# Patient Record
Sex: MALE | Race: WHITE | NOT HISPANIC OR LATINO | Employment: OTHER | URBAN - METROPOLITAN AREA
[De-identification: names, ages, dates, MRNs, and addresses within clinical notes are randomized per-mention and may not be internally consistent; named-entity substitution may affect disease eponyms.]

---

## 2017-12-27 ENCOUNTER — HOSPITAL ENCOUNTER (EMERGENCY)
Facility: HOSPITAL | Age: 52
Discharge: HOME | End: 2017-12-28
Attending: EMERGENCY MEDICINE | Admitting: EMERGENCY MEDICINE
Payer: COMMERCIAL

## 2017-12-27 DIAGNOSIS — T69.9XXA COLD EXPOSURE, INITIAL ENCOUNTER: Primary | ICD-10-CM

## 2017-12-28 VITALS
WEIGHT: 205 LBS | BODY MASS INDEX: 32.11 KG/M2 | SYSTOLIC BLOOD PRESSURE: 122 MMHG | HEART RATE: 100 BPM | OXYGEN SATURATION: 99 % | TEMPERATURE: 97.3 F | RESPIRATION RATE: 18 BRPM | DIASTOLIC BLOOD PRESSURE: 72 MMHG

## 2017-12-28 PROCEDURE — 99284 EMERGENCY DEPT VISIT MOD MDM: CPT

## 2017-12-28 RX ORDER — METHYLPHENIDATE HYDROCHLORIDE 10 MG/1
5 TABLET ORAL
COMMUNITY
End: 2018-10-09 | Stop reason: ALTCHOICE

## 2017-12-28 RX ORDER — PANTOPRAZOLE SODIUM 40 MG/1
40 TABLET, DELAYED RELEASE ORAL DAILY
COMMUNITY
End: 2018-08-03 | Stop reason: SDUPTHER

## 2017-12-28 RX ORDER — CLOZAPINE 50 MG/1
50 TABLET ORAL DAILY
Status: ON HOLD | COMMUNITY
End: 2019-12-23 | Stop reason: DRUGHIGH

## 2017-12-28 NOTE — ED ATTENDING ATTESTATION
Angelic Fish MD, saw and evaluated the patient  I have discussed the patient with the resident/non-physician practitioner and agree with the resident's/non-physician practitioner's findings, Plan of Care, and MDM as documented in the resident's/non-physician practitioner's note, except where noted  All available labs and Radiology studies were reviewed  At this point I agree with the current assessment done in the Emergency Department  I have conducted an independent evaluation of this patient a history and physical is as follows:      Critical Care Time  CritCare Time    Procedures     51 yo male from group home, was walking outside for 4 to 5 hours  Pt was seen by  and was brought in to due to frozen pants and concern for hypothermia  Pt with no current complaints  No cp, no sob, no abdominal pain, no headache  Vss, afebrile, lungs cta, rrr, abdomen soft nontender, no neuro deficits  Reassurance, discuss with [facility]

## 2017-12-28 NOTE — ED PROVIDER NOTES
History  Chief Complaint   Patient presents with    Cold Exposure     pt was walking around outside for approx 4-5hrs  pt was found by police with urine soaked/frozen pants  pt states "i was just walking, i live in a group home "     HPI  51-year-old male with known psychiatric history presents to the emergency department after being found by police walking through the streets  Patient states that he lives in a group home and that he went for a walk today  He reports having walked for about 4 hours in the cold today and that he was picked up by a  who drove by and saw him a with freezing pants  Patient denies any complaints or injuries states that he feels well and would like to go back to his group home  Review of history is negative other than stated above  Prior to Admission Medications   Prescriptions Last Dose Informant Patient Reported? Taking? Multiple Vitamin (DAILY DEVORA PO) 12/27/2017 at morning  Yes Yes   Sig: Take 1 tablet by mouth daily  aspirin 81 MG tablet 12/27/2017 at morning  Yes Yes   Sig: Take 81 mg by mouth daily  benztropine (COGENTIN) 1 mg tablet 12/27/2017 at morning  Yes Yes   Sig: Take 0 5 mg by mouth 2 (two) times a day     clozapine (CLOZARIL) 50 MG tablet 12/27/2017 at morning  Yes Yes   Sig: Take 50 mg by mouth daily   divalproex sodium (DEPAKOTE) 500 mg EC tablet 12/26/2017 at bedtime  Yes Yes   Sig: Take 1,000 mg by mouth daily at bedtime  divalproex sodium (DEPAKOTE) 500 mg EC tablet 12/27/2017 at morning time  Yes Yes   Sig: Take 500 mg by mouth every morning  levothyroxine 75 mcg tablet 12/27/2017 at morning  Yes Yes   Sig: Take 75 mcg by mouth daily  lisinopril (ZESTRIL) 5 mg tablet 12/27/2017 at morning  Yes Yes   Sig: Take 5 mg by mouth daily     metFORMIN (GLUCOPHAGE) 500 mg tablet 12/27/2017 at Unknown time  Yes Yes   Sig: Take 500 mg by mouth 2 (two) times a day with meals     methylphenidate (RITALIN) 10 mg tablet 12/27/2017 at morning time Yes Yes   Sig: Take 5 mg by mouth daily before breakfast   pantoprazole (PROTONIX) 40 mg tablet 12/27/2017 at morning  Yes Yes   Sig: Take 40 mg by mouth daily      Facility-Administered Medications: None       Past Medical History:   Diagnosis Date    Bipolar 1 disorder ([place] [address] )     Constipation     Diabetes ([place] [address] )     Disease of thyroid gland     GERD (gastroesophageal reflux disease)     Hypertension     Hyponatremia     Hypothyroidism     Psychiatric disorder     Schizo affective schizophrenia ([place] [address] )        History reviewed  No pertinent surgical history  Family History   Problem Relation Age of Onset    Family history unknown: Yes     I have reviewed and agree with the history as documented  Social History   Substance Use Topics    Smoking status: Current Every Day Smoker     Packs/day: 2 00     Types: Cigarettes    Smokeless tobacco: Never Used    Alcohol use No        Review of Systems   Constitutional: Negative for fatigue and fever  Eyes: Negative for visual disturbance  Respiratory: Negative for cough and shortness of breath  Cardiovascular: Negative for chest pain and leg swelling  Gastrointestinal: Negative for abdominal pain, nausea and vomiting  Genitourinary: Negative for dysuria  Musculoskeletal: Negative for gait problem  Skin: Negative for rash and wound  Neurological: Negative for dizziness and headaches  Psychiatric/Behavioral: The patient is not nervous/anxious  All other systems reviewed and are negative        Physical Exam  ED Triage Vitals [12/27/17 2327]   Temperature Pulse Respirations Blood Pressure SpO2   (!) 97 3 °F (36 3 °C) 97 20 101/60 98 %      Temp Source Heart Rate Source Patient Position - Orthostatic VS BP Location FiO2 (%)   Oral Monitor Lying Right arm --      Pain Score       No Pain           Orthostatic Vital Signs  Vitals:    12/27/17 2327 12/28/17 0000 12/28/17 0030   BP: 101/60 91/56 122/72   Pulse: 97 92 100   Patient Position - Orthostatic VS: Lying Lying Lying       Physical Exam   Constitutional: He appears well-nourished  No distress  HENT:   Head: Normocephalic and atraumatic  Eyes: EOM are normal    Neck: Normal range of motion  Neck supple  Cardiovascular: Normal rate and regular rhythm  Pulmonary/Chest: Effort normal and breath sounds normal  No respiratory distress  Abdominal: Soft  He exhibits no distension  There is no tenderness  Musculoskeletal: Normal range of motion  Neurological: He is alert  Skin: Skin is warm and dry  He is not diaphoretic  Psychiatric: He has a normal mood and affect  His behavior is normal  Cognition and memory are normal    Nursing note and vitals reviewed  ED Medications  Medications - No data to display    Diagnostic Studies  Results Reviewed     None                 No orders to display         Procedures  Procedures      Phone Consults  ED Phone Contact    ED Course  ED Course                                MDM  Number of Diagnoses or Management Options  Cold exposure, initial encounter:   Diagnosis management comments: 53-year-old male picked up by police after found walking in the cold  Patient denies any complaints at this time  Okay for discharge back to group home  CritCare Time    Disposition  Final diagnoses:   Cold exposure, initial encounter     Time reflects when diagnosis was documented in both MDM as applicable and the Disposition within this note     Time User Action Codes Description Comment    12/28/2017  1:07 AM Danyelle Crain  9XXA] Cold exposure, initial encounter       ED Disposition     ED Disposition Condition Comment    Discharge  Garry Granados discharge to home/self care  Condition at discharge: Good        Follow-up Information     Follow up With Specialties Details Why Contact Info Additional Information    Bessie Jessica MD   As needed [address] [facility] Emergency Department Emergency Medicine  If symptoms worsen [address]  [phone]  ED, [address] [place] [address], [phone]        Patient's Medications   Discharge Prescriptions    No medications on file     No discharge procedures on file  ED Provider  Attending physically available and evaluated Teodoro Chang I managed the patient along with the ED Attending      Electronically Signed by         Hilda Abarca MD  Resident  12/28/17 9477

## 2017-12-28 NOTE — ED NOTES
[facility] ACT worker at the bedside, Brody Gerard  Requested she be called and updated on pt status  [phone]   Pt lives at a group home Rayna Zhu pa [address] [facility] [place], RN  12/28/17 8331

## 2018-03-29 LAB
ABSOL LYMPHOCYTES (HISTORICAL): 3 K/UL (ref 0.5–4)
BASOPHILS # BLD AUTO: 0 % (ref 0–1)
BASOPHILS # BLD AUTO: 0 K/UL (ref 0–0.1)
DEPRECATED RDW RBC AUTO: 15.2 %
EOSINOPHIL # BLD AUTO: 0 K/UL (ref 0–0.4)
EOSINOPHIL NFR BLD AUTO: 0 % (ref 0–6)
HCT VFR BLD AUTO: 41.2 % (ref 41–53)
HGB BLD-MCNC: 13.8 G/DL (ref 13.5–17.5)
LYMPHOCYTES NFR BLD AUTO: 26 % (ref 25–45)
MCH RBC QN AUTO: 29.4 PG (ref 26–34)
MCHC RBC AUTO-ENTMCNC: 33.5 % (ref 31–36)
MCV RBC AUTO: 88 FL (ref 80–100)
MONOCYTES # BLD AUTO: 1.1 K/UL (ref 0.2–0.9)
MONOCYTES NFR BLD AUTO: 10 % (ref 1–10)
NEUTROPHILS ABS COUNT (HISTORICAL): 7.2 K/UL (ref 1.8–7.8)
NEUTS SEG NFR BLD AUTO: 64 % (ref 45–65)
PLATELET # BLD AUTO: 130 K/MCL (ref 150–450)
RBC # BLD AUTO: 4.68 M/MCL (ref 4.5–5.9)
WBC # BLD AUTO: 11.3 K/MCL (ref 4.5–11)

## 2018-04-03 LAB
ABSOL LYMPHOCYTES (HISTORICAL): 3.6 K/UL (ref 0.5–4)
ALBUMIN SERPL BCP-MCNC: 3.7 G/DL (ref 3–5.2)
ALP SERPL-CCNC: 54 U/L (ref 43–122)
ALT SERPL W P-5'-P-CCNC: 26 U/L (ref 9–52)
ANION GAP SERPL CALCULATED.3IONS-SCNC: 7 MMOL/L (ref 5–14)
AST SERPL W P-5'-P-CCNC: 26 U/L (ref 17–59)
BASOPHILS # BLD AUTO: 0.1 K/UL (ref 0–0.1)
BASOPHILS # BLD AUTO: 1 % (ref 0–1)
BILIRUB SERPL-MCNC: 0.2 MG/DL
BUN SERPL-MCNC: 9 MG/DL (ref 5–25)
CALCIUM SERPL-MCNC: 8.9 MG/DL (ref 8.4–10.2)
CHLORIDE SERPL-SCNC: 99 MEQ/L (ref 97–108)
CHOLEST SERPL-MCNC: 134 MG/DL
CHOLEST/HDLC SERPL: 3.8 {RATIO}
CO2 SERPL-SCNC: 28 MMOL/L (ref 22–30)
CREATINE, SERUM (HISTORICAL): 0.53 MG/DL (ref 0.7–1.5)
DEPRECATED RDW RBC AUTO: 15.5 %
EGFR (HISTORICAL): >60 ML/MIN/1.73 M2
EOSINOPHIL # BLD AUTO: 0 K/UL (ref 0–0.4)
EOSINOPHIL NFR BLD AUTO: 0 % (ref 0–6)
EST. AVERAGE GLUCOSE BLD GHB EST-MCNC: 117 MG/DL
GLUCOSE SERPL-MCNC: 86 MG/DL (ref 70–99)
HBA1C MFR BLD HPLC: 5.7 %
HCT VFR BLD AUTO: 41.9 % (ref 41–53)
HDLC SERPL-MCNC: 35 MG/DL
HGB BLD-MCNC: 14.1 G/DL (ref 13.5–17.5)
LDL/HDL RATIO (HISTORICAL): 2.2
LDLC SERPL CALC-MCNC: 78 MG/DL
LYMPHOCYTES NFR BLD AUTO: 33 % (ref 25–45)
MCH RBC QN AUTO: 29.4 PG (ref 26–34)
MCHC RBC AUTO-ENTMCNC: 33.6 % (ref 31–36)
MCV RBC AUTO: 88 FL (ref 80–100)
MONOCYTES # BLD AUTO: 1.1 K/UL (ref 0.2–0.9)
MONOCYTES NFR BLD AUTO: 10 % (ref 1–10)
NEUTROPHILS ABS COUNT (HISTORICAL): 6.1 K/UL (ref 1.8–7.8)
NEUTS SEG NFR BLD AUTO: 56 % (ref 45–65)
PLATELET # BLD AUTO: 141 K/MCL (ref 150–450)
POTASSIUM SERPL-SCNC: 4 MEQ/L (ref 3.6–5)
PSA (HISTORICAL): 0.58 NG/ML
RBC # BLD AUTO: 4.79 M/MCL (ref 4.5–5.9)
SODIUM SERPL-SCNC: 134 MEQ/L (ref 137–147)
TOTAL PROTEIN (HISTORICAL): 6.2 G/DL (ref 5.9–8.4)
TRIGL SERPL-MCNC: 105 MG/DL
TSH SERPL DL<=0.05 MIU/L-ACNC: 1.25 UIU/ML (ref 0.47–4.68)
VLDLC SERPL CALC-MCNC: 21 MG/DL (ref 0–40)
WBC # BLD AUTO: 10.9 K/MCL (ref 4.5–11)

## 2018-04-13 LAB
ABSOL LYMPHOCYTES (HISTORICAL): 2.2 K/UL (ref 0.5–4)
BASOPHILS # BLD AUTO: 0 % (ref 0–1)
BASOPHILS # BLD AUTO: 0 K/UL (ref 0–0.1)
DEPRECATED RDW RBC AUTO: 15.2 %
EOSINOPHIL # BLD AUTO: 0 K/UL (ref 0–0.4)
EOSINOPHIL NFR BLD AUTO: 0 % (ref 0–6)
HCT VFR BLD AUTO: 43.9 % (ref 41–53)
HGB BLD-MCNC: 14.5 G/DL (ref 13.5–17.5)
LYMPHOCYTES NFR BLD AUTO: 21 % (ref 25–45)
MCH RBC QN AUTO: 28.9 PG (ref 26–34)
MCHC RBC AUTO-ENTMCNC: 33 % (ref 31–36)
MCV RBC AUTO: 88 FL (ref 80–100)
MONOCYTES # BLD AUTO: 1.2 K/UL (ref 0.2–0.9)
MONOCYTES NFR BLD AUTO: 11 % (ref 1–10)
NEUTROPHILS ABS COUNT (HISTORICAL): 7 K/UL (ref 1.8–7.8)
NEUTS SEG NFR BLD AUTO: 68 % (ref 45–65)
PLATELET # BLD AUTO: 140 K/MCL (ref 150–450)
RBC # BLD AUTO: 5.01 M/MCL (ref 4.5–5.9)
WBC # BLD AUTO: 10.4 K/MCL (ref 4.5–11)

## 2018-04-27 LAB
ABSOL LYMPHOCYTES (HISTORICAL): 2 K/UL (ref 0.5–4)
BASOPHILS # BLD AUTO: 0 % (ref 0–1)
BASOPHILS # BLD AUTO: 0 K/UL (ref 0–0.1)
DEPRECATED RDW RBC AUTO: 14.9 %
EOSINOPHIL # BLD AUTO: 0 K/UL (ref 0–0.4)
EOSINOPHIL NFR BLD AUTO: 0 % (ref 0–6)
HCT VFR BLD AUTO: 44 % (ref 41–53)
HGB BLD-MCNC: 14.4 G/DL (ref 13.5–17.5)
LYMPHOCYTES NFR BLD AUTO: 26 % (ref 25–45)
MCH RBC QN AUTO: 29 PG (ref 26–34)
MCHC RBC AUTO-ENTMCNC: 32.8 % (ref 31–36)
MCV RBC AUTO: 89 FL (ref 80–100)
MONOCYTES # BLD AUTO: 1 K/UL (ref 0.2–0.9)
MONOCYTES NFR BLD AUTO: 12 % (ref 1–10)
NEUTROPHILS ABS COUNT (HISTORICAL): 4.8 K/UL (ref 1.8–7.8)
NEUTS SEG NFR BLD AUTO: 62 % (ref 45–65)
PLATELET # BLD AUTO: 144 K/MCL (ref 150–450)
RBC # BLD AUTO: 4.97 M/MCL (ref 4.5–5.9)
WBC # BLD AUTO: 7.8 K/MCL (ref 4.5–11)

## 2018-05-11 LAB
ABSOL LYMPHOCYTES (HISTORICAL): 2.4 K/UL (ref 0.5–4)
ACETAMINOPHEN LEVEL (HISTORICAL): <10 UG/ML (ref 10–30)
ALBUMIN SERPL BCP-MCNC: 4.4 G/DL (ref 3–5.2)
ALP SERPL-CCNC: 60 U/L (ref 43–122)
ALT SERPL W P-5'-P-CCNC: 31 U/L (ref 9–52)
AMPHETAMINE URINE (HISTORICAL): NEGATIVE
ANION GAP SERPL CALCULATED.3IONS-SCNC: 10 MMOL/L (ref 5–14)
AST SERPL W P-5'-P-CCNC: 28 U/L (ref 17–59)
BARBITURATE URINE (HISTORICAL): NEGATIVE
BASOPHILS # BLD AUTO: 0 % (ref 0–1)
BASOPHILS # BLD AUTO: 0 K/UL (ref 0–0.1)
BENZODIAZEPINE URINE (HISTORICAL): NEGATIVE
BILIRUB SERPL-MCNC: 0.3 MG/DL
BILIRUB UR QL STRIP: NEGATIVE MG/DL
BUN SERPL-MCNC: 12 MG/DL (ref 5–25)
CALCIUM SERPL-MCNC: 9.5 MG/DL (ref 8.4–10.2)
CHLORIDE SERPL-SCNC: 105 MEQ/L (ref 97–108)
CLARITY UR: CLEAR
CO2 SERPL-SCNC: 26 MMOL/L (ref 22–30)
COCAINE (METAB.), URINE (HISTORICAL): NEGATIVE
COLOR UR: YELLOW
COMMENT (HISTORICAL): ABNORMAL
COMMENT (HISTORICAL): NORMAL
CREATINE, SERUM (HISTORICAL): 0.61 MG/DL (ref 0.7–1.5)
DEPRECATED RDW RBC AUTO: 15.2 %
DRUG COMMENT (HISTORICAL): NORMAL
EGFR (HISTORICAL): >60 ML/MIN/1.73 M2
EOSINOPHIL # BLD AUTO: 0 K/UL (ref 0–0.4)
EOSINOPHIL NFR BLD AUTO: 0 % (ref 0–6)
GLUCOSE SERPL-MCNC: 91 MG/DL (ref 70–99)
GLUCOSE SERPL-MCNC: 92 MG/DL (ref 70–99)
GLUCOSE UR STRIP-MCNC: NEGATIVE MG/DL
HCT VFR BLD AUTO: 49.3 % (ref 41–53)
HGB BLD-MCNC: 16 G/DL (ref 13.5–17.5)
HGB UR QL STRIP.AUTO: NEGATIVE
KETONES UR STRIP-MCNC: NEGATIVE MG/DL
LEUKOCYTE ESTERASE UR QL STRIP: NEGATIVE
LYMPHOCYTES NFR BLD AUTO: 14 % (ref 25–45)
MCH RBC QN AUTO: 28.6 PG (ref 26–34)
MCHC RBC AUTO-ENTMCNC: 32.4 % (ref 31–36)
MCV RBC AUTO: 88 FL (ref 80–100)
MDMA (GC/MS) (HISTORICAL): NEGATIVE
METHADONE URINE (HISTORICAL): NEGATIVE
METHAMPHETAMINE URINE (HISTORICAL): NEGATIVE
METHYL ALCOHOL (HISTORICAL): NEGATIVE MG/DL
MONOCYTES # BLD AUTO: 1 K/UL (ref 0.2–0.9)
MONOCYTES NFR BLD AUTO: 6 % (ref 1–10)
NEUTROPHILS ABS COUNT (HISTORICAL): 13.9 K/UL (ref 1.8–7.8)
NEUTS SEG NFR BLD AUTO: 80 % (ref 45–65)
NITRITE UR QL STRIP: NEGATIVE
OPIATES (HISTORICAL): NEGATIVE
OXYCODONE (HISTORICAL): NEGATIVE
PH UR STRIP.AUTO: 6 [PH] (ref 4.5–8)
PHENCYCLIDINE URINE (HISTORICAL): NEGATIVE
PLATELET # BLD AUTO: 152 K/MCL (ref 150–450)
POTASSIUM SERPL-SCNC: 4.9 MEQ/L (ref 3.6–5)
PROT UR STRIP-MCNC: NEGATIVE MG/DL
RBC # BLD AUTO: 5.59 M/MCL (ref 4.5–5.9)
RBC MORPHOLOGY (HISTORICAL): ABNORMAL
SALICYLATE LEVEL (HISTORICAL): <1 MG/DL (ref 10–30)
SODIUM SERPL-SCNC: 141 MEQ/L (ref 137–147)
SP GR UR STRIP.AUTO: 1 (ref 1–1.04)
THC URINE (HISTORICAL): NEGATIVE
TOTAL PROTEIN (HISTORICAL): 7.2 G/DL (ref 5.9–8.4)
TRICYCLICS URINE (HISTORICAL): NEGATIVE
TSH SERPL DL<=0.05 MIU/L-ACNC: 0.51 UIU/ML (ref 0.47–4.68)
UROBILINOGEN UR QL STRIP.AUTO: NEGATIVE MG/DL (ref 0–1)
VALPROIC ACID TOTAL (HISTORICAL): 63.4 UG/ML (ref 50–120)
WBC # BLD AUTO: 17.3 K/MCL (ref 4.5–11)

## 2018-05-31 LAB
ABSOL LYMPHOCYTES (HISTORICAL): 2.4 K/UL (ref 0.5–4)
BANDS (HISTORICAL): 1 % (ref 3–11)
COMMENT (HISTORICAL): ABNORMAL
DEPRECATED RDW RBC AUTO: 14.9 %
HCT VFR BLD AUTO: 44.9 % (ref 41–53)
HGB BLD-MCNC: 15.2 G/DL (ref 13.5–17.5)
LYMPHOCYTES NFR BLD AUTO: 29 % (ref 25–45)
MCH RBC QN AUTO: 30.5 PG (ref 26–34)
MCHC RBC AUTO-ENTMCNC: 33.8 % (ref 31–36)
MCV RBC AUTO: 90 FL (ref 80–100)
MONOCYTES # BLD AUTO: 1.1 K/UL (ref 0.2–0.9)
MONOCYTES NFR BLD AUTO: 13 % (ref 1–10)
NEUTROPHILS ABS COUNT (HISTORICAL): 4.7 K/UL (ref 1.8–7.8)
NEUTS SEG NFR BLD AUTO: 57 % (ref 45–65)
PLATELET # BLD AUTO: 152 K/MCL (ref 150–450)
RBC # BLD AUTO: 4.98 M/MCL (ref 4.5–5.9)
RBC MORPHOLOGY (HISTORICAL): ABNORMAL
WBC # BLD AUTO: 8.2 K/MCL (ref 4.5–11)

## 2018-06-22 LAB
ABSOL LYMPHOCYTES (HISTORICAL): 2.2 K/UL (ref 0.5–4)
BASOPHILS # BLD AUTO: 0 % (ref 0–1)
BASOPHILS # BLD AUTO: 0 K/UL (ref 0–0.1)
DEPRECATED RDW RBC AUTO: 15 %
EOSINOPHIL # BLD AUTO: 0 K/UL (ref 0–0.4)
EOSINOPHIL NFR BLD AUTO: 0 % (ref 0–6)
HCT VFR BLD AUTO: 47.4 % (ref 41–53)
HGB BLD-MCNC: 15.7 G/DL (ref 13.5–17.5)
LYMPHOCYTES NFR BLD AUTO: 21 % (ref 25–45)
MCH RBC QN AUTO: 30 PG (ref 26–34)
MCHC RBC AUTO-ENTMCNC: 33.2 % (ref 31–36)
MCV RBC AUTO: 91 FL (ref 80–100)
MONOCYTES # BLD AUTO: 1 K/UL (ref 0.2–0.9)
MONOCYTES NFR BLD AUTO: 10 % (ref 1–10)
NEUTROPHILS ABS COUNT (HISTORICAL): 7.4 K/UL (ref 1.8–7.8)
NEUTS SEG NFR BLD AUTO: 69 % (ref 45–65)
PLATELET # BLD AUTO: 138 K/MCL (ref 150–450)
RBC # BLD AUTO: 5.24 M/MCL (ref 4.5–5.9)
WBC # BLD AUTO: 10.7 K/MCL (ref 4.5–11)

## 2018-06-27 ENCOUNTER — HOSPITAL ENCOUNTER (EMERGENCY)
Facility: HOSPITAL | Age: 53
Discharge: HOME | End: 2018-06-28
Attending: EMERGENCY MEDICINE | Admitting: EMERGENCY MEDICINE
Payer: COMMERCIAL

## 2018-06-27 VITALS
WEIGHT: 185 LBS | HEART RATE: 84 BPM | BODY MASS INDEX: 28.98 KG/M2 | SYSTOLIC BLOOD PRESSURE: 144 MMHG | DIASTOLIC BLOOD PRESSURE: 84 MMHG | OXYGEN SATURATION: 96 % | TEMPERATURE: 96.6 F | RESPIRATION RATE: 18 BRPM

## 2018-06-27 DIAGNOSIS — R10.9 ABDOMINAL PAIN: Primary | ICD-10-CM

## 2018-06-28 PROCEDURE — 99284 EMERGENCY DEPT VISIT MOD MDM: CPT

## 2018-06-28 NOTE — ED PROVIDER NOTES
Pt Name: Kirstie Carias  MRN: [id number]  [place] 1965  Age/Sex: 50 y o  male  Date of evaluation: 6/27/2018  PCP: Telly Guerrero MD    [address] [place] [address]    Chief Complaint   Patient presents with    Abdominal Pain     pt states that he started with ABD pain this morning  pt states that his Abd pain stopped this afternoon  pt denies any pain at this time  pt denies N/V/D  pt states that he wants to go tot he 3rd floor to ICU  pt states he wants to be admitted to the ICU floor  HPI    50 y o  male presenting with complaint of abdominal pain that has since resolved  Patient states that his stomach hurt some this morning, but resolved this afternoon  He states he still able eat and drink  Patient also complains that he does not want to take his medication anymore and does not want to be doped up  He also states these not want to go back to the home where he is living  Patient also states that he is going to save up his money and get a  at the [phone]  He is currently denying any symptoms to include abdominal pain, nausea, vomiting, diarrhea  HPI      Past Medical and Surgical History    Past Medical History:   Diagnosis Date    Bipolar 1 disorder ([facility] )     Constipation     Diabetes ([facility] )     Disease of thyroid gland     GERD (gastroesophageal reflux disease)     Hypertension     Hyponatremia     Hypothyroidism     Psychiatric disorder     Schizo affective schizophrenia ([place] [address] )        History reviewed  No pertinent surgical history      Family History   Problem Relation Age of Onset    Family history unknown: Yes       Social History   Substance Use Topics    Smoking status: Current Every Day Smoker     Packs/day: 2 00     Types: Cigarettes    Smokeless tobacco: Never Used    Alcohol use No           Allergies    Allergies   Allergen Reactions    Penicillins Anaphylaxis    Carbapenems     Cephalosporins        Home Medications    Prior to Admission medications    Medication Sig Start Date End Date Taking? Authorizing Provider   aspirin 81 MG tablet Take 81 mg by mouth daily  Historical Provider, MD   benztropine (COGENTIN) 1 mg tablet Take 0 5 mg by mouth 2 (two) times a day      Historical Provider, MD   clozapine (CLOZARIL) 50 MG tablet Take 50 mg by mouth daily    Historical Provider, MD   divalproex sodium (DEPAKOTE) 500 mg EC tablet Take 1,000 mg by mouth daily at bedtime  Historical Provider, MD   divalproex sodium (DEPAKOTE) 500 mg EC tablet Take 500 mg by mouth every morning  Historical Provider, MD   levothyroxine 75 mcg tablet Take 75 mcg by mouth daily  Historical Provider, MD   lisinopril (ZESTRIL) 5 mg tablet Take 5 mg by mouth daily  Historical Provider, MD   metFORMIN (GLUCOPHAGE) 500 mg tablet Take 500 mg by mouth 2 (two) times a day with meals      Historical Provider, MD   methylphenidate (RITALIN) 10 mg tablet Take 5 mg by mouth daily before breakfast    Historical Provider, MD   Multiple Vitamin (DAILY DEVORA PO) Take 1 tablet by mouth daily  Historical Provider, MD   pantoprazole (PROTONIX) 40 mg tablet Take 40 mg by mouth daily    Historical Provider, MD           Review of Systems    Review of Systems   Constitutional: Negative for appetite change, chills and diaphoresis  HENT: Negative for drooling, facial swelling, trouble swallowing and voice change  Respiratory: Negative for apnea, shortness of breath and wheezing  Cardiovascular: Negative for chest pain and leg swelling  Gastrointestinal: Positive for abdominal pain  Negative for abdominal distention, diarrhea, nausea and vomiting  Genitourinary: Negative for dysuria and urgency  Musculoskeletal: Negative for arthralgias, back pain, gait problem and neck pain  Skin: Negative for color change, rash and wound  Neurological: Negative for seizures, speech difficulty, weakness and headaches  Psychiatric/Behavioral: Negative for agitation, behavioral problems and dysphoric mood   The patient is not nervous/anxious  All other systems reviewed and negative  Physical Exam      ED Triage Vitals [06/27/18 2300]   Temperature Pulse Respirations Blood Pressure SpO2   (!) 96 6 °F (35 9 °C) 84 18 144/84 96 %      Temp Source Heart Rate Source Patient Position - Orthostatic VS BP Location FiO2 (%)   Temporal Monitor Sitting Left arm --      Pain Score       No Pain               Physical Exam   Constitutional: He is oriented to person, place, and time  He appears well-developed and well-nourished  HENT:   Head: Normocephalic and atraumatic  Eyes: Conjunctivae and EOM are normal  Pupils are equal, round, and reactive to light  Neck: Normal range of motion  Neck supple  No tracheal deviation present  Cardiovascular: Normal rate, regular rhythm, normal heart sounds and intact distal pulses  No murmur heard  Pulmonary/Chest: Effort normal and breath sounds normal  No stridor  No respiratory distress  He has no wheezes  He has no rales  Abdominal: Soft  He exhibits no distension and no mass  There is no tenderness  There is no rebound and no guarding  Negative Hyatt sign, no pain at McBurney's point, negative Rovsing's  No tenderness anywhere on abdominal exam    Musculoskeletal: Normal range of motion  He exhibits no edema or deformity  Neurological: He is alert and oriented to person, place, and time  Skin: Skin is warm and dry  No rash noted  Psychiatric: He has a normal mood and affect   His behavior is normal  Judgment and thought content normal             Diagnostic Results      Labs:    Results for orders placed or performed in visit on 06/22/18   CBC AND DIFFERENTIAL (HISTORICAL)   Result Value Ref Range    WBC 10 7 4 5 - 11 0 K/MCL    RBC 5 24 4 50 - 5 90 M/MCL    Hemoglobin 15 7 13 5 - 17 5 G/DL    Hematocrit 47 4 41 0 - 53 0 %    MCV 91 80 - 100 FL    MCH 30 0 26 0 - 34 0 PG    MCHC 33 2 31 0 - 36 0 %    RDW 15 0 <15 3 %    Platelets 307 (L) 214 - 450 K/MCL Neutrophils Relative 69 (H) 45 - 65 %    NEUTROPHILS ABS COUNT 7 4 1 8 - 7 8 K/uL    ABSOL LYMPHOCYTES 2 2 0 5 - 4 0 K/uL    Monocytes Absolute 1 0 (H) 0 2 - 0 9 K/uL    Eosinophils Absolute 0 0 0 0 - 0 4 K/uL    Basophils Absolute 0 0 0 0 - 0 1 K/uL    Lymphocytes Relative 21 (L) 25 - 45 %    Monocytes Relative 10 1 - 10 %    Eosinophils Relative 0 0 - 6 %    Basophils Relative 0 0 - 1 %       All labs reviewed and utilized in the medical decision making process    Radiology:    No orders to display       All radiology studies independently viewed by me and interpreted by the radiologist     Procedure    Procedures    CritCare Time      ED Course of Care and Re-Assessments      Patient asymptomatic throughout stay, when his  arrived to the ER, patient requested discharge  Medications - No data to display        FINAL IMPRESSION    Final diagnoses:   Abdominal pain         DISPOSITION/PLAN    82-year-old male with history and symptoms above  Vital signs reassuring, examination unremarkable  All symptoms resolved prior to evaluation and did not recur in ER  Patient displayed slightly disordered thinking throughout interview, but no evidence for acute threat to himself or others  Discussed with patient's  on his arrival, who felt comfortable with patient returning to his group home  Discharged with strict return precautions, follow up with primary care doctor  Time reflects when diagnosis was documented in both MDM as applicable and the Disposition within this note     Time User Action Codes Description Comment    6/27/2018 11:56 PM Cloria Cogan T Add [R10 9] Abdominal pain       ED Disposition     ED Disposition Condition Comment    Discharge  Darrell Lesser Alpha discharge to home/self care      Condition at discharge: Good        Follow-up Information     Follow up With Specialties Details Why Contact Info    Stalin Kelley MD Family Medicine Call in 1 day As needed [address] [place] [address] [address]  [phone]              PATIENT REFERRED TO:    Ulysses Chatters, MD  [address]  [address]    Call in 1 day  As needed      DISCHARGE MEDICATIONS:    Discharge Medication List as of 6/27/2018 11:56 PM      CONTINUE these medications which have NOT CHANGED    Details   benztropine (COGENTIN) 1 mg tablet Take 0 5 mg by mouth 2 (two) times a day  , Historical Med      clozapine (CLOZARIL) 50 MG tablet Take 50 mg by mouth daily, Historical Med      !! divalproex sodium (DEPAKOTE) 500 mg EC tablet Take 1,000 mg by mouth daily at bedtime  , Until Discontinued, Historical Med      !! divalproex sodium (DEPAKOTE) 500 mg EC tablet Take 500 mg by mouth every morning , Until Discontinued, Historical Med      lisinopril (ZESTRIL) 5 mg tablet Take 5 mg by mouth daily  , Until Discontinued, Historical Med      metFORMIN (GLUCOPHAGE) 500 mg tablet Take 500 mg by mouth 2 (two) times a day with meals  , Historical Med      Multiple Vitamin (DAILY DEVORA PO) Take 1 tablet by mouth daily  , Until Discontinued, Historical Med      levothyroxine 75 mcg tablet Take 75 mcg by mouth daily  , Until Discontinued, Historical Med      methylphenidate (RITALIN) 10 mg tablet Take 5 mg by mouth daily before breakfast, Historical Med      pantoprazole (PROTONIX) 40 mg tablet Take 40 mg by mouth daily, Historical Med       !! - Potential duplicate medications found  Please discuss with provider  No discharge procedures on file           MD Chadwick Corrales MD  06/28/18 [id number]

## 2018-06-28 NOTE — DISCHARGE INSTRUCTIONS

## 2018-07-03 ENCOUNTER — TELEPHONE (OUTPATIENT)
Dept: FAMILY MEDICINE CLINIC | Facility: CLINIC | Age: 53
End: 2018-07-03

## 2018-07-13 PROBLEM — L84 FOOT CALLUS: Status: ACTIVE | Noted: 2017-07-19

## 2018-07-17 DIAGNOSIS — E11.9 TYPE 2 DIABETES MELLITUS WITHOUT COMPLICATION, WITHOUT LONG-TERM CURRENT USE OF INSULIN (HCC): Primary | ICD-10-CM

## 2018-07-17 DIAGNOSIS — E11.69 TYPE 2 DIABETES MELLITUS WITH OTHER SPECIFIED COMPLICATION, WITHOUT LONG-TERM CURRENT USE OF INSULIN (HCC): ICD-10-CM

## 2018-07-19 ENCOUNTER — APPOINTMENT (OUTPATIENT)
Dept: LAB | Facility: HOSPITAL | Age: 53
End: 2018-07-19
Payer: COMMERCIAL

## 2018-07-19 ENCOUNTER — TRANSCRIBE ORDERS (OUTPATIENT)
Dept: ADMINISTRATIVE | Facility: HOSPITAL | Age: 53
End: 2018-07-19

## 2018-07-19 DIAGNOSIS — E13.8 DIABETES MELLITUS OF OTHER TYPE WITH COMPLICATION, UNSPECIFIED WHETHER LONG TERM INSULIN USE: ICD-10-CM

## 2018-07-19 DIAGNOSIS — I10 ESSENTIAL HYPERTENSION, MALIGNANT: ICD-10-CM

## 2018-07-19 DIAGNOSIS — E13.8 DIABETES MELLITUS OF OTHER TYPE WITH COMPLICATION, UNSPECIFIED WHETHER LONG TERM INSULIN USE: Primary | ICD-10-CM

## 2018-07-19 DIAGNOSIS — E11.9 TYPE 2 DIABETES MELLITUS WITHOUT COMPLICATION, WITHOUT LONG-TERM CURRENT USE OF INSULIN (HCC): ICD-10-CM

## 2018-07-19 LAB
ALBUMIN SERPL BCP-MCNC: 3.8 G/DL (ref 3–5.2)
ALP SERPL-CCNC: 70 U/L (ref 43–122)
ALT SERPL W P-5'-P-CCNC: 23 U/L (ref 9–52)
ANION GAP SERPL CALCULATED.3IONS-SCNC: 7 MMOL/L (ref 5–14)
AST SERPL W P-5'-P-CCNC: 21 U/L (ref 17–59)
BASOPHILS # BLD AUTO: 0 THOUSANDS/ΜL (ref 0–0.1)
BASOPHILS NFR BLD AUTO: 0 % (ref 0–1)
BILIRUB SERPL-MCNC: 0.2 MG/DL
BUN SERPL-MCNC: 11 MG/DL (ref 5–25)
CALCIUM SERPL-MCNC: 9 MG/DL (ref 8.4–10.2)
CHLORIDE SERPL-SCNC: 103 MMOL/L (ref 97–108)
CHOLEST SERPL-MCNC: 101 MG/DL
CO2 SERPL-SCNC: 29 MMOL/L (ref 22–30)
CREAT SERPL-MCNC: 0.6 MG/DL (ref 0.7–1.5)
CREAT UR-MCNC: 64.8 MG/DL
EOSINOPHIL # BLD AUTO: 0 THOUSAND/ΜL (ref 0–0.4)
EOSINOPHIL NFR BLD AUTO: 0 % (ref 0–6)
ERYTHROCYTE [DISTWIDTH] IN BLOOD BY AUTOMATED COUNT: 14.7 %
EST. AVERAGE GLUCOSE BLD GHB EST-MCNC: 126 MG/DL
GFR SERPL CREATININE-BSD FRML MDRD: 116 ML/MIN/1.73SQ M
GLUCOSE P FAST SERPL-MCNC: 94 MG/DL (ref 70–99)
HBA1C MFR BLD: 6 % (ref 4.2–6.3)
HCT VFR BLD AUTO: 45.5 % (ref 41–53)
HDLC SERPL-MCNC: 38 MG/DL (ref 40–59)
HGB BLD-MCNC: 15.3 G/DL (ref 13.5–17.5)
LDLC SERPL CALC-MCNC: 44 MG/DL
LYMPHOCYTES # BLD AUTO: 2.6 THOUSANDS/ΜL (ref 0.5–4)
LYMPHOCYTES NFR BLD AUTO: 27 % (ref 20–50)
MCH RBC QN AUTO: 30.4 PG (ref 26–34)
MCHC RBC AUTO-ENTMCNC: 33.7 G/DL (ref 31–36)
MCV RBC AUTO: 90 FL (ref 80–100)
MICROALBUMIN UR-MCNC: 14.5 MG/L (ref 0–20)
MICROALBUMIN/CREAT 24H UR: 22 MG/G CREATININE (ref 0–30)
MONOCYTES # BLD AUTO: 1 THOUSAND/ΜL (ref 0.2–0.9)
MONOCYTES NFR BLD AUTO: 11 % (ref 1–10)
NEUTROPHILS # BLD AUTO: 6 THOUSANDS/ΜL (ref 1.8–7.8)
NEUTS SEG NFR BLD AUTO: 62 % (ref 45–65)
NONHDLC SERPL-MCNC: 63 MG/DL
PLATELET # BLD AUTO: 137 THOUSANDS/UL (ref 150–450)
PMV BLD AUTO: 8.9 FL (ref 8.9–12.7)
POTASSIUM SERPL-SCNC: 4.1 MMOL/L (ref 3.6–5)
PROT SERPL-MCNC: 6.8 G/DL (ref 5.9–8.4)
RBC # BLD AUTO: 5.04 MILLION/UL (ref 4.5–5.9)
SODIUM SERPL-SCNC: 139 MMOL/L (ref 137–147)
TRIGL SERPL-MCNC: 94 MG/DL
TSH SERPL DL<=0.05 MIU/L-ACNC: 1.43 UIU/ML (ref 0.47–4.68)
WBC # BLD AUTO: 9.6 THOUSAND/UL (ref 4.5–11)

## 2018-07-19 PROCEDURE — 80061 LIPID PANEL: CPT

## 2018-07-19 PROCEDURE — 82570 ASSAY OF URINE CREATININE: CPT | Performed by: FAMILY MEDICINE

## 2018-07-19 PROCEDURE — 85025 COMPLETE CBC W/AUTO DIFF WBC: CPT

## 2018-07-19 PROCEDURE — 36415 COLL VENOUS BLD VENIPUNCTURE: CPT

## 2018-07-19 PROCEDURE — 82043 UR ALBUMIN QUANTITATIVE: CPT | Performed by: FAMILY MEDICINE

## 2018-07-19 PROCEDURE — 84443 ASSAY THYROID STIM HORMONE: CPT

## 2018-07-19 PROCEDURE — 83036 HEMOGLOBIN GLYCOSYLATED A1C: CPT

## 2018-07-19 PROCEDURE — 80053 COMPREHEN METABOLIC PANEL: CPT

## 2018-07-19 PROCEDURE — 3061F NEG MICROALBUMINURIA REV: CPT | Performed by: FAMILY MEDICINE

## 2018-07-19 RX ORDER — BLOOD PRESSURE TEST KIT
KIT MISCELLANEOUS
Qty: 200 EACH | Refills: 0 | Status: SHIPPED | OUTPATIENT
Start: 2018-07-19 | End: 2019-08-07 | Stop reason: SDUPTHER

## 2018-07-21 ENCOUNTER — APPOINTMENT (OUTPATIENT)
Dept: LAB | Facility: HOSPITAL | Age: 53
End: 2018-07-21
Payer: COMMERCIAL

## 2018-07-21 ENCOUNTER — TRANSCRIBE ORDERS (OUTPATIENT)
Dept: ADMINISTRATIVE | Facility: HOSPITAL | Age: 53
End: 2018-07-21

## 2018-07-21 DIAGNOSIS — E13.8 DIABETES MELLITUS OF OTHER TYPE WITH COMPLICATION, UNSPECIFIED WHETHER LONG TERM INSULIN USE: ICD-10-CM

## 2018-07-21 DIAGNOSIS — E66.9 OBESITY, UNSPECIFIED CLASSIFICATION, UNSPECIFIED OBESITY TYPE, UNSPECIFIED WHETHER SERIOUS COMORBIDITY PRESENT: ICD-10-CM

## 2018-07-21 DIAGNOSIS — I10 ESSENTIAL HYPERTENSION, MALIGNANT: ICD-10-CM

## 2018-07-21 DIAGNOSIS — F25.9 SCHIZOAFFECTIVE DISORDER, UNSPECIFIED TYPE (HCC): Primary | ICD-10-CM

## 2018-07-21 DIAGNOSIS — Z79.899 NEED FOR PROPHYLACTIC CHEMOTHERAPY: ICD-10-CM

## 2018-07-21 DIAGNOSIS — F25.9 SCHIZOAFFECTIVE DISORDER, UNSPECIFIED TYPE (HCC): ICD-10-CM

## 2018-07-21 LAB
BASOPHILS # BLD AUTO: 0 THOUSANDS/ΜL (ref 0–0.1)
BASOPHILS NFR BLD AUTO: 0 % (ref 0–1)
EOSINOPHIL # BLD AUTO: 0 THOUSAND/ΜL (ref 0–0.4)
EOSINOPHIL NFR BLD AUTO: 0 % (ref 0–6)
ERYTHROCYTE [DISTWIDTH] IN BLOOD BY AUTOMATED COUNT: 14.8 %
HCT VFR BLD AUTO: 44 % (ref 41–53)
HGB BLD-MCNC: 14.8 G/DL (ref 13.5–17.5)
LYMPHOCYTES # BLD AUTO: 2.3 THOUSANDS/ΜL (ref 0.5–4)
LYMPHOCYTES NFR BLD AUTO: 18 % (ref 20–50)
MCH RBC QN AUTO: 30.2 PG (ref 26–34)
MCHC RBC AUTO-ENTMCNC: 33.5 G/DL (ref 31–36)
MCV RBC AUTO: 90 FL (ref 80–100)
MONOCYTES # BLD AUTO: 1.2 THOUSAND/ΜL (ref 0.2–0.9)
MONOCYTES NFR BLD AUTO: 10 % (ref 1–10)
NEUTROPHILS # BLD AUTO: 9.3 THOUSANDS/ΜL (ref 1.8–7.8)
NEUTS SEG NFR BLD AUTO: 72 % (ref 45–65)
PLATELET # BLD AUTO: 144 THOUSANDS/UL (ref 150–450)
PMV BLD AUTO: 8.6 FL (ref 8.9–12.7)
RBC # BLD AUTO: 4.88 MILLION/UL (ref 4.5–5.9)
WBC # BLD AUTO: 12.9 THOUSAND/UL (ref 4.5–11)

## 2018-07-21 PROCEDURE — 85025 COMPLETE CBC W/AUTO DIFF WBC: CPT

## 2018-07-21 PROCEDURE — 36415 COLL VENOUS BLD VENIPUNCTURE: CPT

## 2018-07-23 ENCOUNTER — OFFICE VISIT (OUTPATIENT)
Dept: FAMILY MEDICINE CLINIC | Facility: CLINIC | Age: 53
End: 2018-07-23
Payer: COMMERCIAL

## 2018-07-23 VITALS
SYSTOLIC BLOOD PRESSURE: 112 MMHG | DIASTOLIC BLOOD PRESSURE: 76 MMHG | HEIGHT: 66 IN | RESPIRATION RATE: 16 BRPM | HEART RATE: 76 BPM | WEIGHT: 201 LBS | TEMPERATURE: 97.2 F | OXYGEN SATURATION: 92 % | BODY MASS INDEX: 32.3 KG/M2

## 2018-07-23 DIAGNOSIS — F17.200 TOBACCO DEPENDENCE SYNDROME: ICD-10-CM

## 2018-07-23 DIAGNOSIS — Z12.11 ENCOUNTER FOR SCREENING COLONOSCOPY: ICD-10-CM

## 2018-07-23 DIAGNOSIS — Z12.5 ENCOUNTER FOR PROSTATE CANCER SCREENING: ICD-10-CM

## 2018-07-23 DIAGNOSIS — E66.9 OBESITY (BMI 30.0-34.9): ICD-10-CM

## 2018-07-23 DIAGNOSIS — Z00.00 MEDICARE ANNUAL WELLNESS VISIT, SUBSEQUENT: Primary | ICD-10-CM

## 2018-07-23 DIAGNOSIS — Z11.59 NEED FOR HEPATITIS C SCREENING TEST: ICD-10-CM

## 2018-07-23 PROCEDURE — 3725F SCREEN DEPRESSION PERFORMED: CPT | Performed by: FAMILY MEDICINE

## 2018-07-23 PROCEDURE — 99396 PREV VISIT EST AGE 40-64: CPT | Performed by: FAMILY MEDICINE

## 2018-07-23 PROCEDURE — 3008F BODY MASS INDEX DOCD: CPT | Performed by: FAMILY MEDICINE

## 2018-07-23 RX ORDER — LANCETS 28 GAUGE
EACH MISCELLANEOUS
COMMUNITY
Start: 2018-03-26 | End: 2019-08-13 | Stop reason: SDUPTHER

## 2018-07-23 RX ORDER — CLOZAPINE 100 MG/1
100 TABLET ORAL DAILY
Status: ON HOLD | COMMUNITY
End: 2020-08-07

## 2018-07-23 RX ORDER — ATORVASTATIN CALCIUM 10 MG/1
TABLET, FILM COATED ORAL
COMMUNITY
Start: 2018-07-10 | End: 2018-08-23 | Stop reason: SDUPTHER

## 2018-07-23 RX ORDER — BLOOD-GLUCOSE METER
KIT MISCELLANEOUS
COMMUNITY
Start: 2018-05-31 | End: 2018-09-27 | Stop reason: SDUPTHER

## 2018-07-23 NOTE — PROGRESS NOTES
Assessment and Plan:    Problem List Items Addressed This Visit     Tobacco dependence syndrome     Uncontrolled patient has been smoking half to 1 pack a day for more than 2 years he declined to quit smoking           Other Visit Diagnoses     Medicare annual wellness visit, subsequent    -  Primary    Patient up-to-date with the tetanus shot he declined the shingles vaccine patient does followed by podiatric an eye doctor for diabetic eye and foot care    Obesity (BMI 30 0-34 9)        Uncontrolled lifestyle modification discussed with the patient proper diet and exercise discussed with the patient    Encounter for screening colonoscopy        Patient declined colonoscopy    Need for hepatitis C screening test        Relevant Orders    Hepatitis C antibody    Encounter for prostate cancer screening        Relevant Orders    PSA, Total Screen        Health Maintenance Due   Topic Date Due    HIV SCREENING  1965    Diabetic Foot Exam  07/17/1977    DM Eye Exam  07/17/1977       HPI:  Abdifatah Ziegler is a 48 y o  male here for his Subsequent Wellness Visit     Patient live in a group home in office with the caregiver per her he already had HIV screening and patient does followed by Ophthalmology and podiatric for diabetic eye and foot care    Patient Active Problem List   Diagnosis    Encephalopathy acute    Bipolar 1 disorder ([address] )    Schizo affective schizophrenia ([address] )    Constipation, chronic    Hyperammonemia ([address]    Type 2 diabetes mellitus ([address] )    Tracheobronchitis    Streptococcus pneumoniae    Aneurysm of ascending aorta ([address] )    Chronic airway obstruction, not elsewhere classified    Benign essential hypertension    Disorder of lipoprotein and lipid metabolism    Plantar fasciitis    Foot callus    Gastroesophageal reflux disease without esophagitis    GI bleed    Hyponatremia    Hypothyroidism    Ischemic colitis ([address] )    Moderate cigarette smoker (10-19 per day)    Morbid obesity ([place] [address] )    Tobacco dependence syndrome    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     Past Medical History:   Diagnosis Date    Bipolar 1 disorder ([place] [address] ) 2011    Colitis 03/27/2014    Constipation     Diabetes ([place] [address] ) 2011    Disease of thyroid gland     GERD (gastroesophageal reflux disease) 2011    Hypertension     Hyponatremia     Hypothyroidism 2011    Lipoprotein deficiency     Obesity     Plantar fasciitis     Psychiatric disorder     Schizo affective schizophrenia ([place] [address] ) 2011    Sebaceous gland disease     Tobacco abuse      History reviewed  No pertinent surgical history  Family History   Problem Relation Age of Onset    Family history unknown: Yes     History   Smoking Status    Current Every Day Smoker    Packs/day: 1 00    Types: Cigarettes   Smokeless Tobacco    Never Used     History   Alcohol Use No      History   Drug Use No       Current Outpatient Prescriptions   Medication Sig Dispense Refill    Alcohol Swabs PADS USE AS NEEDED TO CHECK BLOOD GLUCOSE (DIABETES) 200 each 0    atorvastatin (LIPITOR) 10 mg tablet       cloZAPine (CLOZARIL) 100 mg tablet Take 400 mg by mouth daily      clozapine (CLOZARIL) 50 MG tablet Take 50 mg by mouth daily      divalproex sodium (DEPAKOTE) 500 mg EC tablet Take 1,000 mg by mouth daily at bedtime   divalproex sodium (DEPAKOTE) 500 mg EC tablet Take 500 mg by mouth every morning   FREESTYLE LITE test strip       Lancets (FREESTYLE) lancets TEST BLOOD SUGAR ONCE DAILY (DIABETES)      levothyroxine 75 mcg tablet Take 75 mcg by mouth daily   metFORMIN (GLUCOPHAGE) 500 mg tablet Take 500 mg by mouth 2 (two) times a day with meals        Multiple Vitamin (DAILY DEVORA PO) Take 1 tablet by mouth daily        pantoprazole (PROTONIX) 40 mg tablet Take 40 mg by mouth daily      benztropine (COGENTIN) 1 mg tablet Take 0 5 mg by mouth 2 (two) times a day        lisinopril (ZESTRIL) 5 mg tablet Take 5 mg by mouth daily       methylphenidate (RITALIN) 10 mg tablet Take 5 mg by mouth daily before breakfast       No current facility-administered medications for this visit        Allergies   Allergen Reactions    Penicillins Anaphylaxis    Carbapenems     Cephalosporins      Immunization History   Administered Date(s) Administered    DTaP 06/07/2012    Influenza 09/22/2015    Influenza TIV (IM) 09/22/2011, 10/22/2012, 09/22/2015    Pneumococcal Polysaccharide PPV23 11/14/2008, 09/22/2011    Tdap 11/14/2008, 02/26/2014       Patient Care Team:  Driss Mccloud MD as PCP - General      Medicare Screening Tests and Risk Assessments:  AWV Clinical     ISAR:   Previous hospitalizations?:  No       Once in a Lifetime Medicare Screening:       Medicare Screening Tests and Risk Assessment:   AAA Risk Assessment    Osteoporosis Risk Assessment    HIV Risk Assessment        Drug and Alcohol Use:   Tobacco use    Cigarettes:  current smoker    Tobacco use duration    Packs per day:  1    Tobacco Cessation Readiness    Readiness to quit:  not ready    Alcohol use    Alcohol use:  never    Alcohol Treatment Readiness   Illicit Drug Use    Drug use:  never        Diet & Exercise:   Diet   What is your diet?:  Regular   How many servings a day of the following:   Fruits and Vegetables:  1-2 Meat:  1-2   Whole Grains:  2 Simple Carbs:  3   Dairy:  1 Soda:  2   Coffee:  2    Exercise    Do you currently exercise?:  currently not exercising       Cognitive Impairment Screening:   Cognitive Impairment Screening    Do you have difficulty learning or retaining new information?:  Yes Do you have difficulty handling new tasks?:  No   Do you have difficulty with reasoning?:  No Do you have difficulty with spatial ability and orientation?:  No   Do you have difficulty with language?:  No Do you have difficulty with behavior?:  Yes       Functional Ability/Level of Safety:   Hearing    Hearing difficulties:  No Bilateral:  normal   Left:  normal Right:  normal   Hearing aid:  No    Hearing Impairment Assessment    Current Activities    Help needed with the folllowing:    Using the phone:  No Transportation:  Yes   Shopping:  No Preparing Meals:  Yes   Doing Housework:  No Doing Laundry:  No   Managing Medications:  Yes Managing Money:  No   ADL    Fall Risk   Have you fallen in the last 12 months?:  No    Injury History       Home Safety:   Are there hazards in your environment?:  No   If you fell, would you need help to get back up from the ground?:  Yes Do you have problems or concerns getting in/out of a bed, chair, tub, or toilet?:  No   Do you feel unsteady when walking?:  No Is your activity limited by pain?:  No   Do you have handrails and grab-bars in the home?:  No Are emergency numbers kept by the phone and regularly updated?:  Yes   Are you and/or family members aware of the dangers of smoking in bed?:  No Are firearms stored securely?:  Yes   Do you have working smoke alarms and fire extinguisher?:  Yes Do all household members know how to use them?:  Yes   Have you left the stove on unsupervised?:  No    Home Safety Risk Factors   Unfamilar with surroundings:  No Uneven floors:  No   Stairs or handrail saftey risk:  No Loose rugs:  No   Household clutter:  No Poor household lighting:  No   No grab bars in bathroom:  No Further evaluation needed:  No       Advanced Directives:   Advanced Directives    Living Will:  No Durable POA for healthcare:  No   Advanced directive:  No    Patient's End of Life Decisions        Urinary Incontinence:   Do you have urinary incontinence?:  No        Glaucoma:            Provider Screening     Preventative Screening/Counseling:   Cardiovascular Screening/Counseling:   (Labs Q5 years, EKG optional one-time)   General:  Risks and Benefits Discussed, Screening Current Counseling:  Healthy Diet, Healthy Weight          Diabetes Screening/Counseling:   (2 tests/year if Pre-Diabetes or 1 test/year if no Diabetes) General:  Risks and Benefits Discussed, Screening Current Counseling:  Healthy Diet, Healthy Weight          Colorectal Cancer Screening/Counseling:   (FOBT Q1 yr; Flex Sig Q4 yrs or Q10 yrs after Screening Colonoscopy; Screening Colonoscpy Q2 yrs High Risk or Q10 yrs Low Risk; Barium Enema Q2 yrs High Risk or Q4 yrs Low Risk)   General:  Patient Declines, Risks and Benefits Discussed           Prostate Cancer Screening/Counseling:   (Annual)    General:  Risks and Benefits Discussed Due for: Labs:  PSA         Breast Cancer Screening/Counseling:   (Baseline Age 28 - 43; Annual Age 36+)         Cervical Cancer Screening/Counseling:   (Annual for High Risk or Childbearing Age with Abnormal Pap in Last 3 yrs; Every 2 all others)         Osteoporosis Screening/Counseling:   (Every 2 Yrs if at risk or more if medically necessary)   General:  Risks and Benefits Discussed, Screening Not Indicated           AAA Screening/Counseling:   (Once per Lifetime with risk factors)          Glaucoma Screening/Counseling:   (Annual)   General:  Risks and Benefits Discussed, Screening Current          HIV Screening/Counseling:   (Voluntary; Once annually for high risk OR 3 times for Pregnancy at diagnosis of IUP; 3rd trimester; and at Labor   General:  Risks and Benefits Discussed, Screening Current           Hepatitis C Screening:    Hepatitis C Screening Accepted: Yes              Immunizations:   Influenza (annual): Influenza UTD This Year   Pneumococcal (Once in a Lifetime):  Pneumococcal Due Today   Hepatitis B Series (low risk patients):  Prevention Counseling   Hepatitis B Series (medium to high risk patients scheduled series):  Patient Declines   Tdap (Non-Medicare Wellness Visit required):   Tdap Vaccine UTD       Other Preventative Couseling (Non-Medicare Wellness Visit Required):   nutrition counseling performed, fall prevention education provided, sunscreen education provided       Referrals (Non-Medicare Wellness Visit Required):       Medical Equipment/Suppliers:

## 2018-07-23 NOTE — PATIENT INSTRUCTIONS

## 2018-07-23 NOTE — ASSESSMENT & PLAN NOTE
Uncontrolled patient has been smoking half to 1 pack a day for more than 2 years he declined to quit smoking

## 2018-08-01 ENCOUNTER — HOSPITAL ENCOUNTER (EMERGENCY)
Facility: HOSPITAL | Age: 53
Discharge: HOME | End: 2018-08-01
Attending: EMERGENCY MEDICINE
Payer: COMMERCIAL

## 2018-08-01 VITALS
TEMPERATURE: 98.2 F | WEIGHT: 198.41 LBS | HEART RATE: 71 BPM | BODY MASS INDEX: 32.52 KG/M2 | RESPIRATION RATE: 20 BRPM | SYSTOLIC BLOOD PRESSURE: 122 MMHG | DIASTOLIC BLOOD PRESSURE: 86 MMHG | OXYGEN SATURATION: 96 %

## 2018-08-01 DIAGNOSIS — F22 PARANOIA (HCC): Primary | ICD-10-CM

## 2018-08-01 LAB
AMPHETAMINES SERPL QL SCN: NEGATIVE
BARBITURATES UR QL: NEGATIVE
BENZODIAZ UR QL: NEGATIVE
COCAINE UR QL: NEGATIVE
ETHANOL EXG-MCNC: 0 MG/DL
METHADONE UR QL: NEGATIVE
OPIATES UR QL SCN: NEGATIVE
PCP UR QL: NEGATIVE
THC UR QL: NEGATIVE

## 2018-08-01 PROCEDURE — 82075 ASSAY OF BREATH ETHANOL: CPT | Performed by: EMERGENCY MEDICINE

## 2018-08-01 PROCEDURE — 99284 EMERGENCY DEPT VISIT MOD MDM: CPT

## 2018-08-01 PROCEDURE — 80307 DRUG TEST PRSMV CHEM ANLYZR: CPT | Performed by: EMERGENCY MEDICINE

## 2018-08-01 RX ORDER — NICOTINE 21 MG/24HR
21 PATCH, TRANSDERMAL 24 HOURS TRANSDERMAL ONCE
Status: DISCONTINUED | OUTPATIENT
Start: 2018-08-01 | End: 2018-08-01

## 2018-08-01 NOTE — ED PROVIDER NOTES
Final Diagnosis:  1  Paranoia [facility])      Chief Complaint   Patient presents with    Psychiatric Evaluation     Per ems pt has been increasingly aggressive with other guests at [facility]  Pt states "I called 911 because I don't want to live at [facility] anymore, the place has voice boxes, they want to drive me crazy " Denies SI, HI, VH  This is a 48year old male presenting for evaluation of paranoia  The patient is in a group home  He states that he doesn't want to live there and wants to get an apartment separate from everyone else because he thinks that the facility is trying to make him crazy by installing voice boxes to make him crazy somehow  He also thinks they are talking about him  Denies anything else bothering him  Specifically, denies f/ch/n/v/cp/sob  Denies falls, trauma  Denies SI/HI/VH  Denies AH but also stated about the voice boxes  Per report, the patient was agitated and they called 911 due to his behavior  PMH:  - hypothyroidism  - psychiatric disorder  - diabetes  - cosntipation  - gerd  - bipolar  - schizo-affective schizophrenia  - HTN  - obesity  PSH:  - ?  Smokes daily and was counseled  No alcohol  No drugs  Vitals:    08/01/18 0039   BP: 122/86   BP Location: Right arm   Pulse: 71   Resp: 20   Temp: 98 2 °F (36 8 °C)   TempSrc: Oral   SpO2: 96%   Weight: 90 kg (198 lb 6 6 oz)   General: VSS, NAD, awake, alert  Well-nourished, well-developed  Appears stated age  Speaking normally in full sentences  Head: Normocephalic, atraumatic, nontender  Eyes: PERRL, EOM-I  No diplopia  No hyphema  No subconjunctival hemorrhages  Symmetrical lids  ENT: Atraumatic external nose and ears  MMM  No malocclusion  No stridor  Normal phonation  No drooling  Normal swallowing  Neck: Symmetric, trachea midline  No JVD  CV: RRR  +S1/S2  No murmurs or gallops  Peripheral pulses +2 throughout  No chest wall tenderness     Lungs:   Unlabored No retractions  CTAB, lungs sounds equal bilateral    No tachypnea  Abd: +BS, soft, NT/ND    MSK:   FROM   Back:   No rashes  Skin: Dry, intact  Neuro: AAOx3, GCS 15, CN II-XII grossly intact  Motor grossly intact  Psychiatric/Behavioral: Appropriate mood and affect   Exam: deferred  A:  - Paranoia  - agitation  P:  - CRISIS  - 13 point ROS was performed and all are normal unless stated in the history above  - Nursing note reviewed  Vitals reviewed  - Orders placed by myself and/or advanced practitioner / resident     - Previous chart was reviewed  - No language barrier    - History obtained from patient  - There are no limitations to the history obtained  - Critical care time: Not applicable for this patient  ED Course as of Aug 01 0120   Wed Aug 01, 2018   0118 Discussed with CRISIS  Patient going back to safe place of group home and can work on finding his own place as their own protocols  Medications   nicotine (NICODERM CQ) 21 mg/24 hr TD 24 hr patch 21 mg (not administered)     No orders to display     Orders Placed This Encounter   Procedures    Rapid drug screen, urine    Consult to ED crisis worker    POCT alcohol breath test     Labs Reviewed   POCT ALCOHOL BREATH TEST - Normal       Result Value Ref Range Status    EXTBreath Alcohol 0 00   Final   RAPID DRUG SCREEN, URINE     Time reflects when diagnosis was documented in both MDM as applicable and the Disposition within this note     Time User Action Codes Description Comment    8/1/2018 12:55 AM Jarvis Griffin Add [F22] [facility])       ED Disposition     ED Disposition Condition Comment    Discharge  Susie Gaitan discharge to home/self care      Condition at discharge: Good        Follow-up Information     Follow up With Specialties Details Why Contact Info Additional [address] [facility] Emergency Department Emergency Medicine Go to [facility] symptoms worsen [facility] [address] [phone] [address] [place] [address] ED, 2404 [address] [facility] , Willian Rivas MD Family Medicine Call in 1 day To make appointment for re-evaluation in 1 week [address] [facility]  [address] [facility]  [phone]           Patient's Medications   Discharge Prescriptions    No medications on file     No discharge procedures on file  Prior to Admission Medications   Prescriptions Last Dose Informant Patient Reported? Taking? Alcohol Swabs PADS   No No   Sig: USE AS NEEDED TO CHECK BLOOD GLUCOSE (DIABETES)   FREESTYLE LITE test strip   Yes No   Lancets (FREESTYLE) lancets   Yes No   Sig: TEST BLOOD SUGAR ONCE DAILY (DIABETES)   Multiple Vitamin (DAILY DEVORA PO)   Yes No   Sig: Take 1 tablet by mouth daily  atorvastatin (LIPITOR) 10 mg tablet   Yes No   benztropine (COGENTIN) 1 mg tablet   Yes No   Sig: Take 0 5 mg by mouth 2 (two) times a day     cloZAPine (CLOZARIL) 100 mg tablet   Yes No   Sig: Take 400 mg by mouth daily   clozapine (CLOZARIL) 50 MG tablet   Yes No   Sig: Take 50 mg by mouth daily   divalproex sodium (DEPAKOTE) 500 mg EC tablet   Yes No   Sig: Take 1,000 mg by mouth daily at bedtime  divalproex sodium (DEPAKOTE) 500 mg EC tablet   Yes No   Sig: Take 500 mg by mouth every morning  levothyroxine 75 mcg tablet   Yes No   Sig: Take 75 mcg by mouth daily  lisinopril (ZESTRIL) 5 mg tablet   Yes No   Sig: Take 5 mg by mouth daily  metFORMIN (GLUCOPHAGE) 500 mg tablet   Yes No   Sig: Take 500 mg by mouth 2 (two) times a day with meals     methylphenidate (RITALIN) 10 mg tablet   Yes No   Sig: Take 5 mg by mouth daily before breakfast   pantoprazole (PROTONIX) 40 mg tablet   Yes No   Sig: Take 40 mg by mouth daily      Facility-Administered Medications: None       Portions of the record may have been created with voice recognition software  Occasional wrong word or "sound a like" substitutions may have occurred due to the inherent limitations of voice recognition software   Read the chart carefully and recognize, using context, where substitutions have occurred      Electronically signed by:  Sukhwinder Goodman MD  08/01/18 0126

## 2018-08-01 NOTE — DISCHARGE INSTRUCTIONS
Psychotic Disorder   WHAT YOU NEED TO KNOW:   A psychotic disorder is a medical condition that causes hallucinations and delusions  Hallucinations are seeing, hearing, tasting, or feeling things that are not real  Delusions are beliefs that something is real, true, or right when it is not  These false beliefs do not go away even if there is proof that they are not true  You may believe someone is spying on you, after you, or controlling your mind  You may also believe there is something wrong with how your body works  Schizophrenia and schizoaffective disorder are examples of psychotic disorders  DISCHARGE INSTRUCTIONS:   Call 911 if:   · You feel like you could harm yourself or someone else  Contact your healthcare provider if:   · Your symptoms do not improve  · You cannot make it to your next appointment  · You have new symptoms  · You have questions or concerns about your condition or care  Medicines  may be given to decrease your symptoms  You may need 1 or more medicines  You may need to take your medicine for several weeks before you begin to feel better  Tell your healthcare provider about any side effects or problems you have with your medicines  The type or amount of medicine may need to be changed  Get support: It may be difficult to cope with your illness  You may feel lonely, anxious, or depressed  It may help to join a support group  A support group lets you talk with others who have a mental illness  For information and more support visit:  · [facility]  [address] [facility]  , [address] [place] [address]  Phone: [phone]  Phone: [phone]  Web Address: http://Appier/  [place]  Self-care:   · Do not drink alcohol or use illegal drugs  Alcohol and illegal drugs can make your symptoms worse  Ask your healthcare provider for information if you currently drink alcohol or use illegal drugs and need help to quit  · Do not smoke    Nicotine and other chemicals in cigarettes and cigars can cause lung damage  They can also decrease how well your medicine works  Ask your healthcare provider for information if you currently smoke and need help to quit  E-cigarettes or smokeless tobacco still contain nicotine  Talk to your healthcare provider before you use these products  · Exercise regularly  Exercise can help improve your mood and decrease symptoms  Ask about the best exercise plan for you  · Manage your stress  Stress can make your condition worse  Ask your healthcare provider for more information about practicing mindfulness and deep breathing exercises to help decrease your stress  You may learn other ways to manage stress during therapy  Follow up with your healthcare provider as directed:  Write down your questions so you remember to ask them during your visits  © 2017 [facility] Information is for End User's use only and may not be sold, redistributed or otherwise used for commercial purposes  All illustrations and images included in CareNotes® are the copyrighted property of A D A M , Inc  or Adriel Arenas  The above information is an  only  It is not intended as medical advice for individual conditions or treatments  Talk to your doctor, nurse or pharmacist before following any medical regimen to see if it is safe and effective for you

## 2018-08-01 NOTE — ED NOTES
Spoke with patient per physician request  Pt clearly unable to process most information given to him  He did say he didn't want to hurt himself, but he stated repeatedly that he wanted us to find him a new place to live, that he did not want to live at [facility] any longer  Explained several times that we were unable to find him new housing  He said since he was already in the ED he would just live there  Explained several times that was not possible either  He then said that he wasn't able to walk home, so how would he get home  Explained we would arrange transportation for him  He agreed  Discussed case with Dr Malcom Curry

## 2018-08-03 DIAGNOSIS — E03.9 ACQUIRED HYPOTHYROIDISM: ICD-10-CM

## 2018-08-03 DIAGNOSIS — Z71.3 DIETARY COUNSELING: ICD-10-CM

## 2018-08-03 DIAGNOSIS — K21.9 GASTROESOPHAGEAL REFLUX DISEASE WITHOUT ESOPHAGITIS: Primary | ICD-10-CM

## 2018-08-03 RX ORDER — PANTOPRAZOLE SODIUM 40 MG/1
TABLET, DELAYED RELEASE ORAL
Qty: 28 TABLET | Refills: 0 | Status: SHIPPED | OUTPATIENT
Start: 2018-08-03 | End: 2018-08-21 | Stop reason: SDUPTHER

## 2018-08-03 RX ORDER — LEVOTHYROXINE SODIUM 0.07 MG/1
TABLET ORAL
Qty: 28 TABLET | Refills: 0 | Status: SHIPPED | OUTPATIENT
Start: 2018-08-03 | End: 2018-08-21 | Stop reason: SDUPTHER

## 2018-08-03 RX ORDER — DIPHENOXYLATE HYDROCHLORIDE AND ATROPINE SULFATE 2.5; .025 MG/1; MG/1
TABLET ORAL
Qty: 28 TABLET | Refills: 0 | Status: SHIPPED | OUTPATIENT
Start: 2018-08-03 | End: 2019-08-27 | Stop reason: SDUPTHER

## 2018-08-21 DIAGNOSIS — E03.9 ACQUIRED HYPOTHYROIDISM: ICD-10-CM

## 2018-08-21 DIAGNOSIS — K21.9 GASTROESOPHAGEAL REFLUX DISEASE WITHOUT ESOPHAGITIS: ICD-10-CM

## 2018-08-21 RX ORDER — LEVOTHYROXINE SODIUM 0.07 MG/1
TABLET ORAL
Qty: 28 TABLET | Refills: 0 | Status: SHIPPED | OUTPATIENT
Start: 2018-08-21 | End: 2019-08-27 | Stop reason: SDUPTHER

## 2018-08-21 RX ORDER — PANTOPRAZOLE SODIUM 40 MG/1
TABLET, DELAYED RELEASE ORAL
Qty: 28 TABLET | Refills: 0 | Status: SHIPPED | OUTPATIENT
Start: 2018-08-21 | End: 2019-08-27 | Stop reason: SDUPTHER

## 2018-08-23 DIAGNOSIS — E78.2 MIXED HYPERLIPIDEMIA: ICD-10-CM

## 2018-08-23 DIAGNOSIS — E11.9 TYPE 2 DIABETES MELLITUS WITHOUT COMPLICATION, WITHOUT LONG-TERM CURRENT USE OF INSULIN (HCC): Primary | ICD-10-CM

## 2018-08-23 RX ORDER — ATORVASTATIN CALCIUM 10 MG/1
TABLET, FILM COATED ORAL
Qty: 28 TABLET | Refills: 0 | Status: SHIPPED | OUTPATIENT
Start: 2018-08-23 | End: 2018-10-28 | Stop reason: ALTCHOICE

## 2018-08-24 ENCOUNTER — TRANSCRIBE ORDERS (OUTPATIENT)
Dept: ADMINISTRATIVE | Facility: HOSPITAL | Age: 53
End: 2018-08-24

## 2018-08-24 ENCOUNTER — APPOINTMENT (OUTPATIENT)
Dept: LAB | Facility: HOSPITAL | Age: 53
End: 2018-08-24
Payer: COMMERCIAL

## 2018-08-24 DIAGNOSIS — I10 ESSENTIAL HYPERTENSION, MALIGNANT: ICD-10-CM

## 2018-08-24 DIAGNOSIS — E66.9 OBESITY, UNSPECIFIED CLASSIFICATION, UNSPECIFIED OBESITY TYPE, UNSPECIFIED WHETHER SERIOUS COMORBIDITY PRESENT: ICD-10-CM

## 2018-08-24 DIAGNOSIS — E13.8 DIABETES MELLITUS OF OTHER TYPE WITH COMPLICATION, UNSPECIFIED WHETHER LONG TERM INSULIN USE: ICD-10-CM

## 2018-08-24 DIAGNOSIS — F25.9 SCHIZOAFFECTIVE DISORDER, UNSPECIFIED TYPE (HCC): ICD-10-CM

## 2018-08-24 DIAGNOSIS — F25.9 SCHIZOAFFECTIVE DISORDER, UNSPECIFIED TYPE (HCC): Primary | ICD-10-CM

## 2018-08-24 DIAGNOSIS — Z79.899 NEED FOR PROPHYLACTIC CHEMOTHERAPY: ICD-10-CM

## 2018-08-24 LAB
BASOPHILS # BLD AUTO: 0 THOUSANDS/ΜL (ref 0–0.1)
BASOPHILS NFR BLD AUTO: 0 % (ref 0–1)
EOSINOPHIL # BLD AUTO: 0 THOUSAND/ΜL (ref 0–0.4)
EOSINOPHIL NFR BLD AUTO: 0 % (ref 0–6)
ERYTHROCYTE [DISTWIDTH] IN BLOOD BY AUTOMATED COUNT: 14.9 %
HCT VFR BLD AUTO: 44.7 % (ref 41–53)
HGB BLD-MCNC: 14.9 G/DL (ref 13.5–17.5)
LYMPHOCYTES # BLD AUTO: 2.8 THOUSANDS/ΜL (ref 0.5–4)
LYMPHOCYTES NFR BLD AUTO: 27 % (ref 20–50)
MCH RBC QN AUTO: 30.1 PG (ref 26–34)
MCHC RBC AUTO-ENTMCNC: 33.4 G/DL (ref 31–36)
MCV RBC AUTO: 90 FL (ref 80–100)
MONOCYTES # BLD AUTO: 1 THOUSAND/ΜL (ref 0.2–0.9)
MONOCYTES NFR BLD AUTO: 10 % (ref 1–10)
NEUTROPHILS # BLD AUTO: 6.4 THOUSANDS/ΜL (ref 1.8–7.8)
NEUTS SEG NFR BLD AUTO: 63 % (ref 45–65)
PLATELET # BLD AUTO: 145 THOUSANDS/UL (ref 150–450)
PMV BLD AUTO: 8.8 FL (ref 8.9–12.7)
RBC # BLD AUTO: 4.94 MILLION/UL (ref 4.5–5.9)
WBC # BLD AUTO: 10.2 THOUSAND/UL (ref 4.5–11)

## 2018-08-24 PROCEDURE — 85025 COMPLETE CBC W/AUTO DIFF WBC: CPT

## 2018-08-24 PROCEDURE — 36415 COLL VENOUS BLD VENIPUNCTURE: CPT

## 2018-08-31 ENCOUNTER — TRANSCRIBE ORDERS (OUTPATIENT)
Dept: ADMINISTRATIVE | Facility: HOSPITAL | Age: 53
End: 2018-08-31

## 2018-08-31 ENCOUNTER — APPOINTMENT (OUTPATIENT)
Dept: LAB | Facility: HOSPITAL | Age: 53
End: 2018-08-31
Payer: COMMERCIAL

## 2018-08-31 DIAGNOSIS — E13.8 DIABETES MELLITUS OF OTHER TYPE WITH COMPLICATION, UNSPECIFIED WHETHER LONG TERM INSULIN USE: ICD-10-CM

## 2018-08-31 DIAGNOSIS — E66.9 OBESITY, UNSPECIFIED CLASSIFICATION, UNSPECIFIED OBESITY TYPE, UNSPECIFIED WHETHER SERIOUS COMORBIDITY PRESENT: ICD-10-CM

## 2018-08-31 DIAGNOSIS — Z12.5 ENCOUNTER FOR PROSTATE CANCER SCREENING: ICD-10-CM

## 2018-08-31 DIAGNOSIS — Z11.59 NEED FOR HEPATITIS C SCREENING TEST: ICD-10-CM

## 2018-08-31 DIAGNOSIS — F25.9 SCHIZOAFFECTIVE DISORDER, UNSPECIFIED TYPE (HCC): Primary | ICD-10-CM

## 2018-08-31 DIAGNOSIS — Z79.899 NEED FOR PROPHYLACTIC CHEMOTHERAPY: ICD-10-CM

## 2018-08-31 DIAGNOSIS — F25.9 SCHIZOAFFECTIVE DISORDER, UNSPECIFIED TYPE (HCC): ICD-10-CM

## 2018-08-31 DIAGNOSIS — I10 ESSENTIAL HYPERTENSION, MALIGNANT: ICD-10-CM

## 2018-08-31 LAB
ALBUMIN SERPL BCP-MCNC: 3.9 G/DL (ref 3–5.2)
ALP SERPL-CCNC: 68 U/L (ref 43–122)
ALT SERPL W P-5'-P-CCNC: 27 U/L (ref 9–52)
AMMONIA PLAS-SCNC: <9 UMOL/L (ref 9–33)
ANION GAP SERPL CALCULATED.3IONS-SCNC: 7 MMOL/L (ref 5–14)
AST SERPL W P-5'-P-CCNC: 25 U/L (ref 17–59)
BILIRUB SERPL-MCNC: 0.2 MG/DL
BUN SERPL-MCNC: 9 MG/DL (ref 5–25)
CALCIUM SERPL-MCNC: 9.3 MG/DL (ref 8.4–10.2)
CHLORIDE SERPL-SCNC: 104 MMOL/L (ref 97–108)
CHOLEST SERPL-MCNC: 129 MG/DL
CO2 SERPL-SCNC: 32 MMOL/L (ref 22–30)
CREAT SERPL-MCNC: 0.7 MG/DL (ref 0.7–1.5)
EST. AVERAGE GLUCOSE BLD GHB EST-MCNC: 126 MG/DL
GFR SERPL CREATININE-BSD FRML MDRD: 109 ML/MIN/1.73SQ M
GLUCOSE P FAST SERPL-MCNC: 119 MG/DL (ref 70–99)
HBA1C MFR BLD: 6 % (ref 4.2–6.3)
HDLC SERPL-MCNC: 37 MG/DL (ref 40–59)
LDLC SERPL CALC-MCNC: 58 MG/DL
NONHDLC SERPL-MCNC: 92 MG/DL
POTASSIUM SERPL-SCNC: 4.6 MMOL/L (ref 3.6–5)
PROT SERPL-MCNC: 7.2 G/DL (ref 5.9–8.4)
PSA SERPL-MCNC: 0.3 NG/ML (ref 0–4)
SODIUM SERPL-SCNC: 143 MMOL/L (ref 137–147)
T4 FREE SERPL-MCNC: 0.93 NG/DL (ref 0.76–1.46)
TRIGL SERPL-MCNC: 168 MG/DL
TSH SERPL DL<=0.05 MIU/L-ACNC: 2.58 UIU/ML (ref 0.47–4.68)

## 2018-08-31 PROCEDURE — 80061 LIPID PANEL: CPT

## 2018-08-31 PROCEDURE — 83036 HEMOGLOBIN GLYCOSYLATED A1C: CPT

## 2018-08-31 PROCEDURE — 80053 COMPREHEN METABOLIC PANEL: CPT

## 2018-08-31 PROCEDURE — 80159 DRUG ASSAY CLOZAPINE: CPT

## 2018-08-31 PROCEDURE — 80165 DIPROPYLACETIC ACID FREE: CPT

## 2018-08-31 PROCEDURE — 84439 ASSAY OF FREE THYROXINE: CPT

## 2018-08-31 PROCEDURE — G0103 PSA SCREENING: HCPCS

## 2018-08-31 PROCEDURE — 84443 ASSAY THYROID STIM HORMONE: CPT

## 2018-08-31 PROCEDURE — 86803 HEPATITIS C AB TEST: CPT

## 2018-08-31 PROCEDURE — 36415 COLL VENOUS BLD VENIPUNCTURE: CPT

## 2018-08-31 PROCEDURE — 82140 ASSAY OF AMMONIA: CPT

## 2018-09-01 LAB — HCV AB SER QL: NORMAL

## 2018-09-04 LAB — VALPROATE FREE SERPL-MCNC: 9.8 UG/ML (ref 6–22)

## 2018-09-07 LAB
CLOZAPINE SERPL-MCNC: 384 NG/ML (ref 350–650)
CLOZAPINE+NOR SERPL-MCNC: 508 NG/ML
NORCLOZAPINE SERPL-MCNC: 124 NG/ML

## 2018-09-21 ENCOUNTER — APPOINTMENT (OUTPATIENT)
Dept: LAB | Facility: HOSPITAL | Age: 53
End: 2018-09-21
Payer: COMMERCIAL

## 2018-09-21 DIAGNOSIS — F25.9 SCHIZOAFFECTIVE DISORDER, UNSPECIFIED TYPE (HCC): ICD-10-CM

## 2018-09-21 DIAGNOSIS — Z79.899 NEED FOR PROPHYLACTIC CHEMOTHERAPY: ICD-10-CM

## 2018-09-21 DIAGNOSIS — E13.8 DIABETES MELLITUS OF OTHER TYPE WITH COMPLICATION, UNSPECIFIED WHETHER LONG TERM INSULIN USE: ICD-10-CM

## 2018-09-21 DIAGNOSIS — I10 ESSENTIAL HYPERTENSION, MALIGNANT: ICD-10-CM

## 2018-09-21 DIAGNOSIS — E66.9 OBESITY, UNSPECIFIED CLASSIFICATION, UNSPECIFIED OBESITY TYPE, UNSPECIFIED WHETHER SERIOUS COMORBIDITY PRESENT: ICD-10-CM

## 2018-09-21 LAB
BASOPHILS # BLD AUTO: 0.1 THOUSANDS/ΜL (ref 0–0.1)
BASOPHILS NFR BLD AUTO: 1 % (ref 0–1)
EOSINOPHIL # BLD AUTO: 0 THOUSAND/ΜL (ref 0–0.4)
EOSINOPHIL NFR BLD AUTO: 0 % (ref 0–6)
ERYTHROCYTE [DISTWIDTH] IN BLOOD BY AUTOMATED COUNT: 14.8 %
HCT VFR BLD AUTO: 44.7 % (ref 41–53)
HGB BLD-MCNC: 14.9 G/DL (ref 13.5–17.5)
LYMPHOCYTES # BLD AUTO: 2.8 THOUSANDS/ΜL (ref 0.5–4)
LYMPHOCYTES NFR BLD AUTO: 27 % (ref 20–50)
MCH RBC QN AUTO: 30.3 PG (ref 26–34)
MCHC RBC AUTO-ENTMCNC: 33.5 G/DL (ref 31–36)
MCV RBC AUTO: 91 FL (ref 80–100)
MONOCYTES # BLD AUTO: 0.9 THOUSAND/ΜL (ref 0.2–0.9)
MONOCYTES NFR BLD AUTO: 9 % (ref 1–10)
NEUTROPHILS # BLD AUTO: 6.4 THOUSANDS/ΜL (ref 1.8–7.8)
NEUTS SEG NFR BLD AUTO: 63 % (ref 45–65)
PLATELET # BLD AUTO: 138 THOUSANDS/UL (ref 150–450)
PMV BLD AUTO: 8.6 FL (ref 8.9–12.7)
RBC # BLD AUTO: 4.93 MILLION/UL (ref 4.5–5.9)
WBC # BLD AUTO: 10.1 THOUSAND/UL (ref 4.5–11)

## 2018-09-21 PROCEDURE — 85025 COMPLETE CBC W/AUTO DIFF WBC: CPT

## 2018-09-21 PROCEDURE — 36415 COLL VENOUS BLD VENIPUNCTURE: CPT

## 2018-09-25 NOTE — ED NOTES
Taxi arrived pt escorted to taxi at this time        Terri Mcdaniel, TABBY  08/01/18 0852 patient is HD M W F   Nephrology consult Dr Escoto   last session on Friday   Nithin HD in am  normal Renal functions

## 2018-09-27 DIAGNOSIS — E11.9 TYPE 2 DIABETES MELLITUS WITHOUT COMPLICATION, WITHOUT LONG-TERM CURRENT USE OF INSULIN (HCC): Primary | ICD-10-CM

## 2018-09-27 RX ORDER — BLOOD-GLUCOSE METER
KIT MISCELLANEOUS
Qty: 50 EACH | Refills: 0 | Status: SHIPPED | OUTPATIENT
Start: 2018-09-27 | End: 2019-08-13 | Stop reason: SDUPTHER

## 2018-10-02 ENCOUNTER — HOSPITAL ENCOUNTER (EMERGENCY)
Facility: HOSPITAL | Age: 53
Discharge: HOME | End: 2018-10-02
Attending: EMERGENCY MEDICINE | Admitting: EMERGENCY MEDICINE
Payer: COMMERCIAL

## 2018-10-02 VITALS
HEART RATE: 79 BPM | BODY MASS INDEX: 34.41 KG/M2 | TEMPERATURE: 98.1 F | DIASTOLIC BLOOD PRESSURE: 85 MMHG | WEIGHT: 210 LBS | RESPIRATION RATE: 18 BRPM | OXYGEN SATURATION: 96 % | SYSTOLIC BLOOD PRESSURE: 129 MMHG

## 2018-10-02 DIAGNOSIS — R10.9 ABDOMINAL PAIN: Primary | ICD-10-CM

## 2018-10-02 PROCEDURE — 99283 EMERGENCY DEPT VISIT LOW MDM: CPT

## 2018-10-02 RX ORDER — MAGNESIUM HYDROXIDE/ALUMINUM HYDROXICE/SIMETHICONE 120; 1200; 1200 MG/30ML; MG/30ML; MG/30ML
SUSPENSION ORAL
Status: COMPLETED
Start: 2018-10-02 | End: 2018-10-02

## 2018-10-02 RX ORDER — MAGNESIUM HYDROXIDE/ALUMINUM HYDROXICE/SIMETHICONE 120; 1200; 1200 MG/30ML; MG/30ML; MG/30ML
30 SUSPENSION ORAL ONCE
Status: COMPLETED | OUTPATIENT
Start: 2018-10-02 | End: 2018-10-02

## 2018-10-02 RX ADMIN — MAGNESIUM HYDROXIDE/ALUMINUM HYDROXICE/SIMETHICONE 30 ML: 120; 1200; 1200 SUSPENSION ORAL at 14:39

## 2018-10-02 RX ADMIN — LIDOCAINE HYDROCHLORIDE 15 ML: 20 SOLUTION ORAL; TOPICAL at 14:39

## 2018-10-02 RX ADMIN — ALUMINUM HYDROXIDE, MAGNESIUM HYDROXIDE, AND SIMETHICONE 30 ML: 200; 200; 20 SUSPENSION ORAL at 14:39

## 2018-10-02 NOTE — ED PROVIDER NOTES
Pt Name: Teresa Rose  MRN: [id number]  Birthdate 1965  Age/Sex: 48 y o  male  Date of evaluation: 10/2/2018  PCP: Titi Maki MD    CHIEF COMPLAINT    Chief Complaint   Patient presents with    Heartburn     States has had stomach problems over the past 3-4 months, especially after eating crab cakes; ate them about 1 1/2 hour ago and about 1/2 hour now stomach has been upset; denies nausea or vomiting or pain; also states "I don't like living where I'm at and I want to go upstairs here; Denies SI HI, AH, VH          HPI    48 y o  male presenting with abdominal discomfort  Patient notes feeling of nausea and mild discomfort in the epigastric region, this began shortly after eating some crab cakes  Patient notes multiple prior episodes over the past several months and is currently taking a proton pump inhibitor for the symptoms  Denies fever, shortness of breath, cough, trauma, vomiting, changes in urine or bowel movements, other symptoms  HPI      Past Medical and Surgical History    Past Medical History:   Diagnosis Date    Bipolar 1 disorder ([place] [address] ) 2011    Colitis 03/27/2014    Constipation     Diabetes ([place] [address] ) 2011    Disease of thyroid gland     GERD (gastroesophageal reflux disease) 2011    Hypertension     Hyponatremia     Hypothyroidism 2011    Lipoprotein deficiency     Obesity     Plantar fasciitis     Psychiatric disorder     Schizo affective schizophrenia ([place] [address] ) 2011    Sebaceous gland disease     Tobacco abuse        History reviewed  No pertinent surgical history      Family History   Problem Relation Age of Onset    Family history unknown: Yes       Social History   Substance Use Topics    Smoking status: Current Some Day Smoker     Types: Cigarettes    Smokeless tobacco: Never Used    Alcohol use No           Allergies    Allergies   Allergen Reactions    Penicillins Anaphylaxis    Carbapenems     Cephalosporins        Home Medications    Prior to Admission medications    Medication Sig Start Date End Date Taking? Authorizing Provider   Alcohol Swabs PADS USE AS NEEDED TO CHECK BLOOD GLUCOSE (DIABETES) 7/19/18   Deedee Paniagua MD   atorvastatin (LIPITOR) 10 mg tablet TAKE 1 TABLET BY MOUTH ONCE DAILY AT BEDTIME - HYPERLIPIDEMIA 8/23/18   Deedee Paniagua MD   benztropine (COGENTIN) 1 mg tablet Take 0 5 mg by mouth 2 (two) times a day      Historical Provider, MD   cloZAPine (CLOZARIL) 100 mg tablet Take 400 mg by mouth daily    Historical Provider, MD   clozapine (CLOZARIL) 50 MG tablet Take 50 mg by mouth daily    Historical Provider, MD   divalproex sodium (DEPAKOTE) 500 mg EC tablet Take 1,000 mg by mouth daily at bedtime  Historical Provider, MD   divalproex sodium (DEPAKOTE) 500 mg EC tablet Take 500 mg by mouth every morning  Historical Provider, MD   FREESTYLE LITE test strip TEST BLOOD SUGARS ONCE DAILY (DM) 9/27/18   Deedee Paniagua MD   Lancets (FREESTYLE) lancets TEST BLOOD SUGAR ONCE DAILY (DIABETES) 3/26/18   Historical Provider, MD   levothyroxine 75 mcg tablet TAKE 1 TABLET BY MOUTH ONCE DAILY - HYPOTHYROIDISM 8/21/18   Deedee Paniagua MD   lisinopril (ZESTRIL) 5 mg tablet Take 5 mg by mouth daily  Historical Provider, MD   metFORMIN (GLUCOPHAGE) 500 mg tablet TAKE ONE TABLET BY MOUTH 2 TIMES A DAY (DIABETES) 8/23/18   Deedee Paniagua MD   methylphenidate (RITALIN) 10 mg tablet Take 5 mg by mouth daily before breakfast    Historical Provider, MD   Multiple Vitamin (DAILY DEVORA PO) Take 1 tablet by mouth daily  Historical Provider, MD   multivitamin (THERAGRAN) TABS TAKE ONE TABLET BY MOUTH EVERY DAY (SUPPLEMENT) 8/3/18   Deedee Paniagua MD   pantoprazole (PROTONIX) 40 mg tablet TAKE 1 TABLET BY MOUTH ONCE DAILY AFTER FEED 8/21/18   Deedee Paniagua MD           Review of Systems    Review of Systems   Constitutional: Negative for appetite change, chills and diaphoresis  HENT: Negative for drooling, facial swelling, trouble swallowing and voice change  Respiratory: Negative for apnea, shortness of breath and wheezing  Cardiovascular: Negative for chest pain and leg swelling  Gastrointestinal: Positive for abdominal pain  Negative for abdominal distention, diarrhea, nausea and vomiting  Genitourinary: Negative for dysuria and urgency  Musculoskeletal: Negative for arthralgias, back pain, gait problem and neck pain  Skin: Negative for color change, rash and wound  Neurological: Negative for seizures, speech difficulty, weakness and headaches  Psychiatric/Behavioral: Negative for agitation, behavioral problems and dysphoric mood  The patient is not nervous/anxious  All other systems reviewed and negative  Physical Exam      ED Triage Vitals [10/02/18 1406]   Temperature Pulse Respirations Blood Pressure SpO2   98 1 °F (36 7 °C) 79 18 129/85 96 %      Temp Source Heart Rate Source Patient Position - Orthostatic VS BP Location FiO2 (%)   Tymp Core Monitor Sitting Left arm --      Pain Score       No Pain               Physical Exam   Constitutional: He is oriented to person, place, and time  He appears well-developed and well-nourished  HENT:   Head: Normocephalic and atraumatic  Eyes: Pupils are equal, round, and reactive to light  Conjunctivae and EOM are normal    Neck: Normal range of motion  Neck supple  No tracheal deviation present  Cardiovascular: Normal rate, regular rhythm, normal heart sounds and intact distal pulses  No murmur heard  Pulmonary/Chest: Effort normal and breath sounds normal  No stridor  No respiratory distress  He has no wheezes  He has no rales  Abdominal: Soft  He exhibits no distension  There is no tenderness  There is no rebound and no guarding  No tenderness palpation anywhere in abdomen, abdomen soft with no rebound or guarding   Musculoskeletal: Normal range of motion  He exhibits no edema or deformity  Neurological: He is alert and oriented to person, place, and time     Skin: Skin is warm and dry  No rash noted  Psychiatric: His behavior is normal  Judgment and thought content normal    Flat affect            Diagnostic Results      Labs:    Results for orders placed or performed in visit on 09/21/18   CBC and differential   Result Value Ref Range    WBC 10 10 4 50 - 11 00 Thousand/uL    RBC 4 93 4 50 - 5 90 Million/uL    Hemoglobin 14 9 13 5 - 17 5 g/dL    Hematocrit 44 7 41 0 - 53 0 %    MCV 91 80 - 100 fL    MCH 30 3 26 0 - 34 0 pg    MCHC 33 5 31 0 - 36 0 g/dL    RDW 14 8 <15 3 %    MPV 8 6 (L) 8 9 - 12 7 fL    Platelets 495 (L) 770 - 450 Thousands/uL    Neutrophils Relative 63 45 - 65 %    Lymphocytes Relative 27 20 - 50 %    Monocytes Relative 9 1 - 10 %    Eosinophils Relative 0 0 - 6 %    Basophils Relative 1 0 - 1 %    Neutrophils Absolute 6 40 1 80 - 7 80 Thousands/µL    Lymphocytes Absolute 2 80 0 50 - 4 00 Thousands/µL    Monocytes Absolute 0 90 0 20 - 0 90 Thousand/µL    Eosinophils Absolute 0 00 0 00 - 0 40 Thousand/µL    Basophils Absolute 0 10 0 00 - 0 10 Thousands/µL       All labs reviewed and utilized in the medical decision making process    Radiology:    No orders to display       All radiology studies independently viewed by me and interpreted by the radiologist     Procedure    Procedures    CritCare Time      ED Course of Care and Re-Assessments      Symptoms improved with GI cocktail  Patient requested the admission to the hospital as he states that he would rather not go back to his group home  Informed that this was not an indication for admission to the hospital   Patient conferred this  on the phone and requested discharge that he could walk back to his group home      Medications   aluminum-magnesium hydroxide-simethicone (MYLANTA) 200-200-20 mg/5 mL oral suspension 30 mL (30 mL Oral Given 10/2/18 0620)   lidocaine viscous (XYLOCAINE) 2 % mucosal solution 15 mL (15 mL Swish & Spit Given 10/2/18 1439)           FINAL IMPRESSION    Final diagnoses: Abdominal pain         DISPOSITION/PLAN    46-year-old male with history and symptoms above  Vital signs reassuring, examination likewise reassuring with benign abdomen  Symptoms resolved with GI cocktail  Given timing, vital signs, examination, very low suspicion for perforated ulcer, small-bowel obstruction, cholecystitis, appendicitis, sepsis, other life threat at this time  Discharged strict return precautions, follow up primary care doctor  Time reflects when diagnosis was documented in both MDM as applicable and the Disposition within this note     Time User Action Codes Description Comment    10/2/2018  2:31 PM Martell Acevedo Add [R10 9] Abdominal pain       ED Disposition     ED Disposition Condition Comment    Discharge  Harvis Phalen Alpha discharge to home/self care  Condition at discharge: Good        Follow-up Information     Follow up With Specialties Details Why [address] [facility] Medicine Call today To discuss your symptoms and schedule a recheck  [address]  [address] [facility] Emergency Department Emergency Medicine Go to If symptoms worsen [address] [address] [phone]            PATIENT REFERRED TO:    Taisha Muhammad MD  [address]  [address]    Call today  To discuss your symptoms and schedule a recheck      [facility] Emergency Department  [address] [address] [phone]  Go to  If symptoms worsen      DISCHARGE MEDICATIONS:    Discharge Medication List as of 10/2/2018  2:32 PM      CONTINUE these medications which have NOT CHANGED    Details   Alcohol Swabs PADS USE AS NEEDED TO CHECK BLOOD GLUCOSE (DIABETES), Normal      atorvastatin (LIPITOR) 10 mg tablet TAKE 1 TABLET BY MOUTH ONCE DAILY AT BEDTIME - HYPERLIPIDEMIA, Normal      benztropine (COGENTIN) 1 mg tablet Take 0 5 mg by mouth 2 (two) times a day  , Historical Med      !! cloZAPine (CLOZARIL) 100 mg tablet Take 400 mg by mouth daily, Historical Med      !! clozapine (CLOZARIL) 50 MG tablet Take 50 mg by mouth daily, Historical Med      !! divalproex sodium (DEPAKOTE) 500 mg EC tablet Take 1,000 mg by mouth daily at bedtime  , Until Discontinued, Historical Med      !! divalproex sodium (DEPAKOTE) 500 mg EC tablet Take 500 mg by mouth every morning , Until Discontinued, Historical Med      FREESTYLE LITE test strip TEST BLOOD SUGARS ONCE DAILY (DM), Normal      Lancets (FREESTYLE) lancets TEST BLOOD SUGAR ONCE DAILY (DIABETES), Historical Med      levothyroxine 75 mcg tablet TAKE 1 TABLET BY MOUTH ONCE DAILY - HYPOTHYROIDISM, Normal      lisinopril (ZESTRIL) 5 mg tablet Take 5 mg by mouth daily  , Until Discontinued, Historical Med      metFORMIN (GLUCOPHAGE) 500 mg tablet TAKE ONE TABLET BY MOUTH 2 TIMES A DAY (DIABETES), Normal      methylphenidate (RITALIN) 10 mg tablet Take 5 mg by mouth daily before breakfast, Historical Med      !! Multiple Vitamin (DAILY DEVORA PO) Take 1 tablet by mouth daily  , Until Discontinued, Historical Med      !! multivitamin (THERAGRAN) TABS TAKE ONE TABLET BY MOUTH EVERY DAY (SUPPLEMENT), Normal      pantoprazole (PROTONIX) 40 mg tablet TAKE 1 TABLET BY MOUTH ONCE DAILY AFTER FEED, Normal       !! - Potential duplicate medications found  Please discuss with provider  No discharge procedures on file           MD Mustapha Dorado MD  10/02/18 7685

## 2018-10-02 NOTE — DISCHARGE INSTRUCTIONS

## 2018-10-09 ENCOUNTER — OFFICE VISIT (OUTPATIENT)
Dept: FAMILY MEDICINE CLINIC | Facility: CLINIC | Age: 53
End: 2018-10-09
Payer: COMMERCIAL

## 2018-10-09 VITALS
WEIGHT: 205 LBS | HEIGHT: 66 IN | HEART RATE: 91 BPM | TEMPERATURE: 96.1 F | DIASTOLIC BLOOD PRESSURE: 70 MMHG | BODY MASS INDEX: 32.95 KG/M2 | OXYGEN SATURATION: 97 % | SYSTOLIC BLOOD PRESSURE: 110 MMHG

## 2018-10-09 DIAGNOSIS — E03.9 ACQUIRED HYPOTHYROIDISM: ICD-10-CM

## 2018-10-09 DIAGNOSIS — K21.9 GASTROESOPHAGEAL REFLUX DISEASE WITHOUT ESOPHAGITIS: ICD-10-CM

## 2018-10-09 DIAGNOSIS — I10 BENIGN ESSENTIAL HYPERTENSION: ICD-10-CM

## 2018-10-09 DIAGNOSIS — Z23 NEED FOR INFLUENZA VACCINATION: ICD-10-CM

## 2018-10-09 DIAGNOSIS — E11.9 TYPE 2 DIABETES MELLITUS WITHOUT COMPLICATION, WITHOUT LONG-TERM CURRENT USE OF INSULIN (HCC): Primary | ICD-10-CM

## 2018-10-09 PROCEDURE — 99214 OFFICE O/P EST MOD 30 MIN: CPT | Performed by: FAMILY MEDICINE

## 2018-10-09 PROCEDURE — 3078F DIAST BP <80 MM HG: CPT | Performed by: FAMILY MEDICINE

## 2018-10-09 PROCEDURE — 4010F ACE/ARB THERAPY RXD/TAKEN: CPT | Performed by: FAMILY MEDICINE

## 2018-10-09 PROCEDURE — 3074F SYST BP LT 130 MM HG: CPT | Performed by: FAMILY MEDICINE

## 2018-10-09 RX ORDER — DIVALPROEX SODIUM 500 MG/1
250 TABLET, DELAYED RELEASE ORAL 3 TIMES DAILY
COMMUNITY
End: 2018-10-28 | Stop reason: ALTCHOICE

## 2018-10-09 RX ORDER — LISINOPRIL 5 MG/1
5 TABLET ORAL DAILY
Qty: 30 TABLET | Refills: 2 | Status: SHIPPED | OUTPATIENT
Start: 2018-10-09 | End: 2018-12-05 | Stop reason: SDUPTHER

## 2018-10-09 NOTE — PROGRESS NOTES
Subjective:   Chief Complaint   Patient presents with    Follow-up     chronic conditions        Patient ID: Teresa Rose is a 48 y o  male  Patient here follow-up with a chronic condition The patient has long history of hypertension the blood pressure today is in control patient tolerates medication well and no chest pain or short of breath no palpitation no headache patient was history of hypothyroidism on levothyroxine 75 mcg tolerated well asymptomatic also patient has history of non-insulin-dependent diabetic on metformin 500 mg tolerated well asymptomatic patient already on statin and ACE-inhibitor  The patient history of GERD on pantoprazole tolerated well no side effect patient asymptomatic no abdomen pain no heartburn no nausea vomiting or diarrhea no weight change  Recent blood work discussed with the patient        The following portions of the patient's history were reviewed and updated as appropriate: allergies, current medications, past family history, past medical history, past social history, past surgical history and problem list     Review of Systems   Constitutional: Negative for fatigue and fever  HENT: Negative for ear pain, sinus pain, sinus pressure and sore throat  Eyes: Negative for pain and redness  Respiratory: Negative for cough, chest tightness and shortness of breath  Cardiovascular: Negative for chest pain, palpitations and leg swelling  Gastrointestinal: Negative for abdominal pain, blood in stool, constipation, diarrhea and nausea  Genitourinary: Negative for flank pain, frequency and hematuria  Musculoskeletal: Negative for back pain and joint swelling  Skin: Negative for rash  Neurological: Negative for dizziness, numbness and headaches  Hematological: Does not bruise/bleed easily           Objective:  Vitals:    10/09/18 1112   BP: 110/70   Pulse: 91   Temp: (!) 96 1 °F (35 6 °C)   TempSrc: Oral   SpO2: 97%   Weight: 93 kg (205 lb)   Height: 5' 6" (1 676 m)      Physical Exam   Constitutional: He is oriented to person, place, and time  He appears well-developed and well-nourished  HENT:   Head: Normocephalic  Right Ear: External ear normal    Left Ear: External ear normal    Eyes: Conjunctivae and EOM are normal  Right eye exhibits no discharge  Left eye exhibits no discharge  Neck: No JVD present  Cardiovascular: Normal rate, regular rhythm and normal heart sounds  Exam reveals no gallop  No murmur heard  Pulmonary/Chest: Effort normal  No respiratory distress  He has no wheezes  He has no rales  He exhibits no tenderness  Abdominal: He exhibits no mass  There is no tenderness  There is no rebound  Musculoskeletal: He exhibits no edema or tenderness  Neurological: He is alert and oriented to person, place, and time  Skin: No rash noted  No erythema  Assessment/Plan:    Type 2 diabetes mellitus (HCC)  Lab Results   Component Value Date    HGBA1C 6 0 08/31/2018    Chronic fair controlled and on metformin 500 twice a day patient already on statin    low carb diet discussed with the patient also discussed important lose weight    Gastroesophageal reflux disease without esophagitis  Chronic ,well controlled by pantoprazole  Discuss with pt important lose weight   Multiple small meal ,avoid eat and lying down ,avoid spicy food and avoid provoked food        Hypothyroidism  Chronic asymptomatic for control in the levothyroxine 75 mcg continue current med    Benign essential hypertension  Chronic well controlled continue current medication low-salt diet less than 2 g a day ,   low caffeine intake   regular aerobic exercise 20 to totally minute a day diet and important lose weight discussed with the patient         Diagnoses and all orders for this visit:    Type 2 diabetes mellitus without complication, without long-term current use of insulin ([facility])  -     CBC and differential; Future  -     Comprehensive metabolic panel;  Future  - Hemoglobin A1C; Future  -     Lipid panel; Future  -     TSH, 3rd generation; Future    Benign essential hypertension  -     CBC and differential; Future  -     Comprehensive metabolic panel; Future  -     Hemoglobin A1C; Future  -     Lipid panel; Future  -     TSH, 3rd generation; Future  -     lisinopril (ZESTRIL) 5 mg tablet; Take 1 tablet (5 mg total) by mouth daily    Need for influenza vaccination  -     influenza vaccine, 7493-4704, quadrivalent, recombinant, PF, 0 5 mL, for patients 18 yr+ (FLUBLOK)    Gastroesophageal reflux disease without esophagitis    Acquired hypothyroidism  -     CBC and differential; Future  -     Comprehensive metabolic panel; Future  -     Hemoglobin A1C; Future  -     Lipid panel; Future  -     TSH, 3rd generation; Future    Other orders  -     divalproex sodium (DEPAKOTE) 500 mg EC tablet;  Take 250 mg by mouth 3 (three) times a day

## 2018-10-09 NOTE — PATIENT INSTRUCTIONS
10% - bad control"> 10% - bad control,Hemoglobin A1c (HbA1c) greater than 10% indicating poor diabetic control,Haemoglobin A1c greater than 10% indicating poor diabetic control">   Diabetes Mellitus Type 2 in Adults, Ambulatory Care   GENERAL INFORMATION:   Diabetes mellitus type 2  is a disease that affects how your body uses glucose (sugar)  Insulin helps move sugar out of the blood so it can be used for energy  Normally, when the blood sugar level increases, the pancreas makes more insulin  Type 2 diabetes develops because either the body cannot make enough insulin, or it cannot use the insulin correctly  After many years, your pancreas may stop making insulin  Common symptoms include the following:   · More hunger or thirst than usual     · Frequent urination     · Weight loss without trying     · Blurred vision  Seek immediate care for the following symptoms:   · Severe abdominal pain, or pain that spreads to your back  You may also be vomiting  · Trouble staying awake or focusing    · Shaking or sweating    · Blurred or double vision    · Breath has a fruity, sweet smell    · Breathing is deep and labored, or rapid and shallow    · Heartbeat is fast and weak  Treatment for diabetes mellitus type 2  includes keeping your blood sugar at a normal level  You must eat the right foods, and exercise regularly  You may also need medicine if you cannot control your blood sugar level with nutrition and exercise  Manage diabetes mellitus type 2:   · Check your blood sugar level  You will be taught how to check a small drop of blood in a glucose monitor  Ask your healthcare provider when and how often to check during the day  Ask your healthcare provider what your blood sugar levels should be when you check them  · Keep track of carbohydrates (sugar and starchy foods)  Your blood sugar level can get too high if you eat too many carbohydrates   Your dietitian will help you plan meals and snacks that have the right amount of carbohydrates  · Eat low-fat foods  Some examples are skinless chicken and low-fat milk  · Eat less sodium (salt)  Some examples of high-sodium foods to limit are soy sauce, potato chips, and soup  Do not add salt to food you cook  Limit your use of table salt  · Eat high-fiber foods  Foods that are a good source of fiber include vegetables, whole grain bread, and beans  · Limit alcohol  Alcohol affects your blood sugar level and can make it harder to manage your diabetes  Women should limit alcohol to 1 drink a day  Men should limit alcohol to 2 drinks a day  A drink of alcohol is 12 ounces of beer, 5 ounces of wine, or 1½ ounces of liquor  · Get regular exercise  Exercise can help keep your blood sugar level steady, decrease your risk of heart disease, and help you lose weight  Exercise for at least 30 minutes, 5 days a week  Include muscle strengthening activities 2 days each week  Work with your healthcare provider to create an exercise plan  · Check your feet each day  for injuries or open sores  Ask your healthcare provider for activities you can do if you have an open sore  · Quit smoking  If you smoke, it is never too late to quit  Smoking can worsen the problems that may occur with diabetes  Ask your healthcare provider for information about how to stop smoking if you are having trouble quitting  · Ask about your weight:  Ask healthcare providers if you need to lose weight, and how much to lose  Ask them to help you with a weight loss program  Even a 10 to 15 pound weight loss can help you manage your blood sugar level  · Carry medical alert identification  Wear medical alert jewelry or carry a card that says you have diabetes  Ask your healthcare provider where to get these items  · Ask about vaccines  Diabetes puts you at risk of serious illness if you get the flu, pneumonia, or hepatitis   Ask your healthcare provider if you should get a flu, pneumonia, or hepatitis B vaccine, and when to get the vaccine  Follow up with your healthcare provider as directed:  Write down your questions so you remember to ask them during your visits  CARE AGREEMENT:   You have the right to help plan your care  Learn about your health condition and how it may be treated  Discuss treatment options with your caregivers to decide what care you want to receive  You always have the right to refuse treatment  The above information is an  only  It is not intended as medical advice for individual conditions or treatments  Talk to your doctor, nurse or pharmacist before following any medical regimen to see if it is safe and effective for you  © 2014 [address] is for End User's use only and may not be sold, redistributed or otherwise used for commercial purposes  All illustrations and images included in CareNotes® are the copyrighted property of A D [employer] M [employer]  or [employer] Aren[employer]

## 2018-10-09 NOTE — ASSESSMENT & PLAN NOTE
Lab Results   Component Value Date    HGBA1C 6 0 08/31/2018    Chronic fair controlled and on metformin 500 twice a day patient already on statin    low carb diet discussed with the patient also discussed important lose weight

## 2018-10-19 ENCOUNTER — APPOINTMENT (OUTPATIENT)
Dept: LAB | Facility: HOSPITAL | Age: 53
End: 2018-10-19
Attending: PSYCHIATRY & NEUROLOGY
Payer: COMMERCIAL

## 2018-10-19 ENCOUNTER — TRANSCRIBE ORDERS (OUTPATIENT)
Dept: ADMINISTRATIVE | Facility: HOSPITAL | Age: 53
End: 2018-10-19

## 2018-10-19 DIAGNOSIS — E66.9 OBESITY, UNSPECIFIED CLASSIFICATION, UNSPECIFIED OBESITY TYPE, UNSPECIFIED WHETHER SERIOUS COMORBIDITY PRESENT: ICD-10-CM

## 2018-10-19 DIAGNOSIS — I51.9 MYXEDEMA HEART DISEASE: ICD-10-CM

## 2018-10-19 DIAGNOSIS — I51.9 MYXEDEMA HEART DISEASE: Primary | ICD-10-CM

## 2018-10-19 DIAGNOSIS — E03.9 MYXEDEMA HEART DISEASE: ICD-10-CM

## 2018-10-19 DIAGNOSIS — E03.9 MYXEDEMA HEART DISEASE: Primary | ICD-10-CM

## 2018-10-19 LAB
BASOPHILS # BLD AUTO: 0.1 THOUSANDS/ΜL (ref 0–0.1)
BASOPHILS NFR BLD AUTO: 1 % (ref 0–1)
EOSINOPHIL # BLD AUTO: 0 THOUSAND/ΜL (ref 0–0.4)
EOSINOPHIL NFR BLD AUTO: 0 % (ref 0–6)
ERYTHROCYTE [DISTWIDTH] IN BLOOD BY AUTOMATED COUNT: 14.5 %
HCT VFR BLD AUTO: 46.3 % (ref 41–53)
HGB BLD-MCNC: 15.3 G/DL (ref 13.5–17.5)
LYMPHOCYTES # BLD AUTO: 2.2 THOUSANDS/ΜL (ref 0.5–4)
LYMPHOCYTES NFR BLD AUTO: 20 % (ref 20–50)
MCH RBC QN AUTO: 30.2 PG (ref 26–34)
MCHC RBC AUTO-ENTMCNC: 33 G/DL (ref 31–36)
MCV RBC AUTO: 92 FL (ref 80–100)
MONOCYTES # BLD AUTO: 1.2 THOUSAND/ΜL (ref 0.2–0.9)
MONOCYTES NFR BLD AUTO: 10 % (ref 1–10)
NEUTROPHILS # BLD AUTO: 7.7 THOUSANDS/ΜL (ref 1.8–7.8)
NEUTS SEG NFR BLD AUTO: 69 % (ref 45–65)
PLATELET # BLD AUTO: 165 THOUSANDS/UL (ref 150–450)
PMV BLD AUTO: 8.8 FL (ref 8.9–12.7)
RBC # BLD AUTO: 5.05 MILLION/UL (ref 4.5–5.9)
WBC # BLD AUTO: 11.2 THOUSAND/UL (ref 4.5–11)

## 2018-10-19 PROCEDURE — 85025 COMPLETE CBC W/AUTO DIFF WBC: CPT

## 2018-10-19 PROCEDURE — 36415 COLL VENOUS BLD VENIPUNCTURE: CPT

## 2018-10-28 ENCOUNTER — HOSPITAL ENCOUNTER (EMERGENCY)
Facility: HOSPITAL | Age: 53
Discharge: HOME | End: 2018-10-28
Attending: EMERGENCY MEDICINE | Admitting: EMERGENCY MEDICINE
Payer: COMMERCIAL

## 2018-10-28 VITALS
SYSTOLIC BLOOD PRESSURE: 125 MMHG | WEIGHT: 207 LBS | HEART RATE: 84 BPM | BODY MASS INDEX: 29.63 KG/M2 | DIASTOLIC BLOOD PRESSURE: 70 MMHG | RESPIRATION RATE: 20 BRPM | HEIGHT: 70 IN | OXYGEN SATURATION: 96 % | TEMPERATURE: 98.9 F

## 2018-10-28 DIAGNOSIS — F25.9 SCHIZO AFFECTIVE SCHIZOPHRENIA (HCC): Primary | ICD-10-CM

## 2018-10-28 PROCEDURE — 99284 EMERGENCY DEPT VISIT MOD MDM: CPT

## 2018-10-29 NOTE — DISCHARGE INSTRUCTIONS
Psychotic Disorder   WHAT YOU NEED TO KNOW:   A psychotic disorder is a medical condition that causes hallucinations and delusions  Hallucinations are seeing, hearing, tasting, or feeling things that are not real  Delusions are beliefs that something is real, true, or right when it is not  These false beliefs do not go away even if there is proof that they are not true  You may believe someone is spying on you, after you, or controlling your mind  You may also believe there is something wrong with how your body works  Schizophrenia and schizoaffective disorder are examples of psychotic disorders  DISCHARGE INSTRUCTIONS:   Call 911 if:   · You feel like you could harm yourself or someone else  Contact your healthcare provider if:   · Your symptoms do not improve  · You cannot make it to your next appointment  · You have new symptoms  · You have questions or concerns about your condition or care  Medicines  may be given to decrease your symptoms  You may need 1 or more medicines  You may need to take your medicine for several weeks before you begin to feel better  Tell your healthcare provider about any side effects or problems you have with your medicines  The type or amount of medicine may need to be changed  Get support: It may be difficult to cope with your illness  You may feel lonely, anxious, or depressed  It may help to join a support group  A support group lets you talk with others who have a mental illness  For information and more support visit:  · [facility]  [address]  [facility]  , [address]  Phone: [phone]  Phone: [phone]  Web Address: http://WibiData/  [employer]  Self-care:   · Do not drink alcohol or use illegal drugs  Alcohol and illegal drugs can make your symptoms worse  Ask your healthcare provider for information if you currently drink alcohol or use illegal drugs and need help to quit  · Do not smoke    Nicotine and other chemicals in cigarettes and cigars can cause lung damage  They can also decrease how well your medicine works  Ask your healthcare provider for information if you currently smoke and need help to quit  E-cigarettes or smokeless tobacco still contain nicotine  Talk to your healthcare provider before you use these products  · Exercise regularly  Exercise can help improve your mood and decrease symptoms  Ask about the best exercise plan for you  · Manage your stress  Stress can make your condition worse  Ask your healthcare provider for more information about practicing mindfulness and deep breathing exercises to help decrease your stress  You may learn other ways to manage stress during therapy  Follow up with your healthcare provider as directed:  Write down your questions so you remember to ask them during your visits  © 2017 [facility] Information is for End User's use only and may not be sold, redistributed or otherwise used for commercial purposes  All illustrations and images included in CareNotes® are the copyrighted property of [employer]  or Adriel Arenas  The above information is an  only  It is not intended as medical advice for individual conditions or treatments  Talk to your doctor, nurse or pharmacist before following any medical regimen to see if it is safe and effective for you

## 2018-10-29 NOTE — ED PROVIDER NOTES
History  Chief Complaint   Patient presents with    Psychiatric Evaluation     Not happy at [facility]  The people there tell me to jump off the 2nd floor smoke porch  Denies SI/HI/VH/AH      72-year-old gentleman presents stating that he no longer wishes to live that sounds very house  He has been there for prolonged period of time and states that he is fed up of the people especially as there continually telling him to jump off the second story smoking about a  He denies depression, anxiety, suicidal ideation, homicidal ideation, or hallucinations  He reports he is taking his medications although he is unsure exactly what they are  He denies any acute issues, problems, complaints  Psychiatric Evaluation   Presenting symptoms: no aggressive behavior, no agitation, no bizarre behavior, no delusions, no depression, no hallucinations, no homicidal ideas, no paranoid behavior, no self-mutilation, no suicidal thoughts, no suicidal threats and no suicide attempt    Degree of incapacity (severity): Unable to specify  Onset quality:  Unable to specify  Timing:  Unable to specify  Progression:  Unable to specify  Context: not noncompliant    Treatment compliance:  Most of the time  Associated symptoms: no abdominal pain, no anxiety, no appetite change, no chest pain, no fatigue, no feelings of worthlessness, no headaches, no hypersomnia, no hyperventilation, no insomnia, no irritability and no poor judgment        Prior to Admission Medications   Prescriptions Last Dose Informant Patient Reported? Taking? Alcohol Swabs PADS  Care Giver No No   Sig: USE AS NEEDED TO CHECK BLOOD GLUCOSE (DIABETES)   FREESTYLE LITE test strip  Care Giver No No   Sig: TEST BLOOD SUGARS ONCE DAILY (DM)   Lancets (FREESTYLE) lancets  Care Giver Yes No   Sig: TEST BLOOD SUGAR ONCE DAILY (DIABETES)   Multiple Vitamin (DAILY DEVORA PO)  Care Giver Yes No   Sig: Take 1 tablet by mouth daily     cloZAPine (CLOZARIL) 100 mg tablet Care Giver Yes No   Sig: Take 400 mg by mouth daily   clozapine (CLOZARIL) 50 MG tablet  Care Giver Yes No   Sig: Take 50 mg by mouth daily   levothyroxine 75 mcg tablet  Care Giver No No   Sig: TAKE 1 TABLET BY MOUTH ONCE DAILY - HYPOTHYROIDISM   lisinopril (ZESTRIL) 5 mg tablet   No No   Sig: Take 1 tablet (5 mg total) by mouth daily   metFORMIN (GLUCOPHAGE) 500 mg tablet  Care Giver No No   Sig: TAKE ONE TABLET BY MOUTH 2 TIMES A DAY (DIABETES)   multivitamin (THERAGRAN) TABS  Care Giver No No   Sig: TAKE ONE TABLET BY MOUTH EVERY DAY (SUPPLEMENT)   pantoprazole (PROTONIX) 40 mg tablet  Care Giver No No   Sig: TAKE 1 TABLET BY MOUTH ONCE DAILY AFTER FEED      Facility-Administered Medications: None       Past Medical History:   Diagnosis Date    Bipolar 1 disorder ([place] [address] ) 2011    Colitis 03/27/2014    Constipation     [facility]     Diabetes ([place] [address] ) 2011    Disease of thyroid gland     GERD (gastroesophageal reflux disease) 2011    Hypertension     Hyponatremia     Hypothyroidism 2011    Lipoprotein deficiency     Obesity     Plantar fasciitis     Psychiatric disorder     Schizo affective schizophrenia ([place] [address] ) 2011    Sebaceous gland disease     Tobacco abuse        History reviewed  No pertinent surgical history  Family History   Problem Relation Age of Onset    Family history unknown: Yes     I have reviewed and agree with the history as documented  Social History   Substance Use Topics    Smoking status: Current Every Day Smoker     Packs/day: 1 00     Types: Cigarettes    Smokeless tobacco: Never Used    Alcohol use No        Review of Systems   Constitutional: Negative for activity change, appetite change, chills, fatigue, fever and irritability  HENT: Negative  Negative for congestion, postnasal drip, rhinorrhea, sinus pain, sore throat and trouble swallowing  Eyes: Negative  Respiratory: Negative      Cardiovascular: Negative for chest pain    Gastrointestinal: Negative for abdominal pain, constipation, diarrhea, nausea and vomiting  Endocrine: Negative  Genitourinary: Negative  Musculoskeletal: Negative  Negative for arthralgias, back pain and myalgias  Skin: Negative  Allergic/Immunologic: Negative  Neurological: Negative  Negative for headaches  Hematological: Negative  Psychiatric/Behavioral: Negative  Negative for agitation, hallucinations, homicidal ideas, paranoia, self-injury and suicidal ideas  The patient is not nervous/anxious and does not have insomnia  Physical Exam  Physical Exam   Constitutional: He is oriented to person, place, and time  He appears well-developed and well-nourished  No distress  HENT:   Head: Normocephalic and atraumatic  Eyes: Pupils are equal, round, and reactive to light  Neck: Neck supple  Cardiovascular: Normal rate, regular rhythm and normal heart sounds  Pulmonary/Chest: Effort normal and breath sounds normal  No respiratory distress  Abdominal: Soft  Bowel sounds are normal  He exhibits no distension  There is no tenderness  There is no guarding  Musculoskeletal: Normal range of motion  He exhibits no edema  Neurological: He is alert and oriented to person, place, and time  No sensory deficit  He exhibits normal muscle tone  Skin: Skin is warm and dry  Capillary refill takes less than 2 seconds  He is not diaphoretic  Psychiatric: He has a normal mood and affect  His behavior is normal    Nursing note and vitals reviewed        Vital Signs  ED Triage Vitals [10/28/18 2043]   Temperature Pulse Respirations Blood Pressure SpO2   98 9 °F (37 2 °C) 89 20 (!) 190/99 97 %      Temp Source Heart Rate Source Patient Position - Orthostatic VS BP Location FiO2 (%)   Tympanic Monitor Sitting Left arm --      Pain Score       No Pain           Vitals:    10/28/18 2043   BP: (!) 190/99   Pulse: 89   Patient Position - Orthostatic VS: Sitting       Visual Acuity      ED Medications  Medications - No data to display    Diagnostic Studies  Results Reviewed     None                 No orders to display              Procedures  Procedures       Phone Contacts  ED Phone Contact    ED Course                               MDM  Number of Diagnoses or Management Options  Diagnosis management comments: A 82-year-old gentleman presents stating he no longer wishes to reside at the facility where he is staying  He reports that he does not like the other people that live there he does wishes not to live any longer  He denies any acute psychiatric conditions specifically he denies depression, anxiety, or suicidal ideation  Crisis spoke with the patient and he is now agreeable to return to his place of residence  There are no acute issues to necessitate psychiatric evaluation or admission  CritCare Time    Disposition  Final diagnoses:   None     ED Disposition     None      Follow-up Information    None         Patient's Medications   Discharge Prescriptions    No medications on file     No discharge procedures on file      ED Provider  Electronically Signed by           Damaris Watkins DO  10/28/18 1247

## 2018-10-29 NOTE — ED NOTES
Crisis worker met with Pt  Pt denies SI/HI/AH/VH and is requesting to return home  When asked why he called 911, he reported it was due to not wanting to live at [facility] and that [facility] is not helping him  Crisis worker informed Debbie Salazar that housing cannot be changed from the emergency department and he was in agreement to return home  There was no available transport through [facility] and ride was arranged through the [employer] program  Crisis worker informed staff at [facility] patient would be returning home

## 2018-11-07 ENCOUNTER — APPOINTMENT (EMERGENCY)
Dept: CT IMAGING | Facility: HOSPITAL | Age: 53
End: 2018-11-07
Payer: COMMERCIAL

## 2018-11-07 ENCOUNTER — HOSPITAL ENCOUNTER (EMERGENCY)
Facility: HOSPITAL | Age: 53
Discharge: HOME | End: 2018-11-07
Attending: EMERGENCY MEDICINE | Admitting: EMERGENCY MEDICINE
Payer: COMMERCIAL

## 2018-11-07 VITALS
HEIGHT: 69 IN | RESPIRATION RATE: 14 BRPM | SYSTOLIC BLOOD PRESSURE: 144 MMHG | BODY MASS INDEX: 30.66 KG/M2 | WEIGHT: 207 LBS | TEMPERATURE: 99.8 F | OXYGEN SATURATION: 94 % | DIASTOLIC BLOOD PRESSURE: 85 MMHG | HEART RATE: 101 BPM

## 2018-11-07 DIAGNOSIS — S09.93XA FACIAL INJURY, INITIAL ENCOUNTER: Primary | ICD-10-CM
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
## 2018-11-07 LAB
AMPHETAMINES SERPL QL SCN: NEGATIVE
ANION GAP SERPL CALCULATED.3IONS-SCNC: 6 MMOL/L (ref 5–14)
BARBITURATES UR QL: NEGATIVE
BASOPHILS # BLD AUTO: 0.1 THOUSANDS/ΜL (ref 0–0.1)
BASOPHILS NFR BLD AUTO: 1 % (ref 0–1)
BENZODIAZ UR QL: NEGATIVE
BUN SERPL-MCNC: 11 MG/DL (ref 5–25)
CALCIUM SERPL-MCNC: 9.4 MG/DL (ref 8.4–10.2)
CHLORIDE SERPL-SCNC: 104 MMOL/L (ref 97–108)
CO2 SERPL-SCNC: 30 MMOL/L (ref 22–30)
COCAINE UR QL: NEGATIVE
CREAT SERPL-MCNC: 0.61 MG/DL (ref 0.7–1.5)
EOSINOPHIL # BLD AUTO: 0 THOUSAND/ΜL (ref 0–0.4)
EOSINOPHIL NFR BLD AUTO: 0 % (ref 0–6)
ERYTHROCYTE [DISTWIDTH] IN BLOOD BY AUTOMATED COUNT: 14.3 %
ETHANOL SERPL-MCNC: <10 MG/DL (ref 0–10)
GFR SERPL CREATININE-BSD FRML MDRD: 116 ML/MIN/1.73SQ M
GLUCOSE SERPL-MCNC: 95 MG/DL (ref 70–99)
HCT VFR BLD AUTO: 44.1 % (ref 41–53)
HGB BLD-MCNC: 14.9 G/DL (ref 13.5–17.5)
LYMPHOCYTES # BLD AUTO: 3.5 THOUSANDS/ΜL (ref 0.5–4)
LYMPHOCYTES NFR BLD AUTO: 34 % (ref 20–50)
MCH RBC QN AUTO: 30.6 PG (ref 26–34)
MCHC RBC AUTO-ENTMCNC: 33.8 G/DL (ref 31–36)
MCV RBC AUTO: 91 FL (ref 80–100)
METHADONE UR QL: NEGATIVE
MONOCYTES # BLD AUTO: 1 THOUSAND/ΜL (ref 0.2–0.9)
MONOCYTES NFR BLD AUTO: 10 % (ref 1–10)
NEUTROPHILS # BLD AUTO: 5.6 THOUSANDS/ΜL (ref 1.8–7.8)
NEUTS SEG NFR BLD AUTO: 55 % (ref 45–65)
OPIATES UR QL SCN: NEGATIVE
PCP UR QL: NEGATIVE
PLATELET # BLD AUTO: 164 THOUSANDS/UL (ref 150–450)
PMV BLD AUTO: 8.2 FL (ref 8.9–12.7)
POTASSIUM SERPL-SCNC: 4.2 MMOL/L (ref 3.6–5)
RBC # BLD AUTO: 4.87 MILLION/UL (ref 4.5–5.9)
SODIUM SERPL-SCNC: 140 MMOL/L (ref 137–147)
THC UR QL: NEGATIVE
WBC # BLD AUTO: 10.1 THOUSAND/UL (ref 4.5–11)

## 2018-11-07 PROCEDURE — 80307 DRUG TEST PRSMV CHEM ANLYZR: CPT | Performed by: PHYSICIAN ASSISTANT

## 2018-11-07 PROCEDURE — 36415 COLL VENOUS BLD VENIPUNCTURE: CPT | Performed by: PHYSICIAN ASSISTANT

## 2018-11-07 PROCEDURE — 80320 DRUG SCREEN QUANTALCOHOLS: CPT | Performed by: PHYSICIAN ASSISTANT

## 2018-11-07 PROCEDURE — 99284 EMERGENCY DEPT VISIT MOD MDM: CPT

## 2018-11-07 PROCEDURE — 70450 CT HEAD/BRAIN W/O DYE: CPT

## 2018-11-07 PROCEDURE — 85025 COMPLETE CBC W/AUTO DIFF WBC: CPT | Performed by: PHYSICIAN ASSISTANT

## 2018-11-07 PROCEDURE — 80048 BASIC METABOLIC PNL TOTAL CA: CPT | Performed by: PHYSICIAN ASSISTANT
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
## 2018-11-08 NOTE — DISCHARGE INSTRUCTIONS
Facial Contusion   WHAT YOU NEED TO KNOW:   A facial contusion is a bruise that appears on your face after an injury  A bruise happens when small blood vessels tear but skin does not  When blood vessels tear, blood leaks into nearby tissue, such as soft tissue or muscle  You may develop swelling and bruising around your eyes if your bruise is on your brow, forehead, or the bridge of your nose  DISCHARGE INSTRUCTIONS:   Return to the emergency department if:   · You have a fever  · You have watery, clear fluid draining from your nose  · You have changes in your vision or eye appearance  · You have changes or pain with eye movement  · You have tingling or numbness in or near the injured area  Contact your healthcare provider if:   · You find a new lump in the injured area  · Your symptoms do not improve with treatment  · You have questions or concerns about your condition or care  Medicines:   · Acetaminophen  decreases pain  It is available without a doctor's order  Ask how much to take and how often to take it  Follow directions  Acetaminophen can cause liver damage if not taken correctly  · Take your medicine as directed  Contact your healthcare provider if you think your medicine is not helping or if you have side effects  Tell him of her if you are allergic to any medicine  Keep a list of the medicines, vitamins, and herbs you take  Include the amounts, and when and why you take them  Bring the list or the pill bottles to follow-up visits  Carry your medicine list with you in case of an emergency  Ice:  Apply ice on your bruise for 15 to 20 minutes every hour or as directed  Use an ice pack, or put crushed ice in a plastic bag  Cover it with a towel  Ice helps prevent tissue damage and decreases swelling and pain  Elevation:  Sleep with your head elevated to help decrease swelling     Help your contusion heal:  Do not  massage the area or put heating pads or other warming devices on the bruise right after your injury  Heat and massage may slow the healing of the area  Follow up with your healthcare provider as directed: You may need to return within a week to have your injury checked again  Write down any questions you have so you remember to ask them in your follow-up visits  Prevent a facial contusion:   · Use safety belts and child restraints  · Use safety helmets when you ride a bicycle or motorcycle  · Use a mouth and face guard during sports  © 2017 [facility] Information is for End User's use only and may not be sold, redistributed or otherwise used for commercial purposes  All illustrations and images included in CareNotes® are the copyrighted property of [employer]  or [employer]riel [employer]enas  The above information is an  only  It is not intended as medical advice for individual conditions or treatments  Talk to your doctor, nurse or pharmacist before following any medical regimen to see if it is safe and effective for you

## 2018-11-12 ENCOUNTER — TELEPHONE (OUTPATIENT)
Dept: FAMILY MEDICINE CLINIC | Facility: CLINIC | Age: 53
End: 2018-11-12

## 2018-11-12 NOTE — TELEPHONE ENCOUNTER
Nilam Pandya from adult protective services called regarding any concerns about Anita Joya please advise

## 2018-11-16 ENCOUNTER — APPOINTMENT (OUTPATIENT)
Dept: LAB | Facility: HOSPITAL | Age: 53
End: 2018-11-16
Payer: COMMERCIAL

## 2018-11-16 ENCOUNTER — TRANSCRIBE ORDERS (OUTPATIENT)
Dept: ADMINISTRATIVE | Facility: HOSPITAL | Age: 53
End: 2018-11-16

## 2018-11-16 DIAGNOSIS — Z79.899 NEED FOR PROPHYLACTIC CHEMOTHERAPY: ICD-10-CM

## 2018-11-16 DIAGNOSIS — E66.9 OBESITY, UNSPECIFIED CLASSIFICATION, UNSPECIFIED OBESITY TYPE, UNSPECIFIED WHETHER SERIOUS COMORBIDITY PRESENT: ICD-10-CM

## 2018-11-16 DIAGNOSIS — I10 ESSENTIAL HYPERTENSION, MALIGNANT: ICD-10-CM

## 2018-11-16 DIAGNOSIS — F25.9 SCHIZOAFFECTIVE DISORDER, UNSPECIFIED TYPE (HCC): Primary | ICD-10-CM

## 2018-11-16 DIAGNOSIS — F25.9 SCHIZOAFFECTIVE DISORDER, UNSPECIFIED TYPE (HCC): ICD-10-CM

## 2018-11-16 DIAGNOSIS — E13.8 DIABETES MELLITUS OF OTHER TYPE WITH COMPLICATION, UNSPECIFIED WHETHER LONG TERM INSULIN USE: ICD-10-CM

## 2018-11-16 LAB
BASOPHILS # BLD AUTO: 0.1 THOUSANDS/ΜL (ref 0–0.1)
BASOPHILS NFR BLD AUTO: 0 % (ref 0–1)
EOSINOPHIL # BLD AUTO: 0 THOUSAND/ΜL (ref 0–0.4)
EOSINOPHIL NFR BLD AUTO: 0 % (ref 0–6)
ERYTHROCYTE [DISTWIDTH] IN BLOOD BY AUTOMATED COUNT: 14.4 %
HCT VFR BLD AUTO: 44.9 % (ref 41–53)
HGB BLD-MCNC: 14.9 G/DL (ref 13.5–17.5)
LYMPHOCYTES # BLD AUTO: 2.6 THOUSANDS/ΜL (ref 0.5–4)
LYMPHOCYTES NFR BLD AUTO: 18 % (ref 20–50)
MCH RBC QN AUTO: 30 PG (ref 26–34)
MCHC RBC AUTO-ENTMCNC: 33.2 G/DL (ref 31–36)
MCV RBC AUTO: 90 FL (ref 80–100)
MONOCYTES # BLD AUTO: 1.2 THOUSAND/ΜL (ref 0.2–0.9)
MONOCYTES NFR BLD AUTO: 9 % (ref 1–10)
NEUTROPHILS # BLD AUTO: 10.6 THOUSANDS/ΜL (ref 1.8–7.8)
NEUTS SEG NFR BLD AUTO: 73 % (ref 45–65)
PLATELET # BLD AUTO: 164 THOUSANDS/UL (ref 150–450)
PMV BLD AUTO: 8.4 FL (ref 8.9–12.7)
RBC # BLD AUTO: 4.97 MILLION/UL (ref 4.5–5.9)
WBC # BLD AUTO: 14.5 THOUSAND/UL (ref 4.5–11)

## 2018-11-16 PROCEDURE — 36415 COLL VENOUS BLD VENIPUNCTURE: CPT

## 2018-11-16 PROCEDURE — 85025 COMPLETE CBC W/AUTO DIFF WBC: CPT

## 2018-12-05 DIAGNOSIS — I10 BENIGN ESSENTIAL HYPERTENSION: ICD-10-CM

## 2018-12-05 PROCEDURE — 4010F ACE/ARB THERAPY RXD/TAKEN: CPT | Performed by: FAMILY MEDICINE

## 2018-12-05 RX ORDER — LISINOPRIL 5 MG/1
5 TABLET ORAL DAILY
Qty: 30 TABLET | Refills: 2 | Status: SHIPPED | OUTPATIENT
Start: 2018-12-05 | End: 2019-08-27 | Stop reason: SDUPTHER

## 2018-12-14 ENCOUNTER — APPOINTMENT (OUTPATIENT)
Dept: LAB | Facility: HOSPITAL | Age: 53
End: 2018-12-14
Payer: COMMERCIAL

## 2018-12-14 DIAGNOSIS — E66.9 OBESITY, UNSPECIFIED CLASSIFICATION, UNSPECIFIED OBESITY TYPE, UNSPECIFIED WHETHER SERIOUS COMORBIDITY PRESENT: ICD-10-CM

## 2018-12-14 DIAGNOSIS — Z11.59 NEED FOR HEPATITIS C SCREENING TEST: ICD-10-CM

## 2018-12-14 DIAGNOSIS — F25.9 SCHIZOAFFECTIVE DISORDER, UNSPECIFIED TYPE (HCC): ICD-10-CM

## 2018-12-14 DIAGNOSIS — E03.9 ACQUIRED HYPOTHYROIDISM: ICD-10-CM

## 2018-12-14 DIAGNOSIS — I10 BENIGN ESSENTIAL HYPERTENSION: ICD-10-CM

## 2018-12-14 DIAGNOSIS — E11.9 TYPE 2 DIABETES MELLITUS WITHOUT COMPLICATION, WITHOUT LONG-TERM CURRENT USE OF INSULIN (HCC): ICD-10-CM

## 2018-12-14 DIAGNOSIS — I10 ESSENTIAL HYPERTENSION, MALIGNANT: ICD-10-CM

## 2018-12-14 DIAGNOSIS — Z79.899 NEED FOR PROPHYLACTIC CHEMOTHERAPY: ICD-10-CM

## 2018-12-14 DIAGNOSIS — E13.8 DIABETES MELLITUS OF OTHER TYPE WITH COMPLICATION, UNSPECIFIED WHETHER LONG TERM INSULIN USE: ICD-10-CM

## 2018-12-14 DIAGNOSIS — Z12.5 ENCOUNTER FOR PROSTATE CANCER SCREENING: ICD-10-CM

## 2018-12-14 LAB
ALBUMIN SERPL BCP-MCNC: 4.3 G/DL (ref 3–5.2)
ALP SERPL-CCNC: 59 U/L (ref 43–122)
ALT SERPL W P-5'-P-CCNC: 25 U/L (ref 9–52)
AMMONIA PLAS-SCNC: 22 UMOL/L (ref 9–33)
ANION GAP SERPL CALCULATED.3IONS-SCNC: 8 MMOL/L (ref 5–14)
AST SERPL W P-5'-P-CCNC: 20 U/L (ref 17–59)
BASOPHILS # BLD AUTO: 0 THOUSANDS/ΜL (ref 0–0.1)
BASOPHILS NFR BLD AUTO: 0 % (ref 0–1)
BILIRUB SERPL-MCNC: 0.2 MG/DL
BUN SERPL-MCNC: 11 MG/DL (ref 5–25)
CALCIUM SERPL-MCNC: 9.6 MG/DL (ref 8.4–10.2)
CHLORIDE SERPL-SCNC: 102 MMOL/L (ref 97–108)
CHOLEST SERPL-MCNC: 122 MG/DL
CO2 SERPL-SCNC: 27 MMOL/L (ref 22–30)
CREAT SERPL-MCNC: 0.69 MG/DL (ref 0.7–1.5)
EOSINOPHIL # BLD AUTO: 0 THOUSAND/ΜL (ref 0–0.4)
EOSINOPHIL NFR BLD AUTO: 0 % (ref 0–6)
ERYTHROCYTE [DISTWIDTH] IN BLOOD BY AUTOMATED COUNT: 14.1 %
EST. AVERAGE GLUCOSE BLD GHB EST-MCNC: 146 MG/DL
GFR SERPL CREATININE-BSD FRML MDRD: 110 ML/MIN/1.73SQ M
GLUCOSE SERPL-MCNC: 99 MG/DL (ref 70–99)
HBA1C MFR BLD: 6.7 % (ref 4.2–6.3)
HCT VFR BLD AUTO: 47.3 % (ref 41–53)
HDLC SERPL-MCNC: 31 MG/DL (ref 40–59)
HGB BLD-MCNC: 15.3 G/DL (ref 13.5–17.5)
LDLC SERPL CALC-MCNC: 61 MG/DL
LYMPHOCYTES # BLD AUTO: 2.3 THOUSANDS/ΜL (ref 0.5–4)
LYMPHOCYTES NFR BLD AUTO: 18 % (ref 25–45)
MCH RBC QN AUTO: 29.5 PG (ref 26–34)
MCHC RBC AUTO-ENTMCNC: 32.3 G/DL (ref 31–36)
MCV RBC AUTO: 91 FL (ref 80–100)
MONOCYTES # BLD AUTO: 1 THOUSAND/ΜL (ref 0.2–0.9)
MONOCYTES NFR BLD AUTO: 8 % (ref 1–10)
NEUTROPHILS # BLD AUTO: 9.3 THOUSANDS/ΜL (ref 1.8–7.8)
NEUTS SEG NFR BLD AUTO: 74 % (ref 45–65)
NONHDLC SERPL-MCNC: 91 MG/DL
PLATELET # BLD AUTO: 166 THOUSANDS/UL (ref 150–450)
PMV BLD AUTO: 8.8 FL (ref 8.9–12.7)
POTASSIUM SERPL-SCNC: 4.4 MMOL/L (ref 3.6–5)
PROT SERPL-MCNC: 7.3 G/DL (ref 5.9–8.4)
PSA SERPL-MCNC: 0.3 NG/ML (ref 0–4)
RBC # BLD AUTO: 5.19 MILLION/UL (ref 4.5–5.9)
SODIUM SERPL-SCNC: 137 MMOL/L (ref 137–147)
T4 FREE SERPL-MCNC: 0.94 NG/DL (ref 0.76–1.46)
TRIGL SERPL-MCNC: 149 MG/DL
TSH SERPL DL<=0.05 MIU/L-ACNC: 1.45 UIU/ML (ref 0.47–4.68)
WBC # BLD AUTO: 12.6 THOUSAND/UL (ref 4.5–11)

## 2018-12-14 PROCEDURE — 86803 HEPATITIS C AB TEST: CPT

## 2018-12-14 PROCEDURE — G0103 PSA SCREENING: HCPCS

## 2018-12-14 PROCEDURE — 85025 COMPLETE CBC W/AUTO DIFF WBC: CPT

## 2018-12-14 PROCEDURE — 84439 ASSAY OF FREE THYROXINE: CPT

## 2018-12-14 PROCEDURE — 80159 DRUG ASSAY CLOZAPINE: CPT

## 2018-12-14 PROCEDURE — 83036 HEMOGLOBIN GLYCOSYLATED A1C: CPT

## 2018-12-14 PROCEDURE — 80061 LIPID PANEL: CPT

## 2018-12-14 PROCEDURE — 84443 ASSAY THYROID STIM HORMONE: CPT

## 2018-12-14 PROCEDURE — 80165 DIPROPYLACETIC ACID FREE: CPT

## 2018-12-14 PROCEDURE — 82140 ASSAY OF AMMONIA: CPT

## 2018-12-14 PROCEDURE — 36415 COLL VENOUS BLD VENIPUNCTURE: CPT

## 2018-12-14 PROCEDURE — 80053 COMPREHEN METABOLIC PANEL: CPT

## 2018-12-15 LAB — HCV AB SER QL: NORMAL

## 2018-12-17 LAB — VALPROATE FREE SERPL-MCNC: 5.7 UG/ML (ref 6–22)

## 2018-12-18 LAB
CLOZAPINE SERPL-MCNC: 191 NG/ML (ref 350–650)
CLOZAPINE+NOR SERPL-MCNC: 260 NG/ML
NORCLOZAPINE SERPL-MCNC: 69 NG/ML

## 2019-01-11 ENCOUNTER — TRANSCRIBE ORDERS (OUTPATIENT)
Dept: ADMINISTRATIVE | Facility: HOSPITAL | Age: 54
End: 2019-01-11

## 2019-01-11 ENCOUNTER — APPOINTMENT (OUTPATIENT)
Dept: LAB | Facility: HOSPITAL | Age: 54
End: 2019-01-11
Payer: COMMERCIAL

## 2019-01-11 DIAGNOSIS — F25.9 SCHIZOAFFECTIVE DISORDER, UNSPECIFIED TYPE (HCC): Primary | ICD-10-CM

## 2019-01-11 DIAGNOSIS — E13.8 DIABETES MELLITUS OF OTHER TYPE WITH COMPLICATION, UNSPECIFIED WHETHER LONG TERM INSULIN USE: ICD-10-CM

## 2019-01-11 DIAGNOSIS — F25.9 SCHIZOAFFECTIVE DISORDER, UNSPECIFIED TYPE (HCC): ICD-10-CM

## 2019-01-11 DIAGNOSIS — I10 ESSENTIAL HYPERTENSION, MALIGNANT: ICD-10-CM

## 2019-01-11 DIAGNOSIS — E66.9 OBESITY, UNSPECIFIED CLASSIFICATION, UNSPECIFIED OBESITY TYPE, UNSPECIFIED WHETHER SERIOUS COMORBIDITY PRESENT: ICD-10-CM

## 2019-01-11 DIAGNOSIS — Z79.899 ENCOUNTER FOR LONG-TERM (CURRENT) USE OF MEDICATIONS: ICD-10-CM

## 2019-01-11 LAB
BASOPHILS # BLD AUTO: 0 THOUSANDS/ΜL (ref 0–0.1)
BASOPHILS NFR BLD AUTO: 0 % (ref 0–1)
EOSINOPHIL # BLD AUTO: 0 THOUSAND/ΜL (ref 0–0.4)
EOSINOPHIL NFR BLD AUTO: 0 % (ref 0–6)
ERYTHROCYTE [DISTWIDTH] IN BLOOD BY AUTOMATED COUNT: 14.1 %
HCT VFR BLD AUTO: 46.8 % (ref 41–53)
HGB BLD-MCNC: 15.3 G/DL (ref 13.5–17.5)
LYMPHOCYTES # BLD AUTO: 2.6 THOUSANDS/ΜL (ref 0.5–4)
LYMPHOCYTES NFR BLD AUTO: 26 % (ref 25–45)
MCH RBC QN AUTO: 29.7 PG (ref 26–34)
MCHC RBC AUTO-ENTMCNC: 32.7 G/DL (ref 31–36)
MCV RBC AUTO: 91 FL (ref 80–100)
MONOCYTES # BLD AUTO: 1 THOUSAND/ΜL (ref 0.2–0.9)
MONOCYTES NFR BLD AUTO: 10 % (ref 1–10)
NEUTROPHILS # BLD AUTO: 6.5 THOUSANDS/ΜL (ref 1.8–7.8)
NEUTS SEG NFR BLD AUTO: 64 % (ref 45–65)
PLATELET # BLD AUTO: 160 THOUSANDS/UL (ref 150–450)
PMV BLD AUTO: 8.9 FL (ref 8.9–12.7)
RBC # BLD AUTO: 5.16 MILLION/UL (ref 4.5–5.9)
WBC # BLD AUTO: 10.1 THOUSAND/UL (ref 4.5–11)

## 2019-01-11 PROCEDURE — 85025 COMPLETE CBC W/AUTO DIFF WBC: CPT

## 2019-01-11 PROCEDURE — 36415 COLL VENOUS BLD VENIPUNCTURE: CPT

## 2019-01-22 ENCOUNTER — HOSPITAL ENCOUNTER (EMERGENCY)
Facility: HOSPITAL | Age: 54
Discharge: HOME | End: 2019-01-22
Attending: EMERGENCY MEDICINE | Admitting: EMERGENCY MEDICINE
Payer: COMMERCIAL

## 2019-01-22 VITALS
SYSTOLIC BLOOD PRESSURE: 167 MMHG | RESPIRATION RATE: 18 BRPM | TEMPERATURE: 98.2 F | DIASTOLIC BLOOD PRESSURE: 98 MMHG | HEART RATE: 92 BPM | OXYGEN SATURATION: 97 %

## 2019-01-22 DIAGNOSIS — Z00.8 ENCOUNTER FOR PSYCHOLOGICAL EVALUATION: Primary | ICD-10-CM

## 2019-01-22 PROCEDURE — 99283 EMERGENCY DEPT VISIT LOW MDM: CPT

## 2019-01-22 RX ORDER — DIVALPROEX SODIUM 500 MG/1
1500 TABLET, EXTENDED RELEASE ORAL
Status: ON HOLD | COMMUNITY
End: 2019-12-23 | Stop reason: DRUGHIGH

## 2019-01-22 RX ORDER — ATORVASTATIN CALCIUM 10 MG/1
10 TABLET, FILM COATED ORAL DAILY
COMMUNITY
End: 2019-08-27 | Stop reason: SDUPTHER

## 2019-01-22 NOTE — ED NOTES
Patient was brought to the Ed by [facility]  Believes that his [employer] worker is meeting him here  Contacted [employer], [phone]  Spoke to Ayden who states that she was not supposed to meet the client at the hospital but at his home  She states that they are working on plans for alternative housing, and that he dosen't need to be in the hospital   She said that he can walk home

## 2019-01-22 NOTE — DISCHARGE INSTRUCTIONS
Medical Clearance for Psychiatric Care   WHAT YOU NEED TO KNOW:   Medical clearance for psychiatric care is a medical exam to make sure that a patient's psychiatric symptoms are not caused by a medical condition  Psychiatric symptoms such as delusions or hallucinations may be caused or made worse by medicine or a physical illness  Healthcare providers will provide any necessary treatment so that the patient may be safely transferred to a psychiatric facility  Talk to the patient's healthcare provider if you have questions or concerns about his condition or care  DISCHARGE INSTRUCTIONS:   Tests that may be needed to medically clear a patient for psychiatric care:   · A mental exam  checks the patient's awareness and memory  The healthcare provider will ask the patient if he knows his name, his location, and the date  · A neurological exam  checks the patient's vision, sensation, reflexes, and muscle function  · An EKG  records the patient's heart rhythm to make sure it is safe to transfer him  · Blood and urine tests  check for infection, test kidney function, or get information about the patient's overall health  · An x-ray  takes pictures of the patient's chest to check for infection or fluid in the lungs  · A CT scan , or CAT scan, is a type of x-ray that uses a computer to take pictures of the patient's head  The scan checks for fluid or a tumor  The patient may be given a dye before the pictures are taken to help healthcare providers see the pictures better  Tell the healthcare provider if the patient has ever had a reaction to contrast dye  Follow-up care: The patient will see a medical doctor while at the psychiatric hospital if he takes medicine or has a medical condition  © 2017 [address] [facility] [employer] Information is for End User's use only and may not be sold, redistributed or otherwise used for commercial purposes   All illustrations and images included in CareNotes® are the copyrighted property of [employer]  or [employer]riel Aren[employer]  The above information is an  only  It is not intended as medical advice for individual conditions or treatments  Talk to your doctor, nurse or pharmacist before following any medical regimen to see if it is safe and effective for you

## 2019-01-23 NOTE — ED PROVIDER NOTES
History  Chief Complaint   Patient presents with    Psychiatric Evaluation     pt called [facility] to bring him to ER because he does not want to go back to group home  states taht he is suicidal at times but denies, HI/AH/VH  Psychiatric Evaluation   Presenting symptoms: disorganized thought process    Presenting symptoms: no agitation and no suicidal thoughts    Patient accompanied by:  Law enforcement  Degree of incapacity (severity): Moderate  Onset quality:  Gradual  Timing:  Constant  Progression:  Worsening  Chronicity:  Recurrent  Treatment compliance: All of the time  Associated symptoms: no abdominal pain, no chest pain and no headaches    Risk factors: hx of mental illness    Risk factors comment:  Patient of a group home       Prior to Admission Medications   Prescriptions Last Dose Informant Patient Reported? Taking? Alcohol Swabs PADS  Care Giver No No   Sig: USE AS NEEDED TO CHECK BLOOD GLUCOSE (DIABETES)   FREESTYLE LITE test strip  Care Giver No No   Sig: TEST BLOOD SUGARS ONCE DAILY (DM)   Lancets (FREESTYLE) lancets  Care Giver Yes No   Sig: TEST BLOOD SUGAR ONCE DAILY (DIABETES)   Multiple Vitamin (DAILY DEVORA PO)  Care Giver Yes No   Sig: Take 1 tablet by mouth daily     atorvastatin (LIPITOR) 10 mg tablet   Yes Yes   Sig: Take 10 mg by mouth daily   cloZAPine (CLOZARIL) 100 mg tablet  Care Giver Yes No   Sig: Take 400 mg by mouth daily   clozapine (CLOZARIL) 50 MG tablet  Care Giver Yes No   Sig: Take 50 mg by mouth daily   divalproex sodium (DEPAKOTE ER) 500 mg 24 hr tablet   Yes Yes   Sig: Take 1,500 mg by mouth daily at bedtime   levothyroxine 75 mcg tablet  Care Giver No No   Sig: TAKE 1 TABLET BY MOUTH ONCE DAILY - HYPOTHYROIDISM   lisinopril (ZESTRIL) 5 mg tablet   No No   Sig: Take 1 tablet (5 mg total) by mouth daily   metFORMIN (GLUCOPHAGE) 500 mg tablet  Care Giver No No   Sig: TAKE ONE TABLET BY MOUTH 2 TIMES A DAY (DIABETES)   multivitamin (THERAGRAN) TABS  Care Giver No No Sig: TAKE ONE TABLET BY MOUTH EVERY DAY (SUPPLEMENT)   pantoprazole (PROTONIX) 40 mg tablet  Care Giver No No   Sig: TAKE 1 TABLET BY MOUTH ONCE DAILY AFTER FEED      Facility-Administered Medications: None       Past Medical History:   Diagnosis Date    Asthma     Bipolar 1 disorder ([place] [address] ) 2011    Colitis 03/27/2014    Constipation     [facility]     Diabetes ([place] [address] ) 2011    Disease of thyroid gland     GERD (gastroesophageal reflux disease) 2011    Hyperlipidemia     Hypertension     Hyponatremia     Hypothyroidism 2011    Lipoprotein deficiency     Obesity     Plantar fasciitis     Psychiatric disorder     Schizo affective schizophrenia ([place] [address] ) 2011    Sebaceous gland disease     Tobacco abuse        History reviewed  No pertinent surgical history  Family History   Problem Relation Age of Onset    Family history unknown: Yes     I have reviewed and agree with the history as documented  Social History   Substance Use Topics    Smoking status: Current Every Day Smoker     Packs/day: 1 00     Types: Cigarettes    Smokeless tobacco: Never Used    Alcohol use No        Review of Systems   Constitutional: Negative for chills and fever  HENT: Negative for rhinorrhea, sore throat and trouble swallowing  Eyes: Negative for pain  Respiratory: Negative for cough, shortness of breath, wheezing and stridor  Cardiovascular: Negative for chest pain and leg swelling  Gastrointestinal: Negative for abdominal pain, diarrhea and nausea  Endocrine: Negative for polyuria  Genitourinary: Negative for dysuria, flank pain and urgency  Musculoskeletal: Negative for joint swelling, myalgias and neck stiffness  Skin: Negative for rash  Allergic/Immunologic: Negative for immunocompromised state  Neurological: Negative for dizziness, syncope, weakness, numbness and headaches     Psychiatric/Behavioral: Negative for agitation, confusion and suicidal ideas  All other systems reviewed and are negative  Physical Exam  Physical Exam   Constitutional: He is oriented to person, place, and time  He appears well-developed and well-nourished  HENT:   Head: Normocephalic and atraumatic  Eyes: Pupils are equal, round, and reactive to light  EOM are normal    Neck: Normal range of motion  Neck supple  Cardiovascular: Normal rate and regular rhythm  Exam reveals no friction rub  No murmur heard  Pulmonary/Chest: Breath sounds normal  No respiratory distress  He has no wheezes  He has no rales  Abdominal: Soft  Bowel sounds are normal  He exhibits no distension  There is no tenderness  Musculoskeletal: Normal range of motion  He exhibits no edema or tenderness  Neurological: He is alert and oriented to person, place, and time  Skin: Skin is warm  No rash noted  Psychiatric: He expresses no homicidal and no suicidal ideation  Nursing note and vitals reviewed        Vital Signs  ED Triage Vitals [01/22/19 0951]   Temperature Pulse Respirations Blood Pressure SpO2   98 2 °F (36 8 °C) 92 18 167/98 97 %      Temp Source Heart Rate Source Patient Position - Orthostatic VS BP Location FiO2 (%)   Tympanic Monitor Sitting Left arm --      Pain Score       --           Vitals:    01/22/19 0951   BP: 167/98   Pulse: 92   Patient Position - Orthostatic VS: Sitting       Visual Acuity      ED Medications  Medications - No data to display    Diagnostic Studies  Results Reviewed     None                 No orders to display              Procedures  Procedures       Phone Contacts  ED Phone Contact    ED Course                               MDM  Number of Diagnoses or Management Options  Encounter for psychological evaluation: new and requires workup  Diagnosis management comments: 53M with hx of mental health Patient not suicidal not homicidal   Stable for outpatient management is crisis worker did discuss with act team member who states that the patient can be discharged appropriately back to the group home  Pt re-examined and evaluated after testing and treatment  Spoke with the patient and feeling improved and sxs have resolved  Will discharge home with close f/u with pcp and instructed to return to the ED if sxs worsen or continue  Pt agrees with the plan for discharge and feels comfortable to go home with proper f/u  Advised to return for worsening or additional problems  Diagnostic tests were reviewed and questions answered  Diagnosis, care plan and treatment options were discussed  The patient understand instructions and will follow up as directed  CritCare Time    Disposition  Final diagnoses:   Encounter for psychological evaluation     Time reflects when diagnosis was documented in both MDM as applicable and the Disposition within this note     Time User Action Codes Description Comment    1/22/2019 10:01 AM Jesus Mason Add [Z00 8] Encounter for psychological evaluation       ED Disposition     ED Disposition Condition Comment    Discharge  Asiya Gaitan discharge to home/self care  Condition at discharge: Stable        Follow-up Information     Follow up With Specialties Details Why [address] [facility] Call in 2 days If symptoms worsen [address]    [address]            Discharge Medication List as of 1/22/2019 10:01 AM      CONTINUE these medications which have NOT CHANGED    Details   Alcohol Swabs PADS USE AS NEEDED TO CHECK BLOOD GLUCOSE (DIABETES), Normal      !! cloZAPine (CLOZARIL) 100 mg tablet Take 400 mg by mouth daily, Historical Med      !! clozapine (CLOZARIL) 50 MG tablet Take 50 mg by mouth daily, Historical Med      FREESTYLE LITE test strip TEST BLOOD SUGARS ONCE DAILY (DM), Normal      Lancets (FREESTYLE) lancets TEST BLOOD SUGAR ONCE DAILY (DIABETES), Historical Med      levothyroxine 75 mcg tablet TAKE 1 TABLET BY MOUTH ONCE DAILY - HYPOTHYROIDISM, Normal lisinopril (ZESTRIL) 5 mg tablet Take 1 tablet (5 mg total) by mouth daily, Starting Wed 12/5/2018, Normal      metFORMIN (GLUCOPHAGE) 500 mg tablet TAKE ONE TABLET BY MOUTH 2 TIMES A DAY (DIABETES), Normal      !! Multiple Vitamin (DAILY DEVORA PO) Take 1 tablet by mouth daily  , Historical Med      !! multivitamin (THERAGRAN) TABS TAKE ONE TABLET BY MOUTH EVERY DAY (SUPPLEMENT), Normal      pantoprazole (PROTONIX) 40 mg tablet TAKE 1 TABLET BY MOUTH ONCE DAILY AFTER FEED, Normal       !! - Potential duplicate medications found  Please discuss with provider  No discharge procedures on file      ED Provider  Electronically Signed by           Layla Tomas DO  01/23/19 8394

## 2019-01-29 ENCOUNTER — HOSPITAL ENCOUNTER (EMERGENCY)
Facility: HOSPITAL | Age: 54
Discharge: HOME | End: 2019-01-29
Attending: EMERGENCY MEDICINE | Admitting: EMERGENCY MEDICINE
Payer: COMMERCIAL

## 2019-01-29 VITALS
WEIGHT: 220.6 LBS | HEART RATE: 70 BPM | BODY MASS INDEX: 32.58 KG/M2 | RESPIRATION RATE: 18 BRPM | OXYGEN SATURATION: 99 % | SYSTOLIC BLOOD PRESSURE: 110 MMHG | DIASTOLIC BLOOD PRESSURE: 71 MMHG | TEMPERATURE: 97.5 F

## 2019-01-29 DIAGNOSIS — K59.09 CONSTIPATION, CHRONIC: Primary | ICD-10-CM

## 2019-01-29 DIAGNOSIS — F25.9 SCHIZOAFFECTIVE DISORDER (HCC): ICD-10-CM

## 2019-01-29 LAB
AMPHETAMINES SERPL QL SCN: NEGATIVE
ANION GAP SERPL CALCULATED.3IONS-SCNC: 5 MMOL/L (ref 5–14)
BARBITURATES UR QL: NEGATIVE
BASOPHILS # BLD AUTO: 0.1 THOUSANDS/ΜL (ref 0–0.1)
BASOPHILS NFR BLD AUTO: 1 % (ref 0–1)
BENZODIAZ UR QL: NEGATIVE
BUN SERPL-MCNC: 11 MG/DL (ref 5–25)
CALCIUM SERPL-MCNC: 9.5 MG/DL (ref 8.4–10.2)
CHLORIDE SERPL-SCNC: 103 MMOL/L (ref 97–108)
CK SERPL-CCNC: 74 U/L (ref 55–170)
CO2 SERPL-SCNC: 28 MMOL/L (ref 22–30)
COCAINE UR QL: NEGATIVE
CREAT SERPL-MCNC: 0.68 MG/DL (ref 0.7–1.5)
EOSINOPHIL # BLD AUTO: 0 THOUSAND/ΜL (ref 0–0.4)
EOSINOPHIL NFR BLD AUTO: 0 % (ref 0–6)
ERYTHROCYTE [DISTWIDTH] IN BLOOD BY AUTOMATED COUNT: 14.2 %
ETHANOL SERPL-MCNC: <10 MG/DL (ref 0–10)
GFR SERPL CREATININE-BSD FRML MDRD: 111 ML/MIN/1.73SQ M
GLUCOSE SERPL-MCNC: 88 MG/DL (ref 70–99)
HCT VFR BLD AUTO: 46.4 % (ref 41–53)
HGB BLD-MCNC: 15.5 G/DL (ref 13.5–17.5)
LYMPHOCYTES # BLD AUTO: 3 THOUSANDS/ΜL (ref 0.5–4)
LYMPHOCYTES NFR BLD AUTO: 24 % (ref 25–45)
MCH RBC QN AUTO: 29.7 PG (ref 26–34)
MCHC RBC AUTO-ENTMCNC: 33.4 G/DL (ref 31–36)
MCV RBC AUTO: 89 FL (ref 80–100)
METHADONE UR QL: NEGATIVE
MONOCYTES # BLD AUTO: 1 THOUSAND/ΜL (ref 0.2–0.9)
MONOCYTES NFR BLD AUTO: 8 % (ref 1–10)
NEUTROPHILS # BLD AUTO: 8.6 THOUSANDS/ΜL (ref 1.8–7.8)
NEUTS SEG NFR BLD AUTO: 68 % (ref 45–65)
OPIATES UR QL SCN: NEGATIVE
PCP UR QL: NEGATIVE
PLATELET # BLD AUTO: 155 THOUSANDS/UL (ref 150–450)
PMV BLD AUTO: 8.3 FL (ref 8.9–12.7)
POTASSIUM SERPL-SCNC: 4.4 MMOL/L (ref 3.6–5)
RBC # BLD AUTO: 5.2 MILLION/UL (ref 4.5–5.9)
SODIUM SERPL-SCNC: 136 MMOL/L (ref 137–147)
THC UR QL: NEGATIVE
VALPROATE SERPL-MCNC: 53.6 UG/ML (ref 50–120)
WBC # BLD AUTO: 12.8 THOUSAND/UL (ref 4.5–11)

## 2019-01-29 PROCEDURE — 85025 COMPLETE CBC W/AUTO DIFF WBC: CPT | Performed by: EMERGENCY MEDICINE

## 2019-01-29 PROCEDURE — 80164 ASSAY DIPROPYLACETIC ACD TOT: CPT | Performed by: EMERGENCY MEDICINE

## 2019-01-29 PROCEDURE — 80048 BASIC METABOLIC PNL TOTAL CA: CPT | Performed by: EMERGENCY MEDICINE

## 2019-01-29 PROCEDURE — 99284 EMERGENCY DEPT VISIT MOD MDM: CPT

## 2019-01-29 PROCEDURE — 80320 DRUG SCREEN QUANTALCOHOLS: CPT | Performed by: EMERGENCY MEDICINE

## 2019-01-29 PROCEDURE — 36415 COLL VENOUS BLD VENIPUNCTURE: CPT | Performed by: EMERGENCY MEDICINE

## 2019-01-29 PROCEDURE — 80307 DRUG TEST PRSMV CHEM ANLYZR: CPT | Performed by: EMERGENCY MEDICINE

## 2019-01-29 PROCEDURE — 82550 ASSAY OF CK (CPK): CPT | Performed by: EMERGENCY MEDICINE

## 2019-01-29 RX ORDER — ACETAMINOPHEN 325 MG/1
325 TABLET ORAL EVERY 6 HOURS PRN
Status: CANCELLED | OUTPATIENT
Start: 2019-01-29

## 2019-01-29 RX ORDER — LISINOPRIL 10 MG/1
5 TABLET ORAL DAILY
Status: DISCONTINUED | OUTPATIENT
Start: 2019-01-30 | End: 2019-01-29 | Stop reason: HOSPADM

## 2019-01-29 RX ORDER — HALOPERIDOL 1 MG/1
2 TABLET ORAL EVERY 6 HOURS PRN
Status: CANCELLED | OUTPATIENT
Start: 2019-01-29

## 2019-01-29 RX ORDER — HALOPERIDOL 5 MG/ML
2 INJECTION INTRAMUSCULAR EVERY 6 HOURS PRN
Status: CANCELLED | OUTPATIENT
Start: 2019-01-29

## 2019-01-29 RX ORDER — BENZTROPINE MESYLATE 1 MG/ML
1 INJECTION INTRAMUSCULAR; INTRAVENOUS EVERY 8 HOURS PRN
Status: CANCELLED | OUTPATIENT
Start: 2019-01-29

## 2019-01-29 RX ORDER — RISPERIDONE 0.5 MG/1
0.5 TABLET, ORALLY DISINTEGRATING ORAL EVERY 8 HOURS PRN
Status: CANCELLED | OUTPATIENT
Start: 2019-01-29

## 2019-01-29 RX ORDER — MAGNESIUM HYDROXIDE/ALUMINUM HYDROXICE/SIMETHICONE 120; 1200; 1200 MG/30ML; MG/30ML; MG/30ML
15 SUSPENSION ORAL EVERY 4 HOURS PRN
Status: CANCELLED | OUTPATIENT
Start: 2019-01-29

## 2019-01-29 RX ORDER — HYDROXYZINE HYDROCHLORIDE 25 MG/1
25 TABLET, FILM COATED ORAL EVERY 6 HOURS PRN
Status: CANCELLED | OUTPATIENT
Start: 2019-01-29

## 2019-01-29 RX ORDER — BENZTROPINE MESYLATE 0.5 MG/1
1 TABLET ORAL EVERY 8 HOURS PRN
Status: CANCELLED | OUTPATIENT
Start: 2019-01-29

## 2019-01-29 RX ORDER — PANTOPRAZOLE SODIUM 40 MG/1
40 TABLET, DELAYED RELEASE ORAL
Status: DISCONTINUED | OUTPATIENT
Start: 2019-01-30 | End: 2019-01-29 | Stop reason: HOSPADM

## 2019-01-29 RX ORDER — DIVALPROEX SODIUM 500 MG/1
1500 TABLET, EXTENDED RELEASE ORAL
Status: DISCONTINUED | OUTPATIENT
Start: 2019-01-29 | End: 2019-01-29 | Stop reason: HOSPADM

## 2019-01-29 RX ORDER — LEVOTHYROXINE SODIUM 0.07 MG/1
75 TABLET ORAL
Status: DISCONTINUED | OUTPATIENT
Start: 2019-01-30 | End: 2019-01-29 | Stop reason: HOSPADM

## 2019-01-29 NOTE — ED PROVIDER NOTES
History  Chief Complaint   Patient presents with    Psychiatric Evaluation     Pt brought to Psychiatric via EMS from [facility] after throwing a chair at a window  Per pt, he c/o abd pain, staff stated he needed to go to hospital, he did not want to come so he threw the chair  Pt denies abd pain or symptoms upon arrival  Pt reports AH, states voices are telling him to jump off the bridge or "2nd floor porch"  Pt denies SI/HI/VH  Pt calm and cooperative  Patient is a 53-year-old male with a history of schizoaffective disorder and bipolar who presents with acute onset of command auditory hallucinations  Patient states he has been hearing voices for Alisa Mings but recently they have gotten worse  Today upset because of certain living situations where he is at  Got so angry eventually picked up a chair and threw it at a wall  Patient denies any suicidal homicidal ideations but again has having command hallucinations to kill himself  States he was compliant with his meds up until today did not taking his medication day  Denies any illicit drug or alcohol use  States his last hospital admission has been several years  Patient denies any chest pain abdominal pain shortness of breath headache or other complaints  Prior to Admission Medications   Prescriptions Last Dose Informant Patient Reported? Taking? Alcohol Swabs PADS 1/28/2019 at Unknown time Care Giver No Yes   Sig: USE AS NEEDED TO CHECK BLOOD GLUCOSE (DIABETES)   FREESTYLE LITE test strip 1/28/2019 at Unknown time Care Giver No Yes   Sig: TEST BLOOD SUGARS ONCE DAILY (DM)   Lancets (FREESTYLE) lancets 1/28/2019 at Unknown time Care Giver Yes Yes   Sig: TEST BLOOD SUGAR ONCE DAILY (DIABETES)   Multiple Vitamin (DAILY DEVORA PO) 1/28/2019 at Unknown time Care Giver Yes Yes   Sig: Take 1 tablet by mouth daily     atorvastatin (LIPITOR) 10 mg tablet 1/28/2019 at Unknown time  Yes Yes   Sig: Take 10 mg by mouth daily   cloZAPine (CLOZARIL) 100 mg tablet 1/28/2019 at Unknown time Care Giver Yes Yes   Sig: Take 400 mg by mouth daily   clozapine (CLOZARIL) 50 MG tablet 1/28/2019 at Unknown time Care Giver Yes Yes   Sig: Take 50 mg by mouth daily   divalproex sodium (DEPAKOTE ER) 500 mg 24 hr tablet 1/28/2019 at Unknown time  Yes Yes   Sig: Take 1,500 mg by mouth daily at bedtime   levothyroxine 75 mcg tablet 1/28/2019 at Unknown time Care Giver No Yes   Sig: TAKE 1 TABLET BY MOUTH ONCE DAILY - HYPOTHYROIDISM   lisinopril (ZESTRIL) 5 mg tablet 1/28/2019 at Unknown time  No Yes   Sig: Take 1 tablet (5 mg total) by mouth daily   metFORMIN (GLUCOPHAGE) 500 mg tablet 1/28/2019 at Unknown time Care Giver No Yes   Sig: TAKE ONE TABLET BY MOUTH 2 TIMES A DAY (DIABETES)   multivitamin (THERAGRAN) TABS 1/28/2019 at Unknown time Care Giver No Yes   Sig: TAKE ONE TABLET BY MOUTH EVERY DAY (SUPPLEMENT)   pantoprazole (PROTONIX) 40 mg tablet 1/28/2019 at Unknown time Care Giver No Yes   Sig: TAKE 1 TABLET BY MOUTH ONCE DAILY AFTER FEED      Facility-Administered Medications: None       Past Medical History:   Diagnosis Date    Asthma     Bipolar 1 disorder ([place] [address] ) 2011    Colitis 03/27/2014    Constipation     COPD (chronic obstructive pulmonary disease) ([place] [address] )     Diabetes ([place] [address] ) 2011    Disease of thyroid gland     GERD (gastroesophageal reflux disease) 2011    Hyperlipidemia     Hypertension     Hyponatremia     Hypothyroidism 2011    Lipoprotein deficiency     Obesity     Plantar fasciitis     Psychiatric disorder     Schizo affective schizophrenia ([place] [address] ) 2011    Sebaceous gland disease     Tobacco abuse        History reviewed  No pertinent surgical history  Family History   Problem Relation Age of Onset    Family history unknown: Yes     I have reviewed and agree with the history as documented      Social History   Substance Use Topics    Smoking status: Current Every Day Smoker     Packs/day: 1 00     Types: Cigarettes    Smokeless tobacco: Never Used    Alcohol use No        Review of Systems   Constitutional: Negative  Negative for activity change and appetite change  HENT: Negative  Eyes: Negative  Respiratory: Negative  Negative for cough and shortness of breath  Cardiovascular: Negative  Negative for chest pain  Gastrointestinal: Negative  Negative for abdominal pain  Endocrine: Negative  Genitourinary: Negative  Musculoskeletal: Negative  Skin: Negative  Allergic/Immunologic: Negative  Neurological: Negative  Hematological: Negative  Psychiatric/Behavioral: Positive for agitation, dysphoric mood and hallucinations  All other systems reviewed and are negative  Physical Exam  Physical Exam   Constitutional: He is oriented to person, place, and time  He appears well-developed and well-nourished  HENT:   Head: Normocephalic and atraumatic  Right Ear: External ear normal    Left Ear: External ear normal    Nose: Nose normal    Mouth/Throat: Oropharynx is clear and moist    Eyes: Pupils are equal, round, and reactive to light  Conjunctivae are normal    Neck: Normal range of motion  Neck supple  Cardiovascular: Normal rate, regular rhythm, normal heart sounds and intact distal pulses  Pulmonary/Chest: Effort normal and breath sounds normal    Abdominal: Soft  Bowel sounds are normal    Musculoskeletal: Normal range of motion  Neurological: He is alert and oriented to person, place, and time  Skin: Skin is warm and dry  Psychiatric: His speech is delayed  He is slowed, withdrawn and actively hallucinating  He expresses impulsivity  Nursing note and vitals reviewed        Vital Signs  ED Triage Vitals [01/29/19 1540]   Temperature Pulse Respirations Blood Pressure SpO2   (!) 97 °F (36 1 °C) 78 14 (!) 172/102 95 %      Temp Source Heart Rate Source Patient Position - Orthostatic VS BP Location FiO2 (%)   Tympanic Monitor Sitting Left arm --      Pain Score       No Pain           Vitals: 01/29/19 1540 01/29/19 1813 01/29/19 2001   BP: (!) 172/102 101/60 110/71   Pulse: 78 66 70   Patient Position - Orthostatic VS: Sitting Lying Sitting       Visual Acuity      ED Medications  Medications - No data to display    Diagnostic Studies  Results Reviewed     Procedure Component Value Units Date/Time    Valproic acid level, total [[id number]]  (Normal) Collected:  01/29/19 1610    Lab Status:  Final result Specimen:  Blood from Arm, Left Updated:  01/29/19 1646     Valproic Acid, Total 53 6 ug/mL     Rapid drug screen, urine [[id number]]  (Normal) Collected:  01/29/19 1611    Lab Status:  Final result Specimen:  Urine from Urine, Clean Catch Updated:  01/29/19 1641     Amph/Meth UR Negative     Barbiturate Ur Negative     Benzodiazepine Urine Negative     Cocaine Urine Negative     Methadone Urine Negative     Opiate Urine Negative     PCP Ur Negative     THC Urine Negative    Narrative:         FOR MEDICAL PURPOSES ONLY  IF CONFIRMATION NEEDED PLEASE CONTACT THE LAB WITHIN 5 DAYS      Drug Screen Cutoff Levels:  AMPHETAMINE/METHAMPHETAMINES  1000 ng/mL  BARBITURATES     200 ng/mL  BENZODIAZEPINES     200 ng/mL  COCAINE      300 ng/mL  METHADONE      300 ng/mL  OPIATES      300 ng/mL  PHENCYCLIDINE     25 ng/mL  THC       50 ng/mL    Ethanol [[id number]]  (Normal) Collected:  01/29/19 1610    Lab Status:  Final result Specimen:  Blood from Arm, Left Updated:  01/29/19 1631     Ethanol Lvl <10 mg/dL     CK [[id number]]  (Normal) Collected:  01/29/19 1610    Lab Status:  Final result Specimen:  Blood from Arm, Left Updated:  01/29/19 1631     Total CK 74 U/L     Basic metabolic panel [[id number]]  (Abnormal) Collected:  01/29/19 1610    Lab Status:  Final result Specimen:  Blood from Arm, Left Updated:  01/29/19 1631     Sodium 136 (L) mmol/L      Potassium 4 4 mmol/L      Chloride 103 mmol/L      CO2 28 mmol/L      ANION GAP 5 mmol/L      BUN 11 mg/dL      Creatinine 0 68 (L) mg/dL      Glucose 88 mg/dL Calcium 9 5 mg/dL      eGFR 111 ml/min/1 73sq m     Narrative:         National Kidney Disease Education Program recommendations are as follows:  GFR calculation is accurate only with a steady state creatinine  Chronic Kidney disease less than 60 ml/min/1 73 sq  meters  Kidney failure less than 15 ml/min/1 73 sq  meters  CBC and differential [[id number]]  (Abnormal) Collected:  01/29/19 1610    Lab Status:  Final result Specimen:  Blood from Arm, Left Updated:  01/29/19 1624     WBC 12 80 (H) Thousand/uL      RBC 5 20 Million/uL      Hemoglobin 15 5 g/dL      Hematocrit 46 4 %      MCV 89 fL      MCH 29 7 pg      MCHC 33 4 g/dL      RDW 14 2 %      MPV 8 3 (L) fL      Platelets 957 Thousands/uL      Neutrophils Relative 68 (H) %      Lymphocytes Relative 24 (L) %      Monocytes Relative 8 %      Eosinophils Relative 0 %      Basophils Relative 1 %      Neutrophils Absolute 8 60 (H) Thousands/µL      Lymphocytes Absolute 3 00 Thousands/µL      Monocytes Absolute 1 00 (H) Thousand/µL      Eosinophils Absolute 0 00 Thousand/µL      Basophils Absolute 0 10 Thousands/µL                  No orders to display              Procedures  Procedures       Phone Contacts  ED Phone Contact    ED Course  ED Course as of Jan 30 0009 Tue Jan 29, 2019   [facility] here to see pt  Pt has similar ER visit last week  Pt upset about living situation, wants to live in [place] but not bed availability yet  Stated to ICM that he lied about hearing voices  Will dc                                   MDM    Disposition  Final diagnoses:   Schizoaffective disorder ([facility] [address] )     Time reflects when diagnosis was documented in both MDM as applicable and the Disposition within this note     Time User Action Codes Description Comment    1/29/2019  7:37 PM Crystal Horne Add [K59 09] Constipation, chronic     1/29/2019  7:37 PM Crystal Horne Modify [K59 09] Constipation, chronic     1/29/2019  7:37 PM Crystal Horne Modify [K59 09] Constipation, chronic     1/29/2019  7:51 PM Phyllistine Chain Add [F25 9] Schizoaffective disorder [facility])       ED Disposition     ED Disposition Condition Date/Time Comment    Discharge  Tue Jan 29, 2019  7:51 PM Franca Traore discharge to home/self care  Condition at discharge: Stable        MD Documentation      Most Recent Value   Sending MD Dr Cheung Call up With Specialties Details Why Eneida Yang MD Family Medicine   [address] [facility]  [address]            Discharge Medication List as of 1/29/2019  7:52 PM      CONTINUE these medications which have NOT CHANGED    Details   Alcohol Swabs PADS USE AS NEEDED TO CHECK BLOOD GLUCOSE (DIABETES), Normal      atorvastatin (LIPITOR) 10 mg tablet Take 10 mg by mouth daily, Historical Med      !! cloZAPine (CLOZARIL) 100 mg tablet Take 400 mg by mouth daily, Historical Med      !! clozapine (CLOZARIL) 50 MG tablet Take 50 mg by mouth daily, Historical Med      divalproex sodium (DEPAKOTE ER) 500 mg 24 hr tablet Take 1,500 mg by mouth daily at bedtime, Historical Med      FREESTYLE LITE test strip TEST BLOOD SUGARS ONCE DAILY (DM), Normal      Lancets (FREESTYLE) lancets TEST BLOOD SUGAR ONCE DAILY (DIABETES), Historical Med      levothyroxine 75 mcg tablet TAKE 1 TABLET BY MOUTH ONCE DAILY - HYPOTHYROIDISM, Normal      lisinopril (ZESTRIL) 5 mg tablet Take 1 tablet (5 mg total) by mouth daily, Starting Wed 12/5/2018, Normal      metFORMIN (GLUCOPHAGE) 500 mg tablet TAKE ONE TABLET BY MOUTH 2 TIMES A DAY (DIABETES), Normal      !! Multiple Vitamin (DAILY DEVORA PO) Take 1 tablet by mouth daily  , Historical Med      !! multivitamin (THERAGRAN) TABS TAKE ONE TABLET BY MOUTH EVERY DAY (SUPPLEMENT), Normal      pantoprazole (PROTONIX) 40 mg tablet TAKE 1 TABLET BY MOUTH ONCE DAILY AFTER FEED, Normal       !! - Potential duplicate medications found  Please discuss with provider          No discharge procedures on file      ED Provider  Electronically Signed by           Jw Palma MD  01/29/19 6893       Jw Palma MD  01/30/19 7722

## 2019-01-29 NOTE — ED NOTES
Pt consuming dinner at this time, tolerating well  Pt in no distress, denies complaints        Lisy Klein RN  01/29/19 2829

## 2019-01-29 NOTE — ED NOTES
Pt sleeping at this time, in no distress, in full view of nursing station        Brenden Vences, TABBY  01/29/19 1321

## 2019-01-29 NOTE — ED NOTES
Pt states he did not take his medications today, states he took his meds yesterday        Rusty Villarreal RN  01/29/19 8338

## 2019-01-29 NOTE — ED NOTES
Pt presented to the ED via EMS following him becoming agitated at the group home  Pt reported that other residents were making him mad and he threw a chair at the wall  He denies that he was trying to harm anyone and was expressing his anger  Pt reported increased command AH to kill himself beginning this morning  Pt did not take his medication this morning, but has been compliant until then  At times Pt reports passive SI, but denies a plan  He has been feeling impulsive and does not think he is safe to return to the group home at this time  Pt sleep and appetite has been varying over the past week  Pt is a poor historian, but is able to remember that it has been "years" since he was last admitted for psychiatric reasons  Pt is willing to sign a 201 for inpatient treatment and MD is in agreement with this plan

## 2019-01-29 NOTE — ED NOTES
Per EVS Pt is active with [facility] [place]  Currently in queue to complete pre cert  201 faxed to 6B for review

## 2019-01-29 NOTE — ED NOTES
Update given to Verdene Spurling at [facility]        Sabrina Nguyen RN  01/29/19 4445

## 2019-01-30 ENCOUNTER — TELEPHONE (OUTPATIENT)
Dept: FAMILY MEDICINE CLINIC | Facility: CLINIC | Age: 54
End: 2019-01-30

## 2019-01-30 NOTE — ED NOTES
Glory Song from [address] [place] [address] at bedside at this time speaking with patient       Markel Scanlon RN  01/29/19 1942

## 2019-01-30 NOTE — ED NOTES
Pt cont to sleep, in no distress, respirations even and unlabored        Ruslan Piedra RN  01/29/19 0907

## 2019-01-30 NOTE — ED NOTES
ACT worker came in to evaluate Pt; Pt admitted that his behaviors were due to not being placed in a new group home following his county meeting today  Pt will be discharged back to [facility]  Ride is arranged with Corie

## 2019-01-30 NOTE — ED NOTES
[facility] from [facility] AT [place] no longer at bedside  Pt provided diet cola  Pt denies complaints at this time, in no distress  Pt stable, calm, and cooperative          La Nena Smith RN  01/29/19 1949

## 2019-01-30 NOTE — CONSULTS
VPA ordered for tonight despite warning about "Urea Cycle metabolism" contraindication, as last dose was verified in record by RN as being Jan 28

## 2019-01-30 NOTE — ED NOTES
Writer contacted [facility] to make them aware that patient is in the ED  On call staff will be contacted

## 2019-01-30 NOTE — DISCHARGE INSTRUCTIONS
Schizoaffective Disorder   WHAT YOU NEED TO KNOW:   Schizoaffective disorder is a long-term mental illness that may change how you think, feel, and act around others  You may not know what is real and what is not real    DISCHARGE INSTRUCTIONS:   Medicines:   · Antipsychotics: These medicines help decrease psychotic symptoms or severe agitation  You may need antiparkinson medicine to control muscle stiffness, twitches, and restlessness caused by antipsychotic medicines  · Antianxiety medicine: This medicine may be given to decrease anxiety and help you feel calm and relaxed  · Antidepressants: These medicines are given to decrease or stop the symptoms of depression, anxiety, and behavior problems  · Mood stabilizers: These medicines help control mood swings  · Anticonvulsants: This medicine is given to control seizures  It may also be used to decrease violent behavior and control your mood swings  · Blood pressure medicines: These may be used to help decrease motor tics (uncontrolled movements)  They may also help you feel calmer, more focused, and less irritable  · Anticholinergics: This medicine decreases the side effects of other medicines  · Take your medicine as directed  Contact your healthcare provider if you think your medicine is not helping or if you have side effects  Tell him or her if you are allergic to any medicine  Keep a list of the medicines, vitamins, and herbs you take  Include the amounts, and when and why you take them  Bring the list or the pill bottles to follow-up visits  Carry your medicine list with you in case of an emergency  Follow up with your healthcare provider or psychiatrist as directed: You may need to return to have your blood pressure and other symptoms checked  You may need blood tests to check the level of medicine in your blood  Write down your questions so you remember to ask them during your visits  Manage your symptoms:   The following may help you feel better or prevent symptoms of schizoaffective disorder from coming back:  · Find support for yourself and your family:  Talk with others to help you cope with your illness better  This may also help to improve how you relate to others  · Keep all medical appointments: This will help manage your disease and the side-effects from medicines you may be taking  · Use your medicines as directed:  Put your medicines in a pillbox placed in an area you can easily see  Use a watch with an alarm to help you remember when it is time to take your medicine  Tell your healthcare provider if you know or think you might be pregnant  Do not stop taking your medicines without your healthcare provider's okay  A sudden stop can cause serious medical problems  · Watch for early signs of a relapse and seek help immediately:      ¨ How you think, feel, and see things has changed  ¨ You behave differently than usual     ¨ You become more nervous and upset, but do not know why  ¨ You eat less and have trouble sleeping  ¨ You have little or no interest in friends or activities  For support and more information:   · American [employer] Association  [facility], [facility] Jesus[address] , Cathie Gaviria [address]  Phone: [phone]  Phone: [phone]  Web Address: [employer] com br  Buck Mason  · [address] [place] [address], Public Information & Communication Branch  [address] [place] [address], [address] [facility], [address]  [place] [address]   Phone: [phone]  Phone: [phone]  Web Address: [facility] tn  Contact your healthcare provider or psychiatrist if:   · You think you are having a relapse  · You are having side effects from your medicine, or they are not helping  · You are not sleeping well or are sleeping more than usual     · You cannot eat or are eating more than usual     · You have muscle spasms, stiffness, or trouble walking      · Your sad feelings or thoughts change the way you function during the day  · You have questions or concerns about your condition or care  Seek care immediately or call 911 if:   · You feel like hurting or killing yourself or others  · You feel that your condition is getting worse  · You feel very upset, threaten someone, or you feel violent  · You suddenly have changes in your vision  · You suddenly have chest pain, trouble breathing, or a fever  © 2017 [address]  Information is for End User's use only and may not be sold, redistributed or otherwise used for commercial purposes  All illustrations and images included in CareNotes® are the copyrighted property of A D A M , Inc  or Adriel Arenas  The above information is an  only  It is not intended as medical advice for individual conditions or treatments  Talk to your doctor, nurse or pharmacist before following any medical regimen to see if it is safe and effective for you

## 2019-02-08 ENCOUNTER — HOSPITAL ENCOUNTER (OUTPATIENT)
Dept: NON INVASIVE DIAGNOSTICS | Facility: HOSPITAL | Age: 54
Discharge: HOME | End: 2019-02-08
Payer: COMMERCIAL

## 2019-02-08 ENCOUNTER — APPOINTMENT (OUTPATIENT)
Dept: LAB | Facility: HOSPITAL | Age: 54
End: 2019-02-08
Payer: COMMERCIAL

## 2019-02-08 DIAGNOSIS — I10 ESSENTIAL HYPERTENSION, MALIGNANT: ICD-10-CM

## 2019-02-08 DIAGNOSIS — E13.8 DIABETES MELLITUS OF OTHER TYPE WITH COMPLICATION, UNSPECIFIED WHETHER LONG TERM INSULIN USE: ICD-10-CM

## 2019-02-08 DIAGNOSIS — E66.9 OBESITY, UNSPECIFIED CLASSIFICATION, UNSPECIFIED OBESITY TYPE, UNSPECIFIED WHETHER SERIOUS COMORBIDITY PRESENT: ICD-10-CM

## 2019-02-08 DIAGNOSIS — Z79.899 ENCOUNTER FOR LONG-TERM (CURRENT) USE OF MEDICATIONS: ICD-10-CM

## 2019-02-08 DIAGNOSIS — F25.9 SCHIZOAFFECTIVE DISORDER, UNSPECIFIED TYPE (HCC): ICD-10-CM

## 2019-02-08 LAB
ALBUMIN SERPL BCP-MCNC: 4.6 G/DL (ref 3–5.2)
ALP SERPL-CCNC: 63 U/L (ref 43–122)
ALT SERPL W P-5'-P-CCNC: 17 U/L (ref 9–52)
AMMONIA PLAS-SCNC: 41 UMOL/L (ref 9–33)
ANION GAP SERPL CALCULATED.3IONS-SCNC: 10 MMOL/L (ref 5–14)
AST SERPL W P-5'-P-CCNC: 20 U/L (ref 17–59)
ATRIAL RATE: 94 BPM
BASOPHILS # BLD AUTO: 0 THOUSANDS/ΜL (ref 0–0.1)
BASOPHILS NFR BLD AUTO: 0 % (ref 0–1)
BILIRUB SERPL-MCNC: 0.4 MG/DL
BUN SERPL-MCNC: 10 MG/DL (ref 5–25)
CALCIUM SERPL-MCNC: 10.1 MG/DL (ref 8.4–10.2)
CHLORIDE SERPL-SCNC: 102 MMOL/L (ref 97–108)
CHOLEST SERPL-MCNC: 137 MG/DL
CO2 SERPL-SCNC: 25 MMOL/L (ref 22–30)
CREAT SERPL-MCNC: 0.65 MG/DL (ref 0.7–1.5)
EOSINOPHIL # BLD AUTO: 0 THOUSAND/ΜL (ref 0–0.4)
EOSINOPHIL NFR BLD AUTO: 0 % (ref 0–6)
ERYTHROCYTE [DISTWIDTH] IN BLOOD BY AUTOMATED COUNT: 14 %
GFR SERPL CREATININE-BSD FRML MDRD: 113 ML/MIN/1.73SQ M
GLUCOSE P FAST SERPL-MCNC: 105 MG/DL (ref 70–99)
HCT VFR BLD AUTO: 48.1 % (ref 41–53)
HDLC SERPL-MCNC: 31 MG/DL (ref 40–59)
HGB BLD-MCNC: 15.8 G/DL (ref 13.5–17.5)
LDLC SERPL CALC-MCNC: 83 MG/DL
LYMPHOCYTES # BLD AUTO: 2.3 THOUSANDS/ΜL (ref 0.5–4)
LYMPHOCYTES NFR BLD AUTO: 21 % (ref 25–45)
MCH RBC QN AUTO: 29.5 PG (ref 26–34)
MCHC RBC AUTO-ENTMCNC: 32.8 G/DL (ref 31–36)
MCV RBC AUTO: 90 FL (ref 80–100)
MONOCYTES # BLD AUTO: 1.1 THOUSAND/ΜL (ref 0.2–0.9)
MONOCYTES NFR BLD AUTO: 10 % (ref 1–10)
NEUTROPHILS # BLD AUTO: 8 THOUSANDS/ΜL (ref 1.8–7.8)
NEUTS SEG NFR BLD AUTO: 70 % (ref 45–65)
NONHDLC SERPL-MCNC: 106 MG/DL
P AXIS: 55 DEGREES
PLATELET # BLD AUTO: 153 THOUSANDS/UL (ref 150–450)
PLATELET BLD QL SMEAR: ADEQUATE
PMV BLD AUTO: 8.7 FL (ref 8.9–12.7)
POTASSIUM SERPL-SCNC: 4.3 MMOL/L (ref 3.6–5)
PR INTERVAL: 158 MS
PROT SERPL-MCNC: 7.6 G/DL (ref 5.9–8.4)
QRS AXIS: -21 DEGREES
QRSD INTERVAL: 100 MS
QT INTERVAL: 338 MS
QTC INTERVAL: 422 MS
RBC # BLD AUTO: 5.36 MILLION/UL (ref 4.5–5.9)
RBC MORPH BLD: NORMAL
SODIUM SERPL-SCNC: 137 MMOL/L (ref 137–147)
T WAVE AXIS: 39 DEGREES
T4 FREE SERPL-MCNC: 1.01 NG/DL (ref 0.76–1.46)
TRIGL SERPL-MCNC: 116 MG/DL
TSH SERPL DL<=0.05 MIU/L-ACNC: 1.16 UIU/ML (ref 0.47–4.68)
VENTRICULAR RATE: 94 BPM
WBC # BLD AUTO: 11.4 THOUSAND/UL (ref 4.5–11)

## 2019-02-08 PROCEDURE — 93010 ELECTROCARDIOGRAM REPORT: CPT | Performed by: INTERNAL MEDICINE

## 2019-02-08 PROCEDURE — 84439 ASSAY OF FREE THYROXINE: CPT

## 2019-02-08 PROCEDURE — 85025 COMPLETE CBC W/AUTO DIFF WBC: CPT

## 2019-02-08 PROCEDURE — 80053 COMPREHEN METABOLIC PANEL: CPT

## 2019-02-08 PROCEDURE — 36415 COLL VENOUS BLD VENIPUNCTURE: CPT

## 2019-02-08 PROCEDURE — 82140 ASSAY OF AMMONIA: CPT

## 2019-02-08 PROCEDURE — 84443 ASSAY THYROID STIM HORMONE: CPT

## 2019-02-08 PROCEDURE — 80165 DIPROPYLACETIC ACID FREE: CPT

## 2019-02-08 PROCEDURE — 93005 ELECTROCARDIOGRAM TRACING: CPT

## 2019-02-08 PROCEDURE — 80061 LIPID PANEL: CPT

## 2019-02-11 LAB — VALPROATE FREE SERPL-MCNC: 5.3 UG/ML (ref 6–22)

## 2019-03-08 ENCOUNTER — TRANSCRIBE ORDERS (OUTPATIENT)
Dept: ADMINISTRATIVE | Facility: HOSPITAL | Age: 54
End: 2019-03-08

## 2019-03-08 ENCOUNTER — APPOINTMENT (OUTPATIENT)
Dept: LAB | Facility: HOSPITAL | Age: 54
End: 2019-03-08
Payer: COMMERCIAL

## 2019-03-08 DIAGNOSIS — E13.8 DIABETES MELLITUS OF OTHER TYPE WITH COMPLICATION, UNSPECIFIED WHETHER LONG TERM INSULIN USE: ICD-10-CM

## 2019-03-08 DIAGNOSIS — I10 ESSENTIAL HYPERTENSION, MALIGNANT: ICD-10-CM

## 2019-03-08 DIAGNOSIS — E66.9 OBESITY, UNSPECIFIED CLASSIFICATION, UNSPECIFIED OBESITY TYPE, UNSPECIFIED WHETHER SERIOUS COMORBIDITY PRESENT: ICD-10-CM

## 2019-03-08 DIAGNOSIS — F25.0 SCHIZOAFFECTIVE DISORDER, BIPOLAR TYPE (HCC): ICD-10-CM

## 2019-03-08 DIAGNOSIS — Z79.899 DRUG THERAPY: ICD-10-CM

## 2019-03-08 DIAGNOSIS — F25.0 SCHIZOAFFECTIVE DISORDER, BIPOLAR TYPE (HCC): Primary | ICD-10-CM

## 2019-03-08 LAB
BASOPHILS # BLD AUTO: 0 THOUSANDS/ΜL (ref 0–0.1)
BASOPHILS NFR BLD AUTO: 0 % (ref 0–1)
EOSINOPHIL # BLD AUTO: 0 THOUSAND/ΜL (ref 0–0.4)
EOSINOPHIL NFR BLD AUTO: 0 % (ref 0–6)
ERYTHROCYTE [DISTWIDTH] IN BLOOD BY AUTOMATED COUNT: 13.6 %
HCT VFR BLD AUTO: 46.9 % (ref 41–53)
HGB BLD-MCNC: 15.3 G/DL (ref 13.5–17.5)
LYMPHOCYTES # BLD AUTO: 3.2 THOUSANDS/ΜL (ref 0.5–4)
LYMPHOCYTES NFR BLD AUTO: 26 % (ref 25–45)
MCH RBC QN AUTO: 29.4 PG (ref 26–34)
MCHC RBC AUTO-ENTMCNC: 32.7 G/DL (ref 31–36)
MCV RBC AUTO: 90 FL (ref 80–100)
MONOCYTES # BLD AUTO: 1.1 THOUSAND/ΜL (ref 0.2–0.9)
MONOCYTES NFR BLD AUTO: 9 % (ref 1–10)
NEUTROPHILS # BLD AUTO: 8 THOUSANDS/ΜL (ref 1.8–7.8)
NEUTS SEG NFR BLD AUTO: 65 % (ref 45–65)
PLATELET # BLD AUTO: 157 THOUSANDS/UL (ref 150–450)
PMV BLD AUTO: 9 FL (ref 8.9–12.7)
RBC # BLD AUTO: 5.22 MILLION/UL (ref 4.5–5.9)
WBC # BLD AUTO: 12.3 THOUSAND/UL (ref 4.5–11)

## 2019-03-08 PROCEDURE — 36415 COLL VENOUS BLD VENIPUNCTURE: CPT

## 2019-03-08 PROCEDURE — 85025 COMPLETE CBC W/AUTO DIFF WBC: CPT

## 2019-04-05 ENCOUNTER — APPOINTMENT (OUTPATIENT)
Dept: LAB | Facility: HOSPITAL | Age: 54
End: 2019-04-05
Payer: COMMERCIAL

## 2019-04-05 DIAGNOSIS — I10 ESSENTIAL HYPERTENSION, MALIGNANT: ICD-10-CM

## 2019-04-05 DIAGNOSIS — E13.8 DIABETES MELLITUS OF OTHER TYPE WITH COMPLICATION, UNSPECIFIED WHETHER LONG TERM INSULIN USE: ICD-10-CM

## 2019-04-05 DIAGNOSIS — Z79.899 DRUG THERAPY: ICD-10-CM

## 2019-04-05 DIAGNOSIS — F25.0 SCHIZOAFFECTIVE DISORDER, BIPOLAR TYPE (HCC): ICD-10-CM

## 2019-04-05 DIAGNOSIS — E66.9 OBESITY, UNSPECIFIED CLASSIFICATION, UNSPECIFIED OBESITY TYPE, UNSPECIFIED WHETHER SERIOUS COMORBIDITY PRESENT: ICD-10-CM

## 2019-04-05 LAB
BASOPHILS # BLD AUTO: 0 THOUSANDS/ΜL (ref 0–0.1)
BASOPHILS NFR BLD AUTO: 0 % (ref 0–1)
EOSINOPHIL # BLD AUTO: 0 THOUSAND/ΜL (ref 0–0.4)
EOSINOPHIL NFR BLD AUTO: 0 % (ref 0–6)
ERYTHROCYTE [DISTWIDTH] IN BLOOD BY AUTOMATED COUNT: 13.8 %
HCT VFR BLD AUTO: 46.1 % (ref 41–53)
HGB BLD-MCNC: 15.1 G/DL (ref 13.5–17.5)
LYMPHOCYTES # BLD AUTO: 2.7 THOUSANDS/ΜL (ref 0.5–4)
LYMPHOCYTES NFR BLD AUTO: 22 % (ref 25–45)
MCH RBC QN AUTO: 29.7 PG (ref 26–34)
MCHC RBC AUTO-ENTMCNC: 32.9 G/DL (ref 31–36)
MCV RBC AUTO: 90 FL (ref 80–100)
MONOCYTES # BLD AUTO: 1.2 THOUSAND/ΜL (ref 0.2–0.9)
MONOCYTES NFR BLD AUTO: 10 % (ref 1–10)
NEUTROPHILS # BLD AUTO: 8.7 THOUSANDS/ΜL (ref 1.8–7.8)
NEUTS SEG NFR BLD AUTO: 69 % (ref 45–65)
PLATELET # BLD AUTO: 187 THOUSANDS/UL (ref 150–450)
PMV BLD AUTO: 8.8 FL (ref 8.9–12.7)
RBC # BLD AUTO: 5.11 MILLION/UL (ref 4.5–5.9)
WBC # BLD AUTO: 12.7 THOUSAND/UL (ref 4.5–11)

## 2019-04-05 PROCEDURE — 36415 COLL VENOUS BLD VENIPUNCTURE: CPT

## 2019-04-05 PROCEDURE — 85025 COMPLETE CBC W/AUTO DIFF WBC: CPT

## 2019-04-26 ENCOUNTER — HOSPITAL ENCOUNTER (OUTPATIENT)
Dept: NON INVASIVE DIAGNOSTICS | Facility: HOSPITAL | Age: 54
Discharge: HOME | End: 2019-04-26
Payer: COMMERCIAL

## 2019-04-26 ENCOUNTER — TRANSCRIBE ORDERS (OUTPATIENT)
Dept: ADMINISTRATIVE | Facility: HOSPITAL | Age: 54
End: 2019-04-26

## 2019-04-26 ENCOUNTER — APPOINTMENT (OUTPATIENT)
Dept: LAB | Facility: HOSPITAL | Age: 54
End: 2019-04-26
Payer: COMMERCIAL

## 2019-04-26 DIAGNOSIS — I10 ESSENTIAL HYPERTENSION, MALIGNANT: ICD-10-CM

## 2019-04-26 DIAGNOSIS — F25.9 SCHIZOAFFECTIVE DISORDER, UNSPECIFIED TYPE (HCC): ICD-10-CM

## 2019-04-26 DIAGNOSIS — Z79.899 ENCOUNTER FOR LONG-TERM (CURRENT) USE OF OTHER MEDICATIONS: ICD-10-CM

## 2019-04-26 DIAGNOSIS — E11.9 DIABETES MELLITUS WITHOUT COMPLICATION (HCC): ICD-10-CM

## 2019-04-26 DIAGNOSIS — E66.9 OBESITY, UNSPECIFIED CLASSIFICATION, UNSPECIFIED OBESITY TYPE, UNSPECIFIED WHETHER SERIOUS COMORBIDITY PRESENT: ICD-10-CM

## 2019-04-26 DIAGNOSIS — F25.9 SCHIZOAFFECTIVE DISORDER, UNSPECIFIED TYPE (HCC): Primary | ICD-10-CM

## 2019-04-26 LAB
BASOPHILS # BLD AUTO: 0.1 THOUSAND/UL (ref 0–0.1)
BASOPHILS NFR MAR MANUAL: 1 % (ref 0–1)
ERYTHROCYTE [DISTWIDTH] IN BLOOD BY AUTOMATED COUNT: 14.2 %
HCT VFR BLD AUTO: 43.6 % (ref 41–53)
HGB BLD-MCNC: 14.4 G/DL (ref 13.5–17.5)
LYMPHOCYTES # BLD AUTO: 1.86 THOUSAND/UL (ref 0.5–4)
LYMPHOCYTES # BLD AUTO: 19 % (ref 25–45)
MCH RBC QN AUTO: 29.8 PG (ref 26–34)
MCHC RBC AUTO-ENTMCNC: 33.2 G/DL (ref 31–36)
MCV RBC AUTO: 90 FL (ref 80–100)
MONOCYTES # BLD AUTO: 1.47 THOUSAND/UL (ref 0.2–0.9)
MONOCYTES NFR BLD AUTO: 15 % (ref 1–10)
NEUTS SEG # BLD: 6.37 THOUSAND/UL (ref 1.8–7.8)
NEUTS SEG NFR BLD AUTO: 65 %
PLATELET # BLD AUTO: 147 THOUSANDS/UL (ref 150–450)
PLATELET BLD QL SMEAR: ABNORMAL
PMV BLD AUTO: 8.9 FL (ref 8.9–12.7)
RBC # BLD AUTO: 4.84 MILLION/UL (ref 4.5–5.9)
RBC MORPH BLD: NORMAL
TOTAL CELLS COUNTED SPEC: 100
WBC # BLD AUTO: 9.8 THOUSAND/UL (ref 4.5–11)

## 2019-04-26 PROCEDURE — 93005 ELECTROCARDIOGRAM TRACING: CPT

## 2019-04-26 PROCEDURE — 36415 COLL VENOUS BLD VENIPUNCTURE: CPT

## 2019-04-26 PROCEDURE — 85007 BL SMEAR W/DIFF WBC COUNT: CPT

## 2019-04-26 PROCEDURE — 85027 COMPLETE CBC AUTOMATED: CPT

## 2019-04-27 LAB
ATRIAL RATE: 88 BPM
P AXIS: 34 DEGREES
PR INTERVAL: 152 MS
QRS AXIS: -22 DEGREES
QRSD INTERVAL: 100 MS
QT INTERVAL: 334 MS
QTC INTERVAL: 404 MS
T WAVE AXIS: 18 DEGREES
VENTRICULAR RATE: 88 BPM

## 2019-04-27 PROCEDURE — 93010 ELECTROCARDIOGRAM REPORT: CPT | Performed by: INTERNAL MEDICINE

## 2019-04-29 ENCOUNTER — HOSPITAL ENCOUNTER (EMERGENCY)
Facility: HOSPITAL | Age: 54
Discharge: HOME | End: 2019-04-30
Attending: EMERGENCY MEDICINE | Admitting: EMERGENCY MEDICINE
Payer: COMMERCIAL

## 2019-04-29 VITALS
WEIGHT: 222.66 LBS | HEART RATE: 92 BPM | SYSTOLIC BLOOD PRESSURE: 124 MMHG | TEMPERATURE: 98.7 F | BODY MASS INDEX: 32.88 KG/M2 | DIASTOLIC BLOOD PRESSURE: 79 MMHG | OXYGEN SATURATION: 95 % | RESPIRATION RATE: 16 BRPM

## 2019-04-29 DIAGNOSIS — F32.A DEPRESSION: Primary | ICD-10-CM

## 2019-04-29 DIAGNOSIS — R44.3 HALLUCINATIONS: ICD-10-CM

## 2019-04-29 PROCEDURE — 99284 EMERGENCY DEPT VISIT MOD MDM: CPT

## 2019-04-29 PROCEDURE — 82075 ASSAY OF BREATH ETHANOL: CPT | Performed by: EMERGENCY MEDICINE

## 2019-04-30 LAB
AMPHETAMINES SERPL QL SCN: NEGATIVE
BARBITURATES UR QL: NEGATIVE
BENZODIAZ UR QL: NEGATIVE
BILIRUB UR QL STRIP: NEGATIVE
CLARITY UR: CLEAR
COCAINE UR QL: NEGATIVE
COLOR UR: NORMAL
ETHANOL EXG-MCNC: NORMAL MG/DL
ETHANOL SERPL-MCNC: <10 MG/DL (ref 0–10)
GLUCOSE UR STRIP-MCNC: NEGATIVE MG/DL
HGB UR QL STRIP.AUTO: NEGATIVE
KETONES UR STRIP-MCNC: NEGATIVE MG/DL
LEUKOCYTE ESTERASE UR QL STRIP: NEGATIVE
METHADONE UR QL: NEGATIVE
NITRITE UR QL STRIP: NEGATIVE
OPIATES UR QL SCN: NEGATIVE
PCP UR QL: NEGATIVE
PH UR STRIP.AUTO: 7 [PH]
PROT UR STRIP-MCNC: NEGATIVE MG/DL
SP GR UR STRIP.AUTO: 1.01 (ref 1–1.04)
THC UR QL: NEGATIVE
UROBILINOGEN UA: NEGATIVE MG/DL

## 2019-04-30 PROCEDURE — 80320 DRUG SCREEN QUANTALCOHOLS: CPT | Performed by: EMERGENCY MEDICINE

## 2019-04-30 PROCEDURE — 99282 EMERGENCY DEPT VISIT SF MDM: CPT | Performed by: EMERGENCY MEDICINE

## 2019-04-30 PROCEDURE — 81003 URINALYSIS AUTO W/O SCOPE: CPT | Performed by: EMERGENCY MEDICINE

## 2019-04-30 PROCEDURE — 36415 COLL VENOUS BLD VENIPUNCTURE: CPT | Performed by: EMERGENCY MEDICINE

## 2019-04-30 PROCEDURE — 80307 DRUG TEST PRSMV CHEM ANLYZR: CPT | Performed by: EMERGENCY MEDICINE

## 2019-05-03 ENCOUNTER — APPOINTMENT (OUTPATIENT)
Dept: LAB | Facility: HOSPITAL | Age: 54
End: 2019-05-03
Payer: COMMERCIAL

## 2019-05-03 DIAGNOSIS — F25.9 SCHIZOAFFECTIVE DISORDER, UNSPECIFIED TYPE (HCC): ICD-10-CM

## 2019-05-03 DIAGNOSIS — Z79.899 ENCOUNTER FOR LONG-TERM (CURRENT) USE OF OTHER MEDICATIONS: ICD-10-CM

## 2019-05-03 DIAGNOSIS — E11.9 DIABETES MELLITUS WITHOUT COMPLICATION (HCC): ICD-10-CM

## 2019-05-03 DIAGNOSIS — I10 ESSENTIAL HYPERTENSION, MALIGNANT: ICD-10-CM

## 2019-05-03 DIAGNOSIS — E66.9 OBESITY, UNSPECIFIED CLASSIFICATION, UNSPECIFIED OBESITY TYPE, UNSPECIFIED WHETHER SERIOUS COMORBIDITY PRESENT: ICD-10-CM

## 2019-05-03 LAB
BASOPHILS # BLD AUTO: 0.1 THOUSANDS/ΜL (ref 0–0.1)
BASOPHILS NFR BLD AUTO: 1 % (ref 0–1)
EOSINOPHIL # BLD AUTO: 0 THOUSAND/ΜL (ref 0–0.4)
EOSINOPHIL NFR BLD AUTO: 0 % (ref 0–6)
ERYTHROCYTE [DISTWIDTH] IN BLOOD BY AUTOMATED COUNT: 14.1 %
HCT VFR BLD AUTO: 43.5 % (ref 41–53)
HGB BLD-MCNC: 14.4 G/DL (ref 13.5–17.5)
LYMPHOCYTES # BLD AUTO: 3.1 THOUSANDS/ΜL (ref 0.5–4)
LYMPHOCYTES NFR BLD AUTO: 29 % (ref 25–45)
MCH RBC QN AUTO: 29.9 PG (ref 26–34)
MCHC RBC AUTO-ENTMCNC: 33.2 G/DL (ref 31–36)
MCV RBC AUTO: 90 FL (ref 80–100)
MONOCYTES # BLD AUTO: 1.2 THOUSAND/ΜL (ref 0.2–0.9)
MONOCYTES NFR BLD AUTO: 11 % (ref 1–10)
NEUTROPHILS # BLD AUTO: 6.3 THOUSANDS/ΜL (ref 1.8–7.8)
NEUTS SEG NFR BLD AUTO: 60 % (ref 45–65)
PLATELET # BLD AUTO: 161 THOUSANDS/UL (ref 150–450)
PMV BLD AUTO: 8.5 FL (ref 8.9–12.7)
RBC # BLD AUTO: 4.83 MILLION/UL (ref 4.5–5.9)
WBC # BLD AUTO: 10.6 THOUSAND/UL (ref 4.5–11)

## 2019-05-03 PROCEDURE — 36415 COLL VENOUS BLD VENIPUNCTURE: CPT

## 2019-05-03 PROCEDURE — 85025 COMPLETE CBC W/AUTO DIFF WBC: CPT

## 2019-05-31 ENCOUNTER — APPOINTMENT (OUTPATIENT)
Dept: LAB | Facility: HOSPITAL | Age: 54
End: 2019-05-31
Payer: COMMERCIAL

## 2019-05-31 ENCOUNTER — TRANSCRIBE ORDERS (OUTPATIENT)
Dept: ADMINISTRATIVE | Facility: HOSPITAL | Age: 54
End: 2019-05-31

## 2019-05-31 DIAGNOSIS — F25.9 SCHIZOAFFECTIVE DISORDER, UNSPECIFIED TYPE (HCC): ICD-10-CM

## 2019-05-31 DIAGNOSIS — Z79.899 ENCOUNTER FOR LONG-TERM (CURRENT) USE OF OTHER MEDICATIONS: ICD-10-CM

## 2019-05-31 DIAGNOSIS — I10 ESSENTIAL HYPERTENSION, MALIGNANT: ICD-10-CM

## 2019-05-31 DIAGNOSIS — E13.8 DIABETES MELLITUS OF OTHER TYPE WITH COMPLICATION, UNSPECIFIED WHETHER LONG TERM INSULIN USE: ICD-10-CM

## 2019-05-31 DIAGNOSIS — F25.9 SCHIZOAFFECTIVE DISORDER, UNSPECIFIED TYPE (HCC): Primary | ICD-10-CM

## 2019-05-31 LAB
BASOPHILS # BLD AUTO: 0.1 THOUSANDS/ΜL (ref 0–0.1)
BASOPHILS NFR BLD AUTO: 1 % (ref 0–1)
EOSINOPHIL # BLD AUTO: 0 THOUSAND/ΜL (ref 0–0.4)
EOSINOPHIL NFR BLD AUTO: 0 % (ref 0–6)
ERYTHROCYTE [DISTWIDTH] IN BLOOD BY AUTOMATED COUNT: 14.4 %
HCT VFR BLD AUTO: 43.5 % (ref 41–53)
HGB BLD-MCNC: 14.6 G/DL (ref 13.5–17.5)
LYMPHOCYTES # BLD AUTO: 2.6 THOUSANDS/ΜL (ref 0.5–4)
LYMPHOCYTES NFR BLD AUTO: 26 % (ref 25–45)
MCH RBC QN AUTO: 30 PG (ref 26–34)
MCHC RBC AUTO-ENTMCNC: 33.5 G/DL (ref 31–36)
MCV RBC AUTO: 90 FL (ref 80–100)
MONOCYTES # BLD AUTO: 0.9 THOUSAND/ΜL (ref 0.2–0.9)
MONOCYTES NFR BLD AUTO: 10 % (ref 1–10)
NEUTROPHILS # BLD AUTO: 6.2 THOUSANDS/ΜL (ref 1.8–7.8)
NEUTS SEG NFR BLD AUTO: 63 % (ref 45–65)
PLATELET # BLD AUTO: 170 THOUSANDS/UL (ref 150–450)
PMV BLD AUTO: 9.1 FL (ref 8.9–12.7)
RBC # BLD AUTO: 4.86 MILLION/UL (ref 4.5–5.9)
WBC # BLD AUTO: 9.8 THOUSAND/UL (ref 4.5–11)

## 2019-05-31 PROCEDURE — 36415 COLL VENOUS BLD VENIPUNCTURE: CPT

## 2019-05-31 PROCEDURE — 85025 COMPLETE CBC W/AUTO DIFF WBC: CPT

## 2019-06-28 ENCOUNTER — APPOINTMENT (OUTPATIENT)
Dept: LAB | Facility: HOSPITAL | Age: 54
End: 2019-06-28
Payer: COMMERCIAL

## 2019-06-28 DIAGNOSIS — E13.8 DIABETES MELLITUS OF OTHER TYPE WITH COMPLICATION, UNSPECIFIED WHETHER LONG TERM INSULIN USE: ICD-10-CM

## 2019-06-28 DIAGNOSIS — Z79.899 ENCOUNTER FOR LONG-TERM (CURRENT) USE OF OTHER MEDICATIONS: ICD-10-CM

## 2019-06-28 DIAGNOSIS — F25.9 SCHIZOAFFECTIVE DISORDER, UNSPECIFIED TYPE (HCC): ICD-10-CM

## 2019-06-28 DIAGNOSIS — I10 ESSENTIAL HYPERTENSION, MALIGNANT: ICD-10-CM

## 2019-06-28 LAB
BASOPHILS # BLD AUTO: 0.1 THOUSANDS/ΜL (ref 0–0.1)
BASOPHILS NFR BLD AUTO: 1 % (ref 0–1)
EOSINOPHIL # BLD AUTO: 0 THOUSAND/ΜL (ref 0–0.4)
EOSINOPHIL NFR BLD AUTO: 0 % (ref 0–6)
ERYTHROCYTE [DISTWIDTH] IN BLOOD BY AUTOMATED COUNT: 13.8 %
HCT VFR BLD AUTO: 46.3 % (ref 41–53)
HGB BLD-MCNC: 15.5 G/DL (ref 13.5–17.5)
LYMPHOCYTES # BLD AUTO: 3.4 THOUSANDS/ΜL (ref 0.5–4)
LYMPHOCYTES NFR BLD AUTO: 27 % (ref 25–45)
MCH RBC QN AUTO: 29.8 PG (ref 26–34)
MCHC RBC AUTO-ENTMCNC: 33.6 G/DL (ref 31–36)
MCV RBC AUTO: 89 FL (ref 80–100)
MONOCYTES # BLD AUTO: 1.2 THOUSAND/ΜL (ref 0.2–0.9)
MONOCYTES NFR BLD AUTO: 10 % (ref 1–10)
NEUTROPHILS # BLD AUTO: 7.8 THOUSANDS/ΜL (ref 1.8–7.8)
NEUTS SEG NFR BLD AUTO: 63 % (ref 45–65)
PLATELET # BLD AUTO: 183 THOUSANDS/UL (ref 150–450)
PMV BLD AUTO: 9.4 FL (ref 8.9–12.7)
RBC # BLD AUTO: 5.23 MILLION/UL (ref 4.5–5.9)
WBC # BLD AUTO: 12.4 THOUSAND/UL (ref 4.5–11)

## 2019-06-28 PROCEDURE — 85025 COMPLETE CBC W/AUTO DIFF WBC: CPT

## 2019-06-28 PROCEDURE — 36415 COLL VENOUS BLD VENIPUNCTURE: CPT

## 2019-07-19 ENCOUNTER — APPOINTMENT (OUTPATIENT)
Dept: LAB | Facility: HOSPITAL | Age: 54
End: 2019-07-19
Payer: COMMERCIAL

## 2019-07-19 ENCOUNTER — TRANSCRIBE ORDERS (OUTPATIENT)
Dept: ADMINISTRATIVE | Facility: HOSPITAL | Age: 54
End: 2019-07-19

## 2019-07-19 DIAGNOSIS — E66.9 OBESITY, UNSPECIFIED CLASSIFICATION, UNSPECIFIED OBESITY TYPE, UNSPECIFIED WHETHER SERIOUS COMORBIDITY PRESENT: ICD-10-CM

## 2019-07-19 DIAGNOSIS — E13.8 DIABETES MELLITUS OF OTHER TYPE WITH COMPLICATION, UNSPECIFIED WHETHER LONG TERM INSULIN USE: ICD-10-CM

## 2019-07-19 DIAGNOSIS — E72.20 HYPERAMMONEMIA (HCC): ICD-10-CM

## 2019-07-19 DIAGNOSIS — I10 ESSENTIAL HYPERTENSION, MALIGNANT: ICD-10-CM

## 2019-07-19 DIAGNOSIS — F25.9 SCHIZOAFFECTIVE DISORDER, UNSPECIFIED TYPE (HCC): ICD-10-CM

## 2019-07-19 DIAGNOSIS — E66.01 MORBID OBESITY (HCC): ICD-10-CM

## 2019-07-19 DIAGNOSIS — F25.9 SCHIZOAFFECTIVE DISORDER, UNSPECIFIED TYPE (HCC): Primary | ICD-10-CM

## 2019-07-19 DIAGNOSIS — I10 BENIGN ESSENTIAL HYPERTENSION: ICD-10-CM

## 2019-07-19 DIAGNOSIS — E87.1 HYPONATREMIA: ICD-10-CM

## 2019-07-19 DIAGNOSIS — E11.9 TYPE 2 DIABETES MELLITUS WITHOUT COMPLICATION, WITHOUT LONG-TERM CURRENT USE OF INSULIN (HCC): Primary | ICD-10-CM

## 2019-07-19 LAB
AMMONIA PLAS-SCNC: 28 UMOL/L (ref 9–33)
VALPROATE SERPL-MCNC: 51.9 UG/ML (ref 50–120)

## 2019-07-19 PROCEDURE — 82140 ASSAY OF AMMONIA: CPT

## 2019-07-19 PROCEDURE — 80164 ASSAY DIPROPYLACETIC ACD TOT: CPT

## 2019-07-19 PROCEDURE — 36415 COLL VENOUS BLD VENIPUNCTURE: CPT

## 2019-07-23 DIAGNOSIS — Z12.11 ENCOUNTER FOR SCREENING COLONOSCOPY: Primary | ICD-10-CM

## 2019-07-24 ENCOUNTER — OFFICE VISIT (OUTPATIENT)
Dept: FAMILY MEDICINE CLINIC | Facility: CLINIC | Age: 54
End: 2019-07-24
Payer: COMMERCIAL

## 2019-07-24 VITALS
DIASTOLIC BLOOD PRESSURE: 70 MMHG | WEIGHT: 212 LBS | HEART RATE: 81 BPM | BODY MASS INDEX: 34.07 KG/M2 | OXYGEN SATURATION: 92 % | HEIGHT: 66 IN | TEMPERATURE: 98.2 F | SYSTOLIC BLOOD PRESSURE: 120 MMHG

## 2019-07-24 DIAGNOSIS — L60.9 NAIL ABNORMALITY: ICD-10-CM

## 2019-07-24 DIAGNOSIS — Z00.01 ENCOUNTER FOR WELL ADULT EXAM WITH ABNORMAL FINDINGS: Primary | ICD-10-CM

## 2019-07-24 DIAGNOSIS — R06.2 WHEEZING ON RIGHT SIDE OF CHEST ON EXHALATION: ICD-10-CM

## 2019-07-24 DIAGNOSIS — K21.9 GASTROESOPHAGEAL REFLUX DISEASE WITHOUT ESOPHAGITIS: ICD-10-CM

## 2019-07-24 DIAGNOSIS — Z12.11 COLON CANCER SCREENING: ICD-10-CM

## 2019-07-24 DIAGNOSIS — E11.9 DIABETIC EYE EXAM (HCC): ICD-10-CM

## 2019-07-24 DIAGNOSIS — F31.9 BIPOLAR 1 DISORDER (HCC): ICD-10-CM

## 2019-07-24 DIAGNOSIS — F17.210 MODERATE CIGARETTE SMOKER (10-19 PER DAY): ICD-10-CM

## 2019-07-24 DIAGNOSIS — Z01.00 DIABETIC EYE EXAM (HCC): ICD-10-CM

## 2019-07-24 DIAGNOSIS — E03.9 ACQUIRED HYPOTHYROIDISM: ICD-10-CM

## 2019-07-24 DIAGNOSIS — I10 BENIGN ESSENTIAL HYPERTENSION: ICD-10-CM

## 2019-07-24 DIAGNOSIS — E11.9 TYPE 2 DIABETES MELLITUS WITHOUT COMPLICATION, WITHOUT LONG-TERM CURRENT USE OF INSULIN (HCC): ICD-10-CM

## 2019-07-24 LAB — SL AMB POCT HEMOGLOBIN AIC: 6 (ref ?–6.5)

## 2019-07-24 PROCEDURE — 99396 PREV VISIT EST AGE 40-64: CPT | Performed by: FAMILY MEDICINE

## 2019-07-24 PROCEDURE — 99214 OFFICE O/P EST MOD 30 MIN: CPT | Performed by: FAMILY MEDICINE

## 2019-07-24 PROCEDURE — 83036 HEMOGLOBIN GLYCOSYLATED A1C: CPT | Performed by: FAMILY MEDICINE

## 2019-07-24 PROCEDURE — 82570 ASSAY OF URINE CREATININE: CPT | Performed by: FAMILY MEDICINE

## 2019-07-24 PROCEDURE — 3008F BODY MASS INDEX DOCD: CPT | Performed by: FAMILY MEDICINE

## 2019-07-24 PROCEDURE — 82043 UR ALBUMIN QUANTITATIVE: CPT | Performed by: FAMILY MEDICINE

## 2019-07-24 PROCEDURE — 4004F PT TOBACCO SCREEN RCVD TLK: CPT | Performed by: FAMILY MEDICINE

## 2019-07-24 PROCEDURE — 3725F SCREEN DEPRESSION PERFORMED: CPT | Performed by: FAMILY MEDICINE

## 2019-07-24 PROCEDURE — 3074F SYST BP LT 130 MM HG: CPT | Performed by: FAMILY MEDICINE

## 2019-07-24 NOTE — PROGRESS NOTES
Subjective:   Chief Complaint   Patient presents with    Physical Exam    Follow-up     chronic condition        Patient ID: Claudeen Dupes is a 48 y o  male  Patient who live in a group home here for annual physical exam follow-up with a chronic condition patient history of non-insulin-dependent diabetic on metformin tolerated well without any side effect the patient compliant with the medication he is not compliant with the low carb diet and patient deny any increased thirsty increased frequency urination no dizziness no headache and no abdomen pain patient already on statin he did not see eye doctor for while and his did not let the foot doctor the examine him at home  Patient was history of hypertension on lisinopril tolerated well without any side effect blood pressure control deny any chest pain short of breath no palpitation no headach no dyspnea on exertion and no lower extremity edema patient history of hypothyroidism asymptomatic no heat or cold intolerance and patient due for blood work to check on his TSH patient was history of GERD deny any abdomen pain nausea vomiting or diarrhea no heartburn  Patient history of bipolar and he has not been taking his medication recently psychiatric already aware of that  Recent blood work discussed with the patient      The following portions of the patient's history were reviewed and updated as appropriate: allergies, current medications, past family history, past medical history, past social history, past surgical history and problem list     Review of Systems   Constitutional: Negative for fatigue and fever  HENT: Negative for ear pain, sinus pressure, sinus pain and sore throat  Eyes: Negative for pain and redness  Respiratory: Negative for cough, chest tightness and shortness of breath  Cardiovascular: Negative for chest pain, palpitations and leg swelling     Gastrointestinal: Negative for abdominal pain, blood in stool, constipation, diarrhea and nausea  Genitourinary: Negative for flank pain, frequency and hematuria  Musculoskeletal: Negative for back pain and joint swelling  Skin: Negative for rash  Neurological: Negative for dizziness, numbness and headaches  Hematological: Does not bruise/bleed easily  Objective:  Vitals:    07/24/19 1014   BP: 120/70   Pulse: 81   Temp: 98 2 °F (36 8 °C)   TempSrc: Tympanic   SpO2: 92%   Weight: 96 2 kg (212 lb)   Height: 5' 6" (1 676 m)      Physical Exam   Constitutional: He is oriented to person, place, and time  Poor hygiene   HENT:   Head: Normocephalic  Right Ear: External ear normal    Left Ear: External ear normal    Eyes: Conjunctivae and EOM are normal  Right eye exhibits no discharge  Left eye exhibits no discharge  Neck: No JVD present  Cardiovascular: Normal rate, regular rhythm and normal heart sounds  Exam reveals no gallop  Pulses are no weak pulses  No murmur heard  Pulses:       Dorsalis pedis pulses are 2+ on the right side, and 2+ on the left side  Pulmonary/Chest: Effort normal  No respiratory distress  He has wheezes  He has no rales  He exhibits no tenderness  Wheezing in right lung   Abdominal: He exhibits no mass  There is no tenderness  There is no rebound  Musculoskeletal: He exhibits no edema or tenderness  Feet:    Feet:   Right Foot:   Skin Integrity: Negative for warmth  Left Foot:   Skin Integrity: Negative for warmth  Neurological: He is alert and oriented to person, place, and time  Skin: No rash noted  No erythema  Patient's shoes and socks removed  Right Foot/Ankle   Right Foot Inspection  Skin Exam: skin intact no warmth and no pre-ulcer                          Toe Exam: no swelling and erythema  Sensory       Monofilament testing: intact  Vascular  Capillary refills: < 3 seconds  The right DP pulse is 2+       Left Foot/Ankle  Left Foot Inspection  Skin Exam: skin intactno warmth and no pre-ulcer                         Toe Exam: no swelling and no erythema                   Sensory       Monofilament: intact  Vascular  Capillary refills: < 3 seconds  The left DP pulse is 2+  Assign Risk Category:  No deformity present; No loss of protective sensation; No weak pulses       Risk: 0      Assessment/Plan:    Encounter for well adult exam with abnormal findings  Advice and education were given regarding nutrition, aerobic exercises, weight bearing exercises, cardiovascular risk reduction, fall risk reduction, and age appropriate supplements  The patient was counseled regarding instructions for management, risk factor reductions, prognosis, risks and benefits of treatment options, patient and family education, and importance of compliance with treatment  Patient is due for colonoscopy screening for colon cancer declined the fit test is given to the patient      Moderate cigarette smoker (10-19 per day)   advised patient to quit, and offered support  Discussed current use pattern  Asked patient to inform me when they set a quit date     At discussed with the patient complication of for smoking increase the risk of multiple kind of cancer he is aware  We offer him script for nicotine patch patient decline it          Bipolar 1 disorder  Chronic patient does followed by psychiatric patient refused to take his medication up psychiatric is aware of it    Benign essential hypertension  A chronic asymptomatic fair control continue current management low-salt diet increase physical activity discussed with the patient    Type 2 diabetes mellitus ([facility] [address] )  Lab Results   Component Value Date    HGBA1C 6 0 07/24/2019     Chronic asymptomatic he fair controlled continue current management encouraged patient to lose weight low carb diet discussed with the patient patient already on statin and already on Ace inhibitor      Hypothyroidism  Chronic asymptomatic fair control continue with the levothyroxine proper use of medication discussed with the patient also we the recommend to check a TSH level    BMI 34 0-34 9,adult  The BMI is above average  BMI counseling and education was provided to the patient  Nutrition recommendations include reducing portion sizes, decreasing overall calorie intake, 3-5 servings of fruits/vegetables daily, reducing fast food intake, consuming healthier snacks, decreasing soda and/or juice intake, moderation in carbohydrate intake and reducing intake of saturated fat and trans fat  Exercise recommendations include moderate aerobic physical activity for 150 minutes/week, exercising 3-5 times per week and joining a gym  Nail abnormality  New diagnosis the thick neck and discoloration on the toenails with patient history of diabetic will refer the patient to see podiatric    Wheezing on right side of chest on exhalation   finding in the exam and patient with chronic smoking plan for chest x-ray       Diagnoses and all orders for this visit:    Encounter for well adult exam with abnormal findings    Colon cancer screening  -     Occult Blood, Fecal Immunochemical; Future    BMI 34 0-34 9,adult    Moderate cigarette smoker (10-19 per day)    Nail abnormality    Type 2 diabetes mellitus without complication, without long-term current use of insulin (HCC)  -     POCT hemoglobin A1c  -     Microalbumin / creatinine urine ratio  -     CBC and differential; Future  -     Comprehensive metabolic panel; Future  -     Lipid panel; Future    Diabetic eye exam [facility])  -     Ambulatory Referral to Ophthalmology; Future    Gastroesophageal reflux disease without esophagitis  -     CBC and differential; Future  -     Comprehensive metabolic panel; Future  -     Lipid panel; Future    Benign essential hypertension  -     CBC and differential; Future  -     Comprehensive metabolic panel; Future  -     Lipid panel; Future    Acquired hypothyroidism  -     TSH, 3rd generation with Free T4 reflex;  Future    Bipolar 1 disorder ([facility] [address] )    Wheezing on right side of chest on exhalation  -     XR chest pa & lateral; Future

## 2019-07-24 NOTE — PROGRESS NOTES
[facility] HEALTH MAINTENANCE OFFICE VISIT  [facility] [place] [facility]    NAME: Abiel Franco  AGE: 48 y o   SEX: male  : 1965     DATE: 2019    Assessment and Plan     Problem List Items Addressed This Visit        Digestive    Gastroesophageal reflux disease without esophagitis    Relevant Orders    CBC and differential    Comprehensive metabolic panel    Lipid panel       Endocrine    [facility]     Lab Results   Component Value Date    HGBA1C 6 0 2019     Chronic asymptomatic he fair controlled continue current management encouraged patient to lose weight low carb diet discussed with the patient patient already on statin and already on Ace inhibitor           Relevant Orders    POCT hemoglobin A1c (Completed)    Microalbumin / creatinine urine ratio    CBC and differential    Comprehensive metabolic panel    Lipid panel    Hypothyroidism     Chronic asymptomatic fair control continue with the levothyroxine proper use of medication discussed with the patient also we the recommend to check a TSH level         Relevant Orders    TSH, 3rd generation with Free T4 reflex       Cardiovascular and Mediastinum    Benign essential hypertension     A chronic asymptomatic fair control continue current management low-salt diet increase physical activity discussed with the patient         Relevant Orders    CBC and differential    Comprehensive metabolic panel    Lipid panel       Musculoskeletal and Integument    Nail abnormality     New diagnosis the thick neck and discoloration on the toenails with patient history of diabetic will refer the patient to see podiatric            Other    Bipolar 1 disorder ([facility] [address] )     Chronic patient does followed by psychiatric patient refused to take his medication up psychiatric is aware of it         Moderate cigarette smoker (10-19 per day)      advised patient to quit, and offered support  Discussed current use pattern  Asked patient to inform me when they set a quit date     At discussed with the patient complication of for smoking increase the risk of multiple kind of cancer he is aware  We offer him script for nicotine patch patient decline it               BMI 34 0-34 9,adult     The BMI is above average  BMI counseling and education was provided to the patient  Nutrition recommendations include reducing portion sizes, decreasing overall calorie intake, 3-5 servings of fruits/vegetables daily, reducing fast food intake, consuming healthier snacks, decreasing soda and/or juice intake, moderation in carbohydrate intake and reducing intake of saturated fat and trans fat  Exercise recommendations include moderate aerobic physical activity for 150 minutes/week, exercising 3-5 times per week and joining a gym  Encounter for well adult exam with abnormal findings - Primary     Advice and education were given regarding nutrition, aerobic exercises, weight bearing exercises, cardiovascular risk reduction, fall risk reduction, and age appropriate supplements  The patient was counseled regarding instructions for management, risk factor reductions, prognosis, risks and benefits of treatment options, patient and family education, and importance of compliance with treatment       Patient is due for colonoscopy screening for colon cancer declined the fit test is given to the patient           Wheezing on right side of chest on exhalation      finding in the exam and patient with chronic smoking plan for chest x-ray         Relevant Orders    XR chest pa & lateral      Other Visit Diagnoses     Colon cancer screening        Relevant Orders    Occult Blood, Fecal Immunochemical    Diabetic eye exam [facility])        Relevant Orders    Ambulatory Referral to Ophthalmology            · Patient Counseling:   · Nutrition: Stressed importance of a well balanced diet, moderation of sodium/saturated fat, caloric balance and sufficient intake of fiber  · Exercise: Stressed the importance of regular exercise with a goal of 150 minutes per week  · Dental Health: Discussed daily flossing and brushing and regular dental visits     · Immunizations reviewed de for Shingirix   · Discussed benefits of screening patient is due for screening for colon cancer declined colonoscopy will give him the fit test the patient is due for diabetic eye exam    BMI Counseling: Body mass index is 34 22 kg/m²  Discussed with patient's BMI with him            Chief Complaint     Chief Complaint   Patient presents with    Physical Exam    Follow-up     chronic condition       History of Present Illness     Patient here for annual physical exam follow-up with a chronic condition  The patient live in a group home he has with the caregiver he deny any chest pain short of breath no palpitation no headache no blurred vision no weakness or lateralized of the symptom no cough no short of breath and no dyspnea on exertion no lower extremity edema the patient continued to smoke and he been smoking since teenager and 1-1 and half pack a day he is not interested in quitting smoking patient known to have history of bipolar schizophrenia and recently he has been refusing to take his medication      Well Adult Physical   Patient here for a comprehensive physical exam       Diet and Physical Activity  Diet: Regular diet  Exercise: never      Depression Screen  PHQ-9 Depression Screening    PHQ-9:    Frequency of the following problems over the past two weeks:       Little interest or pleasure in doing things:  0 - not at all  Feeling down, depressed, or hopeless:  0 - not at all  PHQ-2 Score:  0          General Health  Hearing: Normal:  bilateral  Vision: no vision problems  Dental: regular dental visits      The following portions of the patient's history were reviewed and updated as appropriate: allergies, current medications, past family history, past medical history, past social history, past surgical history and problem list     Review of Systems     Review of Systems   Constitutional: Negative for fatigue and fever  HENT: Negative for ear pain, sinus pressure, sinus pain and sore throat  Eyes: Negative for pain and redness  Respiratory: Negative for cough, chest tightness and shortness of breath  Cardiovascular: Negative for chest pain, palpitations and leg swelling  Gastrointestinal: Negative for abdominal pain, blood in stool, constipation, diarrhea and nausea  Endocrine: Negative for heat intolerance and polydipsia  Genitourinary: Negative for flank pain, frequency and hematuria  Musculoskeletal: Negative for back pain and joint swelling  Skin: Negative for rash  Neurological: Negative for dizziness, numbness and headaches  Hematological: Does not bruise/bleed easily  Psychiatric/Behavioral: Negative for agitation  Past Medical History     Past Medical History:   Diagnosis Date    Asthma     Bipolar 1 disorder ([place] [address] ) 2011    Colitis 03/27/2014    Constipation     COPD (chronic obstructive pulmonary disease) ([place] [address] )     Diabetes ([place] [address] ) 2011    Disease of thyroid gland     GERD (gastroesophageal reflux disease) 2011    Hyperlipidemia     Hypertension     Hyponatremia     Hypothyroidism 2011    Lipoprotein deficiency     Obesity     Plantar fasciitis     Psychiatric disorder     Schizo affective schizophrenia ([place] [address] ) 2011    Sebaceous gland disease     Tobacco abuse        Past Surgical History     History reviewed  No pertinent surgical history      Social History     Social History     Socioeconomic History    Marital status: Single     Spouse name: None    Number of children: None    Years of education: None    Highest education level: None   Occupational History    None   Social Needs    Financial resource strain: None    Food insecurity:     Worry: None     Inability: None    Transportation needs: Medical: None     Non-medical: None   Tobacco Use    Smoking status: Current Every Day Smoker     Packs/day: 1 00     Types: Cigarettes    Smokeless tobacco: Current User   Substance and Sexual Activity    Alcohol use: No    Drug use: No    Sexual activity: None   Lifestyle    Physical activity:     Days per week: None     Minutes per session: None    Stress: None   Relationships    Social connections:     Talks on phone: None     Gets together: None     Attends Church service: None     Active member of club or organization: None     Attends meetings of clubs or organizations: None     Relationship status: None    Intimate partner violence:     Fear of current or ex partner: None     Emotionally abused: None     Physically abused: None     Forced sexual activity: None   Other Topics Concern    None   Social History Narrative    No risks for falls    Activity level: moderate    No sleep changes    No animals    No firearms    Always uses a seatbelt       Family History     Family History   Family history unknown: Yes       Current Medications       Current Outpatient Medications:     Alcohol Swabs PADS, USE AS NEEDED TO CHECK BLOOD GLUCOSE (DIABETES), Disp: 200 each, Rfl: 0    atorvastatin (LIPITOR) 10 mg tablet, Take 10 mg by mouth daily, Disp: , Rfl:     cloZAPine (CLOZARIL) 100 mg tablet, Take 400 mg by mouth daily, Disp: , Rfl:     clozapine (CLOZARIL) 50 MG tablet, Take 50 mg by mouth daily, Disp: , Rfl:     divalproex sodium (DEPAKOTE ER) 500 mg 24 hr tablet, Take 1,500 mg by mouth daily at bedtime, Disp: , Rfl:     FREESTYLE LITE test strip, TEST BLOOD SUGARS ONCE DAILY (DM), Disp: 50 each, Rfl: 0    Lancets (FREESTYLE) lancets, TEST BLOOD SUGAR ONCE DAILY (DIABETES), Disp: , Rfl:     levothyroxine 75 mcg tablet, TAKE 1 TABLET BY MOUTH ONCE DAILY - HYPOTHYROIDISM, Disp: 28 tablet, Rfl: 0    lisinopril (ZESTRIL) 5 mg tablet, Take 1 tablet (5 mg total) by mouth daily, Disp: 30 tablet, Rfl: 2    metFORMIN (GLUCOPHAGE) 500 mg tablet, TAKE ONE TABLET BY MOUTH 2 TIMES A DAY (DIABETES), Disp: 56 tablet, Rfl: 0    multivitamin (THERAGRAN) TABS, TAKE ONE TABLET BY MOUTH EVERY DAY (SUPPLEMENT), Disp: 28 tablet, Rfl: 0    pantoprazole (PROTONIX) 40 mg tablet, TAKE 1 TABLET BY MOUTH ONCE DAILY AFTER FEED, Disp: 28 tablet, Rfl: 0     Allergies     Allergies   Allergen Reactions    Penicillins Anaphylaxis    Carbapenems     Cephalosporins        Objective     /70   Pulse 81   Temp 98 2 °F (36 8 °C) (Tympanic)   Ht 5' 6" (1 676 m)   Wt 96 2 kg (212 lb)   SpO2 92%   BMI 34 22 kg/m²      Physical Exam   Constitutional: He is oriented to person, place, and time  He appears well-developed and well-nourished  HENT:   Head: Normocephalic  Right Ear: External ear normal    Left Ear: External ear normal    Eyes: Conjunctivae and EOM are normal  Right eye exhibits no discharge  Left eye exhibits no discharge  Neck: No JVD present  Cardiovascular: Normal rate, regular rhythm and normal heart sounds  Exam reveals no gallop  No murmur heard  Pulmonary/Chest: Effort normal  No respiratory distress  He has wheezes  He has no rales  He exhibits no tenderness  Wheezing in right lung   Abdominal: He exhibits no mass  There is no tenderness  There is no rebound  Musculoskeletal: He exhibits no edema or tenderness  Neurological: He is alert and oriented to person, place, and time  Skin: No rash noted  No erythema  Psychiatric: He has a normal mood and affect  Elaine Arvizu MD  [place] [facility]  BMI Counseling: Body mass index is 34 22 kg/m²  Discussed the patient's BMI with him  The BMI is above average  BMI counseling and education was provided to the patient  Nutrition recommendations include reducing portion sizes, decreasing overall calorie intake, 3-5 servings of fruits/vegetables daily, reducing fast food intake and consuming healthier snacks  Exercise recommendations include moderate aerobic physical activity for 150 minutes/week

## 2019-07-24 NOTE — PATIENT INSTRUCTIONS
Weight Management   AMBULATORY CARE:   Why it is important to manage your weight:  Being overweight increases your risk of health conditions such as heart disease, high blood pressure, type 2 diabetes, and certain types of cancer  It can also increase your risk for osteoarthritis, sleep apnea, and other respiratory problems  Aim for a slow, steady weight loss  Even a small amount of weight loss can lower your risk of health problems  How to lose weight safely:  A safe and healthy way to lose weight is to eat fewer calories and get regular exercise  You can lose up about 1 pound a week by decreasing the number of calories you eat by 500 calories each day  You can decrease calories by eating smaller portion sizes or by cutting out high-calorie foods  Read labels to find out how many calories are in the foods you eat  You can also burn calories with exercise such as walking, swimming, or biking  You will be more likely to keep weight off if you make these changes part of your lifestyle  Healthy meal plan for weight management:  A healthy meal plan includes a variety of foods, contains fewer calories, and helps you stay healthy  A healthy meal plan includes the following:  · Eat whole-grain foods more often  A healthy meal plan should contain fiber  Fiber is the part of grains, fruits, and vegetables that is not broken down by your body  Whole-grain foods are healthy and provide extra fiber in your diet  Some examples of whole-grain foods are whole-wheat breads and pastas, oatmeal, brown rice, and bulgur  · Eat a variety of vegetables every day  Include dark, leafy greens such as spinach, kale, rufino greens, and mustard greens  Eat yellow and orange vegetables such as carrots, sweet potatoes, and winter squash  · Eat a variety of fruits every day  Choose fresh or canned fruit (canned in its own juice or light syrup) instead of juice  Fruit juice has very little or no fiber  · Eat low-fat dairy foods  Drink fat-free (skim) milk or 1% milk  Eat fat-free yogurt and low-fat cottage cheese  Try low-fat cheeses such as mozzarella and other reduced-fat cheeses  · Choose meat and other protein foods that are low in fat  Choose beans or other legumes such as split peas or lentils  Choose fish, skinless poultry (chicken or turkey), or lean cuts of red meat (beef or pork)  Before you cook meat or poultry, cut off any visible fat  · Use less fat and oil  Try baking foods instead of frying them  Add less fat, such as margarine, sour cream, regular salad dressing and mayonnaise to foods  Eat fewer high-fat foods  Some examples of high-fat foods include french fries, doughnuts, ice cream, and cakes  · Eat fewer sweets  Limit foods and drinks that are high in sugar  This includes candy, cookies, regular soda, and sweetened drinks  Ways to decrease calories:   · Eat smaller portions  ¨ Use a small plate with smaller servings  ¨ Do not eat second helpings  ¨ When you eat at a restaurant, ask for a box and place half of your meal in the box before you eat  ¨ Share an entrée with someone else  · Replace high-calorie snacks with healthy, low-calorie snacks  ¨ Choose fresh fruit, vegetables, fat-free rice cakes, or air-popped popcorn instead of potato chips, nuts, or chocolate  ¨ Choose water or calorie-free drinks instead of soda or sweetened drinks  · Eat regular meals  Skipping meals can lead to overeating later in the day  Eat a healthy snack in place of a meal if you do not have time to eat a regular meal      · Do not shop for groceries when you are hungry  You may be more likely to make unhealthy food choices  Take a grocery list of healthy foods and shop after you have eaten  Exercise:  Exercise at least 30 minutes per day on most days of the week  Some examples of exercise include walking, biking, dancing, and swimming   You can also fit in more physical activity by taking the stairs instead of the elevator or parking farther away from stores  Ask your healthcare provider about the best exercise plan for you  Other things to consider as you try to lose weight:   · Be aware of situations that may give you the urge to overeat, such as eating while watching television  Find ways to avoid these situations  For example, read a book, go for a walk, or do crafts  · Meet with a weight loss support group or friends who are also trying to lose weight  This may help you stay motivated to continue working on your weight loss goals  © 2017 [facility] Information is for End User's use only and may not be sold, redistributed or otherwise used for commercial purposes  All illustrations and images included in CareNotes® are the copyrighted property of [employer]  or Adriel Arenas  The above information is an  only  It is not intended as medical advice for individual conditions or treatments  Talk to your doctor, nurse or pharmacist before following any medical regimen to see if it is safe and effective for you

## 2019-07-25 LAB
CREAT UR-MCNC: 71.5 MG/DL
MICROALBUMIN UR-MCNC: <5 MG/L (ref 0–20)
MICROALBUMIN/CREAT 24H UR: <7 MG/G CREATININE (ref 0–30)

## 2019-08-07 DIAGNOSIS — E11.9 TYPE 2 DIABETES MELLITUS WITHOUT COMPLICATION, WITHOUT LONG-TERM CURRENT USE OF INSULIN (HCC): ICD-10-CM

## 2019-08-07 RX ORDER — BLOOD PRESSURE TEST KIT
KIT MISCELLANEOUS DAILY
Qty: 200 EACH | Refills: 2 | Status: SHIPPED | OUTPATIENT
Start: 2019-08-07 | End: 2019-08-27 | Stop reason: SDUPTHER

## 2019-08-13 DIAGNOSIS — E11.9 TYPE 2 DIABETES MELLITUS WITHOUT COMPLICATION, WITHOUT LONG-TERM CURRENT USE OF INSULIN (HCC): ICD-10-CM

## 2019-08-15 RX ORDER — LANCETS 28 GAUGE
EACH MISCELLANEOUS DAILY
Qty: 100 EACH | Refills: 5 | Status: SHIPPED | OUTPATIENT
Start: 2019-08-15 | End: 2019-08-27 | Stop reason: SDUPTHER

## 2019-08-23 ENCOUNTER — TELEPHONE (OUTPATIENT)
Dept: FAMILY MEDICINE CLINIC | Facility: CLINIC | Age: 54
End: 2019-08-23

## 2019-08-23 ENCOUNTER — APPOINTMENT (OUTPATIENT)
Dept: LAB | Facility: HOSPITAL | Age: 54
End: 2019-08-23
Payer: COMMERCIAL

## 2019-08-23 ENCOUNTER — TRANSCRIBE ORDERS (OUTPATIENT)
Dept: ADMINISTRATIVE | Facility: HOSPITAL | Age: 54
End: 2019-08-23

## 2019-08-23 DIAGNOSIS — E11.9 DIABETES MELLITUS WITHOUT COMPLICATION (HCC): ICD-10-CM

## 2019-08-23 DIAGNOSIS — Z79.899 ENCOUNTER FOR LONG-TERM (CURRENT) USE OF OTHER MEDICATIONS: ICD-10-CM

## 2019-08-23 DIAGNOSIS — F25.9 SCHIZOAFFECTIVE DISORDER, UNSPECIFIED TYPE (HCC): ICD-10-CM

## 2019-08-23 DIAGNOSIS — F25.9 SCHIZOAFFECTIVE DISORDER, UNSPECIFIED TYPE (HCC): Primary | ICD-10-CM

## 2019-08-23 DIAGNOSIS — E03.9 ACQUIRED HYPOTHYROIDISM: ICD-10-CM

## 2019-08-23 DIAGNOSIS — K21.9 GASTROESOPHAGEAL REFLUX DISEASE WITHOUT ESOPHAGITIS: ICD-10-CM

## 2019-08-23 DIAGNOSIS — I10 ESSENTIAL HYPERTENSION, MALIGNANT: ICD-10-CM

## 2019-08-23 DIAGNOSIS — E11.9 TYPE 2 DIABETES MELLITUS WITHOUT COMPLICATION, WITHOUT LONG-TERM CURRENT USE OF INSULIN (HCC): ICD-10-CM

## 2019-08-23 DIAGNOSIS — E87.1 HYPONATREMIA: Primary | ICD-10-CM

## 2019-08-23 DIAGNOSIS — I10 BENIGN ESSENTIAL HYPERTENSION: ICD-10-CM

## 2019-08-23 LAB
ALBUMIN SERPL BCP-MCNC: 3.9 G/DL (ref 3–5.2)
ALP SERPL-CCNC: 59 U/L (ref 43–122)
ALT SERPL W P-5'-P-CCNC: 18 U/L (ref 9–52)
AMMONIA PLAS-SCNC: 39 UMOL/L (ref 9–33)
ANION GAP SERPL CALCULATED.3IONS-SCNC: 7 MMOL/L (ref 5–14)
AST SERPL W P-5'-P-CCNC: 19 U/L (ref 17–59)
BILIRUB SERPL-MCNC: 0.2 MG/DL
BUN SERPL-MCNC: 15 MG/DL (ref 5–25)
CALCIUM SERPL-MCNC: 9 MG/DL (ref 8.4–10.2)
CHLORIDE SERPL-SCNC: 95 MMOL/L (ref 97–108)
CHOLEST SERPL-MCNC: 135 MG/DL
CO2 SERPL-SCNC: 29 MMOL/L (ref 22–30)
CREAT SERPL-MCNC: 0.6 MG/DL (ref 0.7–1.5)
ERYTHROCYTE [DISTWIDTH] IN BLOOD BY AUTOMATED COUNT: 13.9 %
EST. AVERAGE GLUCOSE BLD GHB EST-MCNC: 128 MG/DL
GFR SERPL CREATININE-BSD FRML MDRD: 116 ML/MIN/1.73SQ M
GLUCOSE P FAST SERPL-MCNC: 99 MG/DL (ref 70–99)
HBA1C MFR BLD: 6.1 % (ref 4.2–6.3)
HCT VFR BLD AUTO: 41.8 % (ref 41–53)
HDLC SERPL-MCNC: 23 MG/DL (ref 40–59)
HGB BLD-MCNC: 13.9 G/DL (ref 13.5–17.5)
LDLC SERPL CALC-MCNC: 72 MG/DL
LYMPHOCYTES # BLD AUTO: 0.99 THOUSAND/UL (ref 0.5–4)
LYMPHOCYTES # BLD AUTO: 10 % (ref 25–45)
MCH RBC QN AUTO: 29.6 PG (ref 26–34)
MCHC RBC AUTO-ENTMCNC: 33.3 G/DL (ref 31–36)
MCV RBC AUTO: 89 FL (ref 80–100)
MONOCYTES # BLD AUTO: 0.69 THOUSAND/UL (ref 0.2–0.9)
MONOCYTES NFR BLD AUTO: 7 % (ref 1–10)
NEUTS SEG # BLD: 8.22 THOUSAND/UL (ref 1.8–7.8)
NEUTS SEG NFR BLD AUTO: 83 %
NONHDLC SERPL-MCNC: 112 MG/DL
PLATELET # BLD AUTO: 165 THOUSANDS/UL (ref 150–450)
PLATELET BLD QL SMEAR: ADEQUATE
PMV BLD AUTO: 8.8 FL (ref 8.9–12.7)
POTASSIUM SERPL-SCNC: 4.4 MMOL/L (ref 3.6–5)
PROT SERPL-MCNC: 6.8 G/DL (ref 5.9–8.4)
RBC # BLD AUTO: 4.71 MILLION/UL (ref 4.5–5.9)
RBC MORPH BLD: NORMAL
SODIUM SERPL-SCNC: 131 MMOL/L (ref 137–147)
T4 FREE SERPL-MCNC: 0.93 NG/DL (ref 0.76–1.46)
TOTAL CELLS COUNTED SPEC: 100
TRIGL SERPL-MCNC: 201 MG/DL
TSH SERPL DL<=0.05 MIU/L-ACNC: 1.84 UIU/ML (ref 0.47–4.68)
WBC # BLD AUTO: 9.9 THOUSAND/UL (ref 4.5–11)

## 2019-08-23 PROCEDURE — 85027 COMPLETE CBC AUTOMATED: CPT

## 2019-08-23 PROCEDURE — 80159 DRUG ASSAY CLOZAPINE: CPT

## 2019-08-23 PROCEDURE — 80053 COMPREHEN METABOLIC PANEL: CPT

## 2019-08-23 PROCEDURE — 82140 ASSAY OF AMMONIA: CPT

## 2019-08-23 PROCEDURE — 84443 ASSAY THYROID STIM HORMONE: CPT

## 2019-08-23 PROCEDURE — 36415 COLL VENOUS BLD VENIPUNCTURE: CPT | Performed by: PSYCHIATRY & NEUROLOGY

## 2019-08-23 PROCEDURE — 80165 DIPROPYLACETIC ACID FREE: CPT

## 2019-08-23 PROCEDURE — 83036 HEMOGLOBIN GLYCOSYLATED A1C: CPT | Performed by: PSYCHIATRY & NEUROLOGY

## 2019-08-23 PROCEDURE — 85007 BL SMEAR W/DIFF WBC COUNT: CPT

## 2019-08-23 PROCEDURE — 80061 LIPID PANEL: CPT

## 2019-08-23 PROCEDURE — 84439 ASSAY OF FREE THYROXINE: CPT

## 2019-08-23 NOTE — RESULT ENCOUNTER NOTE
Order put in computer for sodium level per dr Sesay Lung   Per humble- patient moved to  [facility] and is no longer our patient

## 2019-08-23 NOTE — TELEPHONE ENCOUNTER
----- Message from Roderick Phalen, MD sent at 8/23/2019  2:49 PM EDT -----  Low sodium recheck NA on MOnday

## 2019-08-26 LAB
CLOZAPINE SERPL-MCNC: 214 NG/ML (ref 350–650)
CLOZAPINE+NOR SERPL-MCNC: 293 NG/ML
NORCLOZAPINE SERPL-MCNC: 79 NG/ML
VALPROATE FREE SERPL-MCNC: 5.6 UG/ML (ref 6–22)

## 2019-08-27 DIAGNOSIS — E03.9 ACQUIRED HYPOTHYROIDISM: ICD-10-CM

## 2019-08-27 DIAGNOSIS — E11.9 TYPE 2 DIABETES MELLITUS WITHOUT COMPLICATION, WITHOUT LONG-TERM CURRENT USE OF INSULIN (HCC): ICD-10-CM

## 2019-08-27 DIAGNOSIS — I10 BENIGN ESSENTIAL HYPERTENSION: ICD-10-CM

## 2019-08-27 DIAGNOSIS — Z71.3 DIETARY COUNSELING: ICD-10-CM

## 2019-08-27 DIAGNOSIS — K21.9 GASTROESOPHAGEAL REFLUX DISEASE WITHOUT ESOPHAGITIS: ICD-10-CM

## 2019-08-27 DIAGNOSIS — E78.9 DISORDER OF LIPOPROTEIN AND LIPID METABOLISM: Primary | ICD-10-CM

## 2019-08-27 NOTE — TELEPHONE ENCOUNTER
Luis aJy from [facility] called and said he still is a patient here and needs refills on all meds   I put you back on as his pcp

## 2019-08-28 PROCEDURE — 4010F ACE/ARB THERAPY RXD/TAKEN: CPT | Performed by: FAMILY MEDICINE

## 2019-08-28 RX ORDER — BLOOD PRESSURE TEST KIT
KIT MISCELLANEOUS DAILY
Qty: 200 EACH | Refills: 2 | Status: ON HOLD | OUTPATIENT
Start: 2019-08-28 | End: 2020-08-07

## 2019-08-28 RX ORDER — PANTOPRAZOLE SODIUM 40 MG/1
40 TABLET, DELAYED RELEASE ORAL DAILY
Qty: 30 TABLET | Refills: 2 | Status: SHIPPED | OUTPATIENT
Start: 2019-08-28 | End: 2019-08-31 | Stop reason: SDUPTHER

## 2019-08-28 RX ORDER — LANCETS 28 GAUGE
EACH MISCELLANEOUS DAILY
Qty: 100 EACH | Refills: 5 | Status: ON HOLD | OUTPATIENT
Start: 2019-08-28 | End: 2020-08-07

## 2019-08-28 RX ORDER — ATORVASTATIN CALCIUM 10 MG/1
10 TABLET, FILM COATED ORAL DAILY
Qty: 30 TABLET | Refills: 2 | Status: ON HOLD | OUTPATIENT
Start: 2019-08-28 | End: 2020-08-07

## 2019-08-28 RX ORDER — LISINOPRIL 5 MG/1
5 TABLET ORAL DAILY
Qty: 30 TABLET | Refills: 2 | Status: SHIPPED | OUTPATIENT
Start: 2019-08-28 | End: 2019-08-31 | Stop reason: SDUPTHER

## 2019-08-28 RX ORDER — DIPHENOXYLATE HYDROCHLORIDE AND ATROPINE SULFATE 2.5; .025 MG/1; MG/1
1 TABLET ORAL DAILY
Qty: 30 TABLET | Refills: 2 | Status: ON HOLD | OUTPATIENT
Start: 2019-08-28 | End: 2020-08-07

## 2019-08-28 RX ORDER — LEVOTHYROXINE SODIUM 0.07 MG/1
75 TABLET ORAL
Qty: 30 TABLET | Refills: 2 | Status: SHIPPED | OUTPATIENT
Start: 2019-08-28 | End: 2019-08-31 | Stop reason: SDUPTHER

## 2019-08-29 LAB
ALBUMIN SERPL-MCNC: 3.9 G/DL (ref 3.6–5.1)
ALBUMIN/GLOB SERPL: 1.6 (CALC) (ref 1–2.5)
ALP SERPL-CCNC: 66 U/L (ref 40–115)
ALT SERPL-CCNC: 13 U/L (ref 9–46)
AST SERPL-CCNC: 14 U/L (ref 10–35)
BASOPHILS # BLD AUTO: 66 CELLS/UL (ref 0–200)
BASOPHILS NFR BLD AUTO: 0.5 %
BILIRUB SERPL-MCNC: 0.2 MG/DL (ref 0.2–1.2)
BUN SERPL-MCNC: 10 MG/DL (ref 7–25)
BUN/CREAT SERPL: ABNORMAL (CALC) (ref 6–22)
CALCIUM SERPL-MCNC: 9 MG/DL (ref 8.6–10.3)
CHLORIDE SERPL-SCNC: 104 MMOL/L (ref 98–110)
CHOLEST SERPL-MCNC: 138 MG/DL
CHOLEST/HDLC SERPL: 4.1 (CALC)
CO2 SERPL-SCNC: 29 MMOL/L (ref 20–32)
CREAT SERPL-MCNC: 0.77 MG/DL (ref 0.7–1.33)
EOSINOPHIL # BLD AUTO: 0 CELLS/UL (ref 15–500)
EOSINOPHIL NFR BLD AUTO: 0 %
ERYTHROCYTE [DISTWIDTH] IN BLOOD BY AUTOMATED COUNT: 13.8 % (ref 11–15)
GLOBULIN SER CALC-MCNC: 2.4 G/DL (CALC) (ref 1.9–3.7)
GLUCOSE SERPL-MCNC: 112 MG/DL (ref 65–99)
HCT VFR BLD AUTO: 44.5 % (ref 38.5–50)
HDLC SERPL-MCNC: 34 MG/DL
HGB BLD-MCNC: 14.5 G/DL (ref 13.2–17.1)
LDLC SERPL CALC-MCNC: 78 MG/DL (CALC)
LYMPHOCYTES # BLD AUTO: 2680 CELLS/UL (ref 850–3900)
LYMPHOCYTES NFR BLD AUTO: 20.3 %
MCH RBC QN AUTO: 29.6 PG (ref 27–33)
MCHC RBC AUTO-ENTMCNC: 32.6 G/DL (ref 32–36)
MCV RBC AUTO: 90.8 FL (ref 80–100)
MONOCYTES # BLD AUTO: 1267 CELLS/UL (ref 200–950)
MONOCYTES NFR BLD AUTO: 9.6 %
NEUTROPHILS # BLD AUTO: 9187 CELLS/UL (ref 1500–7800)
NEUTROPHILS NFR BLD AUTO: 69.6 %
NONHDLC SERPL-MCNC: 104 MG/DL (CALC)
PLATELET # BLD AUTO: 182 THOUSAND/UL (ref 140–400)
PMV BLD REES-ECKER: 10.8 FL (ref 7.5–12.5)
POTASSIUM SERPL-SCNC: 4.4 MMOL/L (ref 3.5–5.3)
PROT SERPL-MCNC: 6.3 G/DL (ref 6.1–8.1)
RBC # BLD AUTO: 4.9 MILLION/UL (ref 4.2–5.8)
SL AMB EGFR AFRICAN AMERICAN: 121 ML/MIN/1.73M2
SL AMB EGFR NON AFRICAN AMERICAN: 104 ML/MIN/1.73M2
SODIUM SERPL-SCNC: 138 MMOL/L (ref 135–146)
TRIGL SERPL-MCNC: 165 MG/DL
TSH SERPL-ACNC: 0.9 MIU/L (ref 0.4–4.5)
WBC # BLD AUTO: 13.2 THOUSAND/UL (ref 3.8–10.8)

## 2019-08-31 DIAGNOSIS — I10 BENIGN ESSENTIAL HYPERTENSION: ICD-10-CM

## 2019-08-31 DIAGNOSIS — K21.9 GASTROESOPHAGEAL REFLUX DISEASE WITHOUT ESOPHAGITIS: ICD-10-CM

## 2019-08-31 DIAGNOSIS — E11.9 TYPE 2 DIABETES MELLITUS WITHOUT COMPLICATION, WITHOUT LONG-TERM CURRENT USE OF INSULIN (HCC): ICD-10-CM

## 2019-08-31 DIAGNOSIS — E03.9 ACQUIRED HYPOTHYROIDISM: ICD-10-CM

## 2019-09-03 RX ORDER — LEVOTHYROXINE SODIUM 0.07 MG/1
TABLET ORAL
Qty: 30 TABLET | Refills: 2 | Status: SHIPPED | OUTPATIENT
Start: 2019-09-03 | End: 2020-08-07 | Stop reason: HOSPADM

## 2019-09-03 RX ORDER — PANTOPRAZOLE SODIUM 40 MG/1
TABLET, DELAYED RELEASE ORAL
Qty: 30 TABLET | Refills: 2 | Status: ON HOLD | OUTPATIENT
Start: 2019-09-03 | End: 2020-08-07

## 2019-09-03 RX ORDER — LISINOPRIL 5 MG/1
TABLET ORAL
Qty: 30 TABLET | Refills: 2 | Status: ON HOLD | OUTPATIENT
Start: 2019-09-03 | End: 2020-08-07

## 2019-10-04 ENCOUNTER — HOSPITAL ENCOUNTER (EMERGENCY)
Facility: HOSPITAL | Age: 54
End: 2019-10-04
Attending: EMERGENCY MEDICINE
Payer: COMMERCIAL

## 2019-10-04 VITALS
OXYGEN SATURATION: 100 % | RESPIRATION RATE: 18 BRPM | TEMPERATURE: 98.7 F | DIASTOLIC BLOOD PRESSURE: 76 MMHG | SYSTOLIC BLOOD PRESSURE: 134 MMHG | HEART RATE: 84 BPM

## 2019-10-04 DIAGNOSIS — Z00.8 ENCOUNTER FOR PSYCHOLOGICAL EVALUATION: ICD-10-CM

## 2019-10-04 DIAGNOSIS — F31.9 BIPOLAR 1 DISORDER (HCC): Primary | ICD-10-CM

## 2019-10-04 LAB
ALBUMIN SERPL BCP-MCNC: 3.3 G/DL (ref 3.5–5)
ALP SERPL-CCNC: 84 U/L (ref 46–116)
ALT SERPL W P-5'-P-CCNC: 20 U/L (ref 12–78)
AMPHETAMINES SERPL QL SCN: NEGATIVE
ANION GAP SERPL CALCULATED.3IONS-SCNC: 8 MMOL/L (ref 4–13)
AST SERPL W P-5'-P-CCNC: 17 U/L (ref 5–45)
BARBITURATES UR QL: NEGATIVE
BASOPHILS # BLD AUTO: 0.06 THOUSANDS/ΜL (ref 0–0.1)
BASOPHILS NFR BLD AUTO: 1 % (ref 0–1)
BENZODIAZ UR QL: NEGATIVE
BILIRUB SERPL-MCNC: 0.21 MG/DL (ref 0.2–1)
BUN SERPL-MCNC: 12 MG/DL (ref 5–25)
CALCIUM SERPL-MCNC: 9.1 MG/DL (ref 8.3–10.1)
CHLORIDE SERPL-SCNC: 106 MMOL/L (ref 100–108)
CO2 SERPL-SCNC: 29 MMOL/L (ref 21–32)
COCAINE UR QL: NEGATIVE
CREAT SERPL-MCNC: 0.69 MG/DL (ref 0.6–1.3)
EOSINOPHIL # BLD AUTO: 0 THOUSAND/ΜL (ref 0–0.61)
EOSINOPHIL NFR BLD AUTO: 0 % (ref 0–6)
ERYTHROCYTE [DISTWIDTH] IN BLOOD BY AUTOMATED COUNT: 13.5 % (ref 11.6–15.1)
ETHANOL EXG-MCNC: 0 MG/DL
GFR SERPL CREATININE-BSD FRML MDRD: 109 ML/MIN/1.73SQ M
GLUCOSE SERPL-MCNC: 149 MG/DL (ref 65–140)
GLUCOSE SERPL-MCNC: 161 MG/DL (ref 65–140)
HCT VFR BLD AUTO: 44.1 % (ref 36.5–49.3)
HGB BLD-MCNC: 14.5 G/DL (ref 12–17)
IMM GRANULOCYTES # BLD AUTO: 0.06 THOUSAND/UL (ref 0–0.2)
IMM GRANULOCYTES NFR BLD AUTO: 1 % (ref 0–2)
LYMPHOCYTES # BLD AUTO: 2.99 THOUSANDS/ΜL (ref 0.6–4.47)
LYMPHOCYTES NFR BLD AUTO: 29 % (ref 14–44)
MCH RBC QN AUTO: 30.1 PG (ref 26.8–34.3)
MCHC RBC AUTO-ENTMCNC: 32.9 G/DL (ref 31.4–37.4)
MCV RBC AUTO: 92 FL (ref 82–98)
METHADONE UR QL: NEGATIVE
MONOCYTES # BLD AUTO: 1.08 THOUSAND/ΜL (ref 0.17–1.22)
MONOCYTES NFR BLD AUTO: 10 % (ref 4–12)
NEUTROPHILS # BLD AUTO: 6.22 THOUSANDS/ΜL (ref 1.85–7.62)
NEUTS SEG NFR BLD AUTO: 59 % (ref 43–75)
NRBC BLD AUTO-RTO: 0 /100 WBCS
OPIATES UR QL SCN: NEGATIVE
PCP UR QL: NEGATIVE
PLATELET # BLD AUTO: 163 THOUSANDS/UL (ref 149–390)
PMV BLD AUTO: 9.9 FL (ref 8.9–12.7)
POTASSIUM SERPL-SCNC: 4 MMOL/L (ref 3.5–5.3)
PROT SERPL-MCNC: 6.6 G/DL (ref 6.4–8.2)
RBC # BLD AUTO: 4.82 MILLION/UL (ref 3.88–5.62)
SODIUM SERPL-SCNC: 143 MMOL/L (ref 136–145)
THC UR QL: NEGATIVE
WBC # BLD AUTO: 10.41 THOUSAND/UL (ref 4.31–10.16)

## 2019-10-04 PROCEDURE — 82075 ASSAY OF BREATH ETHANOL: CPT | Performed by: EMERGENCY MEDICINE

## 2019-10-04 PROCEDURE — 82948 REAGENT STRIP/BLOOD GLUCOSE: CPT

## 2019-10-04 PROCEDURE — 80307 DRUG TEST PRSMV CHEM ANLYZR: CPT | Performed by: EMERGENCY MEDICINE

## 2019-10-04 PROCEDURE — 80053 COMPREHEN METABOLIC PANEL: CPT | Performed by: EMERGENCY MEDICINE

## 2019-10-04 PROCEDURE — 99285 EMERGENCY DEPT VISIT HI MDM: CPT | Performed by: EMERGENCY MEDICINE

## 2019-10-04 PROCEDURE — 36415 COLL VENOUS BLD VENIPUNCTURE: CPT | Performed by: EMERGENCY MEDICINE

## 2019-10-04 PROCEDURE — 85025 COMPLETE CBC W/AUTO DIFF WBC: CPT | Performed by: EMERGENCY MEDICINE

## 2019-10-04 PROCEDURE — 99285 EMERGENCY DEPT VISIT HI MDM: CPT

## 2019-10-04 RX ORDER — TRAZODONE HYDROCHLORIDE 50 MG/1
50 TABLET ORAL
Status: CANCELLED | OUTPATIENT
Start: 2019-10-04

## 2019-10-04 RX ORDER — HALOPERIDOL 5 MG/ML
5 INJECTION INTRAMUSCULAR EVERY 6 HOURS PRN
Status: CANCELLED | OUTPATIENT
Start: 2019-10-04

## 2019-10-04 RX ORDER — BENZTROPINE MESYLATE 1 MG/ML
1 INJECTION INTRAMUSCULAR; INTRAVENOUS EVERY 6 HOURS PRN
Status: CANCELLED | OUTPATIENT
Start: 2019-10-04

## 2019-10-04 RX ORDER — LORAZEPAM 1 MG/1
1 TABLET ORAL EVERY 6 HOURS PRN
Status: CANCELLED | OUTPATIENT
Start: 2019-10-04

## 2019-10-04 RX ORDER — ACETAMINOPHEN 325 MG/1
650 TABLET ORAL EVERY 6 HOURS PRN
Status: CANCELLED | OUTPATIENT
Start: 2019-10-04

## 2019-10-04 RX ORDER — OLANZAPINE 10 MG/1
10 INJECTION, POWDER, LYOPHILIZED, FOR SOLUTION INTRAMUSCULAR ONCE
Status: DISCONTINUED | OUTPATIENT
Start: 2019-10-04 | End: 2019-10-05 | Stop reason: HOSPADM

## 2019-10-04 RX ORDER — ATORVASTATIN CALCIUM 10 MG/1
10 TABLET, FILM COATED ORAL
Status: CANCELLED | OUTPATIENT
Start: 2019-10-05

## 2019-10-04 RX ORDER — MAGNESIUM HYDROXIDE/ALUMINUM HYDROXICE/SIMETHICONE 120; 1200; 1200 MG/30ML; MG/30ML; MG/30ML
30 SUSPENSION ORAL EVERY 4 HOURS PRN
Status: CANCELLED | OUTPATIENT
Start: 2019-10-04

## 2019-10-04 RX ORDER — ATORVASTATIN CALCIUM 10 MG/1
10 TABLET, FILM COATED ORAL
Status: DISCONTINUED | OUTPATIENT
Start: 2019-10-04 | End: 2019-10-05 | Stop reason: HOSPADM

## 2019-10-04 RX ORDER — DIVALPROEX SODIUM 500 MG/1
1500 TABLET, EXTENDED RELEASE ORAL DAILY
Status: CANCELLED | OUTPATIENT
Start: 2019-10-05

## 2019-10-04 RX ORDER — BENZTROPINE MESYLATE 0.5 MG/1
1 TABLET ORAL EVERY 6 HOURS PRN
Status: CANCELLED | OUTPATIENT
Start: 2019-10-04

## 2019-10-04 RX ORDER — HALOPERIDOL 5 MG
5 TABLET ORAL EVERY 6 HOURS PRN
Status: CANCELLED | OUTPATIENT
Start: 2019-10-04

## 2019-10-04 RX ORDER — LORAZEPAM 2 MG/ML
1 INJECTION INTRAMUSCULAR EVERY 6 HOURS PRN
Status: CANCELLED | OUTPATIENT
Start: 2019-10-04

## 2019-10-04 RX ORDER — DIVALPROEX SODIUM 500 MG/1
1500 TABLET, EXTENDED RELEASE ORAL DAILY
Status: DISCONTINUED | OUTPATIENT
Start: 2019-10-04 | End: 2019-10-05 | Stop reason: HOSPADM

## 2019-10-04 RX ORDER — VALPROIC ACID 250 MG/1
1500 CAPSULE, LIQUID FILLED ORAL ONCE
Status: DISCONTINUED | OUTPATIENT
Start: 2019-10-04 | End: 2019-10-04

## 2019-10-04 RX ADMIN — DIVALPROEX SODIUM 1500 MG: 500 TABLET, FILM COATED, EXTENDED RELEASE ORAL at 20:34

## 2019-10-04 RX ADMIN — ATORVASTATIN CALCIUM 10 MG: 10 TABLET, FILM COATED ORAL at 20:35

## 2019-10-04 RX ADMIN — METFORMIN HYDROCHLORIDE 500 MG: 500 TABLET ORAL at 20:35
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
## 2019-10-04 NOTE — ED NOTES
CW started bed search, CW spoke with Brianna Phillips  who informed me that pt clinical can be presented to [address] ([address]), for possible admission, 302 has been faxed      [facility] Worker

## 2019-10-04 NOTE — ED NOTES
Pt refusing to change into hospital scrubs, pt refusing to do BAT  After multiple attempts and encouragement by staff and [employer] pt changed into hospital scrubs        Jayla Gelz RN  10/04/19 7228

## 2019-10-04 NOTE — LETTER
[facility] [address]  [address] [id number]  Dept: [phone]      EMTALA TRANSFER CONSENT    NAME Omar Dey                                         1965                              MRN [id number]    I have been informed of my rights regarding examination, treatment, and transfer   by Dr Mariel Velazco DO    Benefits: Specialized equipment and/or services available at the receiving facility (Include comment)________________________    Risks: Potential for delay in receiving treatment      Consent for Transfer:  I acknowledge that my medical condition has been evaluated and explained to me by the emergency department physician or other qualified medical person and/or my attending physician, who has recommended that I be transferred to the service of  Accepting Physician: DR Te Sun  at [address] [place] [address] Name, [address] [facility] ([address] [place] [address]   The above potential benefits of such transfer, the potential risks associated with such transfer, and the probable risks of not being transferred have been explained to me, and I fully understand them  The doctor has explained that, in my case, the benefits of transfer outweigh the risks  I agree to be transferred  I authorize the performance of emergency medical procedures and treatments upon me in both transit and upon arrival at the receiving facility  Additionally, I authorize the release of any and all medical records to the receiving facility and request they be transported with me, if possible  I understand that the safest mode of transportation during a medical emergency is an ambulance and that the Hospital advocates the use of this mode of transport  Risks of traveling to the receiving facility by car, including absence of medical control, life sustaining equipment, such as oxygen, and medical personnel has been explained to me and I fully understand them      (GRAZYNA CORRECT BOX BELOW)  [x ]  I consent to the stated transfer and to be transported by ambulance/helicopter  [  ]  I consent to the stated transfer, but refuse transportation by ambulance and accept full responsibility for my transportation by car  I understand the risks of non-ambulance transfers and I exonerate the Hospital and its staff from any deterioration in my condition that results from this refusal     X___________________________________________    DATE  10/04/19  TIME________  Signature of patient or legally responsible individual signing on patient behalf           RELATIONSHIP TO PATIENT_________________________          Provider Certification    NAME Tracy Finney                                         1965                              MRN [id number]    A medical screening exam was performed on the above named patient  Based on the examination:    Condition Necessitating Transfer The primary encounter diagnosis was Bipolar 1 disorder ([facility] [address] )  A diagnosis of Encounter for psychological evaluation was also pertinent to this visit  Patient Condition: The patient has been stabilized such that within reasonable medical probability, no material deterioration of the patient condition or the condition of the unborn child(gabino) is likely to result from the transfer    Reason for Transfer: Level of Care needed not available at this facility    Transfer Requirements: [facility] ([address])[place], [place]    · Space available and qualified personnel available for treatment as acknowledged by Tish Jovel ([address] [place] [address]) [phone]  · Agreed to accept transfer and to provide appropriate medical treatment as acknowledged by       DR Radames Goodson   · Appropriate medical records of the examination and treatment of the patient are provided at the time of transfer   [facility], [address] _______  · Transfer will be performed by qualified personnel from [facility] [phone]  and appropriate transfer equipment as required, including the use of necessary and appropriate life support measures  Provider Certification: I have examined the patient and explained the following risks and benefits of being transferred/refusing transfer to the patient/family:         Based on these reasonable risks and benefits to the patient and/or the unborn child(gabino), and based upon the information available at the time of the patients examination, I certify that the medical benefits reasonably to be expected from the provision of appropriate medical treatments at another medical facility outweigh the increasing risks, if any, to the individuals medical condition, and in the case of labor to the unborn child, from effecting the transfer      X____________________________________________ DATE 10/04/19        TIME_______      ORIGINAL - SEND TO MEDICAL RECORDS   COPY - SEND WITH PATIENT DURING TRANSFER

## 2019-10-04 NOTE — ED NOTES
RN asked provider to have medications on deck in case pt de-escalates further        Kenneth Draper RN  10/04/19 1661

## 2019-10-04 NOTE — ED NOTES
Pts sister is at bedside to 302 pt  Cinthia Cuevas from crisis is also at bedside at this time        Cristino Ortiz, RN  10/04/19 0153
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
## 2019-10-04 NOTE — LETTER
Section I - General Information    Name of Patient: Sona Burk                 : 1965    Medicare #:____________________  Transport Date: 10/04/19 (PCS is valid for round trips on this date and for all repetitive trips in the 60-day range as noted below )  Origin: [address] [place] [address]                                                         Destination:[facility] ________________________________________________  Is the pt's stay covered under Medicare Part A (PPS/DRG)     (_) YES  (X) NO  Closest appropriate facility? (X) YES  (_) NO  If no, why is transport to more distant facility required?________________________  If hosp-hosp transfer, describe services needed at 2nd facility not available at 1st facility? _________________________________  If hospice pt, is this transport related to pt's terminal illness? (_) YES (X) NO Describe____________________________________    Section II - Medical Necessity Questionnaire  Ambulance transportation is medically necessary only if other means of transport are contraindicated or would be potentially harmful to the patient  To meet this requirement, the patient must either be "bed confined" or suffer from a condition such that transport by means other than ambulance is contraindicated by the patient's condition   The following questions must be answered by the medical professional signing below for this form to be valid:    1)  Describe the MEDICAL CONDITION (physical and/or mental) of this patient AT [address] [place] [address] that requires the patient to be transported in an ambulance and why transport by other means is contraindicated by the patient's condition:__________________________________________________________________________________________________    2) Is the patient "bed confined" as defined below?     (_) YES  (X) NO  To be "be confined" the patient must satisfy all three of the following conditions: (1) unable to get up from bed without Assistance; AND (2) unable to ambulate; AND (3) unable to sit in a chair or wheelchair  3) Can this patient safely be transported by car or wheelchair [place] (i e , seated during transport without a medical attendant or monitoring)?   (_) YES  (X) NO    4) In addition to completing questions 1-3 above, please check any of the following conditions that apply*:  *Note: supporting documentation for any boxes checked must be maintained in the patient's medical records  (_)Contractures   (_)Non-Healed Fractures  (_)Patient is confused (_)Patient is comatose (_)Moderate/severe pain on movement (X)Danger to self/others  (_)IV meds/fluids required (_)Patient is combative(_)Need or possible need for restraints (_)DVT requires elevation of lower extremity  (_)Medical attendant required (_)Requires oxygen-unable to self administer (_)Special handling/isolation/infection control precautions required (_)Unable to tolerate seated position for time needed to transport (_)Hemodynamic monitoring required en route (_)Unable to sit in a chair or wheelchair due to decubitus ulcers or other wounds (_)Cardiac monitoring required en route (_)Morbid obesity requires additional personnel/equipment to safely handle patient (_)Orthopedic device (backboard, halo, pins, traction, brace, wedge, etc,) requiring special handling during transport (_)Other(specify)_______________________________________________    Section III - Signature of Physician or Healthcare Professional  I certify that the above information is true and correct based on my evaluation of this patient, and represent that the patient requires transport by ambulance and that other forms of transport are contraindicated   I understand that this information will be used by the Centers for Medicare and Medicaid Services (CMS) to support the determination of medical necessity for ambulance services, and I represent that I have personal knowledge of the patient's condition at time of transport  (_) If this box is checked, I also certify that the patient is physically or mentally incapable of signing the ambulance service's claim and that the institution with which I am affiliated has furnished care, services, or assistance to the patient  My signature below is made on behalf of the patient pursuant to 42 CFR §424 36(b)(4)  In accordance with 42 CFR §424 37, the specific reason(s) that the patient is physically or mentally incapable of signing the claim form is as follows: _________________________________________________________________________________________________________      Signature of Physician* or Healthcare Professional______________________________________________________________  Signature Date 10/04/19 (For scheduled repetitive transports, this form is not valid for transports performed more than 60 days after this date)    Printed Name & Credentials of Physician or Healthcare Professional (MD, DO, RN, etc )________________________________  *Form must be signed by patient's attending physician for scheduled, repetitive transports   For non-repetitive, unscheduled ambulance transports, if unable to obtain the signature of the attending physician, any of the following may sign (choose appropriate option below)  (_) Physician Assistant (_)  Clinical Nurse Specialist (_)  Registered Nurse  (_)  Nurse Practitioner  (X) Discharge Planner

## 2019-10-04 NOTE — ED NOTES
Pt told RN he called his  and she is going to come and get him        Mabel Brewer RN  10/04/19 1120
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
## 2019-10-04 NOTE — ED NOTES
Pt poor historian not very knowledgeable about his medications sates only takes aspirin, metformin, and clozapine        Louise Vidal RN  10/04/19 4973

## 2019-10-05 ENCOUNTER — HOSPITAL ENCOUNTER (INPATIENT)
Facility: HOSPITAL | Age: 54
LOS: 79 days | DRG: 750 | End: 2019-12-23
Attending: PSYCHIATRY & NEUROLOGY | Admitting: PSYCHIATRY & NEUROLOGY
Payer: COMMERCIAL

## 2019-10-05 DIAGNOSIS — F31.9 BIPOLAR 1 DISORDER (HCC): ICD-10-CM

## 2019-10-05 DIAGNOSIS — Z91.89 AT RISK FOR DIABETIC FOOT ULCER: Primary | ICD-10-CM

## 2019-10-05 LAB
ALBUMIN SERPL BCP-MCNC: 3 G/DL (ref 3.5–5)
ALP SERPL-CCNC: 76 U/L (ref 46–116)
ALT SERPL W P-5'-P-CCNC: 11 U/L (ref 12–78)
ANION GAP SERPL CALCULATED.3IONS-SCNC: 8 MMOL/L (ref 4–13)
AST SERPL W P-5'-P-CCNC: 14 U/L (ref 5–45)
BASOPHILS # BLD AUTO: 0.08 THOUSANDS/ΜL (ref 0–0.1)
BASOPHILS NFR BLD AUTO: 1 % (ref 0–1)
BILIRUB SERPL-MCNC: 0.2 MG/DL (ref 0.2–1)
BUN SERPL-MCNC: 12 MG/DL (ref 5–25)
CALCIUM SERPL-MCNC: 9 MG/DL (ref 8.3–10.1)
CHLORIDE SERPL-SCNC: 106 MMOL/L (ref 100–108)
CO2 SERPL-SCNC: 26 MMOL/L (ref 21–32)
CREAT SERPL-MCNC: 0.72 MG/DL (ref 0.6–1.3)
EOSINOPHIL # BLD AUTO: 0 THOUSAND/ΜL (ref 0–0.61)
EOSINOPHIL NFR BLD AUTO: 0 % (ref 0–6)
ERYTHROCYTE [DISTWIDTH] IN BLOOD BY AUTOMATED COUNT: 13.7 % (ref 11.6–15.1)
EST. AVERAGE GLUCOSE BLD GHB EST-MCNC: 134 MG/DL
GFR SERPL CREATININE-BSD FRML MDRD: 107 ML/MIN/1.73SQ M
GLUCOSE SERPL-MCNC: 143 MG/DL (ref 65–140)
HBA1C MFR BLD: 6.3 % (ref 4.2–6.3)
HCT VFR BLD AUTO: 41.8 % (ref 36.5–49.3)
HGB BLD-MCNC: 13.7 G/DL (ref 12–17)
IMM GRANULOCYTES # BLD AUTO: 0.03 THOUSAND/UL (ref 0–0.2)
IMM GRANULOCYTES NFR BLD AUTO: 0 % (ref 0–2)
LYMPHOCYTES # BLD AUTO: 3.22 THOUSANDS/ΜL (ref 0.6–4.47)
LYMPHOCYTES NFR BLD AUTO: 31 % (ref 14–44)
MCH RBC QN AUTO: 29.5 PG (ref 26.8–34.3)
MCHC RBC AUTO-ENTMCNC: 32.8 G/DL (ref 31.4–37.4)
MCV RBC AUTO: 90 FL (ref 82–98)
MONOCYTES # BLD AUTO: 1.14 THOUSAND/ΜL (ref 0.17–1.22)
MONOCYTES NFR BLD AUTO: 11 % (ref 4–12)
NEUTROPHILS # BLD AUTO: 5.88 THOUSANDS/ΜL (ref 1.85–7.62)
NEUTS SEG NFR BLD AUTO: 57 % (ref 43–75)
NRBC BLD AUTO-RTO: 0 /100 WBCS
PLATELET # BLD AUTO: 148 THOUSANDS/UL (ref 149–390)
PMV BLD AUTO: 10 FL (ref 8.9–12.7)
POTASSIUM SERPL-SCNC: 4 MMOL/L (ref 3.5–5.3)
PROT SERPL-MCNC: 6.3 G/DL (ref 6.4–8.2)
RBC # BLD AUTO: 4.65 MILLION/UL (ref 3.88–5.62)
SODIUM SERPL-SCNC: 140 MMOL/L (ref 136–145)
WBC # BLD AUTO: 10.35 THOUSAND/UL (ref 4.31–10.16)

## 2019-10-05 PROCEDURE — 80053 COMPREHEN METABOLIC PANEL: CPT | Performed by: PSYCHIATRY & NEUROLOGY

## 2019-10-05 PROCEDURE — 83036 HEMOGLOBIN GLYCOSYLATED A1C: CPT | Performed by: PHYSICIAN ASSISTANT

## 2019-10-05 PROCEDURE — 99221 1ST HOSP IP/OBS SF/LOW 40: CPT | Performed by: STUDENT IN AN ORGANIZED HEALTH CARE EDUCATION/TRAINING PROGRAM

## 2019-10-05 PROCEDURE — 93005 ELECTROCARDIOGRAM TRACING: CPT

## 2019-10-05 PROCEDURE — 85025 COMPLETE CBC W/AUTO DIFF WBC: CPT | Performed by: PSYCHIATRY & NEUROLOGY

## 2019-10-05 RX ORDER — LEVOTHYROXINE SODIUM 0.07 MG/1
75 TABLET ORAL
Status: DISCONTINUED | OUTPATIENT
Start: 2019-10-06 | End: 2019-12-23 | Stop reason: HOSPADM

## 2019-10-05 RX ORDER — HALOPERIDOL 5 MG
5 TABLET ORAL EVERY 6 HOURS PRN
Status: DISCONTINUED | OUTPATIENT
Start: 2019-10-05 | End: 2019-12-23 | Stop reason: HOSPADM

## 2019-10-05 RX ORDER — ATORVASTATIN CALCIUM 10 MG/1
10 TABLET, FILM COATED ORAL
Status: DISCONTINUED | OUTPATIENT
Start: 2019-10-05 | End: 2019-12-23 | Stop reason: HOSPADM

## 2019-10-05 RX ORDER — TRAZODONE HYDROCHLORIDE 50 MG/1
50 TABLET ORAL
Status: DISCONTINUED | OUTPATIENT
Start: 2019-10-05 | End: 2019-12-23 | Stop reason: HOSPADM

## 2019-10-05 RX ORDER — CLOZAPINE 25 MG/1
50 TABLET ORAL DAILY
Status: DISCONTINUED | OUTPATIENT
Start: 2019-10-05 | End: 2019-10-11

## 2019-10-05 RX ORDER — CLOZAPINE 100 MG/1
400 TABLET ORAL
Status: DISCONTINUED | OUTPATIENT
Start: 2019-10-05 | End: 2019-10-08

## 2019-10-05 RX ORDER — DIVALPROEX SODIUM 500 MG/1
1500 TABLET, EXTENDED RELEASE ORAL DAILY
Status: DISCONTINUED | OUTPATIENT
Start: 2019-10-05 | End: 2019-10-22

## 2019-10-05 RX ORDER — BENZTROPINE MESYLATE 1 MG/1
1 TABLET ORAL EVERY 6 HOURS PRN
Status: DISCONTINUED | OUTPATIENT
Start: 2019-10-05 | End: 2019-12-23 | Stop reason: HOSPADM

## 2019-10-05 RX ORDER — MAGNESIUM HYDROXIDE/ALUMINUM HYDROXICE/SIMETHICONE 120; 1200; 1200 MG/30ML; MG/30ML; MG/30ML
30 SUSPENSION ORAL EVERY 4 HOURS PRN
Status: DISCONTINUED | OUTPATIENT
Start: 2019-10-05 | End: 2019-12-23 | Stop reason: HOSPADM

## 2019-10-05 RX ORDER — HALOPERIDOL 5 MG/ML
5 INJECTION INTRAMUSCULAR EVERY 6 HOURS PRN
Status: DISCONTINUED | OUTPATIENT
Start: 2019-10-05 | End: 2019-12-23 | Stop reason: HOSPADM

## 2019-10-05 RX ORDER — LORAZEPAM 2 MG/ML
1 INJECTION INTRAMUSCULAR EVERY 6 HOURS PRN
Status: DISCONTINUED | OUTPATIENT
Start: 2019-10-05 | End: 2019-12-23 | Stop reason: HOSPADM

## 2019-10-05 RX ORDER — LORAZEPAM 1 MG/1
1 TABLET ORAL EVERY 6 HOURS PRN
Status: DISCONTINUED | OUTPATIENT
Start: 2019-10-05 | End: 2019-12-23 | Stop reason: HOSPADM

## 2019-10-05 RX ORDER — BENZTROPINE MESYLATE 1 MG/ML
1 INJECTION INTRAMUSCULAR; INTRAVENOUS EVERY 6 HOURS PRN
Status: DISCONTINUED | OUTPATIENT
Start: 2019-10-05 | End: 2019-12-23 | Stop reason: HOSPADM

## 2019-10-05 RX ORDER — LISINOPRIL 5 MG/1
5 TABLET ORAL DAILY
Status: DISCONTINUED | OUTPATIENT
Start: 2019-10-05 | End: 2019-10-22

## 2019-10-05 RX ORDER — PANTOPRAZOLE SODIUM 40 MG/1
40 TABLET, DELAYED RELEASE ORAL
Status: DISCONTINUED | OUTPATIENT
Start: 2019-10-05 | End: 2019-12-23 | Stop reason: HOSPADM

## 2019-10-05 RX ORDER — ACETAMINOPHEN 325 MG/1
650 TABLET ORAL EVERY 6 HOURS PRN
Status: DISCONTINUED | OUTPATIENT
Start: 2019-10-05 | End: 2019-10-24

## 2019-10-05 RX ADMIN — METFORMIN HYDROCHLORIDE 500 MG: 500 TABLET, FILM COATED ORAL at 16:54

## 2019-10-05 RX ADMIN — CLOZAPINE 50 MG: 25 TABLET ORAL at 14:55

## 2019-10-05 RX ADMIN — LISINOPRIL 5 MG: 5 TABLET ORAL at 11:07

## 2019-10-05 RX ADMIN — DIVALPROEX SODIUM 1500 MG: 500 TABLET, FILM COATED, EXTENDED RELEASE ORAL at 08:58

## 2019-10-05 RX ADMIN — CLOZAPINE 400 MG: 100 TABLET ORAL at 21:31

## 2019-10-05 RX ADMIN — ATORVASTATIN CALCIUM 10 MG: 10 TABLET, FILM COATED ORAL at 16:54

## 2019-10-05 RX ADMIN — PANTOPRAZOLE SODIUM 40 MG: 40 TABLET, DELAYED RELEASE ORAL at 11:07

## 2019-10-05 RX ADMIN — METFORMIN HYDROCHLORIDE 500 MG: 500 TABLET, FILM COATED ORAL at 11:06

## 2019-10-05 NOTE — PROGRESS NOTES
Patient belongings by bedside:   One pair of jeans   Two resendiz   Three t-shirts     Patient belongings sent to locker:  Leather jacket   Brown hoodie   3 bandanas   Change  $13 00  "bat" figure   Empty wallet   Shorts with strings   Pants with strings   Belt   Tank top   Sunglasses   4 rings   5 chains   Sneakers with laces

## 2019-10-05 NOTE — ED NOTES
Insurance Authorization for admission:   Phone call placed to [facility]   Phone number: [phone] to Adelita Denise (care manager)    3 days approved  Level of care: IP  Review on IP  Authorization # Call Upon Arrival     EVS (Eligibility Verification System) called - [phone]    Automated system indicates: [place] eligible - Tr[facility] Crisis Worker
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
## 2019-10-05 NOTE — TREATMENT PLAN
TREATMENT PLAN REVIEW - [address] [facility] 48 y o  1965 male MRN: [id number]    [address] [place] [address] Room / Bed: [facility] 253/[facility] 308-69 Encounter: [id number]          Admit Date/Time:  10/5/2019 12:29 AM    Treatment Team: Attending Provider: Alesha Conklin; Consulting Physician: Jitendra Renner MD; Patient Care Assistant: Miky Reyes; Patient Care Technician: Kylah Garner; Registered Nurse: Gerline Severs, RN    Diagnosis: Principal Problem:    Schizoaffective disorder ([facility] )  Active Problems:    Type 2 diabetes mellitus ([facility] )    Benign essential hypertension    BMI 34 0-34 9,adult    Patient Strengths/Assets: adapts well, cooperative, communication skills     Patient Barriers/Limitations: chronic mental illness, lack of social/family support, poor insight, poor interpersonal skills, poor past treatment response, poor self-care    Short Term Goals: decrease in paranoid thoughts, decrease in psychotic symptoms, improvement in reasoning ability, improvement in self care    Long Term Goals: resolution of psychotic symptoms, no agitation on the unit, no aggressive behavior on the unit, adequate self care, appropriate interaction with peers, stable living arrangements upon discharge    Progress Towards Goals: restarting psychiatric medications as prescribed    Recommended Treatment: medication management, patient medication education, group therapy, milieu therapy, continued Behavioral Health psychiatric evaluation/assessment process     Treatment Frequency: daily medication monitoring, group and milieu therapy daily, monitoring through interdisciplinary rounds, monitoring through weekly patient care conferences    Expected Discharge Date: 7 days - 10/12/2019    Discharge Plan: placement in group home, follow up with [facility] for close psychiatric monitoring    Treatment Plan Created/Updated By: Candido Cantu MD

## 2019-10-05 NOTE — PROGRESS NOTES
Pt with episode of vomiting x1  Pt unsure what caused him to vomit, denies any recent symptoms  Denies feeling nauseous at this time

## 2019-10-05 NOTE — ED NOTES
Patient sister, Amy Silveira, calling department to speak with patient  Patient giving RN permission to discuss care with sister  Sister wishing to be contacted regarding patient care   Can be reached at [address], RN  10/04/19 1178

## 2019-10-05 NOTE — CONSULTS
Consultation - Jhon Dan 48 y o  male MRN: [id number]    Unit/Bed#: [facility] 253-01 Encounter: [id number]      Assessment/Plan     Diabetes Mellitus - continue metformin  Check A1c    Hypothyroidism - continue levothyroxine  TSH normal as of 8/28/19    Hyperlipidemia- continue atorvastatin    Schizophrenia- admit to [facility], orders per psych    Hypertension - continue lisinopril    GERD - continue pantoprazole    History of Present Illness     HPI: Jhon Dan is a 48y o  year old male who presents with acute psychosis  Requires medical clearance for admission to [facility]  Inpatient consult for Medical Clearance for [facility] patient  Consult performed by: Matthew Araujo PA-C  Consult ordered by: Khoa Dorantes MD          Review of Systems   Constitutional: Negative for chills, diaphoresis and fever  Eyes: Negative for visual disturbance  Respiratory: Negative for cough and shortness of breath  Cardiovascular: Negative for chest pain and palpitations  Gastrointestinal: Negative for abdominal pain, diarrhea, nausea and vomiting  Genitourinary: Negative for dysuria, flank pain and frequency  Musculoskeletal: Negative for arthralgias and myalgias  Skin: Negative for color change, rash and wound  Allergic/Immunologic: Negative for immunocompromised state  Neurological: Negative for dizziness and light-headedness  Hematological: Does not bruise/bleed easily  Psychiatric/Behavioral: Positive for behavioral problems  Negative for confusion  The patient is not nervous/anxious          Historical Information   Past Medical History:   Diagnosis Date    Asthma     Bipolar 1 disorder ([place] [address] ) 2011    Colitis 03/27/2014    Constipation     COPD (chronic obstructive pulmonary disease) ([place] [address] )     Diabetes ([place] [address] ) 2011    Disease of thyroid gland     GERD (gastroesophageal reflux disease) 2011    Hyperlipidemia     Hypertension     Hyponatremia     Hypothyroidism 2011    Lipoprotein deficiency     Obesity     Plantar fasciitis     Psychiatric disorder     Schizo affective schizophrenia [facility]) 2011    Sebaceous gland disease     Tobacco abuse      No past surgical history on file    Social History   Social History     Substance and Sexual Activity   Alcohol Use No     Social History     Substance and Sexual Activity   Drug Use No     Social History     Tobacco Use   Smoking Status Current Every Day Smoker    Packs/day: 1 00    Types: Cigarettes   Smokeless Tobacco Current User     Family History: non-contributory    Meds/Allergies   current meds:   Current Facility-Administered Medications   Medication Dose Route Frequency    acetaminophen (TYLENOL) tablet 650 mg  650 mg Oral Q6H PRN    aluminum-magnesium hydroxide-simethicone (MYLANTA) 200-200-20 mg/5 mL oral suspension 30 mL  30 mL Oral Q4H PRN    atorvastatin (LIPITOR) tablet 10 mg  10 mg Oral Daily With Dinner    benztropine (COGENTIN) injection 1 mg  1 mg Intramuscular Q6H PRN    benztropine (COGENTIN) tablet 1 mg  1 mg Oral Q6H PRN    divalproex sodium (DEPAKOTE ER) 24 hr tablet 1,500 mg  1,500 mg Oral Daily    haloperidol (HALDOL) tablet 5 mg  5 mg Oral Q6H PRN    haloperidol lactate (HALDOL) injection 5 mg  5 mg Intramuscular Q6H PRN    [START ON 10/6/2019] levothyroxine tablet 75 mcg  75 mcg Oral Early Morning    lisinopril (ZESTRIL) tablet 5 mg  5 mg Oral Daily    LORazepam (ATIVAN) 2 mg/mL injection 1 mg  1 mg Intramuscular Q6H PRN    LORazepam (ATIVAN) tablet 1 mg  1 mg Oral Q6H PRN    magnesium hydroxide (MILK OF MAGNESIA) 400 mg/5 mL oral suspension 30 mL  30 mL Oral Daily PRN    metFORMIN (GLUCOPHAGE) tablet 500 mg  500 mg Oral BID With Meals    nicotine polacrilex (NICORETTE) gum 2 mg  2 mg Oral Q2H PRN    pantoprazole (PROTONIX) EC tablet 40 mg  40 mg Oral Early Morning    traZODone (DESYREL) tablet 50 mg  50 mg Oral HS PRN     Allergies   Allergen Reactions    Penicillins Anaphylaxis    Carbapenems     Cephalosporins Objective   Vitals: Blood pressure 137/99, pulse 70, temperature 97 8 °F (36 6 °C), temperature source Tympanic, resp  rate 18, height 5' 6" (1 676 m), weight 102 kg (225 lb 3 2 oz), SpO2 99 %  No intake or output data in the 24 hours ending 10/05/19 0923  Invasive Devices     None                 Physical Exam   Constitutional: He is oriented to person, place, and time  He appears well-developed and well-nourished  No distress  HENT:   Head: Normocephalic and atraumatic  Mouth/Throat: Oropharynx is clear and moist    Eyes: Pupils are equal, round, and reactive to light  No scleral icterus  Neck: No JVD present  Cardiovascular: Normal rate and regular rhythm  Exam reveals no gallop and no friction rub  No murmur heard  Pulmonary/Chest: No respiratory distress  He has wheezes (bibasilar, likely chronic )  He has no rales  Abdominal: Soft  Bowel sounds are normal  He exhibits no distension and no mass  There is no tenderness  There is no guarding  Neurological: He is alert and oriented to person, place, and time  Skin: Skin is warm and dry  Capillary refill takes less than 2 seconds  He is not diaphoretic  Psychiatric: His affect is blunt  His speech is rapid and/or pressured and tangential  He is withdrawn  Vitals reviewed  Lab Results: I have personally reviewed pertinent reports  Imaging Studies: I have personally reviewed pertinent reports  EKG, Pathology, and Other Studies: I have personally reviewed pertinent reports      VTE Prophylaxis: Reason for no pharmacologic prophylaxis Ambulatory    Code Status: Prior  Advance Directive and Living Will:      Power of :    POLST:

## 2019-10-05 NOTE — PLAN OF CARE
Problem: SELF HARM/SUICIDALITY  Goal: Will have no self-injury during hospital stay  Description  INTERVENTIONS:  - Q 15 MINUTES: Routine safety checks  - Q WAKING SHIFT & PRN: Assess risk to determine if routine checks are adequate to maintain patient safety  - Encourage patient to participate actively in care by formulating a plan to combat response to suicidal ideation, identify supports and resources  Outcome: Progressing     Problem: INVOLUNTARY ADMIT  Goal: Will cooperate with staff recommendations and doctor's orders and will demonstrate appropriate behavior  Description  INTERVENTIONS:  - Treat underlying conditions and offer medication as ordered  - Educate regarding involuntary admission procedures and rules  - Utilize positive consistent limit setting strategies to support patient and staff safety  Outcome: Progressing     Problem: SELF HARM/SUICIDALITY  Goal: Will have no self-injury during hospital stay  Description  INTERVENTIONS:  - Q 15 MINUTES: Routine safety checks  - Q WAKING SHIFT & PRN: Assess risk to determine if routine checks are adequate to maintain patient safety  - Encourage patient to participate actively in care by formulating a plan to combat response to suicidal ideation, identify supports and resources  10/5/2019 0144 by Kale Mancini RN  Outcome: Progressing  10/5/2019 0129 by Kale Mancini RN  Outcome: Progressing     Problem: INVOLUNTARY ADMIT  Goal: Will cooperate with staff recommendations and doctor's orders and will demonstrate appropriate behavior  Description  INTERVENTIONS:  - Treat underlying conditions and offer medication as ordered  - Educate regarding involuntary admission procedures and rules  - Utilize positive consistent limit setting strategies to support patient and staff safety  10/5/2019 0144 by Kale Mancini RN  Outcome: Progressing  10/5/2019 0129 by Kale Mancini RN  Outcome: Progressing

## 2019-10-05 NOTE — TREATMENT PLAN
RN will meet with the patient at least twice per day to assess any concerns and give education on diagnosis, prescribed medications, and healthy coping skills

## 2019-10-05 NOTE — EMTALA/ACUTE CARE TRANSFER
[facility]  [address] [facility] [address]  Dept: [phone]      EMTALA TRANSFER CONSENT    NAME Maria Antonia Soto                                         1965                              MRN [id number]    I have been informed of my rights regarding examination, treatment, and transfer   by Dr Franchesca Thakur DO    Benefits: Specialized equipment and/or services available at the receiving facility (Include comment)________________________    Risks: Potential for delay in receiving treatment      Consent for Transfer:  I acknowledge that my medical condition has been evaluated and explained to me by the emergency department physician or other qualified medical person and/or my attending physician, who has recommended that I be transferred to the service of  Accepting Physician: DR Keila Hewitt  at [address] Name, [address] : Mo Parra ([facility])[place], [place]   The above potential benefits of such transfer, the potential risks associated with such transfer, and the probable risks of not being transferred have been explained to me, and I fully understand them  The doctor has explained that, in my case, the benefits of transfer outweigh the risks  I agree to be transferred  I authorize the performance of emergency medical procedures and treatments upon me in both transit and upon arrival at the receiving facility  Additionally, I authorize the release of any and all medical records to the receiving facility and request they be transported with me, if possible  I understand that the safest mode of transportation during a medical emergency is an ambulance and that the Hospital advocates the use of this mode of transport  Risks of traveling to the receiving facility by car, including absence of medical control, life sustaining equipment, such as oxygen, and medical personnel has been explained to me and I fully understand them      (GRAZYNA CORRECT BOX BELOW)  [  ]  I consent to the stated transfer and to be transported by ambulance/helicopter  [  ]  I consent to the stated transfer, but refuse transportation by ambulance and accept full responsibility for my transportation by car  I understand the risks of non-ambulance transfers and I exonerate the Hospital and its staff from any deterioration in my condition that results from this refusal     X___________________________________________    DATE  10/04/19  TIME________  Signature of patient or legally responsible individual signing on patient behalf           RELATIONSHIP TO PATIENT_________________________          Provider Certification    NAME Shirley Caro                                         1965                              MRN [id number]    A medical screening exam was performed on the above named patient  Based on the examination:    Condition Necessitating Transfer The encounter diagnosis was Bipolar 1 disorder ([address] )  Patient Condition: The patient has been stabilized such that within reasonable medical probability, no material deterioration of the patient condition or the condition of the unborn child(gabino) is likely to result from the transfer    Reason for Transfer: Level of Care needed not available at this facility    Transfer Requirements: [address] [facility] ([address] [place]),  [place]    · Space available and qualified personnel available for treatment as acknowledged by Froy Wagner ([facility]) [phone]  · Agreed to accept transfer and to provide appropriate medical treatment as acknowledged by       DR Sheila Starkey   · Appropriate medical records of the examination and treatment of the patient are provided at the time of transfer   [address] [facility], [address] _______  · Transfer will be performed by qualified personnel from [facility] [phone]  and appropriate transfer equipment as required, including the use of necessary and appropriate life support measures  Provider Certification: I have examined the patient and explained the following risks and benefits of being transferred/refusing transfer to the patient/family:         Based on these reasonable risks and benefits to the patient and/or the unborn child(gabino), and based upon the information available at the time of the patients examination, I certify that the medical benefits reasonably to be expected from the provision of appropriate medical treatments at another medical facility outweigh the increasing risks, if any, to the individuals medical condition, and in the case of labor to the unborn child, from effecting the transfer      X____________________________________________ DATE 10/04/19        TIME_______      ORIGINAL - SEND TO MEDICAL RECORDS   COPY - SEND WITH PATIENT DURING TRANSFER

## 2019-10-05 NOTE — PROGRESS NOTES
Pt is calm and coopertive this morning  Appropriate during interaction  Pt OOB for breakfast, compliant with meds  Currently in shower

## 2019-10-05 NOTE — ED NOTES
Patient is accepted at [address] ([address])   Patient is accepted by Dr Penelope Burrell per Joycelyn Rinne (intake)     Transportation is arranged with [address] is scheduled for 10/05/2019 @0030  Patient may go to the floor at [address]  report is to be called to [phone] prior to patient transfer         [address] [place] [address]
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
## 2019-10-06 LAB
GLUCOSE SERPL-MCNC: 119 MG/DL (ref 65–140)
GLUCOSE SERPL-MCNC: 120 MG/DL (ref 65–140)

## 2019-10-06 PROCEDURE — 82948 REAGENT STRIP/BLOOD GLUCOSE: CPT

## 2019-10-06 RX ADMIN — LEVOTHYROXINE SODIUM 75 MCG: 75 TABLET ORAL at 06:45

## 2019-10-06 RX ADMIN — PANTOPRAZOLE SODIUM 40 MG: 40 TABLET, DELAYED RELEASE ORAL at 06:45

## 2019-10-06 RX ADMIN — METFORMIN HYDROCHLORIDE 500 MG: 500 TABLET, FILM COATED ORAL at 16:31

## 2019-10-06 RX ADMIN — DIVALPROEX SODIUM 1500 MG: 500 TABLET, FILM COATED, EXTENDED RELEASE ORAL at 10:18

## 2019-10-06 RX ADMIN — CLOZAPINE 400 MG: 100 TABLET ORAL at 22:01

## 2019-10-06 RX ADMIN — METFORMIN HYDROCHLORIDE 500 MG: 500 TABLET, FILM COATED ORAL at 10:17

## 2019-10-06 RX ADMIN — ATORVASTATIN CALCIUM 10 MG: 10 TABLET, FILM COATED ORAL at 16:31

## 2019-10-06 RX ADMIN — CLOZAPINE 50 MG: 25 TABLET ORAL at 10:24

## 2019-10-06 NOTE — PROGRESS NOTES
Pt is calm, cooperative during interaction  Walking away from staff, appears internally preoccupied  Denies SI, HI, AVH  Pt was appropriate in Wrap Up group with some participation  Disheveled appearance

## 2019-10-06 NOTE — PROGRESS NOTES
Pt seclusive to room, nappy majority of shift thus far  Slept though breakfast but did attend lunch  Pt currently sleeping  Responded to writer with eyes closed  Denies having any problems or concerns

## 2019-10-06 NOTE — PROGRESS NOTES
Pt heard with cough overnight  Pt lying on back to sleep, occasionally repositions self  Pt offered water, cough drops and wedge to prop up mattress  Pt politely declined, states, "I'm alright  It went away " referring to the cough  Pt not in distress  Remains in bed throughout the night, able to fall back to sleep

## 2019-10-06 NOTE — PROGRESS NOTES
Progress Note - Behavioral Health   Sylvia Whitfield 48 y o  male MRN: [id number]  Unit/Bed#: [facility] 260-01 Encounter: [id number]    Subjective:    Per nursing, patient is seclusive to room, napping most of the day, denies any concerns    Per patient, patient lying in bed during interview, refusing to get up from sleep  Patient denies any problems or concerns  He mumbles that his mood is "good "  He reports appetite is good, denying any sleep concerns  He denies any auditory or visual hallucinations  He reports tolerating medication well without reported side effects  Behavior over the last 24 hours:  unchanged  Medication side effects: No  ROS: no complaints    Objective:    Temp:  [96 5 °F (35 8 °C)-98 4 °F (36 9 °C)] 96 5 °F (35 8 °C)  HR:  [65-93] 93  Resp:  [16-18] 16  BP: (106-141)/(52-78) 106/61    Mental Status Evaluation:  Appearance:  Lying in bed, disheveled, trash on floor of room, responds to questions   Behavior:  No tics, tremors, or behaviors observed   Speech:  Mumbles answers, normal rhythm, low volume   Mood:  "good"   Affect:  Appears constricted in depressed range, stable, mood-congruent   Thought Process:  Linear and goal directed   Associations intact associations   Thought Content:  No passive or active suicidal or homicidal ideation, intent, or plan  Perceptual Disturbances: Denies any auditory or visual hallucinations   Sensorium:  Oriented to person, place, time, and situation   Memory:  recent and remote memory grossly intact   Consciousness:  Drowsy   Attention: Inattentive   Insight:  poor   Judgment: limited   Gait/Station: Did not assess   Motor Activity: no abnormal movements       Labs: I have personally reviewed all pertinent laboratory/tests results    Labs in last 72 hours:   Recent Labs     10/05/19  0616   WBC 10 35*   RBC 4 65   HGB 13 7   HCT 41 8   *   RDW 13 7   NEUTROABS 5 88   SODIUM 140   K 4 0      CO2 26   BUN 12   CREATININE 0 72   GLUC 143* CALCIUM 9 0   AST 14   ALT 11*   ALKPHOS 76   TP 6 3*   ALB 3 0*   TBILI 0 20       Progress Toward Goals: Minimal progress    Recommended Treatment: Continue with group therapy, milieu therapy and occupational therapy  Risks, benefits and possible side effects of Medications:   Risks, benefits, and possible side effects of medications explained to patient and patient verbalizes understanding  Medications: all current active meds have been reviewed      Current Facility-Administered Medications:  acetaminophen 650 mg Oral Q6H PRN Yana Islas MD   aluminum-magnesium hydroxide-simethicone 30 mL Oral Q4H PRN Yana Islas MD   atorvastatin 10 mg Oral Daily With Gemini Coppola MD   benztropine 1 mg Intramuscular Q6H PRN Yana Islas MD   benztropine 1 mg Oral Q6H PRN Yana Islas MD   cloZAPine 400 mg Oral HS Mayelin De La Torre MD   clozapine 50 mg Oral Daily Mayelin De La Torre MD   divalproex sodium 1,500 mg Oral Daily Yana Islas MD   haloperidol 5 mg Oral Q6H PRN Yana Islas MD   haloperidol lactate 5 mg Intramuscular Q6H PRN Yana Islas MD   insulin lispro 1-5 Units Subcutaneous HS Mayelin De La Torre MD   insulin lispro 1-6 Units Subcutaneous TID AC Mayelin De La Torre MD   levothyroxine 75 mcg Oral Early Morning Dionte Serrano PA-C   lisinopril 5 mg Oral Daily Sar[place]otaale[place]ander Serrano PA-C   LORazepam 1 mg Intramuscular Q6H PRN Yana Islas MD   LORazepam 1 mg Oral Q6H PRN Yana Islas MD   magnesium hydroxide 30 mL Oral Daily PRN Yana Islas MD   metFORMIN 500 mg Oral BID With Meals Dionte Serrano PA-C   nicotine polacrilex 2 mg Oral Q2H PRN Yana Islas MD   pantoprazole 40 mg Oral Early Morning Dionte Serrano PA-C   traZODone 50 mg Oral HS PRN Yana Islas MD           Assessment/Plan   Principal Problem:    Schizoaffective disorder [facility])  Active Problems:    Type 2 diabetes mellitus ([facility] [address] )    Benign essential hypertension    BMI 34 0-34 9,adult    49 y/o Male with schizoaffective disorder- remains disheveled, responds appropriately to questions, drowsy today, compliant with medications  Plan:  -Started blood sugar checks and sliding scale Insulin   -Continue rest of med regimen   -Will order serum VPA level, lipid profile, ammonia level for tomorrow AM   -CW to discuss with group home concerns leading up to admission

## 2019-10-07 LAB
ATRIAL RATE: 57 BPM
GLUCOSE SERPL-MCNC: 101 MG/DL (ref 65–140)
GLUCOSE SERPL-MCNC: 89 MG/DL (ref 65–140)
GLUCOSE SERPL-MCNC: 93 MG/DL (ref 65–140)
P AXIS: 48 DEGREES
PR INTERVAL: 162 MS
QRS AXIS: -19 DEGREES
QRSD INTERVAL: 96 MS
QT INTERVAL: 380 MS
QTC INTERVAL: 369 MS
T WAVE AXIS: 10 DEGREES
VENTRICULAR RATE: 57 BPM

## 2019-10-07 PROCEDURE — 82948 REAGENT STRIP/BLOOD GLUCOSE: CPT

## 2019-10-07 PROCEDURE — 99231 SBSQ HOSP IP/OBS SF/LOW 25: CPT | Performed by: PSYCHIATRY & NEUROLOGY

## 2019-10-07 PROCEDURE — 93010 ELECTROCARDIOGRAM REPORT: CPT | Performed by: INTERNAL MEDICINE

## 2019-10-07 RX ADMIN — DIVALPROEX SODIUM 1500 MG: 500 TABLET, FILM COATED, EXTENDED RELEASE ORAL at 13:03

## 2019-10-07 RX ADMIN — PANTOPRAZOLE SODIUM 40 MG: 40 TABLET, DELAYED RELEASE ORAL at 08:28

## 2019-10-07 RX ADMIN — CLOZAPINE 50 MG: 25 TABLET ORAL at 13:03

## 2019-10-07 RX ADMIN — LEVOTHYROXINE SODIUM 75 MCG: 75 TABLET ORAL at 08:27

## 2019-10-07 NOTE — PROGRESS NOTES
In the next few weeks you may receive a [employer] survey regarding your most recent clinic visit with us.  Please take a few moments to accurately evaluate your visit. We strive to provide you with the best medical care. Your feedback will assist us in achieving this. Again, thank you for your time and we look forward to your next visit.         If we need to contact you regarding any test results, we will make 2 attempts to reach you at the number you have listed during your office visit today. If we are unable to reach you, a letter with your results and any further instructions will be mailed to you home.      Medications as directed  Follow-up with primary M.D.       PT refused Lab work  He said" I am not do in it any way if the DR   Is asking for it "

## 2019-10-07 NOTE — PROGRESS NOTES
Seclusive to room  Irritable upon approach  Refused AM labs, glucose monitoring and VS   Disinterested in speaking with RN  Disheveled and malodorous
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
## 2019-10-07 NOTE — PROGRESS NOTES
Pt irritable, lying in bed  Would not open his eyes when speaking to writer  Pt asks when he is being discharged from this unit and then asks writer "to put in a good word with the doctors"  Pt was medication compliant with scheduled medications at 1300  Pt has not been attending groups, writer encouraged to do so  Writer also encouraged Pt to be cooperative with treatment and treatment team's recommendations, Pt verbalized understanding    Denies SI

## 2019-10-07 NOTE — PLAN OF CARE
Problem: SELF HARM/SUICIDALITY  Goal: Will have no self-injury during hospital stay  Description  INTERVENTIONS:  - Q 15 MINUTES: Routine safety checks  - Q WAKING SHIFT & PRN: Assess risk to determine if routine checks are adequate to maintain patient safety  - Encourage patient to participate actively in care by formulating a plan to combat response to suicidal ideation, identify supports and resources  Outcome: Progressing     Problem: PAIN - ADULT  Goal: Verbalizes/displays adequate comfort level or baseline comfort level  Description  Interventions:  - Encourage patient to monitor pain and request assistance  - Assess pain using appropriate pain scale  - Administer analgesics based on type and severity of pain and evaluate response  - Implement non-pharmacological measures as appropriate and evaluate response  - Consider cultural and social influences on pain and pain management  - Notify physician/advanced practitioner if interventions unsuccessful or patient reports new pain  Outcome: Progressing     Problem: DISCHARGE PLANNING  Goal: Discharge to home or other facility with appropriate resources  Description  INTERVENTIONS:  - Identify barriers to discharge w/patient and caregiver  - Arrange for needed discharge resources and transportation as appropriate  - Identify discharge learning needs (meds, wound care, etc )  - Arrange for interpretive services to assist at discharge as needed  - Refer to Case Management Department for coordinating discharge planning if the patient needs post-hospital services based on physician/advanced practitioner order or complex needs related to functional status, cognitive ability, or social support system  Outcome: Progressing

## 2019-10-07 NOTE — PROGRESS NOTES
Pt appears to have slept the majority of shift  Did begin coughing overnight  Pt loud and argumentative at 0500 when asked to obtain labs  Pt refused

## 2019-10-07 NOTE — PROGRESS NOTES
At the onset of the shift was calm, pleasant & took meds; Glucometer reading without difficulty  As shift progressed, became more irritable; labile  When writer introduced self as his nurse, states, "so what " When RN attempted to engage pt, states, "I don't care, I just want to get out of here!" Took much encouragement to get him to join group, though very little participation observed  Does deny SI/HI  Would not elaborate on anything else asked about  Will continue to monitor

## 2019-10-07 NOTE — PROGRESS NOTES
Progress Note - Behavioral Health   Qamar Brand 48 y o  male MRN: [id number]  Unit/Bed#: [facility] 260-01 Encounter: [id number]    Assessment/Plan   Principal Problem:    Schizoaffective disorder ([address] )  Active Problems:    Type 2 diabetes mellitus (HCC)    Benign essential hypertension    BMI 34 0-34 9,adult    Subjective:  Patient's reason for admission was discussed in detail but patient appears to have poor insight regarding his behaviors of attempting to [facility]  Patient appears malodorous and irritable and continues to refuse vitals and lab work  Patient reports his focus is to get discharge  He understand that [facility] may not accept him based on the behaviors and he is open to working with primary team for another planning if indicated  After long discussion he did agreed with plan of getting Depakote level done today but appears to have poor insight and judgment  We discussed the plan of having a 303 hearing tomorrow and planning disposition accordingly  Patient continues to verbalize that he was compliant with his medication prior to admission      Current Medications:    Current Facility-Administered Medications:  acetaminophen 650 mg Oral Q6H PRN Grzegorz Duarte MD   aluminum-magnesium hydroxide-simethicone 30 mL Oral Q4H PRN Grzegorz Duarte MD   atorvastatin 10 mg Oral Daily With Lillie Massey MD   benztropine 1 mg Intramuscular Q6H PRN Grzegorz Duarte MD   benztropine 1 mg Oral Q6H PRN Grzegorz Duarte MD   cloZAPine 400 mg Oral HS Piedad Rosen MD   clozapine 50 mg Oral Daily Piedad Rosen MD   divalproex sodium 1,500 mg Oral Daily Grzegorz Duarte MD   haloperidol 5 mg Oral Q6H PRN Grzegorz Duarte MD   haloperidol lactate 5 mg Intramuscular Q6H PRN Grzegorz Duarte MD   insulin lispro 1-5 Units Subcutaneous HS Piedad Rosen MD   insulin lispro 1-6 Units Subcutaneous TID AC Piedad Rosen MD   levothyroxine 75 mcg Oral Early Morning Dayna Street PA-C   lisinopril 5 mg Oral Daily Angie Conroy Lisbet Fernandes PA-C   LORazepam 1 mg Intramuscular Q6H PRN Betty Stein MD   LORazepam 1 mg Oral Q6H PRN Betty Stein MD   magnesium hydroxide 30 mL Oral Daily PRN Betty Stein MD   metFORMIN 500 mg Oral BID With Meals Anthony Buckner PA-C   nicotine polacrilex 2 mg Oral Q2H PRN Betty Stein MD   pantoprazole 40 mg Oral Early Morning Anthony Buckner PA-C   traZODone 50 mg Oral HS PRN Betty Stein MD       Behavioral Health Medications: all current active meds have been reviewed  Vital signs in last 24 hours:  Temp:  [97 8 °F (36 6 °C)] 97 8 °F (36 6 °C)  HR:  [87] 87  Resp:  [16] 16  BP: (97)/(53) 97/53    Laboratory results:    I have personally reviewed all pertinent laboratory/tests results    Labs in last 72 hours:   Recent Labs     10/05/19  0616   WBC 10 35*   RBC 4 65   HGB 13 7   HCT 41 8   *   RDW 13 7   NEUTROABS 5 88   SODIUM 140   K 4 0      CO2 26   BUN 12   CREATININE 0 72   GLUC 143*   CALCIUM 9 0   AST 14   ALT 11*   ALKPHOS 76   TP 6 3*   ALB 3 0*   TBILI 0 20     Admission Labs:   Admission on 10/05/2019   Component Date Value    WBC 10/05/2019 10 35*    RBC 10/05/2019 4 65     Hemoglobin 10/05/2019 13 7     Hematocrit 10/05/2019 41 8     MCV 10/05/2019 90     MCH 10/05/2019 29 5     MCHC 10/05/2019 32 8     RDW 10/05/2019 13 7     MPV 10/05/2019 10 0     Platelets 74/27/8833 148*    nRBC 10/05/2019 0     Neutrophils Relative 10/05/2019 57     Immat GRANS % 10/05/2019 0     Lymphocytes Relative 10/05/2019 31     Monocytes Relative 10/05/2019 11     Eosinophils Relative 10/05/2019 0     Basophils Relative 10/05/2019 1     Neutrophils Absolute 10/05/2019 5 88     Immature Grans Absolute 10/05/2019 0 03     Lymphocytes Absolute 10/05/2019 3 22     Monocytes Absolute 10/05/2019 1 14     Eosinophils Absolute 10/05/2019 0 00     Basophils Absolute 10/05/2019 0 08     Sodium 10/05/2019 140     Potassium 10/05/2019 4 0     Chloride 10/05/2019 106     CO2 10/05/2019 26     ANION GAP 10/05/2019 8     BUN 10/05/2019 12     Creatinine 10/05/2019 0 72     Glucose 10/05/2019 143*    Calcium 10/05/2019 9 0     AST 10/05/2019 14     ALT 10/05/2019 11*    Alkaline Phosphatase 10/05/2019 76     Total Protein 10/05/2019 6 3*    Albumin 10/05/2019 3 0*    Total Bilirubin 10/05/2019 0 20     eGFR 10/05/2019 107     Hemoglobin A1C 10/05/2019 6 3     EAG 10/05/2019 134     POC Glucose 10/06/2019 120     POC Glucose 10/06/2019 119     Ventricular Rate 10/05/2019 57     Atrial Rate 10/05/2019 57     FL Interval 10/05/2019 162     QRSD Interval 10/05/2019 96     QT Interval 10/05/2019 380     QTC Interval 10/05/2019 369     P Axis 10/05/2019 48     QRS Axis 10/05/2019 -19     T Wave Axis 10/05/2019 10     POC Glucose 10/07/2019 93        Psychiatric Review of Systems:  Behavior over the last 24 hours:  unchanged  Sleep: normal  Appetite: normal  Medication side effects: No  ROS: no complaints    Mental Status Evaluation:  Appearance:  disheveled   Behavior:  guarded   Speech:  loud   Mood:  anxious   Affect:  increased in range   Language rapid   Thought Process:  circumstantial and disorganized   Thought Content:  delusions  grandiose and persecutory   Perceptual Disturbances: Auditory hallucinations without commands   Risk Potential: Suicidal Ideations without plan, Homicidal Ideations none and Potential for Aggression No   Sensorium:  person and place   Cognition:  grossly intact   Consciousness:  awake    Attention: attention span appeared shorter than expected for age   Insight:  limited   Judgment: limited   Intellect fair   Gait/Station: normal gait/station   Motor Activity: no abnormal movements     Memory: Short and long term memory  fair     Progress Toward Goals: slow progress    Recommended Treatment:   Continue clozapine 50 mg daily and 400 mg at bedtime for psychosis management  Continue Depakote ER 1500 mg daily for mood stabilization    Check Depakote level today after dinner  This timing will be close to 12 hour after morning dose today  303 for tomorrow  Continue with group therapy, milieu therapy and occupational therapy  Continue following current medications:   Current Facility-Administered Medications:  acetaminophen 650 mg Oral Q6H PRN Rosi Bennett MD   aluminum-magnesium hydroxide-simethicone 30 mL Oral Q4H PRN Rosi Bennett MD   atorvastatin 10 mg Oral Daily With Nai Early MD   benztropine 1 mg Intramuscular Q6H PRN Rosi Bennett MD   benztropine 1 mg Oral Q6H PRN Rosi Bennett MD   cloZAPine 400 mg Oral HS Carson James MD   clozapine 50 mg Oral Daily Carson James MD   divalproex sodium 1,500 mg Oral Daily Rosi Bennett MD   haloperidol 5 mg Oral Q6H PRN Rosi Bennett MD   haloperidol lactate 5 mg Intramuscular Q6H PRN Rosi Bennett MD   insulin lispro 1-5 Units Subcutaneous HS Carson James MD   insulin lispro 1-6 Units Subcutaneous TID AC Carson James MD   levothyroxine 75 mcg Oral Early Morning Krystal Marques PA-C   lisinopril 5 mg Oral Daily Krystal Marques PA-C   LORazepam 1 mg Intramuscular Q6H PRN Rosi Bennett MD   LORazepam 1 mg Oral Q6H PRN Rosi Bennett MD   magnesium hydroxide 30 mL Oral Daily PRN Rosi Bennett MD   metFORMIN 500 mg Oral BID With Meals Krystal Marques PA-C   nicotine polacrilex 2 mg Oral Q2H PRN Rosi Bennett MD   pantoprazole 40 mg Oral Early Morning MARIA GUADALUPE PetersenC   traZODone 50 mg Oral HS PRN Rosi Bennett MD       Risks, benefits and possible side effects of Medications:   Risks, benefits, and possible side effects of medications explained to patient and patient verbalizes understanding  This note has been constructed using a voice recognition system  There may be translation, syntax,  or grammatical errors  If you have any questions, please contact the dictating provider

## 2019-10-07 NOTE — PLAN OF CARE
Problem: SELF HARM/SUICIDALITY  Goal: Will have no self-injury during hospital stay  Description  INTERVENTIONS:  - Q 15 MINUTES: Routine safety checks  - Q WAKING SHIFT & PRN: Assess risk to determine if routine checks are adequate to maintain patient safety  - Encourage patient to participate actively in care by formulating a plan to combat response to suicidal ideation, identify supports and resources  Outcome: Progressing     Problem: INVOLUNTARY ADMIT  Goal: Will cooperate with staff recommendations and doctor's orders and will demonstrate appropriate behavior  Description  INTERVENTIONS:  - Treat underlying conditions and offer medication as ordered  - Educate regarding involuntary admission procedures and rules  - Utilize positive consistent limit setting strategies to support patient and staff safety  Outcome: Progressing     Problem: SELF CARE DEFICIT  Goal: Return ADL status to a safe level of function  Description  INTERVENTIONS:  - Administer medication as ordered  - Assess ADL deficits and provide assistive devices as needed  - Obtain PT/OT consults as needed  - Assist and instruct patient to increase activity and self care as tolerated  Outcome: Progressing     Problem: PAIN - ADULT  Goal: Verbalizes/displays adequate comfort level or baseline comfort level  Description  Interventions:  - Encourage patient to monitor pain and request assistance  - Assess pain using appropriate pain scale  - Administer analgesics based on type and severity of pain and evaluate response  - Implement non-pharmacological measures as appropriate and evaluate response  - Consider cultural and social influences on pain and pain management  - Notify physician/advanced practitioner if interventions unsuccessful or patient reports new pain  Outcome: Progressing     Problem: Ineffective Coping  Goal: Participates in unit activities  Description  Interventions:  - Provide therapeutic environment   - Provide required programming - Redirect inappropriate behaviors   Outcome: Progressing
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
## 2019-10-07 NOTE — SOCIAL WORK
[facility] and if a [facility] meeting is required due to disposition issue
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
## 2019-10-07 NOTE — PROGRESS NOTES
Approached pt twice for metformin and lipitor  Refused both times  Educated pt to what they were for  Refused

## 2019-10-07 NOTE — PLAN OF CARE
Problem: Ineffective Coping  Goal: Participates in unit activities  Description  Interventions:  - Provide therapeutic environment   - Provide required programming   - Redirect inappropriate behaviors   10/6/2019 2024 by Yue Flores RN  Outcome: Not Progressing  Note:   Does not always attend groups    10/6/2019 2024 by Yue Flores RN  Outcome: Not Progressing  10/6/2019 2023 by Yue Flores RN  Outcome: Not Progressing

## 2019-10-07 NOTE — PROGRESS NOTES
Per report from previous shift: discussed reason for admit, denies SI/HI, AH/VH, malodorous, disheveled, can not return to [facility], refused labs, low BP  RN: refused AM VS

## 2019-10-07 NOTE — SOCIAL WORK
Worker left message for [facility] to schedule patient's 303 hearing for 10/8

## 2019-10-08 LAB
AMMONIA PLAS-SCNC: 32 UMOL/L (ref 11–35)
GLUCOSE SERPL-MCNC: 103 MG/DL (ref 65–140)
GLUCOSE SERPL-MCNC: 106 MG/DL (ref 65–140)
GLUCOSE SERPL-MCNC: 114 MG/DL (ref 65–140)
GLUCOSE SERPL-MCNC: 97 MG/DL (ref 65–140)
VALPROATE SERPL-MCNC: 56 UG/ML (ref 50–100)

## 2019-10-08 PROCEDURE — 82140 ASSAY OF AMMONIA: CPT | Performed by: PSYCHIATRY & NEUROLOGY

## 2019-10-08 PROCEDURE — 80164 ASSAY DIPROPYLACETIC ACD TOT: CPT | Performed by: PSYCHIATRY & NEUROLOGY

## 2019-10-08 PROCEDURE — 82948 REAGENT STRIP/BLOOD GLUCOSE: CPT

## 2019-10-08 RX ORDER — OLANZAPINE 10 MG/1
10 TABLET ORAL
Status: DISCONTINUED | OUTPATIENT
Start: 2019-10-08 | End: 2019-10-09

## 2019-10-08 RX ORDER — CLOZAPINE 25 MG/1
50 TABLET ORAL
Status: DISCONTINUED | OUTPATIENT
Start: 2019-10-08 | End: 2019-10-10

## 2019-10-08 RX ADMIN — METFORMIN HYDROCHLORIDE 500 MG: 500 TABLET, FILM COATED ORAL at 09:27

## 2019-10-08 RX ADMIN — OLANZAPINE 10 MG: 10 TABLET, FILM COATED ORAL at 16:25

## 2019-10-08 RX ADMIN — LEVOTHYROXINE SODIUM 75 MCG: 75 TABLET ORAL at 09:26

## 2019-10-08 RX ADMIN — ATORVASTATIN CALCIUM 10 MG: 10 TABLET, FILM COATED ORAL at 16:24

## 2019-10-08 RX ADMIN — LISINOPRIL 5 MG: 5 TABLET ORAL at 09:31

## 2019-10-08 RX ADMIN — METFORMIN HYDROCHLORIDE 500 MG: 500 TABLET, FILM COATED ORAL at 16:24

## 2019-10-08 RX ADMIN — DIVALPROEX SODIUM 1500 MG: 500 TABLET, FILM COATED, EXTENDED RELEASE ORAL at 09:26

## 2019-10-08 RX ADMIN — PANTOPRAZOLE SODIUM 40 MG: 40 TABLET, DELAYED RELEASE ORAL at 09:26

## 2019-10-08 RX ADMIN — CLOZAPINE 50 MG: 25 TABLET ORAL at 09:26

## 2019-10-08 NOTE — PROGRESS NOTES
10/08/19 1100   Team Meeting   Meeting Type Tx Team Meeting   Initial Conference Date 10/08/19   Team Members Present   Team Members Present Physician;Nurse;   Physician Team Member Dr Karen Young Team Member [facility] Management Team Member Duayne Bosworth   Patient/Family Present   Patient Present No   Patient's Family Present No   Patient refused to participate in treatment team meeting  Patient focused on getting discharged

## 2019-10-08 NOTE — PROGRESS NOTES
Pt refused to take clozaril  Several of the staff approached pt with it  Explained that missing doses could cause a restart  Pt adamantly refused  Wants to discharge

## 2019-10-08 NOTE — PROGRESS NOTES
Visibly upset following court commitment hearing  Patient stomped back to room and closed door  Disinterested in speaking and does not understand why he can not be discharged to a personal care home  Compliant with medications and blood glucose monitoring

## 2019-10-08 NOTE — PROGRESS NOTES
Pt coughing at times overnight, waking frequently  Declined cough drops however did accept ice water  Remains calm and cooperative

## 2019-10-08 NOTE — PROGRESS NOTES
Per report from previous shift: out in evening, selective with medications, refused clozaril, refused labs, groups, refusing to allow staff to wash clothes  CM: 303 hearing today

## 2019-10-08 NOTE — SOCIAL WORK
Worker received email from ISIDRA at [facility] that she had informed Buzz (Chip coordinator) of patient's admission and discharge from [facility]  Worker will await to hear regarding potential [facility] meeting
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
## 2019-10-09 LAB
GLUCOSE SERPL-MCNC: 129 MG/DL (ref 65–140)
GLUCOSE SERPL-MCNC: 134 MG/DL (ref 65–140)
GLUCOSE SERPL-MCNC: 186 MG/DL (ref 65–140)

## 2019-10-09 PROCEDURE — 82948 REAGENT STRIP/BLOOD GLUCOSE: CPT

## 2019-10-09 RX ADMIN — METFORMIN HYDROCHLORIDE 500 MG: 500 TABLET, FILM COATED ORAL at 15:46

## 2019-10-09 RX ADMIN — PANTOPRAZOLE SODIUM 40 MG: 40 TABLET, DELAYED RELEASE ORAL at 10:13

## 2019-10-09 RX ADMIN — OLANZAPINE 15 MG: 10 TABLET, FILM COATED ORAL at 15:45

## 2019-10-09 RX ADMIN — ATORVASTATIN CALCIUM 10 MG: 10 TABLET, FILM COATED ORAL at 15:45

## 2019-10-09 RX ADMIN — CLOZAPINE 50 MG: 25 TABLET ORAL at 11:46

## 2019-10-09 RX ADMIN — DIVALPROEX SODIUM 1500 MG: 500 TABLET, FILM COATED, EXTENDED RELEASE ORAL at 11:46

## 2019-10-09 RX ADMIN — CLOZAPINE 50 MG: 25 TABLET ORAL at 21:23

## 2019-10-09 RX ADMIN — LEVOTHYROXINE SODIUM 75 MCG: 75 TABLET ORAL at 10:13

## 2019-10-09 RX ADMIN — METFORMIN HYDROCHLORIDE 500 MG: 500 TABLET, FILM COATED ORAL at 11:46

## 2019-10-09 NOTE — PROGRESS NOTES
Per report from previous shift: disappointed with court commitment hearing, selectively cooperative with medication refused clozaril cely [facility]  CM: [facility] 10/18

## 2019-10-09 NOTE — PROGRESS NOTES
Patient refused to take early morning meds  This writer educated patient on levothyroxine and pantoprazole    Patient states he does "not have acid reflux and takes his synthroid at night "  This writer reminded of the times schedule and he agreed to take them before breakfast

## 2019-10-09 NOTE — PLAN OF CARE
Problem: DISCHARGE PLANNING  Goal: Discharge to home or other facility with appropriate resources  Description  INTERVENTIONS:  - Identify barriers to discharge w/patient and caregiver  - Arrange for needed discharge resources and transportation as appropriate  - Identify discharge learning needs (meds, wound care, etc )  - Arrange for interpretive services to assist at discharge as needed  - Refer to Case Management Department for coordinating discharge planning if the patient needs post-hospital services based on physician/advanced practitioner order or complex needs related to functional status, cognitive ability, or social support system  Outcome: Progressing     Problem: INVOLUNTARY ADMIT  Goal: Will cooperate with staff recommendations and doctor's orders and will demonstrate appropriate behavior  Description  INTERVENTIONS:  - Treat underlying conditions and offer medication as ordered  - Educate regarding involuntary admission procedures and rules  - Utilize positive consistent limit setting strategies to support patient and staff safety  Outcome: Progressing

## 2019-10-09 NOTE — PROGRESS NOTES
Pharmacy Clozapine Monitoring Progress Note     Izabella Andino is receiving a total daily dose of 100 mg of clozapine divided as 50mg in the morning and 50mg at bedtime  **If clozapine therapy is interrupted for more than 48 hours, therapy MUST be restarted at the initial titration dose (12 5mg - 25mg once daily) to avoid severe hypotension, bradycardia, seizure and syncope  **        Pharmacist Recommendations Based on Assessment and Plan:    1  Patient is Clozapine-REMS eligible, ANC was updated, with every four weeks monitoring frequency and the next [address] is due on 11/6/19      2    Recommend standing bowel regimen for patient on anticholinergic clozapine therapy  Assessment/Plan:    Phase of Treatment:     Current phase of treatment is titration  Patient Clozapine REMS Eligibility:     Patient is eligible to receive clozapine and the [address] monitoring frequency is every four weeks per clozapine REMS  The patient's latest [address] value has been updated into the Clozapine-REMS program          ANC and Clozapine Level (if available)     The most recent [address] was   Lab Results   Component Value Date    NEUTROABS 5 88 10/05/2019    and the next lab is due on 11/6/19  Last Clozapine level (if available):    0   Lab Value Date/Time    CLOZAPINE 214 (L) 08/23/2019 1304     0   Lab Value Date/Time    NCLOZIP 79 08/23/2019 1304           Monitoring Parameters for Clozapine:     Clozapine Adverse Effect Suggested Monitoring Patient's Current Status    Agranulocytosis ANC baseline and then repeat weekly for first 6 months and every 2 weeks for the second 6 months  Maintenance after one year of therapy every month  The most recent [address] was   Lab Results   Component Value Date    NEUTROABS 5 88 10/05/2019       This ANC is considered normal range (ANC >/= 1500/mcL)  Continue current treatment and monitoring course     Respiratory Depression Please ensure patient is not on concomitant respiratory lowering medications such as benzodiazepines, sedative hypnotics (eg  zolpidem) This patient is not on respiratory depression lowering medications   Myocarditis Baseline and weekly EKG, CRP, BNP, and troponin  Weekly symptomatic complaints of fatigue, dyspnea, tachycardia, chest pain, palpitations, and fever for the first 4 weeks  Last EKG Results on  10/5/19 showed: Sinus bradycardia  Otherwise normal ECG  When compared with ECG of 26-APR-2019 12:50,  Vent  rate has decreased BY  31 BPM  Confirmed by Jessie Saldaña ([id number]) on 10/7/2019 8:58:59 AM     Last QTC value was:  0   Lab Value Date/Time    QTCINT 369 10/05/2019 1430       Last BNP was: No results found for: NTBNP    Last Troponin was:  0   Lab Value Date/Time    TROPONINI 0 05 (H) 03/28/2014 1840        Orthostatic hypotension/  bradycardia Blood pressure and vital signs      Monitor blood pressure and orthostatic hypotension at least twice daily upon initiation and continue to follow at least once daily afterwards  Patient's last recorded vital signs:       /75 (BP Location: Left arm)   Pulse 72   Temp 98 °F (36 7 °C) (Tympanic)   Resp 18   Ht 5' 6" (1 676 m)   Wt 102 kg (225 lb 3 2 oz)   SpO2 99%   BMI 36 35 kg/m²             Constipation (bowel obstruction due to high anticholinergic clozapine burden) Assess at baseline and weekly during first four months of therapy  Docusate 100mg BID and Miralax 17 grams daily recommended initially  Prophylactic bowel regimen not ordered and it is recommended to order a standing bowel regimen to reduce risk of bowel obstruction associated with clozapine therapy  Sialorrhea Assess at baseline and weekly during first four months of therapy  May be managed using sublingual anticholinergic preparations (atropine 1% eye drops)  Patient is not experiencing sialorrhea  This will continue to be monitored           Please note that per current REMS protocol the provider can submit a treatment rationale that justifies clozapine treatment even if patient parameters are outside of REMS recommendations  The Clozapine REMS is an FDA-mandated program implemented by the manufacturers of clozapine  ([employer] cy  com/CpmgClozapineUI/home  u)  Pharmacy will continue to follow patient with team, thank you    Electronically signed by: lBaise Greene, PharmD, [address], Clinical Pharmacist - Psychiatry

## 2019-10-09 NOTE — PROGRESS NOTES
Pt refused bedtime clozaril 50mg  Pt states, "the Dr Marybel Prasad if I think good I can get out of here  I can think real good " Loudly stating to RN that pt is not going to take meds, "I will have to talk to him in the morning then " Pt irritable but states ready for bed

## 2019-10-09 NOTE — PROGRESS NOTES
Pt is walking the halls, currently no irritability  Pt stopped and stared at writer, making quick tongue movements, then stated to writer, "don't worry it wasn't offensive " Pt smiling and joking some with staff this evening  States, "I need to talk to the doctor tomorrow " Denies SI, HI, AVH by shrugging and nodding to writer

## 2019-10-09 NOTE — PROGRESS NOTES
Progress Note - Behavioral Health   Pablito Clifford 48 y o  male MRN: [id number]  Unit/Bed#: [facility] 260-01 Encounter: [id number]    Assessment/Plan   Principal Problem:    Schizoaffective disorder ([address] )  Active Problems:    Type 2 diabetes mellitus (HCC)    Benign essential hypertension    BMI 34 0-34 9,adult    Subjective:  Patient is not compliant with medication and is refusing clozapine despite multiple encouragement efforts  Patient has remained disheveled and with evident disorganized thought process  Today patient is having rapid speech and difficult to redirect  Patient only focused on discharge and will not understand the reasoning for medication compliance and disposition planning  Patient did agreed to get a 2nd psychiatrist evaluation today for need for medication over objection  Patient has remained with poor insight and judgment but is currently consenting for safety on the unit      Current Medications:    Current Facility-Administered Medications:  acetaminophen 650 mg Oral Q6H PRVICENTE Barnes MD   aluminum-magnesium hydroxide-simethicone 30 mL Oral Q4H PRN Richa Barnes MD   atorvastatin 10 mg Oral Daily With Campos Montoya MD   benztropine 1 mg Intramuscular Q6H PRVICENTE Barnes MD   benztropine 1 mg Oral Q6H PRVICENTE Barnes MD   clozapine 50 mg Oral Daily Deana Marroquin MD   cloZAPine 50 mg Oral HS Julio C Gore   divalproex sodium 1,500 mg Oral Daily Richa Barnes MD   haloperidol 5 mg Oral Q6H PRVICENTE Barnes MD   haloperidol lactate 5 mg Intramuscular Q6H PRVICENTE Barnes MD   insulin lispro 1-5 Units Subcutaneous HS Deana Marroquin MD   insulin lispro 1-6 Units Subcutaneous TID AC Deana Marroquin MD   levothyroxine 75 mcg Oral Early Morning Tom Khan PA-C   lisinopril 5 mg Oral Daily Tom Khan PA-C   LORazepam 1 mg Intramuscular Q6H PRVICENTE Barnes MD   LORazepam 1 mg Oral Q6H PRVICENTE Barnes MD   magnesium hydroxide 30 mL Oral Daily PRN Richa Barnes MD   metFORMIN 500 mg Oral BID With Meals Tonya Cintron PA-C   nicotine polacrilex 2 mg Oral Q2H PRN Tracy Nunez MD   OLANZapine 15 mg Oral After Lunch Julio C Gore   pantoprazole 40 mg Oral Early Morning Tonya Cintron PA-C   traZODone 50 mg Oral HS PRN Tracy Nunez MD       Behavioral Health Medications: all current active meds have been reviewed  Vital signs in last 24 hours:       Laboratory results:    I have personally reviewed all pertinent laboratory/tests results    Labs in last 72 hours:   Recent Labs     10/08/19  0900   VALPROICTOT 56   AMMONIA 32     Admission Labs:   Admission on 10/05/2019   Component Date Value    WBC 10/05/2019 10 35*    RBC 10/05/2019 4 65     Hemoglobin 10/05/2019 13 7     Hematocrit 10/05/2019 41 8     MCV 10/05/2019 90     MCH 10/05/2019 29 5     MCHC 10/05/2019 32 8     RDW 10/05/2019 13 7     MPV 10/05/2019 10 0     Platelets 99/85/8851 148*    nRBC 10/05/2019 0     Neutrophils Relative 10/05/2019 57     Immat GRANS % 10/05/2019 0     Lymphocytes Relative 10/05/2019 31     Monocytes Relative 10/05/2019 11     Eosinophils Relative 10/05/2019 0     Basophils Relative 10/05/2019 1     Neutrophils Absolute 10/05/2019 5 88     Immature Grans Absolute 10/05/2019 0 03     Lymphocytes Absolute 10/05/2019 3 22     Monocytes Absolute 10/05/2019 1 14     Eosinophils Absolute 10/05/2019 0 00     Basophils Absolute 10/05/2019 0 08     Sodium 10/05/2019 140     Potassium 10/05/2019 4 0     Chloride 10/05/2019 106     CO2 10/05/2019 26     ANION GAP 10/05/2019 8     BUN 10/05/2019 12     Creatinine 10/05/2019 0 72     Glucose 10/05/2019 143*    Calcium 10/05/2019 9 0     AST 10/05/2019 14     ALT 10/05/2019 11*    Alkaline Phosphatase 10/05/2019 76     Total Protein 10/05/2019 6 3*    Albumin 10/05/2019 3 0*    Total Bilirubin 10/05/2019 0 20     eGFR 10/05/2019 107     Hemoglobin A1C 10/05/2019 6 3     EAG 10/05/2019 134     POC Glucose 10/06/2019 120     POC Glucose 10/06/2019 119     Ventricular Rate 10/05/2019 57     Atrial Rate 10/05/2019 57     NH Interval 10/05/2019 162     QRSD Interval 10/05/2019 96     QT Interval 10/05/2019 380     QTC Interval 10/05/2019 369     P Axis 10/05/2019 48     QRS Axis 10/05/2019 -19     T Wave Axis 10/05/2019 10     POC Glucose 10/07/2019 93     POC Glucose 10/07/2019 89     POC Glucose 10/07/2019 101     Ammonia 10/08/2019 32     Valproic Acid, Total 10/08/2019 56     POC Glucose 10/08/2019 114     POC Glucose 10/08/2019 106     POC Glucose 10/08/2019 103     POC Glucose 10/08/2019 97        Psychiatric Review of Systems:  Behavior over the last 24 hours:  regressed  Sleep: normal  Appetite: normal  Medication side effects: No  ROS: no complaints    Mental Status Evaluation:  Appearance:  disheveled   Behavior:  psychomotor agitation   Speech:  loud   Mood:  angry, anxious and depressed   Affect:  labile   Language rapid   Thought Process:  circumstantial, disorganized, flight of ideas and tangential   Thought Content:  delusions  grandiose and persecutory   Perceptual Disturbances: Auditory hallucinations without commands   Risk Potential: Suicidal Ideations without plan, Homicidal Ideations none and Potential for Aggression No   Sensorium:  person and place   Cognition:  grossly intact   Consciousness:  awake    Attention: attention span appeared shorter than expected for age   Insight:  limited   Judgment: limited   Intellect limited   Gait/Station: normal gait/station   Motor Activity: no abnormal movements     Memory: Short and long term memory  fair     Progress Toward Goals: not progressing    Recommended Treatment:   Increase olanzapine to 15 mg daily at 2:00 p m  For psychosis management  Continue clozapine 50 mg b i d  For psychosis management  Continue Depakote ER 1500 mg daily for mood stabilization    Second psychiatrist evaluation requested for need for intramuscular medication for refusal of oral medication  Continue with group therapy, milieu therapy and occupational therapy  Continue following current medications:   Current Facility-Administered Medications:  acetaminophen 650 mg Oral Q6H PRN Chavo Irwin MD   aluminum-magnesium hydroxide-simethicone 30 mL Oral Q4H PRN Chavo Irwin MD   atorvastatin 10 mg Oral Daily With Aquilino Yee MD   benztropine 1 mg Intramuscular Q6H PRVICENTE Irwin MD   benztropine 1 mg Oral Q6H PRN Chavo Irwin MD   clozapine 50 mg Oral Daily Patsy Bhagat MD   cloZAPine 50 mg Oral HS [facility]   divalproex sodium 1,500 mg Oral Daily Chavo Irwin MD   haloperidol 5 mg Oral Q6H PRVICENTE Irwin MD   haloperidol lactate 5 mg Intramuscular Q6H PRVICENTE Irwin MD   insulin lispro 1-5 Units Subcutaneous HS Patsy Bhagat MD   insulin lispro 1-6 Units Subcutaneous TID AC Patsy Bhagat MD   levothyroxine 75 mcg Oral Early Morning Jayda Schroeder PA-C   lisinopril 5 mg Oral Daily Jayda Schroeder PA-C   LORazepam 1 mg Intramuscular Q6H PRVICENTE Irwin MD   LORazepam 1 mg Oral Q6H PRVICENTE Irwin MD   magnesium hydroxide 30 mL Oral Daily PRVICENTE Irwin MD   metFORMIN 500 mg Oral BID With Meals Jayda Schroeder PA-C   nicotine polacrilex 2 mg Oral Q2H PRN Chavo Irwin MD   OLANZapine 15 mg Oral After Lunch [facility]   pantoprazole 40 mg Oral Early Morning Jayda Schroeder PA-C   traZODone 50 mg Oral HS PRN Chavo Irwin MD       Risks, benefits and possible side effects of Medications:   Risks, benefits, and possible side effects of medications explained to patient and patient verbalizes understanding  This note has been constructed using a voice recognition system  There may be translation, syntax,  or grammatical errors  If you have any questions, please contact the dictating provider

## 2019-10-09 NOTE — PROGRESS NOTES
Pt irritable but cooperative, scant conversation  Visible in the milieu for short intervals, isolative to room majority of the shift  Meal and medication compliant  Reports feeling depressed that he has to stay here, but states that he will be cooperative so he can leave soon

## 2019-10-09 NOTE — SOCIAL WORK
Worker spoke with Amy from [facility] regarding [facility] meeting for 10/18  She reports that the team will make sure someone is present during CSP, she asked about a time  Worker informed her that a time has not been determined yet but it would take place in the afternoon  Worker asked if she was out to see the patient, she reported she was scheduled to see him today but had an emergency so she will be out on Thursday or Friday  Worker provided her with update on patient and not being compliant with meds

## 2019-10-09 NOTE — SOCIAL WORK
Worker received email from [employer] that she is not available till 10/18 for CSP meeting  Per Allan Interiano it appears Surendra Cristobal will be available as well in the afternoon  Worker will contact  [facility] regarding such as they will need to be available

## 2019-10-09 NOTE — PROGRESS NOTES
Seclsuive to room  Irritable upon approach  States "doctor told me I need to think good and I can leave, when can I leave"  Discussed expectation of unit  Refused breakfast, blood glucose monitoring, VS and AM medications  RN attempted to encourage compliance with medications multiple times throughout the morning but continued to refuse  Disinterested in speaking with staff    Refused shower, refused to wash clothes

## 2019-10-10 LAB
GLUCOSE SERPL-MCNC: 102 MG/DL (ref 65–140)
GLUCOSE SERPL-MCNC: 127 MG/DL (ref 65–140)
GLUCOSE SERPL-MCNC: 144 MG/DL (ref 65–140)
GLUCOSE SERPL-MCNC: 88 MG/DL (ref 65–140)

## 2019-10-10 PROCEDURE — 82948 REAGENT STRIP/BLOOD GLUCOSE: CPT

## 2019-10-10 RX ORDER — CLOZAPINE 100 MG/1
100 TABLET ORAL
Status: DISCONTINUED | OUTPATIENT
Start: 2019-10-10 | End: 2019-10-12

## 2019-10-10 RX ADMIN — CLOZAPINE 100 MG: 100 TABLET ORAL at 21:25

## 2019-10-10 RX ADMIN — DIVALPROEX SODIUM 1500 MG: 500 TABLET, FILM COATED, EXTENDED RELEASE ORAL at 09:42

## 2019-10-10 RX ADMIN — METFORMIN HYDROCHLORIDE 500 MG: 500 TABLET, FILM COATED ORAL at 09:42

## 2019-10-10 RX ADMIN — PANTOPRAZOLE SODIUM 40 MG: 40 TABLET, DELAYED RELEASE ORAL at 09:42

## 2019-10-10 RX ADMIN — LEVOTHYROXINE SODIUM 75 MCG: 75 TABLET ORAL at 09:42

## 2019-10-10 RX ADMIN — METFORMIN HYDROCHLORIDE 500 MG: 500 TABLET, FILM COATED ORAL at 16:45

## 2019-10-10 RX ADMIN — ATORVASTATIN CALCIUM 10 MG: 10 TABLET, FILM COATED ORAL at 16:45

## 2019-10-10 RX ADMIN — CLOZAPINE 50 MG: 25 TABLET ORAL at 09:42

## 2019-10-10 RX ADMIN — OLANZAPINE 15 MG: 10 TABLET, FILM COATED ORAL at 16:45
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
## 2019-10-10 NOTE — PLAN OF CARE
Problem: SELF HARM/SUICIDALITY  Goal: Will have no self-injury during hospital stay  Description  INTERVENTIONS:  - Q 15 MINUTES: Routine safety checks  - Q WAKING SHIFT & PRN: Assess risk to determine if routine checks are adequate to maintain patient safety  - Encourage patient to participate actively in care by formulating a plan to combat response to suicidal ideation, identify supports and resources  Outcome: Progressing     Problem: PAIN - ADULT  Goal: Verbalizes/displays adequate comfort level or baseline comfort level  Description  Interventions:  - Encourage patient to monitor pain and request assistance  - Assess pain using appropriate pain scale  - Administer analgesics based on type and severity of pain and evaluate response  - Implement non-pharmacological measures as appropriate and evaluate response  - Consider cultural and social influences on pain and pain management  - Notify physician/advanced practitioner if interventions unsuccessful or patient reports new pain  Outcome: Progressing

## 2019-10-10 NOTE — PROGRESS NOTES
Patient in bedroom sleeping  Initially refused morning medications, but did eventually comply after speaking with the doctor  Denies SI/HI/AVH, and slept well last night  Patient still malodorous and refusing to wash clothing despite encouragement by staff

## 2019-10-10 NOTE — PROGRESS NOTES
Per report from previous shift: compliant with medications after much encouragement in AM, refused insulin for coverage, showered, refused to wash clothes  CM: CSP next week

## 2019-10-10 NOTE — PLAN OF CARE
Problem: INVOLUNTARY ADMIT  Goal: Will cooperate with staff recommendations and doctor's orders and will demonstrate appropriate behavior  Description  INTERVENTIONS:  - Treat underlying conditions and offer medication as ordered  - Educate regarding involuntary admission procedures and rules  - Utilize positive consistent limit setting strategies to support patient and staff safety  Outcome: Progressing     Problem: SELF CARE DEFICIT  Goal: Return ADL status to a safe level of function  Description  INTERVENTIONS:  - Administer medication as ordered  - Assess ADL deficits and provide assistive devices as needed  - Obtain PT/OT consults as needed  - Assist and instruct patient to increase activity and self care as tolerated  Outcome: Progressing
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
## 2019-10-10 NOTE — PROGRESS NOTES
Progress Note - Behavioral Health   Era Darian 48 y o  male MRN: [id number]  Unit/Bed#: [facility] 260-01 Encounter: [id number]    Assessment/Plan   Principal Problem:    Schizoaffective disorder ([address] )  Active Problems:    Type 2 diabetes mellitus (HCC)    Benign essential hypertension    BMI 34 0-34 9,adult    Subjective:  Patient was compliant with medications with no acute side effects  Today he began refusing clozapine again but after discussion he is willing to take his medications  Patient is only focused on discharge but understand the timeline it takes for medications to work and also verbalized understanding that he cannot return back to [facility] and we need to work with team for next step planning  Discussed that the CSP meeting is scheduled for next week and will plan disposition accordingly  Patient did report understanding and agreed to take medication  Again emphasized on importance of self-care activity and participation in milieu therapy  Patient is consenting for safety on the unit      Current Medications:    Current Facility-Administered Medications:  acetaminophen 650 mg Oral Q6H PRN Cipriano Damico MD   aluminum-magnesium hydroxide-simethicone 30 mL Oral Q4H PRN Cipriano MD Mookie   atorvastatin 10 mg Oral Daily With Deana Gallegos MD   benztropine 1 mg Intramuscular Q6H PRN Cipriano Damico MD   benztropine 1 mg Oral Q6H PRN Cipriano Damico MD   cloZAPine 100 mg Oral HS Julio C Gore   clozapine 50 mg Oral Daily Sofia Nava MD   divalproex sodium 1,500 mg Oral Daily Cipriano Damico MD   haloperidol 5 mg Oral Q6H PRN Cipriano Damico MD   haloperidol lactate 5 mg Intramuscular Q6H PRN Cipriano Damico MD   insulin lispro 1-5 Units Subcutaneous HS Sofia Nava MD   insulin lispro 1-6 Units Subcutaneous TID AC Sofia Nava MD   levothyroxine 75 mcg Oral Early Morning Vida Rangel PA-C   lisinopril 5 mg Oral Daily Vida Rangel PA-C   LORazepam 1 mg Intramuscular Q6H PRN Cipriano Damico MD   LORazepam 1 mg Oral Q6H PRN Brett Ellsworth MD   magnesium hydroxide 30 mL Oral Daily PRN Brett Ellsworth MD   metFORMIN 500 mg Oral BID With Meals Charles Ordoñez PA-C   nicotine polacrilex 2 mg Oral Q2H PRN Brett Ellsworth MD   OLANZapine 15 mg Oral After Lunch Julio C Gore   pantoprazole 40 mg Oral Early Morning Charles Ordoñez PA-C   traZODone 50 mg Oral HS PRN Brett Ellsworth MD       Behavioral Health Medications: all current active meds have been reviewed  Vital signs in last 24 hours:  Temp:  [97 8 °F (36 6 °C)-98 2 °F (36 8 °C)] 97 8 °F (36 6 °C)  HR:  [57-89] 57  Resp:  [12-24] 24  BP: ()/(53-71) 89/53    Laboratory results:    I have personally reviewed all pertinent laboratory/tests results    Labs in last 72 hours:   Recent Labs     10/08/19  0900   VALPROICTOT 56   AMMONIA 32     Admission Labs:   Admission on 10/05/2019   Component Date Value    WBC 10/05/2019 10 35*    RBC 10/05/2019 4 65     Hemoglobin 10/05/2019 13 7     Hematocrit 10/05/2019 41 8     MCV 10/05/2019 90     MCH 10/05/2019 29 5     MCHC 10/05/2019 32 8     RDW 10/05/2019 13 7     MPV 10/05/2019 10 0     Platelets 69/70/0427 148*    nRBC 10/05/2019 0     Neutrophils Relative 10/05/2019 57     Immat GRANS % 10/05/2019 0     Lymphocytes Relative 10/05/2019 31     Monocytes Relative 10/05/2019 11     Eosinophils Relative 10/05/2019 0     Basophils Relative 10/05/2019 1     Neutrophils Absolute 10/05/2019 5 88     Immature Grans Absolute 10/05/2019 0 03     Lymphocytes Absolute 10/05/2019 3 22     Monocytes Absolute 10/05/2019 1 14     Eosinophils Absolute 10/05/2019 0 00     Basophils Absolute 10/05/2019 0 08     Sodium 10/05/2019 140     Potassium 10/05/2019 4 0     Chloride 10/05/2019 106     CO2 10/05/2019 26     ANION GAP 10/05/2019 8     BUN 10/05/2019 12     Creatinine 10/05/2019 0 72     Glucose 10/05/2019 143*    Calcium 10/05/2019 9 0     AST 10/05/2019 14     ALT 10/05/2019 11*    Alkaline Phosphatase 10/05/2019 76     Total Protein 10/05/2019 6 3*    Albumin 10/05/2019 3 0*    Total Bilirubin 10/05/2019 0 20     eGFR 10/05/2019 107     Hemoglobin A1C 10/05/2019 6 3     EAG 10/05/2019 134     POC Glucose 10/06/2019 120     POC Glucose 10/06/2019 119     Ventricular Rate 10/05/2019 57     Atrial Rate 10/05/2019 57     ME Interval 10/05/2019 162     QRSD Interval 10/05/2019 96     QT Interval 10/05/2019 380     QTC Interval 10/05/2019 369     P Axis 10/05/2019 48     QRS Axis 10/05/2019 -19     T Wave Axis 10/05/2019 10     POC Glucose 10/07/2019 93     POC Glucose 10/07/2019 89     POC Glucose 10/07/2019 101     Ammonia 10/08/2019 32     Valproic Acid, Total 10/08/2019 56     POC Glucose 10/08/2019 114     POC Glucose 10/08/2019 106     POC Glucose 10/08/2019 103     POC Glucose 10/08/2019 97     POC Glucose 10/09/2019 129     POC Glucose 10/09/2019 134     POC Glucose 10/09/2019 186*    POC Glucose 10/10/2019 127     POC Glucose 10/10/2019 102        Psychiatric Review of Systems:  Behavior over the last 24 hours:  unchanged  Sleep: normal  Appetite: normal  Medication side effects: No  ROS: no complaints    Mental Status Evaluation:  Appearance:  disheveled   Behavior:  psychomotor agitation   Speech:  loud   Mood:  angry and anxious   Affect:  labile   Language rapid   Thought Process:  circumstantial and disorganized   Thought Content:  delusions  grandiose and persecutory   Perceptual Disturbances:  Auditory hallucinations without commands   Risk Potential: Suicidal Ideations without plan, Homicidal Ideations none and Potential for Aggression No   Sensorium:  person and place   Cognition:  grossly intact   Consciousness:  awake    Attention: attention span appeared shorter than expected for age   Insight:  limited   Judgment: limited   Intellect fair   Gait/Station: normal gait/station   Motor Activity: no abnormal movements     Memory: Short and long term memory  fair     Progress Toward Goals: slow progress    Recommended Treatment:   Increase clozapine to 50 mg a m  And 100 mg at bedtime for psychosis management  Continue olanzapine 15 mg daily in the afternoon till clozapine is therapeutic  Depakote ER 15 mg daily for mood stabilization  Continue collaboration with [facility] team for disposition planning  Continue with group therapy, milieu therapy and occupational therapy  Continue following current medications:   Current Facility-Administered Medications:  acetaminophen 650 mg Oral Q6H PRN Brett Ellsworth MD   aluminum-magnesium hydroxide-simethicone 30 mL Oral Q4H PRN Brett Ellsworth MD   atorvastatin 10 mg Oral Daily With Mat Fam MD   benztropine 1 mg Intramuscular Q6H PRN Brett Ellsworth MD   benztropine 1 mg Oral Q6H PRN Brett Ellsworth MD   cloZAPine 100 mg Oral HS Julio C Gore   clozapine 50 mg Oral Daily Eugenio Taylor MD   divalproex sodium 1,500 mg Oral Daily Brett Ellsworth MD   haloperidol 5 mg Oral Q6H PRN Brett Ellsworth MD   haloperidol lactate 5 mg Intramuscular Q6H PRN Brett Ellsworth MD   insulin lispro 1-5 Units Subcutaneous HS Eugenio Taylor MD   insulin lispro 1-6 Units Subcutaneous TID AC Eugenio Taylor MD   levothyroxine 75 mcg Oral Early Morning Kit Smita, PA-C   lisinopril 5 mg Oral Daily Kit BRIT Ordoñez-C   LORazepam 1 mg Intramuscular Q6H PRN Brett Ellsworth MD   LORazepam 1 mg Oral Q6H PRN Brett Ellsworth MD   magnesium hydroxide 30 mL Oral Daily PRN Brett Ellsworth MD   metFORMIN 500 mg Oral BID With Meals BRIT Nazario-C   nicotine polacrilex 2 mg Oral Q2H PRN Brett Ellsworth MD   OLANZapine 15 mg Oral After Lunch Julio C Gore   pantoprazole 40 mg Oral Early Morning Kit Smita, PA-C   traZODone 50 mg Oral HS PRN Brett Ellsworth MD       Risks, benefits and possible side effects of Medications:   Risks, benefits, and possible side effects of medications explained to patient and patient verbalizes understanding          This note has been constructed using a voice recognition system  There may be translation, syntax,  or grammatical errors  If you have any questions, please contact the dictating provider

## 2019-10-11 LAB
GLUCOSE SERPL-MCNC: 103 MG/DL (ref 65–140)
GLUCOSE SERPL-MCNC: 152 MG/DL (ref 65–140)
GLUCOSE SERPL-MCNC: 93 MG/DL (ref 65–140)

## 2019-10-11 PROCEDURE — 82948 REAGENT STRIP/BLOOD GLUCOSE: CPT

## 2019-10-11 RX ORDER — CLOZAPINE 25 MG/1
75 TABLET ORAL DAILY
Status: DISCONTINUED | OUTPATIENT
Start: 2019-10-11 | End: 2019-10-12

## 2019-10-11 RX ADMIN — METFORMIN HYDROCHLORIDE 500 MG: 500 TABLET, FILM COATED ORAL at 09:16

## 2019-10-11 RX ADMIN — ATORVASTATIN CALCIUM 10 MG: 10 TABLET, FILM COATED ORAL at 17:56

## 2019-10-11 RX ADMIN — PANTOPRAZOLE SODIUM 40 MG: 40 TABLET, DELAYED RELEASE ORAL at 09:14

## 2019-10-11 RX ADMIN — CLOZAPINE 100 MG: 100 TABLET ORAL at 21:40

## 2019-10-11 RX ADMIN — OLANZAPINE 15 MG: 10 TABLET, FILM COATED ORAL at 13:16

## 2019-10-11 RX ADMIN — LEVOTHYROXINE SODIUM 75 MCG: 75 TABLET ORAL at 09:15

## 2019-10-11 RX ADMIN — DIVALPROEX SODIUM 1500 MG: 500 TABLET, FILM COATED, EXTENDED RELEASE ORAL at 09:16

## 2019-10-11 RX ADMIN — METFORMIN HYDROCHLORIDE 500 MG: 500 TABLET, FILM COATED ORAL at 17:56

## 2019-10-11 RX ADMIN — CLOZAPINE 75 MG: 25 TABLET ORAL at 09:17
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
## 2019-10-11 NOTE — PROGRESS NOTES
Status: Pt did shower yesterday; staff believe he is incontinent of urine, but he got irritable when asked  Pt refuses to wash his clothing  Pt was on phone frequently  He called the  & asked them to call 9-1-1  He also called the nurses station & asked them if he could leave  Pt was medication compliant     Medication: Clozaril was increased & will continue to be increased / no PRNs  D/C: TBD / considering EAC?     10/11/19 8527   Team Meeting   Meeting Type Daily Rounds   Team Members Present   Team Members Present Physician;Nurse;;Occupational Therapist   Physician Team Member Dr Bobby Obregon / Dante Schaumann Team Member [facility] Management Team Member Tal Santamaria / Zac Escobar   OT Team Member Maria Guadalupe   Patient/Family Present   Patient Present No   Patient's Family Present No

## 2019-10-11 NOTE — PROGRESS NOTES
Pt disheveled and irritable today  At the desk several times asking to see the MD   He saw the PA today  Pt takes radio as coping skill  Focused on discharge
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
## 2019-10-11 NOTE — PLAN OF CARE
Pt agrees for CM to have contact with his sisters & is medication compliant  He remains focused on when he will be allowed to d/c

## 2019-10-12 LAB
BASOPHILS # BLD AUTO: 0.06 THOUSANDS/ΜL (ref 0–0.1)
BASOPHILS NFR BLD AUTO: 1 % (ref 0–1)
EOSINOPHIL # BLD AUTO: 0 THOUSAND/ΜL (ref 0–0.61)
EOSINOPHIL NFR BLD AUTO: 0 % (ref 0–6)
ERYTHROCYTE [DISTWIDTH] IN BLOOD BY AUTOMATED COUNT: 13.2 % (ref 11.6–15.1)
GLUCOSE SERPL-MCNC: 110 MG/DL (ref 65–140)
GLUCOSE SERPL-MCNC: 112 MG/DL (ref 65–140)
GLUCOSE SERPL-MCNC: 115 MG/DL (ref 65–140)
HCT VFR BLD AUTO: 43.7 % (ref 36.5–49.3)
HGB BLD-MCNC: 14.5 G/DL (ref 12–17)
IMM GRANULOCYTES # BLD AUTO: 0.05 THOUSAND/UL (ref 0–0.2)
IMM GRANULOCYTES NFR BLD AUTO: 1 % (ref 0–2)
LYMPHOCYTES # BLD AUTO: 2.59 THOUSANDS/ΜL (ref 0.6–4.47)
LYMPHOCYTES NFR BLD AUTO: 31 % (ref 14–44)
MCH RBC QN AUTO: 29.8 PG (ref 26.8–34.3)
MCHC RBC AUTO-ENTMCNC: 33.2 G/DL (ref 31.4–37.4)
MCV RBC AUTO: 90 FL (ref 82–98)
MONOCYTES # BLD AUTO: 1.11 THOUSAND/ΜL (ref 0.17–1.22)
MONOCYTES NFR BLD AUTO: 13 % (ref 4–12)
NEUTROPHILS # BLD AUTO: 4.67 THOUSANDS/ΜL (ref 1.85–7.62)
NEUTS SEG NFR BLD AUTO: 54 % (ref 43–75)
NRBC BLD AUTO-RTO: 0 /100 WBCS
PLATELET # BLD AUTO: 148 THOUSANDS/UL (ref 149–390)
PMV BLD AUTO: 10.3 FL (ref 8.9–12.7)
RBC # BLD AUTO: 4.87 MILLION/UL (ref 3.88–5.62)
WBC # BLD AUTO: 8.48 THOUSAND/UL (ref 4.31–10.16)

## 2019-10-12 PROCEDURE — 82948 REAGENT STRIP/BLOOD GLUCOSE: CPT

## 2019-10-12 PROCEDURE — 99231 SBSQ HOSP IP/OBS SF/LOW 25: CPT | Performed by: PSYCHIATRY & NEUROLOGY

## 2019-10-12 PROCEDURE — 85025 COMPLETE CBC W/AUTO DIFF WBC: CPT | Performed by: PHYSICIAN ASSISTANT

## 2019-10-12 RX ORDER — CLOZAPINE 100 MG/1
100 TABLET ORAL DAILY
Status: DISCONTINUED | OUTPATIENT
Start: 2019-10-13 | End: 2019-12-23 | Stop reason: HOSPADM

## 2019-10-12 RX ADMIN — CLOZAPINE 75 MG: 25 TABLET ORAL at 10:25

## 2019-10-12 RX ADMIN — OLANZAPINE 15 MG: 10 TABLET, FILM COATED ORAL at 15:17

## 2019-10-12 RX ADMIN — METFORMIN HYDROCHLORIDE 500 MG: 500 TABLET, FILM COATED ORAL at 10:23

## 2019-10-12 RX ADMIN — PANTOPRAZOLE SODIUM 40 MG: 40 TABLET, DELAYED RELEASE ORAL at 07:34

## 2019-10-12 RX ADMIN — LEVOTHYROXINE SODIUM 75 MCG: 75 TABLET ORAL at 07:35

## 2019-10-12 RX ADMIN — ATORVASTATIN CALCIUM 10 MG: 10 TABLET, FILM COATED ORAL at 16:15

## 2019-10-12 RX ADMIN — METFORMIN HYDROCHLORIDE 500 MG: 500 TABLET, FILM COATED ORAL at 16:15

## 2019-10-12 RX ADMIN — DIVALPROEX SODIUM 1500 MG: 500 TABLET, FILM COATED, EXTENDED RELEASE ORAL at 10:24

## 2019-10-12 RX ADMIN — CLOZAPINE 125 MG: 100 TABLET ORAL at 21:04

## 2019-10-12 NOTE — PROGRESS NOTES
Pt alert and wandering unit at times  Refused lab work and glucose testing earlier then later complied  Irritable at times but overall improved mood

## 2019-10-12 NOTE — PROGRESS NOTES
Pt lying in room at time of interview  Repeating, "I'm going home soon  I want to go home " No irritability at this time  Declined group, but few minutes later observed walking with peer to group  Pt requesting sisterIvania's, phone number, not in chart  Pt aware  Denies SI, HI, AVH, focusing on D/C  Encouraged medications and blood sugar checks, pt in agreement at this time  Denies any questions

## 2019-10-12 NOTE — PLAN OF CARE
Problem: SELF HARM/SUICIDALITY  Goal: Will have no self-injury during hospital stay  Description  INTERVENTIONS:  - Q 15 MINUTES: Routine safety checks  - Q WAKING SHIFT & PRN: Assess risk to determine if routine checks are adequate to maintain patient safety  - Encourage patient to participate actively in care by formulating a plan to combat response to suicidal ideation, identify supports and resources  Outcome: Progressing     Problem: INVOLUNTARY ADMIT  Goal: Will cooperate with staff recommendations and doctor's orders and will demonstrate appropriate behavior  Description  INTERVENTIONS:  - Treat underlying conditions and offer medication as ordered  - Educate regarding involuntary admission procedures and rules  - Utilize positive consistent limit setting strategies to support patient and staff safety  Outcome: Progressing     Problem: PAIN - ADULT  Goal: Verbalizes/displays adequate comfort level or baseline comfort level  Description  Interventions:  - Encourage patient to monitor pain and request assistance  - Assess pain using appropriate pain scale  - Administer analgesics based on type and severity of pain and evaluate response  - Implement non-pharmacological measures as appropriate and evaluate response  - Consider cultural and social influences on pain and pain management  - Notify physician/advanced practitioner if interventions unsuccessful or patient reports new pain  Outcome: Progressing

## 2019-10-12 NOTE — PROGRESS NOTES
Progress Note - Behavioral Health   Bianka Senior 48 y o  male MRN: [id number]  Unit/Bed#: [facility] 260-01 Encounter: [id number]    The patient was seen for continuing care and reviewed with treatment team     Mental Status Evaluation:  Appearance:  disheveled   Behavior:  evasive and argumentative   Mood:  irritable   Affect: constricted   Speech: Loud   Thought Process:  Poverty of thoughts   Thought Content:  Obsessions   Perceptual Disturbances: Auditory hallucinations without commands   Risk Potential: No suicidal or homicidal ideation   Orientation:   Person, place     Progress Toward Goals:  Patient seen in room, denies all sxs, asks when he is leaving since he has been here for a month and is tired of it (admitted on 10/5/19)  Is agreeable to increase of clozaril, denies sedation, drooling and will cont up titration to 125/75 today and then 100/125 tmw  Will need follow-up labs today    Assessment/Plan    Schizoaffective disorder ([facility] [address] )     Recommended Treatment: Continue with pharmacotherapy, group therapy, milieu therapy and occupational therapy    The patient will be maintained on the following medications:    Current Facility-Administered Medications:  acetaminophen 650 mg Oral Q6H PRN Sandra Rodriguez MD   aluminum-magnesium hydroxide-simethicone 30 mL Oral Q4H PRN Sandra Rodriguez MD   atorvastatin 10 mg Oral Daily With Saida Anthony MD   benztropine 1 mg Intramuscular Q6H PRN Sandra Rodriguez MD   benztropine 1 mg Oral Q6H PRN Sandra Rodriguez MD   cloZAPine 100 mg Oral HS Julio C Gore   clozapine 75 mg Oral Daily Julio C Gore   divalproex sodium 1,500 mg Oral Daily Sandra Rodriguez MD   haloperidol 5 mg Oral Q6H PRN Sandra Rodriguez MD   haloperidol lactate 5 mg Intramuscular Q6H PRN Sandra Rodriguez MD   insulin lispro 1-5 Units Subcutaneous HS Alfonso Coronado MD   insulin lispro 1-6 Units Subcutaneous TID AC Alfonso Coronado MD   levothyroxine 75 mcg Oral Early Morning Radha Wolfe PA-C   lisinopril 5 mg Oral Daily Oralia Briggs PA-C   LORazepam 1 mg Intramuscular Q6H PRN Luis West MD   LORazepam 1 mg Oral Q6H PRN Luis West MD   magnesium hydroxide 30 mL Oral Daily PRN Luis West MD   metFORMIN 500 mg Oral BID With Meals Oralia Briggs PA-C   nicotine polacrilex 2 mg Oral Q2H PRN Luis West MD   OLANZapine 15 mg Oral After Lunch Julio C Gore   pantoprazole 40 mg Oral Early Morning Oralia Briggs PA-C   traZODone 50 mg Oral HS PRN Luis West MD

## 2019-10-12 NOTE — PROGRESS NOTES
Pt at desk asking writer for sister's phone number  Reminded pt that writer checked a few moments ago and there was no number in pt's chart  Pt stated, "I have to call my sister so she can tell me when I'm leaving " Encouraged pt to discuss D/C dates with the doctor  Pt quickly became irritable, loud and yelling at [address], at nurse's station  Stating that pt does not have any mental health issues, "I am here because someone has it out for me " stating that pt does not belong here  "Doctor said if I can think right I can go " Attempting to calm pt, pt pacing at front of desk  After a few moments pt was able to speak more calmly  Discussed that doctor and pt have a plan in place and we need to follow this current plan  Pt will speak with a provider in the morning  Pt went to Med window and took bedtime meds, went to room and is currently lying down

## 2019-10-12 NOTE — PROGRESS NOTES
Encouraged pt to get AM blood work done  Pt irritable, states he will not get blood work here in the hospital  Pt says that he gets blood work outside of hospital but is unwilling to do here  Discussed importance, pt continues to refuse  Told writer to leave him alone    Pt compliant with blood work after initial refusal

## 2019-10-12 NOTE — PROGRESS NOTES
Daily rounds  Reasons for admission reviewed  Per shift report, pt demands to see doctor, focused on discharge  Irritable/agitated when unable to get in touch with sister  Continues to refuse to wash clothing  Very disheveled  Reluctantly accepts medications

## 2019-10-13 LAB
GLUCOSE SERPL-MCNC: 107 MG/DL (ref 65–140)
GLUCOSE SERPL-MCNC: 112 MG/DL (ref 65–140)
GLUCOSE SERPL-MCNC: 114 MG/DL (ref 65–140)
GLUCOSE SERPL-MCNC: 99 MG/DL (ref 65–140)

## 2019-10-13 PROCEDURE — 82948 REAGENT STRIP/BLOOD GLUCOSE: CPT

## 2019-10-13 RX ADMIN — PANTOPRAZOLE SODIUM 40 MG: 40 TABLET, DELAYED RELEASE ORAL at 09:10

## 2019-10-13 RX ADMIN — ATORVASTATIN CALCIUM 10 MG: 10 TABLET, FILM COATED ORAL at 15:55

## 2019-10-13 RX ADMIN — METFORMIN HYDROCHLORIDE 500 MG: 500 TABLET, FILM COATED ORAL at 15:55

## 2019-10-13 RX ADMIN — DIVALPROEX SODIUM 1500 MG: 500 TABLET, FILM COATED, EXTENDED RELEASE ORAL at 09:06

## 2019-10-13 RX ADMIN — OLANZAPINE 15 MG: 10 TABLET, FILM COATED ORAL at 15:55

## 2019-10-13 RX ADMIN — CLOZAPINE 100 MG: 100 TABLET ORAL at 09:05

## 2019-10-13 RX ADMIN — LISINOPRIL 5 MG: 5 TABLET ORAL at 09:06

## 2019-10-13 RX ADMIN — METFORMIN HYDROCHLORIDE 500 MG: 500 TABLET, FILM COATED ORAL at 09:05

## 2019-10-13 NOTE — PROGRESS NOTES
Pt irritable on floor this evening  At first refused to take 2p zyprexa  Spoke with pt about Dr Joss Ching agreement, pt came up to take medication  Pt was pacing the [facility]  Supper trays were brought up fifteen minutes early  Pt kept telling staff trays were here and if we would bring in  More pleasant overrall, more redirectable  Compliant with blood sugars today

## 2019-10-13 NOTE — PROGRESS NOTES
Pt irritable and scant  Isolative to room through the early portion of the shift, but does wander the halls at times  Focused on discharge and missing clothing  States that he is angry and depressed that he has to be here  Writer encouraged Pt to remain cooperative with treatment team recommendations in order to discharge sooner  Pt verbalized understanding  Medication compliant thus far today

## 2019-10-13 NOTE — PLAN OF CARE
Problem: SELF HARM/SUICIDALITY  Goal: Will have no self-injury during hospital stay  Description  INTERVENTIONS:  - Q 15 MINUTES: Routine safety checks  - Q WAKING SHIFT & PRN: Assess risk to determine if routine checks are adequate to maintain patient safety  - Encourage patient to participate actively in care by formulating a plan to combat response to suicidal ideation, identify supports and resources  Outcome: Progressing     Problem: INVOLUNTARY ADMIT  Goal: Will cooperate with staff recommendations and doctor's orders and will demonstrate appropriate behavior  Description  INTERVENTIONS:  - Treat underlying conditions and offer medication as ordered  - Educate regarding involuntary admission procedures and rules  - Utilize positive consistent limit setting strategies to support patient and staff safety  Outcome: Progressing     Problem: SELF CARE DEFICIT  Goal: Return ADL status to a safe level of function  Description  INTERVENTIONS:  - Administer medication as ordered  - Assess ADL deficits and provide assistive devices as needed  - Obtain PT/OT consults as needed  - Assist and instruct patient to increase activity and self care as tolerated  Outcome: Progressing     Problem: PAIN - ADULT  Goal: Verbalizes/displays adequate comfort level or baseline comfort level  Description  Interventions:  - Encourage patient to monitor pain and request assistance  - Assess pain using appropriate pain scale  - Administer analgesics based on type and severity of pain and evaluate response  - Implement non-pharmacological measures as appropriate and evaluate response  - Consider cultural and social influences on pain and pain management  - Notify physician/advanced practitioner if interventions unsuccessful or patient reports new pain  Outcome: Progressing     Problem: DISCHARGE PLANNING  Goal: Discharge to home or other facility with appropriate resources  Description  INTERVENTIONS:  - Identify barriers to discharge w/patient and caregiver  - Arrange for needed discharge resources and transportation as appropriate  - Identify discharge learning needs (meds, wound care, etc )  - Arrange for interpretive services to assist at discharge as needed  - Refer to Case Management Department for coordinating discharge planning if the patient needs post-hospital services based on physician/advanced practitioner order or complex needs related to functional status, cognitive ability, or social support system  Outcome: Progressing     Problem: Ineffective Coping  Goal: Participates in unit activities  Description  Interventions:  - Provide therapeutic environment   - Provide required programming   - Redirect inappropriate behaviors   Outcome: Progressing

## 2019-10-13 NOTE — PROGRESS NOTES
Progress Note - Behavioral Health   Omar Dey 48 y o  male MRN: [id number]  Unit/Bed#: [facility] 260-01 Encounter: [id number]    The patient was seen for continuing care and reviewed with treatment team     Mental Status Evaluation:  Appearance:  disheveled   Behavior:  Uncooperative   Mood:  irritable   Affect: constricted   Speech: Sparse   Thought Process:  Poverty of thoughts   Thought Content:  Paranoid and mistrustful and Obsessions   Perceptual Disturbances: Appears to be responding to internal stimuli   Risk Potential: No suicidal or homicidal ideation   Orientation:   Person, place     Progress Toward Goals:   Lab Results   Component Value Date    WBC 8 48 10/12/2019    HGB 14 5 10/12/2019    HCT 43 7 10/12/2019    MCV 90 10/12/2019     (L) 10/12/2019     After education and encouragement he did allow CBC draw yesterday and the clozaril up-titration will continue  Remains irritable, impovrished, out in halls and some groups yesterday  Very disheveled, malodorous and wearing multiple layers of clothes  Given severity of his psychosis (impovrished TP, internal preoccupation), impaired insight and judgement he would necessitate medication over objection if he was to refuse clozaril  Assessment/Plan    Schizoaffective disorder ([facility] [address] )     Recommended Treatment: Continue with pharmacotherapy, group therapy, milieu therapy and occupational therapy    The patient will be maintained on the following medications:    Current Facility-Administered Medications:  acetaminophen 650 mg Oral Q6H PRN Brett Ellsworth MD   aluminum-magnesium hydroxide-simethicone 30 mL Oral Q4H PRN Brett Ellsworth MD   atorvastatin 10 mg Oral Daily With Mat Fam MD   benztropine 1 mg Intramuscular Q6H PRN Brett Ellsworth MD   benztropine 1 mg Oral Q6H PRN Brett Ellsworth MD   clozapine 100 mg Oral Daily Allyson Doran MD   cloZAPine 125 mg Oral HS Allyson Doran MD   divalproex sodium 1,500 mg Oral Daily Brett Ellsworth MD haloperidol 5 mg Oral Q6H PRN Joel Snider MD   haloperidol lactate 5 mg Intramuscular Q6H PRN Joel Snider MD   insulin lispro 1-5 Units Subcutaneous HS Casper Rainey MD   insulin lispro 1-6 Units Subcutaneous TID AC Casper Rainey MD   levothyroxine 75 mcg Oral Early Morning Lajune Felt, XUAN   lisinopril 5 mg Oral Daily Lajune Felt, XUAN   LORazepam 1 mg Intramuscular Q6H PRN Joel Snider MD   LORazepam 1 mg Oral Q6H PRN Joel Snider MD   magnesium hydroxide 30 mL Oral Daily PRN Joel Snider MD   metFORMIN 500 mg Oral BID With Meals Lajune Felt, XUAN   nicotine polacrilex 2 mg Oral Q2H PRN Joel Snider MD   OLANZapine 15 mg Oral After Lunch Julio C Gore   pantoprazole 40 mg Oral Early Morning Lajune Rio, XUAN   traZODone 50 mg Oral HS PRN Joel Snider MD

## 2019-10-13 NOTE — PROGRESS NOTES
Daily rounds  Per shift report, pt remains focused on discharge  Focused on missing pants in evening  Compliant with meds

## 2019-10-14 LAB
GLUCOSE SERPL-MCNC: 102 MG/DL (ref 65–140)
GLUCOSE SERPL-MCNC: 119 MG/DL (ref 65–140)
GLUCOSE SERPL-MCNC: 122 MG/DL (ref 65–140)
GLUCOSE SERPL-MCNC: 136 MG/DL (ref 65–140)

## 2019-10-14 PROCEDURE — 82948 REAGENT STRIP/BLOOD GLUCOSE: CPT

## 2019-10-14 PROCEDURE — 99231 SBSQ HOSP IP/OBS SF/LOW 25: CPT | Performed by: PHYSICIAN ASSISTANT

## 2019-10-14 RX ADMIN — ATORVASTATIN CALCIUM 10 MG: 10 TABLET, FILM COATED ORAL at 18:55

## 2019-10-14 RX ADMIN — OLANZAPINE 15 MG: 10 TABLET, FILM COATED ORAL at 13:44

## 2019-10-14 RX ADMIN — CLOZAPINE 100 MG: 100 TABLET ORAL at 09:45

## 2019-10-14 RX ADMIN — DIVALPROEX SODIUM 1500 MG: 500 TABLET, FILM COATED, EXTENDED RELEASE ORAL at 09:45

## 2019-10-14 RX ADMIN — LEVOTHYROXINE SODIUM 75 MCG: 75 TABLET ORAL at 09:46

## 2019-10-14 RX ADMIN — METFORMIN HYDROCHLORIDE 500 MG: 500 TABLET, FILM COATED ORAL at 18:55

## 2019-10-14 RX ADMIN — LISINOPRIL 5 MG: 5 TABLET ORAL at 09:45

## 2019-10-14 RX ADMIN — PANTOPRAZOLE SODIUM 40 MG: 40 TABLET, DELAYED RELEASE ORAL at 09:46

## 2019-10-14 RX ADMIN — CLOZAPINE 125 MG: 100 TABLET ORAL at 00:31

## 2019-10-14 RX ADMIN — CLOZAPINE 150 MG: 100 TABLET ORAL at 21:46

## 2019-10-14 RX ADMIN — METFORMIN HYDROCHLORIDE 500 MG: 500 TABLET, FILM COATED ORAL at 09:46

## 2019-10-14 NOTE — PROGRESS NOTES
Patient cooperative and pleasant upon interaction with writer, however has been labile throughout the morning  Denies SI/HI/AVH, reports sleeping without difficulty last night  At breakfast time, pt was compliant with BS, however refused to eat in dining room and had a verbal altercation with a peer  Issue was resolved through redirection and pt agreed to eat breakfast in day room immediately following  Initially refused morning medications and wanted a radio, staff encouraged pt to take medications then get radio, which he complied  Encouraged to shower, he claims he will do so this afternoon

## 2019-10-14 NOTE — PROGRESS NOTES
Pt became agitated later this evening when staff asked pt to fix pants as they were falling down and exposing himself  Pt became agitated and began cursing at staff  "I wont f*cking do it! I wear my clothes how I want!" pt cursing as he walked quickly to room  Shortly after refused medications  Encouraged pt to remain compliant however pt remains negative regarding treatment at this time  Currently laying in room
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
## 2019-10-14 NOTE — PROGRESS NOTES
Status: Irritable at time, needs encouragement to take medications  Pt was awake around midnight & wanted to talk to his roommate; his roommate was annoyed  Pt was able to return to sleep     Medication: Increase Clozaril  D/C: CPS mtg on Friday - consider EAC     10/14/19 0728   Team Meeting   Meeting Type Daily Rounds   Team Members Present   Team Members Present Physician;Nurse;;Occupational Therapist   Physician Team Member Dr Jeannette Myres / Dr Loretta Harvey / Heavenly Figueroa Team Member [facility] Management Team Member Niecy Hidalgo / Som Peck   OT Team Member Maria Guadalupe   Patient/Family Present   Patient Present No   Patient's Family Present No

## 2019-10-14 NOTE — PROGRESS NOTES
Progress Note - Behavioral Health   Wayland Sicard 48 y o  male MRN: [id number]  Unit/Bed#: [facility] 260-01 Encounter: [id number]    Assessment/Plan   Principal Problem:    Schizoaffective disorder ([address] )  Active Problems:    Type 2 diabetes mellitus (HCC)    Benign essential hypertension    BMI 34 0-34 9,adult      Subjective:   Patient today perseverates over discharge  Remains intrusive and constantly asking numerous staff about when he will be discharged  Is having meeting on Friday to determine where he will be going  Reports of irritability and did have altercation with peer early this morning  Did hear yelling and altercation which patient did require redirection  Was able to be de-escalated  Is labile at times  Speech is pressured  Remains with some disorganization and concrete thought process  Does denies psychotic symptoms when asked but does appear internally preoccupied  Scant in mood related symptoms  Denied suicidal and homicidal ideation  Does have possible potential for aggression due to impulsivity  Compliant with medications but requires much emphasis to take them  Appears to be tolerating medications well without serious side effects  Last CBC w/diff stable        Current Medications:  Current Facility-Administered Medications   Medication Dose Route Frequency    acetaminophen (TYLENOL) tablet 650 mg  650 mg Oral Q6H PRN    aluminum-magnesium hydroxide-simethicone (MYLANTA) 200-200-20 mg/5 mL oral suspension 30 mL  30 mL Oral Q4H PRN    atorvastatin (LIPITOR) tablet 10 mg  10 mg Oral Daily With Dinner    benztropine (COGENTIN) injection 1 mg  1 mg Intramuscular Q6H PRN    benztropine (COGENTIN) tablet 1 mg  1 mg Oral Q6H PRN    cloZAPine (CLOZARIL) tablet 100 mg  100 mg Oral Daily    cloZAPine (CLOZARIL) tablet 150 mg  150 mg Oral HS    divalproex sodium (DEPAKOTE ER) 24 hr tablet 1,500 mg  1,500 mg Oral Daily    haloperidol (HALDOL) tablet 5 mg  5 mg Oral Q6H PRN    haloperidol lactate (HALDOL) injection 5 mg  5 mg Intramuscular Q6H PRN    insulin lispro (HumaLOG) 100 units/mL subcutaneous injection 1-5 Units  1-5 Units Subcutaneous HS    insulin lispro (HumaLOG) 100 units/mL subcutaneous injection 1-6 Units  1-6 Units Subcutaneous TID AC    levothyroxine tablet 75 mcg  75 mcg Oral Early Morning    lisinopril (ZESTRIL) tablet 5 mg  5 mg Oral Daily    LORazepam (ATIVAN) 2 mg/mL injection 1 mg  1 mg Intramuscular Q6H PRN    LORazepam (ATIVAN) tablet 1 mg  1 mg Oral Q6H PRN    magnesium hydroxide (MILK OF MAGNESIA) 400 mg/5 mL oral suspension 30 mL  30 mL Oral Daily PRN    metFORMIN (GLUCOPHAGE) tablet 500 mg  500 mg Oral BID With Meals    nicotine polacrilex (NICORETTE) gum 2 mg  2 mg Oral Q2H PRN    OLANZapine (ZyPREXA) tablet 15 mg  15 mg Oral After Lunch    pantoprazole (PROTONIX) EC tablet 40 mg  40 mg Oral Early Morning    traZODone (DESYREL) tablet 50 mg  50 mg Oral HS PRN       Behavioral Health Medications: all current active meds have been reviewed and continue current psychiatric medications  Vitals:  Vitals:    10/14/19 0731   BP: 121/70   Pulse: 70   Resp: 18   Temp: (!) 97 2 °F (36 2 °C)   SpO2:        Laboratory results:    I have personally reviewed all pertinent laboratory/tests results    Most Recent Labs:   Lab Results   Component Value Date    WBC 8 48 10/12/2019    RBC 4 87 10/12/2019    HGB 14 5 10/12/2019    HCT 43 7 10/12/2019     (L) 10/12/2019    RDW 13 2 10/12/2019    NEUTROABS 4 67 10/12/2019    SODIUM 140 10/05/2019    K 4 0 10/05/2019     10/05/2019    CO2 26 10/05/2019    BUN 12 10/05/2019    CREATININE 0 72 10/05/2019    GLUCOSE 91 05/11/2018    GLUC 143 (H) 10/05/2019    GLUF 99 08/23/2019    CALCIUM 9 0 10/05/2019    AST 14 10/05/2019    ALT 11 (L) 10/05/2019    ALKPHOS 76 10/05/2019    TP 6 3 (L) 10/05/2019    ALB 3 0 (L) 10/05/2019    TBILI 0 20 10/05/2019    CHOLESTEROL 138 08/28/2019    HDL 34 (L) 08/28/2019    TRIG 165 (H) 08/28/2019    LDLCALC 72 08/23/2019    [place] 112 08/23/2019    VALPROICTOT 56 10/08/2019    LITHIUM 0 6 03/27/2014    AMMONIA 32 10/08/2019    [id number] 1 840 08/23/2019    FREET4 0 93 08/23/2019    HGBA1C 6 3 10/05/2019     10/05/2019       Psychiatric Review of Systems:  Behavior over the last 24 hours:  unchanged  Sleep: normal  Appetite: normal  Medication side effects: No  ROS: no complaints    Mental Status Evaluation:  Appearance:  disheveled and malodorous   Behavior:  Restless, fidgety, intrusive   Speech:  loud, pressured   Mood:  "okay"   Affect:  blunted and labile   Language naming objects and repeating phrases   Thought Process:  concrete, disorganized and perserverative   Thought Content:  obsessions   Perceptual Disturbances: probable internal preoccupation   Risk Potential: Denied SI/HI  Potential for aggression: low   Sensorium:  person and place   Cognition:  grossly intact   Consciousness:  alert and awake    Recent and Remote Memory limited   Attention: attention span appeared shorter than expected for age   Insight:  limited   Judgment: limited   Gait/Station: normal gait/station and normal balance   Motor Activity: no abnormal movements     Progress Toward Goals: progressing slowly    Recommended Treatment: Continue with group therapy, milieu therapy and occupational therapy  1   Increase Clozaril to 100mg QD AM + 150mg QD PM for mood stabilization/psychosis  2  Continue other medications  3  Extensive disposition planning     Risks, benefits and possible side effects of Medications:   Risks, benefits, and possible side effects of medications explained to patient and patient verbalizes understanding        Samuel Melendez PA-C

## 2019-10-14 NOTE — PLAN OF CARE
Problem: SELF HARM/SUICIDALITY  Goal: Will have no self-injury during hospital stay  Description  INTERVENTIONS:  - Q 15 MINUTES: Routine safety checks  - Q WAKING SHIFT & PRN: Assess risk to determine if routine checks are adequate to maintain patient safety  - Encourage patient to participate actively in care by formulating a plan to combat response to suicidal ideation, identify supports and resources  Outcome: Not Progressing     Problem: INVOLUNTARY ADMIT  Goal: Will cooperate with staff recommendations and doctor's orders and will demonstrate appropriate behavior  Description  INTERVENTIONS:  - Treat underlying conditions and offer medication as ordered  - Educate regarding involuntary admission procedures and rules  - Utilize positive consistent limit setting strategies to support patient and staff safety  Outcome: Not Progressing     Problem: SELF CARE DEFICIT  Goal: Return ADL status to a safe level of function  Description  INTERVENTIONS:  - Administer medication as ordered  - Assess ADL deficits and provide assistive devices as needed  - Obtain PT/OT consults as needed  - Assist and instruct patient to increase activity and self care as tolerated  Outcome: Not Progressing     Problem: PAIN - ADULT  Goal: Verbalizes/displays adequate comfort level or baseline comfort level  Description  Interventions:  - Encourage patient to monitor pain and request assistance  - Assess pain using appropriate pain scale  - Administer analgesics based on type and severity of pain and evaluate response  - Implement non-pharmacological measures as appropriate and evaluate response  - Consider cultural and social influences on pain and pain management  - Notify physician/advanced practitioner if interventions unsuccessful or patient reports new pain  Outcome: Not Progressing     Problem: DISCHARGE PLANNING  Goal: Discharge to home or other facility with appropriate resources  Description  INTERVENTIONS:  - Identify barriers to discharge w/patient and caregiver  - Arrange for needed discharge resources and transportation as appropriate  - Identify discharge learning needs (meds, wound care, etc )  - Arrange for interpretive services to assist at discharge as needed  - Refer to Case Management Department for coordinating discharge planning if the patient needs post-hospital services based on physician/advanced practitioner order or complex needs related to functional status, cognitive ability, or social support system  Outcome: Not Progressing     Problem: Ineffective Coping  Goal: Participates in unit activities  Description  Interventions:  - Provide therapeutic environment   - Provide required programming   - Redirect inappropriate behaviors   Outcome: Not Progressing

## 2019-10-14 NOTE — PROGRESS NOTES
Pt came out to staff around [address] requesting HS medications that he initially refused  Was calm and cooperative and returned to room  Pt having difficulty sleep and began talking with roommate which angered roommate  Argument began however pt remained calm and cooperative  Did not argue back to peer and states "I just thought you couldn't sleep either so I was gonna talk to you " Encouraged pt to try and rest which pt was agreeable with  Observed to be sleeping within the hour and has slept remainder of the night
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
## 2019-10-14 NOTE — PLAN OF CARE
Pt focused on leaving the hospital, states he does not need to be here  Pt taking medication with a lot of encouragement

## 2019-10-15 LAB
GLUCOSE SERPL-MCNC: 110 MG/DL (ref 65–140)
GLUCOSE SERPL-MCNC: 130 MG/DL (ref 65–140)
GLUCOSE SERPL-MCNC: 87 MG/DL (ref 65–140)
GLUCOSE SERPL-MCNC: 89 MG/DL (ref 65–140)

## 2019-10-15 PROCEDURE — 82948 REAGENT STRIP/BLOOD GLUCOSE: CPT

## 2019-10-15 RX ADMIN — PANTOPRAZOLE SODIUM 40 MG: 40 TABLET, DELAYED RELEASE ORAL at 07:00

## 2019-10-15 RX ADMIN — LEVOTHYROXINE SODIUM 75 MCG: 75 TABLET ORAL at 07:00

## 2019-10-15 RX ADMIN — BENZTROPINE MESYLATE 1 MG: 1 TABLET ORAL at 19:23

## 2019-10-15 RX ADMIN — METFORMIN HYDROCHLORIDE 500 MG: 500 TABLET, FILM COATED ORAL at 09:35

## 2019-10-15 RX ADMIN — ATORVASTATIN CALCIUM 10 MG: 10 TABLET, FILM COATED ORAL at 16:20

## 2019-10-15 RX ADMIN — CLOZAPINE 175 MG: 100 TABLET ORAL at 21:55

## 2019-10-15 RX ADMIN — DIVALPROEX SODIUM 1500 MG: 500 TABLET, FILM COATED, EXTENDED RELEASE ORAL at 09:35

## 2019-10-15 RX ADMIN — CLOZAPINE 100 MG: 100 TABLET ORAL at 09:35

## 2019-10-15 RX ADMIN — OLANZAPINE 15 MG: 10 TABLET, FILM COATED ORAL at 13:15

## 2019-10-15 RX ADMIN — METFORMIN HYDROCHLORIDE 500 MG: 500 TABLET, FILM COATED ORAL at 16:20

## 2019-10-15 RX ADMIN — ACETAMINOPHEN 650 MG: 325 TABLET, FILM COATED ORAL at 22:15

## 2019-10-15 RX ADMIN — HALOPERIDOL 5 MG: 5 TABLET ORAL at 19:23

## 2019-10-15 NOTE — PROGRESS NOTES
Approximately 1615 this afternoon, pt became loud with writer when trying to encourage pt to take prescribed medications  While pt became louder and yelling, a peer on the nearby phone became increasingly agitated and both pt and peer postured at one another, fists clenched, yelling, cursing, etc  Control Team called and then cancelled d/t adequate amount of staff on unit  Pt and peer calmed and kept distance following  Pt returned to room to rest shortly after dinner

## 2019-10-15 NOTE — PROGRESS NOTES
Progress Note - Behavioral Health     Yady Richards 48 y o  male MRN: [id number]   Unit/Bed#: [facility] 260-01 Encounter: [id number]    Behavior over the last 24 hours: neelam Hendrix was seen today for continuation of care and case was reviewed with team   Per staff, patient remains disheveled and can be aggressive at times  Remains bizarre in behavior and carries clothes around the unit  Today patient appears guarded upon approach  He is seen in his room sleeping, but was alert and awake for conversation  Remained scant and dismissive in conversation and would not elaborate on answers  Patient continues to have no insight into current situation and denies any depression, anxiety, anger, SI/HI, paranoia, or hallucinations  However, does appear to be slightly internally stimulated when asked about hearing or seeing anything  Did not express any overt paranoia  When explicitly asked if he felt anyone was watching him patient did deny at this time  Continues to be preoccupied with discharge and leaving       Sleep: normal  Appetite: normal  Medication side effects: No   ROS: denies any headache, abdominal pain, diarrhea, dizziness or sedation    Mental Status Evaluation:    Appearance:  disheveled, looks older than stated age, poor hygiene   Behavior:  guarded, no eye contact   Speech:  scant   Mood:  normal   Affect:  flat, mood-incongruent, slightly irritable   Thought Process:  circumstantial, concrete   Associations: concrete associations   Thought Content:  no overt delusions, preoccupied with discharge date   Perceptual Disturbances: denies auditory or visual hallucinations when asked, appears distracted   Risk Potential: Suicidal ideation - None at present  Homicidal ideation - None at present  Potential for aggression - Yes, due to agitation   Sensorium:  oriented to person and place   Memory:  recent and remote memory: unable to assess due to lack of cooperation   Consciousness:  alert and awake Attention: attention span and concentration appear shorter than expected for age   Insight:  impaired   Judgment: impaired   Gait/Station: in bed   Motor Activity: no abnormal movements     Vital signs in last 24 hours:    Temp:  [98 2 °F (36 8 °C)] 98 2 °F (36 8 °C)  HR:  [69] 69  Resp:  [18] 18  BP: (121)/(59) 121/59    Laboratory results:    I have personally reviewed all pertinent laboratory/tests results  Most Recent Labs:   Lab Results   Component Value Date    WBC 8 48 10/12/2019    RBC 4 87 10/12/2019    HGB 14 5 10/12/2019    HCT 43 7 10/12/2019     (L) 10/12/2019    RDW 13 2 10/12/2019    NEUTROABS 4 67 10/12/2019    SODIUM 140 10/05/2019    K 4 0 10/05/2019     10/05/2019    CO2 26 10/05/2019    BUN 12 10/05/2019    CREATININE 0 72 10/05/2019    GLUCOSE 91 05/11/2018    GLUC 143 (H) 10/05/2019    GLUF 99 08/23/2019    CALCIUM 9 0 10/05/2019    AST 14 10/05/2019    ALT 11 (L) 10/05/2019    ALKPHOS 76 10/05/2019    TP 6 3 (L) 10/05/2019    ALB 3 0 (L) 10/05/2019    TBILI 0 20 10/05/2019    CHOLESTEROL 138 08/28/2019    HDL 34 (L) 08/28/2019    TRIG 165 (H) 08/28/2019    LDLCALC 72 08/23/2019    [place] 112 08/23/2019    VALPROICTOT 56 10/08/2019    LITHIUM 0 6 03/27/2014    AMMONIA 32 10/08/2019    [id number] 1 840 08/23/2019    FREET4 0 93 08/23/2019    HGBA1C 6 3 10/05/2019     10/05/2019       Progress Toward Goals: no significant progress    Assessment/Plan   Principal Problem:    Schizoaffective disorder ([address] )  Active Problems:    Type 2 diabetes mellitus (HCC)    Benign essential hypertension    BMI 34 0-34 9,adult    Recommended Treatment:     Planned medication and treatment changes:     All current active medications have been reviewed  Encourage group therapy, milieu therapy and occupational therapy  Behavioral Health checks every 7 minutes  Increase Clozaril to 100 mg PO in AM and 175 mg PO QHS due to continued psychosis and mood instability with plan to titrate as pt tolerates  Continue all other medications as prescribed  Per CM, plan for ACT team mtg with pt on Friday  CBC w/ diff recheck for Clozaril management ordered for 10/18/19    Current Facility-Administered Medications:  acetaminophen 650 mg Oral Q6H PRN Chavo Irwin MD   aluminum-magnesium hydroxide-simethicone 30 mL Oral Q4H PRN Chavo Irwin MD   atorvastatin 10 mg Oral Daily With Aquilino Yee MD   benztropine 1 mg Intramuscular Q6H PRN Chavo Irwin MD   benztropine 1 mg Oral Q6H PRN Chavo Irwin, MD   clozapine 100 mg Oral Daily Luis Angel Mejia MD   cloZAPine 150 mg Oral HS Felecia Dean PA-C   divalproex sodium 1,500 mg Oral Daily Chavo Irwin MD   haloperidol 5 mg Oral Q6H PRN Chavo Irwin MD   haloperidol lactate 5 mg Intramuscular Q6H PRN Chavo Irwin MD   insulin lispro 1-5 Units Subcutaneous HS Patsy Bhagat MD   insulin lispro 1-6 Units Subcutaneous TID AC Patsy Bhagat MD   levothyroxine 75 mcg Oral Early Morning Jayda Schroeder PA-C   lisinopril 5 mg Oral Daily Jayda Schroeder PA-C   LORazepam 1 mg Intramuscular Q6H PRVICENTE Irwin MD   LORazepam 1 mg Oral Q6H PRN Chavo Irwin MD   magnesium hydroxide 30 mL Oral Daily PRN Chavo Irwin MD   metFORMIN 500 mg Oral BID With Meals Jayda Schroeder PA-C   nicotine polacrilex 2 mg Oral Q2H PRVICENTE Irwin MD   OLANZapine 15 mg Oral After Lunch Julio C Gore   pantoprazole 40 mg Oral Early Morning Jayda Schroeder PA-C   traZODone 50 mg Oral HS PRVICENTE Irwin MD       Risks / Benefits of Treatment:    Risks, benefits, and possible side effects of medications explained to patient  Patient has limited understanding of risks and benefits of treatment at this time, but agrees to take medications as prescribed  Counseling / Coordination of Care: Total floor / unit time spent today 20 minutes  Greater than 50% of total time was spent with the patient and / or family counseling and / or coordination of care   A description of counseling / coordination of care:  Patient's progress discussed with staff in treatment team meeting  Medications, treatment progress and treatment plan reviewed with patient  Medication changes discussed with patient  Supportive counseling provided to the patient

## 2019-10-15 NOTE — PROGRESS NOTES
Status: Pt had 2 verbal altercations with another patient & was posturing  He refused vitals this morning  Pt did shower, but refuses to wash clothing  Pt remains focused on leaving     Medication: Clozaril increased / no PRN  D/C: TBD - [address] meeting on Friday - will discuss [facility]     10/15/19 2058   Team Meeting   Meeting Type Daily Rounds   Team Members Present   Team Members Present Physician;Nurse;   Physician Team Member Dr Mazin Uribe / Dr Kathleen Garcia / Chandan Way Team Member [facility] Management Team Member [id number] / Héctor Mcgarry   Patient/Family Present   Patient Present No   Patient's Family Present No

## 2019-10-15 NOTE — PROGRESS NOTES
Seclusive to room in AM   Refused AM VS   OOB for meals  Pleasant during interaction asking to meet with MD and inquiring about discharge  Reminded patient of scheduled meeting Friday  Disheveled appearance, malodorous and appears to be incontinent of urine, MD aware  Wanders the randolph carrying multiple articles of clothing

## 2019-10-16 LAB
GLUCOSE SERPL-MCNC: 102 MG/DL (ref 65–140)
GLUCOSE SERPL-MCNC: 111 MG/DL (ref 65–140)
GLUCOSE SERPL-MCNC: 113 MG/DL (ref 65–140)
GLUCOSE SERPL-MCNC: 86 MG/DL (ref 65–140)

## 2019-10-16 PROCEDURE — 82948 REAGENT STRIP/BLOOD GLUCOSE: CPT

## 2019-10-16 RX ORDER — CLOZAPINE 100 MG/1
200 TABLET ORAL
Status: DISCONTINUED | OUTPATIENT
Start: 2019-10-16 | End: 2019-10-17

## 2019-10-16 RX ADMIN — PANTOPRAZOLE SODIUM 40 MG: 40 TABLET, DELAYED RELEASE ORAL at 09:43

## 2019-10-16 RX ADMIN — ACETAMINOPHEN 650 MG: 325 TABLET, FILM COATED ORAL at 22:16

## 2019-10-16 RX ADMIN — METFORMIN HYDROCHLORIDE 500 MG: 500 TABLET, FILM COATED ORAL at 16:12

## 2019-10-16 RX ADMIN — OLANZAPINE 15 MG: 10 TABLET, FILM COATED ORAL at 16:12

## 2019-10-16 RX ADMIN — ATORVASTATIN CALCIUM 10 MG: 10 TABLET, FILM COATED ORAL at 16:12

## 2019-10-16 RX ADMIN — CLOZAPINE 100 MG: 100 TABLET ORAL at 09:43

## 2019-10-16 RX ADMIN — METFORMIN HYDROCHLORIDE 500 MG: 500 TABLET, FILM COATED ORAL at 09:43

## 2019-10-16 RX ADMIN — CLOZAPINE 200 MG: 100 TABLET ORAL at 21:28

## 2019-10-16 RX ADMIN — DIVALPROEX SODIUM 1500 MG: 500 TABLET, FILM COATED, EXTENDED RELEASE ORAL at 09:43
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
## 2019-10-16 NOTE — PROGRESS NOTES
Pt focused on retrieving jewelry from locker this evening  Staff gave a few rings and pair of earrings per request   Pt did continue to ask for more items but was redirectable  Pt did shower this afternoon but refuses to put clothes in laundry  Pt wears multiple layers of clothes and carries the rest with him around the unit  Compliant with meds this evening

## 2019-10-16 NOTE — PLAN OF CARE
Problem: SELF HARM/SUICIDALITY  Goal: Will have no self-injury during hospital stay  Description  INTERVENTIONS:  - Q 15 MINUTES: Routine safety checks  - Q WAKING SHIFT & PRN: Assess risk to determine if routine checks are adequate to maintain patient safety  - Encourage patient to participate actively in care by formulating a plan to combat response to suicidal ideation, identify supports and resources  Outcome: Progressing     Problem: INVOLUNTARY ADMIT  Goal: Will cooperate with staff recommendations and doctor's orders and will demonstrate appropriate behavior  Description  INTERVENTIONS:  - Treat underlying conditions and offer medication as ordered  - Educate regarding involuntary admission procedures and rules  - Utilize positive consistent limit setting strategies to support patient and staff safety  Outcome: Progressing     Problem: PAIN - ADULT  Goal: Verbalizes/displays adequate comfort level or baseline comfort level  Description  Interventions:  - Encourage patient to monitor pain and request assistance  - Assess pain using appropriate pain scale  - Administer analgesics based on type and severity of pain and evaluate response  - Implement non-pharmacological measures as appropriate and evaluate response  - Consider cultural and social influences on pain and pain management  - Notify physician/advanced practitioner if interventions unsuccessful or patient reports new pain  Outcome: Progressing     Problem: SELF CARE DEFICIT  Goal: Return ADL status to a safe level of function  Description  INTERVENTIONS:  - Administer medication as ordered  - Assess ADL deficits and provide assistive devices as needed  - Obtain PT/OT consults as needed  - Assist and instruct patient to increase activity and self care as tolerated  Outcome: Not Progressing     Problem: Ineffective Coping  Goal: Participates in unit activities  Description  Interventions:  - Provide therapeutic environment   - Provide required programming   - Redirect inappropriate behaviors   Outcome: Not Progressing

## 2019-10-16 NOTE — PROGRESS NOTES
Refused AM VS   Disheveled, malodorous appearance  Carrying various articles of clothing as he walks the [place] waiting to meet with MD  Initially refuses medications but on second pass patient complied  Enjoys radio
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
## 2019-10-17 LAB
GLUCOSE SERPL-MCNC: 124 MG/DL (ref 65–140)
GLUCOSE SERPL-MCNC: 96 MG/DL (ref 65–140)

## 2019-10-17 PROCEDURE — 82948 REAGENT STRIP/BLOOD GLUCOSE: CPT

## 2019-10-17 PROCEDURE — 99232 SBSQ HOSP IP/OBS MODERATE 35: CPT | Performed by: PSYCHIATRY & NEUROLOGY

## 2019-10-17 RX ADMIN — OLANZAPINE 15 MG: 10 TABLET, FILM COATED ORAL at 19:27

## 2019-10-17 RX ADMIN — CLOZAPINE 100 MG: 100 TABLET ORAL at 08:09

## 2019-10-17 RX ADMIN — CLOZAPINE 250 MG: 100 TABLET ORAL at 21:50

## 2019-10-17 RX ADMIN — LEVOTHYROXINE SODIUM 75 MCG: 75 TABLET ORAL at 08:10

## 2019-10-17 RX ADMIN — METFORMIN HYDROCHLORIDE 500 MG: 500 TABLET, FILM COATED ORAL at 19:27

## 2019-10-17 RX ADMIN — PANTOPRAZOLE SODIUM 40 MG: 40 TABLET, DELAYED RELEASE ORAL at 08:10

## 2019-10-17 RX ADMIN — DIVALPROEX SODIUM 1500 MG: 500 TABLET, FILM COATED, EXTENDED RELEASE ORAL at 08:08

## 2019-10-17 RX ADMIN — METFORMIN HYDROCHLORIDE 500 MG: 500 TABLET, FILM COATED ORAL at 08:09

## 2019-10-17 RX ADMIN — ATORVASTATIN CALCIUM 10 MG: 10 TABLET, FILM COATED ORAL at 19:25
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
## 2019-10-17 NOTE — PLAN OF CARE
Problem: SELF HARM/SUICIDALITY  Goal: Will have no self-injury during hospital stay  Description  INTERVENTIONS:  - Q 15 MINUTES: Routine safety checks  - Q WAKING SHIFT & PRN: Assess risk to determine if routine checks are adequate to maintain patient safety  - Encourage patient to participate actively in care by formulating a plan to combat response to suicidal ideation, identify supports and resources  Outcome: Progressing     Problem: SELF CARE DEFICIT  Goal: Return ADL status to a safe level of function  Description  INTERVENTIONS:  - Administer medication as ordered  - Assess ADL deficits and provide assistive devices as needed  - Obtain PT/OT consults as needed  - Assist and instruct patient to increase activity and self care as tolerated  Outcome: Progressing     Problem: PAIN - ADULT  Goal: Verbalizes/displays adequate comfort level or baseline comfort level  Description  Interventions:  - Encourage patient to monitor pain and request assistance  - Assess pain using appropriate pain scale  - Administer analgesics based on type and severity of pain and evaluate response  - Implement non-pharmacological measures as appropriate and evaluate response  - Consider cultural and social influences on pain and pain management  - Notify physician/advanced practitioner if interventions unsuccessful or patient reports new pain  Outcome: Progressing     Problem: DISCHARGE PLANNING  Goal: Discharge to home or other facility with appropriate resources  Description  INTERVENTIONS:  - Identify barriers to discharge w/patient and caregiver  - Arrange for needed discharge resources and transportation as appropriate  - Identify discharge learning needs (meds, wound care, etc )  - Arrange for interpretive services to assist at discharge as needed  - Refer to Case Management Department for coordinating discharge planning if the patient needs post-hospital services based on physician/advanced practitioner order or complex needs related to functional status, cognitive ability, or social support system  Outcome: Progressing
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
## 2019-10-17 NOTE — PROGRESS NOTES
Pt anxious and rambling about discharge  Presented scheduled medications to him and he refused to take them  This RN and other RN brought medication to him  Refused both

## 2019-10-17 NOTE — PROGRESS NOTES
Awake and upset with staff for washing his clothing this AM   Refused VS, lisinopril held  Refused shower stating he showered last evening  Disheveled appearance  Easily agitated

## 2019-10-18 LAB
GLUCOSE SERPL-MCNC: 117 MG/DL (ref 65–140)
GLUCOSE SERPL-MCNC: 96 MG/DL (ref 65–140)

## 2019-10-18 PROCEDURE — 82948 REAGENT STRIP/BLOOD GLUCOSE: CPT

## 2019-10-18 PROCEDURE — 99232 SBSQ HOSP IP/OBS MODERATE 35: CPT | Performed by: PSYCHIATRY & NEUROLOGY

## 2019-10-18 RX ORDER — CLOZAPINE 100 MG/1
300 TABLET ORAL
Status: DISCONTINUED | OUTPATIENT
Start: 2019-10-18 | End: 2019-10-20

## 2019-10-18 RX ADMIN — METFORMIN HYDROCHLORIDE 500 MG: 500 TABLET, FILM COATED ORAL at 16:00

## 2019-10-18 RX ADMIN — OLANZAPINE 15 MG: 10 TABLET, FILM COATED ORAL at 16:00

## 2019-10-18 RX ADMIN — METFORMIN HYDROCHLORIDE 500 MG: 500 TABLET, FILM COATED ORAL at 11:25

## 2019-10-18 RX ADMIN — CLOZAPINE 100 MG: 100 TABLET ORAL at 11:25

## 2019-10-18 RX ADMIN — ATORVASTATIN CALCIUM 10 MG: 10 TABLET, FILM COATED ORAL at 16:24

## 2019-10-18 RX ADMIN — PANTOPRAZOLE SODIUM 40 MG: 40 TABLET, DELAYED RELEASE ORAL at 07:00

## 2019-10-18 RX ADMIN — DIVALPROEX SODIUM 1500 MG: 500 TABLET, FILM COATED, EXTENDED RELEASE ORAL at 11:24

## 2019-10-18 RX ADMIN — LEVOTHYROXINE SODIUM 75 MCG: 75 TABLET ORAL at 07:00

## 2019-10-18 NOTE — PROGRESS NOTES
Patient slept throughout the night waking to use the bathroom then returning to bed where he appears to have slept the remainder of the night

## 2019-10-18 NOTE — PROGRESS NOTES
Pt appears more reality oriented, less irritable  Remains disheveled and disorganized, but clean, showers frequently, refusing to shave or comb hair   Carries all his clothes under arm

## 2019-10-18 NOTE — PROGRESS NOTES
Pt continues to refuse lab work and also medications this morning  Upset he cannot be discharged today  Reports he is "tired of seeing other people leave" and feels he has a right to leave too  Staff explained importance of med compliance; pt still refuses at this time  Pt carrying clothes with him around unit

## 2019-10-18 NOTE — PROGRESS NOTES
Progress Note - Behavioral Health   Nunu Carrasco 48 y o  male MRN: [id number]  Unit/Bed#: [facility] 260-01 Encounter: [id number]    Assessment/Plan   Principal Problem:    Schizoaffective disorder ([facility] )  Active Problems:    Type 2 diabetes mellitus ([facility] )    Benign essential hypertension    BMI 34 0-34 9,adult      Subjective:  Patient today refused vital signs, refused lab work, and refused medications in the morning  Did agree to take medications later in the day  Requires much emphasis to take medications  Patient is solely focused on discharge with poor future planning  Patient could not tell me where he would go and what his plan would be if he were to be discharged  Due to mental illness and limited capacity patient is having CSP meeting today to determine disposition planning  If they are unable to locate placement for patient he will most likely be going to [facility]  Today patient was evidently paranoid  He believes that everyone is out to get him  He also believes that we are keeping him here for no reason  He stated that staff are writing notes to him saying he is not going anywhere  Denied hallucinations  Did appear internally preoccupied  Seen pacing the hallways  Did not want to go into mood related symptoms but denied suicidal and homicidal thoughts  Was not agitated today  Was not yelling today  Denied somatic complaints and did not appear to be experiencing adverse side effects      Current Medications:  Current Facility-Administered Medications   Medication Dose Route Frequency    acetaminophen (TYLENOL) tablet 650 mg  650 mg Oral Q6H PRN    aluminum-magnesium hydroxide-simethicone (MYLANTA) 200-200-20 mg/5 mL oral suspension 30 mL  30 mL Oral Q4H PRN    atorvastatin (LIPITOR) tablet 10 mg  10 mg Oral Daily With Dinner    benztropine (COGENTIN) injection 1 mg  1 mg Intramuscular Q6H PRN    benztropine (COGENTIN) tablet 1 mg  1 mg Oral Q6H PRN    cloZAPine (CLOZARIL) tablet 100 mg  100 mg Oral Daily    cloZAPine (CLOZARIL) tablet 250 mg  250 mg Oral HS    divalproex sodium (DEPAKOTE ER) 24 hr tablet 1,500 mg  1,500 mg Oral Daily    haloperidol (HALDOL) tablet 5 mg  5 mg Oral Q6H PRN    haloperidol lactate (HALDOL) injection 5 mg  5 mg Intramuscular Q6H PRN    levothyroxine tablet 75 mcg  75 mcg Oral Early Morning    lisinopril (ZESTRIL) tablet 5 mg  5 mg Oral Daily    LORazepam (ATIVAN) 2 mg/mL injection 1 mg  1 mg Intramuscular Q6H PRN    LORazepam (ATIVAN) tablet 1 mg  1 mg Oral Q6H PRN    magnesium hydroxide (MILK OF MAGNESIA) 400 mg/5 mL oral suspension 30 mL  30 mL Oral Daily PRN    metFORMIN (GLUCOPHAGE) tablet 500 mg  500 mg Oral BID With Meals    nicotine polacrilex (NICORETTE) gum 2 mg  2 mg Oral Q2H PRN    OLANZapine (ZyPREXA) tablet 15 mg  15 mg Oral After Lunch    pantoprazole (PROTONIX) EC tablet 40 mg  40 mg Oral Early Morning    traZODone (DESYREL) tablet 50 mg  50 mg Oral HS PRN       Behavioral Health Medications: all current active meds have been reviewed and continue current psychiatric medications  Vitals:  Vitals:    10/18/19 0733   BP: 105/50   Pulse: 81   Resp: 17   Temp: (!) 95 5 °F (35 3 °C)   SpO2: 99%       Laboratory results:    I have personally reviewed all pertinent laboratory/tests results    Most Recent Labs:   Lab Results   Component Value Date    WBC 8 48 10/12/2019    RBC 4 87 10/12/2019    HGB 14 5 10/12/2019    HCT 43 7 10/12/2019     (L) 10/12/2019    RDW 13 2 10/12/2019    NEUTROABS 4 67 10/12/2019    SODIUM 140 10/05/2019    K 4 0 10/05/2019     10/05/2019    CO2 26 10/05/2019    BUN 12 10/05/2019    CREATININE 0 72 10/05/2019    GLUCOSE 91 05/11/2018    GLUC 143 (H) 10/05/2019    GLUF 99 08/23/2019    CALCIUM 9 0 10/05/2019    AST 14 10/05/2019    ALT 11 (L) 10/05/2019    ALKPHOS 76 10/05/2019    TP 6 3 (L) 10/05/2019    ALB 3 0 (L) 10/05/2019    TBILI 0 20 10/05/2019    CHOLESTEROL 138 08/28/2019    HDL 34 (L) 08/28/2019    TRIG 165 (H) 08/28/2019    LDLCALC 72 08/23/2019    [place] 112 08/23/2019    VALPROICTOT 56 10/08/2019    LITHIUM 0 6 03/27/2014    AMMONIA 32 10/08/2019    CXZ7JVUNBPAC 1 840 08/23/2019    FREET4 0 93 08/23/2019    HGBA1C 6 3 10/05/2019     10/05/2019       Psychiatric Review of Systems:  Behavior over the last 24 hours:  unchanged  Sleep: normal  Appetite: normal  Medication side effects: No  ROS: no complaints    Mental Status Evaluation:  Appearance:  disheveled   Behavior:  restless and fidgety and uncooperative at times   Speech:  loud   Mood:  anxious   Affect:  blunted and increased in range   Language repeating phrases   Thought Process:  concrete, disorganized, illogical and perserverative   Thought Content:  obsessions and paranoid delusions   Perceptual Disturbances: Appears internally preoccupied  Denied hallucinations   Risk Potential: Denied SI/HI  Potential for aggression possible   Sensorium:  person and place   Cognition:  grossly intact   Consciousness:  awake    Recent and Remote Memory limited   Attention: attention span appeared shorter than expected for age   Insight:  limited   Judgment: limited   Gait/Station: normal gait/station and normal balance   Motor Activity: no abnormal movements     Progress Toward Goals: unchanged    Recommended Treatment: Continue with group therapy, milieu therapy and occupational therapy  1   Increase Clozaril to 300mg for psychosis and mood stabilization  2  CBC with diff tomorrow  3  Continue other medications  4  Consider increasing Depakote dosing for mood stabilization  5  Continue to watch blood pressure and possibly discontinue lisinopril due to hypertension  6  [address] meeting today for disposition planning      Risks, benefits and possible side effects of Medications:   Risks, benefits, and possible side effects of medications explained to patient and patient verbalizes understanding        Renetta Hand PA-C

## 2019-10-18 NOTE — PROGRESS NOTES
Staff overheard aggressive yelling coming from pt's room  Upon arrival, peer was standing in pt's doorway, both patient's yelling at [address]  Peer reported being angry because pt spilled cup of water on chair in dayroom and left a half eaten apple on the floor  Patients were

## 2019-10-18 NOTE — CASE MANAGEMENT
YOSELYN met with Judge Sorto from [facility] [address] from [facility], prior to the [facility] meeting scheduled for 2 PM   Harriet Quesada reviewed that Pt has lived at [facility] about 3 times the most recent being since 2017  She said that he was miserable there, & just existing  He would just walk around carrying his jacket(which he does on the unit) & sometimes where a jacket & carry another one in 100 degree heat  She said that an opening became available at the [facility] location & they offered similar support to a [facility], but that it would be in an apartment-style setting  She said that Pt would barricade his room door & become loud & verbally threatening when they tried to talk to him about the need to keep his room clean  Pt did attend the clubhouse some times while living there, & so when he would go out, staff would go in & clean his room  They would find pizza in drawers & feces, & deplorable conditions  Throughout the meeting, Pt would knock repeatedly on the door  He left the meeting but did return & was able to talk to Marilee2 Carlos Lowery, Diony Eden  [facility], & asking when he will have another court date  CM reviewed it would be one week from today  After the meeting, Harriet Quesada reviewed that CM could find the referral for housing on the internet & it could be submitted now & the providers would then make their own determinations
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
## 2019-10-18 NOTE — PROGRESS NOTES
Pt refused scheduled Zyprexa after multiple attempts  Writer encouraged pt to take prior to 2pm meeting  Pt refuses stating "No, I'm waiting for the doctor"  Will notify oncoming shift to offer med again

## 2019-10-18 NOTE — PROGRESS NOTES
Status: Pt seemed a little better, not linger at the exit door  Staff did get to do his laundry  He refused vitals all day yesterday  He refused CBC this morning    Medication: Clozaril increased  D/C: [facility] meeting today, will submit [facility] referral afterwards if the county & insurance agrees     10/18/19 2341   Team Meeting   Meeting Type Daily Rounds   Team Members Present   Team Members Present Physician;Nurse;;Occupational Therapist   Physician Team Member Dr Nickolas Khanna / Dr Levi Newton / Lisha Wing Team Member [facility] Management Team Member Anny Amin / Blanca Virgen   OT Team Member Maria Guadalupe   Patient/Family Present   Patient Present No   Patient's Family Present No

## 2019-10-19 LAB
GLUCOSE SERPL-MCNC: 115 MG/DL (ref 65–140)
GLUCOSE SERPL-MCNC: 116 MG/DL (ref 65–140)

## 2019-10-19 PROCEDURE — 82948 REAGENT STRIP/BLOOD GLUCOSE: CPT

## 2019-10-19 PROCEDURE — 99233 SBSQ HOSP IP/OBS HIGH 50: CPT | Performed by: PSYCHIATRY & NEUROLOGY

## 2019-10-19 RX ADMIN — ATORVASTATIN CALCIUM 10 MG: 10 TABLET, FILM COATED ORAL at 17:39

## 2019-10-19 RX ADMIN — METFORMIN HYDROCHLORIDE 500 MG: 500 TABLET, FILM COATED ORAL at 17:41

## 2019-10-19 RX ADMIN — CLOZAPINE 100 MG: 100 TABLET ORAL at 09:16

## 2019-10-19 RX ADMIN — LEVOTHYROXINE SODIUM 75 MCG: 75 TABLET ORAL at 09:15

## 2019-10-19 RX ADMIN — CLOZAPINE 300 MG: 100 TABLET ORAL at 21:36

## 2019-10-19 RX ADMIN — PANTOPRAZOLE SODIUM 40 MG: 40 TABLET, DELAYED RELEASE ORAL at 09:16

## 2019-10-19 RX ADMIN — OLANZAPINE 15 MG: 10 TABLET, FILM COATED ORAL at 13:50

## 2019-10-19 RX ADMIN — DIVALPROEX SODIUM 1500 MG: 500 TABLET, FILM COATED, EXTENDED RELEASE ORAL at 09:16

## 2019-10-19 RX ADMIN — METFORMIN HYDROCHLORIDE 500 MG: 500 TABLET, FILM COATED ORAL at 09:16

## 2019-10-19 NOTE — PROGRESS NOTES
Progress Note - Behavioral Health   Violeta Samayoa 48 y o  male MRN: [id number]  Unit/Bed#: [facility] 252-01 Encounter: [id number]    Assessment/Plan   Principal Problem:    Schizoaffective disorder ([address] )  Active Problems:    Type 2 diabetes mellitus (HCC)    Benign essential hypertension    BMI 34 0-34 9,adult    Subjective:  Patient refuses last night dose of clozapine as he was angry after finding out that he is planned for [facility]  Today after discussion he took the morning dose of clozapine  Patient has remained regressed and child-like with no insight regarding next step planning and him needing more support after discharge  I discussed with patient in detail that we are planning disposition to [facility]  Patient focused on not going to other hospitals but when I informed him that his sister also want him and now more stable setting he understood but will come back again with same questions  Patient was given the option of wearing his necklace if he work with primary team recommendation of medication compliance, self-care activity  He continues to denies suicidal or homicidal ideation and is consenting for safety on the unit  He has remained disheveled with multiple layers of clothing despite multiple encouragement by staff members      Current Medications:    Current Facility-Administered Medications:  acetaminophen 650 mg Oral Q6H PRN Joel Snider MD   aluminum-magnesium hydroxide-simethicone 30 mL Oral Q4H PRN Joel Snider MD   atorvastatin 10 mg Oral Daily With Aruna Silva MD   benztropine 1 mg Intramuscular Q6H PRN Joel Snider MD   benztropine 1 mg Oral Q6H PRN Joel Snider MD   clozapine 100 mg Oral Daily Kayleigh Ng MD   cloZAPine 300 mg Oral  Yazmin Roberts PA-C   divalproex sodium 1,500 mg Oral Daily Joel Snider MD   haloperidol 5 mg Oral Q6H PRN Joel Snider MD   haloperidol lactate 5 mg Intramuscular Q6H PRN Joel Snider MD   levothyroxine 75 mcg Oral Early Morning Carlos Alberto Roper PA-C lisinopril 5 mg Oral Daily Shay Barahona PA-C   LORazepam 1 mg Intramuscular Q6H PRN Ez Hills MD   LORazepam 1 mg Oral Q6H PRN Ez Hills MD   magnesium hydroxide 30 mL Oral Daily PRN Ez Hills MD   metFORMIN 500 mg Oral BID With Meals Shay Barahona PA-C   nicotine polacrilex 2 mg Oral Q2H PRN Ez Hills MD   OLANZapine 15 mg Oral After Lunch Julio C Gore   pantoprazole 40 mg Oral Early Morning Shay Barahona PA-C   traZODone 50 mg Oral HS PRN Ez Hills MD       Behavioral Health Medications: all current active meds have been reviewed  Vital signs in last 24 hours:  Resp:  [18] 18    Laboratory results:    I have personally reviewed all pertinent laboratory/tests results  Labs in last 72 hours: No results for input(s): WBC, RBC, HGB, HCT, PLT, RDW, NEUTROABS, SODIUM, K, CL, CO2, BUN, CREATININE, GLUCOSE, GLUC, GLUF, CALCIUM, AST, ALT, ALKPHOS, TP, ALB, TBILI, CHOLESTEROL, HDL, TRIG, LDLCALC, VALPROICTOT, CARBAMAZEPIN, LITHIUM, AMMONIA, [id number], FREET4, T3FREE, PREGTESTUR, PREGSERUM, HCG, HCGQUANT, RPR in the last 72 hours      Invalid input(s):  RBC  Admission Labs:   Admission on 10/05/2019   Component Date Value    WBC 10/05/2019 10 35*    RBC 10/05/2019 4 65     Hemoglobin 10/05/2019 13 7     Hematocrit 10/05/2019 41 8     MCV 10/05/2019 90     MCH 10/05/2019 29 5     MCHC 10/05/2019 32 8     RDW 10/05/2019 13 7     MPV 10/05/2019 10 0     Platelets 63/75/2285 148*    nRBC 10/05/2019 0     Neutrophils Relative 10/05/2019 57     Immat GRANS % 10/05/2019 0     Lymphocytes Relative 10/05/2019 31     Monocytes Relative 10/05/2019 11     Eosinophils Relative 10/05/2019 0     Basophils Relative 10/05/2019 1     Neutrophils Absolute 10/05/2019 5 88     Immature Grans Absolute 10/05/2019 0 03     Lymphocytes Absolute 10/05/2019 3 22     Monocytes Absolute 10/05/2019 1 14     Eosinophils Absolute 10/05/2019 0 00     Basophils Absolute 10/05/2019 0 08     Sodium 10/05/2019 140  Potassium 10/05/2019 4 0     Chloride 10/05/2019 106     CO2 10/05/2019 26     ANION GAP 10/05/2019 8     BUN 10/05/2019 12     Creatinine 10/05/2019 0 72     Glucose 10/05/2019 143*    Calcium 10/05/2019 9 0     AST 10/05/2019 14     ALT 10/05/2019 11*    Alkaline Phosphatase 10/05/2019 76     Total Protein 10/05/2019 6 3*    Albumin 10/05/2019 3 0*    Total Bilirubin 10/05/2019 0 20     eGFR 10/05/2019 107     Hemoglobin A1C 10/05/2019 6 3     EAG 10/05/2019 134     POC Glucose 10/06/2019 120     POC Glucose 10/06/2019 119     Ventricular Rate 10/05/2019 57     Atrial Rate 10/05/2019 57     MS Interval 10/05/2019 162     QRSD Interval 10/05/2019 96     QT Interval 10/05/2019 380     QTC Interval 10/05/2019 369     P Axis 10/05/2019 48     QRS Axis 10/05/2019 -19     T Wave Axis 10/05/2019 10     POC Glucose 10/07/2019 93     POC Glucose 10/07/2019 89     POC Glucose 10/07/2019 101     Ammonia 10/08/2019 32     Valproic Acid, Total 10/08/2019 56     POC Glucose 10/08/2019 114     POC Glucose 10/08/2019 106     POC Glucose 10/08/2019 103     POC Glucose 10/08/2019 97     POC Glucose 10/09/2019 129     POC Glucose 10/09/2019 134     POC Glucose 10/09/2019 186*    POC Glucose 10/10/2019 127     POC Glucose 10/10/2019 102     POC Glucose 10/10/2019 88     POC Glucose 10/10/2019 144*    POC Glucose 10/11/2019 103     POC Glucose 10/11/2019 93     POC Glucose 10/11/2019 152*    WBC 10/12/2019 8 48     RBC 10/12/2019 4 87     Hemoglobin 10/12/2019 14 5     Hematocrit 10/12/2019 43 7     MCV 10/12/2019 90     MCH 10/12/2019 29 8     MCHC 10/12/2019 33 2     RDW 10/12/2019 13 2     MPV 10/12/2019 10 3     Platelets 47/78/2501 148*    nRBC 10/12/2019 0     Neutrophils Relative 10/12/2019 54     Immat GRANS % 10/12/2019 1     Lymphocytes Relative 10/12/2019 31     Monocytes Relative 10/12/2019 13*    Eosinophils Relative 10/12/2019 0     Basophils Relative 10/12/2019 1     Neutrophils Absolute 10/12/2019 4 67     Immature Grans Absolute 10/12/2019 0 05     Lymphocytes Absolute 10/12/2019 2 59     Monocytes Absolute 10/12/2019 1 11     Eosinophils Absolute 10/12/2019 0 00     Basophils Absolute 10/12/2019 0 06     POC Glucose 10/12/2019 112     POC Glucose 10/12/2019 110     POC Glucose 10/12/2019 115     POC Glucose 10/13/2019 107     POC Glucose 10/13/2019 112     POC Glucose 10/13/2019 99     POC Glucose 10/13/2019 114     POC Glucose 10/14/2019 122     POC Glucose 10/14/2019 119     POC Glucose 10/14/2019 102     POC Glucose 10/14/2019 136     POC Glucose 10/15/2019 110     POC Glucose 10/15/2019 130     POC Glucose 10/15/2019 89     POC Glucose 10/15/2019 87     POC Glucose 10/16/2019 113     POC Glucose 10/16/2019 102     POC Glucose 10/16/2019 86     POC Glucose 10/16/2019 111     POC Glucose 10/17/2019 124     POC Glucose 10/17/2019 96     POC Glucose 10/18/2019 117     POC Glucose 10/18/2019 96     POC Glucose 10/19/2019 115        Psychiatric Review of Systems:  Behavior over the last 24 hours:  unchanged  Sleep: normal  Appetite: normal  Medication side effects: No  ROS: no complaints    Mental Status Evaluation:  Appearance:  disheveled   Behavior:  guarded   Speech:  loud   Mood:  angry   Affect:  increased in range   Language rapid   Thought Process:  disorganized   Thought Content:  delusions  persecutory   Perceptual Disturbances:  Auditory hallucinations without commands   Risk Potential: Suicidal Ideations without plan, Homicidal Ideations none and Potential for Aggression No   Sensorium:  person, place and time/date   Cognition:  grossly intact   Consciousness:  awake    Attention: attention span appeared shorter than expected for age   Insight:  limited   Judgment: limited   Intellect fair   Gait/Station: normal gait/station   Motor Activity: no abnormal movements     Memory: Short and long term memory  fair     Progress Toward Goals: slow progress    Recommended Treatment:   Disposition planning to [facility]  Patient will need single room due to his agitation and malodorous presentation  Continue with group therapy, milieu therapy and occupational therapy  Continue following current medications:   Current Facility-Administered Medications:  acetaminophen 650 mg Oral Q6H PRN Tracy Nunez MD   aluminum-magnesium hydroxide-simethicone 30 mL Oral Q4H PRN Tracy Nunez MD   atorvastatin 10 mg Oral Daily With Elena Robert MD   benztropine 1 mg Intramuscular Q6H PRN Tracy Nunez MD   benztropine 1 mg Oral Q6H PRN Tracy Nunez MD   clozapine 100 mg Oral Daily Tiny Whipple MD   cloZAPine 300 mg Oral HS Ez Molina PA-C   divalproex sodium 1,500 mg Oral Daily Tracy Nunez MD   haloperidol 5 mg Oral Q6H PRN Tracy Nunez MD   haloperidol lactate 5 mg Intramuscular Q6H PRN Tracy Nunez MD   levothyroxine 75 mcg Oral Early Morning Tonya Cintron PA-C   lisinopril 5 mg Oral Daily Tonya Cintron PA-C   LORazepam 1 mg Intramuscular Q6H PRN Tracy Nunez MD   LORazepam 1 mg Oral Q6H PRN Tracy Nunez MD   magnesium hydroxide 30 mL Oral Daily PRN Tracy Nunez MD   metFORMIN 500 mg Oral BID With Meals Tonya Cintron PA-C   nicotine polacrilex 2 mg Oral Q2H PRN Tracy Nunez MD   OLANZapine 15 mg Oral After Lunch Julio C Gore   pantoprazole 40 mg Oral Early Morning Tonya Cintron PA-C   traZODone 50 mg Oral HS PRN Tracy Nunez MD       Risks, benefits and possible side effects of Medications:   Risks, benefits, and possible side effects of medications explained to patient and patient verbalizes understanding  This note has been constructed using a voice recognition system  There may be translation, syntax,  or grammatical errors  If you have any questions, please contact the dictating provider

## 2019-10-19 NOTE — PROGRESS NOTES
Patient was restless during the early morning unable to fully sleep  He was able to fall asleep during the second portion of the night and currently remains asleep

## 2019-10-19 NOTE — PROGRESS NOTES
AM rounds:    Meeting yesterday determined to move forward with [facility] referral  Refused HX Clozaril  Refused labs  Pt is malodorous, disheveled, wearing multiple layers of clothing  Had verbal altercation with peer  Pt now has single room order do to numerous complaints about poor hygiene

## 2019-10-19 NOTE — PROGRESS NOTES
Pt is disheveled and malodorous  Restless, pacing the halls  Focused on belongings in his locker  Repeatedly asks if the doctor is still on the unit  Frequently filling his water cup and listening to music in his room

## 2019-10-19 NOTE — PROGRESS NOTES
Pt wandering unit, frequently asking for water, when phone will be on, radio, etc   Also reporting he is being discharged although has been told he will not be discharged this weekend  Compliant with meds this morning and less labile  Appears disheveled, carrying items around unit  Clothing is layered

## 2019-10-19 NOTE — PROGRESS NOTES
Pt showered  Slight improvement in appearance  Frequenting the nurses station less  Appears less restless

## 2019-10-19 NOTE — PROGRESS NOTES
Pt frequently at nurses desk asking for various things  He wanted his necklace with the cross  The necklace was very long so writer said he couldn't have it  Placed back in locker  Pt keeps coming to desk saying he wants out and when will  come? Is in dayroom with peers wearing a dirty white sleeveless tube shirt over shirt  Gets upset with redirection

## 2019-10-20 LAB
BASOPHILS # BLD AUTO: 0.06 THOUSANDS/ΜL (ref 0–0.1)
BASOPHILS NFR BLD AUTO: 1 % (ref 0–1)
EOSINOPHIL # BLD AUTO: 0 THOUSAND/ΜL (ref 0–0.61)
EOSINOPHIL NFR BLD AUTO: 0 % (ref 0–6)
ERYTHROCYTE [DISTWIDTH] IN BLOOD BY AUTOMATED COUNT: 12.9 % (ref 11.6–15.1)
GLUCOSE SERPL-MCNC: 123 MG/DL (ref 65–140)
GLUCOSE SERPL-MCNC: 188 MG/DL (ref 65–140)
HCT VFR BLD AUTO: 42.3 % (ref 36.5–49.3)
HGB BLD-MCNC: 14 G/DL (ref 12–17)
IMM GRANULOCYTES # BLD AUTO: 0.06 THOUSAND/UL (ref 0–0.2)
IMM GRANULOCYTES NFR BLD AUTO: 1 % (ref 0–2)
LYMPHOCYTES # BLD AUTO: 2.02 THOUSANDS/ΜL (ref 0.6–4.47)
LYMPHOCYTES NFR BLD AUTO: 24 % (ref 14–44)
MCH RBC QN AUTO: 29.7 PG (ref 26.8–34.3)
MCHC RBC AUTO-ENTMCNC: 33.1 G/DL (ref 31.4–37.4)
MCV RBC AUTO: 90 FL (ref 82–98)
MONOCYTES # BLD AUTO: 0.95 THOUSAND/ΜL (ref 0.17–1.22)
MONOCYTES NFR BLD AUTO: 11 % (ref 4–12)
NEUTROPHILS # BLD AUTO: 5.31 THOUSANDS/ΜL (ref 1.85–7.62)
NEUTS SEG NFR BLD AUTO: 63 % (ref 43–75)
NRBC BLD AUTO-RTO: 0 /100 WBCS
PLATELET # BLD AUTO: 157 THOUSANDS/UL (ref 149–390)
PMV BLD AUTO: 10.1 FL (ref 8.9–12.7)
RBC # BLD AUTO: 4.72 MILLION/UL (ref 3.88–5.62)
WBC # BLD AUTO: 8.4 THOUSAND/UL (ref 4.31–10.16)

## 2019-10-20 PROCEDURE — 99233 SBSQ HOSP IP/OBS HIGH 50: CPT | Performed by: PSYCHIATRY & NEUROLOGY

## 2019-10-20 PROCEDURE — 82948 REAGENT STRIP/BLOOD GLUCOSE: CPT

## 2019-10-20 PROCEDURE — 85025 COMPLETE CBC W/AUTO DIFF WBC: CPT | Performed by: PSYCHIATRY & NEUROLOGY

## 2019-10-20 RX ORDER — CLOZAPINE 100 MG/1
350 TABLET ORAL
Status: DISCONTINUED | OUTPATIENT
Start: 2019-10-20 | End: 2019-12-23 | Stop reason: HOSPADM

## 2019-10-20 RX ADMIN — ATORVASTATIN CALCIUM 10 MG: 10 TABLET, FILM COATED ORAL at 16:25

## 2019-10-20 RX ADMIN — OLANZAPINE 15 MG: 10 TABLET, FILM COATED ORAL at 14:31

## 2019-10-20 RX ADMIN — METFORMIN HYDROCHLORIDE 500 MG: 500 TABLET, FILM COATED ORAL at 16:25

## 2019-10-20 RX ADMIN — ACETAMINOPHEN 650 MG: 325 TABLET, FILM COATED ORAL at 00:20

## 2019-10-20 RX ADMIN — CLOZAPINE 100 MG: 100 TABLET ORAL at 10:27

## 2019-10-20 RX ADMIN — DIVALPROEX SODIUM 1500 MG: 500 TABLET, FILM COATED, EXTENDED RELEASE ORAL at 10:28

## 2019-10-20 RX ADMIN — METFORMIN HYDROCHLORIDE 500 MG: 500 TABLET, FILM COATED ORAL at 10:27

## 2019-10-20 NOTE — PROGRESS NOTES
AM Rounds:    Single room ordered due to poor ADLs  Repetitive behaviors, asking about discharge, if the MD is here and to go into his locker  Refuses vitals and labwork  Pt resistant to take meds, needing much encouragement

## 2019-10-20 NOTE — PROGRESS NOTES
Patient pleasant during interaction  Walking halls  Smiling at staff off and on this evening  Denies SI/HI  Compliant with some medications today, but not all  Still refusing vital signs  Shayy Llamas will continue to encourage pt to allow vitals to be assessed and to take all scheduled medicine

## 2019-10-20 NOTE — PROGRESS NOTES
Progress Note - Behavioral Health   Brook Estrella 48 y o  male MRN: [id number]  Unit/Bed#: [facility] 252-01 Encounter: [id number]    Assessment/Plan   Principal Problem:    Schizoaffective disorder ([address] )  Active Problems:    Type 2 diabetes mellitus (HCC)    Benign essential hypertension    BMI 34 0-34 9,adult    Subjective:  Patient is selectively compliant with medication but remained defiant and get angry easily when his needs are not met  Patient is disheveled and his room is malodorous  Today is refusing his morning medication  Patient also refusing again CBC with diff today  Needed frequent encouragement and redirection  Patient will not changes close despite multiple attempts by me and staff members  Patient remained with poor insight and judgment and remained an immediate risk to self due to inability to care for self      Current Medications:    Current Facility-Administered Medications:  acetaminophen 650 mg Oral Q6H PRN Luciana Bledsoe MD   aluminum-magnesium hydroxide-simethicone 30 mL Oral Q4H PRVICENTE Bledsoe MD   atorvastatin 10 mg Oral Daily With Lulú Interiano MD   benztropine 1 mg Intramuscular Q6H PRVICENTE Bledsoe MD   benztropine 1 mg Oral Q6H PRVICENTE Bledsoe MD   clozapine 100 mg Oral Daily Jeffry Leonard MD   cloZAPine 350 mg Oral HS Julio C Gore   divalproex sodium 1,500 mg Oral Daily Luciana Bledsoe MD   haloperidol 5 mg Oral Q6H PRVICENTE Bledsoe MD   haloperidol lactate 5 mg Intramuscular Q6H PRVICENTE Bledsoe MD   levothyroxine 75 mcg Oral Early Morning MARIA GUADALUPE WellerC   lisinopril 5 mg Oral Daily Dyllan Adam PA-C   LORazepam 1 mg Intramuscular Q6H PRVICENTE Bledsoe MD   LORazepam 1 mg Oral Q6H PRVICENTE Bledsoe MD   magnesium hydroxide 30 mL Oral Daily PRVICENTE Bledsoe MD   metFORMIN 500 mg Oral BID With Meals Dyllan Adam PA-C   nicotine polacrilex 2 mg Oral Q2H PRN Luciana Bledsoe MD   OLANZapine 15 mg Oral After Lunch Julio C Gore   pantoprazole 40 mg Oral Early Morning Rhina Arizmendi Michelle Joy PA-C   traZODone 50 mg Oral HS PRN Sandra Rodriguez MD       Behavioral Health Medications: all current active meds have been reviewed  Vital signs in last 24 hours:       Laboratory results:    I have personally reviewed all pertinent laboratory/tests results  Labs in last 72 hours: No results for input(s): WBC, RBC, HGB, HCT, PLT, RDW, NEUTROABS, SODIUM, K, CL, CO2, BUN, CREATININE, GLUCOSE, GLUC, GLUF, CALCIUM, AST, ALT, ALKPHOS, TP, ALB, TBILI, CHOLESTEROL, HDL, TRIG, LDLCALC, VALPROICTOT, CARBAMAZEPIN, LITHIUM, AMMONIA, [id number], FREET4, T3FREE, PREGTESTUR, PREGSERUM, HCG, HCGQUANT, RPR in the last 72 hours      Invalid input(s):  RBC  Admission Labs:   Admission on 10/05/2019   Component Date Value    WBC 10/05/2019 10 35*    RBC 10/05/2019 4 65     Hemoglobin 10/05/2019 13 7     Hematocrit 10/05/2019 41 8     MCV 10/05/2019 90     MCH 10/05/2019 29 5     MCHC 10/05/2019 32 8     RDW 10/05/2019 13 7     MPV 10/05/2019 10 0     Platelets 62/37/7007 148*    nRBC 10/05/2019 0     Neutrophils Relative 10/05/2019 57     Immat GRANS % 10/05/2019 0     Lymphocytes Relative 10/05/2019 31     Monocytes Relative 10/05/2019 11     Eosinophils Relative 10/05/2019 0     Basophils Relative 10/05/2019 1     Neutrophils Absolute 10/05/2019 5 88     Immature Grans Absolute 10/05/2019 0 03     Lymphocytes Absolute 10/05/2019 3 22     Monocytes Absolute 10/05/2019 1 14     Eosinophils Absolute 10/05/2019 0 00     Basophils Absolute 10/05/2019 0 08     Sodium 10/05/2019 140     Potassium 10/05/2019 4 0     Chloride 10/05/2019 106     CO2 10/05/2019 26     ANION GAP 10/05/2019 8     BUN 10/05/2019 12     Creatinine 10/05/2019 0 72     Glucose 10/05/2019 143*    Calcium 10/05/2019 9 0     AST 10/05/2019 14     ALT 10/05/2019 11*    Alkaline Phosphatase 10/05/2019 76     Total Protein 10/05/2019 6 3*    Albumin 10/05/2019 3 0*    Total Bilirubin 10/05/2019 0 20     eGFR 10/05/2019 107  Hemoglobin A1C 10/05/2019 6 3     EAG 10/05/2019 134     POC Glucose 10/06/2019 120     POC Glucose 10/06/2019 119     Ventricular Rate 10/05/2019 57     Atrial Rate 10/05/2019 57     WI Interval 10/05/2019 162     QRSD Interval 10/05/2019 96     QT Interval 10/05/2019 380     QTC Interval 10/05/2019 369     P Axis 10/05/2019 48     QRS Axis 10/05/2019 -19     T Wave Axis 10/05/2019 10     POC Glucose 10/07/2019 93     POC Glucose 10/07/2019 89     POC Glucose 10/07/2019 101     Ammonia 10/08/2019 32     Valproic Acid, Total 10/08/2019 56     POC Glucose 10/08/2019 114     POC Glucose 10/08/2019 106     POC Glucose 10/08/2019 103     POC Glucose 10/08/2019 97     POC Glucose 10/09/2019 129     POC Glucose 10/09/2019 134     POC Glucose 10/09/2019 186*    POC Glucose 10/10/2019 127     POC Glucose 10/10/2019 102     POC Glucose 10/10/2019 88     POC Glucose 10/10/2019 144*    POC Glucose 10/11/2019 103     POC Glucose 10/11/2019 93     POC Glucose 10/11/2019 152*    WBC 10/12/2019 8 48     RBC 10/12/2019 4 87     Hemoglobin 10/12/2019 14 5     Hematocrit 10/12/2019 43 7     MCV 10/12/2019 90     MCH 10/12/2019 29 8     MCHC 10/12/2019 33 2     RDW 10/12/2019 13 2     MPV 10/12/2019 10 3     Platelets 87/24/2477 148*    nRBC 10/12/2019 0     Neutrophils Relative 10/12/2019 54     Immat GRANS % 10/12/2019 1     Lymphocytes Relative 10/12/2019 31     Monocytes Relative 10/12/2019 13*    Eosinophils Relative 10/12/2019 0     Basophils Relative 10/12/2019 1     Neutrophils Absolute 10/12/2019 4 67     Immature Grans Absolute 10/12/2019 0 05     Lymphocytes Absolute 10/12/2019 2 59     Monocytes Absolute 10/12/2019 1 11     Eosinophils Absolute 10/12/2019 0 00     Basophils Absolute 10/12/2019 0 06     POC Glucose 10/12/2019 112     POC Glucose 10/12/2019 110     POC Glucose 10/12/2019 115     POC Glucose 10/13/2019 107     POC Glucose 10/13/2019 112     POC Glucose 10/13/2019 99     POC Glucose 10/13/2019 114     POC Glucose 10/14/2019 122     POC Glucose 10/14/2019 119     POC Glucose 10/14/2019 102     POC Glucose 10/14/2019 136     POC Glucose 10/15/2019 110     POC Glucose 10/15/2019 130     POC Glucose 10/15/2019 89     POC Glucose 10/15/2019 87     POC Glucose 10/16/2019 113     POC Glucose 10/16/2019 102     POC Glucose 10/16/2019 86     POC Glucose 10/16/2019 111     POC Glucose 10/17/2019 124     POC Glucose 10/17/2019 96     POC Glucose 10/18/2019 117     POC Glucose 10/18/2019 96     POC Glucose 10/19/2019 115     POC Glucose 10/19/2019 116     POC Glucose 10/20/2019 123        Psychiatric Review of Systems:  Behavior over the last 24 hours:  unchanged  Sleep: normal  Appetite: normal  Medication side effects: No  ROS: no complaints    Mental Status Evaluation:  Appearance:  disheveled   Behavior:  guarded and psychomotor agitation   Speech:  loud   Mood:  angry and anxious   Affect:  labile   Language rapid   Thought Process:  circumstantial and disorganized   Thought Content:  delusions  persecutory   Perceptual Disturbances: Auditory hallucinations without commands   Risk Potential: Suicidal Ideations without plan, Homicidal Ideations none and Potential for Aggression No   Sensorium:  person and place   Cognition:  grossly intact   Consciousness:  awake    Attention: attention span appeared shorter than expected for age   Insight:  limited   Judgment: limited   Intellect limited   Gait/Station: normal gait/station   Motor Activity: no abnormal movements     Memory: Short and long term memory  fair     Progress Toward Goals: slow progress    Recommended Treatment:   Increase clozapine to 100 mg a m  And 350 mg at HS for psychosis and mood stabilization  Continue Depakote ER 1500 mg daily for mood stabilization  Check CBC with diff today    Continue olanzapine 15 mg daily after lunch for psychosis management till clozapine is therapeutic  Disposition planning for EAC for further management and stabilization  Continue with group therapy, milieu therapy and occupational therapy  Continue following current medications:   Current Facility-Administered Medications:  acetaminophen 650 mg Oral Q6H PRN Luis West MD   aluminum-magnesium hydroxide-simethicone 30 mL Oral Q4H PRN Luis West MD   atorvastatin 10 mg Oral Daily With Asha Chan MD   benztropine 1 mg Intramuscular Q6H PRN Luis West MD   benztropine 1 mg Oral Q6H PRN Luis West MD   clozapine 100 mg Oral Daily Todd Roth MD   cloZAPine 350 mg Oral HS Julio C Gore   divalproex sodium 1,500 mg Oral Daily Luis West MD   haloperidol 5 mg Oral Q6H PRN Luis West MD   haloperidol lactate 5 mg Intramuscular Q6H PRN Luis West MD   levothyroxine 75 mcg Oral Early Morning [place]   lisinopril 5 mg Oral Daily [place] XUAN Briggs   LORazepam 1 mg Intramuscular Q6H PRN Luis West MD   LORazepam 1 mg Oral Q6H PRN Luis West MD   magnesium hydroxide 30 mL Oral Daily PRN Luis West MD   metFORMIN 500 mg Oral BID With Meals Oralia Briggs PA-C   nicotine polacrilex 2 mg Oral Q2H PRN Luis West MD   OLANZapine 15 mg Oral After Lunch Julio C Gore   pantoprazole 40 mg Oral Early Morning [place] XUAN Briggs   traZODone 50 mg Oral HS PRN Luis West MD       Risks, benefits and possible side effects of Medications:   Risks, benefits, and possible side effects of medications explained to patient and patient verbalizes understanding  This note has been constructed using a voice recognition system  There may be translation, syntax,  or grammatical errors  If you have any questions, please contact the dictating provider

## 2019-10-20 NOTE — PROGRESS NOTES
Pt seclusive to room  Napping off and on  Attended meals  Required much encouragement before taking meds and allowing blood draw

## 2019-10-20 NOTE — PROGRESS NOTES
Awake & in good spirits at the onset of the shift  To nurse's station multiple times, requesting water Or asking about when he'll leave here  Needed multiple redirections  Still up  C/o foot pain at 0020, for which he received Tylenol 650 mg po prn upon request at that time  Good effect obtained, as was asleep by 0130  Awake by 0500, again,  With multiple requests  Still smiling at staff, even when refusing meds  Currently in room, resting  Will continue to monitor

## 2019-10-21 LAB
GLUCOSE SERPL-MCNC: 121 MG/DL (ref 65–140)
GLUCOSE SERPL-MCNC: 128 MG/DL (ref 65–140)

## 2019-10-21 PROCEDURE — 99232 SBSQ HOSP IP/OBS MODERATE 35: CPT | Performed by: PSYCHIATRY & NEUROLOGY

## 2019-10-21 PROCEDURE — 82948 REAGENT STRIP/BLOOD GLUCOSE: CPT

## 2019-10-21 RX ADMIN — METFORMIN HYDROCHLORIDE 500 MG: 500 TABLET, FILM COATED ORAL at 15:53

## 2019-10-21 RX ADMIN — CLOZAPINE 100 MG: 100 TABLET ORAL at 09:55

## 2019-10-21 RX ADMIN — ATORVASTATIN CALCIUM 10 MG: 10 TABLET, FILM COATED ORAL at 15:53

## 2019-10-21 RX ADMIN — CLOZAPINE 350 MG: 100 TABLET ORAL at 22:03

## 2019-10-21 RX ADMIN — LEVOTHYROXINE SODIUM 75 MCG: 75 TABLET ORAL at 09:56

## 2019-10-21 RX ADMIN — ACETAMINOPHEN 650 MG: 325 TABLET, FILM COATED ORAL at 18:29

## 2019-10-21 RX ADMIN — DIVALPROEX SODIUM 1500 MG: 500 TABLET, FILM COATED, EXTENDED RELEASE ORAL at 09:54

## 2019-10-21 RX ADMIN — METFORMIN HYDROCHLORIDE 500 MG: 500 TABLET, FILM COATED ORAL at 09:54

## 2019-10-21 RX ADMIN — PANTOPRAZOLE SODIUM 40 MG: 40 TABLET, DELAYED RELEASE ORAL at 09:54

## 2019-10-21 RX ADMIN — OLANZAPINE 15 MG: 10 TABLET, FILM COATED ORAL at 13:28

## 2019-10-21 NOTE — PROGRESS NOTES
Progress Note - Behavioral Health   Wilfrid Mosley 48 y o  male MRN: [id number]  Unit/Bed#: [facility] 252-01 Encounter: [id number]    Assessment/Plan   Principal Problem:    Schizoaffective disorder ([address] )  Active Problems:    Type 2 diabetes mellitus (HCC)    Benign essential hypertension    BMI 34 0-34 9,adult      Subjective:  Patient ruminating and perseverating when he will be discharged  Last night refused Clozaril and today appeared more anxious and restless  Does not speak very long and will often walk away during interview  Must approach patient multiple times get answers  Patient appears to be paranoid towards staff  Had suspicious look when approaching patient  Remains with internal preoccupation  Will deny hallucinations when asked  Does report to other staff members recently had auditory hallucinations of voices  Denied SI/HI  Did not go into mood related symptoms  Denied somatic complaints  Appears to be tolerating medications well without serious side effects  If patient continues to refuse medications consider medications over objection      Current Medications:  Current Facility-Administered Medications   Medication Dose Route Frequency    acetaminophen (TYLENOL) tablet 650 mg  650 mg Oral Q6H PRN    aluminum-magnesium hydroxide-simethicone (MYLANTA) 200-200-20 mg/5 mL oral suspension 30 mL  30 mL Oral Q4H PRN    atorvastatin (LIPITOR) tablet 10 mg  10 mg Oral Daily With Dinner    benztropine (COGENTIN) injection 1 mg  1 mg Intramuscular Q6H PRN    benztropine (COGENTIN) tablet 1 mg  1 mg Oral Q6H PRN    cloZAPine (CLOZARIL) tablet 100 mg  100 mg Oral Daily    cloZAPine (CLOZARIL) tablet 350 mg  350 mg Oral HS    divalproex sodium (DEPAKOTE ER) 24 hr tablet 1,500 mg  1,500 mg Oral Daily    haloperidol (HALDOL) tablet 5 mg  5 mg Oral Q6H PRN    haloperidol lactate (HALDOL) injection 5 mg  5 mg Intramuscular Q6H PRN    levothyroxine tablet 75 mcg  75 mcg Oral Early Morning    lisinopril (ZESTRIL) tablet 5 mg  5 mg Oral Daily    LORazepam (ATIVAN) 2 mg/mL injection 1 mg  1 mg Intramuscular Q6H PRN    LORazepam (ATIVAN) tablet 1 mg  1 mg Oral Q6H PRN    magnesium hydroxide (MILK OF MAGNESIA) 400 mg/5 mL oral suspension 30 mL  30 mL Oral Daily PRN    metFORMIN (GLUCOPHAGE) tablet 500 mg  500 mg Oral BID With Meals    nicotine polacrilex (NICORETTE) gum 2 mg  2 mg Oral Q2H PRN    OLANZapine (ZyPREXA) tablet 15 mg  15 mg Oral After Lunch    pantoprazole (PROTONIX) EC tablet 40 mg  40 mg Oral Early Morning    traZODone (DESYREL) tablet 50 mg  50 mg Oral HS PRN       Behavioral Health Medications: all current active meds have been reviewed and continue current psychiatric medications  Vitals:  Vitals:    10/18/19 0733   SpO2: 99%       Laboratory results:    I have personally reviewed all pertinent laboratory/tests results    Most Recent Labs:   Lab Results   Component Value Date    WBC 8 40 10/20/2019    RBC 4 72 10/20/2019    HGB 14 0 10/20/2019    HCT 42 3 10/20/2019     10/20/2019    RDW 12 9 10/20/2019    NEUTROABS 5 31 10/20/2019    SODIUM 140 10/05/2019    K 4 0 10/05/2019     10/05/2019    CO2 26 10/05/2019    BUN 12 10/05/2019    CREATININE 0 72 10/05/2019    GLUCOSE 91 05/11/2018    GLUC 143 (H) 10/05/2019    GLUF 99 08/23/2019    CALCIUM 9 0 10/05/2019    AST 14 10/05/2019    ALT 11 (L) 10/05/2019    ALKPHOS 76 10/05/2019    TP 6 3 (L) 10/05/2019    ALB 3 0 (L) 10/05/2019    TBILI 0 20 10/05/2019    CHOLESTEROL 138 08/28/2019    HDL 34 (L) 08/28/2019    TRIG 165 (H) 08/28/2019    LDLCALC 72 08/23/2019    NONHDLC 112 08/23/2019    VALPROICTOT 56 10/08/2019    LITHIUM 0 6 03/27/2014    AMMONIA 32 10/08/2019    [id number] 1 840 08/23/2019    FREET4 0 93 08/23/2019    HGBA1C 6 3 10/05/2019     10/05/2019       Psychiatric Review of Systems:  Behavior over the last 24 hours:  unchanged  Sleep: normal  Appetite: normal  Medication side effects: No  ROS: no complaints    Mental Status Evaluation:  Appearance:  casually dressed   Behavior:  restless and fidgety, uncooperative at times   Speech:  pressured   Mood:  anxious   Affect:  blunted and increased in range   Language naming objects and repeating phrases   Thought Process:  concrete, disorganized, illogical and perserverative   Thought Content:  obsessions and paranoid delusions   Perceptual Disturbances: internal preoccupation   Risk Potential: Denied SI/HI  Potential for aggression: possible   Sensorium:  person and place   Cognition:  grossly intact   Consciousness:  alert and awake    Recent and Remote Memory limited   Attention: attention span appeared shorter than expected for age   Insight:  limited   Judgment: limited   Gait/Station: normal gait/station and normal balance   Motor Activity: no abnormal movements     Progress Toward Goals: unchanged    Recommended Treatment: Continue with group therapy, milieu therapy and occupational therapy  1   Continue slow titration of Clozaril  2  Consider increasing Depakote dosing for mood stabilization  3  Continue other medications  4  EAC is recommended at this time  Risks, benefits and possible side effects of Medications:   Risks, benefits, and possible side effects of medications explained to patient and patient verbalizes understanding        Mari Pérez PA-C

## 2019-10-21 NOTE — PROGRESS NOTES
Pt remains disheveled, malodorous, at times is incontinent of urine at times, needs supervision and redirection, always wants to make a deal to get more clothes

## 2019-10-21 NOTE — PROGRESS NOTES
Pt refused bedtime dose of Clozaril 350mg, offered twice  Pt appears to have slept much of the night  Sevier coughing and talking/moaning in sleep

## 2019-10-21 NOTE — PROGRESS NOTES
Status: Pt did agree to CBC lab, however, continues to refuse vitals  Pt refused PM Clozaril as well last night  At times Pt is singing/pleasant, & other times irritable     Medication: Clozaril increased, however, Pt refused it / PRN - Tylenol   D/C: TBD - move forward with EAC referral per [facility] on Fri     10/21/19 0741   Team Meeting   Meeting Type Daily Rounds   Team Members Present   Team Members Present Physician;Nurse;;Occupational Therapist   Physician Team Member Dr Bobby Obregon / Dr Stefany Fagan / Dante Schaumann Team Member [facility] Management Team Member Meli Brennan / Zac Escobar   OT Team Member Maria Guadalupe   Patient/Family Present   Patient Present No   Patient's Family Present No

## 2019-10-21 NOTE — PROGRESS NOTES
Pt wandering unit  Frequently asking when he is leaving or can he get something from his locker  Pt refused meds but later took them  Focused on leaving  Denied SI, HI and AVH

## 2019-10-22 LAB — GLUCOSE SERPL-MCNC: 113 MG/DL (ref 65–140)

## 2019-10-22 PROCEDURE — 82948 REAGENT STRIP/BLOOD GLUCOSE: CPT

## 2019-10-22 PROCEDURE — 99231 SBSQ HOSP IP/OBS SF/LOW 25: CPT | Performed by: PHYSICIAN ASSISTANT

## 2019-10-22 RX ORDER — DIVALPROEX SODIUM 500 MG/1
500 TABLET, DELAYED RELEASE ORAL
Status: COMPLETED | OUTPATIENT
Start: 2019-10-22 | End: 2019-10-22

## 2019-10-22 RX ORDER — DIVALPROEX SODIUM 500 MG/1
1000 TABLET, DELAYED RELEASE ORAL 2 TIMES DAILY
Status: DISCONTINUED | OUTPATIENT
Start: 2019-10-23 | End: 2019-12-23 | Stop reason: HOSPADM

## 2019-10-22 RX ADMIN — PANTOPRAZOLE SODIUM 40 MG: 40 TABLET, DELAYED RELEASE ORAL at 09:29

## 2019-10-22 RX ADMIN — DIVALPROEX SODIUM 1500 MG: 500 TABLET, FILM COATED, EXTENDED RELEASE ORAL at 09:28

## 2019-10-22 RX ADMIN — METFORMIN HYDROCHLORIDE 500 MG: 500 TABLET, FILM COATED ORAL at 09:29

## 2019-10-22 RX ADMIN — DIVALPROEX SODIUM 500 MG: 500 TABLET, DELAYED RELEASE ORAL at 18:38

## 2019-10-22 RX ADMIN — ATORVASTATIN CALCIUM 10 MG: 10 TABLET, FILM COATED ORAL at 18:38

## 2019-10-22 RX ADMIN — OLANZAPINE 15 MG: 10 TABLET, FILM COATED ORAL at 14:19

## 2019-10-22 RX ADMIN — ACETAMINOPHEN 650 MG: 325 TABLET, FILM COATED ORAL at 10:33

## 2019-10-22 RX ADMIN — CLOZAPINE 350 MG: 100 TABLET ORAL at 21:32

## 2019-10-22 RX ADMIN — LISINOPRIL 5 MG: 5 TABLET ORAL at 09:28

## 2019-10-22 RX ADMIN — METFORMIN HYDROCHLORIDE 500 MG: 500 TABLET, FILM COATED ORAL at 18:38

## 2019-10-22 RX ADMIN — LEVOTHYROXINE SODIUM 75 MCG: 75 TABLET ORAL at 09:30

## 2019-10-22 RX ADMIN — CLOZAPINE 100 MG: 100 TABLET ORAL at 09:28

## 2019-10-22 NOTE — CASE MANAGEMENT
CM worked on completing the [facility] referral   CM emailed Santi Doyle of [facility] Pt's sister, Nunu Paez, for information/documents needed for the referral     Pt approached CM multiple times throughout the day to ask where he was going  When told EAC, Pt would respond that he wasn't going there  Pt then would ask when he could go there, because at least there he could go out on day passes  CM reiterated that it would be an extended period of time, before he would be able to go, as they were only working on the referral process at this time  Pt would walk away, angrily, but return a short time later, asking to speak to CM again, asking the same questions

## 2019-10-22 NOTE — PROGRESS NOTES
Progress Note - Behavioral Health   Jhon Dan 48 y o  male MRN: [id number]  Unit/Bed#: [facility] 252-01 Encounter: [id number]    Assessment/Plan   Principal Problem:    Schizoaffective disorder ([address] )  Active Problems:    Type 2 diabetes mellitus (HCC)    Benign essential hypertension    BMI 34 0-34 9,adult      Subjective: Patient is still perseverating over discharge and knocking on staff members doors to speak with them about it  He is seen pacing the halls and heard yelling  When attempting to speak with him he said "what do you want" and turned around and walked away  He appears irritable and frustrated and has a potential for aggression  He is taking his medications but sometimes gives staff members a hard time about it  Less disheveled than on admission  Unable to assess drug side effects or depression/manic/pyschotic symptoms  Appears paranoid and often is seen carrying clothing as if he is ready to leave the unit  Does not appear to be experiencing side effects  Appears internally preoccupied  Refuses vital signs  Selective with lab work but ultimately does agree  Awaiting EAC placement  Blood pressure has been stable and has had Lisinopril held multiple times        Current Medications:  Current Facility-Administered Medications   Medication Dose Route Frequency    acetaminophen (TYLENOL) tablet 650 mg  650 mg Oral Q6H PRN    aluminum-magnesium hydroxide-simethicone (MYLANTA) 200-200-20 mg/5 mL oral suspension 30 mL  30 mL Oral Q4H PRN    atorvastatin (LIPITOR) tablet 10 mg  10 mg Oral Daily With Dinner    benztropine (COGENTIN) injection 1 mg  1 mg Intramuscular Q6H PRN    benztropine (COGENTIN) tablet 1 mg  1 mg Oral Q6H PRN    cloZAPine (CLOZARIL) tablet 100 mg  100 mg Oral Daily    cloZAPine (CLOZARIL) tablet 350 mg  350 mg Oral HS    [START ON 10/23/2019] divalproex sodium (DEPAKOTE) EC tablet 1,000 mg  1,000 mg Oral BID    divalproex sodium (DEPAKOTE) EC tablet 500 mg  500 mg Oral After Dinner    haloperidol (HALDOL) tablet 5 mg  5 mg Oral Q6H PRN    haloperidol lactate (HALDOL) injection 5 mg  5 mg Intramuscular Q6H PRN    levothyroxine tablet 75 mcg  75 mcg Oral Early Morning    lisinopril (ZESTRIL) tablet 5 mg  5 mg Oral Daily    LORazepam (ATIVAN) 2 mg/mL injection 1 mg  1 mg Intramuscular Q6H PRN    LORazepam (ATIVAN) tablet 1 mg  1 mg Oral Q6H PRN    magnesium hydroxide (MILK OF MAGNESIA) 400 mg/5 mL oral suspension 30 mL  30 mL Oral Daily PRN    metFORMIN (GLUCOPHAGE) tablet 500 mg  500 mg Oral BID With Meals    nicotine polacrilex (NICORETTE) gum 2 mg  2 mg Oral Q2H PRN    OLANZapine (ZyPREXA) tablet 15 mg  15 mg Oral After Lunch    pantoprazole (PROTONIX) EC tablet 40 mg  40 mg Oral Early Morning    traZODone (DESYREL) tablet 50 mg  50 mg Oral HS PRN       Behavioral Health Medications: all current active meds have been reviewed  Vitals:  Vitals:    10/22/19 0741   BP: 120/77   Pulse: 75   Resp: 18   Temp: 98 7 °F (37 1 °C)   SpO2:        Laboratory results:  I have personally reviewed all pertinent laboratory/tests results  Psychiatric Review of Systems:  Behavior over the last 24 hours:  Still irritability and guarded   Sleep: normal  Appetite: normal  Medication side effects: No  ROS: no complaints    Mental Status Evaluation:  Appearance:  disheveled and overweight   Behavior:  evasive, guarded and uncooperative   Speech:  [place] and pressured    Mood:  irritable   Affect:  labile and irritable   Language sparse and rapid   Thought Process:  concrete and goal directed   Thought Content:  normal   Perceptual Disturbances: None   Risk Potential: Denied SI/HI  Potential for aggression   Yes   Sensorium:  person and place   Cognition:  grossly intact   Consciousness:  alert and awake    Recent and Remote Memory limited   Attention: attention span and concentration were age appropriate   Insight:  limited   Judgment: limited   Gait/Station: normal gait/station Motor Activity: no abnormal movements     Progress Toward Goals: Not progressing toward goals adequately     Recommended Treatment: Continue with group therapy, milieu therapy and occupational therapy  1   Increase Depakote EC to 1000mg BID for mood stabilization  2  Depakote level, CMP, and CBC w/diff for Sunday  3  D/C Lisinopril due to stable BP and refusal of allowing vital signs   4  Continue other medications  5  Disposition planning with plan for EAC      Risks, benefits and possible side effects of Medications:   Risks, benefits, and possible side effects of medications explained to patient and patient verbalizes understanding        Anitha Hdez PA-C

## 2019-10-22 NOTE — PROGRESS NOTES
Status: Poor ADL's, c/o of foot pain  Pt reviewed vitals, so blood pressure medication held, but Pt compliant with other medications      Medication: Clozaril increased / PRN - Tylenol  D/C: EAC referral     10/22/19 0737   Team Meeting   Meeting Type Daily Rounds   Team Members Present   Team Members Present Physician;Nurse;;Occupational Therapist   Physician Team Member Dr Susie Alvarenga / Dr Nerissa De La Rosa / Fermin Peres Team Member [facility] Management Team Member Edmund Villareal / [place]   OT Team Member Maria Guadalupe   Patient/Family Present   Patient Present No   Patient's Family Present No

## 2019-10-22 NOTE — PROGRESS NOTES
Medication note:  Patient received tylenol 650 mg for pain in his feet patient rated 6/10  Medication effective as patient observed in his room walking around listening to the radio, stating his feet do not hurt  He would not respond to writer with a pain score after being asked twice  No signs of distress

## 2019-10-23 LAB
GLUCOSE SERPL-MCNC: 112 MG/DL (ref 65–140)
GLUCOSE SERPL-MCNC: 115 MG/DL (ref 65–140)
GLUCOSE SERPL-MCNC: 130 MG/DL (ref 65–140)

## 2019-10-23 PROCEDURE — 82948 REAGENT STRIP/BLOOD GLUCOSE: CPT

## 2019-10-23 PROCEDURE — 99232 SBSQ HOSP IP/OBS MODERATE 35: CPT | Performed by: PSYCHIATRY & NEUROLOGY

## 2019-10-23 RX ADMIN — LEVOTHYROXINE SODIUM 75 MCG: 75 TABLET ORAL at 10:35

## 2019-10-23 RX ADMIN — OLANZAPINE 15 MG: 10 TABLET, FILM COATED ORAL at 13:59

## 2019-10-23 RX ADMIN — PANTOPRAZOLE SODIUM 40 MG: 40 TABLET, DELAYED RELEASE ORAL at 10:35

## 2019-10-23 RX ADMIN — CLOZAPINE 100 MG: 100 TABLET ORAL at 10:36

## 2019-10-23 RX ADMIN — DIVALPROEX SODIUM 1000 MG: 500 TABLET, DELAYED RELEASE ORAL at 18:24

## 2019-10-23 RX ADMIN — METFORMIN HYDROCHLORIDE 500 MG: 500 TABLET, FILM COATED ORAL at 10:36

## 2019-10-23 RX ADMIN — METFORMIN HYDROCHLORIDE 500 MG: 500 TABLET, FILM COATED ORAL at 17:46

## 2019-10-23 RX ADMIN — ATORVASTATIN CALCIUM 10 MG: 10 TABLET, FILM COATED ORAL at 17:46

## 2019-10-23 RX ADMIN — DIVALPROEX SODIUM 1000 MG: 500 TABLET, DELAYED RELEASE ORAL at 10:36

## 2019-10-23 RX ADMIN — CLOZAPINE 350 MG: 100 TABLET ORAL at 21:28

## 2019-10-23 NOTE — PROGRESS NOTES
Pt remains disheveled malodorous and constantly requesting to go home, needs redirection and supervision

## 2019-10-23 NOTE — PROGRESS NOTES
Pt comfortable on unit and with staff  Needs redirection with ADL's and medications, but does comply  Frequently asks for water room and radio  Denies all symptoms  Showers

## 2019-10-23 NOTE — PROGRESS NOTES
Progress Note - Behavioral Health   Gabriel Hamilton 48 y o  male MRN: [id number]  Unit/Bed#: U 252-01 Encounter: [id number]    Assessment/Plan   Principal Problem:    Schizoaffective disorder ([address] )  Active Problems:    Type 2 diabetes mellitus (HCC)    Benign essential hypertension    BMI 34 0-34 9,adult      Subjective:  Patient is lying comfortably in bed with no complaints  Patient was informed that he has a 304 hearing on Friday  Patient is tolerating his medications well and denies any side effects like restlessness, tremor, EPS, or anticholinergic side effects  Patient states he has had improved mood and denies any issues sleeping or eating  He he denies any anxiety but does appear anxious over discharge  Patient denies any SI/HI, hallucinations, and delusions  Does remain with chronic paranoia and appears internally preoccupied  Has evident obsessive thought process  Hygiene improved and shaved today  Less malodorous  Will continue to monitor patient for any psychosis or mood changes  Recent increase in Depakote dosing and appeared more subdued today  Did not show signs of Depakote toxicity  Repeat lab work on Sunday  Plan is for EAC at this time      Current Medications:  Current Facility-Administered Medications   Medication Dose Route Frequency    acetaminophen (TYLENOL) tablet 650 mg  650 mg Oral Q6H PRN    aluminum-magnesium hydroxide-simethicone (MYLANTA) 200-200-20 mg/5 mL oral suspension 30 mL  30 mL Oral Q4H PRN    atorvastatin (LIPITOR) tablet 10 mg  10 mg Oral Daily With Dinner    benztropine (COGENTIN) injection 1 mg  1 mg Intramuscular Q6H PRN    benztropine (COGENTIN) tablet 1 mg  1 mg Oral Q6H PRN    cloZAPine (CLOZARIL) tablet 100 mg  100 mg Oral Daily    cloZAPine (CLOZARIL) tablet 350 mg  350 mg Oral HS    divalproex sodium (DEPAKOTE) EC tablet 1,000 mg  1,000 mg Oral BID    haloperidol (HALDOL) tablet 5 mg  5 mg Oral Q6H PRN    haloperidol lactate (HALDOL) injection 5 mg  5 mg Intramuscular Q6H PRN    levothyroxine tablet 75 mcg  75 mcg Oral Early Morning    LORazepam (ATIVAN) 2 mg/mL injection 1 mg  1 mg Intramuscular Q6H PRN    LORazepam (ATIVAN) tablet 1 mg  1 mg Oral Q6H PRN    magnesium hydroxide (MILK OF MAGNESIA) 400 mg/5 mL oral suspension 30 mL  30 mL Oral Daily PRN    metFORMIN (GLUCOPHAGE) tablet 500 mg  500 mg Oral BID With Meals    nicotine polacrilex (NICORETTE) gum 2 mg  2 mg Oral Q2H PRN    OLANZapine (ZyPREXA) tablet 15 mg  15 mg Oral After Lunch    pantoprazole (PROTONIX) EC tablet 40 mg  40 mg Oral Early Morning    traZODone (DESYREL) tablet 50 mg  50 mg Oral HS PRN    tuberculin injection 5 Units  5 Units Intradermal Once       Behavioral Health Medications: all current active meds have been reviewed and continue current psychiatric medications  Vitals:  Vitals:    10/22/19 0741   BP: 120/77   Pulse: 75   Resp: 18   SpO2:        Laboratory results:    I have personally reviewed all pertinent laboratory/tests results    Most Recent Labs:   Lab Results   Component Value Date    WBC 8 40 10/20/2019    RBC 4 72 10/20/2019    HGB 14 0 10/20/2019    HCT 42 3 10/20/2019     10/20/2019    RDW 12 9 10/20/2019    NEUTROABS 5 31 10/20/2019    SODIUM 140 10/05/2019    K 4 0 10/05/2019     10/05/2019    CO2 26 10/05/2019    BUN 12 10/05/2019    CREATININE 0 72 10/05/2019    GLUCOSE 91 05/11/2018    GLUC 143 (H) 10/05/2019    GLUF 99 08/23/2019    CALCIUM 9 0 10/05/2019    AST 14 10/05/2019    ALT 11 (L) 10/05/2019    ALKPHOS 76 10/05/2019    TP 6 3 (L) 10/05/2019    ALB 3 0 (L) 10/05/2019    TBILI 0 20 10/05/2019    CHOLESTEROL 138 08/28/2019    HDL 34 (L) 08/28/2019    TRIG 165 (H) 08/28/2019    LDLCALC 72 08/23/2019    NONHDLC 112 08/23/2019    VALPROICTOT 56 10/08/2019    LITHIUM 0 6 03/27/2014    AMMONIA 32 10/08/2019    [id number] 1 840 08/23/2019    FREET4 0 93 08/23/2019    HGBA1C 6 3 10/05/2019     10/05/2019       Psychiatric Review of Systems:  Behavior over the last 24 hours:  Slightly more subdued  Sleep: normal  Appetite: normal  Medication side effects: No  ROS: no complaints    Mental Status Evaluation:  Appearance:  disheveled, malodorous   Behavior:  less restless, guarded   Speech:  soft   Mood:  "okay"   Affect:  blunted and constricted   Language sparse   Thought Process:  concrete, illogical and perserverative   Thought Content:  obsessions and paranoid delusions   Perceptual Disturbances: internal preoccupation   Risk Potential: Denied SI/HI  Potential for aggression: low   Sensorium:  person and place   Cognition:  grossly intact   Consciousness:  sedated    Recent and Remote Memory limited   Attention: attention span appeared shorter than expected for age   Insight:  limited   Judgment: limited   Gait/Station: normal gait/station and normal balance   Motor Activity: no abnormal movements     Progress Toward Goals: some progression    Recommended Treatment: Continue with group therapy, milieu therapy and occupational therapy  1   Continue current medications  2  PPD ordered for EAC  3  CBC w/diff, CMP, and Depakote level ordered Sunday  4  Disposition planning with plan for EAC    Risks, benefits and possible side effects of Medications:   Risks, benefits, and possible side effects of medications explained to patient and patient verbalizes understanding        Vanessa Pelletier PA-C

## 2019-10-23 NOTE — PROGRESS NOTES
Pt having productive cough frequently throughout the night  Offered water and cough drop however pt declined

## 2019-10-23 NOTE — PROGRESS NOTES
Status: Pt shaved & showered  His room as cleaned, however, it still smells like urine    PRN for foot pain  Medication: no changes / PRN - Tylenol  D/C: EAC referral started - needing further information from ACT & family to complete some sections & for [facility] to provide full hospitalization list       10/23/19 0982   Team Meeting   Meeting Type Daily Rounds   Team Members Present   Team Members Present Physician;Nurse;   Physician Team Member Dr Francisco Vang / Dr Pool Armstrong / Maricarmen Youngblood Team Member [facility] Management Team Member Sara Sands / Birgit Wolfe   Patient/Family Present   Patient Present No   Patient's Family Present No

## 2019-10-23 NOTE — CASE MANAGEMENT
CM met with Pt to review that the hospital would be filing a 304 petition, for up to 90 additional days of commitment  CM reviewed with Pt his rights, & provided him a copy  Pt asking when he will leave & where he will go? CM reviewed EAC referral was submitted, however, they needed PPD completed & assessment from ACT  Pt mumbling under breath & walked away from   CM assisted with completing 304 petition  CM contacted [facility] Commitment @ [phone] & spoke with Luigi Monsivais, who provided Jose Luis Mullero #: [id number], for [facility] to hold the hearing on Friday

## 2019-10-23 NOTE — CASE MANAGEMENT
CM received necessary information from Megan Cota at [facility] Pt's sister, Daysi Host referral was completed, & supporting documentation was faxed to [employer] [place] [address] [address] (090 pages)

## 2019-10-24 LAB
GLUCOSE SERPL-MCNC: 116 MG/DL (ref 65–140)
GLUCOSE SERPL-MCNC: 139 MG/DL (ref 65–140)

## 2019-10-24 PROCEDURE — 99231 SBSQ HOSP IP/OBS SF/LOW 25: CPT | Performed by: PHYSICIAN ASSISTANT

## 2019-10-24 PROCEDURE — 82948 REAGENT STRIP/BLOOD GLUCOSE: CPT

## 2019-10-24 RX ORDER — ACETAMINOPHEN 325 MG/1
975 TABLET ORAL EVERY 8 HOURS PRN
Status: DISCONTINUED | OUTPATIENT
Start: 2019-10-24 | End: 2019-12-23 | Stop reason: HOSPADM

## 2019-10-24 RX ORDER — ACETAMINOPHEN 325 MG/1
325 TABLET ORAL EVERY 6 HOURS PRN
Status: DISCONTINUED | OUTPATIENT
Start: 2019-10-24 | End: 2019-12-23 | Stop reason: HOSPADM

## 2019-10-24 RX ORDER — ACETAMINOPHEN 325 MG/1
650 TABLET ORAL EVERY 6 HOURS PRN
Status: DISCONTINUED | OUTPATIENT
Start: 2019-10-24 | End: 2019-12-23 | Stop reason: HOSPADM

## 2019-10-24 RX ADMIN — DIVALPROEX SODIUM 1000 MG: 500 TABLET, DELAYED RELEASE ORAL at 18:08

## 2019-10-24 RX ADMIN — METFORMIN HYDROCHLORIDE 500 MG: 500 TABLET, FILM COATED ORAL at 08:46

## 2019-10-24 RX ADMIN — CLOZAPINE 100 MG: 100 TABLET ORAL at 08:46

## 2019-10-24 RX ADMIN — METFORMIN HYDROCHLORIDE 500 MG: 500 TABLET, FILM COATED ORAL at 18:07

## 2019-10-24 RX ADMIN — DIVALPROEX SODIUM 1000 MG: 500 TABLET, DELAYED RELEASE ORAL at 08:46

## 2019-10-24 RX ADMIN — PANTOPRAZOLE SODIUM 40 MG: 40 TABLET, DELAYED RELEASE ORAL at 08:46

## 2019-10-24 RX ADMIN — CLOZAPINE 350 MG: 100 TABLET ORAL at 21:57

## 2019-10-24 RX ADMIN — LEVOTHYROXINE SODIUM 75 MCG: 75 TABLET ORAL at 08:47

## 2019-10-24 RX ADMIN — ATORVASTATIN CALCIUM 10 MG: 10 TABLET, FILM COATED ORAL at 18:08

## 2019-10-24 RX ADMIN — OLANZAPINE 15 MG: 10 TABLET, FILM COATED ORAL at 13:08

## 2019-10-24 NOTE — SOCIAL WORK
Amy from  ACT came out to meet with patient  Patient had poor attention span and was asking when he would be discharged  Patient was informed that EAC has been discussed and he is waiting on an opening  Patient does not wish to go back to [facility] but also does not want to go back to [facility]   Patient lacks insight into his admission and behaviors at [facility]  Patient reports he has been compliant with his medications  Patient did admit to having a behavioral outburst last evening  Vinton Skiff encouraged patient to cooperate with staff    Vinton Skiff will check on EAC recommendation by ACT team

## 2019-10-24 NOTE — PROGRESS NOTES
Progress Note - Behavioral Health   Nunu Carrasco 48 y o  male MRN: [id number]  Unit/Bed#: [facility] 252-01 Encounter: [id number]    Assessment/Plan   Principal Problem:    Schizoaffective disorder ([address] )  Active Problems:    Type 2 diabetes mellitus (HCC)    Benign essential hypertension    BMI 34 0-34 9,adult      Subjective:  Patient hard to have full interview with due to limited attention span and often walking out during interviews  Was able to complete small portion of mini-mental status exam   Patient was unable to spell world backwards  He has poor immediate and distant recall  Showed inability to subtract serial sevens  [facility]  Did get his birth date right  Shows limited insight and judgment  Podiatry consult was performed due to for skin care and nail care on diabetic feet  Also yesterday was given PPD for EAC placement and did become agitated  Allegedly was screaming at staff and was trying to say that they are trying to pull his pants down  Evident paranoid delusions  Does appear internally preoccupied  Often does not answer if he is experiencing auditory hallucinations  Manic behavior slowly decreasing  Remains disorganized  Does not go in depth in mood related symptoms  Denied suicidal homicidal ideation  Requires much emphasis to get patient to take his medications  Refuses vital signs  Remains disheveled and smells of urine  Selective with lab work  Has lab work due on Sunday  Is awaiting EAC placement at this time      Current Medications:  Current Facility-Administered Medications   Medication Dose Route Frequency    acetaminophen (TYLENOL) tablet 650 mg  650 mg Oral Q6H PRN    aluminum-magnesium hydroxide-simethicone (MYLANTA) 200-200-20 mg/5 mL oral suspension 30 mL  30 mL Oral Q4H PRN    atorvastatin (LIPITOR) tablet 10 mg  10 mg Oral Daily With Dinner    benztropine (COGENTIN) injection 1 mg  1 mg Intramuscular Q6H PRN    benztropine (COGENTIN) tablet 1 mg  1 mg Oral Q6H PRN    cloZAPine (CLOZARIL) tablet 100 mg  100 mg Oral Daily    cloZAPine (CLOZARIL) tablet 350 mg  350 mg Oral HS    divalproex sodium (DEPAKOTE) EC tablet 1,000 mg  1,000 mg Oral BID    haloperidol (HALDOL) tablet 5 mg  5 mg Oral Q6H PRN    haloperidol lactate (HALDOL) injection 5 mg  5 mg Intramuscular Q6H PRN    levothyroxine tablet 75 mcg  75 mcg Oral Early Morning    LORazepam (ATIVAN) 2 mg/mL injection 1 mg  1 mg Intramuscular Q6H PRN    LORazepam (ATIVAN) tablet 1 mg  1 mg Oral Q6H PRN    magnesium hydroxide (MILK OF MAGNESIA) 400 mg/5 mL oral suspension 30 mL  30 mL Oral Daily PRN    metFORMIN (GLUCOPHAGE) tablet 500 mg  500 mg Oral BID With Meals    nicotine polacrilex (NICORETTE) gum 2 mg  2 mg Oral Q2H PRN    OLANZapine (ZyPREXA) tablet 15 mg  15 mg Oral After Lunch    pantoprazole (PROTONIX) EC tablet 40 mg  40 mg Oral Early Morning    traZODone (DESYREL) tablet 50 mg  50 mg Oral HS PRN    tuberculin injection 5 Units  5 Units Intradermal Once       Behavioral Health Medications: all current active meds have been reviewed and continue current psychiatric medications  Vitals:  Vitals:    10/23/19 1512   SpO2: 98%       Laboratory results:    I have personally reviewed all pertinent laboratory/tests results    Most Recent Labs:   Lab Results   Component Value Date    WBC 8 40 10/20/2019    RBC 4 72 10/20/2019    HGB 14 0 10/20/2019    HCT 42 3 10/20/2019     10/20/2019    RDW 12 9 10/20/2019    NEUTROABS 5 31 10/20/2019    SODIUM 140 10/05/2019    K 4 0 10/05/2019     10/05/2019    CO2 26 10/05/2019    BUN 12 10/05/2019    CREATININE 0 72 10/05/2019    GLUCOSE 91 05/11/2018    GLUC 143 (H) 10/05/2019    GLUF 99 08/23/2019    CALCIUM 9 0 10/05/2019    AST 14 10/05/2019    ALT 11 (L) 10/05/2019    ALKPHOS 76 10/05/2019    TP 6 3 (L) 10/05/2019    ALB 3 0 (L) 10/05/2019    TBILI 0 20 10/05/2019    CHOLESTEROL 138 08/28/2019    HDL 34 (L) 08/28/2019    TRIG 165 (H) 08/28/2019    LDLCALC 72 08/23/2019    [place] 112 08/23/2019    VALPROICTOT 56 10/08/2019    LITHIUM 0 6 03/27/2014    AMMONIA 32 10/08/2019    [id number] 1 840 08/23/2019    FREET4 0 93 08/23/2019    HGBA1C 6 3 10/05/2019     10/05/2019       Psychiatric Review of Systems:  Behavior over the last 24 hours:  unchanged  Sleep: normal  Appetite: normal  Medication side effects: No  ROS: no complaints    Mental Status Evaluation:  Appearance:  disheveled   Behavior:  restless and fidgety   Speech:  loud   Mood:  anxious   Affect:  blunted and increased in range   Language naming objects and repeating phrases   Thought Process:  disorganized, illogical and perserverative   Thought Content:  paranoid delusions, obsessions   Perceptual Disturbances: appears internally preoccupied   Risk Potential: Denied SI/HI  Potential for aggression: low   Sensorium:  person and place   Cognition:  grossly intact   Consciousness:  awake    Recent and Remote Memory limited   Attention: attention span appeared shorter than expected for age   Insight:  limited   Judgment: limited   Gait/Station: normal gait/station and normal balance   Motor Activity: no abnormal movements     Progress Toward Goals: unchanged    Recommended Treatment: Continue with group therapy, milieu therapy and occupational therapy  1   Continue current medications  2  Repeat CBC w/diff, CMP, and Depakote level on Sunday  3  Awaiting EAC placement    Risks, benefits and possible side effects of Medications:   Risks, benefits, and possible side effects of medications explained to patient and patient verbalizes understanding        Yoselin Tucker PA-C

## 2019-10-24 NOTE — PROGRESS NOTES
Patient woke up several times throughout the night coughing at times  He was able to return to sleep on his own without intervention

## 2019-10-24 NOTE — PROGRESS NOTES
10/24/19 0852   Team Meeting   Meeting Type Daily Rounds   Initial Conference Date 10/24/19   Team Members Present   Team Members Present Physician;Nurse;   Physician Team Member Dr Alexis Goodman, NO   Nursing Team Member [address] Management Team Member , [address]  [facility]   Patient/Family Present   Patient Present No   Patient's Family Present No   Chart and labs were reviewed  Medications to be monitored  304 hearing to be held on 10/25  Patient refused his PPD test on 10/23  EAC referral was completed

## 2019-10-24 NOTE — PROGRESS NOTES
Pt pleasant and cooperative during brief interaction  Says he is doing "good" and asked when he is being discharged  Had visit with ACT team member which he kept walking out of

## 2019-10-25 LAB
GLUCOSE SERPL-MCNC: 152 MG/DL (ref 65–140)
GLUCOSE SERPL-MCNC: 212 MG/DL (ref 65–140)

## 2019-10-25 PROCEDURE — 82948 REAGENT STRIP/BLOOD GLUCOSE: CPT

## 2019-10-25 PROCEDURE — 99232 SBSQ HOSP IP/OBS MODERATE 35: CPT | Performed by: PSYCHIATRY & NEUROLOGY

## 2019-10-25 PROCEDURE — NC001 PR NO CHARGE: Performed by: PSYCHIATRY & NEUROLOGY

## 2019-10-25 RX ADMIN — OLANZAPINE 15 MG: 10 TABLET, FILM COATED ORAL at 16:07

## 2019-10-25 RX ADMIN — METFORMIN HYDROCHLORIDE 500 MG: 500 TABLET, FILM COATED ORAL at 10:22

## 2019-10-25 RX ADMIN — ATORVASTATIN CALCIUM 10 MG: 10 TABLET, FILM COATED ORAL at 16:07

## 2019-10-25 RX ADMIN — LEVOTHYROXINE SODIUM 75 MCG: 75 TABLET ORAL at 10:28

## 2019-10-25 RX ADMIN — METFORMIN HYDROCHLORIDE 500 MG: 500 TABLET, FILM COATED ORAL at 16:08

## 2019-10-25 RX ADMIN — DIVALPROEX SODIUM 1000 MG: 500 TABLET, DELAYED RELEASE ORAL at 17:00

## 2019-10-25 RX ADMIN — PANTOPRAZOLE SODIUM 40 MG: 40 TABLET, DELAYED RELEASE ORAL at 10:29

## 2019-10-25 RX ADMIN — DIVALPROEX SODIUM 1000 MG: 500 TABLET, DELAYED RELEASE ORAL at 10:22

## 2019-10-25 RX ADMIN — ACETAMINOPHEN 975 MG: 325 TABLET, FILM COATED ORAL at 21:27

## 2019-10-25 RX ADMIN — CLOZAPINE 100 MG: 100 TABLET ORAL at 10:22

## 2019-10-25 RX ADMIN — CLOZAPINE 350 MG: 100 TABLET ORAL at 21:27

## 2019-10-25 NOTE — PROGRESS NOTES
Pt refused bedtime clozaril several times to writer, stating "I'll take it in the morning when the doctor tells me I can leave  "Telling writer, "I don't want to take it "  Another RN on floor went to talk with pt and take chain necklace to place in locker, as part of agreement made with doctor, and pt then in agreement to take medications

## 2019-10-25 NOTE — PROGRESS NOTES
Progress Note - Behavioral Health   Maria Antonia Resides 48 y o  male MRN: [id number]  Unit/Bed#: [facility] 252-01 Encounter: [id number]    Assessment/Plan   Principal Problem:    Schizoaffective disorder ([address] )  Active Problems:    Type 2 diabetes mellitus (HCC)    Benign essential hypertension    BMI 34 0-34 9,adult    Subjective:  Patient is selectively compliant with medication but only after efforts by nursing staff  Patient has remained disorganized, disheveled with poor insight and judgment  Patient will refuse the dose of clozapine but will take it after discussion with nursing staff  Patient is continuing to refuse it lab work and PPD  We had a 304 hearing today and patient is committed for period not to exceed 30 days inpatient  Patient got angry after the hearing remained child-like needing frequent redirection  He is currently consenting safety on the unit      Current Medications:    Current Facility-Administered Medications:  acetaminophen 325 mg Oral Q6H PRN Karolina Eye, PA-C   acetaminophen 650 mg Oral Q6H PRN Karolina Eye, PA-C   acetaminophen 975 mg Oral Q8H PRN Karolina Eye, PA-C   aluminum-magnesium hydroxide-simethicone 30 mL Oral Q4H PRN Betty Stein MD   atorvastatin 10 mg Oral Daily With Cecil Okeefe MD   benztropine 1 mg Intramuscular Q6H PRN Betty Stein MD   benztropine 1 mg Oral Q6H PRN Betty Stein MD   clozapine 100 mg Oral Daily Thai Lizama MD   cloZAPine 350 mg Oral HS Julio C Gore   divalproex sodium 1,000 mg Oral BID Karolina Eye, XUAN   haloperidol 5 mg Oral Q6H PRN Betty Stein MD   haloperidol lactate 5 mg Intramuscular Q6H PRN Betty Stein MD   levothyroxine 75 mcg Oral Early Morning BRIT Almendarez-PUSHPA   LORazepam 1 mg Intramuscular Q6H PRN Betty Stein MD   LORazepam 1 mg Oral Q6H PRN Betty Stein MD   magnesium hydroxide 30 mL Oral Daily PRN Betty Stein MD   metFORMIN 500 mg Oral BID With Meals Anthony Buckner PA-C   nicotine polacrilex 2 mg Oral Q2H PRN Richa Barnes MD   OLANZapine 15 mg Oral After Lunch Julio C Gore   pantoprazole 40 mg Oral Early Morning Tom Khan PA-C   traZODone 50 mg Oral HS PRN Richa Barnes MD   tuberculin 5 Units Intradermal Once Doc XUAN Gil       Behavioral Health Medications: all current active meds have been reviewed  Vital signs in last 24 hours:       Laboratory results:    I have personally reviewed all pertinent laboratory/tests results  Labs in last 72 hours: No results for input(s): WBC, RBC, HGB, HCT, PLT, RDW, NEUTROABS, SODIUM, K, CL, CO2, BUN, CREATININE, GLUCOSE, GLUC, GLUF, CALCIUM, AST, ALT, ALKPHOS, TP, ALB, TBILI, CHOLESTEROL, HDL, TRIG, LDLCALC, VALPROICTOT, CARBAMAZEPIN, LITHIUM, AMMONIA, [id number], FREET4, T3FREE, PREGTESTUR, PREGSERUM, HCG, HCGQUANT, RPR in the last 72 hours      Invalid input(s):  RBC  Admission Labs:   Admission on 10/05/2019   Component Date Value    WBC 10/05/2019 10 35*    RBC 10/05/2019 4 65     Hemoglobin 10/05/2019 13 7     Hematocrit 10/05/2019 41 8     MCV 10/05/2019 90     MCH 10/05/2019 29 5     MCHC 10/05/2019 32 8     RDW 10/05/2019 13 7     MPV 10/05/2019 10 0     Platelets 40/14/2069 148*    nRBC 10/05/2019 0     Neutrophils Relative 10/05/2019 57     Immat GRANS % 10/05/2019 0     Lymphocytes Relative 10/05/2019 31     Monocytes Relative 10/05/2019 11     Eosinophils Relative 10/05/2019 0     Basophils Relative 10/05/2019 1     Neutrophils Absolute 10/05/2019 5 88     Immature Grans Absolute 10/05/2019 0 03     Lymphocytes Absolute 10/05/2019 3 22     Monocytes Absolute 10/05/2019 1 14     Eosinophils Absolute 10/05/2019 0 00     Basophils Absolute 10/05/2019 0 08     Sodium 10/05/2019 140     Potassium 10/05/2019 4 0     Chloride 10/05/2019 106     CO2 10/05/2019 26     ANION GAP 10/05/2019 8     BUN 10/05/2019 12     Creatinine 10/05/2019 0 72     Glucose 10/05/2019 143*    Calcium 10/05/2019 9 0     AST 10/05/2019 14  ALT 10/05/2019 11*    Alkaline Phosphatase 10/05/2019 76     Total Protein 10/05/2019 6 3*    Albumin 10/05/2019 3 0*    Total Bilirubin 10/05/2019 0 20     eGFR 10/05/2019 107     Hemoglobin A1C 10/05/2019 6 3     EAG 10/05/2019 134     POC Glucose 10/06/2019 120     POC Glucose 10/06/2019 119     Ventricular Rate 10/05/2019 57     Atrial Rate 10/05/2019 57     NH Interval 10/05/2019 162     QRSD Interval 10/05/2019 96     QT Interval 10/05/2019 380     QTC Interval 10/05/2019 369     P Axis 10/05/2019 48     QRS Axis 10/05/2019 -19     T Wave Axis 10/05/2019 10     POC Glucose 10/07/2019 93     POC Glucose 10/07/2019 89     POC Glucose 10/07/2019 101     Ammonia 10/08/2019 32     Valproic Acid, Total 10/08/2019 56     POC Glucose 10/08/2019 114     POC Glucose 10/08/2019 106     POC Glucose 10/08/2019 103     POC Glucose 10/08/2019 97     POC Glucose 10/09/2019 129     POC Glucose 10/09/2019 134     POC Glucose 10/09/2019 186*    POC Glucose 10/10/2019 127     POC Glucose 10/10/2019 102     POC Glucose 10/10/2019 88     POC Glucose 10/10/2019 144*    POC Glucose 10/11/2019 103     POC Glucose 10/11/2019 93     POC Glucose 10/11/2019 152*    WBC 10/12/2019 8 48     RBC 10/12/2019 4 87     Hemoglobin 10/12/2019 14 5     Hematocrit 10/12/2019 43 7     MCV 10/12/2019 90     MCH 10/12/2019 29 8     MCHC 10/12/2019 33 2     RDW 10/12/2019 13 2     MPV 10/12/2019 10 3     Platelets 80/74/0877 148*    nRBC 10/12/2019 0     Neutrophils Relative 10/12/2019 54     Immat GRANS % 10/12/2019 1     Lymphocytes Relative 10/12/2019 31     Monocytes Relative 10/12/2019 13*    Eosinophils Relative 10/12/2019 0     Basophils Relative 10/12/2019 1     Neutrophils Absolute 10/12/2019 4 67     Immature Grans Absolute 10/12/2019 0 05     Lymphocytes Absolute 10/12/2019 2 59     Monocytes Absolute 10/12/2019 1 11     Eosinophils Absolute 10/12/2019 0 00     Basophils Absolute 10/12/2019 0 06     POC Glucose 10/12/2019 112     POC Glucose 10/12/2019 110     POC Glucose 10/12/2019 115     POC Glucose 10/13/2019 107     POC Glucose 10/13/2019 112     POC Glucose 10/13/2019 99     POC Glucose 10/13/2019 114     POC Glucose 10/14/2019 122     POC Glucose 10/14/2019 119     POC Glucose 10/14/2019 102     POC Glucose 10/14/2019 136     POC Glucose 10/15/2019 110     POC Glucose 10/15/2019 130     POC Glucose 10/15/2019 89     POC Glucose 10/15/2019 87     POC Glucose 10/16/2019 113     POC Glucose 10/16/2019 102     POC Glucose 10/16/2019 86     POC Glucose 10/16/2019 111     POC Glucose 10/17/2019 124     POC Glucose 10/17/2019 96     POC Glucose 10/18/2019 117     POC Glucose 10/18/2019 96     POC Glucose 10/19/2019 115     POC Glucose 10/19/2019 116     WBC 10/20/2019 8 40     RBC 10/20/2019 4 72     Hemoglobin 10/20/2019 14 0     Hematocrit 10/20/2019 42 3     MCV 10/20/2019 90     MCH 10/20/2019 29 7     MCHC 10/20/2019 33 1     RDW 10/20/2019 12 9     MPV 10/20/2019 10 1     Platelets 26/56/6611 157     nRBC 10/20/2019 0     Neutrophils Relative 10/20/2019 63     Immat GRANS % 10/20/2019 1     Lymphocytes Relative 10/20/2019 24     Monocytes Relative 10/20/2019 11     Eosinophils Relative 10/20/2019 0     Basophils Relative 10/20/2019 1     Neutrophils Absolute 10/20/2019 5 31     Immature Grans Absolute 10/20/2019 0 06     Lymphocytes Absolute 10/20/2019 2 02     Monocytes Absolute 10/20/2019 0 95     Eosinophils Absolute 10/20/2019 0 00     Basophils Absolute 10/20/2019 0 06     POC Glucose 10/20/2019 123     POC Glucose 10/20/2019 188*    POC Glucose 10/21/2019 121     POC Glucose 10/21/2019 128     POC Glucose 10/22/2019 113     POC Glucose 10/23/2019 115     POC Glucose 10/23/2019 130     POC Glucose 10/23/2019 112     POC Glucose 10/24/2019 139     POC Glucose 10/24/2019 116     POC Glucose 10/25/2019 212*       Psychiatric Review of Systems:  Behavior over the last 24 hours:  unchanged  Sleep: normal  Appetite: normal  Medication side effects: No  ROS: no complaints    Mental Status Evaluation:  Appearance:  disheveled   Behavior:  guarded and psychomotor agitation   Speech:  loud   Mood:  angry and anxious   Affect:  labile   Language rapid   Thought Process:  circumstantial and disorganized   Thought Content:  delusions  persecutory   Perceptual Disturbances: Auditory hallucinations without commands   Risk Potential: Suicidal Ideations without plan, Homicidal Ideations none and Potential for Aggression No   Sensorium:  person and place   Cognition:  grossly intact   Consciousness:  awake    Attention: attention span appeared shorter than expected for age   Insight:  limited   Judgment: limited   Intellect fair   Gait/Station: normal gait/station   Motor Activity: no abnormal movements     Memory: Short and long term memory  fair     Progress Toward Goals: slow progress    Recommended Treatment:   304:  Committed for period not to exceed 30 days  Initiate disposition planning to EAC  Continue with group therapy, milieu therapy and occupational therapy      Continue following current medications:   Current Facility-Administered Medications:  acetaminophen 325 mg Oral Q6H PRN Marleta Alec, PA-C   acetaminophen 650 mg Oral Q6H PRN Marleta Alec, PA-C   acetaminophen 975 mg Oral Q8H PRN Marleta Alec, PA-C   aluminum-magnesium hydroxide-simethicone 30 mL Oral Q4H PRN Ez Hills MD   atorvastatin 10 mg Oral Daily With Pedro oJhnson MD   benztropine 1 mg Intramuscular Q6H PRN Ez Hills MD   benztropine 1 mg Oral Q6H PRN Ez Hills MD   clozapine 100 mg Oral Daily Germain Giraldo MD   cloZAPine 350 mg Oral HS Julio C Gore   divalproex sodium 1,000 mg Oral BID Kevin Michael PA-C   haloperidol 5 mg Oral Q6H PRN Ez Hills MD   haloperidol lactate 5 mg Intramuscular Q6H PRN Ez Hills MD levothyroxine 75 mcg Oral Early Morning Oralia Briggs PA-C   LORazepam 1 mg Intramuscular Q6H PRN Luis West MD   LORazepam 1 mg Oral Q6H PRN Luis West MD   magnesium hydroxide 30 mL Oral Daily PRN Luis West MD   metFORMIN 500 mg Oral BID With Meals Oralia Briggs PA-C   nicotine polacrilex 2 mg Oral Q2H PRN Luis West MD   OLANZapine 15 mg Oral After Lunch Julio C Gore   pantoprazole 40 mg Oral Early Morning Oralia Briggs PA-C   traZODone 50 mg Oral HS PRN Luis West MD   tuberculin 5 Units Intradermal Once Dewameche Pelletier PA-C       Risks, benefits and possible side effects of Medications:   Risks, benefits, and possible side effects of medications explained to patient and patient verbalizes understanding  This note has been constructed using a voice recognition system  There may be translation, syntax,  or grammatical errors  If you have any questions, please contact the dictating provider

## 2019-10-25 NOTE — CASE MANAGEMENT
Pt approaching CM frequently throughout the day, asking if he can just pack his belongings & leave? CM reviewed that at this time, the courts had given up to 30 additional days inpatient  CM reviewed that the plan was for Pt to go to [facility], & a referral was submitted on 10/23  Pt mumbled under this breath & walked away

## 2019-10-25 NOTE — PROGRESS NOTES
TUBERCULIN Interdermal-  For EAC  First attempt was Wednesday at 4p- Pt absolutely refused to take injection  Today re-attempted  Pt absolutely refused again  MD hodges

## 2019-10-25 NOTE — PROGRESS NOTES
Status: 304 hearing this morning - request 90 days  He showered & his room was cleaned  He was upset last night because his dinner tray did not come up at dinner time  He said he would refuse his PM medications, but did eventually take it  He still needs PPD test; he refuses    Medication: no changes / no prns  D/C: TBD     10/25/19 0737   Team Meeting   Meeting Type Daily Rounds   Team Members Present   Team Members Present Physician;Nurse;;Occupational Therapist   Physician Team Member Dr Bobby Obregon / Dr Stefany Fagan / Dante Schaumann Team Member [facility] Management Team Member Tal Santamaria / Matthias Ramires   OT Team Member Maria Guadalupe   Patient/Family Present   Patient Present No   Patient's Family Present No

## 2019-10-25 NOTE — CASE MANAGEMENT
A 304b hearing was held at 8:45 AM, and Pt was represented by Raimundo Barron, the department was represented by Raimundo Viera, and mental health review officer Orquidea Bailey oversaw the proceedings  The court found that the Pt is severly mentally disabled & in need of inpatient treatment not to exceed 30 days  CM sent an email to [facility], [address] sister, Javier Tanner, to make them aware of the hearing outcome

## 2019-10-25 NOTE — PROGRESS NOTES
Patient slept throughout the night waking up at times with cough, would become irritable with safety checks performed with light shinning into his room  He was able to return to sleep each time

## 2019-10-25 NOTE — PROGRESS NOTES
Pt compliant with AM meds but refused scheduled PPD  Pt refused AM vitals  Needs encouragement to take medications, attend to ADLs  Pt showered this AM  Focused on meeting with physician and CM this AM  Became loud when court team here but was easily redirected  Remains focused on leaving the hospital  Pt pleasant and jokes with staff at times

## 2019-10-26 LAB
GLUCOSE SERPL-MCNC: 108 MG/DL (ref 65–140)
GLUCOSE SERPL-MCNC: 128 MG/DL (ref 65–140)

## 2019-10-26 PROCEDURE — 99232 SBSQ HOSP IP/OBS MODERATE 35: CPT | Performed by: PSYCHIATRY & NEUROLOGY

## 2019-10-26 PROCEDURE — 82948 REAGENT STRIP/BLOOD GLUCOSE: CPT

## 2019-10-26 RX ADMIN — PANTOPRAZOLE SODIUM 40 MG: 40 TABLET, DELAYED RELEASE ORAL at 09:33

## 2019-10-26 RX ADMIN — OLANZAPINE 15 MG: 10 TABLET, FILM COATED ORAL at 16:28

## 2019-10-26 RX ADMIN — LEVOTHYROXINE SODIUM 75 MCG: 75 TABLET ORAL at 09:32

## 2019-10-26 RX ADMIN — DIVALPROEX SODIUM 1000 MG: 500 TABLET, DELAYED RELEASE ORAL at 09:32

## 2019-10-26 RX ADMIN — DIVALPROEX SODIUM 1000 MG: 500 TABLET, DELAYED RELEASE ORAL at 17:16

## 2019-10-26 RX ADMIN — METFORMIN HYDROCHLORIDE 500 MG: 500 TABLET, FILM COATED ORAL at 07:56

## 2019-10-26 RX ADMIN — CLOZAPINE 100 MG: 100 TABLET ORAL at 09:32

## 2019-10-26 RX ADMIN — CLOZAPINE 350 MG: 100 TABLET ORAL at 22:11

## 2019-10-26 RX ADMIN — METFORMIN HYDROCHLORIDE 500 MG: 500 TABLET, FILM COATED ORAL at 16:27

## 2019-10-26 RX ADMIN — ATORVASTATIN CALCIUM 10 MG: 10 TABLET, FILM COATED ORAL at 16:26

## 2019-10-26 NOTE — PROGRESS NOTES
Pt refused AM vitals  Compliant with AM meds  Pt calm this morning  Ate breakfast  Pt put muffin in shirt pocket and left trail of crumbs throughout unit unintentionally  Pt apologized and offered to clean up  Very disheveled and malodorous  Pt denies symptoms when asked  No questions/concerns

## 2019-10-26 NOTE — PROGRESS NOTES
Progress Note - [facility] Alpha 48 y o  male MRN: @MRN   Unit/Bed#: [facility] 252-01 Encounter: [id number]    Per nursing report and sign out, patient is loud, but not aggressive, and redirectible  Slept, about same  Omar Dey reports today that "I just want to get out of here  Get me out tomorrow"  He was irritable, refusing to talk to me today  Denied he has any issues with his mood if he was not here  Poor insight  Denies SI or HI  Medication side effects: does not appear to have   ROS: denied issue, but limited interaction  Mental Status Evaluation:    Appearance:  disheveled   Behavior:  guarded   Speech:  loud   Mood:  irritable   Affect:  mood congruent       Thought Process:  circumstantial , disorganized   Associations: intact associations   Thought Content:  persecutory delusions   Perceptual Disturbances: auditory hallucinations   Risk Potential: Suicidal ideation - denies presently  Homicidal ideation - None  Potential for aggression - No   Sensorium:  unable to assess   Cognition:  grossly intact   Consciousness:  Alert & Awake   Memory:  recent and remote memory: unable to assess due to lack of cooperation   Attention: attention span and concentration appear shorter than expected for age           Insight:  limited   Judgment: limited   Muscle Strength  Muscle Tone normal  normal   Gait/Station: normal gait/station with good balance   Motor Activity: no abnormal movements       Vital signs in last 24 hours:         Laboratory results:  I have personally reviewed all pertinent laboratory/tests results  Assessment/Plan   Principal Problem:    Schizoaffective disorder (HCC)  Active Problems:    Type 2 diabetes mellitus (HCC)    Benign essential hypertension    BMI 34 0-34 9,adult      Omar Dey is uncooperative today, but no atypical behavior from current trends  Recommended Treatment:     Planned medication and treatment changes:     All current active medications have been reviewed  Encourage group therapy, milieu therapy and occupational therapy  [address] checks as unit standard unless ordered or noted otherwise      Current Facility-Administered Medications:  acetaminophen 325 mg Oral Q6H PRN Samule Cons, PA-C   acetaminophen 650 mg Oral Q6H PRN Samule Cons, PA-C   acetaminophen 975 mg Oral Q8H PRN Samule Cons, PA-C   aluminum-magnesium hydroxide-simethicone 30 mL Oral Q4H PRN Joel Snider MD   atorvastatin 10 mg Oral Daily With Aruna Silva MD   benztropine 1 mg Intramuscular Q6H PRN Joel Snider MD   benztropine 1 mg Oral Q6H PRN Joel Snider MD   clozapine 100 mg Oral Daily Kayleigh Ng MD   cloZAPine 350 mg Oral HS [facility]   divalproex sodium 1,000 mg Oral BID Samule Cons, PA-C   haloperidol 5 mg Oral Q6H PRN Joel Snider MD   haloperidol lactate 5 mg Intramuscular Q6H PRN Joel Snider MD   levothyroxine 75 mcg Oral Early Morning Lalindsaye XUAN Roper   LORazepam 1 mg Intramuscular Q6H PRN Joel Snider MD   LORazepam 1 mg Oral Q6H PRN Joel Snider MD   magnesium hydroxide 30 mL Oral Daily PRN Joel Snider MD   metFORMIN 500 mg Oral BID With Meals Carlos Alberto Roper PA-C   nicotine polacrilex 2 mg Oral Q2H PRN Joel Snider MD   OLANZapine 15 mg Oral After Lunch [facility]   pantoprazole 40 mg Oral Early Morning Lalindsaye Judson, XUAN   traZODone 50 mg Oral HS PRN Joel Snider MD   tuberculin 5 Units Intradermal Once Samule Cons, PA-C       1) continue current treatment  2) Continue to support patient and engage them in the programs available as feasible and appropriate  Continue case management support and therapy  Continue discharge planning  Risks / Benefits of Treatment:    Risks, benefits, and possible side effects of medications explained to patient and patient verbalizes understanding and agreement for treatment      Counseling / Coordination of Care:    Medication changes reviewed with nursing staff   Medications, treatment progress and treatment plan reviewed with patient        Ania Marquezraina EMANUEL DO  10/26/2019  7:26 AM

## 2019-10-26 NOTE — PROGRESS NOTES
Pt is in a pleasant mood with bright affect  Smiling and socializing with staff, asking to order pizza

## 2019-10-26 NOTE — PROGRESS NOTES
Patient slept throughout the night waking up with coughing and request for water  Patient was able to return to bed and sleep without difficulty

## 2019-10-26 NOTE — PROGRESS NOTES
Daily rounds  Reasons for admission reviewed  Per shift report, pt slept  Refused PPD yesterday  304 hearing yesterday  Showered yesterday

## 2019-10-27 LAB
GLUCOSE SERPL-MCNC: 131 MG/DL (ref 65–140)
GLUCOSE SERPL-MCNC: 138 MG/DL (ref 65–140)

## 2019-10-27 PROCEDURE — 82948 REAGENT STRIP/BLOOD GLUCOSE: CPT

## 2019-10-27 PROCEDURE — 99232 SBSQ HOSP IP/OBS MODERATE 35: CPT | Performed by: PSYCHIATRY & NEUROLOGY

## 2019-10-27 RX ADMIN — DIVALPROEX SODIUM 1000 MG: 500 TABLET, DELAYED RELEASE ORAL at 16:56

## 2019-10-27 RX ADMIN — METFORMIN HYDROCHLORIDE 500 MG: 500 TABLET, FILM COATED ORAL at 16:55

## 2019-10-27 RX ADMIN — DIVALPROEX SODIUM 1000 MG: 500 TABLET, DELAYED RELEASE ORAL at 09:36

## 2019-10-27 RX ADMIN — ATORVASTATIN CALCIUM 10 MG: 10 TABLET, FILM COATED ORAL at 16:55

## 2019-10-27 RX ADMIN — OLANZAPINE 15 MG: 10 TABLET, FILM COATED ORAL at 16:55

## 2019-10-27 RX ADMIN — CLOZAPINE 100 MG: 100 TABLET ORAL at 09:36

## 2019-10-27 RX ADMIN — CLOZAPINE 350 MG: 100 TABLET ORAL at 21:56

## 2019-10-27 RX ADMIN — LEVOTHYROXINE SODIUM 75 MCG: 75 TABLET ORAL at 09:35

## 2019-10-27 RX ADMIN — METFORMIN HYDROCHLORIDE 500 MG: 500 TABLET, FILM COATED ORAL at 09:36

## 2019-10-27 NOTE — PROGRESS NOTES
Pt calm  Sitting in dayroom with peers  Present in milieu  Wandering hallways  Pt said "he wants to go home" and "to get him a Taxi to hotel"  Still refusing vital signs, labs and PPD

## 2019-10-27 NOTE — PROGRESS NOTES
Pt calm and denying symptoms throughout shift  Pt remains focused on discharge and asks whether he could sign himself out  Often out in the milieu  Pleasant at times, cooperative with redirection, compliant with scheduled medications

## 2019-10-27 NOTE — PROGRESS NOTES
Progress Note - [facility] Alpha 48 y o  male MRN: @MRN   Unit/Bed#: [facility] 252-01 Encounter: [id number]    Per nursing report and sign out, patient sleeping well, evenings seem to be worse for him  No SI or HI    Sona Burk reports today that he has no depression, anxiety, SI, HI, AVH, or other issues other than "I committed no crime  Can you let me out?"  I explained the process, he reported he understood  Energy: ok  Sleep: normal  Appetite: normal  Medication side effects: No   ROS: no complaints    Mental Status Evaluation:    Appearance:  disheveled   Behavior:  Pleasant & cooperative, guarded   Speech:  Normal volume, regular rate and rhythm   Mood:  euthymic   Affect:  dysphoric, blunted       Thought Process:  perserverative   Associations: intact associations   Thought Content:  persecutory delusions   Perceptual Disturbances: no auditory or visual hallcunations   Risk Potential: Suicidal ideation - None  Homicidal ideation - None  Potential for aggression - No   Sensorium:  Person and place   Cognition:  grossly intact   Consciousness:  Alert & Awake   Memory:  recent and remote memory grossly intact   Attention: attention span and concentration appear shorter than expected for age           Insight:  limited   Judgment: limited   Muscle Strength  Muscle Tone normal  normal   Gait/Station: normal gait/station with good balance   Motor Activity: no abnormal movements       Vital signs in last 24 hours:         Laboratory results:  I have personally reviewed all pertinent laboratory/tests results  Assessment/Plan   Principal Problem:    Schizoaffective disorder (HCC)  Active Problems:    Type 2 diabetes mellitus (HCC)    Benign essential hypertension    BMI 34 0-34 9,adult      Sona Burk is more conversational, pleasant today  Still guarded and minimizing  Recommended Treatment:     Planned medication and treatment changes:     All current active medications have been reviewed  Encourage group therapy, milieu therapy and occupational therapy  [facility] unless ordered or noted otherwise      Current Facility-Administered Medications:  acetaminophen 325 mg Oral Q6H PRN Merary Camilo PA-C   acetaminophen 650 mg Oral Q6H PRN Merary Camilo PA-C   acetaminophen 975 mg Oral Q8H PRN Merary Camilo PA-C   aluminum-magnesium hydroxide-simethicone 30 mL Oral Q4H PRN Khoa Dorantes MD   atorvastatin 10 mg Oral Daily With Kaleigh Lee MD   benztropine 1 mg Intramuscular Q6H PRN Khoa Dorantes MD   benztropine 1 mg Oral Q6H PRN Khoa Dorantes MD   clozapine 100 mg Oral Daily Karla Marquez MD   cloZAPine 350 mg Oral HS Julio Csarah Gore   divalproex sodium 1,000 mg Oral BID Merary Camilo PA-C   haloperidol 5 mg Oral Q6H PRN Khoa Dorantes MD   haloperidol lactate 5 mg Intramuscular Q6H PRN Khoa Dorantes MD   levothyroxine 75 mcg Oral Early Morning Matthew Araujo PA-C   LORazepam 1 mg Intramuscular Q6H PRN Khoa Dorantes MD   LORazepam 1 mg Oral Q6H PRN Khoa Dorantes MD   magnesium hydroxide 30 mL Oral Daily PRN Khoa Dorantes MD   metFORMIN 500 mg Oral BID With Meals Matthew Araujo PA-C   nicotine polacrilex 2 mg Oral Q2H PRN Khoa Dorantes MD   OLANZapine 15 mg Oral After Lunch [facility]   pantoprazole 40 mg Oral Early Morning Matthew Araujo PA-C   traZODone 50 mg Oral HS PRN Khoa Dorantes MD   tuberculin 5 Units Intradermal Once Merary Camilo PA-C       1) continue current treatment  2) Continue to support patient and engage them in the programs available as feasible and appropriate  Continue case management support and therapy  Continue discharge planning  Risks / Benefits of Treatment:    Risks, benefits, and possible side effects of medications explained to patient and patient verbalizes understanding and agreement for treatment  Counseling / Coordination of Care:    Medication changes reviewed with nursing staff    Medications, treatment progress and treatment plan reviewed with patient        Uriel Nancy III, DO  10/27/2019  7:51 AM

## 2019-10-27 NOTE — PROGRESS NOTES
Has been sleeping in bed throughout the night, without restlessness or wakefulness  Upon arising, refused blood work & 0600 meds

## 2019-10-27 NOTE — PROGRESS NOTES
Tech reported pt had a large amount of urinary incontinence  Pt was asked to shower before attending breakfast because he was malodorous  Pt ate 100% of breakfast in TV room

## 2019-10-28 LAB
GLUCOSE SERPL-MCNC: 126 MG/DL (ref 65–140)
GLUCOSE SERPL-MCNC: 158 MG/DL (ref 65–140)

## 2019-10-28 PROCEDURE — 99231 SBSQ HOSP IP/OBS SF/LOW 25: CPT | Performed by: PHYSICIAN ASSISTANT

## 2019-10-28 PROCEDURE — 82948 REAGENT STRIP/BLOOD GLUCOSE: CPT

## 2019-10-28 RX ORDER — OXYBUTYNIN CHLORIDE 5 MG/1
5 TABLET, EXTENDED RELEASE ORAL DAILY
Status: DISCONTINUED | OUTPATIENT
Start: 2019-10-28 | End: 2019-12-23 | Stop reason: HOSPADM

## 2019-10-28 RX ADMIN — TUBERCULIN PURIFIED PROTEIN DERIVATIVE 5 UNITS: 5 INJECTION, SOLUTION INTRADERMAL at 12:29

## 2019-10-28 RX ADMIN — DIVALPROEX SODIUM 1000 MG: 500 TABLET, DELAYED RELEASE ORAL at 10:32

## 2019-10-28 RX ADMIN — CLOZAPINE 100 MG: 100 TABLET ORAL at 10:32

## 2019-10-28 RX ADMIN — OLANZAPINE 15 MG: 10 TABLET, FILM COATED ORAL at 13:07

## 2019-10-28 RX ADMIN — OXYBUTYNIN CHLORIDE 5 MG: 5 TABLET, EXTENDED RELEASE ORAL at 13:07

## 2019-10-28 RX ADMIN — CLOZAPINE 350 MG: 100 TABLET ORAL at 21:10

## 2019-10-28 RX ADMIN — ACETAMINOPHEN 650 MG: 325 TABLET, FILM COATED ORAL at 13:09

## 2019-10-28 RX ADMIN — METFORMIN HYDROCHLORIDE 500 MG: 500 TABLET, FILM COATED ORAL at 15:58

## 2019-10-28 RX ADMIN — DIVALPROEX SODIUM 1000 MG: 500 TABLET, DELAYED RELEASE ORAL at 17:48

## 2019-10-28 RX ADMIN — ATORVASTATIN CALCIUM 10 MG: 10 TABLET, FILM COATED ORAL at 15:58

## 2019-10-28 RX ADMIN — METFORMIN HYDROCHLORIDE 500 MG: 500 TABLET, FILM COATED ORAL at 10:32

## 2019-10-28 NOTE — PROGRESS NOTES
Progress Note - Behavioral Health   Jordin Omer 48 y o  male MRN: [id number]  Unit/Bed#: [facility] 252-01 Encounter: [id number]    Assessment/Plan   Principal Problem:    Schizoaffective disorder ([address] )  Active Problems:    Type 2 diabetes mellitus (HCC)    Benign essential hypertension    BMI 34 0-34 9,adult      Subjective:  Patient perseverating on discharge  Has limited insight and judgment into this matter  Has no future planning  Recently patient refused lab work, vital signs, and is not seen going to groups  Seen often walking in the hallways with majority of his clothing as if he is leaving the hospital   Currently is malodorous of urine and does appear disheveled  Staff has been helping patient complete his ADLs  Appears paranoid  Also appears internally preoccupied  Is scant in conversation and often terminates interview early  Is superficial most the time and hard to get good history from  Medication compliant  Appears to be tolerating medications well without serious side effects      Current Medications:  Current Facility-Administered Medications   Medication Dose Route Frequency    acetaminophen (TYLENOL) tablet 325 mg  325 mg Oral Q6H PRN    acetaminophen (TYLENOL) tablet 650 mg  650 mg Oral Q6H PRN    acetaminophen (TYLENOL) tablet 975 mg  975 mg Oral Q8H PRN    aluminum-magnesium hydroxide-simethicone (MYLANTA) 200-200-20 mg/5 mL oral suspension 30 mL  30 mL Oral Q4H PRN    atorvastatin (LIPITOR) tablet 10 mg  10 mg Oral Daily With Dinner    benztropine (COGENTIN) injection 1 mg  1 mg Intramuscular Q6H PRN    benztropine (COGENTIN) tablet 1 mg  1 mg Oral Q6H PRN    cloZAPine (CLOZARIL) tablet 100 mg  100 mg Oral Daily    cloZAPine (CLOZARIL) tablet 350 mg  350 mg Oral HS    divalproex sodium (DEPAKOTE) EC tablet 1,000 mg  1,000 mg Oral BID    haloperidol (HALDOL) tablet 5 mg  5 mg Oral Q6H PRN    haloperidol lactate (HALDOL) injection 5 mg  5 mg Intramuscular Q6H PRN    levothyroxine tablet 75 mcg  75 mcg Oral Early Morning    LORazepam (ATIVAN) 2 mg/mL injection 1 mg  1 mg Intramuscular Q6H PRN    LORazepam (ATIVAN) tablet 1 mg  1 mg Oral Q6H PRN    magnesium hydroxide (MILK OF MAGNESIA) 400 mg/5 mL oral suspension 30 mL  30 mL Oral Daily PRN    metFORMIN (GLUCOPHAGE) tablet 500 mg  500 mg Oral BID With Meals    nicotine polacrilex (NICORETTE) gum 2 mg  2 mg Oral Q2H PRN    OLANZapine (ZyPREXA) tablet 15 mg  15 mg Oral After Lunch    oxybutynin (DITROPAN-XL) 24 hr tablet 5 mg  5 mg Oral Daily    pantoprazole (PROTONIX) EC tablet 40 mg  40 mg Oral Early Morning    traZODone (DESYREL) tablet 50 mg  50 mg Oral HS PRN    tuberculin injection 5 Units  5 Units Intradermal Once       Behavioral Health Medications: all current active meds have been reviewed and continue current psychiatric medications  Vitals:  Vitals:    10/23/19 1512   SpO2: 98%       Laboratory results:    I have personally reviewed all pertinent laboratory/tests results    Most Recent Labs:   Lab Results   Component Value Date    WBC 8 40 10/20/2019    RBC 4 72 10/20/2019    HGB 14 0 10/20/2019    HCT 42 3 10/20/2019     10/20/2019    RDW 12 9 10/20/2019    NEUTROABS 5 31 10/20/2019    SODIUM 140 10/05/2019    K 4 0 10/05/2019     10/05/2019    CO2 26 10/05/2019    BUN 12 10/05/2019    CREATININE 0 72 10/05/2019    GLUCOSE 91 05/11/2018    GLUC 143 (H) 10/05/2019    GLUF 99 08/23/2019    CALCIUM 9 0 10/05/2019    AST 14 10/05/2019    ALT 11 (L) 10/05/2019    ALKPHOS 76 10/05/2019    TP 6 3 (L) 10/05/2019    ALB 3 0 (L) 10/05/2019    TBILI 0 20 10/05/2019    CHOLESTEROL 138 08/28/2019    HDL 34 (L) 08/28/2019    TRIG 165 (H) 08/28/2019    LDLCALC 72 08/23/2019    NONHDLC 112 08/23/2019    VALPROICTOT 56 10/08/2019    LITHIUM 0 6 03/27/2014    AMMONIA 32 10/08/2019    [id number] 1 840 08/23/2019    FREET4 0 93 08/23/2019    HGBA1C 6 3 10/05/2019     10/05/2019       Psychiatric Review of Systems:  Behavior over the last 24 hours:  unchanged  Sleep: normal  Appetite: normal  Medication side effects: No  ROS: no complaints    Mental Status Evaluation:  Appearance:  disheveled and malodorous   Behavior:  restless and fidgety   Speech:  pressured   Mood:  anxious   Affect:  increased in range   Language repeating phrases and sparse   Thought Process:  Embudo, goal directed, perseverative   Thought Content:  paranoid delusions, obsessions   Perceptual Disturbances: Appears internally preoccupied   Risk Potential: Denied SI/HI  Potential for aggression: Low   Sensorium:  person and place   Cognition:  grossly intact   Consciousness:  awake    Recent and Remote Memory limited   Attention: attention span appeared shorter than expected for age   Insight:  limited   Judgment: limited   Gait/Station: normal gait/station and normal balance   Motor Activity: no abnormal movements     Progress Toward Goals: unchanged    Recommended Treatment: Continue with group therapy, milieu therapy and occupational therapy  1   Labs reordered for tomorrow  2  Is refusing PPD required for EAC  3  Add Ditropan XL 5mg QD for overactive bladder  4  Continue other medications  5  Disposition planning    Risks, benefits and possible side effects of Medications:   Risks, benefits, and possible side effects of medications explained to patient and patient verbalizes understanding        Sabrina See PASelenaC

## 2019-10-28 NOTE — PROGRESS NOTES
Pt is overall calm and cooperative this evening  Accepting of redirection when necessary  Pt did refuse podiatry consult by Dr Carlyn Sanders  Staff attempted to educate pt on importantanc of foot care d/t diabetes  Pt not receptive
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
## 2019-10-28 NOTE — CONSULTS
Patient has refused care despite efforts by staff to convince him the importance having his diabetic feet looked

## 2019-10-28 NOTE — PLAN OF CARE
Pt continues to be malodorous & is refusing to cooperative with labs, vitals, & PPD testing needed for [facility]

## 2019-10-29 LAB
ALBUMIN SERPL BCP-MCNC: 3.1 G/DL (ref 3.5–5)
ALP SERPL-CCNC: 66 U/L (ref 46–116)
ALT SERPL W P-5'-P-CCNC: 22 U/L (ref 12–78)
ANION GAP SERPL CALCULATED.3IONS-SCNC: 7 MMOL/L (ref 4–13)
AST SERPL W P-5'-P-CCNC: 15 U/L (ref 5–45)
BASOPHILS # BLD AUTO: 0.09 THOUSANDS/ΜL (ref 0–0.1)
BASOPHILS NFR BLD AUTO: 1 % (ref 0–1)
BILIRUB SERPL-MCNC: 0.2 MG/DL (ref 0.2–1)
BUN SERPL-MCNC: 17 MG/DL (ref 5–25)
CALCIUM SERPL-MCNC: 8.6 MG/DL (ref 8.3–10.1)
CHLORIDE SERPL-SCNC: 106 MMOL/L (ref 100–108)
CO2 SERPL-SCNC: 28 MMOL/L (ref 21–32)
CREAT SERPL-MCNC: 0.76 MG/DL (ref 0.6–1.3)
EOSINOPHIL # BLD AUTO: 0 THOUSAND/ΜL (ref 0–0.61)
EOSINOPHIL NFR BLD AUTO: 0 % (ref 0–6)
ERYTHROCYTE [DISTWIDTH] IN BLOOD BY AUTOMATED COUNT: 12.9 % (ref 11.6–15.1)
GFR SERPL CREATININE-BSD FRML MDRD: 105 ML/MIN/1.73SQ M
GLUCOSE SERPL-MCNC: 106 MG/DL (ref 65–140)
GLUCOSE SERPL-MCNC: 93 MG/DL (ref 65–140)
HCT VFR BLD AUTO: 42.5 % (ref 36.5–49.3)
HGB BLD-MCNC: 14.1 G/DL (ref 12–17)
IMM GRANULOCYTES # BLD AUTO: 0.05 THOUSAND/UL (ref 0–0.2)
IMM GRANULOCYTES NFR BLD AUTO: 1 % (ref 0–2)
LYMPHOCYTES # BLD AUTO: 3.08 THOUSANDS/ΜL (ref 0.6–4.47)
LYMPHOCYTES NFR BLD AUTO: 36 % (ref 14–44)
MCH RBC QN AUTO: 30 PG (ref 26.8–34.3)
MCHC RBC AUTO-ENTMCNC: 33.2 G/DL (ref 31.4–37.4)
MCV RBC AUTO: 90 FL (ref 82–98)
MONOCYTES # BLD AUTO: 0.98 THOUSAND/ΜL (ref 0.17–1.22)
MONOCYTES NFR BLD AUTO: 11 % (ref 4–12)
NEUTROPHILS # BLD AUTO: 4.46 THOUSANDS/ΜL (ref 1.85–7.62)
NEUTS SEG NFR BLD AUTO: 51 % (ref 43–75)
NRBC BLD AUTO-RTO: 0 /100 WBCS
PLATELET # BLD AUTO: 161 THOUSANDS/UL (ref 149–390)
PMV BLD AUTO: 10.2 FL (ref 8.9–12.7)
POTASSIUM SERPL-SCNC: 4.3 MMOL/L (ref 3.5–5.3)
PROT SERPL-MCNC: 6.4 G/DL (ref 6.4–8.2)
RBC # BLD AUTO: 4.7 MILLION/UL (ref 3.88–5.62)
SODIUM SERPL-SCNC: 141 MMOL/L (ref 136–145)
VALPROATE SERPL-MCNC: 67 UG/ML (ref 50–100)
WBC # BLD AUTO: 8.66 THOUSAND/UL (ref 4.31–10.16)

## 2019-10-29 PROCEDURE — 85025 COMPLETE CBC W/AUTO DIFF WBC: CPT | Performed by: PHYSICIAN ASSISTANT

## 2019-10-29 PROCEDURE — 80164 ASSAY DIPROPYLACETIC ACD TOT: CPT | Performed by: PHYSICIAN ASSISTANT

## 2019-10-29 PROCEDURE — 80053 COMPREHEN METABOLIC PANEL: CPT | Performed by: PHYSICIAN ASSISTANT

## 2019-10-29 PROCEDURE — 82948 REAGENT STRIP/BLOOD GLUCOSE: CPT

## 2019-10-29 PROCEDURE — 99232 SBSQ HOSP IP/OBS MODERATE 35: CPT | Performed by: PSYCHIATRY & NEUROLOGY

## 2019-10-29 RX ADMIN — METFORMIN HYDROCHLORIDE 500 MG: 500 TABLET, FILM COATED ORAL at 11:02

## 2019-10-29 RX ADMIN — DIVALPROEX SODIUM 1000 MG: 500 TABLET, DELAYED RELEASE ORAL at 17:48

## 2019-10-29 RX ADMIN — ATORVASTATIN CALCIUM 10 MG: 10 TABLET, FILM COATED ORAL at 15:59

## 2019-10-29 RX ADMIN — OXYBUTYNIN CHLORIDE 5 MG: 5 TABLET, EXTENDED RELEASE ORAL at 11:02

## 2019-10-29 RX ADMIN — METFORMIN HYDROCHLORIDE 500 MG: 500 TABLET, FILM COATED ORAL at 15:59

## 2019-10-29 RX ADMIN — CLOZAPINE 100 MG: 100 TABLET ORAL at 11:03

## 2019-10-29 RX ADMIN — LEVOTHYROXINE SODIUM 75 MCG: 75 TABLET ORAL at 11:03

## 2019-10-29 RX ADMIN — ACETAMINOPHEN 975 MG: 325 TABLET, FILM COATED ORAL at 22:05

## 2019-10-29 RX ADMIN — CLOZAPINE 350 MG: 100 TABLET ORAL at 21:38

## 2019-10-29 RX ADMIN — PANTOPRAZOLE SODIUM 40 MG: 40 TABLET, DELAYED RELEASE ORAL at 11:03

## 2019-10-29 RX ADMIN — DIVALPROEX SODIUM 1000 MG: 500 TABLET, DELAYED RELEASE ORAL at 11:02

## 2019-10-29 RX ADMIN — OLANZAPINE 15 MG: 10 TABLET, FILM COATED ORAL at 15:59

## 2019-10-29 NOTE — PROGRESS NOTES
Progress Note - Behavioral Health   Paul Guerra 48 y o  male MRN: [id number]  [facility] 252-[facility] Encounter: [id number]    Assessment/Plan   Principal Problem:    Schizoaffective disorder ([address] )  Active Problems:    Type 2 diabetes mellitus (HCC)    Benign essential hypertension    BMI 34 0-34 9,adult      Subjective:  Patient remains no insight and limited judgment  Appears concrete and disorganized  Denies hallucinations but does appear internally preoccupied  Does remain with paranoid thought content  Does not disclose mood related symptoms and states he feels okay  Denied suicidal and homicidal thoughts  No agitation today  Requires prompting and emphasis to take medications, do vital signs, and complete lab work  After not allowing blood work for a few days he did allow it today  Depakote level today was 67 and deemed therapeutic  CBC and CMP are within normal limits  Patient refused to me with a podiatrist   Patient is awaiting [facility] placement at this time  Due to time constraints on involuntary 304 given by [facility] patient may require alternative care in place      Current Medications:  Current Facility-Administered Medications   Medication Dose Route Frequency    acetaminophen (TYLENOL) tablet 325 mg  325 mg Oral Q6H PRN    acetaminophen (TYLENOL) tablet 650 mg  650 mg Oral Q6H PRN    acetaminophen (TYLENOL) tablet 975 mg  975 mg Oral Q8H PRN    aluminum-magnesium hydroxide-simethicone (MYLANTA) 200-200-20 mg/5 mL oral suspension 30 mL  30 mL Oral Q4H PRN    atorvastatin (LIPITOR) tablet 10 mg  10 mg Oral Daily With Dinner    benztropine (COGENTIN) injection 1 mg  1 mg Intramuscular Q6H PRN    benztropine (COGENTIN) tablet 1 mg  1 mg Oral Q6H PRN    cloZAPine (CLOZARIL) tablet 100 mg  100 mg Oral Daily    cloZAPine (CLOZARIL) tablet 350 mg  350 mg Oral HS    divalproex sodium (DEPAKOTE) EC tablet 1,000 mg  1,000 mg Oral BID    haloperidol (HALDOL) tablet 5 mg  5 mg Oral Q6H PRN  haloperidol lactate (HALDOL) injection 5 mg  5 mg Intramuscular Q6H PRN    levothyroxine tablet 75 mcg  75 mcg Oral Early Morning    LORazepam (ATIVAN) 2 mg/mL injection 1 mg  1 mg Intramuscular Q6H PRN    LORazepam (ATIVAN) tablet 1 mg  1 mg Oral Q6H PRN    magnesium hydroxide (MILK OF MAGNESIA) 400 mg/5 mL oral suspension 30 mL  30 mL Oral Daily PRN    metFORMIN (GLUCOPHAGE) tablet 500 mg  500 mg Oral BID With Meals    nicotine polacrilex (NICORETTE) gum 2 mg  2 mg Oral Q2H PRN    OLANZapine (ZyPREXA) tablet 15 mg  15 mg Oral After Lunch    oxybutynin (DITROPAN-XL) 24 hr tablet 5 mg  5 mg Oral Daily    pantoprazole (PROTONIX) EC tablet 40 mg  40 mg Oral Early Morning    traZODone (DESYREL) tablet 50 mg  50 mg Oral HS PRN    tuberculin injection 5 Units  5 Units Intradermal Once       Behavioral Health Medications: all current active meds have been reviewed and continue current psychiatric medications  Vitals:  Vitals:    10/23/19 1512   SpO2: 98%       Laboratory results:    I have personally reviewed all pertinent laboratory/tests results    Most Recent Labs:   Lab Results   Component Value Date    WBC 8 66 10/29/2019    RBC 4 70 10/29/2019    HGB 14 1 10/29/2019    HCT 42 5 10/29/2019     10/29/2019    RDW 12 9 10/29/2019    NEUTROABS 4 46 10/29/2019    SODIUM 141 10/29/2019    K 4 3 10/29/2019     10/29/2019    CO2 28 10/29/2019    BUN 17 10/29/2019    CREATININE 0 76 10/29/2019    GLUCOSE 91 05/11/2018    GLUC 106 10/29/2019    GLUF 99 08/23/2019    CALCIUM 8 6 10/29/2019    AST 15 10/29/2019    ALT 22 10/29/2019    ALKPHOS 66 10/29/2019    TP 6 4 10/29/2019    ALB 3 1 (L) 10/29/2019    TBILI 0 20 10/29/2019    CHOLESTEROL 138 08/28/2019    HDL 34 (L) 08/28/2019    TRIG 165 (H) 08/28/2019    LDLCALC 72 08/23/2019    NONHDLC 112 08/23/2019    VALPROICTOT 67 10/29/2019    LITHIUM 0 6 03/27/2014    AMMONIA 32 10/08/2019    [id number] 1 840 08/23/2019    FREET4 0 93 08/23/2019 HGBA1C 6 3 10/05/2019     10/05/2019       Psychiatric Review of Systems:  Behavior over the last 24 hours:  unchanged  Sleep: normal  Appetite: normal  Medication side effects: No  ROS: no complaints    Mental Status Evaluation:  Appearance:  disheveled and malodorous   Behavior:  guarded and uncooperative   Speech:  loud   Mood:  dysthymic   Affect:  blunted and constricted   Language naming objects and repeating phrases   Thought Process:  concrete and disorganized   Thought Content:  paranoid delusions, obsessions   Perceptual Disturbances: appears internally preoccupied   Risk Potential: Denied SI/HI  Potential for aggression: low   Sensorium:  person and place   Cognition:  grossly intact   Consciousness:  alert and awake    Recent and Remote Memory limited   Attention: attention span appeared shorter than expected for age   Insight:  limited   Judgment: limited   Gait/Station: normal gait/station and normal balance   Motor Activity: no abnormal movements     Progress Toward Goals: unchanged    Recommended Treatment: Continue with group therapy, milieu therapy and occupational therapy  1   Continue current medications  2  Awaiting placement at [facility]    Risks, benefits and possible side effects of Medications:   Patient does not verbalize understanding at this time and will require further explanation        Stella Bain PA-C

## 2019-10-29 NOTE — PLAN OF CARE
Problem: SELF HARM/SUICIDALITY  Goal: Will have no self-injury during hospital stay  Description  INTERVENTIONS:  - Q 15 MINUTES: Routine safety checks  - Q WAKING SHIFT & PRN: Assess risk to determine if routine checks are adequate to maintain patient safety  - Encourage patient to participate actively in care by formulating a plan to combat response to suicidal ideation, identify supports and resources  Outcome: Progressing     Problem: INVOLUNTARY ADMIT  Goal: Will cooperate with staff recommendations and doctor's orders and will demonstrate appropriate behavior  Description  INTERVENTIONS:  - Treat underlying conditions and offer medication as ordered  - Educate regarding involuntary admission procedures and rules  - Utilize positive consistent limit setting strategies to support patient and staff safety  Outcome: Progressing     Problem: SELF CARE DEFICIT  Goal: Return ADL status to a safe level of function  Description  INTERVENTIONS:  - Administer medication as ordered  - Assess ADL deficits and provide assistive devices as needed  - Obtain PT/OT consults as needed  - Assist and instruct patient to increase activity and self care as tolerated  Outcome: Progressing     Problem: PAIN - ADULT  Goal: Verbalizes/displays adequate comfort level or baseline comfort level  Description  Interventions:  - Encourage patient to monitor pain and request assistance  - Assess pain using appropriate pain scale  - Administer analgesics based on type and severity of pain and evaluate response  - Implement non-pharmacological measures as appropriate and evaluate response  - Consider cultural and social influences on pain and pain management  - Notify physician/advanced practitioner if interventions unsuccessful or patient reports new pain  Outcome: Progressing     Problem: Ineffective Coping  Goal: Participates in unit activities  Description  Interventions:  - Provide therapeutic environment   - Provide required programming - Redirect inappropriate behaviors   Outcome: Not Progressing

## 2019-10-29 NOTE — PROGRESS NOTES
Pt laughing and joking with staff this evening  Pt showered and allowed staff to wash clothing  Pt requesting small toy from his locker  Pt was permitted to bring this to his room  Pt understands it is to stay in his room and he is expected to take his medications and attend to ADLs  Pt agreeable

## 2019-10-29 NOTE — PROGRESS NOTES
Status: Pt got his PPD test done  Pt remains focused on d/c  He refused to meet with Dr Rc Thompson  Depakote is 67  Medication: Ditropan started PRN - Tylenol  D/C: TBD  Discussed if lateral transfer could be made to [facility], so that 305 hearing would be done by [place]; attending will outreach to the doctor there        10/29/19 1849   Team Meeting   Meeting Type Daily Rounds   Team Members Present   Team Members Present Physician;Nurse;;Occupational Therapist   Physician Team Member Dr Viry Ritchie / Dr Bella Fernandez / Danielle Sharma Team Member [facility] Management Team Member Waqar Guerrero / Valeriy Huynh   OT Team Member Maria Guadalupe   Patient/Family Present   Patient Present No   Patient's Family Present No

## 2019-10-29 NOTE — CASE MANAGEMENT
CM returned a phone call to Pt's sister, Grover Chambers, @ [phone], as she had questions about how many days Pt was committed to  CM reviewed that Pt was only give 30 days, but the attending physician had requested 90  CM reviewed that they would petition again, however, there was the possibility they may not give any additional days  Crystal suggesting offering Pt soda and/or pizza if labs are needed  CM reviewed that PT did cooperative with labs & PPD test yesterday  She was happy to hear this  Crystal asked about becoming POA for Pt, & CM reviewed that unless Pt was deemed incompetent or incapacitated, she would not be able to make decisions for him, & she would not be able to sign him in on a 201  CM reviewed that they are continuing to work on options for Pt & she would provide on-going updates

## 2019-10-29 NOTE — PROGRESS NOTES
Pt seclusive to room throughout morning  Refused AM vitals  Refused to allow MHT to assist with cleaning his bedroom  Pt very malodorous and disheveled  Pt initially refused AM meds  Required prompting and encouragement  Pt was then compliant  Focused on talking with doctor

## 2019-10-30 LAB
GLUCOSE SERPL-MCNC: 114 MG/DL (ref 65–140)
GLUCOSE SERPL-MCNC: 126 MG/DL (ref 65–140)

## 2019-10-30 PROCEDURE — 82948 REAGENT STRIP/BLOOD GLUCOSE: CPT

## 2019-10-30 RX ADMIN — OLANZAPINE 15 MG: 10 TABLET, FILM COATED ORAL at 14:47

## 2019-10-30 RX ADMIN — METFORMIN HYDROCHLORIDE 500 MG: 500 TABLET, FILM COATED ORAL at 17:19

## 2019-10-30 RX ADMIN — PANTOPRAZOLE SODIUM 40 MG: 40 TABLET, DELAYED RELEASE ORAL at 07:55

## 2019-10-30 RX ADMIN — DIVALPROEX SODIUM 1000 MG: 500 TABLET, DELAYED RELEASE ORAL at 17:18

## 2019-10-30 RX ADMIN — OXYBUTYNIN CHLORIDE 5 MG: 5 TABLET, EXTENDED RELEASE ORAL at 09:14

## 2019-10-30 RX ADMIN — DIVALPROEX SODIUM 1000 MG: 500 TABLET, DELAYED RELEASE ORAL at 09:13

## 2019-10-30 RX ADMIN — ATORVASTATIN CALCIUM 10 MG: 10 TABLET, FILM COATED ORAL at 17:19

## 2019-10-30 RX ADMIN — CLOZAPINE 100 MG: 100 TABLET ORAL at 09:14

## 2019-10-30 RX ADMIN — ACETAMINOPHEN 325 MG: 325 TABLET, FILM COATED ORAL at 22:13

## 2019-10-30 RX ADMIN — LEVOTHYROXINE SODIUM 75 MCG: 75 TABLET ORAL at 07:55

## 2019-10-30 RX ADMIN — CLOZAPINE 350 MG: 100 TABLET ORAL at 21:19

## 2019-10-30 RX ADMIN — METFORMIN HYDROCHLORIDE 500 MG: 500 TABLET, FILM COATED ORAL at 09:13

## 2019-10-30 NOTE — PROGRESS NOTES
Progress Note - Behavioral Health   Violeta Samayoa 48 y o  male MRN: [id number]  Unit/Bed#: [facility] 252-01 Encounter: [id number]    Assessment/Plan   Principal Problem:    Schizoaffective disorder ([address] )  Active Problems:    Type 2 diabetes mellitus (HCC)    Benign essential hypertension    BMI 34 0-34 9,adult      Subjective:  Patient remains disheveled and malodorous  Yesterday had reports that he was joking, laughing, and dancing with staff  Had apparent brighter affect  PPD test negative today  No signs of agitation  Still perseverates on discharge  Limited insight and judgment  Denies psychosis when asked  Remains internally preoccupied  Remains with paranoid delusions  Is not currently manic  Still requires emphasis to take medications  Denied somatic complaints or side effects  Discharge to [facility]  Due to time constraints on 304 given by [facility] patient may require alternative discharge plan        Current Medications:  Current Facility-Administered Medications   Medication Dose Route Frequency    acetaminophen (TYLENOL) tablet 325 mg  325 mg Oral Q6H PRN    acetaminophen (TYLENOL) tablet 650 mg  650 mg Oral Q6H PRN    acetaminophen (TYLENOL) tablet 975 mg  975 mg Oral Q8H PRN    aluminum-magnesium hydroxide-simethicone (MYLANTA) 200-200-20 mg/5 mL oral suspension 30 mL  30 mL Oral Q4H PRN    atorvastatin (LIPITOR) tablet 10 mg  10 mg Oral Daily With Dinner    benztropine (COGENTIN) injection 1 mg  1 mg Intramuscular Q6H PRN    benztropine (COGENTIN) tablet 1 mg  1 mg Oral Q6H PRN    cloZAPine (CLOZARIL) tablet 100 mg  100 mg Oral Daily    cloZAPine (CLOZARIL) tablet 350 mg  350 mg Oral HS    divalproex sodium (DEPAKOTE) EC tablet 1,000 mg  1,000 mg Oral BID    haloperidol (HALDOL) tablet 5 mg  5 mg Oral Q6H PRN    haloperidol lactate (HALDOL) injection 5 mg  5 mg Intramuscular Q6H PRN    levothyroxine tablet 75 mcg  75 mcg Oral Early Morning    LORazepam (ATIVAN) 2 mg/mL injection 1 mg  1 mg Intramuscular Q6H PRN    LORazepam (ATIVAN) tablet 1 mg  1 mg Oral Q6H PRN    magnesium hydroxide (MILK OF MAGNESIA) 400 mg/5 mL oral suspension 30 mL  30 mL Oral Daily PRN    metFORMIN (GLUCOPHAGE) tablet 500 mg  500 mg Oral BID With Meals    nicotine polacrilex (NICORETTE) gum 2 mg  2 mg Oral Q2H PRN    OLANZapine (ZyPREXA) tablet 15 mg  15 mg Oral After Lunch    oxybutynin (DITROPAN-XL) 24 hr tablet 5 mg  5 mg Oral Daily    pantoprazole (PROTONIX) EC tablet 40 mg  40 mg Oral Early Morning    traZODone (DESYREL) tablet 50 mg  50 mg Oral HS PRN    tuberculin injection 5 Units  5 Units Intradermal Once       Behavioral Health Medications: all current active meds have been reviewed and continue current psychiatric medications  Vitals:  Vitals:    10/23/19 1512   SpO2: 98%       Laboratory results:    I have personally reviewed all pertinent laboratory/tests results    Most Recent Labs:   Lab Results   Component Value Date    WBC 8 66 10/29/2019    RBC 4 70 10/29/2019    HGB 14 1 10/29/2019    HCT 42 5 10/29/2019     10/29/2019    RDW 12 9 10/29/2019    NEUTROABS 4 46 10/29/2019    SODIUM 141 10/29/2019    K 4 3 10/29/2019     10/29/2019    CO2 28 10/29/2019    BUN 17 10/29/2019    CREATININE 0 76 10/29/2019    GLUCOSE 91 05/11/2018    GLUC 106 10/29/2019    GLUF 99 08/23/2019    CALCIUM 8 6 10/29/2019    AST 15 10/29/2019    ALT 22 10/29/2019    ALKPHOS 66 10/29/2019    TP 6 4 10/29/2019    ALB 3 1 (L) 10/29/2019    TBILI 0 20 10/29/2019    CHOLESTEROL 138 08/28/2019    HDL 34 (L) 08/28/2019    TRIG 165 (H) 08/28/2019    LDLCALC 72 08/23/2019    NONHDLC 112 08/23/2019    VALPROICTOT 67 10/29/2019    LITHIUM 0 6 03/27/2014    AMMONIA 32 10/08/2019    [id number] 1 840 08/23/2019    FREET4 0 93 08/23/2019    HGBA1C 6 3 10/05/2019     10/05/2019       Psychiatric Review of Systems:  Behavior over the last 24 hours:  unchanged  Sleep: normal  Appetite: normal  Medication side effects: No  ROS: no complaints    Mental Status Evaluation:  Appearance:  disheveled, malodorous   Behavior:  guarded and restless and fidgety   Speech:  loud   Mood:  "good"   Affect:  blunted and increased in range   Language naming objects and repeating phrases   Thought Process:  concrete and disorganized   Thought Content:  obsessions and chronic paranoid delusions   Perceptual Disturbances: internal preoccupation   Risk Potential: Denied SI/HI  Potential for aggression: low   Sensorium:  person and place   Cognition:  grossly intact   Consciousness:  alert and awake    Recent and Remote Memory limited   Attention: attention span appeared shorter than expected for age   Insight:  limited   Judgment: limited   Gait/Station: normal gait/station and normal balance   Motor Activity: no abnormal movements     Progress Toward Goals: some progression    Recommended Treatment: Continue with group therapy, milieu therapy and occupational therapy  1   Continue current medications  2  Disposition planning with possibility of EAC    Risks, benefits and possible side effects of Medications:   Risks, benefits, and possible side effects of medications explained to patient and patient verbalizes understanding        Franca Byers PA-C

## 2019-10-30 NOTE — PROGRESS NOTES
Pt calm and cooperative during interaction  Smiling during conversation  Compliant with meds today  Pt expresses frustration that he will have to go to another hospital after being here so long  Provided encouragement

## 2019-10-30 NOTE — PROGRESS NOTES
Pt irritable at med time this afternoon stating "I just want to get out of here"  Pt was compliant with meds after a few minutes  Pt is currently pleasant and smiling during interaction  Showered with encouragement

## 2019-10-30 NOTE — PLAN OF CARE
Problem: SELF HARM/SUICIDALITY  Goal: Will have no self-injury during hospital stay  Description  INTERVENTIONS:  - Q 15 MINUTES: Routine safety checks  - Q WAKING SHIFT & PRN: Assess risk to determine if routine checks are adequate to maintain patient safety  - Encourage patient to participate actively in care by formulating a plan to combat response to suicidal ideation, identify supports and resources  Outcome: Progressing     Problem: INVOLUNTARY ADMIT  Goal: Will cooperate with staff recommendations and doctor's orders and will demonstrate appropriate behavior  Description  INTERVENTIONS:  - Treat underlying conditions and offer medication as ordered  - Educate regarding involuntary admission procedures and rules  - Utilize positive consistent limit setting strategies to support patient and staff safety  Outcome: Progressing     Problem: SELF CARE DEFICIT  Goal: Return ADL status to a safe level of function  Description  INTERVENTIONS:  - Administer medication as ordered  - Assess ADL deficits and provide assistive devices as needed  - Obtain PT/OT consults as needed  - Assist and instruct patient to increase activity and self care as tolerated  Outcome: Progressing     Problem: PAIN - ADULT  Goal: Verbalizes/displays adequate comfort level or baseline comfort level  Description  Interventions:  - Encourage patient to monitor pain and request assistance  - Assess pain using appropriate pain scale  - Administer analgesics based on type and severity of pain and evaluate response  - Implement non-pharmacological measures as appropriate and evaluate response  - Consider cultural and social influences on pain and pain management  - Notify physician/advanced practitioner if interventions unsuccessful or patient reports new pain  Outcome: Progressing     Problem: Risk for Self Injury/Neglect  Goal: Complete daily ADLs, including personal hygiene independently, as able  Description  Interventions:  - Observe, teach, and assist patient with ADLS  - Monitor and promote a balance of rest/activity, with adequate nutrition and elimination  Outcome: Progressing     Problem: Ineffective Coping  Goal: Participates in unit activities  Description  Interventions:  - Provide therapeutic environment   - Provide required programming   - Redirect inappropriate behaviors   Outcome: Not Progressing

## 2019-10-30 NOTE — PROGRESS NOTES
Pt appears to have slept much of the night  Loud, deep cough overnight  Pt lying on side or flat on back  No distress

## 2019-10-30 NOTE — PROGRESS NOTES
Status: Pt was laughing, joking, & dancing yesterday     Medication: no changes / no PRNs  D/C: TBD     10/30/19 0731   Team Meeting   Meeting Type Daily Rounds   Team Members Present   Team Members Present Physician;Nurse;;Occupational Therapist   Physician Team Member Dr Daya Polanco / Dr Radha Ballard / Sharmin Enrique Team Member [facility] Management Team Member [id number] / Willy Miner   OT Team Member Maria Guadalupe   Patient/Family Present   Patient Present No   Patient's Family Present No

## 2019-10-31 LAB
GLUCOSE SERPL-MCNC: 107 MG/DL (ref 65–140)
GLUCOSE SERPL-MCNC: 117 MG/DL (ref 65–140)

## 2019-10-31 PROCEDURE — 82948 REAGENT STRIP/BLOOD GLUCOSE: CPT

## 2019-10-31 PROCEDURE — 99232 SBSQ HOSP IP/OBS MODERATE 35: CPT | Performed by: PSYCHIATRY & NEUROLOGY

## 2019-10-31 RX ADMIN — LEVOTHYROXINE SODIUM 75 MCG: 75 TABLET ORAL at 08:29

## 2019-10-31 RX ADMIN — OXYBUTYNIN CHLORIDE 5 MG: 5 TABLET, EXTENDED RELEASE ORAL at 09:46

## 2019-10-31 RX ADMIN — CLOZAPINE 350 MG: 100 TABLET ORAL at 21:18

## 2019-10-31 RX ADMIN — CLOZAPINE 100 MG: 100 TABLET ORAL at 09:46

## 2019-10-31 RX ADMIN — PANTOPRAZOLE SODIUM 40 MG: 40 TABLET, DELAYED RELEASE ORAL at 08:28

## 2019-10-31 RX ADMIN — ATORVASTATIN CALCIUM 10 MG: 10 TABLET, FILM COATED ORAL at 16:41

## 2019-10-31 RX ADMIN — OLANZAPINE 15 MG: 10 TABLET, FILM COATED ORAL at 14:17

## 2019-10-31 RX ADMIN — METFORMIN HYDROCHLORIDE 500 MG: 500 TABLET, FILM COATED ORAL at 08:29

## 2019-10-31 RX ADMIN — METFORMIN HYDROCHLORIDE 500 MG: 500 TABLET, FILM COATED ORAL at 16:41

## 2019-10-31 RX ADMIN — DIVALPROEX SODIUM 1000 MG: 500 TABLET, DELAYED RELEASE ORAL at 17:49

## 2019-10-31 RX ADMIN — DIVALPROEX SODIUM 1000 MG: 500 TABLET, DELAYED RELEASE ORAL at 09:46

## 2019-10-31 NOTE — PROGRESS NOTES
Progress Note - Behavioral Health   Era Darian 48 y o  male MRN: [id number]  Unit/Bed#: [facility] 252-01 Encounter: [id number]    Assessment/Plan   Principal Problem:    Schizoaffective disorder ([address] )  Active Problems:    Type 2 diabetes mellitus (HCC)    Benign essential hypertension    BMI 34 0-34 9,adult      Subjective:  Patient today required much emphasis and encouragement to take medications  Patient reluctantly did agree to take them  Is defiant and uncooperative at times  Was agitated today with nurses  Seen mostly seclusive to his room or pacing around the unit aimlessly with his clothes in his hands  Patient is solely focused on discharge but has no plan after discharge  Has limited insight and judgment  Appears concrete in thought process  Answers in yes or no  Denied hallucinations when asked  Appears internally preoccupied and paranoid  Denies most mood related symptoms  When asked he states he is okay  Denied somatic complaints or side effects  PLan is for discharge to [facility]  Due to time constraints on 304 given by [facility] patient may require alternative discharge plan        Current Medications:  Current Facility-Administered Medications   Medication Dose Route Frequency    acetaminophen (TYLENOL) tablet 325 mg  325 mg Oral Q6H PRN    acetaminophen (TYLENOL) tablet 650 mg  650 mg Oral Q6H PRN    acetaminophen (TYLENOL) tablet 975 mg  975 mg Oral Q8H PRN    aluminum-magnesium hydroxide-simethicone (MYLANTA) 200-200-20 mg/5 mL oral suspension 30 mL  30 mL Oral Q4H PRN    atorvastatin (LIPITOR) tablet 10 mg  10 mg Oral Daily With Dinner    benztropine (COGENTIN) injection 1 mg  1 mg Intramuscular Q6H PRN    benztropine (COGENTIN) tablet 1 mg  1 mg Oral Q6H PRN    cloZAPine (CLOZARIL) tablet 100 mg  100 mg Oral Daily    cloZAPine (CLOZARIL) tablet 350 mg  350 mg Oral HS    divalproex sodium (DEPAKOTE) EC tablet 1,000 mg  1,000 mg Oral BID    haloperidol (HALDOL) tablet 5 mg  5 mg Oral Q6H PRN    haloperidol lactate (HALDOL) injection 5 mg  5 mg Intramuscular Q6H PRN    levothyroxine tablet 75 mcg  75 mcg Oral Early Morning    LORazepam (ATIVAN) 2 mg/mL injection 1 mg  1 mg Intramuscular Q6H PRN    LORazepam (ATIVAN) tablet 1 mg  1 mg Oral Q6H PRN    magnesium hydroxide (MILK OF MAGNESIA) 400 mg/5 mL oral suspension 30 mL  30 mL Oral Daily PRN    metFORMIN (GLUCOPHAGE) tablet 500 mg  500 mg Oral BID With Meals    nicotine polacrilex (NICORETTE) gum 2 mg  2 mg Oral Q2H PRN    OLANZapine (ZyPREXA) tablet 15 mg  15 mg Oral After Lunch    oxybutynin (DITROPAN-XL) 24 hr tablet 5 mg  5 mg Oral Daily    pantoprazole (PROTONIX) EC tablet 40 mg  40 mg Oral Early Morning    traZODone (DESYREL) tablet 50 mg  50 mg Oral HS PRN    tuberculin injection 5 Units  5 Units Intradermal Once       Behavioral Health Medications: all current active meds have been reviewed and continue current psychiatric medications  Vitals:  Vitals:    10/31/19 0749   BP: 108/64   Pulse: 66   Resp: 18   Temp: 97 8 °F (36 6 °C)   SpO2:        Laboratory results:    I have personally reviewed all pertinent laboratory/tests results    Most Recent Labs:   Lab Results   Component Value Date    WBC 8 66 10/29/2019    RBC 4 70 10/29/2019    HGB 14 1 10/29/2019    HCT 42 5 10/29/2019     10/29/2019    RDW 12 9 10/29/2019    NEUTROABS 4 46 10/29/2019    SODIUM 141 10/29/2019    K 4 3 10/29/2019     10/29/2019    CO2 28 10/29/2019    BUN 17 10/29/2019    CREATININE 0 76 10/29/2019    GLUCOSE 91 05/11/2018    GLUC 106 10/29/2019    GLUF 99 08/23/2019    CALCIUM 8 6 10/29/2019    AST 15 10/29/2019    ALT 22 10/29/2019    ALKPHOS 66 10/29/2019    TP 6 4 10/29/2019    ALB 3 1 (L) 10/29/2019    TBILI 0 20 10/29/2019    CHOLESTEROL 138 08/28/2019    HDL 34 (L) 08/28/2019    TRIG 165 (H) 08/28/2019    LDLCALC 72 08/23/2019    NONHDLC 112 08/23/2019    VALPROICTOT 67 10/29/2019    LITHIUM 0 6 03/27/2014    AMMONIA 32 10/08/2019    [id number] 1 840 08/23/2019    FREET4 0 93 08/23/2019    HGBA1C 6 3 10/05/2019     10/05/2019       Psychiatric Review of Systems:  Behavior over the last 24 hours:  unchanged  Sleep: varies  Appetite: normal  Medication side effects: No  ROS: no complaints    Mental Status Evaluation:  Appearance:  disheveled and malodorous   Behavior:  restless and fidgety and uncooperative   Speech:  loud   Mood:  irritable   Affect:  blunted, increased in range and labile   Language naming objects, repeating phrases and rapid   Thought Process:  concrete, disorganized and perserverative   Thought Content:  obsessions and paranoid delusions   Perceptual Disturbances: appears internally preoccupied   Risk Potential: Denied SI/HI  Potential for aggression: possible   Sensorium:  person and place   Cognition:  grossly intact   Consciousness:  awake    Recent and Remote Memory limited   Attention: attention span appeared shorter than expected for age   Insight:  limited   Judgment: limited   Gait/Station: normal gait/station and normal balance   Motor Activity: no abnormal movements     Progress Toward Goals: unchanged    Recommended Treatment: Continue with group therapy, milieu therapy and occupational therapy  1   Continue current medications  2  Consider increase in Clozaril or Depakote for mood stabilization if patient remains labile and agitated  3  Disposition planning with possibility of EAC    Risks, benefits and possible side effects of Medications:   Patient does not verbalize understanding at this time and will require further explanation        Sabrina Barker PA-C

## 2019-10-31 NOTE — PROGRESS NOTES
Pt woke once overnight to use restroom however otherwise appeared to have slept throughout the night without difficulty

## 2019-10-31 NOTE — PROGRESS NOTES
Pt cont's to be labile with periods of agitation  Initially refused am med's, stalking away down [address]  Several attempts made and pt became more angry stating he wanted to leave and didn't deserve to be in this "hell hole"  When reminded he needed to take his medication in order to keep his jewelry, he did take them but remained angry  Disheveled and malodorous  Room was cleaned, bedding changed, clutter removed  After taking medications, refused to speak further to nurse

## 2019-10-31 NOTE — PROGRESS NOTES
Status: Pt has brief moments of agitation  C/o left foot pain & took Tylenol     Medication: no changes  D/C: EAC - as of Mon Pt was about 10th on the waiting list       10/31/19 0906   Team Meeting   Meeting Type Daily Rounds   Team Members Present   Team Members Present Physician;;Occupational Therapist;Nurse   Physician Team Member Dr John Paul Jones Team Member Laura Melendez / Patricia Olivas Management Team Member [id number]   OT Team Member Maria Guadalupe   Patient/Family Present   Patient Present No   Patient's Family Present No

## 2019-11-01 LAB
GLUCOSE SERPL-MCNC: 115 MG/DL (ref 65–140)
GLUCOSE SERPL-MCNC: 136 MG/DL (ref 65–140)

## 2019-11-01 PROCEDURE — 82948 REAGENT STRIP/BLOOD GLUCOSE: CPT

## 2019-11-01 PROCEDURE — 99232 SBSQ HOSP IP/OBS MODERATE 35: CPT | Performed by: PHYSICIAN ASSISTANT

## 2019-11-01 RX ADMIN — LEVOTHYROXINE SODIUM 75 MCG: 75 TABLET ORAL at 07:27

## 2019-11-01 RX ADMIN — DIVALPROEX SODIUM 1000 MG: 500 TABLET, DELAYED RELEASE ORAL at 08:41

## 2019-11-01 RX ADMIN — ATORVASTATIN CALCIUM 10 MG: 10 TABLET, FILM COATED ORAL at 17:12

## 2019-11-01 RX ADMIN — DIVALPROEX SODIUM 1000 MG: 500 TABLET, DELAYED RELEASE ORAL at 17:12

## 2019-11-01 RX ADMIN — ACETAMINOPHEN 975 MG: 325 TABLET, FILM COATED ORAL at 21:57

## 2019-11-01 RX ADMIN — CLOZAPINE 100 MG: 100 TABLET ORAL at 08:41

## 2019-11-01 RX ADMIN — OLANZAPINE 15 MG: 10 TABLET, FILM COATED ORAL at 13:26

## 2019-11-01 RX ADMIN — CLOZAPINE 350 MG: 100 TABLET ORAL at 21:31

## 2019-11-01 RX ADMIN — PANTOPRAZOLE SODIUM 40 MG: 40 TABLET, DELAYED RELEASE ORAL at 07:27

## 2019-11-01 RX ADMIN — OXYBUTYNIN CHLORIDE 5 MG: 5 TABLET, EXTENDED RELEASE ORAL at 08:41

## 2019-11-01 RX ADMIN — METFORMIN HYDROCHLORIDE 500 MG: 500 TABLET, FILM COATED ORAL at 17:12

## 2019-11-01 RX ADMIN — METFORMIN HYDROCHLORIDE 500 MG: 500 TABLET, FILM COATED ORAL at 08:41

## 2019-11-01 NOTE — PROGRESS NOTES
Pt remains labile, no irritability noted  Malodorous  Wandering halls  Attended groups  Focused on discharge  Frequently asking for items from his locker  Denies SI/HI/AVH  Medication compliant
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
## 2019-11-01 NOTE — PLAN OF CARE
Problem: SELF HARM/SUICIDALITY  Goal: Will have no self-injury during hospital stay  Description  INTERVENTIONS:  - Q 15 MINUTES: Routine safety checks  - Q WAKING SHIFT & PRN: Assess risk to determine if routine checks are adequate to maintain patient safety  - Encourage patient to participate actively in care by formulating a plan to combat response to suicidal ideation, identify supports and resources  Outcome: Progressing     Problem: INVOLUNTARY ADMIT  Goal: Will cooperate with staff recommendations and doctor's orders and will demonstrate appropriate behavior  Description  INTERVENTIONS:  - Treat underlying conditions and offer medication as ordered  - Educate regarding involuntary admission procedures and rules  - Utilize positive consistent limit setting strategies to support patient and staff safety  Outcome: Progressing     Problem: SELF CARE DEFICIT  Goal: Return ADL status to a safe level of function  Description  INTERVENTIONS:  - Administer medication as ordered  - Assess ADL deficits and provide assistive devices as needed  - Obtain PT/OT consults as needed  - Assist and instruct patient to increase activity and self care as tolerated  Outcome: Progressing     Problem: PAIN - ADULT  Goal: Verbalizes/displays adequate comfort level or baseline comfort level  Description  Interventions:  - Encourage patient to monitor pain and request assistance  - Assess pain using appropriate pain scale  - Administer analgesics based on type and severity of pain and evaluate response  - Implement non-pharmacological measures as appropriate and evaluate response  - Consider cultural and social influences on pain and pain management  - Notify physician/advanced practitioner if interventions unsuccessful or patient reports new pain  Outcome: Progressing     Problem: Risk for Self Injury/Neglect  Goal: Complete daily ADLs, including personal hygiene independently, as able  Description  Interventions:  - Observe, teach, and assist patient with ADLS  - Monitor and promote a balance of rest/activity, with adequate nutrition and elimination  Outcome: Progressing

## 2019-11-01 NOTE — PROGRESS NOTES
Status: Pt taking medication with much encouragement; he tends to argue with staff about it, but does eventually take it  Pt slept overnight  Pt last reported a BM on 10/23; staff to ask him about this today  Medication: no changes / no PRNs  D/C: EAC - Pt is currently 10th on the waiting list   CM Clinical Supervisor was able to work with Trudi Zaidi TIGIST, so that they will hold the 305 hearing        11/01/19 0738   Team Meeting   Meeting Type Daily Rounds   Team Members Present   Team Members Present Physician;Nurse;;Occupational Therapist   Physician Team Member Dr Fco Connors / Dr Sis Marks Team Member [facility] Management Team Member [id number] / Alla Sneed   OT Team Member Maria Guadalupe   Patient/Family Present   Patient Present No   Patient's Family Present No

## 2019-11-02 LAB
GLUCOSE SERPL-MCNC: 119 MG/DL (ref 65–140)
GLUCOSE SERPL-MCNC: 273 MG/DL (ref 65–140)

## 2019-11-02 PROCEDURE — 99231 SBSQ HOSP IP/OBS SF/LOW 25: CPT | Performed by: STUDENT IN AN ORGANIZED HEALTH CARE EDUCATION/TRAINING PROGRAM

## 2019-11-02 PROCEDURE — 82948 REAGENT STRIP/BLOOD GLUCOSE: CPT

## 2019-11-02 RX ADMIN — OLANZAPINE 15 MG: 10 TABLET, FILM COATED ORAL at 13:28

## 2019-11-02 RX ADMIN — ACETAMINOPHEN 650 MG: 325 TABLET, FILM COATED ORAL at 18:05

## 2019-11-02 RX ADMIN — ATORVASTATIN CALCIUM 10 MG: 10 TABLET, FILM COATED ORAL at 16:45

## 2019-11-02 RX ADMIN — OXYBUTYNIN CHLORIDE 5 MG: 5 TABLET, EXTENDED RELEASE ORAL at 10:19

## 2019-11-02 RX ADMIN — DIVALPROEX SODIUM 1000 MG: 500 TABLET, DELAYED RELEASE ORAL at 17:52

## 2019-11-02 RX ADMIN — CLOZAPINE 350 MG: 100 TABLET ORAL at 21:20

## 2019-11-02 RX ADMIN — METFORMIN HYDROCHLORIDE 500 MG: 500 TABLET, FILM COATED ORAL at 10:19

## 2019-11-02 RX ADMIN — CLOZAPINE 100 MG: 100 TABLET ORAL at 10:19

## 2019-11-02 RX ADMIN — LEVOTHYROXINE SODIUM 75 MCG: 75 TABLET ORAL at 10:18

## 2019-11-02 RX ADMIN — METFORMIN HYDROCHLORIDE 500 MG: 500 TABLET, FILM COATED ORAL at 16:45

## 2019-11-02 RX ADMIN — DIVALPROEX SODIUM 1000 MG: 500 TABLET, DELAYED RELEASE ORAL at 10:19

## 2019-11-02 RX ADMIN — PANTOPRAZOLE SODIUM 40 MG: 40 TABLET, DELAYED RELEASE ORAL at 10:19

## 2019-11-02 NOTE — PROGRESS NOTES
[facility] ACT member Ashley Main RN here to see pt  Pt appears cooperative and appropriate during interaction

## 2019-11-02 NOTE — PROGRESS NOTES
Pt woke once overtime to use restroom and requested water  Able to return to sleep soon after and slept remainder of the night

## 2019-11-02 NOTE — PLAN OF CARE
Problem: SELF HARM/SUICIDALITY  Goal: Will have no self-injury during hospital stay  Description  INTERVENTIONS:  - Q 15 MINUTES: Routine safety checks  - Q WAKING SHIFT & PRN: Assess risk to determine if routine checks are adequate to maintain patient safety  - Encourage patient to participate actively in care by formulating a plan to combat response to suicidal ideation, identify supports and resources  Outcome: Progressing     Problem: INVOLUNTARY ADMIT  Goal: Will cooperate with staff recommendations and doctor's orders and will demonstrate appropriate behavior  Description  INTERVENTIONS:  - Treat underlying conditions and offer medication as ordered  - Educate regarding involuntary admission procedures and rules  - Utilize positive consistent limit setting strategies to support patient and staff safety  Outcome: Progressing     Problem: SELF CARE DEFICIT  Goal: Return ADL status to a safe level of function  Description  INTERVENTIONS:  - Administer medication as ordered  - Assess ADL deficits and provide assistive devices as needed  - Obtain PT/OT consults as needed  - Assist and instruct patient to increase activity and self care as tolerated  Outcome: Progressing     Problem: PAIN - ADULT  Goal: Verbalizes/displays adequate comfort level or baseline comfort level  Description  Interventions:  - Encourage patient to monitor pain and request assistance  - Assess pain using appropriate pain scale  - Administer analgesics based on type and severity of pain and evaluate response  - Implement non-pharmacological measures as appropriate and evaluate response  - Consider cultural and social influences on pain and pain management  - Notify physician/advanced practitioner if interventions unsuccessful or patient reports new pain  Outcome: Progressing     Problem: Ineffective Coping  Goal: Participates in unit activities  Description  Interventions:  - Provide therapeutic environment   - Provide required programming - Redirect inappropriate behaviors   Outcome: Progressing     Problem: Risk for Self Injury/Neglect  Goal: Complete daily ADLs, including personal hygiene independently, as able  Description  Interventions:  - Observe, teach, and assist patient with ADLS  - Monitor and promote a balance of rest/activity, with adequate nutrition and elimination  Outcome: Progressing

## 2019-11-02 NOTE — PROGRESS NOTES
Pt displays repetitive behavior, asking for water and when he is leaving  Brief episodes of being loud and irritable  Other times he is social and joking with staff  Enjoys listening to music

## 2019-11-02 NOTE — PROGRESS NOTES
Progress Note - Behavioral Health   Sourav Reyes 48 y o  male MRN: [id number]  Unit/Bed#: [facility] 252-01 Encounter: [id number]    Subjective:    Per nursing, patient has been labile, malodorous, wonders halls, attending groups, focused on discharge  Per patient, patient reports feeling good  He denies any problems or concerns  He reports sleeping okay  He denies any concerns about appetite  He is perseverative about discharge, feeling trapped on the unit  During interview, patient got up and left room  Behavior over the last 24 hours:  unchanged  Medication side effects: No  ROS: no complaints    Objective:    Temp:  [96 °F (35 6 °C)] 96 °F (35 6 °C)    Mental Status Evaluation:  Appearance:  Sitting comfortably in chair, disheveled, cooperative, abruptly leaves interview, needing frequent re-direction   Behavior:  No tics, tremors, or behaviors observed   Speech:  Normal rate, rhythm, and volume   Mood:  "good"   Affect:  Appears blunted   Thought Process:  Perseverative, disorganized   Associations intact associations   Thought Content:  No passive or active suicidal or homicidal ideation, intent, or plan  Perceptual Disturbances: Unable to assess   Sensorium:  Oriented to person, place, time, and situation   Memory:  recent and remote memory grossly intact   Consciousness:  alert and awake   Attention: Distractible, needing a lot of re-direction   Insight:  poor   Judgment: impaired   Gait/Station: normal gait/station   Motor Activity: no abnormal movements     Progress Toward Goals: No change    Recommended Treatment: Continue with group therapy, milieu therapy and occupational therapy  Risks, benefits and possible side effects of Medications:   Risks, benefits, and possible side effects of medications explained to patient and patient verbalizes understanding  Medications: all current active meds have been reviewed      Current Facility-Administered Medications:  acetaminophen 325 mg Oral Q6H PRN Jaswinder Patterson PA-C   acetaminophen 650 mg Oral Q6H PRN Jaswinder Patterson PA-C   acetaminophen 975 mg Oral Q8H PRN Jaswinder Patterson PA-C   aluminum-magnesium hydroxide-simethicone 30 mL Oral Q4H PRN Maribel Voss MD   atorvastatin 10 mg Oral Daily With Lorelei Morris MD   benztropine 1 mg Intramuscular Q6H PRN Maribelabena Voss MD   benztropine 1 mg Oral Q6H PRN Maribel Voss MD   clozapine 100 mg Oral Daily Oscar Martinez MD   cloZAPine 350 mg Oral HS Julio Csarah Gore   divalproex sodium 1,000 mg Oral BID Jaswinder Patterson PA-C   haloperidol 5 mg Oral Q6H PRN Maribel Voss MD   haloperidol lactate 5 mg Intramuscular Q6H PRN Maribel Points, MD   levothyroxine 75 mcg Oral Early Morning Paramjit Miller PA-C   LORazepam 1 mg Intramuscular Q6H PRN Maribel Voss MD   LORazepam 1 mg Oral Q6H PRN Maribel Voss, MD   magnesium hydroxide 30 mL Oral Daily PRN Mariebl Voss MD   metFORMIN 500 mg Oral BID With Meals Paramjit Miller PA-C   nicotine polacrilex 2 mg Oral Q2H PRN Maribel Voss MD   OLANZapine 15 mg Oral After Lunch Julio C Gore   oxybutynin 5 mg Oral Daily Jaswinder Patterson PA-C   pantoprazole 40 mg Oral Early Morning Paramjit Miller PA-C   traZODone 50 mg Oral HS PRN Maribel Voss MD           Assessment/Plan   Principal Problem:    Schizoaffective disorder [facility])  Active Problems:    Type 2 diabetes mellitus (HCC)    Benign essential hypertension    BMI 34 0-34 9,adult    47 y/o Male with schizoaffective disorder- remains unkempt, disorganized, perseverative    Plan:  -Continue current med regimen   -Awaiting EAC placement

## 2019-11-03 LAB
GLUCOSE SERPL-MCNC: 123 MG/DL (ref 65–140)
GLUCOSE SERPL-MCNC: 89 MG/DL (ref 65–140)

## 2019-11-03 PROCEDURE — 82948 REAGENT STRIP/BLOOD GLUCOSE: CPT

## 2019-11-03 PROCEDURE — 99231 SBSQ HOSP IP/OBS SF/LOW 25: CPT | Performed by: STUDENT IN AN ORGANIZED HEALTH CARE EDUCATION/TRAINING PROGRAM

## 2019-11-03 RX ADMIN — LEVOTHYROXINE SODIUM 75 MCG: 75 TABLET ORAL at 07:11

## 2019-11-03 RX ADMIN — CLOZAPINE 100 MG: 100 TABLET ORAL at 09:04

## 2019-11-03 RX ADMIN — PANTOPRAZOLE SODIUM 40 MG: 40 TABLET, DELAYED RELEASE ORAL at 07:11

## 2019-11-03 RX ADMIN — METFORMIN HYDROCHLORIDE 500 MG: 500 TABLET, FILM COATED ORAL at 09:03

## 2019-11-03 RX ADMIN — CLOZAPINE 350 MG: 100 TABLET ORAL at 21:00

## 2019-11-03 RX ADMIN — METFORMIN HYDROCHLORIDE 500 MG: 500 TABLET, FILM COATED ORAL at 16:39

## 2019-11-03 RX ADMIN — OXYBUTYNIN CHLORIDE 5 MG: 5 TABLET, EXTENDED RELEASE ORAL at 09:03

## 2019-11-03 RX ADMIN — ATORVASTATIN CALCIUM 10 MG: 10 TABLET, FILM COATED ORAL at 16:39

## 2019-11-03 RX ADMIN — DIVALPROEX SODIUM 1000 MG: 500 TABLET, DELAYED RELEASE ORAL at 09:03

## 2019-11-03 RX ADMIN — OLANZAPINE 15 MG: 10 TABLET, FILM COATED ORAL at 13:27

## 2019-11-03 RX ADMIN — DIVALPROEX SODIUM 1000 MG: 500 TABLET, DELAYED RELEASE ORAL at 17:50

## 2019-11-03 NOTE — PROGRESS NOTES
Progress Note - Zane Gaitan 48 y o  male MRN: [id number]  Unit/Bed#: U 252-01 Encounter: [id number]    Subjective:    Per nursing, patient displays repetitive behaviors, asking for water, loud and irritable at times, enjoys listening to music, overall pleasant and cooperative today, re-directable  Per patient, patient reports not liking being in the hospital   He reports that his appetite is good, denying sleep problems  He reports his mood is "good "  He denies any SI/HI  He denies any auditory or visual hallucinations  He reports enjoying listening to music  He reports some frustrations with staff regarding his ability to get in his locker  Behavior over the last 24 hours:  unchanged  Medication side effects: No  ROS: no complaints    Objective:    Temp:  [96 °F (35 6 °C)] 96 °F (35 6 °C)    Mental Status Evaluation:  Appearance:  Pacing the unit, knocks intrusively on door at times, disheveled, malodorous   Behavior:  No tics, tremors, or behaviors observed   Speech:  Normal rate, rhythm, and volume   Mood:  "good"   Affect:  A bit labile, irritable at times   Thought Process: Tangential   Associations tangential associations   Thought Content:  No passive or active suicidal or homicidal ideation, intent, or plan  Perceptual Disturbances: Denies any auditory or visual hallucinations   Sensorium:  Oriented to person, place, time, and situation   Memory:  recent and remote memory grossly intact   Consciousness:  alert and awake   Attention: attention span and concentration were age appropriate   Insight:  poor   Judgment: impaired   Gait/Station: normal gait/station   Motor Activity: no abnormal movements       Progress Toward Goals: Progressing    Recommended Treatment: Continue with group therapy, milieu therapy and occupational therapy        Risks, benefits and possible side effects of Medications:   Risks, benefits, and possible side effects of medications explained to patient and patient verbalizes understanding  Medications: all current active meds have been reviewed  Current Facility-Administered Medications:  acetaminophen 325 mg Oral Q6H PRN Vj Pelayo PA-C   acetaminophen 650 mg Oral Q6H PRN Vj Pelayo PA-C   acetaminophen 975 mg Oral Q8H PRN Vj Pelayo PA-C   aluminum-magnesium hydroxide-simethicone 30 mL Oral Q4H PRN Modesto Ortiz MD   atorvastatin 10 mg Oral Daily With Sarahi Aly MD   benztropine 1 mg Intramuscular Q6H PRN Modesto Ortiz MD   benztropine 1 mg Oral Q6H PRN Modesto Ortiz MD   clozapine 100 mg Oral Daily Tabitha Stevens MD   cloZAPine 350 mg Oral HS Julio C Gore   divalproex sodium 1,000 mg Oral BID Vj Pelayo PA-C   haloperidol 5 mg Oral Q6H PRN Modesto Ortiz MD   haloperidol lactate 5 mg Intramuscular Q6H PRN Modesto Ortiz MD   levothyroxine 75 mcg Oral Early Morning Carleen Crow PA-C   LORazepam 1 mg Intramuscular Q6H PRN Modesto Ortiz MD   LORazepam 1 mg Oral Q6H PRN Modesto Ortiz MD   magnesium hydroxide 30 mL Oral Daily PRN Modesto Ortiz MD   metFORMIN 500 mg Oral BID With Meals Carleen Crow PA-C   nicotine polacrilex 2 mg Oral Q2H PRN Modesto Ortiz MD   OLANZapine 15 mg Oral After Lunch Julio C Gore   oxybutynin 5 mg Oral Daily Vj Pelayo PA-C   pantoprazole 40 mg Oral Early Morning Carleen Crow PA-C   traZODone 50 mg Oral HS PRN Modesto Ortiz MD           Assessment/Plan   Principal Problem:    Schizoaffective disorder [facility])  Active Problems:    Type 2 diabetes mellitus (HCC)    Benign essential hypertension    BMI 34 0-34 9,adult    47 y/o Male with schizoaffective disorder- continues to be disorganized in behavior, tangential thoughts, some lability of mood, disheveled and malodorous    Plan:  -Continue current med regimen   -Awaiting EAC placement

## 2019-11-03 NOTE — PROGRESS NOTES
Pt overall pleasant and cooperative  Repetitive questions to obtain water, locker items, etc   Pt is redirectable

## 2019-11-03 NOTE — PROGRESS NOTES
Pt labile this evening  Yelling "I want to get out of here" in hallway prior to dinner  Pt agitated r/t seeing someone exit the unit  Pt was able calm down quickly  Pt frequently at nurses station  Irritable at times but is redirectable

## 2019-11-03 NOTE — PROGRESS NOTES
Daily Rounds:    [facility] ACT saw pt yesterday  Pt frequently asking doctor about discharge yesterday  Continues with water chart  Mild agitation at times but is redirectable

## 2019-11-04 LAB
GLUCOSE SERPL-MCNC: 104 MG/DL (ref 65–140)
GLUCOSE SERPL-MCNC: 113 MG/DL (ref 65–140)

## 2019-11-04 PROCEDURE — 99231 SBSQ HOSP IP/OBS SF/LOW 25: CPT | Performed by: PHYSICIAN ASSISTANT

## 2019-11-04 PROCEDURE — 82948 REAGENT STRIP/BLOOD GLUCOSE: CPT

## 2019-11-04 RX ADMIN — DIVALPROEX SODIUM 1000 MG: 500 TABLET, DELAYED RELEASE ORAL at 17:18

## 2019-11-04 RX ADMIN — CLOZAPINE 100 MG: 100 TABLET ORAL at 09:06

## 2019-11-04 RX ADMIN — DIVALPROEX SODIUM 1000 MG: 500 TABLET, DELAYED RELEASE ORAL at 09:05

## 2019-11-04 RX ADMIN — LEVOTHYROXINE SODIUM 75 MCG: 75 TABLET ORAL at 06:45

## 2019-11-04 RX ADMIN — METFORMIN HYDROCHLORIDE 500 MG: 500 TABLET, FILM COATED ORAL at 17:18

## 2019-11-04 RX ADMIN — PANTOPRAZOLE SODIUM 40 MG: 40 TABLET, DELAYED RELEASE ORAL at 06:45

## 2019-11-04 RX ADMIN — ATORVASTATIN CALCIUM 10 MG: 10 TABLET, FILM COATED ORAL at 17:18

## 2019-11-04 RX ADMIN — OLANZAPINE 15 MG: 10 TABLET, FILM COATED ORAL at 13:59

## 2019-11-04 RX ADMIN — CLOZAPINE 350 MG: 100 TABLET ORAL at 21:06

## 2019-11-04 RX ADMIN — METFORMIN HYDROCHLORIDE 500 MG: 500 TABLET, FILM COATED ORAL at 09:06

## 2019-11-04 RX ADMIN — ACETAMINOPHEN 650 MG: 325 TABLET, FILM COATED ORAL at 09:48

## 2019-11-04 RX ADMIN — OXYBUTYNIN CHLORIDE 5 MG: 5 TABLET, EXTENDED RELEASE ORAL at 09:05

## 2019-11-04 NOTE — PROGRESS NOTES
Status: Pt has periods of agitation, but otherwise doing the same  CM inquired about Pt's clothing, as Pt's sister left a message regarding this  It was clarified that Pt has been offered clothing from the donations closet, but he needs to also allow staff to wash what he has; he has on some occassions     Medication: no changes / PRN - Tylenol(11/2)  D/C: EAC     11/04/19 0735   Team Meeting   Meeting Type Daily Rounds   Team Members Present   Team Members Present Physician;Nurse;;Occupational Therapist   Physician Team Member XUAN Olivera / Dr Quinn Henley Team Member GRANT [facility] Management Team Member Anny Amin / Blanca Virgen   OT Team Member Maria Guadalupe   Patient/Family Present   Patient Present No   Patient's Family Present No

## 2019-11-04 NOTE — PROGRESS NOTES
Progress Note - Behavioral Health   Yady Richards 48 y o  male MRN: [id number]  Unit/Bed#: U 252-01 Encounter: [id number]    Assessment/Plan   Principal Problem:    Schizoaffective disorder ([address] )  Active Problems:    Type 2 diabetes mellitus (HCC)    Benign essential hypertension    BMI 34 0-34 9,adult      Subjective:  Patient appears to have limited attention span and often terminates interview early  Today after approaching him multiple times did speak more in depth  States he is only focused on discharge but cannot give me any future planning ahead of this  Patient has poor judgment and insight into current situation and psychiatric disorder  Remains disheveled, carries most was clothing in the hallway, frequently knocks on physicians door, and requires emphasis in completing ADLs  At times patient will refuse vital signs and blood glucose checks, but does overall agree to them  Did take medications today but usually requires much emphasis  Patient remains disheveled and malodorous  Reports from nursing that patient often is incontinent of urine in his bed  Patient does not usually engage with others and is mostly isolative to himself  Does not attend groups  Appears internally preoccupied at times  Can be labile and agitated and usually involves it being over discharge  Did say he was agitated last night because he wanted to leave the hospital   When asking patient he did deny having all forms hallucinations, suicidal thoughts, and homicidal thoughts  He does harbor chronic paranoia  He Did deny somatic complaints and potential serious side effects  Patient is awaiting discharge to [facility]  Due to time constraints with 304 may require alternative discharge plan      Current Medications:  Current Facility-Administered Medications   Medication Dose Route Frequency    acetaminophen (TYLENOL) tablet 325 mg  325 mg Oral Q6H PRN    acetaminophen (TYLENOL) tablet 650 mg  650 mg Oral Q6H PRN    acetaminophen (TYLENOL) tablet 975 mg  975 mg Oral Q8H PRN    aluminum-magnesium hydroxide-simethicone (MYLANTA) 200-200-20 mg/5 mL oral suspension 30 mL  30 mL Oral Q4H PRN    atorvastatin (LIPITOR) tablet 10 mg  10 mg Oral Daily With Dinner    benztropine (COGENTIN) injection 1 mg  1 mg Intramuscular Q6H PRN    benztropine (COGENTIN) tablet 1 mg  1 mg Oral Q6H PRN    cloZAPine (CLOZARIL) tablet 100 mg  100 mg Oral Daily    cloZAPine (CLOZARIL) tablet 350 mg  350 mg Oral HS    divalproex sodium (DEPAKOTE) EC tablet 1,000 mg  1,000 mg Oral BID    haloperidol (HALDOL) tablet 5 mg  5 mg Oral Q6H PRN    haloperidol lactate (HALDOL) injection 5 mg  5 mg Intramuscular Q6H PRN    levothyroxine tablet 75 mcg  75 mcg Oral Early Morning    LORazepam (ATIVAN) 2 mg/mL injection 1 mg  1 mg Intramuscular Q6H PRN    LORazepam (ATIVAN) tablet 1 mg  1 mg Oral Q6H PRN    magnesium hydroxide (MILK OF MAGNESIA) 400 mg/5 mL oral suspension 30 mL  30 mL Oral Daily PRN    metFORMIN (GLUCOPHAGE) tablet 500 mg  500 mg Oral BID With Meals    nicotine polacrilex (NICORETTE) gum 2 mg  2 mg Oral Q2H PRN    OLANZapine (ZyPREXA) tablet 15 mg  15 mg Oral After Lunch    oxybutynin (DITROPAN-XL) 24 hr tablet 5 mg  5 mg Oral Daily    pantoprazole (PROTONIX) EC tablet 40 mg  40 mg Oral Early Morning    traZODone (DESYREL) tablet 50 mg  50 mg Oral HS PRN       Behavioral Health Medications: all current active meds have been reviewed and continue current psychiatric medications  Vitals:  Vitals:    11/03/19 1530   BP: 134/79   Pulse: 96   Resp: 17   SpO2:        Laboratory results:    I have personally reviewed all pertinent laboratory/tests results    Most Recent Labs:   Lab Results   Component Value Date    WBC 8 66 10/29/2019    RBC 4 70 10/29/2019    HGB 14 1 10/29/2019    HCT 42 5 10/29/2019     10/29/2019    RDW 12 9 10/29/2019    NEUTROABS 4 46 10/29/2019    SODIUM 141 10/29/2019    K 4 3 10/29/2019     10/29/2019    CO2 28 10/29/2019    BUN 17 10/29/2019    CREATININE 0 76 10/29/2019    GLUCOSE 91 05/11/2018    GLUC 106 10/29/2019    GLUF 99 08/23/2019    CALCIUM 8 6 10/29/2019    AST 15 10/29/2019    ALT 22 10/29/2019    ALKPHOS 66 10/29/2019    TP 6 4 10/29/2019    ALB 3 1 (L) 10/29/2019    TBILI 0 20 10/29/2019    CHOLESTEROL 138 08/28/2019    HDL 34 (L) 08/28/2019    TRIG 165 (H) 08/28/2019    LDLCALC 72 08/23/2019    NONHDLC 112 08/23/2019    VALPROICTOT 67 10/29/2019    LITHIUM 0 6 03/27/2014    AMMONIA 32 10/08/2019    [id number] 1 840 08/23/2019    FREET4 0 93 08/23/2019    HGBA1C 6 3 10/05/2019     10/05/2019       Psychiatric Review of Systems:  Behavior over the last 24 hours:  unchanged  Sleep: normal  Appetite: normal  Medication side effects: No  ROS: no complaints    Mental Status Evaluation:  Appearance:  disheveled and malodorous   Behavior:  restless and fidgety   Speech:  Less pressured, loud   Mood:  anxious   Affect:  increased in range   Language naming objects and repeating phrases   Thought Process:  concrete, goal directed and perserverative   Thought Content:  obsessions and paranoid delusions   Perceptual Disturbances: internal preoccupation   Risk Potential: Denied SI/HI  Potential for aggression: low   Sensorium:  person, place and time/date   Cognition:  grossly intact   Consciousness:  alert and awake    Recent and Remote Memory limited   Attention: attention span appeared shorter than expected for age   Insight:  limited   Judgment: limited   Gait/Station: normal gait/station and normal balance   Motor Activity: no abnormal movements     Progress Toward Goals: unchanged    Recommended Treatment: Continue with group therapy, milieu therapy and occupational therapy  1   Continue current medications  2  CBC w/diff for 11/5 for Clozaril monitoring  3    Disposition planning with plan for EAC    Risks, benefits and possible side effects of Medications:   Risks, benefits, and possible side effects of medications explained to patient and patient verbalizes understanding        Sandy Gorman PA-C

## 2019-11-04 NOTE — PROGRESS NOTES
Pt childlike, walking halls carrying his clothing, frequently asks for water, knocking on physician's door  Needs encouragement to comply with treatment  Refused AM vitals  Initially refused blood glucose check but was then agreeable  Pt received AM meds  Remains malodorous and disheveled in appearance, requires encouragement to attend to ADLs

## 2019-11-04 NOTE — PROGRESS NOTES
Pt pleasant this evening  Showered in afternoon  Refused evening vitals  Compliant with blood glucose check  Compliant with scheduled medicines  No irritability observed

## 2019-11-05 LAB
BASOPHILS # BLD AUTO: 0.09 THOUSANDS/ΜL (ref 0–0.1)
BASOPHILS NFR BLD AUTO: 1 % (ref 0–1)
CHOLEST SERPL-MCNC: 103 MG/DL (ref 50–200)
EOSINOPHIL # BLD AUTO: 0 THOUSAND/ΜL (ref 0–0.61)
EOSINOPHIL NFR BLD AUTO: 0 % (ref 0–6)
ERYTHROCYTE [DISTWIDTH] IN BLOOD BY AUTOMATED COUNT: 13 % (ref 11.6–15.1)
EST. AVERAGE GLUCOSE BLD GHB EST-MCNC: 131 MG/DL
GLUCOSE SERPL-MCNC: 100 MG/DL (ref 65–140)
GLUCOSE SERPL-MCNC: 118 MG/DL (ref 65–140)
HBA1C MFR BLD: 6.2 % (ref 4.2–6.3)
HCT VFR BLD AUTO: 42.1 % (ref 36.5–49.3)
HDLC SERPL-MCNC: 27 MG/DL
HGB BLD-MCNC: 13.7 G/DL (ref 12–17)
IMM GRANULOCYTES # BLD AUTO: 0.03 THOUSAND/UL (ref 0–0.2)
IMM GRANULOCYTES NFR BLD AUTO: 0 % (ref 0–2)
LDLC SERPL CALC-MCNC: 29 MG/DL (ref 0–100)
LYMPHOCYTES # BLD AUTO: 3.11 THOUSANDS/ΜL (ref 0.6–4.47)
LYMPHOCYTES NFR BLD AUTO: 31 % (ref 14–44)
MCH RBC QN AUTO: 29.3 PG (ref 26.8–34.3)
MCHC RBC AUTO-ENTMCNC: 32.5 G/DL (ref 31.4–37.4)
MCV RBC AUTO: 90 FL (ref 82–98)
MONOCYTES # BLD AUTO: 1.21 THOUSAND/ΜL (ref 0.17–1.22)
MONOCYTES NFR BLD AUTO: 12 % (ref 4–12)
NEUTROPHILS # BLD AUTO: 5.51 THOUSANDS/ΜL (ref 1.85–7.62)
NEUTS SEG NFR BLD AUTO: 56 % (ref 43–75)
NONHDLC SERPL-MCNC: 76 MG/DL
NRBC BLD AUTO-RTO: 0 /100 WBCS
PLATELET # BLD AUTO: 152 THOUSANDS/UL (ref 149–390)
PMV BLD AUTO: 10.2 FL (ref 8.9–12.7)
RBC # BLD AUTO: 4.68 MILLION/UL (ref 3.88–5.62)
TRIGL SERPL-MCNC: 236 MG/DL
WBC # BLD AUTO: 9.95 THOUSAND/UL (ref 4.31–10.16)

## 2019-11-05 PROCEDURE — 85025 COMPLETE CBC W/AUTO DIFF WBC: CPT | Performed by: PHYSICIAN ASSISTANT

## 2019-11-05 PROCEDURE — 82948 REAGENT STRIP/BLOOD GLUCOSE: CPT

## 2019-11-05 PROCEDURE — 80061 LIPID PANEL: CPT | Performed by: PHYSICIAN ASSISTANT

## 2019-11-05 PROCEDURE — 83036 HEMOGLOBIN GLYCOSYLATED A1C: CPT | Performed by: PHYSICIAN ASSISTANT

## 2019-11-05 PROCEDURE — 99231 SBSQ HOSP IP/OBS SF/LOW 25: CPT | Performed by: PHYSICIAN ASSISTANT

## 2019-11-05 RX ADMIN — PANTOPRAZOLE SODIUM 40 MG: 40 TABLET, DELAYED RELEASE ORAL at 05:55

## 2019-11-05 RX ADMIN — ACETAMINOPHEN 650 MG: 325 TABLET, FILM COATED ORAL at 21:31

## 2019-11-05 RX ADMIN — METFORMIN HYDROCHLORIDE 500 MG: 500 TABLET, FILM COATED ORAL at 08:41

## 2019-11-05 RX ADMIN — METFORMIN HYDROCHLORIDE 500 MG: 500 TABLET, FILM COATED ORAL at 16:39

## 2019-11-05 RX ADMIN — ATORVASTATIN CALCIUM 10 MG: 10 TABLET, FILM COATED ORAL at 16:39

## 2019-11-05 RX ADMIN — CLOZAPINE 350 MG: 100 TABLET ORAL at 21:28

## 2019-11-05 RX ADMIN — CLOZAPINE 100 MG: 100 TABLET ORAL at 08:42

## 2019-11-05 RX ADMIN — OLANZAPINE 15 MG: 10 TABLET, FILM COATED ORAL at 13:16

## 2019-11-05 RX ADMIN — DIVALPROEX SODIUM 1000 MG: 500 TABLET, DELAYED RELEASE ORAL at 08:42

## 2019-11-05 RX ADMIN — OXYBUTYNIN CHLORIDE 5 MG: 5 TABLET, EXTENDED RELEASE ORAL at 08:42

## 2019-11-05 RX ADMIN — DIVALPROEX SODIUM 1000 MG: 500 TABLET, DELAYED RELEASE ORAL at 17:57

## 2019-11-05 RX ADMIN — LEVOTHYROXINE SODIUM 75 MCG: 75 TABLET ORAL at 05:57

## 2019-11-05 NOTE — PROGRESS NOTES
Status: Pt takes meds with encouragement, took labs, refused vitals  Medication: no changes / PRN - Tylenol for food pain  D/C: [facility]     11/05/19 0774   Team Meeting   Meeting Type Daily Rounds   Team Members Present   Team Members Present Physician;Nurse;;Occupational Therapist   Physician Team Member XUAN Jacobson / Dr La Nena Baig Team Member [facility] Management Team Member [id number] / Valeriy Huynh   OT Team Member Maria Guadalupe   Patient/Family Present   Patient Present No   Patient's Family Present No

## 2019-11-05 NOTE — CASE MANAGEMENT
CM received a call from Pt's sister, [facility], & provided her an update  She inquired about Pt's support, Tamela Hashimoto, taking Pt into the community & CM reviewed that Pt would not be given community pass while on the acute unit, however, that is something that can be done at [facility]  Kristy said that Tamela Hashimoto would also like to attend the court hearing & share what he knows of Daniel Dey from the past 40 years  CM reviewed he would not be permitted to attend, & reasons why  Kristy said that she was doing to call [facility] to see if Pt had any belongings there & she will let CM know

## 2019-11-05 NOTE — CASE MANAGEMENT
YOSELYN met with Pt who continues to ask about when he will leave, where he will go, & if CM can just open the door for him to leave? YOSELYN reviewed with Pt that he is on the waiting list for EAC at this time  YOSELYN had received an email from Pt's sister, Cathi Nazario, over the weekend, seeking an update  YOSELYN contacted Kristy @ [phone] & she asked if she could call CM back around 11 AM, when she goes on break; CM agreed

## 2019-11-05 NOTE — PROGRESS NOTES
Progress Note - Behavioral Health   Paul uGerra 48 y o  male MRN: [id number]  Unit/Bed#: [facility] 252-01 Encounter: [id number]    Assessment/Plan   Principal Problem:    Schizoaffective disorder ([facility] [address] )  Active Problems:    [facility] )    Benign essential hypertension    BMI 34 0-34 9,adult      Subjective:  Patient compliant with lab work today  Does not show increase in A1c or lipid panel  Does show 21lb weight gain since admission  Will consult nutrition  Patient answers minimally in conversation and is focused on discharge  Often intrusive with staff asking questions perseverating on discharge  Does deny all psychiatric symptoms when I asked him  Does appear internally preoccupied at times  Does have labile moments but today was seen with brighter affect and joking with staff  Often seclusive to himself  Remains malodorous and disheveled  Seen carrying his clothes as if he is going on a trip  Does appear to be chronically paranoid  Was seen holding hands with another patient calling her his girlfriend  Patient has limited insight and judgment and is awaiting discharge to [facility]  Due to time constraints granted by [facility] on 304 may require alternative disposition plan        Current Medications:  Current Facility-Administered Medications   Medication Dose Route Frequency    acetaminophen (TYLENOL) tablet 325 mg  325 mg Oral Q6H PRN    acetaminophen (TYLENOL) tablet 650 mg  650 mg Oral Q6H PRN    acetaminophen (TYLENOL) tablet 975 mg  975 mg Oral Q8H PRN    aluminum-magnesium hydroxide-simethicone (MYLANTA) 200-200-20 mg/5 mL oral suspension 30 mL  30 mL Oral Q4H PRN    atorvastatin (LIPITOR) tablet 10 mg  10 mg Oral Daily With Dinner    benztropine (COGENTIN) injection 1 mg  1 mg Intramuscular Q6H PRN    benztropine (COGENTIN) tablet 1 mg  1 mg Oral Q6H PRN    cloZAPine (CLOZARIL) tablet 100 mg  100 mg Oral Daily    cloZAPine (CLOZARIL) tablet 350 mg  350 mg Oral HS    divalproex sodium (DEPAKOTE) EC tablet 1,000 mg  1,000 mg Oral BID    haloperidol (HALDOL) tablet 5 mg  5 mg Oral Q6H PRN    haloperidol lactate (HALDOL) injection 5 mg  5 mg Intramuscular Q6H PRN    levothyroxine tablet 75 mcg  75 mcg Oral Early Morning    LORazepam (ATIVAN) 2 mg/mL injection 1 mg  1 mg Intramuscular Q6H PRN    LORazepam (ATIVAN) tablet 1 mg  1 mg Oral Q6H PRN    magnesium hydroxide (MILK OF MAGNESIA) 400 mg/5 mL oral suspension 30 mL  30 mL Oral Daily PRN    metFORMIN (GLUCOPHAGE) tablet 500 mg  500 mg Oral BID With Meals    nicotine polacrilex (NICORETTE) gum 2 mg  2 mg Oral Q2H PRN    OLANZapine (ZyPREXA) tablet 15 mg  15 mg Oral After Lunch    oxybutynin (DITROPAN-XL) 24 hr tablet 5 mg  5 mg Oral Daily    pantoprazole (PROTONIX) EC tablet 40 mg  40 mg Oral Early Morning    traZODone (DESYREL) tablet 50 mg  50 mg Oral HS PRN       Behavioral Health Medications: all current active meds have been reviewed and continue current psychiatric medications  Vitals:  Vitals:    11/05/19 0904   BP: 124/74   Pulse: 96   Resp:    Temp: 97 8 °F (36 6 °C)   SpO2:        Laboratory results:    I have personally reviewed all pertinent laboratory/tests results    Most Recent Labs:   Lab Results   Component Value Date    WBC 9 95 11/05/2019    RBC 4 68 11/05/2019    HGB 13 7 11/05/2019    HCT 42 1 11/05/2019     11/05/2019    RDW 13 0 11/05/2019    NEUTROABS 5 51 11/05/2019    SODIUM 141 10/29/2019    K 4 3 10/29/2019     10/29/2019    CO2 28 10/29/2019    BUN 17 10/29/2019    CREATININE 0 76 10/29/2019    GLUCOSE 91 05/11/2018    GLUC 106 10/29/2019    GLUF 99 08/23/2019    CALCIUM 8 6 10/29/2019    AST 15 10/29/2019    ALT 22 10/29/2019    ALKPHOS 66 10/29/2019    TP 6 4 10/29/2019    ALB 3 1 (L) 10/29/2019    TBILI 0 20 10/29/2019    CHOLESTEROL 103 11/05/2019    HDL 27 (L) 11/05/2019    TRIG 236 (H) 11/05/2019    LDLCALC 29 11/05/2019    NONHDLC 76 11/05/2019    VALPROICTOT 67 10/29/2019    LITHIUM 0 6 2014    AMMONIA 32 10/08/2019    [id number] 1 840 2019    FREET4 0 93 2019    HGBA1C 6 2 2019          Metabolic Monitoring:       Baseline:  4 week  8 week  12 week  Quarterly Annually   Weight (BMI) 237  258          Waist Circumference X X X         /99  124/74           HbA1C/Fasting plasma glucose 6 3/134 6 131           Lipid Profile Chol: 138  T  HDL: 34  LDL: 78 Chol:  103  T  HDL: 27  LDL: 29  Chol:   TG:   HFL:   LDL:             Psychiatric Review of Systems:  Behavior over the last 24 hours:  unchanged  Sleep: normal  Appetite: normal  Medication side effects: No  ROS: no complaints    Mental Status Evaluation:  Appearance:  disheveled and Malodorous   Behavior:  guarded and restless and fidgety   Speech:  loud   Mood:  anxious   Affect:  blunted and increased in range   Language naming objects, repeating phrases and sparse   Thought Process:  concrete, goal directed and perserverative   Thought Content:  obsessions and Chronic paranoid delusions   Perceptual Disturbances: None  Appears internally preoccupied   Risk Potential: Denied SI/HI  Potential for aggression low   Sensorium:  person and place   Cognition:  grossly intact   Consciousness:  awake    Recent and Remote Memory limited   Attention: attention span appeared shorter than expected for age   Insight:  limited   Judgment: limited   Gait/Station: normal gait/station and normal balance   Motor Activity: no abnormal movements     Progress Toward Goals:  Some progression    Recommended Treatment: Continue with group therapy, milieu therapy and occupational therapy  1   Nutrition consult due to patient has gained 21 pounds since admission  2  Continue current medications  3  Awaiting discharge to [facility]  May require alternative plan due to time constraints on involuntary 304 granted by [facility]      Risks, benefits and possible side effects of Medications:   Risks, benefits, and possible side effects of medications explained to patient and patient verbalizes understanding        Bryant Rosado PA-C

## 2019-11-05 NOTE — PLAN OF CARE
Problem: SELF HARM/SUICIDALITY  Goal: Will have no self-injury during hospital stay  Description  INTERVENTIONS:  - Q 15 MINUTES: Routine safety checks  - Q WAKING SHIFT & PRN: Assess risk to determine if routine checks are adequate to maintain patient safety  - Encourage patient to participate actively in care by formulating a plan to combat response to suicidal ideation, identify supports and resources  Outcome: Progressing     Problem: INVOLUNTARY ADMIT  Goal: Will cooperate with staff recommendations and doctor's orders and will demonstrate appropriate behavior  Description  INTERVENTIONS:  - Treat underlying conditions and offer medication as ordered  - Educate regarding involuntary admission procedures and rules  - Utilize positive consistent limit setting strategies to support patient and staff safety  Outcome: Progressing     Problem: SELF CARE DEFICIT  Goal: Return ADL status to a safe level of function  Description  INTERVENTIONS:  - Administer medication as ordered  - Assess ADL deficits and provide assistive devices as needed  - Obtain PT/OT consults as needed  - Assist and instruct patient to increase activity and self care as tolerated  Outcome: Progressing     Problem: PAIN - ADULT  Goal: Verbalizes/displays adequate comfort level or baseline comfort level  Description  Interventions:  - Encourage patient to monitor pain and request assistance  - Assess pain using appropriate pain scale  - Administer analgesics based on type and severity of pain and evaluate response  - Implement non-pharmacological measures as appropriate and evaluate response  - Consider cultural and social influences on pain and pain management  - Notify physician/advanced practitioner if interventions unsuccessful or patient reports new pain  Outcome: Progressing     Problem: DISCHARGE PLANNING  Goal: Discharge to home or other facility with appropriate resources  Description  INTERVENTIONS:  - Identify barriers to discharge w/patient and caregiver  - Arrange for needed discharge resources and transportation as appropriate  - Identify discharge learning needs (meds, wound care, etc )  - Arrange for interpretive services to assist at discharge as needed  - Refer to Case Management Department for coordinating discharge planning if the patient needs post-hospital services based on physician/advanced practitioner order or complex needs related to functional status, cognitive ability, or social support system  Outcome: Progressing     Problem: Ineffective Coping  Goal: Participates in unit activities  Description  Interventions:  - Provide therapeutic environment   - Provide required programming   - Redirect inappropriate behaviors   Outcome: Progressing     Problem: Risk for Self Injury/Neglect  Goal: Complete daily ADLs, including personal hygiene independently, as able  Description  Interventions:  - Observe, teach, and assist patient with ADLS  - Monitor and promote a balance of rest/activity, with adequate nutrition and elimination  Outcome: Progressing

## 2019-11-06 LAB
GLUCOSE SERPL-MCNC: 110 MG/DL (ref 65–140)
GLUCOSE SERPL-MCNC: 122 MG/DL (ref 65–140)

## 2019-11-06 PROCEDURE — 99231 SBSQ HOSP IP/OBS SF/LOW 25: CPT | Performed by: PHYSICIAN ASSISTANT

## 2019-11-06 PROCEDURE — 93005 ELECTROCARDIOGRAM TRACING: CPT

## 2019-11-06 PROCEDURE — 82948 REAGENT STRIP/BLOOD GLUCOSE: CPT

## 2019-11-06 RX ORDER — DOCUSATE SODIUM 100 MG/1
100 CAPSULE, LIQUID FILLED ORAL 2 TIMES DAILY
Status: DISCONTINUED | OUTPATIENT
Start: 2019-11-06 | End: 2019-12-23 | Stop reason: HOSPADM

## 2019-11-06 RX ADMIN — ATORVASTATIN CALCIUM 10 MG: 10 TABLET, FILM COATED ORAL at 17:08

## 2019-11-06 RX ADMIN — METFORMIN HYDROCHLORIDE 500 MG: 500 TABLET, FILM COATED ORAL at 09:11

## 2019-11-06 RX ADMIN — METFORMIN HYDROCHLORIDE 500 MG: 500 TABLET, FILM COATED ORAL at 17:08

## 2019-11-06 RX ADMIN — OLANZAPINE 15 MG: 10 TABLET, FILM COATED ORAL at 14:01

## 2019-11-06 RX ADMIN — CLOZAPINE 350 MG: 100 TABLET ORAL at 21:46

## 2019-11-06 RX ADMIN — DIVALPROEX SODIUM 1000 MG: 500 TABLET, DELAYED RELEASE ORAL at 18:15

## 2019-11-06 RX ADMIN — LEVOTHYROXINE SODIUM 75 MCG: 75 TABLET ORAL at 06:35

## 2019-11-06 RX ADMIN — DIVALPROEX SODIUM 1000 MG: 500 TABLET, DELAYED RELEASE ORAL at 09:11

## 2019-11-06 RX ADMIN — OXYBUTYNIN CHLORIDE 5 MG: 5 TABLET, EXTENDED RELEASE ORAL at 09:11

## 2019-11-06 RX ADMIN — ACETAMINOPHEN 650 MG: 325 TABLET, FILM COATED ORAL at 21:48

## 2019-11-06 RX ADMIN — PANTOPRAZOLE SODIUM 40 MG: 40 TABLET, DELAYED RELEASE ORAL at 06:35

## 2019-11-06 RX ADMIN — CLOZAPINE 100 MG: 100 TABLET ORAL at 09:11

## 2019-11-06 NOTE — PROGRESS NOTES
Pt pleasant and cooperative  Has times he gets irritable over his discharge  We still need to redirect patient at times we do things on the unit: getting water, shower at group time, etc   Gets irritable over these redirections, too  Overall, redirectable

## 2019-11-06 NOTE — PROGRESS NOTES
Pharmacy Clozapine Monitoring Progress Note     Esequiel Donald is receiving a total daily dose of 450 mg of clozapine divided as 100mg in the morning and 350mg at bedtime  **If clozapine therapy is interrupted for more than 48 hours, therapy MUST be restarted at the initial titration dose (12 5mg - 25mg once daily) to avoid severe hypotension, bradycardia, seizure and syncope  **        Pharmacist Recommendations Based on Assessment and Plan:    1  Patient is Clozapine-REMS eligible, ANC was updated, with every four weeks monitoring frequency and the next [address] is due on 12/3/19  2    Recommend standing bowel regimen for patient on anticholinergic olanzapine and clozapine therapy  3  Recommend repeat ECG for patient on QTC prolonging agent  Assessment/Plan:    Phase of Treatment:     Current phase of treatment is titration  Patient Clozapine REMS Eligibility:     Patient is eligible to receive clozapine and the [address] monitoring frequency is every four weeks per clozapine REMS  The patient's latest [address] value has been updated into the Clozapine-REMS program          ANC and Clozapine Level (if available)     The most recent [address] was   Lab Results   Component Value Date    NEUTROABS 5 51 11/05/2019    and the next lab is due on 12/3/19  Last Clozapine level (if available):    0   Lab Value Date/Time    CLOZAPINE 214 (L) 08/23/2019 1304     0   Lab Value Date/Time    NCLOZIP 79 08/23/2019 1304           Monitoring Parameters for Clozapine:     Clozapine Adverse Effect Suggested Monitoring Patient's Current Status    Agranulocytosis ANC baseline and then repeat weekly for first 6 months and every 2 weeks for the second 6 months  Maintenance after one year of therapy every month  The most recent [address] was   Lab Results   Component Value Date    NEUTROABS 5 51 11/05/2019       This ANC is considered normal range (ANC >/= 1500/mcL)  Continue current treatment and monitoring course     Respiratory Depression Please ensure patient is not on concomitant respiratory lowering medications such as benzodiazepines, sedative hypnotics (eg  zolpidem) This patient is not on respiratory depression lowering medications   Myocarditis Baseline and weekly EKG, CRP, BNP, and troponin  Weekly symptomatic complaints of fatigue, dyspnea, tachycardia, chest pain, palpitations, and fever for the first 4 weeks  Last EKG Results on  10/5/19 showed: Sinus bradycardia  Otherwise normal ECG  When compared with ECG of 26-APR-2019 12:50,  Vent  rate has decreased BY  31 BPM  Confirmed by Jerilee Sever ([id number]) on 10/7/2019 8:58:59 AM     Last QTC value was:  0   Lab Value Date/Time    QTCINT 369 10/05/2019 1430       Last BNP was: No results found for: NTBNP    Last Troponin was:  0   Lab Value Date/Time    TROPONINI 0 05 (H) 03/28/2014 1840        Orthostatic hypotension/  bradycardia Blood pressure and vital signs      Monitor blood pressure and orthostatic hypotension at least twice daily upon initiation and continue to follow at least once daily afterwards  Patient's last recorded vital signs:       /82 (BP Location: Left arm)   Pulse 97   Temp 97 8 °F (36 6 °C) (Tympanic)   Resp 17   Ht 5' 6" (1 676 m)   Wt 117 kg (258 lb 8 oz)   SpO2 98%   BMI 41 72 kg/m²             Constipation (bowel obstruction due to high anticholinergic clozapine burden) Assess at baseline and weekly during first four months of therapy  Docusate 100mg BID and Miralax 17 grams daily recommended initially  Prophylactic bowel regimen not ordered and it is recommended to order a standing bowel regimen to reduce risk of bowel obstruction associated with clozapine therapy  Sialorrhea Assess at baseline and weekly during first four months of therapy  May be managed using sublingual anticholinergic preparations (atropine 1% eye drops)  Patient is not experiencing sialorrhea  This will continue to be monitored           Please note that per current REMS protocol the provider can submit a treatment rationale that justifies clozapine treatment even if patient parameters are outside of REMS recommendations  The Clozapine REMS is an FDA-mandated program implemented by the manufacturers of clozapine  ([employer]  com/CpmgClozapineUI/home  u)  Pharmacy will continue to follow patient with team, thank you    Electronically signed by: Felix Haney, [place], [address], Clinical Pharmacist - Psychiatry

## 2019-11-06 NOTE — NUTRITION
11/06/19 1247   Assessment   Timepoint Initial  (PA consult for + wt gain since admission)   Labs   List Completed Labs   (11/6/19 , 100, 122  meds: lipitor, glucophage, protonix)   Feeding Route   PO Independent   Adequacy of Intake   Nutrition Modality PO  (CCD2)   Intake Meals %   Estimated Calorie Intake %   Estimated Protein Intake  %   Estimated Fluid Intake %   Estimated calorie intake compared to estimated need po intake good  Nutrition Prognosis   Nutrition Concerns   (schizoaffective disorder, skin: no issues noted per skin care plan  )   Nutrition Considerations   (diet ed: pt refused  reports that he needs to take a shower and get out of here  )   PES Statement   Problem Intake   Energy Balance (1) Excessive energy intake NI-1 3   Related to Increased Appetite   As evidenced by: Weight Gain   Patient Nutrition Goals   Goal meet PO needs;avoid weight gain   Goal Status revised;not met;initiated;met   Timeframe to complete goal by next f/u   Recommendations/Interventions   Summary Pt would not discuss po intake or wt with this RD, walking the unit  Pt is a 304 noted  Will decarease diet to CCD1 to help decarease available calories  Malnutrition/BMI Present Yes   BMI Classifications Morbid Obesity 40-44 9   Interventions Diet: order adjustment   Nutrition Recommendations Other (specify)  (Per RD protocol will drop diet to CCD1 to better control calorie intake   Weigh weekly  )   Nutrition Complexity Risk   Nutrition complexity level Low risk   Follow up date 11/16/19

## 2019-11-06 NOTE — MALNUTRITION/BMI
This medical record reflects one or more clinical indicators suggestive of malnutrition and/or morbid obesity  BMI Findings:  BMI Classifications: Morbid Obesity 40-44 9     Body mass index is 41 72 kg/m²  Pt with 20# wt gain in 1 month  Per Rd protocol will change diet to CCD1 to better control caloric intake  See Nutrition note dated 11/6/19 for additional details  Completed nutrition assessment is viewable in the nutrition documentation

## 2019-11-06 NOTE — PROGRESS NOTES
Progress Note - Behavioral Health   Sylvia Whitfield 48 y o  male MRN: [id number]  Unit/Bed#: [facility] 252-01 Encounter: [id number]    Assessment/Plan   Principal Problem:    Schizoaffective disorder ([address] )  Active Problems:    Type 2 diabetes mellitus (HCC)    Benign essential hypertension    BMI 34 0-34 9,adult      Subjective:  Patient today remains perseverative over discharge  Often is scant in conversation and exits interview early  Appears to have poor attention span  Does deny all psychiatric symptoms when asked  States he feels well  Does remain with chronic paranoid delusions and appears internally preoccupied at times  Mostly seclusive to himself  Recently has been holding hands with another psychotic patient on the unit  Did appear to shave this morning and appears to have better hygiene  Requires prompting to carry out ADLs  Is hesitant with medications but will take medications when asked  Recent lab work was within normal limits  Did have recent weight gain and we consulted nutrition  Patient is awaiting discharge at this time to [facility]  Due to time constraints by [facility] patient may require alternative discharge plan      Current Medications:  Current Facility-Administered Medications   Medication Dose Route Frequency    acetaminophen (TYLENOL) tablet 325 mg  325 mg Oral Q6H PRN    acetaminophen (TYLENOL) tablet 650 mg  650 mg Oral Q6H PRN    acetaminophen (TYLENOL) tablet 975 mg  975 mg Oral Q8H PRN    aluminum-magnesium hydroxide-simethicone (MYLANTA) 200-200-20 mg/5 mL oral suspension 30 mL  30 mL Oral Q4H PRN    atorvastatin (LIPITOR) tablet 10 mg  10 mg Oral Daily With Dinner    benztropine (COGENTIN) injection 1 mg  1 mg Intramuscular Q6H PRN    benztropine (COGENTIN) tablet 1 mg  1 mg Oral Q6H PRN    cloZAPine (CLOZARIL) tablet 100 mg  100 mg Oral Daily    cloZAPine (CLOZARIL) tablet 350 mg  350 mg Oral HS    divalproex sodium (DEPAKOTE) EC tablet 1,000 mg  1,000 mg Oral BID  haloperidol (HALDOL) tablet 5 mg  5 mg Oral Q6H PRN    haloperidol lactate (HALDOL) injection 5 mg  5 mg Intramuscular Q6H PRN    levothyroxine tablet 75 mcg  75 mcg Oral Early Morning    LORazepam (ATIVAN) 2 mg/mL injection 1 mg  1 mg Intramuscular Q6H PRN    LORazepam (ATIVAN) tablet 1 mg  1 mg Oral Q6H PRN    magnesium hydroxide (MILK OF MAGNESIA) 400 mg/5 mL oral suspension 30 mL  30 mL Oral Daily PRN    metFORMIN (GLUCOPHAGE) tablet 500 mg  500 mg Oral BID With Meals    nicotine polacrilex (NICORETTE) gum 2 mg  2 mg Oral Q2H PRN    OLANZapine (ZyPREXA) tablet 15 mg  15 mg Oral After Lunch    oxybutynin (DITROPAN-XL) 24 hr tablet 5 mg  5 mg Oral Daily    pantoprazole (PROTONIX) EC tablet 40 mg  40 mg Oral Early Morning    traZODone (DESYREL) tablet 50 mg  50 mg Oral HS PRN       Behavioral Health Medications: all current active meds have been reviewed and continue current psychiatric medications  Vitals:  Vitals:    11/05/19 1527   BP: 121/82   Pulse: 97   Resp: 17   SpO2:        Laboratory results:    I have personally reviewed all pertinent laboratory/tests results    Most Recent Labs:   Lab Results   Component Value Date    WBC 9 95 11/05/2019    RBC 4 68 11/05/2019    HGB 13 7 11/05/2019    HCT 42 1 11/05/2019     11/05/2019    RDW 13 0 11/05/2019    NEUTROABS 5 51 11/05/2019    SODIUM 141 10/29/2019    K 4 3 10/29/2019     10/29/2019    CO2 28 10/29/2019    BUN 17 10/29/2019    CREATININE 0 76 10/29/2019    GLUCOSE 91 05/11/2018    GLUC 106 10/29/2019    GLUF 99 08/23/2019    CALCIUM 8 6 10/29/2019    AST 15 10/29/2019    ALT 22 10/29/2019    ALKPHOS 66 10/29/2019    TP 6 4 10/29/2019    ALB 3 1 (L) 10/29/2019    TBILI 0 20 10/29/2019    CHOLESTEROL 103 11/05/2019    HDL 27 (L) 11/05/2019    TRIG 236 (H) 11/05/2019    LDLCALC 29 11/05/2019    NONHDLC 76 11/05/2019    VALPROICTOT 67 10/29/2019    LITHIUM 0 6 03/27/2014    AMMONIA 32 10/08/2019    [id number] 1 840 08/23/2019 FREET4 0 93 2019    HGBA1C 6 2 2019          Metabolic Monitoring:       Baseline:  4 week  8 week  12 week  Quarterly Annually   Weight (BMI) 237  258           Waist Circumference X X X         /99  124/74           HbA1C/Fasting plasma glucose 6 3/134 6 131           Lipid Profile Chol: 138  T  HDL: 34  LDL: 78 Chol:  103  T  HDL: 27  LDL: 29  Chol:   TG:   HFL:   LDL:              Psychiatric Review of Systems:  Behavior over the last 24 hours:  unchanged  Sleep: normal  Appetite: normal  Medication side effects: No  ROS: no complaints    Mental Status Evaluation:  Appearance:  disheveled but clean shaven   Behavior:  guarded and restless and fidgety   Speech:  loud   Mood:  anxious   Affect:  blunted and increased in range   Language naming objects, repeating phrases and sparse   Thought Process:  concrete, goal directed and perserverative   Thought Content:  obsessions and chronic paranoid delusions   Perceptual Disturbances: None  Appears internally preoccupied   Risk Potential: Denied SI/HI  Potential for aggression low   Sensorium:  person and place   Cognition:  grossly intact   Consciousness:  awake    Recent and Remote Memory limited   Attention: attention span appeared shorter than expected for age   Insight:  limited   Judgment: limited   Gait/Station: normal gait/station and normal balance   Motor Activity: no abnormal movements     Progress Toward Goals: unchanged    Recommended Treatment: Continue with group therapy, milieu therapy and occupational therapy  1   Per pharmacy will add Colace 100mg BID for constipation prevention and EKG to check to QTc prolongation  2  Continue other medications  3  Awaiting DC to [facility]    Risks, benefits and possible side effects of Medications:   Risks, benefits, and possible side effects of medications explained to patient and patient verbalizes understanding        Shyam Hall PA-C

## 2019-11-07 LAB
GLUCOSE SERPL-MCNC: 121 MG/DL (ref 65–140)
GLUCOSE SERPL-MCNC: 122 MG/DL (ref 65–140)

## 2019-11-07 PROCEDURE — 82948 REAGENT STRIP/BLOOD GLUCOSE: CPT

## 2019-11-07 PROCEDURE — 99231 SBSQ HOSP IP/OBS SF/LOW 25: CPT | Performed by: PHYSICIAN ASSISTANT

## 2019-11-07 RX ADMIN — METFORMIN HYDROCHLORIDE 500 MG: 500 TABLET, FILM COATED ORAL at 16:08

## 2019-11-07 RX ADMIN — ALUMINUM HYDROXIDE, MAGNESIUM HYDROXIDE, AND SIMETHICONE 30 ML: 200; 200; 20 SUSPENSION ORAL at 19:49

## 2019-11-07 RX ADMIN — ALUMINUM HYDROXIDE, MAGNESIUM HYDROXIDE, AND SIMETHICONE 30 ML: 200; 200; 20 SUSPENSION ORAL at 15:49

## 2019-11-07 RX ADMIN — CLOZAPINE 350 MG: 100 TABLET ORAL at 21:33

## 2019-11-07 RX ADMIN — DIVALPROEX SODIUM 1000 MG: 500 TABLET, DELAYED RELEASE ORAL at 09:16

## 2019-11-07 RX ADMIN — OLANZAPINE 15 MG: 10 TABLET, FILM COATED ORAL at 14:26

## 2019-11-07 RX ADMIN — DIVALPROEX SODIUM 1000 MG: 500 TABLET, DELAYED RELEASE ORAL at 17:50

## 2019-11-07 RX ADMIN — METFORMIN HYDROCHLORIDE 500 MG: 500 TABLET, FILM COATED ORAL at 09:16

## 2019-11-07 RX ADMIN — CLOZAPINE 100 MG: 100 TABLET ORAL at 09:16

## 2019-11-07 RX ADMIN — ATORVASTATIN CALCIUM 10 MG: 10 TABLET, FILM COATED ORAL at 16:08

## 2019-11-07 RX ADMIN — OXYBUTYNIN CHLORIDE 5 MG: 5 TABLET, EXTENDED RELEASE ORAL at 09:17

## 2019-11-07 RX ADMIN — ACETAMINOPHEN 650 MG: 325 TABLET, FILM COATED ORAL at 22:26

## 2019-11-07 RX ADMIN — DOCUSATE SODIUM 100 MG: 100 CAPSULE, LIQUID FILLED ORAL at 17:50

## 2019-11-07 NOTE — PROGRESS NOTES
Progress Note - Behavioral Health   Dylan Couch 48 y o  male MRN: [id number]  Unit/Bed#: [facility] 252-01 Encounter: [id number]    Assessment/Plan   Principal Problem:    Schizoaffective disorder ([address] )  Active Problems:    Type 2 diabetes mellitus (HCC)    Benign essential hypertension    BMI 34 0-34 9,adult      Subjective:  Patient allowed EKG yesterday which appeared to be within normal limits  Refuses to see nutritionist yesterday  Had 21 lb weight gain and we will reduce caloric intake  Patient solely focused on discharge at this time  Was interested in speaking to  yesterday  Patient is superficial and scant in conversation  Appears preoccupied at times  Perseverates over discharge  Has moments of lability but no reports of agitation recently  Remains on involuntary 304 commitment with time constraints from [facility]  When time runs out may require alternate discharge plan than EAC based on court's decision  Will order Clozaril level with CBC with diff next draw      Current Medications:  Current Facility-Administered Medications   Medication Dose Route Frequency    acetaminophen (TYLENOL) tablet 325 mg  325 mg Oral Q6H PRN    acetaminophen (TYLENOL) tablet 650 mg  650 mg Oral Q6H PRN    acetaminophen (TYLENOL) tablet 975 mg  975 mg Oral Q8H PRN    aluminum-magnesium hydroxide-simethicone (MYLANTA) 200-200-20 mg/5 mL oral suspension 30 mL  30 mL Oral Q4H PRN    atorvastatin (LIPITOR) tablet 10 mg  10 mg Oral Daily With Dinner    benztropine (COGENTIN) injection 1 mg  1 mg Intramuscular Q6H PRN    benztropine (COGENTIN) tablet 1 mg  1 mg Oral Q6H PRN    cloZAPine (CLOZARIL) tablet 100 mg  100 mg Oral Daily    cloZAPine (CLOZARIL) tablet 350 mg  350 mg Oral HS    divalproex sodium (DEPAKOTE) EC tablet 1,000 mg  1,000 mg Oral BID    docusate sodium (COLACE) capsule 100 mg  100 mg Oral BID    haloperidol (HALDOL) tablet 5 mg  5 mg Oral Q6H PRN    haloperidol lactate (HALDOL) injection 5 mg  5 mg Intramuscular Q6H PRN    levothyroxine tablet 75 mcg  75 mcg Oral Early Morning    LORazepam (ATIVAN) 2 mg/mL injection 1 mg  1 mg Intramuscular Q6H PRN    LORazepam (ATIVAN) tablet 1 mg  1 mg Oral Q6H PRN    magnesium hydroxide (MILK OF MAGNESIA) 400 mg/5 mL oral suspension 30 mL  30 mL Oral Daily PRN    metFORMIN (GLUCOPHAGE) tablet 500 mg  500 mg Oral BID With Meals    nicotine polacrilex (NICORETTE) gum 2 mg  2 mg Oral Q2H PRN    OLANZapine (ZyPREXA) tablet 15 mg  15 mg Oral After Lunch    oxybutynin (DITROPAN-XL) 24 hr tablet 5 mg  5 mg Oral Daily    pantoprazole (PROTONIX) EC tablet 40 mg  40 mg Oral Early Morning    traZODone (DESYREL) tablet 50 mg  50 mg Oral HS PRN       Behavioral Health Medications: all current active meds have been reviewed and continue current psychiatric medications  Vitals:  Vitals:    11/07/19 0742   BP: 146/82   Pulse: 89   Resp: 18   Temp: (!) 96 2 °F (35 7 °C)   SpO2:        Laboratory results:    I have personally reviewed all pertinent laboratory/tests results    Most Recent Labs:   Lab Results   Component Value Date    WBC 9 95 11/05/2019    RBC 4 68 11/05/2019    HGB 13 7 11/05/2019    HCT 42 1 11/05/2019     11/05/2019    RDW 13 0 11/05/2019    NEUTROABS 5 51 11/05/2019    SODIUM 141 10/29/2019    K 4 3 10/29/2019     10/29/2019    CO2 28 10/29/2019    BUN 17 10/29/2019    CREATININE 0 76 10/29/2019    GLUCOSE 91 05/11/2018    GLUC 106 10/29/2019    GLUF 99 08/23/2019    CALCIUM 8 6 10/29/2019    AST 15 10/29/2019    ALT 22 10/29/2019    ALKPHOS 66 10/29/2019    TP 6 4 10/29/2019    ALB 3 1 (L) 10/29/2019    TBILI 0 20 10/29/2019    CHOLESTEROL 103 11/05/2019    HDL 27 (L) 11/05/2019    TRIG 236 (H) 11/05/2019    LDLCALC 29 11/05/2019    NONHDLC 76 11/05/2019    VALPROICTOT 67 10/29/2019    LITHIUM 0 6 03/27/2014    AMMONIA 32 10/08/2019    [id number] 1 840 08/23/2019    FREET4 0 93 08/23/2019    HGBA1C 6 2 11/05/2019     11/05/2019       Psychiatric Review of Systems:  Behavior over the last 24 hours:  unchanged  Sleep: normal  Appetite: normal  Medication side effects: No  ROS: no complaints    Mental Status Evaluation:  Appearance:  disheveled   Behavior:  guarded and restless and fidgety   Speech:  loud   Mood:  anxious   Affect:  increased in range   Language naming objects and repeating phrases   Thought Process:  concrete, goal directed and perserverative   Thought Content:  obsessions and chronic paranoid delusions   Perceptual Disturbances: None  Internally preoccupied at times   Risk Potential: Denied SI/HI  Potential for aggression: low   Sensorium:  person and place   Cognition:  grossly intact   Consciousness:  awake    Recent and Remote Memory limited   Attention: attention span appeared shorter than expected for age   Insight:  limited   Judgment: limited   Gait/Station: normal gait/station and normal balance   Motor Activity: no abnormal movements     Progress Toward Goals: unchanged    Recommended Treatment: Continue with group therapy, milieu therapy and occupational therapy  1   Continue current medications  2  Disposition planning with plan to go to [facility]    Risks, benefits and possible side effects of Medications:   Risks, benefits, and possible side effects of medications explained to patient and patient verbalizes understanding        Franca Byers PA-C

## 2019-11-07 NOTE — PROGRESS NOTES
Status: Pt saw nutritionist, but he didn't want to talk to her  He finally agreed to the EKG  He called the  to request the       Medication: no changes / PRN - Tylenol  D/C: [facility]     11/07/19 1337   Team Meeting   Meeting Type Daily Rounds   Team Members Present   Team Members Present Physician;Nurse;;Occupational Therapist   Physician Team Member Michelle Pompa   Nursing Team Member [facility] Management Team Member [id number] / Nicolle Garcia   OT Team Member Maria Guadalupe   Patient/Family Present   Patient Present No   Patient's Family Present No

## 2019-11-07 NOTE — PROGRESS NOTES
Pt pleasant this evening  Childlike  Frequently at RN station asking about his belongings, money  No agitation/irritability

## 2019-11-07 NOTE — PROGRESS NOTES
Status: Pt has had a 21 lb weight gain since admission    Pt with c/o foot pain & took Tylenol  Medication: no changes / PRN - Tylenol  D/C: [facility]     11/06/19 0800   Team Meeting   Meeting Type Daily Rounds   Team Members Present   Team Members Present Physician;Nurse;;Occupational Therapist   Physician Team Member XUAN Recinos / Dr Ronnell Serrano Team Member Hector Geiger / Jorge Joy   Care Management Team Member Jadon García / Alina Botello   OT Team Member Maria Guadalupe   Patient/Family Present   Patient Present No   Patient's Family Present No

## 2019-11-08 LAB
GLUCOSE SERPL-MCNC: 114 MG/DL (ref 65–140)
GLUCOSE SERPL-MCNC: 81 MG/DL (ref 65–140)

## 2019-11-08 PROCEDURE — 82948 REAGENT STRIP/BLOOD GLUCOSE: CPT

## 2019-11-08 PROCEDURE — 99231 SBSQ HOSP IP/OBS SF/LOW 25: CPT | Performed by: PHYSICIAN ASSISTANT

## 2019-11-08 RX ADMIN — LEVOTHYROXINE SODIUM 75 MCG: 75 TABLET ORAL at 10:55

## 2019-11-08 RX ADMIN — ATORVASTATIN CALCIUM 10 MG: 10 TABLET, FILM COATED ORAL at 16:09

## 2019-11-08 RX ADMIN — METFORMIN HYDROCHLORIDE 500 MG: 500 TABLET, FILM COATED ORAL at 10:56

## 2019-11-08 RX ADMIN — DIVALPROEX SODIUM 1000 MG: 500 TABLET, DELAYED RELEASE ORAL at 10:56

## 2019-11-08 RX ADMIN — ACETAMINOPHEN 650 MG: 325 TABLET, FILM COATED ORAL at 17:56

## 2019-11-08 RX ADMIN — CLOZAPINE 100 MG: 100 TABLET ORAL at 10:55

## 2019-11-08 RX ADMIN — DOCUSATE SODIUM 100 MG: 100 CAPSULE, LIQUID FILLED ORAL at 17:56

## 2019-11-08 RX ADMIN — DOCUSATE SODIUM 100 MG: 100 CAPSULE, LIQUID FILLED ORAL at 10:56

## 2019-11-08 RX ADMIN — PANTOPRAZOLE SODIUM 40 MG: 40 TABLET, DELAYED RELEASE ORAL at 10:55

## 2019-11-08 RX ADMIN — METFORMIN HYDROCHLORIDE 500 MG: 500 TABLET, FILM COATED ORAL at 16:09

## 2019-11-08 RX ADMIN — DIVALPROEX SODIUM 1000 MG: 500 TABLET, DELAYED RELEASE ORAL at 17:56

## 2019-11-08 RX ADMIN — CLOZAPINE 350 MG: 100 TABLET ORAL at 21:25

## 2019-11-08 RX ADMIN — OXYBUTYNIN CHLORIDE 5 MG: 5 TABLET, EXTENDED RELEASE ORAL at 10:56

## 2019-11-08 RX ADMIN — ALUMINUM HYDROXIDE, MAGNESIUM HYDROXIDE, AND SIMETHICONE 30 ML: 200; 200; 20 SUSPENSION ORAL at 17:56

## 2019-11-08 RX ADMIN — ALUMINUM HYDROXIDE, MAGNESIUM HYDROXIDE, AND SIMETHICONE 30 ML: 200; 200; 20 SUSPENSION ORAL at 13:50

## 2019-11-08 RX ADMIN — OLANZAPINE 15 MG: 10 TABLET, FILM COATED ORAL at 16:09

## 2019-11-08 NOTE — PROGRESS NOTES
Status: Pt is focused on d/c  Denies SI/HI     Medication: no changes / PRN - Tylenol, Mylanta  D/C: [facility]     11/08/19 1671   Team Meeting   Meeting Type Daily Rounds   Team Members Present   Team Members Present Physician;Nurse;;Occupational Therapist   Physician Team Member XUAN Bo / Dr Raul Vega Team Member [facility] Management Team Member Ruddy Collins / Willy Miner   OT Team Member Maria Guadalupe   Patient/Family Present   Patient Present No   Patient's Family Present No

## 2019-11-08 NOTE — PROGRESS NOTES
Patient appears to have slept throughout the night waking up occasionally coughing but able to return back to sleep immediately

## 2019-11-08 NOTE — PROGRESS NOTES
Progress Note - Behavioral Health   Archana Shea 48 y o  male MRN: [id number]  Unit/Bed#: [facility] 252-01 Encounter: [id number]    Assessment/Plan   Principal Problem:    Schizoaffective disorder ([address] )  Active Problems:    Type 2 diabetes mellitus (HCC)    Benign essential hypertension    BMI 34 0-34 9,adult      Subjective:  Patient remains perseverative over discharge  Not interested in any other topic  Seen disheveled and malodorous carrying his clothing around the unit  When asked why he is carrying clothing he could not answer me  Has underlying chronic paranoia and internal preoccupation  No reports of agitation  Seen joking with staff at times  Mostly seclusive to himself  Denies most symptoms when asked  Denied somatic complaints  Remains on involuntary 304 commitment with time constraints from [facility] mental health  When time runs out may require alternate discharge plan than EAC based on court's decision  Will order Clozaril level with CBC with diff next draw        Current Medications:  Current Facility-Administered Medications   Medication Dose Route Frequency    acetaminophen (TYLENOL) tablet 325 mg  325 mg Oral Q6H PRN    acetaminophen (TYLENOL) tablet 650 mg  650 mg Oral Q6H PRN    acetaminophen (TYLENOL) tablet 975 mg  975 mg Oral Q8H PRN    aluminum-magnesium hydroxide-simethicone (MYLANTA) 200-200-20 mg/5 mL oral suspension 30 mL  30 mL Oral Q4H PRN    atorvastatin (LIPITOR) tablet 10 mg  10 mg Oral Daily With Dinner    benztropine (COGENTIN) injection 1 mg  1 mg Intramuscular Q6H PRN    benztropine (COGENTIN) tablet 1 mg  1 mg Oral Q6H PRN    cloZAPine (CLOZARIL) tablet 100 mg  100 mg Oral Daily    cloZAPine (CLOZARIL) tablet 350 mg  350 mg Oral HS    divalproex sodium (DEPAKOTE) EC tablet 1,000 mg  1,000 mg Oral BID    docusate sodium (COLACE) capsule 100 mg  100 mg Oral BID    haloperidol (HALDOL) tablet 5 mg  5 mg Oral Q6H PRN    haloperidol lactate (HALDOL) injection 5 mg  5 mg Intramuscular Q6H PRN    levothyroxine tablet 75 mcg  75 mcg Oral Early Morning    LORazepam (ATIVAN) 2 mg/mL injection 1 mg  1 mg Intramuscular Q6H PRN    LORazepam (ATIVAN) tablet 1 mg  1 mg Oral Q6H PRN    magnesium hydroxide (MILK OF MAGNESIA) 400 mg/5 mL oral suspension 30 mL  30 mL Oral Daily PRN    metFORMIN (GLUCOPHAGE) tablet 500 mg  500 mg Oral BID With Meals    nicotine polacrilex (NICORETTE) gum 2 mg  2 mg Oral Q2H PRN    OLANZapine (ZyPREXA) tablet 15 mg  15 mg Oral After Lunch    oxybutynin (DITROPAN-XL) 24 hr tablet 5 mg  5 mg Oral Daily    pantoprazole (PROTONIX) EC tablet 40 mg  40 mg Oral Early Morning    traZODone (DESYREL) tablet 50 mg  50 mg Oral HS PRN       Behavioral Health Medications: all current active meds have been reviewed and continue current psychiatric medications  Vitals:  Vitals:    11/08/19 0759   BP: 130/89   Pulse: 90   Resp: 14   Temp: 98 1 °F (36 7 °C)   SpO2: 97%       Laboratory results:    I have personally reviewed all pertinent laboratory/tests results    Most Recent Labs:   Lab Results   Component Value Date    WBC 9 95 11/05/2019    RBC 4 68 11/05/2019    HGB 13 7 11/05/2019    HCT 42 1 11/05/2019     11/05/2019    RDW 13 0 11/05/2019    NEUTROABS 5 51 11/05/2019    SODIUM 141 10/29/2019    K 4 3 10/29/2019     10/29/2019    CO2 28 10/29/2019    BUN 17 10/29/2019    CREATININE 0 76 10/29/2019    GLUCOSE 91 05/11/2018    GLUC 106 10/29/2019    GLUF 99 08/23/2019    CALCIUM 8 6 10/29/2019    AST 15 10/29/2019    ALT 22 10/29/2019    ALKPHOS 66 10/29/2019    TP 6 4 10/29/2019    ALB 3 1 (L) 10/29/2019    TBILI 0 20 10/29/2019    CHOLESTEROL 103 11/05/2019    HDL 27 (L) 11/05/2019    TRIG 236 (H) 11/05/2019    LDLCALC 29 11/05/2019    NONHDLC 76 11/05/2019    VALPROICTOT 67 10/29/2019    LITHIUM 0 6 03/27/2014    AMMONIA 32 10/08/2019    [id number] 1 840 08/23/2019    FREET4 0 93 08/23/2019    HGBA1C 6 2 11/05/2019     11/05/2019       Psychiatric Review of Systems:  Behavior over the last 24 hours:  unchanged  Sleep: normal  Appetite: normal  Medication side effects: No  ROS: no complaints, all others negative    Mental Status Evaluation:  Appearance:  disheveled   Behavior:  guarded and restless and fidgety   Speech:  loud   Mood:  anxious   Affect:  constricted   Language sparse   Thought Process:  concrete, goal directed and perserverative   Thought Content:  obsessions and chronic paranoid delusions   Perceptual Disturbances: internal preoccupation   Risk Potential: Denied SI/HI  Potential for aggression: low   Sensorium:  person and place   Cognition:  grossly intact   Consciousness:  awake    Recent and Remote Memory limited   Attention: attention span appeared shorter than expected for age   Insight:  limited   Judgment: limited   Gait/Station: normal gait/station and normal balance   Motor Activity: no abnormal movements     Progress Toward Goals: unchanged    Recommended Treatment: Continue with group therapy, milieu therapy and occupational therapy  1   Continue current medications  2  Awaiting DC to [facility]    Risks, benefits and possible side effects of Medications:   Risks, benefits, and possible side effects of medications explained to patient and patient verbalizes understanding        Gary Rg PA-C

## 2019-11-08 NOTE — PROGRESS NOTES
Med note: Pt requested and received PRN Mylanta for indigestion at [address] [place] [address]

## 2019-11-09 LAB
ATRIAL RATE: 94 BPM
GLUCOSE SERPL-MCNC: 117 MG/DL (ref 65–140)
GLUCOSE SERPL-MCNC: 96 MG/DL (ref 65–140)
P AXIS: 47 DEGREES
PR INTERVAL: 152 MS
QRS AXIS: -26 DEGREES
QRSD INTERVAL: 100 MS
QT INTERVAL: 346 MS
QTC INTERVAL: 432 MS
T WAVE AXIS: 46 DEGREES
VENTRICULAR RATE: 94 BPM

## 2019-11-09 PROCEDURE — 82948 REAGENT STRIP/BLOOD GLUCOSE: CPT

## 2019-11-09 PROCEDURE — 93010 ELECTROCARDIOGRAM REPORT: CPT | Performed by: INTERNAL MEDICINE

## 2019-11-09 PROCEDURE — 99231 SBSQ HOSP IP/OBS SF/LOW 25: CPT | Performed by: PSYCHIATRY & NEUROLOGY

## 2019-11-09 RX ADMIN — TRAZODONE HYDROCHLORIDE 50 MG: 50 TABLET ORAL at 22:51

## 2019-11-09 RX ADMIN — DOCUSATE SODIUM 100 MG: 100 CAPSULE, LIQUID FILLED ORAL at 10:11

## 2019-11-09 RX ADMIN — METFORMIN HYDROCHLORIDE 500 MG: 500 TABLET, FILM COATED ORAL at 16:20

## 2019-11-09 RX ADMIN — OLANZAPINE 15 MG: 10 TABLET, FILM COATED ORAL at 16:20

## 2019-11-09 RX ADMIN — CLOZAPINE 350 MG: 100 TABLET ORAL at 21:54

## 2019-11-09 RX ADMIN — OXYBUTYNIN CHLORIDE 5 MG: 5 TABLET, EXTENDED RELEASE ORAL at 10:12

## 2019-11-09 RX ADMIN — DIVALPROEX SODIUM 1000 MG: 500 TABLET, DELAYED RELEASE ORAL at 18:33

## 2019-11-09 RX ADMIN — DIVALPROEX SODIUM 1000 MG: 500 TABLET, DELAYED RELEASE ORAL at 10:11

## 2019-11-09 RX ADMIN — PANTOPRAZOLE SODIUM 40 MG: 40 TABLET, DELAYED RELEASE ORAL at 06:47

## 2019-11-09 RX ADMIN — METFORMIN HYDROCHLORIDE 500 MG: 500 TABLET, FILM COATED ORAL at 10:11

## 2019-11-09 RX ADMIN — LEVOTHYROXINE SODIUM 75 MCG: 75 TABLET ORAL at 06:47

## 2019-11-09 RX ADMIN — ATORVASTATIN CALCIUM 10 MG: 10 TABLET, FILM COATED ORAL at 16:20

## 2019-11-09 RX ADMIN — CLOZAPINE 100 MG: 100 TABLET ORAL at 10:12

## 2019-11-09 RX ADMIN — DOCUSATE SODIUM 100 MG: 100 CAPSULE, LIQUID FILLED ORAL at 18:33

## 2019-11-09 NOTE — PROGRESS NOTES
Progress Note - Behavioral Health   Nawaf Roach 48 y o  male MRN: [id number]  Unit/Bed#: [facility] 252-01 Encounter: [id number]    The patient was seen for continuing care and reviewed with treatment team     Mental Status Evaluation:  Appearance:  Marginal/poor hygiene and disheveled   Behavior:  cooperative and friendly   Mood:  irritable   Affect: constricted   Speech: Rapid   Thought Process:  Poverty of thoughts   Thought Content:  Obsessions, paranoia   Perceptual Disturbances: Internally preoccupied   Risk Potential: No suicidal or homicidal ideation   Orientation:   Person, place, situation     Progress Toward Goals: Focused on DC, wants to know when it will happen and what is taking so long  Per RN report he has been med compliant, pleasant, wanders the unit  Very bizarrely attired today and still needs prompting for ADL  No reported SI/HI  On 304 commitment      Recommended Treatment: Continue with pharmacotherapy, group therapy, milieu therapy and occupational therapy    The patient will be maintained on the following medications:    Current Facility-Administered Medications:  acetaminophen 325 mg Oral Q6H PRN Sabrina Peterson PA-C   acetaminophen 650 mg Oral Q6H PRN Sabrina Peterson PA-C   acetaminophen 975 mg Oral Q8H PRN Sabrina Peterson PA-C   aluminum-magnesium hydroxide-simethicone 30 mL Oral Q4H PRN Fidel Perry MD   atorvastatin 10 mg Oral Daily With Christiana Goltz, MD   benztropine 1 mg Intramuscular Q6H PRN Fidel Perry MD   benztropine 1 mg Oral Q6H PRN Fidel Perry MD   clozapine 100 mg Oral Daily Hector Stark MD   cloZAPine 350 mg Oral HS Julio C Gore   divalproex sodium 1,000 mg Oral BID Sabrina Peterson PA-C   docusate sodium 100 mg Oral BID Sabrina Peterson PA-C   haloperidol 5 mg Oral Q6H PRN Fidel Perry MD   haloperidol lactate 5 mg Intramuscular Q6H PRN Fidel Perry MD   levothyroxine 75 mcg Oral Early Morning Telly Mcadams PA-C   LORazepam 1 mg Intramuscular Q6H PRN Tracy Nunez MD   LORazepam 1 mg Oral Q6H PRN Tracy Nunez MD   magnesium hydroxide 30 mL Oral Daily PRN Tracy Nunez MD   metFORMIN 500 mg Oral BID With Meals Tonya Cintron PA-C   nicotine polacrilex 2 mg Oral Q2H PRN Tracy Nunez MD   OLANZapine 15 mg Oral After Lunch Julio C Gore   oxybutynin 5 mg Oral Daily Ez Molina PA-C   pantoprazole 40 mg Oral Early Morning Tonya Cintron PA-C   traZODone 50 mg Oral HS PRN Tracy Nunez MD       Schizoaffective disorder [facility])

## 2019-11-09 NOTE — PROGRESS NOTES
Pt pleasant, visible in milieu throughout morning  Pt childlike, remains focused on discharge, asks when he will transfer to another facility  Pt disheveled  Improved ADLs  Pt assisted writer with cleaning his bedroom and changing linens  Plans to shower in afternoon

## 2019-11-09 NOTE — PROGRESS NOTES
Pt agitated and shouting at start of shift, demanding to leave, deescalated quickly  Pt has been labile for the rest of the shift, often calm, pleasant, and cooperative, but at times anxious and irritable while focused on discharge  Pt reports foot pain but otherwise denies symptoms  Out in milieu, follows redirection  Pt received Mylanta 30 mL PO for heartburn, PRN effective

## 2019-11-09 NOTE — PROGRESS NOTES
Daily rounds  Per shift report, pt pleasant throughout evening with no issues, compliant with medicines, slept

## 2019-11-09 NOTE — PROGRESS NOTES
Pt irritable this afternoon  Preservating on water room and water  Redirected several times but kept coming up  Following usual routine

## 2019-11-10 LAB
GLUCOSE SERPL-MCNC: 121 MG/DL (ref 65–140)
GLUCOSE SERPL-MCNC: 140 MG/DL (ref 65–140)

## 2019-11-10 PROCEDURE — 82948 REAGENT STRIP/BLOOD GLUCOSE: CPT

## 2019-11-10 PROCEDURE — 99231 SBSQ HOSP IP/OBS SF/LOW 25: CPT | Performed by: PSYCHIATRY & NEUROLOGY

## 2019-11-10 RX ADMIN — METFORMIN HYDROCHLORIDE 500 MG: 500 TABLET, FILM COATED ORAL at 09:52

## 2019-11-10 RX ADMIN — DIVALPROEX SODIUM 1000 MG: 500 TABLET, DELAYED RELEASE ORAL at 17:26

## 2019-11-10 RX ADMIN — LEVOTHYROXINE SODIUM 75 MCG: 75 TABLET ORAL at 07:00

## 2019-11-10 RX ADMIN — DIVALPROEX SODIUM 1000 MG: 500 TABLET, DELAYED RELEASE ORAL at 09:52

## 2019-11-10 RX ADMIN — CLOZAPINE 100 MG: 100 TABLET ORAL at 09:52

## 2019-11-10 RX ADMIN — DOCUSATE SODIUM 100 MG: 100 CAPSULE, LIQUID FILLED ORAL at 09:52

## 2019-11-10 RX ADMIN — METFORMIN HYDROCHLORIDE 500 MG: 500 TABLET, FILM COATED ORAL at 16:15

## 2019-11-10 RX ADMIN — OXYBUTYNIN CHLORIDE 5 MG: 5 TABLET, EXTENDED RELEASE ORAL at 09:52

## 2019-11-10 RX ADMIN — OLANZAPINE 15 MG: 10 TABLET, FILM COATED ORAL at 16:15

## 2019-11-10 RX ADMIN — PANTOPRAZOLE SODIUM 40 MG: 40 TABLET, DELAYED RELEASE ORAL at 07:00

## 2019-11-10 RX ADMIN — ATORVASTATIN CALCIUM 10 MG: 10 TABLET, FILM COATED ORAL at 16:15

## 2019-11-10 RX ADMIN — DOCUSATE SODIUM 100 MG: 100 CAPSULE, LIQUID FILLED ORAL at 17:26

## 2019-11-10 RX ADMIN — CLOZAPINE 350 MG: 100 TABLET ORAL at 21:20
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
## 2019-11-10 NOTE — PROGRESS NOTES
Daily rounds  Per shift report, pt irritable and needing redirection in evening, flirtatious with female peer, negotiating to obtain items from locker when taking hs medications, was compliant after redirection

## 2019-11-10 NOTE — PROGRESS NOTES
Patient was unable to fall asleep at the beginning of the night shift  He received a snack and returned to his room  He was observed sleeping within the hour and slept throughout the night without difficulty

## 2019-11-10 NOTE — PROGRESS NOTES
Pt remains focused on discharge  Pleasant this morning  Childlike  States he had a good evening  Frequently requesting water  Staff assisted pt with cleaning his room and doing laundry  Layered attire, less malodorous  Compliant with AM meds  Observed in patient lounge at times

## 2019-11-10 NOTE — PROGRESS NOTES
Pt has been visible in milieu throughout day  Childlike, can be intrusive at RN station at times  Asks frequently when he can be discharged  Has not been irritable today  Pt showered in afternoon

## 2019-11-11 LAB
GLUCOSE SERPL-MCNC: 101 MG/DL (ref 65–140)
GLUCOSE SERPL-MCNC: 127 MG/DL (ref 65–140)

## 2019-11-11 PROCEDURE — 82948 REAGENT STRIP/BLOOD GLUCOSE: CPT

## 2019-11-11 PROCEDURE — 99231 SBSQ HOSP IP/OBS SF/LOW 25: CPT | Performed by: PHYSICIAN ASSISTANT

## 2019-11-11 RX ADMIN — DOCUSATE SODIUM 100 MG: 100 CAPSULE, LIQUID FILLED ORAL at 17:04

## 2019-11-11 RX ADMIN — DIVALPROEX SODIUM 1000 MG: 500 TABLET, DELAYED RELEASE ORAL at 17:04

## 2019-11-11 RX ADMIN — ATORVASTATIN CALCIUM 10 MG: 10 TABLET, FILM COATED ORAL at 17:03

## 2019-11-11 RX ADMIN — TRAZODONE HYDROCHLORIDE 50 MG: 50 TABLET ORAL at 22:14

## 2019-11-11 RX ADMIN — METFORMIN HYDROCHLORIDE 500 MG: 500 TABLET, FILM COATED ORAL at 10:19

## 2019-11-11 RX ADMIN — METFORMIN HYDROCHLORIDE 500 MG: 500 TABLET, FILM COATED ORAL at 17:03

## 2019-11-11 RX ADMIN — CLOZAPINE 100 MG: 100 TABLET ORAL at 10:17

## 2019-11-11 RX ADMIN — DOCUSATE SODIUM 100 MG: 100 CAPSULE, LIQUID FILLED ORAL at 10:18

## 2019-11-11 RX ADMIN — CLOZAPINE 350 MG: 100 TABLET ORAL at 21:20

## 2019-11-11 RX ADMIN — DIVALPROEX SODIUM 1000 MG: 500 TABLET, DELAYED RELEASE ORAL at 10:18

## 2019-11-11 RX ADMIN — ACETAMINOPHEN 650 MG: 325 TABLET, FILM COATED ORAL at 22:14

## 2019-11-11 RX ADMIN — OXYBUTYNIN CHLORIDE 5 MG: 5 TABLET, EXTENDED RELEASE ORAL at 10:18

## 2019-11-11 RX ADMIN — OLANZAPINE 15 MG: 10 TABLET, FILM COATED ORAL at 14:33

## 2019-11-11 NOTE — PROGRESS NOTES
Pt pleasant this morning  Disheveled appearance  Agrees to clean room today  Showers in afternoon  Remains focused on water and at desk frequently asking to get in water room  Denies symptoms  No irritability
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
## 2019-11-11 NOTE — PROGRESS NOTES
Has been sleeping in bed since about midnight  Calls out in sleep or coughs at intervals, but otherwise able to sleep  Will continue to monitor

## 2019-11-11 NOTE — PROGRESS NOTES
Pt observed walking about the milieu  Agitated at times, asking about discharge  Encouraged to speak with provider  Denies SI/HI/AVH  Continues to approach nurse's desk for water  Easily redirected and cooperative
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
## 2019-11-12 LAB
GLUCOSE SERPL-MCNC: 110 MG/DL (ref 65–140)
GLUCOSE SERPL-MCNC: 92 MG/DL (ref 65–140)

## 2019-11-12 PROCEDURE — 82948 REAGENT STRIP/BLOOD GLUCOSE: CPT

## 2019-11-12 PROCEDURE — 99231 SBSQ HOSP IP/OBS SF/LOW 25: CPT | Performed by: PHYSICIAN ASSISTANT

## 2019-11-12 RX ADMIN — CLOZAPINE 350 MG: 100 TABLET ORAL at 22:17

## 2019-11-12 RX ADMIN — CLOZAPINE 100 MG: 100 TABLET ORAL at 10:03

## 2019-11-12 RX ADMIN — OXYBUTYNIN CHLORIDE 5 MG: 5 TABLET, EXTENDED RELEASE ORAL at 10:03

## 2019-11-12 RX ADMIN — ACETAMINOPHEN 650 MG: 325 TABLET, FILM COATED ORAL at 18:15

## 2019-11-12 RX ADMIN — OLANZAPINE 15 MG: 10 TABLET, FILM COATED ORAL at 15:42

## 2019-11-12 RX ADMIN — DIVALPROEX SODIUM 1000 MG: 500 TABLET, DELAYED RELEASE ORAL at 17:41

## 2019-11-12 RX ADMIN — ATORVASTATIN CALCIUM 10 MG: 10 TABLET, FILM COATED ORAL at 15:42

## 2019-11-12 RX ADMIN — LEVOTHYROXINE SODIUM 75 MCG: 75 TABLET ORAL at 07:00

## 2019-11-12 RX ADMIN — METFORMIN HYDROCHLORIDE 500 MG: 500 TABLET, FILM COATED ORAL at 15:42

## 2019-11-12 RX ADMIN — METFORMIN HYDROCHLORIDE 500 MG: 500 TABLET, FILM COATED ORAL at 10:03

## 2019-11-12 RX ADMIN — DOCUSATE SODIUM 100 MG: 100 CAPSULE, LIQUID FILLED ORAL at 10:02

## 2019-11-12 RX ADMIN — DOCUSATE SODIUM 100 MG: 100 CAPSULE, LIQUID FILLED ORAL at 17:41

## 2019-11-12 RX ADMIN — PANTOPRAZOLE SODIUM 40 MG: 40 TABLET, DELAYED RELEASE ORAL at 07:00

## 2019-11-12 RX ADMIN — DIVALPROEX SODIUM 1000 MG: 500 TABLET, DELAYED RELEASE ORAL at 10:02

## 2019-11-12 NOTE — PROGRESS NOTES
Status: Pt is med compliant  Medication: no changes / prn Tylenol  D/C: Pt is on waiting list for EAC  His sister, Mouna Bowden, is planning to mail care package for him       11/12/19 4673   Team Meeting   Meeting Type Daily Rounds   Team Members Present   Team Members Present Physician;Nurse;;Occupational Therapist   Physician Team Member XUAN Brennan / Dr Osmany Bedolla Team Member GRANT [facility] Management Team Member [id number]   OT Team Member Maria Guadalupe   Patient/Family Present   Patient Present No   Patient's Family Present No

## 2019-11-12 NOTE — PROGRESS NOTES
Friendly and smiling during interaction  Wandering the unit  Focused on water consumption  Compliant with meals and medications

## 2019-11-12 NOTE — PROGRESS NOTES
Patient received trazodone for sleep at 2214  Patient appears to have slept throughout the night without difficulty

## 2019-11-12 NOTE — PROGRESS NOTES
Progress Note - Behavioral Health   Maria Antonia Resides 48 y o  male MRN: [id number]  Unit/Bed#: [facility] 256-01 Encounter: [id number]    Assessment/Plan   Principal Problem:    Schizoaffective disorder ([address] )  Active Problems:    Type 2 diabetes mellitus (HCC)    Benign essential hypertension    BMI 34 0-34 9,adult      Subjective:  Patient seen pacing around the unit  Was only focused on discharge with me  Patient is more agreeable to go to [facility] this time  Is apparently #3 on the wait list   Shows improved hygiene, seen going to groups, and is compliant with medications  Patient has chronic underlying paranoia and internal preoccupation  Will deny all symptoms when asked  Will order repeat CBC with diff for Clozaril monitoring  Will also order Clozaril level in morning  Appears to be tolerating medications well without serious side effects  Remains on involuntary 304 commitment with time constraints from [address] [place] [address] time runs out may require alternate discharge plan than [facility] based on court's decision      Current Medications:  Current Facility-Administered Medications   Medication Dose Route Frequency    acetaminophen (TYLENOL) tablet 325 mg  325 mg Oral Q6H PRN    acetaminophen (TYLENOL) tablet 650 mg  650 mg Oral Q6H PRN    acetaminophen (TYLENOL) tablet 975 mg  975 mg Oral Q8H PRN    aluminum-magnesium hydroxide-simethicone (MYLANTA) 200-200-20 mg/5 mL oral suspension 30 mL  30 mL Oral Q4H PRN    atorvastatin (LIPITOR) tablet 10 mg  10 mg Oral Daily With Dinner    benztropine (COGENTIN) injection 1 mg  1 mg Intramuscular Q6H PRN    benztropine (COGENTIN) tablet 1 mg  1 mg Oral Q6H PRN    cloZAPine (CLOZARIL) tablet 100 mg  100 mg Oral Daily    cloZAPine (CLOZARIL) tablet 350 mg  350 mg Oral HS    divalproex sodium (DEPAKOTE) EC tablet 1,000 mg  1,000 mg Oral BID    docusate sodium (COLACE) capsule 100 mg  100 mg Oral BID    haloperidol (HALDOL) tablet 5 mg  5 mg Oral Q6H PRN    haloperidol lactate (HALDOL) injection 5 mg  5 mg Intramuscular Q6H PRN    levothyroxine tablet 75 mcg  75 mcg Oral Early Morning    LORazepam (ATIVAN) 2 mg/mL injection 1 mg  1 mg Intramuscular Q6H PRN    LORazepam (ATIVAN) tablet 1 mg  1 mg Oral Q6H PRN    magnesium hydroxide (MILK OF MAGNESIA) 400 mg/5 mL oral suspension 30 mL  30 mL Oral Daily PRN    metFORMIN (GLUCOPHAGE) tablet 500 mg  500 mg Oral BID With Meals    nicotine polacrilex (NICORETTE) gum 2 mg  2 mg Oral Q2H PRN    OLANZapine (ZyPREXA) tablet 15 mg  15 mg Oral After Lunch    oxybutynin (DITROPAN-XL) 24 hr tablet 5 mg  5 mg Oral Daily    pantoprazole (PROTONIX) EC tablet 40 mg  40 mg Oral Early Morning    traZODone (DESYREL) tablet 50 mg  50 mg Oral HS PRN       Behavioral Health Medications: all current active meds have been reviewed and continue current psychiatric medications  Vitals:  Vitals:    11/11/19 1542   BP: 126/97   Pulse: 103   Resp: 16   SpO2:        Laboratory results:    I have personally reviewed all pertinent laboratory/tests results    Most Recent Labs:   Lab Results   Component Value Date    WBC 9 95 11/05/2019    RBC 4 68 11/05/2019    HGB 13 7 11/05/2019    HCT 42 1 11/05/2019     11/05/2019    RDW 13 0 11/05/2019    NEUTROABS 5 51 11/05/2019    SODIUM 141 10/29/2019    K 4 3 10/29/2019     10/29/2019    CO2 28 10/29/2019    BUN 17 10/29/2019    CREATININE 0 76 10/29/2019    GLUCOSE 91 05/11/2018    GLUC 106 10/29/2019    GLUF 99 08/23/2019    CALCIUM 8 6 10/29/2019    AST 15 10/29/2019    ALT 22 10/29/2019    ALKPHOS 66 10/29/2019    TP 6 4 10/29/2019    ALB 3 1 (L) 10/29/2019    TBILI 0 20 10/29/2019    CHOLESTEROL 103 11/05/2019    HDL 27 (L) 11/05/2019    TRIG 236 (H) 11/05/2019    LDLCALC 29 11/05/2019    NONHDLC 76 11/05/2019    VALPROICTOT 67 10/29/2019    LITHIUM 0 6 03/27/2014    AMMONIA 32 10/08/2019    [id number] 1 840 08/23/2019    FREET4 0 93 08/23/2019    HGBA1C 6 2 11/05/2019  11/05/2019       Psychiatric Review of Systems:  Behavior over the last 24 hours:  improved  Sleep: normal  Appetite: normal  Medication side effects: No  ROS: no complaints, all others negative    Mental Status Evaluation:  Appearance:  casually dressed   Behavior:  restless and fidgety   Speech:  loud   Mood:  euthymic   Affect:  blunted and constricted   Language sparse   Thought Process:  concrete and perserverative   Thought Content:  chronic paranoid delusions, obsessions   Perceptual Disturbances: less preoccupied   Risk Potential: Denied SI/HI  Potential for aggression: No   Sensorium:  person, place and time/date   Cognition:  grossly intact   Consciousness:  alert and awake    Recent and Remote Memory poor   Attention: attention span appeared shorter than expected for age   Insight:  limited   Judgment: limited   Gait/Station: normal gait/station and normal balance   Motor Activity: no abnormal movements     Progress Toward Goals: some progress    Recommended Treatment: Continue with group therapy, milieu therapy and occupational therapy  1   Continue current medications  2  CBC w/diff and Clozaril level for tomorrow   3  Awaiting EAC placement    Risks, benefits and possible side effects of Medications:   Risks, benefits, and possible side effects of medications explained to patient and patient verbalizes understanding        Goyo Gil PA-C

## 2019-11-12 NOTE — PROGRESS NOTES
Med Note:  Pt requesting PRN Tylenol 650mg po for b/l foot pain, 6/10  PRN Trazodone 50mg po administered for sleep at 2214  Upon reassessment, pt is sleeping

## 2019-11-13 LAB
BASOPHILS # BLD AUTO: 0.07 THOUSANDS/ΜL (ref 0–0.1)
BASOPHILS NFR BLD AUTO: 1 % (ref 0–1)
EOSINOPHIL # BLD AUTO: 0 THOUSAND/ΜL (ref 0–0.61)
EOSINOPHIL NFR BLD AUTO: 0 % (ref 0–6)
ERYTHROCYTE [DISTWIDTH] IN BLOOD BY AUTOMATED COUNT: 12.9 % (ref 11.6–15.1)
GLUCOSE SERPL-MCNC: 143 MG/DL (ref 65–140)
GLUCOSE SERPL-MCNC: 93 MG/DL (ref 65–140)
HCT VFR BLD AUTO: 43.3 % (ref 36.5–49.3)
HGB BLD-MCNC: 14.2 G/DL (ref 12–17)
IMM GRANULOCYTES # BLD AUTO: 0.04 THOUSAND/UL (ref 0–0.2)
IMM GRANULOCYTES NFR BLD AUTO: 1 % (ref 0–2)
LYMPHOCYTES # BLD AUTO: 2.46 THOUSANDS/ΜL (ref 0.6–4.47)
LYMPHOCYTES NFR BLD AUTO: 28 % (ref 14–44)
MCH RBC QN AUTO: 29.7 PG (ref 26.8–34.3)
MCHC RBC AUTO-ENTMCNC: 32.8 G/DL (ref 31.4–37.4)
MCV RBC AUTO: 91 FL (ref 82–98)
MONOCYTES # BLD AUTO: 1.1 THOUSAND/ΜL (ref 0.17–1.22)
MONOCYTES NFR BLD AUTO: 13 % (ref 4–12)
NEUTROPHILS # BLD AUTO: 5.16 THOUSANDS/ΜL (ref 1.85–7.62)
NEUTS SEG NFR BLD AUTO: 57 % (ref 43–75)
NRBC BLD AUTO-RTO: 0 /100 WBCS
PLATELET # BLD AUTO: 151 THOUSANDS/UL (ref 149–390)
PMV BLD AUTO: 9.9 FL (ref 8.9–12.7)
RBC # BLD AUTO: 4.78 MILLION/UL (ref 3.88–5.62)
WBC # BLD AUTO: 8.83 THOUSAND/UL (ref 4.31–10.16)

## 2019-11-13 PROCEDURE — 85025 COMPLETE CBC W/AUTO DIFF WBC: CPT | Performed by: PHYSICIAN ASSISTANT

## 2019-11-13 PROCEDURE — 82948 REAGENT STRIP/BLOOD GLUCOSE: CPT

## 2019-11-13 PROCEDURE — 80159 DRUG ASSAY CLOZAPINE: CPT | Performed by: PHYSICIAN ASSISTANT

## 2019-11-13 PROCEDURE — 99232 SBSQ HOSP IP/OBS MODERATE 35: CPT | Performed by: PHYSICIAN ASSISTANT

## 2019-11-13 RX ADMIN — OLANZAPINE 15 MG: 10 TABLET, FILM COATED ORAL at 15:00

## 2019-11-13 RX ADMIN — OXYBUTYNIN CHLORIDE 5 MG: 5 TABLET, EXTENDED RELEASE ORAL at 10:14

## 2019-11-13 RX ADMIN — LEVOTHYROXINE SODIUM 75 MCG: 75 TABLET ORAL at 07:15

## 2019-11-13 RX ADMIN — CLOZAPINE 100 MG: 100 TABLET ORAL at 10:13

## 2019-11-13 RX ADMIN — ACETAMINOPHEN 325 MG: 325 TABLET, FILM COATED ORAL at 21:40

## 2019-11-13 RX ADMIN — METFORMIN HYDROCHLORIDE 500 MG: 500 TABLET, FILM COATED ORAL at 16:43

## 2019-11-13 RX ADMIN — METFORMIN HYDROCHLORIDE 500 MG: 500 TABLET, FILM COATED ORAL at 10:14

## 2019-11-13 RX ADMIN — CLOZAPINE 350 MG: 100 TABLET ORAL at 21:40

## 2019-11-13 RX ADMIN — PANTOPRAZOLE SODIUM 40 MG: 40 TABLET, DELAYED RELEASE ORAL at 07:14

## 2019-11-13 RX ADMIN — DIVALPROEX SODIUM 1000 MG: 500 TABLET, DELAYED RELEASE ORAL at 10:13

## 2019-11-13 RX ADMIN — ATORVASTATIN CALCIUM 10 MG: 10 TABLET, FILM COATED ORAL at 16:43

## 2019-11-13 RX ADMIN — DOCUSATE SODIUM 100 MG: 100 CAPSULE, LIQUID FILLED ORAL at 18:02

## 2019-11-13 RX ADMIN — DIVALPROEX SODIUM 1000 MG: 500 TABLET, DELAYED RELEASE ORAL at 18:02

## 2019-11-13 RX ADMIN — DOCUSATE SODIUM 100 MG: 100 CAPSULE, LIQUID FILLED ORAL at 10:14

## 2019-11-13 NOTE — PROGRESS NOTES
Pleasant and cooperative  Perseverates on discharge,  items in his locker and ice water  Easily redirectable with no agitation observed  Visible in the milieu, attends some groups  Paces the halls at times  Medication compliant

## 2019-11-13 NOTE — PROGRESS NOTES
Pleasant and cooperative  Social with staff  Visible in the milieu, pacing halls  Focused on discharge  Frequently asking for ice water and items from his locker  When asked what he needs from his locker, he is unable to tell writer and walks away  No irritability or agitation  Medication compliant

## 2019-11-13 NOTE — PROGRESS NOTES
Friendly with peers and staff  Walking the halls, carryng clothing  Compliant with medications  No irritability  Inquired about discharge

## 2019-11-13 NOTE — PLAN OF CARE
Problem: SELF HARM/SUICIDALITY  Goal: Will have no self-injury during hospital stay  Description  INTERVENTIONS:  - Q 15 MINUTES: Routine safety checks  - Q WAKING SHIFT & PRN: Assess risk to determine if routine checks are adequate to maintain patient safety  - Encourage patient to participate actively in care by formulating a plan to combat response to suicidal ideation, identify supports and resources  Outcome: Progressing     Problem: INVOLUNTARY ADMIT  Goal: Will cooperate with staff recommendations and doctor's orders and will demonstrate appropriate behavior  Description  INTERVENTIONS:  - Treat underlying conditions and offer medication as ordered  - Educate regarding involuntary admission procedures and rules  - Utilize positive consistent limit setting strategies to support patient and staff safety  Outcome: Progressing     Problem: SELF CARE DEFICIT  Goal: Return ADL status to a safe level of function  Description  INTERVENTIONS:  - Administer medication as ordered  - Assess ADL deficits and provide assistive devices as needed  - Obtain PT/OT consults as needed  - Assist and instruct patient to increase activity and self care as tolerated  Outcome: Progressing     Problem: PAIN - ADULT  Goal: Verbalizes/displays adequate comfort level or baseline comfort level  Description  Interventions:  - Encourage patient to monitor pain and request assistance  - Assess pain using appropriate pain scale  - Administer analgesics based on type and severity of pain and evaluate response  - Implement non-pharmacological measures as appropriate and evaluate response  - Consider cultural and social influences on pain and pain management  - Notify physician/advanced practitioner if interventions unsuccessful or patient reports new pain  Outcome: Progressing     Problem: Risk for Self Injury/Neglect  Goal: Complete daily ADLs, including personal hygiene independently, as able  Description  Interventions:  - Observe, teach, and assist patient with ADLS  - Monitor and promote a balance of rest/activity, with adequate nutrition and elimination  Outcome: Progressing     Problem: Nutrition/Hydration-ADULT  Goal: Nutrient/Hydration intake appropriate for improving, restoring or maintaining nutritional needs  Description  Monitor and assess patient's nutrition/hydration status for malnutrition  Collaborate with interdisciplinary team and initiate plan and interventions as ordered  Monitor patient's weight and dietary intake as ordered or per policy  Utilize nutrition screening tool and intervene as necessary  Determine patient's food preferences and provide high-protein, high-caloric foods as appropriate       INTERVENTIONS:  - Monitor oral intake, urinary output, labs, and treatment plans  - Assess nutrition and hydration status and recommend course of action  - Evaluate amount of meals eaten  - Assist patient with eating if necessary   - Allow adequate time for meals  - Recommend/ encourage appropriate diets, oral nutritional supplements, and vitamin/mineral supplements  - Order, calculate, and assess calorie counts as needed  - Recommend, monitor, and adjust tube feedings and TPN/PPN based on assessed needs  - Assess need for intravenous fluids  - Provide specific nutrition/hydration education as appropriate  - Include patient/family/caregiver in decisions related to nutrition  Outcome: Progressing

## 2019-11-13 NOTE — PROGRESS NOTES
Status: Pt pleasant & social, dancing  Compliant with medications     Medication: no changes / PRN - Tylenol  D/C: [facility] - 3rd on waiting list

## 2019-11-13 NOTE — PROGRESS NOTES
Progress Note - Behavioral Health   Omar Dey 48 y o  male MRN: [id number]  Unit/Bed#: [facility] 256-01 Encounter: [id number]    Assessment/Plan   Principal Problem:    Schizoaffective disorder ([facility] )  Active Problems:    Type 2 diabetes mellitus ([facility] )    Benign essential hypertension    BMI 34 0-34 9,adult      Subjective:  Patient compliant with blood work this morning  Had CBC with diff,  which was within normal limits  Also had a Clozaril level as well  Patient seen social, walking the halls, and seen dancing  Is more pleasant  Remains perseverative over discharge  Asked multiple times a day about discharge  Denies most psychiatric symptoms when asked  Remains with chronic underlying paranoia and internal preoccupation  Is compliant with medications  ADLs are improving  Appears to be tolerating medications well without serious side effects  Remains on involuntary 304 commitment with time constraints from 74[facility] time runs out may require alternate discharge plan than [facility] based on court's decision        Current Medications:  Current Facility-Administered Medications   Medication Dose Route Frequency    acetaminophen (TYLENOL) tablet 325 mg  325 mg Oral Q6H PRN    acetaminophen (TYLENOL) tablet 650 mg  650 mg Oral Q6H PRN    acetaminophen (TYLENOL) tablet 975 mg  975 mg Oral Q8H PRN    aluminum-magnesium hydroxide-simethicone (MYLANTA) 200-200-20 mg/5 mL oral suspension 30 mL  30 mL Oral Q4H PRN    atorvastatin (LIPITOR) tablet 10 mg  10 mg Oral Daily With Dinner    benztropine (COGENTIN) injection 1 mg  1 mg Intramuscular Q6H PRN    benztropine (COGENTIN) tablet 1 mg  1 mg Oral Q6H PRN    cloZAPine (CLOZARIL) tablet 100 mg  100 mg Oral Daily    cloZAPine (CLOZARIL) tablet 350 mg  350 mg Oral HS    divalproex sodium (DEPAKOTE) EC tablet 1,000 mg  1,000 mg Oral BID    docusate sodium (COLACE) capsule 100 mg  100 mg Oral BID    haloperidol (HALDOL) tablet 5 mg 5 mg Oral Q6H PRN    haloperidol lactate (HALDOL) injection 5 mg  5 mg Intramuscular Q6H PRN    levothyroxine tablet 75 mcg  75 mcg Oral Early Morning    LORazepam (ATIVAN) 2 mg/mL injection 1 mg  1 mg Intramuscular Q6H PRN    LORazepam (ATIVAN) tablet 1 mg  1 mg Oral Q6H PRN    magnesium hydroxide (MILK OF MAGNESIA) 400 mg/5 mL oral suspension 30 mL  30 mL Oral Daily PRN    metFORMIN (GLUCOPHAGE) tablet 500 mg  500 mg Oral BID With Meals    nicotine polacrilex (NICORETTE) gum 2 mg  2 mg Oral Q2H PRN    OLANZapine (ZyPREXA) tablet 15 mg  15 mg Oral After Lunch    oxybutynin (DITROPAN-XL) 24 hr tablet 5 mg  5 mg Oral Daily    pantoprazole (PROTONIX) EC tablet 40 mg  40 mg Oral Early Morning    traZODone (DESYREL) tablet 50 mg  50 mg Oral HS PRN       Behavioral Health Medications: all current active meds have been reviewed and continue current psychiatric medications  Vitals:  Vitals:    11/12/19 1555   Resp:    Temp: (!) 96 6 °F (35 9 °C)   SpO2:        Laboratory results:    I have personally reviewed all pertinent laboratory/tests results    Most Recent Labs:   Lab Results   Component Value Date    WBC 8 83 11/13/2019    RBC 4 78 11/13/2019    HGB 14 2 11/13/2019    HCT 43 3 11/13/2019     11/13/2019    RDW 12 9 11/13/2019    NEUTROABS 5 16 11/13/2019    SODIUM 141 10/29/2019    K 4 3 10/29/2019     10/29/2019    CO2 28 10/29/2019    BUN 17 10/29/2019    CREATININE 0 76 10/29/2019    GLUCOSE 91 05/11/2018    GLUC 106 10/29/2019    GLUF 99 08/23/2019    CALCIUM 8 6 10/29/2019    AST 15 10/29/2019    ALT 22 10/29/2019    ALKPHOS 66 10/29/2019    TP 6 4 10/29/2019    ALB 3 1 (L) 10/29/2019    TBILI 0 20 10/29/2019    CHOLESTEROL 103 11/05/2019    HDL 27 (L) 11/05/2019    TRIG 236 (H) 11/05/2019    LDLCALC 29 11/05/2019    NONHDLC 76 11/05/2019    VALPROICTOT 67 10/29/2019    LITHIUM 0 6 03/27/2014    AMMONIA 32 10/08/2019    [id number] 1 840 08/23/2019    FREET4 0 93 08/23/2019    HGBA1C 6 2 11/05/2019     11/05/2019       Psychiatric Review of Systems:  Behavior over the last 24 hours:  unchanged  Sleep: normal  Appetite: normal  Medication side effects: No  ROS: no complaints, all others negative    Mental Status Evaluation:  Appearance:  casually dressed   Behavior:  restless and fidgety   Speech:  loud   Mood:  euthymic   Affect:  constricted   Language naming objects and repeating phrases   Thought Process:  concrete and perserverative   Thought Content:  obsessions and chronic paranoid delusions   Perceptual Disturbances: None  Chronic internal preoccupation   Risk Potential: Denied SI/HI  Potential for aggression: minimal   Sensorium:  person and place   Cognition:  grossly intact   Consciousness:  alert and awake    Recent and Remote Memory poor   Attention: attention span appeared shorter than expected for age   Insight:  limited   Judgment: limited   Gait/Station: normal gait/station and normal balance   Motor Activity: no abnormal movements     Progress Toward Goals: slow progression    Recommended Treatment: Continue with group therapy, milieu therapy and occupational therapy  1   Continue current medications  2  CBC w/diff and Clozaril level done this morning  3  Disposition planning with plan for [facility]    Risks, benefits and possible side effects of Medications:   Risks, benefits, and possible side effects of medications explained to patient and patient verbalizes understanding        Jaciel Barrera PA-C

## 2019-11-14 LAB
GLUCOSE SERPL-MCNC: 128 MG/DL (ref 65–140)
GLUCOSE SERPL-MCNC: 137 MG/DL (ref 65–140)

## 2019-11-14 PROCEDURE — 99232 SBSQ HOSP IP/OBS MODERATE 35: CPT | Performed by: PHYSICIAN ASSISTANT

## 2019-11-14 PROCEDURE — 82948 REAGENT STRIP/BLOOD GLUCOSE: CPT

## 2019-11-14 RX ADMIN — TRAZODONE HYDROCHLORIDE 50 MG: 50 TABLET ORAL at 22:13

## 2019-11-14 RX ADMIN — LEVOTHYROXINE SODIUM 75 MCG: 75 TABLET ORAL at 07:04

## 2019-11-14 RX ADMIN — DIVALPROEX SODIUM 1000 MG: 500 TABLET, DELAYED RELEASE ORAL at 18:01

## 2019-11-14 RX ADMIN — PANTOPRAZOLE SODIUM 40 MG: 40 TABLET, DELAYED RELEASE ORAL at 07:03

## 2019-11-14 RX ADMIN — METFORMIN HYDROCHLORIDE 500 MG: 500 TABLET, FILM COATED ORAL at 16:45

## 2019-11-14 RX ADMIN — ATORVASTATIN CALCIUM 10 MG: 10 TABLET, FILM COATED ORAL at 16:45

## 2019-11-14 RX ADMIN — CLOZAPINE 350 MG: 100 TABLET ORAL at 21:51

## 2019-11-14 RX ADMIN — DIVALPROEX SODIUM 1000 MG: 500 TABLET, DELAYED RELEASE ORAL at 09:34

## 2019-11-14 RX ADMIN — OLANZAPINE 15 MG: 10 TABLET, FILM COATED ORAL at 16:44

## 2019-11-14 RX ADMIN — DOCUSATE SODIUM 100 MG: 100 CAPSULE, LIQUID FILLED ORAL at 09:34

## 2019-11-14 RX ADMIN — OXYBUTYNIN CHLORIDE 5 MG: 5 TABLET, EXTENDED RELEASE ORAL at 09:34

## 2019-11-14 RX ADMIN — ACETAMINOPHEN 650 MG: 325 TABLET, FILM COATED ORAL at 18:33

## 2019-11-14 RX ADMIN — CLOZAPINE 100 MG: 100 TABLET ORAL at 09:34

## 2019-11-14 RX ADMIN — METFORMIN HYDROCHLORIDE 500 MG: 500 TABLET, FILM COATED ORAL at 09:35

## 2019-11-14 RX ADMIN — DOCUSATE SODIUM 100 MG: 100 CAPSULE, LIQUID FILLED ORAL at 18:01

## 2019-11-14 NOTE — PROGRESS NOTES
Progress Note - Behavioral Health   Era Darian 48 y o  male MRN: [id number]  Unit/Bed#: [facility] 256-01 Encounter: [id number]    Assessment/Plan   Principal Problem:    Schizoaffective disorder ([address] )  Active Problems:    Type 2 diabetes mellitus (HCC)    Benign essential hypertension    BMI 34 0-34 9,adult      Subjective:  Patient seen more social and cooperative  Has brighter affect and better hygiene  Still perseverates and is focused on discharge  Carries clothing while walking halls and dresses bizarrely at times  States he does not have any active hallucinations or psychosis  Does have underlying chronic paranoia and internal preoccupation  Scant in mood related symptoms  Denies suicidal and homicidal ideation  Does not appear to be manic at this time  No agitation  CBC with diff have been normal   Clozaril level is pending  Remains on involuntary 304 commitment with time constraints from [facility] time runs out may require alternate discharge plan than [facility] based on court's decision      Current Medications:  Current Facility-Administered Medications   Medication Dose Route Frequency    acetaminophen (TYLENOL) tablet 325 mg  325 mg Oral Q6H PRN    acetaminophen (TYLENOL) tablet 650 mg  650 mg Oral Q6H PRN    acetaminophen (TYLENOL) tablet 975 mg  975 mg Oral Q8H PRN    aluminum-magnesium hydroxide-simethicone (MYLANTA) 200-200-20 mg/5 mL oral suspension 30 mL  30 mL Oral Q4H PRN    atorvastatin (LIPITOR) tablet 10 mg  10 mg Oral Daily With Dinner    benztropine (COGENTIN) injection 1 mg  1 mg Intramuscular Q6H PRN    benztropine (COGENTIN) tablet 1 mg  1 mg Oral Q6H PRN    cloZAPine (CLOZARIL) tablet 100 mg  100 mg Oral Daily    cloZAPine (CLOZARIL) tablet 350 mg  350 mg Oral HS    divalproex sodium (DEPAKOTE) EC tablet 1,000 mg  1,000 mg Oral BID    docusate sodium (COLACE) capsule 100 mg  100 mg Oral BID    haloperidol (HALDOL) tablet 5 mg  5 mg Oral Q6H PRN    haloperidol lactate (HALDOL) injection 5 mg  5 mg Intramuscular Q6H PRN    levothyroxine tablet 75 mcg  75 mcg Oral Early Morning    LORazepam (ATIVAN) 2 mg/mL injection 1 mg  1 mg Intramuscular Q6H PRN    LORazepam (ATIVAN) tablet 1 mg  1 mg Oral Q6H PRN    magnesium hydroxide (MILK OF MAGNESIA) 400 mg/5 mL oral suspension 30 mL  30 mL Oral Daily PRN    metFORMIN (GLUCOPHAGE) tablet 500 mg  500 mg Oral BID With Meals    nicotine polacrilex (NICORETTE) gum 2 mg  2 mg Oral Q2H PRN    OLANZapine (ZyPREXA) tablet 15 mg  15 mg Oral After Lunch    oxybutynin (DITROPAN-XL) 24 hr tablet 5 mg  5 mg Oral Daily    pantoprazole (PROTONIX) EC tablet 40 mg  40 mg Oral Early Morning    traZODone (DESYREL) tablet 50 mg  50 mg Oral HS PRN       Behavioral Health Medications: all current active meds have been reviewed and continue current psychiatric medications  Vitals:  Vitals:    11/14/19 0721   BP: 120/84   Pulse: 80   Resp: 18   Temp: (!) 96 4 °F (35 8 °C)   SpO2:        Laboratory results:    I have personally reviewed all pertinent laboratory/tests results    Most Recent Labs:   Lab Results   Component Value Date    WBC 8 83 11/13/2019    RBC 4 78 11/13/2019    HGB 14 2 11/13/2019    HCT 43 3 11/13/2019     11/13/2019    RDW 12 9 11/13/2019    NEUTROABS 5 16 11/13/2019    SODIUM 141 10/29/2019    K 4 3 10/29/2019     10/29/2019    CO2 28 10/29/2019    BUN 17 10/29/2019    CREATININE 0 76 10/29/2019    GLUCOSE 91 05/11/2018    GLUC 106 10/29/2019    GLUF 99 08/23/2019    CALCIUM 8 6 10/29/2019    AST 15 10/29/2019    ALT 22 10/29/2019    ALKPHOS 66 10/29/2019    TP 6 4 10/29/2019    ALB 3 1 (L) 10/29/2019    TBILI 0 20 10/29/2019    CHOLESTEROL 103 11/05/2019    HDL 27 (L) 11/05/2019    TRIG 236 (H) 11/05/2019    LDLCALC 29 11/05/2019    NONHDLC 76 11/05/2019    VALPROICTOT 67 10/29/2019    LITHIUM 0 6 03/27/2014    AMMONIA 32 10/08/2019    [id number] 1 840 08/23/2019    FREET4 0 93 08/23/2019    HGBA1C 6 2 11/05/2019     11/05/2019       Psychiatric Review of Systems:  Behavior over the last 24 hours:  improved  Sleep: normal  Appetite: normal  Medication side effects: No  ROS: no complaints, all others negative    Mental Status Evaluation:  Appearance:  casually dressed, wearing bandana bizarrely   Behavior:  more cooperative but guarded   Speech:  loud   Mood:  euthymic   Affect:  mood-congruent   Language naming objects and repeating phrases   Thought Process:  concrete, goal directed and perserverative   Thought Content:  chronic underlying paranoia, obsessions   Perceptual Disturbances: None  Less internal preoccupation   Risk Potential: Denied SI/HI  Potential for aggression: no   Sensorium:  person and place   Cognition:  grossly intact   Consciousness:  awake    Recent and Remote Memory limited   Attention: attention span appeared shorter than expected for age   Insight:  limited   Judgment: limited   Gait/Station: normal gait/station and normal balance   Motor Activity: no abnormal movements     Progress Toward Goals: progressing    Recommended Treatment: Continue with group therapy, milieu therapy and occupational therapy  1   Continue current medications  2   Clozaril level pending  3  Disposition planning with plan for E[facility]    Risks, benefits and possible side effects of Medications:   Risks, benefits, and possible side effects of medications explained to patient and patient verbalizes understanding        Payton Reid PA-C

## 2019-11-14 NOTE — PROGRESS NOTES
Pt mostly pleasant  Childlike, perseverating on discharge, getting water from kitchen, and getting into locker  Pt had brief period of irritability this evening  Pt asking for water, a staff member informed him he had 30 minutes until he was able to get water, and pt began screaming "fuck you!" Pt was verbally de-escalated  Pt then apologetic  Pt said "they're talking to me like I'm a kid  I'm not a kid  I don't like it here  I just want to go " Pt asking multiple times throughout day how he can get out of here  Reassured pt that he has been compliant with treatment and is doing well  Discussed importance of remaining in control  Pt calmer presently

## 2019-11-14 NOTE — PROGRESS NOTES
Status: Pt is wandering & carrying his clothing  He is cooperative with medication with encouragement  Cooperative with vitals at times    Medication: no changes / PRN - Tylenol  D/C: 3rd on waiting list at [facility]     11/14/19 9504   Team Meeting   Meeting Type Daily Rounds   Team Members Present   Team Members Present Physician;Nurse;;Occupational Therapist   Physician Team Member XUAN Suggs / Dr Chencho Saunders Team Member [facility] Management Team Member Chanda Resendiz / Volodymyr Nava   OT Team Member Maria Guadalupe   Patient/Family Present   Patient Present No   Patient's Family Present No

## 2019-11-14 NOTE — PROGRESS NOTES
Pt pleasant in conversation  Asks writer how he can get out of the hospital sooner  Reports having a good night and said he slept all night  Remains focused on water, frequently asks to get in water room  Singing at times, jokes with staff

## 2019-11-15 LAB
CLOZAPINE SERPL-MCNC: 657 NG/ML (ref 350–650)
CLOZAPINE+NOR SERPL-MCNC: 844 NG/ML
GLUCOSE SERPL-MCNC: 115 MG/DL (ref 65–140)
GLUCOSE SERPL-MCNC: 96 MG/DL (ref 65–140)
NORCLOZAPINE SERPL-MCNC: 187 NG/ML

## 2019-11-15 PROCEDURE — 82948 REAGENT STRIP/BLOOD GLUCOSE: CPT

## 2019-11-15 PROCEDURE — 99232 SBSQ HOSP IP/OBS MODERATE 35: CPT | Performed by: PHYSICIAN ASSISTANT

## 2019-11-15 RX ADMIN — METFORMIN HYDROCHLORIDE 500 MG: 500 TABLET, FILM COATED ORAL at 08:47

## 2019-11-15 RX ADMIN — DOCUSATE SODIUM 100 MG: 100 CAPSULE, LIQUID FILLED ORAL at 17:27

## 2019-11-15 RX ADMIN — DOCUSATE SODIUM 100 MG: 100 CAPSULE, LIQUID FILLED ORAL at 08:47

## 2019-11-15 RX ADMIN — OXYBUTYNIN CHLORIDE 5 MG: 5 TABLET, EXTENDED RELEASE ORAL at 08:47

## 2019-11-15 RX ADMIN — PANTOPRAZOLE SODIUM 40 MG: 40 TABLET, DELAYED RELEASE ORAL at 07:10

## 2019-11-15 RX ADMIN — LEVOTHYROXINE SODIUM 75 MCG: 75 TABLET ORAL at 07:11

## 2019-11-15 RX ADMIN — CLOZAPINE 100 MG: 100 TABLET ORAL at 08:47

## 2019-11-15 RX ADMIN — CLOZAPINE 350 MG: 100 TABLET ORAL at 21:20

## 2019-11-15 RX ADMIN — METFORMIN HYDROCHLORIDE 500 MG: 500 TABLET, FILM COATED ORAL at 17:27

## 2019-11-15 RX ADMIN — DIVALPROEX SODIUM 1000 MG: 500 TABLET, DELAYED RELEASE ORAL at 17:27

## 2019-11-15 RX ADMIN — ACETAMINOPHEN 650 MG: 325 TABLET, FILM COATED ORAL at 17:53

## 2019-11-15 RX ADMIN — TRAZODONE HYDROCHLORIDE 50 MG: 50 TABLET ORAL at 22:31

## 2019-11-15 RX ADMIN — OLANZAPINE 15 MG: 10 TABLET, FILM COATED ORAL at 14:04

## 2019-11-15 RX ADMIN — ATORVASTATIN CALCIUM 10 MG: 10 TABLET, FILM COATED ORAL at 17:27

## 2019-11-15 RX ADMIN — DIVALPROEX SODIUM 1000 MG: 500 TABLET, DELAYED RELEASE ORAL at 08:47

## 2019-11-15 NOTE — CASE MANAGEMENT
Care package received from Pt's sister, Tracey Velasquez   CM met with Pt & allowed him to open it  He was excited & CM assisted him with calling Kristy to thank her  CM informed Kirsty that [facility] was to pick-up Pt's belongings at [facility] yesterday, & they would be bringing them to the hospital when they visit next week

## 2019-11-15 NOTE — PROGRESS NOTES
Pt mostly pleasant with period of irritability  Pt anxious for discharge  Pt received a care package from sister and has been focused on retrieving items from locker  Pt is overall cooperative and redirectable

## 2019-11-15 NOTE — PLAN OF CARE
Pt is 3rd on the waiting list for [facility]  Pt received a care package from his sister which included clothing items & a radio    Pt very excited

## 2019-11-15 NOTE — PROGRESS NOTES
Progress Note - Behavioral Health   Evelyn Day 48 y o  male MRN: [id number]  Unit/Bed#: [facility] 256-01 Encounter: [id number]    Assessment/Plan   Principal Problem:    Schizoaffective disorder ([address] )  Active Problems:    Type 2 diabetes mellitus (HCC)    Benign essential hypertension    BMI 34 0-34 9,adult      Subjective:  Patient today in better spirits due to package coming from his sister  Were reports that he was cursing in the last 24 hours over dispute with another patient  Today seen walking with sunglasses, bandanna, and necklace  Has odd attire  Seen walking with clothes  Focused on discharge remains perseverative  Requires redirection on this topic  Evident obsessive thought process  Chronic underlying paranoid delusions and internal preoccupation  Will deny all psychiatric symptoms when asked  No reports of incontinence of urine  ADLs are improving  Has better hygiene  Compliant with medications  Appears to be tolerating medications well without serious side effects  Clozaril level is pending    Remains on involuntary 304 commitment with time constraints from [facility] of [address] time runs out may require alternate discharge plan than EAC based on court's decision        Current Medications:  Current Facility-Administered Medications   Medication Dose Route Frequency    acetaminophen (TYLENOL) tablet 325 mg  325 mg Oral Q6H PRN    acetaminophen (TYLENOL) tablet 650 mg  650 mg Oral Q6H PRN    acetaminophen (TYLENOL) tablet 975 mg  975 mg Oral Q8H PRN    aluminum-magnesium hydroxide-simethicone (MYLANTA) 200-200-20 mg/5 mL oral suspension 30 mL  30 mL Oral Q4H PRN    atorvastatin (LIPITOR) tablet 10 mg  10 mg Oral Daily With Dinner    benztropine (COGENTIN) injection 1 mg  1 mg Intramuscular Q6H PRN    benztropine (COGENTIN) tablet 1 mg  1 mg Oral Q6H PRN    cloZAPine (CLOZARIL) tablet 100 mg  100 mg Oral Daily    cloZAPine (CLOZARIL) tablet 350 mg  350 mg Oral HS    divalproex sodium (DEPAKOTE) EC tablet 1,000 mg  1,000 mg Oral BID    docusate sodium (COLACE) capsule 100 mg  100 mg Oral BID    haloperidol (HALDOL) tablet 5 mg  5 mg Oral Q6H PRN    haloperidol lactate (HALDOL) injection 5 mg  5 mg Intramuscular Q6H PRN    levothyroxine tablet 75 mcg  75 mcg Oral Early Morning    LORazepam (ATIVAN) 2 mg/mL injection 1 mg  1 mg Intramuscular Q6H PRN    LORazepam (ATIVAN) tablet 1 mg  1 mg Oral Q6H PRN    magnesium hydroxide (MILK OF MAGNESIA) 400 mg/5 mL oral suspension 30 mL  30 mL Oral Daily PRN    metFORMIN (GLUCOPHAGE) tablet 500 mg  500 mg Oral BID With Meals    nicotine polacrilex (NICORETTE) gum 2 mg  2 mg Oral Q2H PRN    OLANZapine (ZyPREXA) tablet 15 mg  15 mg Oral After Lunch    oxybutynin (DITROPAN-XL) 24 hr tablet 5 mg  5 mg Oral Daily    pantoprazole (PROTONIX) EC tablet 40 mg  40 mg Oral Early Morning    traZODone (DESYREL) tablet 50 mg  50 mg Oral HS PRN       Behavioral Health Medications: all current active meds have been reviewed and continue current psychiatric medications  Vitals:  Vitals:    11/09/19 0838   SpO2: 98%       Laboratory results:    I have personally reviewed all pertinent laboratory/tests results    Most Recent Labs:   Lab Results   Component Value Date    WBC 8 83 11/13/2019    RBC 4 78 11/13/2019    HGB 14 2 11/13/2019    HCT 43 3 11/13/2019     11/13/2019    RDW 12 9 11/13/2019    NEUTROABS 5 16 11/13/2019    SODIUM 141 10/29/2019    K 4 3 10/29/2019     10/29/2019    CO2 28 10/29/2019    BUN 17 10/29/2019    CREATININE 0 76 10/29/2019    GLUCOSE 91 05/11/2018    GLUC 106 10/29/2019    GLUF 99 08/23/2019    CALCIUM 8 6 10/29/2019    AST 15 10/29/2019    ALT 22 10/29/2019    ALKPHOS 66 10/29/2019    TP 6 4 10/29/2019    ALB 3 1 (L) 10/29/2019    TBILI 0 20 10/29/2019    CHOLESTEROL 103 11/05/2019    HDL 27 (L) 11/05/2019    TRIG 236 (H) 11/05/2019    LDLCALC 29 11/05/2019    [place] 76 11/05/2019 VALPROICTOT 67 10/29/2019    LITHIUM 0 6 2014    AMMONIA 32 10/08/2019    AJI2UDYETNRV 1 840 2019    FREET4 0 93 2019    HGBA1C 6 2 2019          Metabolic Monitoring:       Baseline:  4 week  8 week  12 week  Quarterly Annually   Weight (BMI) 237  258           Waist Circumference X X X         /99  124/74           HbA1C/Fasting plasma glucose 6 3/134 6 131           Lipid Profile Chol: 138  PT: 146  HDL: 34  LDL: 78 Chol:  103  T  HDL: 27  LDL: 29  Chol:   TG:   HFL:   LDL:                Psychiatric Review of Systems:  Behavior over the last 24 hours:  improved  Sleep: normal  Appetite: normal  Medication side effects: No  ROS: no complaints, all others negative    Mental Status Evaluation:  Appearance:  disheveled   Behavior:  less guarded, more cooperative   Speech:  loud   Mood:  euthymic   Affect:  increased in range   Language repeating phrases   Thought Process:  concrete and perserverative   Thought Content:  obsessions, chronic paranoia   Perceptual Disturbances: chronic underlying internal preoccupation   Risk Potential: Denied SI/HI  Potential for aggression: no   Sensorium:  person and place   Cognition:  grossly intact   Consciousness:  alert and awake    Recent and Remote Memory poor   Attention: attention span appeared shorter than expected for age   Insight:  limited   Judgment: limited   Gait/Station: normal gait/station and normal balance   Motor Activity: no abnormal movements     Progress Toward Goals: progressing    Recommended Treatment: Continue with group therapy, milieu therapy and occupational therapy  1   Continue current medications  2   Clozaril level pending  3  Disposition planning awaiting EAC    Risks, benefits and possible side effects of Medications:   Risks, benefits, and possible side effects of medications explained to patient and patient verbalizes understanding        Ez Molina PA-C

## 2019-11-15 NOTE — PROGRESS NOTES
Status: Pt angry last night, cursing at another patient & staff, stating we are treating him like a child  He was awake twice overnight  Medication: no changes / PRN - Tylenol & Trazodone  D/C: 3rd on waiting list for [facility]  His sister mailed a care package, which should arrive today       11/15/19 0753   Team Meeting   Meeting Type Daily Rounds   Team Members Present   Team Members Present Physician;Nurse;;Occupational Therapist   Physician Team Member XUAN Alberto / Dr Sis Marks Team Member [facility] Management Team Member Casandra Moncada / Alla Sneed   OT Team Member Maria Guadalupe   Patient/Family Present   Patient Present No   Patient's Family Present No

## 2019-11-15 NOTE — CASE MANAGEMENT
CM met with Amy from [facility]  CM reviewed that Ritchie Ariza has gathered up Pt's belongings & CM had spoken with Soraida from [facility] last week about picking it up  All Montemayor said that she was actually going to [facility] today & whoever visits Pt next week, can bring the items  CM provided an update that Pt is now 3rd on the waiting list for [facility] & that he is doing well  Pt has been showering & taking his medications & cooperative with vitals & labs at times  All Montemayor had no questions for CM, & CM left so that she could visit with Pt

## 2019-11-16 LAB
GLUCOSE SERPL-MCNC: 120 MG/DL (ref 65–140)
GLUCOSE SERPL-MCNC: 194 MG/DL (ref 65–140)

## 2019-11-16 PROCEDURE — 99232 SBSQ HOSP IP/OBS MODERATE 35: CPT | Performed by: PSYCHIATRY & NEUROLOGY

## 2019-11-16 PROCEDURE — 82948 REAGENT STRIP/BLOOD GLUCOSE: CPT

## 2019-11-16 RX ADMIN — ATORVASTATIN CALCIUM 10 MG: 10 TABLET, FILM COATED ORAL at 17:06

## 2019-11-16 RX ADMIN — LEVOTHYROXINE SODIUM 75 MCG: 75 TABLET ORAL at 06:57

## 2019-11-16 RX ADMIN — CLOZAPINE 100 MG: 100 TABLET ORAL at 09:47

## 2019-11-16 RX ADMIN — DOCUSATE SODIUM 100 MG: 100 CAPSULE, LIQUID FILLED ORAL at 17:06

## 2019-11-16 RX ADMIN — DOCUSATE SODIUM 100 MG: 100 CAPSULE, LIQUID FILLED ORAL at 09:47

## 2019-11-16 RX ADMIN — METFORMIN HYDROCHLORIDE 500 MG: 500 TABLET, FILM COATED ORAL at 09:47

## 2019-11-16 RX ADMIN — NICOTINE POLACRILEX 2 MG: 2 GUM, CHEWING BUCCAL at 14:16

## 2019-11-16 RX ADMIN — DIVALPROEX SODIUM 1000 MG: 500 TABLET, DELAYED RELEASE ORAL at 09:47

## 2019-11-16 RX ADMIN — NICOTINE POLACRILEX 2 MG: 2 GUM, CHEWING BUCCAL at 09:49

## 2019-11-16 RX ADMIN — OLANZAPINE 15 MG: 10 TABLET, FILM COATED ORAL at 14:15

## 2019-11-16 RX ADMIN — PANTOPRAZOLE SODIUM 40 MG: 40 TABLET, DELAYED RELEASE ORAL at 06:57

## 2019-11-16 RX ADMIN — DIVALPROEX SODIUM 1000 MG: 500 TABLET, DELAYED RELEASE ORAL at 17:06

## 2019-11-16 RX ADMIN — OXYBUTYNIN CHLORIDE 5 MG: 5 TABLET, EXTENDED RELEASE ORAL at 09:47

## 2019-11-16 RX ADMIN — TRAZODONE HYDROCHLORIDE 50 MG: 50 TABLET ORAL at 22:07

## 2019-11-16 RX ADMIN — METFORMIN HYDROCHLORIDE 500 MG: 500 TABLET, FILM COATED ORAL at 17:06

## 2019-11-16 RX ADMIN — CLOZAPINE 350 MG: 100 TABLET ORAL at 21:02

## 2019-11-16 NOTE — PROGRESS NOTES
Pt is oriented to self only  He denies S/H/I or AVH  He is pleasant and anxious, focused on using the radio and getting things from his locker  He stated that he needs to get out and get fresh air  He is compliant with meds, attended the end of group, stating he just wants to be discharged  He ambulates the halls, asking for water frequently; cooperative with water schedule  Will monitor, provide support and encouragement

## 2019-11-16 NOTE — PLAN OF CARE
Problem: SELF HARM/SUICIDALITY  Goal: Will have no self-injury during hospital stay  Description  INTERVENTIONS:  - Q 15 MINUTES: Routine safety checks  - Q WAKING SHIFT & PRN: Assess risk to determine if routine checks are adequate to maintain patient safety  - Encourage patient to participate actively in care by formulating a plan to combat response to suicidal ideation, identify supports and resources  Outcome: Progressing     Problem: INVOLUNTARY ADMIT  Goal: Will cooperate with staff recommendations and doctor's orders and will demonstrate appropriate behavior  Description  INTERVENTIONS:  - Treat underlying conditions and offer medication as ordered  - Educate regarding involuntary admission procedures and rules  - Utilize positive consistent limit setting strategies to support patient and staff safety  Outcome: Progressing     Problem: SELF CARE DEFICIT  Goal: Return ADL status to a safe level of function  Description  INTERVENTIONS:  - Administer medication as ordered  - Assess ADL deficits and provide assistive devices as needed  - Obtain PT/OT consults as needed  - Assist and instruct patient to increase activity and self care as tolerated  Outcome: Progressing     Problem: PAIN - ADULT  Goal: Verbalizes/displays adequate comfort level or baseline comfort level  Description  Interventions:  - Encourage patient to monitor pain and request assistance  - Assess pain using appropriate pain scale  - Administer analgesics based on type and severity of pain and evaluate response  - Implement non-pharmacological measures as appropriate and evaluate response  - Consider cultural and social influences on pain and pain management  - Notify physician/advanced practitioner if interventions unsuccessful or patient reports new pain  Outcome: Progressing     Problem: Ineffective Coping  Goal: Participates in unit activities  Description  Interventions:  - Provide therapeutic environment   - Provide required programming - Redirect inappropriate behaviors   Outcome: Progressing     Problem: Risk for Self Injury/Neglect  Goal: Complete daily ADLs, including personal hygiene independently, as able  Description  Interventions:  - Observe, teach, and assist patient with ADLS  - Monitor and promote a balance of rest/activity, with adequate nutrition and elimination  Outcome: Progressing     Problem: Nutrition/Hydration-ADULT  Goal: Nutrient/Hydration intake appropriate for improving, restoring or maintaining nutritional needs  Description  Monitor and assess patient's nutrition/hydration status for malnutrition  Collaborate with interdisciplinary team and initiate plan and interventions as ordered  Monitor patient's weight and dietary intake as ordered or per policy  Utilize nutrition screening tool and intervene as necessary  Determine patient's food preferences and provide high-protein, high-caloric foods as appropriate       INTERVENTIONS:  - Monitor oral intake, urinary output, labs, and treatment plans  - Assess nutrition and hydration status and recommend course of action  - Evaluate amount of meals eaten  - Assist patient with eating if necessary   - Allow adequate time for meals  - Recommend/ encourage appropriate diets, oral nutritional supplements, and vitamin/mineral supplements  - Order, calculate, and assess calorie counts as needed  - Recommend, monitor, and adjust tube feedings and TPN/PPN based on assessed needs  - Assess need for intravenous fluids  - Provide specific nutrition/hydration education as appropriate  - Include patient/family/caregiver in decisions related to nutrition  Outcome: Progressing

## 2019-11-16 NOTE — PROGRESS NOTES
Pt in room listening to radio at start of shift  Refused to give back radio stating he is allowed to have it overnight  Reviewed the unit rules with pt however pt became agitated and began yelling at staff  Pt stuck radio in his pocket and demanding staff leave him alone  After several minutes, pt agreeable to allow staff to lock radio in locker  States he will speak to doctor about this in the morning so he can be allowed to listen to it over night  Explained to pt that this can be disturbing for roommate  Pt mumbled to self however returned to room   Observed sleeping about 20 minutes later

## 2019-11-16 NOTE — PROGRESS NOTES
Pt calm and pleasant throughout this shift, no irritability noted  Pt remains focused on discharge and was reassured  Out in milieu, cooperative
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
## 2019-11-16 NOTE — PROGRESS NOTES
Patient received trazodone for sleep at 2231  Patient is in bed appears to be sleeping soundly  Medication effective  Will continue to monitor and support throughout the night

## 2019-11-16 NOTE — PROGRESS NOTES
Pt is pleasant and cooperative during interaction  Frequently asking for locker items, water, etc but is redirectable  Compliant with meds  Needs encouragement to tidy room and remove trash from floor

## 2019-11-17 LAB
GLUCOSE SERPL-MCNC: 119 MG/DL (ref 65–140)
GLUCOSE SERPL-MCNC: 140 MG/DL (ref 65–140)

## 2019-11-17 PROCEDURE — 99231 SBSQ HOSP IP/OBS SF/LOW 25: CPT | Performed by: PSYCHIATRY & NEUROLOGY

## 2019-11-17 PROCEDURE — 82948 REAGENT STRIP/BLOOD GLUCOSE: CPT

## 2019-11-17 RX ORDER — BACITRACIN, NEOMYCIN, POLYMYXIN B 400; 3.5; 5 [USP'U]/G; MG/G; [USP'U]/G
1 OINTMENT TOPICAL 2 TIMES DAILY
Status: DISCONTINUED | OUTPATIENT
Start: 2019-11-17 | End: 2019-11-26

## 2019-11-17 RX ADMIN — LEVOTHYROXINE SODIUM 75 MCG: 75 TABLET ORAL at 06:55

## 2019-11-17 RX ADMIN — CLOZAPINE 350 MG: 100 TABLET ORAL at 21:14

## 2019-11-17 RX ADMIN — DOCUSATE SODIUM 100 MG: 100 CAPSULE, LIQUID FILLED ORAL at 17:30

## 2019-11-17 RX ADMIN — TRAZODONE HYDROCHLORIDE 50 MG: 50 TABLET ORAL at 21:15

## 2019-11-17 RX ADMIN — OXYBUTYNIN CHLORIDE 5 MG: 5 TABLET, EXTENDED RELEASE ORAL at 09:12

## 2019-11-17 RX ADMIN — NICOTINE POLACRILEX 2 MG: 2 GUM, CHEWING BUCCAL at 09:13

## 2019-11-17 RX ADMIN — DIVALPROEX SODIUM 1000 MG: 500 TABLET, DELAYED RELEASE ORAL at 09:12

## 2019-11-17 RX ADMIN — CLOZAPINE 100 MG: 100 TABLET ORAL at 09:12

## 2019-11-17 RX ADMIN — METFORMIN HYDROCHLORIDE 500 MG: 500 TABLET, FILM COATED ORAL at 16:38

## 2019-11-17 RX ADMIN — BACITRACIN, NEOMYCIN, POLYMYXIN B 1 SMALL APPLICATION: 400; 3.5; 5 OINTMENT TOPICAL at 17:30

## 2019-11-17 RX ADMIN — DOCUSATE SODIUM 100 MG: 100 CAPSULE, LIQUID FILLED ORAL at 09:12

## 2019-11-17 RX ADMIN — OLANZAPINE 15 MG: 10 TABLET, FILM COATED ORAL at 13:46

## 2019-11-17 RX ADMIN — NICOTINE POLACRILEX 2 MG: 2 GUM, CHEWING BUCCAL at 13:48

## 2019-11-17 RX ADMIN — PANTOPRAZOLE SODIUM 40 MG: 40 TABLET, DELAYED RELEASE ORAL at 06:55

## 2019-11-17 RX ADMIN — ATORVASTATIN CALCIUM 10 MG: 10 TABLET, FILM COATED ORAL at 16:38

## 2019-11-17 RX ADMIN — METFORMIN HYDROCHLORIDE 500 MG: 500 TABLET, FILM COATED ORAL at 09:12

## 2019-11-17 RX ADMIN — DIVALPROEX SODIUM 1000 MG: 500 TABLET, DELAYED RELEASE ORAL at 17:30

## 2019-11-17 NOTE — PROGRESS NOTES
Pt mostly pleasant throughout day and evening  Pt observed to have open area on left 5th digit where pt removed a ring  Pt was instructed to wash thoroughly with soap and water and bandaid applied  Neosporin ordered and applied as well  Pt removed other rings and has a reddened area to 4th digit  Pt encouraged to leave rings off at this time  Pt has a positive visit with "big brother"
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
## 2019-11-17 NOTE — PROGRESS NOTES
Pt pleasant in mood today  Frequently asking for items, phone, water,etc   Redirectable when necessary

## 2019-11-18 LAB
GLUCOSE SERPL-MCNC: 117 MG/DL (ref 65–140)
GLUCOSE SERPL-MCNC: 139 MG/DL (ref 65–140)

## 2019-11-18 PROCEDURE — 82948 REAGENT STRIP/BLOOD GLUCOSE: CPT

## 2019-11-18 PROCEDURE — 99232 SBSQ HOSP IP/OBS MODERATE 35: CPT | Performed by: PSYCHIATRY & NEUROLOGY

## 2019-11-18 RX ADMIN — DIVALPROEX SODIUM 1000 MG: 500 TABLET, DELAYED RELEASE ORAL at 17:38

## 2019-11-18 RX ADMIN — ATORVASTATIN CALCIUM 10 MG: 10 TABLET, FILM COATED ORAL at 17:38

## 2019-11-18 RX ADMIN — PANTOPRAZOLE SODIUM 40 MG: 40 TABLET, DELAYED RELEASE ORAL at 09:44

## 2019-11-18 RX ADMIN — OXYBUTYNIN CHLORIDE 5 MG: 5 TABLET, EXTENDED RELEASE ORAL at 09:45

## 2019-11-18 RX ADMIN — DOCUSATE SODIUM 100 MG: 100 CAPSULE, LIQUID FILLED ORAL at 09:45

## 2019-11-18 RX ADMIN — DOCUSATE SODIUM 100 MG: 100 CAPSULE, LIQUID FILLED ORAL at 17:38

## 2019-11-18 RX ADMIN — METFORMIN HYDROCHLORIDE 500 MG: 500 TABLET, FILM COATED ORAL at 09:45

## 2019-11-18 RX ADMIN — CLOZAPINE 100 MG: 100 TABLET ORAL at 09:45

## 2019-11-18 RX ADMIN — BACITRACIN, NEOMYCIN, POLYMYXIN B 1 SMALL APPLICATION: 400; 3.5; 5 OINTMENT TOPICAL at 09:47

## 2019-11-18 RX ADMIN — DIVALPROEX SODIUM 1000 MG: 500 TABLET, DELAYED RELEASE ORAL at 09:45

## 2019-11-18 RX ADMIN — METFORMIN HYDROCHLORIDE 500 MG: 500 TABLET, FILM COATED ORAL at 17:38

## 2019-11-18 RX ADMIN — BACITRACIN, NEOMYCIN, POLYMYXIN B 1 SMALL APPLICATION: 400; 3.5; 5 OINTMENT TOPICAL at 17:37

## 2019-11-18 RX ADMIN — CLOZAPINE 350 MG: 100 TABLET ORAL at 21:39

## 2019-11-18 RX ADMIN — OLANZAPINE 15 MG: 10 TABLET, FILM COATED ORAL at 13:30
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
## 2019-11-18 NOTE — PROGRESS NOTES
Pt labile and irritable throughout day  Multiple outbursts, cursing at staff  Pt perseverating on discharge and seeing the doctor  Pt had outburst at start of shift, yelling "fuck you! fuck you!" Pt redirectable after incident and said he would stay in his room to calm down  Pt requesting to sign 72 hour notice and repeating "I didn't commit a crime " Explained involuntary status to patient  Pt walking halls, carrying his clothing with him, needs frequent reassurance

## 2019-11-18 NOTE — PROGRESS NOTES
Pt refused AM vitals  Ate breakfast  Focused on meeting with physician and said "I want to get out of here " Pt increasingly anxious, asking multiple times where the doctor is  Pt then had outburst when MHT spoke with pt about cleaning his bedroom  Pt began shouting and cursing  Pt said that this staff member wanted to call the police  Pt able to be verbally de-escalated  Pt continues to request to speak with physician  Calmer presently

## 2019-11-18 NOTE — CASE MANAGEMENT
CM met with Pt who remains focused on leaving  Pt requesting to talk to CM and the attending physician with staff member "Hamlet Haas", who said that he would tell them that Pt should be discharged today  CM reviewed that Pt would not be discharged today, & they would be having a 305 hearing either tomorrow or Friday, to request additional inpatient days, as Pt is on the waiting list for EAC  CM reviewed with Pt his rights for the 305 & provided a copy  Pt angry & walked away from CM  CM assisted with completing the 305 petition & it was faxed to [facility] @ [phone]

## 2019-11-18 NOTE — PLAN OF CARE
Pt remains focused on leaving the hospital & is irritable regarding this matter  305 petition filed with [facility] Selena PEÑA

## 2019-11-18 NOTE — CASE MANAGEMENT
CM received a phone call from An[place], at [facility], who tentatively scheduled Pt's 305 hearing for Fri(11/22) at 8:30 AM

## 2019-11-18 NOTE — PROGRESS NOTES
Pt had 2nd outburst, yelled that staff member was telling the doctor to keep him here  Pt screaming and cursing  Pt verbally de-escalated  Pt then refusing AM medications  Was then agreeable after some encouragement

## 2019-11-18 NOTE — PROGRESS NOTES
Progress Note - Behavioral Health   Francois Montes De Oca 48 y o  male MRN: [id number]  Unit/Bed#: [facility] 256-01 Encounter: [id number]    Assessment/Plan   Principal Problem:    Schizoaffective disorder ([address] )  Active Problems:    Type 2 diabetes mellitus (HCC)    Benign essential hypertension    BMI 34 0-34 9,adult      Subjective:  Patient more restless and perseverative over discharge today due to attending returning after long break  Believes that he had answer when he is going to [facility] and became labile today  Was yelling and seen pacing the hallways  Appeared more anxious  Had to be redirected multiple times by staff  Patient did make paranoid comments to staff  Has chronic underlying paranoia  Denies psychotic symptoms but does have chronic underlying internal preoccupation  Often evasive and scant in mood related symptoms  Denied suicidal and homicidal ideation  Low potential for aggression at this time  Clozaril level was 657 and may have been elevated due to timing of when he got lab work and when he got medications  Does not appear to be experiencing side effects of Clozaril at this time  Medication compliant  Often denies somatic complaints and potential serious side effects        Current Medications:  Current Facility-Administered Medications   Medication Dose Route Frequency    acetaminophen (TYLENOL) tablet 325 mg  325 mg Oral Q6H PRN    acetaminophen (TYLENOL) tablet 650 mg  650 mg Oral Q6H PRN    acetaminophen (TYLENOL) tablet 975 mg  975 mg Oral Q8H PRN    aluminum-magnesium hydroxide-simethicone (MYLANTA) 200-200-20 mg/5 mL oral suspension 30 mL  30 mL Oral Q4H PRN    atorvastatin (LIPITOR) tablet 10 mg  10 mg Oral Daily With Dinner    benztropine (COGENTIN) injection 1 mg  1 mg Intramuscular Q6H PRN    benztropine (COGENTIN) tablet 1 mg  1 mg Oral Q6H PRN    cloZAPine (CLOZARIL) tablet 100 mg  100 mg Oral Daily    cloZAPine (CLOZARIL) tablet 350 mg  350 mg Oral HS    divalproex sodium (DEPAKOTE) EC tablet 1,000 mg  1,000 mg Oral BID    docusate sodium (COLACE) capsule 100 mg  100 mg Oral BID    haloperidol (HALDOL) tablet 5 mg  5 mg Oral Q6H PRN    haloperidol lactate (HALDOL) injection 5 mg  5 mg Intramuscular Q6H PRN    levothyroxine tablet 75 mcg  75 mcg Oral Early Morning    LORazepam (ATIVAN) 2 mg/mL injection 1 mg  1 mg Intramuscular Q6H PRN    LORazepam (ATIVAN) tablet 1 mg  1 mg Oral Q6H PRN    magnesium hydroxide (MILK OF MAGNESIA) 400 mg/5 mL oral suspension 30 mL  30 mL Oral Daily PRN    metFORMIN (GLUCOPHAGE) tablet 500 mg  500 mg Oral BID With Meals    neomycin-bacitracin-polymyxin b (NEOSPORIN) ointment 1 small application  1 small application Topical BID    nicotine polacrilex (NICORETTE) gum 2 mg  2 mg Oral Q2H PRN    OLANZapine (ZyPREXA) tablet 15 mg  15 mg Oral After Lunch    oxybutynin (DITROPAN-XL) 24 hr tablet 5 mg  5 mg Oral Daily    pantoprazole (PROTONIX) EC tablet 40 mg  40 mg Oral Early Morning    traZODone (DESYREL) tablet 50 mg  50 mg Oral HS PRN       Behavioral Health Medications: all current active meds have been reviewed and continue current psychiatric medications  Vitals:  Vitals:    11/17/19 1524   BP: 137/83   Pulse: 102   Resp: 16   SpO2:        Laboratory results:    I have personally reviewed all pertinent laboratory/tests results    Most Recent Labs:   Lab Results   Component Value Date    WBC 8 83 11/13/2019    RBC 4 78 11/13/2019    HGB 14 2 11/13/2019    HCT 43 3 11/13/2019     11/13/2019    RDW 12 9 11/13/2019    NEUTROABS 5 16 11/13/2019    SODIUM 141 10/29/2019    K 4 3 10/29/2019     10/29/2019    CO2 28 10/29/2019    BUN 17 10/29/2019    CREATININE 0 76 10/29/2019    GLUCOSE 91 05/11/2018    GLUC 106 10/29/2019    GLUF 99 08/23/2019    CALCIUM 8 6 10/29/2019    AST 15 10/29/2019    ALT 22 10/29/2019    ALKPHOS 66 10/29/2019    TP 6 4 10/29/2019    ALB 3 1 (L) 10/29/2019    TBILI 0 20 10/29/2019    CHOLESTEROL 103 11/05/2019    HDL 27 (L) 11/05/2019    TRIG 236 (H) 11/05/2019    LDLCALC 29 11/05/2019    NONHDLC 76 11/05/2019    VALPROICTOT 67 10/29/2019    LITHIUM 0 6 03/27/2014    AMMONIA 32 10/08/2019    JLV1GTPVESRA 1 840 08/23/2019    FREET4 0 93 08/23/2019    HGBA1C 6 2 11/05/2019     11/05/2019       Psychiatric Review of Systems:  Behavior over the last 24 hours:  regressed  Sleep: normal  Appetite: normal  Medication side effects: No  ROS: no complaints, all others negative    Mental Status Evaluation:  Appearance:  casually dressed   Behavior:  restless and fidgety   Speech:  loud   Mood:  anxious   Affect:  increased in range and labile   Language naming objects and repeating phrases   Thought Process:  concrete, goal directed and perserverative   Thought Content:  obsessions, chronic underlying paranoia   Perceptual Disturbances: denied  chronic internal preoccupation   Risk Potential: Denied SI/HI  Potential for aggression:  low   Sensorium:  person and place   Cognition:  grossly intact   Consciousness:  alert and awake    Recent and Remote Memory limited   Attention: attention span appeared shorter than expected for age   Insight:  limited   Judgment: limited   Gait/Station: normal gait/station and normal balance   Motor Activity: no abnormal movements     Progress Toward Goals: regressed today, more anxious    Recommended Treatment: Continue with group therapy, milieu therapy and occupational therapy  1   Continue current medications  2  Disposition planning     Risks, benefits and possible side effects of Medications:   Risks, benefits, and possible side effects of medications explained to patient and patient verbalizes understanding        Stella Bain PA-C

## 2019-11-19 LAB
GLUCOSE SERPL-MCNC: 128 MG/DL (ref 65–140)
GLUCOSE SERPL-MCNC: 99 MG/DL (ref 65–140)

## 2019-11-19 PROCEDURE — 99232 SBSQ HOSP IP/OBS MODERATE 35: CPT | Performed by: PSYCHIATRY & NEUROLOGY

## 2019-11-19 PROCEDURE — 82948 REAGENT STRIP/BLOOD GLUCOSE: CPT

## 2019-11-19 RX ADMIN — OLANZAPINE 15 MG: 10 TABLET, FILM COATED ORAL at 13:50

## 2019-11-19 RX ADMIN — METFORMIN HYDROCHLORIDE 500 MG: 500 TABLET, FILM COATED ORAL at 16:37

## 2019-11-19 RX ADMIN — CLOZAPINE 100 MG: 100 TABLET ORAL at 08:42

## 2019-11-19 RX ADMIN — LEVOTHYROXINE SODIUM 75 MCG: 75 TABLET ORAL at 06:51

## 2019-11-19 RX ADMIN — BACITRACIN, NEOMYCIN, POLYMYXIN B 1 SMALL APPLICATION: 400; 3.5; 5 OINTMENT TOPICAL at 08:43

## 2019-11-19 RX ADMIN — DIVALPROEX SODIUM 1000 MG: 500 TABLET, DELAYED RELEASE ORAL at 17:56

## 2019-11-19 RX ADMIN — METFORMIN HYDROCHLORIDE 500 MG: 500 TABLET, FILM COATED ORAL at 08:42

## 2019-11-19 RX ADMIN — DOCUSATE SODIUM 100 MG: 100 CAPSULE, LIQUID FILLED ORAL at 08:42

## 2019-11-19 RX ADMIN — PANTOPRAZOLE SODIUM 40 MG: 40 TABLET, DELAYED RELEASE ORAL at 06:51

## 2019-11-19 RX ADMIN — OXYBUTYNIN CHLORIDE 5 MG: 5 TABLET, EXTENDED RELEASE ORAL at 08:43

## 2019-11-19 RX ADMIN — DIVALPROEX SODIUM 1000 MG: 500 TABLET, DELAYED RELEASE ORAL at 08:42

## 2019-11-19 RX ADMIN — ATORVASTATIN CALCIUM 10 MG: 10 TABLET, FILM COATED ORAL at 16:37

## 2019-11-19 RX ADMIN — CLOZAPINE 350 MG: 100 TABLET ORAL at 21:54

## 2019-11-19 RX ADMIN — DOCUSATE SODIUM 100 MG: 100 CAPSULE, LIQUID FILLED ORAL at 17:56

## 2019-11-19 RX ADMIN — BACITRACIN, NEOMYCIN, POLYMYXIN B 1 SMALL APPLICATION: 400; 3.5; 5 OINTMENT TOPICAL at 21:55

## 2019-11-19 NOTE — CASE MANAGEMENT
CM contacted [facility] @ [phone] & left a message for the , Leopold Bell, seeking to confirm what items they were able to  from Pt's former residence  CM emailed Pt's sister, Karen Mays, to let her know she was waiting to hear back from [facility]  Crystal had left a voicemail yesterday, stating she received a letter from Pt's former residence, dated 11/15, stating his belongings could only be held until 11/05, & she was worried they may have thrown everything away  CM met with Pt who remains focused on discharge  Pt is redirectable, but irritable

## 2019-11-19 NOTE — PROGRESS NOTES
Pt is pleasant, joking around with staff  At times flirtatious with female staff, but behavior remains appropriate  Visible in the milieu  [address] halls at times  Listens to his handheld radio for long periods of time in his room  Denies SI/AVH  Asks about discharge, but verbalizes understanding when told that placement takes time  Pt has been compliant with medications

## 2019-11-19 NOTE — PROGRESS NOTES
Progress Note - Behavioral Health   Jaron Patrick 48 y o  male MRN: [id number]  Unit/Bed#: [facility] 256-01 Encounter: [id number]    Assessment/Plan   Principal Problem:    Schizoaffective disorder ([address] )  Active Problems:    Type 2 diabetes mellitus (HCC)    Benign essential hypertension    BMI 34 0-34 9,adult    Subjective:  Patient is compliant with medications with no acute side effects  Patient is showing gradual improvement in mood and psychotic symptoms with slow improvement in ADLs  Patient is currently denying suicidal or homicidal ideation but remained focused on discharge  Patient was again educated on the timeline to get in to [facility] and he is currently victoria 3 in line of placement  Patient is consenting for safety on the unit and remained appropriate during entire part of discussion  Patient was encouraged to increase participation in milieu therapy and improve his self-care activities      Current Medications:    Current Facility-Administered Medications:  acetaminophen 325 mg Oral Q6H PRN Samuel Melendez, PA-C   acetaminophen 650 mg Oral Q6H PRN Samuel Al, PA-C   acetaminophen 975 mg Oral Q8H PRN Samuel Melendez, PA-C   aluminum-magnesium hydroxide-simethicone 30 mL Oral Q4H PRN Janee Mccracken MD   atorvastatin 10 mg Oral Daily With Nolberto Palmer MD   benztropine 1 mg Intramuscular Q6H PRN Janee Mccracken MD   benztropine 1 mg Oral Q6H PRN Janee Mccracken MD   clozapine 100 mg Oral Daily Nichelle Burden MD   cloZAPine 350 mg Oral HS Julio C Gore   divalproex sodium 1,000 mg Oral BID Samuel Al, PA-C   docusate sodium 100 mg Oral BID MARIA GUADALUPE BatemanC   haloperidol 5 mg Oral Q6H PRN Janee Mccracken MD   haloperidol lactate 5 mg Intramuscular Q6H PRN Janee Mccracken MD   levothyroxine 75 mcg Oral Early Morning Stefany XUAN Coles   LORazepam 1 mg Intramuscular Q6H PRN Janee Mccracken MD   LORazepam 1 mg Oral Q6H PRN Janee Mccracken MD   magnesium hydroxide 30 mL Oral Daily PRN Janee Mccracken MD   metFORMIN 500 mg Oral BID With Meals Tom Khan PA-C   neomycin-bacitracin-polymyxin b 1 small application Topical BID Larry Medina MD   nicotine polacrilex 2 mg Oral Q2H PRN Richa Barnes MD   OLANZapine 15 mg Oral After Lunch Julio C Gore   oxybutynin 5 mg Oral Daily Goyo Gil PA-C   pantoprazole 40 mg Oral Early Morning Tom Khan PA-C   traZODone 50 mg Oral HS PRN Richa Barnes MD       Behavioral Health Medications: all current active meds have been reviewed  Vital signs in last 24 hours:  Temp:  [98 °F (36 7 °C)] 98 °F (36 7 °C)  HR:  [96] 96  Resp:  [16] 16  BP: (155)/(74) 155/74    Laboratory results:    I have personally reviewed all pertinent laboratory/tests results  Labs in last 72 hours: No results for input(s): WBC, RBC, HGB, HCT, PLT, RDW, NEUTROABS, SODIUM, K, CL, CO2, BUN, CREATININE, GLUCOSE, GLUC, GLUF, CALCIUM, AST, ALT, ALKPHOS, TP, ALB, TBILI, CHOLESTEROL, HDL, TRIG, LDLCALC, VALPROICTOT, CARBAMAZEPIN, LITHIUM, AMMONIA, [id number], FREET4, T3FREE, PREGTESTUR, PREGSERUM, HCG, HCGQUANT, RPR in the last 72 hours  Invalid input(s):  RBC  Admission Labs:   No results displayed because visit has over 200 results  Psychiatric Review of Systems:  Behavior over the last 24 hours:  improving  Sleep: normal  Appetite: normal  Medication side effects: No  ROS: no complaints    Mental Status Evaluation:  Appearance:  casually dressed   Behavior:  guarded   Speech:  soft   Mood:  anxious and depressed   Affect:  constricted   Language naming objects   Thought Process:  circumstantial   Thought Content:  delusions  persecutory   Perceptual Disturbances:  Auditory hallucinations without commands   Risk Potential: Suicidal Ideations without plan, Homicidal Ideations none and Potential for Aggression No   Sensorium:  person, place and time/date   Cognition:  grossly intact   Consciousness:  awake    Attention: attention span appeared shorter than expected for age   Insight: limited   Judgment: limited   Intellect fair   Gait/Station: normal gait/station   Motor Activity: no abnormal movements     Memory: Short and long term memory  fair     Progress Toward Goals: slow progress    Recommended Treatment:   Disposition is pending to [facility] for further stabilization and management  Consider gradual reduction in olanzapine over next few days with reduction of 2 5 mg per week  Continue with group therapy, milieu therapy and occupational therapy      Continue following current medications:   Current Facility-Administered Medications:  acetaminophen 325 mg Oral Q6H PRN Cloretta Jenn, PA-C   acetaminophen 650 mg Oral Q6H PRN Cloretta Jenn, PA-C   acetaminophen 975 mg Oral Q8H PRN Cloretta Jenn, PA-C   aluminum-magnesium hydroxide-simethicone 30 mL Oral Q4H PRN Yana Islas MD   atorvastatin 10 mg Oral Daily With Vena MD Abdon   benztropine 1 mg Intramuscular Q6H PRN Yana Islas MD   benztropine 1 mg Oral Q6H PRN Yana Islas MD   clozapine 100 mg Oral Daily Emi Dewitt MD   cloZAPine 350 mg Oral HS [facility]   divalproex sodium 1,000 mg Oral BID Cloretta Jenn, PA-C   docusate sodium 100 mg Oral BID Cloretta Jenn, PA-C   haloperidol 5 mg Oral Q6H PRN Yana Islas MD   haloperidol lactate 5 mg Intramuscular Q6H PRN Yana Islas MD   levothyroxine 75 mcg Oral Early Morning Wiota Serrano, PA-C   LORazepam 1 mg Intramuscular Q6H PRN Yana Islas MD   LORazepam 1 mg Oral Q6H PRN Yana Islas MD   magnesium hydroxide 30 mL Oral Daily PRN Yana Islas MD   metFORMIN 500 mg Oral BID With Meals Wiota Serrano, PA-C   neomycin-bacitracin-polymyxin b 1 small application Topical BID Priya Ladd MD   nicotine polacrilex 2 mg Oral Q2H PRN Yana Islas MD   OLANZapine 15 mg Oral After Lunch [facility]   oxybutynin 5 mg Oral Daily Cloretta Jenn, PA-C   pantoprazole 40 mg Oral Early Morning Dionte Serrano, PA-C   traZODone 50 mg Oral HS PRN Yana Islas MD Risks, benefits and possible side effects of Medications:   Risks, benefits, and possible side effects of medications explained to patient and patient verbalizes understanding  This note has been constructed using a voice recognition system  There may be translation, syntax,  or grammatical errors  If you have any questions, please contact the dictating provider

## 2019-11-19 NOTE — PROGRESS NOTES
Pt pleasant and cooperative  Denies SI/HI/AVH  Pt out for meds and meals and seclusive to room in the AM  Refused VS  Pt compliant with personal radio rules  Pt exhibits tendency for agitation but easily redirectable

## 2019-11-19 NOTE — PROGRESS NOTES
OCCUPATIONAL THERAPY PROGRESS NOTE  Group Time:  7280  Attendance: The patient attended full group. Participation:  The patient participated with minimal elaboration in the activity. Attention:  The patient was able to focus on the activity. Interaction:  The patient acknowledges others or responds to questions,  with no spontaneous interaction. Participated as called on in discussion on stressors and stress management. Status: 80 petition filed yesterday & court scheduled for Fri at 8:30 AM  Pt irritable at times, especially about being here  He has a sore on his pinky finger & using neosporin for that      Medication: no changes / no PRNs  D/C: EAC - 3rd on list     11/19/19 1795   Team Meeting   Meeting Type Daily Rounds   Team Members Present   Team Members Present Physician;Nurse;;Occupational Therapist   Physician Team Member Dr Vaibhav Yee / Dr Elvin Roland / Mark Cassette Member [facility] Management Team Member [id number] / [facility]   OT Team Member Maria Guadalupe   Patient/Family Present   Patient Present No   Patient's Family Present No

## 2019-11-19 NOTE — TREATMENT PLAN
TREATMENT PLAN REVIEW - [address] [facility] B Alpha 48 y o  1965 male MRN: [id number]    6 [address] [place] [address] Room / Bed: [place] [address]/[facility] 949- Encounter: [id number]        Admit Date/Time:  10/5/2019 12:29 AM    Treatment Team: Attending Provider: Dolly Yoo; Consulting Physician: Fenton Homans, MD; : Chichi George; Patient Care Assistant: Julien Carlos; Consulting Physician: Yady Stratton DPM; Patient Care Technician: Barak Brennan; Patient Care Technician: Reyes Shaffer; Care Manager: Stiven Powell, TABBY; Patient Care Technician: Delmer Burch; Patient Care Assistant: Linda Nichole; Registered Nurse: Chica Roy RN; Registered Nurse: Arslan Reyes RN; Occupational Therapy Assistant: Nick Laurent;  Registered Nurse: Mary Hernandez RN    Diagnosis: Principal Problem:    Schizoaffective disorder ([facility] )  Active Problems:    Type 2 diabetes mellitus ([facility] )    Benign essential hypertension    BMI 34 0-34 9,adult    Patient Strengths/Assets: cooperative, good past treatment response      Patient Barriers/Limitations: patient is on an involuntary commitment, poor self-care    Short Term Goals: decrease in depressive symptoms, decrease in manic symptoms, decrease in anxiety symptoms, decrease in paranoid thoughts, decrease in psychotic symptoms, decrease in suicidal thoughts, decrease in level of agitation    Long Term Goals: improvement in depression, improvement in anxiety, stabilization of mood, free of suicidal thoughts, resolution of psychotic symptoms, acceptance of need for psychiatric medications, acceptance of need for psychiatric treatment, acceptance of need for psychiatric follow up after discharge    Progress Towards Goals: continue psychiatric medications as prescribed    Recommended Treatment: medication management, patient medication education, group therapy, milieu therapy, continued [facility] psychiatric evaluation/assessment process     Treatment Frequency: daily medication monitoring, group and milieu therapy daily, monitoring through interdisciplinary rounds, monitoring through weekly patient care conferences    Expected Discharge Date: 30 days - 12/19/2019    Discharge Plan: referral to Extended Acute Care unit for a long term psychiatric treatment    Treatment Plan Created/Updated By: Cliff North

## 2019-11-20 LAB
GLUCOSE SERPL-MCNC: 115 MG/DL (ref 65–140)
GLUCOSE SERPL-MCNC: 119 MG/DL (ref 65–140)

## 2019-11-20 PROCEDURE — 99232 SBSQ HOSP IP/OBS MODERATE 35: CPT | Performed by: PSYCHIATRY & NEUROLOGY

## 2019-11-20 PROCEDURE — 82948 REAGENT STRIP/BLOOD GLUCOSE: CPT

## 2019-11-20 RX ADMIN — BACITRACIN, NEOMYCIN, POLYMYXIN B 1 SMALL APPLICATION: 400; 3.5; 5 OINTMENT TOPICAL at 17:31

## 2019-11-20 RX ADMIN — METFORMIN HYDROCHLORIDE 500 MG: 500 TABLET, FILM COATED ORAL at 16:39

## 2019-11-20 RX ADMIN — PANTOPRAZOLE SODIUM 40 MG: 40 TABLET, DELAYED RELEASE ORAL at 07:00

## 2019-11-20 RX ADMIN — DIVALPROEX SODIUM 1000 MG: 500 TABLET, DELAYED RELEASE ORAL at 10:20

## 2019-11-20 RX ADMIN — ACETAMINOPHEN 650 MG: 325 TABLET, FILM COATED ORAL at 22:43

## 2019-11-20 RX ADMIN — DOCUSATE SODIUM 100 MG: 100 CAPSULE, LIQUID FILLED ORAL at 10:21

## 2019-11-20 RX ADMIN — CLOZAPINE 350 MG: 100 TABLET ORAL at 21:29

## 2019-11-20 RX ADMIN — DIVALPROEX SODIUM 1000 MG: 500 TABLET, DELAYED RELEASE ORAL at 17:31

## 2019-11-20 RX ADMIN — METFORMIN HYDROCHLORIDE 500 MG: 500 TABLET, FILM COATED ORAL at 10:21

## 2019-11-20 RX ADMIN — OXYBUTYNIN CHLORIDE 5 MG: 5 TABLET, EXTENDED RELEASE ORAL at 10:20

## 2019-11-20 RX ADMIN — CLOZAPINE 100 MG: 100 TABLET ORAL at 10:20

## 2019-11-20 RX ADMIN — TRAZODONE HYDROCHLORIDE 50 MG: 50 TABLET ORAL at 22:43

## 2019-11-20 RX ADMIN — LEVOTHYROXINE SODIUM 75 MCG: 75 TABLET ORAL at 07:00

## 2019-11-20 RX ADMIN — ATORVASTATIN CALCIUM 10 MG: 10 TABLET, FILM COATED ORAL at 16:39

## 2019-11-20 RX ADMIN — OLANZAPINE 15 MG: 10 TABLET, FILM COATED ORAL at 14:25

## 2019-11-20 RX ADMIN — DOCUSATE SODIUM 100 MG: 100 CAPSULE, LIQUID FILLED ORAL at 17:31

## 2019-11-20 NOTE — PLAN OF CARE
Pt met with the Treatment Team today  There are no further updates on alternative housing options before Pt would go to [facility]

## 2019-11-20 NOTE — PROGRESS NOTES
Pt woke once overnight to get water  Otherwise pt appeared to have slept throughout the night without difficulty

## 2019-11-20 NOTE — PROGRESS NOTES
Status: Pt remains focused on discharge  He denies any SI     Medication: no changes / no PRNs  D/C: [facility] - 3rd on waiting list     11/20/19 0707   Team Meeting   Meeting Type Daily Rounds   Team Members Present   Team Members Present Physician;Nurse;;Occupational Therapist   Physician Team Member Dr Cory Talbot / Dr [address] / Amisha Rutoshas Member GRANT [facility] Management Team Member Ministerio Ortiz / Lyudmila Ramirez   OT Team Member Maria Guadalupe   Patient/Family Present   Patient Present No   Patient's Family Present No

## 2019-11-20 NOTE — PROGRESS NOTES
Treatment Plan Meeting:  Pt was agreeable to attending; he has refused twice before  Reviewed that Pt's diagnosis has not changed, & remains schizoaffective disorder  Pt's medications have remained the same & he is doing well on them  Pt was encouraged to continue to take his medications & attending to his ADLs which had improved since his admission  Reviewed that at this time, Pt is number 3 on the waiting list for [facility]  Pt would like to explore other options, however, at this time, [facility] is not open to any type of CRR or enhanced [facility]BH, until Pt has further stabilized at [facility]  Pt verbalized understanding  All parties in agreement & Tx Plan was signed       11/20/19 0988   Team Meeting   Meeting Type Tx Team Meeting   Initial Conference Date 11/20/19   Next Conference Date 12/02/19   Team Members Present   Team Members Present Physician;Nurse;   Physician Team Member Dr Vaibhav Yee / Dr Genaro Joseph Team Member Jus Enriquez / Olga Gil Management Team Member [id number] / [facility]   Patient/Family Present   Patient Present Yes   Patient's Family Present No

## 2019-11-20 NOTE — PROGRESS NOTES
Progress Note - Behavioral Health   Gabriel Hamilton 48 y o  male MRN: [id number]  Unit/Bed#: [facility] 256-01 Encounter: [id number]    Assessment/Plan   Principal Problem:    Schizoaffective disorder ([address] )  Active Problems:    Type 2 diabetes mellitus (HCC)    Benign essential hypertension    BMI 34 0-34 9,adult    Subjective:  Patient is compliant with medications with no acute side effects  He is showing gradual improvement in mood and psychotic symptoms with reduction in passive death wishes  Patient appears disheveled but is showing gradual improvement in self-care activity  Patient has remained with poor insight and judgment and still remained at high risk of noncompliance of discharge from the hospital   Patient was able to attend treatment team meeting today and we discussed the plan of him going to extended acute care and timeline it may take for him getting accepted  Patient reports understanding and is currently consenting for safety on the unit      Current Medications:    Current Facility-Administered Medications:  acetaminophen 325 mg Oral Q6H PRN Madisyn Quinn PA-C   acetaminophen 650 mg Oral Q6H PRN Madisyn Quinn PA-C   acetaminophen 975 mg Oral Q8H PRN Madisyn Quinn PA-C   aluminum-magnesium hydroxide-simethicone 30 mL Oral Q4H PRN Elin Germain MD   atorvastatin 10 mg Oral Daily With Sultana Carranza MD   benztropine 1 mg Intramuscular Q6H PRN Elin Germain MD   benztropine 1 mg Oral Q6H PRN Elin Germain MD   clozapine 100 mg Oral Daily Lay Vega MD   cloZAPine 350 mg Oral HS Julio C Gore   divalproex sodium 1,000 mg Oral BID Madisyn Quinn PA-C   docusate sodium 100 mg Oral BID Madisyn Quinn PA-C   haloperidol 5 mg Oral Q6H PRN Elin Germain MD   haloperidol lactate 5 mg Intramuscular Q6H PRN Elin Germain MD   levothyroxine 75 mcg Oral Early Morning Mona Gibbs PA-C   LORazepam 1 mg Intramuscular Q6H PRN Elin Germain MD   LORazepam 1 mg Oral Q6H PRN Elin Germain MD   magnesium hydroxide 30 mL Oral Daily PRN Grzegorz Duarte MD   metFORMIN 500 mg Oral BID With Meals Dayna Street PA-C   neomycin-bacitracin-polymyxin b 1 small application Topical BID Lauren Ramos MD   nicotine polacrilex 2 mg Oral Q2H PRN Grzegorz Duarte MD   OLANZapine 15 mg Oral After Lunch Julio C Gore   oxybutynin 5 mg Oral Daily Sandy Gorman PA-C   pantoprazole 40 mg Oral Early Morning Dayna Street PA-C   traZODone 50 mg Oral HS PRN Grzegorz Duarte MD       Behavioral Health Medications: all current active meds have been reviewed  Vital signs in last 24 hours:  Temp:  [98 °F (36 7 °C)] 98 °F (36 7 °C)  HR:  [95] 95  Resp:  [16] 16  BP: (142)/(82) 142/82    Laboratory results:    I have personally reviewed all pertinent laboratory/tests results  Labs in last 72 hours: No results for input(s): WBC, RBC, HGB, HCT, PLT, RDW, NEUTROABS, SODIUM, K, CL, CO2, BUN, CREATININE, GLUCOSE, GLUC, GLUF, CALCIUM, AST, ALT, ALKPHOS, TP, ALB, TBILI, CHOLESTEROL, HDL, TRIG, LDLCALC, VALPROICTOT, CARBAMAZEPIN, LITHIUM, AMMONIA, [id number], FREET4, T3FREE, PREGTESTUR, PREGSERUM, HCG, HCGQUANT, RPR in the last 72 hours  Invalid input(s):  RBC  Admission Labs:   No results displayed because visit has over 200 results  Psychiatric Review of Systems:  Behavior over the last 24 hours:  Slow improvement  Sleep: normal  Appetite: normal  Medication side effects: No  ROS: no complaints    Mental Status Evaluation:  Appearance:  casually dressed   Behavior:  guarded   Speech:  soft   Mood:  anxious   Affect:  increased in range   Language repeating phrases   Thought Process:  circumstantial and disorganized   Thought Content:  delusions  persecutory   Perceptual Disturbances:  Auditory hallucinations without commands   Risk Potential: Suicidal Ideations without plan, Homicidal Ideations none and Potential for Aggression No   Sensorium:  person, place and time/date   Cognition:  grossly intact   Consciousness:  awake Attention: attention span appeared shorter than expected for age   Insight:  limited   Judgment: limited   Intellect fair   Gait/Station: normal gait/station   Motor Activity: no abnormal movements     Memory: Short and long term memory  fair     Progress Toward Goals: slow progress    Recommended Treatment:   Disposition pending to [facility]  Continue with group therapy, milieu therapy and occupational therapy      Continue following current medications:   Current Facility-Administered Medications:  acetaminophen 325 mg Oral Q6H PRN Samule Cons, PA-C   acetaminophen 650 mg Oral Q6H PRN Samule Cons, PA-C   acetaminophen 975 mg Oral Q8H PRN Samule Cons, PA-C   aluminum-magnesium hydroxide-simethicone 30 mL Oral Q4H PRN Joel Snider MD   atorvastatin 10 mg Oral Daily With Aruna Silva MD   benztropine 1 mg Intramuscular Q6H PRN Joel Snider MD   benztropine 1 mg Oral Q6H PRN Joel Snider MD   clozapine 100 mg Oral Daily Kayleigh Ng MD   cloZAPine 350 mg Oral HS Julio C Gore   divalproex sodium 1,000 mg Oral BID Samule Cons, PA-C   docusate sodium 100 mg Oral BID Samule Cons, PA-C   haloperidol 5 mg Oral Q6H PRN Joel Snider MD   haloperidol lactate 5 mg Intramuscular Q6H PRN Joel Snider MD   levothyroxine 75 mcg Oral Early Morning Carlos Alberto Roper, XUAN   LORazepam 1 mg Intramuscular Q6H PRN Joel Snider MD   LORazepam 1 mg Oral Q6H PRN Joel Snider MD   magnesium hydroxide 30 mL Oral Daily PRN Joel Snider MD   metFORMIN 500 mg Oral BID With Meals Laalexis Roper, PA-C   neomycin-bacitracin-polymyxin b 1 small application Topical BID Param Cordova MD   nicotine polacrilex 2 mg Oral Q2H PRN Joel Snider MD   OLANZapine 15 mg Oral After Lunch [facility]   oxybutynin 5 mg Oral Daily Samule Cons, PA-C   pantoprazole 40 mg Oral Early Morning Lalindsaye Judson, PA-C   traZODone 50 mg Oral HS PRN Joel Snider MD       Risks, benefits and possible side effects of Medications: Risks, benefits, and possible side effects of medications explained to patient and patient verbalizes understanding  This note has been constructed using a voice recognition system  There may be translation, syntax,  or grammatical errors  If you have any questions, please contact the dictating provider

## 2019-11-20 NOTE — PROGRESS NOTES
came whit   ID, birth certificate Checks and $30 are in envelope # [id number]   Clothing two black bags are in 255#1 closet

## 2019-11-20 NOTE — CASE MANAGEMENT
CM received a voicemail from [address] of [facility], who reported that when she was at [facility] [employer] last week, Pt had a lot more belongings than had been reported  She said there were more than 1 bag of clothes & there were stereos & speakers  CM contacted [facility] @ [phone] & spoke with Kristy who said that there were 2 half-full trash bags of clothing, two large radios, & some other stuff  CM reviewed that [address] from [address] [facility] would only be picking up the 2 bags of clothing today, & CM would ask Pt's sister to ask Eula Mercado to come get the rest   Grover Redway confirmed that Eula Mercado did come get Pt's bike the other week  CM sent an email to Pt's sister, Grover Chambers, to provide an update on Pt's belongings & to request she contact Eula Mercado to  the remainder of items  CM contacted [facility] @ [phone] & spoke with Stefany Alejandra who confirmed the referral was received & processed  Stefany Alejandra asked if anyone from [facility] had contacted CM, but YOSELYN said not  Stefany Alejandra said that Samisincere Hatch is the one that talks with the providers & CM would need to follow-up with her  CM sent an email to [facility], requesting an update  CM met with Pt who was pleasant  He remains focused on discharge & continually ask if CM can let him go somewhere else, but then verbalizing understanding that he has to go to [facility] for his next step

## 2019-11-20 NOTE — PROGRESS NOTES
[facility] sent the rest of Julisa Baltazar belongings      Belongings sent to locker 256-1  -2 Pair Sneakers (Grey/green) (Black)  -2 Stuff Animals  -8 Skyte[address]en 8  -3 Pair Underwear  -1 Black Jacket (no sleeves)  -2 Packs Toothbrushes  -3 Deoderant's  -2 Packs "AA" Batteries  -4 Boxes Toothpaste  -4 Body Washes  -1 Hair Brush  -3 Pair Socks

## 2019-11-20 NOTE — PROGRESS NOTES
Pt pleasant during interaction  Asks for water, locker items, etc frequently but is redirectable  [facility] here with belongings and brief visit  Pt pleasant but disinterested in conversation during visit

## 2019-11-21 LAB
GLUCOSE SERPL-MCNC: 110 MG/DL (ref 65–140)
GLUCOSE SERPL-MCNC: 83 MG/DL (ref 65–140)

## 2019-11-21 PROCEDURE — 99232 SBSQ HOSP IP/OBS MODERATE 35: CPT | Performed by: PHYSICIAN ASSISTANT

## 2019-11-21 PROCEDURE — 82948 REAGENT STRIP/BLOOD GLUCOSE: CPT

## 2019-11-21 RX ADMIN — METFORMIN HYDROCHLORIDE 500 MG: 500 TABLET, FILM COATED ORAL at 08:32

## 2019-11-21 RX ADMIN — DIVALPROEX SODIUM 1000 MG: 500 TABLET, DELAYED RELEASE ORAL at 17:10

## 2019-11-21 RX ADMIN — DIVALPROEX SODIUM 1000 MG: 500 TABLET, DELAYED RELEASE ORAL at 08:32

## 2019-11-21 RX ADMIN — METFORMIN HYDROCHLORIDE 500 MG: 500 TABLET, FILM COATED ORAL at 16:19

## 2019-11-21 RX ADMIN — CLOZAPINE 100 MG: 100 TABLET ORAL at 08:32

## 2019-11-21 RX ADMIN — DOCUSATE SODIUM 100 MG: 100 CAPSULE, LIQUID FILLED ORAL at 08:32

## 2019-11-21 RX ADMIN — DOCUSATE SODIUM 100 MG: 100 CAPSULE, LIQUID FILLED ORAL at 17:10

## 2019-11-21 RX ADMIN — OLANZAPINE 15 MG: 10 TABLET, FILM COATED ORAL at 14:38

## 2019-11-21 RX ADMIN — OXYBUTYNIN CHLORIDE 5 MG: 5 TABLET, EXTENDED RELEASE ORAL at 08:32

## 2019-11-21 RX ADMIN — LEVOTHYROXINE SODIUM 75 MCG: 75 TABLET ORAL at 08:31

## 2019-11-21 RX ADMIN — CLOZAPINE 350 MG: 100 TABLET ORAL at 22:10

## 2019-11-21 RX ADMIN — PANTOPRAZOLE SODIUM 40 MG: 40 TABLET, DELAYED RELEASE ORAL at 08:31

## 2019-11-21 RX ADMIN — TRAZODONE HYDROCHLORIDE 50 MG: 50 TABLET ORAL at 22:17

## 2019-11-21 RX ADMIN — ATORVASTATIN CALCIUM 10 MG: 10 TABLET, FILM COATED ORAL at 16:19

## 2019-11-21 NOTE — PROGRESS NOTES
Pt awake at start of shift, talking politely with staff  No irritability  Able to fall asleep around 12am and appeared to have slept remainder of the night without difficulty

## 2019-11-21 NOTE — PROGRESS NOTES
Patient pleasant and cooperative during interaction  Denies any concerns or issues, other than when he will be discharged  Pt frequently asking for water, locker belongings and when the doctor will allow him to leave  Pt is redirectable, but does have periods of irritability  Compliant with medications today  Attends some groups throughout the day  Walks halls frequently and utilizes radio often

## 2019-11-21 NOTE — PROGRESS NOTES
Progress Note - Behavioral Health   Shirley Caro 48 y o  male MRN: [id number]  Unit/Bed#: [facility] 256-01 Encounter: [id number]    Assessment/Plan   Principal Problem:    Schizoaffective disorder ([address] )  Active Problems:    Type 2 diabetes mellitus (HCC)    Benign essential hypertension    BMI 34 0-34 9,adult      Subjective:  Patient uninterested in interview  Is solely focused on discharge  Has short attention span when speaking to him  Reports feeling well today and has no complaints  Remains with chronic underlying paranoia and internal preoccupation  Denied hallucinations  Is not manic, denied depressive symptoms, and appeared less anxious  Denied suicidal and homicidal ideation  No agitation today  Medication compliant  Awaiting discharge to [facility]      Current Medications:  Current Facility-Administered Medications   Medication Dose Route Frequency    acetaminophen (TYLENOL) tablet 325 mg  325 mg Oral Q6H PRN    acetaminophen (TYLENOL) tablet 650 mg  650 mg Oral Q6H PRN    acetaminophen (TYLENOL) tablet 975 mg  975 mg Oral Q8H PRN    aluminum-magnesium hydroxide-simethicone (MYLANTA) 200-200-20 mg/5 mL oral suspension 30 mL  30 mL Oral Q4H PRN    atorvastatin (LIPITOR) tablet 10 mg  10 mg Oral Daily With Dinner    benztropine (COGENTIN) injection 1 mg  1 mg Intramuscular Q6H PRN    benztropine (COGENTIN) tablet 1 mg  1 mg Oral Q6H PRN    cloZAPine (CLOZARIL) tablet 100 mg  100 mg Oral Daily    cloZAPine (CLOZARIL) tablet 350 mg  350 mg Oral HS    divalproex sodium (DEPAKOTE) EC tablet 1,000 mg  1,000 mg Oral BID    docusate sodium (COLACE) capsule 100 mg  100 mg Oral BID    haloperidol (HALDOL) tablet 5 mg  5 mg Oral Q6H PRN    haloperidol lactate (HALDOL) injection 5 mg  5 mg Intramuscular Q6H PRN    levothyroxine tablet 75 mcg  75 mcg Oral Early Morning    LORazepam (ATIVAN) 2 mg/mL injection 1 mg  1 mg Intramuscular Q6H PRN    LORazepam (ATIVAN) tablet 1 mg  1 mg Oral Q6H PRN    magnesium hydroxide (MILK OF MAGNESIA) 400 mg/5 mL oral suspension 30 mL  30 mL Oral Daily PRN    metFORMIN (GLUCOPHAGE) tablet 500 mg  500 mg Oral BID With Meals    neomycin-bacitracin-polymyxin b (NEOSPORIN) ointment 1 small application  1 small application Topical BID    nicotine polacrilex (NICORETTE) gum 2 mg  2 mg Oral Q2H PRN    OLANZapine (ZyPREXA) tablet 15 mg  15 mg Oral After Lunch    oxybutynin (DITROPAN-XL) 24 hr tablet 5 mg  5 mg Oral Daily    pantoprazole (PROTONIX) EC tablet 40 mg  40 mg Oral Early Morning    traZODone (DESYREL) tablet 50 mg  50 mg Oral HS PRN       Behavioral Health Medications: all current active meds have been reviewed and continue current psychiatric medications  Vitals:  Vitals:    11/21/19 0726   BP: 120/80   Pulse: 89   Resp: 16   Temp: (!) 96 8 °F (36 °C)   SpO2: 96%       Laboratory results:    I have personally reviewed all pertinent laboratory/tests results    Most Recent Labs:   Lab Results   Component Value Date    WBC 8 83 11/13/2019    RBC 4 78 11/13/2019    HGB 14 2 11/13/2019    HCT 43 3 11/13/2019     11/13/2019    RDW 12 9 11/13/2019    NEUTROABS 5 16 11/13/2019    SODIUM 141 10/29/2019    K 4 3 10/29/2019     10/29/2019    CO2 28 10/29/2019    BUN 17 10/29/2019    CREATININE 0 76 10/29/2019    GLUCOSE 91 05/11/2018    GLUC 106 10/29/2019    GLUF 99 08/23/2019    CALCIUM 8 6 10/29/2019    AST 15 10/29/2019    ALT 22 10/29/2019    ALKPHOS 66 10/29/2019    TP 6 4 10/29/2019    ALB 3 1 (L) 10/29/2019    TBILI 0 20 10/29/2019    CHOLESTEROL 103 11/05/2019    HDL 27 (L) 11/05/2019    TRIG 236 (H) 11/05/2019    LDLCALC 29 11/05/2019    NONHDLC 76 11/05/2019    VALPROICTOT 67 10/29/2019    LITHIUM 0 6 03/27/2014    AMMONIA 32 10/08/2019    [id number] 1 840 08/23/2019    FREET4 0 93 08/23/2019    HGBA1C 6 2 11/05/2019     11/05/2019       Psychiatric Review of Systems:  Behavior over the last 24 hours:  unchanged  Sleep: normal  Appetite: normal  Medication side effects: No  ROS: no complaints, all others negative    Mental Status Evaluation:  Appearance:  casually dressed   Behavior:  guarded   Speech:  loud   Mood:  euthymic   Affect:  increased in range   Language sparse   Thought Process:  concrete and perserverative   Thought Content:  obsessions and chronic paranoid delusions   Perceptual Disturbances: None  Chronic internal preoccupation   Risk Potential: Denied SI/HI  Potential for aggression: No   Sensorium:  person and place   Cognition:  grossly intact   Consciousness:  alert and awake    Recent and Remote Memory limited   Attention: attention span appeared shorter than expected for age   Insight:  limited   Judgment: limited   Gait/Station: normal gait/station and normal balance   Motor Activity: no abnormal movements     Progress Toward Goals: unchanged    Recommended Treatment: Continue with group therapy, milieu therapy and occupational therapy  1   Continue current medications  2  Disposition planning with plan for [facility]    Risks, benefits and possible side effects of Medications:   Risks, benefits, and possible side effects of medications explained to patient and patient verbalizes understanding        Bryant Rosado PA-C

## 2019-11-21 NOTE — PROGRESS NOTES
Status: Pt got upset because not allowed into the dining room, because eating large amounts of fruit      Medication: no changes  D/C: [facility]     11/21/19 7058   Team Meeting   Meeting Type Daily Rounds   Team Members Present   Team Members Present Physician;Nurse;;Occupational Therapist   Physician Team Member Dr Valeria Barillas / Galina Medrano Team Member GRANT [facility] Management Team Member Jeanine Coyle / Juan Koo   OT Team Member Maria Guadalupe   Patient/Family Present   Patient Present No   Patient's Family Present No

## 2019-11-21 NOTE — PROGRESS NOTES
This evening patient asked MHT to find his sisters phone number, MHT asked pt to wait as MHT was involved in another task at that time  RN told pt that he should have his sisters number written in his journal, as RN wrote it down for him recently  Pt snapped at RN saying, "shut the fuck up you motherfucker, you're always blaming me and jumping down my throat "  RN asked patient to calm down, and explained that was not true, that RN simply told pt the numbers for his sisters are written in his blue journal, pt continued to yell at RN, after attempting to redirect pt, RN stopped engaging and gave him space  Pt was able to calm down minutes after  Will continue to monitor

## 2019-11-22 LAB
GLUCOSE SERPL-MCNC: 110 MG/DL (ref 65–140)
GLUCOSE SERPL-MCNC: 133 MG/DL (ref 65–140)

## 2019-11-22 PROCEDURE — 99232 SBSQ HOSP IP/OBS MODERATE 35: CPT | Performed by: PSYCHIATRY & NEUROLOGY

## 2019-11-22 PROCEDURE — 82948 REAGENT STRIP/BLOOD GLUCOSE: CPT

## 2019-11-22 RX ADMIN — DIVALPROEX SODIUM 1000 MG: 500 TABLET, DELAYED RELEASE ORAL at 09:46

## 2019-11-22 RX ADMIN — LEVOTHYROXINE SODIUM 75 MCG: 75 TABLET ORAL at 06:47

## 2019-11-22 RX ADMIN — CLOZAPINE 100 MG: 100 TABLET ORAL at 09:46

## 2019-11-22 RX ADMIN — CLOZAPINE 350 MG: 100 TABLET ORAL at 21:35

## 2019-11-22 RX ADMIN — METFORMIN HYDROCHLORIDE 500 MG: 500 TABLET, FILM COATED ORAL at 09:46

## 2019-11-22 RX ADMIN — OXYBUTYNIN CHLORIDE 5 MG: 5 TABLET, EXTENDED RELEASE ORAL at 09:46

## 2019-11-22 RX ADMIN — DOCUSATE SODIUM 100 MG: 100 CAPSULE, LIQUID FILLED ORAL at 09:46

## 2019-11-22 RX ADMIN — OLANZAPINE 15 MG: 10 TABLET, FILM COATED ORAL at 13:44

## 2019-11-22 RX ADMIN — DIVALPROEX SODIUM 1000 MG: 500 TABLET, DELAYED RELEASE ORAL at 18:27

## 2019-11-22 RX ADMIN — PANTOPRAZOLE SODIUM 40 MG: 40 TABLET, DELAYED RELEASE ORAL at 06:47

## 2019-11-22 RX ADMIN — BACITRACIN, NEOMYCIN, POLYMYXIN B 1 SMALL APPLICATION: 400; 3.5; 5 OINTMENT TOPICAL at 09:46

## 2019-11-22 RX ADMIN — DOCUSATE SODIUM 100 MG: 100 CAPSULE, LIQUID FILLED ORAL at 18:27

## 2019-11-22 RX ADMIN — METFORMIN HYDROCHLORIDE 500 MG: 500 TABLET, FILM COATED ORAL at 16:35

## 2019-11-22 RX ADMIN — ACETAMINOPHEN 650 MG: 325 TABLET, FILM COATED ORAL at 16:20

## 2019-11-22 RX ADMIN — ATORVASTATIN CALCIUM 10 MG: 10 TABLET, FILM COATED ORAL at 16:35

## 2019-11-22 NOTE — PROGRESS NOTES
Progress Note - Behavioral Health   Wilfrid Mosley 48 y o  male MRN: [id number]  Unit/Bed#: [facility] 256-01 Encounter: [id number]    Assessment/Plan   Principal Problem:    Schizoaffective disorder ([address] )  Active Problems:    Type 2 diabetes mellitus (HCC)    Benign essential hypertension    BMI 34 0-34 9,adult    Subjective:  Patient is compliant with medications with no acute side effects  Patient is showing gradual improvement in mood and anxiety with reduction in passive death wishes  Patient is showing gradual improvement in self-care activity  He does get irritable when his demands are met but is more easily redirectable  Patient agreed with plan titrating medication further and in agreement with disposition to [facility]  We had a 305 hearing held on the unit and patient is committed for period not to exceed 180 days      Current Medications:    Current Facility-Administered Medications:  acetaminophen 325 mg Oral Q6H PRN Daren Mauri, PA-C   acetaminophen 650 mg Oral Q6H PRN Daren Mauri, PA-C   acetaminophen 975 mg Oral Q8H PRN Daren Mauri, PA-C   aluminum-magnesium hydroxide-simethicone 30 mL Oral Q4H PRN Dannie Calix MD   atorvastatin 10 mg Oral Daily With Carley Johnson MD   benztropine 1 mg Intramuscular Q6H PRN Dannie Calix MD   benztropine 1 mg Oral Q6H PRN Dannie Calix MD   clozapine 100 mg Oral Daily Mikey King MD   cloZAPine 350 mg Oral HS Julio C Gore   divalproex sodium 1,000 mg Oral BID Daren Mauri, PA-C   docusate sodium 100 mg Oral BID Daren Mauri, PA-C   haloperidol 5 mg Oral Q6H PRN Dannie Calix MD   haloperidol lactate 5 mg Intramuscular Q6H PRN Dannie Calix MD   levothyroxine 75 mcg Oral Early Morning Jordy Washington PA-C   LORazepam 1 mg Intramuscular Q6H PRN Dannie Calix MD   LORazepam 1 mg Oral Q6H PRN Dannie Calix MD   magnesium hydroxide 30 mL Oral Daily PRN Dannie Calix MD   metFORMIN 500 mg Oral BID With Meals Jordy MaskXUAN neomycin-bacitracin-polymyxin b 1 small application Topical BID Gregory Bales MD   nicotine polacrilex 2 mg Oral Q2H PRN Gerda Bianchi MD   OLANZapine 15 mg Oral After Lunch Julio C Gore   oxybutynin 5 mg Oral Daily Silverio Ramirez PA-C   pantoprazole 40 mg Oral Early Morning Latasha Quiroz PA-C   traZODone 50 mg Oral HS PRN Gerda Bianchi MD       Behavioral Health Medications: all current active meds have been reviewed  Vital signs in last 24 hours:  Temp:  [98 4 °F (36 9 °C)] 98 4 °F (36 9 °C)  HR:  [92] 92  Resp:  [16] 16  BP: (139)/(82) 139/82    Laboratory results:    I have personally reviewed all pertinent laboratory/tests results  Labs in last 72 hours: No results for input(s): WBC, RBC, HGB, HCT, PLT, RDW, NEUTROABS, SODIUM, K, CL, CO2, BUN, CREATININE, GLUCOSE, GLUC, GLUF, CALCIUM, AST, ALT, ALKPHOS, TP, ALB, TBILI, CHOLESTEROL, HDL, TRIG, LDLCALC, VALPROICTOT, CARBAMAZEPIN, LITHIUM, AMMONIA, [id number], FREET4, T3FREE, PREGTESTUR, PREGSERUM, HCG, HCGQUANT, RPR in the last 72 hours  Invalid input(s):  RBC  Admission Labs:   No results displayed because visit has over 200 results  Psychiatric Review of Systems:  Behavior over the last 24 hours:  Slow improvement  Sleep: normal  Appetite: normal  Medication side effects: No  ROS: no complaints    Mental Status Evaluation:  Appearance:  casually dressed   Behavior:  guarded   Speech:  soft   Mood:  anxious   Affect:  increased in range   Language repeating phrases   Thought Process:  disorganized   Thought Content:  delusions  persecutory   Perceptual Disturbances:  Auditory hallucinations without commands   Risk Potential: Suicidal Ideations without plan, Homicidal Ideations none and Potential for Aggression No   Sensorium:  person and place   Cognition:  grossly intact   Consciousness:  awake    Attention: attention span appeared shorter than expected for age   Insight:  limited   Judgment: limited   Intellect limited   Gait/Station: normal gait/station   Motor Activity: no abnormal movements     Memory: Short and long term memory  fair     Progress Toward Goals: slow progress    Recommended Treatment:   305 hearing:  Patient committed for period not to exceed on it 2 days  Disposition is pending to [facility]  Continue with group therapy, milieu therapy and occupational therapy      Continue following current medications:   Current Facility-Administered Medications:  acetaminophen 325 mg Oral Q6H PRN Marleta Los, PA-C   acetaminophen 650 mg Oral Q6H PRN Marleta Los, PA-C   acetaminophen 975 mg Oral Q8H PRN Marleta Los, PA-C   aluminum-magnesium hydroxide-simethicone 30 mL Oral Q4H PRN Ez Hills MD   atorvastatin 10 mg Oral Daily With Pedro Johnson MD   benztropine 1 mg Intramuscular Q6H PRN Ez Hills MD   benztropine 1 mg Oral Q6H PRN Ez Hills MD   clozapine 100 mg Oral Daily Germain Giraldo MD   cloZAPine 350 mg Oral HS Julio C Gore   divalproex sodium 1,000 mg Oral BID Marleta Los, PA-C   docusate sodium 100 mg Oral BID Marleta Los, PA-C   haloperidol 5 mg Oral Q6H PRN Ez Hills MD   haloperidol lactate 5 mg Intramuscular Q6H PRN Ez Hills MD   levothyroxine 75 mcg Oral Early Morning Gearld Rushing, PA-C   LORazepam 1 mg Intramuscular Q6H PRN Ez Hills MD   LORazepam 1 mg Oral Q6H PRN Ez Hills MD   magnesium hydroxide 30 mL Oral Daily PRN Ez Hills MD   metFORMIN 500 mg Oral BID With Meals Gearld Rushing, PA-C   neomycin-bacitracin-polymyxin b 1 small application Topical BID Jose Menard MD   nicotine polacrilex 2 mg Oral Q2H PRN Ez Hills MD   OLANZapine 15 mg Oral After Lunch Julio Csarah Gore   oxybutynin 5 mg Oral Daily Marleta Los, PA-C   pantoprazole 40 mg Oral Early Morning Gearld Rushing, PA-C   traZODone 50 mg Oral HS PRN Ez Hills MD       Risks, benefits and possible side effects of Medications:   Risks, benefits, and possible side effects of medications explained to patient and patient verbalizes understanding  This note has been constructed using a voice recognition system  There may be translation, syntax,  or grammatical errors  If you have any questions, please contact the dictating provider

## 2019-11-22 NOTE — PROGRESS NOTES
Status: Pt was polite during the day  He became irritable in the evening as he was focused on his belongings & wanted to get into his locker  He refused vitals     Medication: no changes / PRN - Trazodone  D/C: [facility] - 3rd on the waiting list, will get an update on Tues 11/22/19 0865   Team Meeting   Meeting Type Daily Rounds   Team Members Present   Team Members Present Physician;Nurse;;Occupational Therapist   Physician Team Member Dr Mandi Licea / Dr Susie Ashby / Gustavo Rainey Member [facility] Management Team Member Amy Fraser / Alem Diaz   OT Team Member Maria Guadalupe   Patient/Family Present   Patient Present No   Patient's Family Present No

## 2019-11-22 NOTE — PROGRESS NOTES
Pt focused on retrieving items from locker, requires redirection  Overall cooperative, pleasant with staff  Using radio in room, singing loudly at times

## 2019-11-22 NOTE — PROGRESS NOTES
Pt with increased agitation this morning during med pass  Pt refusing medications  When staff asked pt to return necklaces d/t non-compliance, pt began yelling at [address]  After several attempts pt did take scheduled meds and was immediately apologetic for the yelling and cursing

## 2019-11-22 NOTE — CASE MANAGEMENT
A 305 hearing was held at 8:30 AM, and Pt was represented by Maria Fernanda Trevizo, the department was represented by Sarath Adhikari Esq, and mental health review officer Nasrin Daniel oversaw the proceedings  The court found that the Pt is severly mentally disabled & in need of inpatient treatment not to exceed 180 days with this being inpatient at [facility], until Pt can be transferred to [address] met with Pt to review the outcome  Pt remains focused on how soon he can go to [facility]

## 2019-11-22 NOTE — PROGRESS NOTES
Pt irritable this morning  Refused medications first time offered  Said "I want to talk to Dr"  Writer asked why he wanted to talk to Dr, pt said, "I want to leave"  Pt did come to get medication after that

## 2019-11-23 LAB
GLUCOSE SERPL-MCNC: 115 MG/DL (ref 65–140)
GLUCOSE SERPL-MCNC: 126 MG/DL (ref 65–140)

## 2019-11-23 PROCEDURE — 99232 SBSQ HOSP IP/OBS MODERATE 35: CPT | Performed by: PSYCHIATRY & NEUROLOGY

## 2019-11-23 PROCEDURE — 82948 REAGENT STRIP/BLOOD GLUCOSE: CPT

## 2019-11-23 RX ADMIN — DIVALPROEX SODIUM 1000 MG: 500 TABLET, DELAYED RELEASE ORAL at 10:59

## 2019-11-23 RX ADMIN — DIVALPROEX SODIUM 1000 MG: 500 TABLET, DELAYED RELEASE ORAL at 17:41

## 2019-11-23 RX ADMIN — TRAZODONE HYDROCHLORIDE 50 MG: 50 TABLET ORAL at 21:57

## 2019-11-23 RX ADMIN — OLANZAPINE 15 MG: 10 TABLET, FILM COATED ORAL at 14:45

## 2019-11-23 RX ADMIN — OXYBUTYNIN CHLORIDE 5 MG: 5 TABLET, EXTENDED RELEASE ORAL at 11:00

## 2019-11-23 RX ADMIN — ATORVASTATIN CALCIUM 10 MG: 10 TABLET, FILM COATED ORAL at 17:41

## 2019-11-23 RX ADMIN — CLOZAPINE 100 MG: 100 TABLET ORAL at 10:59

## 2019-11-23 RX ADMIN — DOCUSATE SODIUM 100 MG: 100 CAPSULE, LIQUID FILLED ORAL at 17:41

## 2019-11-23 RX ADMIN — CLOZAPINE 350 MG: 100 TABLET ORAL at 21:24

## 2019-11-23 RX ADMIN — LEVOTHYROXINE SODIUM 75 MCG: 75 TABLET ORAL at 10:59

## 2019-11-23 RX ADMIN — METFORMIN HYDROCHLORIDE 500 MG: 500 TABLET, FILM COATED ORAL at 10:59

## 2019-11-23 RX ADMIN — DOCUSATE SODIUM 100 MG: 100 CAPSULE, LIQUID FILLED ORAL at 11:00

## 2019-11-23 RX ADMIN — PANTOPRAZOLE SODIUM 40 MG: 40 TABLET, DELAYED RELEASE ORAL at 11:00

## 2019-11-23 RX ADMIN — METFORMIN HYDROCHLORIDE 500 MG: 500 TABLET, FILM COATED ORAL at 17:41

## 2019-11-23 NOTE — PROGRESS NOTES
Daily rounds  Per shift report, no outbursts yesterday, focused on belongings in locker, med compliant

## 2019-11-23 NOTE — PROGRESS NOTES
Pt awake several times early in shift asking for snack and water  Pt observed sleeping throughout night without difficulty

## 2019-11-23 NOTE — PROGRESS NOTES
Progress Note - Behavioral Health   Wilfrid Mosley 48 y o  male MRN: [id number]  Unit/Bed#: [facility] 256-01 Encounter: [id number]    Assessment/Plan   Principal Problem:    Schizoaffective disorder ([address] )  Active Problems:    Type 2 diabetes mellitus (HCC)    Benign essential hypertension    BMI 34 0-34 9,adult    Subjective:  Patient is compliant with medication with no acute side effects  Patient is showing gradual improvement in mood and psychotic symptoms  Patient continues to express frustration over the duration of her stay on the unit but is in agreement with transfer to [facility] for further stabilization and management  Patient was educated that next week we will have the list of where he is ranked in terms of placement to [facility]  Patient has remained seclusive to self and was encouraged to increase his physical activity and participate in milieu therapy  Patient denies any physical symptoms and denies any side effects from his medications      Current Medications:    Current Facility-Administered Medications:  acetaminophen 325 mg Oral Q6H PRN Daren Mauri, PA-C   acetaminophen 650 mg Oral Q6H PRN Daren Mauri, PA-C   acetaminophen 975 mg Oral Q8H PRN Daren Mauri, PA-C   aluminum-magnesium hydroxide-simethicone 30 mL Oral Q4H PRN Dannie Calix MD   atorvastatin 10 mg Oral Daily With Carley Johnson MD   benztropine 1 mg Intramuscular Q6H PRN Dannie Calix MD   benztropine 1 mg Oral Q6H PRN Dannie Calix MD   clozapine 100 mg Oral Daily Mikey King MD   cloZAPine 350 mg Oral HS Julio C Gore   divalproex sodium 1,000 mg Oral BID Daren Mauri, PA-C   docusate sodium 100 mg Oral BID Daren Mauri, PA-C   haloperidol 5 mg Oral Q6H PRN Dannie Calix MD   haloperidol lactate 5 mg Intramuscular Q6H PRN Dannie Calix MD   levothyroxine 75 mcg Oral Early Morning Jordy XUAN [place]   LORazepam 1 mg Intramuscular Q6H PRN Dannie Calix MD   LORazepam 1 mg Oral Q6H PRN Dannie Calix MD   magnesium hydroxide 30 mL Oral Daily PRN Liza Cevallos MD   metFORMIN 500 mg Oral BID With Meals Christa Cid PA-C   neomycin-bacitracin-polymyxin b 1 small application Topical BID Shyam Cohen MD   nicotine polacrilex 2 mg Oral Q2H PRN Liza Cevallos MD   OLANZapine 15 mg Oral After Lunch Julio C Gore   oxybutynin 5 mg Oral Daily Shyam Hall PA-C   pantoprazole 40 mg Oral Early Morning Christa Cid PA-C   traZODone 50 mg Oral HS PRN Liza Cevallos MD       Behavioral Health Medications: all current active meds have been reviewed  Vital signs in last 24 hours:  Temp:  [98 °F (36 7 °C)] 98 °F (36 7 °C)  HR:  [102] 102  Resp:  [17] 17  BP: (129)/(92) 129/92    Laboratory results:    I have personally reviewed all pertinent laboratory/tests results  Labs in last 72 hours: No results for input(s): WBC, RBC, HGB, HCT, PLT, RDW, NEUTROABS, SODIUM, K, CL, CO2, BUN, CREATININE, GLUCOSE, GLUC, GLUF, CALCIUM, AST, ALT, ALKPHOS, TP, ALB, TBILI, CHOLESTEROL, HDL, TRIG, LDLCALC, VALPROICTOT, CARBAMAZEPIN, LITHIUM, AMMONIA, [id number], FREET4, T3FREE, PREGTESTUR, PREGSERUM, HCG, HCGQUANT, RPR in the last 72 hours  Invalid input(s):  RBC  Admission Labs:   No results displayed because visit has over 200 results  Psychiatric Review of Systems:  Behavior over the last 24 hours:  Slow improvement  Sleep: normal  Appetite: normal  Medication side effects: No  ROS: no complaints    Mental Status Evaluation:  Appearance:  casually dressed   Behavior:  guarded   Speech:  soft   Mood:  anxious   Affect:  constricted   Language naming objects   Thought Process:  circumstantial   Thought Content:  delusions  persecutory   Perceptual Disturbances:  Auditory hallucinations without commands   Risk Potential: Suicidal Ideations without plan, Homicidal Ideations none and Potential for Aggression No   Sensorium:  person, place and time/date   Cognition:  grossly intact   Consciousness:  awake    Attention: attention span appeared shorter than expected for age   Insight:  limited   Judgment: limited   Intellect fair   Gait/Station: normal gait/station   Motor Activity: no abnormal movements     Memory: Short and long term memory  fair     Progress Toward Goals: slow progress    Recommended Treatment:   Disposition pending to [facility]  Continue with group therapy, milieu therapy and occupational therapy      Continue following current medications:   Current Facility-Administered Medications:  acetaminophen 325 mg Oral Q6H PRN Mari Beto, PA-C   acetaminophen 650 mg Oral Q6H PRN Mari Beto, PA-C   acetaminophen 975 mg Oral Q8H PRN Mari Beto, PA-C   aluminum-magnesium hydroxide-simethicone 30 mL Oral Q4H PRN Rosi Bennett MD   atorvastatin 10 mg Oral Daily With Nai Early MD   benztropine 1 mg Intramuscular Q6H PRN Rosi Bennett MD   benztropine 1 mg Oral Q6H PRN Rosi Bennett MD   clozapine 100 mg Oral Daily Carolyn Alejandra MD   cloZAPine 350 mg Oral HS Julio C Gore   divalproex sodium 1,000 mg Oral BID Mari Beto, PA-PUSHPA   docusate sodium 100 mg Oral BID Mari Beto, XUAN   haloperidol 5 mg Oral Q6H PRN Rosi Bennett MD   haloperidol lactate 5 mg Intramuscular Q6H PRN Rosi Benntet MD   levothyroxine 75 mcg Oral Early Morning Krystal Marques PA-C   LORazepam 1 mg Intramuscular Q6H PRN Rosi Bennett MD   LORazepam 1 mg Oral Q6H PRN Rosi Bennett MD   magnesium hydroxide 30 mL Oral Daily PRN Rosi Bennett MD   metFORMIN 500 mg Oral BID With Meals Krystal Marques PA-C   neomycin-bacitracin-polymyxin b 1 small application Topical BID Kevin Ross MD   nicotine polacrilex 2 mg Oral Q2H PRN Rosi Bennett MD   OLANZapine 15 mg Oral After Lunch Julio C Bao   oxybutynin 5 mg Oral Daily Marisincere Pérez PA-C   pantoprazole 40 mg Oral Early Morning Kyrstal Marques PA-C   traZODone 50 mg Oral HS PRN Rosi Bennett MD       Risks, benefits and possible side effects of Medications:   Risks, benefits, and possible side effects of medications explained to patient and patient verbalizes understanding  This note has been constructed using a voice recognition system  There may be translation, syntax,  or grammatical errors  If you have any questions, please contact the dictating provider

## 2019-11-23 NOTE — PROGRESS NOTES
Pt irritable after breakfast   Focused on discharge  Was reassured that placement is being arranged  Pt verbalized understanding  Denies SI/AH  Isolative to room early in this shift  Medication compliant

## 2019-11-24 LAB
GLUCOSE SERPL-MCNC: 111 MG/DL (ref 65–140)
GLUCOSE SERPL-MCNC: 118 MG/DL (ref 65–140)

## 2019-11-24 PROCEDURE — 99232 SBSQ HOSP IP/OBS MODERATE 35: CPT | Performed by: PSYCHIATRY & NEUROLOGY

## 2019-11-24 PROCEDURE — 82948 REAGENT STRIP/BLOOD GLUCOSE: CPT

## 2019-11-24 RX ADMIN — METFORMIN HYDROCHLORIDE 500 MG: 500 TABLET, FILM COATED ORAL at 11:27

## 2019-11-24 RX ADMIN — DOCUSATE SODIUM 100 MG: 100 CAPSULE, LIQUID FILLED ORAL at 17:23

## 2019-11-24 RX ADMIN — DIVALPROEX SODIUM 1000 MG: 500 TABLET, DELAYED RELEASE ORAL at 11:26

## 2019-11-24 RX ADMIN — METFORMIN HYDROCHLORIDE 500 MG: 500 TABLET, FILM COATED ORAL at 17:22

## 2019-11-24 RX ADMIN — DOCUSATE SODIUM 100 MG: 100 CAPSULE, LIQUID FILLED ORAL at 11:26

## 2019-11-24 RX ADMIN — LEVOTHYROXINE SODIUM 75 MCG: 75 TABLET ORAL at 05:33

## 2019-11-24 RX ADMIN — PANTOPRAZOLE SODIUM 40 MG: 40 TABLET, DELAYED RELEASE ORAL at 05:33

## 2019-11-24 RX ADMIN — CLOZAPINE 100 MG: 100 TABLET ORAL at 11:27

## 2019-11-24 RX ADMIN — CLOZAPINE 350 MG: 100 TABLET ORAL at 21:23

## 2019-11-24 RX ADMIN — DIVALPROEX SODIUM 1000 MG: 500 TABLET, DELAYED RELEASE ORAL at 17:22

## 2019-11-24 RX ADMIN — BACITRACIN, NEOMYCIN, POLYMYXIN B 1 SMALL APPLICATION: 400; 3.5; 5 OINTMENT TOPICAL at 11:27

## 2019-11-24 RX ADMIN — ATORVASTATIN CALCIUM 10 MG: 10 TABLET, FILM COATED ORAL at 17:22

## 2019-11-24 RX ADMIN — OXYBUTYNIN CHLORIDE 5 MG: 5 TABLET, EXTENDED RELEASE ORAL at 11:27

## 2019-11-24 RX ADMIN — OLANZAPINE 15 MG: 10 TABLET, FILM COATED ORAL at 14:53

## 2019-11-24 NOTE — PROGRESS NOTES
Daily rounds  Per shift report, pt with mild irritability at times, focused on water/locker, no outbursts, slept well

## 2019-11-24 NOTE — PROGRESS NOTES
Pt mostly isolative to room today  Was OOB for meals and meds and periodically paced the halls  One brief period of irritability regarding discharge, which was quickly de-escalated  Overall Pt is pleasant and medication compliant

## 2019-11-24 NOTE — PROGRESS NOTES
No irritability exhibited thus far this latter PM  Continues to deny SI & AH  Is less isolative this PM  No encouragement needed to attend group  Less requests for fluids this PM  Will continue to monitor

## 2019-11-24 NOTE — PROGRESS NOTES
Progress Note - Behavioral Health   Violeta Samayoa 48 y o  male MRN: [id number]  Unit/Bed#: [facility] 256-01 Encounter: [id number]    Assessment/Plan   Principal Problem:    Schizoaffective disorder ([address] )  Active Problems:    Type 2 diabetes mellitus (HCC)    Benign essential hypertension    BMI 34 0-34 9,adult    Subjective:  Patient is compliant with medications with no acute side effects  Patient is showing gradual improvement in mood and psychotic symptoms  He has remained more seclusive to self and understand the disposition planning to [facility]  No acute physical symptoms reported today  He is consenting for safety on the unit      Current Medications:    Current Facility-Administered Medications:  acetaminophen 325 mg Oral Q6H PRN Samule Cons, PA-C   acetaminophen 650 mg Oral Q6H PRN Samule Cons, PA-C   acetaminophen 975 mg Oral Q8H PRN Samule Cons, PA-C   aluminum-magnesium hydroxide-simethicone 30 mL Oral Q4H PRN Joel Snider MD   atorvastatin 10 mg Oral Daily With Aruna Silva MD   benztropine 1 mg Intramuscular Q6H PRN Joel Snider MD   benztropine 1 mg Oral Q6H PRN Joel Snider MD   clozapine 100 mg Oral Daily Kayleigh Ng MD   cloZAPine 350 mg Oral HS Julio C Gore   divalproex sodium 1,000 mg Oral BID Samule Cons, PA-PUSHPA   docusate sodium 100 mg Oral BID Samule Cons, PA-C   haloperidol 5 mg Oral Q6H PRN Joel Snider MD   haloperidol lactate 5 mg Intramuscular Q6H PRN Joel Snider MD   levothyroxine 75 mcg Oral Early Morning Carlos Alberto Roper, XUAN   LORazepam 1 mg Intramuscular Q6H PRN Joel Snider MD   LORazepam 1 mg Oral Q6H PRN Joel Snider MD   magnesium hydroxide 30 mL Oral Daily PRN Joel Snider MD   metFORMIN 500 mg Oral BID With Meals Carlos Alberto Roper PA-C   neomycin-bacitracin-polymyxin b 1 small application Topical BID Param Cordova MD   nicotine polacrilex 2 mg Oral Q2H PRN Joel Snider MD   OLANZapine 15 mg Oral After Lunch Julio C Gore   oxybutynin 5 mg Oral Daily Xochitl Piedra PA-C   pantoprazole 40 mg Oral Early Morning Micheal Gustafson PA-C   traZODone 50 mg Oral HS PRN Damir Justice MD       Behavioral Health Medications: all current active meds have been reviewed  Vital signs in last 24 hours:  Temp:  [98 1 °F (36 7 °C)-98 2 °F (36 8 °C)] 98 2 °F (36 8 °C)  HR:  [71-89] 71  Resp:  [17] 17  BP: (100-138)/(60-82) 100/60    Laboratory results:    I have personally reviewed all pertinent laboratory/tests results  Labs in last 72 hours: No results for input(s): WBC, RBC, HGB, HCT, PLT, RDW, NEUTROABS, SODIUM, K, CL, CO2, BUN, CREATININE, GLUCOSE, GLUC, GLUF, CALCIUM, AST, ALT, ALKPHOS, TP, ALB, TBILI, CHOLESTEROL, HDL, TRIG, LDLCALC, VALPROICTOT, CARBAMAZEPIN, LITHIUM, AMMONIA, [id number], FREET4, T3FREE, PREGTESTUR, PREGSERUM, HCG, HCGQUANT, RPR in the last 72 hours  Invalid input(s):  RBC  Admission Labs:   No results displayed because visit has over 200 results  Psychiatric Review of Systems:  Behavior over the last 24 hours:  Slow improvement  Sleep: normal  Appetite: normal  Medication side effects: No  ROS: no complaints    Mental Status Evaluation:  Appearance:  casually dressed   Behavior:  guarded   Speech:  soft   Mood:  anxious and depressed   Affect:  constricted   Language naming objects   Thought Process:  disorganized   Thought Content:  delusions  persecutory   Perceptual Disturbances:  Auditory hallucinations without commands   Risk Potential: Suicidal Ideations without plan, Homicidal Ideations none and Potential for Aggression No   Sensorium:  person and place   Cognition:  grossly intact   Consciousness:  awake    Attention: attention span appeared shorter than expected for age   Insight:  limited   Judgment: limited   Intellect fair   Gait/Station: normal gait/station   Motor Activity: no abnormal movements     Memory: Short and long term memory  fair     Progress Toward Goals: slow progress    Recommended Treatment:   Disposition pending to [facility]  Continue with group therapy, milieu therapy and occupational therapy  Continue following current medications:   Current Facility-Administered Medications:  acetaminophen 325 mg Oral Q6H PRN Marleta Los, PA-C   acetaminophen 650 mg Oral Q6H PRN Marleta Los, PA-C   acetaminophen 975 mg Oral Q8H PRN Marleta Los, PA-C   aluminum-magnesium hydroxide-simethicone 30 mL Oral Q4H PRN Ez Hills MD   atorvastatin 10 mg Oral Daily With Pedro Johnson MD   benztropine 1 mg Intramuscular Q6H PRN Ez Hills MD   benztropine 1 mg Oral Q6H PRN Ez Hills MD   clozapine 100 mg Oral Daily Germain Giraldo MD   cloZAPine 350 mg Oral HS [facility]   divalproex sodium 1,000 mg Oral BID Marleta Los, PA-C   docusate sodium 100 mg Oral BID Marleta Los, PA-C   haloperidol 5 mg Oral Q6H PRN Ez Hills MD   haloperidol lactate 5 mg Intramuscular Q6H PRN Ez Hills MD   levothyroxine 75 mcg Oral Early Morning Gearld Rushing, PA-C   LORazepam 1 mg Intramuscular Q6H PRN Ez Hills MD   LORazepam 1 mg Oral Q6H PRN Ez Hills MD   magnesium hydroxide 30 mL Oral Daily PRN Ez Hills MD   metFORMIN 500 mg Oral BID With Meals Gearld Rushing, PA-C   neomycin-bacitracin-polymyxin b 1 small application Topical BID Jose Menard MD   nicotine polacrilex 2 mg Oral Q2H PRN Ez Hills MD   OLANZapine 15 mg Oral After Lunch [facility]   oxybutynin 5 mg Oral Daily Marleta Alec, PA-C   pantoprazole 40 mg Oral Early Morning Gearld Rushing, PA-C   traZODone 50 mg Oral HS PRN Ez Hills MD       Risks, benefits and possible side effects of Medications:   Risks, benefits, and possible side effects of medications explained to patient and patient verbalizes understanding  This note has been constructed using a voice recognition system  There may be translation, syntax,  or grammatical errors  If you have any questions, please contact the dictating provider

## 2019-11-24 NOTE — PROGRESS NOTES
Did ask more frequently for water as evening progressed, but is cooperative with waiting if the duration between drinks was not long enough  Will continue to monitor

## 2019-11-24 NOTE — PROGRESS NOTES
Pt slept majority of the night  Would wait for brief periods of times however able to return to sleep quickly

## 2019-11-24 NOTE — PROGRESS NOTES
Irritable edge immediately after waking, more pleasant after breakfast   Pt said he planned to listen to the radio all day "because its raining outside and Fuentes (rubber bat) likes the rain"  Denies SI and AH  Compliant with medications

## 2019-11-25 LAB
GLUCOSE SERPL-MCNC: 107 MG/DL (ref 65–140)
GLUCOSE SERPL-MCNC: 77 MG/DL (ref 65–140)

## 2019-11-25 PROCEDURE — 99232 SBSQ HOSP IP/OBS MODERATE 35: CPT | Performed by: PHYSICIAN ASSISTANT

## 2019-11-25 PROCEDURE — 82948 REAGENT STRIP/BLOOD GLUCOSE: CPT

## 2019-11-25 RX ADMIN — OXYBUTYNIN CHLORIDE 5 MG: 5 TABLET, EXTENDED RELEASE ORAL at 09:24

## 2019-11-25 RX ADMIN — DOCUSATE SODIUM 100 MG: 100 CAPSULE, LIQUID FILLED ORAL at 17:25

## 2019-11-25 RX ADMIN — CLOZAPINE 100 MG: 100 TABLET ORAL at 09:24

## 2019-11-25 RX ADMIN — DIVALPROEX SODIUM 1000 MG: 500 TABLET, DELAYED RELEASE ORAL at 09:24

## 2019-11-25 RX ADMIN — METFORMIN HYDROCHLORIDE 500 MG: 500 TABLET, FILM COATED ORAL at 09:24

## 2019-11-25 RX ADMIN — NICOTINE POLACRILEX 2 MG: 2 GUM, CHEWING BUCCAL at 13:04

## 2019-11-25 RX ADMIN — DOCUSATE SODIUM 100 MG: 100 CAPSULE, LIQUID FILLED ORAL at 09:24

## 2019-11-25 RX ADMIN — CLOZAPINE 350 MG: 100 TABLET ORAL at 21:18

## 2019-11-25 RX ADMIN — ATORVASTATIN CALCIUM 10 MG: 10 TABLET, FILM COATED ORAL at 17:25

## 2019-11-25 RX ADMIN — TRAZODONE HYDROCHLORIDE 50 MG: 50 TABLET ORAL at 21:19

## 2019-11-25 RX ADMIN — DIVALPROEX SODIUM 1000 MG: 500 TABLET, DELAYED RELEASE ORAL at 17:25

## 2019-11-25 RX ADMIN — METFORMIN HYDROCHLORIDE 500 MG: 500 TABLET, FILM COATED ORAL at 17:25

## 2019-11-25 RX ADMIN — PANTOPRAZOLE SODIUM 40 MG: 40 TABLET, DELAYED RELEASE ORAL at 06:02

## 2019-11-25 RX ADMIN — OLANZAPINE 15 MG: 10 TABLET, FILM COATED ORAL at 13:03

## 2019-11-25 RX ADMIN — LEVOTHYROXINE SODIUM 75 MCG: 75 TABLET ORAL at 06:03

## 2019-11-25 NOTE — PROGRESS NOTES
Pt with slight irritable edge during conversation this afternoon  Pt asking about discharge and why he cannot sign self out  Pt redirectable  Appears disheveled, layered clothes

## 2019-11-25 NOTE — PLAN OF CARE
Problem: SELF HARM/SUICIDALITY  Goal: Will have no self-injury during hospital stay  Description  INTERVENTIONS:  - Q 15 MINUTES: Routine safety checks  - Q WAKING SHIFT & PRN: Assess risk to determine if routine checks are adequate to maintain patient safety  - Encourage patient to participate actively in care by formulating a plan to combat response to suicidal ideation, identify supports and resources  Outcome: Progressing     Problem: INVOLUNTARY ADMIT  Goal: Will cooperate with staff recommendations and doctor's orders and will demonstrate appropriate behavior  Description  INTERVENTIONS:  - Treat underlying conditions and offer medication as ordered  - Educate regarding involuntary admission procedures and rules  - Utilize positive consistent limit setting strategies to support patient and staff safety  Outcome: Progressing     Problem: SELF CARE DEFICIT  Goal: Return ADL status to a safe level of function  Description  INTERVENTIONS:  - Administer medication as ordered  - Assess ADL deficits and provide assistive devices as needed  - Obtain PT/OT consults as needed  - Assist and instruct patient to increase activity and self care as tolerated  Outcome: Progressing     Problem: PAIN - ADULT  Goal: Verbalizes/displays adequate comfort level or baseline comfort level  Description  Interventions:  - Encourage patient to monitor pain and request assistance  - Assess pain using appropriate pain scale  - Administer analgesics based on type and severity of pain and evaluate response  - Implement non-pharmacological measures as appropriate and evaluate response  - Consider cultural and social influences on pain and pain management  - Notify physician/advanced practitioner if interventions unsuccessful or patient reports new pain  Outcome: Progressing     Problem: Risk for Self Injury/Neglect  Goal: Complete daily ADLs, including personal hygiene independently, as able  Description  Interventions:  - Observe, teach, and assist patient with ADLS  - Monitor and promote a balance of rest/activity, with adequate nutrition and elimination  Outcome: Progressing     Problem: Nutrition/Hydration-ADULT  Goal: Nutrient/Hydration intake appropriate for improving, restoring or maintaining nutritional needs  Description  Monitor and assess patient's nutrition/hydration status for malnutrition  Collaborate with interdisciplinary team and initiate plan and interventions as ordered  Monitor patient's weight and dietary intake as ordered or per policy  Utilize nutrition screening tool and intervene as necessary  Determine patient's food preferences and provide high-protein, high-caloric foods as appropriate       INTERVENTIONS:  - Monitor oral intake, urinary output, labs, and treatment plans  - Assess nutrition and hydration status and recommend course of action  - Evaluate amount of meals eaten  - Assist patient with eating if necessary   - Allow adequate time for meals  - Recommend/ encourage appropriate diets, oral nutritional supplements, and vitamin/mineral supplements  - Order, calculate, and assess calorie counts as needed  - Recommend, monitor, and adjust tube feedings and TPN/PPN based on assessed needs  - Assess need for intravenous fluids  - Provide specific nutrition/hydration education as appropriate  - Include patient/family/caregiver in decisions related to nutrition  Outcome: Progressing     Problem: Ineffective Coping  Goal: Participates in unit activities  Description  Interventions:  - Provide therapeutic environment   - Provide required programming   - Redirect inappropriate behaviors   Outcome: Not Progressing

## 2019-11-25 NOTE — PROGRESS NOTES
Status: Pt had one irritable moment in the morning  He utilizes his radio, & no further issues     Medication: no changes / no PRNs  D/C: 3rd on waiting list for [facility]; should get updated list tomorrow     11/25/19 0730   Team Meeting   Meeting Type Daily Rounds   Team Members Present   Team Members Present Physician;Nurse;;Occupational Therapist   Physician Team Member Dr Diana Orozco / Dr Chau Granado / Rupali Dempsey Member [facility] Management Team Member Bebeto Recinos / Sharon Bolaños    OT Team Member Maria Guadalupe   Patient/Family Present   Patient Present No   Patient's Family Present No

## 2019-11-25 NOTE — PROGRESS NOTES
Progress Note - Behavioral Health   Sourav Reyes 48 y o  male MRN: [id number]  Unit/Bed#: [facility] 256-01 Encounter: [id number]    Assessment/Plan   Principal Problem:    Schizoaffective disorder ([address] )  Active Problems:    Type 2 diabetes mellitus (HCC)    Benign essential hypertension    BMI 34 0-34 9,adult      Subjective:  Patient today not interested in interview  States he feels well  Focused on discharge  No agitation or irritability  Report of one episode of irritability over weekend and was easily redirected  Was irritable over discharge  Denies most psychiatric symptoms when asked  Appears to have chronic underlying paranoia and internal preoccupation  Will deny hallucinations when asked  Is not currently manic  Denied somatic complaints and does not appear to be experiencing any serious side effects  Is on monthly CBC with diff testing  Next is due 12/3/19  Medication compliant time  Awaiting discharge to [facility] when bed is available      Current Medications:  Current Facility-Administered Medications   Medication Dose Route Frequency    acetaminophen (TYLENOL) tablet 325 mg  325 mg Oral Q6H PRN    acetaminophen (TYLENOL) tablet 650 mg  650 mg Oral Q6H PRN    acetaminophen (TYLENOL) tablet 975 mg  975 mg Oral Q8H PRN    aluminum-magnesium hydroxide-simethicone (MYLANTA) 200-200-20 mg/5 mL oral suspension 30 mL  30 mL Oral Q4H PRN    atorvastatin (LIPITOR) tablet 10 mg  10 mg Oral Daily With Dinner    benztropine (COGENTIN) injection 1 mg  1 mg Intramuscular Q6H PRN    benztropine (COGENTIN) tablet 1 mg  1 mg Oral Q6H PRN    cloZAPine (CLOZARIL) tablet 100 mg  100 mg Oral Daily    cloZAPine (CLOZARIL) tablet 350 mg  350 mg Oral HS    divalproex sodium (DEPAKOTE) EC tablet 1,000 mg  1,000 mg Oral BID    docusate sodium (COLACE) capsule 100 mg  100 mg Oral BID    haloperidol (HALDOL) tablet 5 mg  5 mg Oral Q6H PRN    haloperidol lactate (HALDOL) injection 5 mg  5 mg Intramuscular Q6H PRN  levothyroxine tablet 75 mcg  75 mcg Oral Early Morning    LORazepam (ATIVAN) 2 mg/mL injection 1 mg  1 mg Intramuscular Q6H PRN    LORazepam (ATIVAN) tablet 1 mg  1 mg Oral Q6H PRN    magnesium hydroxide (MILK OF MAGNESIA) 400 mg/5 mL oral suspension 30 mL  30 mL Oral Daily PRN    metFORMIN (GLUCOPHAGE) tablet 500 mg  500 mg Oral BID With Meals    neomycin-bacitracin-polymyxin b (NEOSPORIN) ointment 1 small application  1 small application Topical BID    nicotine polacrilex (NICORETTE) gum 2 mg  2 mg Oral Q2H PRN    OLANZapine (ZyPREXA) tablet 15 mg  15 mg Oral After Lunch    oxybutynin (DITROPAN-XL) 24 hr tablet 5 mg  5 mg Oral Daily    pantoprazole (PROTONIX) EC tablet 40 mg  40 mg Oral Early Morning    traZODone (DESYREL) tablet 50 mg  50 mg Oral HS PRN       Behavioral Health Medications: all current active meds have been reviewed and continue current psychiatric medications  Vitals:  Vitals:    11/24/19 0740   BP: 100/60   Pulse: 71   Resp: 17   SpO2:        Laboratory results:    I have personally reviewed all pertinent laboratory/tests results    Most Recent Labs:   Lab Results   Component Value Date    WBC 8 83 11/13/2019    RBC 4 78 11/13/2019    HGB 14 2 11/13/2019    HCT 43 3 11/13/2019     11/13/2019    RDW 12 9 11/13/2019    NEUTROABS 5 16 11/13/2019    SODIUM 141 10/29/2019    K 4 3 10/29/2019     10/29/2019    CO2 28 10/29/2019    BUN 17 10/29/2019    CREATININE 0 76 10/29/2019    GLUCOSE 91 05/11/2018    GLUC 106 10/29/2019    GLUF 99 08/23/2019    CALCIUM 8 6 10/29/2019    AST 15 10/29/2019    ALT 22 10/29/2019    ALKPHOS 66 10/29/2019    TP 6 4 10/29/2019    ALB 3 1 (L) 10/29/2019    TBILI 0 20 10/29/2019    CHOLESTEROL 103 11/05/2019    HDL 27 (L) 11/05/2019    TRIG 236 (H) 11/05/2019    LDLCALC 29 11/05/2019    NONHDLC 76 11/05/2019    VALPROICTOT 67 10/29/2019    LITHIUM 0 6 03/27/2014    AMMONIA 32 10/08/2019    [id number] 1 840 08/23/2019    FREET4 0 93 2019    HGBA1C 6 2 2019          Metabolic Monitoring:       Baseline:  4 week  8 week  12 week  Quarterly Annually   Weight (BMI) 237  258           Waist Circumference X X X         /99  124/74           HbA1C/Fasting plasma glucose 6 3/134 6 2/131           Lipid Profile Chol: 138  T  HDL: 34  LDL: 78 Chol:  103  T  HDL: 27  LDL: 29  Chol:   TG:   HFL:   LDL:                Psychiatric Review of Systems:  Behavior over the last 24 hours:  unchanged  Sleep: normal  Appetite: normal  Medication side effects: No  ROS: no complaints, all others negative    Mental Status Evaluation:  Appearance:  casually dressed   Behavior:  restless and fidgety   Speech:  loud   Mood:  euthymic   Affect:  increased in range   Language sparse   Thought Process:  concrete and perserverative   Thought Content:  obsessions and chronic paranoid delusions   Perceptual Disturbances: None  Chronic internal preoccupation   Risk Potential: Denied SI/HI  Potential for aggression: No   Sensorium:  person and place   Cognition:  grossly intact   Consciousness:  alert and awake    Recent and Remote Memory poor   Attention: attention span appeared shorter than expected for age   Insight:  limited   Judgment: limited   Gait/Station: normal gait/station and normal balance   Motor Activity: no abnormal movements     Progress Toward Goals: slow progression    Recommended Treatment: Continue with group therapy, milieu therapy and occupational therapy  1   Continue current medications  2  Awaiting placement at [facility]  3  Next [address] due 12/3/19, on monthly checks    Risks, benefits and possible side effects of Medications:   Risks, benefits, and possible side effects of medications explained to patient and patient verbalizes understanding        Vj Pelayo PA-C

## 2019-11-25 NOTE — PROGRESS NOTES
Pt is pleasant with no irritable episodes thus far  Says he is fine  Following rules reg  His radio  Anxiously await lunch to begin

## 2019-11-26 LAB
GLUCOSE SERPL-MCNC: 108 MG/DL (ref 65–140)
GLUCOSE SERPL-MCNC: 132 MG/DL (ref 65–140)

## 2019-11-26 PROCEDURE — 99232 SBSQ HOSP IP/OBS MODERATE 35: CPT | Performed by: PHYSICIAN ASSISTANT

## 2019-11-26 PROCEDURE — 82948 REAGENT STRIP/BLOOD GLUCOSE: CPT

## 2019-11-26 RX ADMIN — DOCUSATE SODIUM 100 MG: 100 CAPSULE, LIQUID FILLED ORAL at 08:54

## 2019-11-26 RX ADMIN — OLANZAPINE 15 MG: 10 TABLET, FILM COATED ORAL at 15:15

## 2019-11-26 RX ADMIN — CLOZAPINE 350 MG: 100 TABLET ORAL at 21:25

## 2019-11-26 RX ADMIN — DIVALPROEX SODIUM 1000 MG: 500 TABLET, DELAYED RELEASE ORAL at 08:54

## 2019-11-26 RX ADMIN — ATORVASTATIN CALCIUM 10 MG: 10 TABLET, FILM COATED ORAL at 16:40

## 2019-11-26 RX ADMIN — CLOZAPINE 100 MG: 100 TABLET ORAL at 08:54

## 2019-11-26 RX ADMIN — METFORMIN HYDROCHLORIDE 500 MG: 500 TABLET, FILM COATED ORAL at 08:54

## 2019-11-26 RX ADMIN — OXYBUTYNIN CHLORIDE 5 MG: 5 TABLET, EXTENDED RELEASE ORAL at 08:54

## 2019-11-26 RX ADMIN — DIVALPROEX SODIUM 1000 MG: 500 TABLET, DELAYED RELEASE ORAL at 17:28

## 2019-11-26 RX ADMIN — DOCUSATE SODIUM 100 MG: 100 CAPSULE, LIQUID FILLED ORAL at 17:28

## 2019-11-26 RX ADMIN — PANTOPRAZOLE SODIUM 40 MG: 40 TABLET, DELAYED RELEASE ORAL at 07:09

## 2019-11-26 RX ADMIN — LEVOTHYROXINE SODIUM 75 MCG: 75 TABLET ORAL at 07:09

## 2019-11-26 RX ADMIN — METFORMIN HYDROCHLORIDE 500 MG: 500 TABLET, FILM COATED ORAL at 16:40

## 2019-11-26 NOTE — PROGRESS NOTES
Irritable edge this AM  Scant in conversation  Observed walking the halls at times  Remains focused on discharge  Compliant with scheduled medicines

## 2019-11-26 NOTE — PROGRESS NOTES
Progress Note - Behavioral Health   Violeta Samayoa 48 y o  male MRN: [id number]  Unit/Bed#: [facility] 256-01 Encounter: [id number]    Assessment/Plan   Principal Problem:    Schizoaffective disorder ([address] )  Active Problems:    Type 2 diabetes mellitus (HCC)    Benign essential hypertension    BMI 34 0-34 9,adult      Subjective:    No changes from yesterday  Poor sleep last night requiring Trazodone  Remains focused on discharge  Scant in all psychiatric symptoms  Nursing staff reported patient was irritable last night  Will deny suicidal and homicidal ideations  No aggression  Remains with chronic underlying paranoia and internal preoccupation  Is not currently manic  Denied somatic complaints and does not appear to be experiencing any serious side effects  Is on monthly CBC with diff testing  Next is due 12/3/19  Medication compliant time  Awaiting discharge to [facility] when bed is available      Current Medications:  Current Facility-Administered Medications   Medication Dose Route Frequency    acetaminophen (TYLENOL) tablet 325 mg  325 mg Oral Q6H PRN    acetaminophen (TYLENOL) tablet 650 mg  650 mg Oral Q6H PRN    acetaminophen (TYLENOL) tablet 975 mg  975 mg Oral Q8H PRN    aluminum-magnesium hydroxide-simethicone (MYLANTA) 200-200-20 mg/5 mL oral suspension 30 mL  30 mL Oral Q4H PRN    atorvastatin (LIPITOR) tablet 10 mg  10 mg Oral Daily With Dinner    benztropine (COGENTIN) injection 1 mg  1 mg Intramuscular Q6H PRN    benztropine (COGENTIN) tablet 1 mg  1 mg Oral Q6H PRN    cloZAPine (CLOZARIL) tablet 100 mg  100 mg Oral Daily    cloZAPine (CLOZARIL) tablet 350 mg  350 mg Oral HS    divalproex sodium (DEPAKOTE) EC tablet 1,000 mg  1,000 mg Oral BID    docusate sodium (COLACE) capsule 100 mg  100 mg Oral BID    haloperidol (HALDOL) tablet 5 mg  5 mg Oral Q6H PRN    haloperidol lactate (HALDOL) injection 5 mg  5 mg Intramuscular Q6H PRN    levothyroxine tablet 75 mcg  75 mcg Oral Early Morning  LORazepam (ATIVAN) 2 mg/mL injection 1 mg  1 mg Intramuscular Q6H PRN    LORazepam (ATIVAN) tablet 1 mg  1 mg Oral Q6H PRN    magnesium hydroxide (MILK OF MAGNESIA) 400 mg/5 mL oral suspension 30 mL  30 mL Oral Daily PRN    metFORMIN (GLUCOPHAGE) tablet 500 mg  500 mg Oral BID With Meals    nicotine polacrilex (NICORETTE) gum 2 mg  2 mg Oral Q2H PRN    OLANZapine (ZyPREXA) tablet 15 mg  15 mg Oral After Lunch    oxybutynin (DITROPAN-XL) 24 hr tablet 5 mg  5 mg Oral Daily    pantoprazole (PROTONIX) EC tablet 40 mg  40 mg Oral Early Morning    traZODone (DESYREL) tablet 50 mg  50 mg Oral HS PRN       Behavioral Health Medications: all current active meds have been reviewed and continue current psychiatric medications  Vitals:  Vitals:    11/25/19 1534   BP: 122/75   Pulse: 99   Resp: 20   SpO2:        Laboratory results:    I have personally reviewed all pertinent laboratory/tests results    Most Recent Labs:   Lab Results   Component Value Date    WBC 8 83 11/13/2019    RBC 4 78 11/13/2019    HGB 14 2 11/13/2019    HCT 43 3 11/13/2019     11/13/2019    RDW 12 9 11/13/2019    NEUTROABS 5 16 11/13/2019    SODIUM 141 10/29/2019    K 4 3 10/29/2019     10/29/2019    CO2 28 10/29/2019    BUN 17 10/29/2019    CREATININE 0 76 10/29/2019    GLUCOSE 91 05/11/2018    GLUC 106 10/29/2019    GLUF 99 08/23/2019    CALCIUM 8 6 10/29/2019    AST 15 10/29/2019    ALT 22 10/29/2019    ALKPHOS 66 10/29/2019    TP 6 4 10/29/2019    ALB 3 1 (L) 10/29/2019    TBILI 0 20 10/29/2019    CHOLESTEROL 103 11/05/2019    HDL 27 (L) 11/05/2019    TRIG 236 (H) 11/05/2019    LDLCALC 29 11/05/2019    NONHDLC 76 11/05/2019    VALPROICTOT 67 10/29/2019    LITHIUM 0 6 03/27/2014    AMMONIA 32 10/08/2019    [id number] 1 840 08/23/2019    FREET4 0 93 08/23/2019    HGBA1C 6 2 11/05/2019     98/45/8054     Metabolic Monitoring:       Baseline:  4 week  8 week  12 week  Quarterly Annually   Weight (BMI) 237  258         Waist Circumference X X X         /99  124/74           HbA1C/Fasting plasma glucose 6 3/134 6 2/131           Lipid Profile Chol: 138  T  HDL: 34  LDL: 78 Chol:  103  T  HDL: 27  LDL: 29  Chol:   TG:   HFL:   LDL:                Psychiatric Review of Systems:  Behavior over the last 24 hours:  unchanged  Sleep: normal  Appetite: normal  Medication side effects: No  ROS: no complaints, all others negative    Mental Status Evaluation:  Appearance:  casually dressed   Behavior:  restless and fidgety   Speech:  loud   Mood:  euthymic   Affect:  increased in range   Language sparse   Thought Process:  concrete and perserverative   Thought Content:  obsessions and chronic paranoid delusions   Perceptual Disturbances: None  Chronic internal preoccupation   Risk Potential: Denied SI/HI  Potential for aggression: No   Sensorium:  person and place   Cognition:  grossly intact   Consciousness:  alert and awake    Recent and Remote Memory poor   Attention: attention span appeared shorter than expected for age   Insight:  limited   Judgment: limited   Gait/Station: normal gait/station and normal balance   Motor Activity: no abnormal movements     Progress Toward Goals: unchanged    Recommended Treatment: Continue with group therapy, milieu therapy and occupational therapy  1   Continue current medications   2  Awaiting placement at [facility]  3  Next [address] due 12/3/19, on monthly checks    Risks, benefits and possible side effects of Medications:   Risks, benefits, and possible side effects of medications explained to patient and patient verbalizes understanding        Merary Camilo PA-C 4

## 2019-11-26 NOTE — CASE MANAGEMENT
Pt remains focused on discharge & that, "you can't hold me here forever"  CM agreed to provide updates as received  Pt verbalized understanding & is hopeful that his other sister, Francis Chapman, will send him a care package this week

## 2019-11-26 NOTE — PROGRESS NOTES
Pt is pleasant and calm  Visible in the milieu, wandering the halls and playing with his rubber bat "Dusty"  Utilizes the radio often, and is cooperative with turning it over to staff at [address] [facility] times  Focused on discharge and getting personal items  Compliant with medications

## 2019-11-26 NOTE — PROGRESS NOTES
Med Note:  Pt received PRN Trazodone 50mg po at 2119  Pt has not yet been to sleep  Pt lying in bed at times, frequently at desk for water  Encouraged to lie down and rest to allow medications to be effective for use  Will monitor

## 2019-11-26 NOTE — PROGRESS NOTES
Status: Pt is pleasant & social   He showered & used his radio yesterday  He did get a little irritable when asking about discharge, but otherwise fine  Medication: no changes / PRN - Trazodone - took about 3 hours to fall asleep afterwards, but did sleep eventually    D/C: 3rd on waiting list for [facility]     11/26/19 0179   Team Meeting   Meeting Type Daily Rounds   Team Members Present   Team Members Present Physician;Nurse;;Occupational Therapist   Physician Team Member Dr Daya Polanco / Dr Geovanny Nayak / Eliud Barbosa [facility] Management Team Member Ruddy Collins / Willy Miner   OT Team Member Maria Guadalupe   Patient/Family Present   Patient Present No   Patient's Family Present No

## 2019-11-26 NOTE — PROGRESS NOTES
Patient slept most of the night waking up once for a snack and drink  Patient returned to his room and slept the remainder of the night  He is currently in his room appears to be sleeping

## 2019-11-27 LAB
GLUCOSE SERPL-MCNC: 111 MG/DL (ref 65–140)
GLUCOSE SERPL-MCNC: 94 MG/DL (ref 65–140)

## 2019-11-27 PROCEDURE — 82948 REAGENT STRIP/BLOOD GLUCOSE: CPT

## 2019-11-27 PROCEDURE — 99232 SBSQ HOSP IP/OBS MODERATE 35: CPT | Performed by: PHYSICIAN ASSISTANT

## 2019-11-27 RX ADMIN — OXYBUTYNIN CHLORIDE 5 MG: 5 TABLET, EXTENDED RELEASE ORAL at 10:43

## 2019-11-27 RX ADMIN — METFORMIN HYDROCHLORIDE 500 MG: 500 TABLET, FILM COATED ORAL at 10:44

## 2019-11-27 RX ADMIN — LEVOTHYROXINE SODIUM 75 MCG: 75 TABLET ORAL at 10:43

## 2019-11-27 RX ADMIN — DIVALPROEX SODIUM 1000 MG: 500 TABLET, DELAYED RELEASE ORAL at 10:42

## 2019-11-27 RX ADMIN — DOCUSATE SODIUM 100 MG: 100 CAPSULE, LIQUID FILLED ORAL at 10:42

## 2019-11-27 RX ADMIN — DIVALPROEX SODIUM 1000 MG: 500 TABLET, DELAYED RELEASE ORAL at 17:41

## 2019-11-27 RX ADMIN — CLOZAPINE 350 MG: 100 TABLET ORAL at 21:26

## 2019-11-27 RX ADMIN — DOCUSATE SODIUM 100 MG: 100 CAPSULE, LIQUID FILLED ORAL at 17:41

## 2019-11-27 RX ADMIN — CLOZAPINE 100 MG: 100 TABLET ORAL at 10:44

## 2019-11-27 RX ADMIN — OLANZAPINE 15 MG: 10 TABLET, FILM COATED ORAL at 14:42

## 2019-11-27 RX ADMIN — PANTOPRAZOLE SODIUM 40 MG: 40 TABLET, DELAYED RELEASE ORAL at 10:44

## 2019-11-27 RX ADMIN — METFORMIN HYDROCHLORIDE 500 MG: 500 TABLET, FILM COATED ORAL at 16:50

## 2019-11-27 RX ADMIN — ATORVASTATIN CALCIUM 10 MG: 10 TABLET, FILM COATED ORAL at 16:50

## 2019-11-27 NOTE — PROGRESS NOTES
Progress Note - Behavioral Health   Dylan Couch 48 y o  male MRN: [id number]  Unit/Bed#: [facility] 256-01 Encounter: [id number]    Assessment/Plan   Principal Problem:    Schizoaffective disorder ([address] )  Active Problems:    Type 2 diabetes mellitus (HCC)    Benign essential hypertension    BMI 34 0-34 9,adult      Subjective:  Patient perseverating on discharge  Seclusive to his room and did not eat any breakfast this morning  States he is tired  Denies all psychiatric symptoms when asked  No reports of irritability  No suicidal or homicidal ideation  No aggression  Remains with chronic underlying paranoia and internal preoccupation  Is not currently manic  Denied somatic complaints and does not appear to be experiencing any serious side effects   Is on monthly CBC with diff testing   Next is due 12/3/19   Medication compliant time   Awaiting discharge to [facility] when bed is available        Current Medications:  Current Facility-Administered Medications   Medication Dose Route Frequency    acetaminophen (TYLENOL) tablet 325 mg  325 mg Oral Q6H PRN    acetaminophen (TYLENOL) tablet 650 mg  650 mg Oral Q6H PRN    acetaminophen (TYLENOL) tablet 975 mg  975 mg Oral Q8H PRN    aluminum-magnesium hydroxide-simethicone (MYLANTA) 200-200-20 mg/5 mL oral suspension 30 mL  30 mL Oral Q4H PRN    atorvastatin (LIPITOR) tablet 10 mg  10 mg Oral Daily With Dinner    benztropine (COGENTIN) injection 1 mg  1 mg Intramuscular Q6H PRN    benztropine (COGENTIN) tablet 1 mg  1 mg Oral Q6H PRN    cloZAPine (CLOZARIL) tablet 100 mg  100 mg Oral Daily    cloZAPine (CLOZARIL) tablet 350 mg  350 mg Oral HS    divalproex sodium (DEPAKOTE) EC tablet 1,000 mg  1,000 mg Oral BID    docusate sodium (COLACE) capsule 100 mg  100 mg Oral BID    haloperidol (HALDOL) tablet 5 mg  5 mg Oral Q6H PRN    haloperidol lactate (HALDOL) injection 5 mg  5 mg Intramuscular Q6H PRN    levothyroxine tablet 75 mcg  75 mcg Oral Early Morning    LORazepam (ATIVAN) 2 mg/mL injection 1 mg  1 mg Intramuscular Q6H PRN    LORazepam (ATIVAN) tablet 1 mg  1 mg Oral Q6H PRN    magnesium hydroxide (MILK OF MAGNESIA) 400 mg/5 mL oral suspension 30 mL  30 mL Oral Daily PRN    metFORMIN (GLUCOPHAGE) tablet 500 mg  500 mg Oral BID With Meals    nicotine polacrilex (NICORETTE) gum 2 mg  2 mg Oral Q2H PRN    OLANZapine (ZyPREXA) tablet 15 mg  15 mg Oral After Lunch    oxybutynin (DITROPAN-XL) 24 hr tablet 5 mg  5 mg Oral Daily    pantoprazole (PROTONIX) EC tablet 40 mg  40 mg Oral Early Morning    traZODone (DESYREL) tablet 50 mg  50 mg Oral HS PRN       Behavioral Health Medications: all current active meds have been reviewed and continue current psychiatric medications  Vitals:  Vitals:    11/26/19 1518   BP: 121/53   Pulse: 100   Resp: 18   Temp: 99 1 °F (37 3 °C)   SpO2:        Laboratory results:    I have personally reviewed all pertinent laboratory/tests results    Most Recent Labs:   Lab Results   Component Value Date    WBC 8 83 11/13/2019    RBC 4 78 11/13/2019    HGB 14 2 11/13/2019    HCT 43 3 11/13/2019     11/13/2019    RDW 12 9 11/13/2019    NEUTROABS 5 16 11/13/2019    SODIUM 141 10/29/2019    K 4 3 10/29/2019     10/29/2019    CO2 28 10/29/2019    BUN 17 10/29/2019    CREATININE 0 76 10/29/2019    GLUCOSE 91 05/11/2018    GLUC 106 10/29/2019    GLUF 99 08/23/2019    CALCIUM 8 6 10/29/2019    AST 15 10/29/2019    ALT 22 10/29/2019    ALKPHOS 66 10/29/2019    TP 6 4 10/29/2019    ALB 3 1 (L) 10/29/2019    TBILI 0 20 10/29/2019    CHOLESTEROL 103 11/05/2019    HDL 27 (L) 11/05/2019    TRIG 236 (H) 11/05/2019    LDLCALC 29 11/05/2019    NONHDLC 76 11/05/2019    VALPROICTOT 67 10/29/2019    LITHIUM 0 6 03/27/2014    AMMONIA 32 10/08/2019    [id number] 1 840 08/23/2019    FREET4 0 93 08/23/2019    HGBA1C 6 2 11/05/2019     78/96/7685       Metabolic Monitoring:       Baseline:  4 week  8 week  12 week  Quarterly Annually   Weight (BMI) 237  258           Waist Circumference X X X         /99  124/74           HbA1C/Fasting plasma glucose 6 3/134 6 2/131           Lipid Profile Chol: 138  HW: 110  HDL: 34  LDL: 78 Chol:  954  T  HDL: 27  LDL: 29  Chol:   TG:   HFL:   LDL:                Psychiatric Review of Systems:  Behavior over the last 24 hours:  unchanged  Sleep: normal  Appetite: normal  Medication side effects: No  ROS: no complaints, all others negative    Mental Status Evaluation:  Appearance:  casually dressed   Behavior:  guarded   Speech:  loud   Mood:  euthymic   Affect:  increased in range   Language sparse   Thought Process:  concrete and perserverative   Thought Content:  obsessions and chronic paranoid delusions   Perceptual Disturbances: None  Chronic internal preoccupation   Risk Potential: Denied SI/HI  Potential for aggression: No   Sensorium:  person and place   Cognition:  grossly intact   Consciousness:  alert and awake    Recent and Remote Memory poor   Attention: attention span appeared shorter than expected for age   Insight:  limited   Judgment: limited   Gait/Station: normal gait/station and normal balance   Motor Activity: no abnormal movements     Progress Toward Goals: unchanged    Recommended Treatment: Continue with group therapy, milieu therapy and occupational therapy  1   Continue current medications   2   Awaiting placement at [facility]  3   Next ANC due 12/3/19, on monthly checks    Risks, benefits and possible side effects of Medications:   Risks, benefits, and possible side effects of medications explained to patient and patient verbalizes understanding        Vanessa Pelletier PA-C

## 2019-11-27 NOTE — PROGRESS NOTES
Patient slept most of the night waking up when roommate awoke and talking to staff  Roommate has deep volume when talking  He was able to return to sleep and remained asleep for most of the night getting 5 to 6 hours of sleep

## 2019-11-27 NOTE — PROGRESS NOTES
Status: Pt is compliant with medications  He slept off & on throughout the day    Medication: no changes / no PRNs  D/C: 3rd on waiting list for [facility]     11/27/19 0742   Team Meeting   Meeting Type Daily Rounds   Team Members Present   Team Members Present Physician;Nurse;;Occupational Therapist   Physician Team Member Dr Valeria Barillas / Dr Javad oLpez / Cameron Side Member [facility] Management Team Member Jeanine Coyle / Juan Koo   OT Team Member Maria Guadalupe   Patient/Family Present   Patient Present No   Patient's Family Present No

## 2019-11-27 NOTE — PROGRESS NOTES
Pt is pleasant and calm  [address] halls at times  Attended some groups  Denies SI and AH  Utilizes radio  Compliant w/ meals and meds

## 2019-11-27 NOTE — PROGRESS NOTES
Pt with bright affect at change of shift  Smiling and joking with staff  Showered this afternoon  Pt became agitated at dinner and shoved his tray away  Pt states "I'm tired of being here, I want to go"  Pt calmed down quickly on his own

## 2019-11-28 LAB
GLUCOSE SERPL-MCNC: 100 MG/DL (ref 65–140)
GLUCOSE SERPL-MCNC: 98 MG/DL (ref 65–140)

## 2019-11-28 PROCEDURE — 99232 SBSQ HOSP IP/OBS MODERATE 35: CPT | Performed by: PSYCHIATRY & NEUROLOGY

## 2019-11-28 PROCEDURE — 82948 REAGENT STRIP/BLOOD GLUCOSE: CPT

## 2019-11-28 RX ADMIN — CLOZAPINE 350 MG: 100 TABLET ORAL at 21:40

## 2019-11-28 RX ADMIN — OLANZAPINE 15 MG: 10 TABLET, FILM COATED ORAL at 13:23

## 2019-11-28 RX ADMIN — ATORVASTATIN CALCIUM 10 MG: 10 TABLET, FILM COATED ORAL at 17:17

## 2019-11-28 RX ADMIN — DOCUSATE SODIUM 100 MG: 100 CAPSULE, LIQUID FILLED ORAL at 09:16

## 2019-11-28 RX ADMIN — TRAZODONE HYDROCHLORIDE 50 MG: 50 TABLET ORAL at 22:00

## 2019-11-28 RX ADMIN — METFORMIN HYDROCHLORIDE 500 MG: 500 TABLET, FILM COATED ORAL at 17:17

## 2019-11-28 RX ADMIN — DIVALPROEX SODIUM 1000 MG: 500 TABLET, DELAYED RELEASE ORAL at 09:16

## 2019-11-28 RX ADMIN — OXYBUTYNIN CHLORIDE 5 MG: 5 TABLET, EXTENDED RELEASE ORAL at 09:16

## 2019-11-28 RX ADMIN — DOCUSATE SODIUM 100 MG: 100 CAPSULE, LIQUID FILLED ORAL at 17:17

## 2019-11-28 RX ADMIN — METFORMIN HYDROCHLORIDE 500 MG: 500 TABLET, FILM COATED ORAL at 07:45

## 2019-11-28 RX ADMIN — LEVOTHYROXINE SODIUM 75 MCG: 75 TABLET ORAL at 06:27

## 2019-11-28 RX ADMIN — DIVALPROEX SODIUM 1000 MG: 500 TABLET, DELAYED RELEASE ORAL at 17:17

## 2019-11-28 RX ADMIN — PANTOPRAZOLE SODIUM 40 MG: 40 TABLET, DELAYED RELEASE ORAL at 06:28

## 2019-11-28 RX ADMIN — CLOZAPINE 100 MG: 100 TABLET ORAL at 09:16

## 2019-11-28 NOTE — CASE MANAGEMENT
YOSELYN met with Pt to provide him an update that he is now 2nd on the waiting list for [facility]  Pt talked to CM about the Thanksgiving holiday & that his sister, Janet Kraft, is to have sent him a care package too  CM emailed Pt's sister, Mouna Bowden to provide her an update on Pt as well

## 2019-11-28 NOTE — PROGRESS NOTES
Noelle from [address] [place] [address] team visited with pt this morning  Nursing gave scheduled medication list and gave status on pt

## 2019-11-28 NOTE — PROGRESS NOTES
Daily rounds  Per shift report, pt remains focused on discharge but is redirectable, med compliant, slept

## 2019-11-28 NOTE — PROGRESS NOTES
Pt calm, cooperative  In bed most of the morning  Chatted with [facility] worker  Michel this am   Pt following normal morning routine  Irritable with redirection

## 2019-11-28 NOTE — PLAN OF CARE
Problem: SELF HARM/SUICIDALITY  Goal: Will have no self-injury during hospital stay  Description  INTERVENTIONS:  - Q 15 MINUTES: Routine safety checks  - Q WAKING SHIFT & PRN: Assess risk to determine if routine checks are adequate to maintain patient safety  - Encourage patient to participate actively in care by formulating a plan to combat response to suicidal ideation, identify supports and resources  Outcome: Progressing     Problem: INVOLUNTARY ADMIT  Goal: Will cooperate with staff recommendations and doctor's orders and will demonstrate appropriate behavior  Description  INTERVENTIONS:  - Treat underlying conditions and offer medication as ordered  - Educate regarding involuntary admission procedures and rules  - Utilize positive consistent limit setting strategies to support patient and staff safety  Outcome: Progressing     Problem: SELF CARE DEFICIT  Goal: Return ADL status to a safe level of function  Description  INTERVENTIONS:  - Administer medication as ordered  - Assess ADL deficits and provide assistive devices as needed  - Obtain PT/OT consults as needed  - Assist and instruct patient to increase activity and self care as tolerated  Outcome: Progressing     Problem: PAIN - ADULT  Goal: Verbalizes/displays adequate comfort level or baseline comfort level  Description  Interventions:  - Encourage patient to monitor pain and request assistance  - Assess pain using appropriate pain scale  - Administer analgesics based on type and severity of pain and evaluate response  - Implement non-pharmacological measures as appropriate and evaluate response  - Consider cultural and social influences on pain and pain management  - Notify physician/advanced practitioner if interventions unsuccessful or patient reports new pain  Outcome: Progressing     Problem: DISCHARGE PLANNING  Goal: Discharge to home or other facility with appropriate resources  Description  INTERVENTIONS:  - Identify barriers to discharge w/patient and caregiver  - Arrange for needed discharge resources and transportation as appropriate  - Identify discharge learning needs (meds, wound care, etc )  - Arrange for interpretive services to assist at discharge as needed  - Refer to Case Management Department for coordinating discharge planning if the patient needs post-hospital services based on physician/advanced practitioner order or complex needs related to functional status, cognitive ability, or social support system  Outcome: Progressing     Problem: Ineffective Coping  Goal: Participates in unit activities  Description  Interventions:  - Provide therapeutic environment   - Provide required programming   - Redirect inappropriate behaviors   Outcome: Progressing     Problem: Risk for Self Injury/Neglect  Goal: Complete daily ADLs, including personal hygiene independently, as able  Description  Interventions:  - Observe, teach, and assist patient with ADLS  - Monitor and promote a balance of rest/activity, with adequate nutrition and elimination  Outcome: Progressing     Problem: Nutrition/Hydration-ADULT  Goal: Nutrient/Hydration intake appropriate for improving, restoring or maintaining nutritional needs  Description  Monitor and assess patient's nutrition/hydration status for malnutrition  Collaborate with interdisciplinary team and initiate plan and interventions as ordered  Monitor patient's weight and dietary intake as ordered or per policy  Utilize nutrition screening tool and intervene as necessary  Determine patient's food preferences and provide high-protein, high-caloric foods as appropriate       INTERVENTIONS:  - Monitor oral intake, urinary output, labs, and treatment plans  - Assess nutrition and hydration status and recommend course of action  - Evaluate amount of meals eaten  - Assist patient with eating if necessary   - Allow adequate time for meals  - Recommend/ encourage appropriate diets, oral nutritional supplements, and vitamin/mineral supplements  - Order, calculate, and assess calorie counts as needed  - Recommend, monitor, and adjust tube feedings and TPN/PPN based on assessed needs  - Assess need for intravenous fluids  - Provide specific nutrition/hydration education as appropriate  - Include patient/family/caregiver in decisions related to nutrition  Outcome: Progressing

## 2019-11-28 NOTE — PROGRESS NOTES
Progress Note - Behavioral Health     Tracy Finney 48 y o  male MRN: @MRN   Unit/Bed#: [facility]-01 Encounter: [id number]    Per nursing report and sign out, patient is relatively stable, but set up for Raissa Flkinga Olney reports today that he is fine  "when am I getting out of here?"  Denies issues, scant and brief  Cut interview off, disinterested, "I'm alright"    Energy: ok  Sleep: normal  Appetite: normal  Medication side effects: No   ROS: no complaints    Mental Status Evaluation:    Appearance:  disheveled   Behavior:  guarded   Speech:  Normal volume, regular rate and rhythm   Mood:  euthymic   Affect:  blunted       Thought Process:  perserverative   Associations: intact associations   Thought Content:  paranoid ideation   Perceptual Disturbances: no auditory or visual hallcunations   Risk Potential: Suicidal ideation - None  Homicidal ideation - None  Potential for aggression - No   Sensorium:  unable to assess   Cognition:  grossly intact   Consciousness:  Alert & Awake   Memory:  recent and remote memory: unable to assess due to lack of cooperation   Attention: attention span and concentration appear shorter than expected for age           Insight:  limited   Judgment: limited   Muscle Strength  Muscle Tone normal  normal   Gait/Station: normal gait/station with good balance   Motor Activity: no abnormal movements       Vital signs in last 24 hours:         Laboratory results:  I have personally reviewed all pertinent laboratory/tests results  Assessment/Plan   Principal Problem:    Schizoaffective disorder (HCC)  Active Problems:    Type 2 diabetes mellitus (HCC)    Benign essential hypertension    BMI 34 0-34 9,adult      Tracy Finney is about the same per report, no acute issues    Recommended Treatment:     Planned medication and treatment changes: All current active medications have been reviewed    Encourage group therapy, milieu therapy and occupational therapy  Behavioral Health checks as unit standard unless ordered or noted otherwise      Current Facility-Administered Medications:  acetaminophen 325 mg Oral Q6H PRN Fritz Ferrell PA-C   acetaminophen 650 mg Oral Q6H PRN Fritz Ferrell PA-C   acetaminophen 975 mg Oral Q8H PRN Fritz Ferrell PA-C   aluminum-magnesium hydroxide-simethicone 30 mL Oral Q4H PRN Mary Junior MD   atorvastatin 10 mg Oral Daily With Perla Milligan MD   benztropine 1 mg Intramuscular Q6H PRN Mary Junior MD   benztropine 1 mg Oral Q6H PRN Mary Junior MD   clozapine 100 mg Oral Daily Paul Richardson MD   cloZAPine 350 mg Oral HS [facility]   divalproex sodium 1,000 mg Oral BID Fritz Ferrell PA-C   docusate sodium 100 mg Oral BID Fritz Ferrell PA-C   haloperidol 5 mg Oral Q6H PRN Mary Junior MD   haloperidol lactate 5 mg Intramuscular Q6H PRN Mary Junior MD   levothyroxine 75 mcg Oral Early Morning Sofía Ross PA-C   LORazepam 1 mg Intramuscular Q6H PRN Mary Junior MD   LORazepam 1 mg Oral Q6H PRN Mary Junior MD   magnesium hydroxide 30 mL Oral Daily PRN Mary Junior MD   metFORMIN 500 mg Oral BID With Meals Sofía Ross PA-C   nicotine polacrilex 2 mg Oral Q2H PRN Mary Junior MD   OLANZapine 15 mg Oral After Lunch [facility]   oxybutynin 5 mg Oral Daily Fritz Ferrell PA-C   pantoprazole 40 mg Oral Early Morning Sofía Ross PA-C   traZODone 50 mg Oral HS PRN Mary Junior MD       1) continue current treatment  2) Continue to support patient and engage them in the programs available as feasible and appropriate  Continue case management support and therapy  Continue discharge planning  Risks / Benefits of Treatment:    Risks, benefits, and possible side effects of medications explained to patient and patient verbalizes understanding and agreement for treatment  Counseling / Coordination of Care:    Medication changes reviewed with nursing staff    Medications, treatment progress and treatment plan reviewed with patient        Amena Nicolle III, DO  11/28/2019  7:59 AM

## 2019-11-29 LAB
GLUCOSE SERPL-MCNC: 100 MG/DL (ref 65–140)
GLUCOSE SERPL-MCNC: 109 MG/DL (ref 65–140)

## 2019-11-29 PROCEDURE — 99232 SBSQ HOSP IP/OBS MODERATE 35: CPT | Performed by: PHYSICIAN ASSISTANT

## 2019-11-29 PROCEDURE — 82948 REAGENT STRIP/BLOOD GLUCOSE: CPT

## 2019-11-29 RX ADMIN — DIVALPROEX SODIUM 1000 MG: 500 TABLET, DELAYED RELEASE ORAL at 17:12

## 2019-11-29 RX ADMIN — ALUMINUM HYDROXIDE, MAGNESIUM HYDROXIDE, AND SIMETHICONE 30 ML: 200; 200; 20 SUSPENSION ORAL at 19:42

## 2019-11-29 RX ADMIN — PANTOPRAZOLE SODIUM 40 MG: 40 TABLET, DELAYED RELEASE ORAL at 05:52

## 2019-11-29 RX ADMIN — DIVALPROEX SODIUM 1000 MG: 500 TABLET, DELAYED RELEASE ORAL at 09:01

## 2019-11-29 RX ADMIN — TRAZODONE HYDROCHLORIDE 50 MG: 50 TABLET ORAL at 21:59

## 2019-11-29 RX ADMIN — DOCUSATE SODIUM 100 MG: 100 CAPSULE, LIQUID FILLED ORAL at 17:12

## 2019-11-29 RX ADMIN — OXYBUTYNIN CHLORIDE 5 MG: 5 TABLET, EXTENDED RELEASE ORAL at 09:01

## 2019-11-29 RX ADMIN — ATORVASTATIN CALCIUM 10 MG: 10 TABLET, FILM COATED ORAL at 16:48

## 2019-11-29 RX ADMIN — METFORMIN HYDROCHLORIDE 500 MG: 500 TABLET, FILM COATED ORAL at 16:48

## 2019-11-29 RX ADMIN — CLOZAPINE 100 MG: 100 TABLET ORAL at 09:01

## 2019-11-29 RX ADMIN — LEVOTHYROXINE SODIUM 75 MCG: 75 TABLET ORAL at 05:52

## 2019-11-29 RX ADMIN — OLANZAPINE 15 MG: 10 TABLET, FILM COATED ORAL at 13:30

## 2019-11-29 RX ADMIN — DOCUSATE SODIUM 100 MG: 100 CAPSULE, LIQUID FILLED ORAL at 09:01

## 2019-11-29 RX ADMIN — METFORMIN HYDROCHLORIDE 500 MG: 500 TABLET, FILM COATED ORAL at 09:01

## 2019-11-29 RX ADMIN — CLOZAPINE 350 MG: 100 TABLET ORAL at 21:34

## 2019-11-29 NOTE — PROGRESS NOTES
Pt is cooperative with taking med's, meals, and water schedule  Enjoys light banter with staff  Non agitated  No concerns for nursing

## 2019-11-29 NOTE — PROGRESS NOTES
Progress Note - Behavioral Health   Era Darian 48 y o  male MRN: [id number]  Unit/Bed#: [facility] 256-01 Encounter: [id number]    Assessment/Plan   Principal Problem:    Schizoaffective disorder ([address] )  Active Problems:    Type 2 diabetes mellitus (HCC)    Benign essential hypertension    BMI 34 0-34 9,adult      Subjective:  Patient still perseverating on discharge  Denies all psychiatric symptoms and has no questions or complaints  States he feels good and does not want to talk  Patient has chronic underlying paranoia and internal preoccupation  No suicidal or homicidal ideation and no aggression at this time  Is not currently manic  Denied any somatic complaints  Not experiencing any medication side effects and is medication compliant  Continues to ask about [facility] placement  Awaiting discharge when next [facility] bed available        Current Medications:  Current Facility-Administered Medications   Medication Dose Route Frequency    acetaminophen (TYLENOL) tablet 325 mg  325 mg Oral Q6H PRN    acetaminophen (TYLENOL) tablet 650 mg  650 mg Oral Q6H PRN    acetaminophen (TYLENOL) tablet 975 mg  975 mg Oral Q8H PRN    aluminum-magnesium hydroxide-simethicone (MYLANTA) 200-200-20 mg/5 mL oral suspension 30 mL  30 mL Oral Q4H PRN    atorvastatin (LIPITOR) tablet 10 mg  10 mg Oral Daily With Dinner    benztropine (COGENTIN) injection 1 mg  1 mg Intramuscular Q6H PRN    benztropine (COGENTIN) tablet 1 mg  1 mg Oral Q6H PRN    cloZAPine (CLOZARIL) tablet 100 mg  100 mg Oral Daily    cloZAPine (CLOZARIL) tablet 350 mg  350 mg Oral HS    divalproex sodium (DEPAKOTE) EC tablet 1,000 mg  1,000 mg Oral BID    docusate sodium (COLACE) capsule 100 mg  100 mg Oral BID    haloperidol (HALDOL) tablet 5 mg  5 mg Oral Q6H PRN    haloperidol lactate (HALDOL) injection 5 mg  5 mg Intramuscular Q6H PRN    levothyroxine tablet 75 mcg  75 mcg Oral Early Morning    LORazepam (ATIVAN) 2 mg/mL injection 1 mg  1 mg Intramuscular Q6H PRN    LORazepam (ATIVAN) tablet 1 mg  1 mg Oral Q6H PRN    magnesium hydroxide (MILK OF MAGNESIA) 400 mg/5 mL oral suspension 30 mL  30 mL Oral Daily PRN    metFORMIN (GLUCOPHAGE) tablet 500 mg  500 mg Oral BID With Meals    nicotine polacrilex (NICORETTE) gum 2 mg  2 mg Oral Q2H PRN    OLANZapine (ZyPREXA) tablet 15 mg  15 mg Oral After Lunch    oxybutynin (DITROPAN-XL) 24 hr tablet 5 mg  5 mg Oral Daily    pantoprazole (PROTONIX) EC tablet 40 mg  40 mg Oral Early Morning    traZODone (DESYREL) tablet 50 mg  50 mg Oral HS PRN       Behavioral Health Medications: all current active meds have been reviewed  Vitals:  Vitals:    19 0742   BP: 137/80   Pulse: 90   Resp: 18   Temp: 98 9 °F (37 2 °C)   SpO2: 61%     Metabolic Monitoring:       Baseline:  4 week  8 week  12 week  Quarterly Annually   Weight (BMI) 237  258           Waist Circumference X X X         /99  124/74           HbA1C/Fasting plasma glucose 6 3/134 6 2/131           Lipid Profile Chol: 138  T  HDL: 34  LDL: 78 Chol:  103  T  HDL: 27  LDL: 29  Chol:   TG:   HFL:   LDL:              Laboratory results:  I have personally reviewed all pertinent laboratory/tests results  Psychiatric Review of Systems:  Behavior over the last 24 hours:  Unchanged   Sleep: normal  Appetite: normal  Medication side effects: No  ROS: no complaints, all others negative    Mental Status Evaluation:  Appearance:  casually dressed and disheveled   Behavior:  guarded   Speech:  loud   Mood:  euthymic   Affect:  increased in range   Language Sparse   Thought Process:  concrete and perserverative   Thought Content:  Obsessions and chronic paranoid delusions   Perceptual Disturbances: None  Chronic internal preoccupation   Risk Potential: Denied SI/HI   Potential for aggression: No   Sensorium:  person and place   Cognition:  grossly intact   Consciousness:  alert    Recent and Remote Memory limited   Attention: attention span appeared shorter than expected for age   Insight:  limited   Judgment: limited   Gait/Station: normal gait/station   Motor Activity: no abnormal movements     Progress Toward Goals: unchanged    Recommended Treatment: Continue with group therapy, milieu therapy and occupational therapy  1  Continue current medication regimen  2  Awaiting placement at [facility]  3  Next ANC due 12/3/19, on monthly checks    Risks, benefits and possible side effects of Medications:   Risks, benefits, and possible side effects of medications explained to patient and patient verbalizes understanding        Yazmin Roberts PA-C

## 2019-11-29 NOTE — PROGRESS NOTES
With the exception of awakening x1 during the night, briefly, has been sleeping throughout the night, without restlessness

## 2019-11-29 NOTE — PROGRESS NOTES
Pt pleasant and calm throughout morning  Asks about discharge but was calm discussing   Listening to radio in bedroom after breakfast

## 2019-11-29 NOTE — PROGRESS NOTES
Pt assessed at start of shift  Denies any concerns or questions for nursing  Cont's to be cooperative with water restriction and taking medications  Non agitated  Disheveled, somewhat disorganized but overall much improved

## 2019-11-29 NOTE — PROGRESS NOTES
Status: Pt is doing well, no problems    Medication: no changes   D/C: 2nd on waiting list for [facility]     11/29/19 7593   Team Meeting   Meeting Type Daily Rounds   Team Members Present   Team Members Present Physician;Nurse;;Occupational Therapist   Physician Team Member Dr Jennifer Deshpande / Sharon Giles Team Member [facility] Management Team Member Maite Fitzgerald / Mala Duffy   OT Team Member Maria Guadalupe   Patient/Family Present   Patient Present No   Patient's Family Present No

## 2019-11-30 LAB
GLUCOSE SERPL-MCNC: 111 MG/DL (ref 65–140)
GLUCOSE SERPL-MCNC: 122 MG/DL (ref 65–140)

## 2019-11-30 PROCEDURE — 82948 REAGENT STRIP/BLOOD GLUCOSE: CPT

## 2019-11-30 PROCEDURE — 99232 SBSQ HOSP IP/OBS MODERATE 35: CPT | Performed by: PSYCHIATRY & NEUROLOGY

## 2019-11-30 RX ADMIN — DOCUSATE SODIUM 100 MG: 100 CAPSULE, LIQUID FILLED ORAL at 17:48

## 2019-11-30 RX ADMIN — METFORMIN HYDROCHLORIDE 500 MG: 500 TABLET, FILM COATED ORAL at 16:51

## 2019-11-30 RX ADMIN — OXYBUTYNIN CHLORIDE 5 MG: 5 TABLET, EXTENDED RELEASE ORAL at 11:19

## 2019-11-30 RX ADMIN — OLANZAPINE 15 MG: 10 TABLET, FILM COATED ORAL at 14:55

## 2019-11-30 RX ADMIN — METFORMIN HYDROCHLORIDE 500 MG: 500 TABLET, FILM COATED ORAL at 11:19

## 2019-11-30 RX ADMIN — CLOZAPINE 100 MG: 100 TABLET ORAL at 11:19

## 2019-11-30 RX ADMIN — DIVALPROEX SODIUM 1000 MG: 500 TABLET, DELAYED RELEASE ORAL at 17:48

## 2019-11-30 RX ADMIN — TRAZODONE HYDROCHLORIDE 50 MG: 50 TABLET ORAL at 21:38

## 2019-11-30 RX ADMIN — LEVOTHYROXINE SODIUM 75 MCG: 75 TABLET ORAL at 11:18

## 2019-11-30 RX ADMIN — CLOZAPINE 350 MG: 100 TABLET ORAL at 21:39

## 2019-11-30 RX ADMIN — DOCUSATE SODIUM 100 MG: 100 CAPSULE, LIQUID FILLED ORAL at 11:19

## 2019-11-30 RX ADMIN — ATORVASTATIN CALCIUM 10 MG: 10 TABLET, FILM COATED ORAL at 16:51

## 2019-11-30 RX ADMIN — DIVALPROEX SODIUM 1000 MG: 500 TABLET, DELAYED RELEASE ORAL at 11:18

## 2019-11-30 RX ADMIN — PANTOPRAZOLE SODIUM 40 MG: 40 TABLET, DELAYED RELEASE ORAL at 11:18

## 2019-11-30 NOTE — PROGRESS NOTES
Pt refused AM glucose check as well as AM meds  Stated that he wants to sleep   Pt declined to eat breakfast

## 2019-11-30 NOTE — PROGRESS NOTES
Daily rounds  Per shift report, no behavioral issues yesterday, slept overnight, observed to be talking in sleep frequently

## 2019-11-30 NOTE — PROGRESS NOTES
Pt napping throughout morning, stated he is tired, refusing AM vitals, blood glucose, breakfast  Pt refused multiple times and said he just wants to rest

## 2019-12-01 LAB
GLUCOSE SERPL-MCNC: 115 MG/DL (ref 65–140)
GLUCOSE SERPL-MCNC: 117 MG/DL (ref 65–140)

## 2019-12-01 PROCEDURE — 99232 SBSQ HOSP IP/OBS MODERATE 35: CPT | Performed by: PSYCHIATRY & NEUROLOGY

## 2019-12-01 PROCEDURE — 82948 REAGENT STRIP/BLOOD GLUCOSE: CPT

## 2019-12-01 RX ADMIN — DOCUSATE SODIUM 100 MG: 100 CAPSULE, LIQUID FILLED ORAL at 08:54

## 2019-12-01 RX ADMIN — DOCUSATE SODIUM 100 MG: 100 CAPSULE, LIQUID FILLED ORAL at 17:07

## 2019-12-01 RX ADMIN — CLOZAPINE 100 MG: 100 TABLET ORAL at 08:54

## 2019-12-01 RX ADMIN — CLOZAPINE 350 MG: 100 TABLET ORAL at 21:11

## 2019-12-01 RX ADMIN — OLANZAPINE 15 MG: 10 TABLET, FILM COATED ORAL at 14:19

## 2019-12-01 RX ADMIN — OXYBUTYNIN CHLORIDE 5 MG: 5 TABLET, EXTENDED RELEASE ORAL at 08:54

## 2019-12-01 RX ADMIN — LEVOTHYROXINE SODIUM 75 MCG: 75 TABLET ORAL at 08:54

## 2019-12-01 RX ADMIN — PANTOPRAZOLE SODIUM 40 MG: 40 TABLET, DELAYED RELEASE ORAL at 08:54

## 2019-12-01 RX ADMIN — DIVALPROEX SODIUM 1000 MG: 500 TABLET, DELAYED RELEASE ORAL at 17:07

## 2019-12-01 RX ADMIN — METFORMIN HYDROCHLORIDE 500 MG: 500 TABLET, FILM COATED ORAL at 08:54

## 2019-12-01 RX ADMIN — TRAZODONE HYDROCHLORIDE 50 MG: 50 TABLET ORAL at 22:57

## 2019-12-01 RX ADMIN — ATORVASTATIN CALCIUM 10 MG: 10 TABLET, FILM COATED ORAL at 17:07

## 2019-12-01 RX ADMIN — DIVALPROEX SODIUM 1000 MG: 500 TABLET, DELAYED RELEASE ORAL at 08:54

## 2019-12-01 RX ADMIN — METFORMIN HYDROCHLORIDE 500 MG: 500 TABLET, FILM COATED ORAL at 17:07

## 2019-12-01 NOTE — PROGRESS NOTES
Daily Rounds:    Pt stable  Sleeping well with trazodone  Pt has been sleeping later in the morning and refused breakfast x2 days  Pleasant when awake  Madelaine Maldonado for discharge

## 2019-12-01 NOTE — PROGRESS NOTES
Pt is laughing and smiling on unit, with staff and peers  Calm and cooperative  Utilizing radio off and on  Denies SI, HI, AVH

## 2019-12-01 NOTE — PROGRESS NOTES
Progress Note - [facility] Alpha 48 y o  male MRN: @MRN   Unit/Bed#: [facility] 256-01 Encounter: [id number]    Per nursing report and sign out, patient sleeping well, and into the day  Pleasant, d/c focus     Bianka Senior reports today that he is "OK"  Does not want to talk much  Denies all symptoms, wanting to leave, asking about discharge plans  Energy: ok, but lays in bed  Sleep: increased  Appetite: normal  Medication side effects: No   ROS: no complaints    Mental Status Evaluation:    Appearance:  overweight, disheveled   Behavior:  guarded   Speech:  Normal volume, regular rate and rhythm, paucity of speech   Mood:  euthymic   Affect:  blunted       Thought Process:  Linear and goal directed, perserverative   Associations: intact associations   Thought Content:  no overt delusions, paranoid ideation   Perceptual Disturbances: no auditory or visual hallcunations   Risk Potential: Suicidal ideation - None  Homicidal ideation - None  Potential for aggression - No   Sensorium:  oriented to person and place   Cognition:  grossly intact   Consciousness:  Alert & Awake   Memory:  recent and remote memory grossly intact   Attention: attention span and concentration appear shorter than expected for age           Insight:  limited   Judgment: limited   Muscle Strength  Muscle Tone normal  normal   Gait/Station: normal gait/station with good balance   Motor Activity: no abnormal movements       Vital signs in last 24 hours:    Temp:  [98 7 °F (37 1 °C)] 98 7 °F (37 1 °C)  HR:  [98] 98  Resp:  [16] 16  BP: (130)/(88) 130/88    Laboratory results:  I have personally reviewed all pertinent laboratory/tests results  Assessment/Plan   Principal Problem:    Schizoaffective disorder (HCC)  Active Problems:    Type 2 diabetes mellitus (HCC)    Benign essential hypertension    BMI 34 0-34 9,adult      Bianka Senior is about same, no acute issues      Recommended Treatment:     Planned medication and treatment changes: All current active medications have been reviewed  Encourage group therapy, milieu therapy and occupational therapy  [address] checks as unit standard unless ordered or noted otherwise      Current Facility-Administered Medications:  acetaminophen 325 mg Oral Q6H PRN Clarance Sauger, PA-C   acetaminophen 650 mg Oral Q6H PRN Clarance Sauger, PA-C   acetaminophen 975 mg Oral Q8H PRN Clarance Sauger, PA-C   aluminum-magnesium hydroxide-simethicone 30 mL Oral Q4H PRN Chavo Irwin MD   atorvastatin 10 mg Oral Daily With Aquilino Yee MD   benztropine 1 mg Intramuscular Q6H PRN Chavo Irwin MD   benztropine 1 mg Oral Q6H PRN Chavo Irwin MD   clozapine 100 mg Oral Daily Luis Angel Mejia MD   cloZAPine 350 mg Oral HS [facility]   divalproex sodium 1,000 mg Oral BID Clarance Sauger, PA-C   docusate sodium 100 mg Oral BID Clarance Sauger, PA-C   haloperidol 5 mg Oral Q6H PRN Chavo Irwin, MD   haloperidol lactate 5 mg Intramuscular Q6H PRN Chavo Irwin MD   levothyroxine 75 mcg Oral Early Morning Jayda Schroeder PA-C   LORazepam 1 mg Intramuscular Q6H PRN Chavo Irwin MD   LORazepam 1 mg Oral Q6H PRN Chavo Irwin MD   magnesium hydroxide 30 mL Oral Daily PRN Chavo Irwin MD   metFORMIN 500 mg Oral BID With Meals Jayda Schroeder PA-C   nicotine polacrilex 2 mg Oral Q2H PRN Chavo Irwin MD   OLANZapine 15 mg Oral After Lunch [facility]   oxybutynin 5 mg Oral Daily Clarance Sauger, PA-PUSHPA   pantoprazole 40 mg Oral Early Morning Jayda Schroeder PA-C   traZODone 50 mg Oral HS PRN Chavo Irwin MD       1) continue current treatment  2) Continue to support patient and engage them in the programs available as feasible and appropriate  Continue case management support and therapy  Continue discharge planning      Risks / Benefits of Treatment:    Risks, benefits, and possible side effects of medications explained to patient and patient verbalizes understanding and agreement for treatment  Counseling / Coordination of Care:    Medication changes reviewed with nursing staff  Medications, treatment progress and treatment plan reviewed with patient        Alan Clayton III, DO  12/1/2019  8:32 AM

## 2019-12-02 LAB
GLUCOSE SERPL-MCNC: 106 MG/DL (ref 65–140)
GLUCOSE SERPL-MCNC: 109 MG/DL (ref 65–140)

## 2019-12-02 PROCEDURE — 82948 REAGENT STRIP/BLOOD GLUCOSE: CPT

## 2019-12-02 PROCEDURE — 99232 SBSQ HOSP IP/OBS MODERATE 35: CPT | Performed by: PHYSICIAN ASSISTANT

## 2019-12-02 RX ADMIN — METFORMIN HYDROCHLORIDE 500 MG: 500 TABLET, FILM COATED ORAL at 09:16

## 2019-12-02 RX ADMIN — DOCUSATE SODIUM 100 MG: 100 CAPSULE, LIQUID FILLED ORAL at 17:17

## 2019-12-02 RX ADMIN — METFORMIN HYDROCHLORIDE 500 MG: 500 TABLET, FILM COATED ORAL at 17:17

## 2019-12-02 RX ADMIN — DIVALPROEX SODIUM 1000 MG: 500 TABLET, DELAYED RELEASE ORAL at 09:16

## 2019-12-02 RX ADMIN — PANTOPRAZOLE SODIUM 40 MG: 40 TABLET, DELAYED RELEASE ORAL at 09:15

## 2019-12-02 RX ADMIN — DIVALPROEX SODIUM 1000 MG: 500 TABLET, DELAYED RELEASE ORAL at 17:17

## 2019-12-02 RX ADMIN — OXYBUTYNIN CHLORIDE 5 MG: 5 TABLET, EXTENDED RELEASE ORAL at 09:16

## 2019-12-02 RX ADMIN — CLOZAPINE 100 MG: 100 TABLET ORAL at 09:15

## 2019-12-02 RX ADMIN — ATORVASTATIN CALCIUM 10 MG: 10 TABLET, FILM COATED ORAL at 17:17

## 2019-12-02 RX ADMIN — TRAZODONE HYDROCHLORIDE 50 MG: 50 TABLET ORAL at 22:15

## 2019-12-02 RX ADMIN — CLOZAPINE 350 MG: 100 TABLET ORAL at 21:22

## 2019-12-02 RX ADMIN — DOCUSATE SODIUM 100 MG: 100 CAPSULE, LIQUID FILLED ORAL at 09:16

## 2019-12-02 RX ADMIN — OLANZAPINE 15 MG: 10 TABLET, FILM COATED ORAL at 14:56

## 2019-12-02 RX ADMIN — NICOTINE POLACRILEX 2 MG: 2 GUM, CHEWING BUCCAL at 14:57

## 2019-12-02 RX ADMIN — LEVOTHYROXINE SODIUM 75 MCG: 75 TABLET ORAL at 09:16

## 2019-12-02 NOTE — PROGRESS NOTES
Pt is pleasant  Wandering halls at times  Preoccupied with discharge  Frequently asking for water  Denies SI/AH  Compliant with medications

## 2019-12-02 NOTE — PROGRESS NOTES
Progress Note - Behavioral Health   Alethea Can 48 y o  male MRN: [id number]  Unit/Bed#: [facility] 256-01 Encounter: [id number]    Assessment/Plan   Principal Problem:    Schizoaffective disorder ([address] )  Active Problems:    Type 2 diabetes mellitus (HCC)    Benign essential hypertension    BMI 34 0-34 9,adult      Subjective:  Patient not interested in interview  Did not ask about discharge today  States he feels well and was seen attending groups  Has improvement in ADLs and organization  Remains concrete in thought process  Poor attention span  Denies all psychiatric symptoms when asked  Appears less paranoid and less internally preoccupied  Not currently manic  No agitation  Medication compliant  Tolerating medications well without serious side effects  Blood work due tomorrow      Current Medications:  Current Facility-Administered Medications   Medication Dose Route Frequency    acetaminophen (TYLENOL) tablet 325 mg  325 mg Oral Q6H PRN    acetaminophen (TYLENOL) tablet 650 mg  650 mg Oral Q6H PRN    acetaminophen (TYLENOL) tablet 975 mg  975 mg Oral Q8H PRN    aluminum-magnesium hydroxide-simethicone (MYLANTA) 200-200-20 mg/5 mL oral suspension 30 mL  30 mL Oral Q4H PRN    atorvastatin (LIPITOR) tablet 10 mg  10 mg Oral Daily With Dinner    benztropine (COGENTIN) injection 1 mg  1 mg Intramuscular Q6H PRN    benztropine (COGENTIN) tablet 1 mg  1 mg Oral Q6H PRN    cloZAPine (CLOZARIL) tablet 100 mg  100 mg Oral Daily    cloZAPine (CLOZARIL) tablet 350 mg  350 mg Oral HS    divalproex sodium (DEPAKOTE) EC tablet 1,000 mg  1,000 mg Oral BID    docusate sodium (COLACE) capsule 100 mg  100 mg Oral BID    haloperidol (HALDOL) tablet 5 mg  5 mg Oral Q6H PRN    haloperidol lactate (HALDOL) injection 5 mg  5 mg Intramuscular Q6H PRN    levothyroxine tablet 75 mcg  75 mcg Oral Early Morning    LORazepam (ATIVAN) 2 mg/mL injection 1 mg  1 mg Intramuscular Q6H PRN    LORazepam (ATIVAN) tablet 1 mg 1 mg Oral Q6H PRN    magnesium hydroxide (MILK OF MAGNESIA) 400 mg/5 mL oral suspension 30 mL  30 mL Oral Daily PRN    metFORMIN (GLUCOPHAGE) tablet 500 mg  500 mg Oral BID With Meals    nicotine polacrilex (NICORETTE) gum 2 mg  2 mg Oral Q2H PRN    OLANZapine (ZyPREXA) tablet 15 mg  15 mg Oral After Lunch    oxybutynin (DITROPAN-XL) 24 hr tablet 5 mg  5 mg Oral Daily    pantoprazole (PROTONIX) EC tablet 40 mg  40 mg Oral Early Morning    traZODone (DESYREL) tablet 50 mg  50 mg Oral HS PRN       Behavioral Health Medications: all current active meds have been reviewed and continue current psychiatric medications  Vitals:  Vitals:    12/01/19 1515   BP: 136/89   Pulse: 99   Resp: 17   SpO2:        Laboratory results:    I have personally reviewed all pertinent laboratory/tests results    Most Recent Labs:   Lab Results   Component Value Date    WBC 8 83 11/13/2019    RBC 4 78 11/13/2019    HGB 14 2 11/13/2019    HCT 43 3 11/13/2019     11/13/2019    RDW 12 9 11/13/2019    NEUTROABS 5 16 11/13/2019    SODIUM 141 10/29/2019    K 4 3 10/29/2019     10/29/2019    CO2 28 10/29/2019    BUN 17 10/29/2019    CREATININE 0 76 10/29/2019    GLUCOSE 91 05/11/2018    GLUC 106 10/29/2019    GLUF 99 08/23/2019    CALCIUM 8 6 10/29/2019    AST 15 10/29/2019    ALT 22 10/29/2019    ALKPHOS 66 10/29/2019    TP 6 4 10/29/2019    ALB 3 1 (L) 10/29/2019    TBILI 0 20 10/29/2019    CHOLESTEROL 103 11/05/2019    HDL 27 (L) 11/05/2019    TRIG 236 (H) 11/05/2019    LDLCALC 29 11/05/2019    NONHDLC 76 11/05/2019    VALPROICTOT 67 10/29/2019    LITHIUM 0 6 03/27/2014    AMMONIA 32 10/08/2019    [id number] 1 840 08/23/2019    FREET4 0 93 08/23/2019    HGBA1C 6 2 11/05/2019     35/86/7310     Metabolic Monitoring:       Baseline:  4 week  8 week  12 week  Quarterly Annually   Weight (BMI) 237  258           Waist Circumference X X X         /99  124/74           HbA1C/Fasting plasma glucose 6 3/134 6 2/131           Lipid Profile Chol: 138  GF: 120  HDL: 34  LDL: 78 Chol:  103  T  HDL: 27  LDL: 29  Chol:   TG:   HFL:   LDL:               Psychiatric Review of Systems:  Behavior over the last 24 hours:  unchanged  Sleep: normal  Appetite: normal  Medication side effects: No  ROS: no complaints, all others negative    Mental Status Evaluation:  Appearance:  casually dressed   Behavior:  guarded but cooperative   Speech:  loud   Mood:  euthymic   Affect:  blunted and constricted   Language naming objects and repeating phrases   Thought Process:  concrete, goal directed and perserverative   Thought Content:  obsessions, less paranoid   Perceptual Disturbances: None  Less internally preoccupied   Risk Potential: Denied SI/HI  Potential for aggression: No   Sensorium:  person, place and time/date   Cognition:  grossly intact   Consciousness:  alert and awake    Recent and Remote Memory poor   Attention: attention span appeared shorter than expected for age   Insight:  limited   Judgment: limited   Gait/Station: normal gait/station and normal balance   Motor Activity: no abnormal movements     Progress Toward Goals: slow progression    Recommended Treatment: Continue with group therapy, milieu therapy and occupational therapy  1   Continue current medications  2  A1c, Lipid panel, and weight for tomorrow for metabolic monitoring  3  CBC w/diff tomorrow for Clozaril monitoring  4  Awaiting EAC placement    Risks, benefits and possible side effects of Medications:   Risks, benefits, and possible side effects of medications explained to patient and patient verbalizes understanding        Jaciel Barrera PA-C

## 2019-12-02 NOTE — PROGRESS NOTES
Pt pleasant and calm today  Slept late then awoke for breakfast after prompted several times  Observed walking halls at times  No questions/concerns at this time

## 2019-12-02 NOTE — PROGRESS NOTES
12/02/19 0736   Team Meeting   Meeting Type Daily Rounds   Team Members Present   Team Members Present Physician;Nurse;;Occupational Therapist   Physician Team Member Dr Jeannette Myers, Dr Grupo Saxena, Pan Godinez (PA-C)   Nursing Team Member GRANT [facility] Management Team Member [place]   OT Team Member George   Patient/Family Present   Patient Present No   Patient's Family Present No     Slept all day yesterday  Listening to his music on Saturday  EAC   Number 2 on list

## 2019-12-03 LAB
BASOPHILS # BLD AUTO: 0.04 THOUSANDS/ΜL (ref 0–0.1)
BASOPHILS NFR BLD AUTO: 1 % (ref 0–1)
CHOLEST SERPL-MCNC: 120 MG/DL (ref 50–200)
EOSINOPHIL # BLD AUTO: 0 THOUSAND/ΜL (ref 0–0.61)
EOSINOPHIL NFR BLD AUTO: 0 % (ref 0–6)
ERYTHROCYTE [DISTWIDTH] IN BLOOD BY AUTOMATED COUNT: 13.1 % (ref 11.6–15.1)
EST. AVERAGE GLUCOSE BLD GHB EST-MCNC: 128 MG/DL
GLUCOSE SERPL-MCNC: 115 MG/DL (ref 65–140)
GLUCOSE SERPL-MCNC: 78 MG/DL (ref 65–140)
HBA1C MFR BLD: 6.1 % (ref 4.2–6.3)
HCT VFR BLD AUTO: 41.6 % (ref 36.5–49.3)
HDLC SERPL-MCNC: 28 MG/DL
HGB BLD-MCNC: 13.9 G/DL (ref 12–17)
IMM GRANULOCYTES # BLD AUTO: 0.04 THOUSAND/UL (ref 0–0.2)
IMM GRANULOCYTES NFR BLD AUTO: 1 % (ref 0–2)
LDLC SERPL CALC-MCNC: 45 MG/DL (ref 0–100)
LYMPHOCYTES # BLD AUTO: 2.59 THOUSANDS/ΜL (ref 0.6–4.47)
LYMPHOCYTES NFR BLD AUTO: 30 % (ref 14–44)
MCH RBC QN AUTO: 29.1 PG (ref 26.8–34.3)
MCHC RBC AUTO-ENTMCNC: 33.4 G/DL (ref 31.4–37.4)
MCV RBC AUTO: 87 FL (ref 82–98)
MONOCYTES # BLD AUTO: 1.05 THOUSAND/ΜL (ref 0.17–1.22)
MONOCYTES NFR BLD AUTO: 12 % (ref 4–12)
NEUTROPHILS # BLD AUTO: 4.85 THOUSANDS/ΜL (ref 1.85–7.62)
NEUTS SEG NFR BLD AUTO: 56 % (ref 43–75)
NONHDLC SERPL-MCNC: 92 MG/DL
PLATELET # BLD AUTO: 154 THOUSANDS/UL (ref 149–390)
PMV BLD AUTO: 10.4 FL (ref 8.9–12.7)
RBC # BLD AUTO: 4.77 MILLION/UL (ref 3.88–5.62)
TRIGL SERPL-MCNC: 237 MG/DL
WBC # BLD AUTO: 8.57 THOUSAND/UL (ref 4.31–10.16)

## 2019-12-03 PROCEDURE — 99232 SBSQ HOSP IP/OBS MODERATE 35: CPT | Performed by: PHYSICIAN ASSISTANT

## 2019-12-03 PROCEDURE — 80061 LIPID PANEL: CPT | Performed by: PHYSICIAN ASSISTANT

## 2019-12-03 PROCEDURE — 83036 HEMOGLOBIN GLYCOSYLATED A1C: CPT | Performed by: PHYSICIAN ASSISTANT

## 2019-12-03 PROCEDURE — 82948 REAGENT STRIP/BLOOD GLUCOSE: CPT

## 2019-12-03 PROCEDURE — 85025 COMPLETE CBC W/AUTO DIFF WBC: CPT | Performed by: PHYSICIAN ASSISTANT

## 2019-12-03 RX ADMIN — OXYBUTYNIN CHLORIDE 5 MG: 5 TABLET, EXTENDED RELEASE ORAL at 11:27

## 2019-12-03 RX ADMIN — CLOZAPINE 350 MG: 100 TABLET ORAL at 21:31

## 2019-12-03 RX ADMIN — LEVOTHYROXINE SODIUM 75 MCG: 75 TABLET ORAL at 11:26

## 2019-12-03 RX ADMIN — ATORVASTATIN CALCIUM 10 MG: 10 TABLET, FILM COATED ORAL at 17:26

## 2019-12-03 RX ADMIN — PANTOPRAZOLE SODIUM 40 MG: 40 TABLET, DELAYED RELEASE ORAL at 11:27

## 2019-12-03 RX ADMIN — DIVALPROEX SODIUM 1000 MG: 500 TABLET, DELAYED RELEASE ORAL at 11:27

## 2019-12-03 RX ADMIN — DOCUSATE SODIUM 100 MG: 100 CAPSULE, LIQUID FILLED ORAL at 17:26

## 2019-12-03 RX ADMIN — DIVALPROEX SODIUM 1000 MG: 500 TABLET, DELAYED RELEASE ORAL at 17:26

## 2019-12-03 RX ADMIN — METFORMIN HYDROCHLORIDE 500 MG: 500 TABLET, FILM COATED ORAL at 11:27

## 2019-12-03 RX ADMIN — DOCUSATE SODIUM 100 MG: 100 CAPSULE, LIQUID FILLED ORAL at 11:26

## 2019-12-03 RX ADMIN — METFORMIN HYDROCHLORIDE 500 MG: 500 TABLET, FILM COATED ORAL at 17:26

## 2019-12-03 RX ADMIN — CLOZAPINE 100 MG: 100 TABLET ORAL at 11:26

## 2019-12-03 RX ADMIN — OLANZAPINE 15 MG: 10 TABLET, FILM COATED ORAL at 15:24

## 2019-12-03 NOTE — PLAN OF CARE
Pt is scheduled to begin PHP at [facility] on 12/5  A referral was made to [facility] for ICM services, & a letter sent to Pt's PCP, requesting they refer Pt to [address] [place] [address]

## 2019-12-03 NOTE — PROGRESS NOTES
Pt slept throughout the morning, refused breakfast   Pt OOB at lunch time with encouraged  Accepted all medications at that time  Pt pleasant during interaction, expresses desire to be discharge

## 2019-12-03 NOTE — PROGRESS NOTES
Status: Pt slept fairly well; no changes, no updates  Medication: no changes / PRN - Trazodone  D/C: 2nd on E[facility] waiting list     12/03/19 0766   Team Meeting   Meeting Type Daily Rounds   Team Members Present   Team Members Present Physician;Nurse;;Occupational Therapist   Physician Team Member Dr Cory Talbot / Dr Rafaela Rosales / Amisha Tobar Member GRANT [facility] Management Team Member Ministerio Ortiz / Lyudmila Ramirez   OT Team Member Maria Guadalupe   Patient/Family Present   Patient Present No   Patient's Family Present No
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
## 2019-12-03 NOTE — PROGRESS NOTES
Pt appears to have slept much of the night PRN Trazodone 50mg po given at 2215  Pt awakened d/t new roommate overnight, able to return to sleep

## 2019-12-03 NOTE — PROGRESS NOTES
Progress Note - Behavioral Health   Carol Ann Waldron 48 y o  male MRN: [id number]  Unit/Bed#: [facility] 256-01 Encounter: [id number]    Assessment/Plan   Principal Problem:    Schizoaffective disorder ([address] )  Active Problems:    Type 2 diabetes mellitus (HCC)    Benign essential hypertension    BMI 34 0-34 9,adult      Subjective:  Patient uninterested in interview  Reports he feels well  Was slightly more irritable today  Required PRN Trazodone last night  Remained chronic underlying paranoia and internal preoccupation  Not currently manic  Denied most mood related symptoms  Denied somatic complaints or potential serious side effects  Awaiting EAC placement at this time      Current Medications:  Current Facility-Administered Medications   Medication Dose Route Frequency    acetaminophen (TYLENOL) tablet 325 mg  325 mg Oral Q6H PRN    acetaminophen (TYLENOL) tablet 650 mg  650 mg Oral Q6H PRN    acetaminophen (TYLENOL) tablet 975 mg  975 mg Oral Q8H PRN    aluminum-magnesium hydroxide-simethicone (MYLANTA) 200-200-20 mg/5 mL oral suspension 30 mL  30 mL Oral Q4H PRN    atorvastatin (LIPITOR) tablet 10 mg  10 mg Oral Daily With Dinner    benztropine (COGENTIN) injection 1 mg  1 mg Intramuscular Q6H PRN    benztropine (COGENTIN) tablet 1 mg  1 mg Oral Q6H PRN    cloZAPine (CLOZARIL) tablet 100 mg  100 mg Oral Daily    cloZAPine (CLOZARIL) tablet 350 mg  350 mg Oral HS    divalproex sodium (DEPAKOTE) EC tablet 1,000 mg  1,000 mg Oral BID    docusate sodium (COLACE) capsule 100 mg  100 mg Oral BID    haloperidol (HALDOL) tablet 5 mg  5 mg Oral Q6H PRN    haloperidol lactate (HALDOL) injection 5 mg  5 mg Intramuscular Q6H PRN    levothyroxine tablet 75 mcg  75 mcg Oral Early Morning    LORazepam (ATIVAN) 2 mg/mL injection 1 mg  1 mg Intramuscular Q6H PRN    LORazepam (ATIVAN) tablet 1 mg  1 mg Oral Q6H PRN    magnesium hydroxide (MILK OF MAGNESIA) 400 mg/5 mL oral suspension 30 mL  30 mL Oral Daily PRN  metFORMIN (GLUCOPHAGE) tablet 500 mg  500 mg Oral BID With Meals    nicotine polacrilex (NICORETTE) gum 2 mg  2 mg Oral Q2H PRN    OLANZapine (ZyPREXA) tablet 15 mg  15 mg Oral After Lunch    oxybutynin (DITROPAN-XL) 24 hr tablet 5 mg  5 mg Oral Daily    pantoprazole (PROTONIX) EC tablet 40 mg  40 mg Oral Early Morning    traZODone (DESYREL) tablet 50 mg  50 mg Oral HS PRN       Behavioral Health Medications: all current active meds have been reviewed and continue current psychiatric medications  Vitals:  Vitals:    12/03/19 0738   BP: 94/51   Pulse: 65   Resp: 16   Temp: 98 7 °F (37 1 °C)   SpO2: 97%       Laboratory results:    I have personally reviewed all pertinent laboratory/tests results    Most Recent Labs:   Lab Results   Component Value Date    WBC 8 57 12/03/2019    RBC 4 77 12/03/2019    HGB 13 9 12/03/2019    HCT 41 6 12/03/2019     12/03/2019    RDW 13 1 12/03/2019    NEUTROABS 4 85 12/03/2019    SODIUM 141 10/29/2019    K 4 3 10/29/2019     10/29/2019    CO2 28 10/29/2019    BUN 17 10/29/2019    CREATININE 0 76 10/29/2019    GLUCOSE 91 05/11/2018    GLUC 106 10/29/2019    GLUF 99 08/23/2019    CALCIUM 8 6 10/29/2019    AST 15 10/29/2019    ALT 22 10/29/2019    ALKPHOS 66 10/29/2019    TP 6 4 10/29/2019    ALB 3 1 (L) 10/29/2019    TBILI 0 20 10/29/2019    CHOLESTEROL 120 12/03/2019    HDL 28 (L) 12/03/2019    TRIG 237 (H) 12/03/2019    LDLCALC 45 12/03/2019    NONHDLC 92 12/03/2019    VALPROICTOT 67 10/29/2019    LITHIUM 0 6 03/27/2014    AMMONIA 32 10/08/2019    [id number] 1 840 08/23/2019    FREET4 0 93 08/23/2019    HGBA1C 6 2 11/05/2019     15/87/7462     Metabolic Monitoring:       Baseline:  4 week  8 week  12 week  Quarterly Annually   Weight (BMI) 237  258  Not done yet         Waist Circumference X X X         /99  124/74  94/51         HbA1C/Fasting plasma glucose 6 3/134 6 2/131  Pending         Lipid Profile Chol: 138  KP: 893  HDL: 34  LDL: 78 Chol:  103  T  HDL: 27  LDL: 29 Chol: 120  T  HDL: 28   LDL: 45              Psychiatric Review of Systems:  Behavior over the last 24 hours:  unchanged  Sleep: hypersomnia  Appetite: normal  Medication side effects: No  ROS: no complaints, all others negative    Mental Status Evaluation:  Appearance:  disheveled   Behavior:  guarded   Speech:  soft   Mood:  irritable   Affect:  constricted   Language sparse   Thought Process:  concrete and perserverative   Thought Content:  obsessions and chronic underlying paranoia   Perceptual Disturbances: None  Chronic internal preoccupation   Risk Potential: Denied SI/HI  Potential for aggression: No   Sensorium:  person, place and time/date   Cognition:  grossly intact   Consciousness:  alert and awake    Recent and Remote Memory poor   Attention: attention span appeared shorter than expected for age   Insight:  limited   Judgment: limited   Gait/Station: normal gait/station and normal balance   Motor Activity: no abnormal movements     Progress Toward Goals: unchanged    Recommended Treatment: Continue with group therapy, milieu therapy and occupational therapy  1   Continue current medications  2  Awaiting EAC placement    Risks, benefits and possible side effects of Medications:   Risks, benefits, and possible side effects of medications explained to patient and patient verbalizes understanding        Vj Pelayo PA-C

## 2019-12-03 NOTE — PROGRESS NOTES
Pt irritable at the start of the shift  Refusing medications  Posturing in the corner of the hallway and argumentation with staff  Pt reminded that he needed to be compliant with medications or he would have to return jewelry to staff  Pt argumentative and stated "just try it, I'll fight you"  Pt was able to remain in control  Pt allowed some space and within two minutes returned to the medications room and was compliant  Several minutes later Pt had a more pleasant demeanor and was joking around and smiling with staff  Denies SI/AH  Preoccupied with discharge

## 2019-12-03 NOTE — CASE MANAGEMENT
Chris Martinez from [id number] [facility] visited with Pt & spoke briefly with CM afterwards  CM updated her on Pt's status on the [facility] waiting list     CM received a voicemail from [facility], stating she wanted to come & meet with Pt on Mon(12/9)  CM called her back at [phone] & to inquire about her visit  Pt continues to ask multiple times throughout the day when he is leaving & if he is now first on the waiting list   Pt redirectable & verbalizes frustration, but does not get agitated with CM

## 2019-12-04 LAB — GLUCOSE SERPL-MCNC: 115 MG/DL (ref 65–140)

## 2019-12-04 PROCEDURE — 82948 REAGENT STRIP/BLOOD GLUCOSE: CPT

## 2019-12-04 PROCEDURE — 99232 SBSQ HOSP IP/OBS MODERATE 35: CPT | Performed by: PHYSICIAN ASSISTANT

## 2019-12-04 RX ADMIN — TRAZODONE HYDROCHLORIDE 50 MG: 50 TABLET ORAL at 21:28

## 2019-12-04 RX ADMIN — OXYBUTYNIN CHLORIDE 5 MG: 5 TABLET, EXTENDED RELEASE ORAL at 13:01

## 2019-12-04 RX ADMIN — METFORMIN HYDROCHLORIDE 500 MG: 500 TABLET, FILM COATED ORAL at 16:22

## 2019-12-04 RX ADMIN — METFORMIN HYDROCHLORIDE 500 MG: 500 TABLET, FILM COATED ORAL at 13:02

## 2019-12-04 RX ADMIN — ATORVASTATIN CALCIUM 10 MG: 10 TABLET, FILM COATED ORAL at 16:22

## 2019-12-04 RX ADMIN — DOCUSATE SODIUM 100 MG: 100 CAPSULE, LIQUID FILLED ORAL at 18:15

## 2019-12-04 RX ADMIN — DIVALPROEX SODIUM 1000 MG: 500 TABLET, DELAYED RELEASE ORAL at 18:15

## 2019-12-04 RX ADMIN — LEVOTHYROXINE SODIUM 75 MCG: 75 TABLET ORAL at 13:03

## 2019-12-04 RX ADMIN — NICOTINE POLACRILEX 2 MG: 2 GUM, CHEWING BUCCAL at 21:27

## 2019-12-04 RX ADMIN — DIVALPROEX SODIUM 1000 MG: 500 TABLET, DELAYED RELEASE ORAL at 12:59

## 2019-12-04 RX ADMIN — DOCUSATE SODIUM 100 MG: 100 CAPSULE, LIQUID FILLED ORAL at 12:32

## 2019-12-04 RX ADMIN — CLOZAPINE 100 MG: 100 TABLET ORAL at 13:01

## 2019-12-04 RX ADMIN — PANTOPRAZOLE SODIUM 40 MG: 40 TABLET, DELAYED RELEASE ORAL at 13:03

## 2019-12-04 RX ADMIN — OLANZAPINE 15 MG: 10 TABLET, FILM COATED ORAL at 16:22

## 2019-12-04 RX ADMIN — CLOZAPINE 350 MG: 100 TABLET ORAL at 21:27

## 2019-12-04 NOTE — PROGRESS NOTES
Progress Note - Behavioral Health   Jaron Patrick 48 y o  male MRN: [id number]  Unit/Bed#: [facility] 256-01 Encounter: [id number]    Assessment/Plan   Principal Problem:    Schizoaffective disorder ([address] )  Active Problems:    Type 2 diabetes mellitus (HCC)    Benign essential hypertension    BMI 34 0-34 9,adult      Subjective:  Patient focused on his belongings and when he will be discharged  Not interested in full interview  Yesterday became agitated posturing at staff when trying to remove one of his necklaces  Does have potential for violence due to concrete thought process and impulsivity  Does remain with chronic underlying paranoia and internal preoccupation  Does not speak about psychotic symptoms  Not currently manic  No agitation today so far  Medication compliant  Appears to be tolerating medications well without serious side effects  Appears of lost weight since last weight  Awaiting EAC placement      Current Medications:  Current Facility-Administered Medications   Medication Dose Route Frequency    acetaminophen (TYLENOL) tablet 325 mg  325 mg Oral Q6H PRN    acetaminophen (TYLENOL) tablet 650 mg  650 mg Oral Q6H PRN    acetaminophen (TYLENOL) tablet 975 mg  975 mg Oral Q8H PRN    aluminum-magnesium hydroxide-simethicone (MYLANTA) 200-200-20 mg/5 mL oral suspension 30 mL  30 mL Oral Q4H PRN    atorvastatin (LIPITOR) tablet 10 mg  10 mg Oral Daily With Dinner    benztropine (COGENTIN) injection 1 mg  1 mg Intramuscular Q6H PRN    benztropine (COGENTIN) tablet 1 mg  1 mg Oral Q6H PRN    cloZAPine (CLOZARIL) tablet 100 mg  100 mg Oral Daily    cloZAPine (CLOZARIL) tablet 350 mg  350 mg Oral HS    divalproex sodium (DEPAKOTE) EC tablet 1,000 mg  1,000 mg Oral BID    docusate sodium (COLACE) capsule 100 mg  100 mg Oral BID    haloperidol (HALDOL) tablet 5 mg  5 mg Oral Q6H PRN    haloperidol lactate (HALDOL) injection 5 mg  5 mg Intramuscular Q6H PRN    levothyroxine tablet 75 mcg  75 mcg Oral Early Morning    LORazepam (ATIVAN) 2 mg/mL injection 1 mg  1 mg Intramuscular Q6H PRN    LORazepam (ATIVAN) tablet 1 mg  1 mg Oral Q6H PRN    magnesium hydroxide (MILK OF MAGNESIA) 400 mg/5 mL oral suspension 30 mL  30 mL Oral Daily PRN    metFORMIN (GLUCOPHAGE) tablet 500 mg  500 mg Oral BID With Meals    nicotine polacrilex (NICORETTE) gum 2 mg  2 mg Oral Q2H PRN    OLANZapine (ZyPREXA) tablet 15 mg  15 mg Oral After Lunch    oxybutynin (DITROPAN-XL) 24 hr tablet 5 mg  5 mg Oral Daily    pantoprazole (PROTONIX) EC tablet 40 mg  40 mg Oral Early Morning    traZODone (DESYREL) tablet 50 mg  50 mg Oral HS PRN       Behavioral Health Medications: all current active meds have been reviewed and continue current psychiatric medications  Vitals:  Vitals:    12/03/19 1528   BP:    Pulse:    Resp: 16   Temp: 97 8 °F (36 6 °C)   SpO2:        Laboratory results:    I have personally reviewed all pertinent laboratory/tests results    Most Recent Labs:   Lab Results   Component Value Date    WBC 8 57 12/03/2019    RBC 4 77 12/03/2019    HGB 13 9 12/03/2019    HCT 41 6 12/03/2019     12/03/2019    RDW 13 1 12/03/2019    NEUTROABS 4 85 12/03/2019    SODIUM 141 10/29/2019    K 4 3 10/29/2019     10/29/2019    CO2 28 10/29/2019    BUN 17 10/29/2019    CREATININE 0 76 10/29/2019    GLUCOSE 91 05/11/2018    GLUC 106 10/29/2019    GLUF 99 08/23/2019    CALCIUM 8 6 10/29/2019    AST 15 10/29/2019    ALT 22 10/29/2019    ALKPHOS 66 10/29/2019    TP 6 4 10/29/2019    ALB 3 1 (L) 10/29/2019    TBILI 0 20 10/29/2019    CHOLESTEROL 120 12/03/2019    HDL 28 (L) 12/03/2019    TRIG 237 (H) 12/03/2019    LDLCALC 45 12/03/2019    NONHDLC 92 12/03/2019    VALPROICTOT 67 10/29/2019    LITHIUM 0 6 03/27/2014    AMMONIA 32 10/08/2019    [id number] 1 840 08/23/2019    FREET4 0 93 08/23/2019    HGBA1C 6 1 12/03/2019     15/19/2071     Metabolic Monitoring:       Baseline:  4 week  8 week  12 week  Quarterly Annually   Weight (BMI) 237  258  235         Waist Circumference X X X         /99  124/74  94/51         HbA1C/Fasting plasma glucose 6 3/134 6 2/131 6 1/128        Lipid Profile Chol: 138  T  HDL: 34  LDL: 78 Chol:  103  T  HDL: 27  LDL: 29 Chol: 120  T  HDL: 28   LDL: 45              Psychiatric Review of Systems:  Behavior over the last 24 hours:  unchanged  Sleep: normal  Appetite: normal  Medication side effects: No  ROS: no complaints, all others negative    Mental Status Evaluation:  Appearance:  casually dressed   Behavior:  guarded and restless and fidgety   Speech:  loud   Mood:  euthymic   Affect:  blunted   Language naming objects and repeating phrases   Thought Process:  concrete, goal directed and perserverative   Thought Content:  obsessions and chronic underlying paranoia   Perceptual Disturbances: None  Chronic internal preoccupation   Risk Potential: Denied SI/HI  Potential for aggression: possible   Sensorium:  person, place and time/date   Cognition:  grossly intact   Consciousness:  awake    Recent and Remote Memory poor   Attention: attention span appeared shorter than expected for age   Insight:  limited   Judgment: limited   Gait/Station: normal gait/station and normal balance   Motor Activity: no abnormal movements     Progress Toward Goals: slow progression    Recommended Treatment: Continue with group therapy, milieu therapy and occupational therapy  1   Continue current medications  2   Awaiting EAC placement    Risks, benefits and possible side effects of Medications:   Risks, benefits, and possible side effects of medications explained to patient and patient verbalizes understanding        Gary Rg PA-C

## 2019-12-04 NOTE — PROGRESS NOTES
Status: Pt wanted to refuse his meds last night, & staff told him that he would have to give back a piece of jewelry he was wearing  Pt became angry & postured a little at staff, but then was able to calm himself down & took his medication  Medication: no changes / no PRNs  D/C: 2nd on [facility] waiting list   CM did get a msg from [facility] that wants to meet with Pt on Mon, but when CM called back they didn't answer, so CM is not sure if this is a residential option       12/04/19 8362   Team Meeting   Meeting Type Daily Rounds   Team Members Present   Team Members Present Physician;Nurse;;Occupational Therapist   Physician Team Member Dr Diana Orozco / Dr Chau Granado / Rupali Dempsey Member [facility] Management Team Member Bebeto Recinos / Sharon Bolaños   OT Team Member Maria Guadalupe   Patient/Family Present   Patient Present No   Patient's Family Present No

## 2019-12-05 PROCEDURE — 99232 SBSQ HOSP IP/OBS MODERATE 35: CPT | Performed by: PHYSICIAN ASSISTANT

## 2019-12-05 RX ADMIN — PANTOPRAZOLE SODIUM 40 MG: 40 TABLET, DELAYED RELEASE ORAL at 08:55

## 2019-12-05 RX ADMIN — ATORVASTATIN CALCIUM 10 MG: 10 TABLET, FILM COATED ORAL at 17:12

## 2019-12-05 RX ADMIN — DIVALPROEX SODIUM 1000 MG: 500 TABLET, DELAYED RELEASE ORAL at 08:55

## 2019-12-05 RX ADMIN — NICOTINE POLACRILEX 2 MG: 2 GUM, CHEWING BUCCAL at 15:49

## 2019-12-05 RX ADMIN — OLANZAPINE 15 MG: 10 TABLET, FILM COATED ORAL at 15:48

## 2019-12-05 RX ADMIN — CLOZAPINE 100 MG: 100 TABLET ORAL at 08:55

## 2019-12-05 RX ADMIN — DIVALPROEX SODIUM 1000 MG: 500 TABLET, DELAYED RELEASE ORAL at 17:12

## 2019-12-05 RX ADMIN — CLOZAPINE 350 MG: 100 TABLET ORAL at 21:04

## 2019-12-05 RX ADMIN — OXYBUTYNIN CHLORIDE 5 MG: 5 TABLET, EXTENDED RELEASE ORAL at 08:54

## 2019-12-05 RX ADMIN — METFORMIN HYDROCHLORIDE 500 MG: 500 TABLET, FILM COATED ORAL at 08:55

## 2019-12-05 RX ADMIN — METFORMIN HYDROCHLORIDE 500 MG: 500 TABLET, FILM COATED ORAL at 17:12

## 2019-12-05 RX ADMIN — DOCUSATE SODIUM 100 MG: 100 CAPSULE, LIQUID FILLED ORAL at 17:12

## 2019-12-05 RX ADMIN — DOCUSATE SODIUM 100 MG: 100 CAPSULE, LIQUID FILLED ORAL at 08:55

## 2019-12-05 RX ADMIN — LEVOTHYROXINE SODIUM 75 MCG: 75 TABLET ORAL at 08:56

## 2019-12-05 NOTE — PROGRESS NOTES
Progress Note - Behavioral Health   Franki Felty 48 y o  male MRN: [id number]  Unit/Bed#: [facility] 256-01 Encounter: [id number]    Assessment/Plan   Principal Problem:    Schizoaffective disorder ([address] )  Active Problems:    Type 2 diabetes mellitus (HCC)    Benign essential hypertension    BMI 34 0-34 9,adult      Subjective:  Patient sleeps in late in the morning  Has obsessions over items and does not like when we try to limit his accessories  Was irritable over removing a head scarf and his necklace as well  Scant in mood related symptoms  Denies suicidal and homicidal thoughts  No irritability or agitation today  Remains with chronic underlying paranoia and internal preoccupation  Will deny psychotic symptoms when asked  When he gets more irritable he can see more paranoid behavior  Not manic at this time  Medication compliant  Appears to be tolerating medications well without serious side effects        Current Medications:  Current Facility-Administered Medications   Medication Dose Route Frequency    acetaminophen (TYLENOL) tablet 325 mg  325 mg Oral Q6H PRN    acetaminophen (TYLENOL) tablet 650 mg  650 mg Oral Q6H PRN    acetaminophen (TYLENOL) tablet 975 mg  975 mg Oral Q8H PRN    aluminum-magnesium hydroxide-simethicone (MYLANTA) 200-200-20 mg/5 mL oral suspension 30 mL  30 mL Oral Q4H PRN    atorvastatin (LIPITOR) tablet 10 mg  10 mg Oral Daily With Dinner    benztropine (COGENTIN) injection 1 mg  1 mg Intramuscular Q6H PRN    benztropine (COGENTIN) tablet 1 mg  1 mg Oral Q6H PRN    cloZAPine (CLOZARIL) tablet 100 mg  100 mg Oral Daily    cloZAPine (CLOZARIL) tablet 350 mg  350 mg Oral HS    divalproex sodium (DEPAKOTE) EC tablet 1,000 mg  1,000 mg Oral BID    docusate sodium (COLACE) capsule 100 mg  100 mg Oral BID    haloperidol (HALDOL) tablet 5 mg  5 mg Oral Q6H PRN    haloperidol lactate (HALDOL) injection 5 mg  5 mg Intramuscular Q6H PRN    levothyroxine tablet 75 mcg  75 mcg Oral Early Morning    LORazepam (ATIVAN) 2 mg/mL injection 1 mg  1 mg Intramuscular Q6H PRN    LORazepam (ATIVAN) tablet 1 mg  1 mg Oral Q6H PRN    magnesium hydroxide (MILK OF MAGNESIA) 400 mg/5 mL oral suspension 30 mL  30 mL Oral Daily PRN    metFORMIN (GLUCOPHAGE) tablet 500 mg  500 mg Oral BID With Meals    nicotine polacrilex (NICORETTE) gum 2 mg  2 mg Oral Q2H PRN    OLANZapine (ZyPREXA) tablet 15 mg  15 mg Oral After Lunch    oxybutynin (DITROPAN-XL) 24 hr tablet 5 mg  5 mg Oral Daily    pantoprazole (PROTONIX) EC tablet 40 mg  40 mg Oral Early Morning    traZODone (DESYREL) tablet 50 mg  50 mg Oral HS PRN       Behavioral Health Medications: all current active meds have been reviewed and continue current psychiatric medications  Vitals:  Vitals:    12/05/19 0743   BP: 109/66   Pulse: 65   Resp: 16   Temp: 97 9 °F (36 6 °C)   SpO2: 98%       Laboratory results:    I have personally reviewed all pertinent laboratory/tests results    Most Recent Labs:   Lab Results   Component Value Date    WBC 8 57 12/03/2019    RBC 4 77 12/03/2019    HGB 13 9 12/03/2019    HCT 41 6 12/03/2019     12/03/2019    RDW 13 1 12/03/2019    NEUTROABS 4 85 12/03/2019    SODIUM 141 10/29/2019    K 4 3 10/29/2019     10/29/2019    CO2 28 10/29/2019    BUN 17 10/29/2019    CREATININE 0 76 10/29/2019    GLUCOSE 91 05/11/2018    GLUC 106 10/29/2019    GLUF 99 08/23/2019    CALCIUM 8 6 10/29/2019    AST 15 10/29/2019    ALT 22 10/29/2019    ALKPHOS 66 10/29/2019    TP 6 4 10/29/2019    ALB 3 1 (L) 10/29/2019    TBILI 0 20 10/29/2019    CHOLESTEROL 120 12/03/2019    HDL 28 (L) 12/03/2019    TRIG 237 (H) 12/03/2019    LDLCALC 45 12/03/2019    NONHDLC 92 12/03/2019    VALPROICTOT 67 10/29/2019    LITHIUM 0 6 03/27/2014    AMMONIA 32 10/08/2019    [id number] 1 840 08/23/2019    FREET4 0 93 08/23/2019    HGBA1C 6 1 12/03/2019     05/19/9533     Metabolic Monitoring:       Baseline:  4 week  8 week  12 week Quarterly Annually   Weight (BMI) 237  258  235         Waist Circumference X X X         /99  124/74  94/51         HbA1C/Fasting plasma glucose 6 3/134 6 2/131 6 1/128         Lipid Profile Chol: 138  RS: 392  HDL: 34  LDL: 78 Chol:  187  T  HDL: 27  LDL: 29 Chol: 120  T  HDL: 28   LDL: 45             Psychiatric Review of Systems:  Behavior over the last 24 hours:  unchanged  Sleep: hypersomnia  Appetite: normal  Medication side effects: No  ROS: no complaints, all others negative    Mental Status Evaluation:  Appearance:  casually dressed   Behavior:  more cooperative but guarded   Speech:  normal pitch and normal volume   Mood:  euthymic   Affect:  blunted   Language sparse   Thought Process:  concrete and perserverative   Thought Content:  chronic underlying paranoia and obsessions   Perceptual Disturbances: None  Chronic internal preoccupation   Risk Potential: Denied SI/HI  Potential  for aggression:  low   Sensorium:  person, place and time/date   Cognition:  grossly intact   Consciousness:  alert and awake    Recent and Remote Memory poor   Attention: attention span appeared shorter than expected for age   Insight:  limited   Judgment: limited   Gait/Station: normal gait/station and normal balance   Motor Activity: no abnormal movements     Progress Toward Goals:  unchanged    Recommended Treatment: Continue with group therapy, milieu therapy and occupational therapy  1   Continue current medications  2  Awaiting EAC placement    Risks, benefits and possible side effects of Medications:   Risks, benefits, and possible side effects of medications explained to patient and patient verbalizes understanding        Dee Mascorro PA-C

## 2019-12-05 NOTE — PROGRESS NOTES
Pharmacy Clozapine Monitoring Progress Note     Kiet Vazquez is receiving a total daily dose of 450 mg of clozapine divided as 100mg in the morning and 350mg at bedtime  **If clozapine therapy is interrupted for more than 48 hours, therapy MUST be restarted at the initial titration dose (12 5mg - 25mg once daily) to avoid severe hypotension, bradycardia, seizure and syncope  **        Pharmacist Recommendations Based on Assessment and Plan:    1  Patient is Clozapine-REMS eligible, ANC was updated, with every four weeks monitoring frequency and the next [address] is due on 12/31/19  2  Recommend repeat ECG for patient on QTC prolonging agent  Assessment/Plan:    Phase of Treatment:     Current phase of treatment is titration  Patient Clozapine REMS Eligibility:     Patient is eligible to receive clozapine and the [address] monitoring frequency is every four weeks per clozapine REMS  The patient's latest [address] value has been updated into the Clozapine-REMS program          ANC and Clozapine Level (if available)     The most recent [address] was   Lab Results   Component Value Date    NEUTROABS 4 85 12/03/2019    and the next lab is due on 12/31/19  Last Clozapine level (if available):    0   Lab Value Date/Time    CLOZAPINE 657 (H) 11/13/2019 1108     0   Lab Value Date/Time    NCLOZIP 187 11/13/2019 1108           Monitoring Parameters for Clozapine:     Clozapine Adverse Effect Suggested Monitoring Patient's Current Status    Agranulocytosis ANC baseline and then repeat weekly for first 6 months and every 2 weeks for the second 6 months  Maintenance after one year of therapy every month  The most recent [address] was   Lab Results   Component Value Date    NEUTROABS 4 85 12/03/2019       This ANC is considered normal range (ANC >/= 1500/mcL)  Continue current treatment and monitoring course     Respiratory Depression Please ensure patient is not on concomitant respiratory lowering medications such as benzodiazepines, sedative hypnotics (eg  zolpidem) This patient is not on respiratory depression lowering medications   Myocarditis Baseline and weekly EKG, CRP, BNP, and troponin  Weekly symptomatic complaints of fatigue, dyspnea, tachycardia, chest pain, palpitations, and fever for the first 4 weeks  Last EKG Results on  11/6/19 showed: "Normal sinus rhythm  Normal ECG  When compared with ECG of 05-OCT-2019 14:30,  Vent  rate has increased BY  37 BPM  QT has lengthened  Confirmed by Alexa Mejia ([id number]) on 11/9/2019 7:25:14 AM"    Last QTC value was:  0   Lab Value Date/Time    QTCINT 432 11/06/2019 1919       Last BNP was: No results found for: NTBNP    Last Troponin was:  0   Lab Value Date/Time    TROPONINI 0 05 (H) 03/28/2014 1840        Orthostatic hypotension/  bradycardia Blood pressure and vital signs      Monitor blood pressure and orthostatic hypotension at least twice daily upon initiation and continue to follow at least once daily afterwards  Patient's last recorded vital signs:       /66 (BP Location: Right arm)   Pulse 65   Temp 97 9 °F (36 6 °C) (Tympanic)   Resp 16   Ht 5' 6" (1 676 m)   Wt 107 kg (235 lb 6 4 oz)   SpO2 98%   BMI 37 99 kg/m²             Constipation (bowel obstruction due to high anticholinergic clozapine burden) Assess at baseline and weekly during first four months of therapy  Docusate 100mg BID and Miralax 17 grams daily recommended initially  Prophylactic bowel regimen ordered and includes: docusate   Sialorrhea Assess at baseline and weekly during first four months of therapy  May be managed using sublingual anticholinergic preparations (atropine 1% eye drops)  Patient is not experiencing sialorrhea  This will continue to be monitored  Please note that per current REMS protocol the provider can submit a treatment rationale that justifies clozapine treatment even if patient parameters are outside of REMS recommendations        The Clozapine REMS is an FDA-mandated program implemented by the manufacturers of clozapine  ([employer]  com/CpmgClozapineUI/home  u)  Pharmacy will continue to follow patient with team, thank you    Electronically signed by: Francesca Huang, PharmD, [facility], Clinical Pharmacist - Psychiatry

## 2019-12-05 NOTE — PROGRESS NOTES
Requested & received Trazodone 50 mg  Po prn/sleep at 2128  Good effect obtained, as was asleep in bed within the hr  Will continue to monitor

## 2019-12-06 PROCEDURE — 99232 SBSQ HOSP IP/OBS MODERATE 35: CPT | Performed by: PSYCHIATRY & NEUROLOGY

## 2019-12-06 RX ADMIN — LEVOTHYROXINE SODIUM 75 MCG: 75 TABLET ORAL at 11:03

## 2019-12-06 RX ADMIN — ATORVASTATIN CALCIUM 10 MG: 10 TABLET, FILM COATED ORAL at 16:19

## 2019-12-06 RX ADMIN — DOCUSATE SODIUM 100 MG: 100 CAPSULE, LIQUID FILLED ORAL at 11:03

## 2019-12-06 RX ADMIN — DIVALPROEX SODIUM 1000 MG: 500 TABLET, DELAYED RELEASE ORAL at 11:02

## 2019-12-06 RX ADMIN — DOCUSATE SODIUM 100 MG: 100 CAPSULE, LIQUID FILLED ORAL at 18:06

## 2019-12-06 RX ADMIN — OLANZAPINE 15 MG: 10 TABLET, FILM COATED ORAL at 16:19

## 2019-12-06 RX ADMIN — OXYBUTYNIN CHLORIDE 5 MG: 5 TABLET, EXTENDED RELEASE ORAL at 11:02

## 2019-12-06 RX ADMIN — CLOZAPINE 350 MG: 100 TABLET ORAL at 21:03

## 2019-12-06 RX ADMIN — METFORMIN HYDROCHLORIDE 500 MG: 500 TABLET, FILM COATED ORAL at 11:02

## 2019-12-06 RX ADMIN — METFORMIN HYDROCHLORIDE 500 MG: 500 TABLET, FILM COATED ORAL at 16:18

## 2019-12-06 RX ADMIN — NICOTINE POLACRILEX 2 MG: 2 GUM, CHEWING BUCCAL at 16:21

## 2019-12-06 RX ADMIN — CLOZAPINE 100 MG: 100 TABLET ORAL at 11:03

## 2019-12-06 RX ADMIN — DIVALPROEX SODIUM 1000 MG: 500 TABLET, DELAYED RELEASE ORAL at 18:06

## 2019-12-06 RX ADMIN — PANTOPRAZOLE SODIUM 40 MG: 40 TABLET, DELAYED RELEASE ORAL at 11:03

## 2019-12-06 NOTE — CASE MANAGEMENT
CM received a phone call from Hollis Deshpande ([phone]), from [facility]  She said that they are a CRR in [place], & Pt was referred to them from [facility]  She said that she would like to come meet Pt on Monday, around 10 AM; CM said that would be fine  CM inquired if they had an opening & she said that they do have 1 bed available  CM sent out an email to Pt's sister Valerie Winn  of [address]  [facility]  of [facility], to let them know of the above interview & that Pt is currently 2nd on the waiting list for [address]  CM met with Pt, who was pleasant, & asking about a day pass to go into the community  CM reviewed that he would not be able to do a day pass while here, however, once at [facility], they do allow day passes with doctor's approval   Pt continues to ask to leave & how long he will be "stuck" here, but is redirectable

## 2019-12-06 NOTE — PROGRESS NOTES
Pt has been calm, cooperative  Attended most of Wrap Up group  Some irritability when asked to  room, did comply  Disheveled appears

## 2019-12-06 NOTE — PROGRESS NOTES
Status: Pt doing well, no changes  [facility] is a CRR that will meet with Pt on Monday at 10 AM - PLEASE DO NOT TELL PATIENT THIS    Medication: no changes / PRN - Trazodone  D/C: 2nd on waiting list at [facility]     12/06/19 [address] Type Daily Rounds   Team Members Present   Team Members Present Physician;Nurse;;Occupational Therapist   Physician Team Member Dr Jake Russ / Jaron Garcia Team Member [facility] Management Team Member Jocelyn Lemon / Sabrina Ghosh   OT Team Member Maria Guadalupe   Patient/Family Present   Patient Present No   Patient's Family Present No

## 2019-12-06 NOTE — PROGRESS NOTES
Progress Note - Behavioral Health   Shirley Caro 48 y o  male MRN: [id number]  Unit/Bed#: [facility] 256-01 Encounter: [id number]    Assessment/Plan   Principal Problem:    Schizoaffective disorder ([address] )  Active Problems:    Type 2 diabetes mellitus (HCC)    Benign essential hypertension    BMI 34 0-34 9,adult      Subjective:    Patient today seclusive to his room  Reports by nursing that he was slept all day yesterday  Did become irritable and agitated after speaking with his sister but able to be redirected  No agitation today  Did not appear to be manic at this time  Remains with chronic underlying paranoia and internal preoccupation  Scant in mood related symptoms and denied suicidal ideation and homicidal ideation  Medication compliant  Tolerating medications well without serious side effects  Next CBC with diff do 12/31/2019    Awaiting discharge to [facility] or CRR, whichever comes 1st       Current Medications:  Current Facility-Administered Medications   Medication Dose Route Frequency    acetaminophen (TYLENOL) tablet 325 mg  325 mg Oral Q6H PRN    acetaminophen (TYLENOL) tablet 650 mg  650 mg Oral Q6H PRN    acetaminophen (TYLENOL) tablet 975 mg  975 mg Oral Q8H PRN    aluminum-magnesium hydroxide-simethicone (MYLANTA) 200-200-20 mg/5 mL oral suspension 30 mL  30 mL Oral Q4H PRN    atorvastatin (LIPITOR) tablet 10 mg  10 mg Oral Daily With Dinner    benztropine (COGENTIN) injection 1 mg  1 mg Intramuscular Q6H PRN    benztropine (COGENTIN) tablet 1 mg  1 mg Oral Q6H PRN    cloZAPine (CLOZARIL) tablet 100 mg  100 mg Oral Daily    cloZAPine (CLOZARIL) tablet 350 mg  350 mg Oral HS    divalproex sodium (DEPAKOTE) EC tablet 1,000 mg  1,000 mg Oral BID    docusate sodium (COLACE) capsule 100 mg  100 mg Oral BID    haloperidol (HALDOL) tablet 5 mg  5 mg Oral Q6H PRN    haloperidol lactate (HALDOL) injection 5 mg  5 mg Intramuscular Q6H PRN    levothyroxine tablet 75 mcg  75 mcg Oral Early Morning  LORazepam (ATIVAN) 2 mg/mL injection 1 mg  1 mg Intramuscular Q6H PRN    LORazepam (ATIVAN) tablet 1 mg  1 mg Oral Q6H PRN    magnesium hydroxide (MILK OF MAGNESIA) 400 mg/5 mL oral suspension 30 mL  30 mL Oral Daily PRN    metFORMIN (GLUCOPHAGE) tablet 500 mg  500 mg Oral BID With Meals    nicotine polacrilex (NICORETTE) gum 2 mg  2 mg Oral Q2H PRN    OLANZapine (ZyPREXA) tablet 15 mg  15 mg Oral After Lunch    oxybutynin (DITROPAN-XL) 24 hr tablet 5 mg  5 mg Oral Daily    pantoprazole (PROTONIX) EC tablet 40 mg  40 mg Oral Early Morning    traZODone (DESYREL) tablet 50 mg  50 mg Oral HS PRN       Behavioral Health Medications: all current active meds have been reviewed and continue current psychiatric medications  Vitals:  Vitals:    12/05/19 0743   BP: 109/66   Pulse: 65   Resp: 16   SpO2: 98%       Laboratory results:    I have personally reviewed all pertinent laboratory/tests results    Most Recent Labs:   Lab Results   Component Value Date    WBC 8 57 12/03/2019    RBC 4 77 12/03/2019    HGB 13 9 12/03/2019    HCT 41 6 12/03/2019     12/03/2019    RDW 13 1 12/03/2019    NEUTROABS 4 85 12/03/2019    SODIUM 141 10/29/2019    K 4 3 10/29/2019     10/29/2019    CO2 28 10/29/2019    BUN 17 10/29/2019    CREATININE 0 76 10/29/2019    GLUCOSE 91 05/11/2018    GLUC 106 10/29/2019    GLUF 99 08/23/2019    CALCIUM 8 6 10/29/2019    AST 15 10/29/2019    ALT 22 10/29/2019    ALKPHOS 66 10/29/2019    TP 6 4 10/29/2019    ALB 3 1 (L) 10/29/2019    TBILI 0 20 10/29/2019    CHOLESTEROL 120 12/03/2019    HDL 28 (L) 12/03/2019    TRIG 237 (H) 12/03/2019    LDLCALC 45 12/03/2019    NONHDLC 92 12/03/2019    VALPROICTOT 67 10/29/2019    LITHIUM 0 6 03/27/2014    AMMONIA 32 10/08/2019    [id number] 1 840 08/23/2019    FREET4 0 93 08/23/2019    HGBA1C 6 1 12/03/2019     25/35/3337     Metabolic Monitoring:       Baseline:  4 week  8 week  12 week  Quarterly Annually   Weight (BMI) 237  258  235         Waist Circumference X X X         /99  124/74  94/51         HbA1C/Fasting plasma glucose 6 3/134 6 2/131 6 1/128         Lipid Profile Chol: 138  BF: 469  HDL: 34  LDL: 78 Chol:  529  T  HDL: 27  LDL: 29 Chol: 120  T  HDL: 28   LDL: 45             Psychiatric Review of Systems:  Behavior over the last 24 hours:  unchanged  Sleep: hypersomnia  Appetite: normal  Medication side effects: No  ROS: no complaints, all others negative    Mental Status Evaluation:  Appearance:  casually dressed   Behavior:  guarded   Speech:  loud   Mood:  euthymic   Affect:  blunted and constricted   Language sparse   Thought Process:  concrete, goal directed and perserverative   Thought Content:  obsessions and chronic underlying paranoia   Perceptual Disturbances: None  Internal preoccupation   Risk Potential: Denied SI/HI  Potential for aggression: low   Sensorium:  person and place   Cognition:  grossly intact   Consciousness:  alert and awake    Recent and Remote Memory poor   Attention: attention span appeared shorter than expected for age   Insight:  limited   Judgment: limited   Gait/Station: normal gait/station and normal balance   Motor Activity: no abnormal movements     Progress Toward Goals: unchanged    Recommended Treatment: Continue with group therapy, milieu therapy and occupational therapy  1   Continue current medication  2  CBC w/diff on 19  3  Disposition planning with plan to go to [facility] or CRR, whichever comes first    Risks, benefits and possible side effects of Medications:   Risks, benefits, and possible side effects of medications explained to patient and patient verbalizes understanding        Kemal Noble PA-C

## 2019-12-06 NOTE — TREATMENT PLAN
TREATMENT PLAN REVIEW - [address] [facility] 48 y o  1965 male MRN: [id number]    [address] [place] [address] Room / Bed: [facility] 256/[facility] Encounter: [id number]        Admit Date/Time:  10/5/2019 12:29 AM    Treatment Team: Attending Provider: Dolly Yoo; Consulting Physician: Fenton Homans, MD; : Chichi George;  Consulting Physician: Yady Stratton DPM; Patient Care Assistant: Julien Carlos; Patient Care Technician: Curt Nava; Patient Care Technician: Jhon Olvera; Registered Nurse: Bobby Simental, RN; Care Manager: Stiven Powell, TABBY; Patient Care Technician: Delmer Burch; Registered Nurse: Whitney Swann, TABBY; Registered Nurse: Chica Roy, TABBY; Occupational Therapy Assistant: Nick Laurent; Patient Care Assistant: Mohan Dooley; Medications RN: Levi Medrano RN    Diagnosis: Principal Problem:    Schizoaffective disorder [facility])  Active Problems:    Type 2 diabetes mellitus ([facility] [address] )    Benign essential hypertension    BMI 34 0-34 9,adult    Patient Strengths/Assets: cooperative, compliant with medication, motivation for treatment/growth      Patient Barriers/Limitations: limited insight, low self esteem    Short Term Goals: decrease in depressive symptoms, decrease in manic symptoms, decrease in psychotic symptoms, decrease in suicidal thoughts, improvement in self care    Long Term Goals: improvement in depression, improvement in anxiety, stabilization of mood, free of suicidal thoughts, resolution of psychotic symptoms, acceptance of need for psychiatric medications, acceptance of need for psychiatric treatment, acceptance of need for psychiatric follow up after discharge    Progress Towards Goals: starting psychiatric medications as prescribed    Recommended Treatment: medication management, patient medication education, group therapy, milieu therapy, continued Behavioral Health psychiatric evaluation/assessment process     Treatment Frequency: daily medication monitoring, group and milieu therapy daily, monitoring through interdisciplinary rounds, monitoring through weekly patient care conferences    Expected Discharge Date: 30 days - 1/5/2020    Discharge Plan: referral to Extended Acute Care unit for a long term psychiatric treatment    Treatment Plan Created/Updated By: Alesha Conklin

## 2019-12-07 PROCEDURE — 99232 SBSQ HOSP IP/OBS MODERATE 35: CPT | Performed by: PSYCHIATRY & NEUROLOGY

## 2019-12-07 RX ADMIN — DIVALPROEX SODIUM 1000 MG: 500 TABLET, DELAYED RELEASE ORAL at 12:05

## 2019-12-07 RX ADMIN — OXYBUTYNIN CHLORIDE 5 MG: 5 TABLET, EXTENDED RELEASE ORAL at 12:05

## 2019-12-07 RX ADMIN — LEVOTHYROXINE SODIUM 75 MCG: 75 TABLET ORAL at 12:05

## 2019-12-07 RX ADMIN — OLANZAPINE 15 MG: 10 TABLET, FILM COATED ORAL at 16:12

## 2019-12-07 RX ADMIN — CLOZAPINE 350 MG: 100 TABLET ORAL at 21:18

## 2019-12-07 RX ADMIN — CLOZAPINE 100 MG: 100 TABLET ORAL at 12:05

## 2019-12-07 RX ADMIN — PANTOPRAZOLE SODIUM 40 MG: 40 TABLET, DELAYED RELEASE ORAL at 12:04

## 2019-12-07 RX ADMIN — METFORMIN HYDROCHLORIDE 500 MG: 500 TABLET, FILM COATED ORAL at 16:12

## 2019-12-07 RX ADMIN — DIVALPROEX SODIUM 1000 MG: 500 TABLET, DELAYED RELEASE ORAL at 18:51

## 2019-12-07 RX ADMIN — DOCUSATE SODIUM 100 MG: 100 CAPSULE, LIQUID FILLED ORAL at 12:05

## 2019-12-07 RX ADMIN — DOCUSATE SODIUM 100 MG: 100 CAPSULE, LIQUID FILLED ORAL at 18:51

## 2019-12-07 RX ADMIN — METFORMIN HYDROCHLORIDE 500 MG: 500 TABLET, FILM COATED ORAL at 12:06

## 2019-12-07 RX ADMIN — TRAZODONE HYDROCHLORIDE 50 MG: 50 TABLET ORAL at 21:31

## 2019-12-07 RX ADMIN — ATORVASTATIN CALCIUM 10 MG: 10 TABLET, FILM COATED ORAL at 16:12

## 2019-12-07 NOTE — PROGRESS NOTES
Pt pleasant  Scant conversation  No irritability or agitation observed  Isolative to self/room  No groups today  Utilizing the radio  Preoccupied with discharge  Compliant with medications

## 2019-12-07 NOTE — PROGRESS NOTES
Progress Note - Behavioral Health   Nunu Carrasco 48 y o  male MRN: [id number]  Unit/Bed#: [facility] 256-01 Encounter: [id number]    Assessment/Plan   Principal Problem:    Schizoaffective disorder ([address] )  Active Problems:    Type 2 diabetes mellitus (HCC)    Benign essential hypertension    BMI 34 0-34 9,adult    Subjective:  Patient is compliant with medications with no acute side effects  Patient is appropriate and agreed with plan of disposition to [facility] once bed is available  Patient is currently denying suicidal or homicidal ideation  Patient has remained seclusive to self with minimal attendance of milieu therapy despite multiple encouragement      Current Medications:    Current Facility-Administered Medications:  acetaminophen 325 mg Oral Q6H PRN Marleta Los, PA-C   acetaminophen 650 mg Oral Q6H PRN Marleta Los, PA-C   acetaminophen 975 mg Oral Q8H PRN Marleta Los, PA-C   aluminum-magnesium hydroxide-simethicone 30 mL Oral Q4H PRN Ez Hills MD   atorvastatin 10 mg Oral Daily With Pedro Johnson MD   benztropine 1 mg Intramuscular Q6H PRN Ez Hills MD   benztropine 1 mg Oral Q6H PRN Ez Hills MD   clozapine 100 mg Oral Daily Germain Giraldo MD   cloZAPine 350 mg Oral HS Julio C Gore   divalproex sodium 1,000 mg Oral BID Marleta BRIT Michael-PUSHPA   docusate sodium 100 mg Oral BID Marleta BRIT Michael-C   haloperidol 5 mg Oral Q6H PRN Ez Hills MD   haloperidol lactate 5 mg Intramuscular Q6H PRN Ez Hills MD   levothyroxine 75 mcg Oral Early Morning Gearld RusXUAN ferreira   LORazepam 1 mg Intramuscular Q6H PRN Ez Hills MD   LORazepam 1 mg Oral Q6H PRN Ez Hills MD   magnesium hydroxide 30 mL Oral Daily PRN Ez Hills MD   metFORMIN 500 mg Oral BID With Meals Shay Barahona PA-C   nicotine polacrilex 2 mg Oral Q2H PRN Ez Hills MD   OLANZapine 15 mg Oral After Lunch Julio C Gore   oxybutynin 5 mg Oral Daily Marleta BRIT Michael-PUSHPA   pantoprazole 40 mg Oral Early Morning Stefany Coles PA-C   traZODone 50 mg Oral HS PRN Janee Mccracken MD       Behavioral Health Medications: all current active meds have been reviewed  Vital signs in last 24 hours:  Temp:  [98 °F (36 7 °C)] 98 °F (36 7 °C)  HR:  [104] 104  Resp:  [16] 16  BP: (128)/(80) 128/80    Laboratory results:    I have personally reviewed all pertinent laboratory/tests results  Labs in last 72 hours: No results for input(s): WBC, RBC, HGB, HCT, PLT, RDW, NEUTROABS, SODIUM, K, CL, CO2, BUN, CREATININE, GLUCOSE, GLUC, GLUF, CALCIUM, AST, ALT, ALKPHOS, TP, ALB, TBILI, CHOLESTEROL, HDL, TRIG, LDLCALC, VALPROICTOT, CARBAMAZEPIN, LITHIUM, AMMONIA, [id number], FREET4, T3FREE, PREGTESTUR, PREGSERUM, HCG, HCGQUANT, RPR in the last 72 hours  Invalid input(s):  RBC  Admission Labs:   No results displayed because visit has over 200 results  Psychiatric Review of Systems:  Behavior over the last 24 hours:  Slow improvement  Sleep: normal  Appetite: normal  Medication side effects: No  ROS: no complaints    Mental Status Evaluation:  Appearance:  casually dressed   Behavior:  guarded   Speech:  soft   Mood:  anxious   Affect:  constricted   Language naming objects   Thought Process:  disorganized   Thought Content:  delusions  persecutory   Perceptual Disturbances: None   Risk Potential: Suicidal Ideations none, Homicidal Ideations none and Potential for Aggression No   Sensorium:  person, place and time/date   Cognition:  grossly intact   Consciousness:  awake    Attention: attention span appeared shorter than expected for age   Insight:  limited   Judgment: limited   Intellect fair   Gait/Station: normal gait/station and normal balance   Motor Activity: no abnormal movements     Memory: Short and long term memory  fair     Progress Toward Goals: slow progress    Recommended Treatment:   [facility]    Continue with group therapy, milieu therapy and occupational therapy  Continue following current medications:   Current Facility-Administered Medications:  acetaminophen 325 mg Oral Q6H PRN Daren Mauri, PA-C   acetaminophen 650 mg Oral Q6H PRN Daren Mauri, PA-C   acetaminophen 975 mg Oral Q8H PRN Daren Mauri, PA-C   aluminum-magnesium hydroxide-simethicone 30 mL Oral Q4H PRN Dannie Calix MD   atorvastatin 10 mg Oral Daily With Carley Johnson MD   benztropine 1 mg Intramuscular Q6H PRN Dannie Calix MD   benztropine 1 mg Oral Q6H PRN Dannie Calix MD   clozapine 100 mg Oral Daily Mikey King MD   cloZAPine 350 mg Oral HS [facility]   divalproex sodium 1,000 mg Oral BID Daren Mauri, PA-C   docusate sodium 100 mg Oral BID Daren Mauri, PA-C   haloperidol 5 mg Oral Q6H PRN Dannie Calix MD   haloperidol lactate 5 mg Intramuscular Q6H PRN Dannie Calix MD   levothyroxine 75 mcg Oral Early Morning Jordy Mask, PA-C   LORazepam 1 mg Intramuscular Q6H PRN Dannie Calix MD   LORazepam 1 mg Oral Q6H PRN Dannie Calix MD   magnesium hydroxide 30 mL Oral Daily PRN Dannie Calix MD   metFORMIN 500 mg Oral BID With Meals Jordy Mask, PA-PUSHPA   nicotine polacrilex 2 mg Oral Q2H PRN Dannie Calix MD   OLANZapine 15 mg Oral After Lunch [facility]   oxybutynin 5 mg Oral Daily Daren Mauri, PA-C   pantoprazole 40 mg Oral Early Morning Jordy Mask, PA-C   traZODone 50 mg Oral HS PRN Dannie Calix MD       Risks, benefits and possible side effects of Medications:   Risks, benefits, and possible side effects of medications explained to patient and patient verbalizes understanding  This note has been constructed using a voice recognition system  There may be translation, syntax,  or grammatical errors  If you have any questions, please contact the dictating provider

## 2019-12-07 NOTE — PROGRESS NOTES
Pt sleeping until lunch  Ate lunch, returned to room and listened to the radio  Isolative to self  Denies SI/AH  Compliant with medications  Overall pleasant demeanor

## 2019-12-08 LAB — GLUCOSE SERPL-MCNC: 100 MG/DL (ref 65–140)

## 2019-12-08 PROCEDURE — 99232 SBSQ HOSP IP/OBS MODERATE 35: CPT | Performed by: PSYCHIATRY & NEUROLOGY

## 2019-12-08 PROCEDURE — 82948 REAGENT STRIP/BLOOD GLUCOSE: CPT

## 2019-12-08 RX ORDER — OLANZAPINE 10 MG/1
10 TABLET ORAL
Status: DISCONTINUED | OUTPATIENT
Start: 2019-12-08 | End: 2019-12-23 | Stop reason: HOSPADM

## 2019-12-08 RX ADMIN — CLOZAPINE 100 MG: 100 TABLET ORAL at 12:27

## 2019-12-08 RX ADMIN — LEVOTHYROXINE SODIUM 75 MCG: 75 TABLET ORAL at 12:29

## 2019-12-08 RX ADMIN — DIVALPROEX SODIUM 1000 MG: 500 TABLET, DELAYED RELEASE ORAL at 12:28

## 2019-12-08 RX ADMIN — ATORVASTATIN CALCIUM 10 MG: 10 TABLET, FILM COATED ORAL at 17:43

## 2019-12-08 RX ADMIN — METFORMIN HYDROCHLORIDE 500 MG: 500 TABLET, FILM COATED ORAL at 17:43

## 2019-12-08 RX ADMIN — NICOTINE POLACRILEX 2 MG: 2 GUM, CHEWING BUCCAL at 19:45

## 2019-12-08 RX ADMIN — METFORMIN HYDROCHLORIDE 500 MG: 500 TABLET, FILM COATED ORAL at 12:28

## 2019-12-08 RX ADMIN — TRAZODONE HYDROCHLORIDE 50 MG: 50 TABLET ORAL at 21:36

## 2019-12-08 RX ADMIN — DIVALPROEX SODIUM 1000 MG: 500 TABLET, DELAYED RELEASE ORAL at 17:43

## 2019-12-08 RX ADMIN — OLANZAPINE 10 MG: 10 TABLET, FILM COATED ORAL at 17:43

## 2019-12-08 RX ADMIN — OXYBUTYNIN CHLORIDE 5 MG: 5 TABLET, EXTENDED RELEASE ORAL at 12:28

## 2019-12-08 RX ADMIN — DOCUSATE SODIUM 100 MG: 100 CAPSULE, LIQUID FILLED ORAL at 17:43

## 2019-12-08 RX ADMIN — PANTOPRAZOLE SODIUM 40 MG: 40 TABLET, DELAYED RELEASE ORAL at 12:30

## 2019-12-08 RX ADMIN — CLOZAPINE 350 MG: 100 TABLET ORAL at 21:34

## 2019-12-08 RX ADMIN — DOCUSATE SODIUM 100 MG: 100 CAPSULE, LIQUID FILLED ORAL at 12:27

## 2019-12-08 NOTE — PROGRESS NOTES
Pt having difficulty falling asleep in the beginning of the night however eventually fell asleep around 0030 and slept remainder of the night without difficulty

## 2019-12-08 NOTE — PROGRESS NOTES
Pt pleasant, cooperative  Frequently at desk for snacks and water  Naps on and off during the day  Usually sleeps late  Denies all symptoms  Nil management issues

## 2019-12-08 NOTE — PROGRESS NOTES
Progress Note - Behavioral Health   Luis Fischer 48 y o  male MRN: [id number]  Unit/Bed#: [facility] 256-01 Encounter: [id number]    Assessment/Plan   Principal Problem:    Schizoaffective disorder ([address] )  Active Problems:    Type 2 diabetes mellitus (HCC)    Benign essential hypertension    BMI 34 0-34 9,adult    Subjective:  Patient is compliant with medications with no acute side effects  Patient is showing gradual improvement in mood and anxiety with reduction in passive death wishes  Disposition is pending to [facility] for further management and stabilization  Patient continues to remained seclusive to self with minimal participation in milieu therapy, despite multiple encouragement efforts  He does report feeling safe on the unit and is currently consenting for safety on the unit      Current Medications:    Current Facility-Administered Medications:  acetaminophen 325 mg Oral Q6H PRN Mari Beto, PA-C   acetaminophen 650 mg Oral Q6H PRN Mari Beto, PA-C   acetaminophen 975 mg Oral Q8H PRN Mair Beto, PA-C   aluminum-magnesium hydroxide-simethicone 30 mL Oral Q4H PRN Rosi Bennett MD   atorvastatin 10 mg Oral Daily With Nai Early MD   benztropine 1 mg Intramuscular Q6H PRN Rosi Bennett MD   benztropine 1 mg Oral Q6H PRN Rosi Bennett MD   clozapine 100 mg Oral Daily Carolyn Alejandra MD   cloZAPine 350 mg Oral HS Julio C Gore   divalproex sodium 1,000 mg Oral BID Mari BRIT Pérez-C   docusate sodium 100 mg Oral BID Mari XUAN Pérez   haloperidol 5 mg Oral Q6H PRN Rosi Bennett MD   haloperidol lactate 5 mg Intramuscular Q6H PRN Rosi Bennett MD   levothyroxine 75 mcg Oral Early Morning Krystal Marques PA-C   LORazepam 1 mg Intramuscular Q6H PRN Rosi Bennett MD   LORazepam 1 mg Oral Q6H PRN Rosi Bennett MD   magnesium hydroxide 30 mL Oral Daily PRN Rosi Bennett MD   metFORMIN 500 mg Oral BID With Meals Krystal Marques PA-C   nicotine polacrilex 2 mg Oral Q2H PRN Rosi Bennett MD OLANZapine 15 mg Oral After Lunch Julio C Gore   oxybutynin 5 mg Oral Daily Gary Rg PA-C   pantoprazole 40 mg Oral Early Morning Lul Bates PA-C   traZODone 50 mg Oral HS PRN Gregory Randall MD       Behavioral Health Medications: all current active meds have been reviewed  Vital signs in last 24 hours:  Temp:  [97 1 °F (36 2 °C)-97 3 °F (36 3 °C)] 97 3 °F (36 3 °C)  HR:  [] 65  Resp:  [17-18] 18  BP: (103-134)/(58-91) 103/58    Laboratory results:    I have personally reviewed all pertinent laboratory/tests results  Labs in last 72 hours: No results for input(s): WBC, RBC, HGB, HCT, PLT, RDW, NEUTROABS, SODIUM, K, CL, CO2, BUN, CREATININE, GLUCOSE, GLUC, GLUF, CALCIUM, AST, ALT, ALKPHOS, TP, ALB, TBILI, CHOLESTEROL, HDL, TRIG, LDLCALC, VALPROICTOT, CARBAMAZEPIN, LITHIUM, AMMONIA, [id number], FREET4, T3FREE, PREGTESTUR, PREGSERUM, HCG, HCGQUANT, RPR in the last 72 hours  Invalid input(s):  RBC  Admission Labs:   No results displayed because visit has over 200 results            Psychiatric Review of Systems:  Behavior over the last 24 hours:  Slow improvement  Sleep: normal  Appetite: normal  Medication side effects: No  ROS: no complaints    Mental Status Evaluation:  Appearance:  casually dressed   Behavior:  guarded   Speech:  soft   Mood:  anxious   Affect:  constricted   Language naming objects   Thought Process:  circumstantial   Thought Content:  delusions  persecutory   Perceptual Disturbances: None   Risk Potential: Suicidal Ideations without plan, Homicidal Ideations none and Potential for Aggression No   Sensorium:  person and place   Cognition:  grossly intact   Consciousness:  awake    Attention: attention span appeared shorter than expected for age   Insight:  limited   Judgment: limited   Intellect limited   Gait/Station: normal gait/station   Motor Activity: no abnormal movements     Memory: Short and long term memory  fair     Progress Toward Goals: slow progress    Recommended Treatment:   Disposition is pending to [facility] for further management stabilization  Reduce the olanzapine dose to 10 mg daily at 2:00 p m  Due to excessive sedation and weight gain noticed over last 1 month  Continue with group therapy, milieu therapy and occupational therapy  Continue following current medications:   Current Facility-Administered Medications:  acetaminophen 325 mg Oral Q6H PRN Madisyn Quinn PA-C   acetaminophen 650 mg Oral Q6H PRN Madisyn Quinn PA-C   acetaminophen 975 mg Oral Q8H PRN Madisyn Quinn PA-C   aluminum-magnesium hydroxide-simethicone 30 mL Oral Q4H PRN Elin Germain MD   atorvastatin 10 mg Oral Daily With Sultana Carranza MD   benztropine 1 mg Intramuscular Q6H PRN Elin Germain MD   benztropine 1 mg Oral Q6H PRN Elin Germain MD   clozapine 100 mg Oral Daily Lay Vega MD   cloZAPine 350 mg Oral HS [facility]   divalproex sodium 1,000 mg Oral BID Madisyn Quinn PA-C   docusate sodium 100 mg Oral BID Madisyn Quinn PA-C   haloperidol 5 mg Oral Q6H PRN Elin Germain MD   haloperidol lactate 5 mg Intramuscular Q6H PRN Elin Germain MD   levothyroxine 75 mcg Oral Early Morning Mona Gibbs PA-C   LORazepam 1 mg Intramuscular Q6H PRN Elin Germain MD   LORazepam 1 mg Oral Q6H PRN Elin Germain MD   magnesium hydroxide 30 mL Oral Daily PRN Elin Germain MD   metFORMIN 500 mg Oral BID With Meals Mona Gibbs PA-C   nicotine polacrilex 2 mg Oral Q2H PRN Elin Germain MD   OLANZapine 15 mg Oral After Lunch [facility]   oxybutynin 5 mg Oral Daily Madisyn Quinn PA-C   pantoprazole 40 mg Oral Early Morning Mona Gibbs PA-C   traZODone 50 mg Oral HS PRN Elin Germain MD       Risks, benefits and possible side effects of Medications:   Risks, benefits, and possible side effects of medications explained to patient and patient verbalizes understanding  This note has been constructed using a voice recognition system      There may be translation, syntax,  or grammatical errors  If you have any questions, please contact the dictating provider

## 2019-12-09 PROCEDURE — 99232 SBSQ HOSP IP/OBS MODERATE 35: CPT | Performed by: PSYCHIATRY & NEUROLOGY

## 2019-12-09 RX ADMIN — OXYBUTYNIN CHLORIDE 5 MG: 5 TABLET, EXTENDED RELEASE ORAL at 09:52

## 2019-12-09 RX ADMIN — TRAZODONE HYDROCHLORIDE 50 MG: 50 TABLET ORAL at 21:40

## 2019-12-09 RX ADMIN — DIVALPROEX SODIUM 1000 MG: 500 TABLET, DELAYED RELEASE ORAL at 09:52

## 2019-12-09 RX ADMIN — OLANZAPINE 10 MG: 10 TABLET, FILM COATED ORAL at 13:14

## 2019-12-09 RX ADMIN — CLOZAPINE 100 MG: 100 TABLET ORAL at 09:52

## 2019-12-09 RX ADMIN — CLOZAPINE 350 MG: 100 TABLET ORAL at 21:21

## 2019-12-09 RX ADMIN — METFORMIN HYDROCHLORIDE 500 MG: 500 TABLET, FILM COATED ORAL at 17:29

## 2019-12-09 RX ADMIN — DOCUSATE SODIUM 100 MG: 100 CAPSULE, LIQUID FILLED ORAL at 09:52

## 2019-12-09 RX ADMIN — ATORVASTATIN CALCIUM 10 MG: 10 TABLET, FILM COATED ORAL at 17:29

## 2019-12-09 RX ADMIN — METFORMIN HYDROCHLORIDE 500 MG: 500 TABLET, FILM COATED ORAL at 09:52

## 2019-12-09 RX ADMIN — DOCUSATE SODIUM 100 MG: 100 CAPSULE, LIQUID FILLED ORAL at 17:29

## 2019-12-09 RX ADMIN — DIVALPROEX SODIUM 1000 MG: 500 TABLET, DELAYED RELEASE ORAL at 17:29

## 2019-12-09 NOTE — PROGRESS NOTES
Requested & received Trazodone 50 mg po prn/sleep at 2136  Good effect obtained, as observed asleep in bed within the hr  Will continue to monitor

## 2019-12-09 NOTE — PROGRESS NOTES
Progress Note - Behavioral Health   Stacey Arnold 48 y o  male MRN: [id number]  [facility] 256-[facility] Encounter: [id number]    Assessment/Plan   Principal Problem:    Schizoaffective disorder ([address] )  Active Problems:    Type 2 diabetes mellitus (HCC)    Benign essential hypertension    BMI 34 0-34 9,adult      Subjective:  Patient remains scant in reporting psychiatric symptoms   States he feels well and is focused on discharge  Did have meeting today for possible CRR program   After meeting patient did state that it went well  No reports of agitation or irritability over the weekend  Patient did have Zyprexa dosing decreased due to therapeutic Clozaril levels, hypersomnia, and morning sedation  Remains with chronic underlying paranoia, internal preoccupation, and moments of irritability  Reports from nursing that patient had good weekend  Medication compliant  Tolerating medications well without serious side effects  Next CBC with diff do 12/31/2019    Awaiting discharge to [facility] or [facility], whichever comes 1st     Current Medications:  Current Facility-Administered Medications   Medication Dose Route Frequency    acetaminophen (TYLENOL) tablet 325 mg  325 mg Oral Q6H PRN    acetaminophen (TYLENOL) tablet 650 mg  650 mg Oral Q6H PRN    acetaminophen (TYLENOL) tablet 975 mg  975 mg Oral Q8H PRN    aluminum-magnesium hydroxide-simethicone (MYLANTA) 200-200-20 mg/5 mL oral suspension 30 mL  30 mL Oral Q4H PRN    atorvastatin (LIPITOR) tablet 10 mg  10 mg Oral Daily With Dinner    benztropine (COGENTIN) injection 1 mg  1 mg Intramuscular Q6H PRN    benztropine (COGENTIN) tablet 1 mg  1 mg Oral Q6H PRN    cloZAPine (CLOZARIL) tablet 100 mg  100 mg Oral Daily    cloZAPine (CLOZARIL) tablet 350 mg  350 mg Oral HS    divalproex sodium (DEPAKOTE) EC tablet 1,000 mg  1,000 mg Oral BID    docusate sodium (COLACE) capsule 100 mg  100 mg Oral BID    haloperidol (HALDOL) tablet 5 mg  5 mg Oral Q6H PRN    haloperidol lactate (HALDOL) injection 5 mg  5 mg Intramuscular Q6H PRN    levothyroxine tablet 75 mcg  75 mcg Oral Early Morning    LORazepam (ATIVAN) 2 mg/mL injection 1 mg  1 mg Intramuscular Q6H PRN    LORazepam (ATIVAN) tablet 1 mg  1 mg Oral Q6H PRN    magnesium hydroxide (MILK OF MAGNESIA) 400 mg/5 mL oral suspension 30 mL  30 mL Oral Daily PRN    metFORMIN (GLUCOPHAGE) tablet 500 mg  500 mg Oral BID With Meals    nicotine polacrilex (NICORETTE) gum 2 mg  2 mg Oral Q2H PRN    OLANZapine (ZyPREXA) tablet 10 mg  10 mg Oral After Lunch    oxybutynin (DITROPAN-XL) 24 hr tablet 5 mg  5 mg Oral Daily    pantoprazole (PROTONIX) EC tablet 40 mg  40 mg Oral Early Morning    traZODone (DESYREL) tablet 50 mg  50 mg Oral HS PRN       Behavioral Health Medications: all current active meds have been reviewed and continue current psychiatric medications  Vitals:  Vitals:    12/08/19 1520   BP: 157/97   Pulse: 99   Resp: 18   SpO2:        Laboratory results:    I have personally reviewed all pertinent laboratory/tests results    Most Recent Labs:   Lab Results   Component Value Date    WBC 8 57 12/03/2019    RBC 4 77 12/03/2019    HGB 13 9 12/03/2019    HCT 41 6 12/03/2019     12/03/2019    RDW 13 1 12/03/2019    NEUTROABS 4 85 12/03/2019    SODIUM 141 10/29/2019    K 4 3 10/29/2019     10/29/2019    CO2 28 10/29/2019    BUN 17 10/29/2019    CREATININE 0 76 10/29/2019    GLUCOSE 91 05/11/2018    GLUC 106 10/29/2019    GLUF 99 08/23/2019    CALCIUM 8 6 10/29/2019    AST 15 10/29/2019    ALT 22 10/29/2019    ALKPHOS 66 10/29/2019    TP 6 4 10/29/2019    ALB 3 1 (L) 10/29/2019    TBILI 0 20 10/29/2019    CHOLESTEROL 120 12/03/2019    HDL 28 (L) 12/03/2019    TRIG 237 (H) 12/03/2019    LDLCALC 45 12/03/2019    NONHDLC 92 12/03/2019    VALPROICTOT 67 10/29/2019    LITHIUM 0 6 03/27/2014    AMMONIA 32 10/08/2019    [id number] 1 840 08/23/2019    FREET4 0 93 08/23/2019    HGBA1C 6 1 12/03/2019    [facility] 128 12/03/2019       Psychiatric Review of Systems:  Behavior over the last 24 hours:  improved  Sleep: normal  Appetite: normal  Medication side effects: No  ROS: no complaints, all others negative    Mental Status Evaluation:  Appearance:  casually dressed   Behavior:  guarded   Speech:  loud   Mood:  euthymic   Affect:  blunted and flat   Language sparse   Thought Process:  concrete, goal directed and perserverative   Thought Content:  obsessions and chronic underlying paranoia   Perceptual Disturbances: None  Chronic internal preoccupation   Risk Potential: Denied SI/HI  Potential for aggression: No   Sensorium:  person, place and time/date   Cognition:  grossly intact   Consciousness:  alert and awake    Recent and Remote Memory fair   Attention: attention span appeared shorter than expected for age   Insight:  limited   Judgment: limited   Gait/Station: normal gait/station and normal balance   Motor Activity: no abnormal movements     Progress Toward Goals: progressing    Recommended Treatment: Continue with group therapy, milieu therapy and occupational therapy  1   Continue current medications  2  CBC w/diff on 12/31/19  3  Disposition planning with plan to go to [facility] or [facility], whichever comes first    Risks, benefits and possible side effects of Medications:   Risks, benefits, and possible side effects of medications explained to patient and patient verbalizes understanding        Daren Dotson PA-C

## 2019-12-09 NOTE — PROGRESS NOTES
Status: Pt had a good weekend  [facility] to come today at 10      Medication: Zyprexa decreased to 10mg / PRN - Trazodone  D/C: 2nd on waiting list for [facility]     12/09/19 0787   Team Meeting   Meeting Type Daily Rounds   Team Members Present   Team Members Present Physician;Nurse;;Occupational Therapist   Physician Team Member Dr Dionte Martinez / Dr Mary Gambino / Kaylie Donahue Management Team Member [id number]    OT Team Member Maria Guadalupe   Patient/Family Present   Patient Present No   Patient's Family Present No

## 2019-12-09 NOTE — PROGRESS NOTES
Awake earlier this AM and attended breakfast  OOB more during the day as well  Slight irritable edge but pleasant overall

## 2019-12-10 PROCEDURE — 99232 SBSQ HOSP IP/OBS MODERATE 35: CPT | Performed by: PSYCHIATRY & NEUROLOGY

## 2019-12-10 RX ADMIN — NICOTINE POLACRILEX 2 MG: 2 GUM, CHEWING BUCCAL at 22:04

## 2019-12-10 RX ADMIN — LEVOTHYROXINE SODIUM 75 MCG: 75 TABLET ORAL at 11:53

## 2019-12-10 RX ADMIN — METFORMIN HYDROCHLORIDE 500 MG: 500 TABLET, FILM COATED ORAL at 15:39

## 2019-12-10 RX ADMIN — TRAZODONE HYDROCHLORIDE 50 MG: 50 TABLET ORAL at 22:03

## 2019-12-10 RX ADMIN — PANTOPRAZOLE SODIUM 40 MG: 40 TABLET, DELAYED RELEASE ORAL at 11:53

## 2019-12-10 RX ADMIN — DOCUSATE SODIUM 100 MG: 100 CAPSULE, LIQUID FILLED ORAL at 17:29

## 2019-12-10 RX ADMIN — DIVALPROEX SODIUM 1000 MG: 500 TABLET, DELAYED RELEASE ORAL at 17:28

## 2019-12-10 RX ADMIN — DIVALPROEX SODIUM 1000 MG: 500 TABLET, DELAYED RELEASE ORAL at 11:52

## 2019-12-10 RX ADMIN — CLOZAPINE 350 MG: 100 TABLET ORAL at 21:21

## 2019-12-10 RX ADMIN — DOCUSATE SODIUM 100 MG: 100 CAPSULE, LIQUID FILLED ORAL at 11:52

## 2019-12-10 RX ADMIN — CLOZAPINE 100 MG: 100 TABLET ORAL at 11:52

## 2019-12-10 RX ADMIN — OLANZAPINE 10 MG: 10 TABLET, FILM COATED ORAL at 15:39

## 2019-12-10 RX ADMIN — METFORMIN HYDROCHLORIDE 500 MG: 500 TABLET, FILM COATED ORAL at 11:53

## 2019-12-10 RX ADMIN — OXYBUTYNIN CHLORIDE 5 MG: 5 TABLET, EXTENDED RELEASE ORAL at 11:53

## 2019-12-10 RX ADMIN — ATORVASTATIN CALCIUM 10 MG: 10 TABLET, FILM COATED ORAL at 15:39

## 2019-12-10 RX ADMIN — NICOTINE POLACRILEX 2 MG: 2 GUM, CHEWING BUCCAL at 16:46

## 2019-12-10 NOTE — PLAN OF CARE
Pt completed an interview with [facility] yesterday, but they are interviewing 4 other candidates  Pt is currently 2nd on the waiting list for [facility]
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
## 2019-12-10 NOTE — PROGRESS NOTES
Pt awake and OOB for lunch  Pleasant and laughing with staff  Preoccupied with discharge, frequently asking when he will be leaving  Redirectable with no irritability or agitation  Denies SI/AH  Compliant with medications
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
## 2019-12-10 NOTE — PROGRESS NOTES
Pt appears to have slept throughout most of the night  Did awaken twice in early part of shift for water
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
## 2019-12-11 PROCEDURE — 99232 SBSQ HOSP IP/OBS MODERATE 35: CPT | Performed by: PSYCHIATRY & NEUROLOGY

## 2019-12-11 RX ADMIN — NICOTINE POLACRILEX 2 MG: 2 GUM, CHEWING BUCCAL at 18:52

## 2019-12-11 RX ADMIN — ATORVASTATIN CALCIUM 10 MG: 10 TABLET, FILM COATED ORAL at 16:15

## 2019-12-11 RX ADMIN — OXYBUTYNIN CHLORIDE 5 MG: 5 TABLET, EXTENDED RELEASE ORAL at 11:45

## 2019-12-11 RX ADMIN — METFORMIN HYDROCHLORIDE 500 MG: 500 TABLET, FILM COATED ORAL at 11:48

## 2019-12-11 RX ADMIN — DOCUSATE SODIUM 100 MG: 100 CAPSULE, LIQUID FILLED ORAL at 11:45

## 2019-12-11 RX ADMIN — METFORMIN HYDROCHLORIDE 500 MG: 500 TABLET, FILM COATED ORAL at 16:15

## 2019-12-11 RX ADMIN — NICOTINE POLACRILEX 2 MG: 2 GUM, CHEWING BUCCAL at 16:16

## 2019-12-11 RX ADMIN — LEVOTHYROXINE SODIUM 75 MCG: 75 TABLET ORAL at 11:45

## 2019-12-11 RX ADMIN — NICOTINE POLACRILEX 2 MG: 2 GUM, CHEWING BUCCAL at 22:26

## 2019-12-11 RX ADMIN — TRAZODONE HYDROCHLORIDE 50 MG: 50 TABLET ORAL at 22:26

## 2019-12-11 RX ADMIN — DIVALPROEX SODIUM 1000 MG: 500 TABLET, DELAYED RELEASE ORAL at 11:45

## 2019-12-11 RX ADMIN — CLOZAPINE 350 MG: 100 TABLET ORAL at 22:01

## 2019-12-11 RX ADMIN — DIVALPROEX SODIUM 1000 MG: 500 TABLET, DELAYED RELEASE ORAL at 17:20

## 2019-12-11 RX ADMIN — PANTOPRAZOLE SODIUM 40 MG: 40 TABLET, DELAYED RELEASE ORAL at 11:45

## 2019-12-11 RX ADMIN — CLOZAPINE 100 MG: 100 TABLET ORAL at 11:45

## 2019-12-11 RX ADMIN — OLANZAPINE 10 MG: 10 TABLET, FILM COATED ORAL at 14:15

## 2019-12-11 RX ADMIN — DOCUSATE SODIUM 100 MG: 100 CAPSULE, LIQUID FILLED ORAL at 17:20

## 2019-12-11 NOTE — PROGRESS NOTES
Pt received PRN Trazodone 50mg po at 2203 for sleep  Appears to have been asleep at start of shift, no observed difficulty throughout the night

## 2019-12-11 NOTE — PROGRESS NOTES
Pt sleeping throughout morning after breakfast  Pt did not want to get OOB for meds, administered just before lunch  Pt is overall pleasant and cooperative at this time

## 2019-12-11 NOTE — PROGRESS NOTES
Status: Pt had a visitor last night that brought him [employer] & snacks    No issues  Medication: no changes / PRN - Trazodone  D/C: 2nd on waiting list for [facility]     12/11/19 0832   Team Meeting   Meeting Type Daily Rounds   Team Members Present   Team Members Present Physician;Nurse;;Occupational Therapist   Physician Team Member Dr Fco Connors / Dr Gretchen Solano / Jose Martin Hum Member [facility] Management Team Member Casandra Moncada / Alla Sneed   OT Team Member Maria Guadalupe   Patient/Family Present   Patient Present No   Patient's Family Present No

## 2019-12-11 NOTE — PROGRESS NOTES
Progress Note - Behavioral Health   Bianka Senior 48 y o  male MRN: [id number]  Unit/Bed#: [facility] 256-01 Encounter: [id number]    Assessment/Plan   Principal Problem:    Schizoaffective disorder ([address] )  Active Problems:    Type 2 diabetes mellitus (HCC)    Benign essential hypertension    BMI 34 0-34 9,adult      Subjective:    Patient today minimal in conversation  Awaiting placement in [facility]  Also waiting to hear back from [facility] referral   No reports of agitation  Scant in all reported symptoms  Remains with chronic underlying paranoia that people are trying to steal his things  Also has chronic underlying internal preoccupation  Will deny hallucinations when asked  Not currently manic  Remains with goal-directed behavior at times  [place] in thought process  Medication compliant  Tolerating medications well without serious side effects        Current Medications:  Current Facility-Administered Medications   Medication Dose Route Frequency    acetaminophen (TYLENOL) tablet 325 mg  325 mg Oral Q6H PRN    acetaminophen (TYLENOL) tablet 650 mg  650 mg Oral Q6H PRN    acetaminophen (TYLENOL) tablet 975 mg  975 mg Oral Q8H PRN    aluminum-magnesium hydroxide-simethicone (MYLANTA) 200-200-20 mg/5 mL oral suspension 30 mL  30 mL Oral Q4H PRN    atorvastatin (LIPITOR) tablet 10 mg  10 mg Oral Daily With Dinner    benztropine (COGENTIN) injection 1 mg  1 mg Intramuscular Q6H PRN    benztropine (COGENTIN) tablet 1 mg  1 mg Oral Q6H PRN    cloZAPine (CLOZARIL) tablet 100 mg  100 mg Oral Daily    cloZAPine (CLOZARIL) tablet 350 mg  350 mg Oral HS    divalproex sodium (DEPAKOTE) EC tablet 1,000 mg  1,000 mg Oral BID    docusate sodium (COLACE) capsule 100 mg  100 mg Oral BID    haloperidol (HALDOL) tablet 5 mg  5 mg Oral Q6H PRN    haloperidol lactate (HALDOL) injection 5 mg  5 mg Intramuscular Q6H PRN    levothyroxine tablet 75 mcg  75 mcg Oral Early Morning    LORazepam (ATIVAN) 2 mg/mL injection 1 mg  1 mg Intramuscular Q6H PRN    LORazepam (ATIVAN) tablet 1 mg  1 mg Oral Q6H PRN    magnesium hydroxide (MILK OF MAGNESIA) 400 mg/5 mL oral suspension 30 mL  30 mL Oral Daily PRN    metFORMIN (GLUCOPHAGE) tablet 500 mg  500 mg Oral BID With Meals    nicotine polacrilex (NICORETTE) gum 2 mg  2 mg Oral Q2H PRN    OLANZapine (ZyPREXA) tablet 10 mg  10 mg Oral After Lunch    oxybutynin (DITROPAN-XL) 24 hr tablet 5 mg  5 mg Oral Daily    pantoprazole (PROTONIX) EC tablet 40 mg  40 mg Oral Early Morning    traZODone (DESYREL) tablet 50 mg  50 mg Oral HS PRN       Behavioral Health Medications: all current active meds have been reviewed and continue current psychiatric medications  Vitals:  Vitals:    12/11/19 0836   BP: 125/78   Pulse: 66   Resp: 18   Temp: (!) 96 5 °F (35 8 °C)   SpO2: 97%       Laboratory results:    I have personally reviewed all pertinent laboratory/tests results    Most Recent Labs:   Lab Results   Component Value Date    WBC 8 57 12/03/2019    RBC 4 77 12/03/2019    HGB 13 9 12/03/2019    HCT 41 6 12/03/2019     12/03/2019    RDW 13 1 12/03/2019    NEUTROABS 4 85 12/03/2019    SODIUM 141 10/29/2019    K 4 3 10/29/2019     10/29/2019    CO2 28 10/29/2019    BUN 17 10/29/2019    CREATININE 0 76 10/29/2019    GLUCOSE 91 05/11/2018    GLUC 106 10/29/2019    GLUF 99 08/23/2019    CALCIUM 8 6 10/29/2019    AST 15 10/29/2019    ALT 22 10/29/2019    ALKPHOS 66 10/29/2019    TP 6 4 10/29/2019    ALB 3 1 (L) 10/29/2019    TBILI 0 20 10/29/2019    CHOLESTEROL 120 12/03/2019    HDL 28 (L) 12/03/2019    TRIG 237 (H) 12/03/2019    LDLCALC 45 12/03/2019    NONHDLC 92 12/03/2019    VALPROICTOT 67 10/29/2019    LITHIUM 0 6 03/27/2014    AMMONIA 32 10/08/2019    [id number] 1 840 08/23/2019    FREET4 0 93 08/23/2019    HGBA1C 6 1 12/03/2019    [facility] 128 59/39/3479       Metabolic Monitoring:       Baseline:  4 week  8 week  12 week  Quarterly Annually   Weight (BMI) 237  258  235         Waist Circumference X X X         /99  124/74  94/51         HbA1C/Fasting plasma glucose 6 3/134 6 2/131 6 1/128         Lipid Profile Chol: 138  XV: 599  HDL: 34  LDL: 78 Chol:  559  T  HDL: 27  LDL: 29 Chol: 120  T  HDL: 28   LDL: 45              Psychiatric Review of Systems:  Behavior over the last 24 hours:  unchanged  Sleep: normal  Appetite: normal  Medication side effects: No  ROS: no complaints, all others negative    Mental Status Evaluation:  Appearance:  casually dressed   Behavior:  Guarded, cooperative   Speech:  normal pitch and normal volume   Mood:  euthymic   Affect:  blunted and flat   Language sparse   Thought Process:  concrete, goal directed and perserverative   Thought Content:  obsessions, chronic underlying paranoia   Perceptual Disturbances: None  Chronic underlying internal preoccupation   Risk Potential: Denied SI/HI  Potential for aggression No    Sensorium:  person and place   Cognition:  grossly intact   Consciousness:  alert and awake    Recent and Remote Memory poor   Attention: attention span appeared shorter than expected for age   Insight:  limited   Judgment: limited   Gait/Station: normal gait/station and normal balance   Motor Activity: no abnormal movements     Progress Toward Goals: unchanged    Recommended Treatment: Continue with group therapy, milieu therapy and occupational therapy  1   Continue current medications  2   CBC w/diff on 19  3   Disposition planning with plan to go to [facility] or [facility], whichever comes first    Risks, benefits and possible side effects of Medications:   Risks, benefits, and possible side effects of medications explained to patient and patient verbalizes understanding        Stella Bain PA-C

## 2019-12-12 PROCEDURE — 99232 SBSQ HOSP IP/OBS MODERATE 35: CPT | Performed by: PSYCHIATRY & NEUROLOGY

## 2019-12-12 RX ADMIN — PANTOPRAZOLE SODIUM 40 MG: 40 TABLET, DELAYED RELEASE ORAL at 11:39

## 2019-12-12 RX ADMIN — LEVOTHYROXINE SODIUM 75 MCG: 75 TABLET ORAL at 11:39

## 2019-12-12 RX ADMIN — CLOZAPINE 100 MG: 100 TABLET ORAL at 11:39

## 2019-12-12 RX ADMIN — DIVALPROEX SODIUM 1000 MG: 500 TABLET, DELAYED RELEASE ORAL at 11:39

## 2019-12-12 RX ADMIN — OXYBUTYNIN CHLORIDE 5 MG: 5 TABLET, EXTENDED RELEASE ORAL at 11:40

## 2019-12-12 RX ADMIN — CLOZAPINE 350 MG: 100 TABLET ORAL at 21:37

## 2019-12-12 RX ADMIN — METFORMIN HYDROCHLORIDE 500 MG: 500 TABLET, FILM COATED ORAL at 17:11

## 2019-12-12 RX ADMIN — NICOTINE POLACRILEX 2 MG: 2 GUM, CHEWING BUCCAL at 17:12

## 2019-12-12 RX ADMIN — METFORMIN HYDROCHLORIDE 500 MG: 500 TABLET, FILM COATED ORAL at 11:40

## 2019-12-12 RX ADMIN — ATORVASTATIN CALCIUM 10 MG: 10 TABLET, FILM COATED ORAL at 17:11

## 2019-12-12 RX ADMIN — NICOTINE POLACRILEX 2 MG: 2 GUM, CHEWING BUCCAL at 21:39

## 2019-12-12 RX ADMIN — DOCUSATE SODIUM 100 MG: 100 CAPSULE, LIQUID FILLED ORAL at 11:39

## 2019-12-12 RX ADMIN — DOCUSATE SODIUM 100 MG: 100 CAPSULE, LIQUID FILLED ORAL at 17:11

## 2019-12-12 RX ADMIN — DIVALPROEX SODIUM 1000 MG: 500 TABLET, DELAYED RELEASE ORAL at 17:11

## 2019-12-12 RX ADMIN — OLANZAPINE 10 MG: 10 TABLET, FILM COATED ORAL at 14:28

## 2019-12-12 NOTE — PLAN OF CARE
[facility] it to make a decision by the end of the week & [facility] is reporting potential opening for Pt next week, if [facility] declines
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
## 2019-12-12 NOTE — PROGRESS NOTES
Status: Pt had an outburst about the diet soda, & not happy with one day     Medication: no changes / PRN - Trazodone  D/C: waiting to hear from [facility] / if not tentative [facility] [place]     12/12/19 3863   Team Meeting   Meeting Type Daily Rounds   Team Members Present   Team Members Present Physician;Nurse;;Occupational Therapist   Physician Team Member Dr Melony Madrid / Dr Marisabel Cm / Marile Ontiveros Management Team Member Amish Orellana / [address] [place] [address]   OT Team Member Maria Guadalupe   Patient/Family Present   Patient Present No   Patient's Family Present No

## 2019-12-12 NOTE — PROGRESS NOTES
Pt pleasant, smiling, walking halls throughout afternoon/evening  Asks about discharge but is pleasant discussing  No agitation/irritability  Denies all symptoms and appears calm  Remains compliant with medications

## 2019-12-12 NOTE — CASE MANAGEMENT
CM was notified by email yesterday of potential [facility] availability next week  Also, [facility], who met with Pt on Monday, is to make a decision by the end of the week  Pt continues to ask about leaving & is hopeful it is soon  CM emailed Pt's sister, Mary Delatorre to provide an update

## 2019-12-12 NOTE — PROGRESS NOTES
Pt received trazodone 50mg po at 2226 for sleep  Pt effective within the hour as pt observed sleeping and has appeared to have slept throughout the night without difficulty

## 2019-12-13 PROCEDURE — 99232 SBSQ HOSP IP/OBS MODERATE 35: CPT | Performed by: PSYCHIATRY & NEUROLOGY

## 2019-12-13 RX ADMIN — METFORMIN HYDROCHLORIDE 500 MG: 500 TABLET, FILM COATED ORAL at 17:39

## 2019-12-13 RX ADMIN — CLOZAPINE 350 MG: 100 TABLET ORAL at 21:30

## 2019-12-13 RX ADMIN — OXYBUTYNIN CHLORIDE 5 MG: 5 TABLET, EXTENDED RELEASE ORAL at 09:48

## 2019-12-13 RX ADMIN — PANTOPRAZOLE SODIUM 40 MG: 40 TABLET, DELAYED RELEASE ORAL at 09:48

## 2019-12-13 RX ADMIN — ATORVASTATIN CALCIUM 10 MG: 10 TABLET, FILM COATED ORAL at 17:39

## 2019-12-13 RX ADMIN — NICOTINE POLACRILEX 2 MG: 2 GUM, CHEWING BUCCAL at 21:33

## 2019-12-13 RX ADMIN — DIVALPROEX SODIUM 1000 MG: 500 TABLET, DELAYED RELEASE ORAL at 17:40

## 2019-12-13 RX ADMIN — DOCUSATE SODIUM 100 MG: 100 CAPSULE, LIQUID FILLED ORAL at 17:40

## 2019-12-13 RX ADMIN — METFORMIN HYDROCHLORIDE 500 MG: 500 TABLET, FILM COATED ORAL at 09:48

## 2019-12-13 RX ADMIN — DIVALPROEX SODIUM 1000 MG: 500 TABLET, DELAYED RELEASE ORAL at 09:48

## 2019-12-13 RX ADMIN — OLANZAPINE 10 MG: 10 TABLET, FILM COATED ORAL at 14:11

## 2019-12-13 RX ADMIN — LEVOTHYROXINE SODIUM 75 MCG: 75 TABLET ORAL at 09:48

## 2019-12-13 RX ADMIN — DOCUSATE SODIUM 100 MG: 100 CAPSULE, LIQUID FILLED ORAL at 09:48

## 2019-12-13 RX ADMIN — TRAZODONE HYDROCHLORIDE 50 MG: 50 TABLET ORAL at 23:44

## 2019-12-13 RX ADMIN — CLOZAPINE 100 MG: 100 TABLET ORAL at 09:48

## 2019-12-13 NOTE — PROGRESS NOTES
Pt calm, pleasant  Awake for breakfast today  Walking halls, asking if the doctor is here  No concerns at this time

## 2019-12-13 NOTE — CASE MANAGEMENT
YOSELYN contacted Cesar Andujar of [facility] @ [phone], to inquire if they had made a decisions about accepting Pt or not? She said that unfortunately they hadn't made a decision yet  She said that [facility], who had met with Pt on Monday, had his last day yesterday & she was actually out of the office today, but agreed she would call CM on Monday, with a determination  YOSELYN sent email to [facility] to notify them of this & to Pt's sister  YOSELYN met with Pt & provided an update, that they should hear something on Monday

## 2019-12-13 NOTE — PROGRESS NOTES
Status: No changes  Medication: no changes / PRN - Trazodone  D/C: hopeful to hear from [address] their decision today - [facility] opening possibly next [facility]     12/13/19 6304   Team Meeting   Meeting Type Daily Rounds   Team Members Present   Team Members Present Physician;Nurse;;Occupational Therapist   Physician Team Member Dr Nabila Bowden / Dr Gerhardt Spice / Jackie Oterori Member [facility] Management Team Member Leoncio Rao / George Lopez   OT Team Member Maria Guadalupe   Patient/Family Present   Patient Present No   Patient's Family Present No

## 2019-12-13 NOTE — PROGRESS NOTES
Progress Note - Behavioral Health   Omar Dey 48 y o  male MRN: [id number]  Unit/Bed#: [facility] 256-01 Encounter: [id number]    Assessment/Plan   Principal Problem:    Schizoaffective disorder ([address] )  Active Problems:    Type 2 diabetes mellitus (HCC)    Benign essential hypertension    BMI 34 0-34 9,adult      Subjective:  Patient today focused on discharge  Possibility of being placed in EAC or being accepted in a CRR by next week  Is guarded and superficial denying all psychiatric symptoms when asked  Remains with chronic underlying paranoia that people are trying to steal his things  Often seen carrying his clothes around  Suspicious at times  Denies hallucinations when asked  Is not actively manic  Not agitated  Medication compliant  Tolerating medications well without serious side effects    Next CBC with diff due on 31st       Current Medications:  Current Facility-Administered Medications   Medication Dose Route Frequency    acetaminophen (TYLENOL) tablet 325 mg  325 mg Oral Q6H PRN    acetaminophen (TYLENOL) tablet 650 mg  650 mg Oral Q6H PRN    acetaminophen (TYLENOL) tablet 975 mg  975 mg Oral Q8H PRN    aluminum-magnesium hydroxide-simethicone (MYLANTA) 200-200-20 mg/5 mL oral suspension 30 mL  30 mL Oral Q4H PRN    atorvastatin (LIPITOR) tablet 10 mg  10 mg Oral Daily With Dinner    benztropine (COGENTIN) injection 1 mg  1 mg Intramuscular Q6H PRN    benztropine (COGENTIN) tablet 1 mg  1 mg Oral Q6H PRN    cloZAPine (CLOZARIL) tablet 100 mg  100 mg Oral Daily    cloZAPine (CLOZARIL) tablet 350 mg  350 mg Oral HS    divalproex sodium (DEPAKOTE) EC tablet 1,000 mg  1,000 mg Oral BID    docusate sodium (COLACE) capsule 100 mg  100 mg Oral BID    haloperidol (HALDOL) tablet 5 mg  5 mg Oral Q6H PRN    haloperidol lactate (HALDOL) injection 5 mg  5 mg Intramuscular Q6H PRN    levothyroxine tablet 75 mcg  75 mcg Oral Early Morning    LORazepam (ATIVAN) 2 mg/mL injection 1 mg  1 mg Intramuscular Q6H PRN    LORazepam (ATIVAN) tablet 1 mg  1 mg Oral Q6H PRN    magnesium hydroxide (MILK OF MAGNESIA) 400 mg/5 mL oral suspension 30 mL  30 mL Oral Daily PRN    metFORMIN (GLUCOPHAGE) tablet 500 mg  500 mg Oral BID With Meals    nicotine polacrilex (NICORETTE) gum 2 mg  2 mg Oral Q2H PRN    OLANZapine (ZyPREXA) tablet 10 mg  10 mg Oral After Lunch    oxybutynin (DITROPAN-XL) 24 hr tablet 5 mg  5 mg Oral Daily    pantoprazole (PROTONIX) EC tablet 40 mg  40 mg Oral Early Morning    traZODone (DESYREL) tablet 50 mg  50 mg Oral HS PRN       Behavioral Health Medications: all current active meds have been reviewed and continue current psychiatric medications  Vitals:  Vitals:    12/11/19 0836   SpO2: 97%       Laboratory results:    I have personally reviewed all pertinent laboratory/tests results    Most Recent Labs:   Lab Results   Component Value Date    WBC 8 57 12/03/2019    RBC 4 77 12/03/2019    HGB 13 9 12/03/2019    HCT 41 6 12/03/2019     12/03/2019    RDW 13 1 12/03/2019    NEUTROABS 4 85 12/03/2019    SODIUM 141 10/29/2019    K 4 3 10/29/2019     10/29/2019    CO2 28 10/29/2019    BUN 17 10/29/2019    CREATININE 0 76 10/29/2019    GLUCOSE 91 05/11/2018    GLUC 106 10/29/2019    GLUF 99 08/23/2019    CALCIUM 8 6 10/29/2019    AST 15 10/29/2019    ALT 22 10/29/2019    ALKPHOS 66 10/29/2019    TP 6 4 10/29/2019    ALB 3 1 (L) 10/29/2019    TBILI 0 20 10/29/2019    CHOLESTEROL 120 12/03/2019    HDL 28 (L) 12/03/2019    TRIG 237 (H) 12/03/2019    LDLCALC 45 12/03/2019    NONHDLC 92 12/03/2019    VALPROICTOT 67 10/29/2019    LITHIUM 0 6 03/27/2014    AMMONIA 32 10/08/2019    SID4UTGAPSDJ 1 840 08/23/2019    FREET4 0 93 08/23/2019    HGBA1C 6 1 12/03/2019     61/87/4055     Metabolic Monitoring:       Baseline:  4 week  8 week  12 week  Quarterly Annually   Weight (BMI) 237  258  235         Waist Circumference X X X         /99  124/74  94/51         HbA1C/Fasting plasma glucose 6 3/134 6 2/131 6 1128         Lipid Profile Chol: 138  T  HDL: 34  LDL: 78 Chol:  103  T  HDL: 27  LDL: 29 Chol: 120  T  HDL: 28   LDL: 45              Psychiatric Review of Systems:  Behavior over the last 24 hours:  unchanged  Sleep: normal  Appetite: normal  Medication side effects: No  ROS: no complaints, all others negative    Mental Status Evaluation:  Appearance:  casually dressed and disheveled   Behavior:  guarded but cooperative   Speech:  loud   Mood:  euthymic   Affect:  blunted and flat   Language sparse   Thought Process:  concrete, goal directed and perserverative   Thought Content:  obsessions and chronic underlying paranoia   Perceptual Disturbances: None  Chronic underlying internal preoccupation   Risk Potential: Denied SI/HI  Potential for aggression: No   Sensorium:  person, place and time/date   Cognition:  grossly intact   Consciousness:  alert and awake    Recent and Remote Memory poor   Attention: attention span appeared shorter than expected for age   Insight:  limited   Judgment: limited   Gait/Station: normal gait/station and normal balance   Motor Activity: no abnormal movements     Progress Toward Goals: progressing slowly    Recommended Treatment: Continue with group therapy, milieu therapy and occupational therapy  1   Continue current medications  2  Discharge to [facility] or CRR, whichever comes first  3  Monthly CBC's  Next CBC w/diff due on 19    Risks, benefits and possible side effects of Medications:   Risks, benefits, and possible side effects of medications explained to patient and patient verbalizes understanding        Jaswinder Patterson PA-C

## 2019-12-14 PROCEDURE — 99232 SBSQ HOSP IP/OBS MODERATE 35: CPT | Performed by: STUDENT IN AN ORGANIZED HEALTH CARE EDUCATION/TRAINING PROGRAM

## 2019-12-14 RX ADMIN — LEVOTHYROXINE SODIUM 75 MCG: 75 TABLET ORAL at 09:19

## 2019-12-14 RX ADMIN — CLOZAPINE 350 MG: 100 TABLET ORAL at 21:21

## 2019-12-14 RX ADMIN — ATORVASTATIN CALCIUM 10 MG: 10 TABLET, FILM COATED ORAL at 16:02

## 2019-12-14 RX ADMIN — METFORMIN HYDROCHLORIDE 500 MG: 500 TABLET, FILM COATED ORAL at 16:02

## 2019-12-14 RX ADMIN — TRAZODONE HYDROCHLORIDE 50 MG: 50 TABLET ORAL at 22:30

## 2019-12-14 RX ADMIN — METFORMIN HYDROCHLORIDE 500 MG: 500 TABLET, FILM COATED ORAL at 09:19

## 2019-12-14 RX ADMIN — DIVALPROEX SODIUM 1000 MG: 500 TABLET, DELAYED RELEASE ORAL at 17:07

## 2019-12-14 RX ADMIN — OLANZAPINE 10 MG: 10 TABLET, FILM COATED ORAL at 13:50

## 2019-12-14 RX ADMIN — NICOTINE POLACRILEX 2 MG: 2 GUM, CHEWING BUCCAL at 15:39

## 2019-12-14 RX ADMIN — DOCUSATE SODIUM 100 MG: 100 CAPSULE, LIQUID FILLED ORAL at 09:19

## 2019-12-14 RX ADMIN — DOCUSATE SODIUM 100 MG: 100 CAPSULE, LIQUID FILLED ORAL at 17:07

## 2019-12-14 RX ADMIN — CLOZAPINE 100 MG: 100 TABLET ORAL at 09:19

## 2019-12-14 RX ADMIN — DIVALPROEX SODIUM 1000 MG: 500 TABLET, DELAYED RELEASE ORAL at 09:19

## 2019-12-14 RX ADMIN — OXYBUTYNIN CHLORIDE 5 MG: 5 TABLET, EXTENDED RELEASE ORAL at 09:20

## 2019-12-14 RX ADMIN — PANTOPRAZOLE SODIUM 40 MG: 40 TABLET, DELAYED RELEASE ORAL at 09:20

## 2019-12-14 NOTE — PROGRESS NOTES
Pleasant, calm and cooperative  [address] halls at times, isolative to room listening to music at others  Friendly and social with staff  Denies SI/AH  Compliant with medications  Asks about discharge frequently, but is redirectable

## 2019-12-14 NOTE — PROGRESS NOTES
Progress Note - Behavioral Health   Wilfrid Mosley 48 y o  male MRN: [id number]  Unit/Bed#: [facility] 256-01 Encounter: [id number]    Subjective:    Per nursing, patient is calm and cooperative, pleasant in interaction, no irritation or agitation noted  Per patient, patient reports that things are going well  He describes mood as "good "  He reports eating and sleeping fine  Denies any passive or active suicidal or homicidal ideation, intent, or plan  He denies any AH/VH  He perseverates about wanting to be discharged  Behavior over the last 24 hours:  unchanged  Medication side effects: No  ROS: no complaints    Objective:    Temp:  [97 1 °F (36 2 °C)-97 8 °F (36 6 °C)] 97 1 °F (36 2 °C)  HR:  [] 86  Resp:  [16-18] 16  BP: (115-188)/(73-92) 124/87    Mental Status Evaluation:  Appearance:  Pacing halls, appears unkempt, limited cooperativity with interview, easily gets frustrated when his questions aren't answered   Behavior:  No tics, tremors, or behaviors observed   Speech:  Somewhat slurred, normal volume and rate   Mood:  "good"   Affect:  Appears blunted, stable, mood-congruent   Thought Process:  Perseverative, poverty of content   Associations Intact, perseveration   Thought Content:  No passive or active suicidal or homicidal ideation, intent, or plan  Perceptual Disturbances: Denies any auditory or visual hallucinations   Sensorium:  Oriented to person, place, time, and situation   Memory:  recent and remote memory grossly intact   Consciousness:  alert and awake   Attention: attention span and concentration were age appropriate   Insight:  poor   Judgment: fair   Gait/Station: normal gait/station   Motor Activity: no abnormal movements       Progress Toward Goals: Progressing    Recommended Treatment: Continue with group therapy, milieu therapy and occupational therapy        Risks, benefits and possible side effects of Medications:   Risks, benefits, and possible side effects of medications explained to patient and patient verbalizes understanding  Medications: all current active meds have been reviewed  Current Facility-Administered Medications:  acetaminophen 325 mg Oral Q6H PRN Ruthanna Linker, PA-C   acetaminophen 650 mg Oral Q6H PRN Ruthanna Linker, PA-C   acetaminophen 975 mg Oral Q8H PRN Ruthanna Linker, PA-C   aluminum-magnesium hydroxide-simethicone 30 mL Oral Q4H PRN Damir Justice MD   atorvastatin 10 mg Oral Daily With Cornelius Shaw MD   benztropine 1 mg Intramuscular Q6H PRN Damir Justice MD   benztropine 1 mg Oral Q6H PRN Damir Justice MD   clozapine 100 mg Oral Daily Abi Stephens MD   cloZAPine 350 mg Oral HS [facility]   divalproex sodium 1,000 mg Oral BID Ruthanna Linker, PA-C   docusate sodium 100 mg Oral BID Ruthanna Linker, PA-C   haloperidol 5 mg Oral Q6H PRN Damir Justice MD   haloperidol lactate 5 mg Intramuscular Q6H PRN Damir Justice MD   levothyroxine 75 mcg Oral Early Morning Micheal Janay, PA-C   LORazepam 1 mg Intramuscular Q6H PRN Damir Justice MD   LORazepam 1 mg Oral Q6H PRN Damir Justice MD   magnesium hydroxide 30 mL Oral Daily PRN Damir Justice MD   metFORMIN 500 mg Oral BID With Meals Micheal Gustafson, PA-PUSHPA   nicotine polacrilex 2 mg Oral Q2H PRN Damir Justice MD   OLANZapine 10 mg Oral After Lunch Julio Csarah Gore   oxybutynin 5 mg Oral Daily Ruthanna Linker, PA-C   pantoprazole 40 mg Oral Early Morning Micheal Janay, PA-C   traZODone 50 mg Oral HS PRN Damir Justice MD       Assessment/Plan   Principal Problem:    Schizoaffective disorder [facility])  Active Problems:    Type 2 diabetes mellitus (HCC)    Benign essential hypertension    BMI 34 0-34 9,adult    49 y/o Male with schizoaffective disorder- remaining stable, fair behavioral control on unit, able to make needs known, poverty of thought content    Plan:  -Awaiting placement   -Continue current med regimen

## 2019-12-14 NOTE — PROGRESS NOTES
Pt awake at start of shift requesting PRN trazodone 50mg po at 2344  Effective within the hour and pt appears to have slept throughout the remainder of the night without difficulty

## 2019-12-15 PROCEDURE — 99232 SBSQ HOSP IP/OBS MODERATE 35: CPT | Performed by: STUDENT IN AN ORGANIZED HEALTH CARE EDUCATION/TRAINING PROGRAM

## 2019-12-15 RX ADMIN — CLOZAPINE 100 MG: 100 TABLET ORAL at 09:26

## 2019-12-15 RX ADMIN — TRAZODONE HYDROCHLORIDE 50 MG: 50 TABLET ORAL at 22:00

## 2019-12-15 RX ADMIN — NICOTINE POLACRILEX 2 MG: 2 GUM, CHEWING BUCCAL at 18:23

## 2019-12-15 RX ADMIN — DOCUSATE SODIUM 100 MG: 100 CAPSULE, LIQUID FILLED ORAL at 17:41

## 2019-12-15 RX ADMIN — OXYBUTYNIN CHLORIDE 5 MG: 5 TABLET, EXTENDED RELEASE ORAL at 09:27

## 2019-12-15 RX ADMIN — ACETAMINOPHEN 650 MG: 325 TABLET, FILM COATED ORAL at 18:22

## 2019-12-15 RX ADMIN — NICOTINE POLACRILEX 2 MG: 2 GUM, CHEWING BUCCAL at 13:26

## 2019-12-15 RX ADMIN — METFORMIN HYDROCHLORIDE 500 MG: 500 TABLET, FILM COATED ORAL at 09:27

## 2019-12-15 RX ADMIN — DIVALPROEX SODIUM 1000 MG: 500 TABLET, DELAYED RELEASE ORAL at 17:41

## 2019-12-15 RX ADMIN — DIVALPROEX SODIUM 1000 MG: 500 TABLET, DELAYED RELEASE ORAL at 09:26

## 2019-12-15 RX ADMIN — LEVOTHYROXINE SODIUM 75 MCG: 75 TABLET ORAL at 09:26

## 2019-12-15 RX ADMIN — OLANZAPINE 10 MG: 10 TABLET, FILM COATED ORAL at 13:26

## 2019-12-15 RX ADMIN — NICOTINE POLACRILEX 2 MG: 2 GUM, CHEWING BUCCAL at 21:30

## 2019-12-15 RX ADMIN — PANTOPRAZOLE SODIUM 40 MG: 40 TABLET, DELAYED RELEASE ORAL at 09:27

## 2019-12-15 RX ADMIN — ATORVASTATIN CALCIUM 10 MG: 10 TABLET, FILM COATED ORAL at 16:12

## 2019-12-15 RX ADMIN — DOCUSATE SODIUM 100 MG: 100 CAPSULE, LIQUID FILLED ORAL at 09:26

## 2019-12-15 RX ADMIN — CLOZAPINE 350 MG: 100 TABLET ORAL at 21:29

## 2019-12-15 RX ADMIN — METFORMIN HYDROCHLORIDE 500 MG: 500 TABLET, FILM COATED ORAL at 16:12

## 2019-12-15 NOTE — PROGRESS NOTES
Patient received trazodone at 2230  Medication effective as patient appears to be soundly sleeping  Will continue to monitor throughout the night

## 2019-12-15 NOTE — PROGRESS NOTES
Pt slept through bfst although wakened several times  Pt mostly withdrawn to his room  Took all am med's cooperatively  Not as interactive today and more isolative  States he is tired  No other c/o or concerns for nursing at this time  No episodes of agitation

## 2019-12-15 NOTE — PROGRESS NOTES
Progress Note - Behavioral Health   Rasta Tsai 48 y o  male MRN: [id number]  Unit/Bed#: [facility] 256-01 Encounter: [id number]    Subjective:    Per nursing, patient was calm and cooperative yesterday evening, pleasant during interaction, compliant with medications, withdrawn to room today, no concerns, no agitation  Per patient, patient denies any problems or concerns  He reports eating and sleeping okay  He reports taking a shower today  He denies any SI/HI, denies any AH/VH  Reports tolerating medication well  Behavior over the last 24 hours:  unchanged  Medication side effects: No  ROS: no complaints    Objective:    Temp:  [97 9 °F (36 6 °C)] 97 9 °F (36 6 °C)  HR:  [92] 92  Resp:  [17] 17  BP: (110)/(77) 110/77    Mental Status Evaluation:  Appearance:  Pacing halls, dressed in casual clothing, unkempt, cooperative   Behavior:  No tics, tremors, or behaviors observed   Speech:  Normal rate, rhythm, and volume   Mood:  "Okay"   Affect:  Appears blunted   Thought Process:  Paucity of thoughts   Associations intact associations   Thought Content:  No passive or active suicidal or homicidal ideation, intent, or plan  Perceptual Disturbances: Denies any auditory or visual hallucinations   Sensorium:  Oriented to person, place, time, and situation   Memory:  recent and remote memory grossly intact   Consciousness:  alert and awake   Attention: distractible   Insight:  poor   Judgment: limited   Gait/Station: normal gait/station   Motor Activity: no abnormal movements       Progress Toward Goals: Progressing    Recommended Treatment: Continue with group therapy, milieu therapy and occupational therapy  Risks, benefits and possible side effects of Medications:   Risks, benefits, and possible side effects of medications explained to patient and patient verbalizes understanding  Medications: all current active meds have been reviewed      Current Facility-Administered Medications:  acetaminophen 325 mg Oral Q6H PRN Doc Flirt, PA-C   acetaminophen 650 mg Oral Q6H PRN Doc Flirt, PA-C   acetaminophen 975 mg Oral Q8H PRN Doc Flirt, PA-PUSHPA   aluminum-magnesium hydroxide-simethicone 30 mL Oral Q4H PRN Richa Barnes MD   atorvastatin 10 mg Oral Daily With Campos Montoya MD   benztropine 1 mg Intramuscular Q6H PRVICENTE Barnes MD   benztropine 1 mg Oral Q6H PRVICENTE Barnes MD   clozapine 100 mg Oral Daily Rosalia Joy MD   cloZAPine 350 mg Oral HS Julio Csarah Gore   divalproex sodium 1,000 mg Oral BID Doc Flirt, PA-PUSHPA   docusate sodium 100 mg Oral BID Doc Flirt, XUAN   haloperidol 5 mg Oral Q6H PRVICENTE Barnes MD   haloperidol lactate 5 mg Intramuscular Q6H PRVICENTE Barnes MD   levothyroxine 75 mcg Oral Early Morning Tom Khan PA-C   LORazepam 1 mg Intramuscular Q6H PRVICENTE Barnes MD   LORazepam 1 mg Oral Q6H PRVICENTE Barnes MD   magnesium hydroxide 30 mL Oral Daily PRVICENTE Barnes MD   metFORMIN 500 mg Oral BID With Meals Tom Khan PA-C   nicotine polacrilex 2 mg Oral Q2H PRVICENTE Barnes MD   OLANZapine 10 mg Oral After Lunch [facility]   oxybutynin 5 mg Oral Daily Doc Flirt, PA-PUSHPA   pantoprazole 40 mg Oral Early Morning Tom Khan PA-C   traZODone 50 mg Oral HS PRVICENTE Barnes MD       Assessment/Plan   Principal Problem:    Schizoaffective disorder [facility])  Active Problems:    Type 2 diabetes mellitus (HCC)    Benign essential hypertension    BMI 34 0-34 9,adult    49 y/o Male with schizoaffective d/o- continues to have limited conversation, improving self-care but remains unkempt, blunted affect, in fair behavioral control on unit  Plan:  -Continue current med regimen   -Awaiting placement

## 2019-12-16 PROCEDURE — 99232 SBSQ HOSP IP/OBS MODERATE 35: CPT | Performed by: PHYSICIAN ASSISTANT

## 2019-12-16 RX ADMIN — CLOZAPINE 100 MG: 100 TABLET ORAL at 09:23

## 2019-12-16 RX ADMIN — ATORVASTATIN CALCIUM 10 MG: 10 TABLET, FILM COATED ORAL at 15:57

## 2019-12-16 RX ADMIN — DIVALPROEX SODIUM 1000 MG: 500 TABLET, DELAYED RELEASE ORAL at 09:22

## 2019-12-16 RX ADMIN — DOCUSATE SODIUM 100 MG: 100 CAPSULE, LIQUID FILLED ORAL at 09:22

## 2019-12-16 RX ADMIN — OLANZAPINE 10 MG: 10 TABLET, FILM COATED ORAL at 13:51

## 2019-12-16 RX ADMIN — LEVOTHYROXINE SODIUM 75 MCG: 75 TABLET ORAL at 09:22

## 2019-12-16 RX ADMIN — PANTOPRAZOLE SODIUM 40 MG: 40 TABLET, DELAYED RELEASE ORAL at 09:22

## 2019-12-16 RX ADMIN — CLOZAPINE 350 MG: 100 TABLET ORAL at 21:21

## 2019-12-16 RX ADMIN — METFORMIN HYDROCHLORIDE 500 MG: 500 TABLET, FILM COATED ORAL at 09:22

## 2019-12-16 RX ADMIN — OXYBUTYNIN CHLORIDE 5 MG: 5 TABLET, EXTENDED RELEASE ORAL at 09:22

## 2019-12-16 RX ADMIN — DIVALPROEX SODIUM 1000 MG: 500 TABLET, DELAYED RELEASE ORAL at 18:02

## 2019-12-16 RX ADMIN — METFORMIN HYDROCHLORIDE 500 MG: 500 TABLET, FILM COATED ORAL at 15:58

## 2019-12-16 RX ADMIN — DOCUSATE SODIUM 100 MG: 100 CAPSULE, LIQUID FILLED ORAL at 18:02

## 2019-12-16 RX ADMIN — TRAZODONE HYDROCHLORIDE 50 MG: 50 TABLET ORAL at 22:50

## 2019-12-16 NOTE — PROGRESS NOTES
Pt resting in bed this morning, overheard talking in sleep  Awoke when staff attempted to remove items from his room  Pt jumped out of bed and quickly walked to breakfast at that time  Disheveled appearance  Pleasant during conversation

## 2019-12-16 NOTE — PLAN OF CARE
Problem: SELF HARM/SUICIDALITY  Goal: Will have no self-injury during hospital stay  Description  INTERVENTIONS:  - Q 15 MINUTES: Routine safety checks  - Q WAKING SHIFT & PRN: Assess risk to determine if routine checks are adequate to maintain patient safety  - Encourage patient to participate actively in care by formulating a plan to combat response to suicidal ideation, identify supports and resources  Outcome: Progressing     Problem: INVOLUNTARY ADMIT  Goal: Will cooperate with staff recommendations and doctor's orders and will demonstrate appropriate behavior  Description  INTERVENTIONS:  - Treat underlying conditions and offer medication as ordered  - Educate regarding involuntary admission procedures and rules  - Utilize positive consistent limit setting strategies to support patient and staff safety  Outcome: Progressing     Problem: SELF CARE DEFICIT  Goal: Return ADL status to a safe level of function  Description  INTERVENTIONS:  - Administer medication as ordered  - Assess ADL deficits and provide assistive devices as needed  - Obtain PT/OT consults as needed  - Assist and instruct patient to increase activity and self care as tolerated  Outcome: Progressing     Problem: PAIN - ADULT  Goal: Verbalizes/displays adequate comfort level or baseline comfort level  Description  Interventions:  - Encourage patient to monitor pain and request assistance  - Assess pain using appropriate pain scale  - Administer analgesics based on type and severity of pain and evaluate response  - Implement non-pharmacological measures as appropriate and evaluate response  - Consider cultural and social influences on pain and pain management  - Notify physician/advanced practitioner if interventions unsuccessful or patient reports new pain  Outcome: Progressing     Problem: DISCHARGE PLANNING  Goal: Discharge to home or other facility with appropriate resources  Description  INTERVENTIONS:  - Identify barriers to discharge w/patient and caregiver    Pt is homeless & cannot return to previous residence    - Arrange for needed discharge resources and transportation as appropriate    Pt has been [facility] to [facility] & he is currently 2nd on the list    [facility] will come to meet with Pt on 12/9    Pt is still working with [facility]      - Identify discharge learning needs (meds, wound care, etc )    Diagnosis/medication education       Outcome: Progressing     Problem: Ineffective Coping  Goal: Participates in unit activities  Description  Interventions:  - Provide therapeutic environment   - Provide required programming   - Redirect inappropriate behaviors   Outcome: Progressing     Problem: Risk for Self Injury/Neglect  Goal: Complete daily ADLs, including personal hygiene independently, as able  Description  Interventions:  - Observe, teach, and assist patient with ADLS  - Monitor and promote a balance of rest/activity, with adequate nutrition and elimination  Outcome: Progressing     Problem: Nutrition/Hydration-ADULT  Goal: Nutrient/Hydration intake appropriate for improving, restoring or maintaining nutritional needs  Description  Monitor and assess patient's nutrition/hydration status for malnutrition  Collaborate with interdisciplinary team and initiate plan and interventions as ordered  Monitor patient's weight and dietary intake as ordered or per policy  Utilize nutrition screening tool and intervene as necessary  Determine patient's food preferences and provide high-protein, high-caloric foods as appropriate       INTERVENTIONS:  - Monitor oral intake, urinary output, labs, and treatment plans  - Assess nutrition and hydration status and recommend course of action  - Evaluate amount of meals eaten  - Assist patient with eating if necessary   - Allow adequate time for meals  - Recommend/ encourage appropriate diets, oral nutritional supplements, and vitamin/mineral supplements  - Order, calculate, and assess calorie counts as needed  - Recommend, monitor, and adjust tube feedings and TPN/PPN based on assessed needs  - Assess need for intravenous fluids  - Provide specific nutrition/hydration education as appropriate  - Include patient/family/caregiver in decisions related to nutrition  Outcome: Progressing

## 2019-12-16 NOTE — PROGRESS NOTES
Progress Note - Behavioral Health   Wayland Sicard 48 y o  male MRN: [id number]  Unit/Bed#: [facility] 256-01 Encounter: [id number]    Assessment/Plan   Principal Problem:    Schizoaffective disorder ([address] )  Active Problems:    Type 2 diabetes mellitus (HCC)    Benign essential hypertension    BMI 34 0-34 9,adult      Subjective:   Patient is seclusive to his room  Perseverates on discharge  No reports of agitation this weekend  Appears disheveled and requires prompting to carry out ADLs  Reports he feels well  Denies all psychiatric symptoms and is superficial in conversation  Remains with chronic underlying paranoia and internal preoccupation  Is not currently manic  Slightly anxious over discharge  Denied depressive symptoms  Medication compliant  Tolerating medications well without serious side effects  Awaiting placement at this time        Current Medications:  Current Facility-Administered Medications   Medication Dose Route Frequency    acetaminophen (TYLENOL) tablet 325 mg  325 mg Oral Q6H PRN    acetaminophen (TYLENOL) tablet 650 mg  650 mg Oral Q6H PRN    acetaminophen (TYLENOL) tablet 975 mg  975 mg Oral Q8H PRN    aluminum-magnesium hydroxide-simethicone (MYLANTA) 200-200-20 mg/5 mL oral suspension 30 mL  30 mL Oral Q4H PRN    atorvastatin (LIPITOR) tablet 10 mg  10 mg Oral Daily With Dinner    benztropine (COGENTIN) injection 1 mg  1 mg Intramuscular Q6H PRN    benztropine (COGENTIN) tablet 1 mg  1 mg Oral Q6H PRN    cloZAPine (CLOZARIL) tablet 100 mg  100 mg Oral Daily    cloZAPine (CLOZARIL) tablet 350 mg  350 mg Oral HS    divalproex sodium (DEPAKOTE) EC tablet 1,000 mg  1,000 mg Oral BID    docusate sodium (COLACE) capsule 100 mg  100 mg Oral BID    haloperidol (HALDOL) tablet 5 mg  5 mg Oral Q6H PRN    haloperidol lactate (HALDOL) injection 5 mg  5 mg Intramuscular Q6H PRN    levothyroxine tablet 75 mcg  75 mcg Oral Early Morning    LORazepam (ATIVAN) 2 mg/mL injection 1 mg  1 mg Intramuscular Q6H PRN    LORazepam (ATIVAN) tablet 1 mg  1 mg Oral Q6H PRN    magnesium hydroxide (MILK OF MAGNESIA) 400 mg/5 mL oral suspension 30 mL  30 mL Oral Daily PRN    metFORMIN (GLUCOPHAGE) tablet 500 mg  500 mg Oral BID With Meals    nicotine polacrilex (NICORETTE) gum 2 mg  2 mg Oral Q2H PRN    OLANZapine (ZyPREXA) tablet 10 mg  10 mg Oral After Lunch    oxybutynin (DITROPAN-XL) 24 hr tablet 5 mg  5 mg Oral Daily    pantoprazole (PROTONIX) EC tablet 40 mg  40 mg Oral Early Morning    traZODone (DESYREL) tablet 50 mg  50 mg Oral HS PRN       Behavioral Health Medications: all current active meds have been reviewed and continue current psychiatric medications  Vitals:  Vitals:    12/15/19 1536   BP: 140/90   Pulse: 96   Resp: 17   Temp: 97 5 °F (36 4 °C)   SpO2:        Laboratory results:    I have personally reviewed all pertinent laboratory/tests results    Most Recent Labs:   Lab Results   Component Value Date    WBC 8 57 12/03/2019    RBC 4 77 12/03/2019    HGB 13 9 12/03/2019    HCT 41 6 12/03/2019     12/03/2019    RDW 13 1 12/03/2019    NEUTROABS 4 85 12/03/2019    SODIUM 141 10/29/2019    K 4 3 10/29/2019     10/29/2019    CO2 28 10/29/2019    BUN 17 10/29/2019    CREATININE 0 76 10/29/2019    GLUC 106 10/29/2019    GLUF 99 08/23/2019    CALCIUM 8 6 10/29/2019    AST 15 10/29/2019    ALT 22 10/29/2019    ALKPHOS 66 10/29/2019    TP 6 4 10/29/2019    ALB 3 1 (L) 10/29/2019    TBILI 0 20 10/29/2019    CHOLESTEROL 120 12/03/2019    HDL 28 (L) 12/03/2019    TRIG 237 (H) 12/03/2019    LDLCALC 45 12/03/2019    NONHDLC 92 12/03/2019    VALPROICTOT 67 10/29/2019    LITHIUM 0 6 03/27/2014    AMMONIA 32 10/08/2019    [id number] 1 840 08/23/2019    FREET4 0 93 08/23/2019    HGBA1C 6 1 12/03/2019     90/22/7004     Metabolic Monitoring:       Baseline:  4 week  8 week  12 week  Quarterly Annually   Weight (BMI) 237  258  235         Waist Circumference X X X         /99  124/74  94/51         HbA1C/Fasting plasma glucose 6 3/134 6 2/131 6 1/128         Lipid Profile Chol: 138  T  HDL: 34  LDL: 78 Chol:  103  T  HDL: 27  LDL: 29 Chol: 120  T  HDL: 28   LDL: 45             Psychiatric Review of Systems:  Behavior over the last 24 hours:  unchanged  Sleep: normal  Appetite: normal  Medication side effects: No  ROS: no complaints, all others negative    Mental Status Evaluation:  Appearance:  casually dressed, disheveled   Behavior:  guarded   Speech:  loud   Mood:  euthymic   Affect:  blunted and constricted   Language naming objects and repeating phrases   Thought Process:  concrete, goal directed and perserverative   Thought Content:  obsessions, chronic underlying paranoia   Perceptual Disturbances: None   Risk Potential: Denied SI/HI  Potential for aggression: low   Sensorium:  person and place   Cognition:  grossly intact   Consciousness:  awake    Recent and Remote Memory poor   Attention: attention span appeared shorter than expected for age   Insight:  limited   Judgment: limited   Gait/Station: normal gait/station and normal balance   Motor Activity: no abnormal movements     Progress Toward Goals: progressing    Recommended Treatment: Continue with group therapy, milieu therapy and occupational therapy  1   Continue current medications  2  Disposition planning with plan for EAC vs CRR, whichever comes first  3  Monthly CBC's  Next CBC w/diff due on 19    Risks, benefits and possible side effects of Medications:   Risks, benefits, and possible side effects of medications explained to patient and patient verbalizes understanding        Madisyn Quinn PA-C

## 2019-12-16 NOTE — PROGRESS NOTES
Status: No changes  Medication: no changes / PRN - Tylenol & Trazodone  D/C: [facility] is to make a decision today  If they decline, EAC is planned for Thurs or next Tues       12/16/19 0703   Team Meeting   Meeting Type Daily Rounds   Team Members Present   Team Members Present Physician;Nurse;;Occupational Therapist   Physician Team Member XUAN Joel   Nursing Team Member GRANT [facility] Management Team Member [address] / [place]   OT Team Member Maria Guadalupe   Patient/Family Present   Patient Present No   Patient's Family Present No

## 2019-12-17 PROCEDURE — 99232 SBSQ HOSP IP/OBS MODERATE 35: CPT | Performed by: PHYSICIAN ASSISTANT

## 2019-12-17 RX ADMIN — METFORMIN HYDROCHLORIDE 500 MG: 500 TABLET, FILM COATED ORAL at 16:21

## 2019-12-17 RX ADMIN — DIVALPROEX SODIUM 1000 MG: 500 TABLET, DELAYED RELEASE ORAL at 17:08

## 2019-12-17 RX ADMIN — DIVALPROEX SODIUM 1000 MG: 500 TABLET, DELAYED RELEASE ORAL at 09:29

## 2019-12-17 RX ADMIN — LEVOTHYROXINE SODIUM 75 MCG: 75 TABLET ORAL at 09:29

## 2019-12-17 RX ADMIN — CLOZAPINE 350 MG: 100 TABLET ORAL at 21:21

## 2019-12-17 RX ADMIN — NICOTINE POLACRILEX 2 MG: 2 GUM, CHEWING BUCCAL at 16:23

## 2019-12-17 RX ADMIN — NICOTINE POLACRILEX 2 MG: 2 GUM, CHEWING BUCCAL at 21:23

## 2019-12-17 RX ADMIN — DOCUSATE SODIUM 100 MG: 100 CAPSULE, LIQUID FILLED ORAL at 09:29

## 2019-12-17 RX ADMIN — DOCUSATE SODIUM 100 MG: 100 CAPSULE, LIQUID FILLED ORAL at 17:08

## 2019-12-17 RX ADMIN — PANTOPRAZOLE SODIUM 40 MG: 40 TABLET, DELAYED RELEASE ORAL at 09:28

## 2019-12-17 RX ADMIN — NICOTINE POLACRILEX 2 MG: 2 GUM, CHEWING BUCCAL at 18:39

## 2019-12-17 RX ADMIN — OXYBUTYNIN CHLORIDE 5 MG: 5 TABLET, EXTENDED RELEASE ORAL at 09:29

## 2019-12-17 RX ADMIN — ATORVASTATIN CALCIUM 10 MG: 10 TABLET, FILM COATED ORAL at 16:21

## 2019-12-17 RX ADMIN — CLOZAPINE 100 MG: 100 TABLET ORAL at 09:29

## 2019-12-17 RX ADMIN — METFORMIN HYDROCHLORIDE 500 MG: 500 TABLET, FILM COATED ORAL at 09:29

## 2019-12-17 RX ADMIN — ALUMINUM HYDROXIDE, MAGNESIUM HYDROXIDE, AND SIMETHICONE 30 ML: 200; 200; 20 SUSPENSION ORAL at 03:07

## 2019-12-17 RX ADMIN — OLANZAPINE 10 MG: 10 TABLET, FILM COATED ORAL at 13:53

## 2019-12-17 RX ADMIN — TRAZODONE HYDROCHLORIDE 50 MG: 50 TABLET ORAL at 23:01

## 2019-12-17 NOTE — PROGRESS NOTES
Status: Pt had a good evening, until bedtime  He was followed by staff into the water room, where he spit his clozaril out  He was up one overnight with heartburn     Medication: no changes / PRN - Trazodone & Mylanta   D/C: TBD     12/17/19 0908   Team Meeting   Meeting Type Daily Rounds   Team Members Present   Team Members Present Physician;Nurse;   Physician Team Member Dr Andria Murillo / Bal Iqbal Team Member [facility] Management Team Member Waqar Guerrero / Valeriy Huynh   OT Team Member Maria Guadalupe   Patient/Family Present   Patient Present No   Patient's Family Present No

## 2019-12-17 NOTE — PROGRESS NOTES
Nursing reported pt spitting out clozaril last pm  AM meds given and pt was monitored after  No evidence of non-compliance  Pt is mostly withdrawn to his room throughout the morning but did get up for bfst  Little interaction with other pt's  No agitation or inappropriate behavior, compliant with behav mod plan regarding water intake  No concerns for nursing when asked

## 2019-12-17 NOTE — PROGRESS NOTES
Gave pt Clozaril 350 to pt  Writer had to cut last pill in half for dose  Pt took pills like always  He asked to be let in water room, writer going in to refill pitcher  Found 1 and 1/2 pills in sink  Asked pt about pills  He denied  Suggest 10 minute med monitor  Made charge nurse aware

## 2019-12-17 NOTE — PROGRESS NOTES
Pt received PRN Trazodone 50mg po at 2250 for sleep  Pt observed sleeping much of the night, however did wake twice  Pt remains with talking in sleep and coughing  Pt denies wanting to see practitioner in morning to assess cough

## 2019-12-17 NOTE — CASE MANAGEMENT
CM contacted [facility] @ [phone], who reported they have decided to go with another candidate & would not be accepting Pt to their program     CM emailed [facility] [id number] director, to inform her of above & inquire about next steps for Pt
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
## 2019-12-17 NOTE — PLAN OF CARE
Problem: SELF HARM/SUICIDALITY  Goal: Will have no self-injury during hospital stay  Description  INTERVENTIONS:  - Q 15 MINUTES: Routine safety checks  - Q WAKING SHIFT & PRN: Assess risk to determine if routine checks are adequate to maintain patient safety  - Encourage patient to participate actively in care by formulating a plan to combat response to suicidal ideation, identify supports and resources  Outcome: Progressing     Problem: INVOLUNTARY ADMIT  Goal: Will cooperate with staff recommendations and doctor's orders and will demonstrate appropriate behavior  Description  INTERVENTIONS:  - Treat underlying conditions and offer medication as ordered  - Educate regarding involuntary admission procedures and rules  - Utilize positive consistent limit setting strategies to support patient and staff safety  Outcome: Progressing     Problem: SELF CARE DEFICIT  Goal: Return ADL status to a safe level of function  Description  INTERVENTIONS:  - Administer medication as ordered  - Assess ADL deficits and provide assistive devices as needed  - Obtain PT/OT consults as needed  - Assist and instruct patient to increase activity and self care as tolerated  Outcome: Progressing     Problem: PAIN - ADULT  Goal: Verbalizes/displays adequate comfort level or baseline comfort level  Description  Interventions:  - Encourage patient to monitor pain and request assistance  - Assess pain using appropriate pain scale  - Administer analgesics based on type and severity of pain and evaluate response  - Implement non-pharmacological measures as appropriate and evaluate response  - Consider cultural and social influences on pain and pain management  - Notify physician/advanced practitioner if interventions unsuccessful or patient reports new pain  Outcome: Progressing     Problem: DISCHARGE PLANNING  Goal: Discharge to home or other facility with appropriate resources  Description  INTERVENTIONS:  - Identify barriers to discharge w/patient and caregiver    Pt is homeless & cannot return to previous residence    - Arrange for needed discharge resources and transportation as appropriate    Pt has been [facility] to [facility] & he is currently 2nd on the list    [facility] will come to meet with Pt on 12/9    Pt is still working with [facility]      - Identify discharge learning needs (meds, wound care, etc )    Diagnosis/medication education       Outcome: Progressing     Problem: Ineffective Coping  Goal: Participates in unit activities  Description  Interventions:  - Provide therapeutic environment   - Provide required programming   - Redirect inappropriate behaviors   Outcome: Progressing     Problem: Risk for Self Injury/Neglect  Goal: Complete daily ADLs, including personal hygiene independently, as able  Description  Interventions:  - Observe, teach, and assist patient with ADLS  - Monitor and promote a balance of rest/activity, with adequate nutrition and elimination  Outcome: Progressing     Problem: Nutrition/Hydration-ADULT  Goal: Nutrient/Hydration intake appropriate for improving, restoring or maintaining nutritional needs  Description  Monitor and assess patient's nutrition/hydration status for malnutrition  Collaborate with interdisciplinary team and initiate plan and interventions as ordered  Monitor patient's weight and dietary intake as ordered or per policy  Utilize nutrition screening tool and intervene as necessary  Determine patient's food preferences and provide high-protein, high-caloric foods as appropriate       INTERVENTIONS:  - Monitor oral intake, urinary output, labs, and treatment plans  - Assess nutrition and hydration status and recommend course of action  - Evaluate amount of meals eaten  - Assist patient with eating if necessary   - Allow adequate time for meals  - Recommend/ encourage appropriate diets, oral nutritional supplements, and vitamin/mineral supplements  - Order, calculate, and assess calorie counts as needed  - Recommend, monitor, and adjust tube feedings and TPN/PPN based on assessed needs  - Assess need for intravenous fluids  - Provide specific nutrition/hydration education as appropriate  - Include patient/family/caregiver in decisions related to nutrition  Outcome: Progressing

## 2019-12-18 PROCEDURE — 99232 SBSQ HOSP IP/OBS MODERATE 35: CPT | Performed by: PHYSICIAN ASSISTANT

## 2019-12-18 RX ADMIN — OLANZAPINE 10 MG: 10 TABLET, FILM COATED ORAL at 14:19

## 2019-12-18 RX ADMIN — DIVALPROEX SODIUM 1000 MG: 500 TABLET, DELAYED RELEASE ORAL at 09:43

## 2019-12-18 RX ADMIN — DOCUSATE SODIUM 100 MG: 100 CAPSULE, LIQUID FILLED ORAL at 17:37

## 2019-12-18 RX ADMIN — CLOZAPINE 100 MG: 100 TABLET ORAL at 09:43

## 2019-12-18 RX ADMIN — PANTOPRAZOLE SODIUM 40 MG: 40 TABLET, DELAYED RELEASE ORAL at 09:42

## 2019-12-18 RX ADMIN — METFORMIN HYDROCHLORIDE 500 MG: 500 TABLET, FILM COATED ORAL at 09:43

## 2019-12-18 RX ADMIN — LEVOTHYROXINE SODIUM 75 MCG: 75 TABLET ORAL at 09:43

## 2019-12-18 RX ADMIN — CLOZAPINE 350 MG: 100 TABLET ORAL at 21:18

## 2019-12-18 RX ADMIN — ATORVASTATIN CALCIUM 10 MG: 10 TABLET, FILM COATED ORAL at 15:55

## 2019-12-18 RX ADMIN — METFORMIN HYDROCHLORIDE 500 MG: 500 TABLET, FILM COATED ORAL at 15:55

## 2019-12-18 RX ADMIN — OXYBUTYNIN CHLORIDE 5 MG: 5 TABLET, EXTENDED RELEASE ORAL at 09:43

## 2019-12-18 RX ADMIN — NICOTINE POLACRILEX 2 MG: 2 GUM, CHEWING BUCCAL at 17:38

## 2019-12-18 RX ADMIN — DOCUSATE SODIUM 100 MG: 100 CAPSULE, LIQUID FILLED ORAL at 09:43

## 2019-12-18 RX ADMIN — DIVALPROEX SODIUM 1000 MG: 500 TABLET, DELAYED RELEASE ORAL at 17:37

## 2019-12-18 NOTE — PROGRESS NOTES
12/18/19 0731   Team Meeting   Meeting Type Daily Rounds   Team Members Present   Team Members Present Physician;Nurse;;Occupational Therapist   Physician Team Member Claudeen Bake PA-C   Nursing Team Member GRANT [facility] Management Team Member [place]   OT Team Member George   Patient/Family Present   Patient Present No   Patient's Family Present No     Pt is for [facility] on Monday  No concerns at this time

## 2019-12-18 NOTE — CASE MANAGEMENT
CM received email from [facility] [address] director, stating Pt can be accepted Monday  CM met with Pt, who is excited to be discharging before the [place] holiday  Pt pleasant & cooperative & later observed participating in group  YOSELYN emailed Pt's sister, Farzad Guan to update on her planned discharge/transfer for Monday

## 2019-12-18 NOTE — PROGRESS NOTES
Pt is pleasant  Visible throughout the entire shift  Asked twice when he will discharge and if it will be before [place]  Pt reassured that the entire treatment team is working towards his discharge  Pt smiled and said "Ok, thank you"  Denies all psych symptoms  Compliant with medications

## 2019-12-18 NOTE — PLAN OF CARE
Problem: SELF HARM/SUICIDALITY  Goal: Will have no self-injury during hospital stay  Description  INTERVENTIONS:  - Q 15 MINUTES: Routine safety checks  - Q WAKING SHIFT & PRN: Assess risk to determine if routine checks are adequate to maintain patient safety  - Encourage patient to participate actively in care by formulating a plan to combat response to suicidal ideation, identify supports and resources  12/18/2019 1700 by Sue Simeon RN  Outcome: Progressing  12/18/2019 1659 by Sue Simeon RN  Outcome: Progressing     Problem: INVOLUNTARY ADMIT  Goal: Will cooperate with staff recommendations and doctor's orders and will demonstrate appropriate behavior  Description  INTERVENTIONS:  - Treat underlying conditions and offer medication as ordered  - Educate regarding involuntary admission procedures and rules  - Utilize positive consistent limit setting strategies to support patient and staff safety  12/18/2019 1700 by Sue Simeon RN  Outcome: Progressing  12/18/2019 1659 by Sue Simeon RN  Outcome: Progressing     Problem: SELF CARE DEFICIT  Goal: Return ADL status to a safe level of function  Description  INTERVENTIONS:  - Administer medication as ordered  - Assess ADL deficits and provide assistive devices as needed  - Obtain PT/OT consults as needed  - Assist and instruct patient to increase activity and self care as tolerated  12/18/2019 1700 by Sue Simeon RN  Outcome: Progressing  12/18/2019 1659 by Sue Simeon RN  Outcome: Progressing     Problem: PAIN - ADULT  Goal: Verbalizes/displays adequate comfort level or baseline comfort level  Description  Interventions:  - Encourage patient to monitor pain and request assistance  - Assess pain using appropriate pain scale  - Administer analgesics based on type and severity of pain and evaluate response  - Implement non-pharmacological measures as appropriate and evaluate response  - Consider cultural and social influences on pain and pain management  - Notify physician/advanced practitioner if interventions unsuccessful or patient reports new pain  12/18/2019 1700 by Ethel Adams RN  Outcome: Progressing  12/18/2019 1659 by Ethel Adams RN  Outcome: Progressing     Problem: DISCHARGE PLANNING  Goal: Discharge to home or other facility with appropriate resources  Description  INTERVENTIONS:  - Identify barriers to discharge w/patient and caregiver    Pt is homeless & cannot return to previous residence    - Arrange for needed discharge resources and transportation as appropriate    Pt has been referred to [facility] & he is currently 2nd on the list    [facility] will come to meet with Pt on 12/9    Pt is still working with Huan Sevilla      - Identify discharge learning needs (meds, wound care, etc )    Diagnosis/medication education       12/18/2019 1700 by Ethel Adams RN  Outcome: Progressing  12/18/2019 1659 by Ethel Adams RN  Outcome: Not Progressing     Problem: Ineffective Coping  Goal: Participates in unit activities  Description  Interventions:  - Provide therapeutic environment   - Provide required programming   - Redirect inappropriate behaviors   12/18/2019 1700 by Ethel Adams RN  Outcome: Progressing  12/18/2019 1659 by Ethel Adams RN  Outcome: Progressing     Problem: Risk for Self Injury/Neglect  Goal: Complete daily ADLs, including personal hygiene independently, as able  Description  Interventions:  - Observe, teach, and assist patient with ADLS  - Monitor and promote a balance of rest/activity, with adequate nutrition and elimination  12/18/2019 1700 by Ethel Adams RN  Outcome: Progressing  12/18/2019 1659 by Ethel Adams RN  Outcome: Progressing     Problem: Nutrition/Hydration-ADULT  Goal: Nutrient/Hydration intake appropriate for improving, restoring or maintaining nutritional needs  Description  Monitor and assess patient's nutrition/hydration status for malnutrition  Collaborate with interdisciplinary team and initiate plan and interventions as ordered  Monitor patient's weight and dietary intake as ordered or per policy  Utilize nutrition screening tool and intervene as necessary  Determine patient's food preferences and provide high-protein, high-caloric foods as appropriate       INTERVENTIONS:  - Monitor oral intake, urinary output, labs, and treatment plans  - Assess nutrition and hydration status and recommend course of action  - Evaluate amount of meals eaten  - Assist patient with eating if necessary   - Allow adequate time for meals  - Recommend/ encourage appropriate diets, oral nutritional supplements, and vitamin/mineral supplements  - Order, calculate, and assess calorie counts as needed  - Recommend, monitor, and adjust tube feedings and TPN/PPN based on assessed needs  - Assess need for intravenous fluids  - Provide specific nutrition/hydration education as appropriate  - Include patient/family/caregiver in decisions related to nutrition  12/18/2019 1700 by Reginald Goodell, RN  Outcome: Progressing  12/18/2019 1659 by Reginald Goodell, RN  Outcome: Progressing

## 2019-12-18 NOTE — PROGRESS NOTES
Pt had difficulty falling asleep initially  Was pleasant with staff and cooperative  Returned to room and able to fall asleep around midnight  Pt appeared to have slept the remainder of the night without difficulty

## 2019-12-18 NOTE — PROGRESS NOTES
Progress Note - Behavioral Health   Violeta Samayoa 48 y o  male MRN: [id number]  Unit/Bed#: [facility] 256-01 Encounter: [id number]    Assessment/Plan   Principal Problem:    Schizoaffective disorder ([address] )  Active Problems:    Type 2 diabetes mellitus (HCC)    Benign essential hypertension    BMI 34 0-34 9,adult      Subjective:  Patient seclusive to his room this morning  Focused on discharge and did not want to speak about anything else  Reports that he cheeked and spit out Clozaril the other night  Now is on mouth checks  Very scant in psychiatric symptoms  Has chronic underlying paranoia and is fearful that people are trying to steal his belongings  No irritability or agitation today  Is news that [facility] has an opening for patient on Monday  Awaiting discharge at this time  Denied somatic complaints or potential serious side effects      Current Medications:  Current Facility-Administered Medications   Medication Dose Route Frequency    acetaminophen (TYLENOL) tablet 325 mg  325 mg Oral Q6H PRN    acetaminophen (TYLENOL) tablet 650 mg  650 mg Oral Q6H PRN    acetaminophen (TYLENOL) tablet 975 mg  975 mg Oral Q8H PRN    aluminum-magnesium hydroxide-simethicone (MYLANTA) 200-200-20 mg/5 mL oral suspension 30 mL  30 mL Oral Q4H PRN    atorvastatin (LIPITOR) tablet 10 mg  10 mg Oral Daily With Dinner    benztropine (COGENTIN) injection 1 mg  1 mg Intramuscular Q6H PRN    benztropine (COGENTIN) tablet 1 mg  1 mg Oral Q6H PRN    cloZAPine (CLOZARIL) tablet 100 mg  100 mg Oral Daily    cloZAPine (CLOZARIL) tablet 350 mg  350 mg Oral HS    divalproex sodium (DEPAKOTE) EC tablet 1,000 mg  1,000 mg Oral BID    docusate sodium (COLACE) capsule 100 mg  100 mg Oral BID    haloperidol (HALDOL) tablet 5 mg  5 mg Oral Q6H PRN    haloperidol lactate (HALDOL) injection 5 mg  5 mg Intramuscular Q6H PRN    levothyroxine tablet 75 mcg  75 mcg Oral Early Morning    LORazepam (ATIVAN) 2 mg/mL injection 1 mg  1 mg Intramuscular Q6H PRN    LORazepam (ATIVAN) tablet 1 mg  1 mg Oral Q6H PRN    magnesium hydroxide (MILK OF MAGNESIA) 400 mg/5 mL oral suspension 30 mL  30 mL Oral Daily PRN    metFORMIN (GLUCOPHAGE) tablet 500 mg  500 mg Oral BID With Meals    nicotine polacrilex (NICORETTE) gum 2 mg  2 mg Oral Q2H PRN    OLANZapine (ZyPREXA) tablet 10 mg  10 mg Oral After Lunch    oxybutynin (DITROPAN-XL) 24 hr tablet 5 mg  5 mg Oral Daily    pantoprazole (PROTONIX) EC tablet 40 mg  40 mg Oral Early Morning    traZODone (DESYREL) tablet 50 mg  50 mg Oral HS PRN       Behavioral Health Medications: all current active meds have been reviewed and continue current psychiatric medications  Vitals:  Vitals:    12/17/19 1536   Resp: 16   SpO2:        Laboratory results:    I have personally reviewed all pertinent laboratory/tests results    Most Recent Labs:   Lab Results   Component Value Date    WBC 8 57 12/03/2019    RBC 4 77 12/03/2019    HGB 13 9 12/03/2019    HCT 41 6 12/03/2019     12/03/2019    RDW 13 1 12/03/2019    NEUTROABS 4 85 12/03/2019    SODIUM 141 10/29/2019    K 4 3 10/29/2019     10/29/2019    CO2 28 10/29/2019    BUN 17 10/29/2019    CREATININE 0 76 10/29/2019    GLUC 106 10/29/2019    GLUF 99 08/23/2019    CALCIUM 8 6 10/29/2019    AST 15 10/29/2019    ALT 22 10/29/2019    ALKPHOS 66 10/29/2019    TP 6 4 10/29/2019    ALB 3 1 (L) 10/29/2019    TBILI 0 20 10/29/2019    CHOLESTEROL 120 12/03/2019    HDL 28 (L) 12/03/2019    TRIG 237 (H) 12/03/2019    LDLCALC 45 12/03/2019    NONHDLC 92 12/03/2019    VALPROICTOT 67 10/29/2019    LITHIUM 0 6 03/27/2014    AMMONIA 32 10/08/2019    [id number] 1 840 08/23/2019    FREET4 0 93 08/23/2019    HGBA1C 6 1 12/03/2019     34/24/3641     Metabolic Monitoring:       Baseline:  4 week  8 week  12 week  Quarterly Annually   Weight (BMI) 237  258  235         Waist Circumference X X X         /99  124/74  94/51         HbA1C/Fasting plasma glucose 6 3134 6 2/131 6          Lipid Profile Chol: 138  T  HDL: 34  LDL: 78 Chol:  103  T  HDL: 27  LDL: 29 Chol: 120  T  HDL: 28   LDL: 45              Psychiatric Review of Systems:  Behavior over the last 24 hours:  unchanged  Sleep: hypersomnia  Appetite: normal  Medication side effects: No  ROS: no complaints, all others negative    Mental Status Evaluation:  Appearance:  disheveled   Behavior:  guarded   Speech:  loud   Mood:  "okay"   Affect:  blunted and flat   Language repeating phrases and sparse   Thought Process:  concrete, goal directed and perserverative   Thought Content:  obsessions and chronic underlying paranoia   Perceptual Disturbances: None   Risk Potential: Denied SI/HI  Potential for aggression: low   Sensorium:  person and place   Cognition:  grossly intact   Consciousness:  awake    Recent and Remote Memory poor   Attention: attention span appeared shorter than expected for age   Insight:  limited   Judgment: limited   Gait/Station: normal gait/station and normal balance   Motor Activity: no abnormal movements     Progress Toward Goals: slow progression    Recommended Treatment: Continue with group therapy, milieu therapy and occupational therapy  1   5 minute mouth checks  2  Continue current medications  3  Disposition planning with plan to discharge to [facility] on Monday    Risks, benefits and possible side effects of Medications:   Risks, benefits, and possible side effects of medications explained to patient and patient verbalizes understanding        Kevin Michael PA-C

## 2019-12-18 NOTE — PROGRESS NOTES
Pt up for bfst and returned to bed now sleeping  Cooperative with am med's and mouth check  Pt at times brighter during staff interaction, remains preoccupied with discharge  No concerns for nursing  Compliant with water limits/chart  No agitation

## 2019-12-19 PROCEDURE — 99232 SBSQ HOSP IP/OBS MODERATE 35: CPT | Performed by: PHYSICIAN ASSISTANT

## 2019-12-19 RX ADMIN — LEVOTHYROXINE SODIUM 75 MCG: 75 TABLET ORAL at 10:42

## 2019-12-19 RX ADMIN — OLANZAPINE 10 MG: 10 TABLET, FILM COATED ORAL at 14:06

## 2019-12-19 RX ADMIN — DIVALPROEX SODIUM 1000 MG: 500 TABLET, DELAYED RELEASE ORAL at 17:02

## 2019-12-19 RX ADMIN — CLOZAPINE 100 MG: 100 TABLET ORAL at 10:42

## 2019-12-19 RX ADMIN — PANTOPRAZOLE SODIUM 40 MG: 40 TABLET, DELAYED RELEASE ORAL at 10:42

## 2019-12-19 RX ADMIN — CLOZAPINE 350 MG: 100 TABLET ORAL at 21:14

## 2019-12-19 RX ADMIN — METFORMIN HYDROCHLORIDE 500 MG: 500 TABLET, FILM COATED ORAL at 17:02

## 2019-12-19 RX ADMIN — ATORVASTATIN CALCIUM 10 MG: 10 TABLET, FILM COATED ORAL at 17:02

## 2019-12-19 RX ADMIN — OXYBUTYNIN CHLORIDE 5 MG: 5 TABLET, EXTENDED RELEASE ORAL at 10:42

## 2019-12-19 RX ADMIN — DOCUSATE SODIUM 100 MG: 100 CAPSULE, LIQUID FILLED ORAL at 10:41

## 2019-12-19 RX ADMIN — DOCUSATE SODIUM 100 MG: 100 CAPSULE, LIQUID FILLED ORAL at 17:02

## 2019-12-19 RX ADMIN — METFORMIN HYDROCHLORIDE 500 MG: 500 TABLET, FILM COATED ORAL at 10:42

## 2019-12-19 RX ADMIN — DIVALPROEX SODIUM 1000 MG: 500 TABLET, DELAYED RELEASE ORAL at 10:42

## 2019-12-19 NOTE — PROGRESS NOTES
Status: No changes  Pt's big brother visited last night & brought him soda & snacks    Medication: no changes / no PRNs  D/C: Monday to [facility]     12/19/19 0900   Team Meeting   Meeting Type Daily Rounds   Team Members Present   Team Members Present Physician;;Nurse   Physician Team Member Agus Kohler   Nursing Team Member [id number]  [facility] Management Team Member Yemi Colon / Lisa Romero   OT Team Member Clement   Patient/Family Present   Patient Present No   Patient's Family Present No

## 2019-12-19 NOTE — DISCHARGE INSTR - OTHER ORDERS
A CRR referral was completed & faxed October 25, 2019  [facility] met with you on December 9, 2019    Melisa Fay is a confidential 7 days/week telephone support service manned by trained mental health consumers  [facility] operates daily but is not able to accept calls between 2AM-6AM    [facility] provides support, a listening ear and can provide information about available services  [facility] specializes in the concerns of mental health consumers, their families and friends  However, we are also here for anyone who has a mental health concern, is confused about or just doesn't know anything about mental health or where to get information  To reach Melisa Fay, call [phone] accepts calls between 6:00 AM to 10:00 AM and from 4:00 PM to 12:00 AM      Text CONNECT to 919269 from anywhere in the [place], anytime, about any type of crisis  A live, trained Crisis Counselor receives the text and lets you know that they are here to listen  The volunteer Crisis Counselor will help you move from a hot moment to a cool moment  [facility] [place] [facility] - licensed telephone and mobile crisis services that provide mental health assessments to all age groups regardless of income or insurance  Crisis Intervention operates 24-hour/7 days a week  [address] [place] [address] assists consumer who are experiencing a mental health emergency and lack the resources to assist themselves  Immediate intervention for suicidal and depressed individuals with home visits/outreach being top priority  Crisis can be contacted at [phone]  [facility] ([facility]) offers various education & support groups for you & your family  For more information visit their website at http://threadsy/     Dial 2-1-1 to get connected/get help    Free, confidential information & referral available 24/7: Aging Services, Child & Youth Services, Counseling, Education/Training, Food/Shelter/Clothing, Health Services, Parenting, Substance Abuse, Support Groups, Volunteer Opportunities, & much more  Phone: 2-1-1 or [phone], Web: LILIANQ [email or web address] [place], Email: [email or web address]

## 2019-12-19 NOTE — PROGRESS NOTES
Pleasant and cooperative  Joking and laughing with staff  Utilizing radio, heard singing along often  Is redirectable when asking for items from his locker  Nil management issues

## 2019-12-19 NOTE — PROGRESS NOTES
Appropriate when awake  Asleep into the late morning  Refused breakfast and VS   Compliant with medications  Disheveled appearance

## 2019-12-19 NOTE — CASE MANAGEMENT
YOSELYN contacted [facility] @ [phone] & requested ambulance transport for Mon at 8:30 AM, to [facility] [address]  YOSELYN will get a call to confirm on Monday morning  YOSELYN contacted [facility] @ [phone] & left a message for Renetta Serrano, to inform her of planned transfer to [address] [place] [address] [facility] [address] on Monday  YOSELYN met with Pt who is looking forward to going to [facility], & that he will be able to go out into the community with his big brother again

## 2019-12-19 NOTE — PROGRESS NOTES
Progress Note - Behavioral Health   Omar Dey 48 y o  male MRN: [id number]  Unit/Bed#: [facility] 256-01 Encounter: [id number]    Assessment/Plan   Principal Problem:    Schizoaffective disorder ([address] )  Active Problems:    Type 2 diabetes mellitus (HCC)    Benign essential hypertension    BMI 34 0-34 9,adult      Subjective:  Patient scant in mood related symptoms  Focused on discharge  Does appear mildly anxious in regards to this  Not currently manic  Denies hallucinations  Does remain obsessive, perseverative, and appears to have chronic underlying paranoia  Questionable internal preoccupation  Was seen cheeking Clozaril the other night but no reports the last 2  Appears to be tolerating medications well without serious side effects  Patient is be discharged [facility] on Monday  CBC w/diff to be done on 12/31/19        Current Medications:  Current Facility-Administered Medications   Medication Dose Route Frequency    acetaminophen (TYLENOL) tablet 325 mg  325 mg Oral Q6H PRN    acetaminophen (TYLENOL) tablet 650 mg  650 mg Oral Q6H PRN    acetaminophen (TYLENOL) tablet 975 mg  975 mg Oral Q8H PRN    aluminum-magnesium hydroxide-simethicone (MYLANTA) 200-200-20 mg/5 mL oral suspension 30 mL  30 mL Oral Q4H PRN    atorvastatin (LIPITOR) tablet 10 mg  10 mg Oral Daily With Dinner    benztropine (COGENTIN) injection 1 mg  1 mg Intramuscular Q6H PRN    benztropine (COGENTIN) tablet 1 mg  1 mg Oral Q6H PRN    cloZAPine (CLOZARIL) tablet 100 mg  100 mg Oral Daily    cloZAPine (CLOZARIL) tablet 350 mg  350 mg Oral HS    divalproex sodium (DEPAKOTE) EC tablet 1,000 mg  1,000 mg Oral BID    docusate sodium (COLACE) capsule 100 mg  100 mg Oral BID    haloperidol (HALDOL) tablet 5 mg  5 mg Oral Q6H PRN    haloperidol lactate (HALDOL) injection 5 mg  5 mg Intramuscular Q6H PRN    levothyroxine tablet 75 mcg  75 mcg Oral Early Morning    LORazepam (ATIVAN) 2 mg/mL injection 1 mg  1 mg Intramuscular Q6H PRN    LORazepam (ATIVAN) tablet 1 mg  1 mg Oral Q6H PRN    magnesium hydroxide (MILK OF MAGNESIA) 400 mg/5 mL oral suspension 30 mL  30 mL Oral Daily PRN    metFORMIN (GLUCOPHAGE) tablet 500 mg  500 mg Oral BID With Meals    nicotine polacrilex (NICORETTE) gum 2 mg  2 mg Oral Q2H PRN    OLANZapine (ZyPREXA) tablet 10 mg  10 mg Oral After Lunch    oxybutynin (DITROPAN-XL) 24 hr tablet 5 mg  5 mg Oral Daily    pantoprazole (PROTONIX) EC tablet 40 mg  40 mg Oral Early Morning    traZODone (DESYREL) tablet 50 mg  50 mg Oral HS PRN       Behavioral Health Medications: all current active meds have been reviewed and continue current psychiatric medications  Vitals:  Vitals:    12/18/19 1448   BP: 117/92   Pulse: 87   Resp: 18   SpO2:        Laboratory results:    I have personally reviewed all pertinent laboratory/tests results    Most Recent Labs:   Lab Results   Component Value Date    WBC 8 57 12/03/2019    RBC 4 77 12/03/2019    HGB 13 9 12/03/2019    HCT 41 6 12/03/2019     12/03/2019    RDW 13 1 12/03/2019    NEUTROABS 4 85 12/03/2019    SODIUM 141 10/29/2019    K 4 3 10/29/2019     10/29/2019    CO2 28 10/29/2019    BUN 17 10/29/2019    CREATININE 0 76 10/29/2019    GLUC 106 10/29/2019    GLUF 99 08/23/2019    CALCIUM 8 6 10/29/2019    AST 15 10/29/2019    ALT 22 10/29/2019    ALKPHOS 66 10/29/2019    TP 6 4 10/29/2019    ALB 3 1 (L) 10/29/2019    TBILI 0 20 10/29/2019    CHOLESTEROL 120 12/03/2019    HDL 28 (L) 12/03/2019    TRIG 237 (H) 12/03/2019    LDLCALC 45 12/03/2019    NONHDLC 92 12/03/2019    VALPROICTOT 67 10/29/2019    LITHIUM 0 6 03/27/2014    AMMONIA 32 10/08/2019    [id number] 1 840 08/23/2019    FREET4 0 93 08/23/2019    HGBA1C 6 1 12/03/2019     45/96/5124     Metabolic Monitoring:       Baseline:  4 week  8 week  12 week  Quarterly Annually   Weight (BMI) 237  258  235         Waist Circumference X X X         /99  124/74  94/51         HbA1C/Fasting plasma glucose 6 3/134 6 2/131 6 128         Lipid Profile Chol: 138  T  HDL: 34  LDL: 78 Chol:  103  T  HDL: 27  LDL: 29 Chol: 120  T  HDL: 28   LDL: 45             Psychiatric Review of Systems:  Behavior over the last 24 hours:  unchanged  Sleep: normal  Appetite: normal  Medication side effects: No  ROS: no complaints, all others negative    Mental Status Evaluation:  Appearance:  disheveled   Behavior:  guarded   Speech:  loud   Mood:  euthymic   Affect:  blunted and flat   Language sparse   Thought Process:  concrete, goal directed and perserverative   Thought Content:  obsessions and chronic underlying paranoia   Perceptual Disturbances: None  Questionable internal preoccupation   Risk Potential: Denied SI/HI  Potential for aggression: No   Sensorium:  person and place   Cognition:  grossly intact   Consciousness:  alert and awake    Recent and Remote Memory poor   Attention: attention span appeared shorter than expected for age   Insight:  limited   Judgment: limited   Gait/Station: normal gait/station and normal balance   Motor Activity: no abnormal movements     Progress Toward Goals: slow progression    Recommended Treatment: Continue with group therapy, milieu therapy and occupational therapy  1   Continue current medications  2  CBC w/diff on 19  3  Discharge to [facility] on Monday    Risks, benefits and possible side effects of Medications:   Risks, benefits, and possible side effects of medications explained to patient and patient verbalizes understanding        Pipe Dupree PA-C

## 2019-12-20 PROCEDURE — 99232 SBSQ HOSP IP/OBS MODERATE 35: CPT | Performed by: PHYSICIAN ASSISTANT

## 2019-12-20 RX ADMIN — METFORMIN HYDROCHLORIDE 500 MG: 500 TABLET, FILM COATED ORAL at 17:04

## 2019-12-20 RX ADMIN — DIVALPROEX SODIUM 1000 MG: 500 TABLET, DELAYED RELEASE ORAL at 12:07

## 2019-12-20 RX ADMIN — NICOTINE POLACRILEX 2 MG: 2 GUM, CHEWING BUCCAL at 17:05

## 2019-12-20 RX ADMIN — METFORMIN HYDROCHLORIDE 500 MG: 500 TABLET, FILM COATED ORAL at 12:07

## 2019-12-20 RX ADMIN — TRAZODONE HYDROCHLORIDE 50 MG: 50 TABLET ORAL at 21:20

## 2019-12-20 RX ADMIN — DOCUSATE SODIUM 100 MG: 100 CAPSULE, LIQUID FILLED ORAL at 17:04

## 2019-12-20 RX ADMIN — CLOZAPINE 350 MG: 100 TABLET ORAL at 21:17

## 2019-12-20 RX ADMIN — CLOZAPINE 100 MG: 100 TABLET ORAL at 12:07

## 2019-12-20 RX ADMIN — OLANZAPINE 10 MG: 10 TABLET, FILM COATED ORAL at 14:21

## 2019-12-20 RX ADMIN — OXYBUTYNIN CHLORIDE 5 MG: 5 TABLET, EXTENDED RELEASE ORAL at 12:07

## 2019-12-20 RX ADMIN — PANTOPRAZOLE SODIUM 40 MG: 40 TABLET, DELAYED RELEASE ORAL at 12:09

## 2019-12-20 RX ADMIN — NICOTINE POLACRILEX 2 MG: 2 GUM, CHEWING BUCCAL at 21:19

## 2019-12-20 RX ADMIN — DIVALPROEX SODIUM 1000 MG: 500 TABLET, DELAYED RELEASE ORAL at 17:04

## 2019-12-20 RX ADMIN — LEVOTHYROXINE SODIUM 75 MCG: 75 TABLET ORAL at 12:08

## 2019-12-20 RX ADMIN — ATORVASTATIN CALCIUM 10 MG: 10 TABLET, FILM COATED ORAL at 17:03

## 2019-12-20 RX ADMIN — DOCUSATE SODIUM 100 MG: 100 CAPSULE, LIQUID FILLED ORAL at 12:07

## 2019-12-20 NOTE — PROGRESS NOTES
Progress Note - Behavioral Health   Tracy Finney 48 y o  male MRN: [id number]  Unit/Bed#: [facility] [place] 256-01 Encounter: [id number]    Assessment/Plan   Principal Problem:    Schizoaffective disorder ([address] )  Active Problems:    Type 2 diabetes mellitus (HCC)    Benign essential hypertension    BMI 34 0-34 9,adult      Subjective:  Patient not invested in interview  Focused on discharge at this time  States he feels well  No reports of agitation  Will deny psychosis when asked  No manic behavior  Only reports anxiety with worrying in relation to when he is leaving the hospital   Denied depressive symptoms  Has chronic underlying paranoia  At this time appears to be medication compliant but are reports the patient cheeking his Clozaril in recent days  Next CBC with diff is due on 12/31/2019  Tolerating medications well without serious side effects  Awaiting discharge to [facility] on Monday      Current Medications:  Current Facility-Administered Medications   Medication Dose Route Frequency    acetaminophen (TYLENOL) tablet 325 mg  325 mg Oral Q6H PRN    acetaminophen (TYLENOL) tablet 650 mg  650 mg Oral Q6H PRN    acetaminophen (TYLENOL) tablet 975 mg  975 mg Oral Q8H PRN    aluminum-magnesium hydroxide-simethicone (MYLANTA) 200-200-20 mg/5 mL oral suspension 30 mL  30 mL Oral Q4H PRN    atorvastatin (LIPITOR) tablet 10 mg  10 mg Oral Daily With Dinner    benztropine (COGENTIN) injection 1 mg  1 mg Intramuscular Q6H PRN    benztropine (COGENTIN) tablet 1 mg  1 mg Oral Q6H PRN    cloZAPine (CLOZARIL) tablet 100 mg  100 mg Oral Daily    cloZAPine (CLOZARIL) tablet 350 mg  350 mg Oral HS    divalproex sodium (DEPAKOTE) EC tablet 1,000 mg  1,000 mg Oral BID    docusate sodium (COLACE) capsule 100 mg  100 mg Oral BID    haloperidol (HALDOL) tablet 5 mg  5 mg Oral Q6H PRN    haloperidol lactate (HALDOL) injection 5 mg  5 mg Intramuscular Q6H PRN    levothyroxine tablet 75 mcg  75 mcg Oral Early Morning    LORazepam (ATIVAN) 2 mg/mL injection 1 mg  1 mg Intramuscular Q6H PRN    LORazepam (ATIVAN) tablet 1 mg  1 mg Oral Q6H PRN    magnesium hydroxide (MILK OF MAGNESIA) 400 mg/5 mL oral suspension 30 mL  30 mL Oral Daily PRN    metFORMIN (GLUCOPHAGE) tablet 500 mg  500 mg Oral BID With Meals    nicotine polacrilex (NICORETTE) gum 2 mg  2 mg Oral Q2H PRN    OLANZapine (ZyPREXA) tablet 10 mg  10 mg Oral After Lunch    oxybutynin (DITROPAN-XL) 24 hr tablet 5 mg  5 mg Oral Daily    pantoprazole (PROTONIX) EC tablet 40 mg  40 mg Oral Early Morning    traZODone (DESYREL) tablet 50 mg  50 mg Oral HS PRN       Behavioral Health Medications: all current active meds have been reviewed and continue current psychiatric medications  Vitals:  Vitals:    12/20/19 0727   BP: 113/61   Pulse: 73   Resp: 20   Temp: 98 7 °F (37 1 °C)   SpO2: 98%       Laboratory results:    I have personally reviewed all pertinent laboratory/tests results    Most Recent Labs:   Lab Results   Component Value Date    WBC 8 57 12/03/2019    RBC 4 77 12/03/2019    HGB 13 9 12/03/2019    HCT 41 6 12/03/2019     12/03/2019    RDW 13 1 12/03/2019    NEUTROABS 4 85 12/03/2019    SODIUM 141 10/29/2019    K 4 3 10/29/2019     10/29/2019    CO2 28 10/29/2019    BUN 17 10/29/2019    CREATININE 0 76 10/29/2019    GLUC 106 10/29/2019    GLUF 99 08/23/2019    CALCIUM 8 6 10/29/2019    AST 15 10/29/2019    ALT 22 10/29/2019    ALKPHOS 66 10/29/2019    TP 6 4 10/29/2019    ALB 3 1 (L) 10/29/2019    TBILI 0 20 10/29/2019    CHOLESTEROL 120 12/03/2019    HDL 28 (L) 12/03/2019    TRIG 237 (H) 12/03/2019    LDLCALC 45 12/03/2019    NONHDLC 92 12/03/2019    VALPROICTOT 67 10/29/2019    LITHIUM 0 6 03/27/2014    AMMONIA 32 10/08/2019    VRX3RKHBWZMG 1 840 08/23/2019    FREET4 0 93 08/23/2019    HGBA1C 6 1 12/03/2019     12/03/2019       Psychiatric Review of Systems:  Behavior over the last 24 hours:  unchanged  Sleep: hypersomnia  Appetite: normal  Medication side effects: No  ROS: no complaints, all others negative    Mental Status Evaluation:  Appearance:  disheveled   Behavior:  guarded but cooperative   Speech:  loud   Mood:  euthymic   Affect:  blunted and flat   Language sparse   Thought Process:  concrete   Thought Content:  obsessions and chronic underlying paranoia   Perceptual Disturbances: None   Risk Potential: Denied SI/HI  Potential for aggression: no   Sensorium:  person and place   Cognition:  grossly intact   Consciousness:  sedated    Recent and Remote Memory poor   Attention: attention span appeared shorter than expected for age   Insight:  limited   Judgment: improved   Gait/Station: normal gait/station and normal balance   Motor Activity: no abnormal movements     Progress Toward Goals: progressing slowly    Recommended Treatment: Continue with group therapy, milieu therapy and occupational therapy  1   Continue current medications  2  Discharge to [facility] Monday  3  CBC w/diff on 12/31/19      Risks, benefits and possible side effects of Medications:   Risks, benefits, and possible side effects of medications explained to patient and patient verbalizes understanding        Kendall Barajas PA-C

## 2019-12-20 NOTE — PROGRESS NOTES
Pt awake for short intervals in early part of shift  Pt moved to bed #2 overnight and has been observed sleeping throughout much of night

## 2019-12-20 NOTE — CASE MANAGEMENT
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

## 2019-12-20 NOTE — PROGRESS NOTES
Status: Pt refused AM vitals yesterday  He met with [facility] briefly in the afternoon    Medication: no changes / no PRNs  D/C: Monday - [facility] is to  around 8:30 AM, but will call CM Monday morning to confirm time     12/20/19 0800   Team Meeting   Meeting Type Daily Rounds   Team Members Present   Team Members Present Physician;Nurse;;Occupational Therapist   Physician Team Member Scot Avendano   Nursing Team Member [id number]  [facility] Management Team Member Justyn Santamaria / Francisco Fuel   OT Team Member Maria Guadalupe   Patient/Family Present   Patient Present No   Patient's Family Present No     Medication:  D/C:

## 2019-12-21 PROCEDURE — 99232 SBSQ HOSP IP/OBS MODERATE 35: CPT | Performed by: PSYCHIATRY & NEUROLOGY

## 2019-12-21 RX ADMIN — DOCUSATE SODIUM 100 MG: 100 CAPSULE, LIQUID FILLED ORAL at 17:03

## 2019-12-21 RX ADMIN — DIVALPROEX SODIUM 1000 MG: 500 TABLET, DELAYED RELEASE ORAL at 17:03

## 2019-12-21 RX ADMIN — CLOZAPINE 350 MG: 100 TABLET ORAL at 21:33

## 2019-12-21 RX ADMIN — METFORMIN HYDROCHLORIDE 500 MG: 500 TABLET, FILM COATED ORAL at 12:34

## 2019-12-21 RX ADMIN — NICOTINE POLACRILEX 2 MG: 2 GUM, CHEWING BUCCAL at 21:35

## 2019-12-21 RX ADMIN — ATORVASTATIN CALCIUM 10 MG: 10 TABLET, FILM COATED ORAL at 17:03

## 2019-12-21 RX ADMIN — LEVOTHYROXINE SODIUM 75 MCG: 75 TABLET ORAL at 12:34

## 2019-12-21 RX ADMIN — PANTOPRAZOLE SODIUM 40 MG: 40 TABLET, DELAYED RELEASE ORAL at 12:35

## 2019-12-21 RX ADMIN — DIVALPROEX SODIUM 1000 MG: 500 TABLET, DELAYED RELEASE ORAL at 12:34

## 2019-12-21 RX ADMIN — OXYBUTYNIN CHLORIDE 5 MG: 5 TABLET, EXTENDED RELEASE ORAL at 12:34

## 2019-12-21 RX ADMIN — METFORMIN HYDROCHLORIDE 500 MG: 500 TABLET, FILM COATED ORAL at 17:03

## 2019-12-21 RX ADMIN — CLOZAPINE 100 MG: 100 TABLET ORAL at 12:35

## 2019-12-21 RX ADMIN — DOCUSATE SODIUM 100 MG: 100 CAPSULE, LIQUID FILLED ORAL at 12:34

## 2019-12-21 RX ADMIN — OLANZAPINE 10 MG: 10 TABLET, FILM COATED ORAL at 14:04

## 2019-12-21 RX ADMIN — NICOTINE POLACRILEX 2 MG: 2 GUM, CHEWING BUCCAL at 17:05

## 2019-12-21 NOTE — PROGRESS NOTES
Progress Note - Behavioral Health   Wilfrid Mosley 48 y o  male MRN: [id number]  Unit/Bed#: [facility] 256-01 Encounter: @CSN        Assessment/Plan   Principal Problem:    Schizoaffective disorder ([address] )  Active Problems:    Type 2 diabetes mellitus (HCC)    Benign essential hypertension    BMI 34 0-34 9,adult      Subjective: The patient was seen today for continuing care and reviewed with treatment team     Bill Connor  today reports that " everything is good"  He is looking for to be discharged for [facility] on Monday  Reports that " the place has started to get to me you know"  He is preoccupied with discharge  His brother will be coming on the day of discharge  He was seen interacting with other peers  Walking in the hallway most of the time  Anxious about the discharge  He also admits being paranoid about "everything"  Patient has been medication compliant  Reports sleeping well  Patient follows direction well    No management issues reported by the staff       Current Medications:    Current Facility-Administered Medications:  acetaminophen 325 mg Oral Q6H PRN Daren Mauri, PA-C   acetaminophen 650 mg Oral Q6H PRN Daren Mauri, PA-C   acetaminophen 975 mg Oral Q8H PRN Daren Mauri, PA-C   aluminum-magnesium hydroxide-simethicone 30 mL Oral Q4H PRN Dannie Calix MD   atorvastatin 10 mg Oral Daily With Carley Johnson MD   benztropine 1 mg Intramuscular Q6H PRN Dannie Calix MD   benztropine 1 mg Oral Q6H PRN Dannie Calix MD   clozapine 100 mg Oral Daily Mikey King MD   cloZAPine 350 mg Oral HS Julio C Gore   divalproex sodium 1,000 mg Oral BID Daren Mauri, PA-C   docusate sodium 100 mg Oral BID Daren Mauri, PA-C   haloperidol 5 mg Oral Q6H PRN Dannie Calix MD   haloperidol lactate 5 mg Intramuscular Q6H PRN Dannie Calix MD   levothyroxine 75 mcg Oral Early Morning Jordy Washington PA-C   LORazepam 1 mg Intramuscular Q6H PRN Dannie Calix MD   LORazepam 1 mg Oral Q6H PRN Barbara Grier Jacobo Renteria MD   magnesium hydroxide 30 mL Oral Daily PRN Gerda Bianchi MD   metFORMIN 500 mg Oral BID With Meals Latasha Quiroz PA-C   nicotine polacrilex 2 mg Oral Q2H PRN Gerda Bianchi MD   OLANZapine 10 mg Oral After Lunch Julio C Gore   oxybutynin 5 mg Oral Daily Silverio Ramirez PA-C   pantoprazole 40 mg Oral Early Morning Latasharobi Quiroz PA-C   traZODone 50 mg Oral HS PRN Gerda Bianchi MD       Behavioral Health Medications: all current active meds have been reviewed and continue current psychiatric medications  Vital signs in last 24 hours:  Temp:  [98 °F (36 7 °C)] 98 °F (36 7 °C)  HR:  [111] 111  Resp:  [16] 16  BP: (127)/(85) 127/85    Laboratory results:  I have personally reviewed all pertinent laboratory/tests results  Next CBC scheduled for 12/31/2019    Psychiatric Review of Systems:  Behavior over the last 24 hours:  unchanged  Sleep: hypersomnia  Appetite:  Adequate  Medication side effects: No  ROS: no complaints and All others negative    Mental Status Evaluation:  Appearance:  disheveled, overweight and wearing jeweleries   Behavior:  Cooperative but guarded   Speech:  normal pitch and normal volume   Mood:  anxious   Affect:  blunted and flat   Thought Process:  concrete   Thought Content:  Paranoid   Perceptual Disturbances: None   Risk Potential: Suicidal Ideations none, Homicidal Ideations none and Potential for Aggression No   Sensorium:  person and place   Consciousness:  alert and awake    Insight:  Impaired   Judgment: Impaired   Gait/Station: normal gait/station   Motor Activity: no abnormal movements       Progress Toward Goals:  Unchanged    Recommended Treatment: 1  Continue with group therapy, milieu therapy and occupational therapy  2  Patient is scheduled to be discharged to [facility] on Monday  3  CBC with differential scheduled to be drawn on 12/31/2019    4 Continue following current medications:     Current Facility-Administered Medications:  acetaminophen 325 mg Oral Q6H PRN Estella Martinez Denzel Banegas PA-C   acetaminophen 650 mg Oral Q6H PRN Clarance Sauger, XUAN   acetaminophen 975 mg Oral Q8H PRN Radhae Saugedawna, XUAN   aluminum-magnesium hydroxide-simethicone 30 mL Oral Q4H PRN Chavo Irwin, MD   atorvastatin 10 mg Oral Daily With Aquilino Yee MD   benztropine 1 mg Intramuscular Q6H PRN Chavo Irwin, MD   benztropine 1 mg Oral Q6H PRN Chavo Irwin, MD   clozapine 100 mg Oral Daily Luis Angel Mejia MD   cloZAPine 350 mg Oral HS [facility]   divalproex sodium 1,000 mg Oral BID Radhae SaugerXUAN   docusate sodium 100 mg Oral BID Radhae Sauger, XUAN   haloperidol 5 mg Oral Q6H PRN Chavo Irwin, MD   haloperidol lactate 5 mg Intramuscular Q6H PRN Chavo Irwin, MD   levothyroxine 75 mcg Oral Early Morning Jayda Schroeder PA-C   LORazepam 1 mg Intramuscular Q6H PRN Chavo Irwin, MD   LORazepam 1 mg Oral Q6H PRN Chavo Irwin, MD   magnesium hydroxide 30 mL Oral Daily PRN Chavo Irwin, MD   metFORMIN 500 mg Oral BID With Meals Jayda Schroeder PA-C   nicotine polacrilex 2 mg Oral Q2H PRN Chavo Irwin MD   OLANZapine 10 mg Oral After Lunch [facility]   oxybutynin 5 mg Oral Daily Radhae Saugedawna, XUAN   pantoprazole 40 mg Oral Early Morning Jayda Schroeder PA-C   traZODone 50 mg Oral HS PRN Chavo Irwin MD       Risks, benefits and possible side effects of Medications:   Risks, benefits, and possible side effects of medications explained to patient and patient verbalizes understanding  This note has been constructed using a voice recognition system  There may be translation, syntax,  or grammatical errors  If you have any questions, please contact the dictating provider

## 2019-12-21 NOTE — PROGRESS NOTES
Pt sleeping at start of shift and remained In bed until lunchtime  Visible in the milieu during later part of the shift  Pleasant and cooperative  Attended Life Skills group  Denies any concerns

## 2019-12-21 NOTE — PROGRESS NOTES
Patient slept most of the night waking up twice for water  He was awoken when new roommate arrived  He was able to return to sleep without difficulty and remained asleep for the remainder of the night

## 2019-12-21 NOTE — PROGRESS NOTES
Pt calm, pleasant, and cooperative throughout shift  Denies symptoms or concerns   Out in milieu, social

## 2019-12-22 PROCEDURE — 99231 SBSQ HOSP IP/OBS SF/LOW 25: CPT | Performed by: PSYCHIATRY & NEUROLOGY

## 2019-12-22 RX ADMIN — TRAZODONE HYDROCHLORIDE 50 MG: 50 TABLET ORAL at 22:44

## 2019-12-22 RX ADMIN — OXYBUTYNIN CHLORIDE 5 MG: 5 TABLET, EXTENDED RELEASE ORAL at 10:46

## 2019-12-22 RX ADMIN — LEVOTHYROXINE SODIUM 75 MCG: 75 TABLET ORAL at 10:45

## 2019-12-22 RX ADMIN — DOCUSATE SODIUM 100 MG: 100 CAPSULE, LIQUID FILLED ORAL at 17:19

## 2019-12-22 RX ADMIN — DIVALPROEX SODIUM 1000 MG: 500 TABLET, DELAYED RELEASE ORAL at 17:19

## 2019-12-22 RX ADMIN — METFORMIN HYDROCHLORIDE 500 MG: 500 TABLET, FILM COATED ORAL at 10:46

## 2019-12-22 RX ADMIN — DOCUSATE SODIUM 100 MG: 100 CAPSULE, LIQUID FILLED ORAL at 10:46

## 2019-12-22 RX ADMIN — DIVALPROEX SODIUM 1000 MG: 500 TABLET, DELAYED RELEASE ORAL at 10:46

## 2019-12-22 RX ADMIN — CLOZAPINE 350 MG: 100 TABLET ORAL at 21:19

## 2019-12-22 RX ADMIN — ATORVASTATIN CALCIUM 10 MG: 10 TABLET, FILM COATED ORAL at 17:19

## 2019-12-22 RX ADMIN — PANTOPRAZOLE SODIUM 40 MG: 40 TABLET, DELAYED RELEASE ORAL at 10:47

## 2019-12-22 RX ADMIN — OLANZAPINE 10 MG: 10 TABLET, FILM COATED ORAL at 14:30

## 2019-12-22 RX ADMIN — CLOZAPINE 100 MG: 100 TABLET ORAL at 10:46

## 2019-12-22 RX ADMIN — NICOTINE POLACRILEX 2 MG: 2 GUM, CHEWING BUCCAL at 17:21

## 2019-12-22 RX ADMIN — METFORMIN HYDROCHLORIDE 500 MG: 500 TABLET, FILM COATED ORAL at 17:19

## 2019-12-22 NOTE — PROGRESS NOTES
Pt pleasant and appropriate throughout day  Showered in afternoon  Asked writer "should I shower again in the morning?" Encouraged pt to shower and attend to ADLs prior to discharge tomorrow  Pt looking forward to leaving  Does not appear overly anxious today  Asking about packing his belongings and was reassured staff would assist him with this

## 2019-12-22 NOTE — PROGRESS NOTES
Has been sleeping in bed throughout the night  Moans & calls out; coughs in his sleep, but appears to remain asleep during these spells  Will continue to monitor

## 2019-12-22 NOTE — PROGRESS NOTES
Status essentially unchanged  Denies all s/s's when asked  Attended group, but observed going in & out several times  No questions/concerns voiced  Will continue to monitor

## 2019-12-22 NOTE — PLAN OF CARE
Problem: SELF HARM/SUICIDALITY  Goal: Will have no self-injury during hospital stay  Description  INTERVENTIONS:  - Q 15 MINUTES: Routine safety checks  - Q WAKING SHIFT & PRN: Assess risk to determine if routine checks are adequate to maintain patient safety  - Encourage patient to participate actively in care by formulating a plan to combat response to suicidal ideation, identify supports and resources  Outcome: Progressing     Problem: INVOLUNTARY ADMIT  Goal: Will cooperate with staff recommendations and doctor's orders and will demonstrate appropriate behavior  Description  INTERVENTIONS:  - Treat underlying conditions and offer medication as ordered  - Educate regarding involuntary admission procedures and rules  - Utilize positive consistent limit setting strategies to support patient and staff safety  Outcome: Progressing     Problem: SELF CARE DEFICIT  Goal: Return ADL status to a safe level of function  Description  INTERVENTIONS:  - Administer medication as ordered  - Assess ADL deficits and provide assistive devices as needed  - Obtain PT/OT consults as needed  - Assist and instruct patient to increase activity and self care as tolerated  Outcome: Progressing     Problem: PAIN - ADULT  Goal: Verbalizes/displays adequate comfort level or baseline comfort level  Description  Interventions:  - Encourage patient to monitor pain and request assistance  - Assess pain using appropriate pain scale  - Administer analgesics based on type and severity of pain and evaluate response  - Implement non-pharmacological measures as appropriate and evaluate response  - Consider cultural and social influences on pain and pain management  - Notify physician/advanced practitioner if interventions unsuccessful or patient reports new pain  Outcome: Progressing     Problem: DISCHARGE PLANNING  Goal: Discharge to home or other facility with appropriate resources  Description  INTERVENTIONS:  - Identify barriers to discharge w/patient and caregiver    Pt is homeless & cannot return to previous residence    - Arrange for needed discharge resources and transportation as appropriate    Pt has been referred to [facility] & he is currently 2nd on the list    [facility] will come to meet with Pt on 12/9    Pt is still working with [facility]      - Identify discharge learning needs (meds, wound care, etc )    Diagnosis/medication education       Outcome: Progressing     Problem: Ineffective Coping  Goal: Participates in unit activities  Description  Interventions:  - Provide therapeutic environment   - Provide required programming   - Redirect inappropriate behaviors   Outcome: Progressing     Problem: Risk for Self Injury/Neglect  Goal: Complete daily ADLs, including personal hygiene independently, as able  Description  Interventions:  - Observe, teach, and assist patient with ADLS  - Monitor and promote a balance of rest/activity, with adequate nutrition and elimination  Outcome: Progressing     Problem: Nutrition/Hydration-ADULT  Goal: Nutrient/Hydration intake appropriate for improving, restoring or maintaining nutritional needs  Description  Monitor and assess patient's nutrition/hydration status for malnutrition  Collaborate with interdisciplinary team and initiate plan and interventions as ordered  Monitor patient's weight and dietary intake as ordered or per policy  Utilize nutrition screening tool and intervene as necessary  Determine patient's food preferences and provide high-protein, high-caloric foods as appropriate       INTERVENTIONS:  - Monitor oral intake, urinary output, labs, and treatment plans  - Assess nutrition and hydration status and recommend course of action  - Evaluate amount of meals eaten  - Assist patient with eating if necessary   - Allow adequate time for meals  - Recommend/ encourage appropriate diets, oral nutritional supplements, and vitamin/mineral supplements  - Order, calculate, and assess calorie counts as needed  - Recommend, monitor, and adjust tube feedings and TPN/PPN based on assessed needs  - Assess need for intravenous fluids  - Provide specific nutrition/hydration education as appropriate  - Include patient/family/caregiver in decisions related to nutrition  Outcome: Progressing

## 2019-12-22 NOTE — PROGRESS NOTES
Pt sleeping throughout morning, ate breakfast then returned to bed  Calm and pleasant upon awakening  Received AM medications  Very disheveled and malodorous  Was encouraged to shower  No issues or concerns

## 2019-12-22 NOTE — PROGRESS NOTES
Progress Note - Behavioral Health   Tracy Finney 48 y o  male MRN: [id number]  Unit/Bed#: [facility] 256-01 Encounter: @[facility]        Assessment/Plan   Principal Problem:    Schizoaffective disorder ([address] )  Active Problems:    Type 2 diabetes mellitus (HCC)    Benign essential hypertension    BMI 34 0-34 9,adult      Subjective: The patient was seen today for continuing care and reviewed with treatment team     Roberto Gallegos  today reports that there is no changes in him since yesterday  He remains preoccupied with his discharge  Has slept most of the time in the day  Mostly secluded in the room, except coming for meals     Has no insight  Looking forward to going to [facility]  He remains disorganized, paranoid  No aggressive behavior reported by the staff  Reports his mood as okay  Has been compliant with medication  Denies any suicidal/homicidal ideation intent or plan  Denies any perceptual disturbances  Has been following direction in the unit  No management issues reported by the staff overnight      Current Medications:    Current Facility-Administered Medications:  acetaminophen 325 mg Oral Q6H PRN Pipe Dupree PA-C   acetaminophen 650 mg Oral Q6H PRN Pipe Dupree PA-C   acetaminophen 975 mg Oral Q8H PRN Pipe Dupree PA-C   aluminum-magnesium hydroxide-simethicone 30 mL Oral Q4H PRN Cristina Grier MD   atorvastatin 10 mg Oral Daily With Rosmery Hargrove MD   benztropine 1 mg Intramuscular Q6H PRN Cristina Grier MD   benztropine 1 mg Oral Q6H PRN Cristina Grier MD   clozapine 100 mg Oral Daily Wendy Guido MD   cloZAPine 350 mg Oral HS Julio C Gore   divalproex sodium 1,000 mg Oral BID Pipe Dupree PA-C   docusate sodium 100 mg Oral BID Pipe Dpuree PA-C   haloperidol 5 mg Oral Q6H PRN Cristina Grier MD   haloperidol lactate 5 mg Intramuscular Q6H PRN Cristina Grier MD   levothyroxine 75 mcg Oral Early Morning Lazaro Castaneda PA-C   LORazepam 1 mg Intramuscular Q6H PRN Cristina Grier MD LORazepam 1 mg Oral Q6H PRN Maryse Ortiz MD   magnesium hydroxide 30 mL Oral Daily PRN Maryse Ortiz MD   metFORMIN 500 mg Oral BID With Meals Vladislav Sierra PA-C   nicotine polacrilex 2 mg Oral Q2H PRN Maryse Ortiz MD   OLANZapine 10 mg Oral After Lunch Julio C Gore   oxybutynin 5 mg Oral Daily Kemal Noble PA-C   pantoprazole 40 mg Oral Early Morning Vladislav Sierra PA-C   traZODone 50 mg Oral HS PRN Maryse Ortiz MD       Behavioral Health Medications: all current active meds have been reviewed and continue current psychiatric medications  Vital signs in last 24 hours:  Temp:  [97 5 °F (36 4 °C)-98 °F (36 7 °C)] 97 5 °F (36 4 °C)  HR:  [] 76  Resp:  [16] 16  BP: ()/(56-85) 94/56    Laboratory results:  I have personally reviewed all pertinent laboratory/tests results  Next CBC scheduled for 12/31/2019    Psychiatric Review of Systems:  Behavior over the last 24 hours:  unchanged  Sleep: hypersomnia  Appetite:  Adequate  Medication side effects: No  ROS: no complaints and All others negative    Mental Status Evaluation:  Appearance:  disheveled, overweight and wearing jeweleries   Behavior:  Cooperative but guarded   Speech:  normal pitch and normal volume   Mood:  "ok"   Affect:  flat   Thought Process:  concrete   Thought Content:  Paranoid   Perceptual Disturbances: None   Risk Potential: Suicidal Ideations none, Homicidal Ideations none and Potential for Aggression No   Sensorium:  person and place   Consciousness:  alert and awake    Insight:  Impaired   Judgment: Impaired   Gait/Station: normal gait/station   Motor Activity: no abnormal movements       Progress Toward Goals:  Unchanged    Recommended Treatment: 1  Continue with group therapy, milieu therapy and occupational therapy  2  Patient is scheduled to be discharged to [facility] on Monday  3  CBC with differential scheduled to be drawn on 12/31/2019    4 Continue following current medications:     Current Facility-Administered Medications:  acetaminophen 325 mg Oral Q6H PRN Sandy Gorman PA-C   acetaminophen 650 mg Oral Q6H PRN Sandy Gorman PA-C   acetaminophen 975 mg Oral Q8H PRN Sandy Gorman PA-C   aluminum-magnesium hydroxide-simethicone 30 mL Oral Q4H PRN Grzegorz Duarte MD   atorvastatin 10 mg Oral Daily With Lillie Massey MD   benztropine 1 mg Intramuscular Q6H PRN Grzegorz Duarte MD   benztropine 1 mg Oral Q6H PRN Grzegorz Duarte MD   clozapine 100 mg Oral Daily Gabriel Zendejas MD   cloZAPine 350 mg Oral HS [facility] Gore   divalproex sodium 1,000 mg Oral BID Sandy Gorman PA-C   docusate sodium 100 mg Oral BID Sandy Gorman PA-C   haloperidol 5 mg Oral Q6H PRN Grzegorz Duarte MD   haloperidol lactate 5 mg Intramuscular Q6H PRN Grzegorz Duarte MD   levothyroxine 75 mcg Oral Early Morning [address]   LORazepam 1 mg Intramuscular Q6H PRN Grzegorz Duarte MD   LORazepam 1 mg Oral Q6H PRN Grzegorz Duarte MD   magnesium hydroxide 30 mL Oral Daily PRN Grzegorz Duarte MD   metFORMIN 500 mg Oral BID With Meals Dayna Street PA-C   nicotine polacrilex 2 mg Oral Q2H PRN Grzegorz Duarte MD   OLANZapine 10 mg Oral After Lunch [facility] Gore   oxybutynin 5 mg Oral Daily Sandy Gorman PA-C   pantoprazole 40 mg Oral Early Morning [address]   traZODone 50 mg Oral HS PRN Grzegorz Duarte MD       Risks, benefits and possible side effects of Medications:   Risks, benefits, and possible side effects of medications explained to patient and patient verbalizes understanding  This note has been constructed using a voice recognition system  There may be translation, syntax,  or grammatical errors  If you have any questions, please contact the dictating provider

## 2019-12-23 ENCOUNTER — HOSPITAL ENCOUNTER (INPATIENT)
Facility: HOSPITAL | Age: 54
LOS: 228 days | DRG: 750 | End: 2020-08-07
Attending: PSYCHIATRY & NEUROLOGY | Admitting: PSYCHIATRY & NEUROLOGY
Payer: COMMERCIAL

## 2019-12-23 VITALS
OXYGEN SATURATION: 99 % | DIASTOLIC BLOOD PRESSURE: 75 MMHG | SYSTOLIC BLOOD PRESSURE: 124 MMHG | HEART RATE: 101 BPM | TEMPERATURE: 97.1 F | HEIGHT: 66 IN | RESPIRATION RATE: 16 BRPM | WEIGHT: 232 LBS | BODY MASS INDEX: 37.28 KG/M2

## 2019-12-23 DIAGNOSIS — R32 URINARY INCONTINENCE: ICD-10-CM

## 2019-12-23 DIAGNOSIS — K21.9 GASTROESOPHAGEAL REFLUX DISEASE WITHOUT ESOPHAGITIS: ICD-10-CM

## 2019-12-23 DIAGNOSIS — L84 FOOT CALLUS: ICD-10-CM

## 2019-12-23 DIAGNOSIS — E03.9 ACQUIRED HYPOTHYROIDISM: ICD-10-CM

## 2019-12-23 DIAGNOSIS — E11.9 TYPE 2 DIABETES MELLITUS WITHOUT COMPLICATION, WITHOUT LONG-TERM CURRENT USE OF INSULIN (HCC): ICD-10-CM

## 2019-12-23 DIAGNOSIS — K59.09 CONSTIPATION, CHRONIC: ICD-10-CM

## 2019-12-23 DIAGNOSIS — N25.1 DIABETES INSIPIDUS, NEPHROGENIC (HCC): ICD-10-CM

## 2019-12-23 DIAGNOSIS — F25.0 SCHIZOAFFECTIVE DISORDER, BIPOLAR TYPE (HCC): Primary | ICD-10-CM

## 2019-12-23 PROCEDURE — 99239 HOSP IP/OBS DSCHRG MGMT >30: CPT | Performed by: PHYSICIAN ASSISTANT

## 2019-12-23 RX ORDER — OLANZAPINE 10 MG/1
10 TABLET ORAL
Status: CANCELLED | OUTPATIENT
Start: 2019-12-23

## 2019-12-23 RX ORDER — BENZTROPINE MESYLATE 1 MG/1
1 TABLET ORAL EVERY 6 HOURS PRN
Status: DISCONTINUED | OUTPATIENT
Start: 2019-12-23 | End: 2020-08-07 | Stop reason: HOSPADM

## 2019-12-23 RX ORDER — LORAZEPAM 1 MG/1
1 TABLET ORAL EVERY 6 HOURS PRN
Status: CANCELLED | OUTPATIENT
Start: 2019-12-23

## 2019-12-23 RX ORDER — BENZTROPINE MESYLATE 1 MG/ML
1 INJECTION INTRAMUSCULAR; INTRAVENOUS EVERY 6 HOURS PRN
Status: DISCONTINUED | OUTPATIENT
Start: 2019-12-23 | End: 2020-08-07 | Stop reason: HOSPADM

## 2019-12-23 RX ORDER — CLOZAPINE 100 MG/1
100 TABLET ORAL DAILY
Status: CANCELLED | OUTPATIENT
Start: 2019-12-24

## 2019-12-23 RX ORDER — OLANZAPINE 10 MG/1
10 TABLET ORAL
Status: ON HOLD | COMMUNITY
Start: 2019-12-24 | End: 2020-08-07

## 2019-12-23 RX ORDER — CLOZAPINE 100 MG/1
100 TABLET ORAL
Status: ON HOLD | COMMUNITY
Start: 2019-12-23 | End: 2020-08-07

## 2019-12-23 RX ORDER — BENZTROPINE MESYLATE 1 MG/1
1 TABLET ORAL EVERY 6 HOURS PRN
Status: CANCELLED | OUTPATIENT
Start: 2019-12-23

## 2019-12-23 RX ORDER — DIVALPROEX SODIUM 500 MG/1
1000 TABLET, DELAYED RELEASE ORAL 2 TIMES DAILY
COMMUNITY
Start: 2019-12-23 | End: 2020-08-07 | Stop reason: HOSPADM

## 2019-12-23 RX ORDER — ACETAMINOPHEN 325 MG/1
650 TABLET ORAL EVERY 6 HOURS PRN
Status: CANCELLED | OUTPATIENT
Start: 2019-12-23

## 2019-12-23 RX ORDER — LORAZEPAM 2 MG/ML
1 INJECTION INTRAMUSCULAR EVERY 6 HOURS PRN
Status: CANCELLED | OUTPATIENT
Start: 2019-12-23

## 2019-12-23 RX ORDER — DOCUSATE SODIUM 100 MG/1
100 CAPSULE, LIQUID FILLED ORAL 2 TIMES DAILY
Status: DISCONTINUED | OUTPATIENT
Start: 2019-12-23 | End: 2020-03-13

## 2019-12-23 RX ORDER — HALOPERIDOL 5 MG
5 TABLET ORAL EVERY 6 HOURS PRN
Status: DISCONTINUED | OUTPATIENT
Start: 2019-12-23 | End: 2020-08-07 | Stop reason: HOSPADM

## 2019-12-23 RX ORDER — MAGNESIUM HYDROXIDE/ALUMINUM HYDROXICE/SIMETHICONE 120; 1200; 1200 MG/30ML; MG/30ML; MG/30ML
30 SUSPENSION ORAL EVERY 4 HOURS PRN
Status: CANCELLED | OUTPATIENT
Start: 2019-12-23

## 2019-12-23 RX ORDER — ATORVASTATIN CALCIUM 10 MG/1
10 TABLET, FILM COATED ORAL
Status: CANCELLED | OUTPATIENT
Start: 2019-12-23

## 2019-12-23 RX ORDER — DOCUSATE SODIUM 100 MG/1
100 CAPSULE, LIQUID FILLED ORAL 2 TIMES DAILY
Status: CANCELLED | OUTPATIENT
Start: 2019-12-23

## 2019-12-23 RX ORDER — ACETAMINOPHEN 325 MG/1
650 TABLET ORAL EVERY 6 HOURS PRN
Status: DISCONTINUED | OUTPATIENT
Start: 2019-12-23 | End: 2020-08-07 | Stop reason: HOSPADM

## 2019-12-23 RX ORDER — HALOPERIDOL 5 MG/ML
5 INJECTION INTRAMUSCULAR EVERY 6 HOURS PRN
Status: DISCONTINUED | OUTPATIENT
Start: 2019-12-23 | End: 2020-08-07 | Stop reason: HOSPADM

## 2019-12-23 RX ORDER — CLOZAPINE 100 MG/1
100 TABLET ORAL DAILY
Status: DISCONTINUED | OUTPATIENT
Start: 2019-12-24 | End: 2020-01-06

## 2019-12-23 RX ORDER — OXYBUTYNIN CHLORIDE 5 MG/1
5 TABLET, EXTENDED RELEASE ORAL DAILY
Status: DISCONTINUED | OUTPATIENT
Start: 2019-12-24 | End: 2020-08-07 | Stop reason: HOSPADM

## 2019-12-23 RX ORDER — ACETAMINOPHEN 325 MG/1
975 TABLET ORAL EVERY 8 HOURS PRN
Status: CANCELLED | OUTPATIENT
Start: 2019-12-23

## 2019-12-23 RX ORDER — PANTOPRAZOLE SODIUM 40 MG/1
40 TABLET, DELAYED RELEASE ORAL
Status: CANCELLED | OUTPATIENT
Start: 2019-12-24

## 2019-12-23 RX ORDER — ACETAMINOPHEN 325 MG/1
325 TABLET ORAL EVERY 6 HOURS PRN
Status: CANCELLED | OUTPATIENT
Start: 2019-12-23

## 2019-12-23 RX ORDER — ACETAMINOPHEN 325 MG/1
975 TABLET ORAL EVERY 8 HOURS PRN
Status: DISCONTINUED | OUTPATIENT
Start: 2019-12-23 | End: 2020-08-07 | Stop reason: HOSPADM

## 2019-12-23 RX ORDER — PANTOPRAZOLE SODIUM 40 MG/1
40 TABLET, DELAYED RELEASE ORAL
Status: DISCONTINUED | OUTPATIENT
Start: 2019-12-24 | End: 2020-08-07 | Stop reason: HOSPADM

## 2019-12-23 RX ORDER — DIVALPROEX SODIUM 500 MG/1
1000 TABLET, DELAYED RELEASE ORAL 2 TIMES DAILY
Status: CANCELLED | OUTPATIENT
Start: 2019-12-23

## 2019-12-23 RX ORDER — CLOZAPINE 200 MG/1
200 TABLET ORAL
COMMUNITY
Start: 2019-12-23 | End: 2020-08-07 | Stop reason: HOSPADM

## 2019-12-23 RX ORDER — LORAZEPAM 2 MG/ML
1 INJECTION INTRAMUSCULAR EVERY 6 HOURS PRN
Status: DISCONTINUED | OUTPATIENT
Start: 2019-12-23 | End: 2020-08-07 | Stop reason: HOSPADM

## 2019-12-23 RX ORDER — ACETAMINOPHEN 325 MG/1
325 TABLET ORAL EVERY 6 HOURS PRN
Status: DISCONTINUED | OUTPATIENT
Start: 2019-12-23 | End: 2020-08-07 | Stop reason: HOSPADM

## 2019-12-23 RX ORDER — OXYBUTYNIN CHLORIDE 5 MG/1
5 TABLET, EXTENDED RELEASE ORAL DAILY
Status: CANCELLED | OUTPATIENT
Start: 2019-12-24

## 2019-12-23 RX ORDER — HALOPERIDOL 5 MG
5 TABLET ORAL EVERY 6 HOURS PRN
Status: CANCELLED | OUTPATIENT
Start: 2019-12-23

## 2019-12-23 RX ORDER — LEVOTHYROXINE SODIUM 0.07 MG/1
75 TABLET ORAL
Status: CANCELLED | OUTPATIENT
Start: 2019-12-24

## 2019-12-23 RX ORDER — MAGNESIUM HYDROXIDE/ALUMINUM HYDROXICE/SIMETHICONE 120; 1200; 1200 MG/30ML; MG/30ML; MG/30ML
30 SUSPENSION ORAL EVERY 4 HOURS PRN
Status: DISCONTINUED | OUTPATIENT
Start: 2019-12-23 | End: 2020-08-07 | Stop reason: HOSPADM

## 2019-12-23 RX ORDER — HALOPERIDOL 5 MG/ML
5 INJECTION INTRAMUSCULAR EVERY 6 HOURS PRN
Status: CANCELLED | OUTPATIENT
Start: 2019-12-23

## 2019-12-23 RX ORDER — TRAZODONE HYDROCHLORIDE 50 MG/1
50 TABLET ORAL
Status: DISCONTINUED | OUTPATIENT
Start: 2019-12-23 | End: 2020-08-07 | Stop reason: HOSPADM

## 2019-12-23 RX ORDER — BENZTROPINE MESYLATE 1 MG/ML
1 INJECTION INTRAMUSCULAR; INTRAVENOUS EVERY 6 HOURS PRN
Status: CANCELLED | OUTPATIENT
Start: 2019-12-23

## 2019-12-23 RX ORDER — LEVOTHYROXINE SODIUM 0.07 MG/1
75 TABLET ORAL
Status: DISCONTINUED | OUTPATIENT
Start: 2019-12-24 | End: 2020-08-07 | Stop reason: HOSPADM

## 2019-12-23 RX ORDER — DIVALPROEX SODIUM 500 MG/1
1000 TABLET, DELAYED RELEASE ORAL 2 TIMES DAILY
Status: DISCONTINUED | OUTPATIENT
Start: 2019-12-23 | End: 2020-01-08

## 2019-12-23 RX ORDER — ATORVASTATIN CALCIUM 10 MG/1
10 TABLET, FILM COATED ORAL
Status: DISCONTINUED | OUTPATIENT
Start: 2019-12-23 | End: 2020-08-07 | Stop reason: HOSPADM

## 2019-12-23 RX ORDER — CLOZAPINE 50 MG/1
50 TABLET ORAL
COMMUNITY
Start: 2019-12-23 | End: 2020-08-07 | Stop reason: HOSPADM

## 2019-12-23 RX ORDER — OXYBUTYNIN CHLORIDE 5 MG/1
5 TABLET, EXTENDED RELEASE ORAL DAILY
Status: ON HOLD | COMMUNITY
Start: 2019-12-24 | End: 2020-08-07

## 2019-12-23 RX ORDER — TRAZODONE HYDROCHLORIDE 50 MG/1
50 TABLET ORAL
Status: CANCELLED | OUTPATIENT
Start: 2019-12-23

## 2019-12-23 RX ORDER — CLOZAPINE 100 MG/1
350 TABLET ORAL
Status: CANCELLED | OUTPATIENT
Start: 2019-12-23

## 2019-12-23 RX ORDER — OLANZAPINE 10 MG/1
10 TABLET ORAL
Status: DISCONTINUED | OUTPATIENT
Start: 2019-12-23 | End: 2020-01-22

## 2019-12-23 RX ORDER — LORAZEPAM 1 MG/1
1 TABLET ORAL EVERY 6 HOURS PRN
Status: DISCONTINUED | OUTPATIENT
Start: 2019-12-23 | End: 2020-08-07 | Stop reason: HOSPADM

## 2019-12-23 RX ORDER — DOCUSATE SODIUM 100 MG/1
100 CAPSULE, LIQUID FILLED ORAL 2 TIMES DAILY
COMMUNITY
End: 2020-08-07 | Stop reason: HOSPADM

## 2019-12-23 RX ADMIN — OXYBUTYNIN CHLORIDE 5 MG: 5 TABLET, EXTENDED RELEASE ORAL at 08:00

## 2019-12-23 RX ADMIN — DOCUSATE SODIUM 100 MG: 100 CAPSULE, LIQUID FILLED ORAL at 08:00

## 2019-12-23 RX ADMIN — DIVALPROEX SODIUM 1000 MG: 500 TABLET, DELAYED RELEASE ORAL at 08:00

## 2019-12-23 RX ADMIN — DIVALPROEX SODIUM 1000 MG: 500 TABLET, DELAYED RELEASE ORAL at 17:05

## 2019-12-23 RX ADMIN — METFORMIN HYDROCHLORIDE 500 MG: 500 TABLET, FILM COATED ORAL at 07:59

## 2019-12-23 RX ADMIN — PANTOPRAZOLE SODIUM 40 MG: 40 TABLET, DELAYED RELEASE ORAL at 07:58

## 2019-12-23 RX ADMIN — CLOZAPINE 100 MG: 100 TABLET ORAL at 08:00

## 2019-12-23 RX ADMIN — LEVOTHYROXINE SODIUM 75 MCG: 75 TABLET ORAL at 07:59

## 2019-12-23 RX ADMIN — ATORVASTATIN CALCIUM 10 MG: 10 TABLET, FILM COATED ORAL at 17:05

## 2019-12-23 RX ADMIN — CLOZAPINE 350 MG: 100 TABLET ORAL at 21:12

## 2019-12-23 RX ADMIN — METFORMIN HYDROCHLORIDE 500 MG: 500 TABLET ORAL at 17:05

## 2019-12-23 RX ADMIN — DOCUSATE SODIUM 100 MG: 100 CAPSULE, LIQUID FILLED ORAL at 17:05

## 2019-12-23 RX ADMIN — OLANZAPINE 10 MG: 10 TABLET, FILM COATED ORAL at 17:05

## 2019-12-23 RX ADMIN — TRAZODONE HYDROCHLORIDE 50 MG: 50 TABLET ORAL at 23:20

## 2019-12-23 NOTE — PROGRESS NOTES
Was given trazodone 50 mg po prn/sleep at 2244  Good effect obtained, as observed asleep in bed within the hr  & remains asleep presently
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
## 2019-12-23 NOTE — DISCHARGE INSTRUCTIONS
Schizoaffective Disorder   WHAT YOU NEED TO KNOW:   Schizoaffective disorder is a long-term mental illness that may change how you think, feel, and act around others  You may not know what is real and what is not real    DISCHARGE INSTRUCTIONS:   Medicines:   · Antipsychotics: These medicines help decrease psychotic symptoms or severe agitation  You may need antiparkinson medicine to control muscle stiffness, twitches, and restlessness caused by antipsychotic medicines  · Antianxiety medicine: This medicine may be given to decrease anxiety and help you feel calm and relaxed  · Antidepressants: These medicines are given to decrease or stop the symptoms of depression, anxiety, and behavior problems  · Mood stabilizers: These medicines help control mood swings  · Anticonvulsants: This medicine is given to control seizures  It may also be used to decrease violent behavior and control your mood swings  · Blood pressure medicines: These may be used to help decrease motor tics (uncontrolled movements)  They may also help you feel calmer, more focused, and less irritable  · Anticholinergics: This medicine decreases the side effects of other medicines  · Take your medicine as directed  Contact your healthcare provider if you think your medicine is not helping or if you have side effects  Tell him or her if you are allergic to any medicine  Keep a list of the medicines, vitamins, and herbs you take  Include the amounts, and when and why you take them  Bring the list or the pill bottles to follow-up visits  Carry your medicine list with you in case of an emergency  Follow up with your healthcare provider or psychiatrist as directed: You may need to return to have your blood pressure and other symptoms checked  You may need blood tests to check the level of medicine in your blood  Write down your questions so you remember to ask them during your visits  Manage your symptoms:   The following may help you feel better or prevent symptoms of schizoaffective disorder from coming back:  · Find support for yourself and your family:  Talk with others to help you cope with your illness better  This may also help to improve how you relate to others  · Keep all medical appointments: This will help manage your disease and the side-effects from medicines you may be taking  · Use your medicines as directed:  Put your medicines in a pillbox placed in an area you can easily see  Use a watch with an alarm to help you remember when it is time to take your medicine  Tell your healthcare provider if you know or think you might be pregnant  Do not stop taking your medicines without your healthcare provider's okay  A sudden stop can cause serious medical problems  · Watch for early signs of a relapse and seek help immediately:      ¨ How you think, feel, and see things has changed  ¨ You behave differently than usual     ¨ You become more nervous and upset, but do not know why  ¨ You eat less and have trouble sleeping  ¨ You have little or no interest in friends or activities  For support and more information:   · American [employer] Association  [facility], [facility] Jesusita Simon [address] , Cathie Gaviria 32  Phone: 9- [phone]  Phone: [phone]  Web Address: [employer] com br  [employer]  · [address] [place] [address], Public Information & Communication Branch  [address] [place] [address], [address] [facility], [address]  Lizzie [place]a [place] [address]   Phone: [phone]  Phone: [phone]  Web Address: Patricia jeter  Contact your healthcare provider or psychiatrist if:   · You think you are having a relapse  · You are having side effects from your medicine, or they are not helping  · You are not sleeping well or are sleeping more than usual     · You cannot eat or are eating more than usual     · You have muscle spasms, stiffness, or trouble walking      · Your sad feelings or thoughts change the way you function during the day  · You have questions or concerns about your condition or care  Seek care immediately or call 911 if:   · You feel like hurting or killing yourself or others  · You feel that your condition is getting worse  · You feel very upset, threaten someone, or you feel violent  · You suddenly have changes in your vision  · You suddenly have chest pain, trouble breathing, or a fever  © 2017 [address]  Information is for End User's use only and may not be sold, redistributed or otherwise used for commercial purposes  All illustrations and images included in CareNotes® are the copyrighted property of A D A M , Inc  or Adriel Arenas  The above information is an  only  It is not intended as medical advice for individual conditions or treatments  Talk to your doctor, nurse or pharmacist before following any medical regimen to see if it is safe and effective for you

## 2019-12-23 NOTE — BH TRANSITION RECORD
Contact Information: If you have any questions, concerns, pended studies, tests and/or procedures, or emergencies regarding your inpatient behavioral health visit  Please contact [address] [place] [address] behavioral health unit [phone] and ask to speak to a , nurse or physician  A contact is available 24 hours/ 7 days a week at this number  Summary of Procedures Performed During your Stay:  Below is a list of major procedures performed during your hospital stay and a summary of results:  - Cardiac Procedures/Studies: EKG  Pending Studies (From admission, onward)    None        If studies are pending at discharge, follow up with your PCP and/or referring provider

## 2019-12-23 NOTE — CASE MANAGEMENT
YOSELYN contacted BI @ [phone] & spoke with Spencer Rachel, to confirm pick-up time of 8:30 AM   Spencer Rachel reported he had to check on something, because it was on standby  Spencer Rachel reported the earliest time they could do was noon  CM reviewed that she had called last Thursday to request 8:30 AM, as the accepting facility wanted Pt to arrive between 9/10 AM   Spencer Rachel apologized, but again said 12 PM was the soonest time they could do  YOSELYN notified [employer] notified nursing staff on the unit of change of d/c time  CM met with Pt who verbalized readiness for discharge  Pt is happy got be moving on, but also expressed some anxiety about leaving  CM acknowledged Pt's feelings, & reminded him of the importance of signing ROIs for his sister, the act team, & his big brother, Bacilio Reddy  YOSELYN contacted Pt's sister, Cathi Nazario, @ [phone] to confirm d/c plans for today  She reports that she plans to visit with Pt, in the spring, if he is doing well  She had no questions about Pt's d/c

## 2019-12-23 NOTE — PROGRESS NOTES
Admission Note-  Patient arrived to the [facility] unit at approx  1327 via ambulance transport  He is pleasant and cooperative upon arrival and with the admission process  He is noted to be disheveled  Patient is on a 305 commitment  Denies thoughts of self harm or wanting to harm other  He also denies having any hallucinations or delusions  Continue to monitor

## 2019-12-24 PROBLEM — Z72.0 TOBACCO ABUSE: Status: ACTIVE | Noted: 2019-12-24

## 2019-12-24 LAB
ALBUMIN SERPL BCP-MCNC: 4.2 G/DL (ref 3–5.2)
ALP SERPL-CCNC: 72 U/L (ref 43–122)
ALT SERPL W P-5'-P-CCNC: 28 U/L (ref 9–52)
ANION GAP SERPL CALCULATED.3IONS-SCNC: 11 MMOL/L (ref 5–14)
AST SERPL W P-5'-P-CCNC: 21 U/L (ref 17–59)
BASOPHILS # BLD AUTO: 0.18 THOUSAND/UL (ref 0–0.1)
BASOPHILS NFR MAR MANUAL: 2 % (ref 0–1)
BILIRUB SERPL-MCNC: 0.3 MG/DL
BUN SERPL-MCNC: 15 MG/DL (ref 5–25)
CALCIUM SERPL-MCNC: 9.5 MG/DL (ref 8.4–10.2)
CHLORIDE SERPL-SCNC: 101 MMOL/L (ref 97–108)
CO2 SERPL-SCNC: 29 MMOL/L (ref 22–30)
CREAT SERPL-MCNC: 0.68 MG/DL (ref 0.7–1.5)
ERYTHROCYTE [DISTWIDTH] IN BLOOD BY AUTOMATED COUNT: 13.3 %
GFR SERPL CREATININE-BSD FRML MDRD: 110 ML/MIN/1.73SQ M
GLUCOSE SERPL-MCNC: 115 MG/DL (ref 70–99)
HCT VFR BLD AUTO: 44.1 % (ref 41–53)
HGB BLD-MCNC: 14.9 G/DL (ref 13.5–17.5)
LG PLATELETS BLD QL SMEAR: PRESENT
LYMPHOCYTES # BLD AUTO: 3.59 THOUSAND/UL (ref 0.5–4)
LYMPHOCYTES # BLD AUTO: 39 % (ref 25–45)
MCH RBC QN AUTO: 29.8 PG (ref 26–34)
MCHC RBC AUTO-ENTMCNC: 33.8 G/DL (ref 31–36)
MCV RBC AUTO: 88 FL (ref 80–100)
MONOCYTES # BLD AUTO: 1.29 THOUSAND/UL (ref 0.2–0.9)
MONOCYTES NFR BLD AUTO: 14 % (ref 1–10)
NEUTS BAND NFR BLD MANUAL: 1 % (ref 0–8)
NEUTS SEG # BLD: 4.14 THOUSAND/UL (ref 1.8–7.8)
NEUTS SEG NFR BLD AUTO: 44 %
PLATELET # BLD AUTO: 164 THOUSANDS/UL (ref 150–450)
PLATELET BLD QL SMEAR: ADEQUATE
PMV BLD AUTO: 8.5 FL (ref 8.9–12.7)
POTASSIUM SERPL-SCNC: 4.4 MMOL/L (ref 3.6–5)
PROT SERPL-MCNC: 7.2 G/DL (ref 5.9–8.4)
RBC # BLD AUTO: 5.01 MILLION/UL (ref 4.5–5.9)
RBC MORPH BLD: NORMAL
SODIUM SERPL-SCNC: 141 MMOL/L (ref 137–147)
TOTAL CELLS COUNTED SPEC: 100
TSH SERPL DL<=0.05 MIU/L-ACNC: 3.81 UIU/ML (ref 0.47–4.68)
VALPROATE SERPL-MCNC: 50.1 UG/ML (ref 50–120)
WBC # BLD AUTO: 9.2 THOUSAND/UL (ref 4.5–11)

## 2019-12-24 PROCEDURE — 99222 1ST HOSP IP/OBS MODERATE 55: CPT | Performed by: PSYCHIATRY & NEUROLOGY

## 2019-12-24 PROCEDURE — 85007 BL SMEAR W/DIFF WBC COUNT: CPT | Performed by: PSYCHIATRY & NEUROLOGY

## 2019-12-24 PROCEDURE — 85027 COMPLETE CBC AUTOMATED: CPT | Performed by: PSYCHIATRY & NEUROLOGY

## 2019-12-24 PROCEDURE — 84443 ASSAY THYROID STIM HORMONE: CPT | Performed by: PSYCHIATRY & NEUROLOGY

## 2019-12-24 PROCEDURE — 80164 ASSAY DIPROPYLACETIC ACD TOT: CPT | Performed by: PSYCHIATRY & NEUROLOGY

## 2019-12-24 PROCEDURE — 80053 COMPREHEN METABOLIC PANEL: CPT | Performed by: PSYCHIATRY & NEUROLOGY

## 2019-12-24 PROCEDURE — 99253 IP/OBS CNSLTJ NEW/EST LOW 45: CPT | Performed by: FAMILY MEDICINE

## 2019-12-24 RX ADMIN — DIVALPROEX SODIUM 1000 MG: 500 TABLET, DELAYED RELEASE ORAL at 17:17

## 2019-12-24 RX ADMIN — CLOZAPINE 350 MG: 100 TABLET ORAL at 21:07

## 2019-12-24 RX ADMIN — METFORMIN HYDROCHLORIDE 500 MG: 500 TABLET ORAL at 17:17

## 2019-12-24 RX ADMIN — DIVALPROEX SODIUM 1000 MG: 500 TABLET, DELAYED RELEASE ORAL at 10:32

## 2019-12-24 RX ADMIN — METFORMIN HYDROCHLORIDE 500 MG: 500 TABLET ORAL at 10:33

## 2019-12-24 RX ADMIN — ATORVASTATIN CALCIUM 10 MG: 10 TABLET, FILM COATED ORAL at 17:18

## 2019-12-24 RX ADMIN — LEVOTHYROXINE SODIUM 75 MCG: 75 TABLET ORAL at 06:09

## 2019-12-24 RX ADMIN — OXYBUTYNIN CHLORIDE 5 MG: 5 TABLET, EXTENDED RELEASE ORAL at 10:33

## 2019-12-24 RX ADMIN — DOCUSATE SODIUM 100 MG: 100 CAPSULE, LIQUID FILLED ORAL at 10:33

## 2019-12-24 RX ADMIN — PANTOPRAZOLE SODIUM 40 MG: 40 TABLET, DELAYED RELEASE ORAL at 06:09

## 2019-12-24 RX ADMIN — CLOZAPINE 100 MG: 100 TABLET ORAL at 10:33

## 2019-12-24 RX ADMIN — OLANZAPINE 10 MG: 10 TABLET, FILM COATED ORAL at 13:51

## 2019-12-24 RX ADMIN — DOCUSATE SODIUM 100 MG: 100 CAPSULE, LIQUID FILLED ORAL at 17:18

## 2019-12-24 NOTE — PROGRESS NOTES
12/24/19 0800   Team Meeting   Meeting Type Daily Rounds   Team Members Present   Team Members Present Physician;Nurse;; Other (Discipline and Name)   Physician Team Member Dr Rodney Roberts, RN   Care Management Team Member Lily Gee   Other (Discipline and Name) Dee Benson LCSW     Patient admitted to the unit yesterday  Poor insight  Nrsg staff to monitor water intake  Disheveled  Slept

## 2019-12-24 NOTE — TREATMENT PLAN
TREATMENT PLAN REVIEW - [facility] 48 y o  1965 male MRN: [id number]    [address] Room / Bed: [address] 178-41 Encounter: [id number]          Admit Date/Time:  12/23/2019  1:27 PM    Treatment Team: Attending Provider: Amanda Barahona MD; : Emy Green; Registered Nurse: Francois Ramos RN; Unit Clerk: Huyen Galindo; Registered Nurse: Brannon Newberry RN; Patient Care Technician: Rodrigo Potter; Patient Care Technician: Nakul Perry; Occupational Therapist: Richard Ken; Patient Care Assistant: Wilfrid Carrero;  Registered Nurse: Torres North LPN    Diagnosis: Principal Problem:    Schizoaffective disorder, bipolar type ([facility] )      Patient Strengths/Assets: cooperative, compliant with medication, financial means, financially secure, good past treatment response, motivation for treatment/growth, negotiates basic needs, patient is willing to work on problems    Patient Barriers/Limitations: lack of social/family support, lack of stable employment, limited education, limited support system, noncompliant with medication, noncompliant with treatment, patient is on an involuntary commitment, poor insight, poor interpersonal skills    Short Term Goals: decrease in anxiety symptoms, decrease in paranoid thoughts, decrease in psychotic symptoms, decrease in level of agitation, improvement in insight, improvement in reality testing, improvement in reasoning ability, improvement in self care, mood stabilization, acceptance of need for psychiatric treatment, acceptance of psychiatric medications, Safe smoking practices    Long Term Goals: resolution of manic symptoms, stabilization of mood, resolution of psychotic symptoms, improvement in reality testing, acceptance of psychiatric diagnosis, adequate self care, appropriate interaction with peers    Progress Towards Goals: continue psychiatric medications as prescribed, improving gradually, progressing slowly, participates in milieu therapy, mood is stabilizing gradually, reality testing is slowly improving, attends to more to ADLs, less agitated, less irritable    Recommended Treatment: medication management, patient medication education, group therapy, milieu therapy, supportive therapy, continued Behavioral Health psychiatric evaluation/assessment process, medical follow up with medical team, individual therapy    Treatment Frequency: daily medication monitoring, group and milieu therapy daily, monitoring through interdisciplinary rounds, monitoring through weekly patient care conferences, individual psychotherapy on the unit daily    Expected Discharge Date:  05/23/2020    Discharge Plan: placement in personal care boarding home, referral for outpatient medication management with a psychiatrist, referral to [address] [place] [address] Team for close psychiatric monitoring, Back to [facility] again    Treatment Plan Created/Updated By: Shey Lange MD

## 2019-12-24 NOTE — CONSULTS
Crow Alvarez 1965, 48 y o  male MRN: [id number]    Unit/Bed#: [facility] 107-01 Encounter: [id number]    Primary Care Provider: No primary care provider on file  Date and time admitted to hospital: 12/23/2019  1:27 PM      Consults    * Schizoaffective disorder, bipolar type ([address] )  Assessment & Plan  As per psych    Tobacco abuse  Assessment & Plan  Patient states that he does not smoke anymore  No need for any nicotine replacement therapy at this time  If patient requests it will place on nicotine patch    Acquired hypothyroidism  Assessment & Plan  TSH normal   Continue Synthroid    Gastroesophageal reflux disease without esophagitis  Assessment & Plan  Continue Protonix  Stable at this time    Benign essential hypertension  Assessment & Plan  Patient's blood pressures are well controlled on lisinopril  Type 2 diabetes mellitus without complication, without long-term current use of insulin [facility])  Assessment & Plan    Lab Results   Component Value Date    HGBA1C 6 1 12/03/2019   Patient's diabetes is well controlled  Hemoglobin A1c 6 1  Will continue metformin 500 mg twice daily        VTE Prophylaxis: Reason for no pharmacologic prophylaxis Encourage ambulation  / reason for no mechanical VTE prophylaxis Encourage ambulation     Recommendations for Discharge:  · None    Counseling / Coordination of Care Time: 45 minutes  Greater than 50% of total time spent on patient counseling and coordination of care  Collaboration of Care: Were Recommendations Directly Discussed with Primary Treatment Team? - Yes     History of Present Illness:    Ashwini Jensen is a 48 y o  male who is originally admitted to the psychiatry service due to uncontrolled schizoaffective disorder  We are consulted for medical management  Patient denies any chest pain or shortness of breath or abdominal pain    He feels well at this time    Review of Systems:    Review of Systems   Constitutional: Negative for appetite change, chills, fatigue and fever  HENT: Negative for hearing loss, sore throat and trouble swallowing  Eyes: Negative for photophobia, discharge and visual disturbance  Respiratory: Negative for chest tightness and shortness of breath  Cardiovascular: Negative for chest pain and palpitations  Gastrointestinal: Negative for abdominal pain, blood in stool and vomiting  Endocrine: Negative for polydipsia and polyuria  Genitourinary: Negative for difficulty urinating, dysuria, flank pain and hematuria  Musculoskeletal: Negative for back pain and gait problem  Skin: Negative for rash  Allergic/Immunologic: Negative for environmental allergies and food allergies  Neurological: Negative for dizziness, seizures, syncope and headaches  Hematological: Does not bruise/bleed easily  Psychiatric/Behavioral: Positive for behavioral problems  The patient is nervous/anxious  All other systems reviewed and are negative  Past Medical and Surgical History:     Past Medical History:   Diagnosis Date    Asthma     Bipolar 1 disorder ([place] [address] ) 2011    Colitis 03/27/2014    Constipation     COPD (chronic obstructive pulmonary disease) ([place] [address] )     Diabetes ([place] [address] ) 2011    Disease of thyroid gland     GERD (gastroesophageal reflux disease) 2011    Hyperlipidemia     Hypertension     Hyponatremia     Hypothyroidism 2011    Lipoprotein deficiency     Obesity     Plantar fasciitis     Psychiatric disorder     Schizo affective schizophrenia ([place] [address] ) 2011    Sebaceous gland disease     Tobacco abuse        History reviewed  No pertinent surgical history  Meds/Allergies:    all medications and allergies reviewed    Allergies:    Allergies   Allergen Reactions    Penicillins Anaphylaxis    Carbapenems     Cephalosporins        Social History:     Marital Status: Single    Substance Use History:   Social History     Substance and Sexual Activity   Alcohol Use No     Social History     Tobacco Use Smoking Status Current Every Day Smoker    Packs/day: 1 00    Types: Cigarettes   Smokeless Tobacco Current User     Social History     Substance and Sexual Activity   Drug Use No       Family History:    Family History   Family history unknown: Yes       Physical Exam:     Vitals:   Blood Pressure: 102/60 (12/24/19 0730)  Pulse: 72 (12/24/19 0730)  Temperature: 97 7 °F (36 5 °C) (12/24/19 0730)  Temp Source: Temporal (12/24/19 0730)  Respirations: 20 (12/24/19 0730)  Height: 5' 9" (175 3 cm) (12/23/19 1330)  Weight - Scale: 108 kg (238 lb 3 2 oz) (12/23/19 1330)    Physical Exam   Constitutional: He is oriented to person, place, and time  He appears well-developed and well-nourished  HENT:   Head: Normocephalic and atraumatic  Right Ear: External ear normal    Left Ear: External ear normal    Mouth/Throat: Oropharynx is clear and moist    Eyes: Pupils are equal, round, and reactive to light  Conjunctivae and EOM are normal    Neck: Normal range of motion  Neck supple  Cardiovascular: Normal rate, regular rhythm, normal heart sounds and intact distal pulses  Pulmonary/Chest: Effort normal and breath sounds normal    Abdominal: Soft  Bowel sounds are normal  He exhibits no mass  There is no tenderness  There is no rebound and no guarding  Genitourinary:   Genitourinary Comments: deferred   Musculoskeletal: Normal range of motion  Neurological: He is alert and oriented to person, place, and time  He has normal reflexes  Cranial nerves 2-12 are normal   Normal neurological exam   Skin: Skin is warm and dry  No rash noted  Nursing note and vitals reviewed  Additional Data:     Lab Results: I have personally reviewed pertinent reports        Results from last 7 days   Lab Units 12/24/19  0613   WBC Thousand/uL 9 20   HEMOGLOBIN g/dL 14 9   HEMATOCRIT % 44 1   PLATELETS Thousands/uL 164   BANDS PCT % 1   LYMPHO PCT % 39   MONO PCT % 14*     Results from last 7 days   Lab Units 12/24/19  5297 SODIUM mmol/L 141   POTASSIUM mmol/L 4 4   CHLORIDE mmol/L 101   CO2 mmol/L 29   BUN mg/dL 15   CREATININE mg/dL 0 68*   ANION GAP mmol/L 11   CALCIUM mg/dL 9 5   ALBUMIN g/dL 4 2   TOTAL BILIRUBIN mg/dL 0 30   ALK PHOS U/L 72   ALT U/L 28   AST U/L 21   GLUCOSE RANDOM mg/dL 115*             Lab Results   Component Value Date/Time    HGBA1C 6 1 12/03/2019 08:18 AM    HGBA1C 6 2 11/05/2019 05:55 AM    HGBA1C 6 3 10/05/2019 06:16 AM    HGBA1C 5 7 (H) 04/03/2018 06:35 AM    HGBA1C 5 8 (H) 03/30/2014 05:35 AM               Imaging: I have personally reviewed pertinent reports  No orders to display       EKG, Pathology, and Other Studies Reviewed on Admission:   · EKG:  Sinus bradycardia    ** Please Note: This note has been constructed using a voice recognition system   **
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
## 2019-12-24 NOTE — PROGRESS NOTES
12/24/19 1100   Activity/Group Checklist   Group   ([facility]/[place] [facility] )   Attendance Attended   Attendance Duration (min) 46-60   Interactions Interacted appropriately   Affect/Mood Appropriate;Normal range   Goals Achieved Able to listen to others; Able to engage in interactions

## 2019-12-24 NOTE — PLAN OF CARE
Problem: Ineffective Coping  Goal: Demonstrates healthy coping skills  Outcome: Philip Nguyen has been visible and cooperative with staff and peers  Pt was med and meal compliant no prompting required  Pt appears with a disheveled affect  No c/o pain or discomfort at this time, pt is able to make needs know  No behaviors  Will continue to monitor

## 2019-12-24 NOTE — PLAN OF CARE
Problem: Alteration in Thoughts and Perception  Goal: Verbalize thoughts and feelings  Description  Interventions:  - Promote a nonjudgmental and trusting relationship with the patient through active listening and therapeutic communication  - Assess patient's level of functioning, behavior and potential for risk  - Engage patient in 1 on 1 interactions  - Encourage patient to express fears, feelings, frustrations, and discuss symptoms    - [place] patient to reality, help patient recognize reality-based thinking   - Administer medications as ordered and assess for potential side effects  - Provide the patient education related to the signs and symptoms of the illness and desired effects of prescribed medications  Outcome: Progressing  Goal: Agree to be compliant with medication regime, as prescribed and report medication side effects  Description  Interventions:  - Offer appropriate PRN medication and supervise ingestion; conduct AIMS, as needed   Outcome: Progressing  Goal: Attend and participate in unit activities, including therapeutic, recreational, and educational groups  Description  Interventions:  -Encourage Visitation and family involvement in care  Outcome: Progressing  Goal: Complete daily ADLs, including personal hygiene independently, as able  Description  Interventions:  - Observe, teach, and assist patient with ADLS  - Monitor and promote a balance of rest/activity, with adequate nutrition and elimination   Outcome: Progressing     Problem: Ineffective Coping  Goal: Understands least restrictive measures  Description  Interventions:  - Utilize least restrictive behavior  Outcome: Win Sharif has been awake, alert, and visible intermittently out in the milieu  Adjusting well to unit  Compliant with scheduled meds without prompting  Brother visited with good interaction noted  Pt ate 100% supper and had evening snack  Pt completed his daily ADL's today  No behavioral issues    Will continue to monitor/assess for any changes  no

## 2019-12-24 NOTE — PLAN OF CARE
Problem: Ineffective Coping  Goal: Identifies ineffective coping skills  Outcome: Not Progressing   Awake in [place] at 2330, midnight and 0110   Trazodone given earlier not effective  Monitored on Q 15 minute checks  Currently in BR drinking water

## 2019-12-24 NOTE — ASSESSMENT & PLAN NOTE
Patient states that he does not smoke anymore    No need for any nicotine replacement therapy at this time  If patient requests it will place on nicotine patch

## 2019-12-24 NOTE — NURSING NOTE
Difficulty falling asleep  Awake and ambulated in [place] several times  Drinking water in BR   Monitored Q 15 minute checks  Not voicing any SI's  Pleasant on approach

## 2019-12-24 NOTE — ASSESSMENT & PLAN NOTE
Lab Results   Component Value Date    HGBA1C 6 1 12/03/2019   Patient's diabetes is well controlled  Hemoglobin A1c 6 1    Will continue metformin 500 mg twice daily

## 2019-12-24 NOTE — PROGRESS NOTES
Status: Pt up & showered & ready for d/c  Medication: no changes / PRN - Trazodone  D/C: Keaton barba [facility] at 12 PM to [facility] [address]     12/23/19 0745   Team Meeting   Meeting Type Daily Rounds   Team Members Present   Team Members Present Physician;Nurse;;Occupational Therapist   Physician Team Member Dr Alexis Goodman / Ivory Rangel Team Member [facility] Management Team Member Judy Hidalgo / Jorge Cid   OT Team Member Maria Guadalupe   Patient/Family Present   Patient Present No   Patient's Family Present No

## 2019-12-25 PROCEDURE — 99232 SBSQ HOSP IP/OBS MODERATE 35: CPT | Performed by: NURSE PRACTITIONER

## 2019-12-25 RX ADMIN — LEVOTHYROXINE SODIUM 75 MCG: 75 TABLET ORAL at 06:03

## 2019-12-25 RX ADMIN — ATORVASTATIN CALCIUM 10 MG: 10 TABLET, FILM COATED ORAL at 17:03

## 2019-12-25 RX ADMIN — DOCUSATE SODIUM 100 MG: 100 CAPSULE, LIQUID FILLED ORAL at 17:03

## 2019-12-25 RX ADMIN — DIVALPROEX SODIUM 1000 MG: 500 TABLET, DELAYED RELEASE ORAL at 11:11

## 2019-12-25 RX ADMIN — METFORMIN HYDROCHLORIDE 500 MG: 500 TABLET ORAL at 17:03

## 2019-12-25 RX ADMIN — CLOZAPINE 100 MG: 100 TABLET ORAL at 11:12

## 2019-12-25 RX ADMIN — DOCUSATE SODIUM 100 MG: 100 CAPSULE, LIQUID FILLED ORAL at 08:46

## 2019-12-25 RX ADMIN — METFORMIN HYDROCHLORIDE 500 MG: 500 TABLET ORAL at 11:11

## 2019-12-25 RX ADMIN — METFORMIN HYDROCHLORIDE 500 MG: 500 TABLET ORAL at 08:46

## 2019-12-25 RX ADMIN — DIVALPROEX SODIUM 1000 MG: 500 TABLET, DELAYED RELEASE ORAL at 08:46

## 2019-12-25 RX ADMIN — OXYBUTYNIN CHLORIDE 5 MG: 5 TABLET, EXTENDED RELEASE ORAL at 08:46

## 2019-12-25 RX ADMIN — DIVALPROEX SODIUM 1000 MG: 500 TABLET, DELAYED RELEASE ORAL at 17:03

## 2019-12-25 RX ADMIN — CLOZAPINE 350 MG: 100 TABLET ORAL at 21:02

## 2019-12-25 RX ADMIN — CLOZAPINE 100 MG: 100 TABLET ORAL at 08:46

## 2019-12-25 RX ADMIN — OLANZAPINE 10 MG: 10 TABLET, FILM COATED ORAL at 13:59

## 2019-12-25 RX ADMIN — OXYBUTYNIN CHLORIDE 5 MG: 5 TABLET, EXTENDED RELEASE ORAL at 11:11

## 2019-12-25 RX ADMIN — PANTOPRAZOLE SODIUM 40 MG: 40 TABLET, DELAYED RELEASE ORAL at 06:03

## 2019-12-25 RX ADMIN — DOCUSATE SODIUM 100 MG: 100 CAPSULE, LIQUID FILLED ORAL at 11:11

## 2019-12-25 NOTE — PLAN OF CARE
Problem: Alteration in Thoughts and Perception  Goal: Agree to be compliant with medication regime, as prescribed and report medication side effects  Description  Interventions:  - Offer appropriate PRN medication and supervise ingestion; conduct AIMS, as needed   Outcome: Progressing  Goal: Attend and participate in unit activities, including therapeutic, recreational, and educational groups  Description  Interventions:  -Encourage Visitation and family involvement in care  Outcome: Progressing  Goal: Recognize dysfunctional thoughts, communicate reality-based thoughts at the time of discharge  Description  Interventions:  - Provide medication and psycho-education to assist patient in compliance and developing insight into his/her illness   Outcome: Cristino Malik has been intermittently visible on the unit, loud at times, makes his needs known to staff  After prompting he was compliant with his medications and he was prompt for his meals  No complaints at this time, behaviors controlled  Will continue to monitor

## 2019-12-25 NOTE — PROGRESS NOTES
Progress Note - Behavioral Health   Franca Traore 48 y o  male MRN: [id number]  Unit/Bed#: [facility] 435-61 Encounter: [id number]    The patient was seen for continuing care and reviewed with treatment team   Staff reports no significant change in the past 24 hours  Patient was observed lying in bed sleeping  He was pleasant and cooperative upon approach, displayed intermittent eye contact with constricted affect noted  Patient was brief and guarded in conversation and stated he was tired  He is denying any depression or anxiety currently  He is denying any suicidal/homicidal ideation or auditory/visual hallucinations and did not appear to be responding to internal stimuli at time of encounter  He is reporting adequate sleep and appetite with decreased energy level  He is compliant with his medications and no side effects noted at this time  Denies pain  No agitation or aggressive behaviors noted  Mental Status Evaluation:  Appearance:  disheveled   Behavior:  cooperative and friendly   Mood:  anxious   Affect: constricted   Speech: Normal rate and Normal volume   Thought Process:  [place]   Thought Content:  Does not verbalize delusional material   Perceptual Disturbances: Denies hallucinations and does not appear to be responding to internal stimuli   Risk Potential: No suicidal or homicidal ideation   Attention/Concentration attention span appeared shorter than expected for age   Orientation:   Alert and awake   Gait/Station: Not observed   Motor Activity: No abnormal movement noted     Progress Toward Goals:  No change    Assessment/Plan    Principal Problem:    Schizoaffective disorder, bipolar type ([facility])  Active Problems:    Type 2 diabetes mellitus without complication, without long-term current use of insulin ([facility])    Benign essential hypertension    Gastroesophageal reflux disease without esophagitis    Acquired hypothyroidism    Tobacco abuse      Recommended Treatment: 1  Continue with pharmacotherapy, group therapy, milieu therapy and occupational therapy  2  Continue with safety checks per unit protocol  3  No medication changes at this time  The patient will be maintained on the following medications:    Current Facility-Administered Medications:  acetaminophen 325 mg Oral Q6H PRN Eather Dusky, PA-PUSHPA   acetaminophen 650 mg Oral Q6H PRN Eather Dusky, PA-C   acetaminophen 975 mg Oral Q8H PRN Eather Dusky, PA-C   aluminum-magnesium hydroxide-simethicone 30 mL Oral Q4H PRN Eather Dusky, PA-C   atorvastatin 10 mg Oral Daily With Landrum [employer], PA-C   benztropine 1 mg Intramuscular Q6H PRN Eather Dusky, PA-C   benztropine 1 mg Oral Q6H PRN Eather Dusky, PA-C   clozapine 100 mg Oral Daily Eather Dusky, PA-C   cloZAPine 350 mg Oral HS Eather Dusky, PA-C   divalproex sodium 1,000 mg Oral BID Eather Dusky, PA-C   docusate sodium 100 mg Oral BID Eather Dusky, PA-C   haloperidol 5 mg Oral Q6H PRN Eather Dusky, PA-C   haloperidol lactate 5 mg Intramuscular Q6H PRN Eather Dusky, PA-C   levothyroxine 75 mcg Oral Early Morning Eather Dusky, PA-C   LORazepam 1 mg Intramuscular Q6H PRN Eather Dusky, PA-C   LORazepam 1 mg Oral Q6H PRN Eather Dusky, PA-C   magnesium hydroxide 30 mL Oral Daily PRN Eather Dusky, PA-C   metFORMIN 500 mg Oral BID With Meals Eather Dusky, PA-C   nicotine polacrilex 2 mg Oral Q2H PRN Eather Dusky, PA-C   OLANZapine 10 mg Oral After [place] [employer], PA-C   oxybutynin 5 mg Oral Daily Eather Dusky, PA-C   pantoprazole 40 mg Oral Early Morning Eather Dusky, PA-C   traZODone 50 mg Oral HS PRN Eather Dusky, PA-C       Risks, benefits and possible side effects of Medications:   Risks, benefits, and possible side effects of medications explained to patient and patient verbalizes understanding        JOVITA Gallardo    Portions of the record may have been created with voice recognition software  Occasional wrong word or "sound a like" substitutions may have occurred due to the inherent limitations of voice recognition software  Read the chart carefully and recognize, using context, where substitutions have occurred

## 2019-12-25 NOTE — PLAN OF CARE
Problem: Alteration in Thoughts and Perception  Goal: Agree to be compliant with medication regime, as prescribed and report medication side effects  Description  Interventions:  - Offer appropriate PRN medication and supervise ingestion; conduct AIMS, as needed   Outcome: Progressing  Goal: Attend and participate in unit activities, including therapeutic, recreational, and educational groups  Description  Interventions:  -Encourage Visitation and family involvement in care  Outcome: Progressing     Problem: Ineffective Coping  Goal: Demonstrates healthy coping skills  Outcome: Naye Carranza has been visible ambulating around unit with a disheveled affect  Pt has been compliant with med and meal regimen required prompting for supper meds due to sleeping  Individual spent time watching [employer] movie with peers for evening group  PT denies anxiety at this time and encouraged to seek staff as needed  No behaviors at this time  Will continue to monitor

## 2019-12-26 PROCEDURE — NC001 PR NO CHARGE: Performed by: PSYCHIATRY & NEUROLOGY

## 2019-12-26 RX ADMIN — METFORMIN HYDROCHLORIDE 500 MG: 500 TABLET ORAL at 17:02

## 2019-12-26 RX ADMIN — DOCUSATE SODIUM 100 MG: 100 CAPSULE, LIQUID FILLED ORAL at 08:43

## 2019-12-26 RX ADMIN — PANTOPRAZOLE SODIUM 40 MG: 40 TABLET, DELAYED RELEASE ORAL at 06:08

## 2019-12-26 RX ADMIN — CLOZAPINE 100 MG: 100 TABLET ORAL at 08:43

## 2019-12-26 RX ADMIN — DIVALPROEX SODIUM 1000 MG: 500 TABLET, DELAYED RELEASE ORAL at 17:02

## 2019-12-26 RX ADMIN — OLANZAPINE 10 MG: 10 TABLET, FILM COATED ORAL at 12:36

## 2019-12-26 RX ADMIN — LEVOTHYROXINE SODIUM 75 MCG: 75 TABLET ORAL at 06:08

## 2019-12-26 RX ADMIN — METFORMIN HYDROCHLORIDE 500 MG: 500 TABLET ORAL at 08:43

## 2019-12-26 RX ADMIN — OXYBUTYNIN CHLORIDE 5 MG: 5 TABLET, EXTENDED RELEASE ORAL at 08:43

## 2019-12-26 RX ADMIN — DIVALPROEX SODIUM 1000 MG: 500 TABLET, DELAYED RELEASE ORAL at 08:43

## 2019-12-26 RX ADMIN — DOCUSATE SODIUM 100 MG: 100 CAPSULE, LIQUID FILLED ORAL at 17:02

## 2019-12-26 RX ADMIN — ATORVASTATIN CALCIUM 10 MG: 10 TABLET, FILM COATED ORAL at 17:02

## 2019-12-26 RX ADMIN — CLOZAPINE 350 MG: 100 TABLET ORAL at 21:04

## 2019-12-26 NOTE — NURSING NOTE
Received pt in bed at change of shift with eyes closed; chest movement noted  Continues the same thus this far as per q 15 min room checks  Pt observed to have periods of restlessness  Will continue to monitor

## 2019-12-26 NOTE — PLAN OF CARE
Problem: Alteration in Thoughts and Perception  Goal: Treatment Goal: Gain control of psychotic behaviors/thinking, reduce/eliminate presenting symptoms and demonstrate improved reality functioning upon discharge  Outcome: Progressing  Goal: Verbalize thoughts and feelings  Description  Interventions:  - Promote a nonjudgmental and trusting relationship with the patient through active listening and therapeutic communication  - Assess patient's level of functioning, behavior and potential for risk  - Engage patient in 1 on 1 interactions  - Encourage patient to express fears, feelings, frustrations, and discuss symptoms    - [place] patient to reality, help patient recognize reality-based thinking   - Administer medications as ordered and assess for potential side effects  - Provide the patient education related to the signs and symptoms of the illness and desired effects of prescribed medications  Outcome: Progressing  Goal: Agree to be compliant with medication regime, as prescribed and report medication side effects  Description  Interventions:  - Offer appropriate PRN medication and supervise ingestion; conduct AIMS, as needed   Outcome: Progressing     Problem: RESPIRATORY - ADULT  Goal: Achieves optimal ventilation and oxygenation  Description  INTERVENTIONS:  - Assess for changes in respiratory status  - Assess for changes in mentation and behavior  - Position to facilitate oxygenation and minimize respiratory effort  - Oxygen administered by appropriate delivery if ordered  - Initiate smoking cessation education as indicated  - Encourage broncho-pulmonary hygiene including cough, deep breathe, Incentive Spirometry  - Assess the need for suctioning and aspirate as needed  - Assess and instruct to report SOB or any respiratory difficulty  - Respiratory Therapy support as indicated  Outcome: Progressing     Problem: GASTROINTESTINAL - ADULT  Goal: Minimal or absence of nausea and/or vomiting  Description  INTERVENTIONS:  - Administer IV fluids if ordered to ensure adequate hydration  - Maintain NPO status until nausea and vomiting are resolved  - Nasogastric tube if ordered  - Administer ordered antiemetic medications as needed  - Provide nonpharmacologic comfort measures as appropriate  - Advance diet as tolerated, if ordered  - Consider nutrition services referral to assist patient with adequate nutrition and appropriate food choices  Outcome: Progressing  Goal: Maintains or returns to baseline bowel function  Description  INTERVENTIONS:  - Assess bowel function  - Encourage oral fluids to ensure adequate hydration  - Administer IV fluids if ordered to ensure adequate hydration  - Administer ordered medications as needed  - Encourage mobilization and activity  - Consider nutritional services referral to assist patient with adequate nutrition and appropriate food choices  Outcome: Progressing     Problem: Alteration in Thoughts and Perception  Goal: Attend and participate in unit activities, including therapeutic, recreational, and educational groups  Description  Interventions:  -Encourage Visitation and family involvement in care  Outcome: Not Progressing  Goal: Complete daily ADLs, including personal hygiene independently, as able  Description  Interventions:  - Observe, teach, and assist patient with ADLS  - Monitor and promote a balance of rest/activity, with adequate nutrition and elimination   Outcome: Not Progressing    Patient is isolative to room and self  Prompt for meds and meals  No group attendance  Prefers to remain in bed sleeping  No attention to hygiene  Refused breakfast   Irritable when prompted  Denies depression, anxiety, SI and HI  No evidence of hallucinations or delusions    Will continue to monitor progress in recovery program
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
## 2019-12-26 NOTE — NURSING NOTE
Patient was visible on unit this evening, med and meal compliant, behaviors controlled, offered no complaints, cooperative upon approach  Disheveled, appears preoccupied at times, currently in own room sleeping, will continue to monitor
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
## 2019-12-26 NOTE — PROGRESS NOTES
12/26/19 0800   Team Meeting   Meeting Type Daily Rounds   Team Members Present   Team Members Present Physician;Nurse; Other (Discipline and Name)   Physician Team Member Dr Galina Cohn Team Member Jt Magaña RN   Other (Discipline and Name) Tiny Hill LCSW     Disheveled, preoccupied
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
## 2019-12-27 PROCEDURE — 99232 SBSQ HOSP IP/OBS MODERATE 35: CPT | Performed by: PSYCHIATRY & NEUROLOGY

## 2019-12-27 RX ADMIN — CLOZAPINE 100 MG: 100 TABLET ORAL at 09:00

## 2019-12-27 RX ADMIN — OLANZAPINE 10 MG: 10 TABLET, FILM COATED ORAL at 13:17

## 2019-12-27 RX ADMIN — DOCUSATE SODIUM 100 MG: 100 CAPSULE, LIQUID FILLED ORAL at 17:08

## 2019-12-27 RX ADMIN — LEVOTHYROXINE SODIUM 75 MCG: 75 TABLET ORAL at 06:12

## 2019-12-27 RX ADMIN — ATORVASTATIN CALCIUM 10 MG: 10 TABLET, FILM COATED ORAL at 17:08

## 2019-12-27 RX ADMIN — METFORMIN HYDROCHLORIDE 500 MG: 500 TABLET ORAL at 08:59

## 2019-12-27 RX ADMIN — DOCUSATE SODIUM 100 MG: 100 CAPSULE, LIQUID FILLED ORAL at 08:59

## 2019-12-27 RX ADMIN — CLOZAPINE 350 MG: 100 TABLET ORAL at 21:04

## 2019-12-27 RX ADMIN — PANTOPRAZOLE SODIUM 40 MG: 40 TABLET, DELAYED RELEASE ORAL at 06:12

## 2019-12-27 RX ADMIN — METFORMIN HYDROCHLORIDE 500 MG: 500 TABLET ORAL at 17:08

## 2019-12-27 RX ADMIN — DIVALPROEX SODIUM 1000 MG: 500 TABLET, DELAYED RELEASE ORAL at 08:59

## 2019-12-27 RX ADMIN — DIVALPROEX SODIUM 1000 MG: 500 TABLET, DELAYED RELEASE ORAL at 17:08

## 2019-12-27 RX ADMIN — OXYBUTYNIN CHLORIDE 5 MG: 5 TABLET, EXTENDED RELEASE ORAL at 09:00
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
## 2019-12-27 NOTE — PROGRESS NOTES
12/26/19 0900   Team Meeting   Meeting Type Tx Team Meeting   Initial Conference Date 12/26/19   Next Conference Date 12/30/19   Team Members Present   Team Members Present Physician;Nurse; Other (Discipline and Name)   Physician Team Member Dr Celina Nageotte Team Member Mayra Baron RN   Other (Discipline and Name) Lucy Card LCSW   Patient/Family Present   Patient Present Yes   Patient's Family Present No     Patient met with treatment team to review treatment plan  Review initiated, but patient became agitated when asked about the reasons why he is has been hospitalized and where he would like to be discharged to  Patient became upset with team's efforts to emphasize personal responsibility and declined to complete the review or sign the treatment plan  Patient exited the room  Present teams members signed to recognize an attempt to review treatment plan
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
## 2019-12-27 NOTE — PLAN OF CARE
Problem: DISCHARGE PLANNING  Goal: Discharge to home or other facility with appropriate resources  Description    CASE MANAGEMENT INTERVENTIONS:  - Conduct assessment to determine patient/family and health care team treatment goals, and need for post-acute services based on payer coverage, community resources, patient preferences and barriers to discharge  - Address psychosocial, clinical, and financial barriers to discharge as identified in assessment in conjunction with the patient/family and health care team   - Assist the patient in reintegration back into the community by removing barriers which may hinder a successful discharge once deemed stable  - Arrange appropriate level of post-acute services according to patient's needs and preference and payer coverage in collaboration with the physician and health care team   - Communicate with and update the patient/family, physician, and health care team regarding progress on the discharge plan  - Arrange appropriate transportation to post-acute venues  12/27/2019 1435 by Tony Amaya  Outcome: Progressing     CM faxed notification to [facility] office, informing them of patient's admission to avoid overpayments while he is in the hospital  CM will continue to follow patient's progress and assist with discharge planning needs
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
## 2019-12-27 NOTE — PLAN OF CARE
Problem: Alteration in Thoughts and Perception  Goal: Verbalize thoughts and feelings  Description  Interventions:  - Promote a nonjudgmental and trusting relationship with the patient through active listening and therapeutic communication  - Assess patient's level of functioning, behavior and potential for risk  - Engage patient in 1 on 1 interactions  - Encourage patient to express fears, feelings, frustrations, and discuss symptoms    - [place] patient to reality, help patient recognize reality-based thinking   - Administer medications as ordered and assess for potential side effects  - Provide the patient education related to the signs and symptoms of the illness and desired effects of prescribed medications  Outcome: Progressing  Goal: Attend and participate in unit activities, including therapeutic, recreational, and educational groups  Description  Interventions:  -Encourage Visitation and family involvement in care  Outcome: Progressing  Goal: Recognize dysfunctional thoughts, communicate reality-based thoughts at the time of discharge  Description  Interventions:  - Provide medication and psycho-education to assist patient in compliance and developing insight into his/her illness   Outcome: Progressing  Goal: Complete daily ADLs, including personal hygiene independently, as able  Description  Interventions:  - Observe, teach, and assist patient with ADLS  - Monitor and promote a balance of rest/activity, with adequate nutrition and elimination   Outcome: Lissett Flow has been intermittently visible on the unit, loud at times, makes his needs known to staff  After prompting he was compliant with his medications and he was prompt for his meals  No complaints at this time, behaviors controlled  Will continue to monitor

## 2019-12-27 NOTE — PROGRESS NOTES
12/27/19 0900   Team Meeting   Meeting Type Daily Rounds   Team Members Present   Team Members Present Physician;Nurse;; Other (Discipline and Name)   Physician Team Member Dr Celina Nageotte Team Member Mayra Baron RN   Care Management Team Member Lily Dunham   Other (Discipline and Name) Lucy Card LCSW     Patient presents irritable, labile and poorly motivated  Patient also has poor hygiene  Slept  01-Oct-2018

## 2019-12-27 NOTE — PLAN OF CARE
Problem: DISCHARGE PLANNING  Goal: Discharge to home or other facility with appropriate resources  Description    CASE MANAGEMENT INTERVENTIONS:  - Conduct assessment to determine patient/family and health care team treatment goals, and need for post-acute services based on payer coverage, community resources, patient preferences and barriers to discharge  - Address psychosocial, clinical, and financial barriers to discharge as identified in assessment in conjunction with the patient/family and health care team   - Assist the patient in reintegration back into the community by removing barriers which may hinder a successful discharge once deemed stable  - Arrange appropriate level of post-acute services according to patient's needs and preference and payer coverage in collaboration with the physician and health care team   - Communicate with and update the patient/family, physician, and health care team regarding progress on the discharge plan  - Arrange appropriate transportation to post-acute venues  12/27/2019 1547 by Lilly Saavedra  Outcome: Progressing     CM called and left  for Cassandra Console with [facility] ([phone] ext  [phone]) who is patient's representative payee service  CM asked that Cassandra Console return this CM's call to review patient's current income status and to find out the best way to reach out to her if/when patient will need money on the unit for one of his passes  CM will continue to follow patient's progress and assist with discharge planning needs

## 2019-12-27 NOTE — PLAN OF CARE
Problem: Alteration in Thoughts and Perception  Goal: Agree to be compliant with medication regime, as prescribed and report medication side effects  Description  Interventions:  - Offer appropriate PRN medication and supervise ingestion; conduct AIMS, as needed   Outcome: Progressing  Goal: Attend and participate in unit activities, including therapeutic, recreational, and educational groups  Description  Interventions:  -Encourage Visitation and family involvement in care  Outcome: Progressing     Problem: Ineffective Coping  Goal: Demonstrates healthy coping skills  Outcome: Progressing     Problem: Alteration in Thoughts and Perception  Goal: Complete daily ADLs, including personal hygiene independently, as able  Description  Interventions:  - Observe, teach, and assist patient with ADLS  - Monitor and promote a balance of rest/activity, with adequate nutrition and elimination   Outcome: Not Progressing     [address] Aubrey Gabriel is visible on unit walking about  He is pleasant, social w/his peers  He uses radio for relaxation, but, has a poor attention span, walking away from it repeatedly  Reminders given him not to abandon it as it can pose a safety hazard to some  He is punctual for scheduled medicines, ate 100% of meal, had HS snack  He took part in PM Group  He is disheveled, no attention to grooming this shift

## 2019-12-28 RX ADMIN — CLOZAPINE 350 MG: 100 TABLET ORAL at 21:02

## 2019-12-28 RX ADMIN — DIVALPROEX SODIUM 1000 MG: 500 TABLET, DELAYED RELEASE ORAL at 09:05

## 2019-12-28 RX ADMIN — METFORMIN HYDROCHLORIDE 500 MG: 500 TABLET ORAL at 17:03

## 2019-12-28 RX ADMIN — CLOZAPINE 100 MG: 100 TABLET ORAL at 09:05

## 2019-12-28 RX ADMIN — ATORVASTATIN CALCIUM 10 MG: 10 TABLET, FILM COATED ORAL at 17:02

## 2019-12-28 RX ADMIN — METFORMIN HYDROCHLORIDE 500 MG: 500 TABLET ORAL at 09:05

## 2019-12-28 RX ADMIN — DOCUSATE SODIUM 100 MG: 100 CAPSULE, LIQUID FILLED ORAL at 09:05

## 2019-12-28 RX ADMIN — DIVALPROEX SODIUM 1000 MG: 500 TABLET, DELAYED RELEASE ORAL at 17:03

## 2019-12-28 RX ADMIN — OXYBUTYNIN CHLORIDE 5 MG: 5 TABLET, EXTENDED RELEASE ORAL at 09:05

## 2019-12-28 RX ADMIN — PANTOPRAZOLE SODIUM 40 MG: 40 TABLET, DELAYED RELEASE ORAL at 06:03

## 2019-12-28 RX ADMIN — OLANZAPINE 10 MG: 10 TABLET, FILM COATED ORAL at 14:00

## 2019-12-28 RX ADMIN — DOCUSATE SODIUM 100 MG: 100 CAPSULE, LIQUID FILLED ORAL at 17:03

## 2019-12-28 RX ADMIN — LEVOTHYROXINE SODIUM 75 MCG: 75 TABLET ORAL at 06:03

## 2019-12-28 NOTE — PLAN OF CARE
Problem: Alteration in Thoughts and Perception  Goal: Treatment Goal: Gain control of psychotic behaviors/thinking, reduce/eliminate presenting symptoms and demonstrate improved reality functioning upon discharge  12/28/2019 1616 by Peri Howell RN  Outcome: Not Progressing  12/28/2019 1616 by Peri Howell RN  Outcome: Not Progressing  Goal: Verbalize thoughts and feelings  Description  Interventions:  - Promote a nonjudgmental and trusting relationship with the patient through active listening and therapeutic communication  - Assess patient's level of functioning, behavior and potential for risk  - Engage patient in 1 on 1 interactions  - Encourage patient to express fears, feelings, frustrations, and discuss symptoms    - [place] patient to reality, help patient recognize reality-based thinking   - Administer medications as ordered and assess for potential side effects  - Provide the patient education related to the signs and symptoms of the illness and desired effects of prescribed medications  Outcome: Not Progressing  Goal: Refrain from acting on delusional thinking/internal stimuli  Description  Interventions:  - Monitor patient closely, per order   - Utilize least restrictive measures   - Set reasonable limits, give positive feedback for acceptable   - Administer medications as ordered and monitor of potential side effects  Outcome: Not Progressing  Goal: Agree to be compliant with medication regime, as prescribed and report medication side effects  Description  Interventions:  - Offer appropriate PRN medication and supervise ingestion; conduct AIMS, as needed   Outcome: Not Progressing  Goal: Attend and participate in unit activities, including therapeutic, recreational, and educational groups  Description  Interventions:  -Encourage Visitation and family involvement in care  Outcome: Not Progressing  Goal: Recognize dysfunctional thoughts, communicate reality-based thoughts at the time of discharge  Description  Interventions:  - Provide medication and psycho-education to assist patient in compliance and developing insight into his/her illness   Outcome: Not Progressing  Goal: Complete daily ADLs, including personal hygiene independently, as able  Description  Interventions:  - Observe, teach, and assist patient with ADLS  - Monitor and promote a balance of rest/activity, with adequate nutrition and elimination   Outcome: Not Progressing     Problem: Ineffective Coping  Goal: Identifies ineffective coping skills  Outcome: Not Progressing  Goal: Identifies healthy coping skills  Outcome: Not Progressing  Goal: Demonstrates healthy coping skills  Outcome: Not Progressing  Goal: Patient/Family participate in treatment and DC plans  Description  Interventions:  - Provide therapeutic environment  Outcome: Not Progressing  Goal: Patient/Family verbalizes awareness of resources  Outcome: Not Progressing  Goal: Understands least restrictive measures  Description  Interventions:  - Utilize least restrictive behavior  Outcome: Not Progressing     Problem: RESPIRATORY - ADULT  Goal: Achieves optimal ventilation and oxygenation  Description  INTERVENTIONS:  - Assess for changes in respiratory status  - Assess for changes in mentation and behavior  - Position to facilitate oxygenation and minimize respiratory effort  - Oxygen administered by appropriate delivery if ordered  - Initiate smoking cessation education as indicated  - Encourage broncho-pulmonary hygiene including cough, deep breathe, Incentive Spirometry  - Assess the need for suctioning and aspirate as needed  - Assess and instruct to report SOB or any respiratory difficulty  - Respiratory Therapy support as indicated  Outcome: Not Progressing     Problem: GASTROINTESTINAL - ADULT  Goal: Minimal or absence of nausea and/or vomiting  Description  INTERVENTIONS:  - Administer IV fluids if ordered to ensure adequate hydration  - Maintain NPO status until nausea and vomiting are resolved  - Nasogastric tube if ordered  - Administer ordered antiemetic medications as needed  - Provide nonpharmacologic comfort measures as appropriate  - Advance diet as tolerated, if ordered  - Consider nutrition services referral to assist patient with adequate nutrition and appropriate food choices  Outcome: Not Progressing  Goal: Maintains or returns to baseline bowel function  Description  INTERVENTIONS:  - Assess bowel function  - Encourage oral fluids to ensure adequate hydration  - Administer IV fluids if ordered to ensure adequate hydration  - Administer ordered medications as needed  - Encourage mobilization and activity  - Consider nutritional services referral to assist patient with adequate nutrition and appropriate food choices  Outcome: Not Progressing  Goal: Maintains adequate nutritional intake  Description  INTERVENTIONS:  - Monitor percentage of each meal consumed  - Identify factors contributing to decreased intake, treat as appropriate  - Assist with meals as needed  - Monitor I&O, weight, and lab values if indicated  - Obtain nutrition services referral as needed  Outcome: Not Progressing     Problem: METABOLIC, FLUID AND ELECTROLYTES - ADULT  Goal: Glucose maintained within target range  Description  INTERVENTIONS:  - Monitor Blood Glucose as ordered  - Assess for signs and symptoms of hyperglycemia and hypoglycemia  - Administer ordered medications to maintain glucose within target range  - Assess nutritional intake and initiate nutrition service referral as needed  Outcome: Not Progressing     Problem: DISCHARGE PLANNING  Goal: Discharge to home or other facility with appropriate resources  Description    CASE MANAGEMENT INTERVENTIONS:  - Conduct assessment to determine patient/family and health care team treatment goals, and need for post-acute services based on payer coverage, community resources, patient preferences and barriers to discharge    - Address psychosocial, clinical, and financial barriers to discharge as identified in assessment in conjunction with the patient/family and health care team   - Assist the patient in reintegration back into the community by removing barriers which may hinder a successful discharge once deemed stable  - Arrange appropriate level of post-acute services according to patient's needs and preference and payer coverage in collaboration with the physician and health care team   - Communicate with and update the patient/family, physician, and health care team regarding progress on the discharge plan  - Arrange appropriate transportation to post-acute venues  Outcome: Not Progressing    Patient is Irritable, labile, and poorly motivated  Patient requires prompting for meds and meals  He denies depression, anxiety, SI and HI yet remains in bed sleeping all of shift  Patient denies pain, discomfort and distress  Med and meal compliant after prompting  Ate 100% of both meals  Took all meds  Did not attend groups  Vinayak   Will continue to monitor progress in recovery program

## 2019-12-28 NOTE — PROGRESS NOTES
Psychiatry Progress Note    Subjective: Interval History     The patient is lying in bed today  He has been isolative and irritable per staff  He needs a lot of prompting to come out for medication and meals  He has been not compliant with the program   He states he did sleep well last evening but is still tired and needs to sleep  He denies being depressed or anxious  He is denying hallucinations or suicidal ideation      Behavior over the last 24 hours:  unchanged  Sleep: hypersomnia  Appetite: normal  Medication side effects: No  ROS: no complaints    Current medications:    Current Facility-Administered Medications:     acetaminophen (TYLENOL) tablet 325 mg, 325 mg, Oral, Q6H PRN, BRIT Holland-C    acetaminophen (TYLENOL) tablet 650 mg, 650 mg, Oral, Q6H PRN, BRIT Holland-PUSHPA    acetaminophen (TYLENOL) tablet 975 mg, 975 mg, Oral, Q8H PRN, BRIT Holland-C    aluminum-magnesium hydroxide-simethicone (MYLANTA) 200-200-20 mg/5 mL oral suspension 30 mL, 30 mL, Oral, Q4H PRN, Binu Cabrera PA-C    atorvastatin (LIPITOR) tablet 10 mg, 10 mg, Oral, Daily With Dinner, BRIT Holland-C, 10 mg at 12/27/19 1708    benztropine (COGENTIN) injection 1 mg, 1 mg, Intramuscular, Q6H PRN, BRIT Holland-PUSHPA    benztropine (COGENTIN) tablet 1 mg, 1 mg, Oral, Q6H PRN, BRIT Holland-C    cloZAPine (CLOZARIL) tablet 100 mg, 100 mg, Oral, Daily, BRIT Holland-C, 100 mg at 12/28/19 0905    cloZAPine (CLOZARIL) tablet 350 mg, 350 mg, Oral, HS, BRIT Holland-C, 350 mg at 12/27/19 2104    divalproex sodium (DEPAKOTE) EC tablet 1,000 mg, 1,000 mg, Oral, BID, BRIT Holland-C, 1,000 mg at 12/28/19 4191    docusate sodium (COLACE) capsule 100 mg, 100 mg, Oral, BID, BRIT Holland-C, 100 mg at 12/28/19 0905    haloperidol (HALDOL) tablet 5 mg, 5 mg, Oral, Q6H PRN, Binu Cabrera PA-C    haloperidol lactate (HALDOL) injection 5 mg, 5 mg, Intramuscular, Q6H PRN, BRIT Yip-C    levothyroxine tablet 75 mcg, 75 mcg, Oral, Early Morning, BRIT Yip-C, 75 mcg at 12/28/19 0603    LORazepam (ATIVAN) 2 mg/mL injection 1 mg, 1 mg, Intramuscular, Q6H PRN, Ariadna Magaña PA-C    LORazepam (ATIVAN) tablet 1 mg, 1 mg, Oral, Q6H PRN, BRIT Yip-C    magnesium hydroxide (MILK OF MAGNESIA) 400 mg/5 mL oral suspension 30 mL, 30 mL, Oral, Daily PRN, MARIA GUADALUPE YipC    metFORMIN (GLUCOPHAGE) tablet 500 mg, 500 mg, Oral, BID With Meals, Ariadna Magaña PA-C, 500 mg at 12/28/19 4374    nicotine polacrilex (NICORETTE) gum 2 mg, 2 mg, Oral, Q2H PRN, MARIA GUADALUPE YipC    OLANZapine (ZyPREXA) tablet 10 mg, 10 mg, Oral, After Lunch, BRIT Yip-C, 10 mg at 12/27/19 1317    oxybutynin (DITROPAN-XL) 24 hr tablet 5 mg, 5 mg, Oral, Daily, BRIT Yip-C, 5 mg at 12/28/19 0905    pantoprazole (PROTONIX) EC tablet 40 mg, 40 mg, Oral, Early Morning, BRIT Yip-C, 40 mg at 12/28/19 0603    traZODone (DESYREL) tablet 50 mg, 50 mg, Oral, HS PRN, BRIT Yip-C, 50 mg at 12/23/19 2320    Current Problem List:    Patient Active Problem List   Diagnosis    Encephalopathy acute    [facility]    Schizoaffective disorder, bipolar type ([facility]    Constipation, chronic    Hyperammonemia ([facility] )    Type 2 diabetes mellitus without complication, without long-term current use of insulin ([facility])    Tracheobronchitis    Streptococcus pneumoniae    Chronic airway obstruction, not elsewhere classified    Benign essential hypertension    Disorder of lipoprotein and lipid metabolism    Plantar fasciitis    Foot callus    Gastroesophageal reflux disease without esophagitis    GI bleed    Hyponatremia    Acquired hypothyroidism    [facility]schem[facility] colitis [facility]    Moderate cigarette smoker (10-19 per day)    Morbid obesity ([facility] )    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    BMI 34 0-34 9,adult    Nail abnormality    Encounter for well adult exam with abnormal findings    Wheezing on right side of chest on exhalation    Tobacco abuse       Problem list reviewed 12/28/19     Objective:     Vital Signs:  Vitals:    12/25/19 0700 12/26/19 0700 12/26/19 0705 12/28/19 0700   BP: 129/72 127/72 123/73 113/71   BP Location: Right arm Left arm Left arm Right arm   Pulse: 89 93 93 86   Resp: 20 18 18 18   Temp: (!) 97 2 °F (36 2 °C) 98 2 °F (36 8 °C)  (!) 97 4 °F (36 3 °C)   TempSrc:  Temporal     Weight:       Height:             Appearance:  age appropriate, casually dressed and disheveled   Behavior:  restless and fidgety   Speech:  soft   Mood:  anxious and irritable   Affect:  inappropriate and labile   Thought Process:  circumstantial   Thought Content:  delusions  persecutory   Perceptual Disturbances: None   Risk Potential: none   Sensorium:  person, place, situation and time   Cognition:  intact   Consciousness:  alert and awake    Attention: attention span and concentration were age appropriate   Intellect: average   Insight:  limited   Judgment: limited      Motor Activity: no abnormal movements       I/O Past 24 hours:  No intake/output data recorded  No intake/output data recorded  Labs:  Reviewed 12/28/19    Progress Toward Goals:  unchanged    Assessment / Plan:     Schizoaffective disorder, bipolar type ([facility] )    Recommended Treatment:      Medication changes:  1) continue current medication regimen  Non-pharmacological treatments  1) Continue with group therapy, milieu therapy and occupational therapy  Safety  1) Safety/communication plan established targeting dynamic risk factors above  2) Risks, benefits, and possible side effects of medications explained to patient and patient verbalizes understanding  Counseling / Coordination of Care    Total floor / unit time spent today 20 minutes   Greater than 50% of total time was spent with the patient and / or family counseling and / or coordination of care  A description of the counseling / coordination of care  Patient's Rights, confidentiality and exceptions to confidentiality, use of automated medical record, Claudia Mei staff access to medical record, and consent to treatment reviewed      Candi Dubose PA-C

## 2019-12-28 NOTE — PLAN OF CARE
Problem: Alteration in Thoughts and Perception  Goal: Agree to be compliant with medication regime, as prescribed and report medication side effects  Description  Interventions:  - Offer appropriate PRN medication and supervise ingestion; conduct AIMS, as needed   Outcome: Progressing  Goal: Attend and participate in unit activities, including therapeutic, recreational, and educational groups  Description  Interventions:  -Encourage Visitation and family involvement in care  Outcome: Progressing     2000 Alessia Rushing was napping early in shift until supper medicine called on the overhead  He came up for it promptly  He has stayed up since; ate 100% of meal, called family, socialized w/peers, listened to radio for relaxation  Presently attentive to offering being shown @ [address]  Is disheveled looking

## 2019-12-28 NOTE — PROGRESS NOTES
6745 Maryan Guillen was punctual for HS medicine, had an HS snack, is presently enjoying chatting w/family member on phone

## 2019-12-29 PROCEDURE — 99231 SBSQ HOSP IP/OBS SF/LOW 25: CPT | Performed by: FAMILY MEDICINE

## 2019-12-29 RX ADMIN — ATORVASTATIN CALCIUM 10 MG: 10 TABLET, FILM COATED ORAL at 16:44

## 2019-12-29 RX ADMIN — DIVALPROEX SODIUM 1000 MG: 500 TABLET, DELAYED RELEASE ORAL at 17:05

## 2019-12-29 RX ADMIN — OLANZAPINE 10 MG: 10 TABLET, FILM COATED ORAL at 13:33

## 2019-12-29 RX ADMIN — DIVALPROEX SODIUM 1000 MG: 500 TABLET, DELAYED RELEASE ORAL at 08:43

## 2019-12-29 RX ADMIN — OXYBUTYNIN CHLORIDE 5 MG: 5 TABLET, EXTENDED RELEASE ORAL at 08:43

## 2019-12-29 RX ADMIN — DOCUSATE SODIUM 100 MG: 100 CAPSULE, LIQUID FILLED ORAL at 08:43

## 2019-12-29 RX ADMIN — METFORMIN HYDROCHLORIDE 500 MG: 500 TABLET ORAL at 08:43

## 2019-12-29 RX ADMIN — LEVOTHYROXINE SODIUM 75 MCG: 75 TABLET ORAL at 06:19

## 2019-12-29 RX ADMIN — METFORMIN HYDROCHLORIDE 500 MG: 500 TABLET ORAL at 16:44

## 2019-12-29 RX ADMIN — CLOZAPINE 100 MG: 100 TABLET ORAL at 08:43

## 2019-12-29 RX ADMIN — CLOZAPINE 350 MG: 100 TABLET ORAL at 21:01

## 2019-12-29 RX ADMIN — PANTOPRAZOLE SODIUM 40 MG: 40 TABLET, DELAYED RELEASE ORAL at 06:18

## 2019-12-29 RX ADMIN — DOCUSATE SODIUM 100 MG: 100 CAPSULE, LIQUID FILLED ORAL at 17:05

## 2019-12-29 NOTE — PLAN OF CARE
Problem: Alteration in Thoughts and Perception  Goal: Treatment Goal: Gain control of psychotic behaviors/thinking, reduce/eliminate presenting symptoms and demonstrate improved reality functioning upon discharge  Outcome: Progressing  Goal: Verbalize thoughts and feelings  Description  Interventions:  - Promote a nonjudgmental and trusting relationship with the patient through active listening and therapeutic communication  - Assess patient's level of functioning, behavior and potential for risk  - Engage patient in 1 on 1 interactions  - Encourage patient to express fears, feelings, frustrations, and discuss symptoms    - [place] patient to reality, help patient recognize reality-based thinking   - Administer medications as ordered and assess for potential side effects  - Provide the patient education related to the signs and symptoms of the illness and desired effects of prescribed medications  Outcome: Progressing  Goal: Refrain from acting on delusional thinking/internal stimuli  Description  Interventions:  - Monitor patient closely, per order   - Utilize least restrictive measures   - Set reasonable limits, give positive feedback for acceptable   - Administer medications as ordered and monitor of potential side effects  Outcome: Progressing  Goal: Agree to be compliant with medication regime, as prescribed and report medication side effects  Description  Interventions:  - Offer appropriate PRN medication and supervise ingestion; conduct AIMS, as needed   Outcome: Progressing     Problem: RESPIRATORY - ADULT  Goal: Achieves optimal ventilation and oxygenation  Description  INTERVENTIONS:  - Assess for changes in respiratory status  - Assess for changes in mentation and behavior  - Position to facilitate oxygenation and minimize respiratory effort  - Oxygen administered by appropriate delivery if ordered  - Initiate smoking cessation education as indicated  - Encourage broncho-pulmonary hygiene including cough, deep breathe, Incentive Spirometry  - Assess the need for suctioning and aspirate as needed  - Assess and instruct to report SOB or any respiratory difficulty  - Respiratory Therapy support as indicated  Outcome: Progressing     Problem: GASTROINTESTINAL - ADULT  Goal: Minimal or absence of nausea and/or vomiting  Description  INTERVENTIONS:  - Administer IV fluids if ordered to ensure adequate hydration  - Maintain NPO status until nausea and vomiting are resolved  - Nasogastric tube if ordered  - Administer ordered antiemetic medications as needed  - Provide nonpharmacologic comfort measures as appropriate  - Advance diet as tolerated, if ordered  - Consider nutrition services referral to assist patient with adequate nutrition and appropriate food choices  Outcome: Progressing  Goal: Maintains or returns to baseline bowel function  Description  INTERVENTIONS:  - Assess bowel function  - Encourage oral fluids to ensure adequate hydration  - Administer IV fluids if ordered to ensure adequate hydration  - Administer ordered medications as needed  - Encourage mobilization and activity  - Consider nutritional services referral to assist patient with adequate nutrition and appropriate food choices  Outcome: Progressing  Goal: Maintains adequate nutritional intake  Description  INTERVENTIONS:  - Monitor percentage of each meal consumed  - Identify factors contributing to decreased intake, treat as appropriate  - Assist with meals as needed  - Monitor I&O, weight, and lab values if indicated  - Obtain nutrition services referral as needed  Outcome: Progressing     Problem: Alteration in Thoughts and Perception  Goal: Attend and participate in unit activities, including therapeutic, recreational, and educational groups  Description  Interventions:  -Encourage Visitation and family involvement in care  Outcome: Not Progressing  Goal: Recognize dysfunctional thoughts, communicate reality-based thoughts at the time of discharge  Description  Interventions:  - Provide medication and psycho-education to assist patient in compliance and developing insight into his/her illness   Outcome: Not Progressing  Goal: Complete daily ADLs, including personal hygiene independently, as able  Description  Interventions:  - Observe, teach, and assist patient with ADLS  - Monitor and promote a balance of rest/activity, with adequate nutrition and elimination   Outcome: Not Progressing    Patient is Irritable, labile, and poorly motivated  Patient requires prompting for meds and meals  He denies depression, anxiety, SI and HI yet remains in bed sleeping all of shift  Patient denies pain, discomfort and distress  Med and meal compliant after prompting  Refused breakfast and ate 100% of lunch  Took all meds  Did not attend groups  Disheveled   Will continue to monitor progress in recovery program

## 2019-12-29 NOTE — PROGRESS NOTES
Progress Note - Ingrid Red 1965, 48 y o  male MRN: [id number]    Unit/Bed#: [facility] 107-01 Encounter: [id number]    Primary Care Provider: No primary care provider on file  Date and time admitted to hospital: 12/23/2019  1:27 PM        Tobacco abuse  Assessment & Plan  Patient states that he does not smoke anymore  No need for any nicotine replacement therapy at this time  If patient requests it will place on nicotine patch    Acquired hypothyroidism  Assessment & Plan  TSH normal   Continue Synthroid    Gastroesophageal reflux disease without esophagitis  Assessment & Plan  Continue Protonix  Stable at this time    Benign essential hypertension  Assessment & Plan  Patient's blood pressures are well controlled on lisinopril  Type 2 diabetes mellitus without complication, without long-term current use of insulin [facility])  Assessment & Plan    Lab Results   Component Value Date    HGBA1C 6 1 12/03/2019   Patient's diabetes is well controlled  Hemoglobin A1c 6 1  Will continue metformin 500 mg twice daily    * Schizoaffective disorder, bipolar type ([facility] [address] )  Assessment & Plan  As per psych       Current Length of Stay: 6 day(s)    Current Patient Status: Psych - Long Term      Code Status: Level 1 - Full Code      Subjective:   Patient denies any concerns or complaints today  Denies any shortness of breath, chest pain, abdominal pain, constipation, nausea or vomiting  He is compliant with his medication  Objective:     Vitals:   No data recorded  Blood pressure 113/71, heart rate 86, temperature 97 4 degree, respiration rate 18     Body mass index is 33 88 kg/m²  Input and Output Summary (last 24 hours):     No intake or output data in the 24 hours ending 12/29/19 1416    Physical Exam:     Physical Exam   Constitutional: He appears well-developed and well-nourished  HENT:   Head: Normocephalic and atraumatic     Right Ear: External ear normal    Left Ear: External ear normal    Nose: Nose normal    Mouth/Throat: Oropharynx is clear and moist    Eyes: Conjunctivae and EOM are normal    Neck: Normal range of motion  Neck supple  Cardiovascular: Normal rate, regular rhythm and normal heart sounds  Pulmonary/Chest: Effort normal and breath sounds normal    Abdominal: Soft  Bowel sounds are normal    Genitourinary:   Genitourinary Comments: deferred   Neurological: He is alert  Cranial nerve 2 -12 are normal    Skin: Skin is warm and dry  Psychiatric: He has a normal mood and affect            Additional Data:     Labs:    Results from last 7 days   Lab Units 12/24/19  0613   WBC Thousand/uL 9 20   HEMOGLOBIN g/dL 14 9   HEMATOCRIT % 44 1   PLATELETS Thousands/uL 164   BANDS PCT % 1   LYMPHO PCT % 39   MONO PCT % 14*     Results from last 7 days   Lab Units 12/24/19  0613   SODIUM mmol/L 141   POTASSIUM mmol/L 4 4   CHLORIDE mmol/L 101   CO2 mmol/L 29   BUN mg/dL 15   CREATININE mg/dL 0 68*   ANION GAP mmol/L 11   CALCIUM mg/dL 9 5   ALBUMIN g/dL 4 2   TOTAL BILIRUBIN mg/dL 0 30   ALK PHOS U/L 72   ALT U/L 28   AST U/L 21   GLUCOSE RANDOM mg/dL 115*        Recent Cultures (last 7 days):           Last 24 Hours Medication List:     Current Facility-Administered Medications:  acetaminophen 325 mg Oral Q6H PRN Slime Richters, PA-C   acetaminophen 650 mg Oral Q6H PRN Slime Richters, PA-C   acetaminophen 975 mg Oral Q8H PRN Slime Richters, PA-C   aluminum-magnesium hydroxide-simethicone 30 mL Oral Q4H PRN Slime Raz, PA-C   atorvastatin 10 mg Oral Daily With [employer], PA-C   benztropine 1 mg Intramuscular Q6H PRN Slime Richters, PA-C   benztropine 1 mg Oral Q6H PRN Slime Richters, PA-C   clozapine 100 mg Oral Daily Slime Raz, PA-C   cloZAPine 350 mg Oral HS Slime Raz, PA-C   divalproex sodium 1,000 mg Oral BID Slime Richters, PA-C   docusate sodium 100 mg Oral BID Slime Raz, PA-C   haloperidol 5 mg Oral Q6H PRN Slime Raz, PA-C haloperidol lactate 5 mg Intramuscular Q6H PRN Sudie Gelineau, PA-C   levothyroxine 75 mcg Oral Early Morning Sudie Gelineau, PA-C   LORazepam 1 mg Intramuscular Q6H PRN Sudie Gelineau, PA-C   LORazepam 1 mg Oral Q6H PRN Sudie Gelineau, PA-C   magnesium hydroxide 30 mL Oral Daily PRN Sudie Gelineau, PA-C   metFORMIN 500 mg Oral BID With Meals Sudie Gelineau, PA-C   nicotine polacrilex 2 mg Oral Q2H PRN Sudie Gelineau, PA-C   OLANZapine 10 mg Oral After [employer], PA-C   oxybutynin 5 mg Oral Daily Sudie Gelineau, PA-C   pantoprazole 40 mg Oral Early Morning Sudie Gelineau, PA-C   traZODone 50 mg Oral HS PRN Sudie Gelineau, PA-C        Today, Patient Was Seen By: Isaiah Pelayo MD    ** Please Note: Dictation voice to text software may have been used in the creation of this document   **

## 2019-12-29 NOTE — PROGRESS NOTES
Psychiatry Progress Note    Subjective: Interval History     The patient is lying in bed this morning  Patient states he refused breakfast this morning because he was tired  He states he did sleep well  Patient is guarded during discussion and denies all symptoms such as hallucinations, depression, anxiety  He states he wants to be discharged  He has been isolative to his room and does not interact much with peers       Behavior over the last 24 hours:  unchanged  Sleep: hypersomnia  Appetite: normal  Medication side effects: No  ROS: no complaints    Current medications:    Current Facility-Administered Medications:     acetaminophen (TYLENOL) tablet 325 mg, 325 mg, Oral, Q6H PRN, Jayme Myers PA-C    acetaminophen (TYLENOL) tablet 650 mg, 650 mg, Oral, Q6H PRN, Jayme Myers PA-C    acetaminophen (TYLENOL) tablet 975 mg, 975 mg, Oral, Q8H PRN, Jayme Myers PA-C    aluminum-magnesium hydroxide-simethicone (MYLANTA) 200-200-20 mg/5 mL oral suspension 30 mL, 30 mL, Oral, Q4H PRN, Jayme Myers PA-C    atorvastatin (LIPITOR) tablet 10 mg, 10 mg, Oral, Daily With Dinner, Jayme Myers PA-C, 10 mg at 12/28/19 1702    benztropine (COGENTIN) injection 1 mg, 1 mg, Intramuscular, Q6H PRN, Jayme Myers PA-C    benztropine (COGENTIN) tablet 1 mg, 1 mg, Oral, Q6H PRN, Jayme Myers PA-C    cloZAPine (CLOZARIL) tablet 100 mg, 100 mg, Oral, Daily, Jayme Myers PA-C, 100 mg at 12/29/19 0843    cloZAPine (CLOZARIL) tablet 350 mg, 350 mg, Oral, HS, Jayme Myers PA-C, 350 mg at 12/28/19 2102    divalproex sodium (DEPAKOTE) EC tablet 1,000 mg, 1,000 mg, Oral, BID, Jayme Myers PA-C, 1,000 mg at 12/29/19 0843    docusate sodium (COLACE) capsule 100 mg, 100 mg, Oral, BID, Jayme Myers PA-C, 100 mg at 12/29/19 0843    haloperidol (HALDOL) tablet 5 mg, 5 mg, Oral, Q6H PRN, Jayme Myers PA-C    haloperidol lactate (HALDOL) injection 5 mg, 5 mg, Intramuscular, Q6H PRN, Jestine Mungo, PA-C    levothyroxine tablet 75 mcg, 75 mcg, Oral, Early Morning, Jestine Mungo, PA-C, 75 mcg at 12/29/19 0619    LORazepam (ATIVAN) 2 mg/mL injection 1 mg, 1 mg, Intramuscular, Q6H PRN, Jestine Mungo, PA-C    LORazepam (ATIVAN) tablet 1 mg, 1 mg, Oral, Q6H PRN, Jestine Mungo, PA-C    magnesium hydroxide (MILK OF MAGNESIA) 400 mg/5 mL oral suspension 30 mL, 30 mL, Oral, Daily PRN, Jestine Mungo, PA-C    metFORMIN (GLUCOPHAGE) tablet 500 mg, 500 mg, Oral, BID With Meals, Jestine Mungo, PA-C, 500 mg at 12/29/19 0843    nicotine polacrilex (NICORETTE) gum 2 mg, 2 mg, Oral, Q2H PRN, Jestine Mungo, PA-C    OLANZapine (ZyPREXA) tablet 10 mg, 10 mg, Oral, After Lunch, Jestine Mungo, PA-C, 10 mg at 12/28/19 1400    oxybutynin (DITROPAN-XL) 24 hr tablet 5 mg, 5 mg, Oral, Daily, Jestine Mungo, PA-C, 5 mg at 12/29/19 0843    pantoprazole (PROTONIX) EC tablet 40 mg, 40 mg, Oral, Early Morning, Jestine Mungo, PA-C, 40 mg at 12/29/19 2274    traZODone (DESYREL) tablet 50 mg, 50 mg, Oral, HS PRN, Jestine Mungo, PA-C, 50 mg at 12/23/19 2320    Current Problem List:    Patient Active Problem List   Diagnosis    Encephalopathy acute    Bipolar 1 disorder ([facility] [address] )    Schizoaffective disorder, bipolar type ([facility] [address] )    Constipation, chronic    Hyperammonemia ([facility] [address] )    Type 2 diabetes mellitus without complication, without long-term current use of insulin ([facility])    Tracheobronchitis    Streptococcus pneumoniae    Chronic airway obstruction, not elsewhere classified    Benign essential hypertension    Disorder of lipoprotein and lipid metabolism    Plantar fasciitis    Foot callus    Gastroesophageal reflux disease without esophagitis    GI bleed    Hyponatremia    Acquired hypothyroidism    Ischemic colitis ([facility])    Moderate cigarette smoker (10-19 per day)    Morbid obesity (HCC)    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    BMI 34 0-34 9,adult    Nail abnormality    Encounter for well adult exam with abnormal findings    Wheezing on right side of chest on exhalation    Tobacco abuse       Problem list reviewed 12/29/19     Objective:     Vital Signs:  Vitals:    12/26/19 0700 12/26/19 0705 12/28/19 0700 12/29/19 0830   BP: 127/72 123/73 113/71    BP Location: Left arm Left arm Right arm    Pulse: 93 93 86    Resp: 18 18 18    Temp: 98 2 °F (36 8 °C)  (!) 97 4 °F (36 3 °C)    TempSrc: Temporal      Weight:    104 kg (229 lb 6 4 oz)   Height:             Appearance:  age appropriate, casually dressed and disheveled   Behavior:  restless and fidgety   Speech:  soft   Mood:  anxious and irritable   Affect:  inappropriate and labile   Thought Process:  circumstantial   Thought Content:  delusions  persecutory   Perceptual Disturbances: None   Risk Potential: none   Sensorium:  person, place, situation and time   Cognition:  intact   Consciousness:  alert and awake    Attention: attention span and concentration were age appropriate   Intellect: average   Insight:  limited   Judgment: limited      Motor Activity: no abnormal movements       I/O Past 24 hours:  No intake/output data recorded  No intake/output data recorded  Labs:  Reviewed 12/29/19    Progress Toward Goals:  unchanged    Assessment / Plan:     Schizoaffective disorder, bipolar type ([facility] [address] )    Recommended Treatment:      Medication changes:  1) continue current medication regimen  Non-pharmacological treatments  1) Continue with group therapy, milieu therapy and occupational therapy  Safety  1) Safety/communication plan established targeting dynamic risk factors above  2) Risks, benefits, and possible side effects of medications explained to patient and patient verbalizes understanding  Counseling / Coordination of Care    Total floor / unit time spent today 20 minutes   Greater than 50% of total time was spent with the patient and / or family counseling and / or coordination of care  A description of the counseling / coordination of care  Patient's Rights, confidentiality and exceptions to confidentiality, use of automated medical record, [facility] staff access to medical record, and consent to treatment reviewed      Pravin Chacon PA-C

## 2019-12-29 NOTE — PLAN OF CARE
Problem: Alteration in Thoughts and Perception  Goal: Treatment Goal: Gain control of psychotic behaviors/thinking, reduce/eliminate presenting symptoms and demonstrate improved reality functioning upon discharge  Outcome: Progressing     Problem: Ineffective Coping  Goal: Identifies ineffective coping skills  Outcome: Progressing   Pt in bed ,eyes closed ,chest noted to be rising ,audile breath sounds will monitor for change in behavior/assessment

## 2019-12-29 NOTE — PROGRESS NOTES
12/28/19 1300   Activity/Group Checklist   Group Other (Comment)  (OPEN STUDIO/Art Therapy, Social Interaction-Expression)   Attendance Attended   Attendance Duration (min) 31-45  (In and out often)   Interactions Interacted appropriately  (Required some prompting)   Affect/Mood Appropriate   Goals Achieved Able to listen to others; Able to engage in interactions

## 2019-12-29 NOTE — PLAN OF CARE
Problem: Alteration in Thoughts and Perception  Goal: Verbalize thoughts and feelings  Description  Interventions:  - Promote a nonjudgmental and trusting relationship with the patient through active listening and therapeutic communication  - Assess patient's level of functioning, behavior and potential for risk  - Engage patient in 1 on 1 interactions  - Encourage patient to express fears, feelings, frustrations, and discuss symptoms    - [place] patient to reality, help patient recognize reality-based thinking   - Administer medications as ordered and assess for potential side effects  - Provide the patient education related to the signs and symptoms of the illness and desired effects of prescribed medications  Outcome: Progressing  Goal: Refrain from acting on delusional thinking/internal stimuli  Description  Interventions:  - Monitor patient closely, per order   - Utilize least restrictive measures   - Set reasonable limits, give positive feedback for acceptable   - Administer medications as ordered and monitor of potential side effects  Outcome: Progressing  Goal: Agree to be compliant with medication regime, as prescribed and report medication side effects  Description  Interventions:  - Offer appropriate PRN medication and supervise ingestion; conduct AIMS, as needed   Outcome: Progressing  Goal: Attend and participate in unit activities, including therapeutic, recreational, and educational groups  Description  Interventions:  -Encourage Visitation and family involvement in care  Outcome: Progressing  Goal: Complete daily ADLs, including personal hygiene independently, as able  Description  Interventions:  - Observe, teach, and assist patient with ADLS  - Monitor and promote a balance of rest/activity, with adequate nutrition and elimination   Outcome: Progressing     Problem: Ineffective Coping  Goal: Demonstrates healthy coping skills  Outcome: Progressing  Goal: Understands least restrictive measures  Description  Interventions:  - Utilize least restrictive behavior  Outcome: Progressing     Problem: GASTROINTESTINAL - ADULT  Goal: Maintains adequate nutritional intake  Description  INTERVENTIONS:  - Monitor percentage of each meal consumed  - Identify factors contributing to decreased intake, treat as appropriate  - Assist with meals as needed  - Monitor I&O, weight, and lab values if indicated  - Obtain nutrition services referral as needed  Outcome: Marlys Perea has been awake, alert, and visible minimally out in the milieu  Isolative to room and resting at times  Ate 100% supper  Pt showered this shift  Compliant with scheduled meds with some prompting  Minimal interaction noted with peers  Pt denies any depression, anxiety, si/hi, intent, plan, or a/v hallucinations  Pt stated he wants to get out to get fresh air and walk  Pt did not attend evening group but came out for snack after  No behavioral issues  Continue to monitor/assess for any changes

## 2019-12-30 LAB
ATRIAL RATE: 93 BPM
P AXIS: 49 DEGREES
PR INTERVAL: 160 MS
QRS AXIS: -35 DEGREES
QRSD INTERVAL: 106 MS
QT INTERVAL: 350 MS
QTC INTERVAL: 435 MS
T WAVE AXIS: 30 DEGREES
VENTRICULAR RATE: 93 BPM

## 2019-12-30 PROCEDURE — 93010 ELECTROCARDIOGRAM REPORT: CPT | Performed by: INTERNAL MEDICINE

## 2019-12-30 PROCEDURE — 99231 SBSQ HOSP IP/OBS SF/LOW 25: CPT | Performed by: PSYCHIATRY & NEUROLOGY

## 2019-12-30 PROCEDURE — 93005 ELECTROCARDIOGRAM TRACING: CPT

## 2019-12-30 RX ADMIN — METFORMIN HYDROCHLORIDE 500 MG: 500 TABLET ORAL at 08:25

## 2019-12-30 RX ADMIN — TRAZODONE HYDROCHLORIDE 50 MG: 50 TABLET ORAL at 22:28

## 2019-12-30 RX ADMIN — OXYBUTYNIN CHLORIDE 5 MG: 5 TABLET, EXTENDED RELEASE ORAL at 08:24

## 2019-12-30 RX ADMIN — LEVOTHYROXINE SODIUM 75 MCG: 75 TABLET ORAL at 05:56

## 2019-12-30 RX ADMIN — ATORVASTATIN CALCIUM 10 MG: 10 TABLET, FILM COATED ORAL at 16:45

## 2019-12-30 RX ADMIN — DOCUSATE SODIUM 100 MG: 100 CAPSULE, LIQUID FILLED ORAL at 17:08

## 2019-12-30 RX ADMIN — CLOZAPINE 100 MG: 100 TABLET ORAL at 08:23

## 2019-12-30 RX ADMIN — DIVALPROEX SODIUM 1000 MG: 500 TABLET, DELAYED RELEASE ORAL at 17:08

## 2019-12-30 RX ADMIN — PANTOPRAZOLE SODIUM 40 MG: 40 TABLET, DELAYED RELEASE ORAL at 05:57

## 2019-12-30 RX ADMIN — CLOZAPINE 350 MG: 100 TABLET ORAL at 21:12

## 2019-12-30 RX ADMIN — DOCUSATE SODIUM 100 MG: 100 CAPSULE, LIQUID FILLED ORAL at 08:24

## 2019-12-30 RX ADMIN — OLANZAPINE 10 MG: 10 TABLET, FILM COATED ORAL at 13:28

## 2019-12-30 RX ADMIN — METFORMIN HYDROCHLORIDE 500 MG: 500 TABLET ORAL at 16:45

## 2019-12-30 RX ADMIN — DIVALPROEX SODIUM 1000 MG: 500 TABLET, DELAYED RELEASE ORAL at 08:24
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
## 2019-12-30 NOTE — PLAN OF CARE
Problem: Alteration in Thoughts and Perception  Goal: Verbalize thoughts and feelings  Description  Interventions:  - Promote a nonjudgmental and trusting relationship with the patient through active listening and therapeutic communication  - Assess patient's level of functioning, behavior and potential for risk  - Engage patient in 1 on 1 interactions  - Encourage patient to express fears, feelings, frustrations, and discuss symptoms    - Forbes patient to reality, help patient recognize reality-based thinking   - Administer medications as ordered and assess for potential side effects  - Provide the patient education related to the signs and symptoms of the illness and desired effects of prescribed medications  Outcome: Not Progressing  Goal: Agree to be compliant with medication regime, as prescribed and report medication side effects  Description  Interventions:  - Offer appropriate PRN medication and supervise ingestion; conduct AIMS, as needed   Outcome: Not Progressing  Goal: Attend and participate in unit activities, including therapeutic, recreational, and educational groups  Description  Interventions:  -Encourage Visitation and family involvement in care  Outcome: Not Progressing  Goal: Complete daily ADLs, including personal hygiene independently, as able  Description  Interventions:  - Observe, teach, and assist patient with ADLS  - Monitor and promote a balance of rest/activity, with adequate nutrition and elimination   Outcome: Not Progressing  Individual has been keeping to self, isolative to room this shift  He was visible only at meal times  Presentation with disheveled with poor hygiene  He did not actively participate in programming, no groups were attended  Compliant with meds, but needs multiple prompts to do so  EKG completed this shift  No issues or concerns expressed  Availability of staff made known  Will continue to monitor

## 2019-12-30 NOTE — PROGRESS NOTES
Pharmacy Clozapine Monitoring Progress Note     Cheikh Vega is receiving a total daily dose of 450 mg of clozapine divided as 100mg daily and 350mg at bedtime  **If clozapine therapy is interrupted for more than 48 hours, therapy MUST be restarted at the initial titration dose (12 5mg - 25mg once daily) to avoid severe hypotension, bradycardia, seizure and syncope  **        Pharmacist Recommendations Based on Assessment and Plan:    1  Patient is Clozapine-REMS eligible, ANC was updated, with every four weeks monitoring frequency and the next [address] is due on 1/21/19 per the REMS program       2    Recommend BNP, CRP and Troponin to be drawn for baseline comparison along with clozapine level  3    Recommend per provider discretion to review use of Zyprexa and Ditropan in Clozapine patient  Assessment/Plan:    Phase of Treatment:     Current phase of treatment is maintenance/continuation  Patient Clozapine REMS Eligibility:     Patient is eligible to receive clozapine and the [address] monitoring frequency is every four weeks per clozapine REMS  The patient's latest [address] value has been updated into the Clozapine-REMS program          ANC and Clozapine Level (if available)     The most recent [address] was   Lab Results   Component Value Date    NEUTROABS 4 14 12/24/2019    and the next lab is due on 1/21/19  Last Clozapine level (if available):    0   Lab Value Date/Time    CLOZAPINE 657 (H) 11/13/2019 1108     0   Lab Value Date/Time    NCLOZIP 187 11/13/2019 1108           Monitoring Parameters for Clozapine:     Clozapine Adverse Effect Suggested Monitoring Patient's Current Status    Agranulocytosis ANC baseline and then repeat weekly for first 6 months and every 2 weeks for the second 6 months  Maintenance after one year of therapy every month    The most recent [address] was   Lab Results   Component Value Date    NEUTROABS 4 14 12/24/2019       This ANC is considered normal range (ANC >/= 1500/mcL)  Continue current treatment and monitoring course  Respiratory Depression Please ensure patient is not on concomitant respiratory lowering medications such as benzodiazepines, sedative hypnotics (eg  zolpidem) This patient is not on respiratory depression lowering medications   Myocarditis Baseline and weekly EKG, CRP, BNP, and troponin  Weekly symptomatic complaints of fatigue, dyspnea, tachycardia, chest pain, palpitations, and fever for the first 4 weeks  Last EKG Results on  11/6/19 showed: "Normal sinus rhythm  Normal ECG  When compared with ECG of 05-OCT-2019 14:30,  Vent  rate has increased BY  37 BPM  QT has lengthened  Confirmed by Sidney Gilmore ([id number]) on 11/9/2019 7:25:14 AM"    Last QTC value was:  0   Lab Value Date/Time    QTCINT 432 11/06/2019 1919       Last BNP was: No results found for: NTBNP    Last Troponin was:  0   Lab Value Date/Time    TROPONINI 0 05 (H) 03/28/2014 1840        Orthostatic hypotension/  bradycardia Blood pressure and vital signs      Monitor blood pressure and orthostatic hypotension at least twice daily upon initiation and continue to follow at least once daily afterwards  Patient's last recorded vital signs:       /71 (BP Location: Right arm)   Pulse 86   Temp (!) 97 4 °F (36 3 °C)   Resp 18   Ht 5' 9" (1 753 m)   Wt 104 kg (229 lb 6 4 oz)   BMI 33 88 kg/m²             Constipation (bowel obstruction due to high anticholinergic clozapine burden) Assess at baseline and weekly during first four months of therapy  Docusate 100mg BID and Miralax 17 grams daily recommended initially  Prophylactic bowel regimen ordered and includes: docusate 100mg twice daily   Sialorrhea Assess at baseline and weekly during first four months of therapy  May be managed using sublingual anticholinergic preparations (atropine 1% eye drops)  If needed atropine 1% sublingual tongue drops or PO glycopyrrolate or terazosin are recommended   (If no allergies to these agents)        Please note that per current REMS protocol the provider can submit a treatment rationale that justifies clozapine treatment even if patient parameters are outside of REMS recommendations  The Clozapine REMS is an FDA-mandated program implemented by the manufacturers of clozapine  ([employer]  com/CpmgClozapineUI/home  u)  Pharmacy will continue to follow patient with team, thank you    Electronically signed by: Parish Bridges, PharmD, [facility], Clinical Pharmacist - Psychiatry

## 2019-12-30 NOTE — PROGRESS NOTES
12/27/19 1100   Activity/Group Checklist   Group Other (Comment)  ([facility] - Weekly Goal Review/Discharge Fears)   Attendance Other (Comment)  (No credit, slept the entire time)     Patient sat in [facility] today, but slept the entire time, audibly snoring  He was reminded that credit is not given for sleeping through the groups and he acknowledged this  Patient will not receive credit today

## 2019-12-30 NOTE — PLAN OF CARE
Problem: Alteration in Thoughts and Perception  Goal: Refrain from acting on delusional thinking/internal stimuli  Description  Interventions:  - Monitor patient closely, per order   - Utilize least restrictive measures   - Set reasonable limits, give positive feedback for acceptable   - Administer medications as ordered and monitor of potential side effects  Outcome: Progressing  Goal: Agree to be compliant with medication regime, as prescribed and report medication side effects  Description  Interventions:  - Offer appropriate PRN medication and supervise ingestion; conduct AIMS, as needed   Outcome: Progressing  Goal: Attend and participate in unit activities, including therapeutic, recreational, and educational groups  Description  Interventions:  -Encourage Visitation and family involvement in care  Outcome: Progressing   Patient is observed resting in  bed with eyes closed throughout the night  No signs of distress noted  Pt denies S/I,HI,VH  Will continue to monitor on q 7 minute checks
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
## 2019-12-30 NOTE — PLAN OF CARE
Problem: Alteration in Thoughts and Perception  Goal: Attend and participate in unit activities, including therapeutic, recreational, and educational groups  Description  Interventions:  -Encourage Visitation and family involvement in care  Outcome: Progressing   Patient agreeable to meet and complete Peer Assessment with this writer  Patient is a [facility] resident with no forensic concerns at this time  Patient claims he does have a valid [place] State ID and SSI Card  Patient feels he has no physical disabilities in areas of mobility, hearing or visual however, has suggested a Dental exam  When ask if he felt ready to live in the community he replied, " ready" and thinks that "because I have to take med's, use positive thinking and watch what I do " Ivonne Dos Santos feels that working on following direction and listening would benefit him greatly and also being acclimated back into the community with assistance  Patient was not able to identify any symptoms of his illness that have hindered him from community living and took no responsibility for actions that brought him back to the hospital  Patient was able to list his coping skills as music, drawing, Clubhouse, [facility], shopping and working with his ACT Team  When ask what he feels staff can help him with Pt stated, " help me understand why I do things, help me get out for coffee and off the unit " Pt sees his strengths as being able to keep himself motivated, polite, friendly and sharing with others  Patient has used the Group home, ACT Teams, [facility], [address] [place], [facility] and [address] as community supports  Patient stated his only hope for the future is to "think before I act" and considers the [address] the most preferable living arrangement   Patient is agreeable to living in a Group home setting and resume working with an ACT Team  Patient continues to need assistance with most Independent Living Skills/Tasks with the exception of transportation and meal preparation  Currently, Patient does not use spiritual supports but needs to find a Confucianist he can attend  Patient has been educated on working and receiving benefits and on Psychiatric Advance Directives however, declined to have more information at this time  Patient also declined to make additional comments or add anything to assessment  Writer will encourage group attendance and participation and will assist patient in the completion of a WRAP Plan

## 2019-12-30 NOTE — PROGRESS NOTES
12/30/19 0900   Team Meeting   Meeting Type Daily Rounds   Team Members Present   Team Members Present Physician;Nurse;; Other (Discipline and Name)   Physician Team Member Dr Ayan Amador Team Member Oli Vargas RN   Care Management Team Member Lily Gee   Other (Discipline and Name) Dee Benson LCSW     Patient was observed to be sleeping a lot this weekend  Reports to staff that he wants to get out of the hospital  Slept

## 2019-12-30 NOTE — PLAN OF CARE
Problem: Alteration in Thoughts and Perception  Goal: Verbalize thoughts and feelings  Description  Interventions:  - Promote a nonjudgmental and trusting relationship with the patient through active listening and therapeutic communication  - Assess patient's level of functioning, behavior and potential for risk  - Engage patient in 1 on 1 interactions  - Encourage patient to express fears, feelings, frustrations, and discuss symptoms    - [place] patient to reality, help patient recognize reality-based thinking   - Administer medications as ordered and assess for potential side effects  - Provide the patient education related to the signs and symptoms of the illness and desired effects of prescribed medications  Outcome: Progressing  Goal: Agree to be compliant with medication regime, as prescribed and report medication side effects  Description  Interventions:  - Offer appropriate PRN medication and supervise ingestion; conduct AIMS, as needed   Outcome: Progressing  Goal: Attend and participate in unit activities, including therapeutic, recreational, and educational groups  Description  Interventions:  -Encourage Visitation and family involvement in care  Outcome: Progressing  Goal: Complete daily ADLs, including personal hygiene independently, as able  Description  Interventions:  - Observe, teach, and assist patient with ADLS  - Monitor and promote a balance of rest/activity, with adequate nutrition and elimination   Outcome: Progressing     Problem: Ineffective Coping  Goal: Understands least restrictive measures  Description  Interventions:  - Utilize least restrictive behavior  Outcome: Amada Reed has been awake, alert, and visible intermittently out in the milieu  Pt showered this shift  Compliant with scheduled meds without prompting  Pt stated that he wants to be discharged and get his own apartment  Ate 100% supper  Disheveled in appearance    Attended and participated in evening group and had snack after  No behavioral issues  Little interaction noted with peers  Continue to monitor/assess for any changes

## 2019-12-30 NOTE — PROGRESS NOTES
12/30/19 1100   Activity/Group Checklist   Group   (IMR/Relapse Prevention )   Attendance Did not attend   Attendance Duration (min) 46-60   Affect/Mood GAEL

## 2019-12-31 PROCEDURE — 99231 SBSQ HOSP IP/OBS SF/LOW 25: CPT | Performed by: PSYCHIATRY & NEUROLOGY

## 2019-12-31 RX ADMIN — TRAZODONE HYDROCHLORIDE 50 MG: 50 TABLET ORAL at 21:47

## 2019-12-31 RX ADMIN — METFORMIN HYDROCHLORIDE 500 MG: 500 TABLET ORAL at 17:26

## 2019-12-31 RX ADMIN — CLOZAPINE 350 MG: 100 TABLET ORAL at 21:07

## 2019-12-31 RX ADMIN — PANTOPRAZOLE SODIUM 40 MG: 40 TABLET, DELAYED RELEASE ORAL at 06:01

## 2019-12-31 RX ADMIN — OLANZAPINE 10 MG: 10 TABLET, FILM COATED ORAL at 14:08

## 2019-12-31 RX ADMIN — DIVALPROEX SODIUM 1000 MG: 500 TABLET, DELAYED RELEASE ORAL at 17:26

## 2019-12-31 RX ADMIN — ATORVASTATIN CALCIUM 10 MG: 10 TABLET, FILM COATED ORAL at 17:26

## 2019-12-31 RX ADMIN — DOCUSATE SODIUM 100 MG: 100 CAPSULE, LIQUID FILLED ORAL at 17:26

## 2019-12-31 RX ADMIN — LEVOTHYROXINE SODIUM 75 MCG: 75 TABLET ORAL at 06:01

## 2019-12-31 NOTE — PLAN OF CARE
Problem: Alteration in Thoughts and Perception  Goal: Verbalize thoughts and feelings  Description  Interventions:  - Promote a nonjudgmental and trusting relationship with the patient through active listening and therapeutic communication  - Assess patient's level of functioning, behavior and potential for risk  - Engage patient in 1 on 1 interactions  - Encourage patient to express fears, feelings, frustrations, and discuss symptoms    - [place] patient to reality, help patient recognize reality-based thinking   - Administer medications as ordered and assess for potential side effects  - Provide the patient education related to the signs and symptoms of the illness and desired effects of prescribed medications  Outcome: Progressing  Goal: Agree to be compliant with medication regime, as prescribed and report medication side effects  Description  Interventions:  - Offer appropriate PRN medication and supervise ingestion; conduct AIMS, as needed   Outcome: Progressing  Goal: Attend and participate in unit activities, including therapeutic, recreational, and educational groups  Description  Interventions:  -Encourage Visitation and family involvement in care  Outcome: Progressing     Problem: Ineffective Coping  Goal: Understands least restrictive measures  Description  Interventions:  - Utilize least restrictive behavior  Outcome: Progressing     Problem: GASTROINTESTINAL - ADULT  Goal: Maintains adequate nutritional intake  Description  INTERVENTIONS:  - Monitor percentage of each meal consumed  - Identify factors contributing to decreased intake, treat as appropriate  - Assist with meals as needed  - Monitor I&O, weight, and lab values if indicated  - Obtain nutrition services referral as needed  Outcome: Malgorzata Gill has been awake, alert, and visible intermittently out in the milieu  Minimal interaction noted with peers  Disheveled in appearance  Needy and at nurse's station frequently with requests  Ate 100% supper  Denies any depression, anxiety, si/hi, intent, plan, a/v hallucinations, and has not verbalized any delusions  Pt continues to focus on wanting to be discharged and wants to get his own apartment  Pt attended and participated in evening group and had snack after  Compliant with all scheduled meds with some prompting  Continue to monitor/assess for any changes

## 2019-12-31 NOTE — PLAN OF CARE
Problem: Alteration in Thoughts and Perception  Goal: Treatment Goal: Gain control of psychotic behaviors/thinking, reduce/eliminate presenting symptoms and demonstrate improved reality functioning upon discharge  Outcome: Not Progressing  Goal: Verbalize thoughts and feelings  Description  Interventions:  - Promote a nonjudgmental and trusting relationship with the patient through active listening and therapeutic communication  - Assess patient's level of functioning, behavior and potential for risk  - Engage patient in 1 on 1 interactions  - Encourage patient to express fears, feelings, frustrations, and discuss symptoms    - [place] patient to reality, help patient recognize reality-based thinking   - Administer medications as ordered and assess for potential side effects  - Provide the patient education related to the signs and symptoms of the illness and desired effects of prescribed medications  Outcome: Not Progressing  Goal: Refrain from acting on delusional thinking/internal stimuli  Description  Interventions:  - Monitor patient closely, per order   - Utilize least restrictive measures   - Set reasonable limits, give positive feedback for acceptable   - Administer medications as ordered and monitor of potential side effects  Outcome: Not Progressing  Goal: Agree to be compliant with medication regime, as prescribed and report medication side effects  Description  Interventions:  - Offer appropriate PRN medication and supervise ingestion; conduct AIMS, as needed   Outcome: Not Progressing  Goal: Attend and participate in unit activities, including therapeutic, recreational, and educational groups  Description  Interventions:  -Encourage Visitation and family involvement in care  Outcome: Not Progressing  Goal: Recognize dysfunctional thoughts, communicate reality-based thoughts at the time of discharge  Description  Interventions:  - Provide medication and psycho-education to assist patient in compliance and developing insight into his/her illness   Outcome: Not Progressing  Goal: Complete daily ADLs, including personal hygiene independently, as able  Description  Interventions:  - Observe, teach, and assist patient with ADLS  - Monitor and promote a balance of rest/activity, with adequate nutrition and elimination   Outcome: Not Progressing     Problem: Ineffective Coping  Goal: Identifies ineffective coping skills  Outcome: Not Progressing  Goal: Identifies healthy coping skills  Outcome: Not Progressing  Goal: Demonstrates healthy coping skills  Outcome: Not Progressing  Goal: Patient/Family participate in treatment and DC plans  Description  Interventions:  - Provide therapeutic environment  Outcome: Not Progressing  Goal: Patient/Family verbalizes awareness of resources  Outcome: Not Progressing  Goal: Understands least restrictive measures  Description  Interventions:  - Utilize least restrictive behavior  Outcome: Not Progressing     Problem: RESPIRATORY - ADULT  Goal: Achieves optimal ventilation and oxygenation  Description  INTERVENTIONS:  - Assess for changes in respiratory status  - Assess for changes in mentation and behavior  - Position to facilitate oxygenation and minimize respiratory effort  - Oxygen administered by appropriate delivery if ordered  - Initiate smoking cessation education as indicated  - Encourage broncho-pulmonary hygiene including cough, deep breathe, Incentive Spirometry  - Assess the need for suctioning and aspirate as needed  - Assess and instruct to report SOB or any respiratory difficulty  - Respiratory Therapy support as indicated  Outcome: Not Progressing     Problem: GASTROINTESTINAL - ADULT  Goal: Minimal or absence of nausea and/or vomiting  Description  INTERVENTIONS:  - Administer IV fluids if ordered to ensure adequate hydration  - Maintain NPO status until nausea and vomiting are resolved  - Nasogastric tube if ordered  - Administer ordered antiemetic medications as needed  - Provide nonpharmacologic comfort measures as appropriate  - Advance diet as tolerated, if ordered  - Consider nutrition services referral to assist patient with adequate nutrition and appropriate food choices  Outcome: Not Progressing  Goal: Maintains or returns to baseline bowel function  Description  INTERVENTIONS:  - Assess bowel function  - Encourage oral fluids to ensure adequate hydration  - Administer IV fluids if ordered to ensure adequate hydration  - Administer ordered medications as needed  - Encourage mobilization and activity  - Consider nutritional services referral to assist patient with adequate nutrition and appropriate food choices  Outcome: Not Progressing  Goal: Maintains adequate nutritional intake  Description  INTERVENTIONS:  - Monitor percentage of each meal consumed  - Identify factors contributing to decreased intake, treat as appropriate  - Assist with meals as needed  - Monitor I&O, weight, and lab values if indicated  - Obtain nutrition services referral as needed  Outcome: Not Progressing     Problem: METABOLIC, FLUID AND ELECTROLYTES - ADULT  Goal: Glucose maintained within target range  Description  INTERVENTIONS:  - Monitor Blood Glucose as ordered  - Assess for signs and symptoms of hyperglycemia and hypoglycemia  - Administer ordered medications to maintain glucose within target range  - Assess nutritional intake and initiate nutrition service referral as needed  Outcome: Not Progressing     Problem: DISCHARGE PLANNING  Goal: Discharge to home or other facility with appropriate resources  Description    CASE MANAGEMENT INTERVENTIONS:  - Conduct assessment to determine patient/family and health care team treatment goals, and need for post-acute services based on payer coverage, community resources, patient preferences and barriers to discharge    - Address psychosocial, clinical, and financial barriers to discharge as identified in assessment in conjunction with the patient/family and health care team   - Assist the patient in reintegration back into the community by removing barriers which may hinder a successful discharge once deemed stable  - Arrange appropriate level of post-acute services according to patient's needs and preference and payer coverage in collaboration with the physician and health care team   - Communicate with and update the patient/family, physician, and health care team regarding progress on the discharge plan  - Arrange appropriate transportation to post-acute venues  Outcome: Not Progressing    Patient is isolative, irritable, poorly motivated, and refused all AM meds  Patient refused both breakfast and lunch  He refused vitals and blood draw  Patient remained in bed all shift sleeping  Patient did get out of bed at his leisure to be compliant with noon meds  Patient left treatment team irritable and defensive  He claims that he does not do anything because he is "locked up" in here  Patient is urinating all over floor and toilet seat in bathroom  He is refusing to clean up after himself  Patient did not attend groups    Will continue to monitor progress in recovery program

## 2019-12-31 NOTE — PROGRESS NOTES
12/31/19 0900   Team Meeting   Meeting Type Tx Team Meeting   Initial Conference Date 12/31/19   Next Conference Date 01/07/20   Team Members Present   Team Members Present Physician;Nurse;; Other (Discipline and Name)   Physician Team Member Dr Bryant Contreras Team Member Lita Levy, RN   Care Management Team Member Lily Walter   Other (Discipline and Name) RACHELE Hyatt; Elke Giles, [facility]   Patient/Family Present   Patient Present Yes   Patient's Family Present No     Patient attended treatment team meeting this morning without his self assessment completed  Patient attended 13% of groups offered last week  Patient came into the meeting this morning however, asked if he could go out on a pass  Team attempted to explain to him that he is needing to attend more groups and be on the unit two weeks before the pass process can begin  Patient became upset with this information and walked out of the team meeting  Team and patient completed risk assessment and the patient did not verbalize any desire to elope from the program  Patient verbalized understanding of consequences of eloping from treatment while on a commitment  Patient verbalized no further questions or concerns at the conclusion of the meeting

## 2019-12-31 NOTE — PROGRESS NOTES
12/31/19 0800   Team Meeting   Meeting Type Daily Rounds   Team Members Present   Team Members Present Physician;Nurse;; Other (Discipline and Name)   Physician Team Member Dr Lam Rosenthal Team Member Dre Wooten RN   Care Management Team Member Lily Lopez   Other (Discipline and Name) Doreen Barrera LCSW     Patient presents with poor motivation needing multiple prompts for medications yesterday  Patient presents disheveled  Attended 3-11 shift groups  Refused labs  Slept
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
## 2019-12-31 NOTE — PLAN OF CARE
Problem: Individualized Interventions  Goal: Attend and participate in unit activities, including therapeutic, recreational, and educational groups  Description  PSYCHOTHERAPY INTERVENTIONS:    -Form therapeutic alliance to promote trust and safety within a therapeutic environment    -Engage in individual psychotherapy using evidence-based practices that are specific to individual needs   -Process barriers to community living with an emphasis on solution-based interventions   -Promote self-awareness and identity development   -Identify and process patterns of behavior that have led to decompensation and/or hospitalizations   -Attend psychoeducation groups with licensed psychotherapist to learn about Illness Management Recovery (IMR) concepts and enhance coping skills    -Practice effective communication with staff, peers, family, and community members  Participate in family sessions as necessary   -Encourage reality-based thought content by examining thought processes and cognitive distortions      -Work with treatment team in reintegration back into the community when appropriate  Outcome: Not Progressing  Patient has shown no improvement or commitment to his recovery process as of yet  Patient encouraged to attend groups but is in bed most of the day  Patient currently at a 13% group attendance and has been more focused on speaking to this writer about getting off the unit and group home placement  Patient taking little/no responsibility for his re-hospitalization or can he identify at this time what behaviors he needs to change to live successfully in the community again  Writer and Pt have completed Peer Assessment and will work on development of [address] when Zane geller is ready to do so  Patient continues to be encouraged by writer and staff to attend groups and stay out of his bed during the day

## 2019-12-31 NOTE — PROGRESS NOTES
Patient attended and began to engage then abruptly left group and did not return/ No credit given for group      12/31/19 1500   Activity/Group Checklist   Group   (Revovery Workshop/A Year in Review)   Attendance Attended   Attendance Duration (min) 46-60   Interactions Interacted appropriately
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
## 2019-12-31 NOTE — PROGRESS NOTES
Patient in bed  Declined group        12/31/19 1100   Activity/Group Checklist   Group   (IMR/Identifying Warning Signs of Relapse )   Attendance Did not attend   Attendance Duration (min) 46-60   Affect/Mood GAEL

## 2020-01-01 PROCEDURE — 99231 SBSQ HOSP IP/OBS SF/LOW 25: CPT | Performed by: PSYCHIATRY & NEUROLOGY

## 2020-01-01 RX ADMIN — PANTOPRAZOLE SODIUM 40 MG: 40 TABLET, DELAYED RELEASE ORAL at 05:56

## 2020-01-01 RX ADMIN — DOCUSATE SODIUM 100 MG: 100 CAPSULE, LIQUID FILLED ORAL at 17:10

## 2020-01-01 RX ADMIN — METFORMIN HYDROCHLORIDE 500 MG: 500 TABLET ORAL at 16:59

## 2020-01-01 RX ADMIN — DIVALPROEX SODIUM 1000 MG: 500 TABLET, DELAYED RELEASE ORAL at 17:11

## 2020-01-01 RX ADMIN — LEVOTHYROXINE SODIUM 75 MCG: 75 TABLET ORAL at 05:56

## 2020-01-01 RX ADMIN — CLOZAPINE 350 MG: 100 TABLET ORAL at 21:02

## 2020-01-01 RX ADMIN — OLANZAPINE 10 MG: 10 TABLET, FILM COATED ORAL at 14:27

## 2020-01-01 RX ADMIN — ATORVASTATIN CALCIUM 10 MG: 10 TABLET, FILM COATED ORAL at 16:59

## 2020-01-01 NOTE — PLAN OF CARE
Problem: Alteration in Thoughts and Perception  Goal: Agree to be compliant with medication regime, as prescribed and report medication side effects  Description  Interventions:  - Offer appropriate PRN medication and supervise ingestion; conduct AIMS, as needed   Outcome: Mark Beebe slept throughout the night  Behaviors controlled, no complaints voiced  Will continue to monitor  Med compliant with 0600 meds

## 2020-01-01 NOTE — PROGRESS NOTES
Guzman Tariq requested Trazodone @ 4827 for insomnia and accepted the intervention  Will continue to monitor for effectiveness of intervention
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
## 2020-01-02 PROCEDURE — 99231 SBSQ HOSP IP/OBS SF/LOW 25: CPT | Performed by: PSYCHIATRY & NEUROLOGY

## 2020-01-02 RX ADMIN — DOCUSATE SODIUM 100 MG: 100 CAPSULE, LIQUID FILLED ORAL at 09:05

## 2020-01-02 RX ADMIN — DIVALPROEX SODIUM 1000 MG: 500 TABLET, DELAYED RELEASE ORAL at 09:05

## 2020-01-02 RX ADMIN — CLOZAPINE 350 MG: 100 TABLET ORAL at 21:08

## 2020-01-02 RX ADMIN — METFORMIN HYDROCHLORIDE 500 MG: 500 TABLET ORAL at 09:05

## 2020-01-02 RX ADMIN — DOCUSATE SODIUM 100 MG: 100 CAPSULE, LIQUID FILLED ORAL at 17:17

## 2020-01-02 RX ADMIN — OLANZAPINE 10 MG: 10 TABLET, FILM COATED ORAL at 13:39

## 2020-01-02 RX ADMIN — LEVOTHYROXINE SODIUM 75 MCG: 75 TABLET ORAL at 06:11

## 2020-01-02 RX ADMIN — DIVALPROEX SODIUM 1000 MG: 500 TABLET, DELAYED RELEASE ORAL at 17:16

## 2020-01-02 RX ADMIN — PANTOPRAZOLE SODIUM 40 MG: 40 TABLET, DELAYED RELEASE ORAL at 06:11

## 2020-01-02 RX ADMIN — OXYBUTYNIN CHLORIDE 5 MG: 5 TABLET, EXTENDED RELEASE ORAL at 09:05

## 2020-01-02 RX ADMIN — CLOZAPINE 100 MG: 100 TABLET ORAL at 09:05

## 2020-01-02 RX ADMIN — ATORVASTATIN CALCIUM 10 MG: 10 TABLET, FILM COATED ORAL at 16:57

## 2020-01-02 RX ADMIN — METFORMIN HYDROCHLORIDE 500 MG: 500 TABLET ORAL at 16:57
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
## 2020-01-02 NOTE — PLAN OF CARE
Problem: DISCHARGE PLANNING  Goal: Discharge to home or other facility with appropriate resources  Description    CASE MANAGEMENT INTERVENTIONS:  - Conduct assessment to determine patient/family and health care team treatment goals, and need for post-acute services based on payer coverage, community resources, patient preferences and barriers to discharge  - Address psychosocial, clinical, and financial barriers to discharge as identified in assessment in conjunction with the patient/family and health care team   - Assist the patient in reintegration back into the community by removing barriers which may hinder a successful discharge once deemed stable  - Arrange appropriate level of post-acute services according to patient's needs and preference and payer coverage in collaboration with the physician and health care team   - Communicate with and update the patient/family, physician, and health care team regarding progress on the discharge plan  - Arrange appropriate transportation to post-acute venues  Outcome: Progressing     YOSELYN responded to an email from patient's sister, Katelyn Gomez, apologizing for not being able to call into patient's treatment team meeting on Tuesday  CM provided her with update on patient, noting that he is not happy about the average length of stay for the [facility] and even more upset that he is not able to go out on passes with his "big brother" Baldemar Iglesias yet  CM provided her with the pass process as well as the patient responsibilities so she can help encourage patient to completed the expectations of the unit and work toward getting passes with staff and eventually his friend, Kayley De Anda  CM will continue to follow patient's progress and assist with discharge planning needs
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
## 2020-01-02 NOTE — PROGRESS NOTES
Patient declined   In bed       01/02/20 1100   Activity/Group Checklist   Group   (IMR/Relapse,Recovery and Resiliency )   Attendance Did not attend   Attendance Duration (min) 46-60   Affect/Mood GAEL

## 2020-01-02 NOTE — PLAN OF CARE
Problem: Alteration in Thoughts and Perception  Goal: Treatment Goal: Gain control of psychotic behaviors/thinking, reduce/eliminate presenting symptoms and demonstrate improved reality functioning upon discharge  Outcome: Progressing  Goal: Verbalize thoughts and feelings  Description  Interventions:  - Promote a nonjudgmental and trusting relationship with the patient through active listening and therapeutic communication  - Assess patient's level of functioning, behavior and potential for risk  - Engage patient in 1 on 1 interactions  - Encourage patient to express fears, feelings, frustrations, and discuss symptoms    - Paloma patient to reality, help patient recognize reality-based thinking   - Administer medications as ordered and assess for potential side effects  - Provide the patient education related to the signs and symptoms of the illness and desired effects of prescribed medications  Outcome: Progressing  Goal: Refrain from acting on delusional thinking/internal stimuli  Description  Interventions:  - Monitor patient closely, per order   - Utilize least restrictive measures   - Set reasonable limits, give positive feedback for acceptable   - Administer medications as ordered and monitor of potential side effects  Outcome: Progressing  Goal: Agree to be compliant with medication regime, as prescribed and report medication side effects  Description  Interventions:  - Offer appropriate PRN medication and supervise ingestion; conduct AIMS, as needed   Outcome: Progressing  Goal: Attend and participate in unit activities, including therapeutic, recreational, and educational groups  Description  Interventions:  -Encourage Visitation and family involvement in care  Outcome: Progressing  Goal: Recognize dysfunctional thoughts, communicate reality-based thoughts at the time of discharge  Description  Interventions:  - Provide medication and psycho-education to assist patient in compliance and developing insight into his/her illness   Outcome: Progressing  Goal: Complete daily ADLs, including personal hygiene independently, as able  Description  Interventions:  - Observe, teach, and assist patient with ADLS  - Monitor and promote a balance of rest/activity, with adequate nutrition and elimination   Outcome: Progressing     Problem: Ineffective Coping  Goal: Identifies ineffective coping skills  Outcome: Progressing  Goal: Identifies healthy coping skills  Outcome: Progressing  Goal: Demonstrates healthy coping skills  Outcome: Progressing  Goal: Patient/Family participate in treatment and DC plans  Description  Interventions:  - Provide therapeutic environment  Outcome: Progressing  Goal: Patient/Family verbalizes awareness of resources  Outcome: Progressing  Goal: Understands least restrictive measures  Description  Interventions:  - Utilize least restrictive behavior  Outcome: Progressing     Problem: RESPIRATORY - ADULT  Goal: Achieves optimal ventilation and oxygenation  Description  INTERVENTIONS:  - Assess for changes in respiratory status  - Assess for changes in mentation and behavior  - Position to facilitate oxygenation and minimize respiratory effort  - Oxygen administered by appropriate delivery if ordered  - Initiate smoking cessation education as indicated  - Encourage broncho-pulmonary hygiene including cough, deep breathe, Incentive Spirometry  - Assess the need for suctioning and aspirate as needed  - Assess and instruct to report SOB or any respiratory difficulty  - Respiratory Therapy support as indicated  Outcome: Progressing     Problem: GASTROINTESTINAL - ADULT  Goal: Minimal or absence of nausea and/or vomiting  Description  INTERVENTIONS:  - Administer IV fluids if ordered to ensure adequate hydration  - Maintain NPO status until nausea and vomiting are resolved  - Nasogastric tube if ordered  - Administer ordered antiemetic medications as needed  - Provide nonpharmacologic comfort measures as appropriate  - Advance diet as tolerated, if ordered  - Consider nutrition services referral to assist patient with adequate nutrition and appropriate food choices  Outcome: Progressing  Goal: Maintains or returns to baseline bowel function  Description  INTERVENTIONS:  - Assess bowel function  - Encourage oral fluids to ensure adequate hydration  - Administer IV fluids if ordered to ensure adequate hydration  - Administer ordered medications as needed  - Encourage mobilization and activity  - Consider nutritional services referral to assist patient with adequate nutrition and appropriate food choices  Outcome: Progressing  Goal: Maintains adequate nutritional intake  Description  INTERVENTIONS:  - Monitor percentage of each meal consumed  - Identify factors contributing to decreased intake, treat as appropriate  - Assist with meals as needed  - Monitor I&O, weight, and lab values if indicated  - Obtain nutrition services referral as needed  Outcome: Progressing     Problem: METABOLIC, FLUID AND ELECTROLYTES - ADULT  Goal: Glucose maintained within target range  Description  INTERVENTIONS:  - Monitor Blood Glucose as ordered  - Assess for signs and symptoms of hyperglycemia and hypoglycemia  - Administer ordered medications to maintain glucose within target range  - Assess nutritional intake and initiate nutrition service referral as needed  Outcome: Progressing     Problem: DISCHARGE PLANNING  Goal: Discharge to home or other facility with appropriate resources  Description    CASE MANAGEMENT INTERVENTIONS:  - Conduct assessment to determine patient/family and health care team treatment goals, and need for post-acute services based on payer coverage, community resources, patient preferences and barriers to discharge    - Address psychosocial, clinical, and financial barriers to discharge as identified in assessment in conjunction with the patient/family and health care team   - Assist the patient in reintegration back into the community by removing barriers which may hinder a successful discharge once deemed stable  - Arrange appropriate level of post-acute services according to patient's needs and preference and payer coverage in collaboration with the physician and health care team   - Communicate with and update the patient/family, physician, and health care team regarding progress on the discharge plan  - Arrange appropriate transportation to post-acute venues  Outcome: Progressing  Angy Amor very isolative ,didn't come out for Breakfast   Not followed unit rules   Non complaint with hygiene  He get upset when asked to attend  group  Denies suicidal ideation /delusion   Patient made aware to come out for meals when announce   Med compliant

## 2020-01-02 NOTE — PROGRESS NOTES
Not scheduled to attend      12/31/19 1430   Activity/Group Checklist   Group   (Community Programming Wrap Up  )   Attendance Did not attend   Attendance Duration (min) 16-30   Affect/Mood GAEL

## 2020-01-03 LAB
BASOPHILS # BLD AUTO: 0.2 THOUSAND/UL (ref 0–0.1)
BASOPHILS NFR MAR MANUAL: 2 % (ref 0–1)
ERYTHROCYTE [DISTWIDTH] IN BLOOD BY AUTOMATED COUNT: 13.5 %
HCT VFR BLD AUTO: 45.1 % (ref 41–53)
HGB BLD-MCNC: 15.3 G/DL (ref 13.5–17.5)
LYMPHOCYTES # BLD AUTO: 5.3 THOUSAND/UL (ref 0.5–4)
LYMPHOCYTES # BLD AUTO: 53 % (ref 25–45)
MCH RBC QN AUTO: 29.4 PG (ref 26–34)
MCHC RBC AUTO-ENTMCNC: 33.9 G/DL (ref 31–36)
MCV RBC AUTO: 87 FL (ref 80–100)
MONOCYTES # BLD AUTO: 0.3 THOUSAND/UL (ref 0.2–0.9)
MONOCYTES NFR BLD AUTO: 3 % (ref 1–10)
NEUTS BAND NFR BLD MANUAL: 1 % (ref 0–8)
NEUTS SEG # BLD: 4.2 THOUSAND/UL (ref 1.8–7.8)
NEUTS SEG NFR BLD AUTO: 41 %
PLATELET # BLD AUTO: 177 THOUSANDS/UL (ref 150–450)
PLATELET BLD QL SMEAR: ADEQUATE
PMV BLD AUTO: 8.1 FL (ref 8.9–12.7)
RBC # BLD AUTO: 5.2 MILLION/UL (ref 4.5–5.9)
RBC MORPH BLD: NORMAL
TOTAL CELLS COUNTED SPEC: 100
WBC # BLD AUTO: 10 THOUSAND/UL (ref 4.5–11)

## 2020-01-03 PROCEDURE — 85027 COMPLETE CBC AUTOMATED: CPT | Performed by: PSYCHIATRY & NEUROLOGY

## 2020-01-03 PROCEDURE — 99231 SBSQ HOSP IP/OBS SF/LOW 25: CPT | Performed by: PSYCHIATRY & NEUROLOGY

## 2020-01-03 PROCEDURE — 85007 BL SMEAR W/DIFF WBC COUNT: CPT | Performed by: PSYCHIATRY & NEUROLOGY

## 2020-01-03 PROCEDURE — 80159 DRUG ASSAY CLOZAPINE: CPT | Performed by: PSYCHIATRY & NEUROLOGY

## 2020-01-03 RX ADMIN — METFORMIN HYDROCHLORIDE 500 MG: 500 TABLET ORAL at 16:46

## 2020-01-03 RX ADMIN — PANTOPRAZOLE SODIUM 40 MG: 40 TABLET, DELAYED RELEASE ORAL at 06:04

## 2020-01-03 RX ADMIN — METFORMIN HYDROCHLORIDE 500 MG: 500 TABLET ORAL at 09:29

## 2020-01-03 RX ADMIN — CLOZAPINE 300 MG: 100 TABLET ORAL at 21:06

## 2020-01-03 RX ADMIN — DIVALPROEX SODIUM 1000 MG: 500 TABLET, DELAYED RELEASE ORAL at 09:29

## 2020-01-03 RX ADMIN — DOCUSATE SODIUM 100 MG: 100 CAPSULE, LIQUID FILLED ORAL at 17:04

## 2020-01-03 RX ADMIN — ATORVASTATIN CALCIUM 10 MG: 10 TABLET, FILM COATED ORAL at 16:46

## 2020-01-03 RX ADMIN — OXYBUTYNIN CHLORIDE 5 MG: 5 TABLET, EXTENDED RELEASE ORAL at 09:30

## 2020-01-03 RX ADMIN — DOCUSATE SODIUM 100 MG: 100 CAPSULE, LIQUID FILLED ORAL at 09:29

## 2020-01-03 RX ADMIN — CLOZAPINE 100 MG: 100 TABLET ORAL at 09:29

## 2020-01-03 RX ADMIN — OLANZAPINE 10 MG: 10 TABLET, FILM COATED ORAL at 14:33

## 2020-01-03 RX ADMIN — TRAZODONE HYDROCHLORIDE 50 MG: 50 TABLET ORAL at 22:02

## 2020-01-03 RX ADMIN — DIVALPROEX SODIUM 1000 MG: 500 TABLET, DELAYED RELEASE ORAL at 17:04

## 2020-01-03 RX ADMIN — LEVOTHYROXINE SODIUM 75 MCG: 75 TABLET ORAL at 06:04

## 2020-01-03 NOTE — PROGRESS NOTES
01/03/20 0800   Team Meeting   Meeting Type Daily Rounds   Team Members Present   Team Members Present Physician;Nurse;; Other (Discipline and Name)   Physician Team Member Dr Griffin Brock Team Member Siomara Salmeron RN   Care Management Team Member Lily Albert   Other (Discipline and Name) Ronn House        01/03/20 0800   Team Meeting   Meeting Type Daily Rounds   Team Members Present   Team Members Present Physician;Nurse;; Other (Discipline and Name)   Physician Team Member Dr Griffin Brock Team Member Siomara Salmeron RN   Care Management Team Member Lily Albert   Other (Discipline and Name) Ada Gonzalez LCSW     Patient disheveled  Preoccupied with wanting to leave the unit  Slept
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
## 2020-01-03 NOTE — PLAN OF CARE
Problem: Alteration in Thoughts and Perception  Goal: Verbalize thoughts and feelings  Description  Interventions:  - Promote a nonjudgmental and trusting relationship with the patient through active listening and therapeutic communication  - Assess patient's level of functioning, behavior and potential for risk  - Engage patient in 1 on 1 interactions  - Encourage patient to express fears, feelings, frustrations, and discuss symptoms    - [place] patient to reality, help patient recognize reality-based thinking   - Administer medications as ordered and assess for potential side effects  - Provide the patient education related to the signs and symptoms of the illness and desired effects of prescribed medications  Outcome: Progressing  Goal: Agree to be compliant with medication regime, as prescribed and report medication side effects  Description  Interventions:  - Offer appropriate PRN medication and supervise ingestion; conduct AIMS, as needed   Outcome: Progressing  Goal: Attend and participate in unit activities, including therapeutic, recreational, and educational groups  Description  Interventions:  -Encourage Visitation and family involvement in care  Outcome: Not Progressing  Goal: Complete daily ADLs, including personal hygiene independently, as able  Description  Interventions:  - Observe, teach, and assist patient with ADLS  - Monitor and promote a balance of rest/activity, with adequate nutrition and elimination   Outcome: Not Progressing     Problem: Ineffective Coping  Goal: Demonstrates healthy coping skills  Outcome: Not Progressing  Goal: Understands least restrictive measures  Description  Interventions:  - Utilize least restrictive behavior  Outcome: Aniket Trujillo has been awake, alert, and visible intermittently out in the milieu  Pt continues to be preoccupied with getting clothes from closet and going home    At desk to make frequent requests but less needy of staff and cooperative with redirection when needed  Ate 100% supper  Compliant with scheduled meds without prompting  Pt did not attend evening group but came out for snack after  No behavioral issues  Minimal interaction noted with peers  Disheveled in appearance and did not complete his daily ADL's today  Continue to monitor/assess for any changes
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
## 2020-01-04 PROCEDURE — 99232 SBSQ HOSP IP/OBS MODERATE 35: CPT | Performed by: NURSE PRACTITIONER

## 2020-01-04 RX ADMIN — DIVALPROEX SODIUM 1000 MG: 500 TABLET, DELAYED RELEASE ORAL at 08:28

## 2020-01-04 RX ADMIN — DOCUSATE SODIUM 100 MG: 100 CAPSULE, LIQUID FILLED ORAL at 08:28

## 2020-01-04 RX ADMIN — OXYBUTYNIN CHLORIDE 5 MG: 5 TABLET, EXTENDED RELEASE ORAL at 08:28

## 2020-01-04 RX ADMIN — PANTOPRAZOLE SODIUM 40 MG: 40 TABLET, DELAYED RELEASE ORAL at 06:48

## 2020-01-04 RX ADMIN — DIVALPROEX SODIUM 1000 MG: 500 TABLET, DELAYED RELEASE ORAL at 17:01

## 2020-01-04 RX ADMIN — CLOZAPINE 300 MG: 100 TABLET ORAL at 21:08

## 2020-01-04 RX ADMIN — ATORVASTATIN CALCIUM 10 MG: 10 TABLET, FILM COATED ORAL at 16:41

## 2020-01-04 RX ADMIN — METFORMIN HYDROCHLORIDE 500 MG: 500 TABLET ORAL at 16:41

## 2020-01-04 RX ADMIN — OLANZAPINE 10 MG: 10 TABLET, FILM COATED ORAL at 13:17

## 2020-01-04 RX ADMIN — METFORMIN HYDROCHLORIDE 500 MG: 500 TABLET ORAL at 08:28

## 2020-01-04 RX ADMIN — DOCUSATE SODIUM 100 MG: 100 CAPSULE, LIQUID FILLED ORAL at 17:01

## 2020-01-04 RX ADMIN — LEVOTHYROXINE SODIUM 75 MCG: 75 TABLET ORAL at 06:48

## 2020-01-04 RX ADMIN — CLOZAPINE 100 MG: 100 TABLET ORAL at 08:28

## 2020-01-04 NOTE — PROGRESS NOTES
Patient in bed, asleep at shift onset  Continued with sleep throughout the night  No behaviors observed

## 2020-01-04 NOTE — PLAN OF CARE
Problem: Alteration in Thoughts and Perception  Goal: Verbalize thoughts and feelings  Description  Interventions:  - Promote a nonjudgmental and trusting relationship with the patient through active listening and therapeutic communication  - Assess patient's level of functioning, behavior and potential for risk  - Engage patient in 1 on 1 interactions  - Encourage patient to express fears, feelings, frustrations, and discuss symptoms    - [place] patient to reality, help patient recognize reality-based thinking   - Administer medications as ordered and assess for potential side effects  - Provide the patient education related to the signs and symptoms of the illness and desired effects of prescribed medications  Outcome: Progressing  Goal: Agree to be compliant with medication regime, as prescribed and report medication side effects  Description  Interventions:  - Offer appropriate PRN medication and supervise ingestion; conduct AIMS, as needed   Outcome: Progressing  Goal: Attend and participate in unit activities, including therapeutic, recreational, and educational groups  Description  Interventions:  -Encourage Visitation and family involvement in care  Outcome: Progressing     Problem: Ineffective Coping  Goal: Demonstrates healthy coping skills  Outcome: Progressing  Goal: Understands least restrictive measures  Description  Interventions:  - Utilize least restrictive behavior  Outcome: Progressing     Problem: GASTROINTESTINAL - ADULT  Goal: Maintains adequate nutritional intake  Description  INTERVENTIONS:  - Monitor percentage of each meal consumed  - Identify factors contributing to decreased intake, treat as appropriate  - Assist with meals as needed  - Monitor I&O, weight, and lab values if indicated  - Obtain nutrition services referral as needed  Outcome: Slime Pelletier has been awake, alert, and visible intermittently out in the milieu  Pt is disheveled in appearance  Ate 100% supper    At desk to make frequent requests to make phone calls, asking for more clothes  Some socialization noted with select peers  Attended evening group and had snack after  Compliant with scheduled meds with little prompting  Pt denies any depression, anxiety, si/hi, intent, plan, a/v hallucinations, and has not verbalized any delusions  Rests in room at times  Continue to monitor/assess for any changes

## 2020-01-04 NOTE — PLAN OF CARE
Problem: Alteration in Thoughts and Perception  Goal: Verbalize thoughts and feelings  Description  Interventions:  - Promote a nonjudgmental and trusting relationship with the patient through active listening and therapeutic communication  - Assess patient's level of functioning, behavior and potential for risk  - Engage patient in 1 on 1 interactions  - Encourage patient to express fears, feelings, frustrations, and discuss symptoms    - [place] patient to reality, help patient recognize reality-based thinking   - Administer medications as ordered and assess for potential side effects  - Provide the patient education related to the signs and symptoms of the illness and desired effects of prescribed medications  Outcome: Progressing  Goal: Agree to be compliant with medication regime, as prescribed and report medication side effects  Description  Interventions:  - Offer appropriate PRN medication and supervise ingestion; conduct AIMS, as needed   Outcome: Progressing  Goal: Complete daily ADLs, including personal hygiene independently, as able  Description  Interventions:  - Observe, teach, and assist patient with ADLS  - Monitor and promote a balance of rest/activity, with adequate nutrition and elimination   Outcome: Progressing     Problem: RESPIRATORY - ADULT  Goal: Achieves optimal ventilation and oxygenation  Description  INTERVENTIONS:  - Assess for changes in respiratory status  - Assess for changes in mentation and behavior  - Position to facilitate oxygenation and minimize respiratory effort  - Oxygen administered by appropriate delivery if ordered  - Initiate smoking cessation education as indicated  - Encourage broncho-pulmonary hygiene including cough, deep breathe, Incentive Spirometry  - Assess the need for suctioning and aspirate as needed  - Assess and instruct to report SOB or any respiratory difficulty  - Respiratory Therapy support as indicated  Outcome: Progressing     Problem: GASTROINTESTINAL - ADULT  Goal: Minimal or absence of nausea and/or vomiting  Description  INTERVENTIONS:  - Administer IV fluids if ordered to ensure adequate hydration  - Maintain NPO status until nausea and vomiting are resolved  - Nasogastric tube if ordered  - Administer ordered antiemetic medications as needed  - Provide nonpharmacologic comfort measures as appropriate  - Advance diet as tolerated, if ordered  - Consider nutrition services referral to assist patient with adequate nutrition and appropriate food choices  Outcome: Progressing  Goal: Maintains adequate nutritional intake  Description  INTERVENTIONS:  - Monitor percentage of each meal consumed  - Identify factors contributing to decreased intake, treat as appropriate  - Assist with meals as needed  - Monitor I&O, weight, and lab values if indicated  - Obtain nutrition services referral as needed  Outcome: Juno Agarwal has been visible and ambulating in the milieu  He comes to the nurses station often with requests  Wants to check the donation area for sneakers  He likes to call his sisters  Shaved with supervision of MHT  Still with disheveled appearance  Wears layers of clothing  Medication with prompting  Ate 100% of his meal  Was in and out of evening group  Took HS medication  ate snack  He asked about getting a hair cut  Continue to monitor  Precautions maintained

## 2020-01-04 NOTE — PLAN OF CARE
Problem: Alteration in Thoughts and Perception  Goal: Agree to be compliant with medication regime, as prescribed and report medication side effects  Description  Interventions:  - Offer appropriate PRN medication and supervise ingestion; conduct AIMS, as needed   Outcome: Progressing  Goal: Attend and participate in unit activities, including therapeutic, recreational, and educational groups  Description  Interventions:  -Encourage Visitation and family involvement in care  Outcome: Not Progressing     Ankit Lerma has been minimally visible on the unit in between meals  Keeps to himself for the majority of the shift  Med and meal compliant, minimal interactions with others  Paces the unit at times  Continue to monitor

## 2020-01-04 NOTE — PROGRESS NOTES
Progress Note - Behavioral Health   Simin [facility] 48 y o  male MRN: [id number]  [facility] Encounter: [id number]    The patient was seen for continuing care and reviewed with treatment team  Staff reports that the patient remains calm, cooperative, but overtly suspicious and guarded  Self isolating and minimally social with peers  Attends selective groups  Denies any thoughts to hurt himself or others  Denies any psychotic symptoms, although he continues to appear preoccupied  Reports ongoing medication compliance and denies any adverse side effects  Appetite and sleep remain adequate  Continues to lack insight into mental health  Mental Status Evaluation:  Appearance:  Marginal/poor hygiene   Behavior:  cooperative, guarded and Superficial   Fund of knowledge  Diminished   Speech:   Language: Sparse  No overt abnormality   Mood:  irritable   Affect:   Associations: blunted  Intact   Thought Process:  Circumstantial   Thought Content:  Paranoid and mistrustful and Obsessive ruminations   Perceptual Disturbances: Appears to be responding to internal stimuli   Risk Potential: No suicidal or homicidal ideation   Orientation  Oriented to place and person   Memory Not tested   Attention/Concentration attention span appeared shorter than expected for age   Insight:  No insight   Judgment: Poor judgment   Gait/Station: normal gait/station and normal balance   Motor Activity: No abnormal movement noted     Progress Toward Goals: improving slightly    Assessment/Plan    Principal Problem:    Schizoaffective disorder, bipolar type ([facility])  Active Problems:    Type 2 diabetes mellitus without complication, without long-term current use of insulin ([facility])    Benign essential hypertension    Gastroesophageal reflux disease without esophagitis    Acquired hypothyroidism    Tobacco abuse      Recommended Treatment: Continue with pharmacotherapy, group therapy, milieu therapy and occupational therapy    The patient O/N: POC wnl, post op H/H: 13/38.1, passed TOV, now c/o chest pressure  10/12 : underwent lap sleeve gastrectomy.     STATUS POST: Lap sleeve gastrectomy    POD# 1 will be maintained on the following medications:    Current Facility-Administered Medications:  acetaminophen 325 mg Oral Q6H PRN Sudie Gelineau, PA-C   acetaminophen 650 mg Oral Q6H PRN Sudie Gelineau, PA-C   acetaminophen 975 mg Oral Q8H PRN Sudie Gelineau, PA-C   aluminum-magnesium hydroxide-simethicone 30 mL Oral Q4H PRN Sudie Gelineau, PA-C   atorvastatin 10 mg Oral Daily With Landrum Soup, PA-C   benztropine 1 mg Intramuscular Q6H PRN Sudie Gelineau, PA-C   benztropine 1 mg Oral Q6H PRN Sudie Gelineau, PA-C   clozapine 100 mg Oral Daily Sudie Gelineau, PA-C   cloZAPine 300 mg Oral HS Homer Jimenez MD   divalproex sodium 1,000 mg Oral BID Sudie Gelineau, PA-C   docusate sodium 100 mg Oral BID Sudie Gelineau, PA-C   haloperidol 5 mg Oral Q6H PRN Sudie Gelineau, PA-C   haloperidol lactate 5 mg Intramuscular Q6H PRN Sudie Gelineau, PA-C   levothyroxine 75 mcg Oral Early Morning Sudie Gelineau, PA-C   LORazepam 1 mg Intramuscular Q6H PRN Sudie Gelineau, PA-C   LORazepam 1 mg Oral Q6H PRN Sudie Gelineau, PA-C   magnesium hydroxide 30 mL Oral Daily PRN Sudie Gelineau, PA-C   metFORMIN 500 mg Oral BID With Meals Sudie Gelineau, PA-C   nicotine polacrilex 2 mg Oral Q2H PRN Sudie Gelineau, PA-C   OLANZapine 10 mg Oral After Oliver Ridges, PA-C   oxybutynin 5 mg Oral Daily Sudie Gelineau, PA-C   pantoprazole 40 mg Oral Early Morning Sudie Gelineau, PA-C   traZODone 50 mg Oral HS PRN Sudie Gelineau, PA-C       Risks, benefits and possible side effects of Medications:   Risks, benefits, and possible side effects of medications explained to patient and patient verbalizes understanding  O/N: POC wnl, post op H/H: 13/38.1, passed TOV, now c/o chest pressure  10/12 : underwent lap sleeve gastrectomy.     STATUS POST: Lap sleeve gastrectomy    POD# 1  SUBJECTIVE: Patient seen and examined bedside by chief resident. Mild pain over the epigastric area, tolerating well. no nausea/vomiting, ambulating    enoxaparin Injectable 40 milliGRAM(s) SubCutaneous two times a day      Vital Signs Last 24 Hrs  T(C): 36.6 (13 Oct 2017 05:22), Max: 36.8 (12 Oct 2017 13:10)  T(F): 97.9 (13 Oct 2017 05:22), Max: 98.3 (13 Oct 2017 01:54)  HR: 95 (13 Oct 2017 05:22) (64 - 95)  BP: 120/77 (13 Oct 2017 05:22) (111/59 - 137/71)  BP(mean): 90 (12 Oct 2017 20:40) (86 - 92)  RR: 16 (13 Oct 2017 05:22) (16 - 20)  SpO2: 95% (13 Oct 2017 05:22) (93% - 100%)  I&O's Detail    12 Oct 2017 07:01  -  13 Oct 2017 07:00  --------------------------------------------------------  IN:    lactated ringers.: 1350 mL  Total IN: 1350 mL    OUT:    Voided: 260 mL  Total OUT: 260 mL    Total NET: 1090 mL      13 Oct 2017 07:01  -  13 Oct 2017 08:15  --------------------------------------------------------  IN:  Total IN: 0 mL    OUT:    Voided: 300 mL  Total OUT: 300 mL    Total NET: -300 mL          General: NAD, resting comfortably in bed  C/V: NSR  Pulm: Nonlabored breathing, no respiratory distress  Abd: soft, tender over the epigastric area, incsions C/D/I.  Extrem: WWP, no edema, SCDs in place        LABS:                        12.8   6.2   )-----------( 250      ( 13 Oct 2017 06:42 )             35.9     10-13    135  |  98  |  8   ----------------------------<  132<H>  4.1   |  22  |  0.61    Ca    8.9      13 Oct 2017 06:42  Phos  3.7     10-13  Mg     2.1     10-13            RADIOLOGY & ADDITIONAL STUDIES:

## 2020-01-05 PROCEDURE — 99232 SBSQ HOSP IP/OBS MODERATE 35: CPT | Performed by: NURSE PRACTITIONER

## 2020-01-05 RX ADMIN — OLANZAPINE 10 MG: 10 TABLET, FILM COATED ORAL at 13:07

## 2020-01-05 RX ADMIN — METFORMIN HYDROCHLORIDE 500 MG: 500 TABLET ORAL at 16:56

## 2020-01-05 RX ADMIN — PANTOPRAZOLE SODIUM 40 MG: 40 TABLET, DELAYED RELEASE ORAL at 06:05

## 2020-01-05 RX ADMIN — ATORVASTATIN CALCIUM 10 MG: 10 TABLET, FILM COATED ORAL at 16:57

## 2020-01-05 RX ADMIN — DIVALPROEX SODIUM 1000 MG: 500 TABLET, DELAYED RELEASE ORAL at 13:06

## 2020-01-05 RX ADMIN — DOCUSATE SODIUM 100 MG: 100 CAPSULE, LIQUID FILLED ORAL at 13:06

## 2020-01-05 RX ADMIN — LEVOTHYROXINE SODIUM 75 MCG: 75 TABLET ORAL at 06:05

## 2020-01-05 RX ADMIN — CLOZAPINE 300 MG: 100 TABLET ORAL at 21:03

## 2020-01-05 RX ADMIN — DOCUSATE SODIUM 100 MG: 100 CAPSULE, LIQUID FILLED ORAL at 17:04

## 2020-01-05 RX ADMIN — CLOZAPINE 100 MG: 100 TABLET ORAL at 13:06

## 2020-01-05 RX ADMIN — DIVALPROEX SODIUM 1000 MG: 500 TABLET, DELAYED RELEASE ORAL at 17:03

## 2020-01-05 RX ADMIN — OXYBUTYNIN CHLORIDE 5 MG: 5 TABLET, EXTENDED RELEASE ORAL at 13:05

## 2020-01-05 NOTE — NURSING NOTE
Patient isolative in room all day sleeping, disheveled  Patient refused breakfast, did not come for morning meds after prompting 3 times  Patient out of bed for lunch, nursing had to prompt to take morning meds, patient agreed to go to med window, cooperative and took meds at 1300  Patient offers no complaints, behaviors controlled  Patient had no group attendance, lacks motivation  Currently in room sleeping, will continue to monitor

## 2020-01-05 NOTE — PROGRESS NOTES
Progress Note - Behavioral Health   Nita Galloway 48 y o  male MRN: [id number]  Unit/Bed#: [facility] 166-04 Encounter: [id number]    The patient was seen for continuing care  He is currently disheveled and lying in his bed  He is guarded, bizarre with scant, mumbled difficult to understand speech  Reports mood and sleep are good  Has decreased appetite  Denies auditory hallucinations but does appear internally preoccupied  Denies SI HI  Current Mental Status Evaluation:  Appearance:  Marginal/poor hygiene and disheveled   Behavior:  Dismissive   Mood:  euthymic   Affect: constricted   Speech: Sparse and Garbled   Thought Process:  Unable to assess due to scant verbalization   Thought Content:  Paranoid and mistrustful   Perceptual Disturbances: Appears to be responding to internal stimuli   Risk Potential: No suicidal or homicidal ideation   Orientation:   Oriented to person and place     Progress Toward Goals:  No change  Principal Problem:    Schizoaffective disorder, bipolar type ([facility])  Active Problems:    Type 2 diabetes mellitus without complication, without long-term current use of insulin ([facility])    Benign essential hypertension    Gastroesophageal reflux disease without esophagitis    Acquired hypothyroidism    Tobacco abuse      Recommended Treatment: Continue with pharmacotherapy, group therapy, milieu therapy and occupational therapy    The patient will be maintained on the following medications:    Current Facility-Administered Medications:  acetaminophen 325 mg Oral Q6H PRN Hamida Haile PA-C   acetaminophen 650 mg Oral Q6H PRN Hamida Haile PA-C   acetaminophen 975 mg Oral Q8H PRN Hamida Haile PA-C   aluminum-magnesium hydroxide-simethicone 30 mL Oral Q4H PRN Hamida Haile PA-C   atorvastatin 10 mg Oral Daily With Landrum SoupXUAN   benztropine 1 mg Intramuscular Q6H PRN Hamida Haile PA-C   benztropine 1 mg Oral Q6H PRN Hamida Haile PA-C clozapine 100 mg Oral Daily Bassem Mouse, PA-C   cloZAPine 300 mg Oral HS Louie Theoodre MD   divalproex sodium 1,000 mg Oral BID Bassem Mouse, PA-C   docusate sodium 100 mg Oral BID Bassem Mouse, PA-C   haloperidol 5 mg Oral Q6H PRN Bassem Mouse, PA-C   haloperidol lactate 5 mg Intramuscular Q6H PRN Bassem Mouse, PA-C   levothyroxine 75 mcg Oral Early Morning Bassem Mouse, PA-C   LORazepam 1 mg Intramuscular Q6H PRN Bassem Mouse, PA-C   LORazepam 1 mg Oral Q6H PRN Bassem Mouse, PA-C   magnesium hydroxide 30 mL Oral Daily PRN Bassem Mouse, PA-C   metFORMIN 500 mg Oral BID With Meals Bassem Mouse, PA-C   nicotine polacrilex 2 mg Oral Q2H PRN Bassem Mouse, PA-C   OLANZapine 10 mg Oral After memory lane [employer], [employer], PA-C   oxybutynin 5 mg Oral Daily Bassem Mouse, PA-C   pantoprazole 40 mg Oral Early Morning Bassem Mouse, PA-C   traZODone 50 mg Oral HS PRN Bassem Mouse, PA-C

## 2020-01-05 NOTE — PLAN OF CARE
Problem: Alteration in Thoughts and Perception  Goal: Verbalize thoughts and feelings  Description  Interventions:  - Promote a nonjudgmental and trusting relationship with the patient through active listening and therapeutic communication  - Assess patient's level of functioning, behavior and potential for risk  - Engage patient in 1 on 1 interactions  - Encourage patient to express fears, feelings, frustrations, and discuss symptoms    - [place] patient to reality, help patient recognize reality-based thinking   - Administer medications as ordered and assess for potential side effects  - Provide the patient education related to the signs and symptoms of the illness and desired effects of prescribed medications  Outcome: Progressing  Goal: Agree to be compliant with medication regime, as prescribed and report medication side effects  Description  Interventions:  - Offer appropriate PRN medication and supervise ingestion; conduct AIMS, as needed   Outcome: Progressing  Goal: Complete daily ADLs, including personal hygiene independently, as able  Description  Interventions:  - Observe, teach, and assist patient with ADLS  - Monitor and promote a balance of rest/activity, with adequate nutrition and elimination   Outcome: Progressing     Problem: RESPIRATORY - ADULT  Goal: Achieves optimal ventilation and oxygenation  Description  INTERVENTIONS:  - Assess for changes in respiratory status  - Assess for changes in mentation and behavior  - Position to facilitate oxygenation and minimize respiratory effort  - Oxygen administered by appropriate delivery if ordered  - Initiate smoking cessation education as indicated  - Encourage broncho-pulmonary hygiene including cough, deep breathe, Incentive Spirometry  - Assess the need for suctioning and aspirate as needed  - Assess and instruct to report SOB or any respiratory difficulty  - Respiratory Therapy support as indicated  Outcome: Progressing     Problem: GASTROINTESTINAL - ADULT  Goal: Maintains adequate nutritional intake  Description  INTERVENTIONS:  - Monitor percentage of each meal consumed  - Identify factors contributing to decreased intake, treat as appropriate  - Assist with meals as needed  - Monitor I&O, weight, and lab values if indicated  - Obtain nutrition services referral as needed  Outcome: Mark Beebe was visible and ambulating without distress  Comes to the nurses station often with requests  Able to make needs known  Denies anxiety and depression  Prompted for medication  Ate 100% of his meal  Makes phone calls  Still with disheveled appearance  Wears layers of clothes  If he does not have his jacket on he will carry it around with him  Social with select peers  His friend was here to visit  Appeared to have gone well  Attended evening group  Took HS medication with prompting  Ate snack  He was asking to call Other [facility] facility  Continue to monitor  Precautions maintained

## 2020-01-05 NOTE — PROGRESS NOTES
01/04/20 1300   Activity/Group Checklist   Group Other (Comment)  (OPEN STUDIO/Art Therapy, Social Interaction-Expression)   Attendance Attended   Attendance Duration (min) 46-60   Interactions Interacted appropriately   Affect/Mood Appropriate   Goals Achieved Able to listen to others; Able to engage in interactions

## 2020-01-06 PROCEDURE — 99231 SBSQ HOSP IP/OBS SF/LOW 25: CPT | Performed by: PSYCHIATRY & NEUROLOGY

## 2020-01-06 RX ADMIN — LEVOTHYROXINE SODIUM 75 MCG: 75 TABLET ORAL at 05:52

## 2020-01-06 RX ADMIN — METFORMIN HYDROCHLORIDE 500 MG: 500 TABLET ORAL at 16:51

## 2020-01-06 RX ADMIN — DIVALPROEX SODIUM 1000 MG: 500 TABLET, DELAYED RELEASE ORAL at 17:02

## 2020-01-06 RX ADMIN — OLANZAPINE 10 MG: 10 TABLET, FILM COATED ORAL at 13:55

## 2020-01-06 RX ADMIN — CLOZAPINE 300 MG: 100 TABLET ORAL at 16:50

## 2020-01-06 RX ADMIN — ATORVASTATIN CALCIUM 10 MG: 10 TABLET, FILM COATED ORAL at 16:51

## 2020-01-06 RX ADMIN — PANTOPRAZOLE SODIUM 40 MG: 40 TABLET, DELAYED RELEASE ORAL at 05:52

## 2020-01-06 RX ADMIN — DOCUSATE SODIUM 100 MG: 100 CAPSULE, LIQUID FILLED ORAL at 17:02

## 2020-01-06 NOTE — PLAN OF CARE
Problem: Alteration in Thoughts and Perception  Goal: Treatment Goal: Gain control of psychotic behaviors/thinking, reduce/eliminate presenting symptoms and demonstrate improved reality functioning upon discharge  Outcome: Not Progressing  Goal: Verbalize thoughts and feelings  Description  Interventions:  - Promote a nonjudgmental and trusting relationship with the patient through active listening and therapeutic communication  - Assess patient's level of functioning, behavior and potential for risk  - Engage patient in 1 on 1 interactions  - Encourage patient to express fears, feelings, frustrations, and discuss symptoms    - [place] patient to reality, help patient recognize reality-based thinking   - Administer medications as ordered and assess for potential side effects  - Provide the patient education related to the signs and symptoms of the illness and desired effects of prescribed medications  Outcome: Not Progressing  Goal: Refrain from acting on delusional thinking/internal stimuli  Description  Interventions:  - Monitor patient closely, per order   - Utilize least restrictive measures   - Set reasonable limits, give positive feedback for acceptable   - Administer medications as ordered and monitor of potential side effects  Outcome: Not Progressing  Goal: Agree to be compliant with medication regime, as prescribed and report medication side effects  Description  Interventions:  - Offer appropriate PRN medication and supervise ingestion; conduct AIMS, as needed   Outcome: Not Progressing  Goal: Attend and participate in unit activities, including therapeutic, recreational, and educational groups  Description  Interventions:  -Encourage Visitation and family involvement in care  Outcome: Not Progressing  Goal: Recognize dysfunctional thoughts, communicate reality-based thoughts at the time of discharge  Description  Interventions:  - Provide medication and psycho-education to assist patient in compliance and developing insight into his/her illness   Outcome: Not Progressing  Goal: Complete daily ADLs, including personal hygiene independently, as able  Description  Interventions:  - Observe, teach, and assist patient with ADLS  - Monitor and promote a balance of rest/activity, with adequate nutrition and elimination   Outcome: Not Progressing     Problem: Ineffective Coping  Goal: Identifies ineffective coping skills  Outcome: Not Progressing  Goal: Identifies healthy coping skills  Outcome: Not Progressing  Goal: Demonstrates healthy coping skills  Outcome: Not Progressing  Goal: Patient/Family participate in treatment and DC plans  Description  Interventions:  - Provide therapeutic environment  Outcome: Not Progressing  Goal: Patient/Family verbalizes awareness of resources  Outcome: Not Progressing  Goal: Understands least restrictive measures  Description  Interventions:  - Utilize least restrictive behavior  Outcome: Not Progressing     Problem: RESPIRATORY - ADULT  Goal: Achieves optimal ventilation and oxygenation  Description  INTERVENTIONS:  - Assess for changes in respiratory status  - Assess for changes in mentation and behavior  - Position to facilitate oxygenation and minimize respiratory effort  - Oxygen administered by appropriate delivery if ordered  - Initiate smoking cessation education as indicated  - Encourage broncho-pulmonary hygiene including cough, deep breathe, Incentive Spirometry  - Assess the need for suctioning and aspirate as needed  - Assess and instruct to report SOB or any respiratory difficulty  - Respiratory Therapy support as indicated  Outcome: Not Progressing     Problem: GASTROINTESTINAL - ADULT  Goal: Minimal or absence of nausea and/or vomiting  Description  INTERVENTIONS:  - Administer IV fluids if ordered to ensure adequate hydration  - Maintain NPO status until nausea and vomiting are resolved  - Nasogastric tube if ordered  - Administer ordered antiemetic medications as needed  - Provide nonpharmacologic comfort measures as appropriate  - Advance diet as tolerated, if ordered  - Consider nutrition services referral to assist patient with adequate nutrition and appropriate food choices  Outcome: Not Progressing  Goal: Maintains or returns to baseline bowel function  Description  INTERVENTIONS:  - Assess bowel function  - Encourage oral fluids to ensure adequate hydration  - Administer IV fluids if ordered to ensure adequate hydration  - Administer ordered medications as needed  - Encourage mobilization and activity  - Consider nutritional services referral to assist patient with adequate nutrition and appropriate food choices  Outcome: Not Progressing  Goal: Maintains adequate nutritional intake  Description  INTERVENTIONS:  - Monitor percentage of each meal consumed  - Identify factors contributing to decreased intake, treat as appropriate  - Assist with meals as needed  - Monitor I&O, weight, and lab values if indicated  - Obtain nutrition services referral as needed  Outcome: Not Progressing     Problem: METABOLIC, FLUID AND ELECTROLYTES - ADULT  Goal: Glucose maintained within target range  Description  INTERVENTIONS:  - Monitor Blood Glucose as ordered  - Assess for signs and symptoms of hyperglycemia and hypoglycemia  - Administer ordered medications to maintain glucose within target range  - Assess nutritional intake and initiate nutrition service referral as needed  Outcome: Not Progressing     Problem: DISCHARGE PLANNING  Goal: Discharge to home or other facility with appropriate resources  Description    CASE MANAGEMENT INTERVENTIONS:  - Conduct assessment to determine patient/family and health care team treatment goals, and need for post-acute services based on payer coverage, community resources, patient preferences and barriers to discharge    - Address psychosocial, clinical, and financial barriers to discharge as identified in assessment in conjunction with the patient/family and health care team   - Assist the patient in reintegration back into the community by removing barriers which may hinder a successful discharge once deemed stable  - Arrange appropriate level of post-acute services according to patient's needs and preference and payer coverage in collaboration with the physician and health care team   - Communicate with and update the patient/family, physician, and health care team regarding progress on the discharge plan  - Arrange appropriate transportation to post-acute venues  Outcome: Not Progressing     Patient is isolative to room and self  Irritable, poorly motivated, refused all AM meds  Resistant to out of bed until lunch  No groups  No hygiene  Clothing soiled  Patient refused to let this writer assess his feet for possible podiatry  Appointment  Med changes noted     Other: message Dr Jae Soriano to change AM meds to noon r/t non compliance

## 2020-01-06 NOTE — PLAN OF CARE
Problem: DISCHARGE PLANNING  Goal: Discharge to home or other facility with appropriate resources  Description    CASE MANAGEMENT INTERVENTIONS:  - Conduct assessment to determine patient/family and health care team treatment goals, and need for post-acute services based on payer coverage, community resources, patient preferences and barriers to discharge  - Address psychosocial, clinical, and financial barriers to discharge as identified in assessment in conjunction with the patient/family and health care team   - Assist the patient in reintegration back into the community by removing barriers which may hinder a successful discharge once deemed stable  - Arrange appropriate level of post-acute services according to patient's needs and preference and payer coverage in collaboration with the physician and health care team   - Communicate with and update the patient/family, physician, and health care team regarding progress on the discharge plan  - Arrange appropriate transportation to post-acute venues  Outcome: Progressing     CM responded to an email from patient's sister, Kinjal Gupta, who reported she is working on sending him a care package to the unit  She reported she is planning to send him socks, underwear, a radio (with no antenna) and sneakers  She also mentioned that she believes patient will need to see a podiatrist for nail care  CM informed her that this information will be passed on the nursing staff to have his feet assessed  CM will continue to follow patient's progress and assist with discharge planning needs
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
## 2020-01-06 NOTE — PROGRESS NOTES
01/06/20 0800   Team Meeting   Meeting Type Daily Rounds   Team Members Present   Team Members Present Physician;Nurse;; Other (Discipline and Name)   Physician Team Member Dr Gene Lee, RN   Care Management Team Member Lily Estrada   Other (Discipline and Name) Garnette Pulling, LCSW     Isolative and irritable  Less motivation  Disheveled and needed prompting for medications  Slept

## 2020-01-07 LAB
CLOZAPINE SERPL-MCNC: 505 NG/ML (ref 350–650)
CLOZAPINE+NOR SERPL-MCNC: 630 NG/ML
NORCLOZAPINE SERPL-MCNC: 125 NG/ML

## 2020-01-07 RX ADMIN — DIVALPROEX SODIUM 1000 MG: 500 TABLET, DELAYED RELEASE ORAL at 17:04

## 2020-01-07 RX ADMIN — LEVOTHYROXINE SODIUM 75 MCG: 75 TABLET ORAL at 06:19

## 2020-01-07 RX ADMIN — TRAZODONE HYDROCHLORIDE 50 MG: 50 TABLET ORAL at 21:55

## 2020-01-07 RX ADMIN — OLANZAPINE 10 MG: 10 TABLET, FILM COATED ORAL at 13:39

## 2020-01-07 RX ADMIN — PANTOPRAZOLE SODIUM 40 MG: 40 TABLET, DELAYED RELEASE ORAL at 06:19

## 2020-01-07 RX ADMIN — DOCUSATE SODIUM 100 MG: 100 CAPSULE, LIQUID FILLED ORAL at 08:59

## 2020-01-07 RX ADMIN — OXYBUTYNIN CHLORIDE 5 MG: 5 TABLET, EXTENDED RELEASE ORAL at 08:59

## 2020-01-07 RX ADMIN — CLOZAPINE 300 MG: 100 TABLET ORAL at 16:48

## 2020-01-07 RX ADMIN — DIVALPROEX SODIUM 1000 MG: 500 TABLET, DELAYED RELEASE ORAL at 08:59

## 2020-01-07 RX ADMIN — DOCUSATE SODIUM 100 MG: 100 CAPSULE, LIQUID FILLED ORAL at 17:04

## 2020-01-07 RX ADMIN — METFORMIN HYDROCHLORIDE 500 MG: 500 TABLET ORAL at 16:49

## 2020-01-07 RX ADMIN — ATORVASTATIN CALCIUM 10 MG: 10 TABLET, FILM COATED ORAL at 16:48

## 2020-01-07 RX ADMIN — METFORMIN HYDROCHLORIDE 500 MG: 500 TABLET ORAL at 08:59

## 2020-01-07 NOTE — PLAN OF CARE
Problem: Alteration in Thoughts and Perception  Goal: Agree to be compliant with medication regime, as prescribed and report medication side effects  Description  Interventions:  - Offer appropriate PRN medication and supervise ingestion; conduct AIMS, as needed   Outcome: Progressing  Goal: Attend and participate in unit activities, including therapeutic, recreational, and educational groups  Description  Interventions:  -Encourage Visitation and family involvement in care  Outcome: Progressing     Problem: GASTROINTESTINAL - ADULT  Goal: Minimal or absence of nausea and/or vomiting  Description  INTERVENTIONS:  - Administer IV fluids if ordered to ensure adequate hydration  - Maintain NPO status until nausea and vomiting are resolved  - Nasogastric tube if ordered  - Administer ordered antiemetic medications as needed  - Provide nonpharmacologic comfort measures as appropriate  - Advance diet as tolerated, if ordered  - Consider nutrition services referral to assist patient with adequate nutrition and appropriate food choices  Outcome: Progressing     Problem: Alteration in Thoughts and Perception  Goal: Recognize dysfunctional thoughts, communicate reality-based thoughts at the time of discharge  Description  Interventions:  - Provide medication and psycho-education to assist patient in compliance and developing insight into his/her illness   Outcome: Not Progressing     Problem: GASTROINTESTINAL - ADULT  Goal: Maintains adequate nutritional intake  Description  INTERVENTIONS:  - Monitor percentage of each meal consumed  - Identify factors contributing to decreased intake, treat as appropriate  - Assist with meals as needed  - Monitor I&O, weight, and lab values if indicated  - Obtain nutrition services referral as needed  Outcome: Not Progressing   Mostly isolative to his bed, slept most of the day  Did not get up for vitals, refused both meals, and needed multiple prompts to be compliant with taking his meds  Very disheveled and disorganized  Lacks insight into illness and reason for hospitalization  Remains focused on when he'll be discharged but when reminded take appropriate steps to work toward discharge (follow unit routine, attend groups, etc), becomes dismissive and walks away / ignores staff  Did not attend structured groups but went out on the deck for fresh air  No other behaviors or issues noted  Continue to monitor

## 2020-01-07 NOTE — PROGRESS NOTES
01/07/20 0900   Team Meeting   Meeting Type Tx Team Meeting   Initial Conference Date 01/07/20   Next Conference Date 01/14/20   Team Members Present   Team Members Present Nurse;; Other (Discipline and Name)   Nursing Team Member Oriana Barrera RN   Care Management Team Member Lily Knight   Other (Discipline and Name) Rolm Mohs, LCS; Sonia Whitmore, [facility]   Patient/Family Present   Patient Present Yes   Patient's Family Present Yes   Family Relationship Sibling  (Sister)   Sibling Iline Collet  (via phone conference line)     Patient attend treatment team meeting this morning without a self assessment completed  Patient attended 24% of groups offered last week  Patient is still very preoccupied with asking when he is going to be discharged from the hospital as well as going out on passes with his "big brother", Ruth Phelan  Treatment team explained to him that he needs to be consistently following the expectations of the unit  Patient's sister attempted to start giving him encouraging words, however, he did not want to listen and left the meeting room  Crystal stated patient responds well to sicker charts that have a goal of getting a soda or something similar and therapist reported that a behavioral plan could be worked on for patient  Team and patient completed risk assessment and the patient did not verbalize any desire to elope from the program  Patient verbalized understanding of consequences of eloping from treatment while on a commitment  Patient verbalized no further questions or concerns at the conclusion of the meeting

## 2020-01-07 NOTE — PROGRESS NOTES
01/03/20 1100   Activity/Group Checklist   Group Other (Comment)  (IMR - Weekly Goal Review/Life Balance)   Attendance Did not attend     Patient was encouraged to attend IMR today, but declined

## 2020-01-07 NOTE — PLAN OF CARE
Problem: Alteration in Thoughts and Perception  Goal: Verbalize thoughts and feelings  Description  Interventions:  - Promote a nonjudgmental and trusting relationship with the patient through active listening and therapeutic communication  - Assess patient's level of functioning, behavior and potential for risk  - Engage patient in 1 on 1 interactions  - Encourage patient to express fears, feelings, frustrations, and discuss symptoms    - [place] patient to reality, help patient recognize reality-based thinking   - Administer medications as ordered and assess for potential side effects  - Provide the patient education related to the signs and symptoms of the illness and desired effects of prescribed medications  Outcome: Progressing  Goal: Agree to be compliant with medication regime, as prescribed and report medication side effects  Description  Interventions:  - Offer appropriate PRN medication and supervise ingestion; conduct AIMS, as needed   Outcome: Progressing  Goal: Attend and participate in unit activities, including therapeutic, recreational, and educational groups  Description  Interventions:  -Encourage Visitation and family involvement in care  Outcome: Progressing  Goal: Complete daily ADLs, including personal hygiene independently, as able  Description  Interventions:  - Observe, teach, and assist patient with ADLS  - Monitor and promote a balance of rest/activity, with adequate nutrition and elimination   Outcome: Progressing     Problem: Ineffective Coping  Goal: Understands least restrictive measures  Description  Interventions:  - Utilize least restrictive behavior  Outcome: Progressing     Problem: GASTROINTESTINAL - ADULT  Goal: Maintains adequate nutritional intake  Description  INTERVENTIONS:  - Monitor percentage of each meal consumed  - Identify factors contributing to decreased intake, treat as appropriate  - Assist with meals as needed  - Monitor I&O, weight, and lab values if indicated  - Obtain nutrition services referral as needed  Outcome: Sepideh Sensor has been awake, alert, and visible intermittently out in the milieu  Pt remains focused on wanting to know when he is being discharged  Ate 100% supper  Pt did not attend evening group but came out for snack after  Pt noted drinking water continuously in beginning of shift (2 pitchers of water prior to supper) and now monitoring water intake  Pt showered this shift but remains disheveled in appearance  Compliant with scheduled meds with some prompting  Some interaction noted with select peers  Makes phone calls at intervals  Continue to monitor/assess for any changes

## 2020-01-08 PROCEDURE — 99231 SBSQ HOSP IP/OBS SF/LOW 25: CPT | Performed by: PSYCHIATRY & NEUROLOGY

## 2020-01-08 RX ORDER — DIVALPROEX SODIUM 500 MG/1
1000 TABLET, DELAYED RELEASE ORAL 2 TIMES DAILY
Status: DISPENSED | OUTPATIENT
Start: 2020-01-08 | End: 2020-03-13

## 2020-01-08 RX ADMIN — OLANZAPINE 10 MG: 10 TABLET, FILM COATED ORAL at 13:29

## 2020-01-08 RX ADMIN — CLOZAPINE 300 MG: 100 TABLET ORAL at 17:00

## 2020-01-08 RX ADMIN — DOCUSATE SODIUM 100 MG: 100 CAPSULE, LIQUID FILLED ORAL at 17:00

## 2020-01-08 RX ADMIN — METFORMIN HYDROCHLORIDE 500 MG: 500 TABLET ORAL at 17:00

## 2020-01-08 RX ADMIN — ATORVASTATIN CALCIUM 10 MG: 10 TABLET, FILM COATED ORAL at 17:00

## 2020-01-08 RX ADMIN — DOCUSATE SODIUM 100 MG: 100 CAPSULE, LIQUID FILLED ORAL at 08:22

## 2020-01-08 RX ADMIN — PANTOPRAZOLE SODIUM 40 MG: 40 TABLET, DELAYED RELEASE ORAL at 06:04

## 2020-01-08 RX ADMIN — DIVALPROEX SODIUM 1000 MG: 500 TABLET, DELAYED RELEASE ORAL at 20:32

## 2020-01-08 RX ADMIN — OXYBUTYNIN CHLORIDE 5 MG: 5 TABLET, EXTENDED RELEASE ORAL at 08:22

## 2020-01-08 RX ADMIN — METFORMIN HYDROCHLORIDE 500 MG: 500 TABLET ORAL at 08:22

## 2020-01-08 RX ADMIN — DIVALPROEX SODIUM 1000 MG: 500 TABLET, DELAYED RELEASE ORAL at 08:22

## 2020-01-08 RX ADMIN — LEVOTHYROXINE SODIUM 75 MCG: 75 TABLET ORAL at 06:04

## 2020-01-08 NOTE — PLAN OF CARE
Problem: Alteration in Thoughts and Perception  Goal: Agree to be compliant with medication regime, as prescribed and report medication side effects  Description  Interventions:  - Offer appropriate PRN medication and supervise ingestion; conduct AIMS, as needed   1/7/2020 2202 by Boris Worley RN  Outcome: Progressing  1/7/2020 1958 by Boris Worley RN  Outcome: Lisha Adonis requested prn Trazodone for sleep  Trazodone 50 mg po prn given at 2155  Continue to monitor/assess for effectiveness
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
## 2020-01-08 NOTE — PROGRESS NOTES
Progress Note - Behavioral Health   Franca Traore 48 y o  male MRN: [id number]  Unit/Bed#: [facility] 569-62 Encounter: [id number]    Mac Phalen was observed laying in bed, his hygiene and room appearing unkempt  He was guarded and suspicious during the interview, asking how long he will be admitted  He has been isolative to his room  He has not been attending groups or unit activities  He is poorly motivated and appears preoccupied  He minimizes all psychiatric symptoms, stating, "Things are just fine " He slept well  He refused his morning medications today per nursing, although he firmly denies this upon interview  He denies any homicidal or suicidal ideations  Current Mental Status Evaluation:  Appearance:  Marginal/poor hygiene and disheveled   Behavior:  Dismissive   Mood:  euthymic   Affect: constricted   Speech: Sparse and Garbled   Thought Process:  Unable to assess due to scant verbalization   Thought Content:  Paranoid and mistrustful   Perceptual Disturbances: Appears to be responding to internal stimuli   Risk Potential: No suicidal or homicidal ideation   Orientation:   Oriented to person and place     Progress Toward Goals:  No change  Principal Problem:    Schizoaffective disorder, bipolar type ([facility])  Active Problems:    Type 2 diabetes mellitus without complication, without long-term current use of insulin ([facility])    Benign essential hypertension    Gastroesophageal reflux disease without esophagitis    Acquired hypothyroidism    Tobacco abuse      Recommended Treatment: Continue with pharmacotherapy, group therapy, milieu therapy and occupational therapy    The patient will be maintained on the following medications:    Current Facility-Administered Medications:  acetaminophen 325 mg Oral Q6H PRN Melvena Ashley, PA-C   acetaminophen 650 mg Oral Q6H PRN Melvena Ashlye, PA-C   acetaminophen 975 mg Oral Q8H PRN Melvena Ashley, PA-C   aluminum-magnesium hydroxide-simethicone 30 mL Oral Q4H PRN Sangeetha Pedro PA-C   atorvastatin 10 mg Oral Daily With Diallo Hall PA-C   benztropine 1 mg Intramuscular Q6H PRN Sangeetha Pedro PA-C   benztropine 1 mg Oral Q6H PRN Sangeetha Pedro PA-C   cloZAPine 300 mg Oral Daily Amanda Barahona MD   divalproex sodium 1,000 mg Oral BID Sangeetha Pedro PA-C   docusate sodium 100 mg Oral BID Sangeetha Pedro PA-C   haloperidol 5 mg Oral Q6H PRN Sangeetha Pedro PA-C   haloperidol lactate 5 mg Intramuscular Q6H PRN Sangeetha Pedro PA-C   levothyroxine 75 mcg Oral Early Morning Sangeetha Pedro PA-C   LORazepam 1 mg Intramuscular Q6H PRN Sangeetha Pedro PA-C   LORazepam 1 mg Oral Q6H PRN Sangeetha Pedro PA-C   magnesium hydroxide 30 mL Oral Daily PRN Sagneetha Pedro PA-C   metFORMIN 500 mg Oral BID With Meals Sangeetha Pedro PA-C   nicotine polacrilex 2 mg Oral Q2H PRN Sangeetha Pedro PA-C   OLANZapine 10 mg Oral After [employer], XUAN   oxybutynin 5 mg Oral Daily Sangeetha Pedro PA-C   pantoprazole 40 mg Oral Early Morning Sangeetha Pedro PA-C   traZODone 50 mg Oral HS PRN Sangeetha Pedro PA-C
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
## 2020-01-08 NOTE — PROGRESS NOTES
01/08/20 1400   Activity/Group Checklist   Group   ([facility] )   Attendance Did not attend   Attendance Duration (min) 46-60   Affect/Mood GAEL

## 2020-01-08 NOTE — PROGRESS NOTES
01/08/20 1100   Activity/Group Checklist   Group   (IMR/ Family Dynamics )   Attendance Attended   Attendance Duration (min) 46-60   Interactions Did not interact   Affect/Mood Appropriate   Goals Achieved Able to listen to others

## 2020-01-08 NOTE — PLAN OF CARE
Problem: Alteration in Thoughts and Perception  Goal: Treatment Goal: Gain control of psychotic behaviors/thinking, reduce/eliminate presenting symptoms and demonstrate improved reality functioning upon discharge  Outcome: Progressing  Goal: Verbalize thoughts and feelings  Description  Interventions:  - Promote a nonjudgmental and trusting relationship with the patient through active listening and therapeutic communication  - Assess patient's level of functioning, behavior and potential for risk  - Engage patient in 1 on 1 interactions  - Encourage patient to express fears, feelings, frustrations, and discuss symptoms    - [place] patient to reality, help patient recognize reality-based thinking   - Administer medications as ordered and assess for potential side effects  - Provide the patient education related to the signs and symptoms of the illness and desired effects of prescribed medications  Outcome: Progressing  Goal: Agree to be compliant with medication regime, as prescribed and report medication side effects  Description  Interventions:  - Offer appropriate PRN medication and supervise ingestion; conduct AIMS, as needed   Outcome: Progressing  Goal: Attend and participate in unit activities, including therapeutic, recreational, and educational groups  Description  Interventions:  -Encourage Visitation and family involvement in care  Outcome: Progressing     Problem: Ineffective Coping  Goal: Understands least restrictive measures  Description  Interventions:  - Utilize least restrictive behavior  Outcome: Progressing     Problem: GASTROINTESTINAL - ADULT  Goal: Maintains adequate nutritional intake  Description  INTERVENTIONS:  - Monitor percentage of each meal consumed  - Identify factors contributing to decreased intake, treat as appropriate  - Assist with meals as needed  - Monitor I&O, weight, and lab values if indicated  - Obtain nutrition services referral as needed  Outcome: Progressing Problem: Alteration in Thoughts and Perception  Goal: Recognize dysfunctional thoughts, communicate reality-based thoughts at the time of discharge  Description  Interventions:  - Provide medication and psycho-education to assist patient in compliance and developing insight into his/her illness   Outcome: Not Progressing  Goal: Complete daily ADLs, including personal hygiene independently, as able  Description  Interventions:  - Observe, teach, and assist patient with ADLS  - Monitor and promote a balance of rest/activity, with adequate nutrition and elimination   Outcome: Not Progressing   More visible today, still did not get up for vitals but did come for breakfast when prompted by staff and then came for his meds afterward without prompting  Ate 100% of both meals  Still very disheveled and disorganized  Continues to lack insight into his illness and the reason for his hospitalization  Remains focused on when he'll be discharged  He did attend IMR group and was provided with encouragement and positive praise for taking a step in the right direction  No other behaviors or issues noted  Continue to monitor

## 2020-01-08 NOTE — PLAN OF CARE
Problem: Alteration in Thoughts and Perception  Goal: Verbalize thoughts and feelings  Description  Interventions:  - Promote a nonjudgmental and trusting relationship with the patient through active listening and therapeutic communication  - Assess patient's level of functioning, behavior and potential for risk  - Engage patient in 1 on 1 interactions  - Encourage patient to express fears, feelings, frustrations, and discuss symptoms    - [place] patient to reality, help patient recognize reality-based thinking   - Administer medications as ordered and assess for potential side effects  - Provide the patient education related to the signs and symptoms of the illness and desired effects of prescribed medications  Outcome: Progressing  Goal: Refrain from acting on delusional thinking/internal stimuli  Description  Interventions:  - Monitor patient closely, per order   - Utilize least restrictive measures   - Set reasonable limits, give positive feedback for acceptable   - Administer medications as ordered and monitor of potential side effects  Outcome: Progressing  Goal: Agree to be compliant with medication regime, as prescribed and report medication side effects  Description  Interventions:  - Offer appropriate PRN medication and supervise ingestion; conduct AIMS, as needed   Outcome: Progressing  Goal: Attend and participate in unit activities, including therapeutic, recreational, and educational groups  Description  Interventions:  -Encourage Visitation and family involvement in care  Outcome: Progressing     Problem: Ineffective Coping  Goal: Demonstrates healthy coping skills  Outcome: Progressing  Goal: Understands least restrictive measures  Description  Interventions:  - Utilize least restrictive behavior  Outcome: Progressing     Problem: GASTROINTESTINAL - ADULT  Goal: Maintains adequate nutritional intake  Description  INTERVENTIONS:  - Monitor percentage of each meal consumed  - Identify factors contributing to decreased intake, treat as appropriate  - Assist with meals as needed  - Monitor I&O, weight, and lab values if indicated  - Obtain nutrition services referral as needed  Outcome: Fernandez Kang has been awake, alert, and visible intermittently out in the milieu  Less needy and at desk less frequently with requests  Pt denies any depression, anxiety, si/hi, intent, plan, a/v hallucinations, and has not verbalized any delusions  Ate 100% supper  Disheveled in appearance  Remains focused on asking when he will discharged  Pt attended and participated in  evening group and had snack after  Some interaction noted with peers  Compliant with scheduled meds without prompting  No behavioral issues  Continue to monitor/assess for any changes
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
## 2020-01-08 NOTE — PROGRESS NOTES
01/07/20 1100   Activity/Group Checklist   Group Other (Comment)  (IMR - Vulnerability and Shame)   Attendance Did not attend     Patient was personally encouraged to attend group  He did state he would be attending, but did not show

## 2020-01-09 RX ADMIN — CLOZAPINE 300 MG: 100 TABLET ORAL at 16:52

## 2020-01-09 RX ADMIN — OLANZAPINE 10 MG: 10 TABLET, FILM COATED ORAL at 13:02

## 2020-01-09 RX ADMIN — PANTOPRAZOLE SODIUM 40 MG: 40 TABLET, DELAYED RELEASE ORAL at 06:05

## 2020-01-09 RX ADMIN — ATORVASTATIN CALCIUM 10 MG: 10 TABLET, FILM COATED ORAL at 16:52

## 2020-01-09 RX ADMIN — DOCUSATE SODIUM 100 MG: 100 CAPSULE, LIQUID FILLED ORAL at 17:07

## 2020-01-09 RX ADMIN — METFORMIN HYDROCHLORIDE 500 MG: 500 TABLET ORAL at 09:17

## 2020-01-09 RX ADMIN — METFORMIN HYDROCHLORIDE 500 MG: 500 TABLET ORAL at 16:52

## 2020-01-09 RX ADMIN — DIVALPROEX SODIUM 1000 MG: 500 TABLET, DELAYED RELEASE ORAL at 20:38

## 2020-01-09 RX ADMIN — DOCUSATE SODIUM 100 MG: 100 CAPSULE, LIQUID FILLED ORAL at 09:17

## 2020-01-09 RX ADMIN — LEVOTHYROXINE SODIUM 75 MCG: 75 TABLET ORAL at 06:05

## 2020-01-09 RX ADMIN — DIVALPROEX SODIUM 1000 MG: 500 TABLET, DELAYED RELEASE ORAL at 12:56

## 2020-01-09 RX ADMIN — OXYBUTYNIN CHLORIDE 5 MG: 5 TABLET, EXTENDED RELEASE ORAL at 09:17

## 2020-01-09 NOTE — PLAN OF CARE
Problem: Alteration in Thoughts and Perception  Goal: Agree to be compliant with medication regime, as prescribed and report medication side effects  Description  Interventions:  - Offer appropriate PRN medication and supervise ingestion; conduct AIMS, as needed   Outcome: Progressing as per chart review    Received pt in bed at change of shift with eyes closed; chest movement noted  Continues the same thus this far as per q 15 min room checks  Will continue to monitor
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
## 2020-01-09 NOTE — PROGRESS NOTES
01/09/20 1100   Activity/Group Checklist   Group   (IMR/Dysfunctional Family Roles )   Attendance Did not attend   Attendance Duration (min) 46-60   Affect/Mood GAEL

## 2020-01-09 NOTE — PROGRESS NOTES
01/09/20 0800   Team Meeting   Meeting Type Daily Rounds   Team Members Present   Team Members Present Physician;Nurse;; Other (Discipline and Name)   Physician Team Member Dr Shannan Henry Team Member Chaim Tobar RN   Care Management Team Member Lily Levine   Other (Discipline and Name) Fady Brice LCSW     Patient presents needy and intrusive  Limited insight  Presents disheveled and his room is a mess  Slept

## 2020-01-09 NOTE — PLAN OF CARE
Problem: Alteration in Thoughts and Perception  Goal: Verbalize thoughts and feelings  Description  Interventions:  - Promote a nonjudgmental and trusting relationship with the patient through active listening and therapeutic communication  - Assess patient's level of functioning, behavior and potential for risk  - Engage patient in 1 on 1 interactions  - Encourage patient to express fears, feelings, frustrations, and discuss symptoms    - [place] patient to reality, help patient recognize reality-based thinking   - Administer medications as ordered and assess for potential side effects  - Provide the patient education related to the signs and symptoms of the illness and desired effects of prescribed medications  Outcome: Progressing  Goal: Agree to be compliant with medication regime, as prescribed and report medication side effects  Description  Interventions:  - Offer appropriate PRN medication and supervise ingestion; conduct AIMS, as needed   Outcome: Progressing  Goal: Attend and participate in unit activities, including therapeutic, recreational, and educational groups  Description  Interventions:  -Encourage Visitation and family involvement in care  Outcome: Progressing  Goal: Complete daily ADLs, including personal hygiene independently, as able  Description  Interventions:  - Observe, teach, and assist patient with ADLS  - Monitor and promote a balance of rest/activity, with adequate nutrition and elimination   Outcome: Progressing     Problem: Ineffective Coping  Goal: Understands least restrictive measures  Description  Interventions:  - Utilize least restrictive behavior  Outcome: Progressing     Problem: GASTROINTESTINAL - ADULT  Goal: Maintains adequate nutritional intake  Description  INTERVENTIONS:  - Monitor percentage of each meal consumed  - Identify factors contributing to decreased intake, treat as appropriate  - Assist with meals as needed  - Monitor I&O, weight, and lab values if indicated  - Obtain nutrition services referral as needed  Outcome: Zeferino Hatch has been awake, alert, and visible intermittently out in the milieu  Pt completed his daily ADL's today but still disheveled in appearance  Pt remains focused on wanting to be discharged  At nurse's station less frequently with requests  Ate 100% supper  Pt denies any depression, anxiety, si/hi, intent, plan, a/v hallucinations, and has not verbalized any delusions  Compliant with scheduled meds without prompting  Some interaction noted with peers  Attended and participated in Men's Group and evening group and played table tennis with male peers  Had evening snack  Continue to monitor/assess for any changes

## 2020-01-09 NOTE — PLAN OF CARE
Problem: Alteration in Thoughts and Perception  Goal: Verbalize thoughts and feelings  Description  Interventions:  - Promote a nonjudgmental and trusting relationship with the patient through active listening and therapeutic communication  - Assess patient's level of functioning, behavior and potential for risk  - Engage patient in 1 on 1 interactions  - Encourage patient to express fears, feelings, frustrations, and discuss symptoms    - [place] patient to reality, help patient recognize reality-based thinking   - Administer medications as ordered and assess for potential side effects  - Provide the patient education related to the signs and symptoms of the illness and desired effects of prescribed medications  Outcome: Progressing  Goal: Agree to be compliant with medication regime, as prescribed and report medication side effects  Description  Interventions:  - Offer appropriate PRN medication and supervise ingestion; conduct AIMS, as needed   Outcome: Progressing     Problem: GASTROINTESTINAL - ADULT  Goal: Maintains adequate nutritional intake  Description  INTERVENTIONS:  - Monitor percentage of each meal consumed  - Identify factors contributing to decreased intake, treat as appropriate  - Assist with meals as needed  - Monitor I&O, weight, and lab values if indicated  - Obtain nutrition services referral as needed  Outcome: Progressing     Problem: Alteration in Thoughts and Perception  Goal: Treatment Goal: Gain control of psychotic behaviors/thinking, reduce/eliminate presenting symptoms and demonstrate improved reality functioning upon discharge  Outcome: Not Progressing  Goal: Attend and participate in unit activities, including therapeutic, recreational, and educational groups  Description  Interventions:  -Encourage Visitation and family involvement in care  Outcome: Not Progressing  Goal: Recognize dysfunctional thoughts, communicate reality-based thoughts at the time of discharge  Description  Interventions:  - Provide medication and psycho-education to assist patient in compliance and developing insight into his/her illness   Outcome: Not Progressing  Goal: Complete daily ADLs, including personal hygiene independently, as able  Description  Interventions:  - Observe, teach, and assist patient with ADLS  - Monitor and promote a balance of rest/activity, with adequate nutrition and elimination   Outcome: Not Progressing     Problem: Ineffective Coping  Goal: Understands least restrictive measures  Description  Interventions:  - Utilize least restrictive behavior  Outcome: Not Progressing    Isolative to his room in the morning  Did not get up for vitals or come out for breakfast, needed multiple prompts from staff to get up and take his morning meds  Ate 100% lunch and has been more visible this afternoon  Compliant with afternoon meds with only 1 prompt  Still very disheveled and disorganized and did not shower or do hygiene  Continues to lack insight into his illness and the reason for his hospitalization  Remains focused on when he'll be discharged  He was reminded that he needs to attend unit programming and be involved in his recovery  No other behaviors or issues noted  Continue to monitor

## 2020-01-10 PROCEDURE — 99231 SBSQ HOSP IP/OBS SF/LOW 25: CPT | Performed by: PSYCHIATRY & NEUROLOGY

## 2020-01-10 RX ADMIN — ATORVASTATIN CALCIUM 10 MG: 10 TABLET, FILM COATED ORAL at 17:03

## 2020-01-10 RX ADMIN — PANTOPRAZOLE SODIUM 40 MG: 40 TABLET, DELAYED RELEASE ORAL at 06:05

## 2020-01-10 RX ADMIN — OLANZAPINE 10 MG: 10 TABLET, FILM COATED ORAL at 13:35

## 2020-01-10 RX ADMIN — DOCUSATE SODIUM 100 MG: 100 CAPSULE, LIQUID FILLED ORAL at 17:02

## 2020-01-10 RX ADMIN — METFORMIN HYDROCHLORIDE 500 MG: 500 TABLET ORAL at 09:00

## 2020-01-10 RX ADMIN — DIVALPROEX SODIUM 1000 MG: 500 TABLET, DELAYED RELEASE ORAL at 20:37

## 2020-01-10 RX ADMIN — DIVALPROEX SODIUM 1000 MG: 500 TABLET, DELAYED RELEASE ORAL at 12:39

## 2020-01-10 RX ADMIN — OXYBUTYNIN CHLORIDE 5 MG: 5 TABLET, EXTENDED RELEASE ORAL at 09:00

## 2020-01-10 RX ADMIN — DOCUSATE SODIUM 100 MG: 100 CAPSULE, LIQUID FILLED ORAL at 09:01

## 2020-01-10 RX ADMIN — TRAZODONE HYDROCHLORIDE 50 MG: 50 TABLET ORAL at 22:16

## 2020-01-10 RX ADMIN — CLOZAPINE 300 MG: 100 TABLET ORAL at 17:00

## 2020-01-10 RX ADMIN — METFORMIN HYDROCHLORIDE 500 MG: 500 TABLET ORAL at 17:02

## 2020-01-10 RX ADMIN — LEVOTHYROXINE SODIUM 75 MCG: 75 TABLET ORAL at 06:05

## 2020-01-10 NOTE — PROGRESS NOTES
01/10/20 0800   Team Meeting   Meeting Type Daily Rounds   Team Members Present   Team Members Present Physician;Nurse;; Other (Discipline and Name)   Physician Team Member Dr Shaneka Yadav Team Member Gentry Santos RN   Care Management Team Member Lily Dee   Other (Discipline and Name) Leticia Lloyd [facility]     Patient did not attend groups  Disheveled and disorganized  Preoccupied about discharge  Slept

## 2020-01-10 NOTE — PLAN OF CARE
Problem: Alteration in Thoughts and Perception  Goal: Refrain from acting on delusional thinking/internal stimuli  Description  Interventions:  - Monitor patient closely, per order   - Utilize least restrictive measures   - Set reasonable limits, give positive feedback for acceptable   - Administer medications as ordered and monitor of potential side effects  Outcome: Progressing  Goal: Agree to be compliant with medication regime, as prescribed and report medication side effects  Description  Interventions:  - Offer appropriate PRN medication and supervise ingestion; conduct AIMS, as needed   Outcome: Gurdeep Theodore is alert and oriented, requires prompting for meals and meds  Remained isolative to self and room throughout shift  Denies any depression, anxiety, SI/HI, and VH/AH at this time  Appears disheveled and did not perform ADLs this AM  Will continue to encourage pt to get OOB, participate in milieu expectations and activities, and use effective coping skills in order to express self safely and work towards therapeutic recovery

## 2020-01-10 NOTE — PLAN OF CARE
Problem: Alteration in Thoughts and Perception  Goal: Verbalize thoughts and feelings  Description  Interventions:  - Promote a nonjudgmental and trusting relationship with the patient through active listening and therapeutic communication  - Assess patient's level of functioning, behavior and potential for risk  - Engage patient in 1 on 1 interactions  - Encourage patient to express fears, feelings, frustrations, and discuss symptoms    - [place] patient to reality, help patient recognize reality-based thinking   - Administer medications as ordered and assess for potential side effects  - Provide the patient education related to the signs and symptoms of the illness and desired effects of prescribed medications  Outcome: Progressing  Goal: Agree to be compliant with medication regime, as prescribed and report medication side effects  Description  Interventions:  - Offer appropriate PRN medication and supervise ingestion; conduct AIMS, as needed   Outcome: Progressing  Goal: Attend and participate in unit activities, including therapeutic, recreational, and educational groups  Description  Interventions:  -Encourage Visitation and family involvement in care  Outcome: Progressing     Problem: Ineffective Coping  Goal: Demonstrates healthy coping skills  Outcome: Progressing  Goal: Understands least restrictive measures  Description  Interventions:  - Utilize least restrictive behavior  Outcome: Progressing     Problem: GASTROINTESTINAL - ADULT  Goal: Maintains adequate nutritional intake  Description  INTERVENTIONS:  - Monitor percentage of each meal consumed  - Identify factors contributing to decreased intake, treat as appropriate  - Assist with meals as needed  - Monitor I&O, weight, and lab values if indicated  - Obtain nutrition services referral as needed  Outcome: Yanni Ng has been awake, alert, and visible intermittently out in the milieu  Disheveled in appearance  Social with select peers    Ate 100% supper  Brother visited with good interaction noted  Pleasant, polite, and cooperative  Coming to nurse's station less frequently with requests and less needy of staff  Spends time watching tv in living room with peers  Mohan Puga denies any depression, anxiety, a/v hallucinations, si/hi, intent, plan, and has not verbalized any delusions  Attended and participated in evening group and had snack after  Compliant with scheduled meds without prompting  No behavioral issues  Continue to monitor/assess for any changes
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
## 2020-01-11 RX ADMIN — PANTOPRAZOLE SODIUM 40 MG: 40 TABLET, DELAYED RELEASE ORAL at 05:43

## 2020-01-11 RX ADMIN — CLOZAPINE 300 MG: 100 TABLET ORAL at 16:47

## 2020-01-11 RX ADMIN — DIVALPROEX SODIUM 1000 MG: 500 TABLET, DELAYED RELEASE ORAL at 20:54

## 2020-01-11 RX ADMIN — METFORMIN HYDROCHLORIDE 500 MG: 500 TABLET ORAL at 08:43

## 2020-01-11 RX ADMIN — METFORMIN HYDROCHLORIDE 500 MG: 500 TABLET ORAL at 16:47

## 2020-01-11 RX ADMIN — OXYBUTYNIN CHLORIDE 5 MG: 5 TABLET, EXTENDED RELEASE ORAL at 08:43

## 2020-01-11 RX ADMIN — OLANZAPINE 10 MG: 10 TABLET, FILM COATED ORAL at 13:26

## 2020-01-11 RX ADMIN — ATORVASTATIN CALCIUM 10 MG: 10 TABLET, FILM COATED ORAL at 16:47

## 2020-01-11 RX ADMIN — LEVOTHYROXINE SODIUM 75 MCG: 75 TABLET ORAL at 05:43

## 2020-01-11 RX ADMIN — DIVALPROEX SODIUM 1000 MG: 500 TABLET, DELAYED RELEASE ORAL at 13:26

## 2020-01-11 RX ADMIN — DOCUSATE SODIUM 100 MG: 100 CAPSULE, LIQUID FILLED ORAL at 08:43

## 2020-01-11 RX ADMIN — DOCUSATE SODIUM 100 MG: 100 CAPSULE, LIQUID FILLED ORAL at 17:09

## 2020-01-11 NOTE — PROGRESS NOTES
Psychiatry Progress Note    Subjective: Interval History     Patient isolative to his room this morning  Patient with poor hygiene  Patient friendly during assessment as familiar with author from previous hospitalizations  Patient reports that he is feeling well physically and has no somatic complaints  In regards to his mental health reports some mild anxiety at times however unable to identify any trigger  Denies any depression, suicidal ideations, homicidal ideations  Patient with no overt psychosis  Has been medication and meal compliant  No behavioral issues  Sleeping well      Behavior over the last 24 hours:  unchanged  Sleep: normal  Appetite: normal  Medication side effects: No  ROS: no complaints    Current medications:    Current Facility-Administered Medications:     acetaminophen (TYLENOL) tablet 325 mg, 325 mg, Oral, Q6H PRN, [place], PA-C    acetaminophen (TYLENOL) tablet 650 mg, 650 mg, Oral, Q6H PRN, Tequila Scot, PA-C    acetaminophen (TYLENOL) tablet 975 mg, 975 mg, Oral, Q8H PRN, Tequila Caio, PA-C    aluminum-magnesium hydroxide-simethicone (MYLANTA) 200-200-20 mg/5 mL oral suspension 30 mL, 30 mL, Oral, Q4H PRN, Tequila Kearney, PA-C    atorvastatin (LIPITOR) tablet 10 mg, 10 mg, Oral, Daily With Dinner, Tequila Kearney PA-C, 10 mg at 01/10/20 1703    benztropine (COGENTIN) injection 1 mg, 1 mg, Intramuscular, Q6H PRN, Tequila Kearney, PA-C    benztropine (COGENTIN) tablet 1 mg, 1 mg, Oral, Q6H PRN, Tequila Kearney PA-C    cloZAPine (CLOZARIL) tablet 300 mg, 300 mg, Oral, Daily, Lisa Saleh MD, 300 mg at 01/10/20 1700    divalproex sodium (DEPAKOTE) EC tablet 1,000 mg, 1,000 mg, Oral, BID, Lisa Saleh MD, 1,000 mg at 01/10/20 2037    docusate sodium (COLACE) capsule 100 mg, 100 mg, Oral, BID, Tequila Kearney PA-C, 100 mg at 01/11/20 0843    haloperidol (HALDOL) tablet 5 mg, 5 mg, Oral, Q6H PRN, Tequila Kearney PA-C    haloperidol lactate (HALDOL) injection 5 mg, 5 mg, Intramuscular, Q6H PRN, Renata Chance PA-C    levothyroxine tablet 75 mcg, 75 mcg, Oral, Early Morning, Renata Chance PA-C, 75 mcg at 01/11/20 0543    LORazepam (ATIVAN) 2 mg/mL injection 1 mg, 1 mg, Intramuscular, Q6H PRN, Renata Chance PA-C    LORazepam (ATIVAN) tablet 1 mg, 1 mg, Oral, Q6H PRN, Renata Chance PA-C    magnesium hydroxide (MILK OF MAGNESIA) 400 mg/5 mL oral suspension 30 mL, 30 mL, Oral, Daily PRN, Renata Chance PA-C    metFORMIN (GLUCOPHAGE) tablet 500 mg, 500 mg, Oral, BID With Meals, Renata Chance PA-C, 500 mg at 01/11/20 0843    nicotine polacrilex (NICORETTE) gum 2 mg, 2 mg, Oral, Q2H PRN, Renata Chance PA-C    OLANZapine (ZyPREXA) tablet 10 mg, 10 mg, Oral, After Lunch, Renata Chance PA-C, 10 mg at 01/10/20 1335    oxybutynin (DITROPAN-XL) 24 hr tablet 5 mg, 5 mg, Oral, Daily, Renata Chance PA-C, 5 mg at 01/11/20 0843    pantoprazole (PROTONIX) EC tablet 40 mg, 40 mg, Oral, Early Morning, Renata Chance PA-C, 40 mg at 01/11/20 0543    traZODone (DESYREL) tablet 50 mg, 50 mg, Oral, HS PRN, Renata Chance PA-C, 50 mg at 01/10/20 2216    Current Problem List:    Patient Active Problem List   Diagnosis    Encephalopathy acute    Bipolar 1 disorder ([facility] )    Schizoaffective disorder, bipolar type ([facility] )    Constipation, chronic    [facility]ammonemia ([facility] )    Type 2 diabetes mellitus without complication, without long-term current use of insulin ([facility] )    Tracheobronchitis    Streptococcus pneumoniae    Chronic airway obstruction, not elsewhere classified    Benign essential hypertension    Disorder of lipoprotein and lipid metabolism    Plantar fasciitis    Foot callus    Gastroesophageal reflux disease without esophagitis    GI bleed    Hyponatremia    Acquired hypothyroidism    [facility]schem[facility]    Moderate cigarette smoker (10-19 per day)    Morbid obesity ([facility] )    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    BMI 34 0-34 9,adult    Nail abnormality    Encounter for well adult exam with abnormal findings    Wheezing on right side of chest on exhalation    Tobacco abuse       Problem list reviewed 01/11/20     Objective:     Vital Signs:  Vitals:    01/06/20 0700 01/07/20 0700 01/08/20 0730 01/11/20 0700   BP:  106/56 127/70 129/79   BP Location:  Left arm Left arm Right arm   Pulse:  76 89 92   Resp: 18 19 18 18   Temp:   97 7 °F (36 5 °C) 97 9 °F (36 6 °C)   TempSrc:       Weight:       Height:             Appearance:  age appropriate, casually dressed and disheveled   Behavior:  normal   Speech:  normal volume   Mood:  constricted   Affect:  blunted   Thought Process:  normal   Thought Content:  normal   Perceptual Disturbances: None   Risk Potential: none   Sensorium:  person, place, situation and time   Cognition:  intact   Consciousness:  alert and awake    Attention: attention span and concentration were age appropriate   Intellect: average   Insight:  limited   Judgment: limited      Motor Activity: no abnormal movements       I/O Past 24 hours:  No intake/output data recorded  No intake/output data recorded  Labs:  Reviewed 01/11/20    Progress Toward Goals:  Unchanged    Assessment / Plan:     Schizoaffective disorder, bipolar type ([facility] )    Recommended Treatment:      Medication changes:  1) continue current treatment plan    Non-pharmacological treatments  1) Continue with group therapy, milieu therapy and occupational therapy  Safety  1) Safety/communication plan established targeting dynamic risk factors above  2) Risks, benefits, and possible side effects of medications explained to patient and patient verbalizes understanding  Counseling / Coordination of Care    Total floor / unit time spent today 20 minutes  Greater than 50% of total time was spent with the patient and / or family counseling and / or coordination of care   A description of the counseling / coordination of care  Patient's Rights, confidentiality and exceptions to confidentiality, use of automated medical record, Claudia Mei staff access to medical record, and consent to treatment reviewed      Ree Botello PA-C

## 2020-01-11 NOTE — PLAN OF CARE
Problem: Alteration in Thoughts and Perception  Goal: Verbalize thoughts and feelings  Description  Interventions:  - Promote a nonjudgmental and trusting relationship with the patient through active listening and therapeutic communication  - Assess patient's level of functioning, behavior and potential for risk  - Engage patient in 1 on 1 interactions  - Encourage patient to express fears, feelings, frustrations, and discuss symptoms    - [place] patient to reality, help patient recognize reality-based thinking   - Administer medications as ordered and assess for potential side effects  - Provide the patient education related to the signs and symptoms of the illness and desired effects of prescribed medications  Outcome: Progressing  Goal: Agree to be compliant with medication regime, as prescribed and report medication side effects  Description  Interventions:  - Offer appropriate PRN medication and supervise ingestion; conduct AIMS, as needed   Outcome: Progressing     Problem: Ineffective Coping  Goal: Patient/Family verbalizes awareness of resources  Outcome: Progressing  Goal: Understands least restrictive measures  Description  Interventions:  - Utilize least restrictive behavior  Outcome: Progressing     Problem: GASTROINTESTINAL - ADULT  Goal: Maintains adequate nutritional intake  Description  INTERVENTIONS:  - Monitor percentage of each meal consumed  - Identify factors contributing to decreased intake, treat as appropriate  - Assist with meals as needed  - Monitor I&O, weight, and lab values if indicated  - Obtain nutrition services referral as needed  Outcome: Progressing     Problem: Alteration in Thoughts and Perception  Goal: Treatment Goal: Gain control of psychotic behaviors/thinking, reduce/eliminate presenting symptoms and demonstrate improved reality functioning upon discharge  Outcome: Not Progressing  Goal: Refrain from acting on delusional thinking/internal stimuli  Description  Interventions:  - Monitor patient closely, per order   - Utilize least restrictive measures   - Set reasonable limits, give positive feedback for acceptable   - Administer medications as ordered and monitor of potential side effects  Outcome: Not Progressing  Goal: Attend and participate in unit activities, including therapeutic, recreational, and educational groups  Description  Interventions:  -Encourage Visitation and family involvement in care  Outcome: Not Progressing  Goal: Recognize dysfunctional thoughts, communicate reality-based thoughts at the time of discharge  Description  Interventions:  - Provide medication and psycho-education to assist patient in compliance and developing insight into his/her illness   Outcome: Not Progressing  Goal: Complete daily ADLs, including personal hygiene independently, as able  Description  Interventions:  - Observe, teach, and assist patient with ADLS  - Monitor and promote a balance of rest/activity, with adequate nutrition and elimination   Outcome: Not Progressing  Mostly isolative to his room, sleeping in his bed  Did not get up for vitals  Needed multiple prompts from staff to get up and come to breakfast and lunch  Need multiple prompts to get up and come for his meds  Very disheveled and disorganized and did not shower or do hygiene  Did not attend any groups  Continues to lack insight into his illness and the reason for his hospitalization  No other behaviors or issues noted  Continue to monitor

## 2020-01-11 NOTE — PLAN OF CARE
Problem: Alteration in Thoughts and Perception  Goal: Agree to be compliant with medication regime, as prescribed and report medication side effects  Description  Interventions:  - Offer appropriate PRN medication and supervise ingestion; conduct AIMS, as needed   Outcome: Shakeel Shah has been asleep for the entirety of the shift  No behavioral issues reported or observed at this time  Took 0600 medications with prompting  No complaints of pain or discomfort reported or observed at this time of documentation  Will continue to monitor

## 2020-01-11 NOTE — PLAN OF CARE
Problem: Alteration in Thoughts and Perception  Goal: Agree to be compliant with medication regime, as prescribed and report medication side effects  Description  Interventions:  - Offer appropriate PRN medication and supervise ingestion; conduct AIMS, as needed   Outcome: Progressing     Problem: GASTROINTESTINAL - ADULT  Goal: Maintains adequate nutritional intake  Description  INTERVENTIONS:  - Monitor percentage of each meal consumed  - Identify factors contributing to decreased intake, treat as appropriate  - Assist with meals as needed  - Monitor I&O, weight, and lab values if indicated  - Obtain nutrition services referral as needed  Outcome: Progressing     Problem: Alteration in Thoughts and Perception  Goal: Attend and participate in unit activities, including therapeutic, recreational, and educational groups  Description  Interventions:  -Encourage Visitation and family involvement in care  Outcome: Yeison Meyer has been visible,cooperative and pleasant with staff and peers  Pt was med and meal compliant no prompting required  PT continues to be reminded timing fluid intake do to taking in xlg amount in short periods of time  Pt denied feeling of depression and brightened when approached by this writer  Individual was encouraged but did not attend group  No behaviors at this time,will continue to monitor

## 2020-01-12 RX ADMIN — LEVOTHYROXINE SODIUM 75 MCG: 75 TABLET ORAL at 06:05

## 2020-01-12 RX ADMIN — METFORMIN HYDROCHLORIDE 500 MG: 500 TABLET ORAL at 08:42

## 2020-01-12 RX ADMIN — ATORVASTATIN CALCIUM 10 MG: 10 TABLET, FILM COATED ORAL at 16:52

## 2020-01-12 RX ADMIN — PANTOPRAZOLE SODIUM 40 MG: 40 TABLET, DELAYED RELEASE ORAL at 06:05

## 2020-01-12 RX ADMIN — OXYBUTYNIN CHLORIDE 5 MG: 5 TABLET, EXTENDED RELEASE ORAL at 08:42

## 2020-01-12 RX ADMIN — DOCUSATE SODIUM 100 MG: 100 CAPSULE, LIQUID FILLED ORAL at 17:08

## 2020-01-12 RX ADMIN — OLANZAPINE 10 MG: 10 TABLET, FILM COATED ORAL at 13:17

## 2020-01-12 RX ADMIN — DOCUSATE SODIUM 100 MG: 100 CAPSULE, LIQUID FILLED ORAL at 08:42

## 2020-01-12 RX ADMIN — CLOZAPINE 300 MG: 100 TABLET ORAL at 16:52

## 2020-01-12 RX ADMIN — TRAZODONE HYDROCHLORIDE 50 MG: 50 TABLET ORAL at 22:05

## 2020-01-12 RX ADMIN — DIVALPROEX SODIUM 1000 MG: 500 TABLET, DELAYED RELEASE ORAL at 13:17

## 2020-01-12 RX ADMIN — METFORMIN HYDROCHLORIDE 500 MG: 500 TABLET ORAL at 16:52

## 2020-01-12 RX ADMIN — DIVALPROEX SODIUM 1000 MG: 500 TABLET, DELAYED RELEASE ORAL at 20:46
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
## 2020-01-12 NOTE — PLAN OF CARE
Problem: Alteration in Thoughts and Perception  Goal: Verbalize thoughts and feelings  Description  Interventions:  - Promote a nonjudgmental and trusting relationship with the patient through active listening and therapeutic communication  - Assess patient's level of functioning, behavior and potential for risk  - Engage patient in 1 on 1 interactions  - Encourage patient to express fears, feelings, frustrations, and discuss symptoms    - [place] patient to reality, help patient recognize reality-based thinking   - Administer medications as ordered and assess for potential side effects  - Provide the patient education related to the signs and symptoms of the illness and desired effects of prescribed medications  Outcome: Progressing  Goal: Agree to be compliant with medication regime, as prescribed and report medication side effects  Description  Interventions:  - Offer appropriate PRN medication and supervise ingestion; conduct AIMS, as needed   Outcome: Progressing  Goal: Attend and participate in unit activities, including therapeutic, recreational, and educational groups  Description  Interventions:  -Encourage Visitation and family involvement in care  Outcome: Progressing     Problem: Ineffective Coping  Goal: Demonstrates healthy coping skills  Outcome: Progressing  Goal: Understands least restrictive measures  Description  Interventions:  - Utilize least restrictive behavior  Outcome: Progressing     Problem: GASTROINTESTINAL - ADULT  Goal: Maintains adequate nutritional intake  Description  INTERVENTIONS:  - Monitor percentage of each meal consumed  - Identify factors contributing to decreased intake, treat as appropriate  - Assist with meals as needed  - Monitor I&O, weight, and lab values if indicated  - Obtain nutrition services referral as needed  Outcome: Michelle Bentley has been awake, alert, and visible intermittently out in the milieu  Pt walks around unit at intervals    Compliant with scheduled meds with little prompting  Pt ate 100% supper  Social with select peers  Still disheveled in appearance  Pt lacks insight into his illness and continues to ask when he can be discharged  Denies any depression, anxiety, si/hi, intent, plan, a/v hallucinations, and has not verbalized any delusions  Pt did not attend evening group but came out for snack after  Pleasant, polite, and cooperative  Continue to monitor/assess for any changes

## 2020-01-12 NOTE — PROGRESS NOTES
Psychiatry Progress Note    Subjective: Interval History     Patient remains isolative to his room  Patient with poor hygiene  Patient reports that he is feeling sad this morning as he does not want to be in the hospital on this unit  Lacking insight into his admission  Continues to deny all psychiatric symptoms  Offers no somatic complaints  No observed adverse effects medications noted at this time  Staff reports the patient has been cooperative with care  Intermittently visible on the unit  Has been medication and meal compliant  Slept well last evening      Behavior over the last 24 hours:  unchanged  Sleep: normal  Appetite: normal  Medication side effects: No  ROS: no complaints    Current medications:    Current Facility-Administered Medications:     acetaminophen (TYLENOL) tablet 325 mg, 325 mg, Oral, Q6H PRN, Andrés Welch PA-C    acetaminophen (TYLENOL) tablet 650 mg, 650 mg, Oral, Q6H PRN, Andrés Welch PA-C    acetaminophen (TYLENOL) tablet 975 mg, 975 mg, Oral, Q8H PRN, Andrés Welch PA-C    aluminum-magnesium hydroxide-simethicone (MYLANTA) 200-200-20 mg/5 mL oral suspension 30 mL, 30 mL, Oral, Q4H PRN, Andrés Welch PA-C    atorvastatin (LIPITOR) tablet 10 mg, 10 mg, Oral, Daily With Dinner, Andrés Welch PA-C, 10 mg at 01/11/20 1647    benztropine (COGENTIN) injection 1 mg, 1 mg, Intramuscular, Q6H PRN, Andrés Welch PA-C    benztropine (COGENTIN) tablet 1 mg, 1 mg, Oral, Q6H PRN, Andrés Welch PA-C    cloZAPine (CLOZARIL) tablet 300 mg, 300 mg, Oral, Daily, Letty Andrea MD, 300 mg at 01/11/20 1647    divalproex sodium (DEPAKOTE) EC tablet 1,000 mg, 1,000 mg, Oral, BID, Letty Andrea MD, 1,000 mg at 01/11/20 2054    docusate sodium (COLACE) capsule 100 mg, 100 mg, Oral, BID, Andrés Welch PA-C, 100 mg at 01/12/20 0842    haloperidol (HALDOL) tablet 5 mg, 5 mg, Oral, Q6H PRN, Andrés Welch PA-C    haloperidol lactate (HALDOL) injection 5 mg, 5 mg, Intramuscular, Q6H PRN, Zondra Baars, PA-C    levothyroxine tablet 75 mcg, 75 mcg, Oral, Early Morning, Zondra Baars, PA-C, 75 mcg at 01/12/20 0605    LORazepam (ATIVAN) 2 mg/mL injection 1 mg, 1 mg, Intramuscular, Q6H PRN, Zondra Baars, PA-C    LORazepam (ATIVAN) tablet 1 mg, 1 mg, Oral, Q6H PRN, Zondra Baars, PA-C    magnesium hydroxide (MILK OF MAGNESIA) 400 mg/5 mL oral suspension 30 mL, 30 mL, Oral, Daily PRN, Zondra Baars, PA-C    metFORMIN (GLUCOPHAGE) tablet 500 mg, 500 mg, Oral, BID With Meals, Zondra Baars, PA-C, 500 mg at 01/12/20 9413    nicotine polacrilex (NICORETTE) gum 2 mg, 2 mg, Oral, Q2H PRN, Zondra Baars, PA-C    OLANZapine (ZyPREXA) tablet 10 mg, 10 mg, Oral, After Lunch, Zondra Baars, PA-C, 10 mg at 01/11/20 1326    oxybutynin (DITROPAN-XL) 24 hr tablet 5 mg, 5 mg, Oral, Daily, Zondra Baars, PA-C, 5 mg at 01/12/20 6428    pantoprazole (PROTONIX) EC tablet 40 mg, 40 mg, Oral, Early Morning, Zondra Baars, PA-C, 40 mg at 01/12/20 0605    traZODone (DESYREL) tablet 50 mg, 50 mg, Oral, HS PRN, Zondra Baars, PA-C, 50 mg at 01/10/20 2216    Current Problem List:    Patient Active Problem List   Diagnosis    Encephalopathy acute    [facility]    [facility]oaffective disorder, bipolar type [facility]    Constipation, chronic    Hyperammonemia (HCC)    Type 2 diabetes mellitus without complication, without long-term current use of insulin (HCC)    Tracheobronchitis    Streptococcus pneumoniae    Chronic airway obstruction, not elsewhere classified    Benign essential hypertension    Disorder of lipoprotein and lipid metabolism    Plantar fasciitis    Foot callus    Gastroesophageal reflux disease without esophagitis    GI bleed    Hyponatremia    Acquired hypothyroidism    Ischemic colitis ([address] )    Moderate cigarette smoker (10-19 per day)    Morbid obesity ([facility]    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    BMI 34 0-34 9,adult    Nail abnormality    Encounter for well adult exam with abnormal findings    Wheezing on right side of chest on exhalation    Tobacco abuse       Problem list reviewed 01/12/20     Objective:     Vital Signs:  Vitals:    01/08/20 0730 01/11/20 0700 01/12/20 0900 01/12/20 0915   BP:  129/79 123/80 120/77   BP Location:  Right arm Right arm Right arm   Pulse: 89 92 98 93   Resp: 18 18 18 18   Temp:  97 9 °F (36 6 °C) (!) 96 9 °F (36 1 °C)    TempSrc:   Temporal    Weight:   102 kg (224 lb 12 8 oz)    Height:             Appearance:  age appropriate, casually dressed and disheveled   Behavior:  normal   Speech:  normal volume   Mood:  constricted   Affect:  blunted   Thought Process:  normal   Thought Content:  normal   Perceptual Disturbances: None   Risk Potential: none   Sensorium:  person, place, situation and time   Cognition:  intact   Consciousness:  alert and awake    Attention: attention span and concentration were age appropriate   Intellect: average   Insight:  limited   Judgment: limited      Motor Activity: no abnormal movements       I/O Past 24 hours:  No intake/output data recorded  No intake/output data recorded  Labs:  Reviewed 01/12/20    Progress Toward Goals:  Unchanged    Assessment / Plan:     Schizoaffective disorder, bipolar type ([facility] )    Recommended Treatment:      Medication changes:  1) continue current treatment plan    Non-pharmacological treatments  1) Continue with group therapy, milieu therapy and occupational therapy  Safety  1) Safety/communication plan established targeting dynamic risk factors above  2) Risks, benefits, and possible side effects of medications explained to patient and patient verbalizes understanding  Counseling / Coordination of Care    Total floor / unit time spent today 20 minutes  Greater than 50% of total time was spent with the patient and / or family counseling and / or coordination of care  A description of the counseling / coordination of care  Patient's Rights, confidentiality and exceptions to confidentiality, use of automated medical record, [facility] staff access to medical record, and consent to treatment reviewed      Dalila Hernandez PA-C

## 2020-01-12 NOTE — PLAN OF CARE
Problem: Alteration in Thoughts and Perception  Goal: Verbalize thoughts and feelings  Description  Interventions:  - Promote a nonjudgmental and trusting relationship with the patient through active listening and therapeutic communication  - Assess patient's level of functioning, behavior and potential for risk  - Engage patient in 1 on 1 interactions  - Encourage patient to express fears, feelings, frustrations, and discuss symptoms    - [place] patient to reality, help patient recognize reality-based thinking   - Administer medications as ordered and assess for potential side effects  - Provide the patient education related to the signs and symptoms of the illness and desired effects of prescribed medications  Outcome: Progressing  Goal: Agree to be compliant with medication regime, as prescribed and report medication side effects  Description  Interventions:  - Offer appropriate PRN medication and supervise ingestion; conduct AIMS, as needed   Outcome: Progressing     Problem: Ineffective Coping  Goal: Patient/Family verbalizes awareness of resources  Outcome: Progressing  Goal: Understands least restrictive measures  Description  Interventions:  - Utilize least restrictive behavior  Outcome: Progressing     Problem: GASTROINTESTINAL - ADULT  Goal: Maintains adequate nutritional intake  Description  INTERVENTIONS:  - Monitor percentage of each meal consumed  - Identify factors contributing to decreased intake, treat as appropriate  - Assist with meals as needed  - Monitor I&O, weight, and lab values if indicated  - Obtain nutrition services referral as needed  Outcome: Progressing     Problem: Alteration in Thoughts and Perception  Goal: Treatment Goal: Gain control of psychotic behaviors/thinking, reduce/eliminate presenting symptoms and demonstrate improved reality functioning upon discharge  Outcome: Not Progressing  Goal: Refrain from acting on delusional thinking/internal stimuli  Description  Interventions:  - Monitor patient closely, per order   - Utilize least restrictive measures   - Set reasonable limits, give positive feedback for acceptable   - Administer medications as ordered and monitor of potential side effects  Outcome: Not Progressing  Goal: Attend and participate in unit activities, including therapeutic, recreational, and educational groups  Description  Interventions:  -Encourage Visitation and family involvement in care  Outcome: Not Progressing  Goal: Recognize dysfunctional thoughts, communicate reality-based thoughts at the time of discharge  Description  Interventions:  - Provide medication and psycho-education to assist patient in compliance and developing insight into his/her illness   Outcome: Not Progressing  Goal: Complete daily ADLs, including personal hygiene independently, as able  Description  Interventions:  - Observe, teach, and assist patient with ADLS  - Monitor and promote a balance of rest/activity, with adequate nutrition and elimination   Outcome: Not Progressing   Mostly isolative to his room, sleeping in his bed  Did come out for vitals when announced but needed multiple prompts to get up and come for his meds  Became irritable with the prompts, saying "When the heck am I getting out of here anyway?"  Very disheveled and disorganized and did not shower or do hygiene  Did not attend any groups  Continues to lack insight into his illness and the reason for his hospitalization  Ate 100% of both meals  No other behaviors or issues noted  Continue to monitor

## 2020-01-12 NOTE — PROGRESS NOTES
01/11/20 1300   Activity/Group Checklist   Group Other (Comment)  (OPEN STUDIO/Art Therapy, Social Interaction-Expression)   Attendance Attended   Attendance Duration (min) 16-30  (Patient arrived late)   Interactions Interacted appropriately   Affect/Mood Appropriate   Goals Achieved Able to listen to others; Able to engage in interactions; Able to recieve feedback

## 2020-01-13 PROCEDURE — 99231 SBSQ HOSP IP/OBS SF/LOW 25: CPT | Performed by: PSYCHIATRY & NEUROLOGY

## 2020-01-13 RX ADMIN — METFORMIN HYDROCHLORIDE 500 MG: 500 TABLET ORAL at 09:16

## 2020-01-13 RX ADMIN — CLOZAPINE 300 MG: 100 TABLET ORAL at 16:38

## 2020-01-13 RX ADMIN — LEVOTHYROXINE SODIUM 75 MCG: 75 TABLET ORAL at 05:59

## 2020-01-13 RX ADMIN — OLANZAPINE 10 MG: 10 TABLET, FILM COATED ORAL at 13:01

## 2020-01-13 RX ADMIN — DIVALPROEX SODIUM 1000 MG: 500 TABLET, DELAYED RELEASE ORAL at 13:00

## 2020-01-13 RX ADMIN — PANTOPRAZOLE SODIUM 40 MG: 40 TABLET, DELAYED RELEASE ORAL at 05:59

## 2020-01-13 RX ADMIN — DOCUSATE SODIUM 100 MG: 100 CAPSULE, LIQUID FILLED ORAL at 17:13

## 2020-01-13 RX ADMIN — ATORVASTATIN CALCIUM 10 MG: 10 TABLET, FILM COATED ORAL at 16:38

## 2020-01-13 RX ADMIN — DIVALPROEX SODIUM 1000 MG: 500 TABLET, DELAYED RELEASE ORAL at 20:49

## 2020-01-13 RX ADMIN — METFORMIN HYDROCHLORIDE 500 MG: 500 TABLET ORAL at 16:38

## 2020-01-13 RX ADMIN — OXYBUTYNIN CHLORIDE 5 MG: 5 TABLET, EXTENDED RELEASE ORAL at 09:16

## 2020-01-13 RX ADMIN — TRAZODONE HYDROCHLORIDE 50 MG: 50 TABLET ORAL at 22:01

## 2020-01-13 RX ADMIN — DOCUSATE SODIUM 100 MG: 100 CAPSULE, LIQUID FILLED ORAL at 09:16

## 2020-01-13 NOTE — PLAN OF CARE
Problem: Alteration in Thoughts and Perception  Goal: Attend and participate in unit activities, including therapeutic, recreational, and educational groups  Description  Interventions:  -Encourage Visitation and family involvement in care  Outcome: Progressing     Problem: Individualized Interventions  Goal: Attend and participate in unit activities, including therapeutic, recreational, and educational groups  Description  PSYCHOTHERAPY INTERVENTIONS:    -Form therapeutic alliance to promote trust and safety within a therapeutic environment    -Engage in individual psychotherapy using evidence-based practices that are specific to individual needs   -Process barriers to community living with an emphasis on solution-based interventions   -Promote self-awareness and identity development   -Identify and process patterns of behavior that have led to decompensation and/or hospitalizations   -Attend psychoeducation groups with licensed psychotherapist to learn about Illness Management Recovery (IMR) concepts and enhance coping skills    -Practice effective communication with staff, peers, family, and community members  Participate in family sessions as necessary   -Encourage reality-based thought content by examining thought processes and cognitive distortions      -Work with treatment team in reintegration back into the community when appropriate  Outcome: Progressing  Patient has shown great improvement in group attendance and currently has 54% attendance  Patient has attended only one IMR group last week and is prompted and encouraged  Patient continues to be focused on getting off ground privileges and discharged instead of being aware and changing behaviors that brought him to the [facility] again  Writer will encourage Patient to complete WRAP Plan with assistance and attend IMR groups

## 2020-01-13 NOTE — PROGRESS NOTES
01/10/20 1100   Activity/Group Checklist   Group Other (Comment)  (IMR - Weekly Goal Review)   Attendance Did not attend     Patient was encouraged to attend group, but declined as he was in bed

## 2020-01-13 NOTE — PROGRESS NOTES
01/13/20 0800   Team Meeting   Meeting Type Daily Rounds   Team Members Present   Team Members Present Physician;Nurse;; Other (Discipline and Name)   Physician Team Member Dr Deion Miranda Team Member Estefania Meza RN   Care Management Team Member Lily Kennedy   Other (Discipline and Name) Sherine Chavarria LCSW     Patient continues to present disheveled and focused on discharge  No groups  Slept
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
## 2020-01-13 NOTE — PLAN OF CARE
Problem: Alteration in Thoughts and Perception  Goal: Verbalize thoughts and feelings  Description  Interventions:  - Promote a nonjudgmental and trusting relationship with the patient through active listening and therapeutic communication  - Assess patient's level of functioning, behavior and potential for risk  - Engage patient in 1 on 1 interactions  - Encourage patient to express fears, feelings, frustrations, and discuss symptoms    - [place] patient to reality, help patient recognize reality-based thinking   - Administer medications as ordered and assess for potential side effects  - Provide the patient education related to the signs and symptoms of the illness and desired effects of prescribed medications  Outcome: Progressing     Problem: Ineffective Coping  Goal: Patient/Family verbalizes awareness of resources  Outcome: Progressing  Goal: Understands least restrictive measures  Description  Interventions:  - Utilize least restrictive behavior  Outcome: Progressing     Problem: Alteration in Thoughts and Perception  Goal: Treatment Goal: Gain control of psychotic behaviors/thinking, reduce/eliminate presenting symptoms and demonstrate improved reality functioning upon discharge  Outcome: Not Progressing  Goal: Refrain from acting on delusional thinking/internal stimuli  Description  Interventions:  - Monitor patient closely, per order   - Utilize least restrictive measures   - Set reasonable limits, give positive feedback for acceptable   - Administer medications as ordered and monitor of potential side effects  Outcome: Not Progressing  Goal: Agree to be compliant with medication regime, as prescribed and report medication side effects  Description  Interventions:  - Offer appropriate PRN medication and supervise ingestion; conduct AIMS, as needed   Outcome: Not Progressing  Goal: Attend and participate in unit activities, including therapeutic, recreational, and educational groups  Description  Interventions:  -Encourage Visitation and family involvement in care  Outcome: Not Progressing  Goal: Recognize dysfunctional thoughts, communicate reality-based thoughts at the time of discharge  Description  Interventions:  - Provide medication and psycho-education to assist patient in compliance and developing insight into his/her illness   Outcome: Not Progressing  Goal: Complete daily ADLs, including personal hygiene independently, as able  Description  Interventions:  - Observe, teach, and assist patient with ADLS  - Monitor and promote a balance of rest/activity, with adequate nutrition and elimination   Outcome: Not Progressing     Problem: Ineffective Coping  Goal: Demonstrates healthy coping skills  Outcome: Not Progressing     Problem: GASTROINTESTINAL - ADULT  Goal: Maintains adequate nutritional intake  Description  INTERVENTIONS:  - Monitor percentage of each meal consumed  - Identify factors contributing to decreased intake, treat as appropriate  - Assist with meals as needed  - Monitor I&O, weight, and lab values if indicated  - Obtain nutrition services referral as needed  Outcome: Not Progressing   Mostly isolative to his room, sleeping in his bed  Did not get up for vitals  Refused breakfast and needed multiple prompts to get up and come for his meds  Very disheveled and disorganized and did not shower or do hygiene  Attended fresh air group only  Continues to lack insight into his illness and the reason for his hospitalization  Attempted to refuse meds saying that he wanted to be discharged but then complied when staff reminded him that he needs to comply with taking his meds in order to be discharged  No other behaviors or issues noted  Continue to monitor

## 2020-01-13 NOTE — PROGRESS NOTES
Patient declined      01/13/20 1100   Activity/Group Checklist   Group   (IMR/Getting the Most from Groups/The Art of Self-Disclosure )   Attendance Did not attend   Attendance Duration (min) 46-60   Affect/Mood GAEL

## 2020-01-13 NOTE — PLAN OF CARE
Problem: Alteration in Thoughts and Perception  Goal: Verbalize thoughts and feelings  Description  Interventions:  - Promote a nonjudgmental and trusting relationship with the patient through active listening and therapeutic communication  - Assess patient's level of functioning, behavior and potential for risk  - Engage patient in 1 on 1 interactions  - Encourage patient to express fears, feelings, frustrations, and discuss symptoms    - [place] patient to reality, help patient recognize reality-based thinking   - Administer medications as ordered and assess for potential side effects  - Provide the patient education related to the signs and symptoms of the illness and desired effects of prescribed medications  Outcome: Progressing  Goal: Agree to be compliant with medication regime, as prescribed and report medication side effects  Description  Interventions:  - Offer appropriate PRN medication and supervise ingestion; conduct AIMS, as needed   Outcome: Progressing  Goal: Attend and participate in unit activities, including therapeutic, recreational, and educational groups  Description  Interventions:  -Encourage Visitation and family involvement in care  Outcome: Progressing     Problem: Ineffective Coping  Goal: Understands least restrictive measures  Description  Interventions:  - Utilize least restrictive behavior  Outcome: Progressing     Problem: GASTROINTESTINAL - ADULT  Goal: Maintains adequate nutritional intake  Description  INTERVENTIONS:  - Monitor percentage of each meal consumed  - Identify factors contributing to decreased intake, treat as appropriate  - Assist with meals as needed  - Monitor I&O, weight, and lab values if indicated  - Obtain nutrition services referral as needed  Outcome: Sugar Jeffrey has been awake, alert, and visible intermittently out in the milieu  Pt continues to ask staff when he is going to be discharged  Pt encouraged to focus on working on his recovery goals  Compliant with scheduled meds with little prompting  Ate 100% supper  Rests in room at intervals  Disheveled in appearance  Denies any depression, anxiety, si/hi, intent, plan, a/v hallucinations and does not verbalize any delusions  Pt lacks insight into his illness and is not motivated to work on recovery goals  Pt did not attend evening group but had snack after  Pt requested prn med for sleep  Trazodone 50 mg po prn given at 2205  Continue to monitor/assess for any changes
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
## 2020-01-14 LAB
BASOPHILS # BLD AUTO: 0 THOUSANDS/ΜL (ref 0–0.1)
BASOPHILS NFR BLD AUTO: 1 % (ref 0–1)
EOSINOPHIL # BLD AUTO: 0 THOUSAND/ΜL (ref 0–0.4)
EOSINOPHIL NFR BLD AUTO: 0 % (ref 0–6)
ERYTHROCYTE [DISTWIDTH] IN BLOOD BY AUTOMATED COUNT: 13.3 %
HBV SURFACE AG SER QL: NORMAL
HCT VFR BLD AUTO: 43.6 % (ref 41–53)
HCV AB SER QL: NORMAL
HGB BLD-MCNC: 14.9 G/DL (ref 13.5–17.5)
HIV 1+2 AB+HIV1 P24 AG SERPL QL IA: NORMAL
HIV1 P24 AG SER QL: NORMAL
LYMPHOCYTES # BLD AUTO: 3.2 THOUSANDS/ΜL (ref 0.5–4)
LYMPHOCYTES NFR BLD AUTO: 37 % (ref 25–45)
MCH RBC QN AUTO: 29.6 PG (ref 26–34)
MCHC RBC AUTO-ENTMCNC: 34.1 G/DL (ref 31–36)
MCV RBC AUTO: 87 FL (ref 80–100)
MONOCYTES # BLD AUTO: 0.9 THOUSAND/ΜL (ref 0.2–0.9)
MONOCYTES NFR BLD AUTO: 11 % (ref 1–10)
NEUTROPHILS # BLD AUTO: 4.4 THOUSANDS/ΜL (ref 1.8–7.8)
NEUTS SEG NFR BLD AUTO: 52 % (ref 45–65)
PLATELET # BLD AUTO: 170 THOUSANDS/UL (ref 150–450)
PMV BLD AUTO: 9 FL (ref 8.9–12.7)
RBC # BLD AUTO: 5.01 MILLION/UL (ref 4.5–5.9)
WBC # BLD AUTO: 8.5 THOUSAND/UL (ref 4.5–11)

## 2020-01-14 PROCEDURE — 99232 SBSQ HOSP IP/OBS MODERATE 35: CPT | Performed by: PSYCHIATRY & NEUROLOGY

## 2020-01-14 PROCEDURE — 87806 HIV AG W/HIV1&2 ANTB W/OPTIC: CPT | Performed by: FAMILY MEDICINE

## 2020-01-14 PROCEDURE — 85025 COMPLETE CBC W/AUTO DIFF WBC: CPT | Performed by: PSYCHIATRY & NEUROLOGY

## 2020-01-14 PROCEDURE — 87340 HEPATITIS B SURFACE AG IA: CPT | Performed by: FAMILY MEDICINE

## 2020-01-14 PROCEDURE — 86803 HEPATITIS C AB TEST: CPT | Performed by: FAMILY MEDICINE

## 2020-01-14 RX ADMIN — PANTOPRAZOLE SODIUM 40 MG: 40 TABLET, DELAYED RELEASE ORAL at 06:02

## 2020-01-14 RX ADMIN — METFORMIN HYDROCHLORIDE 500 MG: 500 TABLET ORAL at 08:29

## 2020-01-14 RX ADMIN — DOCUSATE SODIUM 100 MG: 100 CAPSULE, LIQUID FILLED ORAL at 08:29

## 2020-01-14 RX ADMIN — METFORMIN HYDROCHLORIDE 500 MG: 500 TABLET ORAL at 16:50

## 2020-01-14 RX ADMIN — DOCUSATE SODIUM 100 MG: 100 CAPSULE, LIQUID FILLED ORAL at 17:28

## 2020-01-14 RX ADMIN — DIVALPROEX SODIUM 1000 MG: 500 TABLET, DELAYED RELEASE ORAL at 20:13

## 2020-01-14 RX ADMIN — LEVOTHYROXINE SODIUM 75 MCG: 75 TABLET ORAL at 06:02

## 2020-01-14 RX ADMIN — TRAZODONE HYDROCHLORIDE 50 MG: 50 TABLET ORAL at 21:55

## 2020-01-14 RX ADMIN — OLANZAPINE 10 MG: 10 TABLET, FILM COATED ORAL at 13:18

## 2020-01-14 RX ADMIN — DIVALPROEX SODIUM 1000 MG: 500 TABLET, DELAYED RELEASE ORAL at 12:02

## 2020-01-14 RX ADMIN — CLOZAPINE 300 MG: 100 TABLET ORAL at 16:49

## 2020-01-14 RX ADMIN — OXYBUTYNIN CHLORIDE 5 MG: 5 TABLET, EXTENDED RELEASE ORAL at 08:29

## 2020-01-14 RX ADMIN — ATORVASTATIN CALCIUM 10 MG: 10 TABLET, FILM COATED ORAL at 16:50

## 2020-01-14 NOTE — PROGRESS NOTES
Sleeping with no distress noted, safe precautions in place and maintaines at this time  Awake for water a few times    Monitoring continues
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
## 2020-01-14 NOTE — PROGRESS NOTES
Not scheduled to attend      01/14/20 1430   Activity/Group Checklist   Group   (Community Programming Wrap up)   Attendance Did not attend   Attendance Duration (min) 16-30   Affect/Mood GAEL
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
## 2020-01-14 NOTE — PLAN OF CARE
Problem: Alteration in Thoughts and Perception  Goal: Verbalize thoughts and feelings  Description  Interventions:  - Promote a nonjudgmental and trusting relationship with the patient through active listening and therapeutic communication  - Assess patient's level of functioning, behavior and potential for risk  - Engage patient in 1 on 1 interactions  - Encourage patient to express fears, feelings, frustrations, and discuss symptoms    - [place] patient to reality, help patient recognize reality-based thinking   - Administer medications as ordered and assess for potential side effects  - Provide the patient education related to the signs and symptoms of the illness and desired effects of prescribed medications  Outcome: Progressing  Goal: Agree to be compliant with medication regime, as prescribed and report medication side effects  Description  Interventions:  - Offer appropriate PRN medication and supervise ingestion; conduct AIMS, as needed   Outcome: Progressing  Goal: Attend and participate in unit activities, including therapeutic, recreational, and educational groups  Description  Interventions:  -Encourage Visitation and family involvement in care  Outcome: Progressing  Goal: Complete daily ADLs, including personal hygiene independently, as able  Description  Interventions:  - Observe, teach, and assist patient with ADLS  - Monitor and promote a balance of rest/activity, with adequate nutrition and elimination   Outcome: Progressing     Problem: Ineffective Coping  Goal: Understands least restrictive measures  Description  Interventions:  - Utilize least restrictive behavior  Outcome: Progressing     Problem: GASTROINTESTINAL - ADULT  Goal: Maintains adequate nutritional intake  Description  INTERVENTIONS:  - Monitor percentage of each meal consumed  - Identify factors contributing to decreased intake, treat as appropriate  - Assist with meals as needed  - Monitor I&O, weight, and lab values if indicated  - Obtain nutrition services referral as needed  Outcome: Edson Collins has been awake, alert, and visible intermittently out in the milieu  Pt remains focused on being discharged  Lacks insight into his illness  Minimal interaction noted with peers  Ate 100% supper  Compliant with scheduled meds with little prompting  Pt did not attend evening group but came out for snack after  Pt completed his daily ADL's today but continues to be disheveled in appearance  Pt denies any depression, anxiety,  si/hi, intent, plan, a/v hallucinations, and has not verbalized any delusions  No behavioral issues  Continue to monitor/assess for any changes

## 2020-01-14 NOTE — PLAN OF CARE
Problem: Alteration in Thoughts and Perception  Goal: Verbalize thoughts and feelings  Description  Interventions:  - Promote a nonjudgmental and trusting relationship with the patient through active listening and therapeutic communication  - Assess patient's level of functioning, behavior and potential for risk  - Engage patient in 1 on 1 interactions  - Encourage patient to express fears, feelings, frustrations, and discuss symptoms    - [place] patient to reality, help patient recognize reality-based thinking   - Administer medications as ordered and assess for potential side effects  - Provide the patient education related to the signs and symptoms of the illness and desired effects of prescribed medications  Outcome: Progressing  Goal: Refrain from acting on delusional thinking/internal stimuli  Description  Interventions:  - Monitor patient closely, per order   - Utilize least restrictive measures   - Set reasonable limits, give positive feedback for acceptable   - Administer medications as ordered and monitor of potential side effects  Outcome: Progressing  Goal: Attend and participate in unit activities, including therapeutic, recreational, and educational groups  Description  Interventions:  -Encourage Visitation and family involvement in care  Outcome: Sarah Andre has been more visible on the unit, expresses his needs to staff usually inquiring about donated clothing and wanting to out into the closet to look for clothes, which he appears to be dressed in several layers of clothing and has a pair of shoes on  Redirectable, compliant and cooperative with his morning and afternoon medications  Up and out of bed for meals and select groups  Behaviors controlled  Will continue to monitor for changes
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
## 2020-01-14 NOTE — PROGRESS NOTES
01/14/20 0951   Team Meeting   Meeting Type Tx Team Meeting   Initial Conference Date 01/14/20   Next Conference Date 01/21/20   Team Members Present   Team Members Present Physician;Nurse;; Other (Discipline and Name)   Physician Team Member Dr Deion Miranda Team Member Estefania Meza, RN   Care Management Team Member Lily Kennedy   Other (Discipline and Name) Sherine Chavarria LCSW; Ho Brewer, [facility]   Patient/Family Present   Patient Present Yes   Patient's Family Present No     Patient attended treatment team meeting this morning without a self assessment completed  Patient attended 54% of groups offered last week  Patient is still very preoccupied about his discharge, always asking team members where and when he is leaving  He is also asking when he can go out on passes with his big brother  Treatment team granted patient off unit privileges  Patient was pleased with this progress  Team and patient completed risk assessment and the patient did not verbalize any desire to elope from the program  Patient verbalized understanding of consequences of eloping from treatment while on a commitment  Patient verbalized no further questions or concerns at the conclusion of the meeting
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
## 2020-01-14 NOTE — PROGRESS NOTES
Patient engaged/Participated/     01/14/20 1100   Activity/Group Checklist   Group   (IMR/Motivation to Change)   Attendance Attended   Attendance Duration (min) 46-60   Interactions Interacted appropriately   Affect/Mood Appropriate; Constricted   Goals Achieved Identified feelings; Able to listen to others; Able to engage in interactions; Able to recieve feedback

## 2020-01-15 PROCEDURE — 99232 SBSQ HOSP IP/OBS MODERATE 35: CPT | Performed by: PSYCHIATRY & NEUROLOGY

## 2020-01-15 RX ADMIN — DOCUSATE SODIUM 100 MG: 100 CAPSULE, LIQUID FILLED ORAL at 09:14

## 2020-01-15 RX ADMIN — METFORMIN HYDROCHLORIDE 500 MG: 500 TABLET ORAL at 16:36

## 2020-01-15 RX ADMIN — LEVOTHYROXINE SODIUM 75 MCG: 75 TABLET ORAL at 05:59

## 2020-01-15 RX ADMIN — DOCUSATE SODIUM 100 MG: 100 CAPSULE, LIQUID FILLED ORAL at 17:16

## 2020-01-15 RX ADMIN — PANTOPRAZOLE SODIUM 40 MG: 40 TABLET, DELAYED RELEASE ORAL at 05:59

## 2020-01-15 RX ADMIN — DIVALPROEX SODIUM 1000 MG: 500 TABLET, DELAYED RELEASE ORAL at 12:19

## 2020-01-15 RX ADMIN — OXYBUTYNIN CHLORIDE 5 MG: 5 TABLET, EXTENDED RELEASE ORAL at 09:14

## 2020-01-15 RX ADMIN — DIVALPROEX SODIUM 1000 MG: 500 TABLET, DELAYED RELEASE ORAL at 20:37

## 2020-01-15 RX ADMIN — CLOZAPINE 300 MG: 100 TABLET ORAL at 16:36

## 2020-01-15 RX ADMIN — METFORMIN HYDROCHLORIDE 500 MG: 500 TABLET ORAL at 09:14

## 2020-01-15 RX ADMIN — OLANZAPINE 10 MG: 10 TABLET, FILM COATED ORAL at 13:50

## 2020-01-15 RX ADMIN — ATORVASTATIN CALCIUM 10 MG: 10 TABLET, FILM COATED ORAL at 16:36
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
## 2020-01-15 NOTE — PROGRESS NOTES
Rogers Bridges maintained on ongoing SAFE precaution without incident on this shift  Laying in bed with his eyes closed, breath even and unlabored  Q15 minutes rounding continues  No apparent distress  No behavioral noted  Will continue to monitor

## 2020-01-15 NOTE — PLAN OF CARE
Problem: Alteration in Thoughts and Perception  Goal: Verbalize thoughts and feelings  Description  Interventions:  - Promote a nonjudgmental and trusting relationship with the patient through active listening and therapeutic communication  - Assess patient's level of functioning, behavior and potential for risk  - Engage patient in 1 on 1 interactions  - Encourage patient to express fears, feelings, frustrations, and discuss symptoms    - [place] patient to reality, help patient recognize reality-based thinking   - Administer medications as ordered and assess for potential side effects  - Provide the patient education related to the signs and symptoms of the illness and desired effects of prescribed medications  Outcome: Progressing  Goal: Agree to be compliant with medication regime, as prescribed and report medication side effects  Description  Interventions:  - Offer appropriate PRN medication and supervise ingestion; conduct AIMS, as needed   Outcome: Progressing     Problem: Ineffective Coping  Goal: Patient/Family verbalizes awareness of resources  Outcome: Progressing  Goal: Understands least restrictive measures  Description  Interventions:  - Utilize least restrictive behavior  Outcome: Progressing     Problem: GASTROINTESTINAL - ADULT  Goal: Maintains adequate nutritional intake  Description  INTERVENTIONS:  - Monitor percentage of each meal consumed  - Identify factors contributing to decreased intake, treat as appropriate  - Assist with meals as needed  - Monitor I&O, weight, and lab values if indicated  - Obtain nutrition services referral as needed  Outcome: Progressing     Problem: Alteration in Thoughts and Perception  Goal: Treatment Goal: Gain control of psychotic behaviors/thinking, reduce/eliminate presenting symptoms and demonstrate improved reality functioning upon discharge  Outcome: Not Progressing  Goal: Refrain from acting on delusional thinking/internal stimuli  Description  Interventions:  - Monitor patient closely, per order   - Utilize least restrictive measures   - Set reasonable limits, give positive feedback for acceptable   - Administer medications as ordered and monitor of potential side effects  Outcome: Not Progressing  Goal: Attend and participate in unit activities, including therapeutic, recreational, and educational groups  Description  Interventions:  -Encourage Visitation and family involvement in care  Outcome: Not Progressing  Goal: Recognize dysfunctional thoughts, communicate reality-based thoughts at the time of discharge  Description  Interventions:  - Provide medication and psycho-education to assist patient in compliance and developing insight into his/her illness   Outcome: Not Progressing  Goal: Complete daily ADLs, including personal hygiene independently, as able  Description  Interventions:  - Observe, teach, and assist patient with ADLS  - Monitor and promote a balance of rest/activity, with adequate nutrition and elimination   Outcome: Not Progressing     Problem: Ineffective Coping  Goal: Demonstrates healthy coping skills  Outcome: Not Progressing   Mostly isolative to his room, sleeping in his bed  Did not get up for vitals  Refused breakfast and needed multiple prompts to get up and come for his meds  Very disheveled and disorganized and did not shower or do hygiene  Ate 75% of lunch and took his afternoon meds  Did not attend any groups  Continues to lack insight into his illness and the reason for his hospitalization  No other behaviors or issues noted  Continue to monitor

## 2020-01-15 NOTE — PROGRESS NOTES
01/15/20 1100   Activity/Group Checklist   Group   (IMR/Discharge Anxiety )   Attendance Did not attend   Attendance Duration (min) 46-60   Affect/Mood GAEL

## 2020-01-15 NOTE — PROGRESS NOTES
01/15/20 0800   Team Meeting   Meeting Type Daily Rounds   Team Members Present   Team Members Present Physician;Nurse;; Other (Discipline and Name)   Physician Team Member Dr Rashaad Caballero, RN   Care Management Team Member Lily Hess   Other (Discipline and Name) Mirna Lang LCSW     Patient attending groups  Stuffed a whole roll of toilet paper in the toilet yesterday  Preoccupied with clothes and discharge  Needy and intrusive  Slept
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
## 2020-01-15 NOTE — PROGRESS NOTES
Patient declined      01/14/20 1500   Activity/Group Checklist   Group   (Recovery Workshop )   Attendance Did not attend   Attendance Duration (min) 46-60   Affect/Mood GAEL

## 2020-01-15 NOTE — PROGRESS NOTES
01/15/20 1130   Activity/Group Checklist   Group   ([facility] )   Attendance Did not attend   Attendance Duration (min) Greater than 60   Affect/Mood GAEL

## 2020-01-16 PROCEDURE — 99232 SBSQ HOSP IP/OBS MODERATE 35: CPT | Performed by: PSYCHIATRY & NEUROLOGY

## 2020-01-16 RX ADMIN — DIVALPROEX SODIUM 1000 MG: 500 TABLET, DELAYED RELEASE ORAL at 20:41

## 2020-01-16 RX ADMIN — DOCUSATE SODIUM 100 MG: 100 CAPSULE, LIQUID FILLED ORAL at 17:02

## 2020-01-16 RX ADMIN — METFORMIN HYDROCHLORIDE 500 MG: 500 TABLET ORAL at 17:02

## 2020-01-16 RX ADMIN — OXYBUTYNIN CHLORIDE 5 MG: 5 TABLET, EXTENDED RELEASE ORAL at 08:41

## 2020-01-16 RX ADMIN — OLANZAPINE 10 MG: 10 TABLET, FILM COATED ORAL at 13:00

## 2020-01-16 RX ADMIN — LEVOTHYROXINE SODIUM 75 MCG: 75 TABLET ORAL at 05:57

## 2020-01-16 RX ADMIN — ATORVASTATIN CALCIUM 10 MG: 10 TABLET, FILM COATED ORAL at 17:02

## 2020-01-16 RX ADMIN — PANTOPRAZOLE SODIUM 40 MG: 40 TABLET, DELAYED RELEASE ORAL at 05:57

## 2020-01-16 RX ADMIN — DIVALPROEX SODIUM 1000 MG: 500 TABLET, DELAYED RELEASE ORAL at 13:00

## 2020-01-16 RX ADMIN — DOCUSATE SODIUM 100 MG: 100 CAPSULE, LIQUID FILLED ORAL at 08:41

## 2020-01-16 RX ADMIN — CLOZAPINE 300 MG: 100 TABLET ORAL at 17:01

## 2020-01-16 RX ADMIN — METFORMIN HYDROCHLORIDE 500 MG: 500 TABLET ORAL at 08:41

## 2020-01-16 NOTE — PROGRESS NOTES
Aubrey Gabriel maintained on ongoing SAFE precaution without incident on this shift  Laying in bed with his eyes closed, breath even and unlabored  Q15 minutes rounding continues  No apparent distress  No behavioral noted  Will continue to monitor

## 2020-01-16 NOTE — PROGRESS NOTES
01/16/20 1100   Activity/Group Checklist   Group Other (Comment)  (IMR group)   Attendance Did not attend   Attendance Duration (min) 46-60     Patient did not attend group  Patient was encouraged to attend group, but declined

## 2020-01-16 NOTE — PLAN OF CARE
Problem: Alteration in Thoughts and Perception  Goal: Verbalize thoughts and feelings  Description  Interventions:  - Promote a nonjudgmental and trusting relationship with the patient through active listening and therapeutic communication  - Assess patient's level of functioning, behavior and potential for risk  - Engage patient in 1 on 1 interactions  - Encourage patient to express fears, feelings, frustrations, and discuss symptoms    - [place] patient to reality, help patient recognize reality-based thinking   - Administer medications as ordered and assess for potential side effects  - Provide the patient education related to the signs and symptoms of the illness and desired effects of prescribed medications  Outcome: Progressing  Goal: Agree to be compliant with medication regime, as prescribed and report medication side effects  Description  Interventions:  - Offer appropriate PRN medication and supervise ingestion; conduct AIMS, as needed   Outcome: Progressing  Goal: Attend and participate in unit activities, including therapeutic, recreational, and educational groups  Description  Interventions:  -Encourage Visitation and family involvement in care  Outcome: Progressing     Problem: Ineffective Coping  Goal: Patient/Family verbalizes awareness of resources  Outcome: Progressing  Goal: Understands least restrictive measures  Description  Interventions:  - Utilize least restrictive behavior  Outcome: Progressing     Problem: GASTROINTESTINAL - ADULT  Goal: Maintains adequate nutritional intake  Description  INTERVENTIONS:  - Monitor percentage of each meal consumed  - Identify factors contributing to decreased intake, treat as appropriate  - Assist with meals as needed  - Monitor I&O, weight, and lab values if indicated  - Obtain nutrition services referral as needed  Outcome: Progressing     Problem: Alteration in Thoughts and Perception  Goal: Treatment Goal: Gain control of psychotic behaviors/thinking, reduce/eliminate presenting symptoms and demonstrate improved reality functioning upon discharge  Outcome: Not Progressing  Goal: Refrain from acting on delusional thinking/internal stimuli  Description  Interventions:  - Monitor patient closely, per order   - Utilize least restrictive measures   - Set reasonable limits, give positive feedback for acceptable   - Administer medications as ordered and monitor of potential side effects  Outcome: Not Progressing  Goal: Recognize dysfunctional thoughts, communicate reality-based thoughts at the time of discharge  Description  Interventions:  - Provide medication and psycho-education to assist patient in compliance and developing insight into his/her illness   Outcome: Not Progressing  Goal: Complete daily ADLs, including personal hygiene independently, as able  Description  Interventions:  - Observe, teach, and assist patient with ADLS  - Monitor and promote a balance of rest/activity, with adequate nutrition and elimination   Outcome: Not Progressing   Mostly isolative to his room, sleeping in his bed  Did not get up for vitals and then refused when staff came to his room to take vitals  Very disheveled and disorganized and did not shower or do hygiene  Ate 100% of both meals  Attended fresh air group  Remains focused on discharge and continues to lack insight into his illness and the reason for his hospitalization  Advised to follow unit routine and go to all groups as being the best way to move toward discharge  No other behaviors or issues noted  Med compliant with minimal prompting  Continue to monitor
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
## 2020-01-16 NOTE — PROGRESS NOTES
01/16/20 0800   Team Meeting   Meeting Type Daily Rounds   Team Members Present   Team Members Present Nurse; Other (Discipline and Name)   Nursing Team Member Jt Magaña RN   Other (Discipline and Name) Tiny Hill LCSW     Last shower 1/14  Disheveled  Less intrusive, slept
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
## 2020-01-17 PROCEDURE — 99232 SBSQ HOSP IP/OBS MODERATE 35: CPT | Performed by: PSYCHIATRY & NEUROLOGY

## 2020-01-17 RX ADMIN — ATORVASTATIN CALCIUM 10 MG: 10 TABLET, FILM COATED ORAL at 16:38

## 2020-01-17 RX ADMIN — DOCUSATE SODIUM 100 MG: 100 CAPSULE, LIQUID FILLED ORAL at 08:43

## 2020-01-17 RX ADMIN — METFORMIN HYDROCHLORIDE 500 MG: 500 TABLET ORAL at 08:43

## 2020-01-17 RX ADMIN — PANTOPRAZOLE SODIUM 40 MG: 40 TABLET, DELAYED RELEASE ORAL at 05:56

## 2020-01-17 RX ADMIN — TRAZODONE HYDROCHLORIDE 50 MG: 50 TABLET ORAL at 21:23

## 2020-01-17 RX ADMIN — DIVALPROEX SODIUM 1000 MG: 500 TABLET, DELAYED RELEASE ORAL at 13:00

## 2020-01-17 RX ADMIN — DOCUSATE SODIUM 100 MG: 100 CAPSULE, LIQUID FILLED ORAL at 17:04

## 2020-01-17 RX ADMIN — CLOZAPINE 300 MG: 100 TABLET ORAL at 16:38

## 2020-01-17 RX ADMIN — OLANZAPINE 10 MG: 10 TABLET, FILM COATED ORAL at 13:00

## 2020-01-17 RX ADMIN — METFORMIN HYDROCHLORIDE 500 MG: 500 TABLET ORAL at 16:38

## 2020-01-17 RX ADMIN — LEVOTHYROXINE SODIUM 75 MCG: 75 TABLET ORAL at 05:56

## 2020-01-17 RX ADMIN — DIVALPROEX SODIUM 1000 MG: 500 TABLET, DELAYED RELEASE ORAL at 20:45

## 2020-01-17 RX ADMIN — OXYBUTYNIN CHLORIDE 5 MG: 5 TABLET, EXTENDED RELEASE ORAL at 08:43

## 2020-01-17 NOTE — PLAN OF CARE
Problem: RESPIRATORY - ADULT  Goal: Achieves optimal ventilation and oxygenation  Description  INTERVENTIONS:  - Assess for changes in respiratory status  - Assess for changes in mentation and behavior  - Position to facilitate oxygenation and minimize respiratory effort  - Oxygen administered by appropriate delivery if ordered  - Initiate smoking cessation education as indicated  - Encourage broncho-pulmonary hygiene including cough, deep breathe, Incentive Spirometry  - Assess the need for suctioning and aspirate as needed  - Assess and instruct to report SOB or any respiratory difficulty  - Respiratory Therapy support as indicated  Outcome: Korin Guerra maintained on ongoing SAFE precaution without incident on this shift   Laying in bed with his eyes closed, breath even and unlabored   Q15 minutes rounding continues   No apparent distress   No behavioral noted   Will continue to monitor

## 2020-01-17 NOTE — PROGRESS NOTES
01/17/20 0900   Team Meeting   Meeting Type Daily Rounds   Team Members Present   Team Members Present Physician;Nurse; Other (Discipline and Name)   Physician Team Member Dr Alexandro Oleary Team Member Magen Handy RN   Other (Discipline and Name) Shay Malloy LCSW     Patient attended [employer] group only  Disheveled  Attended [facility] and 3-11 groups  Slept

## 2020-01-17 NOTE — PLAN OF CARE
Problem: Alteration in Thoughts and Perception  Goal: Verbalize thoughts and feelings  Description  Interventions:  - Promote a nonjudgmental and trusting relationship with the patient through active listening and therapeutic communication  - Assess patient's level of functioning, behavior and potential for risk  - Engage patient in 1 on 1 interactions  - Encourage patient to express fears, feelings, frustrations, and discuss symptoms    - [place] patient to reality, help patient recognize reality-based thinking   - Administer medications as ordered and assess for potential side effects  - Provide the patient education related to the signs and symptoms of the illness and desired effects of prescribed medications  Outcome: Progressing  Goal: Agree to be compliant with medication regime, as prescribed and report medication side effects  Description  Interventions:  - Offer appropriate PRN medication and supervise ingestion; conduct AIMS, as needed   Outcome: Progressing  Goal: Attend and participate in unit activities, including therapeutic, recreational, and educational groups  Description  Interventions:  -Encourage Visitation and family involvement in care  Outcome: Progressing     Problem: Ineffective Coping  Goal: Demonstrates healthy coping skills  Outcome: Progressing  Goal: Understands least restrictive measures  Description  Interventions:  - Utilize least restrictive behavior  Outcome: Progressing     Problem: GASTROINTESTINAL - ADULT  Goal: Maintains adequate nutritional intake  Description  INTERVENTIONS:  - Monitor percentage of each meal consumed  - Identify factors contributing to decreased intake, treat as appropriate  - Assist with meals as needed  - Monitor I&O, weight, and lab values if indicated  - Obtain nutrition services referral as needed  Outcome: Ramy Bryant has been awake, alert, and visible intermittently out in the milieu  Pt went to [facility] for [facility] with staff and peers    Pt remains focused on discharge and asks when he will be discharged  Disheveled in appearance  Denies any depression or anxiety  Ate 100% supper  Watches tv at times in living room with peers  Attended and participated in evening group and had snack after  No behavioral issues  Continue to monitor/assess for any changes
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
## 2020-01-17 NOTE — PLAN OF CARE
Problem: Alteration in Thoughts and Perception  Goal: Verbalize thoughts and feelings  Description  Interventions:  - Promote a nonjudgmental and trusting relationship with the patient through active listening and therapeutic communication  - Assess patient's level of functioning, behavior and potential for risk  - Engage patient in 1 on 1 interactions  - Encourage patient to express fears, feelings, frustrations, and discuss symptoms    - [place] patient to reality, help patient recognize reality-based thinking   - Administer medications as ordered and assess for potential side effects  - Provide the patient education related to the signs and symptoms of the illness and desired effects of prescribed medications  Outcome: Progressing  Goal: Agree to be compliant with medication regime, as prescribed and report medication side effects  Description  Interventions:  - Offer appropriate PRN medication and supervise ingestion; conduct AIMS, as needed   Outcome: Progressing  Goal: Attend and participate in unit activities, including therapeutic, recreational, and educational groups  Description  Interventions:  -Encourage Visitation and family involvement in care  Outcome: Progressing     Problem: Ineffective Coping  Goal: Patient/Family verbalizes awareness of resources  Outcome: Progressing  Goal: Understands least restrictive measures  Description  Interventions:  - Utilize least restrictive behavior  Outcome: Progressing     Problem: GASTROINTESTINAL - ADULT  Goal: Maintains adequate nutritional intake  Description  INTERVENTIONS:  - Monitor percentage of each meal consumed  - Identify factors contributing to decreased intake, treat as appropriate  - Assist with meals as needed  - Monitor I&O, weight, and lab values if indicated  - Obtain nutrition services referral as needed  Outcome: Progressing  More active and visible in the milieu today  Still appears disheveled aand did not shower or do hygiene   Did not get up for breakfast but ate 100% of lunch  Was awakened for morning meds and complied after the second prompt  Once out of his room, remained awake and visible for the rest of the shift  Attended [facility] and fresh air groups  Came for his afternoon meds following lunch with only 1 prompt  No other behaviors or issues noted  Continue to monitor
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
## 2020-01-18 PROCEDURE — 99232 SBSQ HOSP IP/OBS MODERATE 35: CPT | Performed by: PHYSICIAN ASSISTANT

## 2020-01-18 RX ADMIN — METFORMIN HYDROCHLORIDE 500 MG: 500 TABLET ORAL at 17:05

## 2020-01-18 RX ADMIN — CLOZAPINE 300 MG: 100 TABLET ORAL at 17:05

## 2020-01-18 RX ADMIN — DIVALPROEX SODIUM 1000 MG: 500 TABLET, DELAYED RELEASE ORAL at 21:10

## 2020-01-18 RX ADMIN — OXYBUTYNIN CHLORIDE 5 MG: 5 TABLET, EXTENDED RELEASE ORAL at 09:04

## 2020-01-18 RX ADMIN — LEVOTHYROXINE SODIUM 75 MCG: 75 TABLET ORAL at 06:25

## 2020-01-18 RX ADMIN — DOCUSATE SODIUM 100 MG: 100 CAPSULE, LIQUID FILLED ORAL at 17:05

## 2020-01-18 RX ADMIN — METFORMIN HYDROCHLORIDE 500 MG: 500 TABLET ORAL at 09:04

## 2020-01-18 RX ADMIN — OLANZAPINE 10 MG: 10 TABLET, FILM COATED ORAL at 13:04

## 2020-01-18 RX ADMIN — PANTOPRAZOLE SODIUM 40 MG: 40 TABLET, DELAYED RELEASE ORAL at 06:25

## 2020-01-18 RX ADMIN — DIVALPROEX SODIUM 1000 MG: 500 TABLET, DELAYED RELEASE ORAL at 12:05

## 2020-01-18 RX ADMIN — DOCUSATE SODIUM 100 MG: 100 CAPSULE, LIQUID FILLED ORAL at 09:04

## 2020-01-18 RX ADMIN — ATORVASTATIN CALCIUM 10 MG: 10 TABLET, FILM COATED ORAL at 17:05

## 2020-01-18 RX ADMIN — TRAZODONE HYDROCHLORIDE 50 MG: 50 TABLET ORAL at 21:42
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
## 2020-01-18 NOTE — PROGRESS NOTES
Progress Note - Behavioral Health   Ane Phalen 48 y o  male MRN: [id number]  Unit/Bed#: [facility] 419-00 Encounter: [id number]    Assessment/Plan   Principal Problem:    Schizoaffective disorder, bipolar type ([address] )  Active Problems:    Type 2 diabetes mellitus without complication, without long-term current use of insulin (HCC)    Benign essential hypertension    Gastroesophageal reflux disease without esophagitis    Acquired hypothyroidism    Tobacco abuse      Subjective:  Am familiar with patient from seeing patient over period of few months at [facility]  Behavior appears the same  Seclusive in the morning and not invested in interview  States he feels well  Had no complaints  Appeared disheveled and states he showered last night  Continues behavior of having multiple layers of clothes on carrying possessions in hallways  Are reports patient is perseverative over going home, irritable at times, and can become argumentative  No lability today  Remains with chronic paranoid delusions  Will deny hallucinations when asked  Denies mood related symptoms  Medication compliant  Appears to be tolerating medications well without serious side effects      Current Medications:  Current Facility-Administered Medications   Medication Dose Route Frequency    acetaminophen (TYLENOL) tablet 325 mg  325 mg Oral Q6H PRN    acetaminophen (TYLENOL) tablet 650 mg  650 mg Oral Q6H PRN    acetaminophen (TYLENOL) tablet 975 mg  975 mg Oral Q8H PRN    aluminum-magnesium hydroxide-simethicone (MYLANTA) 200-200-20 mg/5 mL oral suspension 30 mL  30 mL Oral Q4H PRN    atorvastatin (LIPITOR) tablet 10 mg  10 mg Oral Daily With Dinner    benztropine (COGENTIN) injection 1 mg  1 mg Intramuscular Q6H PRN    benztropine (COGENTIN) tablet 1 mg  1 mg Oral Q6H PRN    cloZAPine (CLOZARIL) tablet 300 mg  300 mg Oral Daily    divalproex sodium (DEPAKOTE) EC tablet 1,000 mg  1,000 mg Oral BID    docusate sodium (COLACE) capsule 100 mg 100 mg Oral BID    haloperidol (HALDOL) tablet 5 mg  5 mg Oral Q6H PRN    haloperidol lactate (HALDOL) injection 5 mg  5 mg Intramuscular Q6H PRN    levothyroxine tablet 75 mcg  75 mcg Oral Early Morning    LORazepam (ATIVAN) 2 mg/mL injection 1 mg  1 mg Intramuscular Q6H PRN    LORazepam (ATIVAN) tablet 1 mg  1 mg Oral Q6H PRN    magnesium hydroxide (MILK OF MAGNESIA) 400 mg/5 mL oral suspension 30 mL  30 mL Oral Daily PRN    metFORMIN (GLUCOPHAGE) tablet 500 mg  500 mg Oral BID With Meals    nicotine polacrilex (NICORETTE) gum 2 mg  2 mg Oral Q2H PRN    OLANZapine (ZyPREXA) tablet 10 mg  10 mg Oral After Lunch    oxybutynin (DITROPAN-XL) 24 hr tablet 5 mg  5 mg Oral Daily    pantoprazole (PROTONIX) EC tablet 40 mg  40 mg Oral Early Morning    traZODone (DESYREL) tablet 50 mg  50 mg Oral HS PRN       Behavioral Health Medications: all current active meds have been reviewed and continue current psychiatric medications  Vitals:  Vitals:    01/14/20 0700   Pulse: 81   Resp: 18       Laboratory results:    I have personally reviewed all pertinent laboratory/tests results    Most Recent Labs:   Lab Results   Component Value Date    WBC 8 50 01/14/2020    RBC 5 01 01/14/2020    HGB 14 9 01/14/2020    HCT 43 6 01/14/2020     01/14/2020    RDW 13 3 01/14/2020    NEUTROABS 4 40 01/14/2020    SODIUM 141 12/24/2019    K 4 4 12/24/2019     12/24/2019    CO2 29 12/24/2019    BUN 15 12/24/2019    CREATININE 0 68 (L) 12/24/2019    GLUC 115 (H) 12/24/2019    GLUF 99 08/23/2019    CALCIUM 9 5 12/24/2019    AST 21 12/24/2019    ALT 28 12/24/2019    ALKPHOS 72 12/24/2019    TP 7 2 12/24/2019    ALB 4 2 12/24/2019    TBILI 0 30 12/24/2019    CHOLESTEROL 120 12/03/2019    HDL 28 (L) 12/03/2019    TRIG 237 (H) 12/03/2019    LDLCALC 45 12/03/2019    NONHDLC 92 12/03/2019    VALPROICTOT 50 1 12/24/2019    LITHIUM 0 6 03/27/2014    AMMONIA 32 10/08/2019    [id number] 3 810 12/24/2019    FREET4 0 93 08/23/2019 HGBA1C 6 1 12/03/2019     12/03/2019       Psychiatric Review of Systems:  Behavior over the last 24 hours:  unchanged  Sleep: hypersomnia  Appetite: normal  Medication side effects: No  ROS: no complaints, all others negative    Mental Status Evaluation:  Appearance:  disheveled   Behavior:  guarded   Speech:  loud   Mood:  euthymic   Affect:  blunted and flat   Language sparse   Thought Process:  concrete and perserverative   Thought Content:  obsessions and Chronic paranoid delusions   Perceptual Disturbances: None   Risk Potential: Denied SI/HI  Potential for aggression possible   Sensorium:  person and place   Cognition:  grossly intact   Consciousness:  awake    Recent and Remote Memory limited   Attention: attention span appeared shorter than expected for age   Insight:  limited   Judgment: limited   Gait/Station: normal gait/station and normal balance   Motor Activity: no abnormal movements     Progress Toward Goals:  Unchanged    Recommended Treatment: Continue with group therapy, milieu therapy and occupational therapy  1   Continue current medications    Risks, benefits and possible side effects of Medications:   Risks, benefits, and possible side effects of medications explained to patient and patient verbalizes understanding        Brenda Castillo PA-C

## 2020-01-18 NOTE — PLAN OF CARE
Problem: Alteration in Thoughts and Perception  Goal: Verbalize thoughts and feelings  Description  Interventions:  - Promote a nonjudgmental and trusting relationship with the patient through active listening and therapeutic communication  - Assess patient's level of functioning, behavior and potential for risk  - Engage patient in 1 on 1 interactions  - Encourage patient to express fears, feelings, frustrations, and discuss symptoms    - [place] patient to reality, help patient recognize reality-based thinking   - Administer medications as ordered and assess for potential side effects  - Provide the patient education related to the signs and symptoms of the illness and desired effects of prescribed medications  Outcome: Progressing  Goal: Agree to be compliant with medication regime, as prescribed and report medication side effects  Description  Interventions:  - Offer appropriate PRN medication and supervise ingestion; conduct AIMS, as needed   Outcome: Progressing  Goal: Attend and participate in unit activities, including therapeutic, recreational, and educational groups  Description  Interventions:  -Encourage Visitation and family involvement in care  Outcome: Progressing     Problem: Ineffective Coping  Goal: Understands least restrictive measures  Description  Interventions:  - Utilize least restrictive behavior  Outcome: Progressing     Problem: GASTROINTESTINAL - ADULT  Goal: Maintains adequate nutritional intake  Description  INTERVENTIONS:  - Monitor percentage of each meal consumed  - Identify factors contributing to decreased intake, treat as appropriate  - Assist with meals as needed  - Monitor I&O, weight, and lab values if indicated  - Obtain nutrition services referral as needed  Outcome: Silvia Ennis has been awake, alert, and visible intermittently out in the milieu  Disheveled in appearance  Compliant with scheduled meds with some prompting  Ate 100% supper    Minimal interaction noted with peers  Pt denies any depression, anxiety, si/hi, intent, plan, a/v hallucinations, and has not verbalized any delusions  Pt did not attend evening group but came out for snack after  Pt requested prn med for sleep  Trazodone 50 mg po given at 2123  Continue to monitor/assess for any changes

## 2020-01-19 PROCEDURE — 99232 SBSQ HOSP IP/OBS MODERATE 35: CPT | Performed by: PHYSICIAN ASSISTANT

## 2020-01-19 RX ADMIN — DOCUSATE SODIUM 100 MG: 100 CAPSULE, LIQUID FILLED ORAL at 08:52

## 2020-01-19 RX ADMIN — DOCUSATE SODIUM 100 MG: 100 CAPSULE, LIQUID FILLED ORAL at 17:00

## 2020-01-19 RX ADMIN — LEVOTHYROXINE SODIUM 75 MCG: 75 TABLET ORAL at 05:38

## 2020-01-19 RX ADMIN — PANTOPRAZOLE SODIUM 40 MG: 40 TABLET, DELAYED RELEASE ORAL at 05:38

## 2020-01-19 RX ADMIN — ATORVASTATIN CALCIUM 10 MG: 10 TABLET, FILM COATED ORAL at 16:59

## 2020-01-19 RX ADMIN — METFORMIN HYDROCHLORIDE 500 MG: 500 TABLET ORAL at 08:51

## 2020-01-19 RX ADMIN — METFORMIN HYDROCHLORIDE 500 MG: 500 TABLET ORAL at 16:59

## 2020-01-19 RX ADMIN — OLANZAPINE 10 MG: 10 TABLET, FILM COATED ORAL at 13:49

## 2020-01-19 RX ADMIN — OXYBUTYNIN CHLORIDE 5 MG: 5 TABLET, EXTENDED RELEASE ORAL at 08:52

## 2020-01-19 RX ADMIN — CLOZAPINE 300 MG: 100 TABLET ORAL at 16:59

## 2020-01-19 RX ADMIN — TRAZODONE HYDROCHLORIDE 50 MG: 50 TABLET ORAL at 22:41

## 2020-01-19 RX ADMIN — DIVALPROEX SODIUM 1000 MG: 500 TABLET, DELAYED RELEASE ORAL at 20:30

## 2020-01-19 RX ADMIN — DIVALPROEX SODIUM 1000 MG: 500 TABLET, DELAYED RELEASE ORAL at 12:06

## 2020-01-19 NOTE — PLAN OF CARE
Problem: Alteration in Thoughts and Perception  Goal: Refrain from acting on delusional thinking/internal stimuli  Description  Interventions:  - Monitor patient closely, per order   - Utilize least restrictive measures   - Set reasonable limits, give positive feedback for acceptable   - Administer medications as ordered and monitor of potential side effects  Outcome: Progressing     Problem: Alteration in Thoughts and Perception  Goal: Agree to be compliant with medication regime, as prescribed and report medication side effects  Description  Interventions:  - Offer appropriate PRN medication and supervise ingestion; conduct AIMS, as needed   Outcome: Not Progressing  Goal: Attend and participate in unit activities, including therapeutic, recreational, and educational groups  Description  Interventions:  -Encourage Visitation and family involvement in care  Outcome: Not Progressing  Goal: Recognize dysfunctional thoughts, communicate reality-based thoughts at the time of discharge  Description  Interventions:  - Provide medication and psycho-education to assist patient in compliance and developing insight into his/her illness   Outcome: Not Progressing  Goal: Complete daily ADLs, including personal hygiene independently, as able  Description  Interventions:  - Observe, teach, and assist patient with ADLS  - Monitor and promote a balance of rest/activity, with adequate nutrition and elimination   Outcome: Not Progressing   Patient is isolative to his room today  Sleeping most of the day  He did eventually get up to take meds but then went right back to bed  Patient did get OOB for lunch and stay up and about  He is intrusive at the desk at times regarding his discharge and wanting staff to go to the closet to get another one of his jackets although he is easily redirected  He has a very foul odor and was asked multiple times to to shower, however he continues to refuse    Attended and participated in nutrition group this shift  Continue to monitor

## 2020-01-19 NOTE — NURSING NOTE
Patient visible on unit at times this evening, mainly isolative to self in room  Patient disheveled, poor hygiene, lacks motivation no group attendance  Patient med and meal compliant, irritable, loud and disruptive this evening, punched the nurses station glass window, reported wants to be discharged; redirectable, behaviors controlled since outburst  Currently in own room sleeping, will continue to monitor

## 2020-01-19 NOTE — PROGRESS NOTES
Emilia Niru maintained on ongoing SAFE precaution without incident on this shift   Laying in bed with his eyes closed, breath even and unlabored   Q15 minutes rounding continues   No apparent distress   No behavioral noted   Will continue to monitor

## 2020-01-19 NOTE — PROGRESS NOTES
Progress Note - Behavioral Health   Jacqueline Linear 48 y o  male MRN: [id number]  [facility] Encounter: [id number]    Assessment/Plan   Principal Problem:    Schizoaffective disorder, bipolar type ([address] )  Active Problems:    Type 2 diabetes mellitus without complication, without long-term current use of insulin (HCC)    Benign essential hypertension    Gastroesophageal reflux disease without esophagitis    Acquired hypothyroidism    Tobacco abuse      Subjective:  Patient seclusive to room  Appears disheveled and has items thrown throughout his room  Reports of polydipsia at times  Requires redirection  Requires emphasis on ADLs  Remains superficial in conversation  Denies all symptoms when asked  Does get labile and agitated at times  No irritability or agitation currently  Did not appear internally preoccupied  Medication compliant  Appears to be tolerating medications well without serious side effects        Current Medications:  Current Facility-Administered Medications   Medication Dose Route Frequency    acetaminophen (TYLENOL) tablet 325 mg  325 mg Oral Q6H PRN    acetaminophen (TYLENOL) tablet 650 mg  650 mg Oral Q6H PRN    acetaminophen (TYLENOL) tablet 975 mg  975 mg Oral Q8H PRN    aluminum-magnesium hydroxide-simethicone (MYLANTA) 200-200-20 mg/5 mL oral suspension 30 mL  30 mL Oral Q4H PRN    atorvastatin (LIPITOR) tablet 10 mg  10 mg Oral Daily With Dinner    benztropine (COGENTIN) injection 1 mg  1 mg Intramuscular Q6H PRN    benztropine (COGENTIN) tablet 1 mg  1 mg Oral Q6H PRN    cloZAPine (CLOZARIL) tablet 300 mg  300 mg Oral Daily    divalproex sodium (DEPAKOTE) EC tablet 1,000 mg  1,000 mg Oral BID    docusate sodium (COLACE) capsule 100 mg  100 mg Oral BID    haloperidol (HALDOL) tablet 5 mg  5 mg Oral Q6H PRN    haloperidol lactate (HALDOL) injection 5 mg  5 mg Intramuscular Q6H PRN    levothyroxine tablet 75 mcg  75 mcg Oral Early Morning    LORazepam (ATIVAN) 2 mg/mL injection 1 mg  1 mg Intramuscular Q6H PRN    LORazepam (ATIVAN) tablet 1 mg  1 mg Oral Q6H PRN    magnesium hydroxide (MILK OF MAGNESIA) 400 mg/5 mL oral suspension 30 mL  30 mL Oral Daily PRN    metFORMIN (GLUCOPHAGE) tablet 500 mg  500 mg Oral BID With Meals    nicotine polacrilex (NICORETTE) gum 2 mg  2 mg Oral Q2H PRN    OLANZapine (ZyPREXA) tablet 10 mg  10 mg Oral After Lunch    oxybutynin (DITROPAN-XL) 24 hr tablet 5 mg  5 mg Oral Daily    pantoprazole (PROTONIX) EC tablet 40 mg  40 mg Oral Early Morning    traZODone (DESYREL) tablet 50 mg  50 mg Oral HS PRN       Behavioral Health Medications: all current active meds have been reviewed and continue current psychiatric medications  Vitals:  Vitals:    01/19/20 0732   BP: 102/70   Pulse: 80   Resp:    Temp:        Laboratory results:    I have personally reviewed all pertinent laboratory/tests results    Most Recent Labs:   Lab Results   Component Value Date    WBC 8 50 01/14/2020    RBC 5 01 01/14/2020    HGB 14 9 01/14/2020    HCT 43 6 01/14/2020     01/14/2020    RDW 13 3 01/14/2020    NEUTROABS 4 40 01/14/2020    SODIUM 141 12/24/2019    K 4 4 12/24/2019     12/24/2019    CO2 29 12/24/2019    BUN 15 12/24/2019    CREATININE 0 68 (L) 12/24/2019    GLUC 115 (H) 12/24/2019    GLUF 99 08/23/2019    CALCIUM 9 5 12/24/2019    AST 21 12/24/2019    ALT 28 12/24/2019    ALKPHOS 72 12/24/2019    TP 7 2 12/24/2019    ALB 4 2 12/24/2019    TBILI 0 30 12/24/2019    CHOLESTEROL 120 12/03/2019    HDL 28 (L) 12/03/2019    TRIG 237 (H) 12/03/2019    LDLCALC 45 12/03/2019    NONHDLC 92 12/03/2019    VALPROICTOT 50 1 12/24/2019    LITHIUM 0 6 03/27/2014    AMMONIA 32 10/08/2019    [id number] 3 810 12/24/2019    FREET4 0 93 08/23/2019    HGBA1C 6 1 12/03/2019     12/03/2019       Psychiatric Review of Systems:  Behavior over the last 24 hours:  unchanged  Sleep: hypersomnia  Appetite: normal  Medication side effects: No  ROS: no complaints, all others negative    Mental Status Evaluation:  Appearance:  disheveled   Behavior:  guarded and Seclusive   Speech:  loud   Mood:  euthymic   Affect:  constricted, blunted   Language sparse   Thought Process:  concrete and perserverative   Thought Content:  obsessions and Chronic paranoid delusions   Perceptual Disturbances: None   Risk Potential: Denied SI/hi  Potential for aggression no   Sensorium:  Person and place    Cognition:  grossly intact   Consciousness:  alert and awake    Recent and Remote Memory limited   Attention: attention span appeared shorter than expected for age   Insight:  limited   Judgment: limited   Gait/Station: normal gait/station and normal balance   Motor Activity: no abnormal movements     Progress Toward Goals:  Unchanged    Recommended Treatment: Continue with group therapy, milieu therapy and occupational therapy  1   Continue current medications    Risks, benefits and possible side effects of Medications:   Risks, benefits, and possible side effects of medications explained to patient and patient verbalizes understanding        Rochelle Junior PA-C

## 2020-01-20 PROCEDURE — 99232 SBSQ HOSP IP/OBS MODERATE 35: CPT | Performed by: PSYCHIATRY & NEUROLOGY

## 2020-01-20 RX ADMIN — DOCUSATE SODIUM 100 MG: 100 CAPSULE, LIQUID FILLED ORAL at 08:54

## 2020-01-20 RX ADMIN — LEVOTHYROXINE SODIUM 75 MCG: 75 TABLET ORAL at 06:02

## 2020-01-20 RX ADMIN — METFORMIN HYDROCHLORIDE 500 MG: 500 TABLET ORAL at 16:59

## 2020-01-20 RX ADMIN — OXYBUTYNIN CHLORIDE 5 MG: 5 TABLET, EXTENDED RELEASE ORAL at 08:54

## 2020-01-20 RX ADMIN — DIVALPROEX SODIUM 1000 MG: 500 TABLET, DELAYED RELEASE ORAL at 20:53

## 2020-01-20 RX ADMIN — ATORVASTATIN CALCIUM 10 MG: 10 TABLET, FILM COATED ORAL at 16:59

## 2020-01-20 RX ADMIN — PANTOPRAZOLE SODIUM 40 MG: 40 TABLET, DELAYED RELEASE ORAL at 06:02

## 2020-01-20 RX ADMIN — DOCUSATE SODIUM 100 MG: 100 CAPSULE, LIQUID FILLED ORAL at 17:14

## 2020-01-20 RX ADMIN — DIVALPROEX SODIUM 1000 MG: 500 TABLET, DELAYED RELEASE ORAL at 12:48

## 2020-01-20 RX ADMIN — CLOZAPINE 300 MG: 100 TABLET ORAL at 16:59

## 2020-01-20 RX ADMIN — METFORMIN HYDROCHLORIDE 500 MG: 500 TABLET ORAL at 08:54

## 2020-01-20 RX ADMIN — TRAZODONE HYDROCHLORIDE 50 MG: 50 TABLET ORAL at 21:38

## 2020-01-20 NOTE — PROGRESS NOTES
Patient 45 minutes late for group  Did not attend        01/20/20 1100   Activity/Group Checklist   Group   (IMR/Self Esteem )   Attendance Did not attend   Attendance Duration (min) Greater than 60   Affect/Mood GAEL

## 2020-01-20 NOTE — PROGRESS NOTES
01/20/20 0800   Team Meeting   Meeting Type Daily Rounds   Team Members Present   Team Members Present Physician;Nurse;; Other (Discipline and Name)   Physician Team Member Dr Tonya Langley Team Member Alonso Cabrera, RN   Care Management Team Member Lily Todd   Other (Discipline and Name) Trinidad Munoz LCSW     Patient presents disorganized and disheveled  Hasn't showered since 01/14  Slept

## 2020-01-20 NOTE — PROGRESS NOTES
01/18/20 1300   Activity/Group Checklist   Group Other (Comment)  (OPEN STUDIO/Art Therapy, Social Interaction-Free Expression)   Attendance Attended   Attendance Duration (min) 46-60   Interactions Interacted appropriately   Affect/Mood Appropriate   Goals Achieved Able to listen to others; Able to engage in interactions

## 2020-01-20 NOTE — PLAN OF CARE
Problem: Alteration in Thoughts and Perception  Goal: Agree to be compliant with medication regime, as prescribed and report medication side effects  Description  Interventions:  - Offer appropriate PRN medication and supervise ingestion; conduct AIMS, as needed   Outcome: Progressing  Goal: Complete daily ADLs, including personal hygiene independently, as able  Description  Interventions:  - Observe, teach, and assist patient with ADLS  - Monitor and promote a balance of rest/activity, with adequate nutrition and elimination   Outcome: Progressing     Problem: Alteration in Thoughts and Perception  Goal: Refrain from acting on delusional thinking/internal stimuli  Description  Interventions:  - Monitor patient closely, per order   - Utilize least restrictive measures   - Set reasonable limits, give positive feedback for acceptable   - Administer medications as ordered and monitor of potential side effects  Outcome: Not Progressing  Goal: Attend and participate in unit activities, including therapeutic, recreational, and educational groups  Description  Interventions:  -Encourage Visitation and family involvement in care  Outcome: Not Progressing  Goal: Recognize dysfunctional thoughts, communicate reality-based thoughts at the time of discharge  Description  Interventions:  - Provide medication and psycho-education to assist patient in compliance and developing insight into his/her illness   Outcome: Not Progressing   Patient is more visible on the unit this shift  He is intrusive at the desk asking about going off unit to go shopping  He is upset because he was told in treatment team that he did not earn this privilege due to his lack of group participation  When this writer tried to tell him it was time for IMR group, he stated "why should I go, I can't go shopping on Wednesday anyway"    This writer tried to tell him that if he goes to enough groups this week he can earn the privilege to go out next week and he stated "I don't care, I'm not going"  He did shower this morning after being prompted multiple times by staff  Attended and participated in spirituality group this shift  Continue to monitor
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
## 2020-01-20 NOTE — PLAN OF CARE
Problem: Alteration in Thoughts and Perception  Goal: Verbalize thoughts and feelings  Description  Interventions:  - Promote a nonjudgmental and trusting relationship with the patient through active listening and therapeutic communication  - Assess patient's level of functioning, behavior and potential for risk  - Engage patient in 1 on 1 interactions  - Encourage patient to express fears, feelings, frustrations, and discuss symptoms    - Fellows patient to reality, help patient recognize reality-based thinking   - Administer medications as ordered and assess for potential side effects  - Provide the patient education related to the signs and symptoms of the illness and desired effects of prescribed medications  Outcome: Progressing  Goal: Agree to be compliant with medication regime, as prescribed and report medication side effects  Description  Interventions:  - Offer appropriate PRN medication and supervise ingestion; conduct AIMS, as needed   Outcome: John Em has been visible on unit present with a disheveled affect and continues to make need known  Pt was compliant with med and meal regimen no prompting required  Individual continues to request to drink large quantities of water in short periods of time  No interaction with peers noted and interacts with staff when requesting fluids or to assist with calling sister  No behaviors at this time,will continue to monitor

## 2020-01-20 NOTE — PROGRESS NOTES
01/17/20 1100   Activity/Group Checklist   Group Other (Comment)  ([facility] - Weekly Goal Review)   Attendance Attended   Attendance Duration (min) 31-45   Interactions Interacted appropriately   Affect/Mood Wide   Goals Achieved Able to listen to others; Able to self-disclose; Able to recieve feedback; Able to give feedback to another;Able to manage/cope with feelings     Patient attended [facility] - Weekly Goal Review  Patient reported that he completed his goals of earning off-unit privileges, going to [employer], and listening to music  Patient participated and was respectful of peers

## 2020-01-20 NOTE — PROGRESS NOTES
Sleeping at this time, no distress noted  Safe precautions in place and monitoring continues   Awake x1 for water
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
## 2020-01-21 PROCEDURE — 99232 SBSQ HOSP IP/OBS MODERATE 35: CPT | Performed by: PSYCHIATRY & NEUROLOGY

## 2020-01-21 RX ADMIN — METFORMIN HYDROCHLORIDE 500 MG: 500 TABLET ORAL at 16:37

## 2020-01-21 RX ADMIN — OLANZAPINE 10 MG: 10 TABLET, FILM COATED ORAL at 13:29

## 2020-01-21 RX ADMIN — ATORVASTATIN CALCIUM 10 MG: 10 TABLET, FILM COATED ORAL at 16:37

## 2020-01-21 RX ADMIN — PANTOPRAZOLE SODIUM 40 MG: 40 TABLET, DELAYED RELEASE ORAL at 06:17

## 2020-01-21 RX ADMIN — DOCUSATE SODIUM 100 MG: 100 CAPSULE, LIQUID FILLED ORAL at 17:08

## 2020-01-21 RX ADMIN — DOCUSATE SODIUM 100 MG: 100 CAPSULE, LIQUID FILLED ORAL at 08:35

## 2020-01-21 RX ADMIN — DIVALPROEX SODIUM 1000 MG: 500 TABLET, DELAYED RELEASE ORAL at 20:33

## 2020-01-21 RX ADMIN — OXYBUTYNIN CHLORIDE 5 MG: 5 TABLET, EXTENDED RELEASE ORAL at 08:35

## 2020-01-21 RX ADMIN — TRAZODONE HYDROCHLORIDE 50 MG: 50 TABLET ORAL at 21:40

## 2020-01-21 RX ADMIN — DIVALPROEX SODIUM 1000 MG: 500 TABLET, DELAYED RELEASE ORAL at 11:33

## 2020-01-21 RX ADMIN — METFORMIN HYDROCHLORIDE 500 MG: 500 TABLET ORAL at 08:35

## 2020-01-21 RX ADMIN — LEVOTHYROXINE SODIUM 75 MCG: 75 TABLET ORAL at 06:17

## 2020-01-21 RX ADMIN — CLOZAPINE 300 MG: 100 TABLET ORAL at 16:37

## 2020-01-21 NOTE — PROGRESS NOTES
01/21/20 0900   Team Meeting   Meeting Type Tx Team Meeting   Initial Conference Date 01/21/20   Next Conference Date 01/28/20   Team Members Present   Team Members Present Physician;Nurse;; Other (Discipline and Name)   Physician Team Member Dr Tonya Langley Team Member Alonso Cabrera, RN   Care Management Team Member Lily Todd   Other (Discipline and Name) Trinidad Munoz LCSW   Patient/Family Present   Patient Present No   Patient's Family Present No     Patient refused to attend treatment team meeting this morning  Multiple staff members attempted to have him attend the meeting but declined  Treatment team will continue to support and encourage patient in his recovery

## 2020-01-21 NOTE — PLAN OF CARE
Problem: RESPIRATORY - ADULT  Goal: Achieves optimal ventilation and oxygenation  Description  INTERVENTIONS:  - Assess for changes in respiratory status  - Assess for changes in mentation and behavior  - Position to facilitate oxygenation and minimize respiratory effort  - Oxygen administered by appropriate delivery if ordered  - Initiate smoking cessation education as indicated  - Encourage broncho-pulmonary hygiene including cough, deep breathe, Incentive Spirometry  - Assess the need for suctioning and aspirate as needed  - Assess and instruct to report SOB or any respiratory difficulty  - Respiratory Therapy support as indicated  Outcome: Joel Gomes maintained on ongoing SAFE precaution without incident on this shift   Laying in bed with his eyes closed, breath even and unlabored   Q15 minutes rounding continues   No apparent distress   No behavioral noted   Will continue to monitor

## 2020-01-21 NOTE — PROGRESS NOTES
01/21/20 0800   Team Meeting   Meeting Type Daily Rounds   Team Members Present   Team Members Present Physician;Nurse;; Other (Discipline and Name)   Physician Team Member Dr Yuridia Bernard, RN   Care Management Team Member Lily Whitney   Other (Discipline and Name) Jamir Silva LCSW     Patient attended groups but not IMR  Showered on 1/20  Upset that he isn't able to go on the shopping trip this week  Slept

## 2020-01-21 NOTE — PROGRESS NOTES
01/21/20 1500   Activity/Group Checklist   Group   (Recovery Workshop )   Attendance Attended   Attendance Duration (min) 46-60   Interactions Interacted appropriately   Affect/Mood Appropriate;Normal range; Wide   Goals Achieved Identified feelings; Able to listen to others; Able to engage in interactions; Able to self-disclose

## 2020-01-21 NOTE — PROGRESS NOTES
Not scheduled to attend      01/21/20 1430   Activity/Group Checklist   Group   (Community Programming Wrap Up  )   Attendance Did not attend   Attendance Duration (min) 16-30   Affect/Mood GAEL
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
## 2020-01-21 NOTE — PLAN OF CARE
Problem: Alteration in Thoughts and Perception  Goal: Attend and participate in unit activities, including therapeutic, recreational, and educational groups  Description  Interventions:  -Encourage Visitation and family involvement in care  Outcome: Not Progressing     Patient did not complete Goal Card for the week of 1/20/2020

## 2020-01-21 NOTE — PROGRESS NOTES
01/21/20 1100   Activity/Group Checklist   Group Other (Comment)  (IMR)   Attendance Did not attend     Patient did not attend group  Patient came in for five minutes and left shortly after

## 2020-01-22 PROCEDURE — 99232 SBSQ HOSP IP/OBS MODERATE 35: CPT | Performed by: PSYCHIATRY & NEUROLOGY

## 2020-01-22 RX ORDER — OLANZAPINE 5 MG/1
5 TABLET ORAL
Status: DISCONTINUED | OUTPATIENT
Start: 2020-01-22 | End: 2020-03-13

## 2020-01-22 RX ORDER — CLOZAPINE 200 MG/1
200 TABLET ORAL DAILY
Status: DISCONTINUED | OUTPATIENT
Start: 2020-01-22 | End: 2020-08-07 | Stop reason: HOSPADM

## 2020-01-22 RX ADMIN — DOCUSATE SODIUM 100 MG: 100 CAPSULE, LIQUID FILLED ORAL at 17:06

## 2020-01-22 RX ADMIN — PANTOPRAZOLE SODIUM 40 MG: 40 TABLET, DELAYED RELEASE ORAL at 05:45

## 2020-01-22 RX ADMIN — METFORMIN HYDROCHLORIDE 500 MG: 500 TABLET ORAL at 10:49

## 2020-01-22 RX ADMIN — OLANZAPINE 5 MG: 5 TABLET, FILM COATED ORAL at 14:09

## 2020-01-22 RX ADMIN — DIVALPROEX SODIUM 1000 MG: 500 TABLET, DELAYED RELEASE ORAL at 14:09

## 2020-01-22 RX ADMIN — LEVOTHYROXINE SODIUM 75 MCG: 75 TABLET ORAL at 05:45

## 2020-01-22 RX ADMIN — TRAZODONE HYDROCHLORIDE 50 MG: 50 TABLET ORAL at 21:58

## 2020-01-22 RX ADMIN — DOCUSATE SODIUM 100 MG: 100 CAPSULE, LIQUID FILLED ORAL at 10:50

## 2020-01-22 RX ADMIN — DIVALPROEX SODIUM 1000 MG: 500 TABLET, DELAYED RELEASE ORAL at 20:38

## 2020-01-22 RX ADMIN — CLOZAPINE 200 MG: 200 TABLET ORAL at 16:54

## 2020-01-22 RX ADMIN — OXYBUTYNIN CHLORIDE 5 MG: 5 TABLET, EXTENDED RELEASE ORAL at 10:50

## 2020-01-22 RX ADMIN — ATORVASTATIN CALCIUM 10 MG: 10 TABLET, FILM COATED ORAL at 16:54

## 2020-01-22 RX ADMIN — METFORMIN HYDROCHLORIDE 500 MG: 500 TABLET ORAL at 16:54

## 2020-01-22 NOTE — PROGRESS NOTES
01/22/20 1100   Activity/Group Checklist   Group   (IMR/Cognitive Distortions )   Attendance Did not attend   Attendance Duration (min) 46-60   Affect/Mood GAEL

## 2020-01-22 NOTE — PROGRESS NOTES
01/22/20 0800   Team Meeting   Meeting Type Daily Rounds   Team Members Present   Team Members Present Physician;Nurse;; Other (Discipline and Name)   Physician Team Member Dr Margarita Early RN   Care Management Team Member Lily Albert   Other (Discipline and Name) Ada Gonzalez, MORGAN; MCKAYLA Bentley     Patient slept through breakfast  Disheveled  Irritated and loud at times  Went to fresh air and mass as well as evening groups  Slept
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
## 2020-01-22 NOTE — PROGRESS NOTES
~Armando maintained on ongoing SAFE precaution without incident on this shift   Laying in bed with his eyes closed, breath even and unlabored   Q15 minutes rounding continues   No apparent distress   No behavioral noted   Will continue to monitor   ~Norm has a pass to go on a [employer] from [id number]

## 2020-01-22 NOTE — PLAN OF CARE
Problem: Alteration in Thoughts and Perception  Goal: Verbalize thoughts and feelings  Description  Interventions:  - Promote a nonjudgmental and trusting relationship with the patient through active listening and therapeutic communication  - Assess patient's level of functioning, behavior and potential for risk  - Engage patient in 1 on 1 interactions  - Encourage patient to express fears, feelings, frustrations, and discuss symptoms    - [place] patient to reality, help patient recognize reality-based thinking   - Administer medications as ordered and assess for potential side effects  - Provide the patient education related to the signs and symptoms of the illness and desired effects of prescribed medications  Outcome: Progressing  Goal: Complete daily ADLs, including personal hygiene independently, as able  Description  Interventions:  - Observe, teach, and assist patient with ADLS  - Monitor and promote a balance of rest/activity, with adequate nutrition and elimination   Outcome: Progressing     Problem: Alteration in Thoughts and Perception  Goal: Treatment Goal: Gain control of psychotic behaviors/thinking, reduce/eliminate presenting symptoms and demonstrate improved reality functioning upon discharge  Outcome: Not Progressing  Goal: Refrain from acting on delusional thinking/internal stimuli  Description  Interventions:  - Monitor patient closely, per order   - Utilize least restrictive measures   - Set reasonable limits, give positive feedback for acceptable   - Administer medications as ordered and monitor of potential side effects  Outcome: Not Progressing  Goal: Agree to be compliant with medication regime, as prescribed and report medication side effects  Description  Interventions:  - Offer appropriate PRN medication and supervise ingestion; conduct AIMS, as needed   Outcome: Not Progressing  Goal: Attend and participate in unit activities, including therapeutic, recreational, and educational groups  Description  Interventions:  -Encourage Visitation and family involvement in care  Outcome: Not Progressing  Goal: Recognize dysfunctional thoughts, communicate reality-based thoughts at the time of discharge  Description  Interventions:  - Provide medication and psycho-education to assist patient in compliance and developing insight into his/her illness   Outcome: Not Progressing    Patient is irritable, disorganized, poor motivation, disheveled and lacks insight into illness  Remains focused on discharge  Had AM meds at 1100 due to patient refusal to get up and out of bed until then  He ate no breakfast and consumed 100% of lunch  Patient could not attend RA due to non compliance with AM program requirements  He did attend fresh air group after lunch  Med changes were noted with decrease Zyprexa and adding Clozaril to AM with HS Clozaril remaining the same  Neuropsychology consult is noted in orders    Will continue to monitor progress in recovery program
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
## 2020-01-22 NOTE — PROGRESS NOTES
01/22/20 1400   Activity/Group Checklist   Group   ([facility])   Attendance Did not attend   Attendance Duration (min) Greater than 60   Affect/Mood GAEL

## 2020-01-22 NOTE — PLAN OF CARE
Problem: Alteration in Thoughts and Perception  Goal: Verbalize thoughts and feelings  Description  Interventions:  - Promote a nonjudgmental and trusting relationship with the patient through active listening and therapeutic communication  - Assess patient's level of functioning, behavior and potential for risk  - Engage patient in 1 on 1 interactions  - Encourage patient to express fears, feelings, frustrations, and discuss symptoms    - [place] patient to reality, help patient recognize reality-based thinking   - Administer medications as ordered and assess for potential side effects  - Provide the patient education related to the signs and symptoms of the illness and desired effects of prescribed medications  Outcome: Progressing  Goal: Agree to be compliant with medication regime, as prescribed and report medication side effects  Description  Interventions:  - Offer appropriate PRN medication and supervise ingestion; conduct AIMS, as needed   Outcome: Progressing  Goal: Attend and participate in unit activities, including therapeutic, recreational, and educational groups  Description  Interventions:  -Encourage Visitation and family involvement in care  Outcome: Progressing     Problem: Ineffective Coping  Goal: Understands least restrictive measures  Description  Interventions:  - Utilize least restrictive behavior  Outcome: Progressing     Problem: GASTROINTESTINAL - ADULT  Goal: Maintains adequate nutritional intake  Description  INTERVENTIONS:  - Monitor percentage of each meal consumed  - Identify factors contributing to decreased intake, treat as appropriate  - Assist with meals as needed  - Monitor I&O, weight, and lab values if indicated  - Obtain nutrition services referral as needed  Outcome: Araceli Davenport has been awake, alert, and visible intermittently out in the milieu  Pt went to [facility] with staff and peers    Continues to be preoccupied with asking for his jackets and is already wearing two  Ate 100% supper  Denies any depression or anxiety  Compliant with scheduled meds without prompting  Polite and cooperative  No behavioral issues  Little interaction noted with peers  Attended and participated in evening group and had snack after  Continue to monitor/assess for any changes

## 2020-01-23 PROCEDURE — 99231 SBSQ HOSP IP/OBS SF/LOW 25: CPT | Performed by: PSYCHIATRY & NEUROLOGY

## 2020-01-23 RX ADMIN — DIVALPROEX SODIUM 1000 MG: 500 TABLET, DELAYED RELEASE ORAL at 20:44

## 2020-01-23 RX ADMIN — DOCUSATE SODIUM 100 MG: 100 CAPSULE, LIQUID FILLED ORAL at 17:03

## 2020-01-23 RX ADMIN — ATORVASTATIN CALCIUM 10 MG: 10 TABLET, FILM COATED ORAL at 17:02

## 2020-01-23 RX ADMIN — PANTOPRAZOLE SODIUM 40 MG: 40 TABLET, DELAYED RELEASE ORAL at 05:44

## 2020-01-23 RX ADMIN — CLOZAPINE 200 MG: 200 TABLET ORAL at 17:02

## 2020-01-23 RX ADMIN — OLANZAPINE 5 MG: 5 TABLET, FILM COATED ORAL at 13:00

## 2020-01-23 RX ADMIN — DIVALPROEX SODIUM 1000 MG: 500 TABLET, DELAYED RELEASE ORAL at 12:55

## 2020-01-23 RX ADMIN — TRAZODONE HYDROCHLORIDE 50 MG: 50 TABLET ORAL at 21:22

## 2020-01-23 RX ADMIN — OXYBUTYNIN CHLORIDE 5 MG: 5 TABLET, EXTENDED RELEASE ORAL at 08:05

## 2020-01-23 RX ADMIN — METFORMIN HYDROCHLORIDE 500 MG: 500 TABLET ORAL at 08:05

## 2020-01-23 RX ADMIN — DOCUSATE SODIUM 100 MG: 100 CAPSULE, LIQUID FILLED ORAL at 08:05

## 2020-01-23 RX ADMIN — LEVOTHYROXINE SODIUM 75 MCG: 75 TABLET ORAL at 05:44

## 2020-01-23 RX ADMIN — METFORMIN HYDROCHLORIDE 500 MG: 500 TABLET ORAL at 17:03

## 2020-01-23 NOTE — PROGRESS NOTES
Denae Post maintained on ongoing SAFE precaution without incident on this shift   Laying in bed with his eyes closed, breath even and unlabored   Q15 minutes rounding continues   Multiple trips to the nurse's station station for water  Restlessness noted  Ambulating on the unit softly mumbling  No apparent distress   No behavioral noted   Will continue to monitor

## 2020-01-23 NOTE — PROGRESS NOTES
01/23/20 0800   Team Meeting   Meeting Type Daily Rounds   Team Members Present   Team Members Present Physician;Nurse;; Other (Discipline and Name)   Physician Team Member Dr Alexandro Oleary Team Member Magen Handy RN   Care Management Team Member Lily Helms   Other (Discipline and Name) Shay Malloy, RACHELEW; Lita Cohen, CPS     No behavioral changes  Fresh air only  Slept

## 2020-01-23 NOTE — PLAN OF CARE
Problem: Alteration in Thoughts and Perception  Goal: Refrain from acting on delusional thinking/internal stimuli  Description  Interventions:  - Monitor patient closely, per order   - Utilize least restrictive measures   - Set reasonable limits, give positive feedback for acceptable   - Administer medications as ordered and monitor of potential side effects  Outcome: Progressing  Goal: Agree to be compliant with medication regime, as prescribed and report medication side effects  Description  Interventions:  - Offer appropriate PRN medication and supervise ingestion; conduct AIMS, as needed   Outcome: Progressing   Patient is more visible on the unit this shift  He is intrusive at the desk at times but is redirectable  Attended and participated in spirituality, journaling, IMR and fresh air group this shift  He did meet with the neuropsychologist this morning, awaiting for the consult to be completed in EPIC  Continue to monitor
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
## 2020-01-23 NOTE — PLAN OF CARE
Problem: Alteration in Thoughts and Perception  Goal: Verbalize thoughts and feelings  Description  Interventions:  - Promote a nonjudgmental and trusting relationship with the patient through active listening and therapeutic communication  - Assess patient's level of functioning, behavior and potential for risk  - Engage patient in 1 on 1 interactions  - Encourage patient to express fears, feelings, frustrations, and discuss symptoms    - [place] patient to reality, help patient recognize reality-based thinking   - Administer medications as ordered and assess for potential side effects  - Provide the patient education related to the signs and symptoms of the illness and desired effects of prescribed medications  Outcome: Progressing  Goal: Agree to be compliant with medication regime, as prescribed and report medication side effects  Description  Interventions:  - Offer appropriate PRN medication and supervise ingestion; conduct AIMS, as needed   Outcome: Progressing  Goal: Attend and participate in unit activities, including therapeutic, recreational, and educational groups  Description  Interventions:  -Encourage Visitation and family involvement in care  Outcome: Progressing  Goal: Complete daily ADLs, including personal hygiene independently, as able  Description  Interventions:  - Observe, teach, and assist patient with ADLS  - Monitor and promote a balance of rest/activity, with adequate nutrition and elimination   Outcome: Progressing     Problem: Ineffective Coping  Goal: Understands least restrictive measures  Description  Interventions:  - Utilize least restrictive behavior  Outcome: Progressing     Problem: GASTROINTESTINAL - ADULT  Goal: Maintains adequate nutritional intake  Description  INTERVENTIONS:  - Monitor percentage of each meal consumed  - Identify factors contributing to decreased intake, treat as appropriate  - Assist with meals as needed  - Monitor I&O, weight, and lab values if indicated  - Obtain nutrition services referral as needed  Outcome: Malgorzata Gill has been awake, alert, and visible intermittently out in the milieu  Ate 100% supper  Needs to be redirected from drinking so much water  Compliant with scheduled meds with some prompting  Little interaction noted with peers  Disheveled in appearance and dressed in layers  Attended evening group but only briefly and then left  Denies any depression, anxiety, si/hi, intent, plan, a/v hallucinations, and has not verbalized any delusions  Came out and had snack after group  Continue to monitor/assess for any changes
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
## 2020-01-23 NOTE — PROGRESS NOTES
01/23/20 1100   Activity/Group Checklist   Group   (IMR/Effective Decision Making )   Attendance Attended   Attendance Duration (min) 46-60   Interactions Did not interact   Affect/Mood Appropriate   Goals Achieved Able to listen to others

## 2020-01-23 NOTE — PLAN OF CARE
Problem: Alteration in Thoughts and Perception  Goal: Attend and participate in unit activities, including therapeutic, recreational, and educational groups  Description  Interventions:  -Encourage Visitation and family involvement in care  1/23/2020 1508 by Etienne Vivas  Outcome: Progressing  1/23/2020 1508 by Etienne Vivas  Outcome: Progressing   Patient has struggle to attend therapeutic groups since admission however, was able to obtain a 48% group attendance and attended 1 out of 4 expected IMR groups  Patient continues to focus on discharge and trips into the community  Patient remains disheveled and in numerous layers of clothing  Patient received assistance from this writer in reading the group schedule by the Nurses station so he may better understand and follow it  Patient always is polite to this writer and will engage in group settings at times  Patient will continued to be supported and encouraged to attend groups  Patient completed Peer Assessment and writer will assist in the completion of a WRAP Plan and [facility] Relapse Prevention plan
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
## 2020-01-24 PROCEDURE — 99232 SBSQ HOSP IP/OBS MODERATE 35: CPT | Performed by: PSYCHIATRY & NEUROLOGY

## 2020-01-24 RX ADMIN — ATORVASTATIN CALCIUM 10 MG: 10 TABLET, FILM COATED ORAL at 16:51

## 2020-01-24 RX ADMIN — METFORMIN HYDROCHLORIDE 500 MG: 500 TABLET ORAL at 16:51

## 2020-01-24 RX ADMIN — ALUMINUM HYDROXIDE, MAGNESIUM HYDROXIDE, AND SIMETHICONE 30 ML: 200; 200; 20 SUSPENSION ORAL at 23:52

## 2020-01-24 RX ADMIN — DOCUSATE SODIUM 100 MG: 100 CAPSULE, LIQUID FILLED ORAL at 08:40

## 2020-01-24 RX ADMIN — TRAZODONE HYDROCHLORIDE 50 MG: 50 TABLET ORAL at 21:27

## 2020-01-24 RX ADMIN — DIVALPROEX SODIUM 1000 MG: 500 TABLET, DELAYED RELEASE ORAL at 12:57

## 2020-01-24 RX ADMIN — LEVOTHYROXINE SODIUM 75 MCG: 75 TABLET ORAL at 05:45

## 2020-01-24 RX ADMIN — DOCUSATE SODIUM 100 MG: 100 CAPSULE, LIQUID FILLED ORAL at 17:07

## 2020-01-24 RX ADMIN — PANTOPRAZOLE SODIUM 40 MG: 40 TABLET, DELAYED RELEASE ORAL at 05:45

## 2020-01-24 RX ADMIN — DIVALPROEX SODIUM 1000 MG: 500 TABLET, DELAYED RELEASE ORAL at 20:39

## 2020-01-24 RX ADMIN — METFORMIN HYDROCHLORIDE 500 MG: 500 TABLET ORAL at 08:40

## 2020-01-24 RX ADMIN — OLANZAPINE 5 MG: 5 TABLET, FILM COATED ORAL at 13:01

## 2020-01-24 RX ADMIN — CLOZAPINE 200 MG: 200 TABLET ORAL at 16:51

## 2020-01-24 RX ADMIN — OXYBUTYNIN CHLORIDE 5 MG: 5 TABLET, EXTENDED RELEASE ORAL at 08:40
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
## 2020-01-24 NOTE — PLAN OF CARE
Problem: Alteration in Thoughts and Perception  Goal: Verbalize thoughts and feelings  Description  Interventions:  - Promote a nonjudgmental and trusting relationship with the patient through active listening and therapeutic communication  - Assess patient's level of functioning, behavior and potential for risk  - Engage patient in 1 on 1 interactions  - Encourage patient to express fears, feelings, frustrations, and discuss symptoms    - [place] patient to reality, help patient recognize reality-based thinking   - Administer medications as ordered and assess for potential side effects  - Provide the patient education related to the signs and symptoms of the illness and desired effects of prescribed medications  Outcome: Progressing  Goal: Agree to be compliant with medication regime, as prescribed and report medication side effects  Description  Interventions:  - Offer appropriate PRN medication and supervise ingestion; conduct AIMS, as needed   Outcome: Progressing  Goal: Attend and participate in unit activities, including therapeutic, recreational, and educational groups  Description  Interventions:  -Encourage Visitation and family involvement in care  Outcome: Progressing  Goal: Complete daily ADLs, including personal hygiene independently, as able  Description  Interventions:  - Observe, teach, and assist patient with ADLS  - Monitor and promote a balance of rest/activity, with adequate nutrition and elimination   Outcome: Progressing     Problem: Ineffective Coping  Goal: Understands least restrictive measures  Description  Interventions:  - Utilize least restrictive behavior  Outcome: Progressing     Problem: GASTROINTESTINAL - ADULT  Goal: Maintains adequate nutritional intake  Description  INTERVENTIONS:  - Monitor percentage of each meal consumed  - Identify factors contributing to decreased intake, treat as appropriate  - Assist with meals as needed  - Monitor I&O, weight, and lab values if indicated  - Obtain nutrition services referral as needed  Outcome: Norma Johns has been awake, alert, and visible intermittently out in the milieu  Pt completed his daily ADL's today  Pt remains preoccupied with asking when he will be discharged  Pt encouraged to focus on achieving his discharge goals and to talk to team also  Compliant with scheduled meds with little prompting  Some interaction noted with peers  Ate 100% supper  Walks around unit at times  Remains disheveled in appearance  Attended and participated in evening group and had snack after  Pt requested prn for sleep  Trazodone 50 mg po given at 2122  Continue to monitor/assess for any changes

## 2020-01-24 NOTE — PROGRESS NOTES
01/24/20 1000   Team Meeting   Meeting Type Daily Rounds   Team Members Present   Team Members Present Physician;Nurse;; Other (Discipline and Name)   Physician Team Member Dr Jeimy Manrique Team Member Lisa Gallegos RN   Care Management Team Member Des Starkey, [address]   Other (Discipline and Name) Jeneal Payment, LCSW     Patient is attending some groups  Presents disheveled and intrusive  Preoccupied with passes and discharge  Showered  Slept

## 2020-01-24 NOTE — PLAN OF CARE
Problem: Alteration in Thoughts and Perception  Goal: Refrain from acting on delusional thinking/internal stimuli  Description  Interventions:  - Monitor patient closely, per order   - Utilize least restrictive measures   - Set reasonable limits, give positive feedback for acceptable   - Administer medications as ordered and monitor of potential side effects  Outcome: Progressing  Goal: Agree to be compliant with medication regime, as prescribed and report medication side effects  Description  Interventions:  - Offer appropriate PRN medication and supervise ingestion; conduct AIMS, as needed   Outcome: Progressing   Patient is more visible on the unit this shift  He is intrusive at the desk at times but is redirectable  Attended and participated in [facility] [place] group this shift  He continues to be preoccupied about his discharge  This writer explained the program expectations, patient verbalized understanding    Continue to monitor

## 2020-01-25 RX ADMIN — OXYBUTYNIN CHLORIDE 5 MG: 5 TABLET, EXTENDED RELEASE ORAL at 09:28

## 2020-01-25 RX ADMIN — PANTOPRAZOLE SODIUM 40 MG: 40 TABLET, DELAYED RELEASE ORAL at 05:50

## 2020-01-25 RX ADMIN — METFORMIN HYDROCHLORIDE 500 MG: 500 TABLET ORAL at 09:28

## 2020-01-25 RX ADMIN — TRAZODONE HYDROCHLORIDE 50 MG: 50 TABLET ORAL at 21:40

## 2020-01-25 RX ADMIN — DIVALPROEX SODIUM 1000 MG: 500 TABLET, DELAYED RELEASE ORAL at 13:01

## 2020-01-25 RX ADMIN — DIVALPROEX SODIUM 1000 MG: 500 TABLET, DELAYED RELEASE ORAL at 20:58

## 2020-01-25 RX ADMIN — DOCUSATE SODIUM 100 MG: 100 CAPSULE, LIQUID FILLED ORAL at 09:28

## 2020-01-25 RX ADMIN — DOCUSATE SODIUM 100 MG: 100 CAPSULE, LIQUID FILLED ORAL at 17:10

## 2020-01-25 RX ADMIN — ATORVASTATIN CALCIUM 10 MG: 10 TABLET, FILM COATED ORAL at 16:49

## 2020-01-25 RX ADMIN — METFORMIN HYDROCHLORIDE 500 MG: 500 TABLET ORAL at 16:49

## 2020-01-25 RX ADMIN — OLANZAPINE 5 MG: 5 TABLET, FILM COATED ORAL at 13:01

## 2020-01-25 RX ADMIN — CLOZAPINE 200 MG: 200 TABLET ORAL at 16:49

## 2020-01-25 RX ADMIN — LEVOTHYROXINE SODIUM 75 MCG: 75 TABLET ORAL at 05:50
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
## 2020-01-25 NOTE — PROGRESS NOTES
Nazario Liner maintained on ongoing SAFE precaution without incident on this shift   Laying in bed with his eyes closed, breath even and unlabored   Q15 minutes rounding continues   Multiple trips to the nurse's station station for water  PRN Mylanta 30ml rendered at 2352 for indigestion  PRN was effective for reason given,   Restlessness noted  Ambulating on the unit softly mumbling  Awake most of the night  No apparent distress   No behavioral noted   Will continue to monitor
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
## 2020-01-25 NOTE — PLAN OF CARE
Problem: Alteration in Thoughts and Perception  Goal: Agree to be compliant with medication regime, as prescribed and report medication side effects  Description  Interventions:  - Offer appropriate PRN medication and supervise ingestion; conduct AIMS, as needed   Outcome: Progressing  Goal: Attend and participate in unit activities, including therapeutic, recreational, and educational groups  Description  Interventions:  -Encourage Visitation and family involvement in care  Outcome: Progressing  Goal: Complete daily ADLs, including personal hygiene independently, as able  Description  Interventions:  - Observe, teach, and assist patient with ADLS  - Monitor and promote a balance of rest/activity, with adequate nutrition and elimination   Outcome: Progressing     Problem: Alteration in Thoughts and Perception  Goal: Treatment Goal: Gain control of psychotic behaviors/thinking, reduce/eliminate presenting symptoms and demonstrate improved reality functioning upon discharge  Outcome: Not Progressing  Goal: Refrain from acting on delusional thinking/internal stimuli  Description  Interventions:  - Monitor patient closely, per order   - Utilize least restrictive measures   - Set reasonable limits, give positive feedback for acceptable   - Administer medications as ordered and monitor of potential side effects  Outcome: Not Progressing     Pt visible on unit, social with peers and staff  Pt continues to be intrusive at nurses station, preoccupied with discharge  Pt is compliant with ADL's however, continues to appear disheveled, dressing in layers of clothing, wearing some form of clothing/hat on his head resembling a pirate hat  Pt participated in group and did very well in group with peers today with encouragement  Pt compliant with meds and meals without prompting and had snack  Will continue to monitor

## 2020-01-26 RX ADMIN — METFORMIN HYDROCHLORIDE 500 MG: 500 TABLET ORAL at 16:37

## 2020-01-26 RX ADMIN — OLANZAPINE 5 MG: 5 TABLET, FILM COATED ORAL at 13:00

## 2020-01-26 RX ADMIN — METFORMIN HYDROCHLORIDE 500 MG: 500 TABLET ORAL at 09:01

## 2020-01-26 RX ADMIN — DOCUSATE SODIUM 100 MG: 100 CAPSULE, LIQUID FILLED ORAL at 09:01

## 2020-01-26 RX ADMIN — OXYBUTYNIN CHLORIDE 5 MG: 5 TABLET, EXTENDED RELEASE ORAL at 09:01

## 2020-01-26 RX ADMIN — DIVALPROEX SODIUM 1000 MG: 500 TABLET, DELAYED RELEASE ORAL at 20:25

## 2020-01-26 RX ADMIN — CLOZAPINE 200 MG: 200 TABLET ORAL at 16:37

## 2020-01-26 RX ADMIN — DOCUSATE SODIUM 100 MG: 100 CAPSULE, LIQUID FILLED ORAL at 17:04

## 2020-01-26 RX ADMIN — PANTOPRAZOLE SODIUM 40 MG: 40 TABLET, DELAYED RELEASE ORAL at 06:20

## 2020-01-26 RX ADMIN — ATORVASTATIN CALCIUM 10 MG: 10 TABLET, FILM COATED ORAL at 16:37

## 2020-01-26 RX ADMIN — LEVOTHYROXINE SODIUM 75 MCG: 75 TABLET ORAL at 06:20

## 2020-01-26 RX ADMIN — DIVALPROEX SODIUM 1000 MG: 500 TABLET, DELAYED RELEASE ORAL at 13:00

## 2020-01-26 RX ADMIN — TRAZODONE HYDROCHLORIDE 50 MG: 50 TABLET ORAL at 23:41

## 2020-01-26 NOTE — PROGRESS NOTES
01/25/20 1300   Activity/Group Checklist   Group Other (Comment)  (OPEN STUDIO/Art Therapy, Social Interaction-Expression)   Attendance Attended   Attendance Duration (min) 46-60   Interactions Interacted appropriately   Affect/Mood Appropriate   Goals Achieved Able to listen to others; Able to engage in interactions

## 2020-01-26 NOTE — PLAN OF CARE
Problem: Alteration in Thoughts and Perception  Goal: Verbalize thoughts and feelings  Description  Interventions:  - Promote a nonjudgmental and trusting relationship with the patient through active listening and therapeutic communication  - Assess patient's level of functioning, behavior and potential for risk  - Engage patient in 1 on 1 interactions  - Encourage patient to express fears, feelings, frustrations, and discuss symptoms    - [place] patient to reality, help patient recognize reality-based thinking   - Administer medications as ordered and assess for potential side effects  - Provide the patient education related to the signs and symptoms of the illness and desired effects of prescribed medications  Outcome: Progressing  Goal: Agree to be compliant with medication regime, as prescribed and report medication side effects  Description  Interventions:  - Offer appropriate PRN medication and supervise ingestion; conduct AIMS, as needed   Outcome: Progressing  Goal: Attend and participate in unit activities, including therapeutic, recreational, and educational groups  Description  Interventions:  -Encourage Visitation and family involvement in care  Outcome: Progressing  Goal: Complete daily ADLs, including personal hygiene independently, as able  Description  Interventions:  - Observe, teach, and assist patient with ADLS  - Monitor and promote a balance of rest/activity, with adequate nutrition and elimination   Outcome: Progressing     Problem: Ineffective Coping  Goal: Identifies ineffective coping skills  Outcome: Progressing  Goal: Demonstrates healthy coping skills  Outcome: Progressing  Goal: Understands least restrictive measures  Description  Interventions:  - Utilize least restrictive behavior  Outcome: Progressing     Problem: GASTROINTESTINAL - ADULT  Goal: Maintains adequate nutritional intake  Description  INTERVENTIONS:  - Monitor percentage of each meal consumed  - Identify factors contributing to decreased intake, treat as appropriate  - Assist with meals as needed  - Monitor I&O, weight, and lab values if indicated  - Obtain nutrition services referral as needed  Outcome: Michelle Bentley has been awake, alert, and visible intermittently out in the milieu  Asking staff to assist with making phone calls to sister  Remains preoccupied with asking when he will be discharged  Disheveled in appearance and wearing layered clothing  Ate 100% supper  Attended and participated in evening group and had snack after  Compliant with scheduled meds with some prompting  Social with select peers at times  Pt requested prn for sleep  Trazodone 50 mg po given at 2140  Continue to monitor/assess for any changes

## 2020-01-26 NOTE — PLAN OF CARE
Problem: Alteration in Thoughts and Perception  Goal: Treatment Goal: Gain control of psychotic behaviors/thinking, reduce/eliminate presenting symptoms and demonstrate improved reality functioning upon discharge  Outcome: Progressing  Goal: Verbalize thoughts and feelings  Description  Interventions:  - Promote a nonjudgmental and trusting relationship with the patient through active listening and therapeutic communication  - Assess patient's level of functioning, behavior and potential for risk  - Engage patient in 1 on 1 interactions  - Encourage patient to express fears, feelings, frustrations, and discuss symptoms    - [place] patient to reality, help patient recognize reality-based thinking   - Administer medications as ordered and assess for potential side effects  - Provide the patient education related to the signs and symptoms of the illness and desired effects of prescribed medications  Outcome: Progressing  Goal: Refrain from acting on delusional thinking/internal stimuli  Description  Interventions:  - Monitor patient closely, per order   - Utilize least restrictive measures   - Set reasonable limits, give positive feedback for acceptable   - Administer medications as ordered and monitor of potential side effects  Outcome: Progressing  Goal: Agree to be compliant with medication regime, as prescribed and report medication side effects  Description  Interventions:  - Offer appropriate PRN medication and supervise ingestion; conduct AIMS, as needed   Outcome: Progressing  Goal: Attend and participate in unit activities, including therapeutic, recreational, and educational groups  Description  Interventions:  -Encourage Visitation and family involvement in care  Outcome: Progressing  Goal: Recognize dysfunctional thoughts, communicate reality-based thoughts at the time of discharge  Description  Interventions:  - Provide medication and psycho-education to assist patient in compliance and developing insight into his/her illness   Outcome: Progressing  Goal: Complete daily ADLs, including personal hygiene independently, as able  Description  Interventions:  - Observe, teach, and assist patient with ADLS  - Monitor and promote a balance of rest/activity, with adequate nutrition and elimination   Outcome: Progressing     Problem: Ineffective Coping  Goal: Identifies ineffective coping skills  Outcome: Progressing  Goal: Identifies healthy coping skills  Outcome: Progressing  Goal: Demonstrates healthy coping skills  Outcome: Progressing  Goal: Patient/Family participate in treatment and DC plans  Description  Interventions:  - Provide therapeutic environment  Outcome: Progressing  Goal: Patient/Family verbalizes awareness of resources  Outcome: Progressing  Goal: Understands least restrictive measures  Description  Interventions:  - Utilize least restrictive behavior  Outcome: Progressing     Problem: RESPIRATORY - ADULT  Goal: Achieves optimal ventilation and oxygenation  Description  INTERVENTIONS:  - Assess for changes in respiratory status  - Assess for changes in mentation and behavior  - Position to facilitate oxygenation and minimize respiratory effort  - Oxygen administered by appropriate delivery if ordered  - Initiate smoking cessation education as indicated  - Encourage broncho-pulmonary hygiene including cough, deep breathe, Incentive Spirometry  - Assess the need for suctioning and aspirate as needed  - Assess and instruct to report SOB or any respiratory difficulty  - Respiratory Therapy support as indicated  Outcome: Progressing     Problem: GASTROINTESTINAL - ADULT  Goal: Minimal or absence of nausea and/or vomiting  Description  INTERVENTIONS:  - Administer IV fluids if ordered to ensure adequate hydration  - Maintain NPO status until nausea and vomiting are resolved  - Nasogastric tube if ordered  - Administer ordered antiemetic medications as needed  - Provide nonpharmacologic comfort measures as appropriate  - Advance diet as tolerated, if ordered  - Consider nutrition services referral to assist patient with adequate nutrition and appropriate food choices  Outcome: Progressing  Goal: Maintains or returns to baseline bowel function  Description  INTERVENTIONS:  - Assess bowel function  - Encourage oral fluids to ensure adequate hydration  - Administer IV fluids if ordered to ensure adequate hydration  - Administer ordered medications as needed  - Encourage mobilization and activity  - Consider nutritional services referral to assist patient with adequate nutrition and appropriate food choices  Outcome: Progressing  Goal: Maintains adequate nutritional intake  Description  INTERVENTIONS:  - Monitor percentage of each meal consumed  - Identify factors contributing to decreased intake, treat as appropriate  - Assist with meals as needed  - Monitor I&O, weight, and lab values if indicated  - Obtain nutrition services referral as needed  Outcome: Progressing     Problem: METABOLIC, FLUID AND ELECTROLYTES - ADULT  Goal: Glucose maintained within target range  Description  INTERVENTIONS:  - Monitor Blood Glucose as ordered  - Assess for signs and symptoms of hyperglycemia and hypoglycemia  - Administer ordered medications to maintain glucose within target range  - Assess nutritional intake and initiate nutrition service referral as needed  Outcome: Progressing     Problem: DISCHARGE PLANNING  Goal: Discharge to home or other facility with appropriate resources  Description    CASE MANAGEMENT INTERVENTIONS:  - Conduct assessment to determine patient/family and health care team treatment goals, and need for post-acute services based on payer coverage, community resources, patient preferences and barriers to discharge    - Address psychosocial, clinical, and financial barriers to discharge as identified in assessment in conjunction with the patient/family and health care team   - Assist the patient in reintegration back into the community by removing barriers which may hinder a successful discharge once deemed stable  - Arrange appropriate level of post-acute services according to patient's needs and preference and payer coverage in collaboration with the physician and health care team   - Communicate with and update the patient/family, physician, and health care team regarding progress on the discharge plan  - Arrange appropriate transportation to post-acute venues  Outcome: Progressing    Patient is polite, pleasant and cooperative  Non-compliant with out of bed for vitals  Compliant with out of bed for breakfast, lunch and meds  Patient is preoccupied with clothing, needing more to layer, obsessed with drinking water  Place on schedule of hourly H20 refills  Staff splitting to get water more often  Patient is easily redirectable  Compliant with lunch and afternoon meds  Ate 100% of both meals  Denies depression, anxiety, SI and HI  No c/o pain  Patient did not attend groups    Will continue to monitor progress in recovery program

## 2020-01-26 NOTE — PROGRESS NOTES
Sheila Green maintained on ongoing SAFE precaution without incident on this shift   Laying in bed with his eyes closed, breath even and unlabored   Q15 minutes rounding continues  No apparent distress   No behavioral noted   Will continue to monitor

## 2020-01-27 RX ADMIN — LEVOTHYROXINE SODIUM 75 MCG: 75 TABLET ORAL at 06:05

## 2020-01-27 RX ADMIN — METFORMIN HYDROCHLORIDE 500 MG: 500 TABLET ORAL at 09:13

## 2020-01-27 RX ADMIN — ATORVASTATIN CALCIUM 10 MG: 10 TABLET, FILM COATED ORAL at 16:56

## 2020-01-27 RX ADMIN — DIVALPROEX SODIUM 1000 MG: 500 TABLET, DELAYED RELEASE ORAL at 20:40

## 2020-01-27 RX ADMIN — PANTOPRAZOLE SODIUM 40 MG: 40 TABLET, DELAYED RELEASE ORAL at 06:05

## 2020-01-27 RX ADMIN — DIVALPROEX SODIUM 1000 MG: 500 TABLET, DELAYED RELEASE ORAL at 13:01

## 2020-01-27 RX ADMIN — OLANZAPINE 5 MG: 5 TABLET, FILM COATED ORAL at 13:01

## 2020-01-27 RX ADMIN — CLOZAPINE 200 MG: 200 TABLET ORAL at 16:56

## 2020-01-27 RX ADMIN — OXYBUTYNIN CHLORIDE 5 MG: 5 TABLET, EXTENDED RELEASE ORAL at 09:14

## 2020-01-27 RX ADMIN — DOCUSATE SODIUM 100 MG: 100 CAPSULE, LIQUID FILLED ORAL at 17:01

## 2020-01-27 RX ADMIN — DOCUSATE SODIUM 100 MG: 100 CAPSULE, LIQUID FILLED ORAL at 09:13

## 2020-01-27 RX ADMIN — METFORMIN HYDROCHLORIDE 500 MG: 500 TABLET ORAL at 16:56

## 2020-01-27 NOTE — PLAN OF CARE
Problem: Alteration in Thoughts and Perception  Goal: Verbalize thoughts and feelings  Description  Interventions:  - Promote a nonjudgmental and trusting relationship with the patient through active listening and therapeutic communication  - Assess patient's level of functioning, behavior and potential for risk  - Engage patient in 1 on 1 interactions  - Encourage patient to express fears, feelings, frustrations, and discuss symptoms    - [place] patient to reality, help patient recognize reality-based thinking   - Administer medications as ordered and assess for potential side effects  - Provide the patient education related to the signs and symptoms of the illness and desired effects of prescribed medications  Outcome: Progressing  Goal: Refrain from acting on delusional thinking/internal stimuli  Description  Interventions:  - Monitor patient closely, per order   - Utilize least restrictive measures   - Set reasonable limits, give positive feedback for acceptable   - Administer medications as ordered and monitor of potential side effects  Outcome: Progressing  Goal: Agree to be compliant with medication regime, as prescribed and report medication side effects  Description  Interventions:  - Offer appropriate PRN medication and supervise ingestion; conduct AIMS, as needed   Outcome: Progressing  Goal: Attend and participate in unit activities, including therapeutic, recreational, and educational groups  Description  Interventions:  -Encourage Visitation and family involvement in care  Outcome: Progressing     Problem: Ineffective Coping  Goal: Demonstrates healthy coping skills  Outcome: Progressing  Goal: Understands least restrictive measures  Description  Interventions:  - Utilize least restrictive behavior  Outcome: Progressing     Problem: GASTROINTESTINAL - ADULT  Goal: Maintains adequate nutritional intake  Description  INTERVENTIONS:  - Monitor percentage of each meal consumed  - Identify factors contributing to decreased intake, treat as appropriate  - Assist with meals as needed  - Monitor I&O, weight, and lab values if indicated  - Obtain nutrition services referral as needed  Outcome: Malgorzata Gill has been awake, alert, and visible intermittently out in the milieu  Disheveled in appearance and dressed in layered clothing  Ate 100% supper  Brother in to visit with good interaction noted  Resting in bed at intervals  Pt di attended/participated in evening group but came out for snack after  Compliant with scheduled meds with some prompting  Continues to be preoccupied with drinking water and asking when he will be discharged  Will monitor/assess for any changes
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
## 2020-01-27 NOTE — PROGRESS NOTES
01/27/20 0800   Team Meeting   Meeting Type Daily Rounds   Team Members Present   Team Members Present Physician;Nurse;; Other (Discipline and Name)   Physician Team Member Dr Stephani Najera, RN   Care Management Team Member Lily Garcia   Other (Discipline and Name) Rolando Gonzalez LCSW     Neuropsych consult completed  Big brother, Raymond Seay, visited  Staff monitoring how much water he takes  No groups  Showered on 1/25  Slept

## 2020-01-27 NOTE — PLAN OF CARE
Problem: Alteration in Thoughts and Perception  Goal: Attend and participate in unit activities, including therapeutic, recreational, and educational groups  Description  Interventions:  -Encourage Visitation and family involvement in care  Outcome: Not Progressing   Patient did not complete weekly Goal Card for the week of 1/27/2020

## 2020-01-27 NOTE — PROGRESS NOTES
Patient declined      01/27/20 1000   Activity/Group Checklist   Group   (IMR/Weekly Goal Cards)   Attendance Did not attend   Attendance Duration (min) 31-45   Affect/Mood GAEL

## 2020-01-27 NOTE — PROGRESS NOTES
Maryan Guillen maintained on ongoing SAFE precaution without incident on this shift   Laying in bed with his eyes closed, breath even and unlabored at the onset of the shift     Q15 minutes rounding continues   Multiple trips to the nurse's station station for water  Restlessness noted  Awake most of the nigh, mostly seen in his room  t   No apparent distress   No behavioral noted   Will continue to monitor

## 2020-01-27 NOTE — PLAN OF CARE
Problem: Alteration in Thoughts and Perception  Goal: Refrain from acting on delusional thinking/internal stimuli  Description  Interventions:  - Monitor patient closely, per order   - Utilize least restrictive measures   - Set reasonable limits, give positive feedback for acceptable   - Administer medications as ordered and monitor of potential side effects  Outcome: Progressing  Goal: Agree to be compliant with medication regime, as prescribed and report medication side effects  Description  Interventions:  - Offer appropriate PRN medication and supervise ingestion; conduct AIMS, as needed   Outcome: Progressing     Problem: Alteration in Thoughts and Perception  Goal: Attend and participate in unit activities, including therapeutic, recreational, and educational groups  Description  Interventions:  -Encourage Visitation and family involvement in care  Outcome: Not Progressing   Patient is noted to be in bed napping most of this shift  He got OOB for meds and meals only  Attended and participated in fresh air group  Continue to monitor

## 2020-01-28 PROCEDURE — 99232 SBSQ HOSP IP/OBS MODERATE 35: CPT | Performed by: PSYCHIATRY & NEUROLOGY

## 2020-01-28 RX ADMIN — TRAZODONE HYDROCHLORIDE 50 MG: 50 TABLET ORAL at 21:54

## 2020-01-28 RX ADMIN — DOCUSATE SODIUM 100 MG: 100 CAPSULE, LIQUID FILLED ORAL at 08:40

## 2020-01-28 RX ADMIN — METFORMIN HYDROCHLORIDE 500 MG: 500 TABLET ORAL at 08:40

## 2020-01-28 RX ADMIN — ATORVASTATIN CALCIUM 10 MG: 10 TABLET, FILM COATED ORAL at 17:01

## 2020-01-28 RX ADMIN — LEVOTHYROXINE SODIUM 75 MCG: 75 TABLET ORAL at 06:03

## 2020-01-28 RX ADMIN — CLOZAPINE 200 MG: 200 TABLET ORAL at 17:01

## 2020-01-28 RX ADMIN — DOCUSATE SODIUM 100 MG: 100 CAPSULE, LIQUID FILLED ORAL at 17:00

## 2020-01-28 RX ADMIN — PANTOPRAZOLE SODIUM 40 MG: 40 TABLET, DELAYED RELEASE ORAL at 06:03

## 2020-01-28 RX ADMIN — DIVALPROEX SODIUM 1000 MG: 500 TABLET, DELAYED RELEASE ORAL at 13:04

## 2020-01-28 RX ADMIN — METFORMIN HYDROCHLORIDE 500 MG: 500 TABLET ORAL at 17:01

## 2020-01-28 RX ADMIN — OXYBUTYNIN CHLORIDE 5 MG: 5 TABLET, EXTENDED RELEASE ORAL at 08:40

## 2020-01-28 RX ADMIN — DIVALPROEX SODIUM 1000 MG: 500 TABLET, DELAYED RELEASE ORAL at 20:49

## 2020-01-28 RX ADMIN — OLANZAPINE 5 MG: 5 TABLET, FILM COATED ORAL at 13:04

## 2020-01-28 NOTE — PROGRESS NOTES
01/28/20 0944   Team Meeting   Meeting Type Tx Team Meeting   Initial Conference Date 01/28/20   Next Conference Date 02/04/20   Team Members Present   Team Members Present Physician;Nurse;; Other (Discipline and Name)   Physician Team Member Dr Toy Hankins Team Member Gregory Coleman, RN   Care Management Team Member Lily Horowitz   Other (Discipline and Name) Martha Cook LCSW; Ernesto Swain, [facility]   Patient/Family Present   Patient Present Yes   Patient's Family Present No     Patient attended treatment team meeting this morning without his self assessment completed  Patient attended 79% of groups offered last week  Team attempted to speak with patient regarding his behavior last night and informed him he cannot be approved to go out into the community after calling an MHT a racial slur last night  Patient became upset and left the meeting room  Team and patient completed risk assessment and the patient did not verbalize any desire to elope from the program  Patient verbalized understanding of consequences of eloping from treatment while on a commitment  Patient verbalized no further questions or concerns at the conclusion of the meeting

## 2020-01-28 NOTE — PLAN OF CARE
Problem: Alteration in Thoughts and Perception  Goal: Verbalize thoughts and feelings  Description  Interventions:  - Promote a nonjudgmental and trusting relationship with the patient through active listening and therapeutic communication  - Assess patient's level of functioning, behavior and potential for risk  - Engage patient in 1 on 1 interactions  - Encourage patient to express fears, feelings, frustrations, and discuss symptoms    - [place] patient to reality, help patient recognize reality-based thinking   - Administer medications as ordered and assess for potential side effects  - Provide the patient education related to the signs and symptoms of the illness and desired effects of prescribed medications  Outcome: Progressing  Goal: Agree to be compliant with medication regime, as prescribed and report medication side effects  Description  Interventions:  - Offer appropriate PRN medication and supervise ingestion; conduct AIMS, as needed   Outcome: Progressing  Goal: Attend and participate in unit activities, including therapeutic, recreational, and educational groups  Description  Interventions:  -Encourage Visitation and family involvement in care  Outcome: Progressing  Goal: Complete daily ADLs, including personal hygiene independently, as able  Description  Interventions:  - Observe, teach, and assist patient with ADLS  - Monitor and promote a balance of rest/activity, with adequate nutrition and elimination   Outcome: Progressing     Problem: Ineffective Coping  Goal: Understands least restrictive measures  Description  Interventions:  - Utilize least restrictive behavior  Outcome: Progressing     Problem: GASTROINTESTINAL - ADULT  Goal: Maintains adequate nutritional intake  Description  INTERVENTIONS:  - Monitor percentage of each meal consumed  - Identify factors contributing to decreased intake, treat as appropriate  - Assist with meals as needed  - Monitor I&O, weight, and lab values if indicated  - Obtain nutrition services referral as needed  Outcome: Tuan Damico has been awake, alert, and visible intermittently out in the milieu  Pt showered this shift but remains disheveled in appearance and dressed in layers  Ate 100% supper  Social with select peers  Continue to monitor water q 1 hr  Compliant with scheduled meds with some prompting  Pt remains preoccupied with questioning when he will be discharged  Pt did not attend evening group but came out for snack after  Continue to monitor/assess for any changes

## 2020-01-28 NOTE — PLAN OF CARE
Problem: Alteration in Thoughts and Perception  Goal: Refrain from acting on delusional thinking/internal stimuli  Description  Interventions:  - Monitor patient closely, per order   - Utilize least restrictive measures   - Set reasonable limits, give positive feedback for acceptable   - Administer medications as ordered and monitor of potential side effects  Outcome: Progressing  Goal: Agree to be compliant with medication regime, as prescribed and report medication side effects  Description  Interventions:  - Offer appropriate PRN medication and supervise ingestion; conduct AIMS, as needed   Outcome: Progressing   Patient is more visible on the unit this shift  He is intrusive at the desk at times but is redirectable  No group attendance noted this shift  He continues to be preoccupied with going out on passes and going to clubhouse  This writer explained the program expectations, patient verbalized understanding  Continue to monitor

## 2020-01-28 NOTE — PROGRESS NOTES
01/28/20 0900   Team Meeting   Meeting Type Daily Rounds   Team Members Present   Team Members Present Physician;Nurse;; Other (Discipline and Name)   Physician Team Member Dr Ramón Parson Team Member Ivana Pérez RN   Care Management Team Member Lily Gill   Other (Discipline and Name) Cecily Marmolejo LCSW; Gilford Dull, MCKAYLA     Patient was nasty, yelling and agitated during change of shift  Called [facility] a racial slur  Disheveled  Attended fresh air group  Slept

## 2020-01-28 NOTE — PROGRESS NOTES
Slept well through the night, no issues and no behaviors noted   All precautions in place and maintained at this time, monitoring continues

## 2020-01-28 NOTE — PLAN OF CARE
Problem: Alteration in Thoughts and Perception  Goal: Attend and participate in unit activities, including therapeutic, recreational, and educational groups  Description  Interventions:  -Encourage Visitation and family involvement in care  Outcome: Progressing     Problem: Ineffective Coping  Goal: Identifies healthy coping skills  Outcome: Not Progressing   Patient has attended 79% of groups however, only attended 2 out of 4 [facility] groups  Patient not able to articulate his needs in a healthy manner and often goes to staff to staff asking the same questions  Patient is very disheveled, wearing many layers of clothes and some days does not get out of bed until noon, missing medications, groups and vitals  Patient also becomes aggravated and irritable at staff members when redirected and not told what he wants to hear  Patients focus is always on discharge and getting off grounds to go out instead of what he has to work on daily to obtain privileges  Patient has made derogatory remarks to staff members when redirected  This writer continues to support and encourage Patient to attend groups and participate in his recovery journey however, at this time patient appears to not understand what it means even though he has recovery experience

## 2020-01-28 NOTE — PROGRESS NOTES
Progress Note - Belvin Scale 1965, 48 y o  male MRN: [id number]    [facility] Encounter: [id number]    Primary Care Provider: No primary care provider on file  Date and time admitted to hospital: 12/23/2019  1:27 PM        * Schizoaffective disorder, bipolar type [facility])  Assessment & Plan  Psychiatry Progress Note    Subjective: Interval History     Patient was testing limits, angry , agitated, and cursing staff and calling names when redirected from drinking excess fluids yesterday  Still with multiple layers of clothing and preoccupied about getting out and walked out from team meeting today when confronted,  He is compliant with meds, more alert and not sleepy since cozapine was lowered  Tolerating meds with no side effects  He did cooperate with psych testing as reported by therapist and awaiting results  Still with low frustration tolerance and need for immediate gratification of his needs as usual   His grooming is gain poor and remains focussed only on going out assuming no responsibility for his untoward behaviors     Current medications:    Current Facility-Administered Medications:     acetaminophen (TYLENOL) tablet 325 mg, 325 mg, Oral, Q6H PRN, Stacey Parsons PA-C    acetaminophen (TYLENOL) tablet 650 mg, 650 mg, Oral, Q6H PRN, BRIT Ortega-PUSHPA    acetaminophen (TYLENOL) tablet 975 mg, 975 mg, Oral, Q8H PRN, BRIT Ortega-PUSHPA    aluminum-magnesium hydroxide-simethicone (MYLANTA) 200-200-20 mg/5 mL oral suspension 30 mL, 30 mL, Oral, Q4H PRN, BRIT Ortega-PUSHPA, 30 mL at 01/24/20 2352    atorvastatin (LIPITOR) tablet 10 mg, 10 mg, Oral, Daily With Dinner, BRIT Ortega-PUSHPA, 10 mg at 01/27/20 1656    benztropine (COGENTIN) injection 1 mg, 1 mg, Intramuscular, Q6H PRN, Stacey Parsons PA-C    benztropine (COGENTIN) tablet 1 mg, 1 mg, Oral, Q6H PRN, Stacey Parsons PA-C    clozapine (CLOZARIL) tablet 200 mg, 200 mg, Oral, Daily, Amrik Ladd Dione Najera MD, 200 mg at 01/27/20 1656    divalproex sodium (DEPAKOTE) EC tablet 1,000 mg, 1,000 mg, Oral, BID, Mark Watt MD, 1,000 mg at 01/27/20 2040    docusate sodium (COLACE) capsule 100 mg, 100 mg, Oral, BID, Berta Ruelas PA-C, 100 mg at 01/28/20 0840    haloperidol (HALDOL) tablet 5 mg, 5 mg, Oral, Q6H PRN, Berta Ruelas PA-C    haloperidol lactate (HALDOL) injection 5 mg, 5 mg, Intramuscular, Q6H PRN, Berta Ruelas PA-C    levothyroxine tablet 75 mcg, 75 mcg, Oral, Early Morning, BRIT Cruz-C, 75 mcg at 01/28/20 0603    LORazepam (ATIVAN) 2 mg/mL injection 1 mg, 1 mg, Intramuscular, Q6H PRN, Berta Ruelas PA-C    LORazepam (ATIVAN) tablet 1 mg, 1 mg, Oral, Q6H PRN, Berta Ruelas PA-C    magnesium hydroxide (MILK OF MAGNESIA) 400 mg/5 mL oral suspension 30 mL, 30 mL, Oral, Daily PRN, MARIA GUADALUPE CruzC    metFORMIN (GLUCOPHAGE) tablet 500 mg, 500 mg, Oral, BID With Meals, BRIT Cruz-C, 500 mg at 01/28/20 0840    nicotine polacrilex (NICORETTE) gum 2 mg, 2 mg, Oral, Q2H PRN, BRIT Cruz-C    OLANZapine (ZyPREXA) tablet 5 mg, 5 mg, Oral, After Lunch, Mark Watt MD, 5 mg at 01/27/20 1301    oxybutynin (DITROPAN-XL) 24 hr tablet 5 mg, 5 mg, Oral, Daily, BRIT Cruz-C, 5 mg at 01/28/20 0840    pantoprazole (PROTONIX) EC tablet 40 mg, 40 mg, Oral, Early Morning, BRIT Cruz-C, 40 mg at 01/28/20 0603    traZODone (DESYREL) tablet 50 mg, 50 mg, Oral, HS PRN, BRIT Cruz-C, 50 mg at 01/26/20 2341  Justification if on more than two antipsychotics:  Due to lack of response to single antipsychotics including clozapine  Side effects if any:  None today    Risks , benefits, side effects and precautions of medications discussed with patient and reminded patient to let us know any problems with medications     Objective:     Vital Signs:  Vitals:    01/26/20 0732 01/27/20 0700 01/28/20 0900 01/28/20 0905   BP: 103/60 100/57 126/78 138/84   BP Location: Left arm Left arm Left arm Left arm   Pulse: 80 74 94 102   Resp:  20 18 18   Temp:  98 °F (36 7 °C) (!) 97 3 °F (36 3 °C)    TempSrc:  Temporal Temporal    Weight: 102 kg (225 lb 12 8 oz)      Height: 5' 9" (1 753 m)        Appearance:  Angry irritated with poor grooming and wearing multiple layers of clothing   Behavior:  Argumentative and hostile   Speech:  Pressured and loud   Mood:  Angry and hostile    Affect:  agitated and demanding and irritated and labile   Thought Process:  perseverating on getting out    Thought Content:  No current s/h thoughts intent or plans, no overt delusions elicited, stil focussed on getting out    Perceptual Disturbances: None but appears responding   Risk Potential: Potential for fire setting   Sensorium:  orinted to 3 spheres and to situation   Cognition:  Intact with no deficits in recent or remote memory   Consciousness:  Alert and awake   Attention: Poor attention and concentration   Intellect: Estimate dto be dull normal or mild   Insight:  Poor    Judgment: poor      Motor Activity: No abnormal movements         Recent Labs:  Results Reviewed     None          I/O Past 24 hours:  No intake/output data recorded  No intake/output data recorded  Assessment / Plan:     Schizoaffective disorder, bipolar type [facility])      Reason for continued inpatient care: To demonstrate better judgment and insight and refrain from risky behaviors  Acceptance by patient:  Not fully accepting  Hopefulness in recovery: To livetat he [facility] AL[facility]  Understanding of medications :   To some extent  Involved in reintegration process:  adjusting to the unit  Trusting in relatoinship with psychiatrist:  trusting    Recommended Treatment:    Medication changes:  1) none today but check bmp and osmolality for serum and urine due alleged drinking of excess fluids   Non-pharmacological treatments  1) continue with individual, group and milieu therapy using recovery principles   2) no off grounds privileges now due to cursing and threatening behavior yesterday  Safety  1) Safety/communication plan established targeting dynamic risk factors above  Discharge Plan to a personal care home when stable after results of fore setting risk evaluation are back     Counseling / Coordination of Care    Total floor / unit time spent today 15  minutes  Greater than 50% of total time was spent with the patient and / or family counseling and / or coordination of care  A description of the counseling / coordination of care  Patient's Rights, confidentiality and exceptions to confidentiality, use of automated medical record, [facility] staff access to medical record, and consent to treatment reviewed      Krystal Causey MD

## 2020-01-28 NOTE — PROGRESS NOTES
01/28/20 1100   Activity/Group Checklist   Group Other (Comment)  (IMR)   Attendance Refused     Patient did not attend group  Patient was offered, but declined

## 2020-01-28 NOTE — ASSESSMENT & PLAN NOTE
Psychiatry Progress Note    Subjective: Interval History     Patient was testing limits, angry , agitated, and cursing staff and calling names when redirected from drinking excess fluids yesterday  Still with multiple layers of clothing and preoccupied about getting out and walked out from team meeting today when confronted,  He is compliant with meds, more alert and not sleepy since cozapine was lowered  Tolerating meds with no side effects  He did cooperate with psych testing as reported by therapist and awaiting results  Still with low frustration tolerance and need for immediate gratification of his needs as usual   His grooming is gain poor and remains focussed only on going out assuming no responsibility for his untoward behaviors     Current medications:    Current Facility-Administered Medications:     acetaminophen (TYLENOL) tablet 325 mg, 325 mg, Oral, Q6H PRN, Melvena Ashley, PA-C    acetaminophen (TYLENOL) tablet 650 mg, 650 mg, Oral, Q6H PRN, Melvena Ashley, PA-C    acetaminophen (TYLENOL) tablet 975 mg, 975 mg, Oral, Q8H PRN, Melvena Ashley, PA-C    aluminum-magnesium hydroxide-simethicone (MYLANTA) 200-200-20 mg/5 mL oral suspension 30 mL, 30 mL, Oral, Q4H PRN, Melvena Ashley, PA-C, 30 mL at 01/24/20 2352    atorvastatin (LIPITOR) tablet 10 mg, 10 mg, Oral, Daily With Dinner, Ollie Ramirez, PA-C, 10 mg at 01/27/20 1656    benztropine (COGENTIN) injection 1 mg, 1 mg, Intramuscular, Q6H PRN, Melvena Ashley, PA-C    benztropine (COGENTIN) tablet 1 mg, 1 mg, Oral, Q6H PRN, Melvena Ashley, PA-C    clozapine (CLOZARIL) tablet 200 mg, 200 mg, Oral, Daily, Myrna Merrill MD, 200 mg at 01/27/20 1656    divalproex sodium (DEPAKOTE) EC tablet 1,000 mg, 1,000 mg, Oral, BID, Myrna Merrill MD, 1,000 mg at 01/27/20 2040    docusate sodium (COLACE) capsule 100 mg, 100 mg, Oral, BID, Ollie Ramirez PA-C, 100 mg at 01/28/20 0840    haloperidol (HALDOL) tablet 5 mg, 5 mg, Oral, Q6H PRN, Campos Hurtado PA-C    haloperidol lactate (HALDOL) injection 5 mg, 5 mg, Intramuscular, Q6H PRN, Campos Hurtado PA-C    levothyroxine tablet 75 mcg, 75 mcg, Oral, Early Morning, Campos Hurtado PA-C, 75 mcg at 01/28/20 0603    LORazepam (ATIVAN) 2 mg/mL injection 1 mg, 1 mg, Intramuscular, Q6H PRN, Campos Hurtado PA-C    LORazepam (ATIVAN) tablet 1 mg, 1 mg, Oral, Q6H PRN, Campos Hurtado PA-C    magnesium hydroxide (MILK OF MAGNESIA) 400 mg/5 mL oral suspension 30 mL, 30 mL, Oral, Daily PRN, MARIA GUADALUPE RossC    metFORMIN (GLUCOPHAGE) tablet 500 mg, 500 mg, Oral, BID With Meals, BRIT Ross-C, 500 mg at 01/28/20 0840    nicotine polacrilex (NICORETTE) gum 2 mg, 2 mg, Oral, Q2H PRN, MARIA GUADALUPE RossC    OLANZapine (ZyPREXA) tablet 5 mg, 5 mg, Oral, After Lunch, Shelley Demarco MD, 5 mg at 01/27/20 1301    oxybutynin (DITROPAN-XL) 24 hr tablet 5 mg, 5 mg, Oral, Daily, BRIT Ross-C, 5 mg at 01/28/20 0840    pantoprazole (PROTONIX) EC tablet 40 mg, 40 mg, Oral, Early Morning, BRIT Ross-C, 40 mg at 01/28/20 0603    traZODone (DESYREL) tablet 50 mg, 50 mg, Oral, HS PRN, BRIT Ross-C, 50 mg at 01/26/20 2341  Justification if on more than two antipsychotics:  Due to lack of response to single antipsychotics including clozapine  Side effects if any:  None today    Risks , benefits, side effects and precautions of medications discussed with patient and reminded patient to let us know any problems with medications     Objective:     Vital Signs:  Vitals:    01/26/20 0732 01/27/20 0700 01/28/20 0900 01/28/20 0905   BP: 103/60 100/57 126/78 138/84   BP Location: Left arm Left arm Left arm Left arm   Pulse: 80 74 94 102   Resp:  20 18 18   Temp:  98 °F (36 7 °C) (!) 97 3 °F (36 3 °C)    TempSrc:  Temporal Temporal    Weight: 102 kg (225 lb 12 8 oz)      Height: 5' 9" (1 753 m)        Appearance:  Angry irritated with poor grooming and wearing multiple layers of clothing   Behavior:  Argumentative and hostile   Speech:  Pressured and loud   Mood:  Angry and hostile    Affect:  agitated and demanding and irritated and labile   Thought Process:  perseverating on getting out    Thought Content:  No current s/h thoughts intent or plans, no overt delusions elicited, stil focussed on getting out    Perceptual Disturbances: None but appears responding   Risk Potential: Potential for fire setting   Sensorium:  orinted to 3 spheres and to situation   Cognition:  Intact with no deficits in recent or remote memory   Consciousness:  Alert and awake   Attention: Poor attention and concentration   Intellect: Estimate dto be dull normal or mild   Insight:  Poor    Judgment: poor      Motor Activity: No abnormal movements         Recent Labs:  Results Reviewed     None          I/O Past 24 hours:  No intake/output data recorded  No intake/output data recorded  Assessment / Plan:     Schizoaffective disorder, bipolar type [facility])      Reason for continued inpatient care: To demonstrate better judgment and insight and refrain from risky behaviors  Acceptance by patient:  Not fully accepting  Hopefulness in recovery: To [facility]at [facility]  Understanding of medications : To some extent  Involved in reintegration process:  adjusting to the unit  Trusting in relatoinship with psychiatrist:  trusting    Recommended Treatment:    Medication changes:  1) non etoday    Non-pharmacological treatments  1) continue with individual, group and milieu therapy using recovery principles   2) no off grounds privileges now due to cursing and threatening behavior yesterday  Safety  1) Safety/communication plan established targeting dynamic risk factors above  Discharge Plan to a personal care home when stable after results of fore setting risk evaluation are back     Counseling / Coordination of Care    Total floor / unit time spent today 15  minutes   Greater than 50% of total time was spent with the patient and / or family counseling and / or coordination of care  A description of the counseling / coordination of care  Patient's Rights, confidentiality and exceptions to confidentiality, use of automated medical record, [facility] staff access to medical record, and consent to treatment reviewed      Oval MD Lucrecia

## 2020-01-29 RX ADMIN — DOCUSATE SODIUM 100 MG: 100 CAPSULE, LIQUID FILLED ORAL at 08:58

## 2020-01-29 RX ADMIN — ATORVASTATIN CALCIUM 10 MG: 10 TABLET, FILM COATED ORAL at 16:46

## 2020-01-29 RX ADMIN — OLANZAPINE 5 MG: 5 TABLET, FILM COATED ORAL at 13:30

## 2020-01-29 RX ADMIN — PANTOPRAZOLE SODIUM 40 MG: 40 TABLET, DELAYED RELEASE ORAL at 05:46

## 2020-01-29 RX ADMIN — METFORMIN HYDROCHLORIDE 500 MG: 500 TABLET ORAL at 16:46

## 2020-01-29 RX ADMIN — TRAZODONE HYDROCHLORIDE 50 MG: 50 TABLET ORAL at 22:25

## 2020-01-29 RX ADMIN — DIVALPROEX SODIUM 1000 MG: 500 TABLET, DELAYED RELEASE ORAL at 20:34

## 2020-01-29 RX ADMIN — CLOZAPINE 200 MG: 200 TABLET ORAL at 16:46

## 2020-01-29 RX ADMIN — OXYBUTYNIN CHLORIDE 5 MG: 5 TABLET, EXTENDED RELEASE ORAL at 08:58

## 2020-01-29 RX ADMIN — LEVOTHYROXINE SODIUM 75 MCG: 75 TABLET ORAL at 05:46

## 2020-01-29 RX ADMIN — DOCUSATE SODIUM 100 MG: 100 CAPSULE, LIQUID FILLED ORAL at 17:05

## 2020-01-29 RX ADMIN — METFORMIN HYDROCHLORIDE 500 MG: 500 TABLET ORAL at 08:58

## 2020-01-29 RX ADMIN — DIVALPROEX SODIUM 1000 MG: 500 TABLET, DELAYED RELEASE ORAL at 12:51

## 2020-01-29 NOTE — PROGRESS NOTES
Kinjal Chanel refused blood work as well as Osmolality of the urine this morning after x 2 attempts

## 2020-01-29 NOTE — PLAN OF CARE
Problem: Alteration in Thoughts and Perception  Goal: Agree to be compliant with medication regime, as prescribed and report medication side effects  Description  Interventions:  - Offer appropriate PRN medication and supervise ingestion; conduct AIMS, as needed   Outcome: Progressing     Problem: Alteration in Thoughts and Perception  Goal: Complete daily ADLs, including personal hygiene independently, as able  Description  Interventions:  - Observe, teach, and assist patient with ADLS  - Monitor and promote a balance of rest/activity, with adequate nutrition and elimination   Outcome: Not Progressing   Patient is isolative to his room at times, napping on and off  He is more visible in the afternoon  He is intrusive at the desk at times but is redirectable  Staff encouraged him to attend a group today and he stated "I'm not going to groups because I cant go off the unit"  It was explained to him again that if he does not make the necessary percentage of groups for the next treatment team then he will not get off unit privileges again  He did attend fresh air group  He is preoccupied with discharge again today  Patient remains disheveled and with poor hygiene  He layers his clothing  Continue to monitor

## 2020-01-29 NOTE — PROGRESS NOTES
Kate Winchester maintained on SAFE precaution   In bed with eyes closed, breath even and unlabored  No indication of pain or discomfort noted  Awaken x 1 for 480ml of water  No behavioral noted  Will continue to monitor

## 2020-01-29 NOTE — ASSESSMENT & PLAN NOTE
Psychiatry Progress Note    Subjective: Interval History     Patient is still paranoid preoccupied cursing staff refusing to attend most of the groups and is focused on drinking excess fluids and refused to have blood work because of excessive fluid consumption as reported by staff  He was disappointed yesterday in team when told that he cannot go on passes unless he complies with the groups and refrains from making threats or showing aggressive behaviors id he continues to seek immediate gratification of needs and expects low frustration tolerance and demands to go on a pass despite reminders that he needs to and that privilege  Compliant with medications with no side effects or problems  Still wearing multiple layers of clothing and disheveled unkempt and poorly groomed  Sleeps well and eats fairly well  Remains preoccupied paranoid suspicious    Again found him laying on the bed with clothes all over the floor in his bedroom and was asking to let him go on a pass again and seemed to be disinterested in talking with me    Current medications:    Current Facility-Administered Medications:     acetaminophen (TYLENOL) tablet 325 mg, 325 mg, Oral, Q6H PRN, Jayme Myers PA-C    acetaminophen (TYLENOL) tablet 650 mg, 650 mg, Oral, Q6H PRN, Jayme Myers PA-C    acetaminophen (TYLENOL) tablet 975 mg, 975 mg, Oral, Q8H PRN, Jayme Myers PA-C    aluminum-magnesium hydroxide-simethicone (MYLANTA) 200-200-20 mg/5 mL oral suspension 30 mL, 30 mL, Oral, Q4H PRN, Jayme Myers PA-C, 30 mL at 01/24/20 2352    atorvastatin (LIPITOR) tablet 10 mg, 10 mg, Oral, Daily With Dinner, Jayme Myers PA-C, 10 mg at 01/28/20 1701    benztropine (COGENTIN) injection 1 mg, 1 mg, Intramuscular, Q6H PRN, Jayme Myers PA-C    benztropine (COGENTIN) tablet 1 mg, 1 mg, Oral, Q6H PRN, Jayme Myers PA-C    clozapine (CLOZARIL) tablet 200 mg, 200 mg, Oral, Daily, Farrah Paniagua MD, 200 mg at 01/28/20 1701    divalproex sodium (DEPAKOTE) EC tablet 1,000 mg, 1,000 mg, Oral, BID, Jone Damian MD, 1,000 mg at 01/28/20 2049    docusate sodium (COLACE) capsule 100 mg, 100 mg, Oral, BID, Renata Chance PA-C, 100 mg at 01/28/20 1700    haloperidol (HALDOL) tablet 5 mg, 5 mg, Oral, Q6H PRN, Renata Chance PA-C    haloperidol lactate (HALDOL) injection 5 mg, 5 mg, Intramuscular, Q6H PRN, Renata Chance PA-C    levothyroxine tablet 75 mcg, 75 mcg, Oral, Early Morning, Renata Chance PA-C, 75 mcg at 01/29/20 0546    LORazepam (ATIVAN) 2 mg/mL injection 1 mg, 1 mg, Intramuscular, Q6H PRN, Renata Chance PA-C    LORazepam (ATIVAN) tablet 1 mg, 1 mg, Oral, Q6H PRN, Renata Chance PA-C    magnesium hydroxide (MILK OF MAGNESIA) 400 mg/5 mL oral suspension 30 mL, 30 mL, Oral, Daily PRN, Renata Chance PA-C    metFORMIN (GLUCOPHAGE) tablet 500 mg, 500 mg, Oral, BID With Meals, Renata Chance PA-C, 500 mg at 01/28/20 1701    nicotine polacrilex (NICORETTE) gum 2 mg, 2 mg, Oral, Q2H PRN, Renata Chance PA-C    OLANZapine (ZyPREXA) tablet 5 mg, 5 mg, Oral, After Lunch, Jone Damian MD, 5 mg at 01/28/20 1304    oxybutynin (DITROPAN-XL) 24 hr tablet 5 mg, 5 mg, Oral, Daily, Renata Chance PA-C, 5 mg at 01/28/20 0840    pantoprazole (PROTONIX) EC tablet 40 mg, 40 mg, Oral, Early Morning, Renata Chance PA-C, 40 mg at 01/29/20 0546    traZODone (DESYREL) tablet 50 mg, 50 mg, Oral, HS PRN, Renata Chance PA-C, 50 mg at 01/28/20 2154  Justification if on more than two antipsychotics:  Due to lack of response to single antipsychotics including clozapine  Side effects if any:  None today    Risks , benefits, side effects and precautions of medications discussed with patient and reminded patient to let us know any problems with medications     Objective:     Vital Signs:  Vitals:    01/27/20 0700 01/28/20 0900 01/28/20 0905 01/29/20 0700   BP: 100/57 126/78 138/84 132/79   BP Location: Left arm Left arm Left arm Right arm   Pulse: 74 94 102 94   Resp: 20 18 18 18   Temp: 98 °F (36 7 °C) (!) 97 3 °F (36 3 °C)  97 9 °F (36 6 °C)   TempSrc: Temporal Temporal     Weight:       Height:         Appearance:  Paranoid suspicious wearing multiple layers of clothing with poor grooming and hostility   Behavior:  Argumentative demanding   Speech:  Loud pressured at   Mood:  Angry or    Affect:  Irritated labile   Thought Process: Focused on getting out   Thought Content:  No current suicidal homicidal thoughts and under plans reported  No overt delusions elicited but does appear paranoid   Perceptual Disturbances: None but appears to responding   Risk Potential: Potential for 5 setting behavior present   Sensorium:  Oriented to 3 spheres and to situation   Cognition:  intact with not a visits and recent or remote memory   Consciousness:  Alert and awake when approached   Attention: Attention and concentration span are impaired   Intellect: Estimated to be dull normal all below average   Insight:  Remains poor   Judgment: Remains poor      Motor Activity: No abnormal involuntary movements         Recent Labs:  Results Reviewed     None          I/O Past 24 hours:  No intake/output data recorded  No intake/output data recorded  Assessment / Plan:     Schizoaffective disorder, bipolar type [facility])      Reason for continued inpatient care: To gain control over impulsive behaviors and severe paranoia and agitation  Acceptance by patient:  Not accepting for  Hopefulness in recovery:   To leave at stay personal care boarding home   Understanding of medications :  Somewhat  Involved in reintegration process:  Adjusting to the unit  Trusting in relatoinship with psychiatrist:  Tony Al off and on    Recommended Treatment:    Medication changes:  1) no changes   Non-pharmacological treatments  1) continue with individual therapy group therapy milieu therapy on the extended acute care unit with psychoeducation and use sings recovery principles   2) off grounds privileges on hold due to cursing or threatening behaviors from 2 days ago  Safety  1) Safety/communication plan established targeting dynamic risk factors above  Discharge Plan to a personal care home [facility] unstable after result of far setting risk potential testing is back    Counseling / Coordination of Care    Total floor / unit time spent today 15  minutes  Greater than 50% of total time was spent with the patient and / or family counseling and / or coordination of care  A description of the counseling / coordination of care  Patient's Rights, confidentiality and exceptions to confidentiality, use of automated medical record, [address] [place] [address] staff access to medical record, and consent to treatment reviewed      Sandra Desir MD

## 2020-01-29 NOTE — PROGRESS NOTES
01/28/20 1500   Activity/Group Checklist   Group   (Recovery Workshop )   Attendance Did not attend   Attendance Duration (min) 16-30   Affect/Mood GAEL

## 2020-01-29 NOTE — PROGRESS NOTES
Patient declined      01/29/20 1100   Activity/Group Checklist   Group   (IMR/Graduation )   Attendance Did not attend   Attendance Duration (min) 46-60   Affect/Mood GAEL

## 2020-01-29 NOTE — PROGRESS NOTES
01/28/20 1530   Activity/Group Checklist   Group Spiritual resources   Attendance Did not attend   Attendance Duration (min) 16-30   Affect/Mood GAEL

## 2020-01-29 NOTE — PROGRESS NOTES
01/29/20 0800   Team Meeting   Meeting Type Daily Rounds   Team Members Present   Team Members Present Physician;Nurse;; Other (Discipline and Name)   Physician Team Member Dr Lawrence Reeves RN   Care Management Team Member Lily Briseno   Other (Discipline and Name) Rebecca Contreras [place]; MCKAYLA Verde     Patient preoccupied about water intake  Intrusive and focused on discharge  Slept

## 2020-01-29 NOTE — PROGRESS NOTES
01/29/20 1400   Activity/Group Checklist   Group   (Recovery Anonymous )   Attendance Did not attend   Attendance Duration (min) 46-60   Affect/Mood GAEL

## 2020-01-29 NOTE — PLAN OF CARE
Problem: Alteration in Thoughts and Perception  Goal: Treatment Goal: Gain control of psychotic behaviors/thinking, reduce/eliminate presenting symptoms and demonstrate improved reality functioning upon discharge  Outcome: Progressing  Goal: Verbalize thoughts and feelings  Description  Interventions:  - Promote a nonjudgmental and trusting relationship with the patient through active listening and therapeutic communication  - Assess patient's level of functioning, behavior and potential for risk  - Engage patient in 1 on 1 interactions  - Encourage patient to express fears, feelings, frustrations, and discuss symptoms    - [place] patient to reality, help patient recognize reality-based thinking   - Administer medications as ordered and assess for potential side effects  - Provide the patient education related to the signs and symptoms of the illness and desired effects of prescribed medications  Outcome: Progressing  Goal: Refrain from acting on delusional thinking/internal stimuli  Description  Interventions:  - Monitor patient closely, per order   - Utilize least restrictive measures   - Set reasonable limits, give positive feedback for acceptable   - Administer medications as ordered and monitor of potential side effects  Outcome: Progressing  Goal: Agree to be compliant with medication regime, as prescribed and report medication side effects  Description  Interventions:  - Offer appropriate PRN medication and supervise ingestion; conduct AIMS, as needed   Outcome: Progressing  Goal: Attend and participate in unit activities, including therapeutic, recreational, and educational groups  Description  Interventions:  -Encourage Visitation and family involvement in care  Outcome: Progressing  Goal: Recognize dysfunctional thoughts, communicate reality-based thoughts at the time of discharge  Description  Interventions:  - Provide medication and psycho-education to assist patient in compliance and developing insight into his/her illness   Outcome: Progressing  Goal: Complete daily ADLs, including personal hygiene independently, as able  Description  Interventions:  - Observe, teach, and assist patient with ADLS  - Monitor and promote a balance of rest/activity, with adequate nutrition and elimination   Outcome: Progressing     Problem: Ineffective Coping  Goal: Identifies ineffective coping skills  Outcome: Progressing  Goal: Identifies healthy coping skills  Outcome: Progressing  Goal: Demonstrates healthy coping skills  Outcome: Progressing  Goal: Patient/Family participate in treatment and DC plans  Description  Interventions:  - Provide therapeutic environment  Outcome: Progressing  Goal: Patient/Family verbalizes awareness of resources  Outcome: Progressing  Goal: Understands least restrictive measures  Description  Interventions:  - Utilize least restrictive behavior  Outcome: Progressing     Problem: RESPIRATORY - ADULT  Goal: Achieves optimal ventilation and oxygenation  Description  INTERVENTIONS:  - Assess for changes in respiratory status  - Assess for changes in mentation and behavior  - Position to facilitate oxygenation and minimize respiratory effort  - Oxygen administered by appropriate delivery if ordered  - Initiate smoking cessation education as indicated  - Encourage broncho-pulmonary hygiene including cough, deep breathe, Incentive Spirometry  - Assess the need for suctioning and aspirate as needed  - Assess and instruct to report SOB or any respiratory difficulty  - Respiratory Therapy support as indicated  Outcome: Progressing     Problem: GASTROINTESTINAL - ADULT  Goal: Minimal or absence of nausea and/or vomiting  Description  INTERVENTIONS:  - Administer IV fluids if ordered to ensure adequate hydration  - Maintain NPO status until nausea and vomiting are resolved  - Nasogastric tube if ordered  - Administer ordered antiemetic medications as needed  - Provide nonpharmacologic comfort measures as appropriate  - Advance diet as tolerated, if ordered  - Consider nutrition services referral to assist patient with adequate nutrition and appropriate food choices  Outcome: Progressing  Goal: Maintains or returns to baseline bowel function  Description  INTERVENTIONS:  - Assess bowel function  - Encourage oral fluids to ensure adequate hydration  - Administer IV fluids if ordered to ensure adequate hydration  - Administer ordered medications as needed  - Encourage mobilization and activity  - Consider nutritional services referral to assist patient with adequate nutrition and appropriate food choices  Outcome: Progressing  Goal: Maintains adequate nutritional intake  Description  INTERVENTIONS:  - Monitor percentage of each meal consumed  - Identify factors contributing to decreased intake, treat as appropriate  - Assist with meals as needed  - Monitor I&O, weight, and lab values if indicated  - Obtain nutrition services referral as needed  Outcome: Progressing     Problem: METABOLIC, FLUID AND ELECTROLYTES - ADULT  Goal: Glucose maintained within target range  Description  INTERVENTIONS:  - Monitor Blood Glucose as ordered  - Assess for signs and symptoms of hyperglycemia and hypoglycemia  - Administer ordered medications to maintain glucose within target range  - Assess nutritional intake and initiate nutrition service referral as needed  Outcome: Progressing     Problem: DISCHARGE PLANNING  Goal: Discharge to home or other facility with appropriate resources  Description    CASE MANAGEMENT INTERVENTIONS:  - Conduct assessment to determine patient/family and health care team treatment goals, and need for post-acute services based on payer coverage, community resources, patient preferences and barriers to discharge    - Address psychosocial, clinical, and financial barriers to discharge as identified in assessment in conjunction with the patient/family and health care team   - Assist the patient in reintegration back into the community by removing barriers which may hinder a successful discharge once deemed stable  - Arrange appropriate level of post-acute services according to patient's needs and preference and payer coverage in collaboration with the physician and health care team   - Communicate with and update the patient/family, physician, and health care team regarding progress on the discharge plan  - Arrange appropriate transportation to post-acute venues  Outcome: Progressing     Patient is pleasant, cooperative and visible on unit  Med and meal compliant  No c/o s/s pain, discomfort or distress  Denies depression, anxiety, SI and HI  Patient attends to hygiene but is disheveled and continues to wear multiple layers of clothing  Preoccupied with water consumption which is now provided hourly as nursing measure  Did not attend evening group  Ate 100% of supper plus snack  PRN Trazadone given at 2150 for sleep at patient request   Will continue to monitor progress in recovery program   Addendum:  Patient did not provide urine specimen for urine osmolality order    Serum osmo is scheduled for 1/29 AM

## 2020-01-29 NOTE — PROGRESS NOTES
Not required to attend      01/28/20 0800   Activity/Group Checklist   Group   (Community Programming Wrap up )   Attendance Did not attend   Attendance Duration (min) 16-30   Affect/Mood GAEL

## 2020-01-30 LAB
ANION GAP SERPL CALCULATED.3IONS-SCNC: 9 MMOL/L (ref 5–14)
BUN SERPL-MCNC: 9 MG/DL (ref 5–25)
CALCIUM SERPL-MCNC: 9.4 MG/DL (ref 8.4–10.2)
CHLORIDE SERPL-SCNC: 97 MMOL/L (ref 97–108)
CO2 SERPL-SCNC: 30 MMOL/L (ref 22–30)
CREAT SERPL-MCNC: 0.64 MG/DL (ref 0.7–1.5)
GFR SERPL CREATININE-BSD FRML MDRD: 113 ML/MIN/1.73SQ M
GLUCOSE P FAST SERPL-MCNC: 109 MG/DL (ref 70–99)
GLUCOSE SERPL-MCNC: 109 MG/DL (ref 70–99)
OSMOLALITY UR/SERPL-RTO: 299 MMOL/KG (ref 282–298)
POTASSIUM SERPL-SCNC: 4.2 MMOL/L (ref 3.6–5)
SODIUM SERPL-SCNC: 136 MMOL/L (ref 137–147)

## 2020-01-30 PROCEDURE — NC001 PR NO CHARGE: Performed by: PSYCHIATRY & NEUROLOGY

## 2020-01-30 PROCEDURE — 83930 ASSAY OF BLOOD OSMOLALITY: CPT | Performed by: PSYCHIATRY & NEUROLOGY

## 2020-01-30 PROCEDURE — 80048 BASIC METABOLIC PNL TOTAL CA: CPT | Performed by: PSYCHIATRY & NEUROLOGY

## 2020-01-30 PROCEDURE — 99232 SBSQ HOSP IP/OBS MODERATE 35: CPT | Performed by: PSYCHIATRY & NEUROLOGY

## 2020-01-30 RX ADMIN — LEVOTHYROXINE SODIUM 75 MCG: 75 TABLET ORAL at 06:00

## 2020-01-30 RX ADMIN — ATORVASTATIN CALCIUM 10 MG: 10 TABLET, FILM COATED ORAL at 16:31

## 2020-01-30 RX ADMIN — METFORMIN HYDROCHLORIDE 500 MG: 500 TABLET ORAL at 08:35

## 2020-01-30 RX ADMIN — DIVALPROEX SODIUM 1000 MG: 500 TABLET, DELAYED RELEASE ORAL at 20:41

## 2020-01-30 RX ADMIN — OXYBUTYNIN CHLORIDE 5 MG: 5 TABLET, EXTENDED RELEASE ORAL at 08:34

## 2020-01-30 RX ADMIN — DOCUSATE SODIUM 100 MG: 100 CAPSULE, LIQUID FILLED ORAL at 17:02

## 2020-01-30 RX ADMIN — OLANZAPINE 5 MG: 5 TABLET, FILM COATED ORAL at 13:21

## 2020-01-30 RX ADMIN — DIVALPROEX SODIUM 1000 MG: 500 TABLET, DELAYED RELEASE ORAL at 12:27

## 2020-01-30 RX ADMIN — PANTOPRAZOLE SODIUM 40 MG: 40 TABLET, DELAYED RELEASE ORAL at 06:00

## 2020-01-30 RX ADMIN — DOCUSATE SODIUM 100 MG: 100 CAPSULE, LIQUID FILLED ORAL at 08:35

## 2020-01-30 RX ADMIN — CLOZAPINE 200 MG: 200 TABLET ORAL at 16:31

## 2020-01-30 RX ADMIN — METFORMIN HYDROCHLORIDE 500 MG: 500 TABLET ORAL at 16:31

## 2020-01-30 NOTE — PROGRESS NOTES
Progress Note - Meseret Luna 1965, 48 y o  male MRN: [id number]    [facility] Encounter: [id number]    Primary Care Provider: No primary care provider on file  Date and time admitted to hospital: 12/23/2019  1:27 PM        * Schizoaffective disorder, bipolar type [facility])  Assessment & Plan  Psychiatry Progress Note    Subjective: Interval History     Patient is passive-aggressive about attending groups as he is preoccupied about not getting any passes and therefore not attending groups even though he was reminded that he will not get any more privileges unless he cooperates with the required group attendance  Is still disheveled uncombed irritated wearing multiple layers of clothing living dirty clothing so on the floor in his room and prefers to lay back on bed  He is eating well and sleeping well and does attend only very sporadic groups and is impulsive demanding and seeking immediate gratification of needs with low frustration tolerance  His insight judgment are poor  He is compliant with medications  No side effects reported  Blood work is acceptable for clozapine trial and no signs or symptoms of agranulocytosis or myocarditis noted  He was awake later and was walking around demanding a pass and again reminded him that he needs to be patient and work his way up by attending groups and refraining from threatening aggressive behaviors and he agreed      Current medications:    Current Facility-Administered Medications:     acetaminophen (TYLENOL) tablet 325 mg, 325 mg, Oral, Q6H PRN, Boaz Pall, PA-C    acetaminophen (TYLENOL) tablet 650 mg, 650 mg, Oral, Q6H PRN, Boaz Pall, PA-C    acetaminophen (TYLENOL) tablet 975 mg, 975 mg, Oral, Q8H PRN, Boaz Pall, PA-C    aluminum-magnesium hydroxide-simethicone (MYLANTA) 200-200-20 mg/5 mL oral suspension 30 mL, 30 mL, Oral, Q4H PRN, Boaz Pall, PA-C, 30 mL at 01/24/20 0170    atorvastatin (LIPITOR) tablet 10 mg, 10 mg, Oral, Daily With Adrian Rodriguez PA-C, 10 mg at 01/29/20 1646    benztropine (COGENTIN) injection 1 mg, 1 mg, Intramuscular, Q6H PRN, Qamar Licea PA-C    benztropine (COGENTIN) tablet 1 mg, 1 mg, Oral, Q6H PRN, Qamar Licea PA-C    clozapine (CLOZARIL) tablet 200 mg, 200 mg, Oral, Daily, Kayleigh Prather MD, 200 mg at 01/29/20 1646    divalproex sodium (DEPAKOTE) EC tablet 1,000 mg, 1,000 mg, Oral, BID, Kayleigh Prather MD, 1,000 mg at 01/29/20 2034    docusate sodium (COLACE) capsule 100 mg, 100 mg, Oral, BID, Qamar Licea PA-C, 100 mg at 01/29/20 1705    haloperidol (HALDOL) tablet 5 mg, 5 mg, Oral, Q6H PRN, Qamar Licea PA-C    haloperidol lactate (HALDOL) injection 5 mg, 5 mg, Intramuscular, Q6H PRN, Qamar Licea PA-C    levothyroxine tablet 75 mcg, 75 mcg, Oral, Early Morning, Qamar Licea PA-C, 75 mcg at 01/30/20 0600    LORazepam (ATIVAN) 2 mg/mL injection 1 mg, 1 mg, Intramuscular, Q6H PRN, Qamar Licea PA-C    LORazepam (ATIVAN) tablet 1 mg, 1 mg, Oral, Q6H PRN, Qamar Licea PA-C    magnesium hydroxide (MILK OF MAGNESIA) 400 mg/5 mL oral suspension 30 mL, 30 mL, Oral, Daily PRN, Qamar Licea PA-C    metFORMIN (GLUCOPHAGE) tablet 500 mg, 500 mg, Oral, BID With Meals, Qamar Licea PA-C, 500 mg at 01/29/20 1646    nicotine polacrilex (NICORETTE) gum 2 mg, 2 mg, Oral, Q2H PRN, Qamar Licea PA-C    OLANZapine (ZyPREXA) tablet 5 mg, 5 mg, Oral, After Lunch, Kayleigh Prather MD, 5 mg at 01/29/20 1330    oxybutynin (DITROPAN-XL) 24 hr tablet 5 mg, 5 mg, Oral, Daily, Qamar Anuja, PA-C, 5 mg at 01/29/20 0858    pantoprazole (PROTONIX) EC tablet 40 mg, 40 mg, Oral, Early Morning, Qamar Anuja, PA-C, 40 mg at 01/30/20 0600    traZODone (DESYREL) tablet 50 mg, 50 mg, Oral, HS PRN, Qamar Anuja, PA-C, 50 mg at 01/29/20 2225  Justification if on more than two antipsychotics:  Due to lack of response to single antipsychotics including clozapine  Side effects if any:  None today    Risks , benefits, side effects and precautions of medications discussed with patient and reminded patient to let us know any problems with medications     Objective:     Vital Signs:  Vitals:    01/27/20 0700 01/28/20 0900 01/28/20 0905 01/29/20 0700   BP: 100/57 126/78 138/84 132/79   BP Location: Left arm Left arm Left arm Right arm   Pulse: 74 94 102 94   Resp: 20 18 18 18   Temp: 98 °F (36 7 °C) (!) 97 3 °F (36 3 °C)  97 9 °F (36 6 °C)   TempSrc: Temporal Temporal     Weight:       Height:         Appearance:  Poor grooming hostile wearing multiple layers of clothing paranoid suspicious and laying on bed in his room   Behavior:  Demanding argumentative irritated   Speech:  Loud at times   Mood:  Angry and agitated   Affect:  Labile and it   Thought Process:  Preoccupied about getting out perseverating on same   Thought Content:  No overt delusions elicited but does appear paranoid  No current suicidal homicidal thoughts and under plans reported  Perceptual Disturbances: And noted but appears to respond   Risk Potential: Potential for fire-setting behavior   Sensorium:  Oriented to 3 spheres and to situation   Cognition:  Intact with no deficit in recent or remote memory   Consciousness:  Alert and easily aroused when approached   Attention: Impaired   Intellect: Estimated to be done or   Insight:  Limited   Judgment: Limited      Motor Activity: No abnormal involuntary movements         Recent Labs:  Results Reviewed     None          I/O Past 24 hours:  No intake/output data recorded  No intake/output data recorded  Assessment / Plan:     [facility]ffective [facility]      Reason for continued inpatient care:  Because periods of agitation and demanding and threatening behaviors  Acceptance by patient:  Not accepting at this time  Hopefulness in recovery:   To leave but a personal care  Understanding of medications : To [place]  Involved in reintegration process:  Trying to adjust unit but not cooperating with the treatment  Trusting in relatoinship with psychiatrist:  Shannan Abbott at times    Recommended Treatment:    Medication changes:  1) no changes   Non-pharmacological treatments  1) continue individual group milieu therapy with psychoeducation recover from supple so on the EAC unit  2) off grounds privileges on hold due to cursing or threatening behaviors from early this week  3) reminded to keep attending more groups and comply with treatment plan to obtain higher privileges  Safety  1) Safety/communication plan established targeting dynamic risk factors above  Discharge Plan to a personal care home [facility] unstable after result of far setting risk potential testing is back    Counseling / Coordination of Care    Total floor / unit time spent today 15  minutes  Greater than 50% of total time was spent with the patient and / or family counseling and / or coordination of care  A description of the counseling / coordination of care  Patient's Rights, confidentiality and exceptions to confidentiality, use of automated medical record, [facility] staff access to medical record, and consent to treatment reviewed      Farrah Paniagua MD

## 2020-01-30 NOTE — PROGRESS NOTES
~Armando maintained on SAFE precaution   In bed with eyes closed, breath even and unlabored  No indication of pain or discomfort noted  No behavioral noted  Will continue to monitor   ~Armando has scheduled blood work this morning, he had agreed earlier for his bloodwork to be done by a particular [facility]

## 2020-01-30 NOTE — PLAN OF CARE
Problem: Alteration in Thoughts and Perception  Goal: Refrain from acting on delusional thinking/internal stimuli  Description  Interventions:  - Monitor patient closely, per order   - Utilize least restrictive measures   - Set reasonable limits, give positive feedback for acceptable   - Administer medications as ordered and monitor of potential side effects  Outcome: Progressing  Goal: Agree to be compliant with medication regime, as prescribed and report medication side effects  Description  Interventions:  - Offer appropriate PRN medication and supervise ingestion; conduct AIMS, as needed   Outcome: Progressing  Goal: Attend and participate in unit activities, including therapeutic, recreational, and educational groups  Description  Interventions:  -Encourage Visitation and family involvement in care  Outcome: Malgorzata Gill has been visible on the unit, frequently at the nurse's station requesting staff to get clothes from the storage room, he does have several layers of clothing on  However he is pleasant on approach and brightens on approach with staff  Remains disheveled in appearance  Attended all scheduled groups, compliant with his medications and meals  Will continue to monitor for changes

## 2020-01-30 NOTE — ASSESSMENT & PLAN NOTE
Psychiatry Progress Note    Subjective: Interval History     Patient is passive-aggressive about attending groups as he is preoccupied about not getting any passes and therefore not attending groups even though he was reminded that he will not get any more privileges unless he cooperates with the required group attendance  Is still disheveled uncombed irritated wearing multiple layers of clothing living dirty clothing so on the floor in his room and prefers to lay back on bed  He is eating well and sleeping well and does attend only very sporadic groups and is impulsive demanding and seeking immediate gratification of needs with low frustration tolerance  His insight judgment are poor  He is compliant with medications  No side effects reported  Blood work is acceptable for clozapine trial and no signs or symptoms of agranulocytosis or myocarditis noted  He was awake later and was walking around demanding a pass and again reminded him that he needs to be patient and work his way up by attending groups and refraining from threatening aggressive behaviors and he agreed      Current medications:    Current Facility-Administered Medications:     acetaminophen (TYLENOL) tablet 325 mg, 325 mg, Oral, Q6H PRN, BRIT Ortega-PUSHPA    acetaminophen (TYLENOL) tablet 650 mg, 650 mg, Oral, Q6H PRN, BRIT Ortega-C    acetaminophen (TYLENOL) tablet 975 mg, 975 mg, Oral, Q8H PRN, BRIT Ortega-C    aluminum-magnesium hydroxide-simethicone (MYLANTA) 200-200-20 mg/5 mL oral suspension 30 mL, 30 mL, Oral, Q4H PRN, Stacey Parsons PA-C, 30 mL at 01/24/20 2352    atorvastatin (LIPITOR) tablet 10 mg, 10 mg, Oral, Daily With Dinner, BRIT Ortega-PUSHPA, 10 mg at 01/29/20 1646    benztropine (COGENTIN) injection 1 mg, 1 mg, Intramuscular, Q6H PRN, BRIT Ortega-PUSHPA    benztropine (COGENTIN) tablet 1 mg, 1 mg, Oral, Q6H PRN, BRIT Ortega-PUSHPA    clozapine (CLOZARIL) tablet 200 mg, 200 mg, Oral, Daily, Oscar Dong MD, 200 mg at 01/29/20 1646    divalproex sodium (DEPAKOTE) EC tablet 1,000 mg, 1,000 mg, Oral, BID, Oscar Dong MD, 1,000 mg at 01/29/20 2034    docusate sodium (COLACE) capsule 100 mg, 100 mg, Oral, BID, [facility] BRIT Meyer-PUSHPA, 100 mg at 01/29/20 1705    haloperidol (HALDOL) tablet 5 mg, 5 mg, Oral, [facility], [facility]    haloperidol lactate (HALDOL) injection 5 mg, 5 mg, Intramuscular, Q6H PRN, [facility]    levothyroxine tablet 75 mcg, 75 mcg, Oral, Early Morning, [facility] XUAN Meyer, 75 mcg at 01/30/20 0600    LORazepam (ATIVAN) 2 mg/mL injection 1 mg, 1 mg, Intramuscular, Q6H PRN, [facility]    LORazepam (ATIVAN) tablet 1 mg, 1 mg, Oral, Q6H PRN, [facility]    magnesium hydroxide (MILK OF MAGNESIA) 400 mg/5 mL oral suspension 30 mL, 30 mL, Oral, Daily PRN, [facility] XUAN Meyer    metFORMIN (GLUCOPHAGE) tablet 500 mg, 500 mg, Oral, BID With Meals, [facility] XUAN Meyer, 500 mg at 01/29/20 1646    nicotine polacrilex (NICORETTE) gum 2 mg, 2 mg, Oral, Q2H PRN, [facility]    OLANZapine (ZyPREXA) tablet 5 mg, 5 mg, Oral, After Lunch, Oscar Dong MD, 5 mg at 01/29/20 1330    oxybutynin (DITROPAN-XL) 24 hr tablet 5 mg, 5 mg, Oral, Daily, [facility] XUAN Meyer, 5 mg at 01/29/20 0858    pantoprazole (PROTONIX) EC tablet 40 mg, 40 mg, Oral, Early Morning, [facility] XUAN Meyer, 40 mg at 01/30/20 0600    traZODone (DESYREL) tablet 50 mg, 50 mg, Oral, HS PRN, [facility] XUAN Meyer, 50 mg at 01/29/20 2225  Justification if on more than two antipsychotics:  Due to lack of response to single antipsychotics including clozapine  Side effects if any:  None today    Risks , benefits, side effects and precautions of medications discussed with patient and reminded patient to let us know any problems with medications     Objective:     Vital Signs:  Vitals:    01/27/20 0700 01/28/20 0900 01/28/20 0905 01/29/20 0700   BP: 100/57 126/78 138/84 132/79   BP Location: Left arm Left arm Left arm Right arm   Pulse: 74 94 102 94   Resp: 20 18 18 18   Temp: 98 °F (36 7 °C) (!) 97 3 °F (36 3 °C)  97 9 °F (36 6 °C)   TempSrc: Temporal Temporal     Weight:       Height:         Appearance:  Poor grooming hostile wearing multiple layers of clothing paranoid suspicious and laying on bed in his room   Behavior:  Demanding argumentative irritated   Speech:  Loud at times   Mood:  Angry and agitated   Affect:  Labile and it   Thought Process:  Preoccupied about getting out perseverating on same   Thought Content:  No overt delusions elicited but does appear paranoid  No current suicidal homicidal thoughts and under plans reported  Perceptual Disturbances: And noted but appears to respond   Risk Potential: Potential for fire-setting behavior   Sensorium:  Oriented to 3 spheres and to situation   Cognition:  Intact with no deficit in recent or remote memory   Consciousness:  Alert and easily aroused when approached   Attention: Impaired   Intellect: Estimated to be done or   Insight:  Limited   Judgment: Limited      Motor Activity: No abnormal involuntary movements         Recent Labs:  Results Reviewed     None          I/O Past 24 hours:  No intake/output data recorded  No intake/output data recorded  Assessment / Plan:     Schizoaffective disorder, bipolar type ([facility]      Reason for continued inpatient care:  Because periods of agitation and demanding and threatening behaviors  Acceptance by patient:  Not accepting at this time  Hopefulness in recovery: To leave but a personal care  Understanding of medications :   To [place]  Involved in reintegration process:  Trying to adjust unit but not cooperating with the treatment  Trusting in relatoinship with psychiatrist:  Asiya Yeboah at times    Recommended Treatment:    Medication changes:  1) no changes   Non-pharmacological treatments  1) continue individual group milieu therapy with psychoeducation recover from supple so on the EAC unit  2) off grounds privileges on hold due to cursing or threatening behaviors from early this week  3) reminded to keep attending more groups and comply with treatment plan to obtain higher privileges  Safety  1) Safety/communication plan established targeting dynamic risk factors above  Discharge Plan to a personal care home at [place] unstable after result of far setting risk potential testing is back    Counseling / Coordination of Care    Total floor / unit time spent today 15  minutes  Greater than 50% of total time was spent with the patient and / or family counseling and / or coordination of care  A description of the counseling / coordination of care  Patient's Rights, confidentiality and exceptions to confidentiality, use of automated medical record, [facility] staff access to medical record, and consent to treatment reviewed      Jordan Parra MD

## 2020-01-30 NOTE — PROGRESS NOTES
01/30/20 0800   Team Meeting   Meeting Type Daily Rounds   Team Members Present   Team Members Present Physician;Nurse;; Other (Discipline and Name)   Physician Team Member Dr Foster Baumgarten, RN   Care Management Team Member Lily Lawrence   Other (Discipline and Name) Omar Archibald LCSW     Patient attended fresh air group  Labile but controlled  Called the  stating he was not being helped on the unit  Slept

## 2020-01-30 NOTE — PROGRESS NOTES
Progress Note - Merlinda Fang 1965, 48 y o  male MRN: [id number]    [facility]-01 Encounter: [id number]    Primary Care Provider: No primary care provider on file  Date and time admitted to hospital: 12/23/2019  1:27 PM  Late entry for 01/29/2020      * Schizoaffective disorder, bipolar type [facility])  Assessment & Plan  Psychiatry Progress Note    Subjective: Interval History     Patient is still paranoid preoccupied cursing staff refusing to attend most of the groups and is focused on drinking excess fluids and refused to have blood work because of excessive fluid consumption as reported by staff  He was disappointed yesterday in team when told that he cannot go on passes unless he complies with the groups and refrains from making threats or showing aggressive behaviors id he continues to seek immediate gratification of needs and expects low frustration tolerance and demands to go on a pass despite reminders that he needs to and that privilege  Compliant with medications with no side effects or problems  Still wearing multiple layers of clothing and disheveled unkempt and poorly groomed  Sleeps well and eats fairly well  Remains preoccupied paranoid suspicious    Again found him laying on the bed with clothes all over the floor in his bedroom and was asking to let him go on a pass again and seemed to be disinterested in talking with me    Current medications:    Current Facility-Administered Medications:     acetaminophen (TYLENOL) tablet 325 mg, 325 mg, Oral, Q6H PRN, Qamar Anuja, PA-C    acetaminophen (TYLENOL) tablet 650 mg, 650 mg, Oral, Q6H PRN, Qamar Anuja, PA-C    acetaminophen (TYLENOL) tablet 975 mg, 975 mg, Oral, Q8H PRN, Qamar Anuja, PA-C    aluminum-magnesium hydroxide-simethicone (MYLANTA) 200-200-20 mg/5 mL oral suspension 30 mL, 30 mL, Oral, Q4H PRN, Qamar Anuja, PA-C, 30 mL at 01/24/20 4982    atorvastatin (LIPITOR) tablet 10 mg, 10 mg, Oral, Daily With Darryl Mir PA-C, 10 mg at 01/28/20 1701    benztropine (COGENTIN) injection 1 mg, 1 mg, Intramuscular, Q6H PRN, Brenda Castillo PA-C    benztropine (COGENTIN) tablet 1 mg, 1 mg, Oral, Q6H PRN, Brenda Castillo PA-C    clozapine (CLOZARIL) tablet 200 mg, 200 mg, Oral, Daily, Judie Blas MD, 200 mg at 01/28/20 1701    divalproex sodium (DEPAKOTE) EC tablet 1,000 mg, 1,000 mg, Oral, BID, Judie Blsa MD, 1,000 mg at 01/28/20 2049    docusate sodium (COLACE) capsule 100 mg, 100 mg, Oral, BID, Brenda Castillo PA-C, 100 mg at 01/28/20 1700    haloperidol (HALDOL) tablet 5 mg, 5 mg, Oral, Q6H PRN, Brenda Castillo PA-C    haloperidol lactate (HALDOL) injection 5 mg, 5 mg, Intramuscular, Q6H PRN, Brenda Castillo PA-C    levothyroxine tablet 75 mcg, 75 mcg, Oral, Early Morning, Brenda Castillo PA-C, 75 mcg at 01/29/20 0546    LORazepam (ATIVAN) 2 mg/mL injection 1 mg, 1 mg, Intramuscular, Q6H PRN, Brenda Castillo PA-C    LORazepam (ATIVAN) tablet 1 mg, 1 mg, Oral, Q6H PRN, Brenda Castillo PA-C    magnesium hydroxide (MILK OF MAGNESIA) 400 mg/5 mL oral suspension 30 mL, 30 mL, Oral, Daily PRN, Brenda Castillo PA-C    metFORMIN (GLUCOPHAGE) tablet 500 mg, 500 mg, Oral, BID With Meals, Brenda Castillo PA-C, 500 mg at 01/28/20 1701    nicotine polacrilex (NICORETTE) gum 2 mg, 2 mg, Oral, Q2H PRN, Brenda Castillo PA-C    OLANZapine (ZyPREXA) tablet 5 mg, 5 mg, Oral, After Lunch, Judie Blas MD, 5 mg at 01/28/20 1304    oxybutynin (DITROPAN-XL) 24 hr tablet 5 mg, 5 mg, Oral, Daily, Brenda Castillo PA-C, 5 mg at 01/28/20 0840    pantoprazole (PROTONIX) EC tablet 40 mg, 40 mg, Oral, Early Morning, Brenda Castillo PA-C, 40 mg at 01/29/20 0546    traZODone (DESYREL) tablet 50 mg, 50 mg, Oral, HS PRN, BRIT Doran-PUSHPA, 50 mg at 01/28/20 8088  Justification if on more than two antipsychotics:  Due to lack of response to single antipsychotics including clozapine  Side effects if any:  None today    Risks , benefits, side effects and precautions of medications discussed with patient and reminded patient to let us know any problems with medications     Objective:     Vital Signs:  Vitals:    01/27/20 0700 01/28/20 0900 01/28/20 0905 01/29/20 0700   BP: 100/57 126/78 138/84 132/79   BP Location: Left arm Left arm Left arm Right arm   Pulse: 74 94 102 94   Resp: 20 18 18 18   Temp: 98 °F (36 7 °C) (!) 97 3 °F (36 3 °C)  97 9 °F (36 6 °C)   TempSrc: Temporal Temporal     Weight:       Height:         Appearance:  Paranoid suspicious wearing multiple layers of clothing with poor grooming and hostility   Behavior:  Argumentative demanding   Speech:  Loud pressured at   Mood:  Angry or    Affect:  Irritated labile   Thought Process: Focused on getting out   Thought Content:  No current suicidal homicidal thoughts and under plans reported  No overt delusions elicited but does appear paranoid   Perceptual Disturbances: None but appears to responding   Risk Potential: Potential for 5 setting behavior present   Sensorium:  Oriented to 3 spheres and to situation   Cognition:  intact with not a visits and recent or remote memory   Consciousness:  Alert and awake when approached   Attention: Attention and concentration span are impaired   Intellect: Estimated to be dull normal all below average   Insight:  Remains poor   Judgment: Remains poor      Motor Activity: No abnormal involuntary movements         Recent Labs:  Results Reviewed     None          I/O Past 24 hours:  No intake/output data recorded  No intake/output data recorded  Assessment / Plan:     Schizoaffective disorder, bipolar type [facility])      Reason for continued inpatient care: To gain control over impulsive behaviors and severe paranoia and agitation  Acceptance by patient:  Not accepting for  Hopefulness in recovery:   To leave at stay personal care boarding home   Understanding of medications :  Somewhat  Involved in reintegration process:  Adjusting to the unit  Trusting in relatoinship with psychiatrist:  Asiya Yeboah off and on    Recommended Treatment:    Medication changes:  1) no changes   Non-pharmacological treatments  1) continue with individual therapy group therapy milieu therapy on the extended acute care unit with psychoeducation and use sings recovery principles   2) off grounds privileges on hold due to cursing or threatening behaviors from 2 days ago  Safety  1) Safety/communication plan established targeting dynamic risk factors above  Discharge Plan to a personal care home at [place] unstable after result of far setting risk potential testing is back    Counseling / Coordination of Care    Total floor / unit time spent today 15  minutes  Greater than 50% of total time was spent with the patient and / or family counseling and / or coordination of care  A description of the counseling / coordination of care  Patient's Rights, confidentiality and exceptions to confidentiality, use of automated medical record, [address] [place] [address] staff access to medical record, and consent to treatment reviewed      Oval MD Lucrecia

## 2020-01-31 PROCEDURE — 99232 SBSQ HOSP IP/OBS MODERATE 35: CPT | Performed by: PSYCHIATRY & NEUROLOGY

## 2020-01-31 RX ADMIN — LEVOTHYROXINE SODIUM 75 MCG: 75 TABLET ORAL at 05:38

## 2020-01-31 RX ADMIN — TRAZODONE HYDROCHLORIDE 50 MG: 50 TABLET ORAL at 21:25

## 2020-01-31 RX ADMIN — CLOZAPINE 200 MG: 200 TABLET ORAL at 17:00

## 2020-01-31 RX ADMIN — DIVALPROEX SODIUM 1000 MG: 500 TABLET, DELAYED RELEASE ORAL at 20:32

## 2020-01-31 RX ADMIN — DIVALPROEX SODIUM 1000 MG: 500 TABLET, DELAYED RELEASE ORAL at 12:10

## 2020-01-31 RX ADMIN — OLANZAPINE 5 MG: 5 TABLET, FILM COATED ORAL at 13:10

## 2020-01-31 RX ADMIN — DOCUSATE SODIUM 100 MG: 100 CAPSULE, LIQUID FILLED ORAL at 08:33

## 2020-01-31 RX ADMIN — OXYBUTYNIN CHLORIDE 5 MG: 5 TABLET, EXTENDED RELEASE ORAL at 08:33

## 2020-01-31 RX ADMIN — METFORMIN HYDROCHLORIDE 500 MG: 500 TABLET ORAL at 17:00

## 2020-01-31 RX ADMIN — DOCUSATE SODIUM 100 MG: 100 CAPSULE, LIQUID FILLED ORAL at 17:00

## 2020-01-31 RX ADMIN — METFORMIN HYDROCHLORIDE 500 MG: 500 TABLET ORAL at 08:33

## 2020-01-31 RX ADMIN — PANTOPRAZOLE SODIUM 40 MG: 40 TABLET, DELAYED RELEASE ORAL at 05:38

## 2020-01-31 RX ADMIN — ATORVASTATIN CALCIUM 10 MG: 10 TABLET, FILM COATED ORAL at 17:00

## 2020-01-31 NOTE — PROGRESS NOTES
Othella Aus maintained on SAFE precaution    In bed with eyes closed, breath even and unlabored   No indication of pain or discomfort noted   No behavioral noted   Will continue to monitor

## 2020-01-31 NOTE — PROGRESS NOTES
01/31/20 0800   Team Meeting   Meeting Type Daily Rounds   Team Members Present   Team Members Present Physician;Nurse;; Other (Discipline and Name)   Physician Team Member Dr Ilene Loredo Team Member Chantelle Beaver RN   Care Management Team Member Lily Baer   Other (Discipline and Name) Reyna White LCSW     Patient attended groups  Controlled  Disheveled  Slept

## 2020-01-31 NOTE — PLAN OF CARE
Problem: Alteration in Thoughts and Perception  Goal: Agree to be compliant with medication regime, as prescribed and report medication side effects  Description  Interventions:  - Offer appropriate PRN medication and supervise ingestion; conduct AIMS, as needed   Outcome: Progressing  Goal: Attend and participate in unit activities, including therapeutic, recreational, and educational groups  Description  Interventions:  -Encourage Visitation and family involvement in care  Outcome: Progressing  Goal: Complete daily ADLs, including personal hygiene independently, as able  Description  Interventions:  - Observe, teach, and assist patient with ADLS  - Monitor and promote a balance of rest/activity, with adequate nutrition and elimination   Outcome: Not Progressing    Kate Winchester has been visible on the unit, frequently at the nurse's station, more-so in the afternoon  However he is pleasant on approach and brightens on approach with staff  Remains disheveled in appearance  Attended all scheduled groups, compliant with his medications and meals  Will continue to monitor for changes

## 2020-01-31 NOTE — PLAN OF CARE
Problem: Alteration in Thoughts and Perception  Goal: Verbalize thoughts and feelings  Description  Interventions:  - Promote a nonjudgmental and trusting relationship with the patient through active listening and therapeutic communication  - Assess patient's level of functioning, behavior and potential for risk  - Engage patient in 1 on 1 interactions  - Encourage patient to express fears, feelings, frustrations, and discuss symptoms    - Amber patient to reality, help patient recognize reality-based thinking   - Administer medications as ordered and assess for potential side effects  - Provide the patient education related to the signs and symptoms of the illness and desired effects of prescribed medications  Outcome: Progressing  Goal: Agree to be compliant with medication regime, as prescribed and report medication side effects  Description  Interventions:  - Offer appropriate PRN medication and supervise ingestion; conduct AIMS, as needed   Outcome: Progressing     Problem: GASTROINTESTINAL - ADULT  Goal: Maintains adequate nutritional intake  Description  INTERVENTIONS:  - Monitor percentage of each meal consumed  - Identify factors contributing to decreased intake, treat as appropriate  - Assist with meals as needed  - Monitor I&O, weight, and lab values if indicated  - Obtain nutrition services referral as needed  Outcome: Progressing     Problem: Alteration in Thoughts and Perception  Goal: Complete daily ADLs, including personal hygiene independently, as able  Description  Interventions:  - Observe, teach, and assist patient with ADLS  - Monitor and promote a balance of rest/activity, with adequate nutrition and elimination   Outcome: Yeison Frey has been visible and ambulating in the milieu  He went to [place] with staff member and peers  Social with staff and select peers  Able to make needs known  Denies anxiety and depression  Disheveled appearance  Wears layers of clothing   Showered this shift  Took medications without prompting  Ate 100% of his meal   Attended evening group  Took HS medication  Ate snack  Continue to monitor  Precautions maintained

## 2020-01-31 NOTE — PROGRESS NOTES
Progress Note - Nerissa Gonzalez 1965, 48 y o  male MRN: [id number]    [facility] Encounter: [id number]    Primary Care Provider: No primary care provider on file  Date and time admitted to hospital: 12/23/2019  1:27 PM        * Schizoaffective disorder, bipolar type [facility])  Assessment & Plan  Psychiatry Progress Note  Patient is still focused on getting out on a pass and is not attending all the groups as required  His lab work was inconclusive or excessive water drinking as serum osmolality was only borderline high had not low as was expected and serum sodium level was only slightly low  Lajean Cassette He is still somewhat demanding not easily redirectable paces back and forth in the hallways continues to wear multiple layers of clothing is fairly groomed as clothes all over the floor in his room and is only focused on getting out despite reminders that he needs to show more controls and refrain from making any threats and attend more groups  He was told that needs to demonstrate at least a period of stability for a week before he can get higher privileges again  Tolerating medications with no side effects and is generally compliant with medications  He has not been aggressive or threatening in the last 24 hours  He is moving his bowels    He is eating and sleeping well      Current medications:    Current Facility-Administered Medications:     acetaminophen (TYLENOL) tablet 325 mg, 325 mg, Oral, Q6H PRN, Andrés Welch PA-C    acetaminophen (TYLENOL) tablet 650 mg, 650 mg, Oral, Q6H PRN, Andrés Welch PA-C    acetaminophen (TYLENOL) tablet 975 mg, 975 mg, Oral, Q8H PRN, Andrés Welch PA-C    aluminum-magnesium hydroxide-simethicone (MYLANTA) 200-200-20 mg/5 mL oral suspension 30 mL, 30 mL, Oral, Q4H PRN, Andrés Welch PA-C, 30 mL at 01/24/20 4179    atorvastatin (LIPITOR) tablet 10 mg, 10 mg, Oral, Daily With Aline Jung PA-C, 10 mg at 01/30/20 1427   benztropine (COGENTIN) injection 1 mg, 1 mg, Intramuscular, Q6H PRN, Melvena Ashley, PA-C    benztropine (COGENTIN) tablet 1 mg, 1 mg, Oral, Q6H PRN, Melvena Ashley, PA-C    clozapine (CLOZARIL) tablet 200 mg, 200 mg, Oral, Daily, Myrna Merrill MD, 200 mg at 01/30/20 1631    divalproex sodium (DEPAKOTE) EC tablet 1,000 mg, 1,000 mg, Oral, BID, Myrna Merrill MD, 1,000 mg at 01/30/20 2041    docusate sodium (COLACE) capsule 100 mg, 100 mg, Oral, BID, Ollie Ramirez PA-C, 100 mg at 01/30/20 1702    haloperidol (HALDOL) tablet 5 mg, 5 mg, Oral, Q6H PRN, Mellaila Talberts, PA-C    haloperidol lactate (HALDOL) injection 5 mg, 5 mg, Intramuscular, Q6H PRN, Melbrada Ashley, PA-C    levothyroxine tablet 75 mcg, 75 mcg, Oral, Early Morning, Melbrada Ashley, PA-C, 75 mcg at 01/31/20 0538    LORazepam (ATIVAN) 2 mg/mL injection 1 mg, 1 mg, Intramuscular, Q6H PRN, Melvena Ashley, PA-C    LORazepam (ATIVAN) tablet 1 mg, 1 mg, Oral, Q6H PRN, Melvena Ashley, PA-C    magnesium hydroxide (MILK OF MAGNESIA) 400 mg/5 mL oral suspension 30 mL, 30 mL, Oral, Daily PRN, Melbrada Ashley, PA-C    metFORMIN (GLUCOPHAGE) tablet 500 mg, 500 mg, Oral, BID With Meals, Ollie Ramirez, PA-C, 500 mg at 01/30/20 1631    nicotine polacrilex (NICORETTE) gum 2 mg, 2 mg, Oral, Q2H PRN, Melbrada Ashley, PA-C    OLANZapine (ZyPREXA) tablet 5 mg, 5 mg, Oral, After Lunch, Myrna Merrill MD, 5 mg at 01/30/20 1321    oxybutynin (DITROPAN-XL) 24 hr tablet 5 mg, 5 mg, Oral, Daily, Melvena Ashley, PA-C, 5 mg at 01/30/20 0834    pantoprazole (PROTONIX) EC tablet 40 mg, 40 mg, Oral, Early Morning, Melvena Ashley, PA-C, 40 mg at 01/31/20 0538    traZODone (DESYREL) tablet 50 mg, 50 mg, Oral, HS PRN, Melvena Ashley, PA-C, 50 mg at 01/29/20 2225  Justification if on more than two antipsychotics:  Due to lack of response to single and is a poor   Side effects if any:  None     Risks , benefits, side effects and precautions of medications discussed with patient and reminded patient to let us know any problems with medications     Objective:     Vital Signs:  Vitals:    01/28/20 0905 01/29/20 0700 01/30/20 0700 01/30/20 0705   BP: 138/84 132/79 127/77 146/72   BP Location: Left arm Right arm Left arm Left arm   Pulse: 102 94 96 103   Resp: 18 18 19 19   Temp:  97 9 °F (36 6 °C) (!) 97 °F (36 1 °C)    TempSrc:   Temporal    Weight:       Height:         Appearance:  age appropriate, casually dressed, disheveled, older than stated age and overweight   Behavior:  evasive, guarded, psychomotor agitation, restless and fidgety and uncooperative with poor eye contact   Speech:  loud, pressured and tangential   Mood:  angry, anxious, euphoric, irritable and labile   Affect:  inappropriate, increased in intensity, increased in range, mood-incongruent and redirectable   Thought Process:  circumstantial, illogical, loose associations, perserverative and tangential   Thought Content:  delusions  grandiose and persecutory appears to present with some paranoia and grandiosity but denies any overt delusional experiences when confronted  No current suicidal homicidal thoughts intent or plans reported  Preoccupied about going on passes and not taking any responsibility to get higher privileges  Perceptual Disturbances: None but does appear as if responding sometime   Risk Potential: Risk for fire setting behavior   Sensorium:  person, place, time/date, situation, day of week, month of year, year and time   Cognition:  grossly intact with intact to recent and remote memory   Consciousness:  alert and awake    Attention: Fair   Intellect: Possibly dull normal   Insight:  poor   Judgment: poor      Motor Activity: no abnormal movements         Recent Labs:  Results Reviewed     None          I/O Past 24 hours:  No intake/output data recorded  No intake/output data recorded          Assessment / Plan:     Schizoaffective disorder, bipolar [facility])      Reason for continued inpatient care:  Due to poor judgment and low frustration tolerance and threatening behaviors  Acceptance by patient:  Not fully accepting  Hopefulness in recovery:  Living in a group  Understanding of medications :  Has limited under standing  Involved in reintegration process:  Not yet as privileges on hold because of recent threatening behavior  Trusting in relatoinship with psychiatrist:  Trusting    Recommended Treatment:    Medication changes:  1) none today    Non-pharmacological treatments  1) Continue with group therapy, milieu therapy and occupational therapy using recovery principles and psycho-education about accepting illness and need for treatment   2) reminded to attend groups and an privileges and refrain from making any threatening behaviors  Safety  1) Safety/communication plan established targeting dynamic risk factors above  Discharge Plan to a personal care home or CR and waiting for fire-setting risk evaluation resolved  Counseling / Coordination of Care    Total floor / unit time spent today 20 minutes  Greater than 50% of total time was spent with the patient and / or family counseling and / or coordination of care  A description of the counseling / coordination of care  Patient's Rights, confidentiality and exceptions to confidentiality, use of automated medical record, [address] [place] [address] staff access to medical record, and consent to treatment reviewed      Harris Chamberlain MD

## 2020-01-31 NOTE — PLAN OF CARE
Problem: Individualized Interventions  Goal: Attend and participate in unit activities, including therapeutic, recreational, and educational groups  Description  PSYCHOTHERAPY INTERVENTIONS:    -Form therapeutic alliance to promote trust and safety within a therapeutic environment    -Engage in individual psychotherapy using evidence-based practices that are specific to individual needs   -Process barriers to community living with an emphasis on solution-based interventions   -Promote self-awareness and identity development   -Identify and process patterns of behavior that have led to decompensation and/or hospitalizations   -Attend psychoeducation groups with licensed psychotherapist to learn about Illness Management Recovery (IMR) concepts and enhance coping skills    -Practice effective communication with staff, peers, family, and community members  Participate in family sessions as necessary   -Encourage reality-based thought content by examining thought processes and cognitive distortions      -Work with treatment team in reintegration back into the community when appropriate  Outcome: Progressing    Patient and therapist worked together to review group calendar and written requirements for patient to be able to go off-grounds with staff next week  Patient required redirection from interrupting and this behavior was identified to the patient as impulsive  Therapist encouraged patience and trust with the staff and the recovery process  Patient was very grateful to therapist for his new tools which were hung in his room  He was given positive reinforcement for his behavior over the past couple of days, especially with regard to derogatory language  Therapist will continue to work with patient on impulse control and community-accepted behaviors

## 2020-01-31 NOTE — PLAN OF CARE
Problem: Alteration in Thoughts and Perception  Goal: Verbalize thoughts and feelings  Description  Interventions:  - Promote a nonjudgmental and trusting relationship with the patient through active listening and therapeutic communication  - Assess patient's level of functioning, behavior and potential for risk  - Engage patient in 1 on 1 interactions  - Encourage patient to express fears, feelings, frustrations, and discuss symptoms    - [place] patient to reality, help patient recognize reality-based thinking   - Administer medications as ordered and assess for potential side effects  - Provide the patient education related to the signs and symptoms of the illness and desired effects of prescribed medications  Outcome: Progressing  Goal: Agree to be compliant with medication regime, as prescribed and report medication side effects  Description  Interventions:  - Offer appropriate PRN medication and supervise ingestion; conduct AIMS, as needed   Outcome: Progressing  Goal: Attend and participate in unit activities, including therapeutic, recreational, and educational groups  Description  Interventions:  -Encourage Visitation and family involvement in care  Outcome: Progressing  Goal: Complete daily ADLs, including personal hygiene independently, as able  Description  Interventions:  - Observe, teach, and assist patient with ADLS  - Monitor and promote a balance of rest/activity, with adequate nutrition and elimination   Outcome: Progressing     Problem: Ineffective Coping  Goal: Understands least restrictive measures  Description  Interventions:  - Utilize least restrictive behavior  Outcome: Progressing     Problem: GASTROINTESTINAL - ADULT  Goal: Maintains adequate nutritional intake  Description  INTERVENTIONS:  - Monitor percentage of each meal consumed  - Identify factors contributing to decreased intake, treat as appropriate  - Assist with meals as needed  - Monitor I&O, weight, and lab values if indicated  - Obtain nutrition services referral as needed  Outcome: Michelle Bentley has been awake, alert, and visible intermittently out in the milieu  Pt went to [facility] for mass with staff and peers  Pt Ate 100% supper  Continue to monitor pt with water hourly and pt cooperative  Social with select peers and rests in room at intervals  Pt remains preoccupied with being discharged  Pt completed his daily ADL's today  No behavioral issues  Attended and participated in evening group and had snack after  Compliant with scheduled meds without prompting  Continue to monitor/assess for any changes

## 2020-01-31 NOTE — ASSESSMENT & PLAN NOTE
Psychiatry Progress Note  Patient is still focused on getting out on a pass and is not attending all the groups as required  His lab work was inconclusive or excessive water drinking as serum osmolality was only borderline high had not low as was expected and serum sodium level was only slightly low  Weston Sheldon He is still somewhat demanding not easily redirectable paces back and forth in the hallways continues to wear multiple layers of clothing is fairly groomed as clothes all over the floor in his room and is only focused on getting out despite reminders that he needs to show more controls and refrain from making any threats and attend more groups  He was told that needs to demonstrate at least a period of stability for a week before he can get higher privileges again  Tolerating medications with no side effects and is generally compliant with medications  He has not been aggressive or threatening in the last 24 hours  He is moving his bowels    He is eating and sleeping well      Current medications:    Current Facility-Administered Medications:     acetaminophen (TYLENOL) tablet 325 mg, 325 mg, Oral, Q6H PRN, Rio Koehler PA-C    acetaminophen (TYLENOL) tablet 650 mg, 650 mg, Oral, Q6H PRN, BRIT Berg-PUSHPA    acetaminophen (TYLENOL) tablet 975 mg, 975 mg, Oral, Q8H PRN, Rio Koehler PA-C    aluminum-magnesium hydroxide-simethicone (MYLANTA) 200-200-20 mg/5 mL oral suspension 30 mL, 30 mL, Oral, Q4H PRN, Rio Koehler PA-C, 30 mL at 01/24/20 2352    atorvastatin (LIPITOR) tablet 10 mg, 10 mg, Oral, Daily With Dinner, Rio Koehler PA-C, 10 mg at 01/30/20 1631    benztropine (COGENTIN) injection 1 mg, 1 mg, Intramuscular, Q6H PRN, Rio Koehler PA-C    benztropine (COGENTIN) tablet 1 mg, 1 mg, Oral, Q6H PRN, Rio Koehler PA-C    clozapine (CLOZARIL) tablet 200 mg, 200 mg, Oral, Daily, Elijah Chauhan MD, 200 mg at 01/30/20 1631    divalproex sodium (DEPAKOTE) EC tablet 1,000 mg, 1,000 mg, Oral, BID, Harris Chamberlain MD, 1,000 mg at 01/30/20 2041    docusate sodium (COLACE) capsule 100 mg, 100 mg, Oral, BID, Ariadna Magaña PA-C, 100 mg at 01/30/20 1702    haloperidol (HALDOL) tablet 5 mg, 5 mg, Oral, Q6H PRN, Ariadna Magaña PA-C    haloperidol lactate (HALDOL) injection 5 mg, 5 mg, Intramuscular, Q6H PRN, Ariadna Magaña PA-C    levothyroxine tablet 75 mcg, 75 mcg, Oral, Early Morning, Ariadna Magaña PA-C, 75 mcg at 01/31/20 0538    LORazepam (ATIVAN) 2 mg/mL injection 1 mg, 1 mg, Intramuscular, Q6H PRN, Ariadna Magaña PA-C    LORazepam (ATIVAN) tablet 1 mg, 1 mg, Oral, Q6H PRN, Ariadna Magaña PA-C    magnesium hydroxide (MILK OF MAGNESIA) 400 mg/5 mL oral suspension 30 mL, 30 mL, Oral, Daily PRN, Ariadna Magaña PA-C    metFORMIN (GLUCOPHAGE) tablet 500 mg, 500 mg, Oral, BID With Meals, Ariadna Magaña PA-C, 500 mg at 01/30/20 1631    nicotine polacrilex (NICORETTE) gum 2 mg, 2 mg, Oral, Q2H PRN, Ariadna Magaña PA-C    OLANZapine (ZyPREXA) tablet 5 mg, 5 mg, Oral, After Lunch, Harris Chamberlain MD, 5 mg at 01/30/20 1321    oxybutynin (DITROPAN-XL) 24 hr tablet 5 mg, 5 mg, Oral, Daily, Ariadna Magaña PA-C, 5 mg at 01/30/20 0834    pantoprazole (PROTONIX) EC tablet 40 mg, 40 mg, Oral, Early Morning, BRIT Yip-PUSHPA, 40 mg at 01/31/20 0538    traZODone (DESYREL) tablet 50 mg, 50 mg, Oral, HS PRN, Ariadna Magaña PA-C, 50 mg at 01/29/20 2225  Justification if on more than two antipsychotics:  Due to lack of response to single and is a poor   Side effects if any:  None     Risks , benefits, side effects and precautions of medications discussed with patient and reminded patient to let us know any problems with medications     Objective:     Vital Signs:  Vitals:    01/28/20 0905 01/29/20 0700 01/30/20 0700 01/30/20 0705   BP: 138/84 132/79 127/77 146/72   BP Location: Left arm Right arm Left arm Left arm   Pulse: 102 94 96 103   Resp: 18 18 19 19   Temp:  97 9 °F (36 6 °C) (!) 97 °F (36 1 °C)    TempSrc:   Temporal    Weight:       Height:         Appearance:  age appropriate, casually dressed, disheveled, older than stated age and overweight   Behavior:  evasive, guarded, psychomotor agitation, restless and fidgety and uncooperative with poor eye contact   Speech:  loud, pressured and tangential   Mood:  angry, anxious, euphoric, irritable and labile   Affect:  inappropriate, increased in intensity, increased in range, mood-incongruent and redirectable   Thought Process:  circumstantial, illogical, loose associations, perserverative and tangential   Thought Content:  delusions  grandiose and persecutory appears to present with some paranoia and grandiosity but denies any overt delusional experiences when confronted  No current suicidal homicidal thoughts intent or plans reported  Preoccupied about going on passes and not taking any responsibility to get higher privileges  Perceptual Disturbances: None but does appear as if responding sometime   Risk Potential: Risk for fire setting behavior   Sensorium:  person, place, time/date, situation, day of week, month of year, year and time   Cognition:  grossly intact with intact to recent and remote memory   Consciousness:  alert and awake    Attention: Fair   Intellect: Possibly dull normal   Insight:  poor   Judgment: poor      Motor Activity: no abnormal movements         Recent Labs:  Results Reviewed     None          I/O Past 24 hours:  No intake/output data recorded  No intake/output data recorded          Assessment / Plan:     Schizoaffective [facility] )      Reason for continued inpatient care:  Due to poor judgment and low frustration tolerance and threatening behaviors  Acceptance by patient:  Not fully accepting  Hopefulness in recovery:  Living in a group  Understanding of medications :  Has limited under standing  Involved in reintegration process:  Not yet as privileges on hold because of recent threatening behavior  Trusting in relatoinship with psychiatrist:  Trusting    Recommended Treatment:    Medication changes:  1) none today    Non-pharmacological treatments  1) Continue with group therapy, milieu therapy and occupational therapy using recovery principles and psycho-education about accepting illness and need for treatment   2) reminded to attend groups and an privileges and refrain from making any threatening behaviors  Safety  1) Safety/communication plan established targeting dynamic risk factors above  Discharge Plan to a personal care home or CR and waiting for fire-setting risk evaluation resolved  Counseling / Coordination of Care    Total floor / unit time spent today 20 minutes  Greater than 50% of total time was spent with the patient and / or family counseling and / or coordination of care  A description of the counseling / coordination of care  Patient's Rights, confidentiality and exceptions to confidentiality, use of automated medical record, [address] [place] [address] staff access to medical record, and consent to treatment reviewed      Genaro Glover MD

## 2020-02-01 PROCEDURE — 99232 SBSQ HOSP IP/OBS MODERATE 35: CPT | Performed by: PSYCHIATRY & NEUROLOGY

## 2020-02-01 RX ADMIN — CLOZAPINE 200 MG: 200 TABLET ORAL at 16:50

## 2020-02-01 RX ADMIN — METFORMIN HYDROCHLORIDE 500 MG: 500 TABLET ORAL at 08:39

## 2020-02-01 RX ADMIN — DIVALPROEX SODIUM 1000 MG: 500 TABLET, DELAYED RELEASE ORAL at 11:58

## 2020-02-01 RX ADMIN — PANTOPRAZOLE SODIUM 40 MG: 40 TABLET, DELAYED RELEASE ORAL at 06:20

## 2020-02-01 RX ADMIN — TRAZODONE HYDROCHLORIDE 50 MG: 50 TABLET ORAL at 23:24

## 2020-02-01 RX ADMIN — DIVALPROEX SODIUM 1000 MG: 500 TABLET, DELAYED RELEASE ORAL at 20:37

## 2020-02-01 RX ADMIN — OLANZAPINE 5 MG: 5 TABLET, FILM COATED ORAL at 13:00

## 2020-02-01 RX ADMIN — DOCUSATE SODIUM 100 MG: 100 CAPSULE, LIQUID FILLED ORAL at 08:39

## 2020-02-01 RX ADMIN — DOCUSATE SODIUM 100 MG: 100 CAPSULE, LIQUID FILLED ORAL at 17:02

## 2020-02-01 RX ADMIN — ATORVASTATIN CALCIUM 10 MG: 10 TABLET, FILM COATED ORAL at 16:50

## 2020-02-01 RX ADMIN — METFORMIN HYDROCHLORIDE 500 MG: 500 TABLET ORAL at 16:50

## 2020-02-01 RX ADMIN — LEVOTHYROXINE SODIUM 75 MCG: 75 TABLET ORAL at 06:20

## 2020-02-01 RX ADMIN — OXYBUTYNIN CHLORIDE 5 MG: 5 TABLET, EXTENDED RELEASE ORAL at 08:39
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
## 2020-02-01 NOTE — PLAN OF CARE
Problem: Alteration in Thoughts and Perception  Goal: Agree to be compliant with medication regime, as prescribed and report medication side effects  Description  Interventions:  - Offer appropriate PRN medication and supervise ingestion; conduct AIMS, as needed   Outcome: Progressing  Goal: Attend and participate in unit activities, including therapeutic, recreational, and educational groups  Description  Interventions:  -Encourage Visitation and family involvement in care  Outcome: Progressing  Goal: Complete daily ADLs, including personal hygiene independently, as able  Description  Interventions:  - Observe, teach, and assist patient with ADLS  - Monitor and promote a balance of rest/activity, with adequate nutrition and elimination   Outcome: Progressing     Problem: GASTROINTESTINAL - ADULT  Goal: Maintains adequate nutritional intake  Description  INTERVENTIONS:  - Monitor percentage of each meal consumed  - Identify factors contributing to decreased intake, treat as appropriate  - Assist with meals as needed  - Monitor I&O, weight, and lab values if indicated  - Obtain nutrition services referral as needed  Outcome: Jose A Mark has been visible, pleasant and cooperative  Out and about in the milieu  Social with staff and select peers  Denies anxiety and depression  Took medication without prompting  Ate 100% of his meal  Appearance not as disheveled today  Still wearing layers of clothes  Made phone call to his sister  Attended evening group  Took HS medication  Ate snack  Continue to monitor  Precautions maintained

## 2020-02-01 NOTE — PROGRESS NOTES
Alessia Rushing maintained on SAFE precaution    In bed with eyes closed, breath even and unlabored   No indication of pain or discomfort noted   No behavioral noted   Will continue to monitor

## 2020-02-01 NOTE — PLAN OF CARE
Problem: Alteration in Thoughts and Perception  Goal: Verbalize thoughts and feelings  Description  Interventions:  - Promote a nonjudgmental and trusting relationship with the patient through active listening and therapeutic communication  - Assess patient's level of functioning, behavior and potential for risk  - Engage patient in 1 on 1 interactions  - Encourage patient to express fears, feelings, frustrations, and discuss symptoms    - [place] patient to reality, help patient recognize reality-based thinking   - Administer medications as ordered and assess for potential side effects  - Provide the patient education related to the signs and symptoms of the illness and desired effects of prescribed medications  Outcome: Progressing  Goal: Refrain from acting on delusional thinking/internal stimuli  Description  Interventions:  - Monitor patient closely, per order   - Utilize least restrictive measures   - Set reasonable limits, give positive feedback for acceptable   - Administer medications as ordered and monitor of potential side effects  Outcome: Not Progressing  Goal: Agree to be compliant with medication regime, as prescribed and report medication side effects  Description  Interventions:  - Offer appropriate PRN medication and supervise ingestion; conduct AIMS, as needed   Outcome: Progressing  Goal: Attend and participate in unit activities, including therapeutic, recreational, and educational groups  Description  Interventions:  -Encourage Visitation and family involvement in care  Outcome: Sarah Cuff has been visible on the unit, frequently at the nurse's station, more-so in the afternoon  However he is pleasant on approach and brightens on approach with staff  Remains disheveled in appearance  Attended all scheduled groups, compliant with his medications and meals  Will continue to monitor for changes

## 2020-02-02 PROCEDURE — 99232 SBSQ HOSP IP/OBS MODERATE 35: CPT | Performed by: PSYCHIATRY & NEUROLOGY

## 2020-02-02 RX ADMIN — METFORMIN HYDROCHLORIDE 500 MG: 500 TABLET ORAL at 08:36

## 2020-02-02 RX ADMIN — DIVALPROEX SODIUM 1000 MG: 500 TABLET, DELAYED RELEASE ORAL at 11:56

## 2020-02-02 RX ADMIN — DOCUSATE SODIUM 100 MG: 100 CAPSULE, LIQUID FILLED ORAL at 17:06

## 2020-02-02 RX ADMIN — CLOZAPINE 200 MG: 200 TABLET ORAL at 16:43

## 2020-02-02 RX ADMIN — OXYBUTYNIN CHLORIDE 5 MG: 5 TABLET, EXTENDED RELEASE ORAL at 08:36

## 2020-02-02 RX ADMIN — OLANZAPINE 5 MG: 5 TABLET, FILM COATED ORAL at 13:05

## 2020-02-02 RX ADMIN — DIVALPROEX SODIUM 1000 MG: 500 TABLET, DELAYED RELEASE ORAL at 20:58

## 2020-02-02 RX ADMIN — METFORMIN HYDROCHLORIDE 500 MG: 500 TABLET ORAL at 16:43

## 2020-02-02 RX ADMIN — LEVOTHYROXINE SODIUM 75 MCG: 75 TABLET ORAL at 06:03

## 2020-02-02 RX ADMIN — PANTOPRAZOLE SODIUM 40 MG: 40 TABLET, DELAYED RELEASE ORAL at 06:03

## 2020-02-02 RX ADMIN — ATORVASTATIN CALCIUM 10 MG: 10 TABLET, FILM COATED ORAL at 16:43

## 2020-02-02 RX ADMIN — DOCUSATE SODIUM 100 MG: 100 CAPSULE, LIQUID FILLED ORAL at 08:36

## 2020-02-02 NOTE — PLAN OF CARE
Problem: Alteration in Thoughts and Perception  Goal: Verbalize thoughts and feelings  Description  Interventions:  - Promote a nonjudgmental and trusting relationship with the patient through active listening and therapeutic communication  - Assess patient's level of functioning, behavior and potential for risk  - Engage patient in 1 on 1 interactions  - Encourage patient to express fears, feelings, frustrations, and discuss symptoms    - [place] patient to reality, help patient recognize reality-based thinking   - Administer medications as ordered and assess for potential side effects  - Provide the patient education related to the signs and symptoms of the illness and desired effects of prescribed medications  Outcome: Progressing  Goal: Agree to be compliant with medication regime, as prescribed and report medication side effects  Description  Interventions:  - Offer appropriate PRN medication and supervise ingestion; conduct AIMS, as needed   Outcome: Progressing  Goal: Attend and participate in unit activities, including therapeutic, recreational, and educational groups  Description  Interventions:  -Encourage Visitation and family involvement in care  Outcome: Progressing  Goal: Complete daily ADLs, including personal hygiene independently, as able  Description  Interventions:  - Observe, teach, and assist patient with ADLS  - Monitor and promote a balance of rest/activity, with adequate nutrition and elimination   Outcome: Progressing     Problem: GASTROINTESTINAL - ADULT  Goal: Maintains adequate nutritional intake  Description  INTERVENTIONS:  - Monitor percentage of each meal consumed  - Identify factors contributing to decreased intake, treat as appropriate  - Assist with meals as needed  - Monitor I&O, weight, and lab values if indicated  - Obtain nutrition services referral as needed  Outcome: Sofia Courser has been visible, pleasant and cooperative throughout the shift   Social with staff and select peers  Good eye contact  Able to make needs known  Showered with encouragement by staff  Still wears layers of clothing  Took medication without prompting  Ate 100% of his meal  Watched the football game and ate pizza  Took HS medication  Continue to monitor  Precautions maintained

## 2020-02-02 NOTE — PROGRESS NOTES
02/01/20 1300   Activity/Group Checklist   Group Other (Comment)  (OPEN STUDIO/Art Therapy, Social Interaction-Free Expression)   Attendance Attended   Attendance Duration (min) 46-60   Interactions Interacted appropriately   Affect/Mood Appropriate   Goals Achieved Able to listen to others; Able to engage in interactions

## 2020-02-02 NOTE — PLAN OF CARE
Problem: Alteration in Thoughts and Perception  Goal: Agree to be compliant with medication regime, as prescribed and report medication side effects  Description  Interventions:  - Offer appropriate PRN medication and supervise ingestion; conduct AIMS, as needed   Outcome: Progressing  Goal: Attend and participate in unit activities, including therapeutic, recreational, and educational groups  Description  Interventions:  -Encourage Visitation and family involvement in care  Outcome: Progressing   Patient is pleasant and mostly cooperative  He did refuse to get OOB for breakfast but he did get up to take his morning medications  Patient did attend and participated in spirituality group  He continues to obsess over discharge and going to clubhouse  He is intrusive at the nurses station at times but is easily redirected  Patient is disheveled and has a very foul odor  He was encouraged to shower however, he refused  He states "I'll shower tonight"  Staff did speak with him about how to wash properly, he did verbalize understanding  Continue to monitor

## 2020-02-02 NOTE — PROGRESS NOTES
Sleeping at this time, no distress noted  Awake x1 for water at this time  Safe precautions in place and maintained   Monitoring continues
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
## 2020-02-03 PROCEDURE — 99232 SBSQ HOSP IP/OBS MODERATE 35: CPT | Performed by: PSYCHIATRY & NEUROLOGY

## 2020-02-03 RX ADMIN — METFORMIN HYDROCHLORIDE 500 MG: 500 TABLET ORAL at 16:33

## 2020-02-03 RX ADMIN — ATORVASTATIN CALCIUM 10 MG: 10 TABLET, FILM COATED ORAL at 16:34

## 2020-02-03 RX ADMIN — DOCUSATE SODIUM 100 MG: 100 CAPSULE, LIQUID FILLED ORAL at 08:12

## 2020-02-03 RX ADMIN — OXYBUTYNIN CHLORIDE 5 MG: 5 TABLET, EXTENDED RELEASE ORAL at 08:12

## 2020-02-03 RX ADMIN — PANTOPRAZOLE SODIUM 40 MG: 40 TABLET, DELAYED RELEASE ORAL at 06:22

## 2020-02-03 RX ADMIN — OLANZAPINE 5 MG: 5 TABLET, FILM COATED ORAL at 13:00

## 2020-02-03 RX ADMIN — DOCUSATE SODIUM 100 MG: 100 CAPSULE, LIQUID FILLED ORAL at 17:03

## 2020-02-03 RX ADMIN — LEVOTHYROXINE SODIUM 75 MCG: 75 TABLET ORAL at 06:22

## 2020-02-03 RX ADMIN — DIVALPROEX SODIUM 1000 MG: 500 TABLET, DELAYED RELEASE ORAL at 20:33

## 2020-02-03 RX ADMIN — METFORMIN HYDROCHLORIDE 500 MG: 500 TABLET ORAL at 08:12

## 2020-02-03 RX ADMIN — CLOZAPINE 200 MG: 200 TABLET ORAL at 16:33

## 2020-02-03 RX ADMIN — DIVALPROEX SODIUM 1000 MG: 500 TABLET, DELAYED RELEASE ORAL at 13:00
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
## 2020-02-03 NOTE — PROGRESS NOTES
01/31/20 1100   Activity/Group Checklist   Group Other (Comment)  ( IMR - Weekly Goal Review/Mindfulness)   Attendance Attended   Attendance Duration (min) Greater than 60   Interactions Disorganized interaction   Affect/Mood Appropriate   Goals Achieved Identified feelings; Identified triggers; Able to self-disclose; Able to listen to others; Able to engage in interactions; Able to reflect/comment on own behavior     Patient attended [facility] - Weekly Goal Review/Mindfulness  With some assistance from therapist, patient was able to identify three accomplishments in the last week  Patient reports he used respectful language, attended and enjoyed [employer], and kept up with his hygiene  Patient was given encouragement to continue his progress, so that he may get off-grounds with staff this week  Patient was able to stay quiet and engage with the Mindfulness material provided  Patient was alert, participated, and was respectful of peers

## 2020-02-03 NOTE — PROGRESS NOTES
02/03/20 1500   Activity/Group Checklist   Group Other (Comment)  (Graduation)   Attendance Attended   Attendance Duration (min) 16-30   Interactions Interacted appropriately   Affect/Mood Appropriate   Goals Achieved Increased hopefulness; Displayed empathy;Able to listen to others; Able to engage in interactions; Able to self-disclose; Able to recieve feedback     Patient attended peer's graduation  He provided positive feedback to this peer, and also discussed knowning him from [facility]  This prompted patient to speak about all the things he used to do at [facility]  Patient was easily redirected to keep the group focused on peer's achievements, but acknowledged for his interest in returning to [facility]

## 2020-02-03 NOTE — PROGRESS NOTES
[facility] GROUP NOTE     02/03/20 0900   Activity/Group Checklist   Group Community meeting   Attendance Attended   Attendance Duration (min) 0-15   Interactions Interacted appropriately   Affect/Mood Appropriate   Objectives/Key Points:  Living intentionally  Goal Setting  Thought for the Day  Self Check In

## 2020-02-03 NOTE — PLAN OF CARE
Problem: Alteration in Thoughts and Perception  Goal: Treatment Goal: Gain control of psychotic behaviors/thinking, reduce/eliminate presenting symptoms and demonstrate improved reality functioning upon discharge  Outcome: Progressing  Goal: Verbalize thoughts and feelings  Description  Interventions:  - Promote a nonjudgmental and trusting relationship with the patient through active listening and therapeutic communication  - Assess patient's level of functioning, behavior and potential for risk  - Engage patient in 1 on 1 interactions  - Encourage patient to express fears, feelings, frustrations, and discuss symptoms    - [place] patient to reality, help patient recognize reality-based thinking   - Administer medications as ordered and assess for potential side effects  - Provide the patient education related to the signs and symptoms of the illness and desired effects of prescribed medications  Outcome: Progressing  Goal: Refrain from acting on delusional thinking/internal stimuli  Description  Interventions:  - Monitor patient closely, per order   - Utilize least restrictive measures   - Set reasonable limits, give positive feedback for acceptable   - Administer medications as ordered and monitor of potential side effects  Outcome: Progressing  Goal: Agree to be compliant with medication regime, as prescribed and report medication side effects  Description  Interventions:  - Offer appropriate PRN medication and supervise ingestion; conduct AIMS, as needed   Outcome: Progressing  Goal: Attend and participate in unit activities, including therapeutic, recreational, and educational groups  Description  Interventions:  -Encourage Visitation and family involvement in care  Outcome: Progressing  Goal: Recognize dysfunctional thoughts, communicate reality-based thoughts at the time of discharge  Description  Interventions:  - Provide medication and psycho-education to assist patient in compliance and developing insight into his/her illness   Outcome: Progressing     Problem: Ineffective Coping  Goal: Understands least restrictive measures  Description  Interventions:  - Utilize least restrictive behavior  Outcome: Progressing     Problem: Alteration in Thoughts and Perception  Goal: Complete daily ADLs, including personal hygiene independently, as able  Description  Interventions:  - Observe, teach, and assist patient with ADLS  - Monitor and promote a balance of rest/activity, with adequate nutrition and elimination   Outcome: Not Progressing  Visible intermittently, pleasant and social with staff and select peers, attended spirituality, [facility], and fresh air groups  Disorganized in thought process and disheveled in appearance  No behaviors or issues noted  Med and meal compliant and ate 100% of both meals  Continue to monitor

## 2020-02-03 NOTE — PROGRESS NOTES
02/03/20 0900   Team Meeting   Meeting Type Daily Rounds   Team Members Present   Team Members Present Physician;Nurse;; Other (Discipline and Name)   Physician Team Member Dr Corinne Folds, RN   Care Management Team Member Lily Maria   Other (Discipline and Name) Brenna Landin LCSW     Patient presents disheveled  Controlled with no outbursts

## 2020-02-03 NOTE — PROGRESS NOTES
02/03/20 1100   Activity/Group Checklist   Group   (IMR )   Attendance Attended   Attendance Duration (min) 46-60   Interactions Did not interact   Affect/Mood Appropriate   Goals Achieved Able to listen to others

## 2020-02-03 NOTE — PLAN OF CARE
Problem: Alteration in Thoughts and Perception  Goal: Attend and participate in unit activities, including therapeutic, recreational, and educational groups  Description  Interventions:  -Encourage Visitation and family involvement in care  2/3/2020 1317 by Julius Shields  Outcome: Progressing  2/3/2020 1316 by Julius Shields  Outcome: Progressing   Patient has made improvements is some areas and has been able to obtain a 63% group attendance  Patient continues to be repetitive, concerning himself with discharge and not what he should be working on during his time here on the [facility]  Patient has been controlling behaviors and has been granted off ground privileges with staff  Patient did not complete Goal Card for the week of 2/3/2020

## 2020-02-04 PROCEDURE — 99232 SBSQ HOSP IP/OBS MODERATE 35: CPT | Performed by: PSYCHIATRY & NEUROLOGY

## 2020-02-04 RX ADMIN — METFORMIN HYDROCHLORIDE 500 MG: 500 TABLET ORAL at 17:09

## 2020-02-04 RX ADMIN — METFORMIN HYDROCHLORIDE 500 MG: 500 TABLET ORAL at 08:46

## 2020-02-04 RX ADMIN — DOCUSATE SODIUM 100 MG: 100 CAPSULE, LIQUID FILLED ORAL at 08:46

## 2020-02-04 RX ADMIN — OXYBUTYNIN CHLORIDE 5 MG: 5 TABLET, EXTENDED RELEASE ORAL at 08:46

## 2020-02-04 RX ADMIN — OLANZAPINE 5 MG: 5 TABLET, FILM COATED ORAL at 13:11

## 2020-02-04 RX ADMIN — DOCUSATE SODIUM 100 MG: 100 CAPSULE, LIQUID FILLED ORAL at 17:09

## 2020-02-04 RX ADMIN — LEVOTHYROXINE SODIUM 75 MCG: 75 TABLET ORAL at 06:04

## 2020-02-04 RX ADMIN — PANTOPRAZOLE SODIUM 40 MG: 40 TABLET, DELAYED RELEASE ORAL at 06:04

## 2020-02-04 RX ADMIN — DIVALPROEX SODIUM 1000 MG: 500 TABLET, DELAYED RELEASE ORAL at 20:32

## 2020-02-04 RX ADMIN — ATORVASTATIN CALCIUM 10 MG: 10 TABLET, FILM COATED ORAL at 17:08

## 2020-02-04 RX ADMIN — DIVALPROEX SODIUM 1000 MG: 500 TABLET, DELAYED RELEASE ORAL at 13:11

## 2020-02-04 RX ADMIN — CLOZAPINE 200 MG: 200 TABLET ORAL at 17:08

## 2020-02-04 NOTE — PLAN OF CARE
Problem: Alteration in Thoughts and Perception  Goal: Treatment Goal: Gain control of psychotic behaviors/thinking, reduce/eliminate presenting symptoms and demonstrate improved reality functioning upon discharge  Outcome: Progressing  Goal: Verbalize thoughts and feelings  Description  Interventions:  - Promote a nonjudgmental and trusting relationship with the patient through active listening and therapeutic communication  - Assess patient's level of functioning, behavior and potential for risk  - Engage patient in 1 on 1 interactions  - Encourage patient to express fears, feelings, frustrations, and discuss symptoms    - [place] patient to reality, help patient recognize reality-based thinking   - Administer medications as ordered and assess for potential side effects  - Provide the patient education related to the signs and symptoms of the illness and desired effects of prescribed medications  Outcome: Progressing  Goal: Refrain from acting on delusional thinking/internal stimuli  Description  Interventions:  - Monitor patient closely, per order   - Utilize least restrictive measures   - Set reasonable limits, give positive feedback for acceptable   - Administer medications as ordered and monitor of potential side effects  Outcome: Progressing  Goal: Agree to be compliant with medication regime, as prescribed and report medication side effects  Description  Interventions:  - Offer appropriate PRN medication and supervise ingestion; conduct AIMS, as needed   Outcome: Progressing  Goal: Attend and participate in unit activities, including therapeutic, recreational, and educational groups  Description  Interventions:  -Encourage Visitation and family involvement in care  Outcome: Progressing  Goal: Recognize dysfunctional thoughts, communicate reality-based thoughts at the time of discharge  Description  Interventions:  - Provide medication and psycho-education to assist patient in compliance and developing insight into his/her illness   Outcome: Progressing     Problem: Ineffective Coping  Goal: Patient/Family verbalizes awareness of resources  Outcome: Progressing  Goal: Understands least restrictive measures  Description  Interventions:  - Utilize least restrictive behavior  Outcome: Progressing   Visible intermittently, pleasant and social with staff and select peers, attended IMR and fresh air groups  Continues to be disorganized in thought process and disheveled in appearance  No behaviors or issues noted  Med and meal compliant and ate 100% of both meals  Continue to monitor

## 2020-02-04 NOTE — PROGRESS NOTES
02/04/20 0950   Team Meeting   Meeting Type Tx Team Meeting   Initial Conference Date 02/04/20   Next Conference Date 02/11/20   Team Members Present   Team Members Present Physician;Nurse;; Other (Discipline and Name)   Physician Team Member Dr Courtney Arizmendi Team Member Rachel Box, TABBY   Care Management Team Member Lily Hummel   Other (Discipline and Name) Ann Enamorado [facility]; Kameron Baker, [facility]   Patient/Family Present   Patient Present Yes   Patient's Family Present No     Patient attended treatment team meeting this morning without his self assessment  Patient attended 63% of groups offered last week  Patient is happy that he has been able to control his behaviors and excited for the shopping trip he will have tomorrow  Off grounds with staff privileges granted  Team and patient completed risk assessment and the patient did not verbalize any desire to elope from the program  Patient verbalized understanding of consequences of eloping from treatment while on a commitment  Patient verbalized no further questions or concerns at the conclusion of the meeting
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
## 2020-02-04 NOTE — PROGRESS NOTES
[facility] GROUP NOTE  Slight processing delay  Able to participate with support and simplification of verbal instructions        02/04/20 1430   Activity/Group Checklist   Group Personal control  (Mindfulness through Recovery)   Attendance Attended   Attendance Duration (min) 16-30   Interactions Interacted appropriately   Affect/Mood Appropriate

## 2020-02-04 NOTE — PROGRESS NOTES
[facility] GROUP NOTE     02/04/20 1100   Activity/Group Checklist   Group Wellness  Vamshi Guzman)   Attendance Attended   Attendance Duration (min) 16-30   Interactions Interacted appropriately   Affect/Mood Appropriate   Goals Achieved Able to listen to others; Able to engage in interactions

## 2020-02-04 NOTE — PROGRESS NOTES
02/04/20 0900   Team Meeting   Meeting Type Daily Rounds   Team Members Present   Team Members Present Physician;Nurse;; Other (Discipline and Name)   Physician Team Member Dr Diallo Rizzo Team Member Griffin Harris RN   Care Management Team Member Lily Garcia   Other (Discipline and Name) Rolando Gonzalez LCSW     Patient attended groups  Got a visit from his big brother yesterday  Shopping trip tomorrow  Slept

## 2020-02-04 NOTE — PLAN OF CARE
Problem: Alteration in Thoughts and Perception  Goal: Verbalize thoughts and feelings  Description  Interventions:  - Promote a nonjudgmental and trusting relationship with the patient through active listening and therapeutic communication  - Assess patient's level of functioning, behavior and potential for risk  - Engage patient in 1 on 1 interactions  - Encourage patient to express fears, feelings, frustrations, and discuss symptoms    - [place] patient to reality, help patient recognize reality-based thinking   - Administer medications as ordered and assess for potential side effects  - Provide the patient education related to the signs and symptoms of the illness and desired effects of prescribed medications  Outcome: Progressing  Goal: Agree to be compliant with medication regime, as prescribed and report medication side effects  Description  Interventions:  - Offer appropriate PRN medication and supervise ingestion; conduct AIMS, as needed   Outcome: Progressing  Goal: Attend and participate in unit activities, including therapeutic, recreational, and educational groups  Description  Interventions:  -Encourage Visitation and family involvement in care  Outcome: Progressing  Goal: Complete daily ADLs, including personal hygiene independently, as able  Description  Interventions:  - Observe, teach, and assist patient with ADLS  - Monitor and promote a balance of rest/activity, with adequate nutrition and elimination   Outcome: Progressing     Problem: Ineffective Coping  Goal: Demonstrates healthy coping skills  Outcome: Progressing  Goal: Understands least restrictive measures  Description  Interventions:  - Utilize least restrictive behavior  Outcome: Progressing     Problem: GASTROINTESTINAL - ADULT  Goal: Maintains adequate nutritional intake  Description  INTERVENTIONS:  - Monitor percentage of each meal consumed  - Identify factors contributing to decreased intake, treat as appropriate  - Assist with meals as needed  - Monitor I&O, weight, and lab values if indicated  - Obtain nutrition services referral as needed  Outcome: Malgorzata Gill has been awake, alert, and visible intermittently out in the milieu  Pt went to [facility] for [facility] with staff and peers  Pt showered this shift  Ate 100% supper  Coming to nurse's station with less frequent requests  Compliant with scheduled meds with little prompting  Social with select peers  Attended and participated in evening group and had snack after  No behavioral issues  Disheveled and dressed in layers  Continue to monitor/assess for any changes
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
## 2020-02-05 PROCEDURE — 99232 SBSQ HOSP IP/OBS MODERATE 35: CPT | Performed by: PSYCHIATRY & NEUROLOGY

## 2020-02-05 RX ADMIN — DOCUSATE SODIUM 100 MG: 100 CAPSULE, LIQUID FILLED ORAL at 17:07

## 2020-02-05 RX ADMIN — TRAZODONE HYDROCHLORIDE 50 MG: 50 TABLET ORAL at 21:38

## 2020-02-05 RX ADMIN — OXYBUTYNIN CHLORIDE 5 MG: 5 TABLET, EXTENDED RELEASE ORAL at 08:20

## 2020-02-05 RX ADMIN — DIVALPROEX SODIUM 1000 MG: 500 TABLET, DELAYED RELEASE ORAL at 13:09

## 2020-02-05 RX ADMIN — DOCUSATE SODIUM 100 MG: 100 CAPSULE, LIQUID FILLED ORAL at 08:20

## 2020-02-05 RX ADMIN — ATORVASTATIN CALCIUM 10 MG: 10 TABLET, FILM COATED ORAL at 16:43

## 2020-02-05 RX ADMIN — DIVALPROEX SODIUM 1000 MG: 500 TABLET, DELAYED RELEASE ORAL at 20:34

## 2020-02-05 RX ADMIN — PANTOPRAZOLE SODIUM 40 MG: 40 TABLET, DELAYED RELEASE ORAL at 05:47

## 2020-02-05 RX ADMIN — METFORMIN HYDROCHLORIDE 500 MG: 500 TABLET ORAL at 08:20

## 2020-02-05 RX ADMIN — METFORMIN HYDROCHLORIDE 500 MG: 500 TABLET ORAL at 16:43

## 2020-02-05 RX ADMIN — LEVOTHYROXINE SODIUM 75 MCG: 75 TABLET ORAL at 05:47

## 2020-02-05 RX ADMIN — OLANZAPINE 5 MG: 5 TABLET, FILM COATED ORAL at 13:09

## 2020-02-05 RX ADMIN — CLOZAPINE 200 MG: 200 TABLET ORAL at 16:43

## 2020-02-05 NOTE — PROGRESS NOTES
Progress Note - Alyssa Coyle 1965, 48 y o  male MRN: [id number]    Unit/Bed#: TORRES ZAPATA [facility] 107-01 Encounter: [id number]    Primary Care Provider: No primary care provider on file  Date and time admitted to hospital: 12/23/2019  1:27 PM        * Schizoaffective disorder, bipolar type [facility])  Assessment & Plan  Psychiatry Progress Note  Patient is excited about going on a pass for shopping with staff today  He is better groomed has taken away the same clothing he was wearing for days and now has only 2 layers of clothing as of this morning which is a big improvement  He is friendly pleasant not demanding or agitated and is compliant by attending groups and is no longer threatening or cursing  He is compliant with medications and reports no side effects or problems  Is eating and sleeping well and no side effects reported  His room is also clean with no clothes on the floor  He has been taking showers and is not drinking excess fluids like he was in the past   He is looking forward to starting club house meetings once he does well this week  Still with occasional bouts of low frustration tolerance but redirectable    Excited about a shopping trip today with staff escort    Current medications:    Current Facility-Administered Medications:     [MAR Hold - Suspended Admission] acetaminophen (TYLENOL) tablet 325 mg, 325 mg, Oral, Q6H PRN, Slime Fuentes PA-C    [facility] Hold - Suspended Admission] acetaminophen (TYLENOL) tablet 650 mg, 650 mg, Oral, Q6H PRN, Slime Fuentes PA-C    [facility] Hold - Suspended Admission] acetaminophen (TYLENOL) tablet 975 mg, 975 mg, Oral, Q8H PRN, Slime Fuentes PA-C    [facility] Hold - Suspended Admission] aluminum-magnesium hydroxide-simethicone (MYLANTA) 200-200-20 mg/5 mL oral suspension 30 mL, 30 mL, Oral, Q4H PRN, Slime Fuentes PA-C, 30 mL at 01/24/20 4166    [MAR Hold - Suspended Admission] atorvastatin (LIPITOR) tablet 10 mg, 10 mg, Oral, Daily With Dinner, Ollie Ramirez PA-C, 10 mg at 02/04/20 1708    [MAR Hold - Suspended Admission] benztropine (COGENTIN) injection 1 mg, 1 mg, Intramuscular, Q6H PRN, Ollie Ramirez PA-C    [facility] Hold - Suspended Admission] benztropine (COGENTIN) tablet 1 mg, 1 mg, Oral, Q6H PRN, Ollie Ramirez PA-C    [facility] Hold - Suspended Admission] clozapine (CLOZARIL) tablet 200 mg, 200 mg, Oral, Daily, Myrna Merrill MD, 200 mg at 02/04/20 1708    [MAR Hold - Suspended Admission] divalproex sodium (DEPAKOTE) EC tablet 1,000 mg, 1,000 mg, Oral, BID, Myrna Merrill MD, 1,000 mg at 02/04/20 2032    [MAR Hold - Suspended Admission] docusate sodium (COLACE) capsule 100 mg, 100 mg, Oral, BID, Ollie Ramirez PA-C, 100 mg at 02/05/20 0820    [MAR Hold - Suspended Admission] haloperidol (HALDOL) tablet 5 mg, 5 mg, Oral, Q6H PRN, Ollie Ramirez PA-C    [facility] Hold - Suspended Admission] haloperidol lactate (HALDOL) injection 5 mg, 5 mg, Intramuscular, Q6H PRN, Ollie Ramirez PA-C    [facility] Hold - Suspended Admission] levothyroxine tablet 75 mcg, 75 mcg, Oral, Early Morning, Ollie Ramirez PA-C, 75 mcg at 02/05/20 0547    [MAR Hold - Suspended Admission] LORazepam (ATIVAN) 2 mg/mL injection 1 mg, 1 mg, Intramuscular, Q6H PRN, Ollie Ramirez PA-C    [facility] Hold - Suspended Admission] LORazepam (ATIVAN) tablet 1 mg, 1 mg, Oral, Q6H PRN, Ollie Ramirez PA-C    [facility] Hold - Suspended Admission] magnesium hydroxide (MILK OF MAGNESIA) 400 mg/5 mL oral suspension 30 mL, 30 mL, Oral, Daily PRN, Ollie Ramirez PA-C    [facility] Hold - Suspended Admission] metFORMIN (GLUCOPHAGE) tablet 500 mg, 500 mg, Oral, BID With Meals, Ollie Ramirez PA-C, 500 mg at 02/05/20 0820    [MAR Hold - Suspended Admission] nicotine polacrilex (NICORETTE) gum 2 mg, 2 mg, Oral, Q2H PRN, Ollie Ramirez PA-C    [facility] Hold - Suspended Admission] OLANZapine (ZyPREXA) tablet 5 mg, 5 mg, Oral, After Erendira Pack MD, 5 mg at 02/04/20 1311   [MAR Hold - Suspended Admission] oxybutynin (DITROPAN-XL) 24 hr tablet 5 mg, 5 mg, Oral, Daily, Kevinizzy Junior PA-C, 5 mg at 02/05/20 0820    [MAR Hold - Suspended Admission] pantoprazole (PROTONIX) EC tablet 40 mg, 40 mg, Oral, Early Morning, Kevinizzy Junior, PA-C, 40 mg at 02/05/20 0547    [MAR Hold - Suspended Admission] traZODone (DESYREL) tablet 50 mg, 50 mg, Oral, HS PRN, Rochelle Junior, PA-C, 50 mg at 02/01/20 2324    Current Outpatient Medications:     atorvastatin (LIPITOR) 10 mg tablet, Take 1 tablet (10 mg total) by mouth daily (Patient taking differently: Take 10 mg by mouth daily with dinner ), Disp: 30 tablet, Rfl: 2    cloZAPine (CLOZARIL) 100 mg tablet, Take 100 mg by mouth daily , Disp: , Rfl:     cloZAPine (CLOZARIL) 100 mg tablet, Take 100 mg by mouth daily at bedtime, Disp: , Rfl:     clozapine (CLOZARIL) 200 MG tablet, Take 200 mg by mouth daily at bedtime, Disp: , Rfl:     clozapine (CLOZARIL) 50 MG tablet, Take 50 mg by mouth daily at bedtime, Disp: , Rfl:     divalproex sodium (DEPAKOTE) 500 mg EC tablet, Take 1,000 mg by mouth 2 (two) times a day, Disp: , Rfl:     docusate sodium (COLACE) 100 mg capsule, Take 100 mg by mouth 2 (two) times a day, Disp: , Rfl:     levothyroxine 75 mcg tablet, TAKE ONE TABLET BY MOUTH EVERY DAY (HYPOTHYROIDISM), Disp: 30 tablet, Rfl: 2    metFORMIN (GLUCOPHAGE) 500 mg tablet, TAKE ONE TABLET BY MOUTH TWO TIMES A DAY (DIABETES) (Patient taking differently: Take 500 mg by mouth 2 (two) times a day with meals ), Disp: 60 tablet, Rfl: 2    OLANZapine (ZyPREXA) 10 mg tablet, Take 10 mg by mouth daily after lunch, Disp: , Rfl:     oxybutynin (DITROPAN-XL) 5 mg 24 hr tablet, Take 5 mg by mouth daily, Disp: , Rfl:     pantoprazole (PROTONIX) 40 mg tablet, TAKE ONE TABLET BY MOUTH EVERY DAY AFTER FOOD (Patient taking differently: Take 40 mg by mouth daily in the early morning ), Disp: 30 tablet, Rfl: 2    Alcohol Swabs PADS, Apply topically daily, Disp: 200 each, Rfl: 2    glucose blood (FREESTYLE LITE) test strip, 1 each by Other route daily Use as instructed, Disp: 100 each, Rfl: 5    Lancets (FREESTYLE) lancets, by Other route daily Use as instructed, Disp: 100 each, Rfl: 5    lisinopril (ZESTRIL) 5 mg tablet, TAKE ONE TABLET BY MOUTH DAILY (Patient not taking: Reported on 1/10/2020), Disp: 30 tablet, Rfl: 2    multivitamin (THERAGRAN) TABS, Take 1 tablet by mouth daily Supplement (Patient not taking: Reported on 1/10/2020), Disp: 30 tablet, Rfl: 2  Justification if on more than two antipsychotics:  Due to lack of response to single antipsychotics   Side effects if any: none    Risks , benefits, side effects and precautions of medications discussed with patient and reminded patient to let us know any problems with medications     Objective:     Vital Signs:  Vitals:    02/03/20 0705 02/04/20 0700 02/05/20 0700 02/05/20 0705   BP: 106/59 118/77 122/74 119/74   BP Location: Left arm Right arm Left arm Left arm   Pulse: 77 94 92 91   Resp: 19 20 20 20   Temp:  97 5 °F (36 4 °C) 97 8 °F (36 6 °C)    TempSrc:   Temporal    Weight:       Height:         Appearance:  age appropriate, casually dressed, disheveled and overweight with two l layers of clothing only today friendly pleasant cooperative   Behavior:  Somewhat childish but friendly and pleasant with good   Speech:  loud and pressured times but redirectable   Mood:  angry, anxious, euphoric, irritable and labile   Affect:  inappropriate, increased in intensity, increased in range, labile, mood-congruent and redirectable   Thought Process:  circumstantial, concrete, disorganized, goal directed, perserverative and tangential   Thought Content:  delusions  persecutory  no overt delusions elicited but he does appear to harbor some underlying paranoia  No preoccupation with violence and no obsessions  No current suicidal homicidal thoughts intent or plans     Perceptual Disturbances: None and not appearing to respond   Risk Potential: Potential for Aggression Yes Due to previous history of aggression   Sensorium:  person, place, time/date, situation, day of week, month of year, year and time   Cognition:  grossly intact aware of current events, no problems with recent or remote memory  No language deficits   Consciousness:  alert and awake    Attention: Some distractibility present with some problems with attention and concentration but redirectable   Intellect: Considered to be bothered   Insight:  Fair   Judgment: limited redirect      Motor Activity: no abnormal movements         Recent Labs:  Results Reviewed     None          I/O Past 24 hours:  No intake/output data recorded  No intake/output data recorded  Assessment / Plan:     Schizoaffective disorder, bipolar type [facility])      Reason for continued inpatient care: To stabilize mood and prevent aggressive behavior  Acceptance by patient:  Beginning to accept  Hopefulness in recovery:  Living in a group home [facility] the [facility]  Understanding of medications :  Has limited understanding  Involved in reintegration process:  See how he does with off [facility] in relatoinship with psychiatrist:  Sometimes trusting    Recommended Treatment:    Medication changes:  1) no changes today    Non-pharmacological treatments  1) Continue with individual therapy, group therapy, milieu therapy and occupational therapy using recovery principles and psycho-education about accepting illness and need for treatment   2) encouraged to refrain from threatening demanding behaviors and to attend to ADLs skills and to attend required groups to get higher privileges  3) see how he does with off hospital privileges with staff escort  Safety  1) Safety/communication plan established targeting dynamic risk factors above    Discharge Plan to a personal care home like a:  Once stabilized and once the fire setting risk evaluation is back    Counseling / Coordination of Care    Total floor / unit time spent today 15 minutes  Greater than 50% of total time was spent with the patient and / or family counseling and / or coordination of care  Receptive to supportive listening and counseling about symptom management     Patient's Rights, confidentiality and exceptions to confidentiality, use of automated medical record, Claudia Mei staff access to medical record, and consent to treatment reviewed      Elijah Chauhan MD

## 2020-02-05 NOTE — PLAN OF CARE
Problem: Alteration in Thoughts and Perception  Goal: Treatment Goal: Gain control of psychotic behaviors/thinking, reduce/eliminate presenting symptoms and demonstrate improved reality functioning upon discharge  Outcome: Progressing  Goal: Verbalize thoughts and feelings  Description  Interventions:  - Promote a nonjudgmental and trusting relationship with the patient through active listening and therapeutic communication  - Assess patient's level of functioning, behavior and potential for risk  - Engage patient in 1 on 1 interactions  - Encourage patient to express fears, feelings, frustrations, and discuss symptoms    - [place] patient to reality, help patient recognize reality-based thinking   - Administer medications as ordered and assess for potential side effects  - Provide the patient education related to the signs and symptoms of the illness and desired effects of prescribed medications  Outcome: Progressing  Goal: Refrain from acting on delusional thinking/internal stimuli  Description  Interventions:  - Monitor patient closely, per order   - Utilize least restrictive measures   - Set reasonable limits, give positive feedback for acceptable   - Administer medications as ordered and monitor of potential side effects  Outcome: Progressing  Goal: Agree to be compliant with medication regime, as prescribed and report medication side effects  Description  Interventions:  - Offer appropriate PRN medication and supervise ingestion; conduct AIMS, as needed   Outcome: Progressing  Goal: Attend and participate in unit activities, including therapeutic, recreational, and educational groups  Description  Interventions:  -Encourage Visitation and family involvement in care  Outcome: Progressing  Goal: Recognize dysfunctional thoughts, communicate reality-based thoughts at the time of discharge  Description  Interventions:  - Provide medication and psycho-education to assist patient in compliance and developing insight into his/her illness   Outcome: Progressing     Problem: Ineffective Coping  Goal: Patient/Family participate in treatment and DC plans  Description  Interventions:  - Provide therapeutic environment  Outcome: Progressing  Goal: Patient/Family verbalizes awareness of resources  Outcome: Progressing  Goal: Understands least restrictive measures  Description  Interventions:  - Utilize least restrictive behavior  Outcome: Progressing     Problem: GASTROINTESTINAL - ADULT  Goal: Maintains adequate nutritional intake  Description  INTERVENTIONS:  - Monitor percentage of each meal consumed  - Identify factors contributing to decreased intake, treat as appropriate  - Assist with meals as needed  - Monitor I&O, weight, and lab values if indicated  - Obtain nutrition services referral as needed  Outcome: Progressing     Problem: DISCHARGE PLANNING  Goal: Discharge to home or other facility with appropriate resources  Description    CASE MANAGEMENT INTERVENTIONS:  - Conduct assessment to determine patient/family and health care team treatment goals, and need for post-acute services based on payer coverage, community resources, patient preferences and barriers to discharge  - Address psychosocial, clinical, and financial barriers to discharge as identified in assessment in conjunction with the patient/family and health care team   - Assist the patient in reintegration back into the community by removing barriers which may hinder a successful discharge once deemed stable  - Arrange appropriate level of post-acute services according to patient's needs and preference and payer coverage in collaboration with the physician and health care team   - Communicate with and update the patient/family, physician, and health care team regarding progress on the discharge plan  - Arrange appropriate transportation to post-acute venues     Outcome: Progressing   Visible intermittently, pleasant and social with staff and select peers, attended fresh air and RA groups, and went off unit with staff to [place] on a shopping trip  Staff reported the trip into the community went well with no issues noted  Continues to be disorganized in thought process and disheveled in appearance  No behaviors or issues noted  Med and meal compliant and ate 100% of both meals  Continue to monitor

## 2020-02-05 NOTE — PLAN OF CARE
Problem: Ineffective Coping  Goal: Demonstrates healthy coping skills  Outcome: Progressing  Goal: Understands least restrictive measures  Description  Interventions:  - Utilize least restrictive behavior  Outcome: Progressing     Problem: GASTROINTESTINAL - ADULT  Goal: Maintains adequate nutritional intake  Description  INTERVENTIONS:  - Monitor percentage of each meal consumed  - Identify factors contributing to decreased intake, treat as appropriate  - Assist with meals as needed  - Monitor I&O, weight, and lab values if indicated  - Obtain nutrition services referral as needed  Outcome: Loan Padron has been awake, alert, and visible intermittently out in the milieu  Pt went to [facility] for [facility] with staff and peers  Remains disheveled in appearance and wearing layered clothing  Ate 100% supper  Social with select peers  Behavior improved  Pt continues to state that he wants to be discharged  Attended and participated in evening group and had snack after  Compliant with scheduled meds without prompting  No behavioral issues  Continue to monitor/assess for any changes

## 2020-02-05 NOTE — PROGRESS NOTES
02/05/20 0900   Team Meeting   Meeting Type Daily Rounds   Team Members Present   Team Members Present Nurse;; Other (Discipline and Name)   Nursing Team Member Magen Handy RN   Care Management Team Member Lily Melgoza   Other (Discipline and Name) Shay Malloy LCSW     Patient attended groups  No behavioral issues  Patient scheduled to go on shopping trip today  Slept

## 2020-02-05 NOTE — PROGRESS NOTES
~Armando maintained on SAFE precaution   In bed with eyes closed, breath even and unlabored  No indication of pain or discomfort noted  Awaken x 1 for 480ml of water  No behavioral noted  Will continue to monitor   ~Armando has a pass to go to [employer] with staff at 1000, to assess the level of comfort in the community  Trevor Eldridge

## 2020-02-06 PROCEDURE — 99232 SBSQ HOSP IP/OBS MODERATE 35: CPT | Performed by: PSYCHIATRY & NEUROLOGY

## 2020-02-06 RX ADMIN — DIVALPROEX SODIUM 1000 MG: 500 TABLET, DELAYED RELEASE ORAL at 13:00

## 2020-02-06 RX ADMIN — LEVOTHYROXINE SODIUM 75 MCG: 75 TABLET ORAL at 06:03

## 2020-02-06 RX ADMIN — METFORMIN HYDROCHLORIDE 500 MG: 500 TABLET ORAL at 09:32

## 2020-02-06 RX ADMIN — CLOZAPINE 200 MG: 200 TABLET ORAL at 17:11

## 2020-02-06 RX ADMIN — DOCUSATE SODIUM 100 MG: 100 CAPSULE, LIQUID FILLED ORAL at 17:11

## 2020-02-06 RX ADMIN — OXYBUTYNIN CHLORIDE 5 MG: 5 TABLET, EXTENDED RELEASE ORAL at 09:32

## 2020-02-06 RX ADMIN — DIVALPROEX SODIUM 1000 MG: 500 TABLET, DELAYED RELEASE ORAL at 20:43

## 2020-02-06 RX ADMIN — OLANZAPINE 5 MG: 5 TABLET, FILM COATED ORAL at 13:00

## 2020-02-06 RX ADMIN — PANTOPRAZOLE SODIUM 40 MG: 40 TABLET, DELAYED RELEASE ORAL at 06:03

## 2020-02-06 RX ADMIN — ATORVASTATIN CALCIUM 10 MG: 10 TABLET, FILM COATED ORAL at 17:11

## 2020-02-06 RX ADMIN — DOCUSATE SODIUM 100 MG: 100 CAPSULE, LIQUID FILLED ORAL at 09:32

## 2020-02-06 RX ADMIN — METFORMIN HYDROCHLORIDE 500 MG: 500 TABLET ORAL at 17:11

## 2020-02-06 NOTE — PLAN OF CARE
Problem: Alteration in Thoughts and Perception  Goal: Agree to be compliant with medication regime, as prescribed and report medication side effects  Description  Interventions:  - Offer appropriate PRN medication and supervise ingestion; conduct AIMS, as needed   2/6/2020 0337 by Elisabet Glover LPN  Outcome: Progressing  2/6/2020 0009 by Elisabet Glover LPN  Outcome: Guera Conner maintained on SAFE precaution   Poor sleeping habit, unable to stay asleep, constantly at the nurses station for water and then bathroom  In and out of room to [facility] indication of pain or discomfort noted   Awaken x 3 for 1,440mls of water   No behavioral noted  PRN trazodone given at previous shift was not effective for reason given  Behavioral control    Will continue to monitor

## 2020-02-06 NOTE — PLAN OF CARE
Problem: Alteration in Thoughts and Perception  Goal: Verbalize thoughts and feelings  Description  Interventions:  - Promote a nonjudgmental and trusting relationship with the patient through active listening and therapeutic communication  - Assess patient's level of functioning, behavior and potential for risk  - Engage patient in 1 on 1 interactions  - Encourage patient to express fears, feelings, frustrations, and discuss symptoms    - [place] patient to reality, help patient recognize reality-based thinking   - Administer medications as ordered and assess for potential side effects  - Provide the patient education related to the signs and symptoms of the illness and desired effects of prescribed medications  Outcome: Progressing  Goal: Agree to be compliant with medication regime, as prescribed and report medication side effects  Description  Interventions:  - Offer appropriate PRN medication and supervise ingestion; conduct AIMS, as needed   Outcome: Progressing  Goal: Attend and participate in unit activities, including therapeutic, recreational, and educational groups  Description  Interventions:  -Encourage Visitation and family involvement in care  Outcome: Progressing  Goal: Complete daily ADLs, including personal hygiene independently, as able  Description  Interventions:  - Observe, teach, and assist patient with ADLS  - Monitor and promote a balance of rest/activity, with adequate nutrition and elimination   Outcome: Progressing     Problem: Ineffective Coping  Goal: Demonstrates healthy coping skills  Outcome: Progressing  Goal: Understands least restrictive measures  Description  Interventions:  - Utilize least restrictive behavior  Outcome: Progressing     Problem: GASTROINTESTINAL - ADULT  Goal: Maintains adequate nutritional intake  Description  INTERVENTIONS:  - Monitor percentage of each meal consumed  - Identify factors contributing to decreased intake, treat as appropriate  - Assist with meals as needed  - Monitor I&O, weight, and lab values if indicated  - Obtain nutrition services referral as needed  Outcome: Malgorzata Gill has been awake, alert, and visible intermittently out in the milieu  Attended and participated in peer's graduation group  Pt went to [facility] with staff and peers  Ate 100% supper  Pt continues to ask when he is being discharged  Attended and participated in Men's Group and Evening Group and had snack after  Compliant with scheduled meds without prompting  Pt requested prn med for sleep  Trazodone 50 mg po given at 2138  Pt denies any depression, anxiety, si/hi, intent, plan, a/v hallucinations, and has not verbalized any delusions  No behavioral issues  Continue to monitor/assess for any changes

## 2020-02-06 NOTE — PLAN OF CARE
Problem: Alteration in Thoughts and Perception  Goal: Treatment Goal: Gain control of psychotic behaviors/thinking, reduce/eliminate presenting symptoms and demonstrate improved reality functioning upon discharge  Outcome: Progressing  Goal: Verbalize thoughts and feelings  Description  Interventions:  - Promote a nonjudgmental and trusting relationship with the patient through active listening and therapeutic communication  - Assess patient's level of functioning, behavior and potential for risk  - Engage patient in 1 on 1 interactions  - Encourage patient to express fears, feelings, frustrations, and discuss symptoms    - [place] patient to reality, help patient recognize reality-based thinking   - Administer medications as ordered and assess for potential side effects  - Provide the patient education related to the signs and symptoms of the illness and desired effects of prescribed medications  Outcome: Progressing  Goal: Refrain from acting on delusional thinking/internal stimuli  Description  Interventions:  - Monitor patient closely, per order   - Utilize least restrictive measures   - Set reasonable limits, give positive feedback for acceptable   - Administer medications as ordered and monitor of potential side effects  Outcome: Progressing  Goal: Agree to be compliant with medication regime, as prescribed and report medication side effects  Description  Interventions:  - Offer appropriate PRN medication and supervise ingestion; conduct AIMS, as needed   Outcome: Progressing  Goal: Attend and participate in unit activities, including therapeutic, recreational, and educational groups  Description  Interventions:  -Encourage Visitation and family involvement in care  Outcome: Progressing  Goal: Recognize dysfunctional thoughts, communicate reality-based thoughts at the time of discharge  Description  Interventions:  - Provide medication and psycho-education to assist patient in compliance and developing insight into his/her illness   Outcome: Progressing     Problem: Ineffective Coping  Goal: Patient/Family verbalizes awareness of resources  Outcome: Progressing  Goal: Understands least restrictive measures  Description  Interventions:  - Utilize least restrictive behavior  Outcome: Progressing     Problem: GASTROINTESTINAL - ADULT  Goal: Maintains adequate nutritional intake  Description  INTERVENTIONS:  - Monitor percentage of each meal consumed  - Identify factors contributing to decreased intake, treat as appropriate  - Assist with meals as needed  - Monitor I&O, weight, and lab values if indicated  - Obtain nutrition services referral as needed  Outcome: Progressing   Visible in the milieu, pleasant and social with staff and peers, attended spirituality, journaling, IMR, and fresh air groups  Continues to be disorganized in thought process and disheveled in appearance  Did not wake up in time for breakfast, but ate 2 bowels of cereal and a banana (100% of what staff offered to him) when he requested something to eat when he awoke  Remained awake for the rest of the shift, ate 100% of lunch and took his meds with only minimal prompting needed  No behaviors or issues noted  Continue to monitor
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
## 2020-02-06 NOTE — PROGRESS NOTES
02/06/20 0800   Team Meeting   Meeting Type Daily Rounds   Team Members Present   Team Members Present Physician;Nurse;; Other (Discipline and Name)   Physician Team Member Dr Guzman Thapa Team Member Chadwick Pitts RN   Care Management Team Member Lily Nails   Other (Discipline and Name) Arabella Staples LCSW     Patient attended groups  Went into the community and did well  Slept

## 2020-02-06 NOTE — PROGRESS NOTES
02/05/20 1400   Activity/Group Checklist   Group Other (Comment)  (Recovery Workshop - [facility] group)   Attendance Attended   Attendance Duration (min) 46-60   Interactions Interacted appropriately   Affect/Mood Appropriate   Goals Achieved Displayed empathy;Able to listen to others; Able to engage in interactions     Patient attended Recovery Workshop - [facility] group  Patient was easily redirected from talking about his own trips into the community  However, he did relate with guest speaking about "staying busy," referencing a desire to go to [facility]  Patient was alert and respectful
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
## 2020-02-06 NOTE — PROGRESS NOTES
02/05/20 1500   Activity/Group Checklist   Group Other (Comment)  (Relaxation Group/Graduation)   Attendance Attended   Attendance Duration (min) 31-45   Interactions Interacted appropriately   Affect/Mood Appropriate   Goals Achieved Displayed empathy;Able to listen to others; Able to engage in interactions;Verbalized increased hopefulness

## 2020-02-07 PROCEDURE — 99232 SBSQ HOSP IP/OBS MODERATE 35: CPT | Performed by: PSYCHIATRY & NEUROLOGY

## 2020-02-07 RX ADMIN — OXYBUTYNIN CHLORIDE 5 MG: 5 TABLET, EXTENDED RELEASE ORAL at 08:34

## 2020-02-07 RX ADMIN — METFORMIN HYDROCHLORIDE 500 MG: 500 TABLET ORAL at 08:34

## 2020-02-07 RX ADMIN — DIVALPROEX SODIUM 1000 MG: 500 TABLET, DELAYED RELEASE ORAL at 12:02

## 2020-02-07 RX ADMIN — CLOZAPINE 200 MG: 200 TABLET ORAL at 16:44

## 2020-02-07 RX ADMIN — DOCUSATE SODIUM 100 MG: 100 CAPSULE, LIQUID FILLED ORAL at 17:05

## 2020-02-07 RX ADMIN — DIVALPROEX SODIUM 1000 MG: 500 TABLET, DELAYED RELEASE ORAL at 20:36

## 2020-02-07 RX ADMIN — PANTOPRAZOLE SODIUM 40 MG: 40 TABLET, DELAYED RELEASE ORAL at 05:37

## 2020-02-07 RX ADMIN — METFORMIN HYDROCHLORIDE 500 MG: 500 TABLET ORAL at 16:44

## 2020-02-07 RX ADMIN — OLANZAPINE 5 MG: 5 TABLET, FILM COATED ORAL at 13:59

## 2020-02-07 RX ADMIN — LEVOTHYROXINE SODIUM 75 MCG: 75 TABLET ORAL at 05:37

## 2020-02-07 RX ADMIN — ATORVASTATIN CALCIUM 10 MG: 10 TABLET, FILM COATED ORAL at 16:44

## 2020-02-07 RX ADMIN — DOCUSATE SODIUM 100 MG: 100 CAPSULE, LIQUID FILLED ORAL at 08:34

## 2020-02-07 NOTE — PLAN OF CARE
Problem: Alteration in Thoughts and Perception  Goal: Verbalize thoughts and feelings  Description  Interventions:  - Promote a nonjudgmental and trusting relationship with the patient through active listening and therapeutic communication  - Assess patient's level of functioning, behavior and potential for risk  - Engage patient in 1 on 1 interactions  - Encourage patient to express fears, feelings, frustrations, and discuss symptoms    - [place] patient to reality, help patient recognize reality-based thinking   - Administer medications as ordered and assess for potential side effects  - Provide the patient education related to the signs and symptoms of the illness and desired effects of prescribed medications  Outcome: Progressing  Goal: Agree to be compliant with medication regime, as prescribed and report medication side effects  Description  Interventions:  - Offer appropriate PRN medication and supervise ingestion; conduct AIMS, as needed   Outcome: Progressing  Goal: Attend and participate in unit activities, including therapeutic, recreational, and educational groups  Description  Interventions:  -Encourage Visitation and family involvement in care  Outcome: Progressing  Goal: Complete daily ADLs, including personal hygiene independently, as able  Description  Interventions:  - Observe, teach, and assist patient with ADLS  - Monitor and promote a balance of rest/activity, with adequate nutrition and elimination   Outcome: Progressing     Problem: Ineffective Coping  Goal: Demonstrates healthy coping skills  Outcome: Progressing  Goal: Understands least restrictive measures  Description  Interventions:  - Utilize least restrictive behavior  Outcome: Progressing     Problem: GASTROINTESTINAL - ADULT  Goal: Maintains adequate nutritional intake  Description  INTERVENTIONS:  - Monitor percentage of each meal consumed  - Identify factors contributing to decreased intake, treat as appropriate  - Assist with meals as needed  - Monitor I&O, weight, and lab values if indicated  - Obtain nutrition services referral as needed  Outcome: Amanda Bermeo has been awake, alert, and visible intermittently out in the milieu  Pt went to [employer] for mass with staff and peers  Ate 100% supper  Continues to be disheveled and dressed in layers of clothing  Compliant with scheduled meds without prompting  Pt social with peers and listened to music on radio in [facility]  Denies any depression, anxiety, si/hi, intent, plan, a/v hallucinations, and has not verbalized any delusions  Attended and participated in evening group and had snack after

## 2020-02-07 NOTE — PLAN OF CARE
Problem: Alteration in Thoughts and Perception  Goal: Agree to be compliant with medication regime, as prescribed and report medication side effects  Description  Interventions:  - Offer appropriate PRN medication and supervise ingestion; conduct AIMS, as needed   Outcome: Freda Hernandez maintained on SAFE precaution    In bed with eyes closed, breath even and unlabored   No indication of pain or discomfort noted   Awaken x 1 for 480ml of water  Slept better compared to the previous night  Continues to be compliant with morning meds   No behavioral noted   Will continue to monitor
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
## 2020-02-07 NOTE — PLAN OF CARE
Problem: Alteration in Thoughts and Perception  Goal: Refrain from acting on delusional thinking/internal stimuli  Description  Interventions:  - Monitor patient closely, per order   - Utilize least restrictive measures   - Set reasonable limits, give positive feedback for acceptable   - Administer medications as ordered and monitor of potential side effects  Outcome: Progressing  Goal: Agree to be compliant with medication regime, as prescribed and report medication side effects  Description  Interventions:  - Offer appropriate PRN medication and supervise ingestion; conduct AIMS, as needed   Outcome: Progressing  Goal: Attend and participate in unit activities, including therapeutic, recreational, and educational groups  Description  Interventions:  -Encourage Visitation and family involvement in care  Outcome: Viviana Bhatt has been compliant and cooperative with his medications, attended fresh air group  Less preoccupied with his clothing today  Behaviors controlled and appropriate this shift  No complaints voiced  Will continue to monitor

## 2020-02-07 NOTE — PLAN OF CARE
Problem: DISCHARGE PLANNING  Goal: Discharge to home or other facility with appropriate resources  Description    CASE MANAGEMENT INTERVENTIONS:  - Conduct assessment to determine patient/family and health care team treatment goals, and need for post-acute services based on payer coverage, community resources, patient preferences and barriers to discharge  - Address psychosocial, clinical, and financial barriers to discharge as identified in assessment in conjunction with the patient/family and health care team   - Assist the patient in reintegration back into the community by removing barriers which may hinder a successful discharge once deemed stable  - Arrange appropriate level of post-acute services according to patient's needs and preference and payer coverage in collaboration with the physician and health care team   - Communicate with and update the patient/family, physician, and health care team regarding progress on the discharge plan  - Arrange appropriate transportation to post-acute venues  Outcome: Progressing     CM called and arranged for dental cleaning appointment with [facility] on Thursday, February 13, 2020 at 10 AM  CM will continue to follow patient's progress and assist with discharge planning needs

## 2020-02-07 NOTE — PROGRESS NOTES
02/07/20 0800   Team Meeting   Meeting Type Daily Rounds   Team Members Present   Team Members Present Physician;Nurse;   Physician Team Member Dr Alexandro Oleary Team Member Dashawn Alfaro RN   Care Management Team Member Lily Helms     Patient has no new changes  Slept well last night  Less preoccupied with water  Wearing all his new clothing and old clothing at the same time

## 2020-02-08 LAB — OSMOLALITY UR: 72 MMOL/KG

## 2020-02-08 PROCEDURE — 83935 ASSAY OF URINE OSMOLALITY: CPT | Performed by: PSYCHIATRY & NEUROLOGY

## 2020-02-08 RX ADMIN — DOCUSATE SODIUM 100 MG: 100 CAPSULE, LIQUID FILLED ORAL at 08:28

## 2020-02-08 RX ADMIN — OLANZAPINE 5 MG: 5 TABLET, FILM COATED ORAL at 13:01

## 2020-02-08 RX ADMIN — LEVOTHYROXINE SODIUM 75 MCG: 75 TABLET ORAL at 06:01

## 2020-02-08 RX ADMIN — OXYBUTYNIN CHLORIDE 5 MG: 5 TABLET, EXTENDED RELEASE ORAL at 08:28

## 2020-02-08 RX ADMIN — METFORMIN HYDROCHLORIDE 500 MG: 500 TABLET ORAL at 08:28

## 2020-02-08 RX ADMIN — METFORMIN HYDROCHLORIDE 500 MG: 500 TABLET ORAL at 16:43

## 2020-02-08 RX ADMIN — DIVALPROEX SODIUM 1000 MG: 500 TABLET, DELAYED RELEASE ORAL at 12:35

## 2020-02-08 RX ADMIN — DOCUSATE SODIUM 100 MG: 100 CAPSULE, LIQUID FILLED ORAL at 17:03

## 2020-02-08 RX ADMIN — PANTOPRAZOLE SODIUM 40 MG: 40 TABLET, DELAYED RELEASE ORAL at 06:02

## 2020-02-08 RX ADMIN — DIVALPROEX SODIUM 1000 MG: 500 TABLET, DELAYED RELEASE ORAL at 21:05

## 2020-02-08 RX ADMIN — CLOZAPINE 200 MG: 200 TABLET ORAL at 16:43

## 2020-02-08 RX ADMIN — ATORVASTATIN CALCIUM 10 MG: 10 TABLET, FILM COATED ORAL at 16:43

## 2020-02-08 NOTE — PLAN OF CARE
Problem: Alteration in Thoughts and Perception  Goal: Treatment Goal: Gain control of psychotic behaviors/thinking, reduce/eliminate presenting symptoms and demonstrate improved reality functioning upon discharge  Outcome: Progressing as per chart review    Received pt in bed at change of shift with eyes closed; chest movement noted  Continues the same thus this far as per q 15 min room checks  Will continue to monitor  Urine specimen obtained for urine osmolality and sent to our lab

## 2020-02-08 NOTE — PROGRESS NOTES
Psychiatry Progress Note    Subjective: Interval History     Patient isolative to his room this morning  Patient with disheveled appearance  Patient friendly with author during evaluation  Reports that he is doing fine and he wants to leave the hospital   Patient minimizing all psychiatric symptoms  No overt psychosis during assessment  No somatic complaints  Staff reported patient did shower last evening  Patient has been compliant with his medications and meals  From his brother that went well last evening  Slept      Behavior over the last 24 hours:  unchanged  Sleep: normal  Appetite: normal  Medication side effects: No  ROS: no complaints    Current medications:    Current Facility-Administered Medications:     acetaminophen (TYLENOL) tablet 325 mg, 325 mg, Oral, Q6H PRN, Dominga Owen, PA-C    acetaminophen (TYLENOL) tablet 650 mg, 650 mg, Oral, Q6H PRN, Dominga Owen, PA-C    acetaminophen (TYLENOL) tablet 975 mg, 975 mg, Oral, Q8H PRN, Dominga Owen, PA-C    aluminum-magnesium hydroxide-simethicone (MYLANTA) 200-200-20 mg/5 mL oral suspension 30 mL, 30 mL, Oral, Q4H PRN, Dominga Owen, PA-C, 30 mL at 01/24/20 2352    atorvastatin (LIPITOR) tablet 10 mg, 10 mg, Oral, Daily With Dinner, Dominga Owen PA-C, 10 mg at 02/07/20 1644    benztropine (COGENTIN) injection 1 mg, 1 mg, Intramuscular, Q6H PRN, Dominga Owen, PA-C    benztropine (COGENTIN) tablet 1 mg, 1 mg, Oral, Q6H PRN, Dominga Owen PA-C    clozapine (CLOZARIL) tablet 200 mg, 200 mg, Oral, Daily, Jordan Parra MD, 200 mg at 02/07/20 1644    divalproex sodium (DEPAKOTE) EC tablet 1,000 mg, 1,000 mg, Oral, BID, Jordan Parra MD, 1,000 mg at 02/07/20 2036    docusate sodium (COLACE) capsule 100 mg, 100 mg, Oral, BID, Dominga Owen, PA-C, 100 mg at 02/08/20 2142    haloperidol (HALDOL) tablet 5 mg, 5 mg, Oral, Q6H PRN, Dominga Owen PA-C    haloperidol lactate (HALDOL) injection 5 mg, 5 mg, Intramuscular, Q6H PRN, Zondra Baars, PA-C    levothyroxine tablet 75 mcg, 75 mcg, Oral, Early Morning, Zondra Baars, PA-C, 75 mcg at 02/08/20 0601    LORazepam (ATIVAN) 2 mg/mL injection 1 mg, 1 mg, Intramuscular, Q6H PRN, Zondra Baars, PA-C    LORazepam (ATIVAN) tablet 1 mg, 1 mg, Oral, Q6H PRN, Zondra Baars, PA-C    magnesium hydroxide (MILK OF MAGNESIA) 400 mg/5 mL oral suspension 30 mL, 30 mL, Oral, Daily PRN, Zondra Baars, PA-C    metFORMIN (GLUCOPHAGE) tablet 500 mg, 500 mg, Oral, BID With Meals, Zondra Baars, PA-C, 500 mg at 02/08/20 5421    nicotine polacrilex (NICORETTE) gum 2 mg, 2 mg, Oral, Q2H PRN, Zondra Baars, PA-C    OLANZapine (ZyPREXA) tablet 5 mg, 5 mg, Oral, After Lunch, Shey Lange MD, 5 mg at 02/07/20 1359    oxybutynin (DITROPAN-XL) 24 hr tablet 5 mg, 5 mg, Oral, Daily, Zondra Baars, PA-C, 5 mg at 02/08/20 9902    pantoprazole (PROTONIX) EC tablet 40 mg, 40 mg, Oral, Early Morning, Zondra Baars, PA-C, 40 mg at 02/08/20 0602    traZODone (DESYREL) tablet 50 mg, 50 mg, Oral, HS PRN, Zondra Baars, PA-C, 50 mg at 02/05/20 2138    Current Problem List:    Patient Active Problem List   Diagnosis    Encephalopathy acute    Bipolar 1 disorder ([facility]    Schizoaffective disorder, bipolar type ([facility] )    Constipation, chronic    Hyperammonemia (HCC)    Type 2 diabetes mellitus without complication, without long-term current use of insulin (HCC)    Tracheobronchitis    Streptococcus pneumoniae    Chronic airway obstruction, not elsewhere classified    Benign essential hypertension    Disorder of lipoprotein and lipid metabolism    Plantar fasciitis    Foot callus    Gastroesophageal reflux disease without esophagitis    GI bleed    Hyponatremia    Acquired hypothyroidism    Ischemic colitis ([facility] )    Moderate cigarette smoker (10-19 per day)    Morbid obesity ([address] )    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    BMI 34 0-34 9,adult    Nail abnormality    Encounter for well adult exam with abnormal findings    Wheezing on right side of chest on exhalation    Tobacco abuse       Problem list reviewed 02/08/20     Objective:     Vital Signs:  Vitals:    02/06/20 0700 02/06/20 0705 02/07/20 0700 02/08/20 0730   BP: 126/77 129/81 112/52 119/77   BP Location: Left arm Left arm Right arm Left arm   Pulse: 94 98 73 95   Resp: 16 16 18 18   Temp: 98 8 °F (37 1 °C)  (!) 97 2 °F (36 2 °C) 97 8 °F (36 6 °C)   TempSrc: Temporal   Temporal   Weight:       Height:             Appearance:  age appropriate, casually dressed and disheveled   Behavior:  normal   Speech:  normal volume   Mood:  constricted   Affect:  blunted   Thought Process:  normal   Thought Content:  normal   Perceptual Disturbances: None   Risk Potential: none   Sensorium:  person, place, situation and time   Cognition:  intact   Consciousness:  alert and awake    Attention: attention span and concentration were age appropriate   Intellect: average   Insight:  limited   Judgment: limited      Motor Activity: no abnormal movements       I/O Past 24 hours:  No intake/output data recorded  No intake/output data recorded  Labs:  Reviewed 02/08/20    Progress Toward Goals:  Unchanged    Assessment / Plan:     Schizoaffective disorder, bipolar type ([facility] [address] )    Recommended Treatment:      Medication changes:  1) continue current treatment plan    Non-pharmacological treatments  1) Continue with group therapy, milieu therapy and occupational therapy  Safety  1) Safety/communication plan established targeting dynamic risk factors above  2) Risks, benefits, and possible side effects of medications explained to patient and patient verbalizes understanding  Counseling / Coordination of Care    Total floor / unit time spent today 20 minutes  Greater than 50% of total time was spent with the patient and / or family counseling and / or coordination of care   A description of the counseling / coordination of care  Patient's Rights, confidentiality and exceptions to confidentiality, use of automated medical record, Claudia Mei staff access to medical record, and consent to treatment reviewed      Willie Waldron PA-C

## 2020-02-08 NOTE — PLAN OF CARE
Problem: Alteration in Thoughts and Perception  Goal: Verbalize thoughts and feelings  Description  Interventions:  - Promote a nonjudgmental and trusting relationship with the patient through active listening and therapeutic communication  - Assess patient's level of functioning, behavior and potential for risk  - Engage patient in 1 on 1 interactions  - Encourage patient to express fears, feelings, frustrations, and discuss symptoms    - [place] patient to reality, help patient recognize reality-based thinking   - Administer medications as ordered and assess for potential side effects  - Provide the patient education related to the signs and symptoms of the illness and desired effects of prescribed medications  Outcome: Progressing  Goal: Agree to be compliant with medication regime, as prescribed and report medication side effects  Description  Interventions:  - Offer appropriate PRN medication and supervise ingestion; conduct AIMS, as needed   Outcome: Progressing  Goal: Complete daily ADLs, including personal hygiene independently, as able  Description  Interventions:  - Observe, teach, and assist patient with ADLS  - Monitor and promote a balance of rest/activity, with adequate nutrition and elimination   Outcome: Progressing     Problem: RESPIRATORY - ADULT  Goal: Achieves optimal ventilation and oxygenation  Description  INTERVENTIONS:  - Assess for changes in respiratory status  - Assess for changes in mentation and behavior  - Position to facilitate oxygenation and minimize respiratory effort  - Oxygen administered by appropriate delivery if ordered  - Initiate smoking cessation education as indicated  - Encourage broncho-pulmonary hygiene including cough, deep breathe, Incentive Spirometry  - Assess the need for suctioning and aspirate as needed  - Assess and instruct to report SOB or any respiratory difficulty  - Respiratory Therapy support as indicated  Outcome: Progressing     Problem: GASTROINTESTINAL - ADULT  Goal: Maintains adequate nutritional intake  Description  INTERVENTIONS:  - Monitor percentage of each meal consumed  - Identify factors contributing to decreased intake, treat as appropriate  - Assist with meals as needed  - Monitor I&O, weight, and lab values if indicated  - Obtain nutrition services referral as needed  Outcome: Prabhu Modi was in bed resting at the beginning of the shift  Social with staff and select peers  Able to make needs known  Had to be told about drinking a lot of water  Good eye contact  Appearance disheveled  Shower and BM this shift  Came for medication on his own  Ate 100% of his meal  His brother came to visit and brought food and drink  Visit appeared to go well  Good interaction with his brother  His brother reminded him to go to groups, listen to staff and shower  Attended evening group  Took HS medication  Ate snack  Continue to monitor  Precautions maintained

## 2020-02-08 NOTE — PLAN OF CARE
Problem: Alteration in Thoughts and Perception  Goal: Verbalize thoughts and feelings  Description  Interventions:  - Promote a nonjudgmental and trusting relationship with the patient through active listening and therapeutic communication  - Assess patient's level of functioning, behavior and potential for risk  - Engage patient in 1 on 1 interactions  - Encourage patient to express fears, feelings, frustrations, and discuss symptoms    - [place] patient to reality, help patient recognize reality-based thinking   - Administer medications as ordered and assess for potential side effects  - Provide the patient education related to the signs and symptoms of the illness and desired effects of prescribed medications  Outcome: Progressing  Goal: Attend and participate in unit activities, including therapeutic, recreational, and educational groups  Description  Interventions:  -Encourage Visitation and family involvement in care  Outcome: Not Progressing  Goal: Complete daily ADLs, including personal hygiene independently, as able  Description  Interventions:  - Observe, teach, and assist patient with ADLS  - Monitor and promote a balance of rest/activity, with adequate nutrition and elimination   Outcome: Not Progressing     Renu Hylton was compliant with his medications and meals  Disheveled in appearance and continues to wear multiple layers of clothing  Irritable and preoccupied during interactions as he focuses on wanting to be discharged but still lacks insight to his illness  No groups attended this shift  However he has been redirectable by this writer and behaviors have remained controlled  Will continue to monitor for changes

## 2020-02-09 RX ADMIN — OXYBUTYNIN CHLORIDE 5 MG: 5 TABLET, EXTENDED RELEASE ORAL at 08:31

## 2020-02-09 RX ADMIN — DOCUSATE SODIUM 100 MG: 100 CAPSULE, LIQUID FILLED ORAL at 17:00

## 2020-02-09 RX ADMIN — DIVALPROEX SODIUM 1000 MG: 500 TABLET, DELAYED RELEASE ORAL at 12:38

## 2020-02-09 RX ADMIN — LEVOTHYROXINE SODIUM 75 MCG: 75 TABLET ORAL at 06:16

## 2020-02-09 RX ADMIN — METFORMIN HYDROCHLORIDE 500 MG: 500 TABLET ORAL at 08:31

## 2020-02-09 RX ADMIN — METFORMIN HYDROCHLORIDE 500 MG: 500 TABLET ORAL at 16:53

## 2020-02-09 RX ADMIN — CLOZAPINE 200 MG: 200 TABLET ORAL at 16:53

## 2020-02-09 RX ADMIN — ATORVASTATIN CALCIUM 10 MG: 10 TABLET, FILM COATED ORAL at 16:53

## 2020-02-09 RX ADMIN — OLANZAPINE 5 MG: 5 TABLET, FILM COATED ORAL at 13:03

## 2020-02-09 RX ADMIN — DOCUSATE SODIUM 100 MG: 100 CAPSULE, LIQUID FILLED ORAL at 08:31

## 2020-02-09 RX ADMIN — PANTOPRAZOLE SODIUM 40 MG: 40 TABLET, DELAYED RELEASE ORAL at 06:16

## 2020-02-09 RX ADMIN — DIVALPROEX SODIUM 1000 MG: 500 TABLET, DELAYED RELEASE ORAL at 20:46

## 2020-02-09 NOTE — PLAN OF CARE
Problem: Alteration in Thoughts and Perception  Goal: Treatment Goal: Gain control of psychotic behaviors/thinking, reduce/eliminate presenting symptoms and demonstrate improved reality functioning upon discharge  Outcome: Progressing  Goal: Verbalize thoughts and feelings  Description  Interventions:  - Promote a nonjudgmental and trusting relationship with the patient through active listening and therapeutic communication  - Assess patient's level of functioning, behavior and potential for risk  - Engage patient in 1 on 1 interactions  - Encourage patient to express fears, feelings, frustrations, and discuss symptoms    - [place] patient to reality, help patient recognize reality-based thinking   - Administer medications as ordered and assess for potential side effects  - Provide the patient education related to the signs and symptoms of the illness and desired effects of prescribed medications  Outcome: Progressing  Goal: Refrain from acting on delusional thinking/internal stimuli  Description  Interventions:  - Monitor patient closely, per order   - Utilize least restrictive measures   - Set reasonable limits, give positive feedback for acceptable   - Administer medications as ordered and monitor of potential side effects  Outcome: Progressing  Goal: Agree to be compliant with medication regime, as prescribed and report medication side effects  Description  Interventions:  - Offer appropriate PRN medication and supervise ingestion; conduct AIMS, as needed   Outcome: Progressing  Goal: Complete daily ADLs, including personal hygiene independently, as able  Description  Interventions:  - Observe, teach, and assist patient with ADLS  - Monitor and promote a balance of rest/activity, with adequate nutrition and elimination   Outcome: Progressing     Problem: RESPIRATORY - ADULT  Goal: Achieves optimal ventilation and oxygenation  Description  INTERVENTIONS:  - Assess for changes in respiratory status  - Assess for changes in mentation and behavior  - Position to facilitate oxygenation and minimize respiratory effort  - Oxygen administered by appropriate delivery if ordered  - Initiate smoking cessation education as indicated  - Encourage broncho-pulmonary hygiene including cough, deep breathe, Incentive Spirometry  - Assess the need for suctioning and aspirate as needed  - Assess and instruct to report SOB or any respiratory difficulty  - Respiratory Therapy support as indicated  Outcome: Progressing     Problem: GASTROINTESTINAL - ADULT  Goal: Minimal or absence of nausea and/or vomiting  Description  INTERVENTIONS:  - Administer IV fluids if ordered to ensure adequate hydration  - Maintain NPO status until nausea and vomiting are resolved  - Nasogastric tube if ordered  - Administer ordered antiemetic medications as needed  - Provide nonpharmacologic comfort measures as appropriate  - Advance diet as tolerated, if ordered  - Consider nutrition services referral to assist patient with adequate nutrition and appropriate food choices  Outcome: Progressing  Goal: Maintains or returns to baseline bowel function  Description  INTERVENTIONS:  - Assess bowel function  - Encourage oral fluids to ensure adequate hydration  - Administer IV fluids if ordered to ensure adequate hydration  - Administer ordered medications as needed  - Encourage mobilization and activity  - Consider nutritional services referral to assist patient with adequate nutrition and appropriate food choices  Outcome: Progressing  Goal: Maintains adequate nutritional intake  Description  INTERVENTIONS:  - Monitor percentage of each meal consumed  - Identify factors contributing to decreased intake, treat as appropriate  - Assist with meals as needed  - Monitor I&O, weight, and lab values if indicated  - Obtain nutrition services referral as needed  Outcome: Progressing     Problem: Alteration in Thoughts and Perception  Goal: Attend and participate in unit activities, including therapeutic, recreational, and educational groups  Description  Interventions:  -Encourage Visitation and family involvement in care  Outcome: Not Progressing  Goal: Recognize dysfunctional thoughts, communicate reality-based thoughts at the time of discharge  Description  Interventions:  - Provide medication and psycho-education to assist patient in compliance and developing insight into his/her illness   Outcome: Not Progressing    Patient is alert, pleasant, cooperative and visible intermittently  Med and meal compliant  Patient is social with staff and select peers  He denies depression, anxiety, SI and HI  No c/o s/s pain  Continues to appear disheveled and wearing multiple layers of clothing  Behaviors controlled  Patient did not attend groups    Will continue to monitor progress in recovery program

## 2020-02-09 NOTE — PLAN OF CARE
Problem: Alteration in Thoughts and Perception  Goal: Verbalize thoughts and feelings  Description  Interventions:  - Promote a nonjudgmental and trusting relationship with the patient through active listening and therapeutic communication  - Assess patient's level of functioning, behavior and potential for risk  - Engage patient in 1 on 1 interactions  - Encourage patient to express fears, feelings, frustrations, and discuss symptoms    - [place] patient to reality, help patient recognize reality-based thinking   - Administer medications as ordered and assess for potential side effects  - Provide the patient education related to the signs and symptoms of the illness and desired effects of prescribed medications  Outcome: Progressing  Goal: Agree to be compliant with medication regime, as prescribed and report medication side effects  Description  Interventions:  - Offer appropriate PRN medication and supervise ingestion; conduct AIMS, as needed   Outcome: Progressing  Goal: Attend and participate in unit activities, including therapeutic, recreational, and educational groups  Description  Interventions:  -Encourage Visitation and family involvement in care  Outcome: Progressing     Problem: Ineffective Coping  Goal: Demonstrates healthy coping skills  Outcome: Progressing  Goal: Understands least restrictive measures  Description  Interventions:  - Utilize least restrictive behavior  Outcome: Progressing     Problem: GASTROINTESTINAL - ADULT  Goal: Maintains adequate nutritional intake  Description  INTERVENTIONS:  - Monitor percentage of each meal consumed  - Identify factors contributing to decreased intake, treat as appropriate  - Assist with meals as needed  - Monitor I&O, weight, and lab values if indicated  - Obtain nutrition services referral as needed  Outcome: Larry Khalil has been awake, alert, and visible intermittently out in the milieu  Disheveled in appearance and dressed in layers    Ate 100% supper  Compliant with scheduled meds with some prompting  Pt continues to seek water frequently and instructed to drink one cup q 1 hr  Brother in to visit with good interaction noted  Pt did not attend evening group but came out for snack after  Continue to monitor/assess for any changes  Pt denies any depression, anxiety, si/hi, intent, plan, a/v hallucinations, and has not verbalized any delusions

## 2020-02-09 NOTE — PROGRESS NOTES
Tony Ohara maintained on ongoing SAFE precaution without incident on this shift  He is laying in bed with his eyes closed, breath even and unlabored  Q 15  Minutes rounding continues  No indication of pain or discomfort  Awaken x 1 for ice water  No behavioral noted  Will continue to monitor

## 2020-02-09 NOTE — PLAN OF CARE
Problem: Alteration in Thoughts and Perception  Goal: Verbalize thoughts and feelings  Description  Interventions:  - Promote a nonjudgmental and trusting relationship with the patient through active listening and therapeutic communication  - Assess patient's level of functioning, behavior and potential for risk  - Engage patient in 1 on 1 interactions  - Encourage patient to express fears, feelings, frustrations, and discuss symptoms    - [place] patient to reality, help patient recognize reality-based thinking   - Administer medications as ordered and assess for potential side effects  - Provide the patient education related to the signs and symptoms of the illness and desired effects of prescribed medications  Outcome: Progressing  Goal: Agree to be compliant with medication regime, as prescribed and report medication side effects  Description  Interventions:  - Offer appropriate PRN medication and supervise ingestion; conduct AIMS, as needed   Outcome: Progressing  Goal: Attend and participate in unit activities, including therapeutic, recreational, and educational groups  Description  Interventions:  -Encourage Visitation and family involvement in care  Outcome: Progressing     Problem: Ineffective Coping  Goal: Demonstrates healthy coping skills  Outcome: Progressing  Goal: Understands least restrictive measures  Description  Interventions:  - Utilize least restrictive behavior  Outcome: Progressing     Problem: GASTROINTESTINAL - ADULT  Goal: Maintains adequate nutritional intake  Description  INTERVENTIONS:  - Monitor percentage of each meal consumed  - Identify factors contributing to decreased intake, treat as appropriate  - Assist with meals as needed  - Monitor I&O, weight, and lab values if indicated  - Obtain nutrition services referral as needed  Outcome: Progressing     Problem: GASTROINTESTINAL - ADULT  Goal: Maintains adequate nutritional intake  Description  INTERVENTIONS:  - Monitor percentage of each meal consumed  - Identify factors contributing to decreased intake, treat as appropriate  - Assist with meals as needed  - Monitor I&O, weight, and lab values if indicated  - Obtain nutrition services referral as needed  Outcome: Milly Morrissey has been awake, alert, and visible intermittently out in the milieu  Ate 100% supper  Remains disheveled in appearance and dressed in layered clothing  Social with select peers  Listens and sings to music on radio in [facility]  Pt did not attend evening group but came out for snack after  Continues to ask staff when he will be discharged  Pt lacks insight into his illness  Continue to monitor water q one hour  Compliant with scheduled meds without prompting  Pleasant, cooperative  No behavioral issues  Continue to monitor/assess for any changes

## 2020-02-09 NOTE — PROGRESS NOTES
Psychiatry Progress Note    Subjective: Interval History     Patient isolative to his room  Patient remains disheveled in his appearance  Patient wearing multiple layers of clothing this morning  Patient cooperative with Kaylah Franklin but hyper focused on discharge throughout assessment  Continues to lack insight into his admission and and symptoms  Has remained compliant with medications  Eating his meals  Continues to have water intake monitored    No behavioral outburst     Behavior over the last 24 hours:  unchanged  Sleep: normal  Appetite: normal  Medication side effects: No  ROS: no complaints    Current medications:    Current Facility-Administered Medications:     acetaminophen (TYLENOL) tablet 325 mg, 325 mg, Oral, Q6H PRN, BRIT Loomis-C    acetaminophen (TYLENOL) tablet 650 mg, 650 mg, Oral, Q6H PRN, BRIT Loomis-PUSHPA    acetaminophen (TYLENOL) tablet 975 mg, 975 mg, Oral, Q8H PRN, BRIT Loomis-PUSHPA    aluminum-magnesium hydroxide-simethicone (MYLANTA) 200-200-20 mg/5 mL oral suspension 30 mL, 30 mL, Oral, Q4H PRN, BRIT Loomis-PUHSPA, 30 mL at 01/24/20 2352    atorvastatin (LIPITOR) tablet 10 mg, 10 mg, Oral, Daily With Dinner, Slime Crandall PA-C, 10 mg at 02/08/20 1643    benztropine (COGENTIN) injection 1 mg, 1 mg, Intramuscular, Q6H PRN, Slime Crandall PA-C    benztropine (COGENTIN) tablet 1 mg, 1 mg, Oral, Q6H PRN, Slime Crandall PA-C    clozapine (CLOZARIL) tablet 200 mg, 200 mg, Oral, Daily, Brittney Whitlock MD, 200 mg at 02/08/20 1643    divalproex sodium (DEPAKOTE) EC tablet 1,000 mg, 1,000 mg, Oral, BID, Brittney Whitlock MD, 1,000 mg at 02/08/20 2105    docusate sodium (COLACE) capsule 100 mg, 100 mg, Oral, BID, Slime Crandall PA-C, 100 mg at 02/09/20 0831    haloperidol (HALDOL) tablet 5 mg, 5 mg, Oral, Q6H PRN, Slime Crandall PA-C    haloperidol lactate (HALDOL) injection 5 mg, 5 mg, Intramuscular, Q6H PRN, Slime Crandall PA-C   levothyroxine tablet 75 mcg, 75 mcg, Oral, Early Morning, Rollo Junior, PA-C, 75 mcg at 02/09/20 0616    LORazepam (ATIVAN) 2 mg/mL injection 1 mg, 1 mg, Intramuscular, Q6H PRN, Rollo Junior, PA-C    LORazepam (ATIVAN) tablet 1 mg, 1 mg, Oral, Q6H PRN, Rollo Junior, PA-C    magnesium hydroxide (MILK OF MAGNESIA) 400 mg/5 mL oral suspension 30 mL, 30 mL, Oral, Daily PRN, Rollo Junior, PA-C    metFORMIN (GLUCOPHAGE) tablet 500 mg, 500 mg, Oral, BID With Meals, Rollo Junior, PA-C, 500 mg at 02/09/20 0831    nicotine polacrilex (NICORETTE) gum 2 mg, 2 mg, Oral, Q2H PRN, Rollo Junior, PA-C    OLANZapine (ZyPREXA) tablet 5 mg, 5 mg, Oral, After Lunch, Toña Harris MD, 5 mg at 02/08/20 1301    oxybutynin (DITROPAN-XL) 24 hr tablet 5 mg, 5 mg, Oral, Daily, Rollo Junior, PA-C, 5 mg at 02/09/20 0831    pantoprazole (PROTONIX) EC tablet 40 mg, 40 mg, Oral, Early Morning, Rollo Junior, PA-C, 40 mg at 02/09/20 5101    traZODone (DESYREL) tablet 50 mg, 50 mg, Oral, HS PRN, Rollo Junior, PA-C, 50 mg at 02/05/20 5636    Current Problem List:    Patient Active Problem List   Diagnosis    Encephalopathy acute    Bipolar 1 disorder [facility]    Schizoaffective disorder, bipolar type ([facility] )    Constipation, chronic    Hyperammonemia (HCC)    Type 2 diabetes mellitus without complication, without long-term current use of insulin (HCC)    Tracheobronchitis    Streptococcus pneumoniae    Chronic airway obstruction, not elsewhere classified    Benign essential hypertension    Disorder of lipoprotein and lipid metabolism    Plantar fasciitis    Foot callus    Gastroesophageal reflux disease without esophagitis    GI bleed    Hyponatremia    Acquired hypothyroidism    Ischemic colitis ([facility] )    Moderate cigarette smoker (10-19 per day)    Morbid obesity (HCC)    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    BMI 34 0-34 9,adult    Nail abnormality    Encounter for well adult exam with abnormal findings    Wheezing on right side of chest on exhalation    Tobacco abuse       Problem list reviewed 02/09/20     Objective:     Vital Signs:  Vitals:    02/07/20 0700 02/08/20 0730 02/09/20 0700 02/09/20 0705   BP:  119/77 121/79 160/72   BP Location:  Left arm Left arm Left arm   Pulse:  95 94 102   Resp: 18 18 20    Temp: (!) 97 2 °F (36 2 °C) 97 8 °F (36 6 °C) 97 9 °F (36 6 °C)    TempSrc:  Temporal Temporal    Weight:   106 kg (233 lb)    Height:             Appearance:  age appropriate, casually dressed and disheveled   Behavior:  normal   Speech:  normal volume   Mood:  constricted   Affect:  blunted   Thought Process:  normal   Thought Content:  normal   Perceptual Disturbances: None   Risk Potential: none   Sensorium:  person, place, situation and time   Cognition:  intact   Consciousness:  alert and awake    Attention: attention span and concentration were age appropriate   Intellect: average   Insight:  limited   Judgment: limited      Motor Activity: no abnormal movements       I/O Past 24 hours:  No intake/output data recorded  No intake/output data recorded  Labs:  Reviewed 02/09/20    Progress Toward Goals:  Unchanged    Assessment / Plan:     [facility]ffect[facility] )    Recommended Treatment:      Medication changes:  1) continue current treatment plan    Non-pharmacological treatments  1) Continue with group therapy, milieu therapy and occupational therapy  Safety  1) Safety/communication plan established targeting dynamic risk factors above  2) Risks, benefits, and possible side effects of medications explained to patient and patient verbalizes understanding  Counseling / Coordination of Care    Total floor / unit time spent today 20 minutes  Greater than 50% of total time was spent with the patient and / or family counseling and / or coordination of care  A description of the counseling / coordination of care  Patient's Rights, confidentiality and exceptions to confidentiality, use of automated medical record, [facility] staff access to medical record, and consent to treatment reviewed      Chaz Torres PA-C

## 2020-02-10 PROCEDURE — 99232 SBSQ HOSP IP/OBS MODERATE 35: CPT | Performed by: PSYCHIATRY & NEUROLOGY

## 2020-02-10 RX ADMIN — DOCUSATE SODIUM 100 MG: 100 CAPSULE, LIQUID FILLED ORAL at 17:00

## 2020-02-10 RX ADMIN — PANTOPRAZOLE SODIUM 40 MG: 40 TABLET, DELAYED RELEASE ORAL at 05:58

## 2020-02-10 RX ADMIN — DIVALPROEX SODIUM 1000 MG: 500 TABLET, DELAYED RELEASE ORAL at 13:10

## 2020-02-10 RX ADMIN — METFORMIN HYDROCHLORIDE 500 MG: 500 TABLET ORAL at 08:51

## 2020-02-10 RX ADMIN — OXYBUTYNIN CHLORIDE 5 MG: 5 TABLET, EXTENDED RELEASE ORAL at 08:51

## 2020-02-10 RX ADMIN — ATORVASTATIN CALCIUM 10 MG: 10 TABLET, FILM COATED ORAL at 17:00

## 2020-02-10 RX ADMIN — DIVALPROEX SODIUM 1000 MG: 500 TABLET, DELAYED RELEASE ORAL at 20:42

## 2020-02-10 RX ADMIN — LEVOTHYROXINE SODIUM 75 MCG: 75 TABLET ORAL at 05:58

## 2020-02-10 RX ADMIN — METFORMIN HYDROCHLORIDE 500 MG: 500 TABLET ORAL at 17:00

## 2020-02-10 RX ADMIN — DOCUSATE SODIUM 100 MG: 100 CAPSULE, LIQUID FILLED ORAL at 08:51

## 2020-02-10 RX ADMIN — CLOZAPINE 200 MG: 200 TABLET ORAL at 17:00

## 2020-02-10 RX ADMIN — OLANZAPINE 5 MG: 5 TABLET, FILM COATED ORAL at 13:10

## 2020-02-10 NOTE — PROGRESS NOTES
Rogers Bridges maintained on ongoing SAFE precaution without incident on this shift  He is laying in bed with his eyes closed, breath even and unlabored  Q 15  Minutes rounding continues  No indication of pain or discomfort  No behavioral noted    Will continue to monitor

## 2020-02-10 NOTE — PROGRESS NOTES
02/10/20 1100   Activity/Group Checklist   Group Other (Comment)  (IMR)   Attendance Attended   Attendance Duration (min) 46-60   Interactions Interacted appropriately   Affect/Mood Appropriate   Goals Achieved Identified feelings; Identified triggers; Able to listen to others; Able to engage in interactions     Patient attended group today  Patient was able to interact appropriately and participated during the group session  Session focused on how to deal with difficult circumstances and how to practice positive self-talk during periods of anxiety or depression  Patient was able to identify the challenges that he has experienced since being admitted  Patient stated that he needs to work on being patient while taking the steps needed towards discharge  Patient was encouraged to keep attending group and to work on being patient during his recovery journey  Patient did well with staying on topic and being respectful of others

## 2020-02-10 NOTE — PLAN OF CARE
Problem: Alteration in Thoughts and Perception  Goal: Treatment Goal: Gain control of psychotic behaviors/thinking, reduce/eliminate presenting symptoms and demonstrate improved reality functioning upon discharge  Outcome: Progressing  Goal: Verbalize thoughts and feelings  Description  Interventions:  - Promote a nonjudgmental and trusting relationship with the patient through active listening and therapeutic communication  - Assess patient's level of functioning, behavior and potential for risk  - Engage patient in 1 on 1 interactions  - Encourage patient to express fears, feelings, frustrations, and discuss symptoms    - [place] patient to reality, help patient recognize reality-based thinking   - Administer medications as ordered and assess for potential side effects  - Provide the patient education related to the signs and symptoms of the illness and desired effects of prescribed medications  Outcome: Progressing  Goal: Refrain from acting on delusional thinking/internal stimuli  Description  Interventions:  - Monitor patient closely, per order   - Utilize least restrictive measures   - Set reasonable limits, give positive feedback for acceptable   - Administer medications as ordered and monitor of potential side effects  Outcome: Progressing  Goal: Agree to be compliant with medication regime, as prescribed and report medication side effects  Description  Interventions:  - Offer appropriate PRN medication and supervise ingestion; conduct AIMS, as needed   Outcome: Progressing  Goal: Attend and participate in unit activities, including therapeutic, recreational, and educational groups  Description  Interventions:  -Encourage Visitation and family involvement in care  Outcome: Progressing  Goal: Recognize dysfunctional thoughts, communicate reality-based thoughts at the time of discharge  Description  Interventions:  - Provide medication and psycho-education to assist patient in compliance and developing insight into his/her illness   Outcome: Progressing     Problem: Ineffective Coping  Goal: Patient/Family verbalizes awareness of resources  Outcome: Progressing  Goal: Understands least restrictive measures  Description  Interventions:  - Utilize least restrictive behavior  Outcome: Progressing     Problem: GASTROINTESTINAL - ADULT  Goal: Minimal or absence of nausea and/or vomiting  Description  INTERVENTIONS:  - Administer IV fluids if ordered to ensure adequate hydration  - Maintain NPO status until nausea and vomiting are resolved  - Nasogastric tube if ordered  - Administer ordered antiemetic medications as needed  - Provide nonpharmacologic comfort measures as appropriate  - Advance diet as tolerated, if ordered  - Consider nutrition services referral to assist patient with adequate nutrition and appropriate food choices  Outcome: Progressing  Goal: Maintains adequate nutritional intake  Description  INTERVENTIONS:  - Monitor percentage of each meal consumed  - Identify factors contributing to decreased intake, treat as appropriate  - Assist with meals as needed  - Monitor I&O, weight, and lab values if indicated  - Obtain nutrition services referral as needed  Outcome: Progressing   The patient has been visible in the milieu, pleasant and social with peers and staff, and attended spirituality, IMR, and fresh air groups  Disorganized in thought and disheveled in appearance, wearing layered clothing  Ate 100% of both meals  Med compliant with prompting  No issues noted  Continue to monitor
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
## 2020-02-10 NOTE — PROGRESS NOTES
02/10/20 0900   Team Meeting   Meeting Type Daily Rounds   Team Members Present   Team Members Present Physician;Nurse;; Other (Discipline and Name)   Physician Team Member Dr Kelly Stevens Team Member Sylvia Woods RN   Care Management Team Member Lily Hoyt   Other (Discipline and Name) Zeb Barahona LCSW     Patient has no changes in behaviors  Controlled  Slept
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
## 2020-02-11 LAB
ALBUMIN SERPL BCP-MCNC: 3.7 G/DL (ref 3–5.2)
ALP SERPL-CCNC: 72 U/L (ref 43–122)
ALT SERPL W P-5'-P-CCNC: 23 U/L (ref 9–52)
AMMONIA PLAS-SCNC: 39 UMOL/L (ref 9–33)
ANION GAP SERPL CALCULATED.3IONS-SCNC: 9 MMOL/L (ref 5–14)
AST SERPL W P-5'-P-CCNC: 18 U/L (ref 17–59)
BASOPHILS # BLD AUTO: 0.08 THOUSAND/UL (ref 0–0.1)
BASOPHILS NFR MAR MANUAL: 1 % (ref 0–1)
BILIRUB SERPL-MCNC: 0.2 MG/DL
BUN SERPL-MCNC: 9 MG/DL (ref 5–25)
CALCIUM SERPL-MCNC: 9.3 MG/DL (ref 8.4–10.2)
CHLORIDE SERPL-SCNC: 102 MMOL/L (ref 97–108)
CO2 SERPL-SCNC: 27 MMOL/L (ref 22–30)
CREAT SERPL-MCNC: 0.58 MG/DL (ref 0.7–1.5)
ERYTHROCYTE [DISTWIDTH] IN BLOOD BY AUTOMATED COUNT: 13.6 %
GFR SERPL CREATININE-BSD FRML MDRD: 117 ML/MIN/1.73SQ M
GLUCOSE P FAST SERPL-MCNC: 110 MG/DL (ref 70–99)
GLUCOSE SERPL-MCNC: 110 MG/DL (ref 70–99)
HCT VFR BLD AUTO: 42.2 % (ref 41–53)
HGB BLD-MCNC: 14.4 G/DL (ref 13.5–17.5)
LYMPHOCYTES # BLD AUTO: 3.63 THOUSAND/UL (ref 0.5–4)
LYMPHOCYTES # BLD AUTO: 46 % (ref 25–45)
MCH RBC QN AUTO: 29.9 PG (ref 26–34)
MCHC RBC AUTO-ENTMCNC: 34.1 G/DL (ref 31–36)
MCV RBC AUTO: 88 FL (ref 80–100)
MONOCYTES # BLD AUTO: 0.63 THOUSAND/UL (ref 0.2–0.9)
MONOCYTES NFR BLD AUTO: 8 % (ref 1–10)
NEUTS SEG # BLD: 3.56 THOUSAND/UL (ref 1.8–7.8)
NEUTS SEG NFR BLD AUTO: 45 %
PLATELET # BLD AUTO: 159 THOUSANDS/UL (ref 150–450)
PLATELET BLD QL SMEAR: ADEQUATE
PMV BLD AUTO: 8.2 FL (ref 8.9–12.7)
POTASSIUM SERPL-SCNC: 4.4 MMOL/L (ref 3.6–5)
PROT SERPL-MCNC: 6.4 G/DL (ref 5.9–8.4)
RBC # BLD AUTO: 4.81 MILLION/UL (ref 4.5–5.9)
RBC MORPH BLD: NORMAL
SODIUM SERPL-SCNC: 138 MMOL/L (ref 137–147)
TOTAL CELLS COUNTED SPEC: 100
VALPROATE SERPL-MCNC: 71.7 UG/ML (ref 50–120)
WBC # BLD AUTO: 7.9 THOUSAND/UL (ref 4.5–11)

## 2020-02-11 PROCEDURE — 85027 COMPLETE CBC AUTOMATED: CPT | Performed by: PSYCHIATRY & NEUROLOGY

## 2020-02-11 PROCEDURE — 82140 ASSAY OF AMMONIA: CPT | Performed by: PSYCHIATRY & NEUROLOGY

## 2020-02-11 PROCEDURE — 85007 BL SMEAR W/DIFF WBC COUNT: CPT | Performed by: PSYCHIATRY & NEUROLOGY

## 2020-02-11 PROCEDURE — 99232 SBSQ HOSP IP/OBS MODERATE 35: CPT | Performed by: PSYCHIATRY & NEUROLOGY

## 2020-02-11 PROCEDURE — 80164 ASSAY DIPROPYLACETIC ACD TOT: CPT | Performed by: PSYCHIATRY & NEUROLOGY

## 2020-02-11 PROCEDURE — 80053 COMPREHEN METABOLIC PANEL: CPT | Performed by: PSYCHIATRY & NEUROLOGY

## 2020-02-11 RX ADMIN — METFORMIN HYDROCHLORIDE 500 MG: 500 TABLET ORAL at 08:45

## 2020-02-11 RX ADMIN — DOCUSATE SODIUM 100 MG: 100 CAPSULE, LIQUID FILLED ORAL at 08:45

## 2020-02-11 RX ADMIN — CLOZAPINE 200 MG: 200 TABLET ORAL at 16:49

## 2020-02-11 RX ADMIN — PANTOPRAZOLE SODIUM 40 MG: 40 TABLET, DELAYED RELEASE ORAL at 06:04

## 2020-02-11 RX ADMIN — OXYBUTYNIN CHLORIDE 5 MG: 5 TABLET, EXTENDED RELEASE ORAL at 08:46

## 2020-02-11 RX ADMIN — LEVOTHYROXINE SODIUM 75 MCG: 75 TABLET ORAL at 06:04

## 2020-02-11 RX ADMIN — ATORVASTATIN CALCIUM 10 MG: 10 TABLET, FILM COATED ORAL at 16:49

## 2020-02-11 RX ADMIN — OLANZAPINE 5 MG: 5 TABLET, FILM COATED ORAL at 13:28

## 2020-02-11 RX ADMIN — DIVALPROEX SODIUM 1000 MG: 500 TABLET, DELAYED RELEASE ORAL at 20:42

## 2020-02-11 RX ADMIN — DOCUSATE SODIUM 100 MG: 100 CAPSULE, LIQUID FILLED ORAL at 17:46

## 2020-02-11 RX ADMIN — DIVALPROEX SODIUM 1000 MG: 500 TABLET, DELAYED RELEASE ORAL at 12:27

## 2020-02-11 RX ADMIN — METFORMIN HYDROCHLORIDE 500 MG: 500 TABLET ORAL at 16:49

## 2020-02-11 RX ADMIN — TRAZODONE HYDROCHLORIDE 50 MG: 50 TABLET ORAL at 22:06
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
## 2020-02-11 NOTE — PROGRESS NOTES
Sleeping at this time, awake x1 for water at this point in the shift  No complaints offered  Precautions in place and maintained at this time   Monitoring continues
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
## 2020-02-11 NOTE — PLAN OF CARE
Problem: Alteration in Thoughts and Perception  Goal: Refrain from acting on delusional thinking/internal stimuli  Description  Interventions:  - Monitor patient closely, per order   - Utilize least restrictive measures   - Set reasonable limits, give positive feedback for acceptable   - Administer medications as ordered and monitor of potential side effects  Outcome: Progressing  Goal: Agree to be compliant with medication regime, as prescribed and report medication side effects  Description  Interventions:  - Offer appropriate PRN medication and supervise ingestion; conduct AIMS, as needed   Outcome: Progressing     Problem: GASTROINTESTINAL - ADULT  Goal: Maintains adequate nutritional intake  Description  INTERVENTIONS:  - Monitor percentage of each meal consumed  - Identify factors contributing to decreased intake, treat as appropriate  - Assist with meals as needed  - Monitor I&O, weight, and lab values if indicated  - Obtain nutrition services referral as needed  Outcome: Progressing   Patient is pleasant and mostly cooperative  He refused to get OOB for vitals, he got up for breakfast but very late  He did come for his medication with no prompting needed  He continues to be obsessed with being discharged, and is intrusive at the nurses station regarding this  He is easily redirected when he is being intrusive  Patient is disheveled and has a foul odor, he wears layers of clothing  He was encouraged to shower however, he refused  No group attendance noted this shift  Continue to monitor

## 2020-02-11 NOTE — PROGRESS NOTES
02/11/20 0900   Team Meeting   Meeting Type Daily Rounds   Team Members Present   Team Members Present Physician;Nurse;; Other (Discipline and Name)   Physician Team Member Dr Lam Rosenthal Team Member Dre Wooten RN   Care Management Team Member Lily Lopez   Other (Discipline and Name) Doreen Barrera LCSW     Patient attended groups  Controlled  Slept

## 2020-02-11 NOTE — PLAN OF CARE
Problem: Alteration in Thoughts and Perception  Goal: Verbalize thoughts and feelings  Description  Interventions:  - Promote a nonjudgmental and trusting relationship with the patient through active listening and therapeutic communication  - Assess patient's level of functioning, behavior and potential for risk  - Engage patient in 1 on 1 interactions  - Encourage patient to express fears, feelings, frustrations, and discuss symptoms    - [place] patient to reality, help patient recognize reality-based thinking   - Administer medications as ordered and assess for potential side effects  - Provide the patient education related to the signs and symptoms of the illness and desired effects of prescribed medications  Outcome: Progressing  Goal: Agree to be compliant with medication regime, as prescribed and report medication side effects  Description  Interventions:  - Offer appropriate PRN medication and supervise ingestion; conduct AIMS, as needed   Outcome: Progressing  Goal: Attend and participate in unit activities, including therapeutic, recreational, and educational groups  Description  Interventions:  -Encourage Visitation and family involvement in care  Outcome: Progressing     Problem: Ineffective Coping  Goal: Demonstrates healthy coping skills  Outcome: Progressing  Goal: Understands least restrictive measures  Description  Interventions:  - Utilize least restrictive behavior  Outcome: Progressing     Problem: GASTROINTESTINAL - ADULT  Goal: Maintains adequate nutritional intake  Description  INTERVENTIONS:  - Monitor percentage of each meal consumed  - Identify factors contributing to decreased intake, treat as appropriate  - Assist with meals as needed  - Monitor I&O, weight, and lab values if indicated  - Obtain nutrition services referral as needed  Outcome: Aniket Trujillo has been awake, alert, and visible intermittently out in the milieu  Pt went to [facility] with staff and peers  Continues to focus on asking for water and when he will be discharged  Disheveled in appearance and dressed in layered clothing  Compliant with scheduled meds with little prompting  Behavior has been quiet and mostly to self  Ate 100% supper  Pt completed his daily ADL's today  Attended and participated in evening group and had snack after  Continue to monitor/assess for any changes

## 2020-02-12 RX ADMIN — DIVALPROEX SODIUM 1000 MG: 500 TABLET, DELAYED RELEASE ORAL at 20:36

## 2020-02-12 RX ADMIN — ATORVASTATIN CALCIUM 10 MG: 10 TABLET, FILM COATED ORAL at 16:52

## 2020-02-12 RX ADMIN — DIVALPROEX SODIUM 1000 MG: 500 TABLET, DELAYED RELEASE ORAL at 13:10

## 2020-02-12 RX ADMIN — METFORMIN HYDROCHLORIDE 500 MG: 500 TABLET ORAL at 08:15

## 2020-02-12 RX ADMIN — DOCUSATE SODIUM 100 MG: 100 CAPSULE, LIQUID FILLED ORAL at 08:14

## 2020-02-12 RX ADMIN — CLOZAPINE 200 MG: 200 TABLET ORAL at 16:52

## 2020-02-12 RX ADMIN — OXYBUTYNIN CHLORIDE 5 MG: 5 TABLET, EXTENDED RELEASE ORAL at 08:14

## 2020-02-12 RX ADMIN — TRAZODONE HYDROCHLORIDE 50 MG: 50 TABLET ORAL at 21:33

## 2020-02-12 RX ADMIN — DOCUSATE SODIUM 100 MG: 100 CAPSULE, LIQUID FILLED ORAL at 17:09

## 2020-02-12 RX ADMIN — LEVOTHYROXINE SODIUM 75 MCG: 75 TABLET ORAL at 06:12

## 2020-02-12 RX ADMIN — PANTOPRAZOLE SODIUM 40 MG: 40 TABLET, DELAYED RELEASE ORAL at 06:12

## 2020-02-12 RX ADMIN — OLANZAPINE 5 MG: 5 TABLET, FILM COATED ORAL at 13:10

## 2020-02-12 RX ADMIN — METFORMIN HYDROCHLORIDE 500 MG: 500 TABLET ORAL at 16:52

## 2020-02-12 NOTE — PROGRESS NOTES
02/11/20 1500   Activity/Group Checklist   Group Other (Comment)  (Recovery Workshop)   Attendance Attended   Attendance Duration (min) 31-45   Interactions Interacted appropriately   Affect/Mood Appropriate   Goals Achieved Able to listen to others; Able to engage in interactions     Patient attended Recovery Workshop  Patient was engaged, but quiet throughout the entire group  He controlled his impulses and did not speak about situations relevant only to him  Patient demonstrated behavioral improvement in remaining calm and controlled

## 2020-02-12 NOTE — PLAN OF CARE
Problem: Alteration in Thoughts and Perception  Goal: Treatment Goal: Gain control of psychotic behaviors/thinking, reduce/eliminate presenting symptoms and demonstrate improved reality functioning upon discharge  Outcome: Progressing  Goal: Verbalize thoughts and feelings  Description  Interventions:  - Promote a nonjudgmental and trusting relationship with the patient through active listening and therapeutic communication  - Assess patient's level of functioning, behavior and potential for risk  - Engage patient in 1 on 1 interactions  - Encourage patient to express fears, feelings, frustrations, and discuss symptoms    - [place] patient to reality, help patient recognize reality-based thinking   - Administer medications as ordered and assess for potential side effects  - Provide the patient education related to the signs and symptoms of the illness and desired effects of prescribed medications  Outcome: Progressing  Goal: Refrain from acting on delusional thinking/internal stimuli  Description  Interventions:  - Monitor patient closely, per order   - Utilize least restrictive measures   - Set reasonable limits, give positive feedback for acceptable   - Administer medications as ordered and monitor of potential side effects  Outcome: Progressing  Goal: Agree to be compliant with medication regime, as prescribed and report medication side effects  Description  Interventions:  - Offer appropriate PRN medication and supervise ingestion; conduct AIMS, as needed   Outcome: Progressing  Goal: Attend and participate in unit activities, including therapeutic, recreational, and educational groups  Description  Interventions:  -Encourage Visitation and family involvement in care  Outcome: Progressing     Problem: Ineffective Coping  Goal: Patient/Family verbalizes awareness of resources  Outcome: Progressing  Goal: Understands least restrictive measures  Description  Interventions:  - Utilize least restrictive behavior  Outcome: Progressing     Problem: GASTROINTESTINAL - ADULT  Goal: Minimal or absence of nausea and/or vomiting  Description  INTERVENTIONS:  - Administer IV fluids if ordered to ensure adequate hydration  - Maintain NPO status until nausea and vomiting are resolved  - Nasogastric tube if ordered  - Administer ordered antiemetic medications as needed  - Provide nonpharmacologic comfort measures as appropriate  - Advance diet as tolerated, if ordered  - Consider nutrition services referral to assist patient with adequate nutrition and appropriate food choices  Outcome: Progressing  Goal: Maintains adequate nutritional intake  Description  INTERVENTIONS:  - Monitor percentage of each meal consumed  - Identify factors contributing to decreased intake, treat as appropriate  - Assist with meals as needed  - Monitor I&O, weight, and lab values if indicated  - Obtain nutrition services referral as needed  Outcome: Progressing   Visible in the milieu, pleasant and friendly with others, attended [employer] and fresh air groups  Disorganized and disheveled with layered clothing  Ate 100% of both meals  No behaviors or issues noted  Med compliant  Continue to monitor
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
## 2020-02-12 NOTE — PROGRESS NOTES
02/12/20 0800   Team Meeting   Meeting Type Daily Rounds   Team Members Present   Team Members Present Physician;Nurse; Other (Discipline and Name)   Physician Team Member Dr Ayan Amador Team Member Phillip Corado RN   Other (Discipline and Name) Dee Benson LCSW     Patient's behaviors have been controlled and he is attending groups  Patient has a pass with his "Big Brother" on Friday

## 2020-02-12 NOTE — PROGRESS NOTES
Pharmacy Clozapine Monitoring Progress Note     Maria E Gomez is receiving a total daily dose of 200 mg of clozapine daily  **If clozapine therapy is interrupted for more than 48 hours, therapy MUST be restarted at the initial titration dose (12 5mg - 25mg once daily) to avoid severe hypotension, bradycardia, seizure and syncope  **        Pharmacist Recommendations Based on Assessment and Plan:    1  Patient is Clozapine-REMS eligible, ANC was updated, with every four weeks monitoring frequency and the next [address] is due on 3/10/20 per the REMS program       2    Recommend BNP, CRP and Troponin to be drawn for baseline comparison along with clozapine level  3  Recommend repeat ECG  Assessment/Plan:    Phase of Treatment:     Current phase of treatment is maintenance/continuation  Patient Clozapine REMS Eligibility:     Patient is eligible to receive clozapine and the [address] monitoring frequency is every four weeks per clozapine REMS  The patient's latest [address] value has been updated into the Clozapine-REMS program          ANC and Clozapine Level (if available)     The most recent [address] was   Lab Results   Component Value Date    NEUTROABS 3 56 02/11/2020    and the next lab is due on 3/10/20  Last Clozapine level (if available):    0   Lab Value Date/Time    CLOZAPINE 505 01/03/2020 0617     0   Lab Value Date/Time    NCLOZIP 125 01/03/2020 0617           Monitoring Parameters for Clozapine:     Clozapine Adverse Effect Suggested Monitoring Patient's Current Status    Agranulocytosis ANC baseline and then repeat weekly for first 6 months and every 2 weeks for the second 6 months  Maintenance after one year of therapy every month  The most recent [address] was   Lab Results   Component Value Date    NEUTROABS 3 56 02/11/2020       This ANC is considered normal range (ANC >/= 1500/mcL)  Continue current treatment and monitoring course     Respiratory Depression Please ensure patient is not on concomitant respiratory lowering medications such as benzodiazepines, sedative hypnotics (eg  zolpidem) This patient is not on respiratory depression lowering medications   Myocarditis Baseline and weekly EKG, CRP, BNP, and troponin  Weekly symptomatic complaints of fatigue, dyspnea, tachycardia, chest pain, palpitations, and fever for the first 4 weeks  Last EKG Results on  12/30/20 showed: "Normal sinus rhythm  Left axis deviation  Abnormal ECG  When compared with ECG of 06-NOV-2019 19:19,  axis has shifted left"     Confirmed by Jhon Cosby ([id number]) on 12/30/2019 10:17:05 PM    Last QTC value was:  0   Lab Value Date/Time    QTCINT 435 12/30/2019 1443       Last BNP was: No results found for: NTBNP    Last Troponin was:  0   Lab Value Date/Time    TROPONINI 0 05 (H) 03/28/2014 1840        Orthostatic hypotension/  bradycardia Blood pressure and vital signs      Monitor blood pressure and orthostatic hypotension at least twice daily upon initiation and continue to follow at least once daily afterwards  Patient's last recorded vital signs:       /75 (BP Location: Right arm)   Pulse 90   Temp 97 7 °F (36 5 °C)   Resp 18   Ht 5' 9" (1 753 m)   Wt 106 kg (233 lb)   BMI 34 41 kg/m²             Constipation (bowel obstruction due to high anticholinergic clozapine burden) Assess at baseline and weekly during first four months of therapy  Docusate 100mg BID and Miralax 17 grams daily recommended initially  Prophylactic bowel regimen ordered and includes: docusate 100mg twice daily   Sialorrhea Assess at baseline and weekly during first four months of therapy  May be managed using sublingual anticholinergic preparations (atropine 1% eye drops)  If needed atropine 1% sublingual tongue drops or PO glycopyrrolate or terazosin are recommended   (If no allergies to these agents)        Please note that per current REMS protocol the provider can submit a treatment rationale that justifies clozapine treatment even if patient parameters are outside of REMS recommendations  The Clozapine REMS is an FDA-mandated program implemented by the manufacturers of clozapine  ([employer]  com/CpmgClozapineUI/home  u)  Pharmacy will continue to follow patient with team, thank you    Electronically signed by: Maria E Willis, PharmD, [address], Clinical Pharmacist - Psychiatry

## 2020-02-12 NOTE — PLAN OF CARE
Problem: Alteration in Thoughts and Perception  Goal: Refrain from acting on delusional thinking/internal stimuli  Description  Interventions:  - Monitor patient closely, per order   - Utilize least restrictive measures   - Set reasonable limits, give positive feedback for acceptable   - Administer medications as ordered and monitor of potential side effects  Outcome: Progressing  Goal: Agree to be compliant with medication regime, as prescribed and report medication side effects  Description  Interventions:  - Offer appropriate PRN medication and supervise ingestion; conduct AIMS, as needed   Outcome: Progressing  Goal: Complete daily ADLs, including personal hygiene independently, as able  Description  Interventions:  - Observe, teach, and assist patient with ADLS  - Monitor and promote a balance of rest/activity, with adequate nutrition and elimination   Outcome: Progressing     Problem: Alteration in Thoughts and Perception  Goal: Attend and participate in unit activities, including therapeutic, recreational, and educational groups  Description  Interventions:  -Encourage Visitation and family involvement in care  Outcome: Not Progressing     Pt visible on unit  Compliant with meds and meals without prompting  Pt ate 100% of dinner and had snack  Staff continues to have to remind pt to reduce his water intake  Pt still appearing disheveled, dressing in layers  No reports of depression, anxiety, SI/HI, or AH/VH  No issues or outbursts  Will continue to monitor

## 2020-02-13 PROCEDURE — 83930 ASSAY OF BLOOD OSMOLALITY: CPT | Performed by: PSYCHIATRY & NEUROLOGY

## 2020-02-13 PROCEDURE — 84588 ASSAY OF VASOPRESSIN: CPT | Performed by: PSYCHIATRY & NEUROLOGY

## 2020-02-13 PROCEDURE — 99232 SBSQ HOSP IP/OBS MODERATE 35: CPT | Performed by: PSYCHIATRY & NEUROLOGY

## 2020-02-13 RX ADMIN — METFORMIN HYDROCHLORIDE 500 MG: 500 TABLET ORAL at 08:24

## 2020-02-13 RX ADMIN — DIVALPROEX SODIUM 1000 MG: 500 TABLET, DELAYED RELEASE ORAL at 20:36

## 2020-02-13 RX ADMIN — CLOZAPINE 200 MG: 200 TABLET ORAL at 16:50

## 2020-02-13 RX ADMIN — OLANZAPINE 5 MG: 5 TABLET, FILM COATED ORAL at 13:00

## 2020-02-13 RX ADMIN — DOCUSATE SODIUM 100 MG: 100 CAPSULE, LIQUID FILLED ORAL at 17:07

## 2020-02-13 RX ADMIN — LEVOTHYROXINE SODIUM 75 MCG: 75 TABLET ORAL at 06:02

## 2020-02-13 RX ADMIN — ATORVASTATIN CALCIUM 10 MG: 10 TABLET, FILM COATED ORAL at 16:50

## 2020-02-13 RX ADMIN — PANTOPRAZOLE SODIUM 40 MG: 40 TABLET, DELAYED RELEASE ORAL at 06:02

## 2020-02-13 RX ADMIN — DIVALPROEX SODIUM 1000 MG: 500 TABLET, DELAYED RELEASE ORAL at 13:00

## 2020-02-13 RX ADMIN — METFORMIN HYDROCHLORIDE 500 MG: 500 TABLET ORAL at 16:50

## 2020-02-13 RX ADMIN — DOCUSATE SODIUM 100 MG: 100 CAPSULE, LIQUID FILLED ORAL at 08:24

## 2020-02-13 RX ADMIN — OXYBUTYNIN CHLORIDE 5 MG: 5 TABLET, EXTENDED RELEASE ORAL at 08:24

## 2020-02-13 NOTE — TREATMENT TEAM
[facility] GROUP NOTE  "I feel like they are keeping me prisoner here " Expressed frustration with length of stay, encouraged to continue attending group  Able to be redirected  02/12/20 1100   Activity/Group Checklist   Group Other (Comment)  (IMR)   Attendance Attended   Attendance Duration (min) 0-15   Interactions Other (Comment)  (Off topic   Able to be redirected  )

## 2020-02-13 NOTE — PLAN OF CARE
Problem: DISCHARGE PLANNING  Goal: Discharge to home or other facility with appropriate resources  Description    CASE MANAGEMENT INTERVENTIONS:  - Conduct assessment to determine patient/family and health care team treatment goals, and need for post-acute services based on payer coverage, community resources, patient preferences and barriers to discharge  - Address psychosocial, clinical, and financial barriers to discharge as identified in assessment in conjunction with the patient/family and health care team   - Assist the patient in reintegration back into the community by removing barriers which may hinder a successful discharge once deemed stable  - Arrange appropriate level of post-acute services according to patient's needs and preference and payer coverage in collaboration with the physician and health care team   - Communicate with and update the patient/family, physician, and health care team regarding progress on the discharge plan  - Arrange appropriate transportation to post-acute venues  Outcome: Progressing     Problem: DISCHARGE PLANNING  Goal: Discharge to home or other facility with appropriate resources  Description    CASE MANAGEMENT INTERVENTIONS:  - Conduct assessment to determine patient/family and health care team treatment goals, and need for post-acute services based on payer coverage, community resources, patient preferences and barriers to discharge  - Address psychosocial, clinical, and financial barriers to discharge as identified in assessment in conjunction with the patient/family and health care team   - Assist the patient in reintegration back into the community by removing barriers which may hinder a successful discharge once deemed stable    - Arrange appropriate level of post-acute services according to patient's needs and preference and payer coverage in collaboration with the physician and health care team   - Communicate with and update the patient/family, physician, and health care team regarding progress on the discharge plan  - Arrange appropriate transportation to post-acute venues  Outcome: Progressing     CM informed by [facility] staff that patient is unable to complete his [facility] appointment because he is reporting to the [facility] staff that he has a mechanical heart valve and they will not proceed til they have medical clearance  CM instructed [facility] staff to contact medical to obtain this medical clearance so that a new appointment can be arranged as soon as possible  CM will continue to follow patient's progress and assist with discharge planning needs

## 2020-02-13 NOTE — PROGRESS NOTES
02/13/20 0900   Team Meeting   Meeting Type Daily Rounds   Team Members Present   Team Members Present Physician;Nurse;   Physician Team Member Dr Danny Winn Team Member Kerry Villarreal, RN   Care Management Team Member Jada Gudino [facility]     Patient tomorrow with "big brother" Solis Downs  Attended groups  Disheveled  For [facility] today  Slept

## 2020-02-13 NOTE — PLAN OF CARE
Problem: Alteration in Thoughts and Perception  Goal: Verbalize thoughts and feelings  Description  Interventions:  - Promote a nonjudgmental and trusting relationship with the patient through active listening and therapeutic communication  - Assess patient's level of functioning, behavior and potential for risk  - Engage patient in 1 on 1 interactions  - Encourage patient to express fears, feelings, frustrations, and discuss symptoms    - [place] patient to reality, help patient recognize reality-based thinking   - Administer medications as ordered and assess for potential side effects  - Provide the patient education related to the signs and symptoms of the illness and desired effects of prescribed medications  Outcome: Progressing  Goal: Refrain from acting on delusional thinking/internal stimuli  Description  Interventions:  - Monitor patient closely, per order   - Utilize least restrictive measures   - Set reasonable limits, give positive feedback for acceptable   - Administer medications as ordered and monitor of potential side effects  Outcome: Progressing  Goal: Agree to be compliant with medication regime, as prescribed and report medication side effects  Description  Interventions:  - Offer appropriate PRN medication and supervise ingestion; conduct AIMS, as needed   Outcome: Progressing  Goal: Attend and participate in unit activities, including therapeutic, recreational, and educational groups  Description  Interventions:  -Encourage Visitation and family involvement in care  Outcome: Progressing  Goal: Complete daily ADLs, including personal hygiene independently, as able  Description  Interventions:  - Observe, teach, and assist patient with ADLS  - Monitor and promote a balance of rest/activity, with adequate nutrition and elimination   Outcome: Progressing     Problem: Ineffective Coping  Goal: Demonstrates healthy coping skills  Outcome: Progressing  Goal: Understands least restrictive measures  Description  Interventions:  - Utilize least restrictive behavior  Outcome: Progressing     Problem: GASTROINTESTINAL - ADULT  Goal: Maintains adequate nutritional intake  Description  INTERVENTIONS:  - Monitor percentage of each meal consumed  - Identify factors contributing to decreased intake, treat as appropriate  - Assist with meals as needed  - Monitor I&O, weight, and lab values if indicated  - Obtain nutrition services referral as needed  Outcome: Sepideh Sensor has been awake, alert, and visible intermittently out in the milieu  Pt went to [facility] with staff and peers  Remains dressed in layers but completed daily ADL's today  Ate 100% supper  Compliant with scheduled meds with little prompting  Social with peers  Remains preoccupied with being discharged  Denies any depression, anxiety, a/v hallucinations, and has not verbalized any delusions  Attended and participated in evening group and had snack after  Pt requested prn Trazodone for sleep  Trazodone 50 mg po given at 2133  Continue to monitor/assess for any changes

## 2020-02-13 NOTE — PROGRESS NOTES
While filling out forms at his dentist appointment, the patient indicated that he has an artificial heart valve  Because of this, the dentist said that they would not be able to proceed any further until he obtains medical clearance  Called and spoke with Dr Massimo Hoskins in regards to same and after both of us had reviewed the patient's charts, neither of us has been able to find any documentation supporting that the patient has had any such procedure done  Dr Massimo Hoskins said that she will be following up with the patient on this
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
## 2020-02-13 NOTE — PLAN OF CARE
Problem: Alteration in Thoughts and Perception  Goal: Treatment Goal: Gain control of psychotic behaviors/thinking, reduce/eliminate presenting symptoms and demonstrate improved reality functioning upon discharge  Outcome: Progressing as per chart review    Received pt in bed at change of shift with eyes closed; chest movement noted  Continues the same thus this far as per q 15 min room checks  Awake and out of his room at 0305 requesting and given a snack  Will continue to monitor

## 2020-02-13 NOTE — PLAN OF CARE
Problem: Alteration in Thoughts and Perception  Goal: Treatment Goal: Gain control of psychotic behaviors/thinking, reduce/eliminate presenting symptoms and demonstrate improved reality functioning upon discharge  Outcome: Progressing  Goal: Verbalize thoughts and feelings  Description  Interventions:  - Promote a nonjudgmental and trusting relationship with the patient through active listening and therapeutic communication  - Assess patient's level of functioning, behavior and potential for risk  - Engage patient in 1 on 1 interactions  - Encourage patient to express fears, feelings, frustrations, and discuss symptoms    - [place] patient to reality, help patient recognize reality-based thinking   - Administer medications as ordered and assess for potential side effects  - Provide the patient education related to the signs and symptoms of the illness and desired effects of prescribed medications  Outcome: Progressing  Goal: Agree to be compliant with medication regime, as prescribed and report medication side effects  Description  Interventions:  - Offer appropriate PRN medication and supervise ingestion; conduct AIMS, as needed   Outcome: Progressing  Goal: Attend and participate in unit activities, including therapeutic, recreational, and educational groups  Description  Interventions:  -Encourage Visitation and family involvement in care  Outcome: Progressing     Problem: Ineffective Coping  Goal: Patient/Family verbalizes awareness of resources  Outcome: Progressing  Goal: Understands least restrictive measures  Description  Interventions:  - Utilize least restrictive behavior  Outcome: Progressing     Problem: GASTROINTESTINAL - ADULT  Goal: Maintains adequate nutritional intake  Description  INTERVENTIONS:  - Monitor percentage of each meal consumed  - Identify factors contributing to decreased intake, treat as appropriate  - Assist with meals as needed  - Monitor I&O, weight, and lab values if indicated  - Obtain nutrition services referral as needed  Outcome: Progressing   Visible in the milieu, pleasant and friendly with others, went off unit with staff to a dentist appointment in the morning and then attended fresh air and spirituality groups in the afternoon  Disorganized and disheveled with layered clothing  Ate 100% of both meals  Med compliant  No behaviors or issues noted  Continue to monitor

## 2020-02-14 LAB
AMPHETAMINES SERPL QL SCN: NEGATIVE
BARBITURATES UR QL: NEGATIVE
BENZODIAZ UR QL: NEGATIVE
COCAINE UR QL: NEGATIVE
METHADONE UR QL: NEGATIVE
OPIATES UR QL SCN: NEGATIVE
PCP UR QL: NEGATIVE
THC UR QL: NEGATIVE

## 2020-02-14 PROCEDURE — 80307 DRUG TEST PRSMV CHEM ANLYZR: CPT | Performed by: PSYCHIATRY & NEUROLOGY

## 2020-02-14 PROCEDURE — 99232 SBSQ HOSP IP/OBS MODERATE 35: CPT | Performed by: PSYCHIATRY & NEUROLOGY

## 2020-02-14 RX ADMIN — METFORMIN HYDROCHLORIDE 500 MG: 500 TABLET ORAL at 16:57

## 2020-02-14 RX ADMIN — METFORMIN HYDROCHLORIDE 500 MG: 500 TABLET ORAL at 08:12

## 2020-02-14 RX ADMIN — PANTOPRAZOLE SODIUM 40 MG: 40 TABLET, DELAYED RELEASE ORAL at 06:09

## 2020-02-14 RX ADMIN — DOCUSATE SODIUM 100 MG: 100 CAPSULE, LIQUID FILLED ORAL at 17:10

## 2020-02-14 RX ADMIN — OLANZAPINE 5 MG: 5 TABLET, FILM COATED ORAL at 13:30

## 2020-02-14 RX ADMIN — CLOZAPINE 200 MG: 200 TABLET ORAL at 16:57

## 2020-02-14 RX ADMIN — DOCUSATE SODIUM 100 MG: 100 CAPSULE, LIQUID FILLED ORAL at 08:12

## 2020-02-14 RX ADMIN — OXYBUTYNIN CHLORIDE 5 MG: 5 TABLET, EXTENDED RELEASE ORAL at 08:11

## 2020-02-14 RX ADMIN — ATORVASTATIN CALCIUM 10 MG: 10 TABLET, FILM COATED ORAL at 16:57

## 2020-02-14 RX ADMIN — DIVALPROEX SODIUM 1000 MG: 500 TABLET, DELAYED RELEASE ORAL at 20:50

## 2020-02-14 RX ADMIN — TRAZODONE HYDROCHLORIDE 50 MG: 50 TABLET ORAL at 23:38

## 2020-02-14 RX ADMIN — DIVALPROEX SODIUM 1000 MG: 500 TABLET, DELAYED RELEASE ORAL at 13:30

## 2020-02-14 RX ADMIN — LEVOTHYROXINE SODIUM 75 MCG: 75 TABLET ORAL at 06:09
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
## 2020-02-14 NOTE — PROGRESS NOTES
Progress Note - Behavioral Health     Alyssa Coyle 48 y o  male MRN: [id number]   Unit/Bed#: [facility] 442-34 Encounter: [id number]    Per Nursing: Nursing reports that patient has remained preoccupied with discharge but visible on the unit  He is easily redirected  He is compliant with meals and medications  Per Patient: Patient states that he is in a good mood  He states that he slept well last evening  He states that he doesn't like to be "locked in here " He states that he is being "mistreated " He is mildly irritable during the time of the interview  He denies any feelings of anxiety or depression  Patient denies any thoughts of wanting to harm self or others  Patient denies any recent self-injurious behaviors  Patient denies any active or passive suicidal ideation, intent or plan  Patient is able to contract for safety at the present time  Patient remains future-oriented and goal-directed, as well as motivated and help seeking  He denies any auditory or visual hallucinations  He is worried about his plans after discharge, however  He wants to know if he will have a place to live after he leaves the unit  Behavior over the last 24 hours: unchanged  Sleep: normal  Appetite: normal  Medication side effects: No   ROS: no complaints, all other systems are negative  Any positives in the Comprehensive Review of Systems were noted in the HPI  All other Review of Systems were negative          Mental Status Evaluation:    Appearance:  casually dressed, dressed appropriately   Behavior:  guarded, slightly irritable, demanding   Speech:  normal rate and volume   Mood:  irritable, mildly anxious about discharge   Affect:  reactive, labile, constricted at times   Thought Process:  circumstantial, perseverative   Associations: perseverative   Thought Content:  persecutory thoughts, paranoid ideation, ruminating thoughts   Perceptual Disturbances: denies auditory hallucinations when asked   Risk Potential: Suicidal ideation - None at present  Homicidal ideation - None at present  Potential for aggression - Not at present   Sensorium:  oriented to person, place and time/date   Memory:  recent and remote memory grossly intact   Consciousness:  alert and awake   Attention: attention span and concentration appear shorter than expected for age   Insight:  limited   Judgment: limited   Gait/Station: normal gait/station   Motor Activity: no abnormal movements     Vital signs in last 24 hours:  Vitals:    02/15/20 0732   BP: 137/65   Pulse: 93   Resp:    Temp:          Laboratory results:   I have personally reviewed all pertinent laboratory/tests results    Most Recent Labs:   Lab Results   Component Value Date    WBC 7 90 02/11/2020    RBC 4 81 02/11/2020    HGB 14 4 02/11/2020    HCT 42 2 02/11/2020     02/11/2020    RDW 13 6 02/11/2020    NEUTROABS 3 56 02/11/2020    SODIUM 138 02/11/2020    K 4 4 02/11/2020     02/11/2020    CO2 27 02/11/2020    BUN 9 02/11/2020    CREATININE 0 58 (L) 02/11/2020    GLUC 110 (H) 02/11/2020    GLUF 110 (H) 02/11/2020    CALCIUM 9 3 02/11/2020    AST 18 02/11/2020    ALT 23 02/11/2020    ALKPHOS 72 02/11/2020    TP 6 4 02/11/2020    ALB 3 7 02/11/2020    TBILI 0 20 02/11/2020    CHOLESTEROL 120 12/03/2019    HDL 28 (L) 12/03/2019    TRIG 237 (H) 12/03/2019    LDLCALC 45 12/03/2019    NONHDLC 92 12/03/2019    VALPROICTOT 71 7 02/11/2020    LITHIUM 0 6 03/27/2014    AMMONIA 39 (H) 02/11/2020    SMI2WADZFYAL 3 810 12/24/2019    FREET4 0 93 08/23/2019    HGBA1C 6 1 12/03/2019     12/03/2019       Progress Toward Goals: progressing    Assessment/Plan   Principal Problem:    Schizoaffective disorder, bipolar type ([facility] [address] )  Active Problems:    Type 2 diabetes mellitus without complication, without long-term current use of insulin (HCC)    Benign essential hypertension    Gastroesophageal reflux disease without esophagitis    Acquired hypothyroidism    Tobacco abuse    Recommended Treatment: Planned medication and treatment changes: All current active medications have been reviewed  Encourage group therapy, milieu therapy and occupational therapy  Behavioral Health checks every 7 minutes  Continue current medications:    Current Facility-Administered Medications:  acetaminophen 325 mg Oral Q6H PRN Brittney Whitlock MD   acetaminophen 650 mg Oral Q6H PRN Brittney Whitlock MD   acetaminophen 975 mg Oral Q8H PRN Brittney Whitlock MD   aluminum-magnesium hydroxide-simethicone 30 mL Oral Q4H PRN Brittney Whitlock MD   atorvastatin 10 mg Oral Daily With Jayden Alcala MD   benztropine 1 mg Intramuscular Q6H PRN Brittney Whitlock MD   benztropine 1 mg Oral Q6H PRN Brittney Whitlock MD   cloZAPine 200 mg Oral Daily Brittney Whitlock MD   divalproex sodium 1,000 mg Oral BID Brittney Whitlock MD   docusate sodium 100 mg Oral BID Brittney Whitlock MD   haloperidol 5 mg Oral Q6H PRN Brittney Whitlock MD   haloperidol lactate 5 mg Intramuscular Q6H PRN Brittney Whitlock MD   levothyroxine 75 mcg Oral Early Morning Brittney Whitlock MD   LORazepam 1 mg Intramuscular Q6H PRN Brittney Whitlock MD   LORazepam 1 mg Oral Q6H PRN Brittney Whitlock MD   magnesium hydroxide 30 mL Oral Daily PRN Brittney Whitlock MD   metFORMIN 500 mg Oral BID With Meals Brittney Whitlock MD   nicotine polacrilex 2 mg Oral Q2H PRN Brittney Whitlock MD   OLANZapine 5 mg Oral After Lunch Brittney Whitlock MD   oxybutynin 5 mg Oral Daily Brittney Whitlock MD   pantoprazole 40 mg Oral Early Morning Brittney Whitlock MD   traZODone 50 mg Oral HS PRN Brittney Whitlock MD           Plan:  1) Patient remains irritable at times and demanding with regards to his discharge disposition  He is anxious to know his plans after discharge  He denies other complaints  Will continue to monitor    2) Continue all current medications as directed:    Clozaril 200 mg once daily by mouth   Depakote 1000 mg twice daily by mouth   Zyprexa 5 mg once daily by mouth after lunch  3) Medical   All medical comorbidities are under the care of [facility]  4) Continue to work with Case Management to determine patient's disposition following discharge  Risks / Benefits of Treatment:    Risks, benefits, and possible side effects of medications explained to patient and patient verbalizes understanding and agreement for treatment  Counseling / Coordination of Care:    Patient's progress reviewed with nursing staff  Medications, treatment progress and treatment plan reviewed with patient      Fabi Serrano PA-C 02/15/20
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
## 2020-02-14 NOTE — PLAN OF CARE
Problem: Alteration in Thoughts and Perception  Goal: Verbalize thoughts and feelings  Description  Interventions:  - Promote a nonjudgmental and trusting relationship with the patient through active listening and therapeutic communication  - Assess patient's level of functioning, behavior and potential for risk  - Engage patient in 1 on 1 interactions  - Encourage patient to express fears, feelings, frustrations, and discuss symptoms    - [place] patient to reality, help patient recognize reality-based thinking   - Administer medications as ordered and assess for potential side effects  - Provide the patient education related to the signs and symptoms of the illness and desired effects of prescribed medications  Outcome: Progressing  Goal: Agree to be compliant with medication regime, as prescribed and report medication side effects  Description  Interventions:  - Offer appropriate PRN medication and supervise ingestion; conduct AIMS, as needed   Outcome: Progressing  Goal: Attend and participate in unit activities, including therapeutic, recreational, and educational groups  Description  Interventions:  -Encourage Visitation and family involvement in care  Outcome: Progressing  Goal: Complete daily ADLs, including personal hygiene independently, as able  Description  Interventions:  - Observe, teach, and assist patient with ADLS  - Monitor and promote a balance of rest/activity, with adequate nutrition and elimination   Outcome: Progressing     Problem: Ineffective Coping  Goal: Demonstrates healthy coping skills  Outcome: Progressing  Goal: Understands least restrictive measures  Description  Interventions:  - Utilize least restrictive behavior  Outcome: Progressing     Problem: GASTROINTESTINAL - ADULT  Goal: Maintains adequate nutritional intake  Description  INTERVENTIONS:  - Monitor percentage of each meal consumed  - Identify factors contributing to decreased intake, treat as appropriate  - Assist with meals as needed  - Monitor I&O, weight, and lab values if indicated  - Obtain nutrition services referral as needed  Outcome: Uzma Washington has been awake, alert, and visible intermittently out in the milieu  Dr Beni Teague in to see pt to medically clear him for a dental procedure  Pt completed his daily ADL's today  Compliant with scheduled meds without prompting  Pt continues to be preoccupied with discharge  Ate 100% supper  Denies any depression, anxiety, si/hi, intent, plan, a/v hallucinations, and has not verbalized any delusions  Remains disheveled in appearance and dressed in layers  Pt did not attend evening group but came out for snack after  Continue to monitor/assess for changes

## 2020-02-14 NOTE — PLAN OF CARE
Problem: DISCHARGE PLANNING  Goal: Discharge to home or other facility with appropriate resources  Description    CASE MANAGEMENT INTERVENTIONS:  - Conduct assessment to determine patient/family and health care team treatment goals, and need for post-acute services based on payer coverage, community resources, patient preferences and barriers to discharge  - Address psychosocial, clinical, and financial barriers to discharge as identified in assessment in conjunction with the patient/family and health care team   - Assist the patient in reintegration back into the community by removing barriers which may hinder a successful discharge once deemed stable  - Arrange appropriate level of post-acute services according to patient's needs and preference and payer coverage in collaboration with the physician and health care team   - Communicate with and update the patient/family, physician, and health care team regarding progress on the discharge plan  - Arrange appropriate transportation to post-acute venues  Outcome: Progressing     CM faxed medical clearance form signed by Dr Raimundo PARRA stating patient is clear for dental treatments to [facility]  CM asked that they notify this CM if more information is need prior to rescheduling his appointment  CM will continue to follow patient's progress and assist with discharge planning needs
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
## 2020-02-14 NOTE — PLAN OF CARE
Problem: Alteration in Thoughts and Perception  Goal: Treatment Goal: Gain control of psychotic behaviors/thinking, reduce/eliminate presenting symptoms and demonstrate improved reality functioning upon discharge  2/14/2020 0404 by Levi Ochoa RN  Outcome: Progressing as per chart review    Received pt in bed at change of shift with eyes closed; chest movement noted  Continues the same thus this far as per q 15 min room checks  Will continue to monitor

## 2020-02-14 NOTE — PROGRESS NOTES
02/14/20 0800   Team Meeting   Meeting Type Daily Rounds   Team Members Present   Team Members Present Physician;Nurse;; Other (Discipline and Name)   Physician Team Member Dr Ilene Loredo Team Member Chantelle Beaver, TABBY   Care Management Team Member Lily Baer   Other (Discipline and Name) Reyna White, LCSW     Patient is scheduled for a pass with his "big brother" today 11-1 PM  Patient told [facility] that he has an artificial hear valve and he was unable to be seen without medical clearance first  SLIM was done and stated he is able to be seen  Slept

## 2020-02-14 NOTE — PLAN OF CARE
Problem: Alteration in Thoughts and Perception  Goal: Treatment Goal: Gain control of psychotic behaviors/thinking, reduce/eliminate presenting symptoms and demonstrate improved reality functioning upon discharge  Outcome: Progressing  Goal: Verbalize thoughts and feelings  Description  Interventions:  - Promote a nonjudgmental and trusting relationship with the patient through active listening and therapeutic communication  - Assess patient's level of functioning, behavior and potential for risk  - Engage patient in 1 on 1 interactions  - Encourage patient to express fears, feelings, frustrations, and discuss symptoms    - [place] patient to reality, help patient recognize reality-based thinking   - Administer medications as ordered and assess for potential side effects  - Provide the patient education related to the signs and symptoms of the illness and desired effects of prescribed medications  Outcome: Progressing  Goal: Refrain from acting on delusional thinking/internal stimuli  Description  Interventions:  - Monitor patient closely, per order   - Utilize least restrictive measures   - Set reasonable limits, give positive feedback for acceptable   - Administer medications as ordered and monitor of potential side effects  Outcome: Progressing  Goal: Agree to be compliant with medication regime, as prescribed and report medication side effects  Description  Interventions:  - Offer appropriate PRN medication and supervise ingestion; conduct AIMS, as needed   Outcome: Progressing  Goal: Attend and participate in unit activities, including therapeutic, recreational, and educational groups  Description  Interventions:  -Encourage Visitation and family involvement in care  Outcome: Progressing  Goal: Recognize dysfunctional thoughts, communicate reality-based thoughts at the time of discharge  Description  Interventions:  - Provide medication and psycho-education to assist patient in compliance and developing insight into his/her illness   Outcome: Progressing     Problem: Ineffective Coping  Goal: Patient/Family verbalizes awareness of resources  Outcome: Progressing  Goal: Understands least restrictive measures  Description  Interventions:  - Utilize least restrictive behavior  Outcome: Progressing     Problem: GASTROINTESTINAL - ADULT  Goal: Maintains adequate nutritional intake  Description  INTERVENTIONS:  - Monitor percentage of each meal consumed  - Identify factors contributing to decreased intake, treat as appropriate  - Assist with meals as needed  - Monitor I&O, weight, and lab values if indicated  - Obtain nutrition services referral as needed  Outcome: Progressing   Visible in the milieu, pleasant and friendly with others, went on a pass with [employer]anda[employer]  Both report that the pass went well  They spent time shopping and then went to lunch  Patient was compliant with the body search upon return and in providing a sample for UDS  Remains disorganized in thought process and disheveled in appearance, with layered clothing  Ate 100% of breakfast  Med compliant  No behaviors or issues noted  Continue to monitor

## 2020-02-15 PROCEDURE — 99232 SBSQ HOSP IP/OBS MODERATE 35: CPT | Performed by: PHYSICIAN ASSISTANT

## 2020-02-15 RX ADMIN — METFORMIN HYDROCHLORIDE 500 MG: 500 TABLET ORAL at 16:50

## 2020-02-15 RX ADMIN — DOCUSATE SODIUM 100 MG: 100 CAPSULE, LIQUID FILLED ORAL at 08:18

## 2020-02-15 RX ADMIN — DIVALPROEX SODIUM 1000 MG: 500 TABLET, DELAYED RELEASE ORAL at 12:12

## 2020-02-15 RX ADMIN — DIVALPROEX SODIUM 1000 MG: 500 TABLET, DELAYED RELEASE ORAL at 20:38

## 2020-02-15 RX ADMIN — METFORMIN HYDROCHLORIDE 500 MG: 500 TABLET ORAL at 08:18

## 2020-02-15 RX ADMIN — LEVOTHYROXINE SODIUM 75 MCG: 75 TABLET ORAL at 06:06

## 2020-02-15 RX ADMIN — OXYBUTYNIN CHLORIDE 5 MG: 5 TABLET, EXTENDED RELEASE ORAL at 08:18

## 2020-02-15 RX ADMIN — OLANZAPINE 5 MG: 5 TABLET, FILM COATED ORAL at 13:11

## 2020-02-15 RX ADMIN — PANTOPRAZOLE SODIUM 40 MG: 40 TABLET, DELAYED RELEASE ORAL at 06:06

## 2020-02-15 RX ADMIN — DOCUSATE SODIUM 100 MG: 100 CAPSULE, LIQUID FILLED ORAL at 17:05

## 2020-02-15 RX ADMIN — CLOZAPINE 200 MG: 200 TABLET ORAL at 16:50

## 2020-02-15 RX ADMIN — ATORVASTATIN CALCIUM 10 MG: 10 TABLET, FILM COATED ORAL at 16:50

## 2020-02-15 NOTE — PLAN OF CARE
Problem: Alteration in Thoughts and Perception  Goal: Verbalize thoughts and feelings  Description  Interventions:  - Promote a nonjudgmental and trusting relationship with the patient through active listening and therapeutic communication  - Assess patient's level of functioning, behavior and potential for risk  - Engage patient in 1 on 1 interactions  - Encourage patient to express fears, feelings, frustrations, and discuss symptoms    - [place] patient to reality, help patient recognize reality-based thinking   - Administer medications as ordered and assess for potential side effects  - Provide the patient education related to the signs and symptoms of the illness and desired effects of prescribed medications  Outcome: Progressing  Goal: Agree to be compliant with medication regime, as prescribed and report medication side effects  Description  Interventions:  - Offer appropriate PRN medication and supervise ingestion; conduct AIMS, as needed   Outcome: Progressing  Goal: Attend and participate in unit activities, including therapeutic, recreational, and educational groups  Description  Interventions:  -Encourage Visitation and family involvement in care  Outcome: Progressing     Problem: Ineffective Coping  Goal: Demonstrates healthy coping skills  Outcome: Progressing  Goal: Understands least restrictive measures  Description  Interventions:  - Utilize least restrictive behavior  Outcome: Progressing     Problem: GASTROINTESTINAL - ADULT  Goal: Maintains adequate nutritional intake  Description  INTERVENTIONS:  - Monitor percentage of each meal consumed  - Identify factors contributing to decreased intake, treat as appropriate  - Assist with meals as needed  - Monitor I&O, weight, and lab values if indicated  - Obtain nutrition services referral as needed  Outcome: Isabella French has been awake, alert, and visible intermittently out in the milieu    Pt remains preoccupied with discharge and called writer at desk prior to supper from pt phone thinking that he was calling a supervisor and stated that he wanted to be discharged  Pt was easily redirected  Ate 100% supper  Compliant with scheduled meds without prompting  Dressed in layers and disheveled in appearance  Attended and participated in evening group and had snack after  Continue to monitor/assess for any changes

## 2020-02-15 NOTE — PLAN OF CARE
Problem: Alteration in Thoughts and Perception  Goal: Refrain from acting on delusional thinking/internal stimuli  Description  Interventions:  - Monitor patient closely, per order   - Utilize least restrictive measures   - Set reasonable limits, give positive feedback for acceptable   - Administer medications as ordered and monitor of potential side effects  Outcome: Progressing  Goal: Agree to be compliant with medication regime, as prescribed and report medication side effects  Description  Interventions:  - Offer appropriate PRN medication and supervise ingestion; conduct AIMS, as needed   Outcome: Progressing  Goal: Complete daily ADLs, including personal hygiene independently, as able  Description  Interventions:  - Observe, teach, and assist patient with ADLS  - Monitor and promote a balance of rest/activity, with adequate nutrition and elimination   Outcome: Lissett Roy has been visible on the unit  He did tend to his hygiene today and attended selected groups  Remains disheveled in appearance, wearing multiple layers of t shirts and jackets  Preoccupied with discharge possibilities but is redirectable  Compliant and prompt for his medications  Interacts appropriately with peers and staff  Behaviors controlled at this time  Will continue to monitor for changes

## 2020-02-15 NOTE — PROGRESS NOTES
Progress Note - Behavioral Health     Lia Garcia 48 y o  male MRN: [id number]   Unit/Bed#: [facility] 596-52 Encounter: [id number]    Per Nursing: Nursing reports that patient has been preoccupied with his discharge plans  He has been compliant with his medications and meals  He has listened to the radio and has been interactive with peers  Per Patient: Patient is irritable toward Provider during time of interview, as he is fixated on being discharged  He states that he is "good, I just wanna leave " He starts walking away from Provider at the beginning of the interview  Patient is willing to answer a few more questions and states that he is in a "good" mood  He enjoyed his breakfast this morning  He does not have any negative thoughts or thoughts of wanting to harm himself  He remains irritable and states, "I want to lay down," and continues to walk away  He prematurely concludes the interview and refuses to answer the remainder of the questions  Behavior over the last 24 hours: unchanged  Sleep: normal  Appetite: normal  Medication side effects: No   ROS: no complaints, all other systems are negative  Any positives in the Comprehensive Review of Systems were noted in the HPI  All other Review of Systems were negative          Mental Status Evaluation:    Appearance:  casually dressed, wearing multiple layers   Behavior:  guarded, uncooperative, evasive, does not answer questions, does not want to talk, irritable   Speech:  normal rate and volume   Mood:  irritable, patient reports that he is "good"   Affect:  constricted   Thought Process:  concrete   Associations: intact associations   Thought Content:  no overt delusions, fixated on discharge planning   Perceptual Disturbances: does not appear responding to internal stimuli   Risk Potential: Suicidal ideation - None at present  Homicidal ideation - None at present  Potential for aggression - Not at present   Sensorium:  oriented to person, place and time/date   Memory:  recent and remote memory grossly intact   Consciousness:  alert and awake   Attention: poor concentration and poor attention span   Insight:  limited   Judgment: limited   Gait/Station: normal gait/station   Motor Activity: no abnormal movements     Vital signs in last 24 hours:  Vitals:    02/15/20 0732   Pulse: 93   Resp:          Laboratory results: I have personally reviewed all pertinent laboratory/tests results  Progress Toward Goals: progressing    Assessment/Plan   Principal Problem:    Schizoaffective disorder, bipolar type ([facility])  Active Problems:    Type 2 diabetes mellitus without complication, without long-term current use of insulin ([facility])    Benign essential hypertension    Gastroesophageal reflux disease without esophagitis    Acquired hypothyroidism    Tobacco abuse    Recommended Treatment:     Planned medication and treatment changes:     All current active medications have been reviewed  Encourage group therapy, milieu therapy and occupational therapy  Behavioral Health checks every 7 minutes  Continue current medications:    Current Facility-Administered Medications:  acetaminophen 325 mg Oral Q6H PRN Myrna Merrill MD   acetaminophen 650 mg Oral Q6H PRN Myrna Merrill MD   acetaminophen 975 mg Oral Q8H PRN Myrna Merrill MD   aluminum-magnesium hydroxide-simethicone 30 mL Oral Q4H PRN Myrna Merrill MD   atorvastatin 10 mg Oral Daily With Fairy Spurling, MD   benztropine 1 mg Intramuscular Q6H PRN Myrna Merrill MD   benztropine 1 mg Oral Q6H PRN Myrna Merrill MD   cloZAPine 200 mg Oral Daily Myrna Merrill MD   divalproex sodium 1,000 mg Oral BID Myrna Merrill MD   docusate sodium 100 mg Oral BID Myrna Merrill MD   haloperidol 5 mg Oral Q6H PRN Myrna Merrill MD   haloperidol lactate 5 mg Intramuscular Q6H PRN Myrna Merrill MD   levothyroxine 75 mcg Oral Early Morning Myrna Merrill MD   LORazepam 1 mg Intramuscular Q6H PRN Myrna Merrill MD   LORazepam 1 mg Oral Q6H PRN Myrna Merrill MD magnesium hydroxide 30 mL Oral Daily PRN Toña Harris MD   metFORMIN 500 mg Oral BID With Meals Toña Harris MD   nicotine polacrilex 2 mg Oral Q2H PRN Toña Harris MD   OLANZapine 5 mg Oral After Lunch Toña Harris MD   oxybutynin 5 mg Oral Daily Toña Harris MD   pantoprazole 40 mg Oral Early Morning Toña Harris MD   traZODone 50 mg Oral HS PRN Toña Harris MD           Plan:  1) Patient is irritable and remains fixated on being discharged  He refuses to interact with provider for the majority of the interview and prematurely ends the interview  He has been compliant with his medications and denies all symptoms  Will continue to monitor on the unit  2) Continue all current medications as directed:    Clozaril 200 mg once daily by mouth   Depakote 1000 mg twice daily by mouth   Zyprexa 5 mg once daily by mouth after lunch  3) Medical   All medical comorbidities are under the care of [facility]  4) Continue to work with Case Management to determine patient's disposition following discharge  Risks / Benefits of Treatment:    Risks, benefits, and possible side effects of medications explained to patient and patient verbalizes understanding and agreement for treatment  Counseling / Coordination of Care:    Patient's progress reviewed with nursing staff  Medications, treatment progress and treatment plan reviewed with patient      Bouchra Gonzales PA-C 02/16/20

## 2020-02-15 NOTE — PROGRESS NOTES
Pt requested sleep aid beginning of shift  Trazadone 100 mg given @ 3626  Pt went back to bed & woke up @ 0330 requesting water   Sleeping @ present, will continue to monitor

## 2020-02-16 PROCEDURE — 99232 SBSQ HOSP IP/OBS MODERATE 35: CPT | Performed by: PHYSICIAN ASSISTANT

## 2020-02-16 RX ADMIN — OLANZAPINE 5 MG: 5 TABLET, FILM COATED ORAL at 13:00

## 2020-02-16 RX ADMIN — METFORMIN HYDROCHLORIDE 500 MG: 500 TABLET ORAL at 08:09

## 2020-02-16 RX ADMIN — LEVOTHYROXINE SODIUM 75 MCG: 75 TABLET ORAL at 05:55

## 2020-02-16 RX ADMIN — PANTOPRAZOLE SODIUM 40 MG: 40 TABLET, DELAYED RELEASE ORAL at 05:55

## 2020-02-16 RX ADMIN — METFORMIN HYDROCHLORIDE 500 MG: 500 TABLET ORAL at 16:55

## 2020-02-16 RX ADMIN — CLOZAPINE 200 MG: 200 TABLET ORAL at 16:55

## 2020-02-16 RX ADMIN — DOCUSATE SODIUM 100 MG: 100 CAPSULE, LIQUID FILLED ORAL at 17:12

## 2020-02-16 RX ADMIN — DIVALPROEX SODIUM 1000 MG: 500 TABLET, DELAYED RELEASE ORAL at 12:08

## 2020-02-16 RX ADMIN — DIVALPROEX SODIUM 1000 MG: 500 TABLET, DELAYED RELEASE ORAL at 20:47

## 2020-02-16 RX ADMIN — ATORVASTATIN CALCIUM 10 MG: 10 TABLET, FILM COATED ORAL at 16:55

## 2020-02-16 RX ADMIN — DOCUSATE SODIUM 100 MG: 100 CAPSULE, LIQUID FILLED ORAL at 08:09

## 2020-02-16 RX ADMIN — OXYBUTYNIN CHLORIDE 5 MG: 5 TABLET, EXTENDED RELEASE ORAL at 08:09

## 2020-02-16 NOTE — PLAN OF CARE
Problem: Alteration in Thoughts and Perception  Goal: Agree to be compliant with medication regime, as prescribed and report medication side effects  Description  Interventions:  - Offer appropriate PRN medication and supervise ingestion; conduct AIMS, as needed   Outcome: Progressing  Goal: Attend and participate in unit activities, including therapeutic, recreational, and educational groups  Description  Interventions:  -Encourage Visitation and family involvement in care  Outcome: Progressing  Goal: Complete daily ADLs, including personal hygiene independently, as able  Description  Interventions:  - Observe, teach, and assist patient with ADLS  - Monitor and promote a balance of rest/activity, with adequate nutrition and elimination   Outcome: Not Progressing     Kinjal Chanel has been visible on the unit  Remains disheveled in appearance, wearing multiple layers of t shirts and jackets  Compliant and prompt for his medications  Interacts appropriately with peers and staff  Behaviors controlled at this time  Preoccupied with discharge planning during interactions  Will continue to monitor for changes

## 2020-02-16 NOTE — PLAN OF CARE
Problem: Alteration in Thoughts and Perception  Goal: Verbalize thoughts and feelings  Description  Interventions:  - Promote a nonjudgmental and trusting relationship with the patient through active listening and therapeutic communication  - Assess patient's level of functioning, behavior and potential for risk  - Engage patient in 1 on 1 interactions  - Encourage patient to express fears, feelings, frustrations, and discuss symptoms    - [place] patient to reality, help patient recognize reality-based thinking   - Administer medications as ordered and assess for potential side effects  - Provide the patient education related to the signs and symptoms of the illness and desired effects of prescribed medications  Outcome: Progressing  Goal: Agree to be compliant with medication regime, as prescribed and report medication side effects  Description  Interventions:  - Offer appropriate PRN medication and supervise ingestion; conduct AIMS, as needed   Outcome: Progressing  Goal: Complete daily ADLs, including personal hygiene independently, as able  Description  Interventions:  - Observe, teach, and assist patient with ADLS  - Monitor and promote a balance of rest/activity, with adequate nutrition and elimination   Outcome: Progressing     Problem: GASTROINTESTINAL - ADULT  Goal: Maintains adequate nutritional intake  Description  INTERVENTIONS:  - Monitor percentage of each meal consumed  - Identify factors contributing to decreased intake, treat as appropriate  - Assist with meals as needed  - Monitor I&O, weight, and lab values if indicated  - Obtain nutrition services referral as needed  Outcome: Diane Gates has been visible intermittently in the milieu  Social with staff and select peers  Denies anxiety and depression  Preoccupied with discharge plans  Took medication without prompting  Ate 100% of his meal  BM this evening  Listened to the radio and conversed with peers in [employer] areas   Disheveled appearance and continues to wear layers of clothing  Attended evening group  Took HS medication and ate snack  Continue to monitor  Precautions maintained

## 2020-02-16 NOTE — PROGRESS NOTES
Sleeping at this time, no distress noted  simeon precautions in place and maintained   Monitoring continues

## 2020-02-16 NOTE — TREATMENT TEAM
02/15/20 1300   Activity/Group Checklist   Group Other (Comment)  (OPEN STUDIO/Art Therapy, Social Interaction-Free Expression)   Attendance Attended   Attendance Duration (min) 31-45

## 2020-02-17 PROCEDURE — 99232 SBSQ HOSP IP/OBS MODERATE 35: CPT | Performed by: PSYCHIATRY & NEUROLOGY

## 2020-02-17 RX ADMIN — LEVOTHYROXINE SODIUM 75 MCG: 75 TABLET ORAL at 06:00

## 2020-02-17 RX ADMIN — DIVALPROEX SODIUM 1000 MG: 500 TABLET, DELAYED RELEASE ORAL at 20:35

## 2020-02-17 RX ADMIN — OLANZAPINE 5 MG: 5 TABLET, FILM COATED ORAL at 13:00

## 2020-02-17 RX ADMIN — DOCUSATE SODIUM 100 MG: 100 CAPSULE, LIQUID FILLED ORAL at 08:30

## 2020-02-17 RX ADMIN — CLOZAPINE 200 MG: 200 TABLET ORAL at 16:59

## 2020-02-17 RX ADMIN — METFORMIN HYDROCHLORIDE 500 MG: 500 TABLET ORAL at 08:30

## 2020-02-17 RX ADMIN — METFORMIN HYDROCHLORIDE 500 MG: 500 TABLET ORAL at 17:00

## 2020-02-17 RX ADMIN — PANTOPRAZOLE SODIUM 40 MG: 40 TABLET, DELAYED RELEASE ORAL at 06:03

## 2020-02-17 RX ADMIN — DIVALPROEX SODIUM 1000 MG: 500 TABLET, DELAYED RELEASE ORAL at 13:00

## 2020-02-17 RX ADMIN — DOCUSATE SODIUM 100 MG: 100 CAPSULE, LIQUID FILLED ORAL at 17:00

## 2020-02-17 RX ADMIN — OXYBUTYNIN CHLORIDE 5 MG: 5 TABLET, EXTENDED RELEASE ORAL at 08:30

## 2020-02-17 RX ADMIN — ATORVASTATIN CALCIUM 10 MG: 10 TABLET, FILM COATED ORAL at 17:00
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
## 2020-02-17 NOTE — PLAN OF CARE
Problem: Alteration in Thoughts and Perception  Goal: Verbalize thoughts and feelings  Description  Interventions:  - Promote a nonjudgmental and trusting relationship with the patient through active listening and therapeutic communication  - Assess patient's level of functioning, behavior and potential for risk  - Engage patient in 1 on 1 interactions  - Encourage patient to express fears, feelings, frustrations, and discuss symptoms    - [place] patient to reality, help patient recognize reality-based thinking   - Administer medications as ordered and assess for potential side effects  - Provide the patient education related to the signs and symptoms of the illness and desired effects of prescribed medications  Outcome: Progressing  Goal: Agree to be compliant with medication regime, as prescribed and report medication side effects  Description  Interventions:  - Offer appropriate PRN medication and supervise ingestion; conduct AIMS, as needed   Outcome: Progressing  Goal: Attend and participate in unit activities, including therapeutic, recreational, and educational groups  Description  Interventions:  -Encourage Visitation and family involvement in care  Outcome: Progressing     Problem: Ineffective Coping  Goal: Patient/Family verbalizes awareness of resources  Outcome: Progressing  Goal: Understands least restrictive measures  Description  Interventions:  - Utilize least restrictive behavior  Outcome: Progressing     Problem: GASTROINTESTINAL - ADULT  Goal: Minimal or absence of nausea and/or vomiting  Description  INTERVENTIONS:  - Administer IV fluids if ordered to ensure adequate hydration  - Maintain NPO status until nausea and vomiting are resolved  - Nasogastric tube if ordered  - Administer ordered antiemetic medications as needed  - Provide nonpharmacologic comfort measures as appropriate  - Advance diet as tolerated, if ordered  - Consider nutrition services referral to assist patient with adequate nutrition and appropriate food choices  Outcome: Progressing  Goal: Maintains adequate nutritional intake  Description  INTERVENTIONS:  - Monitor percentage of each meal consumed  - Identify factors contributing to decreased intake, treat as appropriate  - Assist with meals as needed  - Monitor I&O, weight, and lab values if indicated  - Obtain nutrition services referral as needed  Outcome: Progressing     Problem: Alteration in Thoughts and Perception  Goal: Treatment Goal: Gain control of psychotic behaviors/thinking, reduce/eliminate presenting symptoms and demonstrate improved reality functioning upon discharge  Outcome: Not Progressing  Goal: Recognize dysfunctional thoughts, communicate reality-based thoughts at the time of discharge  Description  Interventions:  - Provide medication and psycho-education to assist patient in compliance and developing insight into his/her illness   Outcome: Not Progressing  Goal: Complete daily ADLs, including personal hygiene independently, as able  Description  Interventions:  - Observe, teach, and assist patient with ADLS  - Monitor and promote a balance of rest/activity, with adequate nutrition and elimination   Outcome: Not Progressing   Visible intermittently, friendly and social with others, attended spirituality and fresh air groups  Continues to lack insight into illness and recovery process, preoccupied with when he'll be discharged  Did not shower or do hygiene and appears disheveled, wearing multiple layers of clothing  Ate 75% of breakfast and 75% of lunch  Med compliant with prompting  Continue to monitor
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
## 2020-02-17 NOTE — TREATMENT TEAM
02/17/20 1100   Activity/Group Checklist   Group Other (Comment)  (IMR)   Attendance Did not attend     Patient did not attend group  Patient was encouraged to attend group, but declined

## 2020-02-17 NOTE — PLAN OF CARE
Problem: Alteration in Thoughts and Perception  Goal: Treatment Goal: Gain control of psychotic behaviors/thinking, reduce/eliminate presenting symptoms and demonstrate improved reality functioning upon discharge  Outcome: Progressing  Goal: Verbalize thoughts and feelings  Description  Interventions:  - Promote a nonjudgmental and trusting relationship with the patient through active listening and therapeutic communication  - Assess patient's level of functioning, behavior and potential for risk  - Engage patient in 1 on 1 interactions  - Encourage patient to express fears, feelings, frustrations, and discuss symptoms    - [place] patient to reality, help patient recognize reality-based thinking   - Administer medications as ordered and assess for potential side effects  - Provide the patient education related to the signs and symptoms of the illness and desired effects of prescribed medications  Outcome: Progressing  Goal: Refrain from acting on delusional thinking/internal stimuli  Description  Interventions:  - Monitor patient closely, per order   - Utilize least restrictive measures   - Set reasonable limits, give positive feedback for acceptable   - Administer medications as ordered and monitor of potential side effects  Outcome: Progressing  Goal: Agree to be compliant with medication regime, as prescribed and report medication side effects  Description  Interventions:  - Offer appropriate PRN medication and supervise ingestion; conduct AIMS, as needed   Outcome: Progressing  Goal: Attend and participate in unit activities, including therapeutic, recreational, and educational groups  Description  Interventions:  -Encourage Visitation and family involvement in care  Outcome: Progressing  Goal: Recognize dysfunctional thoughts, communicate reality-based thoughts at the time of discharge  Description  Interventions:  - Provide medication and psycho-education to assist patient in compliance and developing insight into his/her illness   Outcome: Progressing  Goal: Complete daily ADLs, including personal hygiene independently, as able  Description  Interventions:  - Observe, teach, and assist patient with ADLS  - Monitor and promote a balance of rest/activity, with adequate nutrition and elimination   Outcome: Progressing     Problem: Ineffective Coping  Goal: Identifies ineffective coping skills  Outcome: Progressing  Goal: Identifies healthy coping skills  Outcome: Progressing  Goal: Demonstrates healthy coping skills  Outcome: Progressing  Goal: Patient/Family participate in treatment and DC plans  Description  Interventions:  - Provide therapeutic environment  Outcome: Progressing  Goal: Patient/Family verbalizes awareness of resources  Outcome: Progressing  Goal: Understands least restrictive measures  Description  Interventions:  - Utilize least restrictive behavior  Outcome: Progressing     Problem: RESPIRATORY - ADULT  Goal: Achieves optimal ventilation and oxygenation  Description  INTERVENTIONS:  - Assess for changes in respiratory status  - Assess for changes in mentation and behavior  - Position to facilitate oxygenation and minimize respiratory effort  - Oxygen administered by appropriate delivery if ordered  - Initiate smoking cessation education as indicated  - Encourage broncho-pulmonary hygiene including cough, deep breathe, Incentive Spirometry  - Assess the need for suctioning and aspirate as needed  - Assess and instruct to report SOB or any respiratory difficulty  - Respiratory Therapy support as indicated  Outcome: Progressing     Problem: GASTROINTESTINAL - ADULT  Goal: Minimal or absence of nausea and/or vomiting  Description  INTERVENTIONS:  - Administer IV fluids if ordered to ensure adequate hydration  - Maintain NPO status until nausea and vomiting are resolved  - Nasogastric tube if ordered  - Administer ordered antiemetic medications as needed  - Provide nonpharmacologic comfort measures as appropriate  - Advance diet as tolerated, if ordered  - Consider nutrition services referral to assist patient with adequate nutrition and appropriate food choices  Outcome: Progressing  Goal: Maintains or returns to baseline bowel function  Description  INTERVENTIONS:  - Assess bowel function  - Encourage oral fluids to ensure adequate hydration  - Administer IV fluids if ordered to ensure adequate hydration  - Administer ordered medications as needed  - Encourage mobilization and activity  - Consider nutritional services referral to assist patient with adequate nutrition and appropriate food choices  Outcome: Progressing  Goal: Maintains adequate nutritional intake  Description  INTERVENTIONS:  - Monitor percentage of each meal consumed  - Identify factors contributing to decreased intake, treat as appropriate  - Assist with meals as needed  - Monitor I&O, weight, and lab values if indicated  - Obtain nutrition services referral as needed  Outcome: Progressing     Problem: METABOLIC, FLUID AND ELECTROLYTES - ADULT  Goal: Glucose maintained within target range  Description  INTERVENTIONS:  - Monitor Blood Glucose as ordered  - Assess for signs and symptoms of hyperglycemia and hypoglycemia  - Administer ordered medications to maintain glucose within target range  - Assess nutritional intake and initiate nutrition service referral as needed  Outcome: Progressing     Problem: DISCHARGE PLANNING  Goal: Discharge to home or other facility with appropriate resources  Description    CASE MANAGEMENT INTERVENTIONS:  - Conduct assessment to determine patient/family and health care team treatment goals, and need for post-acute services based on payer coverage, community resources, patient preferences and barriers to discharge    - Address psychosocial, clinical, and financial barriers to discharge as identified in assessment in conjunction with the patient/family and health care team   - Assist the patient in reintegration back into the community by removing barriers which may hinder a successful discharge once deemed stable  - Arrange appropriate level of post-acute services according to patient's needs and preference and payer coverage in collaboration with the physician and health care team   - Communicate with and update the patient/family, physician, and health care team regarding progress on the discharge plan  - Arrange appropriate transportation to post-acute venues  Outcome: Progressing    Patient is alert, pleasant, cooperative and visible on unit  Med and meal compliant  Disheveled and wearing layers of clothing  Patient denies depression, anxiety, si and hi  No c/o s/s pain or discomfort  Patient listened to radio and was heard singing along loudly and enjoying himself  Patient attended evening group, ate 100% of supper and had snack    Will continue to monitor progress in recovery program

## 2020-02-17 NOTE — PROGRESS NOTES
02/17/20 0900   Team Meeting   Meeting Type Daily Rounds   Team Members Present   Team Members Present Physician;Nurse; Other (Discipline and Name)   Physician Team Member Dr Lola Mack, RN   Other (Discipline and Name) Hub[facility] Garzon[facility]     Patient's pass was mostly ok, but he did require redirection from his Big Brother Jose Martin Watts at times  Patient remained disheveled, but behaviors are controlled

## 2020-02-17 NOTE — PROGRESS NOTES
02/14/20 1100   Activity/Group Checklist   Group Other (Comment)  (IMR - Weekly Goal Review/SMART Goals)   Attendance Did not attend     Patient did not attended group today as he was on a pass
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
## 2020-02-18 PROCEDURE — 99232 SBSQ HOSP IP/OBS MODERATE 35: CPT | Performed by: PSYCHIATRY & NEUROLOGY

## 2020-02-18 RX ADMIN — DIVALPROEX SODIUM 1000 MG: 500 TABLET, DELAYED RELEASE ORAL at 12:20

## 2020-02-18 RX ADMIN — PANTOPRAZOLE SODIUM 40 MG: 40 TABLET, DELAYED RELEASE ORAL at 06:06

## 2020-02-18 RX ADMIN — DOCUSATE SODIUM 100 MG: 100 CAPSULE, LIQUID FILLED ORAL at 17:03

## 2020-02-18 RX ADMIN — ATORVASTATIN CALCIUM 10 MG: 10 TABLET, FILM COATED ORAL at 16:43

## 2020-02-18 RX ADMIN — LEVOTHYROXINE SODIUM 75 MCG: 75 TABLET ORAL at 06:06

## 2020-02-18 RX ADMIN — DIVALPROEX SODIUM 1000 MG: 500 TABLET, DELAYED RELEASE ORAL at 20:35

## 2020-02-18 RX ADMIN — METFORMIN HYDROCHLORIDE 500 MG: 500 TABLET ORAL at 08:22

## 2020-02-18 RX ADMIN — METFORMIN HYDROCHLORIDE 500 MG: 500 TABLET ORAL at 16:43

## 2020-02-18 RX ADMIN — DOCUSATE SODIUM 100 MG: 100 CAPSULE, LIQUID FILLED ORAL at 08:22

## 2020-02-18 RX ADMIN — OLANZAPINE 5 MG: 5 TABLET, FILM COATED ORAL at 12:22

## 2020-02-18 RX ADMIN — OXYBUTYNIN CHLORIDE 5 MG: 5 TABLET, EXTENDED RELEASE ORAL at 08:22

## 2020-02-18 RX ADMIN — CLOZAPINE 200 MG: 200 TABLET ORAL at 16:43

## 2020-02-18 NOTE — PLAN OF CARE
Problem: Alteration in Thoughts and Perception  Goal: Attend and participate in unit activities, including therapeutic, recreational, and educational groups  Description  Interventions:  -Encourage Visitation and family involvement in care  Outcome: Not Progressing     Problem: Ineffective Coping  Goal: Demonstrates healthy coping skills  Outcome: Not Progressing    Individual spent most of the morning in bed, did not attend any groups  When he questions why he is not considered for discharge/ attending [facility], he resists listening to staff and becomes loud and argumentative  He remains in control and will listen with firm direction  He did attend fresh air group this afternoon  Compliant with meds but needs prompting  Presentation is disheveled  Appetite good for both breakfast and lunch  Argenine Vasopressor Hormone level is still pending  Availability of staff made known  Will continue to monitor
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
## 2020-02-18 NOTE — PLAN OF CARE
Problem: DISCHARGE PLANNING  Goal: Discharge to home or other facility with appropriate resources  Description    CASE MANAGEMENT INTERVENTIONS:  - Conduct assessment to determine patient/family and health care team treatment goals, and need for post-acute services based on payer coverage, community resources, patient preferences and barriers to discharge  - Address psychosocial, clinical, and financial barriers to discharge as identified in assessment in conjunction with the patient/family and health care team   - Assist the patient in reintegration back into the community by removing barriers which may hinder a successful discharge once deemed stable  - Arrange appropriate level of post-acute services according to patient's needs and preference and payer coverage in collaboration with the physician and health care team   - Communicate with and update the patient/family, physician, and health care team regarding progress on the discharge plan  - Arrange appropriate transportation to post-acute venues  2/18/2020 1616 by Pamella Ramos  Outcome: Progressing     CM emailed patient's rep-payee, [address] [facility], with [facility], informing her that patient has three checks here that are outdated and unable to be cashed  She asked that the checks be sent back to her and she will issue a new check for patient to cash so he has spending money while he is on the unit  CM will continue to follow patient's progress and assist with discharge planning needs

## 2020-02-18 NOTE — TREATMENT TEAM
Patient refused      02/18/20 1100   Activity/Group Checklist   Group   (IMR/Meet and Greet)   Attendance Did not attend   Attendance Duration (min) 46-60   Affect/Mood GAEL

## 2020-02-18 NOTE — PROGRESS NOTES
02/18/20 0900   Team Meeting   Meeting Type Daily Rounds   Team Members Present   Team Members Present Nurse;; Other (Discipline and Name)   Nursing Team Member Colton Aldridge RN   Care Management Team Member Lily Young   Other (Discipline and Name) Arabella Gorman [place]; MCKAYLA Mcgee     Patient is easily irritable and disheveled  No other behavioral changes  Slept

## 2020-02-18 NOTE — PLAN OF CARE
Problem: Alteration in Thoughts and Perception  Goal: Verbalize thoughts and feelings  Description  Interventions:  - Promote a nonjudgmental and trusting relationship with the patient through active listening and therapeutic communication  - Assess patient's level of functioning, behavior and potential for risk  - Engage patient in 1 on 1 interactions  - Encourage patient to express fears, feelings, frustrations, and discuss symptoms    - [place] patient to reality, help patient recognize reality-based thinking   - Administer medications as ordered and assess for potential side effects  - Provide the patient education related to the signs and symptoms of the illness and desired effects of prescribed medications  Outcome: Progressing  Goal: Agree to be compliant with medication regime, as prescribed and report medication side effects  Description  Interventions:  - Offer appropriate PRN medication and supervise ingestion; conduct AIMS, as needed   Outcome: Progressing  Goal: Attend and participate in unit activities, including therapeutic, recreational, and educational groups  Description  Interventions:  -Encourage Visitation and family involvement in care  Outcome: Progressing     Problem: Ineffective Coping  Goal: Demonstrates healthy coping skills  Outcome: Progressing  Goal: Understands least restrictive measures  Description  Interventions:  - Utilize least restrictive behavior  Outcome: Progressing     Problem: GASTROINTESTINAL - ADULT  Goal: Maintains adequate nutritional intake  Description  INTERVENTIONS:  - Monitor percentage of each meal consumed  - Identify factors contributing to decreased intake, treat as appropriate  - Assist with meals as needed  - Monitor I&O, weight, and lab values if indicated  - Obtain nutrition services referral as needed  Outcome: Patti Soo has been awake, alert, and visible intermittently out in the milieu  Pt went to [facility] for mass with staff and peers    Ate 100% supper  Denies any depression, anxiety, si/hi, intent, plan, a/v hallucinations, and has not verbalized any delusions  Compliant with scheduled meds without prompting  Disheveled in appearance and dressed in layers  Enjoys listening to music on radio in [facility] with peers  No behavioral issues  Attended and participated in evening group and had snack after  Continue to monitor/assess for any changes

## 2020-02-18 NOTE — TREATMENT TEAM
Not required to attend     02/18/20 1430   Activity/Group Checklist   Group   (Community Programming Wrap up )   Attendance Did not attend   Attendance Duration (min) 16-30   Affect/Mood GAEL

## 2020-02-18 NOTE — PLAN OF CARE
Problem: Alteration in Thoughts and Perception  Goal: Verbalize thoughts and feelings  Description  Interventions:  - Promote a nonjudgmental and trusting relationship with the patient through active listening and therapeutic communication  - Assess patient's level of functioning, behavior and potential for risk  - Engage patient in 1 on 1 interactions  - Encourage patient to express fears, feelings, frustrations, and discuss symptoms    - [place] patient to reality, help patient recognize reality-based thinking   - Administer medications as ordered and assess for potential side effects  - Provide the patient education related to the signs and symptoms of the illness and desired effects of prescribed medications  Outcome: Progressing  Goal: Agree to be compliant with medication regime, as prescribed and report medication side effects  Description  Interventions:  - Offer appropriate PRN medication and supervise ingestion; conduct AIMS, as needed   Outcome: Progressing  Goal: Attend and participate in unit activities, including therapeutic, recreational, and educational groups  Description  Interventions:  -Encourage Visitation and family involvement in care  Outcome: Progressing  Goal: Complete daily ADLs, including personal hygiene independently, as able  Description  Interventions:  - Observe, teach, and assist patient with ADLS  - Monitor and promote a balance of rest/activity, with adequate nutrition and elimination   Outcome: Progressing     Problem: Ineffective Coping  Goal: Demonstrates healthy coping skills  Outcome: Progressing  Goal: Understands least restrictive measures  Description  Interventions:  - Utilize least restrictive behavior  Outcome: Progressing     Problem: GASTROINTESTINAL - ADULT  Goal: Maintains adequate nutritional intake  Description  INTERVENTIONS:  - Monitor percentage of each meal consumed  - Identify factors contributing to decreased intake, treat as appropriate  - Assist with meals as needed  - Monitor I&O, weight, and lab values if indicated  - Obtain nutrition services referral as needed  Outcome: Fadi Lopez has been awake, alert, and visible intermittently out in the milieu  Preoccupied at times with discharge  Ate 100% supper  Pt completed his daily ADL's today  Compliant with scheduled meds without prompting  Pt denies any depression, anxiety, si/hi, intent, plan, a/v hallucinations, and has not verbalized any delusions  Pt is disheveled in appearance and dressed in layers  Spends time watching tv in living room with peers  Attended and participated in evening group and had snack after  No behavioral issues  Continue to monitor/assess for any changes
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
## 2020-02-19 PROCEDURE — 99232 SBSQ HOSP IP/OBS MODERATE 35: CPT | Performed by: PSYCHIATRY & NEUROLOGY

## 2020-02-19 RX ADMIN — MAGNESIUM HYDROXIDE 30 ML: 400 SUSPENSION ORAL at 20:37

## 2020-02-19 RX ADMIN — OLANZAPINE 5 MG: 5 TABLET, FILM COATED ORAL at 11:30

## 2020-02-19 RX ADMIN — DIVALPROEX SODIUM 1000 MG: 500 TABLET, DELAYED RELEASE ORAL at 11:27

## 2020-02-19 RX ADMIN — OXYBUTYNIN CHLORIDE 5 MG: 5 TABLET, EXTENDED RELEASE ORAL at 08:03

## 2020-02-19 RX ADMIN — DIVALPROEX SODIUM 1000 MG: 500 TABLET, DELAYED RELEASE ORAL at 20:35

## 2020-02-19 RX ADMIN — DOCUSATE SODIUM 100 MG: 100 CAPSULE, LIQUID FILLED ORAL at 17:13

## 2020-02-19 RX ADMIN — CLOZAPINE 200 MG: 200 TABLET ORAL at 16:52

## 2020-02-19 RX ADMIN — METFORMIN HYDROCHLORIDE 500 MG: 500 TABLET ORAL at 16:52

## 2020-02-19 RX ADMIN — PANTOPRAZOLE SODIUM 40 MG: 40 TABLET, DELAYED RELEASE ORAL at 06:32

## 2020-02-19 RX ADMIN — DOCUSATE SODIUM 100 MG: 100 CAPSULE, LIQUID FILLED ORAL at 08:03

## 2020-02-19 RX ADMIN — METFORMIN HYDROCHLORIDE 500 MG: 500 TABLET ORAL at 08:03

## 2020-02-19 RX ADMIN — ATORVASTATIN CALCIUM 10 MG: 10 TABLET, FILM COATED ORAL at 16:52

## 2020-02-19 RX ADMIN — LEVOTHYROXINE SODIUM 75 MCG: 75 TABLET ORAL at 06:32

## 2020-02-19 NOTE — PLAN OF CARE
Problem: Alteration in Thoughts and Perception  Goal: Verbalize thoughts and feelings  Description  Interventions:  - Promote a nonjudgmental and trusting relationship with the patient through active listening and therapeutic communication  - Assess patient's level of functioning, behavior and potential for risk  - Engage patient in 1 on 1 interactions  - Encourage patient to express fears, feelings, frustrations, and discuss symptoms    - [place] patient to reality, help patient recognize reality-based thinking   - Administer medications as ordered and assess for potential side effects  - Provide the patient education related to the signs and symptoms of the illness and desired effects of prescribed medications  Outcome: Progressing  Goal: Agree to be compliant with medication regime, as prescribed and report medication side effects  Description  Interventions:  - Offer appropriate PRN medication and supervise ingestion; conduct AIMS, as needed   Outcome: Progressing  Goal: Attend and participate in unit activities, including therapeutic, recreational, and educational groups  Description  Interventions:  -Encourage Visitation and family involvement in care  Outcome: Progressing  Goal: Complete daily ADLs, including personal hygiene independently, as able  Description  Interventions:  - Observe, teach, and assist patient with ADLS  - Monitor and promote a balance of rest/activity, with adequate nutrition and elimination   Outcome: Chuy Cotton has been visible on the unit, compliant with his medications, meals and hygiene before leaving the unit to attend sharing life  No complaints of discomfort or preoccupation about discharge brought in conversation  Interactions remain appropriate and behaviors controlled  Awaiting his return back to the unit  Will continue to monitor
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
## 2020-02-19 NOTE — TREATMENT TEAM
02/19/20 1100   Activity/Group Checklist   Group   (IMR/Preparing for Discharge )   Attendance Attended   Attendance Duration (min) 46-60   Interactions Did not interact   Affect/Mood Appropriate   Goals Achieved Able to listen to others
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
## 2020-02-19 NOTE — TREATMENT TEAM
02/18/20 1500   Activity/Group Checklist   Group   (Recovery Workshop )   Attendance Attended   Attendance Duration (min) 46-60   Interactions Interacted appropriately   Affect/Mood Appropriate;Normal range   Goals Achieved Identified feelings; Able to listen to others; Able to engage in interactions; Able to self-disclose
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
## 2020-02-19 NOTE — PROGRESS NOTES
Individual has appeared to be sleeping comfortably throughout the night, no signs of distress noted  No issues or concerns expressed  Will continue to monitor

## 2020-02-19 NOTE — PROGRESS NOTES
02/19/20 0900   Team Meeting   Meeting Type Daily Rounds   Team Members Present   Team Members Present Physician;Nurse;; Other (Discipline and Name)   Physician Team Member Dr Bryant Contreras Team Member Lita Levy RN   Care Management Team Member Lily Walter   Other (Discipline and Name) Kathya Montalvo LCSW; MCKAYLA Crockett     Patient did have a small outburst yesterday due to being frustrated, but was redirectable  Good 3-11 shift  Slept

## 2020-02-20 LAB
OSMOLALITY SERPL: 277 MOSMOL/KG (ref 275–295)
VASOPRESSIN SERPL-MCNC: 0.9 PG/ML (ref 0–4.7)

## 2020-02-20 PROCEDURE — 99232 SBSQ HOSP IP/OBS MODERATE 35: CPT | Performed by: PSYCHIATRY & NEUROLOGY

## 2020-02-20 RX ADMIN — DIVALPROEX SODIUM 1000 MG: 500 TABLET, DELAYED RELEASE ORAL at 20:51

## 2020-02-20 RX ADMIN — CLOZAPINE 200 MG: 200 TABLET ORAL at 17:12

## 2020-02-20 RX ADMIN — PANTOPRAZOLE SODIUM 40 MG: 40 TABLET, DELAYED RELEASE ORAL at 07:08

## 2020-02-20 RX ADMIN — ATORVASTATIN CALCIUM 10 MG: 10 TABLET, FILM COATED ORAL at 17:13

## 2020-02-20 RX ADMIN — OXYBUTYNIN CHLORIDE 5 MG: 5 TABLET, EXTENDED RELEASE ORAL at 08:24

## 2020-02-20 RX ADMIN — DIVALPROEX SODIUM 1000 MG: 500 TABLET, DELAYED RELEASE ORAL at 12:40

## 2020-02-20 RX ADMIN — LEVOTHYROXINE SODIUM 75 MCG: 75 TABLET ORAL at 07:08

## 2020-02-20 RX ADMIN — METFORMIN HYDROCHLORIDE 500 MG: 500 TABLET ORAL at 08:24

## 2020-02-20 RX ADMIN — DOCUSATE SODIUM 100 MG: 100 CAPSULE, LIQUID FILLED ORAL at 08:24

## 2020-02-20 RX ADMIN — METFORMIN HYDROCHLORIDE 500 MG: 500 TABLET ORAL at 17:13

## 2020-02-20 RX ADMIN — OLANZAPINE 5 MG: 5 TABLET, FILM COATED ORAL at 13:01

## 2020-02-20 RX ADMIN — DOCUSATE SODIUM 100 MG: 100 CAPSULE, LIQUID FILLED ORAL at 17:13

## 2020-02-20 NOTE — PLAN OF CARE
Problem: Alteration in Thoughts and Perception  Goal: Verbalize thoughts and feelings  Description  Interventions:  - Promote a nonjudgmental and trusting relationship with the patient through active listening and therapeutic communication  - Assess patient's level of functioning, behavior and potential for risk  - Engage patient in 1 on 1 interactions  - Encourage patient to express fears, feelings, frustrations, and discuss symptoms    - [place] patient to reality, help patient recognize reality-based thinking   - Administer medications as ordered and assess for potential side effects  - Provide the patient education related to the signs and symptoms of the illness and desired effects of prescribed medications  Outcome: Progressing  Goal: Agree to be compliant with medication regime, as prescribed and report medication side effects  Description  Interventions:  - Offer appropriate PRN medication and supervise ingestion; conduct AIMS, as needed   Outcome: Progressing  Goal: Attend and participate in unit activities, including therapeutic, recreational, and educational groups  Description  Interventions:  -Encourage Visitation and family involvement in care  Outcome: Progressing     Problem: Ineffective Coping  Goal: Demonstrates healthy coping skills  Outcome: Progressing  Goal: Understands least restrictive measures  Description  Interventions:  - Utilize least restrictive behavior  Outcome: Progressing     Problem: GASTROINTESTINAL - ADULT  Goal: Maintains adequate nutritional intake  Description  INTERVENTIONS:  - Monitor percentage of each meal consumed  - Identify factors contributing to decreased intake, treat as appropriate  - Assist with meals as needed  - Monitor I&O, weight, and lab values if indicated  - Obtain nutrition services referral as needed  Outcome: Haley Mar has been awake, alert, and visible intermittently out in the milieu  Naps in room at intervals    Pt remains preoccupied with discharge  Disheveled in appearance and dressed in layers  Ate 100% supper  Compliant with all scheduled meds with some prompting  No behavioral issues  Continued to monitor/assess for any changes  Denies any depression, anxiety, si/hi, intent, plan, a/v hallucinations, and has not verbalized any delusions  Pt did not attend evening group but came out for snack after  Continue to monitor/assess for any changes

## 2020-02-20 NOTE — PLAN OF CARE
Problem: Alteration in Thoughts and Perception  Goal: Verbalize thoughts and feelings  Description  Interventions:  - Promote a nonjudgmental and trusting relationship with the patient through active listening and therapeutic communication  - Assess patient's level of functioning, behavior and potential for risk  - Engage patient in 1 on 1 interactions  - Encourage patient to express fears, feelings, frustrations, and discuss symptoms    - [place] patient to reality, help patient recognize reality-based thinking   - Administer medications as ordered and assess for potential side effects  - Provide the patient education related to the signs and symptoms of the illness and desired effects of prescribed medications  Outcome: Progressing  Goal: Refrain from acting on delusional thinking/internal stimuli  Description  Interventions:  - Monitor patient closely, per order   - Utilize least restrictive measures   - Set reasonable limits, give positive feedback for acceptable   - Administer medications as ordered and monitor of potential side effects  Outcome: Progressing  Goal: Agree to be compliant with medication regime, as prescribed and report medication side effects  Description  Interventions:  - Offer appropriate PRN medication and supervise ingestion; conduct AIMS, as needed   Outcome: Progressing   Patient is pleasant and mostly cooperative  He refused to get OOB for vitals, he did get OOB and eat breakfast but returned right back to bed and remained there until lunch  He did come for his medication with prompting needed  He continues to be obsessed with being discharged  Patient is disheveled and he wears layers of clothing  He was encouraged to shower however, he refused  Only attended fresh air group this shift  Continue to monitor

## 2020-02-20 NOTE — PLAN OF CARE
Problem: Alteration in Thoughts and Perception  Goal: Verbalize thoughts and feelings  Description  Interventions:  - Promote a nonjudgmental and trusting relationship with the patient through active listening and therapeutic communication  - Assess patient's level of functioning, behavior and potential for risk  - Engage patient in 1 on 1 interactions  - Encourage patient to express fears, feelings, frustrations, and discuss symptoms    - [place] patient to reality, help patient recognize reality-based thinking   - Administer medications as ordered and assess for potential side effects  - Provide the patient education related to the signs and symptoms of the illness and desired effects of prescribed medications  Outcome: Progressing  Goal: Agree to be compliant with medication regime, as prescribed and report medication side effects  Description  Interventions:  - Offer appropriate PRN medication and supervise ingestion; conduct AIMS, as needed   Outcome: Progressing  Goal: Attend and participate in unit activities, including therapeutic, recreational, and educational groups  Description  Interventions:  -Encourage Visitation and family involvement in care  Outcome: Progressing     Problem: Ineffective Coping  Goal: Demonstrates healthy coping skills  Outcome: Progressing  Goal: Understands least restrictive measures  Description  Interventions:  - Utilize least restrictive behavior  Outcome: Samm Alonzo has been awake alert, and visible intermittently out in the milieu  Pt went to [facility] with staff and peers  Ate 100% supper  Compliant with all scheduled meds without prompting  Pt remains disheveled in appearance and dressed in layers  Pr continues to be preoccupied with discharge  No behavioral issues  Attended and participated in Men's Group and Evening Group and had snack after  Continue to monitor/assess for any changes

## 2020-02-20 NOTE — PROGRESS NOTES
02/20/20 0900   Team Meeting   Meeting Type Daily Rounds   Team Members Present   Team Members Present Physician;Nurse;; Other (Discipline and Name)   Physician Team Member Dr Griffin Brock Team Member Felix Garza RN   Care Management Team Member Lily Albert   Other (Discipline and Name) Ada Gonzalez LCSW; MCKAYLA Bentley     Patient attended groups and [facility] yesterday  Controlled  Slept

## 2020-02-20 NOTE — TREATMENT TEAM
Patient declined      02/20/20 1100   Activity/Group Checklist   Group   (IMR/Barriers to [employer] )   Attendance Did not attend   Attendance Duration (min) 46-60   Affect/Mood GAEL
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
## 2020-02-20 NOTE — TREATMENT TEAM
02/19/20 1400   Activity/Group Checklist   Group   ([facility] )   Attendance Attended   Attendance Duration (min) 46-60   Interactions Interacted appropriately   Affect/Mood Appropriate;Normal range   Goals Achieved Identified feelings; Identified resources and support systems; Able to listen to others; Able to engage in interactions; Able to reflect/comment on own behavior;Able to self-disclose

## 2020-02-20 NOTE — TREATMENT TEAM
02/19/20 1130   Activity/Group Checklist   Group   ([facility] )   Attendance Attended   Attendance Duration (min) Greater than 60   Interactions Interacted appropriately   Affect/Mood Appropriate;Normal range;Bright   Goals Achieved Identified feelings; Increased hopefulness; Able to listen to others; Able to engage in interactions; Able to self-disclose; Able to reflect/comment on own behavior
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
## 2020-02-20 NOTE — NURSING NOTE
Received Peter in bed at change of shift with eyes closed; chest movement noted  Awake at 0005 requesting and given a snack  No behaviors  Maintained on q 15 min checks

## 2020-02-21 PROCEDURE — 99232 SBSQ HOSP IP/OBS MODERATE 35: CPT | Performed by: PSYCHIATRY & NEUROLOGY

## 2020-02-21 RX ADMIN — DOCUSATE SODIUM 100 MG: 100 CAPSULE, LIQUID FILLED ORAL at 17:00

## 2020-02-21 RX ADMIN — CLOZAPINE 200 MG: 200 TABLET ORAL at 16:57

## 2020-02-21 RX ADMIN — METFORMIN HYDROCHLORIDE 500 MG: 500 TABLET ORAL at 16:56

## 2020-02-21 RX ADMIN — LEVOTHYROXINE SODIUM 75 MCG: 75 TABLET ORAL at 06:04

## 2020-02-21 RX ADMIN — ATORVASTATIN CALCIUM 10 MG: 10 TABLET, FILM COATED ORAL at 16:57

## 2020-02-21 RX ADMIN — PANTOPRAZOLE SODIUM 40 MG: 40 TABLET, DELAYED RELEASE ORAL at 06:04

## 2020-02-21 RX ADMIN — DIVALPROEX SODIUM 1000 MG: 500 TABLET, DELAYED RELEASE ORAL at 20:47
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
## 2020-02-21 NOTE — PLAN OF CARE
Problem: Alteration in Thoughts and Perception  Goal: Agree to be compliant with medication regime, as prescribed and report medication side effects  Description  Interventions:  - Offer appropriate PRN medication and supervise ingestion; conduct AIMS, as needed   Outcome: Not Progressing  Goal: Attend and participate in unit activities, including therapeutic, recreational, and educational groups  Description  Interventions:  -Encourage Visitation and family involvement in care  Outcome: Not Progressing     Tony Ohara remained agitated and dismissive towards staff, choosing to isolate to himself in his room  After lunch he declined his afternoon medications after declining redirection from this writer  Maintains preoccupied with discharge and being off the unit even though he has been unable to control his behaviors
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
## 2020-02-21 NOTE — PROGRESS NOTES
02/21/20 0800   Team Meeting   Meeting Type Daily Rounds   Team Members Present   Team Members Present Physician;Nurse;; Other (Discipline and Name)   Physician Team Member Dr Alexandro Oleary Team Member Magen Handy RN   Care Management Team Member Lily Helms   Other (Discipline and Name) Shay Malloy LCSW     Patient went to fresh air only  Controlled  Slept

## 2020-02-21 NOTE — PROGRESS NOTES
02/21/20 1400   Team Meeting   Meeting Type Tx Team Meeting   Initial Conference Date 02/21/20   Next Conference Date 02/24/20   Team Members Present   Team Members Present Physician;Nurse;; Other (Discipline and Name)   Physician Team Member Dr Reinaldo Hawkins Team Member Martina Pickens, RN   Care Management Team Member Lily Clark   Other (Discipline and Name) Danis Cordero LCSW   Patient/Family Present   Patient Present Yes   Patient's Family Present No     Treatment team met with patient this morning to complete a 30 day treatment plan review with patient  Patient expressed understanding of the treatment plan was focused only on when he will be discharged and where he will be living  Patient signed the document but declined a copy of the treatment plan  Patient's next treatment team meeting will be Monday, February 24, 2020  Team and patient completed risk assessment and the patient did not verbalize any desire to elope from the program  Patient verbalized understanding of consequences of eloping from treatment while on a commitment  Patient verbalized no further questions or concerns at the conclusion of the meeting

## 2020-02-21 NOTE — PROGRESS NOTES
02/21/20 1100   Activity/Group Checklist   Group Other (Comment)  (IMR - Weekly Goal Review)   Attendance Did not attend     Patient did not attend IMR - Weekly Goal Review  Patient walked in 10 minutes after the group had started, stood in the middle of the room and complained to therapist about how he has not been able to go to [facility]  Therapist informed patient that his behavior was not appropriate, patient agreed, and he sat down  Patient arose 30 seconds later and left the room  Moments later he was knocking on the door to come back in, but was informed by therapist that he would be able to receive credit today  Patient walked away  Patient continues to demonstrate impulsive behavior by interrupting when others are speaking, and having minimal awareness of what is happening around him

## 2020-02-22 RX ADMIN — OLANZAPINE 5 MG: 5 TABLET, FILM COATED ORAL at 13:14

## 2020-02-22 RX ADMIN — DIVALPROEX SODIUM 1000 MG: 500 TABLET, DELAYED RELEASE ORAL at 12:33

## 2020-02-22 RX ADMIN — OXYBUTYNIN CHLORIDE 5 MG: 5 TABLET, EXTENDED RELEASE ORAL at 10:18

## 2020-02-22 RX ADMIN — DIVALPROEX SODIUM 1000 MG: 500 TABLET, DELAYED RELEASE ORAL at 20:44

## 2020-02-22 RX ADMIN — METFORMIN HYDROCHLORIDE 500 MG: 500 TABLET ORAL at 10:18

## 2020-02-22 RX ADMIN — PANTOPRAZOLE SODIUM 40 MG: 40 TABLET, DELAYED RELEASE ORAL at 06:01

## 2020-02-22 RX ADMIN — DOCUSATE SODIUM 100 MG: 100 CAPSULE, LIQUID FILLED ORAL at 17:21

## 2020-02-22 RX ADMIN — METFORMIN HYDROCHLORIDE 500 MG: 500 TABLET ORAL at 16:29

## 2020-02-22 RX ADMIN — DOCUSATE SODIUM 100 MG: 100 CAPSULE, LIQUID FILLED ORAL at 10:18

## 2020-02-22 RX ADMIN — LEVOTHYROXINE SODIUM 75 MCG: 75 TABLET ORAL at 06:01

## 2020-02-22 RX ADMIN — ATORVASTATIN CALCIUM 10 MG: 10 TABLET, FILM COATED ORAL at 16:29

## 2020-02-22 RX ADMIN — CLOZAPINE 200 MG: 200 TABLET ORAL at 16:29

## 2020-02-22 NOTE — NURSING NOTE
Pt refused vital signs and breakfast   Accepted 0900 meds with several prompts  Pt denied anxiety, depression, SI and hallucinations  Irritable, disheveled and isolative to room  Monitored for safety and support

## 2020-02-22 NOTE — PLAN OF CARE
Problem: Alteration in Thoughts and Perception  Goal: Verbalize thoughts and feelings  Description  Interventions:  - Promote a nonjudgmental and trusting relationship with the patient through active listening and therapeutic communication  - Assess patient's level of functioning, behavior and potential for risk  - Engage patient in 1 on 1 interactions  - Encourage patient to express fears, feelings, frustrations, and discuss symptoms    - [place] patient to reality, help patient recognize reality-based thinking   - Administer medications as ordered and assess for potential side effects  - Provide the patient education related to the signs and symptoms of the illness and desired effects of prescribed medications  Outcome: Progressing  Goal: Agree to be compliant with medication regime, as prescribed and report medication side effects  Description  Interventions:  - Offer appropriate PRN medication and supervise ingestion; conduct AIMS, as needed   Outcome: Progressing     Problem: Alteration in Thoughts and Perception  Goal: Recognize dysfunctional thoughts, communicate reality-based thoughts at the time of discharge  Description  Interventions:  - Provide medication and psycho-education to assist patient in compliance and developing insight into his/her illness   Outcome: Yeison Edwardo Delaney was initially isolative to self/room start of shift reported from previous nurse , pt required several prompts for medication and am were delivered bedside  Individual was encouraged but did not attend group  11:00 pt has been visible on unit and was observed socializing with peers while listening to music  PT was compliant with afternoon medications with prompting  While engaged in conversation with this writer pt stated"I am mad because I wasn't to get out of here already"   During the conversation this writer made pt aware of goals he needs to accomplish to help with d/c and to talk with treatment team  Pt denied thoughts/feelings of self harm and denied anxiety at this time and was encouraged to seek staff as needed  No current behavioral concerns at this time  Will continue to monitor
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
## 2020-02-22 NOTE — PLAN OF CARE
Problem: Alteration in Thoughts and Perception  Goal: Agree to be compliant with medication regime, as prescribed and report medication side effects  Description  Interventions:  - Offer appropriate PRN medication and supervise ingestion; conduct AIMS, as needed   Outcome: Progressing  Goal: Attend and participate in unit activities, including therapeutic, recreational, and educational groups  Description  Interventions:  -Encourage Visitation and family involvement in care  Outcome: Progressing  Goal: Complete daily ADLs, including personal hygiene independently, as able  Description  Interventions:  - Observe, teach, and assist patient with ADLS  - Monitor and promote a balance of rest/activity, with adequate nutrition and elimination   Outcome: Progressing     Pt visible on unit, social with select peers and staff  Pt still remains disheveled, dressed in layers  Pt compliant with meds and meals without prompting  Ate 100% of dinner and had snack  Pt attended group  No issues or outbursts  Will continue to monitor

## 2020-02-23 RX ADMIN — OXYBUTYNIN CHLORIDE 5 MG: 5 TABLET, EXTENDED RELEASE ORAL at 08:41

## 2020-02-23 RX ADMIN — DIVALPROEX SODIUM 1000 MG: 500 TABLET, DELAYED RELEASE ORAL at 12:54

## 2020-02-23 RX ADMIN — LEVOTHYROXINE SODIUM 75 MCG: 75 TABLET ORAL at 05:51

## 2020-02-23 RX ADMIN — CLOZAPINE 200 MG: 200 TABLET ORAL at 16:57

## 2020-02-23 RX ADMIN — PANTOPRAZOLE SODIUM 40 MG: 40 TABLET, DELAYED RELEASE ORAL at 05:51

## 2020-02-23 RX ADMIN — OLANZAPINE 5 MG: 5 TABLET, FILM COATED ORAL at 12:54

## 2020-02-23 RX ADMIN — DIVALPROEX SODIUM 1000 MG: 500 TABLET, DELAYED RELEASE ORAL at 20:47

## 2020-02-23 RX ADMIN — DOCUSATE SODIUM 100 MG: 100 CAPSULE, LIQUID FILLED ORAL at 08:41

## 2020-02-23 RX ADMIN — METFORMIN HYDROCHLORIDE 500 MG: 500 TABLET ORAL at 16:56

## 2020-02-23 RX ADMIN — ATORVASTATIN CALCIUM 10 MG: 10 TABLET, FILM COATED ORAL at 16:56

## 2020-02-23 RX ADMIN — METFORMIN HYDROCHLORIDE 500 MG: 500 TABLET ORAL at 08:40

## 2020-02-23 RX ADMIN — DOCUSATE SODIUM 100 MG: 100 CAPSULE, LIQUID FILLED ORAL at 17:09

## 2020-02-23 NOTE — NURSING NOTE
Patient visible on unit at times; med and meal compliant  Poor hygiene, disheveled, stated ready to be discharged and called the hospital  requesting for physician on staff, wants to request pass to clubhouse Tuesday;  called unit to inform  Patient was told he will be able to discuss concerns and clubhouse pass on Monday with physician; patient agreeable  Behaviors controlled, argumentative at times upon approach, redirectable  Patient has no group attendance and lacks motivation  Will continue to monitor
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
## 2020-02-23 NOTE — PROGRESS NOTES
Psychiatry Progress Note    Subjective: Interval History     Patient is lying in bed this morning  He is very irritable  Patient states he wants to get out of the hospital   He did have an outburst last night and staff states he was making racial slurs  He did sleep last evening  He is denying hallucinations and depression  Staff reports he has been isolated to have to his room  He did not attend evening group  He has been compliant with medications with some prompting      Behavior over the last 24 hours:  unchanged  Sleep: normal  Appetite: normal  Medication side effects: No  ROS: no complaints    Current medications:    Current Facility-Administered Medications:     acetaminophen (TYLENOL) tablet 325 mg, 325 mg, Oral, Q6H PRN, Jone Damian MD    acetaminophen (TYLENOL) tablet 650 mg, 650 mg, Oral, Q6H PRN, Jone Damian MD    acetaminophen (TYLENOL) tablet 975 mg, 975 mg, Oral, Q8H PRN, Jone Damian MD    aluminum-magnesium hydroxide-simethicone (MYLANTA) 200-200-20 mg/5 mL oral suspension 30 mL, 30 mL, Oral, Q4H PRN, Jone Damian MD, 30 mL at 01/24/20 2352    atorvastatin (LIPITOR) tablet 10 mg, 10 mg, Oral, Daily With Ovidio Watt MD, 10 mg at 02/22/20 1629    benztropine (COGENTIN) injection 1 mg, 1 mg, Intramuscular, Q6H PRN, Jone Damian MD    benztropine (COGENTIN) tablet 1 mg, 1 mg, Oral, Q6H PRN, Jone Damian MD    clozapine (CLOZARIL) tablet 200 mg, 200 mg, Oral, Daily, Jone Damian MD, 200 mg at 02/22/20 1629    divalproex sodium (DEPAKOTE) EC tablet 1,000 mg, 1,000 mg, Oral, BID, Jone Damian MD, 1,000 mg at 02/22/20 2044    docusate sodium (COLACE) capsule 100 mg, 100 mg, Oral, BID, Jone Damian MD, 100 mg at 02/23/20 0841    haloperidol (HALDOL) tablet 5 mg, 5 mg, Oral, Q6H PRN, Jone Damian MD    haloperidol lactate (HALDOL) injection 5 mg, 5 mg, Intramuscular, Q6H PRN, Jone Damian MD    levothyroxine tablet 75 mcg, 75 mcg, Oral, Early Morning, Jone Damian MD, 75 mcg at 02/23/20 0551    LORazepam (ATIVAN) 2 mg/mL injection 1 mg, 1 mg, Intramuscular, Q6H PRN, Ranjan George MD    LORazepam (ATIVAN) tablet 1 mg, 1 mg, Oral, Q6H PRN, Ranjan George MD    magnesium hydroxide (MILK OF MAGNESIA) 400 mg/5 mL oral suspension 30 mL, 30 mL, Oral, Daily PRN, Ranjan George MD, 30 mL at 02/19/20 2037    metFORMIN (GLUCOPHAGE) tablet 500 mg, 500 mg, Oral, BID With Meals, Ranjan George MD, 500 mg at 02/23/20 0840    nicotine polacrilex (NICORETTE) gum 2 mg, 2 mg, Oral, Q2H PRN, Ranjan George MD    OLANZapine (ZyPREXA) tablet 5 mg, 5 mg, Oral, After Lunch, Ranjan George MD, 5 mg at 02/22/20 1314    oxybutynin (DITROPAN-XL) 24 hr tablet 5 mg, 5 mg, Oral, Daily, Ranjan George MD, 5 mg at 02/23/20 0841    pantoprazole (PROTONIX) EC tablet 40 mg, 40 mg, Oral, Early Morning, Ranjan George MD, 40 mg at 02/23/20 0551    traZODone (DESYREL) tablet 50 mg, 50 mg, Oral, HS PRN, Ranjan George MD, 50 mg at 02/14/20 5732    Current Problem List:    Patient Active Problem List   Diagnosis    Encephalopathy acute    Bipolar 1 disorder ([facility] )    Schizoaffective disorder, bipolar type ([facility] )    Constipation, chronic    Hyperammonemia (HCC)    Type 2 diabetes mellitus without complication, without long-term current use of insulin (HCC)    Tracheobronchitis    Streptococcus pneumoniae    Chronic airway obstruction, not elsewhere classified    Benign essential hypertension    Disorder of lipoprotein and lipid metabolism    Plantar fasciitis    Foot callus    Gastroesophageal reflux disease without esophagitis    GI bleed    Hyponatremia    Acquired hypothyroidism    Ischemic colitis ([address] )    Moderate cigarette smoker (10-19 per day)    Morbid obesity (HCC)    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    BMI 34 0-34 9,adult    Nail abnormality    Encounter for well adult exam with abnormal findings    Wheezing on right side of chest on exhalation    Tobacco abuse       Problem list reviewed 02/23/20     Objective:     Vital Signs:  Vitals:    02/18/20 0700 02/19/20 0730 02/22/20 0121 02/23/20 0700   BP:  120/78 113/72 111/71   BP Location:  Left arm Left arm Right arm   Pulse:  94 85 86   Resp: 18 19 20 19   Temp:   97 6 °F (36 4 °C) 97 8 °F (36 6 °C)   TempSrc:  Temporal Temporal    Weight:    101 kg (222 lb 3 2 oz)   Height:             Appearance:  age appropriate, casually dressed and overweight   Behavior:  evasive and guarded   Speech:  loud   Mood:  angry, irritable and labile   Affect:  labile   Thought Process:  circumstantial   Thought Content:  delusions  persecutory   Perceptual Disturbances: None   Risk Potential: Potential for Aggression Yes yes   Sensorium:  person, place and time   Cognition:  grossly intact   Consciousness:  alert and awake    Attention: attention span and concentration were age appropriate   Intellect: average   Insight:  limited   Judgment: limited      Motor Activity: no abnormal movements       I/O Past 24 hours:  No intake/output data recorded  No intake/output data recorded  Labs:  Reviewed 02/23/20    Progress Toward Goals:  Unchanged    Assessment / Plan:     Schizoaffective disorder, bipolar type ([facility] [address] )    Recommended Treatment:      Medication changes:  1) continue current medication regimen    Non-pharmacological treatments  1) Continue with group therapy, milieu therapy and occupational therapy  Safety  1) Safety/communication plan established targeting dynamic risk factors above  2) Risks, benefits, and possible side effects of medications explained to patient and patient verbalizes understanding  Counseling / Coordination of Care    Total floor / unit time spent today 20 minutes  Greater than 50% of total time was spent with the patient and / or family counseling and / or coordination of care  A description of the counseling / coordination of care       Patient's Rights, confidentiality and exceptions to confidentiality, use of automated medical record, [address] [place] [address] staff access to medical record, and consent to treatment reviewed      Joelle Tolentino PA-C

## 2020-02-23 NOTE — PROGRESS NOTES
Luis maintained on ongoing SAFE precaution wihtout incident on this shift  Laying in bed with eyes closed, breath even and unlabored  Q 15 minutes rounding continues  No behavioral noted  Will continue to monitor

## 2020-02-24 PROCEDURE — 99232 SBSQ HOSP IP/OBS MODERATE 35: CPT | Performed by: PSYCHIATRY & NEUROLOGY

## 2020-02-24 RX ADMIN — ATORVASTATIN CALCIUM 10 MG: 10 TABLET, FILM COATED ORAL at 16:44

## 2020-02-24 RX ADMIN — PANTOPRAZOLE SODIUM 40 MG: 40 TABLET, DELAYED RELEASE ORAL at 06:04

## 2020-02-24 RX ADMIN — DIVALPROEX SODIUM 1000 MG: 500 TABLET, DELAYED RELEASE ORAL at 20:40

## 2020-02-24 RX ADMIN — DIVALPROEX SODIUM 1000 MG: 500 TABLET, DELAYED RELEASE ORAL at 13:00

## 2020-02-24 RX ADMIN — METFORMIN HYDROCHLORIDE 500 MG: 500 TABLET ORAL at 16:44

## 2020-02-24 RX ADMIN — DOCUSATE SODIUM 100 MG: 100 CAPSULE, LIQUID FILLED ORAL at 17:08

## 2020-02-24 RX ADMIN — OLANZAPINE 5 MG: 5 TABLET, FILM COATED ORAL at 13:00

## 2020-02-24 RX ADMIN — CLOZAPINE 200 MG: 200 TABLET ORAL at 16:44

## 2020-02-24 RX ADMIN — LEVOTHYROXINE SODIUM 75 MCG: 75 TABLET ORAL at 06:04

## 2020-02-24 NOTE — TREATMENT TEAM
02/24/20 1100   Activity/Group Checklist   Group   (IMR/Internal Barriers to Recovery )   Attendance Did not attend   Attendance Duration (min) 46-60   Affect/Mood GAEL

## 2020-02-24 NOTE — PLAN OF CARE
Problem: Alteration in Thoughts and Perception  Goal: Attend and participate in unit activities, including therapeutic, recreational, and educational groups  Description  Interventions:  -Encourage Visitation and family involvement in care  Outcome: Progressing   Patient has had great improvement in group attendance and is currently at 91%  Patient did attend [employer] event with writer and other peers and did well, was redirected when needed but without defiance  Patient also attended Recovery Anonymous without issues or concerns  Patient does not engage in group settings however, does remain attentive  Patient often focused on discharge and repetitive in his questions often becoming upset when not given the answers he wants which prompts him to staff split  Patient encouraged to attend groups and practice patience

## 2020-02-24 NOTE — PLAN OF CARE
Problem: Alteration in Thoughts and Perception  Goal: Attend and participate in unit activities, including therapeutic, recreational, and educational groups  Description  Interventions:  -Encourage Visitation and family involvement in care  2/24/2020 1527 by Leonor Pratt  Outcome: Not Progressing   Patient did not complete Goal Card for the week of 2/24/2020

## 2020-02-24 NOTE — PROGRESS NOTES
02/24/20 0800   Team Meeting   Meeting Type Daily Rounds   Team Members Present   Team Members Present Physician;Nurse;; Other (Discipline and Name)   Physician Team Member Dr Kalee Hadley Team Member Jannet Alfaro RN   Care Management Team Member Lily Solomon   Other (Discipline and Name) Karlo Guido LCSW     Patient presents disorganized and disheveled  Slept
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
## 2020-02-24 NOTE — PROGRESS NOTES
Maryan Guillen maintained on ongoing SAFE precaution wihtout incident on this shift  Laying in bed with eyes closed, breath even and unlabored  Q 15 minutes rounding continues  No behavioral noted  Will continue to monitor

## 2020-02-24 NOTE — PLAN OF CARE
Problem: Alteration in Thoughts and Perception  Goal: Verbalize thoughts and feelings  Description  Interventions:  - Promote a nonjudgmental and trusting relationship with the patient through active listening and therapeutic communication  - Assess patient's level of functioning, behavior and potential for risk  - Engage patient in 1 on 1 interactions  - Encourage patient to express fears, feelings, frustrations, and discuss symptoms    - [place] patient to reality, help patient recognize reality-based thinking   - Administer medications as ordered and assess for potential side effects  - Provide the patient education related to the signs and symptoms of the illness and desired effects of prescribed medications  Outcome: Progressing  Goal: Agree to be compliant with medication regime, as prescribed and report medication side effects  Description  Interventions:  - Offer appropriate PRN medication and supervise ingestion; conduct AIMS, as needed   Outcome: Progressing  Goal: Attend and participate in unit activities, including therapeutic, recreational, and educational groups  Description  Interventions:  -Encourage Visitation and family involvement in care  Outcome: Progressing  Goal: Complete daily ADLs, including personal hygiene independently, as able  Description  Interventions:  - Observe, teach, and assist patient with ADLS  - Monitor and promote a balance of rest/activity, with adequate nutrition and elimination   Outcome: Progressing     Problem: Ineffective Coping  Goal: Demonstrates healthy coping skills  Outcome: Progressing  Goal: Understands least restrictive measures  Description  Interventions:  - Utilize least restrictive behavior  Outcome: Progressing     Problem: GASTROINTESTINAL - ADULT  Goal: Maintains adequate nutritional intake  Description  INTERVENTIONS:  - Monitor percentage of each meal consumed  - Identify factors contributing to decreased intake, treat as appropriate  - Assist with meals as needed  - Monitor I&O, weight, and lab values if indicated  - Obtain nutrition services referral as needed  Outcome: Ramy Bryant has been awake, alert, and visible intermittently out in the milieu  Pt showered this shift  Brother visited with good interaction noted  Ate 100%s upper  Compliant with all scheduled meds without prompting  Social with peers and enjoys listening to music on radio in [employer] with them  Dressed in layers  Attended and participated in evening group and had snack after  Remains preoccupied with discharge  No behavioral issues this shift  Continue to monitor/assess for any changes

## 2020-02-24 NOTE — PLAN OF CARE
Problem: Alteration in Thoughts and Perception  Goal: Verbalize thoughts and feelings  Description  Interventions:  - Promote a nonjudgmental and trusting relationship with the patient through active listening and therapeutic communication  - Assess patient's level of functioning, behavior and potential for risk  - Engage patient in 1 on 1 interactions  - Encourage patient to express fears, feelings, frustrations, and discuss symptoms    - [place] patient to reality, help patient recognize reality-based thinking   - Administer medications as ordered and assess for potential side effects  - Provide the patient education related to the signs and symptoms of the illness and desired effects of prescribed medications  Outcome: Progressing     Problem: Ineffective Coping  Goal: Patient/Family participate in treatment and DC plans  Description  Interventions:  - Provide therapeutic environment  Outcome: Progressing  Goal: Patient/Family verbalizes awareness of resources  Outcome: Progressing  Goal: Understands least restrictive measures  Description  Interventions:  - Utilize least restrictive behavior  Outcome: Progressing     Problem: GASTROINTESTINAL - ADULT  Goal: Minimal or absence of nausea and/or vomiting  Description  INTERVENTIONS:  - Administer IV fluids if ordered to ensure adequate hydration  - Maintain NPO status until nausea and vomiting are resolved  - Nasogastric tube if ordered  - Administer ordered antiemetic medications as needed  - Provide nonpharmacologic comfort measures as appropriate  - Advance diet as tolerated, if ordered  - Consider nutrition services referral to assist patient with adequate nutrition and appropriate food choices  Outcome: Progressing  Goal: Maintains adequate nutritional intake  Description  INTERVENTIONS:  - Monitor percentage of each meal consumed  - Identify factors contributing to decreased intake, treat as appropriate  - Assist with meals as needed  - Monitor I&O, weight, and lab values if indicated  - Obtain nutrition services referral as needed  Outcome: Progressing     Problem: Alteration in Thoughts and Perception  Goal: Treatment Goal: Gain control of psychotic behaviors/thinking, reduce/eliminate presenting symptoms and demonstrate improved reality functioning upon discharge  Outcome: Not Progressing  Goal: Refrain from acting on delusional thinking/internal stimuli  Description  Interventions:  - Monitor patient closely, per order   - Utilize least restrictive measures   - Set reasonable limits, give positive feedback for acceptable   - Administer medications as ordered and monitor of potential side effects  Outcome: Not Progressing  Goal: Agree to be compliant with medication regime, as prescribed and report medication side effects  Description  Interventions:  - Offer appropriate PRN medication and supervise ingestion; conduct AIMS, as needed   Outcome: Not Progressing  Goal: Attend and participate in unit activities, including therapeutic, recreational, and educational groups  Description  Interventions:  -Encourage Visitation and family involvement in care  Outcome: Not Progressing  Goal: Recognize dysfunctional thoughts, communicate reality-based thoughts at the time of discharge  Description  Interventions:  - Provide medication and psycho-education to assist patient in compliance and developing insight into his/her illness   Outcome: Not Progressing   Mostly isolative to his room during the morning, refused vitals and morning meds, did not attend any offered groups  Disheveled in appearance, wearing multiple layers  Disorganized in thought pattern  Remains focused on discharge  Continues to lack insight into his illness, became upset during treatment team and unable to understand why he is not able to go to [facility] yet  Has been out of his room and visible in the milieu since lunch time and came for his afternoon meds with prompting  Ate 100% of both meals   No other behaviors or issues noted  Continue to monitor
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
## 2020-02-25 PROCEDURE — 99232 SBSQ HOSP IP/OBS MODERATE 35: CPT | Performed by: PSYCHIATRY & NEUROLOGY

## 2020-02-25 RX ADMIN — DOCUSATE SODIUM 100 MG: 100 CAPSULE, LIQUID FILLED ORAL at 17:04

## 2020-02-25 RX ADMIN — OLANZAPINE 5 MG: 5 TABLET, FILM COATED ORAL at 13:36

## 2020-02-25 RX ADMIN — DOCUSATE SODIUM 100 MG: 100 CAPSULE, LIQUID FILLED ORAL at 08:32

## 2020-02-25 RX ADMIN — ATORVASTATIN CALCIUM 10 MG: 10 TABLET, FILM COATED ORAL at 17:04

## 2020-02-25 RX ADMIN — METFORMIN HYDROCHLORIDE 500 MG: 500 TABLET ORAL at 08:32

## 2020-02-25 RX ADMIN — DIVALPROEX SODIUM 1000 MG: 500 TABLET, DELAYED RELEASE ORAL at 12:18

## 2020-02-25 RX ADMIN — OXYBUTYNIN CHLORIDE 5 MG: 5 TABLET, EXTENDED RELEASE ORAL at 08:32

## 2020-02-25 RX ADMIN — CLOZAPINE 200 MG: 200 TABLET ORAL at 17:04

## 2020-02-25 RX ADMIN — DIVALPROEX SODIUM 1000 MG: 500 TABLET, DELAYED RELEASE ORAL at 20:34

## 2020-02-25 RX ADMIN — METFORMIN HYDROCHLORIDE 500 MG: 500 TABLET ORAL at 17:04

## 2020-02-25 RX ADMIN — LEVOTHYROXINE SODIUM 75 MCG: 75 TABLET ORAL at 06:14

## 2020-02-25 RX ADMIN — PANTOPRAZOLE SODIUM 40 MG: 40 TABLET, DELAYED RELEASE ORAL at 06:14
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
## 2020-02-25 NOTE — PROGRESS NOTES
02/25/20 0900   Team Meeting   Meeting Type Daily Rounds   Team Members Present   Team Members Present Physician;Nurse;; Other (Discipline and Name)   Physician Team Member Dr Margy Real Team Member Brannon Newberry RN   Care Management Team Member Lily Hess   Other (Discipline and Name) Mirna Lang LCSW     Patient is irritable at times  Went to select groups  Didn't take AM meds until after lunch  Had a visit with his big brother which went well  Slept

## 2020-02-25 NOTE — PLAN OF CARE
Problem: Alteration in Thoughts and Perception  Goal: Verbalize thoughts and feelings  Description  Interventions:  - Promote a nonjudgmental and trusting relationship with the patient through active listening and therapeutic communication  - Assess patient's level of functioning, behavior and potential for risk  - Engage patient in 1 on 1 interactions  - Encourage patient to express fears, feelings, frustrations, and discuss symptoms    - [place] patient to reality, help patient recognize reality-based thinking   - Administer medications as ordered and assess for potential side effects  - Provide the patient education related to the signs and symptoms of the illness and desired effects of prescribed medications  Outcome: Progressing  Goal: Agree to be compliant with medication regime, as prescribed and report medication side effects  Description  Interventions:  - Offer appropriate PRN medication and supervise ingestion; conduct AIMS, as needed   Outcome: Progressing  Goal: Attend and participate in unit activities, including therapeutic, recreational, and educational groups  Description  Interventions:  -Encourage Visitation and family involvement in care  Outcome: Progressing  Goal: Complete daily ADLs, including personal hygiene independently, as able  Description  Interventions:  - Observe, teach, and assist patient with ADLS  - Monitor and promote a balance of rest/activity, with adequate nutrition and elimination   Outcome: Progressing     Problem: Ineffective Coping  Goal: Demonstrates healthy coping skills  Outcome: Progressing  Goal: Understands least restrictive measures  Description  Interventions:  - Utilize least restrictive behavior  Outcome: Progressing     Problem: GASTROINTESTINAL - ADULT  Goal: Maintains adequate nutritional intake  Description  INTERVENTIONS:  - Monitor percentage of each meal consumed  - Identify factors contributing to decreased intake, treat as appropriate  - Assist with meals as needed  - Monitor I&O, weight, and lab values if indicated  - Obtain nutrition services referral as needed  Outcome: Patti Vann has been awake, alert, and visible intermittently out in the milieu  Ate 100% supper  Pt was visited by his Big Brother with good interaction noted  Pt continues to be preoccupied with discharge  Compliant with all scheduled meds with little prompting  Social with select peers and sits in [place] [id number] and dining room at times  Pt completed his daily ADL's today  No behavioral issues  Attended and participated in evening group and had snack after  Continue to monitor/assess for any changes

## 2020-02-25 NOTE — PLAN OF CARE
Problem: Alteration in Thoughts and Perception  Goal: Verbalize thoughts and feelings  Description  Interventions:  - Promote a nonjudgmental and trusting relationship with the patient through active listening and therapeutic communication  - Assess patient's level of functioning, behavior and potential for risk  - Engage patient in 1 on 1 interactions  - Encourage patient to express fears, feelings, frustrations, and discuss symptoms    - [place] patient to reality, help patient recognize reality-based thinking   - Administer medications as ordered and assess for potential side effects  - Provide the patient education related to the signs and symptoms of the illness and desired effects of prescribed medications  Outcome: Progressing  Goal: Agree to be compliant with medication regime, as prescribed and report medication side effects  Description  Interventions:  - Offer appropriate PRN medication and supervise ingestion; conduct AIMS, as needed   Outcome: Progressing  Goal: Complete daily ADLs, including personal hygiene independently, as able  Description  Interventions:  - Observe, teach, and assist patient with ADLS  - Monitor and promote a balance of rest/activity, with adequate nutrition and elimination   Outcome: Yeison Black Courser has been isolative to his room for the majority of the shift, blunted in affect and remains preoccupied with his discharge plans  Refused vital signs this morning but was compliant with his morning and afternoon medications along with their subsequent meals  At times he will joke with the staff and then switch the topic, verbalizing "when am I going to be getting out of here", this writer encourages Clive Taina to attend groups, maintain his hygiene and be more visible on the unit  Behaviors controlled at this time  Will continue to monitor for changes in behavior

## 2020-02-25 NOTE — TREATMENT TEAM
02/25/20 1100   Activity/Group Checklist   Group Other (Comment)  (IMR)   Attendance Did not attend     Patient did not attend group  Patient was encouraged to attend, but declined
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
## 2020-02-25 NOTE — TREATMENT TEAM
Not scheduled to attend      02/25/20 1430   Activity/Group Checklist   Group   (Community Progarmming Wrap Up )   Attendance Did not attend   Attendance Duration (min) 16-30   Affect/Mood GAEL

## 2020-02-26 PROCEDURE — 99232 SBSQ HOSP IP/OBS MODERATE 35: CPT | Performed by: PSYCHIATRY & NEUROLOGY

## 2020-02-26 RX ADMIN — TRAZODONE HYDROCHLORIDE 50 MG: 50 TABLET ORAL at 21:39

## 2020-02-26 RX ADMIN — DOCUSATE SODIUM 100 MG: 100 CAPSULE, LIQUID FILLED ORAL at 09:12

## 2020-02-26 RX ADMIN — OXYBUTYNIN CHLORIDE 5 MG: 5 TABLET, EXTENDED RELEASE ORAL at 09:12

## 2020-02-26 RX ADMIN — METFORMIN HYDROCHLORIDE 500 MG: 500 TABLET ORAL at 09:12

## 2020-02-26 RX ADMIN — NICOTINE POLACRILEX 2 MG: 2 GUM, CHEWING ORAL at 18:18

## 2020-02-26 RX ADMIN — PANTOPRAZOLE SODIUM 40 MG: 40 TABLET, DELAYED RELEASE ORAL at 06:00

## 2020-02-26 RX ADMIN — DIVALPROEX SODIUM 1000 MG: 500 TABLET, DELAYED RELEASE ORAL at 20:53

## 2020-02-26 RX ADMIN — ATORVASTATIN CALCIUM 10 MG: 10 TABLET, FILM COATED ORAL at 16:59

## 2020-02-26 RX ADMIN — DOCUSATE SODIUM 100 MG: 100 CAPSULE, LIQUID FILLED ORAL at 17:17

## 2020-02-26 RX ADMIN — DIVALPROEX SODIUM 1000 MG: 500 TABLET, DELAYED RELEASE ORAL at 13:00

## 2020-02-26 RX ADMIN — METFORMIN HYDROCHLORIDE 500 MG: 500 TABLET ORAL at 16:59

## 2020-02-26 RX ADMIN — TRAZODONE HYDROCHLORIDE 50 MG: 50 TABLET ORAL at 21:40

## 2020-02-26 RX ADMIN — LEVOTHYROXINE SODIUM 75 MCG: 75 TABLET ORAL at 06:00

## 2020-02-26 RX ADMIN — OLANZAPINE 5 MG: 5 TABLET, FILM COATED ORAL at 13:00

## 2020-02-26 RX ADMIN — CLOZAPINE 200 MG: 200 TABLET ORAL at 16:59

## 2020-02-26 NOTE — TREATMENT TEAM
02/26/20 1100   Activity/Group Checklist   Group   (IMR/Personal Barriers to Recovery )   Attendance Did not attend   Attendance Duration (min) 46-60   Affect/Mood GAEL

## 2020-02-26 NOTE — TREATMENT TEAM
02/25/20 1500   Activity/Group Checklist   Group   (Recovery Workshop )   Attendance Did not attend   Attendance Duration (min) 46-60   Affect/Mood GAEL

## 2020-02-26 NOTE — PROGRESS NOTES
02/26/20 0800   Team Meeting   Meeting Type Daily Rounds   Team Members Present   Team Members Present Physician;Nurse;; Other (Discipline and Name)   Physician Team Member Dr Toy Hankins Team Member Gregory Coleman RN   Care Management Team Member Lily Horowitz   Other (Discipline and Name) Martha Cook [place]; MCKAYLA Silva     Patient was labile yesterday, joking with staff then preoccupied with discharge  Controlled  Med compliant  Slept

## 2020-02-26 NOTE — SOCIAL WORK
Therapist received a phone call from patient's Big Brother Bucky Kemp ([phone]) requesting to take patient out for a two hour pass on Friday  Treatment team in agreement as long as patient maintains controls  Big Brother also encouraged to plan Saturday passes in the future  Pass scheduled for 2/28 from 12-2p

## 2020-02-26 NOTE — PLAN OF CARE
Problem: Alteration in Thoughts and Perception  Goal: Agree to be compliant with medication regime, as prescribed and report medication side effects  Description  Interventions:  - Offer appropriate PRN medication and supervise ingestion; conduct AIMS, as needed   Outcome: Progressing  Goal: Attend and participate in unit activities, including therapeutic, recreational, and educational groups  Description  Interventions:  -Encourage Visitation and family involvement in care  Outcome: Progressing     Problem: Ineffective Coping  Goal: Demonstrates healthy coping skills  Outcome: Progressing     2010 Jeanne grooming is abysmal; wearing many layers of pants, shorts, shirts, jackets, & carries extra clothing w/him, hair wild  But, he is pleasant, came up for supper medicine when it was announced, ate 100% of meal  Enjoyed singing & dancing along w/radio music as sat w/group of like minded peers in [facility]  Sociable  Presently taking part in PM Group

## 2020-02-26 NOTE — PLAN OF CARE
Problem: Alteration in Thoughts and Perception  Goal: Verbalize thoughts and feelings  Description  Interventions:  - Promote a nonjudgmental and trusting relationship with the patient through active listening and therapeutic communication  - Assess patient's level of functioning, behavior and potential for risk  - Engage patient in 1 on 1 interactions  - Encourage patient to express fears, feelings, frustrations, and discuss symptoms    - [place] patient to reality, help patient recognize reality-based thinking   - Administer medications as ordered and assess for potential side effects  - Provide the patient education related to the signs and symptoms of the illness and desired effects of prescribed medications  Outcome: Progressing  Goal: Refrain from acting on delusional thinking/internal stimuli  Description  Interventions:  - Monitor patient closely, per order   - Utilize least restrictive measures   - Set reasonable limits, give positive feedback for acceptable   - Administer medications as ordered and monitor of potential side effects  Outcome: Progressing  Goal: Agree to be compliant with medication regime, as prescribed and report medication side effects  Description  Interventions:  - Offer appropriate PRN medication and supervise ingestion; conduct AIMS, as needed   Outcome: Progressing  Goal: Attend and participate in unit activities, including therapeutic, recreational, and educational groups  Description  Interventions:  -Encourage Visitation and family involvement in care  Outcome: Progressing     Problem: Ineffective Coping  Goal: Patient/Family verbalizes awareness of resources  Outcome: Progressing  Goal: Understands least restrictive measures  Description  Interventions:  - Utilize least restrictive behavior  Outcome: Progressing     Problem: GASTROINTESTINAL - ADULT  Goal: Maintains adequate nutritional intake  Description  INTERVENTIONS:  - Monitor percentage of each meal consumed  - Identify factors contributing to decreased intake, treat as appropriate  - Assist with meals as needed  - Monitor I&O, weight, and lab values if indicated  - Obtain nutrition services referral as needed  Outcome: Progressing     Problem: Alteration in Thoughts and Perception  Goal: Treatment Goal: Gain control of psychotic behaviors/thinking, reduce/eliminate presenting symptoms and demonstrate improved reality functioning upon discharge  Outcome: Not Progressing  Goal: Recognize dysfunctional thoughts, communicate reality-based thoughts at the time of discharge  Description  Interventions:  - Provide medication and psycho-education to assist patient in compliance and developing insight into his/her illness   Outcome: Not Progressing  Isolative to his bed during the morning, missed breakfast, and needed to be prompted 4 times to get up and come for his morning meds  Visible in the afternoon, ate 100% of his lunch and attended fresh air group  Disheveled with poor hygiene  Disorganized in thought process and continues to lack insight into his illness  Remains focused on discharge but does not understand what he needs to do to be discharged  No other behaviors or issues noted  Continue to monitor
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
## 2020-02-27 PROCEDURE — 99232 SBSQ HOSP IP/OBS MODERATE 35: CPT | Performed by: PSYCHIATRY & NEUROLOGY

## 2020-02-27 RX ADMIN — OLANZAPINE 5 MG: 5 TABLET, FILM COATED ORAL at 13:12

## 2020-02-27 RX ADMIN — PANTOPRAZOLE SODIUM 40 MG: 40 TABLET, DELAYED RELEASE ORAL at 06:00

## 2020-02-27 RX ADMIN — DIVALPROEX SODIUM 1000 MG: 500 TABLET, DELAYED RELEASE ORAL at 12:39

## 2020-02-27 RX ADMIN — DIVALPROEX SODIUM 1000 MG: 500 TABLET, DELAYED RELEASE ORAL at 20:54

## 2020-02-27 RX ADMIN — METFORMIN HYDROCHLORIDE 500 MG: 500 TABLET ORAL at 16:49

## 2020-02-27 RX ADMIN — DOCUSATE SODIUM 100 MG: 100 CAPSULE, LIQUID FILLED ORAL at 17:06

## 2020-02-27 RX ADMIN — ATORVASTATIN CALCIUM 10 MG: 10 TABLET, FILM COATED ORAL at 16:49

## 2020-02-27 RX ADMIN — LEVOTHYROXINE SODIUM 75 MCG: 75 TABLET ORAL at 06:00

## 2020-02-27 RX ADMIN — CLOZAPINE 200 MG: 200 TABLET ORAL at 16:49

## 2020-02-27 NOTE — TREATMENT TEAM
02/27/20 1100   Activity/Group Checklist   Group   ([facility] )   Attendance Did not attend   Attendance Duration (min) 46-60   Affect/Mood GAEL

## 2020-02-27 NOTE — PLAN OF CARE
Problem: Alteration in Thoughts and Perception  Goal: Verbalize thoughts and feelings  Description  Interventions:  - Promote a nonjudgmental and trusting relationship with the patient through active listening and therapeutic communication  - Assess patient's level of functioning, behavior and potential for risk  - Engage patient in 1 on 1 interactions  - Encourage patient to express fears, feelings, frustrations, and discuss symptoms    - [place] patient to reality, help patient recognize reality-based thinking   - Administer medications as ordered and assess for potential side effects  - Provide the patient education related to the signs and symptoms of the illness and desired effects of prescribed medications  Outcome: Progressing  Goal: Agree to be compliant with medication regime, as prescribed and report medication side effects  Description  Interventions:  - Offer appropriate PRN medication and supervise ingestion; conduct AIMS, as needed   Outcome: Progressing  Goal: Attend and participate in unit activities, including therapeutic, recreational, and educational groups  Description  Interventions:  -Encourage Visitation and family involvement in care  Outcome: Progressing  Goal: Complete daily ADLs, including personal hygiene independently, as able  Description  Interventions:  - Observe, teach, and assist patient with ADLS  - Monitor and promote a balance of rest/activity, with adequate nutrition and elimination   Outcome: Progressing     Problem: Ineffective Coping  Goal: Demonstrates healthy coping skills  Outcome: Progressing  Goal: Understands least restrictive measures  Description  Interventions:  - Utilize least restrictive behavior  Outcome: Progressing     Problem: GASTROINTESTINAL - ADULT  Goal: Maintains adequate nutritional intake  Description  INTERVENTIONS:  - Monitor percentage of each meal consumed  - Identify factors contributing to decreased intake, treat as appropriate  - Assist with meals as needed  - Monitor I&O, weight, and lab values if indicated  - Obtain nutrition services referral as needed  Outcome: Tuan Damico has been awake, alert, and visible intermittently out in the milieu  Pt completed his daily ADL's today  Ate 100% supper  Social with select peers  Attended and participated in evening group and had snack after  Compliant with all scheduled meds with little prompting  Remains preoccupied with discharge  No behavioral issues  Continue to monitor/assess for any changes  Pt requested prn for sleep  Trazodone 50 mg po given at 2140
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
## 2020-02-27 NOTE — NURSING NOTE
Received pt in bed at change of shift with eyes closed; chest movement noted  Continues the same thus this far as per q 7 min room checks  Will continue to monitor  Awake times one briefly requested and given a snack  Retired to bed without any difficulties
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
## 2020-02-27 NOTE — TREATMENT TEAM
02/26/20 1400   Activity/Group Checklist   Group   ([facility] )   Attendance Did not attend   Attendance Duration (min) 46-60   Affect/Mood GAEL

## 2020-02-27 NOTE — PLAN OF CARE
Problem: Alteration in Thoughts and Perception  Goal: Agree to be compliant with medication regime, as prescribed and report medication side effects  Description  Interventions:  - Offer appropriate PRN medication and supervise ingestion; conduct AIMS, as needed   Outcome: Not Progressing  Goal: Attend and participate in unit activities, including therapeutic, recreational, and educational groups  Description  Interventions:  -Encourage Visitation and family involvement in care  Outcome: Not Progressing  Goal: Complete daily ADLs, including personal hygiene independently, as able  Description  Interventions:  - Observe, teach, and assist patient with ADLS  - Monitor and promote a balance of rest/activity, with adequate nutrition and elimination   Outcome: Not Progressing     Chris Robert has been isolative to his room for the majority of the shift  Flat and blunted in affect  Minimal interactions with peers and staff today  Did not tend to his hygiene, refused his morning medications and meals after multiple attempts of prompting by this writer and staff  Did come out of his room briefly in the afternoon to take his afternoon meds and eat lunch then to return back to his room  No behavorial problems, will continue to monitor

## 2020-02-27 NOTE — PLAN OF CARE
Problem: DISCHARGE PLANNING  Goal: Discharge to home or other facility with appropriate resources  Description    CASE MANAGEMENT INTERVENTIONS:  - Conduct assessment to determine patient/family and health care team treatment goals, and need for post-acute services based on payer coverage, community resources, patient preferences and barriers to discharge  - Address psychosocial, clinical, and financial barriers to discharge as identified in assessment in conjunction with the patient/family and health care team   - Assist the patient in reintegration back into the community by removing barriers which may hinder a successful discharge once deemed stable  - Arrange appropriate level of post-acute services according to patient's needs and preference and payer coverage in collaboration with the physician and health care team   - Communicate with and update the patient/family, physician, and health care team regarding progress on the discharge plan  - Arrange appropriate transportation to post-acute venues  Outcome: Progressing     CM submitted [facility] housing referral to the [facility]/ at [facility] KAILA TOPETE will continue to follow patient's progress and assist with discharge planning needs

## 2020-02-28 PROCEDURE — 99232 SBSQ HOSP IP/OBS MODERATE 35: CPT | Performed by: PSYCHIATRY & NEUROLOGY

## 2020-02-28 RX ADMIN — DIVALPROEX SODIUM 1000 MG: 500 TABLET, DELAYED RELEASE ORAL at 20:34

## 2020-02-28 RX ADMIN — OLANZAPINE 5 MG: 5 TABLET, FILM COATED ORAL at 13:00

## 2020-02-28 RX ADMIN — CLOZAPINE 200 MG: 200 TABLET ORAL at 16:56

## 2020-02-28 RX ADMIN — OXYBUTYNIN CHLORIDE 5 MG: 5 TABLET, EXTENDED RELEASE ORAL at 08:18

## 2020-02-28 RX ADMIN — ATORVASTATIN CALCIUM 10 MG: 10 TABLET, FILM COATED ORAL at 16:56

## 2020-02-28 RX ADMIN — METFORMIN HYDROCHLORIDE 500 MG: 500 TABLET ORAL at 08:18

## 2020-02-28 RX ADMIN — METFORMIN HYDROCHLORIDE 500 MG: 500 TABLET ORAL at 16:55

## 2020-02-28 RX ADMIN — DIVALPROEX SODIUM 1000 MG: 500 TABLET, DELAYED RELEASE ORAL at 13:00

## 2020-02-28 RX ADMIN — DOCUSATE SODIUM 100 MG: 100 CAPSULE, LIQUID FILLED ORAL at 17:12

## 2020-02-28 RX ADMIN — LEVOTHYROXINE SODIUM 75 MCG: 75 TABLET ORAL at 06:19

## 2020-02-28 RX ADMIN — PANTOPRAZOLE SODIUM 40 MG: 40 TABLET, DELAYED RELEASE ORAL at 06:19

## 2020-02-28 RX ADMIN — DOCUSATE SODIUM 100 MG: 100 CAPSULE, LIQUID FILLED ORAL at 08:18

## 2020-02-28 NOTE — PLAN OF CARE
Problem: Alteration in Thoughts and Perception  Goal: Verbalize thoughts and feelings  Description  Interventions:  - Promote a nonjudgmental and trusting relationship with the patient through active listening and therapeutic communication  - Assess patient's level of functioning, behavior and potential for risk  - Engage patient in 1 on 1 interactions  - Encourage patient to express fears, feelings, frustrations, and discuss symptoms    - [place] patient to reality, help patient recognize reality-based thinking   - Administer medications as ordered and assess for potential side effects  - Provide the patient education related to the signs and symptoms of the illness and desired effects of prescribed medications  Outcome: Progressing  Goal: Refrain from acting on delusional thinking/internal stimuli  Description  Interventions:  - Monitor patient closely, per order   - Utilize least restrictive measures   - Set reasonable limits, give positive feedback for acceptable   - Administer medications as ordered and monitor of potential side effects  Outcome: Progressing  Goal: Agree to be compliant with medication regime, as prescribed and report medication side effects  Description  Interventions:  - Offer appropriate PRN medication and supervise ingestion; conduct AIMS, as needed   Outcome: Progressing  Goal: Attend and participate in unit activities, including therapeutic, recreational, and educational groups  Description  Interventions:  -Encourage Visitation and family involvement in care  Outcome: Progressing  Goal: Complete daily ADLs, including personal hygiene independently, as able  Description  Interventions:  - Observe, teach, and assist patient with ADLS  - Monitor and promote a balance of rest/activity, with adequate nutrition and elimination   Outcome: Progressing     Problem: Ineffective Coping  Goal: Demonstrates healthy coping skills  Outcome: Progressing  Goal: Understands least restrictive measures  Description  Interventions:  - Utilize least restrictive behavior  Outcome: Progressing     Problem: GASTROINTESTINAL - ADULT  Goal: Maintains adequate nutritional intake  Description  INTERVENTIONS:  - Monitor percentage of each meal consumed  - Identify factors contributing to decreased intake, treat as appropriate  - Assist with meals as needed  - Monitor I&O, weight, and lab values if indicated  - Obtain nutrition services referral as needed  Outcome: Nikole Pichardo has been awake, alert, and visible intermittently out in the milieu  Pt has been resting in room at intervals  Ate 100% supper  Pt shaved with supervision from staff and showered this shift  Remains preoccupied with discharge  Still disheveled in appearance and dressed in layers  Denies any depression, anxiety, si/hi, intent, plan, a/v hallucinations, and has not verbalized any delusions  Did not attend evening group but had snack after  Continue to monitor/assess for any changes

## 2020-02-28 NOTE — PLAN OF CARE
Problem: Alteration in Thoughts and Perception  Goal: Treatment Goal: Gain control of psychotic behaviors/thinking, reduce/eliminate presenting symptoms and demonstrate improved reality functioning upon discharge  Outcome: Progressing  Goal: Verbalize thoughts and feelings  Description  Interventions:  - Promote a nonjudgmental and trusting relationship with the patient through active listening and therapeutic communication  - Assess patient's level of functioning, behavior and potential for risk  - Engage patient in 1 on 1 interactions  - Encourage patient to express fears, feelings, frustrations, and discuss symptoms    - [place] patient to reality, help patient recognize reality-based thinking   - Administer medications as ordered and assess for potential side effects  - Provide the patient education related to the signs and symptoms of the illness and desired effects of prescribed medications  Outcome: Progressing  Goal: Refrain from acting on delusional thinking/internal stimuli  Description  Interventions:  - Monitor patient closely, per order   - Utilize least restrictive measures   - Set reasonable limits, give positive feedback for acceptable   - Administer medications as ordered and monitor of potential side effects  Outcome: Progressing  Goal: Agree to be compliant with medication regime, as prescribed and report medication side effects  Description  Interventions:  - Offer appropriate PRN medication and supervise ingestion; conduct AIMS, as needed   Outcome: Progressing  Goal: Attend and participate in unit activities, including therapeutic, recreational, and educational groups  Description  Interventions:  -Encourage Visitation and family involvement in care  Outcome: Progressing  Goal: Recognize dysfunctional thoughts, communicate reality-based thoughts at the time of discharge  Description  Interventions:  - Provide medication and psycho-education to assist patient in compliance and developing insight into his/her illness   Outcome: Progressing     Problem: Ineffective Coping  Goal: Patient/Family verbalizes awareness of resources  Outcome: Progressing  Goal: Understands least restrictive measures  Description  Interventions:  - Utilize least restrictive behavior  Outcome: Progressing     Problem: GASTROINTESTINAL - ADULT  Goal: Minimal or absence of nausea and/or vomiting  Description  INTERVENTIONS:  - Administer IV fluids if ordered to ensure adequate hydration  - Maintain NPO status until nausea and vomiting are resolved  - Nasogastric tube if ordered  - Administer ordered antiemetic medications as needed  - Provide nonpharmacologic comfort measures as appropriate  - Advance diet as tolerated, if ordered  - Consider nutrition services referral to assist patient with adequate nutrition and appropriate food choices  Outcome: Progressing  Goal: Maintains adequate nutritional intake  Description  INTERVENTIONS:  - Monitor percentage of each meal consumed  - Identify factors contributing to decreased intake, treat as appropriate  - Assist with meals as needed  - Monitor I&O, weight, and lab values if indicated  - Obtain nutrition services referral as needed  Outcome: Progressing     Problem: Alteration in Thoughts and Perception  Goal: Complete daily ADLs, including personal hygiene independently, as able  Description  Interventions:  - Observe, teach, and assist patient with ADLS  - Monitor and promote a balance of rest/activity, with adequate nutrition and elimination   Outcome: Not Progressing   Visible in the milieu, pleasant and friendly with others, attended IMR and nursing groups  Was dancing during IMR group while music was playing  Med and meal compliant and ate 100% of both meals  Disheveled with poor hygiene  Disorganized in thought process and continues to lack insight into his illness   Was not allowed off unit for a pass with his Big Brother today due to non-compliance with the program (not attending groups, not taking his meds, not doing hygeine)  Was accepting of decision to have him remain on the unit  No other behaviors or issues noted  Continue to monitor
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
## 2020-02-28 NOTE — NURSING NOTE
Received pt in bed at change of shift with eyes closed; chest movement noted  Continues the same thus this far as per continual rounding  Will continue to monitor

## 2020-02-28 NOTE — PROGRESS NOTES
02/28/20 0800   Team Meeting   Meeting Type Daily Rounds   Team Members Present   Team Members Present Physician;Nurse;; Other (Discipline and Name)   Physician Team Member Dr Toy Hankins Team Member Gregory Coleman RN   Care Management Team Member Lily Horowitz   Other (Discipline and Name) Martha Cook LCSW     Patient did not attend any groups  Showered last night  Slept

## 2020-02-29 PROCEDURE — 99232 SBSQ HOSP IP/OBS MODERATE 35: CPT | Performed by: NURSE PRACTITIONER

## 2020-02-29 RX ADMIN — ATORVASTATIN CALCIUM 10 MG: 10 TABLET, FILM COATED ORAL at 16:58

## 2020-02-29 RX ADMIN — METFORMIN HYDROCHLORIDE 500 MG: 500 TABLET ORAL at 16:58

## 2020-02-29 RX ADMIN — OLANZAPINE 5 MG: 5 TABLET, FILM COATED ORAL at 13:01

## 2020-02-29 RX ADMIN — METFORMIN HYDROCHLORIDE 500 MG: 500 TABLET ORAL at 08:12

## 2020-02-29 RX ADMIN — CLOZAPINE 200 MG: 200 TABLET ORAL at 16:58

## 2020-02-29 RX ADMIN — OXYBUTYNIN CHLORIDE 5 MG: 5 TABLET, EXTENDED RELEASE ORAL at 08:12

## 2020-02-29 RX ADMIN — LEVOTHYROXINE SODIUM 75 MCG: 75 TABLET ORAL at 06:03

## 2020-02-29 RX ADMIN — DIVALPROEX SODIUM 1000 MG: 500 TABLET, DELAYED RELEASE ORAL at 11:52

## 2020-02-29 RX ADMIN — DIVALPROEX SODIUM 1000 MG: 500 TABLET, DELAYED RELEASE ORAL at 20:53

## 2020-02-29 RX ADMIN — DOCUSATE SODIUM 100 MG: 100 CAPSULE, LIQUID FILLED ORAL at 08:12

## 2020-02-29 RX ADMIN — DOCUSATE SODIUM 100 MG: 100 CAPSULE, LIQUID FILLED ORAL at 17:01

## 2020-02-29 NOTE — PROGRESS NOTES
Progress Note - Behavioral Health   Simin [facility] 48 y o  male MRN: [id number]  [facility]15-33 Encounter: [id number]    The patient was seen for continuing care and reviewed with treatment team   Staff reports no significant change in the past 24 hours  Patient was observed lying in bed  Patient was pleasant and cooperative upon approach, displayed intermittent eye contact throughout the encounter and was brief and guarded in conversation  Patient is denying any depression or anxiety  He is denying any suicidal/homicidal ideation or auditory/visual hallucinations but does appear to be responding to internal stimuli  He is reporting adequate appetite with decreased/restless sleep and decreased energy level  Patient stated he is increasingly fatigued and that is why he is currently lying in bed  Patient is compliant with medications and reports no side effects at this time  Denies pain  No agitation noted  Current Depakote level: 71 7 (2/11/20) and within therapeutic range      Mental Status Evaluation:  Appearance:  disheveled   Behavior:  cooperative, friendly and guarded   Mood:  anxious   Affect: constricted   Speech: Normal rate and Normal volume   Thought Process:  Circumstantial   Thought Content:  Does not verbalize delusional material   Perceptual Disturbances: Appears to be responding to internal stimuli   Risk Potential: Potentially aggresive and violent   Attention/Concentration attention span appeared shorter than expected for age   Orientation:   Alert and awake   Gait/Station: Not observed   Motor Activity: No abnormal movement noted     Progress Toward Goals:  No change    Assessment/Plan    Principal Problem:    Schizoaffective disorder, bipolar type ([facility])  Active Problems:    Type 2 diabetes mellitus without complication, without long-term current use of insulin ([facility])    Benign essential hypertension    Gastroesophageal reflux disease without esophagitis    Acquired hypothyroidism    Tobacco abuse      Recommended Treatment: 1  Continue with pharmacotherapy, group therapy, milieu therapy and occupational therapy  2  Continue with safety checks per unit protocol  3  Continue with Depakote and Clozaril monitoring  4  The patient will be maintained on the following medications:    Current Facility-Administered Medications:  acetaminophen 325 mg Oral Q6H PRN Antonia Carver MD   acetaminophen 650 mg Oral Q6H PRN Antonia Carver MD   acetaminophen 975 mg Oral Q8H PRN Antonia Carver MD   aluminum-magnesium hydroxide-simethicone 30 mL Oral Q4H PRN Antonia Carver MD   atorvastatin 10 mg Oral Daily With Danish SalineMD jacklyn   benztropine 1 mg Intramuscular Q6H PRN Antonia Carver MD   benztropine 1 mg Oral Q6H PRN Antonia Carver MD   cloZAPine 200 mg Oral Daily Antonia Carver MD   divalproex sodium 1,000 mg Oral BID Antonia Carver MD   docusate sodium 100 mg Oral BID Antonia Carver MD   haloperidol 5 mg Oral Q6H PRN Antonia Carver MD   haloperidol lactate 5 mg Intramuscular Q6H PRN Antonia Carver MD   levothyroxine 75 mcg Oral Early Morning Antonia Carver MD   LORazepam 1 mg Intramuscular Q6H PRN Antonia Carver MD   LORazepam 1 mg Oral Q6H PRN Antonia Carver MD   magnesium hydroxide 30 mL Oral Daily PRN Antonia Carver MD   metFORMIN 500 mg Oral BID With Meals Antonia Carver MD   nicotine polacrilex 2 mg Oral Q2H PRN Antonia Carver MD   OLANZapine 5 mg Oral After Zaria Duty, MD   oxybutynin 5 mg Oral Daily Antonia Carver MD   pantoprazole 40 mg Oral Early Morning Antonia Carver MD   traZODone 50 mg Oral HS PRN Antonia Carver MD       Risks, benefits and possible side effects of Medications:   Risks, benefits, and possible side effects of medications explained to patient and patient verbalizes understanding  Portions of the record may have been created with voice recognition software   Occasional wrong word or "sound a like" substitutions may have occurred due to the inherent limitations of voice recognition software  Read the chart carefully and recognize, using context, where substitutions have occurred

## 2020-02-29 NOTE — PLAN OF CARE
Problem: Alteration in Thoughts and Perception  Goal: Agree to be compliant with medication regime, as prescribed and report medication side effects  Description  Interventions:  - Offer appropriate PRN medication and supervise ingestion; conduct AIMS, as needed   Outcome: Progressing  Goal: Attend and participate in unit activities, including therapeutic, recreational, and educational groups  Description  Interventions:  -Encourage Visitation and family involvement in care  Outcome: Not Progressing  Goal: Complete daily ADLs, including personal hygiene independently, as able  Description  Interventions:  - Observe, teach, and assist patient with ADLS  - Monitor and promote a balance of rest/activity, with adequate nutrition and elimination   Outcome: Not Progressing    Felix Concepcion has been isolative to his room for the majority of the shift, only coming out of his room for meals and medications with prompting  Blunted in affect, disheveled in appearance while wearing multiple layers of clothing  Did not tend to his hygiene or groups today  Behaviors controlled  Will continue to monitor

## 2020-02-29 NOTE — PLAN OF CARE
Problem: Alteration in Thoughts and Perception  Goal: Verbalize thoughts and feelings  Description  Interventions:  - Promote a nonjudgmental and trusting relationship with the patient through active listening and therapeutic communication  - Assess patient's level of functioning, behavior and potential for risk  - Engage patient in 1 on 1 interactions  - Encourage patient to express fears, feelings, frustrations, and discuss symptoms    - [place] patient to reality, help patient recognize reality-based thinking   - Administer medications as ordered and assess for potential side effects  - Provide the patient education related to the signs and symptoms of the illness and desired effects of prescribed medications  Outcome: Progressing     Problem: RESPIRATORY - ADULT  Goal: Achieves optimal ventilation and oxygenation  Description  INTERVENTIONS:  - Assess for changes in respiratory status  - Assess for changes in mentation and behavior  - Position to facilitate oxygenation and minimize respiratory effort  - Oxygen administered by appropriate delivery if ordered  - Initiate smoking cessation education as indicated  - Encourage broncho-pulmonary hygiene including cough, deep breathe, Incentive Spirometry  - Assess the need for suctioning and aspirate as needed  - Assess and instruct to report SOB or any respiratory difficulty  - Respiratory Therapy support as indicated  Outcome: Progressing     Problem: GASTROINTESTINAL - ADULT  Goal: Maintains adequate nutritional intake  Description  INTERVENTIONS:  - Monitor percentage of each meal consumed  - Identify factors contributing to decreased intake, treat as appropriate  - Assist with meals as needed  - Monitor I&O, weight, and lab values if indicated  - Obtain nutrition services referral as needed  Outcome: Jazmine Fuben was asleep at the beginning of the shift  Needed to be prompted twice to come for medication   Appearance remains disheveled and continues to wear layers of clothing  Quiet and kept to self  In his room most of the shift  Minimal interaction when out for his meal  Ate 100% of supper  No shower or BM tonight  Did not attend evening group  Took HS medication and ate snack  Continue to monitor  Precautions maintained

## 2020-02-29 NOTE — PLAN OF CARE
Problem: Alteration in Thoughts and Perception  Goal: Verbalize thoughts and feelings  Description  Interventions:  - Promote a nonjudgmental and trusting relationship with the patient through active listening and therapeutic communication  - Assess patient's level of functioning, behavior and potential for risk  - Engage patient in 1 on 1 interactions  - Encourage patient to express fears, feelings, frustrations, and discuss symptoms    - [place] patient to reality, help patient recognize reality-based thinking   - Administer medications as ordered and assess for potential side effects  - Provide the patient education related to the signs and symptoms of the illness and desired effects of prescribed medications  Outcome: Progressing  Goal: Refrain from acting on delusional thinking/internal stimuli  Description  Interventions:  - Monitor patient closely, per order   - Utilize least restrictive measures   - Set reasonable limits, give positive feedback for acceptable   - Administer medications as ordered and monitor of potential side effects  Outcome: Progressing  Goal: Agree to be compliant with medication regime, as prescribed and report medication side effects  Description  Interventions:  - Offer appropriate PRN medication and supervise ingestion; conduct AIMS, as needed   Outcome: Progressing  Goal: Attend and participate in unit activities, including therapeutic, recreational, and educational groups  Description  Interventions:  -Encourage Visitation and family involvement in care  Outcome: Progressing  Goal: Complete daily ADLs, including personal hygiene independently, as able  Description  Interventions:  - Observe, teach, and assist patient with ADLS  - Monitor and promote a balance of rest/activity, with adequate nutrition and elimination   Outcome: Progressing     Problem: Ineffective Coping  Goal: Demonstrates healthy coping skills  Outcome: Progressing  Goal: Understands least restrictive measures  Description  Interventions:  - Utilize least restrictive behavior  Outcome: Progressing     Problem: GASTROINTESTINAL - ADULT  Goal: Maintains adequate nutritional intake  Description  INTERVENTIONS:  - Monitor percentage of each meal consumed  - Identify factors contributing to decreased intake, treat as appropriate  - Assist with meals as needed  - Monitor I&O, weight, and lab values if indicated  - Obtain nutrition services referral as needed  Outcome: Norma Johns has been awake, alert, and visible intermittently out in the milieu  Spends time resting in room and listening to music on radio in [employer] with peers  Ate 100% supper  Compliant with scheduled meds with some prompting  Pt completed his daily ADL's today  Remains preoccupied with discharge  Attended and participated in evening group and had snack after  No behavioral issues  Continue to monitor/assess for any changes

## 2020-03-01 PROCEDURE — 99232 SBSQ HOSP IP/OBS MODERATE 35: CPT | Performed by: NURSE PRACTITIONER

## 2020-03-01 RX ADMIN — CLOZAPINE 200 MG: 200 TABLET ORAL at 16:39

## 2020-03-01 RX ADMIN — LEVOTHYROXINE SODIUM 75 MCG: 75 TABLET ORAL at 06:04

## 2020-03-01 RX ADMIN — PANTOPRAZOLE SODIUM 40 MG: 40 TABLET, DELAYED RELEASE ORAL at 06:04

## 2020-03-01 RX ADMIN — DOCUSATE SODIUM 100 MG: 100 CAPSULE, LIQUID FILLED ORAL at 17:12

## 2020-03-01 RX ADMIN — OLANZAPINE 5 MG: 5 TABLET, FILM COATED ORAL at 13:52

## 2020-03-01 RX ADMIN — ATORVASTATIN CALCIUM 10 MG: 10 TABLET, FILM COATED ORAL at 16:39

## 2020-03-01 RX ADMIN — DIVALPROEX SODIUM 1000 MG: 500 TABLET, DELAYED RELEASE ORAL at 20:51

## 2020-03-01 RX ADMIN — DIVALPROEX SODIUM 1000 MG: 500 TABLET, DELAYED RELEASE ORAL at 12:28

## 2020-03-01 RX ADMIN — DOCUSATE SODIUM 100 MG: 100 CAPSULE, LIQUID FILLED ORAL at 08:43

## 2020-03-01 RX ADMIN — METFORMIN HYDROCHLORIDE 500 MG: 500 TABLET ORAL at 08:43

## 2020-03-01 RX ADMIN — METFORMIN HYDROCHLORIDE 500 MG: 500 TABLET ORAL at 16:39

## 2020-03-01 RX ADMIN — OXYBUTYNIN CHLORIDE 5 MG: 5 TABLET, EXTENDED RELEASE ORAL at 08:43

## 2020-03-01 NOTE — PROGRESS NOTES
Progress Note - Behavioral Health   Nerissa Gonzalez 48 y o  male MRN: [id number]  Unit/Bed#: [facility] 157-93 Encounter: [id number]    The patient was seen for continuing care and reviewed with treatment team   Staff reports no significant change in the past 24 hours  Patient was observed lying in bed  He is pleasant and cooperative upon approach, displays good intermittent eye contact, is brief in conversation with a flat affect noted  He continues to deny any depression or anxiety  He continues to deny any suicidal/homicidal ideation or auditory/visual hallucinations but does continue to appear to be responding to internal stimuli  He is reporting adequate appetite, decreased restless sleep, decreased energy level  He continues to report increased fatigue  He is compliant with medications and reports no side effects at this time  Denies pain  No agitation noted  Mental Status Evaluation:  Appearance:  disheveled   Behavior:  cooperative, friendly and guarded   Mood:  anxious   Affect: Flat   Speech: Normal rate and Normal volume   Thought Process:  Circumstantial   Thought Content:  Does not verbalize delusional material   Perceptual Disturbances: Denies hallucinations and does not appear to be responding to internal stimuli   Risk Potential: No suicidal or homicidal ideation   Attention/Concentration attention span appeared shorter than expected for age   Orientation:   Alert and awake   Gait/Station: Not observed   Motor Activity: No abnormal movement noted     Progress Toward Goals:  No change    Assessment/Plan    Principal Problem:    Schizoaffective disorder, bipolar type ([facility])  Active Problems:    Type 2 diabetes mellitus without complication, without long-term current use of insulin ([facility])    Benign essential hypertension    Gastroesophageal reflux disease without esophagitis    Acquired hypothyroidism    Tobacco abuse      Recommended Treatment: 1  Continue with pharmacotherapy, group therapy, milieu therapy and occupational therapy  2  Continue with safety checks per unit protocol  3  Continue with Depakote and Clozaril monitoring  4  The patient will be maintained on the following medications:    Current Facility-Administered Medications:  acetaminophen 325 mg Oral Q6H PRN Mark Watt MD   acetaminophen 650 mg Oral Q6H PRN Mark Watt MD   acetaminophen 975 mg Oral Q8H PRN Mark Watt MD   aluminum-magnesium hydroxide-simethicone 30 mL Oral Q4H PRN Mark Watt MD   atorvastatin 10 mg Oral Daily With Donnie Cohen MD   benztropine 1 mg Intramuscular Q6H PRN Mark Watt MD   benztropine 1 mg Oral Q6H PRN Mark Watt MD   cloZAPine 200 mg Oral Daily Mark Watt MD   divalproex sodium 1,000 mg Oral BID Mark Watt MD   docusate sodium 100 mg Oral BID Mark Watt MD   haloperidol 5 mg Oral Q6H PRN Mark Watt MD   haloperidol lactate 5 mg Intramuscular Q6H PRN Mark Watt MD   levothyroxine 75 mcg Oral Early Morning Mark Watt MD   LORazepam 1 mg Intramuscular Q6H PRN Mark Watt MD   LORazepam 1 mg Oral Q6H PRN Mark Watt MD   magnesium hydroxide 30 mL Oral Daily PRN Mark Watt MD   metFORMIN 500 mg Oral BID With Meals Mark Watt MD   nicotine polacrilex 2 mg Oral Q2H PRN Mark Watt MD   OLANZapine 5 mg Oral After Jose Luis Drain, MD   oxybutynin 5 mg Oral Daily Mark Watt MD   pantoprazole 40 mg Oral Early Morning Mark Watt MD   traZODone 50 mg Oral HS PRN Mark Watt MD       Risks, benefits and possible side effects of Medications:   Risks, benefits, and possible side effects of medications explained to patient and patient verbalizes understanding  Portions of the record may have been created with voice recognition software  Occasional wrong word or "sound a like" substitutions may have occurred due to the inherent limitations of voice recognition software  Read the chart carefully and recognize, using context, where substitutions have occurred

## 2020-03-01 NOTE — PLAN OF CARE
Problem: Alteration in Thoughts and Perception  Goal: Agree to be compliant with medication regime, as prescribed and report medication side effects  Description  Interventions:  - Offer appropriate PRN medication and supervise ingestion; conduct AIMS, as needed   Outcome: Not Progressing  Goal: Attend and participate in unit activities, including therapeutic, recreational, and educational groups  Description  Interventions:  -Encourage Visitation and family involvement in care  Outcome: Not Progressing  Goal: Complete daily ADLs, including personal hygiene independently, as able  Description  Interventions:  - Observe, teach, and assist patient with ADLS  - Monitor and promote a balance of rest/activity, with adequate nutrition and elimination   Outcome: Not Progressing    George Saleh has been isolative to his room, napping in between scheduled meals  Accepted his morning medications with prompting but initally declined his afternoon medications, verbalizing "I have been in this place for too long and I want out" but was able to be redirected and encouraged to remain compliant with his scheduled medications  Disheveled in appearance, wearing multiple layers of clothing  Irritable upon approach  Will continue to monitor

## 2020-03-01 NOTE — PLAN OF CARE
Problem: Alteration in Thoughts and Perception  Goal: Verbalize thoughts and feelings  Description  Interventions:  - Promote a nonjudgmental and trusting relationship with the patient through active listening and therapeutic communication  - Assess patient's level of functioning, behavior and potential for risk  - Engage patient in 1 on 1 interactions  - Encourage patient to express fears, feelings, frustrations, and discuss symptoms    - [place] patient to reality, help patient recognize reality-based thinking   - Administer medications as ordered and assess for potential side effects  - Provide the patient education related to the signs and symptoms of the illness and desired effects of prescribed medications  Outcome: Progressing  Goal: Agree to be compliant with medication regime, as prescribed and report medication side effects  Description  Interventions:  - Offer appropriate PRN medication and supervise ingestion; conduct AIMS, as needed   Outcome: Progressing  Goal: Complete daily ADLs, including personal hygiene independently, as able  Description  Interventions:  - Observe, teach, and assist patient with ADLS  - Monitor and promote a balance of rest/activity, with adequate nutrition and elimination   Outcome: Progressing     Problem: GASTROINTESTINAL - ADULT  Goal: Maintains adequate nutritional intake  Description  INTERVENTIONS:  - Monitor percentage of each meal consumed  - Identify factors contributing to decreased intake, treat as appropriate  - Assist with meals as needed  - Monitor I&O, weight, and lab values if indicated  - Obtain nutrition services referral as needed  Outcome: Brenda Duarte has been out and about on the unit  Ambulates often without distress  At times he will sit in [employer] area with peers  Social with staff and select peers  Showered, but still wearing layers of clothing  Watched TV in the living room  Took medications without difficulty   Ate 100% of his meal  No issues or behaviors  Continue to monitor  Precautions maintained

## 2020-03-02 PROCEDURE — 99232 SBSQ HOSP IP/OBS MODERATE 35: CPT | Performed by: PSYCHIATRY & NEUROLOGY

## 2020-03-02 RX ADMIN — OLANZAPINE 5 MG: 5 TABLET, FILM COATED ORAL at 14:12

## 2020-03-02 RX ADMIN — DOCUSATE SODIUM 100 MG: 100 CAPSULE, LIQUID FILLED ORAL at 09:13

## 2020-03-02 RX ADMIN — PANTOPRAZOLE SODIUM 40 MG: 40 TABLET, DELAYED RELEASE ORAL at 05:44

## 2020-03-02 RX ADMIN — OXYBUTYNIN CHLORIDE 5 MG: 5 TABLET, EXTENDED RELEASE ORAL at 09:14

## 2020-03-02 RX ADMIN — ALUMINUM HYDROXIDE, MAGNESIUM HYDROXIDE, AND SIMETHICONE 30 ML: 200; 200; 20 SUSPENSION ORAL at 00:09

## 2020-03-02 RX ADMIN — ATORVASTATIN CALCIUM 10 MG: 10 TABLET, FILM COATED ORAL at 17:08

## 2020-03-02 RX ADMIN — DIVALPROEX SODIUM 1000 MG: 500 TABLET, DELAYED RELEASE ORAL at 14:12

## 2020-03-02 RX ADMIN — METFORMIN HYDROCHLORIDE 500 MG: 500 TABLET ORAL at 17:08

## 2020-03-02 RX ADMIN — MAGNESIUM HYDROXIDE 30 ML: 400 SUSPENSION ORAL at 00:08

## 2020-03-02 RX ADMIN — DIVALPROEX SODIUM 1000 MG: 500 TABLET, DELAYED RELEASE ORAL at 20:47

## 2020-03-02 RX ADMIN — METFORMIN HYDROCHLORIDE 500 MG: 500 TABLET ORAL at 09:13

## 2020-03-02 RX ADMIN — CLOZAPINE 200 MG: 200 TABLET ORAL at 17:08

## 2020-03-02 RX ADMIN — DOCUSATE SODIUM 100 MG: 100 CAPSULE, LIQUID FILLED ORAL at 17:08

## 2020-03-02 RX ADMIN — LEVOTHYROXINE SODIUM 75 MCG: 75 TABLET ORAL at 05:44
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
## 2020-03-02 NOTE — NURSING NOTE
Angy Amor was awake times one for this shift at Mid-night requesting and given Mylanta for Heartburn  Retired to bed without any difficulties and appears to sleep thus this far as per continual rounding  Emergency Department Attending Supervision Note  10/18/2019  1:20 AM      I evaluated this patient in conjunction with Dr. Anne      Briefly, the patient presented with mechanical fall injuring left wrist.  No other associated skeletal trauma father states that she did not hit her head.      On my exam,     Gen: Resting comfortably   head is atraumatic normocephalic, no cervical spine tenderness painless range of motion  CV: ppi, regular   Resp: speaking in full sentences with any resp distress  Extremity: Bony skeletal survey unremarkable with exception of the left upper extremity there is mild soft tissue swelling at the distal forearm.  Intact active and passive range of motion.  Uses the arm interacting with me was able to apparent difficulty.   Skin: warm dry well perfused  Neuro: Alert, no gross motor or sensory deficits,  gait stable            Results:    XR Wrist Left G/E 3 Views   Final Result   IMPRESSION: Negative wrist. No fracture or dislocation.            ED course:          My impression is mechanical fall left wrist contusion.  Radiographs negative discussed with father discharge home with supportive management return with new or worsening symptoms follow-up with PCP if continues to have pain for repeat x-ray.        Diagnosis    ICD-10-CM    1. Wrist injury, left, initial encounter S69.92XA          MD Estrada Estrada Jerome Richard, MD  10/18/19 0121
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
## 2020-03-02 NOTE — TREATMENT TEAM
03/02/20 1100   Activity/Group Checklist   Group   (IMR/Community Group )   Attendance Did not attend   Attendance Duration (min) 46-60   Affect/Mood GAEL

## 2020-03-02 NOTE — PLAN OF CARE
Problem: Ineffective Coping  Goal: Identifies healthy coping skills  Outcome: Not Progressing   Patient did not complete Goal Card for the week of 3/2/2020

## 2020-03-02 NOTE — PROGRESS NOTES
03/02/20 0800   Team Meeting   Meeting Type Daily Rounds   Team Members Present   Team Members Present Physician;Nurse;; Other (Discipline and Name)   Physician Team Member Dr Stephani Najera, RN   Care Management Team Member Lily Garcia   Other (Discipline and Name) Rolando Gonzalez LCSW     Patient was irritable at times  Disorganized  Preoccupied about discharge disposition  Minimal groups  Slept

## 2020-03-02 NOTE — PLAN OF CARE
Problem: Alteration in Thoughts and Perception  Goal: Treatment Goal: Gain control of psychotic behaviors/thinking, reduce/eliminate presenting symptoms and demonstrate improved reality functioning upon discharge  Outcome: Progressing  Goal: Verbalize thoughts and feelings  Description  Interventions:  - Promote a nonjudgmental and trusting relationship with the patient through active listening and therapeutic communication  - Assess patient's level of functioning, behavior and potential for risk  - Engage patient in 1 on 1 interactions  - Encourage patient to express fears, feelings, frustrations, and discuss symptoms    - [place] patient to reality, help patient recognize reality-based thinking   - Administer medications as ordered and assess for potential side effects  - Provide the patient education related to the signs and symptoms of the illness and desired effects of prescribed medications  Outcome: Progressing  Goal: Complete daily ADLs, including personal hygiene independently, as able  Description  Interventions:  - Observe, teach, and assist patient with ADLS  - Monitor and promote a balance of rest/activity, with adequate nutrition and elimination   Outcome: Progressing     Problem: Alteration in Thoughts and Perception  Goal: Refrain from acting on delusional thinking/internal stimuli  Description  Interventions:  - Monitor patient closely, per order   - Utilize least restrictive measures   - Set reasonable limits, give positive feedback for acceptable   - Administer medications as ordered and monitor of potential side effects  Outcome: Not Progressing  Goal: Agree to be compliant with medication regime, as prescribed and report medication side effects  Description  Interventions:  - Offer appropriate PRN medication and supervise ingestion; conduct AIMS, as needed   Outcome: Not Progressing  Goal: Attend and participate in unit activities, including therapeutic, recreational, and educational groups  Description  Interventions:  -Encourage Visitation and family involvement in care  Outcome: Not Progressing  Goal: Recognize dysfunctional thoughts, communicate reality-based thoughts at the time of discharge  Description  Interventions:  - Provide medication and psycho-education to assist patient in compliance and developing insight into his/her illness   Outcome: Not Progressing     Isolative to room and self  Non-compliant with program requirements  Much prompting for med and meals  Did not attend groups  Prompting requires reminding of non-compliance r/t passes off unit  No insight into illness    Will continue to monitor progress in recovery program

## 2020-03-03 PROCEDURE — 99233 SBSQ HOSP IP/OBS HIGH 50: CPT | Performed by: PSYCHIATRY & NEUROLOGY

## 2020-03-03 RX ADMIN — CLOZAPINE 200 MG: 200 TABLET ORAL at 17:00

## 2020-03-03 RX ADMIN — ATORVASTATIN CALCIUM 10 MG: 10 TABLET, FILM COATED ORAL at 17:05

## 2020-03-03 RX ADMIN — METFORMIN HYDROCHLORIDE 500 MG: 500 TABLET ORAL at 17:00

## 2020-03-03 RX ADMIN — DIVALPROEX SODIUM 1000 MG: 500 TABLET, DELAYED RELEASE ORAL at 13:00

## 2020-03-03 RX ADMIN — ALUMINUM HYDROXIDE, MAGNESIUM HYDROXIDE, AND SIMETHICONE 30 ML: 200; 200; 20 SUSPENSION ORAL at 01:19

## 2020-03-03 RX ADMIN — LEVOTHYROXINE SODIUM 75 MCG: 75 TABLET ORAL at 06:00

## 2020-03-03 RX ADMIN — DIVALPROEX SODIUM 1000 MG: 500 TABLET, DELAYED RELEASE ORAL at 20:34

## 2020-03-03 RX ADMIN — METFORMIN HYDROCHLORIDE 500 MG: 500 TABLET ORAL at 08:17

## 2020-03-03 RX ADMIN — DOCUSATE SODIUM 100 MG: 100 CAPSULE, LIQUID FILLED ORAL at 08:17

## 2020-03-03 RX ADMIN — OXYBUTYNIN CHLORIDE 5 MG: 5 TABLET, EXTENDED RELEASE ORAL at 08:17

## 2020-03-03 RX ADMIN — OLANZAPINE 5 MG: 5 TABLET, FILM COATED ORAL at 13:00

## 2020-03-03 RX ADMIN — DOCUSATE SODIUM 100 MG: 100 CAPSULE, LIQUID FILLED ORAL at 17:06

## 2020-03-03 RX ADMIN — PANTOPRAZOLE SODIUM 40 MG: 40 TABLET, DELAYED RELEASE ORAL at 06:00

## 2020-03-03 NOTE — PLAN OF CARE
Problem: Alteration in Thoughts and Perception  Goal: Attend and participate in unit activities, including therapeutic, recreational, and educational groups  Description  Interventions:  -Encourage Visitation and family involvement in care  Outcome: Not Progressing  Patient has had a decline in group attendance and currently at 22%  This writer seldom see's Patient in the milieu during daytime hours  Patient however, is less repetitive about discharge and when he is leaving  Patient is easily redirected and will continued to be supported in his recovery process and encouraged to attend groups and stick to daily routine

## 2020-03-03 NOTE — TREATMENT TEAM
03/03/20 1100   Activity/Group Checklist   Group Other (Comment)  (IMR)   Attendance Did not attend     Patient did not attend group today  Patient was encouraged to attend group, but declined

## 2020-03-03 NOTE — PROGRESS NOTES
03/03/20 0800   Team Meeting   Meeting Type Daily Rounds   Team Members Present   Team Members Present Physician;Nurse;; Other (Discipline and Name)   Physician Team Member Dr Alexandro Oleary Team Member Magen Handy RN   Care Management Team Member Lily Helms   Other (Discipline and Name) Shay Malloy LCSW     Patient was non-copmliant for the majority of the day yesterday  No groups  Disorganized  Irritable  Urinating on the floor and toilet seat  Slept
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
## 2020-03-03 NOTE — PROGRESS NOTES
Scar Singer maintained on ongoing SAFE precaution wihtout incident on this shift  Laying in bed with eyes closed, breath even and unlabored   Continuous rounding implemented  Multiple times waking up for water  At [address] he approach this writer, complaining about heartburn symptoms  PRN mylanta 30ml rendered and tolerated well  PRN was effective for reason given    No behavioral noted   Will continue to monitor

## 2020-03-03 NOTE — PLAN OF CARE
Problem: Alteration in Thoughts and Perception  Goal: Treatment Goal: Gain control of psychotic behaviors/thinking, reduce/eliminate presenting symptoms and demonstrate improved reality functioning upon discharge  Outcome: Progressing  Goal: Verbalize thoughts and feelings  Description  Interventions:  - Promote a nonjudgmental and trusting relationship with the patient through active listening and therapeutic communication  - Assess patient's level of functioning, behavior and potential for risk  - Engage patient in 1 on 1 interactions  - Encourage patient to express fears, feelings, frustrations, and discuss symptoms    - [place] patient to reality, help patient recognize reality-based thinking   - Administer medications as ordered and assess for potential side effects  - Provide the patient education related to the signs and symptoms of the illness and desired effects of prescribed medications  Outcome: Progressing  Goal: Refrain from acting on delusional thinking/internal stimuli  Description  Interventions:  - Monitor patient closely, per order   - Utilize least restrictive measures   - Set reasonable limits, give positive feedback for acceptable   - Administer medications as ordered and monitor of potential side effects  Outcome: Progressing  Goal: Agree to be compliant with medication regime, as prescribed and report medication side effects  Description  Interventions:  - Offer appropriate PRN medication and supervise ingestion; conduct AIMS, as needed   Outcome: Progressing  Goal: Recognize dysfunctional thoughts, communicate reality-based thoughts at the time of discharge  Description  Interventions:  - Provide medication and psycho-education to assist patient in compliance and developing insight into his/her illness   Outcome: Progressing  Goal: Complete daily ADLs, including personal hygiene independently, as able  Description  Interventions:  - Observe, teach, and assist patient with ADLS  - Monitor and promote a balance of rest/activity, with adequate nutrition and elimination   Outcome: Progressing     Problem: Ineffective Coping  Goal: Patient/Family verbalizes awareness of resources  Outcome: Progressing  Goal: Understands least restrictive measures  Description  Interventions:  - Utilize least restrictive behavior  Outcome: Progressing     Problem: GASTROINTESTINAL - ADULT  Goal: Minimal or absence of nausea and/or vomiting  Description  INTERVENTIONS:  - Administer IV fluids if ordered to ensure adequate hydration  - Maintain NPO status until nausea and vomiting are resolved  - Nasogastric tube if ordered  - Administer ordered antiemetic medications as needed  - Provide nonpharmacologic comfort measures as appropriate  - Advance diet as tolerated, if ordered  - Consider nutrition services referral to assist patient with adequate nutrition and appropriate food choices  Outcome: Progressing  Goal: Maintains adequate nutritional intake  Description  INTERVENTIONS:  - Monitor percentage of each meal consumed  - Identify factors contributing to decreased intake, treat as appropriate  - Assist with meals as needed  - Monitor I&O, weight, and lab values if indicated  - Obtain nutrition services referral as needed  Outcome: Progressing     Problem: Alteration in Thoughts and Perception  Goal: Attend and participate in unit activities, including therapeutic, recreational, and educational groups  Description  Interventions:  -Encourage Visitation and family involvement in care  Outcome: Not Progressing   Visible intermittently, napping at different intervals throughout the day  Out for meds and meals and only needed a reminder to come for his meds  Ate 100% of both meals  Did not attend any groups Disheveled with poor hygiene but did shower with strong encouragement from staff  Still looked disheveled afterward, wearing multiple layers of clothes   Disorganized in thought process and continues to lack insight into his illness  Remains focused on discharge but does not understand what he needs to do to be discharged  No other behaviors or issues noted  Continue to monitor

## 2020-03-04 PROCEDURE — 99232 SBSQ HOSP IP/OBS MODERATE 35: CPT | Performed by: PSYCHIATRY & NEUROLOGY

## 2020-03-04 RX ADMIN — DOCUSATE SODIUM 100 MG: 100 CAPSULE, LIQUID FILLED ORAL at 17:12

## 2020-03-04 RX ADMIN — METFORMIN HYDROCHLORIDE 500 MG: 500 TABLET ORAL at 16:58

## 2020-03-04 RX ADMIN — PANTOPRAZOLE SODIUM 40 MG: 40 TABLET, DELAYED RELEASE ORAL at 06:05

## 2020-03-04 RX ADMIN — ALUMINUM HYDROXIDE, MAGNESIUM HYDROXIDE, AND SIMETHICONE 30 ML: 200; 200; 20 SUSPENSION ORAL at 04:51

## 2020-03-04 RX ADMIN — ATORVASTATIN CALCIUM 10 MG: 10 TABLET, FILM COATED ORAL at 16:58

## 2020-03-04 RX ADMIN — MAGNESIUM HYDROXIDE 30 ML: 400 SUSPENSION ORAL at 04:51

## 2020-03-04 RX ADMIN — DIVALPROEX SODIUM 1000 MG: 500 TABLET, DELAYED RELEASE ORAL at 20:47

## 2020-03-04 RX ADMIN — LEVOTHYROXINE SODIUM 75 MCG: 75 TABLET ORAL at 06:05

## 2020-03-04 RX ADMIN — DIVALPROEX SODIUM 1000 MG: 500 TABLET, DELAYED RELEASE ORAL at 13:00

## 2020-03-04 RX ADMIN — METFORMIN HYDROCHLORIDE 500 MG: 500 TABLET ORAL at 09:49

## 2020-03-04 RX ADMIN — OXYBUTYNIN CHLORIDE 5 MG: 5 TABLET, EXTENDED RELEASE ORAL at 09:49

## 2020-03-04 RX ADMIN — OLANZAPINE 5 MG: 5 TABLET, FILM COATED ORAL at 13:00

## 2020-03-04 RX ADMIN — DOCUSATE SODIUM 100 MG: 100 CAPSULE, LIQUID FILLED ORAL at 09:51

## 2020-03-04 RX ADMIN — CLOZAPINE 200 MG: 200 TABLET ORAL at 16:58

## 2020-03-04 NOTE — TREATMENT TEAM
03/04/20 1400   Activity/Group Checklist   Group   ([facility] )   Attendance Did not attend   Attendance Duration (min) 46-60   Affect/Mood GAEL

## 2020-03-04 NOTE — PLAN OF CARE
Problem: Alteration in Thoughts and Perception  Goal: Verbalize thoughts and feelings  Description  Interventions:  - Promote a nonjudgmental and trusting relationship with the patient through active listening and therapeutic communication  - Assess patient's level of functioning, behavior and potential for risk  - Engage patient in 1 on 1 interactions  - Encourage patient to express fears, feelings, frustrations, and discuss symptoms    - [place] patient to reality, help patient recognize reality-based thinking   - Administer medications as ordered and assess for potential side effects  - Provide the patient education related to the signs and symptoms of the illness and desired effects of prescribed medications  Outcome: Progressing  Goal: Agree to be compliant with medication regime, as prescribed and report medication side effects  Description  Interventions:  - Offer appropriate PRN medication and supervise ingestion; conduct AIMS, as needed   Outcome: Progressing  Goal: Attend and participate in unit activities, including therapeutic, recreational, and educational groups  Description  Interventions:  -Encourage Visitation and family involvement in care  Outcome: Progressing     Problem: GASTROINTESTINAL - ADULT  Goal: Maintains adequate nutritional intake  Description  INTERVENTIONS:  - Monitor percentage of each meal consumed  - Identify factors contributing to decreased intake, treat as appropriate  - Assist with meals as needed  - Monitor I&O, weight, and lab values if indicated  - Obtain nutrition services referral as needed  Outcome: Korin Guerra was asleep at the beginning of the shift  He went to [facility] with staff and peers  He was in his room most of the shift  Napping at times  Denies anxiety and depression  Disheveled appearance and wearing layers of clothing  Social with select peers  Took medication and ate 100% of his meal  No shower or BM this shift  Attended evening group   Took HS medication and ate snack  Continue to monitor  Precautions maintained

## 2020-03-04 NOTE — TREATMENT TEAM
Patient attempted to come into group 30 minutes late and was not permitted/     19/60/75 1100   Activity/Group Checklist   Group   (IMR/[facility] Well[facility] )   Attendance Did not attend   Attendance Duration (min) 46-60   Affect/Mood GAEL
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
## 2020-03-04 NOTE — PLAN OF CARE
Problem: Individualized Interventions  Goal: Attend and participate in unit activities, including therapeutic, recreational, and educational groups  Description  PSYCHOTHERAPY INTERVENTIONS:    -Form therapeutic alliance to promote trust and safety within a therapeutic environment    -Engage in individual psychotherapy using evidence-based practices that are specific to individual needs   -Process barriers to community living with an emphasis on solution-based interventions   -Promote self-awareness and identity development   -Identify and process patterns of behavior that have led to decompensation and/or hospitalizations   -Attend psychoeducation groups with licensed psychotherapist to learn about Illness Management Recovery (IMR) concepts and enhance coping skills    -Practice effective communication with staff, peers, family, and community members  Participate in family sessions as necessary   -Encourage reality-based thought content by examining thought processes and cognitive distortions      -Work with treatment team in reintegration back into the community when appropriate  Outcome: Not Progressing    Therapist attempted to initiate a conversation with patient regarding a (behavioral) plan that would assist him in completing his daily routine so that he can progress back into the community  Patient asked when he would be allowed to go to [facility], but would not let therapist explain the process  At the first mention of groups, patient said, "no groups, no groups, go away, I don't want to talk, go away "  Therapist did comply with patient's request  Will re-attempt again tomorrow
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
## 2020-03-04 NOTE — PROGRESS NOTES
03/04/20 0800   Team Meeting   Meeting Type Daily Rounds   Team Members Present   Team Members Present Physician;Nurse;; Other (Discipline and Name)   Physician Team Member Dr Lawrence Reeves, RN; Kayley Izaguirre RN   Care Management Team Member Lily Briseno   Other (Discipline and Name) Rebecca Contreras LCSW; MCKAYLA Verde     No groups 7-3 shift but did attend 3-11 shift groups  Controlled  Slept
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
## 2020-03-04 NOTE — TREATMENT TEAM
03/03/20 1500   Activity/Group Checklist   Group   (Recovery Workshop )   Attendance Did not attend   Attendance Duration (min) 46-60   Affect/Mood GAEL

## 2020-03-05 PROCEDURE — 99232 SBSQ HOSP IP/OBS MODERATE 35: CPT | Performed by: PSYCHIATRY & NEUROLOGY

## 2020-03-05 RX ADMIN — DIVALPROEX SODIUM 1000 MG: 500 TABLET, DELAYED RELEASE ORAL at 12:23

## 2020-03-05 RX ADMIN — METFORMIN HYDROCHLORIDE 500 MG: 500 TABLET ORAL at 17:00

## 2020-03-05 RX ADMIN — DOCUSATE SODIUM 100 MG: 100 CAPSULE, LIQUID FILLED ORAL at 08:05

## 2020-03-05 RX ADMIN — CLOZAPINE 200 MG: 200 TABLET ORAL at 17:00

## 2020-03-05 RX ADMIN — OXYBUTYNIN CHLORIDE 5 MG: 5 TABLET, EXTENDED RELEASE ORAL at 08:06

## 2020-03-05 RX ADMIN — DIVALPROEX SODIUM 1000 MG: 500 TABLET, DELAYED RELEASE ORAL at 20:47

## 2020-03-05 RX ADMIN — METFORMIN HYDROCHLORIDE 500 MG: 500 TABLET ORAL at 08:06

## 2020-03-05 RX ADMIN — LEVOTHYROXINE SODIUM 75 MCG: 75 TABLET ORAL at 06:02

## 2020-03-05 RX ADMIN — DOCUSATE SODIUM 100 MG: 100 CAPSULE, LIQUID FILLED ORAL at 17:30

## 2020-03-05 RX ADMIN — ATORVASTATIN CALCIUM 10 MG: 10 TABLET, FILM COATED ORAL at 17:30

## 2020-03-05 RX ADMIN — OLANZAPINE 5 MG: 5 TABLET, FILM COATED ORAL at 13:43

## 2020-03-05 RX ADMIN — PANTOPRAZOLE SODIUM 40 MG: 40 TABLET, DELAYED RELEASE ORAL at 06:02

## 2020-03-05 NOTE — PROGRESS NOTES
Melida Half maintained on ongoing SAFE precaution wihtout incident on this shift  Laying in bed with eyes closed, breath even and unlabored  Continuous rounding implemented   No behavioral noted   Will continue to monitor

## 2020-03-05 NOTE — PROGRESS NOTES
03/05/20 0800   Team Meeting   Meeting Type Daily Rounds   Team Members Present   Team Members Present Physician;Nurse;; Other (Discipline and Name)   Physician Team Member Dr Omar Monet Team Member Precious Cruz RN   Care Management Team Member Lily Teague   Other (Discipline and Name) Malu Harris LCSW     Patient still presents disheveled  Preoccupied with discharge  Slept

## 2020-03-05 NOTE — PLAN OF CARE
Problem: Alteration in Thoughts and Perception  Goal: Verbalize thoughts and feelings  Description  Interventions:  - Promote a nonjudgmental and trusting relationship with the patient through active listening and therapeutic communication  - Assess patient's level of functioning, behavior and potential for risk  - Engage patient in 1 on 1 interactions  - Encourage patient to express fears, feelings, frustrations, and discuss symptoms    - [place] patient to reality, help patient recognize reality-based thinking   - Administer medications as ordered and assess for potential side effects  - Provide the patient education related to the signs and symptoms of the illness and desired effects of prescribed medications  Outcome: Progressing  Goal: Refrain from acting on delusional thinking/internal stimuli  Description  Interventions:  - Monitor patient closely, per order   - Utilize least restrictive measures   - Set reasonable limits, give positive feedback for acceptable   - Administer medications as ordered and monitor of potential side effects  Outcome: Progressing  Goal: Agree to be compliant with medication regime, as prescribed and report medication side effects  Description  Interventions:  - Offer appropriate PRN medication and supervise ingestion; conduct AIMS, as needed   Outcome: Progressing  Goal: Attend and participate in unit activities, including therapeutic, recreational, and educational groups  Description  Interventions:  -Encourage Visitation and family involvement in care  Outcome: Progressing     Problem: Ineffective Coping  Goal: Demonstrates healthy coping skills  Outcome: Progressing  Goal: Understands least restrictive measures  Description  Interventions:  - Utilize least restrictive behavior  Outcome: Progressing     Problem: GASTROINTESTINAL - ADULT  Goal: Maintains adequate nutritional intake  Description  INTERVENTIONS:  - Monitor percentage of each meal consumed  - Identify factors contributing to decreased intake, treat as appropriate  - Assist with meals as needed  - Monitor I&O, weight, and lab values if indicated  - Obtain nutrition services referral as needed  Outcome: Luciana Patrick has been awake, alert, and visible intermittently out in the milieu  Listens and sings to music on radio with peers in [facility]  Disheveled in appearance and dressed in layers  Ate 100% supper  Compliant with scheduled meds without prompting  Big Brother in to visit with good interaction noted  Attended and participated in Men's Group and Evening Group and had snack after  Pt remains preoccupied with discharge  Continue to monitor/assess for any changes

## 2020-03-05 NOTE — PROGRESS NOTES
03/03/20 0910   Team Meeting   Meeting Type Tx Team Meeting   Initial Conference Date 03/03/20   Next Conference Date 03/10/20   Team Members Present   Team Members Present Physician;Nurse;; Other (Discipline and Name)   Physician Team Member Dr Arelis Vences Team Member Colton Aldridge, TABBY   Care Management Team Member Lily Young   Other (Discipline and Name) Arabella Gorman [facility]; Maura Zuniga, [facility]   Patient/Family Present   Patient Present Yes   Patient's Family Present No     Patient attended treatment team meeting this morning without his self assessment completed  Patient attended 22% of groups offered last week  Patient still present preoccupied with going out on passes and his discharge disposition  When treatment team tries to discuss what he has been doing on the unit the past week or commenting about how he needs to attend more groups, patient becomes closed off and will terminated the meeting prematurely  Team and patient completed risk assessment and the patient did not verbalize any desire to elope from the program  Patient verbalized understanding of consequences of eloping from treatment while on a commitment  Patient verbalized no further questions or concerns at the conclusion of the meeting
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
## 2020-03-06 PROBLEM — N25.1 DIABETES INSIPIDUS, NEPHROGENIC (HCC): Status: ACTIVE | Noted: 2020-03-06

## 2020-03-06 LAB
ATRIAL RATE: 83 BPM
OSMOLALITY UR: 101 MMOL/KG
P AXIS: 41 DEGREES
PR INTERVAL: 160 MS
QRS AXIS: -26 DEGREES
QRSD INTERVAL: 106 MS
QT INTERVAL: 350 MS
QTC INTERVAL: 411 MS
T WAVE AXIS: 28 DEGREES
VENTRICULAR RATE: 83 BPM

## 2020-03-06 PROCEDURE — 99232 SBSQ HOSP IP/OBS MODERATE 35: CPT | Performed by: PSYCHIATRY & NEUROLOGY

## 2020-03-06 PROCEDURE — 93010 ELECTROCARDIOGRAM REPORT: CPT | Performed by: INTERNAL MEDICINE

## 2020-03-06 PROCEDURE — 83935 ASSAY OF URINE OSMOLALITY: CPT | Performed by: FAMILY MEDICINE

## 2020-03-06 PROCEDURE — 93005 ELECTROCARDIOGRAM TRACING: CPT

## 2020-03-06 RX ADMIN — DIVALPROEX SODIUM 1000 MG: 500 TABLET, DELAYED RELEASE ORAL at 20:34

## 2020-03-06 RX ADMIN — OLANZAPINE 5 MG: 5 TABLET, FILM COATED ORAL at 13:21

## 2020-03-06 RX ADMIN — DIVALPROEX SODIUM 1000 MG: 500 TABLET, DELAYED RELEASE ORAL at 12:34

## 2020-03-06 RX ADMIN — CLOZAPINE 200 MG: 200 TABLET ORAL at 16:45

## 2020-03-06 RX ADMIN — DOCUSATE SODIUM 100 MG: 100 CAPSULE, LIQUID FILLED ORAL at 17:05

## 2020-03-06 RX ADMIN — METFORMIN HYDROCHLORIDE 500 MG: 500 TABLET ORAL at 16:46

## 2020-03-06 RX ADMIN — ATORVASTATIN CALCIUM 10 MG: 10 TABLET, FILM COATED ORAL at 16:45

## 2020-03-06 NOTE — TREATMENT TEAM
03/06/20 0900   Activity/Group Checklist   Group Other (Comment)  (Art Therapy Group/Quotes, Open Discussion)   Attendance Attended   Attendance Duration (min) 16-30  (Observed only )   Interactions Did not interact   Affect/Mood Appropriate
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
## 2020-03-06 NOTE — PROGRESS NOTES
03/06/20 0800   Team Meeting   Meeting Type Daily Rounds   Team Members Present   Team Members Present Physician;Nurse;; Other (Discipline and Name)   Physician Team Member Dr Celina Nageotte Team Member Mayra Baron RN   Care Management Team Member Lily Dunham   Other (Discipline and Name) Lucy Card LCSW     Patient became very irritable at change of shift, banging on the window of the conference room  Has to be redirected  A dinner time threw his tray because he thought that one of his male peers who was responding to internal stimuli was making fun of him  Slept
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
## 2020-03-06 NOTE — PLAN OF CARE
Problem: Alteration in Thoughts and Perception  Goal: Agree to be compliant with medication regime, as prescribed and report medication side effects  Description  Interventions:  - Offer appropriate PRN medication and supervise ingestion; conduct AIMS, as needed   Outcome: Yeison Saunders refused his am meds, after 2 attempts by nursing staff

## 2020-03-06 NOTE — PLAN OF CARE
Problem: Alteration in Thoughts and Perception  Goal: Agree to be compliant with medication regime, as prescribed and report medication side effects  Description  Interventions:  - Offer appropriate PRN medication and supervise ingestion; conduct AIMS, as needed   Outcome: Not Progressing  Goal: Attend and participate in unit activities, including therapeutic, recreational, and educational groups  Description  Interventions:  -Encourage Visitation and family involvement in care  Outcome: Progressing  Goal: Complete daily ADLs, including personal hygiene independently, as able  Description  Interventions:  - Observe, teach, and assist patient with ADLS  - Monitor and promote a balance of rest/activity, with adequate nutrition and elimination   Outcome: Progressing     Tony Ohara at the start of the shift was initially noncompliant with his scheduled medications but his mood improved as the shift continued  Blunted in affect, disheveled in appearance as he wears multiple layers of clothes  Attended selected groups  Will continue to monitor

## 2020-03-06 NOTE — PROGRESS NOTES
After encouragement from staff, patient has become compliant with his urine collection and EKG  EKG normal sinus rhythm QT/QTc 350/411 ms  Will continue to monitor

## 2020-03-06 NOTE — PROGRESS NOTES
Pt presents with an irritable edge, preoccupied in conversation with discharge plans and other privileges  Declined morning medications after multiple attempts by this writer for him to do so  Noncompliant with EKG testing, refused at this time  Will continue to monitor

## 2020-03-06 NOTE — PROGRESS NOTES
Mac Phalen maintained on ongoing SAFE precaution wihtout incident on this shift  Laying in bed with eyes closed, breath even and unlabored  Continuous rounding implemented  During the shift, he was awake and alert, multiple times to drink water    No behavioral noted   Will continue to monitor

## 2020-03-07 LAB
ANION GAP SERPL CALCULATED.3IONS-SCNC: 9 MMOL/L (ref 5–14)
BUN SERPL-MCNC: 7 MG/DL (ref 5–25)
CALCIUM SERPL-MCNC: 9.2 MG/DL (ref 8.4–10.2)
CHLORIDE SERPL-SCNC: 106 MMOL/L (ref 97–108)
CO2 SERPL-SCNC: 23 MMOL/L (ref 22–30)
CREAT SERPL-MCNC: 0.54 MG/DL (ref 0.7–1.5)
ERYTHROCYTE [DISTWIDTH] IN BLOOD BY AUTOMATED COUNT: 13.7 %
GFR SERPL CREATININE-BSD FRML MDRD: 121 ML/MIN/1.73SQ M
GLUCOSE P FAST SERPL-MCNC: 112 MG/DL (ref 70–99)
GLUCOSE SERPL-MCNC: 112 MG/DL (ref 70–99)
HCT VFR BLD AUTO: 43.2 % (ref 41–53)
HGB BLD-MCNC: 14.8 G/DL (ref 13.5–17.5)
MCH RBC QN AUTO: 30 PG (ref 26–34)
MCHC RBC AUTO-ENTMCNC: 34.2 G/DL (ref 31–36)
MCV RBC AUTO: 88 FL (ref 80–100)
OSMOLALITY UR/SERPL-RTO: 300 MMOL/KG (ref 282–298)
PLATELET # BLD AUTO: 157 THOUSANDS/UL (ref 150–450)
PMV BLD AUTO: 8.8 FL (ref 8.9–12.7)
POTASSIUM SERPL-SCNC: 4.7 MMOL/L (ref 3.6–5)
RBC # BLD AUTO: 4.92 MILLION/UL (ref 4.5–5.9)
SODIUM SERPL-SCNC: 138 MMOL/L (ref 137–147)
WBC # BLD AUTO: 8.4 THOUSAND/UL (ref 4.5–11)

## 2020-03-07 PROCEDURE — 99232 SBSQ HOSP IP/OBS MODERATE 35: CPT | Performed by: NURSE PRACTITIONER

## 2020-03-07 PROCEDURE — 83930 ASSAY OF BLOOD OSMOLALITY: CPT | Performed by: FAMILY MEDICINE

## 2020-03-07 PROCEDURE — 80048 BASIC METABOLIC PNL TOTAL CA: CPT | Performed by: FAMILY MEDICINE

## 2020-03-07 PROCEDURE — 85027 COMPLETE CBC AUTOMATED: CPT | Performed by: PSYCHIATRY & NEUROLOGY

## 2020-03-07 RX ADMIN — DOCUSATE SODIUM 100 MG: 100 CAPSULE, LIQUID FILLED ORAL at 17:09

## 2020-03-07 RX ADMIN — ATORVASTATIN CALCIUM 10 MG: 10 TABLET, FILM COATED ORAL at 16:53

## 2020-03-07 RX ADMIN — OXYBUTYNIN CHLORIDE 5 MG: 5 TABLET, EXTENDED RELEASE ORAL at 08:55

## 2020-03-07 RX ADMIN — PANTOPRAZOLE SODIUM 40 MG: 40 TABLET, DELAYED RELEASE ORAL at 05:57

## 2020-03-07 RX ADMIN — DIVALPROEX SODIUM 1000 MG: 500 TABLET, DELAYED RELEASE ORAL at 20:42

## 2020-03-07 RX ADMIN — CLOZAPINE 200 MG: 200 TABLET ORAL at 16:53

## 2020-03-07 RX ADMIN — LEVOTHYROXINE SODIUM 75 MCG: 75 TABLET ORAL at 05:57

## 2020-03-07 RX ADMIN — METFORMIN HYDROCHLORIDE 500 MG: 500 TABLET ORAL at 08:54

## 2020-03-07 RX ADMIN — DIVALPROEX SODIUM 1000 MG: 500 TABLET, DELAYED RELEASE ORAL at 13:00

## 2020-03-07 RX ADMIN — DOCUSATE SODIUM 100 MG: 100 CAPSULE, LIQUID FILLED ORAL at 08:55

## 2020-03-07 RX ADMIN — METFORMIN HYDROCHLORIDE 500 MG: 500 TABLET ORAL at 16:53

## 2020-03-07 RX ADMIN — OLANZAPINE 5 MG: 5 TABLET, FILM COATED ORAL at 13:00

## 2020-03-07 NOTE — PROGRESS NOTES
Sleeping at this time, no distress noted  Awake x1 for water and back to bed with no issues   Safe precautions in place and maintained

## 2020-03-07 NOTE — PLAN OF CARE
Problem: Alteration in Thoughts and Perception  Goal: Treatment Goal: Gain control of psychotic behaviors/thinking, reduce/eliminate presenting symptoms and demonstrate improved reality functioning upon discharge  Outcome: Progressing  Goal: Verbalize thoughts and feelings  Description  Interventions:  - Promote a nonjudgmental and trusting relationship with the patient through active listening and therapeutic communication  - Assess patient's level of functioning, behavior and potential for risk  - Engage patient in 1 on 1 interactions  - Encourage patient to express fears, feelings, frustrations, and discuss symptoms    - [place] patient to reality, help patient recognize reality-based thinking   - Administer medications as ordered and assess for potential side effects  - Provide the patient education related to the signs and symptoms of the illness and desired effects of prescribed medications  Outcome: Progressing  Goal: Refrain from acting on delusional thinking/internal stimuli  Description  Interventions:  - Monitor patient closely, per order   - Utilize least restrictive measures   - Set reasonable limits, give positive feedback for acceptable   - Administer medications as ordered and monitor of potential side effects  Outcome: Progressing  Goal: Agree to be compliant with medication regime, as prescribed and report medication side effects  Description  Interventions:  - Offer appropriate PRN medication and supervise ingestion; conduct AIMS, as needed   Outcome: Progressing  Goal: Recognize dysfunctional thoughts, communicate reality-based thoughts at the time of discharge  Description  Interventions:  - Provide medication and psycho-education to assist patient in compliance and developing insight into his/her illness   Outcome: Progressing     Problem: Ineffective Coping  Goal: Patient/Family verbalizes awareness of resources  Outcome: Progressing  Goal: Understands least restrictive measures  Description  Interventions:  - Utilize least restrictive behavior  Outcome: Progressing     Problem: GASTROINTESTINAL - ADULT  Goal: Maintains adequate nutritional intake  Description  INTERVENTIONS:  - Monitor percentage of each meal consumed  - Identify factors contributing to decreased intake, treat as appropriate  - Assist with meals as needed  - Monitor I&O, weight, and lab values if indicated  - Obtain nutrition services referral as needed  Outcome: Progressing     Problem: Alteration in Thoughts and Perception  Goal: Attend and participate in unit activities, including therapeutic, recreational, and educational groups  Description  Interventions:  -Encourage Visitation and family involvement in care  Outcome: Not Progressing   Mostly isolative to his room, spending most of the day napping in his bed but came out for morning vitals, meds, and meals when prompted  Ate 100% of breakfast and 75% of lunch  No behaviors or issues noted  Continue to monitor

## 2020-03-07 NOTE — PLAN OF CARE
Problem: Alteration in Thoughts and Perception  Goal: Agree to be compliant with medication regime, as prescribed and report medication side effects  Description  Interventions:  - Offer appropriate PRN medication and supervise ingestion; conduct AIMS, as needed   Outcome: Progressing     Problem: Alteration in Thoughts and Perception  Goal: Attend and participate in unit activities, including therapeutic, recreational, and educational groups  Description  Interventions:  -Encourage Visitation and family involvement in care  Outcome: Yeison Baltazar Garcia has been visible intermittently due to napping on and off  When visible pt was cooperative with staff and peers  Pt was med and meal compliant no prompting required  Individual was encouraged but did not attend evening group  No behaviors on shift will continue to monitor

## 2020-03-07 NOTE — PROGRESS NOTES
Progress Note - Behavioral Health     Nerissa Gonzalez 48 y o  male MRN: [id number]   Unit/Bed#: [facility] 991-82 Encounter: [id number]        Denae Post was seen today for continuation of care, records reviewed, and patient was discussed with his nurse  Per nursing report patient is very child-like and will only do things on his terms, he will go from being irritable to being over the top happy  He attends groups from time to time, he will eat from time to time  He has limited group attendance, he is disheveled per staff  Upon approach today Denae Post is in bed sleeping  He is difficult to arouse to engage in conversation with provider  Once awake he is somewhat irritable stating that he is tired today  He denies auditory or visual hallucinations, denies suicidal or homicidal ideation  He states that he does not feel like going to groups he is disheveled and slightly malodorous and he is observed wearing multiple layers of clothing  He has the sheets torn off of his bed and his room is very messy  He appears to be quite unmotivated and he does not have insight into his behaviors  He is acting child-like throughout the interview  He states that he is sleeping well and eating well  He denies any side effects to his medications       Sleep: normal  Appetite: normal  Medication side effects: No   ROS: no complaints, all other systems are negative    Mental Status Evaluation:    Appearance:  disheveled, overweight, Slightly malodorous and wearing multiple layers of clothing   Behavior:  limited eye contact, Child-like and slightly irritable   Speech:  loud, somewhat pressured   Mood:  anxious, somewhat irritable   Affect:  inappropriate, labile   Thought Process:  circumstantial, tangential   Associations: tangential associations   Thought Content:  persecutory delusions, paranoid ideation, negative thinking   Perceptual Disturbances: denies auditory or visual hallucinations when asked, but appears distracted   Risk Potential: Suicidal ideation - None  Homicidal ideation - None  Potential for aggression - Yes, due to poor impulse control   Sensorium:  oriented to person, place and time/date   Memory:  recent and remote memory grossly intact   Consciousness:  alert and awake   Attention: decreased concentration and decreased attention span   Insight:  limited   Judgment: limited   Gait/Station: in bed, unable to assess   Motor Activity: no abnormal movements     Vital signs in last 24 hours:    Temp:  [97 7 °F (36 5 °C)] 97 7 °F (36 5 °C)  HR:  [83] 83  Resp:  [20] 20  BP: (122)/(61) 122/61    Laboratory results:    I have personally reviewed all pertinent laboratory/tests results    CBC:   Lab Results   Component Value Date    WBC 8 40 03/07/2020    RBC 4 92 03/07/2020    HGB 14 8 03/07/2020    HCT 43 2 03/07/2020    MCV 88 03/07/2020     03/07/2020    MCH 30 0 03/07/2020    MCHC 34 2 03/07/2020    RDW 13 7 03/07/2020    MPV 8 8 (L) 03/07/2020    NRBC 0 11/13/2019    NEUTROABS 3 56 02/11/2020     BMP:   Lab Results   Component Value Date    SODIUM 138 03/07/2020    K 4 7 03/07/2020     03/07/2020    CO2 23 03/07/2020    AGAP 9 03/07/2020    BUN 7 03/07/2020    CREATININE 0 54 (L) 03/07/2020    GLUC 112 (H) 03/07/2020    GLUF 112 (H) 03/07/2020    CALCIUM 9 2 03/07/2020    EGFR 121 03/07/2020     CMP:   Lab Results   Component Value Date    SODIUM 138 03/07/2020    K 4 7 03/07/2020     03/07/2020    CO2 23 03/07/2020    AGAP 9 03/07/2020    BUN 7 03/07/2020    CREATININE 0 54 (L) 03/07/2020    GLUC 112 (H) 03/07/2020    GLUF 112 (H) 03/07/2020    CALCIUM 9 2 03/07/2020    AST 18 02/11/2020    ALT 23 02/11/2020    ALKPHOS 72 02/11/2020    TP 6 4 02/11/2020    ALB 3 7 02/11/2020    TBILI 0 20 02/11/2020    EGFR 121 03/07/2020     Urinalysis   Lab Results   Component Value Date    COLORU Straw 04/30/2019    CLARITYU Clear 04/30/2019    SPECGRAV 1 010 04/30/2019    PHUR 7 0 04/30/2019    LEUKOCYTESUR Negative 04/30/2019    NITRITE Negative 04/30/2019    PROTEIN UA Negative 04/30/2019    GLUCOSEU Negative 04/30/2019    KETONESU Negative 04/30/2019    UROBILINOGEN Negative 04/30/2019    BILIRUBINUR Negative 04/30/2019    BLOODU Negative 04/30/2019    RBCUA 1-2 03/27/2014    WBCUA 1-2 03/27/2014    EPIS Occasional 03/27/2014    BACTERIA Occasional 03/27/2014     EKG   Lab Results   Component Value Date    VENTRATE 83 03/06/2020    ATRIALRATE 83 03/06/2020    PRINT 160 03/06/2020    QRSDINT 106 03/06/2020    QTINT 350 03/06/2020    QTCINT 411 03/06/2020    PAXIS 41 03/06/2020    QRSAXIS -26 03/06/2020    TWAVEAXIS 28 03/06/2020       Suicide/Homicide Risk Assessment:    Risk of Harm to Self:   Nursing Suicide Risk Assessment Last 24 hours:  ; Moderate Risk to Self: Age 48 or older ;      Risk of Harm to Others:  Nursing Homicide Risk Assessment: Violence Risk to Others: Denies within past 6 months    The following interventions are recommended: behavioral checks every 15 minutes, continued hospitalization on locked unit  Progress Toward Goals: No significant progress    Assessment/Plan   Principal Problem:    Schizoaffective disorder, bipolar type ([facility])  Active Problems:    Type 2 diabetes mellitus without complication, without long-term current use of insulin ([facility])    Benign essential hypertension    Gastroesophageal reflux disease without esophagitis    Acquired hypothyroidism    Tobacco abuse    Diabetes insipidus, nephrogenic ([facility] [address] )    Recommended Treatment:     Planned medication and treatment changes:     All current active medications have been reviewed  Encourage group therapy, milieu therapy and occupational therapy  Behavioral Health checks every 15 minutes  Internal Medicine Dr Santi Paige following for urine osmolality-per attending's note being evaluated for diabetes insipidus  Status of Admission Status of Admission  Status of Admission: Other (Comment)(305 LC (expires 05/22/20))  Continue current medications:    Current Facility-Administered Medications:  acetaminophen 325 mg Oral Q6H PRN Homer Jimenez MD   acetaminophen 650 mg Oral Q6H PRN Homer Jimenze MD   acetaminophen 975 mg Oral Q8H PRN Homer Jimenez MD   aluminum-magnesium hydroxide-simethicone 30 mL Oral Q4H PRN Homer Jimenez MD   atorvastatin 10 mg Oral Daily With Jignesh Viera MD   benztropine 1 mg Intramuscular Q6H PRN Homer Jimenez MD   benztropine 1 mg Oral Q6H PRN Homer Jimenez MD   cloZAPine 200 mg Oral Daily Homer Jimenez MD   divalproex sodium 1,000 mg Oral BID Homer Jimenez MD   docusate sodium 100 mg Oral BID Homer Jimenez MD   haloperidol 5 mg Oral Q6H PRN Homer Jimenez MD   haloperidol lactate 5 mg Intramuscular Q6H PRN Homer Jimenez MD   levothyroxine 75 mcg Oral Early Morning Homer Jimenez MD   LORazepam 1 mg Intramuscular Q6H PRN Homer Jimenez MD   LORazepam 1 mg Oral Q6H PRN Homer Jimenez MD   magnesium hydroxide 30 mL Oral Daily PRN Homer Jimenez MD   metFORMIN 500 mg Oral BID With Meals Homer Jimenez MD   nicotine polacrilex 2 mg Oral Q2H PRN Homer Jimenez MD   OLANZapine 5 mg Oral After Lunch Homer Jimenez MD   oxybutynin 5 mg Oral Daily Homer Jimenez MD   pantoprazole 40 mg Oral Early Morning Homer Jimenez MD   traZODone 50 mg Oral HS PRN Homer Jimenez MD       Risks / Benefits of Treatment:    Risks, benefits, and possible side effects of medications explained to patient and patient verbalizes understanding and agreement for treatment  Counseling / Coordination of Care:    Patient's progress reviewed with nursing staff  Medications, treatment progress and treatment plan reviewed with patient

## 2020-03-08 PROCEDURE — 99232 SBSQ HOSP IP/OBS MODERATE 35: CPT | Performed by: NURSE PRACTITIONER

## 2020-03-08 RX ADMIN — PANTOPRAZOLE SODIUM 40 MG: 40 TABLET, DELAYED RELEASE ORAL at 06:24

## 2020-03-08 RX ADMIN — DOCUSATE SODIUM 100 MG: 100 CAPSULE, LIQUID FILLED ORAL at 17:23

## 2020-03-08 RX ADMIN — METFORMIN HYDROCHLORIDE 500 MG: 500 TABLET ORAL at 16:36

## 2020-03-08 RX ADMIN — DIVALPROEX SODIUM 1000 MG: 500 TABLET, DELAYED RELEASE ORAL at 20:43

## 2020-03-08 RX ADMIN — DOCUSATE SODIUM 100 MG: 100 CAPSULE, LIQUID FILLED ORAL at 08:39

## 2020-03-08 RX ADMIN — CLOZAPINE 200 MG: 200 TABLET ORAL at 16:36

## 2020-03-08 RX ADMIN — DIVALPROEX SODIUM 1000 MG: 500 TABLET, DELAYED RELEASE ORAL at 13:14

## 2020-03-08 RX ADMIN — OLANZAPINE 5 MG: 5 TABLET, FILM COATED ORAL at 13:14

## 2020-03-08 RX ADMIN — METFORMIN HYDROCHLORIDE 500 MG: 500 TABLET ORAL at 08:38

## 2020-03-08 RX ADMIN — ATORVASTATIN CALCIUM 10 MG: 10 TABLET, FILM COATED ORAL at 16:36

## 2020-03-08 RX ADMIN — OXYBUTYNIN CHLORIDE 5 MG: 5 TABLET, EXTENDED RELEASE ORAL at 08:39

## 2020-03-08 RX ADMIN — LEVOTHYROXINE SODIUM 75 MCG: 75 TABLET ORAL at 06:24

## 2020-03-08 NOTE — PROGRESS NOTES
Progress Note - Behavioral Health     Justina Elizabeth 48 y o  male MRN: [id number]   Unit/Bed#: [facility] 107-01 Encounter: [id number]    Behavior over the last 24 hours: unchanged  Tony Ohara was seen today for continuation of care, records reviewed and patient was discussed with nursing team   Per report, patient has been eating well, ate 100% of his supper last night and has been eating today, sleeping well and is visible on the unit intermittently, did not attend evening group last night and did not attend groups today, has been interacting with some peers appropriately on the unit  The patient has no concerns overnight  Today, Tony Ohara is resting in his bed upon approach, he has limited eye contact as he reports he is still sleepy though he slept the entire night and he denies nightmares, has regular speech and a linear yet child-like thought process  He reports no auditory hallucinations, no visual hallucinations, no suicidal ideation, no homicidal ideation, and no delusional thinking  Tony Ohara is room is very disheveled, he is wearing multiple layers of clothing, he has very poor self-care and very poor insight into his mental health issues  Per staff report he does come out of his room today for meals without prompting and he does come out to get his medications without prompting      Sleep: normal  Appetite: normal  Medication side effects: No   ROS: no complaints, all other systems are negative    Mental Status Evaluation:    Appearance:  disheveled   Behavior:  limited eye contact, Child-like behavior   Speech:  normal rate, normal volume, normal pitch   Mood:  anxious   Affect:  labile   Thought Process:  circumstantial, somewhat tangential   Associations: tangential associations   Thought Content:  persecutory delusions, paranoid ideation   Perceptual Disturbances: denies auditory or visual hallucinations when asked, but appears distracted   Risk Potential: Suicidal ideation - None  Homicidal ideation - None  Potential for aggression - Yes, due to poor impulse control   Sensorium:  oriented to person, place and time/date   Memory:  recent memory intact, remote memory intact   Consciousness:  alert and awake   Attention: decreased concentration and decreased attention span   Insight:  limited   Judgment: limited   Gait/Station: normal gait/station, normal balance   Motor Activity: no abnormal movements     Vital signs in last 24 hours:    Temp:  [97 2 °F (36 2 °C)] 97 2 °F (36 2 °C)  HR:  [76-87] 76  Resp:  [20] 20  BP: (109-113)/(69-75) 113/69    Laboratory results:    I have personally reviewed all pertinent laboratory/tests results  Most Recent Labs:   Lab Results   Component Value Date    WBC 8 40 03/07/2020    RBC 4 92 03/07/2020    HGB 14 8 03/07/2020    HCT 43 2 03/07/2020     03/07/2020    RDW 13 7 03/07/2020    NEUTROABS 3 56 02/11/2020    SODIUM 138 03/07/2020    K 4 7 03/07/2020     03/07/2020    CO2 23 03/07/2020    BUN 7 03/07/2020    CREATININE 0 54 (L) 03/07/2020    GLUC 112 (H) 03/07/2020    GLUF 112 (H) 03/07/2020    CALCIUM 9 2 03/07/2020    AST 18 02/11/2020    ALT 23 02/11/2020    ALKPHOS 72 02/11/2020    TP 6 4 02/11/2020    ALB 3 7 02/11/2020    TBILI 0 20 02/11/2020    CHOLESTEROL 120 12/03/2019    HDL 28 (L) 12/03/2019    TRIG 237 (H) 12/03/2019    LDLCALC 45 12/03/2019    NONHDLC 92 12/03/2019    VALPROICTOT 71 7 02/11/2020    LITHIUM 0 6 03/27/2014    AMMONIA 39 (H) 02/11/2020    RBY8PHGJTNFJ 3 810 12/24/2019    FREET4 0 93 08/23/2019    HGBA1C 6 1 12/03/2019     12/03/2019       Suicide/Homicide Risk Assessment:    Risk of Harm to Self:   Nursing Suicide Risk Assessment Last 24 hours: Low Risk to Self: No evidence of suicidal thoughts or history;  Moderate Risk to Self: Age 48 or older ;      Risk of Harm to Others:  Nursing Homicide Risk Assessment: Violence Risk to Others: Denies within past 6 months    The following interventions are recommended: behavioral checks every 15 minutes, continued hospitalization on locked unit  Progress Toward Goals: No significant change    Assessment/Plan   Principal Problem:    Schizoaffective disorder, bipolar type ([facility])  Active Problems:    Type 2 diabetes mellitus without complication, without long-term current use of insulin ([facility])    Benign essential hypertension    Gastroesophageal reflux disease without esophagitis    Acquired hypothyroidism    Tobacco abuse    Diabetes insipidus, nephrogenic ([facility] [address] )    Recommended Treatment:     Planned medication and treatment changes: All current active medications have been reviewed  Encourage group therapy, milieu therapy and occupational therapy  Behavioral Health checks every 15 minutes  Status of Admission Status of Admission  Status of Admission: Other (Comment)(305 LC (expires 05/22/20))  Check Depakote level, CBC/diff and CMP monthly   Internal medicine Dr Bettina Hutchinson following urine and serum osmolality-tiger texted with her yesterday she was going to write note in chart with recommendations-this is pending    Ask staff to continue to monitor    Current Facility-Administered Medications:  acetaminophen 325 mg Oral Q6H PRN Sheree Gordillo MD   acetaminophen 650 mg Oral Q6H PRN Sheree Gordillo MD   acetaminophen 975 mg Oral Q8H PRN Sheree Gordillo MD   aluminum-magnesium hydroxide-simethicone 30 mL Oral Q4H PRN Sheree Gordillo MD   atorvastatin 10 mg Oral Daily With Gissel Brunson MD   benztropine 1 mg Intramuscular Q6H PRN Sheree Gordillo MD   benztropine 1 mg Oral Q6H PRN Sheree Gordillo MD   cloZAPine 200 mg Oral Daily Sheree Gordillo MD   divalproex sodium 1,000 mg Oral BID Sheree Gordillo MD   docusate sodium 100 mg Oral BID Sheree Gordillo MD   haloperidol 5 mg Oral Q6H PRN Sheree Gordillo MD   haloperidol lactate 5 mg Intramuscular Q6H PRN Sheree Gordillo MD   levothyroxine 75 mcg Oral Early Morning Sheree Gordillo MD   LORazepam 1 mg Intramuscular Q6H PRN Sheree Gordillo MD   LORazepam 1 mg Oral Q6H PRN Sheree Gordillo MD magnesium hydroxide 30 mL Oral Daily PRN Sandra Desir MD   metFORMIN 500 mg Oral BID With Meals Sandra Desir MD   nicotine polacrilex 2 mg Oral Q2H PRN Sandra Desir MD   OLANZapine 5 mg Oral After Lunch Sandra Desir MD   oxybutynin 5 mg Oral Daily Sandra Desir MD   pantoprazole 40 mg Oral Early Morning Sandra Desir MD   traZODone 50 mg Oral HS PRN Sandra Desir MD       Risks / Benefits of Treatment:    Risks, benefits, and possible side effects of medications explained to patient  Patient has limited understanding of risks and benefits of treatment at this time, but agrees to take medications as prescribed  Counseling / Coordination of Care:    Patient's progress reviewed with nursing staff  Medications, treatment progress and treatment plan reviewed with patient

## 2020-03-08 NOTE — PLAN OF CARE
Problem: Alteration in Thoughts and Perception  Goal: Treatment Goal: Gain control of psychotic behaviors/thinking, reduce/eliminate presenting symptoms and demonstrate improved reality functioning upon discharge  Outcome: Progressing  Goal: Verbalize thoughts and feelings  Description  Interventions:  - Promote a nonjudgmental and trusting relationship with the patient through active listening and therapeutic communication  - Assess patient's level of functioning, behavior and potential for risk  - Engage patient in 1 on 1 interactions  - Encourage patient to express fears, feelings, frustrations, and discuss symptoms    - [place] patient to reality, help patient recognize reality-based thinking   - Administer medications as ordered and assess for potential side effects  - Provide the patient education related to the signs and symptoms of the illness and desired effects of prescribed medications  Outcome: Progressing  Goal: Refrain from acting on delusional thinking/internal stimuli  Description  Interventions:  - Monitor patient closely, per order   - Utilize least restrictive measures   - Set reasonable limits, give positive feedback for acceptable   - Administer medications as ordered and monitor of potential side effects  Outcome: Progressing  Goal: Agree to be compliant with medication regime, as prescribed and report medication side effects  Description  Interventions:  - Offer appropriate PRN medication and supervise ingestion; conduct AIMS, as needed   Outcome: Progressing  Goal: Recognize dysfunctional thoughts, communicate reality-based thoughts at the time of discharge  Description  Interventions:  - Provide medication and psycho-education to assist patient in compliance and developing insight into his/her illness   Outcome: Progressing     Problem: Ineffective Coping  Goal: Patient/Family verbalizes awareness of resources  Outcome: Progressing  Goal: Understands least restrictive measures  Description  Interventions:  - Utilize least restrictive behavior  Outcome: Progressing     Problem: GASTROINTESTINAL - ADULT  Goal: Maintains adequate nutritional intake  Description  INTERVENTIONS:  - Monitor percentage of each meal consumed  - Identify factors contributing to decreased intake, treat as appropriate  - Assist with meals as needed  - Monitor I&O, weight, and lab values if indicated  - Obtain nutrition services referral as needed  Outcome: Progressing     Problem: Alteration in Thoughts and Perception  Goal: Attend and participate in unit activities, including therapeutic, recreational, and educational groups  Description  Interventions:  -Encourage Visitation and family involvement in care  Outcome: Not Progressing  Goal: Complete daily ADLs, including personal hygiene independently, as able  Description  Interventions:  - Observe, teach, and assist patient with ADLS  - Monitor and promote a balance of rest/activity, with adequate nutrition and elimination   Outcome: Not Progressing   Mostly isolative to his room, spending most of the day napping in his bed, did not get up for vitals, but but came out for breakfast and took his morning meds without prompting  Ate 100% of breakfast and lunch, then came for his afternoon meds  No behaviors or issues noted  Continue to monitor

## 2020-03-08 NOTE — PLAN OF CARE
Problem: Alteration in Thoughts and Perception  Goal: Verbalize thoughts and feelings  Description  Interventions:  - Promote a nonjudgmental and trusting relationship with the patient through active listening and therapeutic communication  - Assess patient's level of functioning, behavior and potential for risk  - Engage patient in 1 on 1 interactions  - Encourage patient to express fears, feelings, frustrations, and discuss symptoms    - [place] patient to reality, help patient recognize reality-based thinking   - Administer medications as ordered and assess for potential side effects  - Provide the patient education related to the signs and symptoms of the illness and desired effects of prescribed medications  Outcome: Progressing  Goal: Refrain from acting on delusional thinking/internal stimuli  Description  Interventions:  - Monitor patient closely, per order   - Utilize least restrictive measures   - Set reasonable limits, give positive feedback for acceptable   - Administer medications as ordered and monitor of potential side effects  Outcome: Progressing  Goal: Agree to be compliant with medication regime, as prescribed and report medication side effects  Description  Interventions:  - Offer appropriate PRN medication and supervise ingestion; conduct AIMS, as needed   Outcome: Progressing  Goal: Attend and participate in unit activities, including therapeutic, recreational, and educational groups  Description  Interventions:  -Encourage Visitation and family involvement in care  Outcome: Progressing     Problem: Ineffective Coping  Goal: Demonstrates healthy coping skills  Outcome: Progressing  Goal: Understands least restrictive measures  Description  Interventions:  - Utilize least restrictive behavior  Outcome: Todd Paulinot has been awake, alert, and visible intermittently out in the milieu  Ate 100% supper  Listens to music on radio and singing in [employer]    Pt continues to be preoccupied with discharge and asks staff when he will be leaving  Rests in room at intervals  Social with select peers  Compliant with scheduled meds without prompting  No behavioral issues  Denies any depression, anxiety, si/hi, intent, plan, a/v hallucinations, and has not verbalized any delusions  Pt did not attend evening group but came out for snack after  Continue to monitor/assess for any changes

## 2020-03-09 PROCEDURE — 99232 SBSQ HOSP IP/OBS MODERATE 35: CPT | Performed by: PSYCHIATRY & NEUROLOGY

## 2020-03-09 RX ADMIN — CLOZAPINE 200 MG: 200 TABLET ORAL at 17:12

## 2020-03-09 RX ADMIN — DOCUSATE SODIUM 100 MG: 100 CAPSULE, LIQUID FILLED ORAL at 17:12

## 2020-03-09 RX ADMIN — LEVOTHYROXINE SODIUM 75 MCG: 75 TABLET ORAL at 06:15

## 2020-03-09 RX ADMIN — PANTOPRAZOLE SODIUM 40 MG: 40 TABLET, DELAYED RELEASE ORAL at 06:15

## 2020-03-09 RX ADMIN — METFORMIN HYDROCHLORIDE 500 MG: 500 TABLET ORAL at 17:12

## 2020-03-09 RX ADMIN — DIVALPROEX SODIUM 1000 MG: 500 TABLET, DELAYED RELEASE ORAL at 20:50

## 2020-03-09 RX ADMIN — ATORVASTATIN CALCIUM 10 MG: 10 TABLET, FILM COATED ORAL at 17:12

## 2020-03-09 NOTE — PLAN OF CARE
Problem: Alteration in Thoughts and Perception  Goal: Verbalize thoughts and feelings  Description  Interventions:  - Promote a nonjudgmental and trusting relationship with the patient through active listening and therapeutic communication  - Assess patient's level of functioning, behavior and potential for risk  - Engage patient in 1 on 1 interactions  - Encourage patient to express fears, feelings, frustrations, and discuss symptoms    - [place] patient to reality, help patient recognize reality-based thinking   - Administer medications as ordered and assess for potential side effects  - Provide the patient education related to the signs and symptoms of the illness and desired effects of prescribed medications  Outcome: Progressing  Goal: Refrain from acting on delusional thinking/internal stimuli  Description  Interventions:  - Monitor patient closely, per order   - Utilize least restrictive measures   - Set reasonable limits, give positive feedback for acceptable   - Administer medications as ordered and monitor of potential side effects  Outcome: Not Progressing  Goal: Agree to be compliant with medication regime, as prescribed and report medication side effects  Description  Interventions:  - Offer appropriate PRN medication and supervise ingestion; conduct AIMS, as needed   Outcome: Progressing  Goal: Attend and participate in unit activities, including therapeutic, recreational, and educational groups  Description  Interventions:  -Encourage Visitation and family involvement in care  Outcome: Progressing     Problem: Ineffective Coping  Goal: Demonstrates healthy coping skills  Outcome: Progressing  Goal: Understands least restrictive measures  Description  Interventions:  - Utilize least restrictive behavior  Outcome: Progressing     Problem: GASTROINTESTINAL - ADULT  Goal: Maintains adequate nutritional intake  Description  INTERVENTIONS:  - Monitor percentage of each meal consumed  - Identify factors contributing to decreased intake, treat as appropriate  - Assist with meals as needed  - Monitor I&O, weight, and lab values if indicated  - Obtain nutrition services referral as needed  Outcome: Amada Reed has been awake, alert, and visible intermittently out in the milieu  Pt initially refused scheduled supper time meds but then took them  Discussed with pt the importance of taking his meds, group attendance, activities if he is to get off unit privileges  Pt spoke of wanting to go on [address] trip with staff and peers but writer then received phone call at nurse's station (at [phone]) from hospital  who stated Nicci Manzanares had phoned the  from pt phone on unit and stated that he wanted to speak to nursing supervisor and file a police report  Pt then stated that he did not take his meds this AM because he thought he was being kidnapped  Reassurance given that he is safe in hospital   Ate 100% supper  Disheveled in appearance and dressed in layers  Social when out in milieu with select peers  Pt did not attend evening group but came out for snack after  Compliant with scheduled meds without prompting  Continue to monitor/assess for any changes

## 2020-03-09 NOTE — PLAN OF CARE
Problem: Alteration in Thoughts and Perception  Goal: Verbalize thoughts and feelings  Description  Interventions:  - Promote a nonjudgmental and trusting relationship with the patient through active listening and therapeutic communication  - Assess patient's level of functioning, behavior and potential for risk  - Engage patient in 1 on 1 interactions  - Encourage patient to express fears, feelings, frustrations, and discuss symptoms    - [place] patient to reality, help patient recognize reality-based thinking   - Administer medications as ordered and assess for potential side effects  - Provide the patient education related to the signs and symptoms of the illness and desired effects of prescribed medications  Outcome: Progressing     Problem: Alteration in Thoughts and Perception  Goal: Treatment Goal: Gain control of psychotic behaviors/thinking, reduce/eliminate presenting symptoms and demonstrate improved reality functioning upon discharge  Outcome: Not Progressing  Goal: Refrain from acting on delusional thinking/internal stimuli  Description  Interventions:  - Monitor patient closely, per order   - Utilize least restrictive measures   - Set reasonable limits, give positive feedback for acceptable   - Administer medications as ordered and monitor of potential side effects  Outcome: Not Progressing  Goal: Agree to be compliant with medication regime, as prescribed and report medication side effects  Description  Interventions:  - Offer appropriate PRN medication and supervise ingestion; conduct AIMS, as needed   Outcome: Not Progressing  Goal: Attend and participate in unit activities, including therapeutic, recreational, and educational groups  Description  Interventions:  -Encourage Visitation and family involvement in care  Outcome: Not Progressing  Goal: Recognize dysfunctional thoughts, communicate reality-based thoughts at the time of discharge  Description  Interventions:  - Provide medication and psycho-education to assist patient in compliance and developing insight into his/her illness   Outcome: Not Progressing  Goal: Complete daily ADLs, including personal hygiene independently, as able  Description  Interventions:  - Observe, teach, and assist patient with ADLS  - Monitor and promote a balance of rest/activity, with adequate nutrition and elimination   Outcome: Not Progressing     Problem: GASTROINTESTINAL - ADULT  Goal: Maintains adequate nutritional intake  Description  INTERVENTIONS:  - Monitor percentage of each meal consumed  - Identify factors contributing to decreased intake, treat as appropriate  - Assist with meals as needed  - Monitor I&O, weight, and lab values if indicated  - Obtain nutrition services referral as needed  Outcome: Not Progressing   Mostly isolative to his room, napping in his bed  Refused breakfast, and refused morning meds after being prompted multiple times  Lacks insight into illness, is disorganized in thought, and is unmotivated  Angry and irritable when prompted to take meds or reminded of unit rules/routine, yelling "leave me alone"  Disheveled in appearance, wearing multiple layers of clothing, and did not shower or do hygiene  Out for lunch and ate 100%, but refused to take his meds  "Dr Jin Cherry won't let me go to [facility], so I'm not gonna take my meds"  Presented to him that he would not be allowed to go off unit if he is not compliant with taking his meds, following unit routine, and attending groups but patient failed to grasp these concepts  No other behaviors or issues noted  Continue to monitor
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
## 2020-03-09 NOTE — PROGRESS NOTES
03/06/20 1100   Activity/Group Checklist   Group Other (Comment)  ([facility] - Weekly Goal Review/Hopelessness)   Attendance Attended   Attendance Duration (min) 46-60   Interactions Interacted appropriately   Affect/Mood Appropriate   Goals Achieved Identified triggers; Able to listen to others; Able to engage in interactions; Able to reflect/comment on own behavior;Able to self-disclose; Able to manage/cope with feelings; Able to give feedback to another;Able to recieve feedback     Patient attended [facility] Goal Review/Hopelessness  Patient presented as quiet and cooperative, engaged in the discussion  He reported that the goals that he completed in the last week included "playing pool/air hockey" and attending more groups  He was reminded that he must continue to attend groups in order to go off grounds with his Big Brother [facility] [place] [facility]  Patient required one instance of redirection, but behaviors were mostly controlled
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
## 2020-03-09 NOTE — PROGRESS NOTES
03/09/20 0800   Team Meeting   Meeting Type Daily Rounds   Team Members Present   Team Members Present Physician;Nurse;; Other (Discipline and Name)   Physician Team Member Dr Marta Post Team Member Norma Dawson RN   Care Management Team Member Lily Briseno   Other (Discipline and Name) Rebecca Contreras LCSW     Patient has a good visit with his "big brother" Kellie Sor over the weekend  No behavioral changes  Slept

## 2020-03-09 NOTE — PLAN OF CARE
Problem: Alteration in Thoughts and Perception  Goal: Verbalize thoughts and feelings  Description  Interventions:  - Promote a nonjudgmental and trusting relationship with the patient through active listening and therapeutic communication  - Assess patient's level of functioning, behavior and potential for risk  - Engage patient in 1 on 1 interactions  - Encourage patient to express fears, feelings, frustrations, and discuss symptoms    - [place] patient to reality, help patient recognize reality-based thinking   - Administer medications as ordered and assess for potential side effects  - Provide the patient education related to the signs and symptoms of the illness and desired effects of prescribed medications  Outcome: Progressing  Goal: Refrain from acting on delusional thinking/internal stimuli  Description  Interventions:  - Monitor patient closely, per order   - Utilize least restrictive measures   - Set reasonable limits, give positive feedback for acceptable   - Administer medications as ordered and monitor of potential side effects  Outcome: Progressing  Goal: Agree to be compliant with medication regime, as prescribed and report medication side effects  Description  Interventions:  - Offer appropriate PRN medication and supervise ingestion; conduct AIMS, as needed   Outcome: Progressing  Goal: Attend and participate in unit activities, including therapeutic, recreational, and educational groups  Description  Interventions:  -Encourage Visitation and family involvement in care  Outcome: Progressing  Goal: Complete daily ADLs, including personal hygiene independently, as able  Description  Interventions:  - Observe, teach, and assist patient with ADLS  - Monitor and promote a balance of rest/activity, with adequate nutrition and elimination   Outcome: Progressing     Problem: Ineffective Coping  Goal: Demonstrates healthy coping skills  Outcome: Progressing  Goal: Understands least restrictive measures  Description  Interventions:  - Utilize least restrictive behavior  Outcome: Progressing     Problem: GASTROINTESTINAL - ADULT  Goal: Maintains adequate nutritional intake  Description  INTERVENTIONS:  - Monitor percentage of each meal consumed  - Identify factors contributing to decreased intake, treat as appropriate  - Assist with meals as needed  - Monitor I&O, weight, and lab values if indicated  - Obtain nutrition services referral as needed  Outcome: Milly Morrissey has been awake, alert, and visible intermittently out in the milieu  Ate 100% supper  Pt remains preoccupied about discharge  Brother in to visit with good interaction noted  Pt showered this shift but remains disheveled in appearance and dressed in layers  Compliant with scheduled meds with some prompting  Rests in room at intervals  Had evening snack  Social with select peers when out in milieu  Continue to monitor/assess for any changes

## 2020-03-10 PROCEDURE — 99232 SBSQ HOSP IP/OBS MODERATE 35: CPT | Performed by: PSYCHIATRY & NEUROLOGY

## 2020-03-10 RX ADMIN — LEVOTHYROXINE SODIUM 75 MCG: 75 TABLET ORAL at 06:22

## 2020-03-10 RX ADMIN — OXYBUTYNIN CHLORIDE 5 MG: 5 TABLET, EXTENDED RELEASE ORAL at 08:20

## 2020-03-10 RX ADMIN — PANTOPRAZOLE SODIUM 40 MG: 40 TABLET, DELAYED RELEASE ORAL at 06:22

## 2020-03-10 RX ADMIN — DIVALPROEX SODIUM 1000 MG: 500 TABLET, DELAYED RELEASE ORAL at 21:02

## 2020-03-10 RX ADMIN — ALUMINUM HYDROXIDE, MAGNESIUM HYDROXIDE, AND SIMETHICONE 30 ML: 200; 200; 20 SUSPENSION ORAL at 23:59

## 2020-03-10 RX ADMIN — DOCUSATE SODIUM 100 MG: 100 CAPSULE, LIQUID FILLED ORAL at 08:20

## 2020-03-10 RX ADMIN — HALOPERIDOL 5 MG: 5 TABLET ORAL at 21:02
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
## 2020-03-10 NOTE — TREATMENT TEAM
03/10/20 1100   Activity/Group Checklist   Group Other (Comment)  (IMR)   Attendance Did not attend     Patient did not attend group  Patient was encouraged to attend, but declined

## 2020-03-10 NOTE — TREATMENT TEAM
03/10/20 1430   Activity/Group Checklist   Group Other (Comment)  (recovery workshop)   Attendance Did not attend     Patient did not attend  Patient was encouraged to attend, but denied
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
## 2020-03-10 NOTE — PLAN OF CARE
Problem: Alteration in Thoughts and Perception  Goal: Agree to be compliant with medication regime, as prescribed and report medication side effects  Description  Interventions:  - Offer appropriate PRN medication and supervise ingestion; conduct AIMS, as needed   Outcome: Not Progressing  Goal: Attend and participate in unit activities, including therapeutic, recreational, and educational groups  Description  Interventions:  -Encourage Visitation and family involvement in care  Outcome: Not Progressing    Individual has been in bed majority of the shift, visible only at lunch time  He did not participate in programming, no groups were attended and he declined treatment team meeting  He was compliant with morning meds except he refused Metformin,when they were taken in to him in his room  This afternoon he refused 1200 Depakote  He is frustrated that he is not able to participate in privileges, but he is not following through with expectations  Behaviors have been controlled, no loud, angry, outbursts  He declined breakfast, but ate 100% of lunch  Will continue to monitor

## 2020-03-10 NOTE — PROGRESS NOTES
03/10/20 0800   Team Meeting   Meeting Type Daily Rounds   Team Members Present   Team Members Present Physician;Nurse;; Other (Discipline and Name)   Physician Team Member Dr Ysabel Pink Team Member Khris Elizalde RN   Care Management Team Member Lily Flynn   Other (Discipline and Name) Jose Randall LCSW     Patient did not do any programming dayshift but did attend some groups 3-11 shift  Patient also refused to take his medications dayshift but took his meds 3-11 shift  Slept

## 2020-03-10 NOTE — PLAN OF CARE
Problem: Alteration in Thoughts and Perception  Goal: Verbalize thoughts and feelings  Description  Interventions:  - Promote a nonjudgmental and trusting relationship with the patient through active listening and therapeutic communication  - Assess patient's level of functioning, behavior and potential for risk  - Engage patient in 1 on 1 interactions  - Encourage patient to express fears, feelings, frustrations, and discuss symptoms    - [place] patient to reality, help patient recognize reality-based thinking   - Administer medications as ordered and assess for potential side effects  - Provide the patient education related to the signs and symptoms of the illness and desired effects of prescribed medications  Outcome: Progressing     Problem: Alteration in Thoughts and Perception  Goal: Treatment Goal: Gain control of psychotic behaviors/thinking, reduce/eliminate presenting symptoms and demonstrate improved reality functioning upon discharge  Outcome: Not Progressing  Goal: Refrain from acting on delusional thinking/internal stimuli  Description  Interventions:  - Monitor patient closely, per order   - Utilize least restrictive measures   - Set reasonable limits, give positive feedback for acceptable   - Administer medications as ordered and monitor of potential side effects  Outcome: Not Progressing  Goal: Agree to be compliant with medication regime, as prescribed and report medication side effects  Description  Interventions:  - Offer appropriate PRN medication and supervise ingestion; conduct AIMS, as needed   Outcome: Not Progressing  Goal: Attend and participate in unit activities, including therapeutic, recreational, and educational groups  Description  Interventions:  -Encourage Visitation and family involvement in care  Outcome: Not Progressing  Goal: Recognize dysfunctional thoughts, communicate reality-based thoughts at the time of discharge  Description  Interventions:  - Provide medication and psycho-education to assist patient in compliance and developing insight into his/her illness   Outcome: Not Progressing  Goal: Complete daily ADLs, including personal hygiene independently, as able  Description  Interventions:  - Observe, teach, and assist patient with ADLS  - Monitor and promote a balance of rest/activity, with adequate nutrition and elimination   Outcome: Not Progressing    Irritable, agitated, non-compliant with medications and program  Refusing all supper meds with multiple attempts and much encouragement  Isolative to room and self    Will continue to monitor progress in recovery program

## 2020-03-10 NOTE — PROGRESS NOTES
Sleeping with no distress noted  Safe and fall precautions in place and maintained    Monitoring continues

## 2020-03-11 PROCEDURE — 99232 SBSQ HOSP IP/OBS MODERATE 35: CPT | Performed by: PSYCHIATRY & NEUROLOGY

## 2020-03-11 RX ADMIN — DOCUSATE SODIUM 100 MG: 100 CAPSULE, LIQUID FILLED ORAL at 17:14

## 2020-03-11 RX ADMIN — PANTOPRAZOLE SODIUM 40 MG: 40 TABLET, DELAYED RELEASE ORAL at 05:46

## 2020-03-11 RX ADMIN — METFORMIN HYDROCHLORIDE 500 MG: 500 TABLET ORAL at 16:39

## 2020-03-11 RX ADMIN — ATORVASTATIN CALCIUM 10 MG: 10 TABLET, FILM COATED ORAL at 16:39

## 2020-03-11 RX ADMIN — DIVALPROEX SODIUM 1000 MG: 500 TABLET, DELAYED RELEASE ORAL at 20:51

## 2020-03-11 RX ADMIN — NICOTINE POLACRILEX 2 MG: 2 GUM, CHEWING ORAL at 16:38

## 2020-03-11 RX ADMIN — DOCUSATE SODIUM 100 MG: 100 CAPSULE, LIQUID FILLED ORAL at 08:27

## 2020-03-11 RX ADMIN — METFORMIN HYDROCHLORIDE 500 MG: 500 TABLET ORAL at 08:27

## 2020-03-11 RX ADMIN — LEVOTHYROXINE SODIUM 75 MCG: 75 TABLET ORAL at 05:46

## 2020-03-11 RX ADMIN — DIVALPROEX SODIUM 1000 MG: 500 TABLET, DELAYED RELEASE ORAL at 11:58

## 2020-03-11 RX ADMIN — OXYBUTYNIN CHLORIDE 5 MG: 5 TABLET, EXTENDED RELEASE ORAL at 08:27

## 2020-03-11 RX ADMIN — OLANZAPINE 5 MG: 5 TABLET, FILM COATED ORAL at 13:16

## 2020-03-11 RX ADMIN — CLOZAPINE 200 MG: 200 TABLET ORAL at 16:39

## 2020-03-11 RX ADMIN — NICOTINE POLACRILEX 2 MG: 2 GUM, CHEWING ORAL at 14:25

## 2020-03-11 NOTE — PLAN OF CARE
Problem: Alteration in Thoughts and Perception  Goal: Verbalize thoughts and feelings  Description  Interventions:  - Promote a nonjudgmental and trusting relationship with the patient through active listening and therapeutic communication  - Assess patient's level of functioning, behavior and potential for risk  - Engage patient in 1 on 1 interactions  - Encourage patient to express fears, feelings, frustrations, and discuss symptoms    - [place] patient to reality, help patient recognize reality-based thinking   - Administer medications as ordered and assess for potential side effects  - Provide the patient education related to the signs and symptoms of the illness and desired effects of prescribed medications  Outcome: Progressing  Goal: Refrain from acting on delusional thinking/internal stimuli  Description  Interventions:  - Monitor patient closely, per order   - Utilize least restrictive measures   - Set reasonable limits, give positive feedback for acceptable   - Administer medications as ordered and monitor of potential side effects  Outcome: Progressing  Goal: Agree to be compliant with medication regime, as prescribed and report medication side effects  Description  Interventions:  - Offer appropriate PRN medication and supervise ingestion; conduct AIMS, as needed   Outcome: Progressing  Goal: Attend and participate in unit activities, including therapeutic, recreational, and educational groups  Description  Interventions:  -Encourage Visitation and family involvement in care  Outcome: Progressing  Goal: Complete daily ADLs, including personal hygiene independently, as able  Description  Interventions:  - Observe, teach, and assist patient with ADLS  - Monitor and promote a balance of rest/activity, with adequate nutrition and elimination   Outcome: Not Progressing     Problem: Ineffective Coping  Goal: Demonstrates healthy coping skills  Outcome: Progressing  Goal: Understands least restrictive measures  Description  Interventions:  - Utilize least restrictive behavior  Outcome: Progressing     Problem: GASTROINTESTINAL - ADULT  Goal: Maintains adequate nutritional intake  Description  INTERVENTIONS:  - Monitor percentage of each meal consumed  - Identify factors contributing to decreased intake, treat as appropriate  - Assist with meals as needed  - Monitor I&O, weight, and lab values if indicated  - Obtain nutrition services referral as needed  Outcome: Annette Renee has been awake, alert, and visible intermittently out in the milieu  Social with select peers  Pt went to [facility] with staff and peers  Remains disheveled in appearance and dressed in layers  No behavioral issues this shift  Remains preoccupied with discharge  Ate 100% supper  Rests in room at intervals  Pt requested Nicotine gum and one piece 2 mg po given at 1638  Pt did not attend evening group but came out for snack after  Compliant with scheduled meds with prompting  Continue to monitor/assess for any changes

## 2020-03-11 NOTE — PROGRESS NOTES
03/11/20 0800   Team Meeting   Meeting Type Daily Rounds   Team Members Present   Team Members Present Physician;Nurse;; Other (Discipline and Name)   Physician Team Member Dr Rolly Conde Team Member Jorge Pérez RN   Care Management Team Member Lily Keen   Other (Discipline and Name) Colt Dotson LCSW     Patient refused medications yesterday  Banging on the nursing station window  Security called  Labile  Slept

## 2020-03-11 NOTE — PROGRESS NOTES
Sleeping now, no distress noted  Complained of heartburn @0000, prn mylanta given with good effect  Safe precautions in place and maintained    Monitoring continues

## 2020-03-11 NOTE — TREATMENT TEAM
ALFARO GROUP NOTE  Despite some off topic conversation, was able to respond appropriately when asked what he learned during group       03/11/20 1100   Activity/Group Checklist   Group Other (Comment)  (IMR)   Attendance Attended   Attendance Duration (min) 31-45   Interactions Interacted appropriately  (Hyperverbal/Off Topic at times  Redirectable )   Affect/Mood Appropriate   Goals Achieved Able to listen to others; Able to engage in interactions

## 2020-03-11 NOTE — PLAN OF CARE
Problem: Individualized Interventions  Goal: Attend and participate in unit activities, including therapeutic, recreational, and educational groups  Description  PSYCHOTHERAPY INTERVENTIONS:    -Form therapeutic alliance to promote trust and safety within a therapeutic environment    -Engage in individual psychotherapy using evidence-based practices that are specific to individual needs   -Process barriers to community living with an emphasis on solution-based interventions   -Promote self-awareness and identity development   -Identify and process patterns of behavior that have led to decompensation and/or hospitalizations   -Attend psychoeducation groups with licensed psychotherapist to learn about Illness Management Recovery (IMR) concepts and enhance coping skills    -Practice effective communication with staff, peers, family, and community members  Participate in family sessions as necessary   -Encourage reality-based thought content by examining thought processes and cognitive distortions      -Work with treatment team in reintegration back into the community when appropriate  Outcome: Not Progressing    After prompting patient for group and medications multiple times today, therapist attempted to sit down with patient to provide some direction  Patient usually does maintain some tolerance for this therapist and will walk away when he becomes frustrated  Therapist empathized with patient who believes that people are trying to make him unhappy by not allowing him to go on passes with his Big Brother or to [facility]  Therapist reminded patient of his responsibilities when it comes to going off the unit  Patient continued to state that staff and his sisters want "keep him" from [facility]  Patient did eventually become agitated enough to punch the wall next to the phone and told therapist "go away I don't want to talk anymore "  Therapist did retreat so as not to escalate the patient's behaviors  Therapist will continue to work with patient on following unit procedures to gain community privileges
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
## 2020-03-11 NOTE — PLAN OF CARE
Problem: Alteration in Thoughts and Perception  Goal: Treatment Goal: Gain control of psychotic behaviors/thinking, reduce/eliminate presenting symptoms and demonstrate improved reality functioning upon discharge  Outcome: Not Progressing  Goal: Refrain from acting on delusional thinking/internal stimuli  Description  Interventions:  - Monitor patient closely, per order   - Utilize least restrictive measures   - Set reasonable limits, give positive feedback for acceptable   - Administer medications as ordered and monitor of potential side effects  Outcome: Not Progressing  Goal: Agree to be compliant with medication regime, as prescribed and report medication side effects  Description  Interventions:  - Offer appropriate PRN medication and supervise ingestion; conduct AIMS, as needed   Outcome: Progressing  Goal: Attend and participate in unit activities, including therapeutic, recreational, and educational groups  Description  Interventions:  -Encourage Visitation and family involvement in care  Outcome: Progressing  Goal: Recognize dysfunctional thoughts, communicate reality-based thoughts at the time of discharge  Description  Interventions:  - Provide medication and psycho-education to assist patient in compliance and developing insight into his/her illness   Outcome: Not Progressing     Problem: Ineffective Coping  Goal: Demonstrates healthy coping skills  Outcome: Not Progressing    Felix Concepcion maintained on SAFE precaution without incident on this shift  He is alert, awake, irritable, agitate upon approach  During group time which was held out on the deck  MHT reported that is was extremely disruptive  Exposing his abdomen, running and standing on stop of the bench  He return back edgy  At 2100, this writer ask him to produce his ID bracelet to be scan for med  He stated that he did have it and he was told to go his room in search of his bracelet  Return back to med room with his ID band    This writer ask him to check his pocket in thought that he had it on his person  Yesika Carter became  Agitated, banging on the medication window  Accusing this writer of trying to have her way with him during the night  And that I should not attempt to come tonight while he was sleeping   (writer was making continuous  rounds from 11pm-3am on 3/9/2020)  Several staff members tried to redirect him and he was not complaining  Security was call  He then turned toward to MHT, accusing her of  "she started this "shit" I not the problem, she is" , pointing to the MHT  And that the MHT was talking about him  PRN haldol 5mg given at 2102 after much protest and the possible of IM injection with the present of security  He took his PRN with mouth check  New ID band was created and secure to his wrist   Behavior slightly calmer but remain suspicious and paranoid behavior like  Currently pacing on the unit, in and out of his room  Staring at staff members as her walk by  Will continue to monitor

## 2020-03-12 PROCEDURE — 99232 SBSQ HOSP IP/OBS MODERATE 35: CPT | Performed by: PSYCHIATRY & NEUROLOGY

## 2020-03-12 RX ADMIN — ATORVASTATIN CALCIUM 10 MG: 10 TABLET, FILM COATED ORAL at 17:05

## 2020-03-12 RX ADMIN — CLOZAPINE 200 MG: 200 TABLET ORAL at 17:00

## 2020-03-12 RX ADMIN — OLANZAPINE 5 MG: 5 TABLET, FILM COATED ORAL at 13:00

## 2020-03-12 RX ADMIN — OXYBUTYNIN CHLORIDE 5 MG: 5 TABLET, EXTENDED RELEASE ORAL at 08:33

## 2020-03-12 RX ADMIN — DOCUSATE SODIUM 100 MG: 100 CAPSULE, LIQUID FILLED ORAL at 08:33

## 2020-03-12 RX ADMIN — DIVALPROEX SODIUM 1000 MG: 500 TABLET, DELAYED RELEASE ORAL at 13:00

## 2020-03-12 RX ADMIN — PANTOPRAZOLE SODIUM 40 MG: 40 TABLET, DELAYED RELEASE ORAL at 05:47

## 2020-03-12 RX ADMIN — DIVALPROEX SODIUM 1000 MG: 500 TABLET, DELAYED RELEASE ORAL at 20:48

## 2020-03-12 RX ADMIN — LEVOTHYROXINE SODIUM 75 MCG: 75 TABLET ORAL at 05:47

## 2020-03-12 RX ADMIN — METFORMIN HYDROCHLORIDE 500 MG: 500 TABLET ORAL at 17:05

## 2020-03-12 RX ADMIN — DOCUSATE SODIUM 100 MG: 100 CAPSULE, LIQUID FILLED ORAL at 17:05

## 2020-03-12 RX ADMIN — METFORMIN HYDROCHLORIDE 500 MG: 500 TABLET ORAL at 08:33

## 2020-03-12 NOTE — PROGRESS NOTES
03/12/20 0900   Team Meeting   Meeting Type Daily Rounds   Team Members Present   Team Members Present Physician;Nurse;; Other (Discipline and Name)   Physician Team Member Dr Ramón Parson Team Member Ivana Pérez RN   Care Management Team Member Lily Gill   Other (Discipline and Name) Cecily Marmolejo LCSW     Patient's behaviors were controlled yesterday  Slept

## 2020-03-12 NOTE — PLAN OF CARE
Problem: Ineffective Coping  Goal: Patient/Family participate in treatment and DC plans  Description  Interventions:  - Provide therapeutic environment  Outcome: Progressing    Therapist spoke with patient's Big Brother Dano Enrique who expressed frustration that patient has not been able to go on a pass off the unit  He continues to say that he believes patient needs a reward first, which therapist is agreeable to, but patient's behaviors at this point will not alvin him off-grounds privileges  Therapist did tell patient that if he attended [facility] today, she would bring him a soda, and he did comply  He has been until this point uncooperative with trying to initiate a behavioral plan  Patient's Big Brother encouraged to call therapist back with additional concerns  He will be notified once patient is eligible for a pass
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
## 2020-03-12 NOTE — PROGRESS NOTES
Denae Post maintained on ongoing SAFE precaution wihtout incident on this shift  Laying in bed with eyes closed, breath even and unlabored   Continuous rounding implemented   No behavioral noted   Will continue to monitor

## 2020-03-12 NOTE — PLAN OF CARE
Problem: Alteration in Thoughts and Perception  Goal: Agree to be compliant with medication regime, as prescribed and report medication side effects  Description  Interventions:  - Offer appropriate PRN medication and supervise ingestion; conduct AIMS, as needed   Outcome: Progressing     Problem: Ineffective Coping  Goal: Patient/Family verbalizes awareness of resources  Outcome: Progressing  Goal: Understands least restrictive measures  Description  Interventions:  - Utilize least restrictive behavior  Outcome: Progressing     Problem: Alteration in Thoughts and Perception  Goal: Treatment Goal: Gain control of psychotic behaviors/thinking, reduce/eliminate presenting symptoms and demonstrate improved reality functioning upon discharge  Outcome: Not Progressing  Goal: Verbalize thoughts and feelings  Description  Interventions:  - Promote a nonjudgmental and trusting relationship with the patient through active listening and therapeutic communication  - Assess patient's level of functioning, behavior and potential for risk  - Engage patient in 1 on 1 interactions  - Encourage patient to express fears, feelings, frustrations, and discuss symptoms    - [place] patient to reality, help patient recognize reality-based thinking   - Administer medications as ordered and assess for potential side effects  - Provide the patient education related to the signs and symptoms of the illness and desired effects of prescribed medications  Outcome: Not Progressing  Goal: Refrain from acting on delusional thinking/internal stimuli  Description  Interventions:  - Monitor patient closely, per order   - Utilize least restrictive measures   - Set reasonable limits, give positive feedback for acceptable   - Administer medications as ordered and monitor of potential side effects  Outcome: Not Progressing  Goal: Attend and participate in unit activities, including therapeutic, recreational, and educational groups  Description  Interventions:  -Encourage Visitation and family involvement in care  Outcome: Not Progressing  Goal: Recognize dysfunctional thoughts, communicate reality-based thoughts at the time of discharge  Description  Interventions:  - Provide medication and psycho-education to assist patient in compliance and developing insight into his/her illness   Outcome: Not Progressing  Goal: Complete daily ADLs, including personal hygiene independently, as able  Description  Interventions:  - Observe, teach, and assist patient with ADLS  - Monitor and promote a balance of rest/activity, with adequate nutrition and elimination   Outcome: Not Progressing     Problem: Ineffective Coping  Goal: Identifies ineffective coping skills  Outcome: Not Progressing  Goal: Identifies healthy coping skills  Outcome: Not Progressing  Goal: Demonstrates healthy coping skills  Outcome: Not Progressing   Mostly isolative to his room, sleeping in his bed  Refused to get up for vitals and breakfast, refused to allow staff draw blood for labs - "leave me alone"  Disheveled with poor hygiene  Disorganized in thought process and continues to lack insight into his illness  Refused all groups  Med compliant when meds brought to him and encouraged to take  No other behaviors or issues noted  Continue to monitor

## 2020-03-12 NOTE — PROGRESS NOTES
Maryan Guillen refused his bloodwork: VPA, CMP and CBC w/Diff this morning after several attempts by various staff
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
## 2020-03-13 PROCEDURE — 99232 SBSQ HOSP IP/OBS MODERATE 35: CPT | Performed by: PSYCHIATRY & NEUROLOGY

## 2020-03-13 RX ORDER — OLANZAPINE 5 MG/1
5 TABLET ORAL
Status: DISCONTINUED | OUTPATIENT
Start: 2020-03-13 | End: 2020-03-13

## 2020-03-13 RX ORDER — OLANZAPINE 5 MG/1
5 TABLET ORAL
Status: DISCONTINUED | OUTPATIENT
Start: 2020-03-13 | End: 2020-07-07

## 2020-03-13 RX ORDER — DOCUSATE SODIUM 100 MG/1
100 CAPSULE, LIQUID FILLED ORAL 2 TIMES DAILY
Status: DISCONTINUED | OUTPATIENT
Start: 2020-03-13 | End: 2020-04-12

## 2020-03-13 RX ORDER — DIVALPROEX SODIUM 500 MG/1
1500 TABLET, EXTENDED RELEASE ORAL
Status: DISCONTINUED | OUTPATIENT
Start: 2020-03-14 | End: 2020-08-07 | Stop reason: HOSPADM

## 2020-03-13 RX ORDER — DIVALPROEX SODIUM 500 MG/1
1500 TABLET, EXTENDED RELEASE ORAL
Status: DISCONTINUED | OUTPATIENT
Start: 2020-03-14 | End: 2020-03-13

## 2020-03-13 RX ADMIN — DOCUSATE SODIUM 100 MG: 100 CAPSULE, LIQUID FILLED ORAL at 08:24

## 2020-03-13 RX ADMIN — DOCUSATE SODIUM 100 MG: 100 CAPSULE, LIQUID FILLED ORAL at 16:51

## 2020-03-13 RX ADMIN — DIVALPROEX SODIUM 1000 MG: 500 TABLET, DELAYED RELEASE ORAL at 20:54

## 2020-03-13 RX ADMIN — METFORMIN HYDROCHLORIDE 500 MG: 500 TABLET ORAL at 08:23

## 2020-03-13 RX ADMIN — NICOTINE POLACRILEX 2 MG: 2 GUM, CHEWING ORAL at 18:13

## 2020-03-13 RX ADMIN — DIVALPROEX SODIUM 1000 MG: 500 TABLET, DELAYED RELEASE ORAL at 13:00

## 2020-03-13 RX ADMIN — CLOZAPINE 200 MG: 200 TABLET ORAL at 16:51

## 2020-03-13 RX ADMIN — OXYBUTYNIN CHLORIDE 5 MG: 5 TABLET, EXTENDED RELEASE ORAL at 08:23

## 2020-03-13 RX ADMIN — PANTOPRAZOLE SODIUM 40 MG: 40 TABLET, DELAYED RELEASE ORAL at 06:02

## 2020-03-13 RX ADMIN — LEVOTHYROXINE SODIUM 75 MCG: 75 TABLET ORAL at 06:02

## 2020-03-13 RX ADMIN — OLANZAPINE 5 MG: 5 TABLET, FILM COATED ORAL at 20:54

## 2020-03-13 RX ADMIN — ATORVASTATIN CALCIUM 10 MG: 10 TABLET, FILM COATED ORAL at 16:51

## 2020-03-13 RX ADMIN — METFORMIN HYDROCHLORIDE 500 MG: 500 TABLET ORAL at 16:51
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
## 2020-03-13 NOTE — PLAN OF CARE
Problem: DISCHARGE PLANNING  Goal: Discharge to home or other facility with appropriate resources  Description    CASE MANAGEMENT INTERVENTIONS:  - Conduct assessment to determine patient/family and health care team treatment goals, and need for post-acute services based on payer coverage, community resources, patient preferences and barriers to discharge  - Address psychosocial, clinical, and financial barriers to discharge as identified in assessment in conjunction with the patient/family and health care team   - Assist the patient in reintegration back into the community by removing barriers which may hinder a successful discharge once deemed stable  - Arrange appropriate level of post-acute services according to patient's needs and preference and payer coverage in collaboration with the physician and health care team   - Communicate with and update the patient/family, physician, and health care team regarding progress on the discharge plan  - Arrange appropriate transportation to post-acute venues  Outcome: Progressing     CM called and spoke with patient's friend/"Big brother", Nadir Pyle, and notified him of the restriction on visitations to the behavioral health units implemented today due to the current COVID-19 outbreak  CM instructed Nadir Pyle to reach out to the [facility] PCM, Natalie Cummings, regarding any questions/concerns he may have regarding this situation  CM also provided a brief update on patient's status  CM will continue to follow patient's progress and assist with discharge planning needs

## 2020-03-13 NOTE — PLAN OF CARE
Problem: Alteration in Thoughts and Perception  Goal: Verbalize thoughts and feelings  Description  Interventions:  - Promote a nonjudgmental and trusting relationship with the patient through active listening and therapeutic communication  - Assess patient's level of functioning, behavior and potential for risk  - Engage patient in 1 on 1 interactions  - Encourage patient to express fears, feelings, frustrations, and discuss symptoms    - [place] patient to reality, help patient recognize reality-based thinking   - Administer medications as ordered and assess for potential side effects  - Provide the patient education related to the signs and symptoms of the illness and desired effects of prescribed medications  Outcome: Progressing  Goal: Refrain from acting on delusional thinking/internal stimuli  Description  Interventions:  - Monitor patient closely, per order   - Utilize least restrictive measures   - Set reasonable limits, give positive feedback for acceptable   - Administer medications as ordered and monitor of potential side effects  Outcome: Progressing  Goal: Agree to be compliant with medication regime, as prescribed and report medication side effects  Description  Interventions:  - Offer appropriate PRN medication and supervise ingestion; conduct AIMS, as needed   Outcome: Progressing  Goal: Attend and participate in unit activities, including therapeutic, recreational, and educational groups  Description  Interventions:  -Encourage Visitation and family involvement in care  Outcome: Not Freda David is awake and alert  Pleasant with a slight edge of irritability  Poor eye contact  Brief with his responses  Continue to to be meds and meals compliant  He did not part take on group this evening  Denies any depression or anxiety  No overt delusion or a/t/v/ hallucination noted  No behavioral noted  Will continue to monitor
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
## 2020-03-13 NOTE — PLAN OF CARE
Problem: Alteration in Thoughts and Perception  Goal: Verbalize thoughts and feelings  Description  Interventions:  - Promote a nonjudgmental and trusting relationship with the patient through active listening and therapeutic communication  - Assess patient's level of functioning, behavior and potential for risk  - Engage patient in 1 on 1 interactions  - Encourage patient to express fears, feelings, frustrations, and discuss symptoms    - [place] patient to reality, help patient recognize reality-based thinking   - Administer medications as ordered and assess for potential side effects  - Provide the patient education related to the signs and symptoms of the illness and desired effects of prescribed medications  Outcome: Progressing     Problem: Ineffective Coping  Goal: Understands least restrictive measures  Description  Interventions:  - Utilize least restrictive behavior  Outcome: Progressing     Problem: Alteration in Thoughts and Perception  Goal: Treatment Goal: Gain control of psychotic behaviors/thinking, reduce/eliminate presenting symptoms and demonstrate improved reality functioning upon discharge  Outcome: Not Progressing  Goal: Refrain from acting on delusional thinking/internal stimuli  Description  Interventions:  - Monitor patient closely, per order   - Utilize least restrictive measures   - Set reasonable limits, give positive feedback for acceptable   - Administer medications as ordered and monitor of potential side effects  Outcome: Not Progressing  Goal: Agree to be compliant with medication regime, as prescribed and report medication side effects  Description  Interventions:  - Offer appropriate PRN medication and supervise ingestion; conduct AIMS, as needed   Outcome: Not Progressing  Goal: Attend and participate in unit activities, including therapeutic, recreational, and educational groups  Description  Interventions:  -Encourage Visitation and family involvement in care  Outcome: Not Progressing  Goal: Recognize dysfunctional thoughts, communicate reality-based thoughts at the time of discharge  Description  Interventions:  - Provide medication and psycho-education to assist patient in compliance and developing insight into his/her illness   Outcome: Not Progressing  Goal: Complete daily ADLs, including personal hygiene independently, as able  Description  Interventions:  - Observe, teach, and assist patient with ADLS  - Monitor and promote a balance of rest/activity, with adequate nutrition and elimination   Outcome: Not Progressing   Mostly isolative to his room, sleeping in his bed, Out for vitals, morning meds, and meals  Did not attend any groups  Afternoon meds brought to him to ensure compliance  Disheveled with poor hygiene  Disorganized in thought process and continues to lack insight into his illness  No other behaviors or issues noted  Continue to monitor

## 2020-03-13 NOTE — PROGRESS NOTES
03/13/20 0900   Team Meeting   Meeting Type Daily Rounds   Team Members Present   Team Members Present Physician;Nurse;; Other (Discipline and Name)   Physician Team Member Dr Reinaldo Hawkins Team Member Martina Pickens RN   Care Management Team Member Lily Clark   Other (Discipline and Name) Danis Cordero LCSW     Patient was irritable yesterday, telling to staff to leave him alone  Refused to have labs done  Slept

## 2020-03-14 RX ADMIN — DOCUSATE SODIUM 100 MG: 100 CAPSULE, LIQUID FILLED ORAL at 17:08

## 2020-03-14 RX ADMIN — LEVOTHYROXINE SODIUM 75 MCG: 75 TABLET ORAL at 06:24

## 2020-03-14 RX ADMIN — PANTOPRAZOLE SODIUM 40 MG: 40 TABLET, DELAYED RELEASE ORAL at 06:24

## 2020-03-14 RX ADMIN — DIVALPROEX SODIUM 1500 MG: 500 TABLET, EXTENDED RELEASE ORAL at 21:02

## 2020-03-14 RX ADMIN — NICOTINE POLACRILEX 2 MG: 2 GUM, CHEWING ORAL at 17:56

## 2020-03-14 RX ADMIN — CLOZAPINE 200 MG: 200 TABLET ORAL at 16:17

## 2020-03-14 RX ADMIN — METFORMIN HYDROCHLORIDE 500 MG: 500 TABLET ORAL at 16:17

## 2020-03-14 RX ADMIN — TRAZODONE HYDROCHLORIDE 50 MG: 50 TABLET ORAL at 21:19

## 2020-03-14 RX ADMIN — OLANZAPINE 5 MG: 5 TABLET, FILM COATED ORAL at 21:02

## 2020-03-14 RX ADMIN — ATORVASTATIN CALCIUM 10 MG: 10 TABLET, FILM COATED ORAL at 16:17

## 2020-03-14 RX ADMIN — MAGNESIUM HYDROXIDE 30 ML: 400 SUSPENSION ORAL at 20:32

## 2020-03-14 NOTE — PLAN OF CARE
Problem: Alteration in Thoughts and Perception  Goal: Agree to be compliant with medication regime, as prescribed and report medication side effects  Description  Interventions:  - Offer appropriate PRN medication and supervise ingestion; conduct AIMS, as needed   Outcome: Not Progressing  Goal: Complete daily ADLs, including personal hygiene independently, as able  Description  Interventions:  - Observe, teach, and assist patient with ADLS  - Monitor and promote a balance of rest/activity, with adequate nutrition and elimination   Outcome: Not Sofia Courser has been isolative to his room, noncompliant with scheduled programming, hygiene and refused his morning medications, vitals napping for the majority of the shift  Non-compliant with his morning scheduled medications and breakfast but was visible minimally during lunch time and then returned to his room  Behaviors controlled  Will continue to monitor for changes

## 2020-03-14 NOTE — PLAN OF CARE
Problem: Alteration in Thoughts and Perception  Goal: Verbalize thoughts and feelings  Description  Interventions:  - Promote a nonjudgmental and trusting relationship with the patient through active listening and therapeutic communication  - Assess patient's level of functioning, behavior and potential for risk  - Engage patient in 1 on 1 interactions  - Encourage patient to express fears, feelings, frustrations, and discuss symptoms    - [place] patient to reality, help patient recognize reality-based thinking   - Administer medications as ordered and assess for potential side effects  - Provide the patient education related to the signs and symptoms of the illness and desired effects of prescribed medications  Outcome: Progressing  Goal: Refrain from acting on delusional thinking/internal stimuli  Description  Interventions:  - Monitor patient closely, per order   - Utilize least restrictive measures   - Set reasonable limits, give positive feedback for acceptable   - Administer medications as ordered and monitor of potential side effects  Outcome: Progressing  Goal: Agree to be compliant with medication regime, as prescribed and report medication side effects  Description  Interventions:  - Offer appropriate PRN medication and supervise ingestion; conduct AIMS, as needed   Outcome: Progressing  Goal: Attend and participate in unit activities, including therapeutic, recreational, and educational groups  Description  Interventions:  -Encourage Visitation and family involvement in care  Outcome: Not Progressing  Goal: Complete daily ADLs, including personal hygiene independently, as able  Description  Interventions:  - Observe, teach, and assist patient with ADLS  - Monitor and promote a balance of rest/activity, with adequate nutrition and elimination   Outcome: Not Progressing     Problem: Ineffective Coping  Goal: Demonstrates healthy coping skills  Outcome: Not Progressing  Goal: Understands least restrictive measures  Description  Interventions:  - Utilize least restrictive behavior  Outcome: Progressing     Problem: GASTROINTESTINAL - ADULT  Goal: Maintains adequate nutritional intake  Description  INTERVENTIONS:  - Monitor percentage of each meal consumed  - Identify factors contributing to decreased intake, treat as appropriate  - Assist with meals as needed  - Monitor I&O, weight, and lab values if indicated  - Obtain nutrition services referral as needed  Outcome: Sepideh Sensor has been awake, alert, and visible intermittently out in the milieu  Pt remains preoccupied with discharge  Staff attempted x 2 to obtain CBC w/diff but pt refused  Rests in room at intervals  Compliant with scheduled meds with some prompting  Ate 100% supper  Pt requested Nicotine gum and one piece 2 mg given at 1813  Pt did not attend evening group but came out for snack after  No BM since 3/9/20 and refused prn  Denies any discomfort  Affect blunted  Social with select peers when out in milieu  Continue to monitor/assess for any changes

## 2020-03-14 NOTE — PROGRESS NOTES
Psychiatry Progress Note    Subjective: Interval History     Patient isolative to his room  Disheveled in appearance  Patient reports that he is doing okay today  Continues to be preoccupied with discharge date  Continues to be provided Education and support  Patient lacking insight into his symptoms and admission  Had was again noncompliant with laboratory studies yesterday  Was compliant with medications with support and encouragement of staff  Has been eating his meals  Slept at long intervals      Behavior over the last 24 hours:  unchanged  Sleep: normal  Appetite: normal  Medication side effects: No  ROS: no complaints    Current medications:    Current Facility-Administered Medications:     acetaminophen (TYLENOL) tablet 325 mg, 325 mg, Oral, Q6H PRN, Elijah Chauhan MD    acetaminophen (TYLENOL) tablet 650 mg, 650 mg, Oral, Q6H PRN, Elijah Chauhan MD    acetaminophen (TYLENOL) tablet 975 mg, 975 mg, Oral, Q8H PRN, Elijah Chauhan MD    aluminum-magnesium hydroxide-simethicone (MYLANTA) 200-200-20 mg/5 mL oral suspension 30 mL, 30 mL, Oral, Q4H PRN, Elijah Chauhan MD, 30 mL at 03/10/20 2359    atorvastatin (LIPITOR) tablet 10 mg, 10 mg, Oral, Daily With Reji Dangelo MD, 10 mg at 03/13/20 1651    benztropine (COGENTIN) injection 1 mg, 1 mg, Intramuscular, Q6H PRN, Elijah Chauhan MD    benztropine (COGENTIN) tablet 1 mg, 1 mg, Oral, Q6H PRN, Elijah Chauhan MD    clozapine (CLOZARIL) tablet 200 mg, 200 mg, Oral, Daily, Elijah Chauhan MD, 200 mg at 03/13/20 1651    divalproex sodium (DEPAKOTE ER) 24 hr tablet 1,500 mg, 1,500 mg, Oral, HS, Elijah Chauhan MD    docusate sodium (COLACE) capsule 100 mg, 100 mg, Oral, BID, Elijah Chauhan MD, 100 mg at 03/13/20 1651    haloperidol (HALDOL) tablet 5 mg, 5 mg, Oral, Q6H PRN, Elijah Chauhan MD, 5 mg at 03/10/20 2102    haloperidol lactate (HALDOL) injection 5 mg, 5 mg, Intramuscular, Q6H PRN, Elijah Chauhan MD    levothyroxine tablet 75 mcg, 75 mcg, Oral, Early Morning, Genaro Glover MD, 75 mcg at 03/14/20 7321    LORazepam (ATIVAN) 2 mg/mL injection 1 mg, 1 mg, Intramuscular, Q6H PRN, Genaro Glover MD    LORazepam (ATIVAN) tablet 1 mg, 1 mg, Oral, Q6H PRN, Genaro Glover MD    magnesium hydroxide (MILK OF MAGNESIA) 400 mg/5 mL oral suspension 30 mL, 30 mL, Oral, Daily PRN, Genaro Glover MD, 30 mL at 03/04/20 0451    metFORMIN (GLUCOPHAGE) tablet 500 mg, 500 mg, Oral, BID With Meals, Genaro Glover MD, 500 mg at 03/13/20 1651    nicotine polacrilex (NICORETTE) gum 2 mg, 2 mg, Oral, Q2H PRN, Genaro Glover MD, 2 mg at 03/13/20 1813    OLANZapine (ZyPREXA) tablet 5 mg, 5 mg, Oral, HS, Genaro Glover MD, 5 mg at 03/13/20 2054    oxybutynin (DITROPAN-XL) 24 hr tablet 5 mg, 5 mg, Oral, Daily, Genaro Glover MD, 5 mg at 03/13/20 7948    pantoprazole (PROTONIX) EC tablet 40 mg, 40 mg, Oral, Early Morning, Genaro Glover MD, 40 mg at 03/14/20 1804    traZODone (DESYREL) tablet 50 mg, 50 mg, Oral, HS PRN, Genaro Glover MD, 50 mg at 02/26/20 2140    Current Problem List:    Patient Active Problem List   Diagnosis    Encephalopathy acute    [facility]    Schizoaffective disorder, bipolar type ([facility]    Constipation, chronic    Hyperammonemia (HCC)    Type 2 diabetes mellitus without complication, without long-term current use of insulin (HCC)    Tracheobronchitis    Streptococcus pneumoniae    Chronic airway obstruction, not elsewhere classified    Benign essential hypertension    Disorder of lipoprotein and lipid metabolism    Plantar fasciitis    Foot callus    Gastroesophageal reflux disease without esophagitis    GI bleed    Hyponatremia    Acquired hypothyroidism    Ischemic colitis ([facility] )    Moderate cigarette smoker (10-19 per day)    Morbid obesity (HCC)    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    BMI 34 0-34 9,adult    Nail abnormality    Encounter for well adult exam with abnormal findings    Wheezing on right side of chest on exhalation    Tobacco abuse    Diabetes insipidus, nephrogenic ([facility] )       Problem list reviewed 03/14/20     Objective:     Vital Signs:  Vitals:    03/08/20 0700 03/08/20 0705 03/11/20 0700 03/13/20 0825   BP:   150/94 102/68   BP Location:   Left arm Right arm   Pulse: 87 76 (!) 118 80   Resp: 20  20 17   Temp:   (!) 97 4 °F (36 3 °C) 97 9 °F (36 6 °C)   TempSrc: Temporal  Temporal    Weight: 103 kg (227 lb 9 6 oz)      Height:             Appearance:  age appropriate, casually dressed and disheveled   Behavior:  normal   Speech:  soft   Mood:  constricted   Affect:  blunted   Thought Process:  normal   Thought Content:  normal   Perceptual Disturbances: None   Risk Potential: none   Sensorium:  person, place, situation and time   Cognition:  intact   Consciousness:  alert and awake    Attention: attention span and concentration were age appropriate   Intellect: average   Insight:  limited   Judgment: limited      Motor Activity: no abnormal movements       I/O Past 24 hours:  No intake/output data recorded  No intake/output data recorded  Labs:  Reviewed 03/14/20    Progress Toward Goals:  Unchanged    Assessment / Plan:     Schizoaffective disorder, bipolar type ([facility] )    Recommended Treatment:      Medication changes:  1) continue current treatment plan    Non-pharmacological treatments  1) Continue with group therapy, milieu therapy and occupational therapy  Safety  1) Safety/communication plan established targeting dynamic risk factors above  2) Risks, benefits, and possible side effects of medications explained to patient and patient verbalizes understanding  Counseling / Coordination of Care    Total floor / unit time spent today 20 minutes  Greater than 50% of total time was spent with the patient and / or family counseling and / or coordination of care  A description of the counseling / coordination of care       Patient's Rights, confidentiality and exceptions to confidentiality, use of automated medical record, [facility] staff access to medical record, and consent to treatment reviewed      Cinthia Rao PA-C

## 2020-03-15 RX ADMIN — ATORVASTATIN CALCIUM 10 MG: 10 TABLET, FILM COATED ORAL at 17:08

## 2020-03-15 RX ADMIN — PANTOPRAZOLE SODIUM 40 MG: 40 TABLET, DELAYED RELEASE ORAL at 06:14

## 2020-03-15 RX ADMIN — DIVALPROEX SODIUM 1500 MG: 500 TABLET, EXTENDED RELEASE ORAL at 20:09

## 2020-03-15 RX ADMIN — OLANZAPINE 5 MG: 5 TABLET, FILM COATED ORAL at 20:09

## 2020-03-15 RX ADMIN — TRAZODONE HYDROCHLORIDE 50 MG: 50 TABLET ORAL at 20:43

## 2020-03-15 RX ADMIN — DOCUSATE SODIUM 100 MG: 100 CAPSULE, LIQUID FILLED ORAL at 17:08

## 2020-03-15 RX ADMIN — METFORMIN HYDROCHLORIDE 500 MG: 500 TABLET ORAL at 17:08

## 2020-03-15 RX ADMIN — LEVOTHYROXINE SODIUM 75 MCG: 75 TABLET ORAL at 06:14

## 2020-03-15 RX ADMIN — CLOZAPINE 200 MG: 200 TABLET ORAL at 17:08

## 2020-03-15 NOTE — PROGRESS NOTES
Psychiatry Progress Note    Subjective: Interval History     Patient isolative to his room  Remains disheveled in appearance  Patient asleep this morning and was difficult to arouse  Was able to be woken for evaluation however patient reporting that he is fine and does not need anything  Patient still dismissive of evaluation as his insight into his symptoms remains poor and remains fixated on discharge  Still continues to have periods impulsivity and flight of ideas  Not completing ADLs  Was non compliant with his morning medications yesterday  Slept with p r n   Trazodone    Behavior over the last 24 hours:  unchanged  Sleep: normal  Appetite: normal  Medication side effects: No  ROS: no complaints    Current medications:    Current Facility-Administered Medications:     acetaminophen (TYLENOL) tablet 325 mg, 325 mg, Oral, Q6H PRN, Meldon Curling, MD    acetaminophen (TYLENOL) tablet 650 mg, 650 mg, Oral, Q6H PRN, Meldon Curling, MD    acetaminophen (TYLENOL) tablet 975 mg, 975 mg, Oral, Q8H PRN, Meldon Curling, MD    aluminum-magnesium hydroxide-simethicone (MYLANTA) 200-200-20 mg/5 mL oral suspension 30 mL, 30 mL, Oral, Q4H PRN, Meldon Curling, MD, 30 mL at 03/10/20 235    atorvastatin (LIPITOR) tablet 10 mg, 10 mg, Oral, Daily With Rexene Apgar, MD, 10 mg at 20 161    benztropine (COGENTIN) injection 1 mg, 1 mg, Intramuscular, Q6H PRN, Meldon Curling, MD    benztropine (COGENTIN) tablet 1 mg, 1 mg, Oral, Q6H PRN, Meldon Curling, MD    clozapine (CLOZARIL) tablet 200 mg, 200 mg, Oral, Daily, Meldon Curling, MD, 200 mg at 20 161    divalproex sodium (DEPAKOTE ER) 24 hr tablet 1,500 mg, 1,500 mg, Oral, HS, Meldon Curling, MD, 1,500 mg at 20 210    docusate sodium (COLACE) capsule 100 mg, 100 mg, Oral, BID, Meldon Curling, MD, 100 mg at 20 1708    haloperidol (HALDOL) tablet 5 mg, 5 mg, Oral, Q6H PRN, Meldon Curling, MD, 5 mg at 03/10/20 2102    haloperidol lactate (HALDOL) injection 5 mg, 5 mg, Intramuscular, Q6H PRN, Toña Harris MD    levothyroxine tablet 75 mcg, 75 mcg, Oral, Early Morning, Toña Harris MD, 75 mcg at 03/15/20 5267    LORazepam (ATIVAN) 2 mg/mL injection 1 mg, 1 mg, Intramuscular, Q6H PRN, Toña Harris MD    LORazepam (ATIVAN) tablet 1 mg, 1 mg, Oral, Q6H PRN, Toña Harris MD    magnesium hydroxide (MILK OF MAGNESIA) 400 mg/5 mL oral suspension 30 mL, 30 mL, Oral, Daily PRN, Toña Harris MD, 30 mL at 03/14/20 2032    metFORMIN (GLUCOPHAGE) tablet 500 mg, 500 mg, Oral, BID With Meals, Toña Harris MD, 500 mg at 03/14/20 1617    nicotine polacrilex (NICORETTE) gum 2 mg, 2 mg, Oral, Q2H PRN, Toña Harris MD, 2 mg at 03/14/20 1756    OLANZapine (ZyPREXA) tablet 5 mg, 5 mg, Oral, HS, Toña Harris MD, 5 mg at 03/14/20 2102    oxybutynin (DITROPAN-XL) 24 hr tablet 5 mg, 5 mg, Oral, Daily, Toña Harris MD, 5 mg at 03/13/20 8260    pantoprazole (PROTONIX) EC tablet 40 mg, 40 mg, Oral, Early Morning, Toña Harris MD, 40 mg at 03/15/20 4728    traZODone (DESYREL) tablet 50 mg, 50 mg, Oral, HS PRN, Toña aHrris MD, 50 mg at 03/14/20 2119    Current Problem List:    Patient Active Problem List   Diagnosis    Encephalopathy acute    Bipolar 1 disorder (HCC)    [facility]oaffective disorder, bipolar type [facility] )    Constipation, chronic    Hyperammonemia (HCC)    Type 2 diabetes mellitus without complication, without long-term current use of insulin (HCC)    Tracheobronchitis    Streptococcus pneumoniae    Chronic airway obstruction, not elsewhere classified    Benign essential hypertension    Disorder of lipoprotein and lipid metabolism    Plantar fasciitis    Foot callus    Gastroesophageal reflux disease without esophagitis    GI bleed    Hyponatremia    Acquired hypothyroidism    Ischemic colitis (HCC)    Moderate cigarette smoker (10-19 per day)    Morbid obesity (HCC)    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    BMI 34 0-34 9,adult    Nail abnormality    Encounter for well adult exam with abnormal findings    Wheezing on right side of chest on exhalation    Tobacco abuse    [facility] insipidus[facility]rogenic [facility] )       Problem list reviewed 03/15/20     Objective:     Vital Signs:  Vitals:    03/08/20 0700 03/08/20 0705 03/11/20 0700 03/13/20 0825   BP:   150/94 102/68   BP Location:   Left arm Right arm   Pulse: 87 76 (!) 118 80   Resp: 20  20 17   Temp:    97 9 °F (36 6 °C)   TempSrc: Temporal  Temporal    Weight: 103 kg (227 lb 9 6 oz)      Height:             Appearance:  age appropriate, casually dressed and disheveled   Behavior:  normal   Speech:  soft   Mood:  constricted   Affect:  blunted   Thought Process:  flight of ideas   Thought Content:  normal   Perceptual Disturbances: None   Risk Potential: none   Sensorium:  person, place and time   Cognition:  intact   Consciousness:  alert and awake    Attention: attention span and concentration were age appropriate   Intellect: average   Insight:  limited   Judgment: limited      Motor Activity: no abnormal movements       I/O Past 24 hours:  No intake/output data recorded  No intake/output data recorded  Labs:  Reviewed 03/15/20    Progress Toward Goals:  Unchanged    Assessment / Plan:     Schizoaffective disorder, bipolar type ([facility] )    Recommended Treatment:      Medication changes:  1) continue current treatment plan    Non-pharmacological treatments  1) Continue with group therapy, milieu therapy and occupational therapy  Safety  1) Safety/communication plan established targeting dynamic risk factors above  2) Risks, benefits, and possible side effects of medications explained to patient and patient verbalizes understanding  Counseling / Coordination of Care    Total floor / unit time spent today 20 minutes  Greater than 50% of total time was spent with the patient and / or family counseling and / or coordination of care   A description of the counseling / coordination of care  Patient's Rights, confidentiality and exceptions to confidentiality, use of automated medical record, [address] [facility] staff access to medical record, and consent to treatment reviewed      Chandrika Seo PA-C

## 2020-03-15 NOTE — PLAN OF CARE
Problem: Alteration in Thoughts and Perception  Goal: Verbalize thoughts and feelings  Description  Interventions:  - Promote a nonjudgmental and trusting relationship with the patient through active listening and therapeutic communication  - Assess patient's level of functioning, behavior and potential for risk  - Engage patient in 1 on 1 interactions  - Encourage patient to express fears, feelings, frustrations, and discuss symptoms    - [place] patient to reality, help patient recognize reality-based thinking   - Administer medications as ordered and assess for potential side effects  - Provide the patient education related to the signs and symptoms of the illness and desired effects of prescribed medications  Outcome: Progressing  Goal: Agree to be compliant with medication regime, as prescribed and report medication side effects  Description  Interventions:  - Offer appropriate PRN medication and supervise ingestion; conduct AIMS, as needed   Outcome: Progressing  Goal: Attend and participate in unit activities, including therapeutic, recreational, and educational groups  Description  Interventions:  -Encourage Visitation and family involvement in care  Outcome: Progressing     Problem: Alteration in Thoughts and Perception  Goal: Treatment Goal: Gain control of psychotic behaviors/thinking, reduce/eliminate presenting symptoms and demonstrate improved reality functioning upon discharge  Outcome: Not Progressing  Goal: Refrain from acting on delusional thinking/internal stimuli  Description  Interventions:  - Monitor patient closely, per order   - Utilize least restrictive measures   - Set reasonable limits, give positive feedback for acceptable   - Administer medications as ordered and monitor of potential side effects  Outcome: Not Progressing  Goal: Complete daily ADLs, including personal hygiene independently, as able  Description  Interventions:  - Observe, teach, and assist patient with ADLS  - Monitor and promote a balance of rest/activity, with adequate nutrition and elimination   Outcome: Not Progressing     Problem: Ineffective Coping  Goal: Demonstrates healthy coping skills  Outcome: Not Progressing     Pt visible on unit  Compliant with meds and meals  Ate 100% of dinner and had snack  Pt attended 1/2 groups  Pt still refusing to shower  Continues dressing in layers  Increased frequency in water drinking, requires a lot of redirection from staff  Pt extremely impulsive, and when staff attempts to provide pt education, pt distracts by blurting out disorganized thoughts/ideas on other subjects  Pt asked how he can be discharged like female peer  Advised pt he needs to be compliant with his medications all day, attend groups, and he needs to be compliant with his labs  Pt received nicorette gum PRN at 1756, milk of magnesia for constipation at 2032, and trazodone 50mg at 2119  No other issues  Will continue to monitor

## 2020-03-15 NOTE — PLAN OF CARE
Problem: Alteration in Thoughts and Perception  Goal: Agree to be compliant with medication regime, as prescribed and report medication side effects  Description  Interventions:  - Offer appropriate PRN medication and supervise ingestion; conduct AIMS, as needed   Outcome: Not Progressing  Goal: Attend and participate in unit activities, including therapeutic, recreational, and educational groups  Description  Interventions:  -Encourage Visitation and family involvement in care  Outcome: Not Progressing  Goal: Complete daily ADLs, including personal hygiene independently, as able  Description  Interventions:  - Observe, teach, and assist patient with ADLS  - Monitor and promote a balance of rest/activity, with adequate nutrition and elimination   Outcome: Not Progressing     Og Mckinney has been isolative to his room, noncompliant with scheduled programming, hygiene and refused his morning medications, vitals, and his breakfast choosing to nap for the majority of the shift  Non-compliant with his morning scheduled medications and breakfast but was visible minimally during lunch time and then returned to his room  Behaviors controlled  Will continue to monitor for changes

## 2020-03-16 LAB
BASOPHILS # BLD AUTO: 0 THOUSANDS/ΜL (ref 0–0.1)
BASOPHILS NFR BLD AUTO: 0 % (ref 0–1)
EOSINOPHIL # BLD AUTO: 0 THOUSAND/ΜL (ref 0–0.4)
EOSINOPHIL NFR BLD AUTO: 0 % (ref 0–6)
ERYTHROCYTE [DISTWIDTH] IN BLOOD BY AUTOMATED COUNT: 14.3 %
HCT VFR BLD AUTO: 45 % (ref 41–53)
HGB BLD-MCNC: 15.1 G/DL (ref 13.5–17.5)
LYMPHOCYTES # BLD AUTO: 3.9 THOUSANDS/ΜL (ref 0.5–4)
LYMPHOCYTES NFR BLD AUTO: 39 % (ref 25–45)
MCH RBC QN AUTO: 29.2 PG (ref 26–34)
MCHC RBC AUTO-ENTMCNC: 33.5 G/DL (ref 31–36)
MCV RBC AUTO: 87 FL (ref 80–100)
MONOCYTES # BLD AUTO: 1.1 THOUSAND/ΜL (ref 0.2–0.9)
MONOCYTES NFR BLD AUTO: 11 % (ref 1–10)
NEUTROPHILS # BLD AUTO: 4.9 THOUSANDS/ΜL (ref 1.8–7.8)
NEUTS SEG NFR BLD AUTO: 50 % (ref 45–65)
PLATELET # BLD AUTO: 175 THOUSANDS/UL (ref 150–450)
PMV BLD AUTO: 8.6 FL (ref 8.9–12.7)
RBC # BLD AUTO: 5.18 MILLION/UL (ref 4.5–5.9)
WBC # BLD AUTO: 10 THOUSAND/UL (ref 4.5–11)

## 2020-03-16 PROCEDURE — 99232 SBSQ HOSP IP/OBS MODERATE 35: CPT | Performed by: PSYCHIATRY & NEUROLOGY

## 2020-03-16 PROCEDURE — 85025 COMPLETE CBC W/AUTO DIFF WBC: CPT | Performed by: PSYCHIATRY & NEUROLOGY

## 2020-03-16 RX ADMIN — DOCUSATE SODIUM 100 MG: 100 CAPSULE, LIQUID FILLED ORAL at 16:59

## 2020-03-16 RX ADMIN — METFORMIN HYDROCHLORIDE 500 MG: 500 TABLET ORAL at 16:59

## 2020-03-16 RX ADMIN — OXYBUTYNIN CHLORIDE 5 MG: 5 TABLET, EXTENDED RELEASE ORAL at 08:17

## 2020-03-16 RX ADMIN — PANTOPRAZOLE SODIUM 40 MG: 40 TABLET, DELAYED RELEASE ORAL at 06:03

## 2020-03-16 RX ADMIN — DOCUSATE SODIUM 100 MG: 100 CAPSULE, LIQUID FILLED ORAL at 08:17

## 2020-03-16 RX ADMIN — CLOZAPINE 200 MG: 200 TABLET ORAL at 16:59

## 2020-03-16 RX ADMIN — DIVALPROEX SODIUM 1500 MG: 500 TABLET, EXTENDED RELEASE ORAL at 20:51

## 2020-03-16 RX ADMIN — ATORVASTATIN CALCIUM 10 MG: 10 TABLET, FILM COATED ORAL at 16:59

## 2020-03-16 RX ADMIN — METFORMIN HYDROCHLORIDE 500 MG: 500 TABLET ORAL at 08:17

## 2020-03-16 RX ADMIN — OLANZAPINE 5 MG: 5 TABLET, FILM COATED ORAL at 20:51

## 2020-03-16 RX ADMIN — LEVOTHYROXINE SODIUM 75 MCG: 75 TABLET ORAL at 06:03

## 2020-03-16 NOTE — PLAN OF CARE
Problem: Alteration in Thoughts and Perception  Goal: Agree to be compliant with medication regime, as prescribed and report medication side effects  Description  Interventions:  - Offer appropriate PRN medication and supervise ingestion; conduct AIMS, as needed   Outcome: Progressing  Goal: Complete daily ADLs, including personal hygiene independently, as able  Description  Interventions:  - Observe, teach, and assist patient with ADLS  - Monitor and promote a balance of rest/activity, with adequate nutrition and elimination   Outcome: Progressing     Problem: Alteration in Thoughts and Perception  Goal: Treatment Goal: Gain control of psychotic behaviors/thinking, reduce/eliminate presenting symptoms and demonstrate improved reality functioning upon discharge  Outcome: Not Progressing  Goal: Refrain from acting on delusional thinking/internal stimuli  Description  Interventions:  - Monitor patient closely, per order   - Utilize least restrictive measures   - Set reasonable limits, give positive feedback for acceptable   - Administer medications as ordered and monitor of potential side effects  Outcome: Not Progressing  Goal: Attend and participate in unit activities, including therapeutic, recreational, and educational groups  Description  Interventions:  -Encourage Visitation and family involvement in care  Outcome: Not Progressing     Pt visible on unit, social with peers and staff  Pt still dressed in layers of clothing  Did shower today  Compliant with meds and meals without prompting  Requested trazodone 50mg, administered at 2043  Ate 100% of dinner, and had snack  Pt did not attend group  Pt less delusional today, not making as many requests at the nurses station  Continues to drink a lot of water, has to be constantly redirected by staff to slow down  No issues or outbursts  Will continue to monitor
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
## 2020-03-16 NOTE — PROGRESS NOTES
Sleeping at this time, no distress noted  Safe precautions in place and maintained   Monitoring continues

## 2020-03-16 NOTE — PROGRESS NOTES
03/16/20 0830   Team Meeting   Meeting Type Daily Rounds   Team Members Present   Team Members Present Physician;Nurse;   Physician Team Member Dr Abilio Arias Team Member Margie Espitia RN   Care Management Team Member Lily Whitney     Patient refused AM medications yesterday and Saturday  Irritable edge  Labile and impulsive  Slept
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
## 2020-03-16 NOTE — PROGRESS NOTES
03/13/20 1100   Activity/Group Checklist   Group Other (Comment)  (IMR - Weekly Goal Review)   Attendance Did not attend     Patient was encouraged to attend [facility], but declined, stating that he if he is not allowed to go to [facility], he will not attend groups

## 2020-03-17 PROCEDURE — 99232 SBSQ HOSP IP/OBS MODERATE 35: CPT | Performed by: PSYCHIATRY & NEUROLOGY

## 2020-03-17 RX ADMIN — DOCUSATE SODIUM 100 MG: 100 CAPSULE, LIQUID FILLED ORAL at 08:43

## 2020-03-17 RX ADMIN — DOCUSATE SODIUM 100 MG: 100 CAPSULE, LIQUID FILLED ORAL at 16:54

## 2020-03-17 RX ADMIN — DIVALPROEX SODIUM 1500 MG: 500 TABLET, EXTENDED RELEASE ORAL at 20:42

## 2020-03-17 RX ADMIN — ATORVASTATIN CALCIUM 10 MG: 10 TABLET, FILM COATED ORAL at 16:54

## 2020-03-17 RX ADMIN — CLOZAPINE 200 MG: 200 TABLET ORAL at 16:54

## 2020-03-17 RX ADMIN — LEVOTHYROXINE SODIUM 75 MCG: 75 TABLET ORAL at 06:00

## 2020-03-17 RX ADMIN — OLANZAPINE 5 MG: 5 TABLET, FILM COATED ORAL at 20:42

## 2020-03-17 RX ADMIN — OXYBUTYNIN CHLORIDE 5 MG: 5 TABLET, EXTENDED RELEASE ORAL at 08:44

## 2020-03-17 RX ADMIN — METFORMIN HYDROCHLORIDE 500 MG: 500 TABLET ORAL at 16:54

## 2020-03-17 RX ADMIN — METFORMIN HYDROCHLORIDE 500 MG: 500 TABLET ORAL at 08:43

## 2020-03-17 RX ADMIN — PANTOPRAZOLE SODIUM 40 MG: 40 TABLET, DELAYED RELEASE ORAL at 06:00

## 2020-03-17 NOTE — PLAN OF CARE
Problem: Alteration in Thoughts and Perception  Goal: Attend and participate in unit activities, including therapeutic, recreational, and educational groups  Description  Interventions:  -Encourage Visitation and family involvement in care  Outcome: Not Progressing    Individual has been isolative and keeping to selg the majority of the shift  He did not actively participate in programming, no groups were attended, but he did participate in treatment team meeting  He was able to stay the entire time, remained controlled, no loud outbursts  He reported feeing that people are against him  He declined eating breakfast, but ate 100% of his lunch  Availability of staff made known  Will continue to monitor
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
## 2020-03-17 NOTE — PROGRESS NOTES
Summer Jefferson maintained on ongoing SAFE precaution without incident on this shift  Laying in bed with eyes closed, breath even and unlabored   Continuous rounding implemented   No behavioral noted   Will continue to monitor
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
## 2020-03-17 NOTE — PROGRESS NOTES
03/17/20 0800   Team Meeting   Meeting Type Daily Rounds   Team Members Present   Team Members Present Physician;Nurse;; Other (Discipline and Name)   Physician Team Member Dr Ysabel Pink Team Member Khris Elizalde RN   Care Management Team Member Lily Flynn   Other (Discipline and Name) Jose Randall LCSW     Patient attended groups  No behavioral issues  Slept

## 2020-03-17 NOTE — PROGRESS NOTES
03/16/20 1575   Activity/Group Checklist   Group Community meeting   Attendance Did not attend     Patient was encouraged to attend Community Meeting this morning, but he declined, staying in bed
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
## 2020-03-17 NOTE — PLAN OF CARE
Problem: Alteration in Thoughts and Perception  Goal: Treatment Goal: Gain control of psychotic behaviors/thinking, reduce/eliminate presenting symptoms and demonstrate improved reality functioning upon discharge  Outcome: Progressing  Goal: Verbalize thoughts and feelings  Description  Interventions:  - Promote a nonjudgmental and trusting relationship with the patient through active listening and therapeutic communication  - Assess patient's level of functioning, behavior and potential for risk  - Engage patient in 1 on 1 interactions  - Encourage patient to express fears, feelings, frustrations, and discuss symptoms    - [place] patient to reality, help patient recognize reality-based thinking   - Administer medications as ordered and assess for potential side effects  - Provide the patient education related to the signs and symptoms of the illness and desired effects of prescribed medications  Outcome: Progressing  Goal: Refrain from acting on delusional thinking/internal stimuli  Description  Interventions:  - Monitor patient closely, per order   - Utilize least restrictive measures   - Set reasonable limits, give positive feedback for acceptable   - Administer medications as ordered and monitor of potential side effects  Outcome: Progressing  Goal: Agree to be compliant with medication regime, as prescribed and report medication side effects  Description  Interventions:  - Offer appropriate PRN medication and supervise ingestion; conduct AIMS, as needed   Outcome: Progressing  Goal: Attend and participate in unit activities, including therapeutic, recreational, and educational groups  Description  Interventions:  -Encourage Visitation and family involvement in care  Outcome: Progressing  Goal: Recognize dysfunctional thoughts, communicate reality-based thoughts at the time of discharge  Description  Interventions:  - Provide medication and psycho-education to assist patient in compliance and developing insight into his/her illness   Outcome: Progressing  Goal: Complete daily ADLs, including personal hygiene independently, as able  Description  Interventions:  - Observe, teach, and assist patient with ADLS  - Monitor and promote a balance of rest/activity, with adequate nutrition and elimination   Outcome: Progressing     Problem: Ineffective Coping  Goal: Understands least restrictive measures  Description  Interventions:  - Utilize least restrictive behavior  Outcome: Progressing     Problem: GASTROINTESTINAL - ADULT  Goal: Maintains adequate nutritional intake  Description  INTERVENTIONS:  - Monitor percentage of each meal consumed  - Identify factors contributing to decreased intake, treat as appropriate  - Assist with meals as needed  - Monitor I&O, weight, and lab values if indicated  - Obtain nutrition services referral as needed  Outcome: Progressing   Visible in the milieu, pleasant and friendly with others, attended [email or web address] and fresh air groups  Med and meal compliant and ate 100% of both meals  Showered but still looks disheveled, wearing multiple layers of clothing  Disorganized in thought process but made a conscientious effort to do what is expected of him  Proud of self for going to groups today "hey, I went to 2 groups"  Positive reenforcement and encouragement provided  No other behaviors or issues noted  Continue to monitor

## 2020-03-17 NOTE — PLAN OF CARE
Problem: Alteration in Thoughts and Perception  Goal: Verbalize thoughts and feelings  Description  Interventions:  - Promote a nonjudgmental and trusting relationship with the patient through active listening and therapeutic communication  - Assess patient's level of functioning, behavior and potential for risk  - Engage patient in 1 on 1 interactions  - Encourage patient to express fears, feelings, frustrations, and discuss symptoms    - [place] patient to reality, help patient recognize reality-based thinking   - Administer medications as ordered and assess for potential side effects  - Provide the patient education related to the signs and symptoms of the illness and desired effects of prescribed medications  Outcome: Progressing  Goal: Refrain from acting on delusional thinking/internal stimuli  Description  Interventions:  - Monitor patient closely, per order   - Utilize least restrictive measures   - Set reasonable limits, give positive feedback for acceptable   - Administer medications as ordered and monitor of potential side effects  Outcome: Progressing  Goal: Agree to be compliant with medication regime, as prescribed and report medication side effects  Description  Interventions:  - Offer appropriate PRN medication and supervise ingestion; conduct AIMS, as needed   Outcome: Progressing  Goal: Attend and participate in unit activities, including therapeutic, recreational, and educational groups  Description  Interventions:  -Encourage Visitation and family involvement in care  Outcome: Progressing  Goal: Complete daily ADLs, including personal hygiene independently, as able  Description  Interventions:  - Observe, teach, and assist patient with ADLS  - Monitor and promote a balance of rest/activity, with adequate nutrition and elimination   Outcome: Progressing     Problem: Ineffective Coping  Goal: Understands least restrictive measures  Description  Interventions:  - Utilize least restrictive behavior  Outcome: Progressing     Problem: GASTROINTESTINAL - ADULT  Goal: Maintains adequate nutritional intake  Description  INTERVENTIONS:  - Monitor percentage of each meal consumed  - Identify factors contributing to decreased intake, treat as appropriate  - Assist with meals as needed  - Monitor I&O, weight, and lab values if indicated  - Obtain nutrition services referral as needed  Outcome: Shweta Gresham has been awake, alert, and visible intermittently out in the milieu  Mood less labile but still preoccupied with discharge  Pt completed his daily ADL's today  Ate 100% supper  Disheveled in appearance and dressed in layers  Minimal interaction noted with peers and rests in room at intervals  Pt did not attend evening group but came out for snack after  No behavioral issues  Compliant with scheduled meds with little prompting  Continue to monitor/assess for any changes
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
## 2020-03-18 PROCEDURE — 99232 SBSQ HOSP IP/OBS MODERATE 35: CPT | Performed by: PSYCHIATRY & NEUROLOGY

## 2020-03-18 RX ADMIN — METFORMIN HYDROCHLORIDE 500 MG: 500 TABLET ORAL at 17:09

## 2020-03-18 RX ADMIN — LEVOTHYROXINE SODIUM 75 MCG: 75 TABLET ORAL at 05:53

## 2020-03-18 RX ADMIN — DOCUSATE SODIUM 100 MG: 100 CAPSULE, LIQUID FILLED ORAL at 17:09

## 2020-03-18 RX ADMIN — METFORMIN HYDROCHLORIDE 500 MG: 500 TABLET ORAL at 08:11

## 2020-03-18 RX ADMIN — DIVALPROEX SODIUM 1500 MG: 500 TABLET, EXTENDED RELEASE ORAL at 20:48

## 2020-03-18 RX ADMIN — PANTOPRAZOLE SODIUM 40 MG: 40 TABLET, DELAYED RELEASE ORAL at 05:53

## 2020-03-18 RX ADMIN — CLOZAPINE 200 MG: 200 TABLET ORAL at 17:09

## 2020-03-18 RX ADMIN — ATORVASTATIN CALCIUM 10 MG: 10 TABLET, FILM COATED ORAL at 17:09

## 2020-03-18 RX ADMIN — TRAZODONE HYDROCHLORIDE 50 MG: 50 TABLET ORAL at 21:49

## 2020-03-18 RX ADMIN — OXYBUTYNIN CHLORIDE 5 MG: 5 TABLET, EXTENDED RELEASE ORAL at 08:11

## 2020-03-18 RX ADMIN — OLANZAPINE 5 MG: 5 TABLET, FILM COATED ORAL at 20:48

## 2020-03-18 RX ADMIN — DOCUSATE SODIUM 100 MG: 100 CAPSULE, LIQUID FILLED ORAL at 08:11

## 2020-03-18 NOTE — PROGRESS NOTES
03/18/20 0800   Team Meeting   Meeting Type Daily Rounds   Team Members Present   Team Members Present Physician;Nurse;; Other (Discipline and Name)   Physician Team Member Dr Zane Elizabeth Team Member Tami Fish, RN   Care Management Team Member Lily Shirley   Other (Discipline and Name) Timothy Modi, [facility]; Claudetta Masse, Ph D, LCSW     Patient did not attend any groups yesterday  Therapist working on behavioral plan with patient  Slept

## 2020-03-18 NOTE — PLAN OF CARE
Problem: Alteration in Thoughts and Perception  Goal: Verbalize thoughts and feelings  Description  Interventions:  - Promote a nonjudgmental and trusting relationship with the patient through active listening and therapeutic communication  - Assess patient's level of functioning, behavior and potential for risk  - Engage patient in 1 on 1 interactions  - Encourage patient to express fears, feelings, frustrations, and discuss symptoms    - [place] patient to reality, help patient recognize reality-based thinking   - Administer medications as ordered and assess for potential side effects  - Provide the patient education related to the signs and symptoms of the illness and desired effects of prescribed medications  Outcome: Progressing  Goal: Agree to be compliant with medication regime, as prescribed and report medication side effects  Description  Interventions:  - Offer appropriate PRN medication and supervise ingestion; conduct AIMS, as needed   Outcome: Progressing     Problem: Ineffective Coping  Goal: Patient/Family verbalizes awareness of resources  Outcome: Progressing  Goal: Understands least restrictive measures  Description  Interventions:  - Utilize least restrictive behavior  Outcome: Progressing     Problem: Alteration in Thoughts and Perception  Goal: Attend and participate in unit activities, including therapeutic, recreational, and educational groups  Description  Interventions:  -Encourage Visitation and family involvement in care  Outcome: Not Progressing  Goal: Recognize dysfunctional thoughts, communicate reality-based thoughts at the time of discharge  Description  Interventions:  - Provide medication and psycho-education to assist patient in compliance and developing insight into his/her illness   Outcome: Not Progressing  Goal: Complete daily ADLs, including personal hygiene independently, as able  Description  Interventions:  - Observe, teach, and assist patient with ADLS  - Monitor and promote a balance of rest/activity, with adequate nutrition and elimination   Outcome: Not Progressing     Problem: GASTROINTESTINAL - ADULT  Goal: Maintains adequate nutritional intake  Description  INTERVENTIONS:  - Monitor percentage of each meal consumed  - Identify factors contributing to decreased intake, treat as appropriate  - Assist with meals as needed  - Monitor I&O, weight, and lab values if indicated  - Obtain nutrition services referral as needed  Outcome: Not Progressing   Mostly isolative to his room, napping in his bed  Did not get up for vitals or breakfast  Did not get his bin and do hygiene and appears disheveled  Did get out of bed when prompted during room changes  No groups attended  Morning meds brought to him to ensure compliance  Ate only 25% of his lunch  Patient has a new behavior plan that he is aware of, but so far has been non-compliant  Continues to lack insight into illness  No other behaviors or issues noted  Continue to monitor

## 2020-03-18 NOTE — PROGRESS NOTES
03/17/20 1100   Activity/Group Checklist   Group Other (Comment)  (IMR - Moral Reasoning)   Attendance Did not attend     Patient was encouraged to attend group, but declined
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
## 2020-03-18 NOTE — PLAN OF CARE
Problem: Individualized Interventions  Goal: Attend and participate in unit activities, including therapeutic, recreational, and educational groups  Description  PSYCHOTHERAPY INTERVENTIONS:    -Form therapeutic alliance to promote trust and safety within a therapeutic environment    -Engage in individual psychotherapy using evidence-based practices that are specific to individual needs   -Process barriers to community living with an emphasis on solution-based interventions   -Promote self-awareness and identity development   -Identify and process patterns of behavior that have led to decompensation and/or hospitalizations   -Attend psychoeducation groups with licensed psychotherapist to learn about Illness Management Recovery (IMR) concepts and enhance coping skills    -Practice effective communication with staff, peers, family, and community members  Participate in family sessions as necessary   -Encourage reality-based thought content by examining thought processes and cognitive distortions      -Work with treatment team in reintegration back into the community when appropriate  3/18/2020 0959 by Sherine Chavarria  Outcome: Not Progressing  3/18/2020 0947 by Sherine Chavarria  Outcome: Progressing    Behavioral plan was reiterated to patient and all treatment team members  Patient admitted to not having completed the first several items on his daily list, including meds, vitals, breakfast, and getting his bin  Patient was notified that he would not earn anything on his daily chart for this morning  He was reminded of the plan in its entirety and verbalized understanding  He asked therapist if therapist thought his meds might be making his "sleepy "  Therapist recommended patient speak with his physician about concerns he is having regarding medications  Patient's nurse was educated at length on how the behavioral plan will work  All needed materials were handed off to patient's nurse     Therapist will follow up

## 2020-03-18 NOTE — PLAN OF CARE
Problem: Individualized Interventions  Goal: Attend and participate in unit activities, including therapeutic, recreational, and educational groups  Description  PSYCHOTHERAPY INTERVENTIONS:    -Form therapeutic alliance to promote trust and safety within a therapeutic environment    -Engage in individual psychotherapy using evidence-based practices that are specific to individual needs   -Process barriers to community living with an emphasis on solution-based interventions   -Promote self-awareness and identity development   -Identify and process patterns of behavior that have led to decompensation and/or hospitalizations   -Attend psychoeducation groups with licensed psychotherapist to learn about Illness Management Recovery (IMR) concepts and enhance coping skills    -Practice effective communication with staff, peers, family, and community members  Participate in family sessions as necessary   -Encourage reality-based thought content by examining thought processes and cognitive distortions      -Work with treatment team in reintegration back into the community when appropriate  Outcome: Progressing    Therapist and patient reviewed new behavioral plan to address patient's lack of treatment compliance  Triggers for this behavior are poor attention and paranoia  Goal behaviors are for treatment compliance, and include, eating meals, taking all meds, attending IMR and [employer] everyday, getting bin, and using respectful language  Rewards and motivators for this behavior are ultimately a trip with [employer] and enrollment at [facility], when restrictions are lifted  In the meantime, patient will use a reward chart to track his rewards so he can visually see his progress    Therapist and patient reviewed the behavioral chart with expectations, and patient was able to read aloud the chart expectations back to therapist   During this review, patient was cooperative and engaged, which is a positive behavioral change compared to past attempts to engage on this subject  Behavioral plan and interventions are written for patient's level of understanding  Patient verbalized understanding that he must complete all tasks on the daily sheet in order to earn a sticker  He can put that sticker on the day for his monthly calendar in his room  Once he has earned three consecutive stickers, he can get a reward  Small rewards to start will include a soda, magazine, or music time  Patient will have the opportunity to earn community rewards once a behavioral pattern is established and visitation restrictions have been lifted  Patient signed plan in agreement  Behavioral plan was introduced to patient's nurse, to be put into effect tomorrow  Therapist will continue to monitor this plan

## 2020-03-18 NOTE — PLAN OF CARE
Problem: Alteration in Thoughts and Perception  Goal: Verbalize thoughts and feelings  Description  Interventions:  - Promote a nonjudgmental and trusting relationship with the patient through active listening and therapeutic communication  - Assess patient's level of functioning, behavior and potential for risk  - Engage patient in 1 on 1 interactions  - Encourage patient to express fears, feelings, frustrations, and discuss symptoms    - [place] patient to reality, help patient recognize reality-based thinking   - Administer medications as ordered and assess for potential side effects  - Provide the patient education related to the signs and symptoms of the illness and desired effects of prescribed medications  Outcome: Progressing  Goal: Refrain from acting on delusional thinking/internal stimuli  Description  Interventions:  - Monitor patient closely, per order   - Utilize least restrictive measures   - Set reasonable limits, give positive feedback for acceptable   - Administer medications as ordered and monitor of potential side effects  Outcome: Progressing  Goal: Agree to be compliant with medication regime, as prescribed and report medication side effects  Description  Interventions:  - Offer appropriate PRN medication and supervise ingestion; conduct AIMS, as needed   Outcome: Progressing  Goal: Attend and participate in unit activities, including therapeutic, recreational, and educational groups  Description  Interventions:  -Encourage Visitation and family involvement in care  Outcome: Progressing     Problem: Ineffective Coping  Goal: Demonstrates healthy coping skills  Outcome: Progressing  Goal: Understands least restrictive measures  Description  Interventions:  - Utilize least restrictive behavior  Outcome: Progressing     Problem: GASTROINTESTINAL - ADULT  Goal: Maintains adequate nutritional intake  Description  INTERVENTIONS:  - Monitor percentage of each meal consumed  - Identify factors contributing to decreased intake, treat as appropriate  - Assist with meals as needed  - Monitor I&O, weight, and lab values if indicated  - Obtain nutrition services referral as needed  Outcome: Fadi Lopze has been awake, alert, and visible intermittently out in the milieu  Pt enjoys listening to music on radio in [facility] but needs reminders not to leave it unattended  Disheveled in appearance and dressed in layers  Ate 100% supper  No outbursts this shift  Attended and participated in evening group and had snack after  Compliant with scheduled meds without prompting  Continue to monitor/assess for any changes
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
## 2020-03-19 PROCEDURE — 99232 SBSQ HOSP IP/OBS MODERATE 35: CPT | Performed by: PSYCHIATRY & NEUROLOGY

## 2020-03-19 RX ADMIN — DOCUSATE SODIUM 100 MG: 100 CAPSULE, LIQUID FILLED ORAL at 08:45

## 2020-03-19 RX ADMIN — PANTOPRAZOLE SODIUM 40 MG: 40 TABLET, DELAYED RELEASE ORAL at 05:24

## 2020-03-19 RX ADMIN — DIVALPROEX SODIUM 1500 MG: 500 TABLET, EXTENDED RELEASE ORAL at 20:53

## 2020-03-19 RX ADMIN — ATORVASTATIN CALCIUM 10 MG: 10 TABLET, FILM COATED ORAL at 17:02

## 2020-03-19 RX ADMIN — OLANZAPINE 5 MG: 5 TABLET, FILM COATED ORAL at 20:53

## 2020-03-19 RX ADMIN — DOCUSATE SODIUM 100 MG: 100 CAPSULE, LIQUID FILLED ORAL at 17:02

## 2020-03-19 RX ADMIN — OXYBUTYNIN CHLORIDE 5 MG: 5 TABLET, EXTENDED RELEASE ORAL at 08:45

## 2020-03-19 RX ADMIN — METFORMIN HYDROCHLORIDE 500 MG: 500 TABLET ORAL at 17:02

## 2020-03-19 RX ADMIN — LEVOTHYROXINE SODIUM 75 MCG: 75 TABLET ORAL at 05:24

## 2020-03-19 RX ADMIN — CLOZAPINE 200 MG: 200 TABLET ORAL at 17:01

## 2020-03-19 RX ADMIN — METFORMIN HYDROCHLORIDE 500 MG: 500 TABLET ORAL at 08:45

## 2020-03-19 NOTE — NURSING NOTE
Felix Concepcion was awake up until approx  0230  During this time he had an intake of 80 ounces (2,365 ML) of water  Retired to bed at [employer] and appears to sleep this thus far as per continual rounding  Will continue to monitor
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
## 2020-03-19 NOTE — PROGRESS NOTES
03/19/20 0800   Team Meeting   Meeting Type Daily Rounds   Team Members Present   Team Members Present Physician;Nurse;; Other (Discipline and Name)   Physician Team Member Dr Palmer Romberg Team Member Claire Montesinos RN   Care Management Team Member Lily Delaney   Other (Discipline and Name) Alireza Zelaya, Ph D, [facility]     Patient did not attend groups yesterday  Patient had to have his meds taken to him  Noncompliant with the behavioral plan  Drank a lot of water overnight  Had interrupted sleep
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
## 2020-03-19 NOTE — PLAN OF CARE
Problem: Alteration in Thoughts and Perception  Goal: Verbalize thoughts and feelings  Description  Interventions:  - Promote a nonjudgmental and trusting relationship with the patient through active listening and therapeutic communication  - Assess patient's level of functioning, behavior and potential for risk  - Engage patient in 1 on 1 interactions  - Encourage patient to express fears, feelings, frustrations, and discuss symptoms    - [place] patient to reality, help patient recognize reality-based thinking   - Administer medications as ordered and assess for potential side effects  - Provide the patient education related to the signs and symptoms of the illness and desired effects of prescribed medications  Outcome: Progressing  Goal: Refrain from acting on delusional thinking/internal stimuli  Description  Interventions:  - Monitor patient closely, per order   - Utilize least restrictive measures   - Set reasonable limits, give positive feedback for acceptable   - Administer medications as ordered and monitor of potential side effects  Outcome: Progressing  Goal: Agree to be compliant with medication regime, as prescribed and report medication side effects  Description  Interventions:  - Offer appropriate PRN medication and supervise ingestion; conduct AIMS, as needed   Outcome: Progressing     Problem: Ineffective Coping  Goal: Demonstrates healthy coping skills  Outcome: Progressing  Goal: Understands least restrictive measures  Description  Interventions:  - Utilize least restrictive behavior  Outcome: Progressing     Problem: GASTROINTESTINAL - ADULT  Goal: Maintains adequate nutritional intake  Description  INTERVENTIONS:  - Monitor percentage of each meal consumed  - Identify factors contributing to decreased intake, treat as appropriate  - Assist with meals as needed  - Monitor I&O, weight, and lab values if indicated  - Obtain nutrition services referral as needed  Outcome: Milly Morrissey has been awake, alert, and visible intermittently out in the milieu  No agitation or outbursts  Pt initially refused 1700/1800 meds but then took them after writer reminded pt that he needs to be compliant with his [employer]  Ate 100% supper  Pt did not attend evening group but came out for snack after  Remains disheveled in appearance and dressed in layers  Pt compliant with 2100/2200 meds without prompting  Pt requested prn med for sleep  Trazodone 50 mg po given at 2149  Continue to monitor/assess for any changes

## 2020-03-20 PROCEDURE — 99233 SBSQ HOSP IP/OBS HIGH 50: CPT | Performed by: PSYCHIATRY & NEUROLOGY

## 2020-03-20 RX ADMIN — LEVOTHYROXINE SODIUM 75 MCG: 75 TABLET ORAL at 05:19

## 2020-03-20 RX ADMIN — METFORMIN HYDROCHLORIDE 500 MG: 500 TABLET ORAL at 08:07

## 2020-03-20 RX ADMIN — DOCUSATE SODIUM 100 MG: 100 CAPSULE, LIQUID FILLED ORAL at 08:07

## 2020-03-20 RX ADMIN — OLANZAPINE 5 MG: 5 TABLET, FILM COATED ORAL at 20:38

## 2020-03-20 RX ADMIN — CLOZAPINE 200 MG: 200 TABLET ORAL at 17:00

## 2020-03-20 RX ADMIN — OXYBUTYNIN CHLORIDE 5 MG: 5 TABLET, EXTENDED RELEASE ORAL at 08:07

## 2020-03-20 RX ADMIN — PANTOPRAZOLE SODIUM 40 MG: 40 TABLET, DELAYED RELEASE ORAL at 05:19

## 2020-03-20 RX ADMIN — DIVALPROEX SODIUM 1500 MG: 500 TABLET, EXTENDED RELEASE ORAL at 20:38

## 2020-03-20 RX ADMIN — ATORVASTATIN CALCIUM 10 MG: 10 TABLET, FILM COATED ORAL at 17:06

## 2020-03-20 RX ADMIN — DOCUSATE SODIUM 100 MG: 100 CAPSULE, LIQUID FILLED ORAL at 17:06

## 2020-03-20 RX ADMIN — METFORMIN HYDROCHLORIDE 500 MG: 500 TABLET ORAL at 17:06

## 2020-03-20 NOTE — NURSING NOTE
Horatio Osler was awake at start of shift requesting and given a snack  Returned to bed without any difficulties and appears to sleep thus thus far as per continual rounding

## 2020-03-20 NOTE — PROGRESS NOTES
03/20/20 0800   Team Meeting   Meeting Type Daily Rounds   Team Members Present   Team Members Present Physician;Nurse; Other (Discipline and Name)   Physician Team Member Dr Olivia Leo Team Member Marcos Ortiz RN   Other (Discipline and Name) Khang Douglass        03/20/20 0800   Team Meeting   Meeting Type Daily Rounds   Team Members Present   Team Members Present Physician;Nurse; Other (Discipline and Name)   Physician Team Member Dr Olivia Leo Team Member Marcos Ortiz RN   Other (Discipline and Name) Arminda Thurston LCSW     Did not attend groups  Not compliant with behavioral plan  Initially refused supper meds, but did take them later  Slept

## 2020-03-20 NOTE — PLAN OF CARE
Problem: Alteration in Thoughts and Perception  Goal: Verbalize thoughts and feelings  Description  Interventions:  - Promote a nonjudgmental and trusting relationship with the patient through active listening and therapeutic communication  - Assess patient's level of functioning, behavior and potential for risk  - Engage patient in 1 on 1 interactions  - Encourage patient to express fears, feelings, frustrations, and discuss symptoms    - [place] patient to reality, help patient recognize reality-based thinking   - Administer medications as ordered and assess for potential side effects  - Provide the patient education related to the signs and symptoms of the illness and desired effects of prescribed medications  Outcome: Progressing  Goal: Refrain from acting on delusional thinking/internal stimuli  Description  Interventions:  - Monitor patient closely, per order   - Utilize least restrictive measures   - Set reasonable limits, give positive feedback for acceptable   - Administer medications as ordered and monitor of potential side effects  Outcome: Progressing  Goal: Agree to be compliant with medication regime, as prescribed and report medication side effects  Description  Interventions:  - Offer appropriate PRN medication and supervise ingestion; conduct AIMS, as needed   Outcome: Progressing  Goal: Attend and participate in unit activities, including therapeutic, recreational, and educational groups  Description  Interventions:  -Encourage Visitation and family involvement in care  Outcome: Progressing  Goal: Complete daily ADLs, including personal hygiene independently, as able  Description  Interventions:  - Observe, teach, and assist patient with ADLS  - Monitor and promote a balance of rest/activity, with adequate nutrition and elimination   Outcome: Not Progressing     Problem: Ineffective Coping  Goal: Understands least restrictive measures  Description  Interventions:  - Utilize least restrictive behavior  Outcome: Progressing     Problem: GASTROINTESTINAL - ADULT  Goal: Maintains adequate nutritional intake  Description  INTERVENTIONS:  - Monitor percentage of each meal consumed  - Identify factors contributing to decreased intake, treat as appropriate  - Assist with meals as needed  - Monitor I&O, weight, and lab values if indicated  - Obtain nutrition services referral as needed  Outcome: Kirk Cummins has been awake, alert, and visible intermittently out in the milieu  Pt initially refused 1700/1800 meds but then took them  Pt reminded of importance of adherence to Behavioral Plan/Rewards  Ate 100% supper  Remains disheveled in appearance and dressed in layers  Naps in room at intervals  Minimal interaction noted with peers  No agitation or behavioral issues  Compliant with scheduled 2100 meds without prompting  Pt did not attend recreational movie with peers but came out for snack after  Continue to monitor/assess for any changes
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
## 2020-03-20 NOTE — PLAN OF CARE
Problem: Alteration in Thoughts and Perception  Goal: Refrain from acting on delusional thinking/internal stimuli  Description  Interventions:  - Monitor patient closely, per order   - Utilize least restrictive measures   - Set reasonable limits, give positive feedback for acceptable   - Administer medications as ordered and monitor of potential side effects  Outcome: Progressing  Goal: Agree to be compliant with medication regime, as prescribed and report medication side effects  Description  Interventions:  - Offer appropriate PRN medication and supervise ingestion; conduct AIMS, as needed   Outcome: Progressing  Goal: Attend and participate in unit activities, including therapeutic, recreational, and educational groups  Description  Interventions:  -Encourage Visitation and family involvement in care  Outcome: Progressing    Patient is up and about this morning  He was complaint with vitals this morning  Got OOB and ate breakfast this morning  Medication complaint  Patient is more visible on the unit this shift  Attended and participated in art therapy, journaling, IMR and fresh air group this shift  Mostly compliant with his behavioral plan  He continues to have pressured and rambling speech  Obsesses over discharge but will not listen when told what he needs to do in order to be discharged  Continue to monitor

## 2020-03-20 NOTE — PROGRESS NOTES
03/20/20 1100   Activity/Group Checklist   Group Other (Comment)  (IMR - Weekly Goal Review)   Attendance Attended   Attendance Duration (min) 31-45   Interactions Interacted appropriately   Affect/Mood Appropriate   Goals Achieved Able to listen to others; Able to engage in interactions; Able to self-disclose; Able to recieve feedback

## 2020-03-20 NOTE — TREATMENT TEAM
03/20/20 0900   Activity/Group Checklist   Group Other (Comment)  (Art Therapy Group/Open Choice, Open Discussion)   Attendance Attended   Attendance Duration (min) 46-60  (Observed only)   Interactions Interacted appropriately  (Required prompting)   Affect/Mood Appropriate   Goals Achieved Able to listen to others

## 2020-03-21 PROCEDURE — 99232 SBSQ HOSP IP/OBS MODERATE 35: CPT | Performed by: PHYSICIAN ASSISTANT

## 2020-03-21 RX ADMIN — PANTOPRAZOLE SODIUM 40 MG: 40 TABLET, DELAYED RELEASE ORAL at 05:45

## 2020-03-21 RX ADMIN — ATORVASTATIN CALCIUM 10 MG: 10 TABLET, FILM COATED ORAL at 16:51

## 2020-03-21 RX ADMIN — OLANZAPINE 5 MG: 5 TABLET, FILM COATED ORAL at 20:46

## 2020-03-21 RX ADMIN — LEVOTHYROXINE SODIUM 75 MCG: 75 TABLET ORAL at 05:45

## 2020-03-21 RX ADMIN — DIVALPROEX SODIUM 1500 MG: 500 TABLET, EXTENDED RELEASE ORAL at 20:46

## 2020-03-21 RX ADMIN — CLOZAPINE 200 MG: 200 TABLET ORAL at 16:51

## 2020-03-21 RX ADMIN — DOCUSATE SODIUM 100 MG: 100 CAPSULE, LIQUID FILLED ORAL at 16:51

## 2020-03-21 RX ADMIN — METFORMIN HYDROCHLORIDE 500 MG: 500 TABLET ORAL at 16:51

## 2020-03-21 RX ADMIN — TRAZODONE HYDROCHLORIDE 50 MG: 50 TABLET ORAL at 21:34

## 2020-03-21 NOTE — NURSING NOTE
Michelle Bocanegra was awake at approx  Mid night requesting a given a jazmyne crackers  Returned to bed without any difficulties  Appears to sleep thus this far as per continual rounding  Will continue to monitor

## 2020-03-21 NOTE — PLAN OF CARE
Problem: Alteration in Thoughts and Perception  Goal: Agree to be compliant with medication regime, as prescribed and report medication side effects  Description  Interventions:  - Offer appropriate PRN medication and supervise ingestion; conduct AIMS, as needed   Outcome: Progressing  Goal: Attend and participate in unit activities, including therapeutic, recreational, and educational groups  Description  Interventions:  -Encourage Visitation and family involvement in care  Outcome: Andrew Zhong has been visible intermittently on shift due to napping  When visible pt is pleasant and cooperative  Individual was med and meal compliant on shift  Pt denied feelings of anxiety and participated in evening group, pt was encouraged to seek staff if changes occur  Pt participated in evening group  No behaviors or outburst on shift,will continue to monitor and follow up as needed

## 2020-03-21 NOTE — PLAN OF CARE
Problem: Alteration in Thoughts and Perception  Goal: Treatment Goal: Gain control of psychotic behaviors/thinking, reduce/eliminate presenting symptoms and demonstrate improved reality functioning upon discharge  Outcome: Progressing  Goal: Verbalize thoughts and feelings  Description  Interventions:  - Promote a nonjudgmental and trusting relationship with the patient through active listening and therapeutic communication  - Assess patient's level of functioning, behavior and potential for risk  - Engage patient in 1 on 1 interactions  - Encourage patient to express fears, feelings, frustrations, and discuss symptoms    - [place] patient to reality, help patient recognize reality-based thinking   - Administer medications as ordered and assess for potential side effects  - Provide the patient education related to the signs and symptoms of the illness and desired effects of prescribed medications  Outcome: Progressing  Goal: Refrain from acting on delusional thinking/internal stimuli  Description  Interventions:  - Monitor patient closely, per order   - Utilize least restrictive measures   - Set reasonable limits, give positive feedback for acceptable   - Administer medications as ordered and monitor of potential side effects  Outcome: Progressing  Goal: Agree to be compliant with medication regime, as prescribed and report medication side effects  Description  Interventions:  - Offer appropriate PRN medication and supervise ingestion; conduct AIMS, as needed   Outcome: Progressing  Goal: Attend and participate in unit activities, including therapeutic, recreational, and educational groups  Description  Interventions:  -Encourage Visitation and family involvement in care  Outcome: Progressing  Goal: Recognize dysfunctional thoughts, communicate reality-based thoughts at the time of discharge  Description  Interventions:  - Provide medication and psycho-education to assist patient in compliance and developing insight into his/her illness   Outcome: Progressing  Goal: Complete daily ADLs, including personal hygiene independently, as able  Description  Interventions:  - Observe, teach, and assist patient with ADLS  - Monitor and promote a balance of rest/activity, with adequate nutrition and elimination   Outcome: Progressing     Problem: Ineffective Coping  Goal: Identifies ineffective coping skills  Outcome: Progressing  Goal: Identifies healthy coping skills  Outcome: Progressing  Goal: Demonstrates healthy coping skills  Outcome: Progressing  Goal: Patient/Family participate in treatment and DC plans  Description  Interventions:  - Provide therapeutic environment  Outcome: Progressing  Goal: Patient/Family verbalizes awareness of resources  Outcome: Progressing  Goal: Understands least restrictive measures  Description  Interventions:  - Utilize least restrictive behavior  Outcome: Progressing     Problem: RESPIRATORY - ADULT  Goal: Achieves optimal ventilation and oxygenation  Description  INTERVENTIONS:  - Assess for changes in respiratory status  - Assess for changes in mentation and behavior  - Position to facilitate oxygenation and minimize respiratory effort  - Oxygen administered by appropriate delivery if ordered  - Initiate smoking cessation education as indicated  - Encourage broncho-pulmonary hygiene including cough, deep breathe, Incentive Spirometry  - Assess the need for suctioning and aspirate as needed  - Assess and instruct to report SOB or any respiratory difficulty  - Respiratory Therapy support as indicated  Outcome: Progressing     Problem: GASTROINTESTINAL - ADULT  Goal: Minimal or absence of nausea and/or vomiting  Description  INTERVENTIONS:  - Administer IV fluids if ordered to ensure adequate hydration  - Maintain NPO status until nausea and vomiting are resolved  - Nasogastric tube if ordered  - Administer ordered antiemetic medications as needed  - Provide nonpharmacologic comfort measures as appropriate  - Advance diet as tolerated, if ordered  - Consider nutrition services referral to assist patient with adequate nutrition and appropriate food choices  Outcome: Progressing  Goal: Maintains or returns to baseline bowel function  Description  INTERVENTIONS:  - Assess bowel function  - Encourage oral fluids to ensure adequate hydration  - Administer IV fluids if ordered to ensure adequate hydration  - Administer ordered medications as needed  - Encourage mobilization and activity  - Consider nutritional services referral to assist patient with adequate nutrition and appropriate food choices  Outcome: Progressing  Goal: Maintains adequate nutritional intake  Description  INTERVENTIONS:  - Monitor percentage of each meal consumed  - Identify factors contributing to decreased intake, treat as appropriate  - Assist with meals as needed  - Monitor I&O, weight, and lab values if indicated  - Obtain nutrition services referral as needed  Outcome: Progressing     Problem: METABOLIC, FLUID AND ELECTROLYTES - ADULT  Goal: Glucose maintained within target range  Description  INTERVENTIONS:  - Monitor Blood Glucose as ordered  - Assess for signs and symptoms of hyperglycemia and hypoglycemia  - Administer ordered medications to maintain glucose within target range  - Assess nutritional intake and initiate nutrition service referral as needed  Outcome: Progressing     Problem: DISCHARGE PLANNING  Goal: Discharge to home or other facility with appropriate resources  Description    CASE MANAGEMENT INTERVENTIONS:  - Conduct assessment to determine patient/family and health care team treatment goals, and need for post-acute services based on payer coverage, community resources, patient preferences and barriers to discharge    - Address psychosocial, clinical, and financial barriers to discharge as identified in assessment in conjunction with the patient/family and health care team   - Assist the patient in reintegration back into the community by removing barriers which may hinder a successful discharge once deemed stable  - Arrange appropriate level of post-acute services according to patient's needs and preference and payer coverage in collaboration with the physician and health care team   - Communicate with and update the patient/family, physician, and health care team regarding progress on the discharge plan  - Arrange appropriate transportation to post-acute venues  Outcome: Progressing    Irritable, agitated, non-compliant with medications and program  Refused vitals, breakfast and morning meds  Ate 100% of lunch and went to Art therapy group after lunch  Otherwise in bed all shift  Irritable when staff attempted to get him to comply with program   Isolative to room and self    Will continue to monitor progress in recovery program

## 2020-03-21 NOTE — PROGRESS NOTES
Progress Note - Behavioral Health   Jacqueline Linear 48 y o  male MRN: [id number]  Unit/Bed#: [facility] 105-01 Encounter: [id number]    Patient seen, chart reviewed, discussed with staff  Nursing staff notes the patient refused medications this morning  He did take his medications yesterday  He continues to be isolative to self and to room spending much of his time in bed  He did attend evening group yesterday  No aggressive or agitated behavior  Patient was seen lying in bed on approach  He has very poor eye contact and is minimally verbal with questions  He denies all signs and symptoms, denies anxiety or depression  Denies any suicidal or homicidal ideation  Denies any auditory or visual hallucinations  He appears to be withdrawn, apathetic and disheveled  Denies any physical complaints or concerns      Behavior over the last 24 hours:  unchanged  Sleep: normal  Appetite: normal  Medication side effects: No  ROS: no complaints  /54 (BP Location: Right arm)   Pulse 71   Temp 97 5 °F (36 4 °C)   Resp 16   Ht 5' 9" (1 753 m)   Wt 103 kg (227 lb 9 6 oz)   BMI 33 61 kg/m²     Medications:   Current Facility-Administered Medications   Medication Dose Route Frequency    acetaminophen (TYLENOL) tablet 325 mg  325 mg Oral Q6H PRN    acetaminophen (TYLENOL) tablet 650 mg  650 mg Oral Q6H PRN    acetaminophen (TYLENOL) tablet 975 mg  975 mg Oral Q8H PRN    aluminum-magnesium hydroxide-simethicone (MYLANTA) 200-200-20 mg/5 mL oral suspension 30 mL  30 mL Oral Q4H PRN    atorvastatin (LIPITOR) tablet 10 mg  10 mg Oral Daily With Dinner    benztropine (COGENTIN) injection 1 mg  1 mg Intramuscular Q6H PRN    benztropine (COGENTIN) tablet 1 mg  1 mg Oral Q6H PRN    clozapine (CLOZARIL) tablet 200 mg  200 mg Oral Daily    divalproex sodium (DEPAKOTE ER) 24 hr tablet 1,500 mg  1,500 mg Oral HS    docusate sodium (COLACE) capsule 100 mg  100 mg Oral BID    haloperidol (HALDOL) tablet 5 mg  5 mg Oral Q6H PRN    haloperidol lactate (HALDOL) injection 5 mg  5 mg Intramuscular Q6H PRN    levothyroxine tablet 75 mcg  75 mcg Oral Early Morning    LORazepam (ATIVAN) 2 mg/mL injection 1 mg  1 mg Intramuscular Q6H PRN    LORazepam (ATIVAN) tablet 1 mg  1 mg Oral Q6H PRN    magnesium hydroxide (MILK OF MAGNESIA) 400 mg/5 mL oral suspension 30 mL  30 mL Oral Daily PRN    metFORMIN (GLUCOPHAGE) tablet 500 mg  500 mg Oral BID With Meals    nicotine polacrilex (NICORETTE) gum 2 mg  2 mg Oral Q2H PRN    OLANZapine (ZyPREXA) tablet 5 mg  5 mg Oral HS    oxybutynin (DITROPAN-XL) 24 hr tablet 5 mg  5 mg Oral Daily    pantoprazole (PROTONIX) EC tablet 40 mg  40 mg Oral Early Morning    traZODone (DESYREL) tablet 50 mg  50 mg Oral HS PRN       Labs:   No results displayed because visit has over 200 results            Mental Status Evaluation:  Appearance:  disheveled and overweight   Behavior:  uncooperative   Speech:  Sparse, minimally verbal   Mood:  euthymic   Affect:  flat and Dysthymic   Thought Process:  Poverty of thought   Thought Content:  Guarded   Perceptual Disturbances: Denies auditory or visual hallucinations   Risk Potential: Denies suicidal or homicidal ideation, history of aggression and fire setting behavior   Sensorium:  person, place and time/date   Cognition:  grossly intact   Consciousness:  alert and awake    Attention: attention span appeared shorter than expected for age   Insight:  limited   Judgment: limited   Gait/Station: Not observed, patient lying in bed   Motor Activity: no abnormal movements     Progress Toward Goals:  Minimal change    Assessment/Plan   Principal Problem:    Schizoaffective disorder, bipolar type ([facility])  Active Problems:    Type 2 diabetes mellitus without complication, without long-term current use of insulin ([facility])    Benign essential hypertension    Gastroesophageal reflux disease without esophagitis    Acquired hypothyroidism    Tobacco abuse    Diabetes insipidus, nephrogenic [facility])      Recommended Treatment:   Continue medications and treatment plan per Dr Sachi Rocha 200 mg daily, CBC with diff from March 16, 2020 reviewed [address] within normal limits  Depakote 1500 mg at HS, Depakote level on February 11, 2020 was 71 7  Zyprexa 5 mg at HS  Encourage compliance with medications  Continue with group therapy, milieu therapy and occupational therapy  Risks, benefits and possible side effects of Medications:   Risks, benefits, and possible side effects of medications explained to patient and patient verbalizes understanding  Counseling / Coordination of Care  Total floor / unit time spent today 20 minutes  Greater than 50% of total time was spent with the patient and / or family counseling and / or coordination of care  A description of the counseling / coordination of care:  Medication management, chart review, patient interview

## 2020-03-21 NOTE — PLAN OF CARE
Problem: Alteration in Thoughts and Perception  Goal: Verbalize thoughts and feelings  Description  Interventions:  - Promote a nonjudgmental and trusting relationship with the patient through active listening and therapeutic communication  - Assess patient's level of functioning, behavior and potential for risk  - Engage patient in 1 on 1 interactions  - Encourage patient to express fears, feelings, frustrations, and discuss symptoms    - [place] patient to reality, help patient recognize reality-based thinking   - Administer medications as ordered and assess for potential side effects  - Provide the patient education related to the signs and symptoms of the illness and desired effects of prescribed medications  Outcome: Progressing  Goal: Refrain from acting on delusional thinking/internal stimuli  Description  Interventions:  - Monitor patient closely, per order   - Utilize least restrictive measures   - Set reasonable limits, give positive feedback for acceptable   - Administer medications as ordered and monitor of potential side effects  Outcome: Progressing  Goal: Agree to be compliant with medication regime, as prescribed and report medication side effects  Description  Interventions:  - Offer appropriate PRN medication and supervise ingestion; conduct AIMS, as needed   Outcome: Progressing  Goal: Attend and participate in unit activities, including therapeutic, recreational, and educational groups  Description  Interventions:  -Encourage Visitation and family involvement in care  Outcome: Progressing  Goal: Complete daily ADLs, including personal hygiene independently, as able  Description  Interventions:  - Observe, teach, and assist patient with ADLS  - Monitor and promote a balance of rest/activity, with adequate nutrition and elimination   Outcome: Progressing     Problem: Ineffective Coping  Goal: Demonstrates healthy coping skills  Outcome: Progressing  Goal: Understands least restrictive measures  Description  Interventions:  - Utilize least restrictive behavior  Outcome: Progressing     Problem: GASTROINTESTINAL - ADULT  Goal: Maintains adequate nutritional intake  Description  INTERVENTIONS:  - Monitor percentage of each meal consumed  - Identify factors contributing to decreased intake, treat as appropriate  - Assist with meals as needed  - Monitor I&O, weight, and lab values if indicated  - Obtain nutrition services referral as needed  Outcome: Lisha Lamb has been awake, alert, and visible intermittently out in the milieu  Ate 100% supper  Pt completed his daily ADL's today  Pt continues to be preoccupied with discharge  Dressed in layers and disheveled in appearance  Compliant with all scheduled meds without prompting  No behavioral issues  Enjoys listening to music on [employer] and singing in [facility]  Attended and participated in fresh air group out on deck with staff and peers  Pt did not attend evening group but came out for snack after  Pt requested prn Trazodone for sleep  Trazodone 50 mg po given at 2134  Continue to monitor/assess for any changes

## 2020-03-22 PROCEDURE — 99232 SBSQ HOSP IP/OBS MODERATE 35: CPT | Performed by: PHYSICIAN ASSISTANT

## 2020-03-22 RX ADMIN — METFORMIN HYDROCHLORIDE 500 MG: 500 TABLET ORAL at 09:24

## 2020-03-22 RX ADMIN — METFORMIN HYDROCHLORIDE 500 MG: 500 TABLET ORAL at 16:36

## 2020-03-22 RX ADMIN — OXYBUTYNIN CHLORIDE 5 MG: 5 TABLET, EXTENDED RELEASE ORAL at 09:24

## 2020-03-22 RX ADMIN — ATORVASTATIN CALCIUM 10 MG: 10 TABLET, FILM COATED ORAL at 16:36

## 2020-03-22 RX ADMIN — PANTOPRAZOLE SODIUM 40 MG: 40 TABLET, DELAYED RELEASE ORAL at 06:17

## 2020-03-22 RX ADMIN — DOCUSATE SODIUM 100 MG: 100 CAPSULE, LIQUID FILLED ORAL at 09:25

## 2020-03-22 RX ADMIN — CLOZAPINE 200 MG: 200 TABLET ORAL at 16:36

## 2020-03-22 RX ADMIN — LEVOTHYROXINE SODIUM 75 MCG: 75 TABLET ORAL at 06:17

## 2020-03-22 RX ADMIN — DOCUSATE SODIUM 100 MG: 100 CAPSULE, LIQUID FILLED ORAL at 16:36

## 2020-03-22 RX ADMIN — OLANZAPINE 5 MG: 5 TABLET, FILM COATED ORAL at 20:56

## 2020-03-22 RX ADMIN — DIVALPROEX SODIUM 1500 MG: 500 TABLET, EXTENDED RELEASE ORAL at 20:56

## 2020-03-22 NOTE — PROGRESS NOTES
Frosty Brinks maintained on ongoing SAFE precaution without incident on this shift  Laying in bed with eyes closed, breath even and unlabored   Continuous rounding implemented   No behavioral noted   Will continue to monitor

## 2020-03-22 NOTE — PLAN OF CARE
Problem: Alteration in Thoughts and Perception  Goal: Verbalize thoughts and feelings  Description  Interventions:  - Promote a nonjudgmental and trusting relationship with the patient through active listening and therapeutic communication  - Assess patient's level of functioning, behavior and potential for risk  - Engage patient in 1 on 1 interactions  - Encourage patient to express fears, feelings, frustrations, and discuss symptoms    - [place] patient to reality, help patient recognize reality-based thinking   - Administer medications as ordered and assess for potential side effects  - Provide the patient education related to the signs and symptoms of the illness and desired effects of prescribed medications  Outcome: Progressing  Goal: Refrain from acting on delusional thinking/internal stimuli  Description  Interventions:  - Monitor patient closely, per order   - Utilize least restrictive measures   - Set reasonable limits, give positive feedback for acceptable   - Administer medications as ordered and monitor of potential side effects  Outcome: Progressing  Goal: Agree to be compliant with medication regime, as prescribed and report medication side effects  Description  Interventions:  - Offer appropriate PRN medication and supervise ingestion; conduct AIMS, as needed   Outcome: Progressing  Goal: Attend and participate in unit activities, including therapeutic, recreational, and educational groups  Description  Interventions:  -Encourage Visitation and family involvement in care  Outcome: Progressing     Problem: Ineffective Coping  Goal: Demonstrates healthy coping skills  Outcome: Progressing  Goal: Understands least restrictive measures  Description  Interventions:  - Utilize least restrictive behavior  Outcome: Progressing     Problem: GASTROINTESTINAL - ADULT  Goal: Maintains adequate nutritional intake  Description  INTERVENTIONS:  - Monitor percentage of each meal consumed  - Identify factors contributing to decreased intake, treat as appropriate  - Assist with meals as needed  - Monitor I&O, weight, and lab values if indicated  - Obtain nutrition services referral as needed  Outcome: Marlys Perea has been awake, aloert, and visible intermittently out in the milieu  No agitation or behavioral issues noted  Rests in room at intervals  Little interaction noted with peers  Ate 100% supper  Remains disheveled in appearance and dressed in layers  Compliant with scheduled meds without prompting  Pt remains preoccupied with discharge and asks staff when he will be discharged  Encouraged pt to work on his recovery goals and what is on his [employer]  Pt did not attend evening group but came out for snack after  Continue to monitor/assess for any changes

## 2020-03-22 NOTE — PLAN OF CARE
Problem: Alteration in Thoughts and Perception  Goal: Verbalize thoughts and feelings  Description  Interventions:  - Promote a nonjudgmental and trusting relationship with the patient through active listening and therapeutic communication  - Assess patient's level of functioning, behavior and potential for risk  - Engage patient in 1 on 1 interactions  - Encourage patient to express fears, feelings, frustrations, and discuss symptoms    - [place] patient to reality, help patient recognize reality-based thinking   - Administer medications as ordered and assess for potential side effects  - Provide the patient education related to the signs and symptoms of the illness and desired effects of prescribed medications  Outcome: Progressing     Problem: Alteration in Thoughts and Perception  Goal: Refrain from acting on delusional thinking/internal stimuli  Description  Interventions:  - Monitor patient closely, per order   - Utilize least restrictive measures   - Set reasonable limits, give positive feedback for acceptable   - Administer medications as ordered and monitor of potential side effects  Outcome: Not Progressing  Goal: Agree to be compliant with medication regime, as prescribed and report medication side effects  Description  Interventions:  - Offer appropriate PRN medication and supervise ingestion; conduct AIMS, as needed   Outcome: Not Progressing  Goal: Attend and participate in unit activities, including therapeutic, recreational, and educational groups  Description  Interventions:  -Encourage Visitation and family involvement in care  Outcome: Not Progressing  Goal: Recognize dysfunctional thoughts, communicate reality-based thoughts at the time of discharge  Description  Interventions:  - Provide medication and psycho-education to assist patient in compliance and developing insight into his/her illness   Outcome: Not Progressing  Goal: Complete daily ADLs, including personal hygiene independently, as able  Description  Interventions:  - Observe, teach, and assist patient with ADLS  - Monitor and promote a balance of rest/activity, with adequate nutrition and elimination   Outcome: Not Progressing    Refused vitals and breakfast   Tried to refuse AM meds but this writer got him up and walked with him to med window  Irritable but complied  He immediately returned to bed  Patient did not attend groups  He did get up and had lunch (100%) but then headed back to bed where he is observed sleeping at this time  Isolative to room and self    Will continue to monitor progress in recovery program

## 2020-03-22 NOTE — TREATMENT TEAM
03/21/20 1300   Activity/Group Checklist   Group Other (Comment)  (OPEN STUDIO/Art Therapy, Social Interaction-Free Expression)   Attendance Attended   Attendance Duration (min) 46-60   Interactions Interacted appropriately   Affect/Mood Appropriate   Goals Achieved Able to listen to others; Able to engage in interactions
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
## 2020-03-23 PROCEDURE — 99232 SBSQ HOSP IP/OBS MODERATE 35: CPT | Performed by: PSYCHIATRY & NEUROLOGY

## 2020-03-23 RX ADMIN — TRAZODONE HYDROCHLORIDE 50 MG: 50 TABLET ORAL at 23:40

## 2020-03-23 RX ADMIN — DIVALPROEX SODIUM 1500 MG: 500 TABLET, EXTENDED RELEASE ORAL at 21:00

## 2020-03-23 RX ADMIN — LEVOTHYROXINE SODIUM 75 MCG: 75 TABLET ORAL at 06:09

## 2020-03-23 RX ADMIN — OXYBUTYNIN CHLORIDE 5 MG: 5 TABLET, EXTENDED RELEASE ORAL at 08:28

## 2020-03-23 RX ADMIN — DOCUSATE SODIUM 100 MG: 100 CAPSULE, LIQUID FILLED ORAL at 16:27

## 2020-03-23 RX ADMIN — PANTOPRAZOLE SODIUM 40 MG: 40 TABLET, DELAYED RELEASE ORAL at 06:09

## 2020-03-23 RX ADMIN — METFORMIN HYDROCHLORIDE 500 MG: 500 TABLET ORAL at 08:27

## 2020-03-23 RX ADMIN — DOCUSATE SODIUM 100 MG: 100 CAPSULE, LIQUID FILLED ORAL at 08:28

## 2020-03-23 RX ADMIN — CLOZAPINE 200 MG: 200 TABLET ORAL at 16:27

## 2020-03-23 RX ADMIN — NICOTINE POLACRILEX 2 MG: 2 GUM, CHEWING ORAL at 16:30

## 2020-03-23 RX ADMIN — OLANZAPINE 5 MG: 5 TABLET, FILM COATED ORAL at 21:00

## 2020-03-23 RX ADMIN — METFORMIN HYDROCHLORIDE 500 MG: 500 TABLET ORAL at 16:27

## 2020-03-23 RX ADMIN — ATORVASTATIN CALCIUM 10 MG: 10 TABLET, FILM COATED ORAL at 16:27
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
## 2020-03-23 NOTE — PLAN OF CARE
Problem: Alteration in Thoughts and Perception  Goal: Agree to be compliant with medication regime, as prescribed and report medication side effects  Description  Interventions:  - Offer appropriate PRN medication and supervise ingestion; conduct AIMS, as needed   Outcome: Progressing  Goal: Attend and participate in unit activities, including therapeutic, recreational, and educational groups  Description  Interventions:  -Encourage Visitation and family involvement in care  Outcome: Progressing     Problem: Alteration in Thoughts and Perception  Goal: Refrain from acting on delusional thinking/internal stimuli  Description  Interventions:  - Monitor patient closely, per order   - Utilize least restrictive measures   - Set reasonable limits, give positive feedback for acceptable   - Administer medications as ordered and monitor of potential side effects  Outcome: Not Progressing   Patient is up and about this morning  He was complaint with vitals this morning  Got OOB and ate breakfast this morning  Medication complaint  Patient is more visible on the unit this shift  Attended and participated in spirituality and IMR group this shift  Mostly compliant with his behavioral plan  He continues to have pressured and rambling speech  Obsesses over discharge but will not listen when told what he needs to do in order to be discharged  Continue to monitor

## 2020-03-23 NOTE — PROGRESS NOTES
03/23/20 0800   Team Meeting   Meeting Type Daily Rounds   Team Members Present   Team Members Present Physician;Nurse;; Other (Discipline and Name)   Physician Team Member Dr Omar Monet Team Member Maritza Blanco RN   Care Management Team Member Lily Teague   Other (Discipline and Name) Malu Harris, MORGAN; Julius Shields, MCKAYLA     Patient refused vitals and would not take medications until he was walked up to the window  Controlled  Also refused breakfast  Slept

## 2020-03-23 NOTE — PLAN OF CARE
Problem: Alteration in Thoughts and Perception  Goal: Verbalize thoughts and feelings  Description  Interventions:  - Promote a nonjudgmental and trusting relationship with the patient through active listening and therapeutic communication  - Assess patient's level of functioning, behavior and potential for risk  - Engage patient in 1 on 1 interactions  - Encourage patient to express fears, feelings, frustrations, and discuss symptoms    - [place] patient to reality, help patient recognize reality-based thinking   - Administer medications as ordered and assess for potential side effects  - Provide the patient education related to the signs and symptoms of the illness and desired effects of prescribed medications  Outcome: Progressing  Goal: Refrain from acting on delusional thinking/internal stimuli  Description  Interventions:  - Monitor patient closely, per order   - Utilize least restrictive measures   - Set reasonable limits, give positive feedback for acceptable   - Administer medications as ordered and monitor of potential side effects  Outcome: Progressing  Goal: Agree to be compliant with medication regime, as prescribed and report medication side effects  Description  Interventions:  - Offer appropriate PRN medication and supervise ingestion; conduct AIMS, as needed   Outcome: Progressing  Goal: Attend and participate in unit activities, including therapeutic, recreational, and educational groups  Description  Interventions:  -Encourage Visitation and family involvement in care  Outcome: Progressing     Problem: Ineffective Coping  Goal: Demonstrates healthy coping skills  Outcome: Progressing  Goal: Understands least restrictive measures  Description  Interventions:  - Utilize least restrictive behavior  Outcome: Progressing     Problem: GASTROINTESTINAL - ADULT  Goal: Maintains adequate nutritional intake  Description  INTERVENTIONS:  - Monitor percentage of each meal consumed  - Identify factors contributing to decreased intake, treat as appropriate  - Assist with meals as needed  - Monitor I&O, weight, and lab values if indicated  - Obtain nutrition services referral as needed  Outcome: Yanni Ng has been awake, alert, and visible intermittently out in the milieu  Pt completed his daily ADL's today  No agitation or outbursts noted  Ate 100% supper  Remains disheveled in appearance and dressed in layers  Continues to be preoccupied with discharge  Pt requested Nicotine Gum and one piece 2 mg given at 1630  Compliant with all scheduled meds with some prompting  Minimal interaction noted with peers  Pt did not attend evening group but came out for snack after  No behavioral issues  Continue to monitor/assess for any changes

## 2020-03-23 NOTE — TREATMENT TEAM
Not in group long enough to establish credit  Talking out of turn and mumbling nonsense when other attempting to talk  Was redirectable        03/23/20 1100   Activity/Group Checklist   Group   (IMR/Self Care)   Attendance Did not attend   Attendance Duration (min) 46-60   Affect/Mood GAEL

## 2020-03-23 NOTE — PROGRESS NOTES
Yesika Carter maintained on ongoing SAFE precaution without incident on this shift  Laying in bed with eyes closed, breath even and unlabored   Continuous rounding implemented   No behavioral noted   Will continue to monitor
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
## 2020-03-24 PROCEDURE — 99232 SBSQ HOSP IP/OBS MODERATE 35: CPT | Performed by: PSYCHIATRY & NEUROLOGY

## 2020-03-24 RX ADMIN — DOCUSATE SODIUM 100 MG: 100 CAPSULE, LIQUID FILLED ORAL at 17:00

## 2020-03-24 RX ADMIN — OLANZAPINE 5 MG: 5 TABLET, FILM COATED ORAL at 21:07

## 2020-03-24 RX ADMIN — DIVALPROEX SODIUM 1500 MG: 500 TABLET, EXTENDED RELEASE ORAL at 21:07

## 2020-03-24 RX ADMIN — LEVOTHYROXINE SODIUM 75 MCG: 75 TABLET ORAL at 05:26

## 2020-03-24 RX ADMIN — TRAZODONE HYDROCHLORIDE 50 MG: 50 TABLET ORAL at 21:07

## 2020-03-24 RX ADMIN — OXYBUTYNIN CHLORIDE 5 MG: 5 TABLET, EXTENDED RELEASE ORAL at 08:39

## 2020-03-24 RX ADMIN — ATORVASTATIN CALCIUM 10 MG: 10 TABLET, FILM COATED ORAL at 17:00

## 2020-03-24 RX ADMIN — METFORMIN HYDROCHLORIDE 500 MG: 500 TABLET ORAL at 08:39

## 2020-03-24 RX ADMIN — METFORMIN HYDROCHLORIDE 500 MG: 500 TABLET ORAL at 17:00

## 2020-03-24 RX ADMIN — CLOZAPINE 200 MG: 200 TABLET ORAL at 16:59

## 2020-03-24 RX ADMIN — PANTOPRAZOLE SODIUM 40 MG: 40 TABLET, DELAYED RELEASE ORAL at 05:26

## 2020-03-24 RX ADMIN — DOCUSATE SODIUM 100 MG: 100 CAPSULE, LIQUID FILLED ORAL at 08:39

## 2020-03-24 NOTE — NURSING NOTE
Nazario Liner received awake at change of shift  Requested and given a snack and Trazodone as ordered PRN  Retired to bed and appears to sleep at this time
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
## 2020-03-24 NOTE — NURSING NOTE
Trazodone effective  Retired to bed after the intake of Trazodone and appears to sleep thus this far as per continual rounding

## 2020-03-24 NOTE — PLAN OF CARE
Problem: Alteration in Thoughts and Perception  Goal: Refrain from acting on delusional thinking/internal stimuli  Description  Interventions:  - Monitor patient closely, per order   - Utilize least restrictive measures   - Set reasonable limits, give positive feedback for acceptable   - Administer medications as ordered and monitor of potential side effects  Outcome: Progressing  Goal: Agree to be compliant with medication regime, as prescribed and report medication side effects  Description  Interventions:  - Offer appropriate PRN medication and supervise ingestion; conduct AIMS, as needed   Outcome: Progressing   Patient is up and about this morning  He was complaint with vitals this morning  Got OOB and ate breakfast this morning  Medication complaint  Patient is more visible on the unit this shift  Attended and participated in spirituality, journaling, IMR and fresh air group this shift  Mostly compliant with his behavioral plan  He continues to have pressured and rambling speech  Obsesses over discharge but will not listen when told what he needs to do in order to be discharged  Continue to monitor
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
## 2020-03-24 NOTE — TREATMENT TEAM
03/24/20 1100   Activity/Group Checklist   Group   (IMR/[employer] )   Attendance Attended   Attendance Duration (min) 46-60   Interactions Interacted appropriately   Affect/Mood Appropriate; Wide   Goals Achieved Identified feelings; Able to listen to others; Able to engage in interactions; Able to recieve feedback; Discussed coping strategies

## 2020-03-24 NOTE — PROGRESS NOTES
03/24/20 0800   Team Meeting   Meeting Type Daily Rounds   Team Members Present   Team Members Present Physician;Nurse;; Other (Discipline and Name)   Physician Team Member Dr Foster Baumgarten, RN   Care Management Team Member Lily Lawrence   Other (Discipline and Name) Omar Archibald LCSW; MCKAYLA Lopez     Patient attended groups  Cooperative  Rapid pressured speech  Slept
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
## 2020-03-25 PROCEDURE — 99232 SBSQ HOSP IP/OBS MODERATE 35: CPT | Performed by: PSYCHIATRY & NEUROLOGY

## 2020-03-25 RX ADMIN — OXYBUTYNIN CHLORIDE 5 MG: 5 TABLET, EXTENDED RELEASE ORAL at 08:51

## 2020-03-25 RX ADMIN — CLOZAPINE 200 MG: 200 TABLET ORAL at 16:43

## 2020-03-25 RX ADMIN — TRAZODONE HYDROCHLORIDE 50 MG: 50 TABLET ORAL at 23:50

## 2020-03-25 RX ADMIN — PANTOPRAZOLE SODIUM 40 MG: 40 TABLET, DELAYED RELEASE ORAL at 05:35

## 2020-03-25 RX ADMIN — OLANZAPINE 5 MG: 5 TABLET, FILM COATED ORAL at 20:37

## 2020-03-25 RX ADMIN — DOCUSATE SODIUM 100 MG: 100 CAPSULE, LIQUID FILLED ORAL at 16:44

## 2020-03-25 RX ADMIN — DOCUSATE SODIUM 100 MG: 100 CAPSULE, LIQUID FILLED ORAL at 08:51

## 2020-03-25 RX ADMIN — ATORVASTATIN CALCIUM 10 MG: 10 TABLET, FILM COATED ORAL at 16:44

## 2020-03-25 RX ADMIN — DIVALPROEX SODIUM 1500 MG: 500 TABLET, EXTENDED RELEASE ORAL at 20:37

## 2020-03-25 RX ADMIN — LEVOTHYROXINE SODIUM 75 MCG: 75 TABLET ORAL at 05:35

## 2020-03-25 RX ADMIN — METFORMIN HYDROCHLORIDE 500 MG: 500 TABLET ORAL at 08:51

## 2020-03-25 RX ADMIN — METFORMIN HYDROCHLORIDE 500 MG: 500 TABLET ORAL at 16:44
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
## 2020-03-25 NOTE — PROGRESS NOTES
03/25/20 0800   Team Meeting   Meeting Type Daily Rounds   Team Members Present   Team Members Present Physician;Nurse;; Other (Discipline and Name)   Physician Team Member Dr Etienne Jonas Team Member Magen Handy, TABBY   Care Management Team Member Lily Helms   Other (Discipline and Name) Shay Malloy, RACHELEW; Lita Cohen, CPS     No behavioral changes  Slept

## 2020-03-25 NOTE — TREATMENT TEAM
03/25/20 1100   Activity/Group Checklist   Group   (IMR/Daily Routine )   Attendance Did not attend   Attendance Duration (min) 46-60   Affect/Mood GAEL

## 2020-03-25 NOTE — PLAN OF CARE
Problem: Alteration in Thoughts and Perception  Goal: Treatment Goal: Gain control of psychotic behaviors/thinking, reduce/eliminate presenting symptoms and demonstrate improved reality functioning upon discharge  Outcome: Progressing  Goal: Verbalize thoughts and feelings  Description  Interventions:  - Promote a nonjudgmental and trusting relationship with the patient through active listening and therapeutic communication  - Assess patient's level of functioning, behavior and potential for risk  - Engage patient in 1 on 1 interactions  - Encourage patient to express fears, feelings, frustrations, and discuss symptoms    - [place] patient to reality, help patient recognize reality-based thinking   - Administer medications as ordered and assess for potential side effects  - Provide the patient education related to the signs and symptoms of the illness and desired effects of prescribed medications  Outcome: Progressing  Goal: Refrain from acting on delusional thinking/internal stimuli  Description  Interventions:  - Monitor patient closely, per order   - Utilize least restrictive measures   - Set reasonable limits, give positive feedback for acceptable   - Administer medications as ordered and monitor of potential side effects  Outcome: Progressing  Goal: Agree to be compliant with medication regime, as prescribed and report medication side effects  Description  Interventions:  - Offer appropriate PRN medication and supervise ingestion; conduct AIMS, as needed   Outcome: Progressing  Goal: Attend and participate in unit activities, including therapeutic, recreational, and educational groups  Description  Interventions:  -Encourage Visitation and family involvement in care  Outcome: Progressing  Goal: Recognize dysfunctional thoughts, communicate reality-based thoughts at the time of discharge  Description  Interventions:  - Provide medication and psycho-education to assist patient in compliance and developing insight into his/her illness   Outcome: Progressing  Goal: Complete daily ADLs, including personal hygiene independently, as able  Description  Interventions:  - Observe, teach, and assist patient with ADLS  - Monitor and promote a balance of rest/activity, with adequate nutrition and elimination   Outcome: Progressing     Problem: Ineffective Coping  Goal: Identifies ineffective coping skills  Outcome: Progressing  Goal: Identifies healthy coping skills  Outcome: Progressing  Goal: Demonstrates healthy coping skills  Outcome: Progressing  Goal: Patient/Family participate in treatment and DC plans  Description  Interventions:  - Provide therapeutic environment  Outcome: Progressing  Goal: Patient/Family verbalizes awareness of resources  Outcome: Progressing  Goal: Understands least restrictive measures  Description  Interventions:  - Utilize least restrictive behavior  Outcome: Progressing     Problem: RESPIRATORY - ADULT  Goal: Achieves optimal ventilation and oxygenation  Description  INTERVENTIONS:  - Assess for changes in respiratory status  - Assess for changes in mentation and behavior  - Position to facilitate oxygenation and minimize respiratory effort  - Oxygen administered by appropriate delivery if ordered  - Initiate smoking cessation education as indicated  - Encourage broncho-pulmonary hygiene including cough, deep breathe, Incentive Spirometry  - Assess the need for suctioning and aspirate as needed  - Assess and instruct to report SOB or any respiratory difficulty  - Respiratory Therapy support as indicated  Outcome: Progressing     Problem: GASTROINTESTINAL - ADULT  Goal: Minimal or absence of nausea and/or vomiting  Description  INTERVENTIONS:  - Administer IV fluids if ordered to ensure adequate hydration  - Maintain NPO status until nausea and vomiting are resolved  - Nasogastric tube if ordered  - Administer ordered antiemetic medications as needed  - Provide nonpharmacologic comfort measures as appropriate  - Advance diet as tolerated, if ordered  - Consider nutrition services referral to assist patient with adequate nutrition and appropriate food choices  Outcome: Progressing  Goal: Maintains or returns to baseline bowel function  Description  INTERVENTIONS:  - Assess bowel function  - Encourage oral fluids to ensure adequate hydration  - Administer IV fluids if ordered to ensure adequate hydration  - Administer ordered medications as needed  - Encourage mobilization and activity  - Consider nutritional services referral to assist patient with adequate nutrition and appropriate food choices  Outcome: Progressing  Goal: Maintains adequate nutritional intake  Description  INTERVENTIONS:  - Monitor percentage of each meal consumed  - Identify factors contributing to decreased intake, treat as appropriate  - Assist with meals as needed  - Monitor I&O, weight, and lab values if indicated  - Obtain nutrition services referral as needed  Outcome: Progressing     Problem: METABOLIC, FLUID AND ELECTROLYTES - ADULT  Goal: Glucose maintained within target range  Description  INTERVENTIONS:  - Monitor Blood Glucose as ordered  - Assess for signs and symptoms of hyperglycemia and hypoglycemia  - Administer ordered medications to maintain glucose within target range  - Assess nutritional intake and initiate nutrition service referral as needed  Outcome: Progressing     Problem: DISCHARGE PLANNING  Goal: Discharge to home or other facility with appropriate resources  Description    CASE MANAGEMENT INTERVENTIONS:  - Conduct assessment to determine patient/family and health care team treatment goals, and need for post-acute services based on payer coverage, community resources, patient preferences and barriers to discharge    - Address psychosocial, clinical, and financial barriers to discharge as identified in assessment in conjunction with the patient/family and health care team   - Assist the patient in reintegration back into the community by removing barriers which may hinder a successful discharge once deemed stable  - Arrange appropriate level of post-acute services according to patient's needs and preference and payer coverage in collaboration with the physician and health care team   - Communicate with and update the patient/family, physician, and health care team regarding progress on the discharge plan  - Arrange appropriate transportation to post-acute venues  Outcome: Progressing     Pleasant, cooperative and visible intermittently  Med and meal compliant  Denies depression, anxiety, SI and HI  No c/o s/s pain or discomfort  Did not attend evening group  Ate 100% of supper and had snack    Will continue to monitor progress in recovery program

## 2020-03-26 PROCEDURE — 99232 SBSQ HOSP IP/OBS MODERATE 35: CPT | Performed by: PSYCHIATRY & NEUROLOGY

## 2020-03-26 RX ADMIN — OXYBUTYNIN CHLORIDE 5 MG: 5 TABLET, EXTENDED RELEASE ORAL at 09:04

## 2020-03-26 RX ADMIN — METFORMIN HYDROCHLORIDE 500 MG: 500 TABLET ORAL at 16:50

## 2020-03-26 RX ADMIN — DOCUSATE SODIUM 100 MG: 100 CAPSULE, LIQUID FILLED ORAL at 09:04

## 2020-03-26 RX ADMIN — ATORVASTATIN CALCIUM 10 MG: 10 TABLET, FILM COATED ORAL at 16:50

## 2020-03-26 RX ADMIN — DIVALPROEX SODIUM 1500 MG: 500 TABLET, EXTENDED RELEASE ORAL at 20:44

## 2020-03-26 RX ADMIN — LEVOTHYROXINE SODIUM 75 MCG: 75 TABLET ORAL at 05:44

## 2020-03-26 RX ADMIN — CLOZAPINE 200 MG: 200 TABLET ORAL at 16:50

## 2020-03-26 RX ADMIN — OLANZAPINE 5 MG: 5 TABLET, FILM COATED ORAL at 20:44

## 2020-03-26 RX ADMIN — DOCUSATE SODIUM 100 MG: 100 CAPSULE, LIQUID FILLED ORAL at 16:50

## 2020-03-26 RX ADMIN — METFORMIN HYDROCHLORIDE 500 MG: 500 TABLET ORAL at 09:04

## 2020-03-26 RX ADMIN — PANTOPRAZOLE SODIUM 40 MG: 40 TABLET, DELAYED RELEASE ORAL at 05:44

## 2020-03-26 NOTE — PLAN OF CARE
Problem: Alteration in Thoughts and Perception  Goal: Verbalize thoughts and feelings  Description  Interventions:  - Promote a nonjudgmental and trusting relationship with the patient through active listening and therapeutic communication  - Assess patient's level of functioning, behavior and potential for risk  - Engage patient in 1 on 1 interactions  - Encourage patient to express fears, feelings, frustrations, and discuss symptoms    - [place] patient to reality, help patient recognize reality-based thinking   - Administer medications as ordered and assess for potential side effects  - Provide the patient education related to the signs and symptoms of the illness and desired effects of prescribed medications  Outcome: Progressing  Goal: Refrain from acting on delusional thinking/internal stimuli  Description  Interventions:  - Monitor patient closely, per order   - Utilize least restrictive measures   - Set reasonable limits, give positive feedback for acceptable   - Administer medications as ordered and monitor of potential side effects  Outcome: Progressing  Goal: Agree to be compliant with medication regime, as prescribed and report medication side effects  Description  Interventions:  - Offer appropriate PRN medication and supervise ingestion; conduct AIMS, as needed   Outcome: Progressing  Goal: Attend and participate in unit activities, including therapeutic, recreational, and educational groups  Description  Interventions:  -Encourage Visitation and family involvement in care  Outcome: Progressing  Goal: Complete daily ADLs, including personal hygiene independently, as able  Description  Interventions:  - Observe, teach, and assist patient with ADLS  - Monitor and promote a balance of rest/activity, with adequate nutrition and elimination   Outcome: Progressing     Problem: Ineffective Coping  Goal: Demonstrates healthy coping skills  Outcome: Progressing  Goal: Understands least restrictive measures  Description  Interventions:  - Utilize least restrictive behavior  Outcome: Progressing     Problem: GASTROINTESTINAL - ADULT  Goal: Maintains adequate nutritional intake  Description  INTERVENTIONS:  - Monitor percentage of each meal consumed  - Identify factors contributing to decreased intake, treat as appropriate  - Assist with meals as needed  - Monitor I&O, weight, and lab values if indicated  - Obtain nutrition services referral as needed  Outcome: Yanni Ng has been awake, alert, and visible intermittently out in the milieu  Rests in room at intervals  Enjoys singing and dancing to music on radio in [facility]  Ate 100% supper  No behavioral issues  Maintaining adequate impulse control this shift  Compliant with all scheduled meds without prompting  Attended and participated in Men's Group, not evening group, but came out for snack after  Continue to monitor/assess for any changes

## 2020-03-26 NOTE — NURSING NOTE
Felix Concepcion requested and given his PRN Trazodone at 2350 3/25/20  Pleasant  Retired to bed and continues to sleep thus this far as per continual rounding  Will continue to monitor

## 2020-03-26 NOTE — PROGRESS NOTES
03/26/20 0800   Team Meeting   Meeting Type Daily Rounds   Team Members Present   Team Members Present Physician;Nurse;; Other (Discipline and Name)   Physician Team Member Dr Angel Limon, RN   Care Management Team Member Lily Schulz   Other (Discipline and Name) Isidro Singh LCSW     Patient attended fresh air group  Was up for vitals and breakfast but then went back to bed  Slept
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
## 2020-03-26 NOTE — PLAN OF CARE
Problem: Alteration in Thoughts and Perception  Goal: Verbalize thoughts and feelings  Description  Interventions:  - Promote a nonjudgmental and trusting relationship with the patient through active listening and therapeutic communication  - Assess patient's level of functioning, behavior and potential for risk  - Engage patient in 1 on 1 interactions  - Encourage patient to express fears, feelings, frustrations, and discuss symptoms    - [place] patient to reality, help patient recognize reality-based thinking   - Administer medications as ordered and assess for potential side effects  - Provide the patient education related to the signs and symptoms of the illness and desired effects of prescribed medications  Outcome: Progressing  Goal: Agree to be compliant with medication regime, as prescribed and report medication side effects  Description  Interventions:  - Offer appropriate PRN medication and supervise ingestion; conduct AIMS, as needed   Outcome: Progressing     Problem: Ineffective Coping  Goal: Patient/Family verbalizes awareness of resources  Outcome: Progressing  Goal: Understands least restrictive measures  Description  Interventions:  - Utilize least restrictive behavior  Outcome: Progressing     Problem: GASTROINTESTINAL - ADULT  Goal: Minimal or absence of nausea and/or vomiting  Description  INTERVENTIONS:  - Administer IV fluids if ordered to ensure adequate hydration  - Maintain NPO status until nausea and vomiting are resolved  - Nasogastric tube if ordered  - Administer ordered antiemetic medications as needed  - Provide nonpharmacologic comfort measures as appropriate  - Advance diet as tolerated, if ordered  - Consider nutrition services referral to assist patient with adequate nutrition and appropriate food choices  Outcome: Progressing  Goal: Maintains adequate nutritional intake  Description  INTERVENTIONS:  - Monitor percentage of each meal consumed  - Identify factors contributing to decreased intake, treat as appropriate  - Assist with meals as needed  - Monitor I&O, weight, and lab values if indicated  - Obtain nutrition services referral as needed  Outcome: Progressing     Problem: Alteration in Thoughts and Perception  Goal: Attend and participate in unit activities, including therapeutic, recreational, and educational groups  Description  Interventions:  -Encourage Visitation and family involvement in care  Outcome: Not Progressing  Mostly isolative to his room, slept for most of the day  Woke up late and missed breakfast, came for meds when prompted  Ate 75% of lunch  Did not attend any groups  Pleasant and friendly with others when in the milieu  No other behaviors or issues noted  Continue to monitor
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
## 2020-03-27 PROCEDURE — 99232 SBSQ HOSP IP/OBS MODERATE 35: CPT | Performed by: PSYCHIATRY & NEUROLOGY

## 2020-03-27 RX ADMIN — DOCUSATE SODIUM 100 MG: 100 CAPSULE, LIQUID FILLED ORAL at 16:51

## 2020-03-27 RX ADMIN — LEVOTHYROXINE SODIUM 75 MCG: 75 TABLET ORAL at 06:01

## 2020-03-27 RX ADMIN — CLOZAPINE 200 MG: 200 TABLET ORAL at 16:51

## 2020-03-27 RX ADMIN — METFORMIN HYDROCHLORIDE 500 MG: 500 TABLET ORAL at 16:51

## 2020-03-27 RX ADMIN — PANTOPRAZOLE SODIUM 40 MG: 40 TABLET, DELAYED RELEASE ORAL at 06:01

## 2020-03-27 RX ADMIN — ATORVASTATIN CALCIUM 10 MG: 10 TABLET, FILM COATED ORAL at 16:53

## 2020-03-27 RX ADMIN — DIVALPROEX SODIUM 1500 MG: 500 TABLET, EXTENDED RELEASE ORAL at 20:52

## 2020-03-27 RX ADMIN — ALUMINUM HYDROXIDE, MAGNESIUM HYDROXIDE, AND SIMETHICONE 30 ML: 200; 200; 20 SUSPENSION ORAL at 23:41

## 2020-03-27 RX ADMIN — OLANZAPINE 5 MG: 5 TABLET, FILM COATED ORAL at 20:52

## 2020-03-27 NOTE — NURSING NOTE
Received pt in bed at change of shift with eyes closed; chest movement noted  Awake times two briefly for a snack and water returning to bed without any difficulties; pleasant  Will continue to monitor on continual rounding

## 2020-03-27 NOTE — PROGRESS NOTES
03/27/20 0800   Team Meeting   Meeting Type Daily Rounds   Team Members Present   Team Members Present Physician;Nurse;; Other (Discipline and Name)   Physician Team Member Dr Arelis Vences Team Member Colton Aldridge RN   Care Management Team Member Lily Young   Other (Discipline and Name) Arabella Gorman LCSW     Patient did not attend groups  Slept most of the day
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
## 2020-03-27 NOTE — PLAN OF CARE
Problem: GASTROINTESTINAL - ADULT  Goal: Minimal or absence of nausea and/or vomiting  Description  INTERVENTIONS:  - Administer IV fluids if ordered to ensure adequate hydration  - Maintain NPO status until nausea and vomiting are resolved  - Nasogastric tube if ordered  - Administer ordered antiemetic medications as needed  - Provide nonpharmacologic comfort measures as appropriate  - Advance diet as tolerated, if ordered  - Consider nutrition services referral to assist patient with adequate nutrition and appropriate food choices  Outcome: Progressing  Goal: Maintains adequate nutritional intake  Description  INTERVENTIONS:  - Monitor percentage of each meal consumed  - Identify factors contributing to decreased intake, treat as appropriate  - Assist with meals as needed  - Monitor I&O, weight, and lab values if indicated  - Obtain nutrition services referral as needed  Outcome: Progressing     Problem: Alteration in Thoughts and Perception  Goal: Agree to be compliant with medication regime, as prescribed and report medication side effects  Description  Interventions:  - Offer appropriate PRN medication and supervise ingestion; conduct AIMS, as needed   Outcome: Not Progressing  Goal: Attend and participate in unit activities, including therapeutic, recreational, and educational groups  Description  Interventions:  -Encourage Visitation and family involvement in care  Outcome: Not Progressing  Goal: Complete daily ADLs, including personal hygiene independently, as able  Description  Interventions:  - Observe, teach, and assist patient with ADLS  - Monitor and promote a balance of rest/activity, with adequate nutrition and elimination   Outcome: Not Progressing   Isolative to his room, sleeping for most of the day  Refused vitals, refused am meds, did not attend any groups, "leave me alone"  Disheveled in appearance, did not shower or do hygiene  Out of room for meals only, ate 100% of both meals   No other behaviors or issues noted  Continue to monitor
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
## 2020-03-27 NOTE — PLAN OF CARE
Problem: Alteration in Thoughts and Perception  Goal: Verbalize thoughts and feelings  Description  Interventions:  - Promote a nonjudgmental and trusting relationship with the patient through active listening and therapeutic communication  - Assess patient's level of functioning, behavior and potential for risk  - Engage patient in 1 on 1 interactions  - Encourage patient to express fears, feelings, frustrations, and discuss symptoms    - [place] patient to reality, help patient recognize reality-based thinking   - Administer medications as ordered and assess for potential side effects  - Provide the patient education related to the signs and symptoms of the illness and desired effects of prescribed medications  3/27/2020 1554 by Bernadette Queen  Outcome: Progressing  3/27/2020 1553 by Bernadette Queen  Outcome: Progressing     Problem: Ineffective Coping  Goal: Identifies healthy coping skills  Outcome: Progressing     Problem: Alteration in Thoughts and Perception  Goal: Attend and participate in unit activities, including therapeutic, recreational, and educational groups  Description  Interventions:  -Encourage Visitation and family involvement in care  3/27/2020 1554 by Bernadette Queen  Outcome: Not Progressing  3/27/2020 1553 by Bernadette Queen  Outcome: Not Progressing  Patient attends select groups however, is not seen on unit during the day  Patient in bed and does not attend even if prompted  Patient can identify music as his greatest coping skills but would benefit greatly by developing more coping skills than one  Patient has been able to maintain a 54% group attendance

## 2020-03-27 NOTE — PLAN OF CARE
Problem: Alteration in Thoughts and Perception  Goal: Verbalize thoughts and feelings  Description  Interventions:  - Promote a nonjudgmental and trusting relationship with the patient through active listening and therapeutic communication  - Assess patient's level of functioning, behavior and potential for risk  - Engage patient in 1 on 1 interactions  - Encourage patient to express fears, feelings, frustrations, and discuss symptoms    - [place] patient to reality, help patient recognize reality-based thinking   - Administer medications as ordered and assess for potential side effects  - Provide the patient education related to the signs and symptoms of the illness and desired effects of prescribed medications  Outcome: Progressing  Goal: Refrain from acting on delusional thinking/internal stimuli  Description  Interventions:  - Monitor patient closely, per order   - Utilize least restrictive measures   - Set reasonable limits, give positive feedback for acceptable   - Administer medications as ordered and monitor of potential side effects  Outcome: Progressing  Goal: Agree to be compliant with medication regime, as prescribed and report medication side effects  Description  Interventions:  - Offer appropriate PRN medication and supervise ingestion; conduct AIMS, as needed   Outcome: Progressing  Goal: Attend and participate in unit activities, including therapeutic, recreational, and educational groups  Description  Interventions:  -Encourage Visitation and family involvement in care  Outcome: Progressing     Problem: Ineffective Coping  Goal: Demonstrates healthy coping skills  Outcome: Progressing  Goal: Understands least restrictive measures  Description  Interventions:  - Utilize least restrictive behavior  Outcome: Progressing     Problem: GASTROINTESTINAL - ADULT  Goal: Maintains adequate nutritional intake  Description  INTERVENTIONS:  - Monitor percentage of each meal consumed  - Identify factors contributing to decreased intake, treat as appropriate  - Assist with meals as needed  - Monitor I&O, weight, and lab values if indicated  - Obtain nutrition services referral as needed  Outcome: Fadi Lopez has been awake, alert, and visible intermittently out in the milieu  Ate 100% supper  Naps in room at intervals  Pt enjoys listening to music on radio in [employer] with peers  Disheveled in appearance, dressed in layers  Compliant with scheduled meds with little prompting  Pt focused on discharge and asks when will he be discharged  No behavioral issues  Pt did not attend evening group but came out for snack after  No behavioral issues  Continue to monitor/assess for any changes

## 2020-03-27 NOTE — TREATMENT TEAM
Patient declined      03/26/20 1100   Activity/Group Checklist   Group Exercise  (IMR)   Attendance Did not attend   Attendance Duration (min) 46-60   Affect/Mood GAEL

## 2020-03-27 NOTE — PROGRESS NOTES
03/27/20 1100   Activity/Group Checklist   Group Other (Comment)  (IMR)   Attendance Did not attend     Patient was encouraged to attend group, as per his behavioral plan, with several personal prompts  Patient remained with his back to therapist and did not attend groups  He also declined to complete a DERS survey

## 2020-03-28 RX ADMIN — CLOZAPINE 200 MG: 200 TABLET ORAL at 17:04

## 2020-03-28 RX ADMIN — METFORMIN HYDROCHLORIDE 500 MG: 500 TABLET ORAL at 08:28

## 2020-03-28 RX ADMIN — DOCUSATE SODIUM 100 MG: 100 CAPSULE, LIQUID FILLED ORAL at 08:28

## 2020-03-28 RX ADMIN — ATORVASTATIN CALCIUM 10 MG: 10 TABLET, FILM COATED ORAL at 17:04

## 2020-03-28 RX ADMIN — OXYBUTYNIN CHLORIDE 5 MG: 5 TABLET, EXTENDED RELEASE ORAL at 08:28

## 2020-03-28 RX ADMIN — METFORMIN HYDROCHLORIDE 500 MG: 500 TABLET ORAL at 17:04

## 2020-03-28 RX ADMIN — DOCUSATE SODIUM 100 MG: 100 CAPSULE, LIQUID FILLED ORAL at 17:04

## 2020-03-28 RX ADMIN — OLANZAPINE 5 MG: 5 TABLET, FILM COATED ORAL at 21:01

## 2020-03-28 RX ADMIN — DIVALPROEX SODIUM 1500 MG: 500 TABLET, EXTENDED RELEASE ORAL at 21:01

## 2020-03-28 RX ADMIN — PANTOPRAZOLE SODIUM 40 MG: 40 TABLET, DELAYED RELEASE ORAL at 06:32

## 2020-03-28 RX ADMIN — LEVOTHYROXINE SODIUM 75 MCG: 75 TABLET ORAL at 06:32

## 2020-03-28 NOTE — PLAN OF CARE
Problem: Alteration in Thoughts and Perception  Goal: Verbalize thoughts and feelings  Description  Interventions:  - Promote a nonjudgmental and trusting relationship with the patient through active listening and therapeutic communication  - Assess patient's level of functioning, behavior and potential for risk  - Engage patient in 1 on 1 interactions  - Encourage patient to express fears, feelings, frustrations, and discuss symptoms    - [place] patient to reality, help patient recognize reality-based thinking   - Administer medications as ordered and assess for potential side effects  - Provide the patient education related to the signs and symptoms of the illness and desired effects of prescribed medications  3/27/2020 2135 by Allan Doe RN  Outcome: Progressing  3/27/2020 2024 by Allan Doe RN  Outcome: Progressing  Goal: Refrain from acting on delusional thinking/internal stimuli  Description  Interventions:  - Monitor patient closely, per order   - Utilize least restrictive measures   - Set reasonable limits, give positive feedback for acceptable   - Administer medications as ordered and monitor of potential side effects  3/27/2020 2135 by Allan Doe RN  Outcome: Progressing  3/27/2020 2024 by Allan Doe RN  Outcome: Progressing  Goal: Agree to be compliant with medication regime, as prescribed and report medication side effects  Description  Interventions:  - Offer appropriate PRN medication and supervise ingestion; conduct AIMS, as needed   3/27/2020 2135 by Allan Doe RN  Outcome: Progressing  3/27/2020 2024 by Allan Doe RN  Outcome: Progressing  Goal: Attend and participate in unit activities, including therapeutic, recreational, and educational groups  Description  Interventions:  -Encourage Visitation and family involvement in care  3/27/2020 2135 by Allan Doe RN  Outcome: Progressing  3/27/2020 2024 by Allan Doe RN  Outcome: Progressing  Goal: Complete daily ADLs, including personal hygiene independently, as able  Description  Interventions:  - Observe, teach, and assist patient with ADLS  - Monitor and promote a balance of rest/activity, with adequate nutrition and elimination   Outcome: Not Progressing     Problem: Ineffective Coping  Goal: Demonstrates healthy coping skills  3/27/2020 2135 by Mayra Rooney RN  Outcome: Progressing  3/27/2020 2024 by Mayra Rooney RN  Outcome: Progressing  Goal: Understands least restrictive measures  Description  Interventions:  - Utilize least restrictive behavior  Outcome: Progressing     Problem: GASTROINTESTINAL - ADULT  Goal: Maintains adequate nutritional intake  Description  INTERVENTIONS:  - Monitor percentage of each meal consumed  - Identify factors contributing to decreased intake, treat as appropriate  - Assist with meals as needed  - Monitor I&O, weight, and lab values if indicated  - Obtain nutrition services referral as needed  3/27/2020 2135 by Mayra Rooney RN  Outcome: Progressing  3/27/2020 2024 by Mayra Rooney RN  Outcome: Joel Gomes has been awake, alert, and visible intermittently out in the milieu  Pt refused 1600 vital signs  Ate 100% supper  Pt went out on deck for fresh air with staff and peers  Rests in room at intervals and enjoys listening to music on radio in [facility] with peers  Pt remains preoccupied with discharge and asks when he will be leaving  Compliant with all scheduled meds with little prompting  No behavioral issues  Pt did not attend evening movie social group but came out for snack after  Little interaction noted with peers  Continue to monitor/assess for any changes

## 2020-03-28 NOTE — PLAN OF CARE
Problem: Alteration in Thoughts and Perception  Goal: Refrain from acting on delusional thinking/internal stimuli  Description  Interventions:  - Monitor patient closely, per order   - Utilize least restrictive measures   - Set reasonable limits, give positive feedback for acceptable   - Administer medications as ordered and monitor of potential side effects  Outcome: Progressing  Goal: Agree to be compliant with medication regime, as prescribed and report medication side effects  Description  Interventions:  - Offer appropriate PRN medication and supervise ingestion; conduct AIMS, as needed   Outcome: Progressing    Patient is isolative to his room today, only visible for meals and meds  He was complaint with vitals this morning  Medication and meal compliant today  He continues to have pressured and rambling speech  Obsesses over discharge but will not listen when told what he needs to do in order to be discharged  No group attendance noted  Continue to monitor

## 2020-03-28 NOTE — PROGRESS NOTES
Pt c/o heartburn beginning of shift & requested mylanta  Given @ [phone]  Slept all night, no issues  Refused morning meds   Will continue to monitor

## 2020-03-28 NOTE — PLAN OF CARE
Problem: Alteration in Thoughts and Perception  Goal: Verbalize thoughts and feelings  Description  Interventions:  - Promote a nonjudgmental and trusting relationship with the patient through active listening and therapeutic communication  - Assess patient's level of functioning, behavior and potential for risk  - Engage patient in 1 on 1 interactions  - Encourage patient to express fears, feelings, frustrations, and discuss symptoms    - [place] patient to reality, help patient recognize reality-based thinking   - Administer medications as ordered and assess for potential side effects  - Provide the patient education related to the signs and symptoms of the illness and desired effects of prescribed medications  Outcome: Progressing  Goal: Refrain from acting on delusional thinking/internal stimuli  Description  Interventions:  - Monitor patient closely, per order   - Utilize least restrictive measures   - Set reasonable limits, give positive feedback for acceptable   - Administer medications as ordered and monitor of potential side effects  Outcome: Progressing  Goal: Agree to be compliant with medication regime, as prescribed and report medication side effects  Description  Interventions:  - Offer appropriate PRN medication and supervise ingestion; conduct AIMS, as needed   Outcome: Progressing  Goal: Attend and participate in unit activities, including therapeutic, recreational, and educational groups  Description  Interventions:  -Encourage Visitation and family involvement in care  Outcome: Progressing     Problem: Ineffective Coping  Goal: Demonstrates healthy coping skills  Outcome: Progressing     Problem: GASTROINTESTINAL - ADULT  Goal: Maintains adequate nutritional intake  Description  INTERVENTIONS:  - Monitor percentage of each meal consumed  - Identify factors contributing to decreased intake, treat as appropriate  - Assist with meals as needed  - Monitor I&O, weight, and lab values if indicated  - Obtain nutrition services referral as needed  Outcome: Marlys Perea has been awake, alert, and visible intermittently out in the milieu  Pt refused vital signs to be taken at 1600  Ate 100% supper

## 2020-03-29 RX ADMIN — DOCUSATE SODIUM 100 MG: 100 CAPSULE, LIQUID FILLED ORAL at 16:38

## 2020-03-29 RX ADMIN — ATORVASTATIN CALCIUM 10 MG: 10 TABLET, FILM COATED ORAL at 16:38

## 2020-03-29 RX ADMIN — DOCUSATE SODIUM 100 MG: 100 CAPSULE, LIQUID FILLED ORAL at 08:18

## 2020-03-29 RX ADMIN — LEVOTHYROXINE SODIUM 75 MCG: 75 TABLET ORAL at 05:30

## 2020-03-29 RX ADMIN — DIVALPROEX SODIUM 1500 MG: 500 TABLET, EXTENDED RELEASE ORAL at 20:44

## 2020-03-29 RX ADMIN — METFORMIN HYDROCHLORIDE 500 MG: 500 TABLET ORAL at 08:18

## 2020-03-29 RX ADMIN — OLANZAPINE 5 MG: 5 TABLET, FILM COATED ORAL at 20:44

## 2020-03-29 RX ADMIN — METFORMIN HYDROCHLORIDE 500 MG: 500 TABLET ORAL at 16:38

## 2020-03-29 RX ADMIN — PANTOPRAZOLE SODIUM 40 MG: 40 TABLET, DELAYED RELEASE ORAL at 05:29

## 2020-03-29 RX ADMIN — OXYBUTYNIN CHLORIDE 5 MG: 5 TABLET, EXTENDED RELEASE ORAL at 08:18

## 2020-03-29 RX ADMIN — CLOZAPINE 200 MG: 200 TABLET ORAL at 16:38

## 2020-03-29 RX ADMIN — ACETAMINOPHEN 975 MG: 325 TABLET ORAL at 17:59

## 2020-03-29 NOTE — PLAN OF CARE
Problem: Alteration in Thoughts and Perception  Goal: Verbalize thoughts and feelings  Description  Interventions:  - Promote a nonjudgmental and trusting relationship with the patient through active listening and therapeutic communication  - Assess patient's level of functioning, behavior and potential for risk  - Engage patient in 1 on 1 interactions  - Encourage patient to express fears, feelings, frustrations, and discuss symptoms    - [place] patient to reality, help patient recognize reality-based thinking   - Administer medications as ordered and assess for potential side effects  - Provide the patient education related to the signs and symptoms of the illness and desired effects of prescribed medications  Outcome: Progressing  Goal: Attend and participate in unit activities, including therapeutic, recreational, and educational groups  Description  Interventions:  -Encourage Visitation and family involvement in care  Outcome: Progressing  Goal: Complete daily ADLs, including personal hygiene independently, as able  Description  Interventions:  - Observe, teach, and assist patient with ADLS  - Monitor and promote a balance of rest/activity, with adequate nutrition and elimination   Outcome: Progressing     Yovani Luciano was in [employer] area listening to the radio and singing at the beginning of the shift  Social with select peers  Laughing and having a good time  Pleasant and cooperative upon approach  Able to make needs known  Disheveled appearance  Still wears layers of clothing  Took medication and ate 100% of his meal  Medicated with Tylenol 975mg po at 1759 for 7/10 right foot pain  Relief with same  Did not attend evening group  Took HS medication  Ate snack  Continues to ask for water often  Continue to monitor  Precautions maintained

## 2020-03-29 NOTE — PROGRESS NOTES
Psychiatry Progress Note    Subjective: Interval History     The patient continues to be very fixated on when he will be leaving  Patient asked when I think he will be leaving  It was discussed with the patient that he needs to speak with his physician regarding this  The patient did sleep last evening  He has been compliant with medication and meals  He denies depression, anxiety, hallucinations, SI/HI  Staff reports his speech has been pressured and rambling  He has been obsessive about discharge      Behavior over the last 24 hours:  unchanged  Sleep: normal  Appetite: normal  Medication side effects: No  ROS: no complaints    Current medications:    Current Facility-Administered Medications:     acetaminophen (TYLENOL) tablet 325 mg, 325 mg, Oral, Q6H PRN, Shelley Demarco MD    acetaminophen (TYLENOL) tablet 650 mg, 650 mg, Oral, Q6H PRN, Shelley Demarco MD    acetaminophen (TYLENOL) tablet 975 mg, 975 mg, Oral, Q8H PRN, Shelley Demarco MD    aluminum-magnesium hydroxide-simethicone (MYLANTA) 200-200-20 mg/5 mL oral suspension 30 mL, 30 mL, Oral, Q4H PRN, Shelley Demacro MD, 30 mL at 03/27/20 2341    atorvastatin (LIPITOR) tablet 10 mg, 10 mg, Oral, Daily With Jessika Chavez MD, 10 mg at 03/28/20 1704    benztropine (COGENTIN) injection 1 mg, 1 mg, Intramuscular, Q6H PRN, Shelley Demarco MD    benztropine (COGENTIN) tablet 1 mg, 1 mg, Oral, Q6H PRN, Shelley Demarco MD    clozapine (CLOZARIL) tablet 200 mg, 200 mg, Oral, Daily, Shelley Demarco MD, 200 mg at 03/28/20 1704    divalproex sodium (DEPAKOTE ER) 24 hr tablet 1,500 mg, 1,500 mg, Oral, HS, Shelley Demarco MD, 1,500 mg at 03/28/20 2101    docusate sodium (COLACE) capsule 100 mg, 100 mg, Oral, BID, Shelley Demarco MD, 100 mg at 03/29/20 0818    haloperidol (HALDOL) tablet 5 mg, 5 mg, Oral, Q6H PRN, Shelley Demarco MD, 5 mg at 03/10/20 2102    haloperidol lactate (HALDOL) injection 5 mg, 5 mg, Intramuscular, Q6H PRN, Shelley Demarco MD    levothyroxine tablet 75 mcg, 75 mcg, Oral, Early Morning, Meldon Curling, MD, 75 mcg at 03/29/20 0530    LORazepam (ATIVAN) 2 mg/mL injection 1 mg, 1 mg, Intramuscular, Q6H PRN, Meldon Curling, MD    LORazepam (ATIVAN) tablet 1 mg, 1 mg, Oral, Q6H PRN, Meldon Curling, MD    magnesium hydroxide (MILK OF MAGNESIA) 400 mg/5 mL oral suspension 30 mL, 30 mL, Oral, Daily PRN, Meldon Curling, MD, 30 mL at 03/14/20 2032    metFORMIN (GLUCOPHAGE) tablet 500 mg, 500 mg, Oral, BID With Meals, Meldon Curling, MD, 500 mg at 03/29/20 0818    nicotine polacrilex (NICORETTE) gum 2 mg, 2 mg, Oral, Q2H PRN, Meldon Curling, MD, 2 mg at 03/23/20 1630    OLANZapine (ZyPREXA) tablet 5 mg, 5 mg, Oral, HS, Meldon Curling, MD, 5 mg at 03/28/20 2101    oxybutynin (DITROPAN-XL) 24 hr tablet 5 mg, 5 mg, Oral, Daily, Meldon Curling, MD, 5 mg at 03/29/20 0818    pantoprazole (PROTONIX) EC tablet 40 mg, 40 mg, Oral, Early Morning, Meldon Curling, MD, 40 mg at 03/29/20 0529    traZODone (DESYREL) tablet 50 mg, 50 mg, Oral, HS PRN, Meldon Curling, MD, 50 mg at 03/25/20 2350    Current Problem List:    Patient Active Problem List   Diagnosis    Encephalopathy acute    Bipolar 1 disorder ([facility] )    Schizoaffective disorder, bipolar type ([facility] )    Constipation, chronic    Hyperammonemia (HCC)    Type 2 diabetes mellitus without complication, without long-term current use of insulin (HCC)    Tracheobronchitis    Streptococcus pneumoniae    Chronic airway obstruction, not elsewhere classified    Benign essential hypertension    Disorder of lipoprotein and lipid metabolism    Plantar fasciitis    Foot callus    Gastroesophageal reflux disease without esophagitis    GI bleed    Hyponatremia    Acquired hypothyroidism    Ischemic colitis (HCC)    Moderate cigarette smoker (10-19 per day)    Morbid obesity (HCC)    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    BMI 34 0-34 9,adult    Nail abnormality    Encounter for well adult exam with abnormal findings    Wheezing on right side of chest on exhalation    Tobacco abuse    Diabetes insipidus, nephrogenic ([facility] )       Problem list reviewed 03/29/20     Objective:     Vital Signs:  Vitals:    03/08/20 0700 03/28/20 0715 03/28/20 1700 03/29/20 0700   BP:  119/73 111/68 93/54   BP Location:  Left arm Left arm Left arm   Pulse:  87 82 66   Resp:  18 16 18   Temp:  97 6 °F (36 4 °C) 98 1 °F (36 7 °C) 97 7 °F (36 5 °C)   TempSrc:  Temporal Temporal Temporal   Weight: 103 kg (227 lb 9 6 oz)   103 kg (227 lb 9 6 oz)   Height: 5' 9" (1 753 m)            Appearance:  age appropriate and disheveled   Behavior:  evasive and guarded   Speech:  normal volume   Mood:  irritable and labile   Affect:  labile   Thought Process:  circumstantial and disorganized   Thought Content:  delusions  persecutory   Perceptual Disturbances: None   Risk Potential: Potential for Aggression Yes yes   Sensorium:  person, place and time   Cognition:  grossly intact   Consciousness:  alert and awake    Attention: attention span and concentration were age appropriate   Intellect: average   Insight:  limited   Judgment: limited      Motor Activity: no abnormal movements       I/O Past 24 hours:  No intake/output data recorded  No intake/output data recorded  Labs:  Reviewed 03/29/20      Assessment / Plan:     Schizoaffective disorder, bipolar type ([facility] )    Recommended Treatment:      Medication changes:  1) continue current medication regimen  Non-pharmacological treatments  1) Continue with group therapy, milieu therapy and occupational therapy  Safety  1) Safety/communication plan established targeting dynamic risk factors above  2) Risks, benefits, and possible side effects of medications explained to patient and patient verbalizes understanding  Counseling / Coordination of Care    Total floor / unit time spent today 20 minutes   Greater than 50% of total time was spent with the patient and / or family counseling and / or coordination of care  A description of the counseling / coordination of care  Patient's Rights, confidentiality and exceptions to confidentiality, use of automated medical record, Claudia Mei staff access to medical record, and consent to treatment reviewed      Cayla Lopes PA-C

## 2020-03-29 NOTE — NURSING NOTE
Patient visible on unit at times, med and meal compliant, disheveled, lacks motivation, behaviors controlled, offered no complaints  Will continue to monitor

## 2020-03-29 NOTE — PLAN OF CARE
Problem: Alteration in Thoughts and Perception  Goal: Refrain from acting on delusional thinking/internal stimuli  Description  Interventions:  - Monitor patient closely, per order   - Utilize least restrictive measures   - Set reasonable limits, give positive feedback for acceptable   - Administer medications as ordered and monitor of potential side effects  Outcome: Progressing  Goal: Agree to be compliant with medication regime, as prescribed and report medication side effects  Description  Interventions:  - Offer appropriate PRN medication and supervise ingestion; conduct AIMS, as needed   Outcome: Progressing  Goal: Complete daily ADLs, including personal hygiene independently, as able  Description  Interventions:  - Observe, teach, and assist patient with ADLS  - Monitor and promote a balance of rest/activity, with adequate nutrition and elimination   Outcome: Progressing   Patient is out and about today  Social with his peers  Listening to the radio while sitting in [employer]  He was complaint with vitals this morning  Medication and meal compliant today  He continues to have pressured and rambling speech  Obsessing over going to the dentist and being discharged  Attended and participated in community meeting and fresh air group this shift  Continue to monitor

## 2020-03-30 PROCEDURE — 99232 SBSQ HOSP IP/OBS MODERATE 35: CPT | Performed by: PSYCHIATRY & NEUROLOGY

## 2020-03-30 RX ADMIN — DIVALPROEX SODIUM 1500 MG: 500 TABLET, EXTENDED RELEASE ORAL at 20:57

## 2020-03-30 RX ADMIN — PANTOPRAZOLE SODIUM 40 MG: 40 TABLET, DELAYED RELEASE ORAL at 05:39

## 2020-03-30 RX ADMIN — METFORMIN HYDROCHLORIDE 500 MG: 500 TABLET ORAL at 16:46

## 2020-03-30 RX ADMIN — ATORVASTATIN CALCIUM 10 MG: 10 TABLET, FILM COATED ORAL at 16:46

## 2020-03-30 RX ADMIN — CLOZAPINE 200 MG: 200 TABLET ORAL at 16:46

## 2020-03-30 RX ADMIN — LEVOTHYROXINE SODIUM 75 MCG: 75 TABLET ORAL at 05:39

## 2020-03-30 RX ADMIN — DOCUSATE SODIUM 100 MG: 100 CAPSULE, LIQUID FILLED ORAL at 16:46

## 2020-03-30 RX ADMIN — DOCUSATE SODIUM 100 MG: 100 CAPSULE, LIQUID FILLED ORAL at 08:42

## 2020-03-30 RX ADMIN — METFORMIN HYDROCHLORIDE 500 MG: 500 TABLET ORAL at 08:42

## 2020-03-30 RX ADMIN — OXYBUTYNIN CHLORIDE 5 MG: 5 TABLET, EXTENDED RELEASE ORAL at 08:42

## 2020-03-30 RX ADMIN — OLANZAPINE 5 MG: 5 TABLET, FILM COATED ORAL at 20:57

## 2020-03-30 NOTE — PROGRESS NOTES
03/30/20 0800   Team Meeting   Meeting Type Daily Rounds   Team Members Present   Team Members Present Physician;Nurse; Other (Discipline and Name)   Physician Team Member Dr Raymond Flannery, RN   Other (Discipline and Name) Fady Brice LCSW; MCKAYLA Griffin     Disheveled, but controlled  Select groups

## 2020-03-30 NOTE — PLAN OF CARE
Problem: Individualized Interventions  Goal: Attend and participate in unit activities, including therapeutic, recreational, and educational groups  Description  PSYCHOTHERAPY INTERVENTIONS:    -Form therapeutic alliance to promote trust and safety within a therapeutic environment    -Engage in individual psychotherapy using evidence-based practices that are specific to individual needs   -Process barriers to community living with an emphasis on solution-based interventions   -Promote self-awareness and identity development   -Identify and process patterns of behavior that have led to decompensation and/or hospitalizations   -Attend psychoeducation groups with licensed psychotherapist to learn about Illness Management Recovery (IMR) concepts and enhance coping skills    -Practice effective communication with staff, peers, family, and community members  Participate in family sessions as necessary   -Encourage reality-based thought content by examining thought processes and cognitive distortions      -Work with treatment team in reintegration back into the community when appropriate  Outcome: Progressing    Patient and therapist did a review of patient's behavioral plan after he announced to her that he had earned a sticker over the weekend  Patient was excited to show therapist the sticker on his calendar  Patient reported that he would not earn a sticker today because he didn't get vitals done  Patient and therapist agreed patient should have a list in his room so he remembers everything he needs to do for a sticker  He asked for a piece of chocolate or a soda once he has completed three days of tasks and earned three stickers  Patient continues to have difficulty understanding/processing the news about COVID-19 and what this means for going out into the community    He remains fixated on [facility], but is easily redirectable, and does know that the virus is keeping everyone on the unit right now  Therapist told patient, "when I am not wearing a mask anymore, you will likely be able to go out "  Patient was reminded that the restriction is not personal and does have anything to do with what he has done or does not do  Patient expressed understanding  Therapist gave patient much encouragement and positive reinforcement for earning a sticker this weekend  Therapist will continue to monitor patient's progress regarding behavioral plan

## 2020-03-30 NOTE — PLAN OF CARE
Problem: Alteration in Thoughts and Perception  Goal: Treatment Goal: Gain control of psychotic behaviors/thinking, reduce/eliminate presenting symptoms and demonstrate improved reality functioning upon discharge  Outcome: Progressing  Goal: Verbalize thoughts and feelings  Description  Interventions:  - Promote a nonjudgmental and trusting relationship with the patient through active listening and therapeutic communication  - Assess patient's level of functioning, behavior and potential for risk  - Engage patient in 1 on 1 interactions  - Encourage patient to express fears, feelings, frustrations, and discuss symptoms    - [place] patient to reality, help patient recognize reality-based thinking   - Administer medications as ordered and assess for potential side effects  - Provide the patient education related to the signs and symptoms of the illness and desired effects of prescribed medications  Outcome: Progressing  Goal: Refrain from acting on delusional thinking/internal stimuli  Description  Interventions:  - Monitor patient closely, per order   - Utilize least restrictive measures   - Set reasonable limits, give positive feedback for acceptable   - Administer medications as ordered and monitor of potential side effects  Outcome: Progressing  Goal: Agree to be compliant with medication regime, as prescribed and report medication side effects  Description  Interventions:  - Offer appropriate PRN medication and supervise ingestion; conduct AIMS, as needed   Outcome: Progressing  Goal: Attend and participate in unit activities, including therapeutic, recreational, and educational groups  Description  Interventions:  -Encourage Visitation and family involvement in care  Outcome: Progressing  Goal: Recognize dysfunctional thoughts, communicate reality-based thoughts at the time of discharge  Description  Interventions:  - Provide medication and psycho-education to assist patient in compliance and developing insight into his/her illness   Outcome: Progressing  Goal: Complete daily ADLs, including personal hygiene independently, as able  Description  Interventions:  - Observe, teach, and assist patient with ADLS  - Monitor and promote a balance of rest/activity, with adequate nutrition and elimination   Outcome: Progressing     Problem: Ineffective Coping  Goal: Identifies ineffective coping skills  Outcome: Progressing  Goal: Identifies healthy coping skills  Outcome: Progressing  Goal: Demonstrates healthy coping skills  Outcome: Progressing  Goal: Patient/Family participate in treatment and DC plans  Description  Interventions:  - Provide therapeutic environment  Outcome: Progressing  Goal: Patient/Family verbalizes awareness of resources  Outcome: Progressing  Goal: Understands least restrictive measures  Description  Interventions:  - Utilize least restrictive behavior  Outcome: Progressing     Problem: RESPIRATORY - ADULT  Goal: Achieves optimal ventilation and oxygenation  Description  INTERVENTIONS:  - Assess for changes in respiratory status  - Assess for changes in mentation and behavior  - Position to facilitate oxygenation and minimize respiratory effort  - Oxygen administered by appropriate delivery if ordered  - Initiate smoking cessation education as indicated  - Encourage broncho-pulmonary hygiene including cough, deep breathe, Incentive Spirometry  - Assess the need for suctioning and aspirate as needed  - Assess and instruct to report SOB or any respiratory difficulty  - Respiratory Therapy support as indicated  Outcome: Progressing     Problem: GASTROINTESTINAL - ADULT  Goal: Minimal or absence of nausea and/or vomiting  Description  INTERVENTIONS:  - Administer IV fluids if ordered to ensure adequate hydration  - Maintain NPO status until nausea and vomiting are resolved  - Nasogastric tube if ordered  - Administer ordered antiemetic medications as needed  - Provide nonpharmacologic comfort measures as appropriate  - Advance diet as tolerated, if ordered  - Consider nutrition services referral to assist patient with adequate nutrition and appropriate food choices  Outcome: Progressing  Goal: Maintains or returns to baseline bowel function  Description  INTERVENTIONS:  - Assess bowel function  - Encourage oral fluids to ensure adequate hydration  - Administer IV fluids if ordered to ensure adequate hydration  - Administer ordered medications as needed  - Encourage mobilization and activity  - Consider nutritional services referral to assist patient with adequate nutrition and appropriate food choices  Outcome: Progressing  Goal: Maintains adequate nutritional intake  Description  INTERVENTIONS:  - Monitor percentage of each meal consumed  - Identify factors contributing to decreased intake, treat as appropriate  - Assist with meals as needed  - Monitor I&O, weight, and lab values if indicated  - Obtain nutrition services referral as needed  Outcome: Progressing     Problem: METABOLIC, FLUID AND ELECTROLYTES - ADULT  Goal: Glucose maintained within target range  Description  INTERVENTIONS:  - Monitor Blood Glucose as ordered  - Assess for signs and symptoms of hyperglycemia and hypoglycemia  - Administer ordered medications to maintain glucose within target range  - Assess nutritional intake and initiate nutrition service referral as needed  Outcome: Progressing     Problem: DISCHARGE PLANNING  Goal: Discharge to home or other facility with appropriate resources  Description    CASE MANAGEMENT INTERVENTIONS:  - Conduct assessment to determine patient/family and health care team treatment goals, and need for post-acute services based on payer coverage, community resources, patient preferences and barriers to discharge    - Address psychosocial, clinical, and financial barriers to discharge as identified in assessment in conjunction with the patient/family and health care team   - Assist the patient in reintegration back into the community by removing barriers which may hinder a successful discharge once deemed stable  - Arrange appropriate level of post-acute services according to patient's needs and preference and payer coverage in collaboration with the physician and health care team   - Communicate with and update the patient/family, physician, and health care team regarding progress on the discharge plan  - Arrange appropriate transportation to post-acute venues  Outcome: Progressing    Patient quiet, cooperative, visible intermittently  Isolative to room and self  Refused morning vitals  Complaint with meds and meals  Out of room for [facility] group only  Denies depression and anxiety  No SI or HI  No c/o s/s pain, or discomfort    Will continue to monitor progress in recovery program

## 2020-03-30 NOTE — TREATMENT TEAM
Patient declined      03/30/20 0900   Activity/Group Checklist   Group Community meeting   Attendance Did not attend   Attendance Duration (min) 16-30   Affect/Mood GAEL

## 2020-03-30 NOTE — TREATMENT TEAM
Patient declined      03/30/20 1100   Activity/Group Checklist   Group   (IMR/Self Care Recovery Planning )   Attendance Did not attend   Attendance Duration (min) 46-60   Affect/Mood GAEL
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
## 2020-03-30 NOTE — NURSING NOTE
Received Devin Maritza in bed at change of shift with eyes closed; chest movement note  Awake times one for this shift for a drink of water and a snack; returning to bed without any difficulties  Will continue to monitor

## 2020-03-30 NOTE — PLAN OF CARE
Problem: Alteration in Thoughts and Perception  Goal: Verbalize thoughts and feelings  Description  Interventions:  - Promote a nonjudgmental and trusting relationship with the patient through active listening and therapeutic communication  - Assess patient's level of functioning, behavior and potential for risk  - Engage patient in 1 on 1 interactions  - Encourage patient to express fears, feelings, frustrations, and discuss symptoms    - [place] patient to reality, help patient recognize reality-based thinking   - Administer medications as ordered and assess for potential side effects  - Provide the patient education related to the signs and symptoms of the illness and desired effects of prescribed medications  Outcome: Progressing     Problem: Ineffective Coping  Goal: Identifies healthy coping skills  Outcome: Progressing  Patient was able to sit with this writer and complete his 5 Life Domains worksheet and some of his WRAP Plan with this writers assistance  Patient was able to list goals in each of the Domain areas  Patient listed taking his medications, going to [facility], working with ACT Team are things he needs to continue for his Recovery  Under the Health domain Patient could not think of anything at this time except to get his blood work done regularly to check his Clozaril level  Writer gave him teri for recognizing this  Patient listed obtaining a "volunteer position" at [facility] [employer] as Education/Employment/Volunteer as an option after discharge  Patient is agreeable to a Group Home under his Housing domain and [facility], hanging with and making friends at group home and [facility] and working with the ACT Team    Patient was also able to complete a few pages of his Human Demand and identified coping skills as taking a walk, going to the park, listening to music, shopping at [employer] and keeping in touch with his sister   When ask what brings meaning and purpose to his life Patient replied, " to take care of myself, be happy and kind to people " Patient stated he would like to try to get a job to see if that could support his wellness also  Starla Muhammad identified his baseline as "happy for things that come my way  Pleasant and enjoy people and talking to them " Patient identified his Daily Maintenance plan as "taking showers, try to keep going on, take medications, get outside and talk to people" as the things that assist him in staying well "  Patient did very well and was better focused  Patient would drift to discharge and when he was able to leave however, was completely polite, cooperative and mush easier to redirect  Writer and Patient will set up a face to face again to complete his WRAP Plan and [facility] Relapse Prevention plan

## 2020-03-31 PROBLEM — R06.2: Status: RESOLVED | Noted: 2019-07-24 | Resolved: 2020-03-31

## 2020-03-31 PROCEDURE — 99232 SBSQ HOSP IP/OBS MODERATE 35: CPT | Performed by: PSYCHIATRY & NEUROLOGY

## 2020-03-31 RX ADMIN — DOCUSATE SODIUM 100 MG: 100 CAPSULE, LIQUID FILLED ORAL at 16:49

## 2020-03-31 RX ADMIN — METFORMIN HYDROCHLORIDE 500 MG: 500 TABLET ORAL at 16:49

## 2020-03-31 RX ADMIN — OLANZAPINE 5 MG: 5 TABLET, FILM COATED ORAL at 20:40

## 2020-03-31 RX ADMIN — METFORMIN HYDROCHLORIDE 500 MG: 500 TABLET ORAL at 08:22

## 2020-03-31 RX ADMIN — DOCUSATE SODIUM 100 MG: 100 CAPSULE, LIQUID FILLED ORAL at 08:22

## 2020-03-31 RX ADMIN — ATORVASTATIN CALCIUM 10 MG: 10 TABLET, FILM COATED ORAL at 16:49

## 2020-03-31 RX ADMIN — OXYBUTYNIN CHLORIDE 5 MG: 5 TABLET, EXTENDED RELEASE ORAL at 08:22

## 2020-03-31 RX ADMIN — CLOZAPINE 200 MG: 200 TABLET ORAL at 16:49

## 2020-03-31 RX ADMIN — LEVOTHYROXINE SODIUM 75 MCG: 75 TABLET ORAL at 05:58

## 2020-03-31 RX ADMIN — DIVALPROEX SODIUM 1500 MG: 500 TABLET, EXTENDED RELEASE ORAL at 20:40

## 2020-03-31 RX ADMIN — PANTOPRAZOLE SODIUM 40 MG: 40 TABLET, DELAYED RELEASE ORAL at 05:58

## 2020-03-31 NOTE — TREATMENT TEAM
Patient declined      03/31/20 1100   Activity/Group Checklist   Group   (IMR/Stress Management )   Attendance Did not attend   Attendance Duration (min) 46-60   Affect/Mood GAEL
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
## 2020-03-31 NOTE — PLAN OF CARE
Problem: Alteration in Thoughts and Perception  Goal: Verbalize thoughts and feelings  Description  Interventions:  - Promote a nonjudgmental and trusting relationship with the patient through active listening and therapeutic communication  - Assess patient's level of functioning, behavior and potential for risk  - Engage patient in 1 on 1 interactions  - Encourage patient to express fears, feelings, frustrations, and discuss symptoms    - [place] patient to reality, help patient recognize reality-based thinking   - Administer medications as ordered and assess for potential side effects  - Provide the patient education related to the signs and symptoms of the illness and desired effects of prescribed medications  Outcome: Progressing  Goal: Refrain from acting on delusional thinking/internal stimuli  Description  Interventions:  - Monitor patient closely, per order   - Utilize least restrictive measures   - Set reasonable limits, give positive feedback for acceptable   - Administer medications as ordered and monitor of potential side effects  Outcome: Progressing  Goal: Agree to be compliant with medication regime, as prescribed and report medication side effects  Description  Interventions:  - Offer appropriate PRN medication and supervise ingestion; conduct AIMS, as needed   Outcome: Progressing  Goal: Attend and participate in unit activities, including therapeutic, recreational, and educational groups  Description  Interventions:  -Encourage Visitation and family involvement in care  Outcome: Progressing  Goal: Complete daily ADLs, including personal hygiene independently, as able  Description  Interventions:  - Observe, teach, and assist patient with ADLS  - Monitor and promote a balance of rest/activity, with adequate nutrition and elimination   Outcome: Progressing     Problem: Ineffective Coping  Goal: Demonstrates healthy coping skills  Outcome: Progressing  Goal: Understands least restrictive measures  Description  Interventions:  - Utilize least restrictive behavior  Outcome: Uzma Washington has been awake, alert, and visible intermittently out in the milieu  Pt showered this shift  Pt went out on deck with staff and peers for fresh air  Ate 100% supper  Compliant with scheduled meds when called for them  Still preoccupied about discharge  Disheveled and dressed in layered clothing  Pt listened to music on radio in [employer] with peers and rests in room at intervals  Pt denies any depression, anxiety, si/hi, intent, plan, a/v hallucinations, and has not verbalized any delusions  Pt did attend evening social movie group briefly but did not stay,  but came out for snack after  No behavioral issues  Continue to monitor/assess for any changes

## 2020-03-31 NOTE — PROGRESS NOTES
03/31/20 1404   Team Meeting   Meeting Type Tx Team Meeting   Initial Conference Date 03/31/20   Next Conference Date 04/07/20   Team Members Present   Team Members Present Physician;Nurse;; Other (Discipline and Name)   Physician Team Member Dr Diallo Rizzo Team Member Griffin Harris, RN   Care Management Team Member EDELMIRA Paredes   Other (Discipline and Name) Bozena Laytonta- 1921 [facility]   Patient/Family Present   Patient Present Yes   Patient's Family Present No     Patient attended treatment team meeting this morning prepared without self-assessment  Patient's group attendance for last week was 54% and attended 3 IMR  Team and patient completed risk assessment and the patient did not verbalize any desire to elope from the program  Patient verbalized understanding of consequences of eloping from treatment while on a commitment  Patient verbalized no further questions or concerns at the conclusion of the meeting  Next team meeting scheduled for 4/7/2020  Patient has poor contact during treatment meeting this morning

## 2020-03-31 NOTE — PLAN OF CARE
Problem: Alteration in Thoughts and Perception  Goal: Agree to be compliant with medication regime, as prescribed and report medication side effects  Description  Interventions:  - Offer appropriate PRN medication and supervise ingestion; conduct AIMS, as needed   Outcome: Progressing  Goal: Attend and participate in unit activities, including therapeutic, recreational, and educational groups  Description  Interventions:  -Encourage Visitation and family involvement in care  Outcome: Not Progressing  Goal: Complete daily ADLs, including personal hygiene independently, as able  Description  Interventions:  - Observe, teach, and assist patient with ADLS  - Monitor and promote a balance of rest/activity, with adequate nutrition and elimination   Outcome: Yeison Hassan Francisco has remained isolative to his room for the majority of the shift, attended treatment team and was compliant for his morning medications and his meals  He refused his labwork today, verbalizing it is against his beliefs, although he has accepted obtaining labs in the past  Will continue to attempt to encourage him to do so, but has been dismissive of this writer and staff's attempts  Disheveled and preoccupied with discharge  Did not attend groups today  Behaviors controlled but pt remains withdrawn to his room, napping throughout the shift  Will continue to monitor for changes

## 2020-03-31 NOTE — TREATMENT TEAM
Patient decorated eggs and listened to music during open studio time  03/31/20 1400   Activity/Group Checklist   Group   (Recovery Workshop/Open Studio )   Attendance Attended   Attendance Duration (min) 46-60   Interactions Interacted appropriately   Affect/Mood Appropriate;Normal range   Goals Achieved Identified feelings; Able to listen to others; Able to engage in interactions

## 2020-03-31 NOTE — TREATMENT TEAM
Patient declined      03/31/20 0900   Activity/Group Checklist   Group   (Mindfulness Chair Yoga/Coffee Hour )   Attendance Did not attend   Attendance Duration (min) 16-30   Affect/Mood GAEL

## 2020-03-31 NOTE — PROGRESS NOTES
03/31/20 0800   Team Meeting   Meeting Type Daily Rounds   Team Members Present   Team Members Present Physician;Nurse; Other (Discipline and Name);    Physician Team Member Dr Gene Lee, RN   Care Management Team Member EDELMIRA Rush   Other (Discipline and Name) Keesha Cox LCSW     Patient has not been compliant with his behavioral plan  Patient attended [employer] group only  He is preoccupied with the community, but controlled  Patient slept

## 2020-04-01 LAB — AMMONIA PLAS-SCNC: 39 UMOL/L (ref 9–33)

## 2020-04-01 PROCEDURE — 82140 ASSAY OF AMMONIA: CPT | Performed by: PSYCHIATRY & NEUROLOGY

## 2020-04-01 PROCEDURE — 99232 SBSQ HOSP IP/OBS MODERATE 35: CPT | Performed by: PSYCHIATRY & NEUROLOGY

## 2020-04-01 RX ADMIN — DIVALPROEX SODIUM 1500 MG: 500 TABLET, EXTENDED RELEASE ORAL at 20:58

## 2020-04-01 RX ADMIN — LEVOTHYROXINE SODIUM 75 MCG: 75 TABLET ORAL at 05:28

## 2020-04-01 RX ADMIN — TRAZODONE HYDROCHLORIDE 50 MG: 50 TABLET ORAL at 22:37

## 2020-04-01 RX ADMIN — ATORVASTATIN CALCIUM 10 MG: 10 TABLET, FILM COATED ORAL at 17:01

## 2020-04-01 RX ADMIN — DOCUSATE SODIUM 100 MG: 100 CAPSULE, LIQUID FILLED ORAL at 17:01

## 2020-04-01 RX ADMIN — OLANZAPINE 5 MG: 5 TABLET, FILM COATED ORAL at 20:58

## 2020-04-01 RX ADMIN — METFORMIN HYDROCHLORIDE 500 MG: 500 TABLET ORAL at 17:01

## 2020-04-01 RX ADMIN — CLOZAPINE 200 MG: 200 TABLET ORAL at 17:00

## 2020-04-01 RX ADMIN — PANTOPRAZOLE SODIUM 40 MG: 40 TABLET, DELAYED RELEASE ORAL at 05:28

## 2020-04-01 NOTE — TREATMENT TEAM
Patient declined with prompting      04/01/20 0900   Activity/Group Checklist   Group Community meeting  (Community Walk/Coffee Hour )   Attendance Did not attend   Attendance Duration (min) 16-30   Affect/Mood GAEL
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
## 2020-04-01 NOTE — PLAN OF CARE
Problem: Alteration in Thoughts and Perception  Goal: Refrain from acting on delusional thinking/internal stimuli  Description  Interventions:  - Monitor patient closely, per order   - Utilize least restrictive measures   - Set reasonable limits, give positive feedback for acceptable   - Administer medications as ordered and monitor of potential side effects  Outcome: Progressing  Goal: Agree to be compliant with medication regime, as prescribed and report medication side effects  Description  Interventions:  - Offer appropriate PRN medication and supervise ingestion; conduct AIMS, as needed   Outcome: Progressing  Goal: Attend and participate in unit activities, including therapeutic, recreational, and educational groups  Description  Interventions:  -Encourage Visitation and family involvement in care  Outcome: Progressing   Patient is quiet and isolative to his room  He was cooperative with getting his vital and eating breakfast but he did refuse to take his medications  Attended and participated in fresh air and movie group this shift  Continue to monitor

## 2020-04-01 NOTE — TREATMENT TEAM
Patient declined      04/01/20 1100   Activity/Group Checklist   Group   (IMR/Music Appreciation/Applying Coping Skills )   Attendance Did not attend   Attendance Duration (min) 46-60   Affect/Mood GAEL

## 2020-04-01 NOTE — PLAN OF CARE
Problem: Alteration in Thoughts and Perception  Goal: Verbalize thoughts and feelings  Description  Interventions:  - Promote a nonjudgmental and trusting relationship with the patient through active listening and therapeutic communication  - Assess patient's level of functioning, behavior and potential for risk  - Engage patient in 1 on 1 interactions  - Encourage patient to express fears, feelings, frustrations, and discuss symptoms    - [place] patient to reality, help patient recognize reality-based thinking   - Administer medications as ordered and assess for potential side effects  - Provide the patient education related to the signs and symptoms of the illness and desired effects of prescribed medications  Outcome: Progressing  Goal: Refrain from acting on delusional thinking/internal stimuli  Description  Interventions:  - Monitor patient closely, per order   - Utilize least restrictive measures   - Set reasonable limits, give positive feedback for acceptable   - Administer medications as ordered and monitor of potential side effects  Outcome: Progressing  Goal: Agree to be compliant with medication regime, as prescribed and report medication side effects  Description  Interventions:  - Offer appropriate PRN medication and supervise ingestion; conduct AIMS, as needed   Outcome: Progressing     Problem: Ineffective Coping  Goal: Demonstrates healthy coping skills  Outcome: Progressing     Problem: GASTROINTESTINAL - ADULT  Goal: Maintains adequate nutritional intake  Description  INTERVENTIONS:  - Monitor percentage of each meal consumed  - Identify factors contributing to decreased intake, treat as appropriate  - Assist with meals as needed  - Monitor I&O, weight, and lab values if indicated  - Obtain nutrition services referral as needed  Outcome: Lawerance Paulding has been awake, alert, and visible intermittently out in the milieu  Compliant with scheduled meds when called    Social with peers when out in [place] [address]  Resting in rooms at intervals  No behavioral issues  Remains preoccupied with discharge  Had evening snack  Refused vital signs and labs to be done  Continue to monitor/assess for any changes

## 2020-04-01 NOTE — PROGRESS NOTES
04/01/20 0800   Team Meeting   Meeting Type Daily Rounds   Team Members Present   Team Members Present Physician;Nurse;; Other (Discipline and Name)   Physician Team Member Dr Guzman Thapa Team Member Chadwick Pitts, RN   Care Management Team Member Fritz Day MSW   Other (Discipline and Name) RACHELE PhamW; Nadia Jaime, MCKAYLA     Patient refused requested bloodwork and was dismissive when staff attempted to help  Remains preoccupied with discharge  Did not attend any groups, but was social 3-11 shift  Slept

## 2020-04-02 PROCEDURE — 99232 SBSQ HOSP IP/OBS MODERATE 35: CPT | Performed by: PSYCHIATRY & NEUROLOGY

## 2020-04-02 RX ADMIN — CLOZAPINE 200 MG: 200 TABLET ORAL at 16:36

## 2020-04-02 RX ADMIN — ATORVASTATIN CALCIUM 10 MG: 10 TABLET, FILM COATED ORAL at 16:36

## 2020-04-02 RX ADMIN — DOCUSATE SODIUM 100 MG: 100 CAPSULE, LIQUID FILLED ORAL at 16:36

## 2020-04-02 RX ADMIN — LEVOTHYROXINE SODIUM 75 MCG: 75 TABLET ORAL at 05:28

## 2020-04-02 RX ADMIN — OLANZAPINE 5 MG: 5 TABLET, FILM COATED ORAL at 20:39

## 2020-04-02 RX ADMIN — OXYBUTYNIN CHLORIDE 5 MG: 5 TABLET, EXTENDED RELEASE ORAL at 08:42

## 2020-04-02 RX ADMIN — METFORMIN HYDROCHLORIDE 500 MG: 500 TABLET ORAL at 08:42

## 2020-04-02 RX ADMIN — METFORMIN HYDROCHLORIDE 500 MG: 500 TABLET ORAL at 16:36

## 2020-04-02 RX ADMIN — PANTOPRAZOLE SODIUM 40 MG: 40 TABLET, DELAYED RELEASE ORAL at 05:28

## 2020-04-02 RX ADMIN — DIVALPROEX SODIUM 1500 MG: 500 TABLET, EXTENDED RELEASE ORAL at 20:39

## 2020-04-02 RX ADMIN — DOCUSATE SODIUM 100 MG: 100 CAPSULE, LIQUID FILLED ORAL at 08:42

## 2020-04-02 NOTE — TREATMENT TEAM
04/02/20 1100   Activity/Group Checklist   Group   (IMR/Open Forum )   Attendance Did not attend   Attendance Duration (min) 46-60   Affect/Mood GAEL

## 2020-04-02 NOTE — PLAN OF CARE
Problem: Alteration in Thoughts and Perception  Goal: Refrain from acting on delusional thinking/internal stimuli  Description  Interventions:  - Monitor patient closely, per order   - Utilize least restrictive measures   - Set reasonable limits, give positive feedback for acceptable   - Administer medications as ordered and monitor of potential side effects  Outcome: Progressing  Goal: Agree to be compliant with medication regime, as prescribed and report medication side effects  Description  Interventions:  - Offer appropriate PRN medication and supervise ingestion; conduct AIMS, as needed   Outcome: Progressing   Patient is quiet and isolative to his room  He was cooperative with getting his vital and eating breakfast but he did refuse to take his medications  No group attendance noted  No issues or concerns noted this shift  Continue to monitor

## 2020-04-02 NOTE — NURSING NOTE
Received pt in bed at change of shift with eyes closed; chest movement noted  Continues the same thus this far as per   Will continue to monitor

## 2020-04-02 NOTE — PLAN OF CARE
Problem: Alteration in Thoughts and Perception  Goal: Agree to be compliant with medication regime, as prescribed and report medication side effects  Description  Interventions:  - Offer appropriate PRN medication and supervise ingestion; conduct AIMS, as needed   Outcome: Progressing  Goal: Complete daily ADLs, including personal hygiene independently, as able  Description  Interventions:  - Observe, teach, and assist patient with ADLS  - Monitor and promote a balance of rest/activity, with adequate nutrition and elimination   Outcome: Progressing     Problem: GASTROINTESTINAL - ADULT  Goal: Maintains adequate nutritional intake  Description  INTERVENTIONS:  - Monitor percentage of each meal consumed  - Identify factors contributing to decreased intake, treat as appropriate  - Assist with meals as needed  - Monitor I&O, weight, and lab values if indicated  - Obtain nutrition services referral as needed  Outcome: Progressing     Attended mens group, compliant with routine medications, gait steady, denied pain, ate 100% of dinner, showered this evening

## 2020-04-02 NOTE — PROGRESS NOTES
48 y o  was seen today, on unit  According to the patient and nursing staff, the following is reported: nursing reports that he has been attending minimal groups, refused Am medications this morning  Has been disheveled, disorganized, but better as day went on, yesterday  Patient reporting that he wants to get out of here, says improving mood and anxiety  Sleep and appetite are fair  Has been partially complaint with medications and no agitation witnessed  Denies SI/HI, denies A/V [place]        Mental Status Evaluation:  Appearance:  Poor eye contact and disheveled   Behavior:  calm, evasive and guarded   Mood:  dysphoric   Affect: blunted   Speech: Normal rate and Normal volume   Thought Process:  Goal directed and coherent   Thought Content:  Does not verbalize delusional material at present time   Perceptual Disturbances: Denies hallucinations and does not appear to be responding to internal stimuli   Risk Potential: No suicidal or homicidal ideation   Orientation:   Oriented x 3   Insight and judgment poor      Current Facility-Administered Medications:  acetaminophen 325 mg Oral Q6H PRN Zehra Chaudhry MD   acetaminophen 650 mg Oral Q6H PRN Zehra Chaudhry MD   acetaminophen 975 mg Oral Q8H PRN Zehra Chaudhry MD   aluminum-magnesium hydroxide-simethicone 30 mL Oral Q4H PRN Zehra Chaudhry MD   atorvastatin 10 mg Oral Daily With Anil Montoya MD   benztropine 1 mg Intramuscular Q6H PRN Zehra Chaudhry MD   benztropine 1 mg Oral Q6H PRN Zehra Chaudhry MD   cloZAPine 200 mg Oral Daily Zehra Chaudhry MD   divalproex sodium 1,500 mg Oral HS Zehra Chaudhry MD   docusate sodium 100 mg Oral BID Zehra Chaudhry MD   haloperidol 5 mg Oral Q6H PRN Zehra Chaudhry MD   haloperidol lactate 5 mg Intramuscular Q6H PRN Zehra Chaudhry MD   levothyroxine 75 mcg Oral Early Morning Zehra Chaudhry MD   LORazepam 1 mg Intramuscular Q6H PRN Zehra Chaudhry MD   LORazepam 1 mg Oral Q6H PRN Zehra Chaudhry MD   magnesium hydroxide 30 mL Oral Daily PRN Zehra Chaudhry MD metFORMIN 500 mg Oral BID With Meals Katherine Mott, MD   nicotine polacrilex 2 mg Oral Q2H PRN Katherine Mott, MD   OLANZapine 5 mg Oral HS Katherine Mott, MD   oxybutynin 5 mg Oral Daily Katherine Mott, MD   pantoprazole 40 mg Oral Early Morning Katherine Mott, MD   traZODone 50 mg Oral HS PRN Katherine Mott MD      Patient Active Problem List    Diagnosis Date Noted    Diabetes insipidus, nephrogenic ([place] [address] ) 03/06/2020    Tobacco abuse 12/24/2019    BMI 34 0-34 9,adult 07/24/2019    Nail abnormality 07/24/2019    Encounter for well adult exam with abnormal findings 07/24/2019    Foot callus 07/19/2017    Gastroesophageal reflux disease without esophagitis 06/07/2016    Schizoaffective disorder, bipolar type ([place] [address] ) 03/14/2016    Constipation, chronic 03/14/2016    Type 2 diabetes mellitus without complication, without long-term current use of insulin ([place] [address] ) 03/14/2016    Morbid obesity ([place] [address] ) 02/26/2014    Disorder of lipoprotein and lipid metabolism 11/21/2012    Acquired hypothyroidism 11/21/2012    Chronic airway obstruction, not elsewhere classified 02/19/2008    Benign essential hypertension 02/19/2008        Plan: Medications reviewed, benefits/risks discussed with patient, condition reviewed with treatment team  Routine monitoring will occur on unit, evaluation of effectiveness and side effects of medications  Will continue with current medications

## 2020-04-02 NOTE — TREATMENT TEAM
04/02/20 0900   Activity/Group Checklist   Group   (Body Chair Yoga Dance )   Attendance Did not attend   Attendance Duration (min) 16-30   Affect/Mood GAEL

## 2020-04-03 PROCEDURE — 99232 SBSQ HOSP IP/OBS MODERATE 35: CPT | Performed by: PSYCHIATRY & NEUROLOGY

## 2020-04-03 RX ADMIN — OLANZAPINE 5 MG: 5 TABLET, FILM COATED ORAL at 20:31

## 2020-04-03 RX ADMIN — DOCUSATE SODIUM 100 MG: 100 CAPSULE, LIQUID FILLED ORAL at 08:10

## 2020-04-03 RX ADMIN — DIVALPROEX SODIUM 1500 MG: 500 TABLET, EXTENDED RELEASE ORAL at 20:31

## 2020-04-03 RX ADMIN — PANTOPRAZOLE SODIUM 40 MG: 40 TABLET, DELAYED RELEASE ORAL at 05:13

## 2020-04-03 RX ADMIN — TRAZODONE HYDROCHLORIDE 50 MG: 50 TABLET ORAL at 22:23

## 2020-04-03 RX ADMIN — OXYBUTYNIN CHLORIDE 5 MG: 5 TABLET, EXTENDED RELEASE ORAL at 08:10

## 2020-04-03 RX ADMIN — METFORMIN HYDROCHLORIDE 500 MG: 500 TABLET ORAL at 08:10

## 2020-04-03 RX ADMIN — LEVOTHYROXINE SODIUM 75 MCG: 75 TABLET ORAL at 05:13

## 2020-04-03 NOTE — TREATMENT TEAM
Patient declined      04/03/20 0900   Activity/Group Checklist   Group Exercise  (Morning Walk/Coffee Social )   Attendance Did not attend   Attendance Duration (min) 16-30   Affect/Mood GAEL

## 2020-04-03 NOTE — PROGRESS NOTES
04/03/20 0800   Team Meeting   Meeting Type Daily Rounds   Team Members Present   Team Members Present Physician;Nurse;; Other (Discipline and Name)   Physician Team Member Dr Diallo Cali Team Member Sim Stokes, RN   Care Management Team Member Alva Carias MSW   Other (Discipline and Name) Colette Alberto LCSW; MCKAYLA Roberts     Attended fresh air and 3-11 groups  Controlled  Slept

## 2020-04-03 NOTE — NURSING NOTE
Kate Winchester was awake at start of shift requesting and given a snack and ice water  Return to bed and appears to sleep thus this far as per continual rounding    Will continue to monitor

## 2020-04-03 NOTE — PLAN OF CARE
Problem: Alteration in Thoughts and Perception  Goal: Verbalize thoughts and feelings  Description  Interventions:  - Promote a nonjudgmental and trusting relationship with the patient through active listening and therapeutic communication  - Assess patient's level of functioning, behavior and potential for risk  - Engage patient in 1 on 1 interactions  - Encourage patient to express fears, feelings, frustrations, and discuss symptoms    - [place] patient to reality, help patient recognize reality-based thinking   - Administer medications as ordered and assess for potential side effects  - Provide the patient education related to the signs and symptoms of the illness and desired effects of prescribed medications  Outcome: Progressing  Goal: Complete daily ADLs, including personal hygiene independently, as able  Description  Interventions:  - Observe, teach, and assist patient with ADLS  - Monitor and promote a balance of rest/activity, with adequate nutrition and elimination   Outcome: Progressing     Problem: RESPIRATORY - ADULT  Goal: Achieves optimal ventilation and oxygenation  Description  INTERVENTIONS:  - Assess for changes in respiratory status  - Assess for changes in mentation and behavior  - Position to facilitate oxygenation and minimize respiratory effort  - Oxygen administered by appropriate delivery if ordered  - Initiate smoking cessation education as indicated  - Encourage broncho-pulmonary hygiene including cough, deep breathe, Incentive Spirometry  - Assess the need for suctioning and aspirate as needed  - Assess and instruct to report SOB or any respiratory difficulty  - Respiratory Therapy support as indicated  Outcome: Progressing     Problem: GASTROINTESTINAL - ADULT  Goal: Maintains adequate nutritional intake  Description  INTERVENTIONS:  - Monitor percentage of each meal consumed  - Identify factors contributing to decreased intake, treat as appropriate  - Assist with meals as needed  - Monitor I&O, weight, and lab values if indicated  - Obtain nutrition services referral as needed  Outcome: Progressing     Problem: Alteration in Thoughts and Perception  Goal: Agree to be compliant with medication regime, as prescribed and report medication side effects  Description  Interventions:  - Offer appropriate PRN medication and supervise ingestion; conduct AIMS, as needed   Outcome: Not Progressing  Goal: Attend and participate in unit activities, including therapeutic, recreational, and educational groups  Description  Interventions:  -Encourage Visitation and family involvement in care  Outcome: Not Progressing     Talia Arellano was visible and ambulating in the milieu  He was a bit irritable because he wants to be discharged  Social with select peers  Disheveled appearance  Continues to wear layers of clothing  Refused to take medications at 1700 and would not give a reason for refusing  Ate 100% of his meal  Constantly asking for water  He went out on the patio for fresh air  Did not shower, but did have a BM tonight  He was singing loudly to the music while in [employer] area with peers  Redirected easily  Did not attend evening group  Took HS medication and ate snack  Continue to monitor  Precautions maintained
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
## 2020-04-03 NOTE — TREATMENT TEAM
04/03/20 1500   Activity/Group Checklist   Group   (Goal Card Planning )   Attendance Attended   Attendance Duration (min) 16-30   Interactions Interacted appropriately   Affect/Mood Appropriate;Normal range   Goals Achieved Identified feelings; Able to engage in interactions; Able to listen to others

## 2020-04-03 NOTE — PLAN OF CARE
Problem: Alteration in Thoughts and Perception  Goal: Agree to be compliant with medication regime, as prescribed and report medication side effects  Description  Interventions:  - Offer appropriate PRN medication and supervise ingestion; conduct AIMS, as needed   Outcome: Progressing  Goal: Complete daily ADLs, including personal hygiene independently, as able  Description  Interventions:  - Observe, teach, and assist patient with ADLS  - Monitor and promote a balance of rest/activity, with adequate nutrition and elimination   Outcome: Progressing     Problem: Ineffective Coping  Goal: Demonstrates healthy coping skills  Outcome: Progressing     Pleasant, cooperative, gait steady, denied pain, watched movie with peers, ate 100% of dinner and attended group on the deck, compliant with routine medications

## 2020-04-03 NOTE — PROGRESS NOTES
Pt blunted, disheveled & isolative to room entire shift  Prompted for meals, ate 100%  Did not attend any groups   No behavioral issues, will continue to monitor

## 2020-04-04 PROCEDURE — 99232 SBSQ HOSP IP/OBS MODERATE 35: CPT | Performed by: NURSE PRACTITIONER

## 2020-04-04 RX ADMIN — METFORMIN HYDROCHLORIDE 500 MG: 500 TABLET ORAL at 16:44

## 2020-04-04 RX ADMIN — LEVOTHYROXINE SODIUM 75 MCG: 75 TABLET ORAL at 05:34

## 2020-04-04 RX ADMIN — CLOZAPINE 200 MG: 200 TABLET ORAL at 16:44

## 2020-04-04 RX ADMIN — OXYBUTYNIN CHLORIDE 5 MG: 5 TABLET, EXTENDED RELEASE ORAL at 08:06

## 2020-04-04 RX ADMIN — DOCUSATE SODIUM 100 MG: 100 CAPSULE, LIQUID FILLED ORAL at 16:44

## 2020-04-04 RX ADMIN — TRAZODONE HYDROCHLORIDE 50 MG: 50 TABLET ORAL at 22:29

## 2020-04-04 RX ADMIN — DIVALPROEX SODIUM 1500 MG: 500 TABLET, EXTENDED RELEASE ORAL at 20:52

## 2020-04-04 RX ADMIN — METFORMIN HYDROCHLORIDE 500 MG: 500 TABLET ORAL at 08:06

## 2020-04-04 RX ADMIN — PANTOPRAZOLE SODIUM 40 MG: 40 TABLET, DELAYED RELEASE ORAL at 05:34

## 2020-04-04 RX ADMIN — ATORVASTATIN CALCIUM 10 MG: 10 TABLET, FILM COATED ORAL at 16:44

## 2020-04-04 RX ADMIN — DOCUSATE SODIUM 100 MG: 100 CAPSULE, LIQUID FILLED ORAL at 08:06

## 2020-04-04 RX ADMIN — OLANZAPINE 5 MG: 5 TABLET, FILM COATED ORAL at 20:52

## 2020-04-04 NOTE — NURSING NOTE
Og Faye was awake at start of shift requesting and given  ice water and cheeses crackers  Retired to bed without any difficulties an appears to sleep this thus far as per continual rounding    Will continue to monitor

## 2020-04-04 NOTE — PROGRESS NOTES
Progress Note - Behavioral Health   Ann Fernandes 48 y o  male MRN: [id number]  [facility] Encounter: [id number]    The patient was seen for continuing care and reviewed with treatment team     Vital signs in last 24 hours:  Temp:  [96 8 °F (36 °C)-97 °F (36 1 °C)] 96 8 °F (36 °C)  HR:  [90-99] 99  Resp:  [18] 18  BP: (116-132)/(72-80) 130/80    Mental Status Evaluation:    Appearance disheveled   Behavior cooperative and restless and fidgety   Mood anxious   Speech Normal rate and volume   Affect constricted   Thought Processes Goal directed and coherent   Thought Content Does not verbalize delusional material   Perceptual Disturbances Denies hallucinations and does not appear to be responding to internal stimuli   Risk Potential Suicidal/Homicidal Ideation - No evidence of suicidal or homicidal ideation and Patient does not verbalize suicidal or homicidal ideation  Risk of Violence - Potentially aggresive and violent  Risk of Self Mutilation - No evidence of risk for self mutilation found on assessment   Sensorium oriented to person, place, time/date and situation   Cognition/Memory recent and remote memory grossly intact   Consciousness alert and awake   Attention/Concentration attention span and concentration are age appropriate   Insight poor   Judgement impaired   Muscle Strength and Gait/Station normal muscle strength and normal muscle tone, normal gait/station and normal balance   Motor Activity no abnormal movements       Progress Toward Goals:  Patient is anxious since restless on approach  Patient states he is eating and sleeping well  Denies any medication side effects  Patient is adamant  but not agitated stating that he wants to leave and  No issues reported by staff  Recommended Treatment: Continue with pharmacotherapy, group therapy, milieu therapy and occupational therapy    The patient will be maintained on the following medications:    Current Facility-Administered Medications:  acetaminophen 325 mg Oral Q6H PRN Sruthi Brink MD   acetaminophen 650 mg Oral Q6H PRN Sruthi Brink MD   acetaminophen 975 mg Oral Q8H PRN Sruthi Brink MD   aluminum-magnesium hydroxide-simethicone 30 mL Oral Q4H PRN Sruthi Brink MD   atorvastatin 10 mg Oral Daily With Winnie Woods MD   benztropine 1 mg Intramuscular Q6H PRN Sruthi Brink MD   benztropine 1 mg Oral Q6H PRN Sruthi Brink MD   cloZAPine 200 mg Oral Daily Sruthi Brink MD   divalproex sodium 1,500 mg Oral HS Sruthi Brink MD   docusate sodium 100 mg Oral BID Sruthi Brink MD   haloperidol 5 mg Oral Q6H PRN Sruthi Brink MD   haloperidol lactate 5 mg Intramuscular Q6H PRN Sruthi Brink MD   levothyroxine 75 mcg Oral Early Morning Sruthi Brink MD   LORazepam 1 mg Intramuscular Q6H PRN Sruthi Brink MD   LORazepam 1 mg Oral Q6H PRN Sruthi Brink MD   magnesium hydroxide 30 mL Oral Daily PRN Srutih Brink MD   metFORMIN 500 mg Oral BID With Meals Sruthi Brink MD   nicotine polacrilex 2 mg Oral Q2H PRN Sruthi Brink MD   OLANZapine 5 mg Oral HS Sruthi Brink MD   oxybutynin 5 mg Oral Daily Sruthi Brink MD   pantoprazole 40 mg Oral Early Morning Sruthi Brink MD   traZODone 50 mg Oral HS PRN Sruthi Brink MD       [facility]ffective disorder, [facility] ([facility]

## 2020-04-05 PROCEDURE — 99232 SBSQ HOSP IP/OBS MODERATE 35: CPT | Performed by: NURSE PRACTITIONER

## 2020-04-05 RX ADMIN — OLANZAPINE 5 MG: 5 TABLET, FILM COATED ORAL at 20:55

## 2020-04-05 RX ADMIN — OXYBUTYNIN CHLORIDE 5 MG: 5 TABLET, EXTENDED RELEASE ORAL at 08:32

## 2020-04-05 RX ADMIN — METFORMIN HYDROCHLORIDE 500 MG: 500 TABLET ORAL at 17:12

## 2020-04-05 RX ADMIN — DOCUSATE SODIUM 100 MG: 100 CAPSULE, LIQUID FILLED ORAL at 08:32

## 2020-04-05 RX ADMIN — METFORMIN HYDROCHLORIDE 500 MG: 500 TABLET ORAL at 08:32

## 2020-04-05 RX ADMIN — CLOZAPINE 200 MG: 200 TABLET ORAL at 17:11

## 2020-04-05 RX ADMIN — DOCUSATE SODIUM 100 MG: 100 CAPSULE, LIQUID FILLED ORAL at 17:12

## 2020-04-05 RX ADMIN — DIVALPROEX SODIUM 1500 MG: 500 TABLET, EXTENDED RELEASE ORAL at 20:55

## 2020-04-05 RX ADMIN — PANTOPRAZOLE SODIUM 40 MG: 40 TABLET, DELAYED RELEASE ORAL at 06:32

## 2020-04-05 RX ADMIN — ATORVASTATIN CALCIUM 10 MG: 10 TABLET, FILM COATED ORAL at 17:12

## 2020-04-05 RX ADMIN — LEVOTHYROXINE SODIUM 75 MCG: 75 TABLET ORAL at 06:32

## 2020-04-05 NOTE — PLAN OF CARE
Problem: Alteration in Thoughts and Perception  Goal: Verbalize thoughts and feelings  Description  Interventions:  - Promote a nonjudgmental and trusting relationship with the patient through active listening and therapeutic communication  - Assess patient's level of functioning, behavior and potential for risk  - Engage patient in 1 on 1 interactions  - Encourage patient to express fears, feelings, frustrations, and discuss symptoms    - [place] patient to reality, help patient recognize reality-based thinking   - Administer medications as ordered and assess for potential side effects  - Provide the patient education related to the signs and symptoms of the illness and desired effects of prescribed medications  Outcome: Progressing  Goal: Refrain from acting on delusional thinking/internal stimuli  Description  Interventions:  - Monitor patient closely, per order   - Utilize least restrictive measures   - Set reasonable limits, give positive feedback for acceptable   - Administer medications as ordered and monitor of potential side effects  Outcome: Progressing  Goal: Agree to be compliant with medication regime, as prescribed and report medication side effects  Description  Interventions:  - Offer appropriate PRN medication and supervise ingestion; conduct AIMS, as needed   Outcome: Progressing  Goal: Attend and participate in unit activities, including therapeutic, recreational, and educational groups  Description  Interventions:  -Encourage Visitation and family involvement in care  Outcome: Progressing  Goal: Complete daily ADLs, including personal hygiene independently, as able  Description  Interventions:  - Observe, teach, and assist patient with ADLS  - Monitor and promote a balance of rest/activity, with adequate nutrition and elimination   Outcome: Progressing     Problem: Ineffective Coping  Goal: Demonstrates healthy coping skills  Outcome: Progressing  Goal: Understands least restrictive measures  Description  Interventions:  - Utilize least restrictive behavior  Outcome: Progressing     Problem: GASTROINTESTINAL - ADULT  Goal: Maintains adequate nutritional intake  Description  INTERVENTIONS:  - Monitor percentage of each meal consumed  - Identify factors contributing to decreased intake, treat as appropriate  - Assist with meals as needed  - Monitor I&O, weight, and lab values if indicated  - Obtain nutrition services referral as needed  Outcome: Catie Salas has been awake, alert, and visible intermittently out in the milieu  Pt completed his daily ADL's and showered this shift  Social with peers and listened to music on radio in [facility] 19  Disheveled in appearance and dressed in layers  Remains preoccupied with discharge  No behavioral issues  Ate 100% supper  Denies any depression, anxiety, si/hi, intent, plan, a/v hallucinations, and has not verbalized any delusions  Pt did not attend evening movie social group but came out for snack after  Rests in room at intervals  Pt requested prn med for sleep  Trazodone 50 mg po given at 2229  Continue to monitor/assess for any changes

## 2020-04-05 NOTE — PROGRESS NOTES
Progress Note - Behavioral Health   Gregory Fernandez 48 y o  male MRN: [id number]  [facility] Encounter: [id number]    The patient was seen for continuing care and reviewed with treatment team     Vital signs in last 24 hours:  Temp:  [97 3 °F (36 3 °C)-97 9 °F (36 6 °C)] 97 9 °F (36 6 °C)  HR:  [84] 84  Resp:  [17-20] 17  BP: (115-125)/(58-68) 115/68    Mental Status Evaluation:    Appearance Marginal/poor hygiene   Behavior cooperative and guarded   Mood irritable   Speech Normal rate and volume   Affect constricted   Thought Processes Goal directed and coherent   Thought Content Paranoid and mistrustful   Perceptual Disturbances Denies hallucinations and does not appear to be responding to internal stimuli   Risk Potential Suicidal/Homicidal Ideation - No evidence of suicidal or homicidal ideation and Patient does not verbalize suicidal or homicidal ideation  Risk of Violence - No evidence of risk for violence found on assessment  Risk of Self Mutilation - No evidence of risk for self mutilation found on assessment   Sensorium oriented to person, place, time/date and situation   Cognition/Memory recent and remote memory grossly intact   Consciousness alert and awake   Attention/Concentration attention span and concentration are age appropriate   Insight poor   Judgement impaired   Muscle Strength and Gait/Station normal muscle strength and normal muscle tone, normal gait/station and normal balance   Motor Activity no abnormal movements       Progress Toward Goals:  Patient observed resting in bed  Patient is brief in responses  Denies any issues  States he is eating and sleeping well  No medication side effects  No she is reported by staff  Recommended Treatment: Continue with pharmacotherapy, group therapy, milieu therapy and occupational therapy    The patient will be maintained on the following medications:    Current Facility-Administered Medications:  acetaminophen 325 mg Oral Q6H PRN Toña Harris MD   acetaminophen 650 mg Oral Q6H PRN Toña Harris MD   acetaminophen 975 mg Oral Q8H PRN Toña Harris MD   aluminum-magnesium hydroxide-simethicone 30 mL Oral Q4H PRN Toña Harris MD   atorvastatin 10 mg Oral Daily With Kevin Lyle MD   benztropine 1 mg Intramuscular Q6H PRN Toña Harris MD   benztropine 1 mg Oral Q6H PRN Toña Harris MD   cloZAPine 200 mg Oral Daily Toña Harris MD   divalproex sodium 1,500 mg Oral HS Toña Harris MD   docusate sodium 100 mg Oral BID Toña Harris MD   haloperidol 5 mg Oral Q6H PRN Toña Harris MD   haloperidol lactate 5 mg Intramuscular Q6H PRN Toña Harris MD   levothyroxine 75 mcg Oral Early Morning Toña Harris MD   LORazepam 1 mg Intramuscular Q6H PRN Toña Harris MD   LORazepam 1 mg Oral Q6H PRN Toña Harris MD   magnesium hydroxide 30 mL Oral Daily PRN Toña Harris MD   metFORMIN 500 mg Oral BID With Meals Toña Harris MD   nicotine polacrilex 2 mg Oral Q2H PRN Toña Harris MD   OLANZapine 5 mg Oral HS Toña Harris MD   oxybutynin 5 mg Oral Daily Toña Harris MD   pantoprazole 40 mg Oral Early Morning Toña Harris MD   traZODone 50 mg Oral HS PRN Toña Harris MD       [facility], [facility] ([facility]

## 2020-04-05 NOTE — PLAN OF CARE
Problem: Alteration in Thoughts and Perception  Goal: Treatment Goal: Gain control of psychotic behaviors/thinking, reduce/eliminate presenting symptoms and demonstrate improved reality functioning upon discharge  Outcome: Progressing  Goal: Verbalize thoughts and feelings  Description  Interventions:  - Promote a nonjudgmental and trusting relationship with the patient through active listening and therapeutic communication  - Assess patient's level of functioning, behavior and potential for risk  - Engage patient in 1 on 1 interactions  - Encourage patient to express fears, feelings, frustrations, and discuss symptoms    - [place] patient to reality, help patient recognize reality-based thinking   - Administer medications as ordered and assess for potential side effects  - Provide the patient education related to the signs and symptoms of the illness and desired effects of prescribed medications  Outcome: Progressing  Goal: Refrain from acting on delusional thinking/internal stimuli  Description  Interventions:  - Monitor patient closely, per order   - Utilize least restrictive measures   - Set reasonable limits, give positive feedback for acceptable   - Administer medications as ordered and monitor of potential side effects  Outcome: Progressing  Goal: Agree to be compliant with medication regime, as prescribed and report medication side effects  Description  Interventions:  - Offer appropriate PRN medication and supervise ingestion; conduct AIMS, as needed   Outcome: Progressing  Goal: Attend and participate in unit activities, including therapeutic, recreational, and educational groups  Description  Interventions:  -Encourage Visitation and family involvement in care  Outcome: Progressing  Goal: Recognize dysfunctional thoughts, communicate reality-based thoughts at the time of discharge  Description  Interventions:  - Provide medication and psycho-education to assist patient in compliance and developing insight into his/her illness   Outcome: Progressing  Goal: Complete daily ADLs, including personal hygiene independently, as able  Description  Interventions:  - Observe, teach, and assist patient with ADLS  - Monitor and promote a balance of rest/activity, with adequate nutrition and elimination   Outcome: Progressing     Problem: Ineffective Coping  Goal: Identifies ineffective coping skills  Outcome: Progressing  Goal: Identifies healthy coping skills  Outcome: Progressing  Goal: Demonstrates healthy coping skills  Outcome: Progressing  Goal: Patient/Family participate in treatment and DC plans  Description  Interventions:  - Provide therapeutic environment  Outcome: Progressing  Goal: Patient/Family verbalizes awareness of resources  Outcome: Progressing  Goal: Understands least restrictive measures  Description  Interventions:  - Utilize least restrictive behavior  Outcome: Progressing     Problem: RESPIRATORY - ADULT  Goal: Achieves optimal ventilation and oxygenation  Description  INTERVENTIONS:  - Assess for changes in respiratory status  - Assess for changes in mentation and behavior  - Position to facilitate oxygenation and minimize respiratory effort  - Oxygen administered by appropriate delivery if ordered  - Initiate smoking cessation education as indicated  - Encourage broncho-pulmonary hygiene including cough, deep breathe, Incentive Spirometry  - Assess the need for suctioning and aspirate as needed  - Assess and instruct to report SOB or any respiratory difficulty  - Respiratory Therapy support as indicated  Outcome: Progressing     Problem: GASTROINTESTINAL - ADULT  Goal: Minimal or absence of nausea and/or vomiting  Description  INTERVENTIONS:  - Administer IV fluids if ordered to ensure adequate hydration  - Maintain NPO status until nausea and vomiting are resolved  - Nasogastric tube if ordered  - Administer ordered antiemetic medications as needed  - Provide nonpharmacologic comfort measures as appropriate  - Advance diet as tolerated, if ordered  - Consider nutrition services referral to assist patient with adequate nutrition and appropriate food choices  Outcome: Progressing  Goal: Maintains or returns to baseline bowel function  Description  INTERVENTIONS:  - Assess bowel function  - Encourage oral fluids to ensure adequate hydration  - Administer IV fluids if ordered to ensure adequate hydration  - Administer ordered medications as needed  - Encourage mobilization and activity  - Consider nutritional services referral to assist patient with adequate nutrition and appropriate food choices  Outcome: Progressing  Goal: Maintains adequate nutritional intake  Description  INTERVENTIONS:  - Monitor percentage of each meal consumed  - Identify factors contributing to decreased intake, treat as appropriate  - Assist with meals as needed  - Monitor I&O, weight, and lab values if indicated  - Obtain nutrition services referral as needed  Outcome: Progressing     Problem: METABOLIC, FLUID AND ELECTROLYTES - ADULT  Goal: Glucose maintained within target range  Description  INTERVENTIONS:  - Monitor Blood Glucose as ordered  - Assess for signs and symptoms of hyperglycemia and hypoglycemia  - Administer ordered medications to maintain glucose within target range  - Assess nutritional intake and initiate nutrition service referral as needed  Outcome: Progressing     Problem: DISCHARGE PLANNING  Goal: Discharge to home or other facility with appropriate resources  Description    CASE MANAGEMENT INTERVENTIONS:  - Conduct assessment to determine patient/family and health care team treatment goals, and need for post-acute services based on payer coverage, community resources, patient preferences and barriers to discharge    - Address psychosocial, clinical, and financial barriers to discharge as identified in assessment in conjunction with the patient/family and health care team   - Assist the patient in reintegration back into the community by removing barriers which may hinder a successful discharge once deemed stable  - Arrange appropriate level of post-acute services according to patient's needs and preference and payer coverage in collaboration with the physician and health care team   - Communicate with and update the patient/family, physician, and health care team regarding progress on the discharge plan  - Arrange appropriate transportation to post-acute venues  Outcome: Progressing    Patient quiet, cooperative, visible intermittently  Social with peers  Compliant with behavioral plan  Dod not attend groups  Med and meal compliant  No c/o pain or discomfort  Denies depression, anxiety, SI and HI    Will continue to monitor progress in recovery program

## 2020-04-06 PROCEDURE — 99232 SBSQ HOSP IP/OBS MODERATE 35: CPT | Performed by: PSYCHIATRY & NEUROLOGY

## 2020-04-06 RX ADMIN — CLOZAPINE 200 MG: 200 TABLET ORAL at 16:27

## 2020-04-06 RX ADMIN — METFORMIN HYDROCHLORIDE 500 MG: 500 TABLET ORAL at 16:27

## 2020-04-06 RX ADMIN — OLANZAPINE 5 MG: 5 TABLET, FILM COATED ORAL at 20:47

## 2020-04-06 RX ADMIN — ATORVASTATIN CALCIUM 10 MG: 10 TABLET, FILM COATED ORAL at 16:27

## 2020-04-06 RX ADMIN — DOCUSATE SODIUM 100 MG: 100 CAPSULE, LIQUID FILLED ORAL at 16:27

## 2020-04-06 RX ADMIN — DIVALPROEX SODIUM 1500 MG: 500 TABLET, EXTENDED RELEASE ORAL at 20:47

## 2020-04-06 NOTE — PLAN OF CARE
Problem: Individualized Interventions  Goal: Attend and participate in unit activities, including therapeutic, recreational, and educational groups  Description  PSYCHOTHERAPY INTERVENTIONS:    -Form therapeutic alliance to promote trust and safety within a therapeutic environment    -Engage in individual psychotherapy using evidence-based practices that are specific to individual needs   -Process barriers to community living with an emphasis on solution-based interventions   -Promote self-awareness and identity development   -Identify and process patterns of behavior that have led to decompensation and/or hospitalizations   -Attend psychoeducation groups with licensed psychotherapist to learn about Illness Management Recovery (IMR) concepts and enhance coping skills    -Practice effective communication with staff, peers, family, and community members  Participate in family sessions as necessary   -Encourage reality-based thought content by examining thought processes and cognitive distortions      -Work with treatment team in reintegration back into the community when appropriate  Outcome: Not Progressing    Patient failed to complete Goal Card for the week of 4/6/2020

## 2020-04-06 NOTE — PROGRESS NOTES
04/06/20 1034   Team Meeting   Meeting Type Daily Rounds   Team Members Present   Team Members Present Physician;Nurse;; Other (Discipline and Name)   Physician Team Member Dr Palmer Romberg Team Member Alfonzo Stacy  RN   Care Management Team Member EDELMIRA Lweis   Other (Discipline and Name) David Figueroa, CPS   Patient/Family Present   Patient Present Yes   Patient's Family Present No     Good weekend, stuck mostly to behavioral plan  Patient social and compliant with hygiene and vitals  Continuously asking when he is able to go to [address] [place] [address]

## 2020-04-06 NOTE — PLAN OF CARE
Problem: Alteration in Thoughts and Perception  Goal: Verbalize thoughts and feelings  Description  Interventions:  - Promote a nonjudgmental and trusting relationship with the patient through active listening and therapeutic communication  - Assess patient's level of functioning, behavior and potential for risk  - Engage patient in 1 on 1 interactions  - Encourage patient to express fears, feelings, frustrations, and discuss symptoms    - [place] patient to reality, help patient recognize reality-based thinking   - Administer medications as ordered and assess for potential side effects  - Provide the patient education related to the signs and symptoms of the illness and desired effects of prescribed medications  Outcome: Progressing  Goal: Attend and participate in unit activities, including therapeutic, recreational, and educational groups  Description  Interventions:  -Encourage Visitation and family involvement in care  Outcome: Progressing  Goal: Complete daily ADLs, including personal hygiene independently, as able  Description  Interventions:  - Observe, teach, and assist patient with ADLS  - Monitor and promote a balance of rest/activity, with adequate nutrition and elimination   Outcome: Progressing     Problem: Alteration in Thoughts and Perception  Goal: Treatment Goal: Gain control of psychotic behaviors/thinking, reduce/eliminate presenting symptoms and demonstrate improved reality functioning upon discharge  Outcome: Not Progressing  Goal: Refrain from acting on delusional thinking/internal stimuli  Description  Interventions:  - Monitor patient closely, per order   - Utilize least restrictive measures   - Set reasonable limits, give positive feedback for acceptable   - Administer medications as ordered and monitor of potential side effects  Outcome: Not Progressing  Goal: Agree to be compliant with medication regime, as prescribed and report medication side effects  Description  Interventions:  - Offer appropriate PRN medication and supervise ingestion; conduct AIMS, as needed   Outcome: Not Progressing  Goal: Recognize dysfunctional thoughts, communicate reality-based thoughts at the time of discharge  Description  Interventions:  - Provide medication and psycho-education to assist patient in compliance and developing insight into his/her illness   Outcome: Not Progressing     Non compliant w/behavior program, refused morning meds, preoccupied with "getting out of here", no agitation, behaviors controlled, med and meal compliant, denies depression, anxiety, SI and HI  No c/o s/s pain or discomfort  Behaviors controlled  Compliant with vitals  Refused morning meds while out of room for breakfast and community meeting  Patient stated "I'm not taking them"  Patient refused at bedside  Ate 100% of both meals  Attended community meeting and fresh air group    Will continue to monitor progress in recovery program
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
## 2020-04-06 NOTE — PLAN OF CARE
Problem: Alteration in Thoughts and Perception  Goal: Refrain from acting on delusional thinking/internal stimuli  Description  Interventions:  - Monitor patient closely, per order   - Utilize least restrictive measures   - Set reasonable limits, give positive feedback for acceptable   - Administer medications as ordered and monitor of potential side effects  Outcome: Progressing  Goal: Agree to be compliant with medication regime, as prescribed and report medication side effects  Description  Interventions:  - Offer appropriate PRN medication and supervise ingestion; conduct AIMS, as needed   Outcome: Progressing  Goal: Attend and participate in unit activities, including therapeutic, recreational, and educational groups  Description  Interventions:  -Encourage Visitation and family involvement in care  Outcome: Progressing  Goal: Complete daily ADLs, including personal hygiene independently, as able  Description  Interventions:  - Observe, teach, and assist patient with ADLS  - Monitor and promote a balance of rest/activity, with adequate nutrition and elimination   Outcome: Progressing     Problem: Ineffective Coping  Goal: Demonstrates healthy coping skills  Outcome: Progressing  Goal: Understands least restrictive measures  Description  Interventions:  - Utilize least restrictive behavior  Outcome: Progressing     Problem: GASTROINTESTINAL - ADULT  Goal: Maintains adequate nutritional intake  Description  INTERVENTIONS:  - Monitor percentage of each meal consumed  - Identify factors contributing to decreased intake, treat as appropriate  - Assist with meals as needed  - Monitor I&O, weight, and lab values if indicated  - Obtain nutrition services referral as needed  Outcome: Isabella Powell has been awake, alert, and visible intermittently out in the milieu  Pt completed his daily ADL's today and showered this shift  Ate 100% supper  Went out on deck for fresh air with staff and peers    Disheveled and dressed in layers  Pleasant, polite, and cooperative  Preoccupied with discharge  Compliant with all scheduled meds without prompting  Continue to monitor/assess for any changes

## 2020-04-06 NOTE — TREATMENT TEAM
Patient left group      04/06/20 1100   Activity/Group Checklist   Group   (IMR/SMART Goals )   Attendance Did not attend   Attendance Duration (min) 46-60   Affect/Mood GAEL
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
## 2020-04-06 NOTE — TREATMENT TEAM
In and out of group/No credit given      04/06/20 0900   Activity/Group Checklist   Group   (Goal Card Review )   Attendance Did not attend   Attendance Duration (min) 16-30   Affect/Mood GAEL

## 2020-04-07 PROCEDURE — 99232 SBSQ HOSP IP/OBS MODERATE 35: CPT | Performed by: PSYCHIATRY & NEUROLOGY

## 2020-04-07 RX ADMIN — DOCUSATE SODIUM 100 MG: 100 CAPSULE, LIQUID FILLED ORAL at 16:30

## 2020-04-07 RX ADMIN — OXYBUTYNIN CHLORIDE 5 MG: 5 TABLET, EXTENDED RELEASE ORAL at 08:52

## 2020-04-07 RX ADMIN — BENZTROPINE MESYLATE 1 MG: 1 TABLET ORAL at 20:31

## 2020-04-07 RX ADMIN — LEVOTHYROXINE SODIUM 75 MCG: 75 TABLET ORAL at 05:48

## 2020-04-07 RX ADMIN — OLANZAPINE 5 MG: 5 TABLET, FILM COATED ORAL at 20:31

## 2020-04-07 RX ADMIN — DIVALPROEX SODIUM 1500 MG: 500 TABLET, EXTENDED RELEASE ORAL at 20:31

## 2020-04-07 RX ADMIN — PANTOPRAZOLE SODIUM 40 MG: 40 TABLET, DELAYED RELEASE ORAL at 05:48

## 2020-04-07 RX ADMIN — DOCUSATE SODIUM 100 MG: 100 CAPSULE, LIQUID FILLED ORAL at 08:52

## 2020-04-07 RX ADMIN — CLOZAPINE 200 MG: 200 TABLET ORAL at 16:30

## 2020-04-07 RX ADMIN — METFORMIN HYDROCHLORIDE 500 MG: 500 TABLET ORAL at 08:52

## 2020-04-07 RX ADMIN — ATORVASTATIN CALCIUM 10 MG: 10 TABLET, FILM COATED ORAL at 16:30

## 2020-04-07 RX ADMIN — METFORMIN HYDROCHLORIDE 500 MG: 500 TABLET ORAL at 16:30

## 2020-04-07 NOTE — TREATMENT TEAM
Patient declined      04/07/20 1100   Activity/Group Checklist   Group   (IMR/Mindfulness )   Attendance Did not attend   Attendance Duration (min) 46-60   Affect/Mood GAEL

## 2020-04-07 NOTE — PROGRESS NOTES
04/07/20 1252   Team Meeting   Meeting Type Tx Team Meeting   Initial Conference Date 04/07/20   Next Conference Date 04/14/20   Team Members Present   Team Members Present Physician;Nurse;; Other (Discipline and Name)   Physician Team Member Dr Galina Cohn Team Member Jt Magaña RN   Care Management Team Member EDELMIRA Ferrari   Other (Discipline and Name) Ashtyn Irene, [facility] [address]   Patient/Family Present   Patient Present Yes   Patient's Family Present No     Patient attended treatment team meeting this morning prepared without self-assessment  Patient's group attendance for last week was low and if he does go he leaves within the first 5 minutes  Team and patient completed risk assessment and the patient did not verbalize any desire to elope from the program  Patient verbalized understanding of consequences of eloping from treatment while on a commitment  Patient verbalized no further questions or concerns at the conclusion of the meeting  Next team meeting scheduled for 4/14/2020  Patient was participating appropriately during meeting at first  However, when Sary Cox from the [facility] was able to speak with patient he began getting upset as he did not like his answer and walked out and slammed the door  Patient repeatedly came up to CM stating that "I need a  to get out of here because I fucking hate this place everyone is mean and don't care "     Prior to outburst, patient was able to process his treatment plan and sign in agreement

## 2020-04-07 NOTE — PLAN OF CARE
Problem: Alteration in Thoughts and Perception  Goal: Treatment Goal: Gain control of psychotic behaviors/thinking, reduce/eliminate presenting symptoms and demonstrate improved reality functioning upon discharge  Outcome: Progressing  Goal: Verbalize thoughts and feelings  Description  Interventions:  - Promote a nonjudgmental and trusting relationship with the patient through active listening and therapeutic communication  - Assess patient's level of functioning, behavior and potential for risk  - Engage patient in 1 on 1 interactions  - Encourage patient to express fears, feelings, frustrations, and discuss symptoms    - [place] patient to reality, help patient recognize reality-based thinking   - Administer medications as ordered and assess for potential side effects  - Provide the patient education related to the signs and symptoms of the illness and desired effects of prescribed medications  Outcome: Progressing  Goal: Refrain from acting on delusional thinking/internal stimuli  Description  Interventions:  - Monitor patient closely, per order   - Utilize least restrictive measures   - Set reasonable limits, give positive feedback for acceptable   - Administer medications as ordered and monitor of potential side effects  Outcome: Progressing  Goal: Agree to be compliant with medication regime, as prescribed and report medication side effects  Description  Interventions:  - Offer appropriate PRN medication and supervise ingestion; conduct AIMS, as needed   Outcome: Progressing  Goal: Attend and participate in unit activities, including therapeutic, recreational, and educational groups  Description  Interventions:  -Encourage Visitation and family involvement in care  Outcome: Progressing  Goal: Recognize dysfunctional thoughts, communicate reality-based thoughts at the time of discharge  Description  Interventions:  - Provide medication and psycho-education to assist patient in compliance and developing insight into his/her illness   Outcome: Progressing  Goal: Complete daily ADLs, including personal hygiene independently, as able  Description  Interventions:  - Observe, teach, and assist patient with ADLS  - Monitor and promote a balance of rest/activity, with adequate nutrition and elimination   Outcome: Progressing     Problem: Ineffective Coping  Goal: Identifies ineffective coping skills  Outcome: Progressing  Goal: Identifies healthy coping skills  Outcome: Progressing  Goal: Demonstrates healthy coping skills  Outcome: Progressing  Goal: Patient/Family participate in treatment and DC plans  Description  Interventions:  - Provide therapeutic environment  Outcome: Progressing  Goal: Patient/Family verbalizes awareness of resources  Outcome: Progressing  Goal: Understands least restrictive measures  Description  Interventions:  - Utilize least restrictive behavior  Outcome: Progressing     Problem: RESPIRATORY - ADULT  Goal: Achieves optimal ventilation and oxygenation  Description  INTERVENTIONS:  - Assess for changes in respiratory status  - Assess for changes in mentation and behavior  - Position to facilitate oxygenation and minimize respiratory effort  - Oxygen administered by appropriate delivery if ordered  - Initiate smoking cessation education as indicated  - Encourage broncho-pulmonary hygiene including cough, deep breathe, Incentive Spirometry  - Assess the need for suctioning and aspirate as needed  - Assess and instruct to report SOB or any respiratory difficulty  - Respiratory Therapy support as indicated  Outcome: Progressing     Problem: GASTROINTESTINAL - ADULT  Goal: Minimal or absence of nausea and/or vomiting  Description  INTERVENTIONS:  - Administer IV fluids if ordered to ensure adequate hydration  - Maintain NPO status until nausea and vomiting are resolved  - Nasogastric tube if ordered  - Administer ordered antiemetic medications as needed  - Provide nonpharmacologic comfort measures as appropriate  - Advance diet as tolerated, if ordered  - Consider nutrition services referral to assist patient with adequate nutrition and appropriate food choices  Outcome: Progressing  Goal: Maintains or returns to baseline bowel function  Description  INTERVENTIONS:  - Assess bowel function  - Encourage oral fluids to ensure adequate hydration  - Administer IV fluids if ordered to ensure adequate hydration  - Administer ordered medications as needed  - Encourage mobilization and activity  - Consider nutritional services referral to assist patient with adequate nutrition and appropriate food choices  Outcome: Progressing  Goal: Maintains adequate nutritional intake  Description  INTERVENTIONS:  - Monitor percentage of each meal consumed  - Identify factors contributing to decreased intake, treat as appropriate  - Assist with meals as needed  - Monitor I&O, weight, and lab values if indicated  - Obtain nutrition services referral as needed  Outcome: Progressing     Problem: METABOLIC, FLUID AND ELECTROLYTES - ADULT  Goal: Glucose maintained within target range  Description  INTERVENTIONS:  - Monitor Blood Glucose as ordered  - Assess for signs and symptoms of hyperglycemia and hypoglycemia  - Administer ordered medications to maintain glucose within target range  - Assess nutritional intake and initiate nutrition service referral as needed  Outcome: Progressing     Problem: DISCHARGE PLANNING  Goal: Discharge to home or other facility with appropriate resources  Description    CASE MANAGEMENT INTERVENTIONS:  - Conduct assessment to determine patient/family and health care team treatment goals, and need for post-acute services based on payer coverage, community resources, patient preferences and barriers to discharge    - Address psychosocial, clinical, and financial barriers to discharge as identified in assessment in conjunction with the patient/family and health care team   - Assist the patient in reintegration back into the community by removing barriers which may hinder a successful discharge once deemed stable  - Arrange appropriate level of post-acute services according to patient's needs and preference and payer coverage in collaboration with the physician and health care team   - Communicate with and update the patient/family, physician, and health care team regarding progress on the discharge plan  - Arrange appropriate transportation to post-acute venues  Outcome: Progressing     Non compliant w/behavior program   Refusing to wear mask  Security was called and were present when Jose Carlos Ma was able to convince patient to put mask on and keep it on  Compliant with meds and meals  Ate 25% of breakfast and 100% of lunch  He did not attend groups  He attended treatment team but left agitated and angry because he did not hear what he wanted to hear about discharge  Patient denies depression, anxiety, SI and HI  No c/o s/s pain or discomfort    Will continue to monitor progress in recovery program

## 2020-04-07 NOTE — PROGRESS NOTES
04/07/20 0800   Team Meeting   Meeting Type Daily Rounds   Team Members Present   Team Members Present Physician;Nurse;; Other (Discipline and Name)   Physician Team Member Dr Margy Real Team Member Brannon Newberry RN   Care Management Team Member Lay Logan MSW   Other (Discipline and Name) Mirna Lang LCSW; Richard Ken, MCKAYLA     Not attending groups  Refused morning meds  Controlled  Slept

## 2020-04-07 NOTE — TREATMENT TEAM
Patient declined      04/07/20 0900   Activity/Group Checklist   Group Community meeting  (Morning Meditation )   Attendance Did not attend   Attendance Duration (min) 16-30   Affect/Mood GAEL

## 2020-04-07 NOTE — PLAN OF CARE
Problem: Alteration in Thoughts and Perception  Goal: Verbalize thoughts and feelings  Description  Interventions:  - Promote a nonjudgmental and trusting relationship with the patient through active listening and therapeutic communication  - Assess patient's level of functioning, behavior and potential for risk  - Engage patient in 1 on 1 interactions  - Encourage patient to express fears, feelings, frustrations, and discuss symptoms    - [place] patient to reality, help patient recognize reality-based thinking   - Administer medications as ordered and assess for potential side effects  - Provide the patient education related to the signs and symptoms of the illness and desired effects of prescribed medications  Outcome: Progressing  Goal: Refrain from acting on delusional thinking/internal stimuli  Description  Interventions:  - Monitor patient closely, per order   - Utilize least restrictive measures   - Set reasonable limits, give positive feedback for acceptable   - Administer medications as ordered and monitor of potential side effects  Outcome: Progressing  Goal: Agree to be compliant with medication regime, as prescribed and report medication side effects  Description  Interventions:  - Offer appropriate PRN medication and supervise ingestion; conduct AIMS, as needed   Outcome: Progressing  Goal: Complete daily ADLs, including personal hygiene independently, as able  Description  Interventions:  - Observe, teach, and assist patient with ADLS  - Monitor and promote a balance of rest/activity, with adequate nutrition and elimination   Outcome: Progressing     Problem: Ineffective Coping  Goal: Demonstrates healthy coping skills  Outcome: Progressing  Goal: Understands least restrictive measures  Description  Interventions:  - Utilize least restrictive behavior  Outcome: Progressing     Problem: GASTROINTESTINAL - ADULT  Goal: Maintains adequate nutritional intake  Description  INTERVENTIONS:  - Monitor percentage of each meal consumed  - Identify factors contributing to decreased intake, treat as appropriate  - Assist with meals as needed  - Monitor I&O, weight, and lab values if indicated  - Obtain nutrition services referral as needed  Outcome: Araceli Davenport has been awake, alert, and visible intermittently out  in the milieu  Pt ate 100% supper  Pt showered this shift  Social with peers and listens to music on radio in [facility]  Continues to be preoccupied with discharge  No behavioral issues  Dressed in layers and disheveled in appearance  Pleasant, polite, and cooperative  Pt did not attend evening group but came out for snack after  Compliant with scheduled meds without prompting  Continue to monitor/assess for any changes

## 2020-04-07 NOTE — PLAN OF CARE
Problem: Alteration in Thoughts and Perception  Goal: Refrain from acting on delusional thinking/internal stimuli  Description  Interventions:  - Monitor patient closely, per order   - Utilize least restrictive measures   - Set reasonable limits, give positive feedback for acceptable   - Administer medications as ordered and monitor of potential side effects  Outcome: Progressing     Therapist approached patient's room to accompany three security guards, the CM, and an MHT standing outside of it  Patient could be seen becoming agitated, as indicated by rapid, disorganized speech, and poor comprehension of what was being asked of him  The MHT was trying to measure patient's face for his mask which all patients have been asked to wear when outside of their rooms  Therapist entered and asked security and staff to disperse down the hallway  Patient and therapist quietly and calmly reviewed the necessity for the mask and patient reported he would let therapist come back in 30 minutes to measure him  Within a few minutes, patient allowed therapist to measure his face with step-by-step directions of how it would be done  He put on the mask and was compliant with the adjustments therapist required before he could leave his room  Patient still needed to be reminded several times that he cannot go to [facility] or out with his friend Kelsie Belle until restrictions are over  Patient and therapist walked down the [place] so he show the nurses that he was wearing his mask  In this therapist's experience, patient benefits from hearing step-by-step instructions of certain tasks so that he knows what to expect  This has been useful in this instance and in other instances requiring patient to talk about things he does not want to talk about  Therapist will continue to support patient as he waits eagerly for the social distance ban to be lifted
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
## 2020-04-07 NOTE — TREATMENT TEAM
04/07/20 1400   Activity/Group Checklist   Group   (Recovery Workshop )   Attendance Did not attend   Attendance Duration (min) 46-60   Affect/Mood GAEL

## 2020-04-07 NOTE — PROGRESS NOTES
04/03/20 1100   Activity/Group Checklist   Group Other (Comment)  (IMR - Recovery Jeopardy)   Attendance Did not attend     Patient was encouraged multiple times to attend two-part, two-hour group  Patient was encouraged to attend, and informed he would receive credit for any half hour he could stay  He was encouraged with the incentives of prizes from [facility]  Patient remained in bed, with minimal interaction with therapist at his door

## 2020-04-07 NOTE — PLAN OF CARE
Problem: Alteration in Thoughts and Perception  Goal: Attend and participate in unit activities, including therapeutic, recreational, and educational groups  Description  Interventions:  -Encourage Visitation and family involvement in care  Outcome: Not Progressing   Patient struggles with consistency concerning his group attendance however, does the best he can  Patient better with face to face meeting with this writer and completed his Life Domains, BH Relapse Prevention plan and 90% of his WRAP Plan  Patient struggles to sit through group settings and often leaves shortly after they start  Patient remains childlike and repetitive at times but is redirectable  Writer continues to encourage and support Patient's wellness and goals

## 2020-04-08 PROCEDURE — 99232 SBSQ HOSP IP/OBS MODERATE 35: CPT | Performed by: PSYCHIATRY & NEUROLOGY

## 2020-04-08 RX ADMIN — DOCUSATE SODIUM 100 MG: 100 CAPSULE, LIQUID FILLED ORAL at 16:28

## 2020-04-08 RX ADMIN — ATORVASTATIN CALCIUM 10 MG: 10 TABLET, FILM COATED ORAL at 16:28

## 2020-04-08 RX ADMIN — METFORMIN HYDROCHLORIDE 500 MG: 500 TABLET ORAL at 16:28

## 2020-04-08 RX ADMIN — DOCUSATE SODIUM 100 MG: 100 CAPSULE, LIQUID FILLED ORAL at 08:26

## 2020-04-08 RX ADMIN — METFORMIN HYDROCHLORIDE 500 MG: 500 TABLET ORAL at 08:26

## 2020-04-08 RX ADMIN — OLANZAPINE 5 MG: 5 TABLET, FILM COATED ORAL at 20:35

## 2020-04-08 RX ADMIN — DIVALPROEX SODIUM 1500 MG: 500 TABLET, EXTENDED RELEASE ORAL at 20:35

## 2020-04-08 RX ADMIN — OXYBUTYNIN CHLORIDE 5 MG: 5 TABLET, EXTENDED RELEASE ORAL at 08:26

## 2020-04-08 RX ADMIN — CLOZAPINE 200 MG: 200 TABLET ORAL at 16:28

## 2020-04-08 RX ADMIN — LEVOTHYROXINE SODIUM 75 MCG: 75 TABLET ORAL at 05:15

## 2020-04-08 RX ADMIN — PANTOPRAZOLE SODIUM 40 MG: 40 TABLET, DELAYED RELEASE ORAL at 05:16

## 2020-04-08 NOTE — TREATMENT TEAM
Patient declined to partake      04/08/20 0900   Activity/Group Checklist   Group Exercise  (Morning Walk )   Attendance Did not attend   Attendance Duration (min) 16-30   Affect/Mood GAEL

## 2020-04-08 NOTE — PLAN OF CARE
Problem: Alteration in Thoughts and Perception  Goal: Treatment Goal: Gain control of psychotic behaviors/thinking, reduce/eliminate presenting symptoms and demonstrate improved reality functioning upon discharge  Outcome: Progressing  Goal: Verbalize thoughts and feelings  Description  Interventions:  - Promote a nonjudgmental and trusting relationship with the patient through active listening and therapeutic communication  - Assess patient's level of functioning, behavior and potential for risk  - Engage patient in 1 on 1 interactions  - Encourage patient to express fears, feelings, frustrations, and discuss symptoms    - [place] patient to reality, help patient recognize reality-based thinking   - Administer medications as ordered and assess for potential side effects  - Provide the patient education related to the signs and symptoms of the illness and desired effects of prescribed medications  Outcome: Progressing  Goal: Refrain from acting on delusional thinking/internal stimuli  Description  Interventions:  - Monitor patient closely, per order   - Utilize least restrictive measures   - Set reasonable limits, give positive feedback for acceptable   - Administer medications as ordered and monitor of potential side effects  Outcome: Progressing  Goal: Agree to be compliant with medication regime, as prescribed and report medication side effects  Description  Interventions:  - Offer appropriate PRN medication and supervise ingestion; conduct AIMS, as needed   Outcome: Progressing  Goal: Attend and participate in unit activities, including therapeutic, recreational, and educational groups  Description  Interventions:  -Encourage Visitation and family involvement in care  Outcome: Progressing  Goal: Recognize dysfunctional thoughts, communicate reality-based thoughts at the time of discharge  Description  Interventions:  - Provide medication and psycho-education to assist patient in compliance and developing insight into his/her illness   Outcome: Progressing  Goal: Complete daily ADLs, including personal hygiene independently, as able  Description  Interventions:  - Observe, teach, and assist patient with ADLS  - Monitor and promote a balance of rest/activity, with adequate nutrition and elimination   Outcome: Progressing     Problem: Ineffective Coping  Goal: Identifies ineffective coping skills  Outcome: Progressing  Goal: Identifies healthy coping skills  Outcome: Progressing  Goal: Demonstrates healthy coping skills  Outcome: Progressing  Goal: Patient/Family participate in treatment and DC plans  Description  Interventions:  - Provide therapeutic environment  Outcome: Progressing  Goal: Patient/Family verbalizes awareness of resources  Outcome: Progressing  Goal: Understands least restrictive measures  Description  Interventions:  - Utilize least restrictive behavior  Outcome: Progressing     Problem: RESPIRATORY - ADULT  Goal: Achieves optimal ventilation and oxygenation  Description  INTERVENTIONS:  - Assess for changes in respiratory status  - Assess for changes in mentation and behavior  - Position to facilitate oxygenation and minimize respiratory effort  - Oxygen administered by appropriate delivery if ordered  - Initiate smoking cessation education as indicated  - Encourage broncho-pulmonary hygiene including cough, deep breathe, Incentive Spirometry  - Assess the need for suctioning and aspirate as needed  - Assess and instruct to report SOB or any respiratory difficulty  - Respiratory Therapy support as indicated  Outcome: Progressing     Problem: GASTROINTESTINAL - ADULT  Goal: Minimal or absence of nausea and/or vomiting  Description  INTERVENTIONS:  - Administer IV fluids if ordered to ensure adequate hydration  - Maintain NPO status until nausea and vomiting are resolved  - Nasogastric tube if ordered  - Administer ordered antiemetic medications as needed  - Provide nonpharmacologic comfort measures as appropriate  - Advance diet as tolerated, if ordered  - Consider nutrition services referral to assist patient with adequate nutrition and appropriate food choices  Outcome: Progressing  Goal: Maintains or returns to baseline bowel function  Description  INTERVENTIONS:  - Assess bowel function  - Encourage oral fluids to ensure adequate hydration  - Administer IV fluids if ordered to ensure adequate hydration  - Administer ordered medications as needed  - Encourage mobilization and activity  - Consider nutritional services referral to assist patient with adequate nutrition and appropriate food choices  Outcome: Progressing  Goal: Maintains adequate nutritional intake  Description  INTERVENTIONS:  - Monitor percentage of each meal consumed  - Identify factors contributing to decreased intake, treat as appropriate  - Assist with meals as needed  - Monitor I&O, weight, and lab values if indicated  - Obtain nutrition services referral as needed  Outcome: Progressing     Problem: METABOLIC, FLUID AND ELECTROLYTES - ADULT  Goal: Glucose maintained within target range  Description  INTERVENTIONS:  - Monitor Blood Glucose as ordered  - Assess for signs and symptoms of hyperglycemia and hypoglycemia  - Administer ordered medications to maintain glucose within target range  - Assess nutritional intake and initiate nutrition service referral as needed  Outcome: Progressing     Problem: DISCHARGE PLANNING  Goal: Discharge to home or other facility with appropriate resources  Description    CASE MANAGEMENT INTERVENTIONS:  - Conduct assessment to determine patient/family and health care team treatment goals, and need for post-acute services based on payer coverage, community resources, patient preferences and barriers to discharge    - Address psychosocial, clinical, and financial barriers to discharge as identified in assessment in conjunction with the patient/family and health care team   - Assist the patient in reintegration back into the community by removing barriers which may hinder a successful discharge once deemed stable  - Arrange appropriate level of post-acute services according to patient's needs and preference and payer coverage in collaboration with the physician and health care team   - Communicate with and update the patient/family, physician, and health care team regarding progress on the discharge plan  - Arrange appropriate transportation to post-acute venues  Outcome: Progressing    Visible, cooperative, med and meal compliant  Compliant with vitals and wearing of mask  No c/o depression, anxiety, SI ```, anxi, si, hi or pain, attended groups,  preoccupied with d/c discomfort    Will continue to monitor progress in recovery program

## 2020-04-08 NOTE — PROGRESS NOTES
04/08/20 0800   Team Meeting   Meeting Type Daily Rounds   Team Members Present   Team Members Present Physician;Nurse;; Other (Discipline and Name)   Physician Team Member Dr Kelly Stevens Team Member Sylvia Woods RN   Care Management Team Member EDELMIRA Espinoza   Other (Discipline and Name) Zeb Barahona LCSW; Devendra Rather, CPS     Not totally compliant with masking precautions  Easily agitated  Did not attend groups

## 2020-04-08 NOTE — TREATMENT TEAM
04/08/20 1100   Activity/Group Checklist   Group   ( IMR/[employer] )   Attendance Attended   Attendance Duration (min) 46-60   Interactions Interacted appropriately   Affect/Mood Appropriate; Wide   Goals Achieved Identified feelings; Discussed discharge plans; Discussed coping strategies; Able to listen to others; Able to engage in interactions; Able to self-disclose; Able to manage/cope with feelings

## 2020-04-08 NOTE — NURSING NOTE
Pt awake times one for water and a snack returning to bed without any difficulties  Appears to sleep thus this far as per continual rounding

## 2020-04-08 NOTE — PLAN OF CARE
Problem: Alteration in Thoughts and Perception  Goal: Verbalize thoughts and feelings  Description  Interventions:  - Promote a nonjudgmental and trusting relationship with the patient through active listening and therapeutic communication  - Assess patient's level of functioning, behavior and potential for risk  - Engage patient in 1 on 1 interactions  - Encourage patient to express fears, feelings, frustrations, and discuss symptoms    - [place] patient to reality, help patient recognize reality-based thinking   - Administer medications as ordered and assess for potential side effects  - Provide the patient education related to the signs and symptoms of the illness and desired effects of prescribed medications  Outcome: Progressing  Goal: Agree to be compliant with medication regime, as prescribed and report medication side effects  Description  Interventions:  - Offer appropriate PRN medication and supervise ingestion; conduct AIMS, as needed   Outcome: Progressing     Problem: GASTROINTESTINAL - ADULT  Goal: Maintains adequate nutritional intake  Description  INTERVENTIONS:  - Monitor percentage of each meal consumed  - Identify factors contributing to decreased intake, treat as appropriate  - Assist with meals as needed  - Monitor I&O, weight, and lab values if indicated  - Obtain nutrition services referral as needed  Outcome: Progressing     Problem: Alteration in Thoughts and Perception  Goal: Attend and participate in unit activities, including therapeutic, recreational, and educational groups  Description  Interventions:  -Encourage Visitation and family involvement in care  Outcome: Not Progressing    Did not attend groups, compliant with routine medications, ate 100% of dinner, gait steady, denied pain, social with select peers, visible used the radio for coping

## 2020-04-09 PROCEDURE — 99232 SBSQ HOSP IP/OBS MODERATE 35: CPT | Performed by: PSYCHIATRY & NEUROLOGY

## 2020-04-09 RX ADMIN — METFORMIN HYDROCHLORIDE 500 MG: 500 TABLET ORAL at 16:44

## 2020-04-09 RX ADMIN — OLANZAPINE 5 MG: 5 TABLET, FILM COATED ORAL at 20:42

## 2020-04-09 RX ADMIN — PANTOPRAZOLE SODIUM 40 MG: 40 TABLET, DELAYED RELEASE ORAL at 06:02

## 2020-04-09 RX ADMIN — LEVOTHYROXINE SODIUM 75 MCG: 75 TABLET ORAL at 06:02

## 2020-04-09 RX ADMIN — OXYBUTYNIN CHLORIDE 5 MG: 5 TABLET, EXTENDED RELEASE ORAL at 08:35

## 2020-04-09 RX ADMIN — METFORMIN HYDROCHLORIDE 500 MG: 500 TABLET ORAL at 08:35

## 2020-04-09 RX ADMIN — DOCUSATE SODIUM 100 MG: 100 CAPSULE, LIQUID FILLED ORAL at 16:44

## 2020-04-09 RX ADMIN — DOCUSATE SODIUM 100 MG: 100 CAPSULE, LIQUID FILLED ORAL at 08:35

## 2020-04-09 RX ADMIN — ATORVASTATIN CALCIUM 10 MG: 10 TABLET, FILM COATED ORAL at 16:44

## 2020-04-09 RX ADMIN — CLOZAPINE 200 MG: 200 TABLET ORAL at 16:44

## 2020-04-09 RX ADMIN — DIVALPROEX SODIUM 1500 MG: 500 TABLET, EXTENDED RELEASE ORAL at 20:42

## 2020-04-09 NOTE — PLAN OF CARE
Problem: Alteration in Thoughts and Perception  Goal: Refrain from acting on delusional thinking/internal stimuli  Description  Interventions:  - Monitor patient closely, per order   - Utilize least restrictive measures   - Set reasonable limits, give positive feedback for acceptable   - Administer medications as ordered and monitor of potential side effects  Outcome: Progressing  Goal: Agree to be compliant with medication regime, as prescribed and report medication side effects  Description  Interventions:  - Offer appropriate PRN medication and supervise ingestion; conduct AIMS, as needed   Outcome: Progressing     Problem: Alteration in Thoughts and Perception  Goal: Attend and participate in unit activities, including therapeutic, recreational, and educational groups  Description  Interventions:  -Encourage Visitation and family involvement in care  Outcome: Not Progressing   Patient is quiet and isolative to his room this morning but more visible this afternoon  He was cooperative with getting his vitals done, eating breakfast and taking his medications  He intermittently attended IMR group this shift  No issues or concerns noted this shift  Continue to monitor

## 2020-04-09 NOTE — TREATMENT TEAM
Patient declined      04/09/20 0900   Activity/Group Checklist   Group   (Morning Stretch )   Attendance Did not attend   Attendance Duration (min) 16-30   Affect/Mood GAEL

## 2020-04-09 NOTE — TREATMENT TEAM
04/09/20 1100   Activity/Group Checklist   Group   (IMR/Demonstrating Coping Skills )   Attendance Attended   Attendance Duration (min) Greater than 60   Interactions Interacted appropriately   Affect/Mood Appropriate; Wide   Goals Achieved Identified feelings; Discussed coping strategies; Able to engage in interactions; Able to listen to others; Able to self-disclose
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
## 2020-04-09 NOTE — PLAN OF CARE
Problem: Alteration in Thoughts and Perception  Goal: Verbalize thoughts and feelings  Description  Interventions:  - Promote a nonjudgmental and trusting relationship with the patient through active listening and therapeutic communication  - Assess patient's level of functioning, behavior and potential for risk  - Engage patient in 1 on 1 interactions  - Encourage patient to express fears, feelings, frustrations, and discuss symptoms    - [place] patient to reality, help patient recognize reality-based thinking   - Administer medications as ordered and assess for potential side effects  - Provide the patient education related to the signs and symptoms of the illness and desired effects of prescribed medications  Outcome: Progressing  Goal: Refrain from acting on delusional thinking/internal stimuli  Description  Interventions:  - Monitor patient closely, per order   - Utilize least restrictive measures   - Set reasonable limits, give positive feedback for acceptable   - Administer medications as ordered and monitor of potential side effects  Outcome: Progressing  Goal: Agree to be compliant with medication regime, as prescribed and report medication side effects  Description  Interventions:  - Offer appropriate PRN medication and supervise ingestion; conduct AIMS, as needed   Outcome: Progressing  Goal: Attend and participate in unit activities, including therapeutic, recreational, and educational groups  Description  Interventions:  -Encourage Visitation and family involvement in care  Outcome: Progressing  Goal: Complete daily ADLs, including personal hygiene independently, as able  Description  Interventions:  - Observe, teach, and assist patient with ADLS  - Monitor and promote a balance of rest/activity, with adequate nutrition and elimination   Outcome: Progressing     Problem: Ineffective Coping  Goal: Demonstrates healthy coping skills  Outcome: Progressing  Goal: Understands least restrictive measures  Description  Interventions:  - Utilize least restrictive behavior  Outcome: Progressing     Problem: GASTROINTESTINAL - ADULT  Goal: Maintains adequate nutritional intake  Description  INTERVENTIONS:  - Monitor percentage of each meal consumed  - Identify factors contributing to decreased intake, treat as appropriate  - Assist with meals as needed  - Monitor I&O, weight, and lab values if indicated  - Obtain nutrition services referral as needed  Outcome: Patti Vann has been awake, alert, and visible intermittently out in the milieu  Pleasant and cooperative  Continues to be preoccupied with discharge  Pt completed his daily ADL's today and showered this shift  Still disheveled in appearance and dressed in layers  Compliant with scheduled meds when called  Ate 100% supper  Pt did not attend evening groups but came out for snack after  Rests in room at intervals and listens to music on radio with peers in [employer]  Continue to monitor/assess for any changes

## 2020-04-09 NOTE — PROGRESS NOTES
04/09/20 0800   Team Meeting   Meeting Type Daily Rounds   Team Members Present   Team Members Present Physician;Nurse;; Other (Discipline and Name)   Physician Team Member Dr Diallo Rizzo Team Member Griffin Harris RN   Care Management Team Member EDELMIRA Paredes   Other (Discipline and Name) Rolando Gonzalez LCSW; MCKAYLA Abbott     Did not attend groups  Disorganized

## 2020-04-10 PROCEDURE — 99232 SBSQ HOSP IP/OBS MODERATE 35: CPT | Performed by: PSYCHIATRY & NEUROLOGY

## 2020-04-10 RX ADMIN — DOCUSATE SODIUM 100 MG: 100 CAPSULE, LIQUID FILLED ORAL at 16:42

## 2020-04-10 RX ADMIN — OXYBUTYNIN CHLORIDE 5 MG: 5 TABLET, EXTENDED RELEASE ORAL at 08:59

## 2020-04-10 RX ADMIN — METFORMIN HYDROCHLORIDE 500 MG: 500 TABLET ORAL at 16:42

## 2020-04-10 RX ADMIN — METFORMIN HYDROCHLORIDE 500 MG: 500 TABLET ORAL at 08:59

## 2020-04-10 RX ADMIN — ALUMINUM HYDROXIDE, MAGNESIUM HYDROXIDE, AND SIMETHICONE 30 ML: 200; 200; 20 SUSPENSION ORAL at 00:00

## 2020-04-10 RX ADMIN — PANTOPRAZOLE SODIUM 40 MG: 40 TABLET, DELAYED RELEASE ORAL at 05:17

## 2020-04-10 RX ADMIN — OLANZAPINE 5 MG: 5 TABLET, FILM COATED ORAL at 21:05

## 2020-04-10 RX ADMIN — CLOZAPINE 200 MG: 200 TABLET ORAL at 16:42

## 2020-04-10 RX ADMIN — DIVALPROEX SODIUM 1500 MG: 500 TABLET, EXTENDED RELEASE ORAL at 21:05

## 2020-04-10 RX ADMIN — LEVOTHYROXINE SODIUM 75 MCG: 75 TABLET ORAL at 05:17

## 2020-04-10 RX ADMIN — ATORVASTATIN CALCIUM 10 MG: 10 TABLET, FILM COATED ORAL at 16:42

## 2020-04-10 RX ADMIN — DOCUSATE SODIUM 100 MG: 100 CAPSULE, LIQUID FILLED ORAL at 08:59

## 2020-04-10 NOTE — PLAN OF CARE
Problem: Alteration in Thoughts and Perception  Goal: Verbalize thoughts and feelings  Description  Interventions:  - Promote a nonjudgmental and trusting relationship with the patient through active listening and therapeutic communication  - Assess patient's level of functioning, behavior and potential for risk  - Engage patient in 1 on 1 interactions  - Encourage patient to express fears, feelings, frustrations, and discuss symptoms    - [place] patient to reality, help patient recognize reality-based thinking   - Administer medications as ordered and assess for potential side effects  - Provide the patient education related to the signs and symptoms of the illness and desired effects of prescribed medications  Outcome: Progressing  Goal: Refrain from acting on delusional thinking/internal stimuli  Description  Interventions:  - Monitor patient closely, per order   - Utilize least restrictive measures   - Set reasonable limits, give positive feedback for acceptable   - Administer medications as ordered and monitor of potential side effects  Outcome: Progressing  Goal: Agree to be compliant with medication regime, as prescribed and report medication side effects  Description  Interventions:  - Offer appropriate PRN medication and supervise ingestion; conduct AIMS, as needed   Outcome: Progressing  Goal: Attend and participate in unit activities, including therapeutic, recreational, and educational groups  Description  Interventions:  -Encourage Visitation and family involvement in care  Outcome: Progressing  Goal: Complete daily ADLs, including personal hygiene independently, as able  Description  Interventions:  - Observe, teach, and assist patient with ADLS  - Monitor and promote a balance of rest/activity, with adequate nutrition and elimination   Outcome: Progressing     Problem: Ineffective Coping  Goal: Demonstrates healthy coping skills  Outcome: Progressing  Goal: Understands least restrictive measures  Description  Interventions:  - Utilize least restrictive behavior  Outcome: Progressing     Problem: GASTROINTESTINAL - ADULT  Goal: Maintains adequate nutritional intake  Description  INTERVENTIONS:  - Monitor percentage of each meal consumed  - Identify factors contributing to decreased intake, treat as appropriate  - Assist with meals as needed  - Monitor I&O, weight, and lab values if indicated  - Obtain nutrition services referral as needed  Outcome: Larry Khalil has been awake, alert, and visible intermittently out in the milieu  Pt completed his daily ADL's today  Ate 100% supper  Compliant with scheduled meds when called  Social with peers when in [employer] listening to music on radio  Attended [employer] group out on deck with staff and peers  Did not attend evening group but came out for snack after  Disheveled in appearance and dressed in layers  Continue to monitor/assess for any changes

## 2020-04-10 NOTE — PROGRESS NOTES
04/10/20 0900   Activity/Group Checklist   Group Community meeting  (Meditation)   Attendance Did not attend     Patient was personally prompted to attend [employer] comprised of morning meditation  Patient declined, preferring to stay in bed

## 2020-04-10 NOTE — PROGRESS NOTES
04/10/20 0800   Team Meeting   Meeting Type Daily Rounds   Team Members Present   Team Members Present Physician;Nurse;; Other (Discipline and Name)   Physician Team Member Dr Tonya Langley Team Member Alonso Cabrera, RN   Care Management Team Member EDELMIRA Rivera   Other (Discipline and Name) Trinidad Munoz LCSW     Frustrated about mask, needs reminders  No groups

## 2020-04-10 NOTE — PROGRESS NOTES
04/10/20 1100   Activity/Group Checklist   Group Other (Comment)  (IMR - The Mind: Explained)   Attendance Did not attend     Patient was personally prompted and encouraged to attend IMR this morning, but declined, staying in bed  Patient was given reminder that once EAC outings are permitted again, his ability to go out will depend on his group percentage

## 2020-04-10 NOTE — PLAN OF CARE
Problem: Alteration in Thoughts and Perception  Goal: Refrain from acting on delusional thinking/internal stimuli  Description  Interventions:  - Monitor patient closely, per order   - Utilize least restrictive measures   - Set reasonable limits, give positive feedback for acceptable   - Administer medications as ordered and monitor of potential side effects  Outcome: Progressing  Goal: Agree to be compliant with medication regime, as prescribed and report medication side effects  Description  Interventions:  - Offer appropriate PRN medication and supervise ingestion; conduct AIMS, as needed   Outcome: Progressing     Problem: Alteration in Thoughts and Perception  Goal: Attend and participate in unit activities, including therapeutic, recreational, and educational groups  Description  Interventions:  -Encourage Visitation and family involvement in care  Outcome: Not Progressing   Patient is quiet and isolative to his room this morning but more visible this afternoon  He was cooperative with getting his vitals done, eating breakfast and taking his medications  Continues to obsess over being discharged  No group attendance noted this shift  No issues or concerns noted this shift  Continue to monitor
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
## 2020-04-11 PROCEDURE — 99232 SBSQ HOSP IP/OBS MODERATE 35: CPT | Performed by: PSYCHIATRY & NEUROLOGY

## 2020-04-11 RX ADMIN — LEVOTHYROXINE SODIUM 75 MCG: 75 TABLET ORAL at 06:04

## 2020-04-11 RX ADMIN — ATORVASTATIN CALCIUM 10 MG: 10 TABLET, FILM COATED ORAL at 16:43

## 2020-04-11 RX ADMIN — OLANZAPINE 5 MG: 5 TABLET, FILM COATED ORAL at 20:49

## 2020-04-11 RX ADMIN — DIVALPROEX SODIUM 1500 MG: 500 TABLET, EXTENDED RELEASE ORAL at 20:49

## 2020-04-11 RX ADMIN — PANTOPRAZOLE SODIUM 40 MG: 40 TABLET, DELAYED RELEASE ORAL at 06:04

## 2020-04-11 RX ADMIN — CLOZAPINE 200 MG: 200 TABLET ORAL at 16:43

## 2020-04-11 RX ADMIN — DOCUSATE SODIUM 100 MG: 100 CAPSULE, LIQUID FILLED ORAL at 16:43

## 2020-04-11 RX ADMIN — METFORMIN HYDROCHLORIDE 500 MG: 500 TABLET ORAL at 16:43

## 2020-04-11 NOTE — PLAN OF CARE
Problem: Alteration in Thoughts and Perception  Goal: Refrain from acting on delusional thinking/internal stimuli  Description  Interventions:  - Monitor patient closely, per order   - Utilize least restrictive measures   - Set reasonable limits, give positive feedback for acceptable   - Administer medications as ordered and monitor of potential side effects  Outcome: Not Progressing  Goal: Agree to be compliant with medication regime, as prescribed and report medication side effects  Description  Interventions:  - Offer appropriate PRN medication and supervise ingestion; conduct AIMS, as needed   Outcome: Not Progressing  Goal: Recognize dysfunctional thoughts, communicate reality-based thoughts at the time of discharge  Description  Interventions:  - Provide medication and psycho-education to assist patient in compliance and developing insight into his/her illness   Outcome: Not Progressing   Patient is quiet and isolative to his room this morning  Slightly irritable with interactions today  He was cooperative with getting his vitals done, eating breakfast   But he refused to take his morning medications  Continues to obsess over being discharged  No group attendance noted this shift  More visible this afternoon but remains uncooperative and refuses to listen to reason  Continue to monitor

## 2020-04-11 NOTE — PROGRESS NOTES
Angy Amor has been asleep since the beginning of the shift  Respirations easy and non labored  Medication compliant  Refused X2 to have blood work drawn  Continue to monitor  Precautions maintained
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
## 2020-04-11 NOTE — PLAN OF CARE
Problem: Alteration in Thoughts and Perception  Goal: Verbalize thoughts and feelings  Description  Interventions:  - Promote a nonjudgmental and trusting relationship with the patient through active listening and therapeutic communication  - Assess patient's level of functioning, behavior and potential for risk  - Engage patient in 1 on 1 interactions  - Encourage patient to express fears, feelings, frustrations, and discuss symptoms    - [place] patient to reality, help patient recognize reality-based thinking   - Administer medications as ordered and assess for potential side effects  - Provide the patient education related to the signs and symptoms of the illness and desired effects of prescribed medications  Outcome: Progressing  Goal: Refrain from acting on delusional thinking/internal stimuli  Description  Interventions:  - Monitor patient closely, per order   - Utilize least restrictive measures   - Set reasonable limits, give positive feedback for acceptable   - Administer medications as ordered and monitor of potential side effects  Outcome: Progressing  Goal: Agree to be compliant with medication regime, as prescribed and report medication side effects  Description  Interventions:  - Offer appropriate PRN medication and supervise ingestion; conduct AIMS, as needed   Outcome: Progressing  Goal: Attend and participate in unit activities, including therapeutic, recreational, and educational groups  Description  Interventions:  -Encourage Visitation and family involvement in care  Outcome: Not Progressing     Problem: Ineffective Coping  Goal: Demonstrates healthy coping skills  Outcome: Progressing  Goal: Understands least restrictive measures  Description  Interventions:  - Utilize least restrictive behavior  Outcome: Progressing     Problem: GASTROINTESTINAL - ADULT  Goal: Maintains adequate nutritional intake  Description  INTERVENTIONS:  - Monitor percentage of each meal consumed  - Identify factors contributing to decreased intake, treat as appropriate  - Assist with meals as needed  - Monitor I&O, weight, and lab values if indicated  - Obtain nutrition services referral as needed  Outcome: Garcia Messer has been awake, alert, and visible intermittently out in the milieu  Pt refused vital signs to be taken  Pt rambled about the doctor keeping him in the hospital for "21 months" and how he wants to be discharged  Remains preoccupied with discharge  Listens and sings to music on radio in [employer]  Disheveled in appearance and dressed in layers  Ate 100% supper  Pt did not attend evening group but came out for snack after  Compliant with all scheduled meds when called  Continue to monitor/assess for any changes

## 2020-04-12 PROCEDURE — 99232 SBSQ HOSP IP/OBS MODERATE 35: CPT | Performed by: PSYCHIATRY & NEUROLOGY

## 2020-04-12 RX ORDER — DOCUSATE SODIUM 100 MG/1
100 CAPSULE, LIQUID FILLED ORAL 2 TIMES DAILY
Status: DISCONTINUED | OUTPATIENT
Start: 2020-04-12 | End: 2020-04-12

## 2020-04-12 RX ORDER — LOPERAMIDE HYDROCHLORIDE 2 MG/1
2 CAPSULE ORAL 3 TIMES DAILY PRN
Status: DISCONTINUED | OUTPATIENT
Start: 2020-04-12 | End: 2020-04-12

## 2020-04-12 RX ADMIN — TRAZODONE HYDROCHLORIDE 50 MG: 50 TABLET ORAL at 22:14

## 2020-04-12 RX ADMIN — DIVALPROEX SODIUM 1500 MG: 500 TABLET, EXTENDED RELEASE ORAL at 20:42

## 2020-04-12 RX ADMIN — PANTOPRAZOLE SODIUM 40 MG: 40 TABLET, DELAYED RELEASE ORAL at 05:15

## 2020-04-12 RX ADMIN — ACETAMINOPHEN 650 MG: 325 TABLET ORAL at 12:45

## 2020-04-12 RX ADMIN — METFORMIN HYDROCHLORIDE 500 MG: 500 TABLET ORAL at 16:50

## 2020-04-12 RX ADMIN — OXYBUTYNIN CHLORIDE 5 MG: 5 TABLET, EXTENDED RELEASE ORAL at 08:40

## 2020-04-12 RX ADMIN — METFORMIN HYDROCHLORIDE 500 MG: 500 TABLET ORAL at 08:39

## 2020-04-12 RX ADMIN — ATORVASTATIN CALCIUM 10 MG: 10 TABLET, FILM COATED ORAL at 16:50

## 2020-04-12 RX ADMIN — OLANZAPINE 5 MG: 5 TABLET, FILM COATED ORAL at 20:42

## 2020-04-12 RX ADMIN — LEVOTHYROXINE SODIUM 75 MCG: 75 TABLET ORAL at 05:15

## 2020-04-12 RX ADMIN — CLOZAPINE 200 MG: 200 TABLET ORAL at 16:50

## 2020-04-12 RX ADMIN — DOCUSATE SODIUM 100 MG: 100 CAPSULE, LIQUID FILLED ORAL at 08:39

## 2020-04-12 NOTE — PLAN OF CARE
Problem: Alteration in Thoughts and Perception  Goal: Treatment Goal: Gain control of psychotic behaviors/thinking, reduce/eliminate presenting symptoms and demonstrate improved reality functioning upon discharge  Outcome: Progressing  Goal: Verbalize thoughts and feelings  Description  Interventions:  - Promote a nonjudgmental and trusting relationship with the patient through active listening and therapeutic communication  - Assess patient's level of functioning, behavior and potential for risk  - Engage patient in 1 on 1 interactions  - Encourage patient to express fears, feelings, frustrations, and discuss symptoms    - [place] patient to reality, help patient recognize reality-based thinking   - Administer medications as ordered and assess for potential side effects  - Provide the patient education related to the signs and symptoms of the illness and desired effects of prescribed medications  Outcome: Progressing  Goal: Refrain from acting on delusional thinking/internal stimuli  Description  Interventions:  - Monitor patient closely, per order   - Utilize least restrictive measures   - Set reasonable limits, give positive feedback for acceptable   - Administer medications as ordered and monitor of potential side effects  Outcome: Progressing  Goal: Agree to be compliant with medication regime, as prescribed and report medication side effects  Description  Interventions:  - Offer appropriate PRN medication and supervise ingestion; conduct AIMS, as needed   Outcome: Progressing  Goal: Attend and participate in unit activities, including therapeutic, recreational, and educational groups  Description  Interventions:  -Encourage Visitation and family involvement in care  Outcome: Progressing  Goal: Recognize dysfunctional thoughts, communicate reality-based thoughts at the time of discharge  Description  Interventions:  - Provide medication and psycho-education to assist patient in compliance and developing insight into his/her illness   Outcome: Progressing  Goal: Complete daily ADLs, including personal hygiene independently, as able  Description  Interventions:  - Observe, teach, and assist patient with ADLS  - Monitor and promote a balance of rest/activity, with adequate nutrition and elimination   Outcome: Progressing     Problem: Ineffective Coping  Goal: Identifies ineffective coping skills  Outcome: Progressing  Goal: Identifies healthy coping skills  Outcome: Progressing  Goal: Demonstrates healthy coping skills  Outcome: Progressing  Goal: Patient/Family participate in treatment and DC plans  Description  Interventions:  - Provide therapeutic environment  Outcome: Progressing  Goal: Patient/Family verbalizes awareness of resources  Outcome: Progressing  Goal: Understands least restrictive measures  Description  Interventions:  - Utilize least restrictive behavior  Outcome: Progressing     Problem: RESPIRATORY - ADULT  Goal: Achieves optimal ventilation and oxygenation  Description  INTERVENTIONS:  - Assess for changes in respiratory status  - Assess for changes in mentation and behavior  - Position to facilitate oxygenation and minimize respiratory effort  - Oxygen administered by appropriate delivery if ordered  - Initiate smoking cessation education as indicated  - Encourage broncho-pulmonary hygiene including cough, deep breathe, Incentive Spirometry  - Assess the need for suctioning and aspirate as needed  - Assess and instruct to report SOB or any respiratory difficulty  - Respiratory Therapy support as indicated  Outcome: Progressing     Problem: GASTROINTESTINAL - ADULT  Goal: Minimal or absence of nausea and/or vomiting  Description  INTERVENTIONS:  - Administer IV fluids if ordered to ensure adequate hydration  - Maintain NPO status until nausea and vomiting are resolved  - Nasogastric tube if ordered  - Administer ordered antiemetic medications as needed  - Provide nonpharmacologic comfort measures as appropriate  - Advance diet as tolerated, if ordered  - Consider nutrition services referral to assist patient with adequate nutrition and appropriate food choices  Outcome: Progressing  Goal: Maintains or returns to baseline bowel function  Description  INTERVENTIONS:  - Assess bowel function  - Encourage oral fluids to ensure adequate hydration  - Administer IV fluids if ordered to ensure adequate hydration  - Administer ordered medications as needed  - Encourage mobilization and activity  - Consider nutritional services referral to assist patient with adequate nutrition and appropriate food choices  Outcome: Progressing  Goal: Maintains adequate nutritional intake  Description  INTERVENTIONS:  - Monitor percentage of each meal consumed  - Identify factors contributing to decreased intake, treat as appropriate  - Assist with meals as needed  - Monitor I&O, weight, and lab values if indicated  - Obtain nutrition services referral as needed  Outcome: Progressing     Problem: METABOLIC, FLUID AND ELECTROLYTES - ADULT  Goal: Glucose maintained within target range  Description  INTERVENTIONS:  - Monitor Blood Glucose as ordered  - Assess for signs and symptoms of hyperglycemia and hypoglycemia  - Administer ordered medications to maintain glucose within target range  - Assess nutritional intake and initiate nutrition service referral as needed  Outcome: Progressing     Problem: DISCHARGE PLANNING  Goal: Discharge to home or other facility with appropriate resources  Description    CASE MANAGEMENT INTERVENTIONS:  - Conduct assessment to determine patient/family and health care team treatment goals, and need for post-acute services based on payer coverage, community resources, patient preferences and barriers to discharge    - Address psychosocial, clinical, and financial barriers to discharge as identified in assessment in conjunction with the patient/family and health care team   - Assist the patient in reintegration back into the community by removing barriers which may hinder a successful discharge once deemed stable  - Arrange appropriate level of post-acute services according to patient's needs and preference and payer coverage in collaboration with the physician and health care team   - Communicate with and update the patient/family, physician, and health care team regarding progress on the discharge plan  - Arrange appropriate transportation to post-acute venues  Outcome: Progressing     Visible, cooperative, med and meal compliant  Compliant with vitals and wearing of mask  No c/o depression, anxiety, SI, HI or pain  Patient attended community meeting  Refused blood work but told Dr iJn Cherry and MHT he will do it tomorrow  Had APAP 650mg at 1245 for 4:10 right foot pain  Effective for no further complaint  Ate 100% of both meals    Will continue to monitor progress in recovery program
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
## 2020-04-12 NOTE — PLAN OF CARE
Problem: Alteration in Thoughts and Perception  Goal: Agree to be compliant with medication regime, as prescribed and report medication side effects  Description  Interventions:  - Offer appropriate PRN medication and supervise ingestion; conduct AIMS, as needed   Outcome: Progressing  Goal: Attend and participate in unit activities, including therapeutic, recreational, and educational groups  Description  Interventions:  -Encourage Visitation and family involvement in care  Outcome: Progressing     Problem: Alteration in Thoughts and Perception  Goal: Complete daily ADLs, including personal hygiene independently, as able  Description  Interventions:  - Observe, teach, and assist patient with ADLS  - Monitor and promote a balance of rest/activity, with adequate nutrition and elimination   Outcome: Not Progressing      Attended fresh air group, compliant with routine medications, ate 100% of dinner, gait steady, denied pain, visible, social with select peers, used the radio for coping will continue to monitor

## 2020-04-13 LAB
ALBUMIN SERPL BCP-MCNC: 3.8 G/DL (ref 3–5.2)
ALP SERPL-CCNC: 63 U/L (ref 43–122)
ALT SERPL W P-5'-P-CCNC: 23 U/L (ref 9–52)
ANION GAP SERPL CALCULATED.3IONS-SCNC: 7 MMOL/L (ref 5–14)
AST SERPL W P-5'-P-CCNC: 21 U/L (ref 17–59)
BASOPHILS # BLD AUTO: 0.17 THOUSAND/UL (ref 0–0.1)
BASOPHILS NFR MAR MANUAL: 2 % (ref 0–1)
BILIRUB SERPL-MCNC: 0.2 MG/DL
BUN SERPL-MCNC: 7 MG/DL (ref 5–25)
CALCIUM SERPL-MCNC: 9.1 MG/DL (ref 8.4–10.2)
CHLORIDE SERPL-SCNC: 103 MMOL/L (ref 97–108)
CHOLEST SERPL-MCNC: 102 MG/DL
CO2 SERPL-SCNC: 28 MMOL/L (ref 22–30)
CREAT SERPL-MCNC: 0.54 MG/DL (ref 0.7–1.5)
ERYTHROCYTE [DISTWIDTH] IN BLOOD BY AUTOMATED COUNT: 13.9 %
GFR SERPL CREATININE-BSD FRML MDRD: 121 ML/MIN/1.73SQ M
GLUCOSE P FAST SERPL-MCNC: 107 MG/DL (ref 70–99)
GLUCOSE SERPL-MCNC: 107 MG/DL (ref 70–99)
HCT VFR BLD AUTO: 41.5 % (ref 41–53)
HDLC SERPL-MCNC: 26 MG/DL
HGB BLD-MCNC: 14.4 G/DL (ref 13.5–17.5)
LDLC SERPL CALC-MCNC: 42 MG/DL
LYMPHOCYTES # BLD AUTO: 4.7 THOUSAND/UL (ref 0.5–4)
LYMPHOCYTES # BLD AUTO: 54 % (ref 25–45)
MCH RBC QN AUTO: 30.2 PG (ref 26–34)
MCHC RBC AUTO-ENTMCNC: 34.7 G/DL (ref 31–36)
MCV RBC AUTO: 87 FL (ref 80–100)
MONOCYTES # BLD AUTO: 0.61 THOUSAND/UL (ref 0.2–0.9)
MONOCYTES NFR BLD AUTO: 7 % (ref 1–10)
NEUTS SEG # BLD: 3.22 THOUSAND/UL (ref 1.8–7.8)
NEUTS SEG NFR BLD AUTO: 37 %
NONHDLC SERPL-MCNC: 76 MG/DL
PLATELET # BLD AUTO: 185 THOUSANDS/UL (ref 150–450)
PLATELET BLD QL SMEAR: ADEQUATE
PMV BLD AUTO: 8.1 FL (ref 8.9–12.7)
POTASSIUM SERPL-SCNC: 4.3 MMOL/L (ref 3.6–5)
PROT SERPL-MCNC: 6.8 G/DL (ref 5.9–8.4)
RBC # BLD AUTO: 4.77 MILLION/UL (ref 4.5–5.9)
RBC MORPH BLD: NORMAL
SODIUM SERPL-SCNC: 138 MMOL/L (ref 137–147)
TOTAL CELLS COUNTED SPEC: 100
TRIGL SERPL-MCNC: 172 MG/DL
VALPROATE SERPL-MCNC: 88.2 UG/ML (ref 50–120)
WBC # BLD AUTO: 8.7 THOUSAND/UL (ref 4.5–11)

## 2020-04-13 PROCEDURE — 85007 BL SMEAR W/DIFF WBC COUNT: CPT | Performed by: PSYCHIATRY & NEUROLOGY

## 2020-04-13 PROCEDURE — 99232 SBSQ HOSP IP/OBS MODERATE 35: CPT | Performed by: PSYCHIATRY & NEUROLOGY

## 2020-04-13 PROCEDURE — 80053 COMPREHEN METABOLIC PANEL: CPT | Performed by: PSYCHIATRY & NEUROLOGY

## 2020-04-13 PROCEDURE — 80061 LIPID PANEL: CPT | Performed by: PSYCHIATRY & NEUROLOGY

## 2020-04-13 PROCEDURE — 80164 ASSAY DIPROPYLACETIC ACD TOT: CPT | Performed by: PSYCHIATRY & NEUROLOGY

## 2020-04-13 PROCEDURE — 85027 COMPLETE CBC AUTOMATED: CPT | Performed by: PSYCHIATRY & NEUROLOGY

## 2020-04-13 RX ADMIN — DIVALPROEX SODIUM 1500 MG: 500 TABLET, EXTENDED RELEASE ORAL at 20:49

## 2020-04-13 RX ADMIN — OLANZAPINE 5 MG: 5 TABLET, FILM COATED ORAL at 20:49

## 2020-04-13 RX ADMIN — METFORMIN HYDROCHLORIDE 500 MG: 500 TABLET ORAL at 08:36

## 2020-04-13 RX ADMIN — OXYBUTYNIN CHLORIDE 5 MG: 5 TABLET, EXTENDED RELEASE ORAL at 08:36

## 2020-04-13 RX ADMIN — CLOZAPINE 200 MG: 200 TABLET ORAL at 16:47

## 2020-04-13 RX ADMIN — LEVOTHYROXINE SODIUM 75 MCG: 75 TABLET ORAL at 05:11

## 2020-04-13 RX ADMIN — PANTOPRAZOLE SODIUM 40 MG: 40 TABLET, DELAYED RELEASE ORAL at 05:11

## 2020-04-13 RX ADMIN — TRAZODONE HYDROCHLORIDE 50 MG: 50 TABLET ORAL at 23:57

## 2020-04-13 RX ADMIN — METFORMIN HYDROCHLORIDE 500 MG: 500 TABLET ORAL at 16:47

## 2020-04-13 RX ADMIN — ATORVASTATIN CALCIUM 10 MG: 10 TABLET, FILM COATED ORAL at 16:47

## 2020-04-13 NOTE — PROGRESS NOTES
04/13/20 1414   Team Meeting   Meeting Type Daily Rounds   Team Members Present   Team Members Present Physician;Nurse;; Other (Discipline and Name)   Physician Team Member Dr Harshal Martinez Team Member Oriana Barrera RN   Care Management Team Member EDELMIRA Carreon   Other (Discipline and Name) MCKAYLA Taylor   Patient/Family Present   Patient Present Yes   Patient's Family Present No     Patient remains compliant with mask over weekened, irritable, and refusing am labs on Saturday, agreeable today  Patient needing constant redirection today with mask

## 2020-04-13 NOTE — PROGRESS NOTES
Pharmacy Clozapine Monitoring Progress Note     Cheikh Vega is receiving a total daily dose of 200 mg of clozapine divided as 200 mg daily  **If clozapine therapy is interrupted for more than 48 hours, therapy MUST be restarted at the initial titration dose (12 5mg - 25mg once daily) to avoid severe hypotension, bradycardia, seizure and syncope  **        Pharmacist Recommendations Based on Assessment and Plan:    Patient is Clozapine-REMS eligible, ANC was updated, with every four weeks monitoring frequency and the next [address] is due on 05/11/2020     2  Recommend repeat EKG for clozapine myocarditis monitoring     3  Recommend ordering a prophylactic bowel regimen for clozapine related anticholinergic side effects         Assessment/Plan:    Phase of Treatment:     Current phase of treatment is maintenance/continuation  Patient Clozapine REMS Eligibility:     Patient is eligible to receive clozapine and the [address] monitoring frequency is every four weeks per clozapine REMS  The patient's latest [address] value has been updated into the Clozapine-REMS program          ANC and Clozapine Level (if available)     The most recent [address] was   Lab Results   Component Value Date    NEUTROABS 3 22 04/13/2020    and the next lab is due on 05/11/2020  Last Clozapine level (if available):    0   Lab Value Date/Time    CLOZAPINE 505 01/03/2020 0617     0   Lab Value Date/Time    NCLOZIP 125 01/03/2020 0617           Monitoring Parameters for Clozapine:     Clozapine Adverse Effect Suggested Monitoring Patient's Current Status    Agranulocytosis ANC baseline and then repeat weekly for first 6 months and every 2 weeks for the second 6 months  Maintenance after one year of therapy every month  The most recent [address] was   Lab Results   Component Value Date    NEUTROABS 3 22 04/13/2020         This ANC is considered normal range (ANC >/= 1500/mcL)  Continue current treatment and monitoring course       Respiratory Depression Please ensure patient is not on concomitant respiratory lowering medications such as benzodiazepines, sedative hypnotics (eg  zolpidem) This patient is not on respiratory depression lowering medications   Myocarditis Baseline and weekly EKG, CRP, BNP, and troponin  Weekly symptomatic complaints of fatigue, dyspnea, tachycardia, chest pain, palpitations, and fever for the first 4 weeks  Last EKG Results on  03/06/2020 showed:     "Normal sinus rhythm  Normal ECG  When compared with ECG of 30-DEC-2019 14:43,  No significant change was found"    Confirmed by Feng Pena ([id number]) on 3/6/2020 4:11:30 PM    Last QTC value was:  0   Lab Value Date/Time    QTCINT 411 03/06/2020 1353         Last BNP was: No results found for: NTBNP    Last Troponin was:  0   Lab Value Date/Time    TROPONINI 0 05 (H) 03/28/2014 1840          Orthostatic hypotension/  bradycardia Blood pressure and vital signs      Monitor blood pressure and orthostatic hypotension at least twice daily upon initiation and continue to follow at least once daily afterwards  Patient's last recorded vital signs:       /83 (BP Location: Right arm)   Pulse 95   Temp (!) 96 5 °F (35 8 °C) (Temporal)   Resp 18   Ht 5' 9" (1 753 m)   Wt 106 kg (232 lb 12 8 oz)   SpO2 96%   BMI 34 38 kg/m²             Constipation (bowel obstruction due to high anticholinergic clozapine burden) Assess at baseline and weekly during first four months of therapy  Docusate 100mg BID and Miralax 17 grams daily recommended initially  Prophylactic bowel regimen not ordered and it is recommended to order a standing bowel regimen to reduce risk of bowel obstruction associated with clozapine therapy  Sialorrhea Assess at baseline and weekly during first four months of therapy  May be managed using sublingual atropine 1% eye drops or PO glycopyrrolate or PO terazosin   (If no allergies or contraindications to these agents)                    Please note that per current REMS protocol the provider can submit a treatment rationale that justifies clozapine treatment even if patient parameters are outside of REMS recommendations  The Clozapine REMS is an FDA-mandated program implemented by the manufacturers of clozapine  ([employer]  com/CpmgClozapineUI/home  u)  Pharmacy will continue to follow patient with team, thank you    Electronically signed by: Marion Johnson, AlexeyD, [address], Clinical Pharmacist - Psychiatry
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
## 2020-04-13 NOTE — PLAN OF CARE
Problem: Alteration in Thoughts and Perception  Goal: Treatment Goal: Gain control of psychotic behaviors/thinking, reduce/eliminate presenting symptoms and demonstrate improved reality functioning upon discharge  Outcome: Progressing  Goal: Verbalize thoughts and feelings  Description  Interventions:  - Promote a nonjudgmental and trusting relationship with the patient through active listening and therapeutic communication  - Assess patient's level of functioning, behavior and potential for risk  - Engage patient in 1 on 1 interactions  - Encourage patient to express fears, feelings, frustrations, and discuss symptoms    - [place] patient to reality, help patient recognize reality-based thinking   - Administer medications as ordered and assess for potential side effects  - Provide the patient education related to the signs and symptoms of the illness and desired effects of prescribed medications  Outcome: Progressing  Goal: Refrain from acting on delusional thinking/internal stimuli  Description  Interventions:  - Monitor patient closely, per order   - Utilize least restrictive measures   - Set reasonable limits, give positive feedback for acceptable   - Administer medications as ordered and monitor of potential side effects  Outcome: Progressing  Goal: Agree to be compliant with medication regime, as prescribed and report medication side effects  Description  Interventions:  - Offer appropriate PRN medication and supervise ingestion; conduct AIMS, as needed   Outcome: Progressing  Goal: Attend and participate in unit activities, including therapeutic, recreational, and educational groups  Description  Interventions:  -Encourage Visitation and family involvement in care  Outcome: Progressing  Goal: Recognize dysfunctional thoughts, communicate reality-based thoughts at the time of discharge  Description  Interventions:  - Provide medication and psycho-education to assist patient in compliance and developing insight into his/her illness   Outcome: Progressing  Goal: Complete daily ADLs, including personal hygiene independently, as able  Description  Interventions:  - Observe, teach, and assist patient with ADLS  - Monitor and promote a balance of rest/activity, with adequate nutrition and elimination   Outcome: Progressing     Problem: Ineffective Coping  Goal: Identifies ineffective coping skills  Outcome: Progressing  Goal: Identifies healthy coping skills  Outcome: Progressing  Goal: Demonstrates healthy coping skills  Outcome: Progressing  Goal: Patient/Family participate in treatment and DC plans  Description  Interventions:  - Provide therapeutic environment  Outcome: Progressing  Goal: Patient/Family verbalizes awareness of resources  Outcome: Progressing  Goal: Understands least restrictive measures  Description  Interventions:  - Utilize least restrictive behavior  Outcome: Progressing     Problem: RESPIRATORY - ADULT  Goal: Achieves optimal ventilation and oxygenation  Description  INTERVENTIONS:  - Assess for changes in respiratory status  - Assess for changes in mentation and behavior  - Position to facilitate oxygenation and minimize respiratory effort  - Oxygen administered by appropriate delivery if ordered  - Initiate smoking cessation education as indicated  - Encourage broncho-pulmonary hygiene including cough, deep breathe, Incentive Spirometry  - Assess the need for suctioning and aspirate as needed  - Assess and instruct to report SOB or any respiratory difficulty  - Respiratory Therapy support as indicated  Outcome: Progressing     Problem: GASTROINTESTINAL - ADULT  Goal: Minimal or absence of nausea and/or vomiting  Description  INTERVENTIONS:  - Administer IV fluids if ordered to ensure adequate hydration  - Maintain NPO status until nausea and vomiting are resolved  - Nasogastric tube if ordered  - Administer ordered antiemetic medications as needed  - Provide nonpharmacologic comfort measures as appropriate  - Advance diet as tolerated, if ordered  - Consider nutrition services referral to assist patient with adequate nutrition and appropriate food choices  Outcome: Progressing  Goal: Maintains or returns to baseline bowel function  Description  INTERVENTIONS:  - Assess bowel function  - Encourage oral fluids to ensure adequate hydration  - Administer IV fluids if ordered to ensure adequate hydration  - Administer ordered medications as needed  - Encourage mobilization and activity  - Consider nutritional services referral to assist patient with adequate nutrition and appropriate food choices  Outcome: Progressing  Goal: Maintains adequate nutritional intake  Description  INTERVENTIONS:  - Monitor percentage of each meal consumed  - Identify factors contributing to decreased intake, treat as appropriate  - Assist with meals as needed  - Monitor I&O, weight, and lab values if indicated  - Obtain nutrition services referral as needed  Outcome: Progressing     Problem: METABOLIC, FLUID AND ELECTROLYTES - ADULT  Goal: Glucose maintained within target range  Description  INTERVENTIONS:  - Monitor Blood Glucose as ordered  - Assess for signs and symptoms of hyperglycemia and hypoglycemia  - Administer ordered medications to maintain glucose within target range  - Assess nutritional intake and initiate nutrition service referral as needed  Outcome: Progressing     Problem: DISCHARGE PLANNING  Goal: Discharge to home or other facility with appropriate resources  Description    CASE MANAGEMENT INTERVENTIONS:  - Conduct assessment to determine patient/family and health care team treatment goals, and need for post-acute services based on payer coverage, community resources, patient preferences and barriers to discharge    - Address psychosocial, clinical, and financial barriers to discharge as identified in assessment in conjunction with the patient/family and health care team   - Assist the patient in reintegration back into the community by removing barriers which may hinder a successful discharge once deemed stable  - Arrange appropriate level of post-acute services according to patient's needs and preference and payer coverage in collaboration with the physician and health care team   - Communicate with and update the patient/family, physician, and health care team regarding progress on the discharge plan  - Arrange appropriate transportation to post-acute venues  Outcome: Progressing    Pleasant, cooperative and visible on unit  Med and meal compliant  Denies depression and anxiety  No c/o pain  Fully compliant with behavior plan this shift  Attended all groups (community meeting, spirituality, IMR, journaling and fresh air)  Ate 100% of both meals    Will continue to monitor progress in recovery program

## 2020-04-13 NOTE — TREATMENT TEAM
04/13/20 1100   Activity/Group Checklist   Group   (IMR/Goal Setting )   Attendance Attended   Attendance Duration (min) 46-60   Interactions Interacted appropriately   Affect/Mood Appropriate; Wide   Goals Achieved Identified feelings; Discussed coping strategies; Able to engage in interactions; Able to listen to others; Able to self-disclose

## 2020-04-13 NOTE — PLAN OF CARE
Problem: Alteration in Thoughts and Perception  Goal: Agree to be compliant with medication regime, as prescribed and report medication side effects  Description  Interventions:  - Offer appropriate PRN medication and supervise ingestion; conduct AIMS, as needed   Outcome: Progressing  Goal: Complete daily ADLs, including personal hygiene independently, as able  Description  Interventions:  - Observe, teach, and assist patient with ADLS  - Monitor and promote a balance of rest/activity, with adequate nutrition and elimination   Outcome: Progressing       Showered this evening, gait steady, denied pain, compliant with routine medications, ate 100% of dinner, visible, social with staff and select peers, used the radio with small peer group will continue to monitor

## 2020-04-13 NOTE — PROGRESS NOTES
Received a tiger Awais from the  that Ruth Acevedo wanted to talk to this writer  Conducted a short session with Clementina Franks and discovered that he only wanted to talk  He continues to lie about his group attendance  Discussed his refusal of labs this past weekend, which he did not want to discuss  He continues to talk about discharge following a continued discussion of the current situation  This writer feels that he is bored and is unable to keep self busy  It appears that he does not follow the program schedule , poor insight, motivation, self awareness and not interested in working on any dc or recovery issues

## 2020-04-13 NOTE — TREATMENT TEAM
04/13/20 0900   Activity/Group Checklist   Group Community meeting  (Goal Cards )   Attendance Attended   Attendance Duration (min) 16-30   Interactions Disorganized interaction   Affect/Mood Wide   Goals Achieved Identified feelings; Able to listen to others; Able to engage in interactions; Able to self-disclose
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
## 2020-04-13 NOTE — PLAN OF CARE
Problem: Alteration in Thoughts and Perception  Goal: Attend and participate in unit activities, including therapeutic, recreational, and educational groups  Description  Interventions:  -Encourage Visitation and family involvement in care  Outcome: Not Progressing    Patient did not complete Goal Card for the week of 4/13/2020

## 2020-04-14 PROCEDURE — 99232 SBSQ HOSP IP/OBS MODERATE 35: CPT | Performed by: PSYCHIATRY & NEUROLOGY

## 2020-04-14 RX ORDER — DOCUSATE SODIUM 100 MG/1
100 CAPSULE, LIQUID FILLED ORAL 2 TIMES DAILY
Status: DISCONTINUED | OUTPATIENT
Start: 2020-04-14 | End: 2020-08-07 | Stop reason: HOSPADM

## 2020-04-14 RX ADMIN — OXYBUTYNIN CHLORIDE 5 MG: 5 TABLET, EXTENDED RELEASE ORAL at 08:02

## 2020-04-14 RX ADMIN — LEVOTHYROXINE SODIUM 75 MCG: 75 TABLET ORAL at 06:07

## 2020-04-14 RX ADMIN — METFORMIN HYDROCHLORIDE 500 MG: 500 TABLET ORAL at 16:44

## 2020-04-14 RX ADMIN — DOCUSATE SODIUM 100 MG: 100 CAPSULE, LIQUID FILLED ORAL at 17:09

## 2020-04-14 RX ADMIN — ATORVASTATIN CALCIUM 10 MG: 10 TABLET, FILM COATED ORAL at 16:44

## 2020-04-14 RX ADMIN — OLANZAPINE 5 MG: 5 TABLET, FILM COATED ORAL at 20:53

## 2020-04-14 RX ADMIN — CLOZAPINE 200 MG: 200 TABLET ORAL at 16:44

## 2020-04-14 RX ADMIN — DIVALPROEX SODIUM 1500 MG: 500 TABLET, EXTENDED RELEASE ORAL at 20:53

## 2020-04-14 RX ADMIN — PANTOPRAZOLE SODIUM 40 MG: 40 TABLET, DELAYED RELEASE ORAL at 06:07

## 2020-04-14 RX ADMIN — METFORMIN HYDROCHLORIDE 500 MG: 500 TABLET ORAL at 08:02

## 2020-04-14 RX ADMIN — TRAZODONE HYDROCHLORIDE 50 MG: 50 TABLET ORAL at 22:13
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
## 2020-04-14 NOTE — TREATMENT TEAM
Patient declined      04/14/20 1100   Activity/Group Checklist   Group   (IMR/Positive Affirmation Betty)   Attendance Did not attend   Attendance Duration (min) 46-60   Affect/Mood GAEL

## 2020-04-14 NOTE — PLAN OF CARE
Problem: Alteration in Thoughts and Perception  Goal: Verbalize thoughts and feelings  Description  Interventions:  - Promote a nonjudgmental and trusting relationship with the patient through active listening and therapeutic communication  - Assess patient's level of functioning, behavior and potential for risk  - Engage patient in 1 on 1 interactions  - Encourage patient to express fears, feelings, frustrations, and discuss symptoms    - [place] patient to reality, help patient recognize reality-based thinking   - Administer medications as ordered and assess for potential side effects  - Provide the patient education related to the signs and symptoms of the illness and desired effects of prescribed medications  Outcome: Progressing  Goal: Refrain from acting on delusional thinking/internal stimuli  Description  Interventions:  - Monitor patient closely, per order   - Utilize least restrictive measures   - Set reasonable limits, give positive feedback for acceptable   - Administer medications as ordered and monitor of potential side effects  Outcome: Progressing  Goal: Agree to be compliant with medication regime, as prescribed and report medication side effects  Description  Interventions:  - Offer appropriate PRN medication and supervise ingestion; conduct AIMS, as needed   Outcome: Progressing  Goal: Attend and participate in unit activities, including therapeutic, recreational, and educational groups  Description  Interventions:  -Encourage Visitation and family involvement in care  Outcome: Progressing     Problem: Ineffective Coping  Goal: Demonstrates healthy coping skills  Outcome: Progressing  Goal: Understands least restrictive measures  Description  Interventions:  - Utilize least restrictive behavior  Outcome: Progressing     Problem: GASTROINTESTINAL - ADULT  Goal: Maintains adequate nutritional intake  Description  INTERVENTIONS:  - Monitor percentage of each meal consumed  - Identify factors contributing to decreased intake, treat as appropriate  - Assist with meals as needed  - Monitor I&O, weight, and lab values if indicated  - Obtain nutrition services referral as needed  Outcome: Norma Johns has been awake, alert, and visible intermittently out in the milieu  Pt enjoys listening to music, singing, and dancing to radio in [facility]  Some socialization noted with peers at times  Pt ate 100% supper  Still preoccupied with discharge  No behavioral issues  Pt went out on deck for fresh air with staff and peers  Pt did not attend evening group but came out for snack after  Compliant with all scheduled meds without prompting  Continue to monitor/assess for any changes

## 2020-04-14 NOTE — PROGRESS NOTES
Mac Phalen was sitting in [employer] area at the beginning of the shift  Medicated with Trazodone 50mg po at 2357 per request  After medication he went to his room and has been sleeping  No issues or behaviors  Compliant with medication  Continue to monitor  Precautions maintained

## 2020-04-14 NOTE — PROGRESS NOTES
04/14/20 1240   Team Meeting   Meeting Type Tx Team Meeting   Initial Conference Date 04/14/20   Next Conference Date 04/21/20   Team Members Present   Team Members Present Physician;Nurse;   Physician Team Member Dr Angel Limon, RN   Care Management Team Member EDELMIRA Chowdary   Other (Discipline and Name) Isidromarina Singh [facility]; Elza Cueva, [facility] [address]   Patient/Family Present   Patient Present Yes   Patient's Family Present Yes   Family Relationship Sibling   Sibling Isaura Apo, sister      Patient attended treatment team meeting this morning prepared without self-assessment  Patient's group attendance for last week was low and has been select  Team and patient completed risk assessment and the patient did not verbalize any desire to elope from the program  Patient verbalized understanding of consequences of eloping from treatment while on a commitment  Patient verbalized no further questions or concerns at the conclusion of the meeting  Next team meeting scheduled for 4/21/2020  Patient reports he would like to go and he does not believe that people are not leaving  Patient reeducated on current environmental factors with COVID that are holding up any discharges from [facility], [facility], and [facility]  Patient became upset and then redirected  Patient requesting that he be able to go to the [address] [place] [address] for his birthday

## 2020-04-14 NOTE — PROGRESS NOTES
04/14/20 0800   Team Meeting   Meeting Type Daily Rounds   Team Members Present   Team Members Present Physician;Nurse;; Other (Discipline and Name)   Physician Team Member Dr Marta Post Team Member Norma Dawson RN   Care Management Team Member Meredith Fernandes MSW   Other (Discipline and Name) Rebecca Contreras LCSW; MCKAYLA Verde     Attended some groups

## 2020-04-14 NOTE — PLAN OF CARE
Problem: Ineffective Coping  Goal: Demonstrates healthy coping skills  Outcome: Not Progressing     Problem: Alteration in Thoughts and Perception  Goal: Attend and participate in unit activities, including therapeutic, recreational, and educational groups  Description  Interventions:  -Encourage Visitation and family involvement in care  Outcome: Progressing   Patient attends groups selectively and is not able to be consistent  Patient states he understands what he needs to do such as take his medications, attend groups and practice hygiene but is not able to perform this tasks on a daily basis without plenty of direction  Writer has been able to work with patient face to face to complete his WRAP Plan, Life Domains and BH Relapse Prevention plan  Rob Miranda will continue to encourage and support while assisting Patient in using/developing coping skills

## 2020-04-14 NOTE — PLAN OF CARE
Problem: Alteration in Thoughts and Perception  Goal: Agree to be compliant with medication regime, as prescribed and report medication side effects  Description  Interventions:  - Offer appropriate PRN medication and supervise ingestion; conduct AIMS, as needed   Outcome: Progressing     Problem: Alteration in Thoughts and Perception  Goal: Attend and participate in unit activities, including therapeutic, recreational, and educational groups  Description  Interventions:  -Encourage Visitation and family involvement in care  Outcome: Not Progressing    Individual was irritable during treatment team, frustrated that he is not able to be discharged  He did not participate in programming, no groups attended  He complied with AM meds  He did, however decline the colace that was ordered today stating he is not having bowel issues  Availability of staff made known  Will continue to monitor

## 2020-04-14 NOTE — TREATMENT TEAM
Patient declined      04/14/20 1400   Activity/Group Checklist   Group   (Recovery Workshop )   Attendance Did not attend   Attendance Duration (min) 46-60   Affect/Mood GAEL

## 2020-04-14 NOTE — TREATMENT TEAM
Patient declined      04/14/20 0900   Activity/Group Checklist   Group Exercise  (Morning Stretch )   Attendance Did not attend   Attendance Duration (min) 16-30   Affect/Mood GAEL

## 2020-04-15 PROCEDURE — 99232 SBSQ HOSP IP/OBS MODERATE 35: CPT | Performed by: PSYCHIATRY & NEUROLOGY

## 2020-04-15 RX ADMIN — DOCUSATE SODIUM 100 MG: 100 CAPSULE, LIQUID FILLED ORAL at 17:13

## 2020-04-15 RX ADMIN — OLANZAPINE 5 MG: 5 TABLET, FILM COATED ORAL at 20:51

## 2020-04-15 RX ADMIN — METFORMIN HYDROCHLORIDE 500 MG: 500 TABLET ORAL at 08:34

## 2020-04-15 RX ADMIN — DIVALPROEX SODIUM 1500 MG: 500 TABLET, EXTENDED RELEASE ORAL at 20:51

## 2020-04-15 RX ADMIN — CLOZAPINE 200 MG: 200 TABLET ORAL at 16:43

## 2020-04-15 RX ADMIN — DOCUSATE SODIUM 100 MG: 100 CAPSULE, LIQUID FILLED ORAL at 08:34

## 2020-04-15 RX ADMIN — PANTOPRAZOLE SODIUM 40 MG: 40 TABLET, DELAYED RELEASE ORAL at 06:00

## 2020-04-15 RX ADMIN — LEVOTHYROXINE SODIUM 75 MCG: 75 TABLET ORAL at 06:00

## 2020-04-15 RX ADMIN — METFORMIN HYDROCHLORIDE 500 MG: 500 TABLET ORAL at 16:43

## 2020-04-15 RX ADMIN — ATORVASTATIN CALCIUM 10 MG: 10 TABLET, FILM COATED ORAL at 16:43

## 2020-04-15 RX ADMIN — OXYBUTYNIN CHLORIDE 5 MG: 5 TABLET, EXTENDED RELEASE ORAL at 08:34

## 2020-04-15 NOTE — PLAN OF CARE
Problem: Alteration in Thoughts and Perception  Goal: Verbalize thoughts and feelings  Description  Interventions:  - Promote a nonjudgmental and trusting relationship with the patient through active listening and therapeutic communication  - Assess patient's level of functioning, behavior and potential for risk  - Engage patient in 1 on 1 interactions  - Encourage patient to express fears, feelings, frustrations, and discuss symptoms    - [place] patient to reality, help patient recognize reality-based thinking   - Administer medications as ordered and assess for potential side effects  - Provide the patient education related to the signs and symptoms of the illness and desired effects of prescribed medications  Outcome: Progressing  Goal: Refrain from acting on delusional thinking/internal stimuli  Description  Interventions:  - Monitor patient closely, per order   - Utilize least restrictive measures   - Set reasonable limits, give positive feedback for acceptable   - Administer medications as ordered and monitor of potential side effects  Outcome: Progressing  Goal: Agree to be compliant with medication regime, as prescribed and report medication side effects  Description  Interventions:  - Offer appropriate PRN medication and supervise ingestion; conduct AIMS, as needed   Outcome: Progressing     Problem: Ineffective Coping  Goal: Demonstrates healthy coping skills  Outcome: Progressing  Goal: Understands least restrictive measures  Description  Interventions:  - Utilize least restrictive behavior  Outcome: Progressing     Problem: GASTROINTESTINAL - ADULT  Goal: Maintains adequate nutritional intake  Description  INTERVENTIONS:  - Monitor percentage of each meal consumed  - Identify factors contributing to decreased intake, treat as appropriate  - Assist with meals as needed  - Monitor I&O, weight, and lab values if indicated  - Obtain nutrition services referral as needed  Outcome: Shweta Gresham has been awake, alert, and visible intermittently out in the milieu  Ate 100% supper  Pt refused to have EKG done this shift  Pt listens to music on radio in [employer] with peers  Rests in room at intervals  Remains disheveled in appearance and dressed in layers  Continues to be preoccupied with discharge  Pt did not attend evening group but came out for snack after  Compliant with all scheduled meds when called  Pt requested prn for sleep  Trazodone 50 mg po given at 2213  No behavioral issues  Denies any depression, anxiety, si/hi, intent, plan, a/v hallucinations, and has not verbalized any delusions  Continue to monitor/assess for any changes

## 2020-04-15 NOTE — TREATMENT TEAM
04/15/20 0930   Activity/Group Checklist   Group Exercise  (Morning Walk )   Attendance Attended   Attendance Duration (min) 16-30   Interactions Interacted appropriately   Affect/Mood Appropriate;Normal range   Goals Achieved Able to listen to others; Able to engage in interactions

## 2020-04-15 NOTE — PLAN OF CARE
Problem: Alteration in Thoughts and Perception  Goal: Refrain from acting on delusional thinking/internal stimuli  Description  Interventions:  - Monitor patient closely, per order   - Utilize least restrictive measures   - Set reasonable limits, give positive feedback for acceptable   - Administer medications as ordered and monitor of potential side effects  Outcome: Not Progressing    Patient was intrusive to therapist and his peer multiple times during a session  His intrusion began in the hallway while therapist was speaking casually to his peer  He was redirected with clear boundaries about interrupting during a discussion  He then needed redirection from looking in and knocking on the conference room window at least three times during peer's session with therapist  When session was concluded, therapist asked patient if he would like to talk  Patient mumbled and walked away  Therapist will continue to support patient in redirecting his impulses  He is fixated on discharge and going to the community, despite receiving education multiple times from multiple staff and family members, everyday  He is able to verbalize that he cannot go into the community because of a virus, however, does not consider this when repeatedly asking about discharge

## 2020-04-15 NOTE — TREATMENT TEAM
04/15/20 1100   Activity/Group Checklist   Group   (IMR/Social Icebreakers )   Attendance Attended   Attendance Duration (min) 46-60   Interactions Interacted appropriately   Affect/Mood Appropriate;Bright;Normal range   Goals Achieved Identified feelings; Able to listen to others; Able to engage in interactions; Able to self-disclose

## 2020-04-15 NOTE — TREATMENT TEAM
04/15/20 0900   Activity/Group Checklist   Group Other (Comment)  (Art Therapy Group/Open Choice, Open Discussion)   Attendance Attended   Attendance Duration (min) 46-60   Interactions Interacted appropriately  (Required mild redirection for talking over others)   Affect/Mood Appropriate   Goals Achieved Able to listen to others; Able to engage in interactions; Able to recieve feedback  (Able to briefly engage materials; limited participation)
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
## 2020-04-15 NOTE — PROGRESS NOTES
04/15/20 0900   Team Meeting   Meeting Type Daily Rounds   Team Members Present   Team Members Present Physician;Nurse; Other (Discipline and Name)   Physician Team Member Dr Sully Willingham Team Member Taina Gooden RN   Other (Discipline and Name) Brenna Landin LCSW; MCKAYLA Blackwood     No group all day  Was in bed most of the day  Refused EKG

## 2020-04-15 NOTE — PLAN OF CARE
Problem: Alteration in Thoughts and Perception  Goal: Treatment Goal: Gain control of psychotic behaviors/thinking, reduce/eliminate presenting symptoms and demonstrate improved reality functioning upon discharge  Outcome: Progressing  Goal: Verbalize thoughts and feelings  Description  Interventions:  - Promote a nonjudgmental and trusting relationship with the patient through active listening and therapeutic communication  - Assess patient's level of functioning, behavior and potential for risk  - Engage patient in 1 on 1 interactions  - Encourage patient to express fears, feelings, frustrations, and discuss symptoms    - [place] patient to reality, help patient recognize reality-based thinking   - Administer medications as ordered and assess for potential side effects  - Provide the patient education related to the signs and symptoms of the illness and desired effects of prescribed medications  Outcome: Progressing  Goal: Agree to be compliant with medication regime, as prescribed and report medication side effects  Description  Interventions:  - Offer appropriate PRN medication and supervise ingestion; conduct AIMS, as needed   Outcome: Progressing  Goal: Attend and participate in unit activities, including therapeutic, recreational, and educational groups  Description  Interventions:  -Encourage Visitation and family involvement in care  Outcome: Progressing  Goal: Recognize dysfunctional thoughts, communicate reality-based thoughts at the time of discharge  Description  Interventions:  - Provide medication and psycho-education to assist patient in compliance and developing insight into his/her illness   Outcome: Progressing  Goal: Complete daily ADLs, including personal hygiene independently, as able  Description  Interventions:  - Observe, teach, and assist patient with ADLS  - Monitor and promote a balance of rest/activity, with adequate nutrition and elimination   Outcome: Progressing     Problem: Ineffective Coping  Goal: Identifies ineffective coping skills  Outcome: Progressing  Goal: Identifies healthy coping skills  Outcome: Progressing  Goal: Demonstrates healthy coping skills  Outcome: Progressing  Goal: Patient/Family participate in treatment and DC plans  Description  Interventions:  - Provide therapeutic environment  Outcome: Progressing  Goal: Patient/Family verbalizes awareness of resources  Outcome: Progressing  Goal: Understands least restrictive measures  Description  Interventions:  - Utilize least restrictive behavior  Outcome: Progressing     Problem: RESPIRATORY - ADULT  Goal: Achieves optimal ventilation and oxygenation  Description  INTERVENTIONS:  - Assess for changes in respiratory status  - Assess for changes in mentation and behavior  - Position to facilitate oxygenation and minimize respiratory effort  - Oxygen administered by appropriate delivery if ordered  - Initiate smoking cessation education as indicated  - Encourage broncho-pulmonary hygiene including cough, deep breathe, Incentive Spirometry  - Assess the need for suctioning and aspirate as needed  - Assess and instruct to report SOB or any respiratory difficulty  - Respiratory Therapy support as indicated  Outcome: Progressing     Problem: GASTROINTESTINAL - ADULT  Goal: Minimal or absence of nausea and/or vomiting  Description  INTERVENTIONS:  - Administer IV fluids if ordered to ensure adequate hydration  - Maintain NPO status until nausea and vomiting are resolved  - Nasogastric tube if ordered  - Administer ordered antiemetic medications as needed  - Provide nonpharmacologic comfort measures as appropriate  - Advance diet as tolerated, if ordered  - Consider nutrition services referral to assist patient with adequate nutrition and appropriate food choices  Outcome: Progressing  Goal: Maintains or returns to baseline bowel function  Description  INTERVENTIONS:  - Assess bowel function  - Encourage oral fluids to ensure adequate hydration  - Administer IV fluids if ordered to ensure adequate hydration  - Administer ordered medications as needed  - Encourage mobilization and activity  - Consider nutritional services referral to assist patient with adequate nutrition and appropriate food choices  Outcome: Progressing  Goal: Maintains adequate nutritional intake  Description  INTERVENTIONS:  - Monitor percentage of each meal consumed  - Identify factors contributing to decreased intake, treat as appropriate  - Assist with meals as needed  - Monitor I&O, weight, and lab values if indicated  - Obtain nutrition services referral as needed  Outcome: Progressing     Problem: METABOLIC, FLUID AND ELECTROLYTES - ADULT  Goal: Glucose maintained within target range  Description  INTERVENTIONS:  - Monitor Blood Glucose as ordered  - Assess for signs and symptoms of hyperglycemia and hypoglycemia  - Administer ordered medications to maintain glucose within target range  - Assess nutritional intake and initiate nutrition service referral as needed  Outcome: Progressing     Problem: DISCHARGE PLANNING  Goal: Discharge to home or other facility with appropriate resources  Description    CASE MANAGEMENT INTERVENTIONS:  - Conduct assessment to determine patient/family and health care team treatment goals, and need for post-acute services based on payer coverage, community resources, patient preferences and barriers to discharge  - Address psychosocial, clinical, and financial barriers to discharge as identified in assessment in conjunction with the patient/family and health care team   - Assist the patient in reintegration back into the community by removing barriers which may hinder a successful discharge once deemed stable    - Arrange appropriate level of post-acute services according to patient's needs and preference and payer coverage in collaboration with the physician and health care team   - Communicate with and update the patient/family, physician, and health care team regarding progress on the discharge plan  - Arrange appropriate transportation to post-acute venues  Outcome: Progressing     Problem: Alteration in Thoughts and Perception  Goal: Refrain from acting on delusional thinking/internal stimuli  Description  Interventions:  - Monitor patient closely, per order   - Utilize least restrictive measures   - Set reasonable limits, give positive feedback for acceptable   - Administer medications as ordered and monitor of potential side effects  Outcome: Not Progressing     Patient began shift pleasant, cooperative and visible on unit  Med and meal compliant  He attended [employer] group, and was social with staff  Right before lunch patient was advised that an EKG was needed and patient adamantly refused  Redirection was not effective  Patient was reminded of program compliance needed for discharge and he continued to refuse  Patient was made aware of need for compliance in order to obtain privileges like the radio for entertainment because EKG is needed r/t meds that he is on  He continues to refuse    Will continue to monitor progress in recovery program

## 2020-04-15 NOTE — PROGRESS NOTES
Pt slept all night  Woke up @ [phone] for some water & immediately went back to bed   No behavioral issues, will continue to monitor

## 2020-04-16 PROCEDURE — 99232 SBSQ HOSP IP/OBS MODERATE 35: CPT | Performed by: PSYCHIATRY & NEUROLOGY

## 2020-04-16 RX ADMIN — DIVALPROEX SODIUM 1500 MG: 500 TABLET, EXTENDED RELEASE ORAL at 20:48

## 2020-04-16 RX ADMIN — DOCUSATE SODIUM 100 MG: 100 CAPSULE, LIQUID FILLED ORAL at 17:12

## 2020-04-16 RX ADMIN — ATORVASTATIN CALCIUM 10 MG: 10 TABLET, FILM COATED ORAL at 16:37

## 2020-04-16 RX ADMIN — CLOZAPINE 200 MG: 200 TABLET ORAL at 16:37

## 2020-04-16 RX ADMIN — ACETAMINOPHEN 650 MG: 325 TABLET ORAL at 20:52

## 2020-04-16 RX ADMIN — OLANZAPINE 5 MG: 5 TABLET, FILM COATED ORAL at 20:48

## 2020-04-16 RX ADMIN — TRAZODONE HYDROCHLORIDE 50 MG: 50 TABLET ORAL at 21:45

## 2020-04-16 RX ADMIN — METFORMIN HYDROCHLORIDE 500 MG: 500 TABLET ORAL at 16:37

## 2020-04-16 NOTE — PLAN OF CARE
Problem: Alteration in Thoughts and Perception  Goal: Verbalize thoughts and feelings  Description  Interventions:  - Promote a nonjudgmental and trusting relationship with the patient through active listening and therapeutic communication  - Assess patient's level of functioning, behavior and potential for risk  - Engage patient in 1 on 1 interactions  - Encourage patient to express fears, feelings, frustrations, and discuss symptoms    - [place] patient to reality, help patient recognize reality-based thinking   - Administer medications as ordered and assess for potential side effects  - Provide the patient education related to the signs and symptoms of the illness and desired effects of prescribed medications  Outcome: Progressing  Goal: Refrain from acting on delusional thinking/internal stimuli  Description  Interventions:  - Monitor patient closely, per order   - Utilize least restrictive measures   - Set reasonable limits, give positive feedback for acceptable   - Administer medications as ordered and monitor of potential side effects  Outcome: Progressing  Goal: Agree to be compliant with medication regime, as prescribed and report medication side effects  Description  Interventions:  - Offer appropriate PRN medication and supervise ingestion; conduct AIMS, as needed   Outcome: Progressing  Goal: Attend and participate in unit activities, including therapeutic, recreational, and educational groups  Description  Interventions:  -Encourage Visitation and family involvement in care  Outcome: Progressing     Problem: Ineffective Coping  Goal: Demonstrates healthy coping skills  Outcome: Progressing  Goal: Understands least restrictive measures  Description  Interventions:  - Utilize least restrictive behavior  Outcome: Progressing     Problem: GASTROINTESTINAL - ADULT  Goal: Maintains adequate nutritional intake  Description  INTERVENTIONS:  - Monitor percentage of each meal consumed  - Identify factors contributing to decreased intake, treat as appropriate  - Assist with meals as needed  - Monitor I&O, weight, and lab values if indicated  - Obtain nutrition services referral as needed  Outcome: Malgorzata Gill has been awake, alert, and visible intermittently out in the milieu  Ate 100% supper  Rests in room at intervals  Refused EKG today but stated that he will have it done tomorrow  Disheveled in appearance and dressed in layers  Pt remains preoccupied with discharge  Social with peers at times when out in milieu  No behavioral issues  Pt did not attend evening group but came out for snack after  Compliant with all scheduled meds without prompting  Pt requested Tylenol for left knee pain and Tylenol 650 mg po given at 2052 and effective  Pt requested prn Trazodone for sleep  Trazodone 50 mg po given at 2145  No behavioral issues  Continue to monitor/assess for any changes

## 2020-04-16 NOTE — PROGRESS NOTES
Lucie Yuan continues to refuse his EKG, refused his morning medications and vitals  Patient verbalized "I didn't do my vitals so why should I take this meds " Encouraged by staff and this writer to be compliant with his behavorial plan  Will continue to monitor for changes
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
## 2020-04-16 NOTE — PROGRESS NOTES
04/16/20 0800   Team Meeting   Meeting Type Daily Rounds   Team Members Present   Team Members Present Physician;Nurse;; Other (Discipline and Name)   Physician Team Member JOVITA Felix   Nursing Team Member Norma Dawson RN   Other (Discipline and Name) Rebecca Contreras LCSW; MCKAYLA Verde     Select groups  Same behaviors  Refused early morning meds this morning  Refusing EKG  Music being used as positive reinforcement

## 2020-04-16 NOTE — PROGRESS NOTES
Patient slept throughout the night  No behavioral issues overnight  Refused AM Synthroid and Protonix

## 2020-04-16 NOTE — PLAN OF CARE
Problem: Alteration in Thoughts and Perception  Goal: Verbalize thoughts and feelings  Description  Interventions:  - Promote a nonjudgmental and trusting relationship with the patient through active listening and therapeutic communication  - Assess patient's level of functioning, behavior and potential for risk  - Engage patient in 1 on 1 interactions  - Encourage patient to express fears, feelings, frustrations, and discuss symptoms    - [place] patient to reality, help patient recognize reality-based thinking   - Administer medications as ordered and assess for potential side effects  - Provide the patient education related to the signs and symptoms of the illness and desired effects of prescribed medications  Outcome: Progressing  Goal: Refrain from acting on delusional thinking/internal stimuli  Description  Interventions:  - Monitor patient closely, per order   - Utilize least restrictive measures   - Set reasonable limits, give positive feedback for acceptable   - Administer medications as ordered and monitor of potential side effects  Outcome: Progressing  Goal: Agree to be compliant with medication regime, as prescribed and report medication side effects  Description  Interventions:  - Offer appropriate PRN medication and supervise ingestion; conduct AIMS, as needed   Outcome: Progressing  Goal: Attend and participate in unit activities, including therapeutic, recreational, and educational groups  Description  Interventions:  -Encourage Visitation and family involvement in care  Outcome: Not Progressing  Goal: Complete daily ADLs, including personal hygiene independently, as able  Description  Interventions:  - Observe, teach, and assist patient with ADLS  - Monitor and promote a balance of rest/activity, with adequate nutrition and elimination   Outcome: Progressing     Problem: Ineffective Coping  Goal: Demonstrates healthy coping skills  Outcome: Progressing  Goal: Understands least restrictive measures  Description  Interventions:  - Utilize least restrictive behavior  Outcome: Progressing     Problem: GASTROINTESTINAL - ADULT  Goal: Maintains adequate nutritional intake  Description  INTERVENTIONS:  - Monitor percentage of each meal consumed  - Identify factors contributing to decreased intake, treat as appropriate  - Assist with meals as needed  - Monitor I&O, weight, and lab values if indicated  - Obtain nutrition services referral as needed  Outcome: Marlys Perea has been awake, alert, and visible intermittently out in the milieu  Pt completed his daily ADL's today  Pt refused to have EKG done  Ate 100% supper  Rests in room at intervals and sits in [facility] 19 with peers when radio on  No behavioral issues  Preoccupied with discharge  Pt did not attend evening group but came out for snack after  Disheveled in appearance, dressed in layers  Compliant with all scheduled meds without prompting  Continue to monitor/assess for any changes

## 2020-04-16 NOTE — TREATMENT TEAM
04/16/20 0900   Activity/Group Checklist   Group Exercise  (Morning Walk )   Attendance Did not attend   Attendance Duration (min) 16-30   Affect/Mood GAEL

## 2020-04-16 NOTE — TREATMENT TEAM
04/16/20 1100   Activity/Group Checklist   Group   (IMR/Coping Skills/[employer] )   Attendance Did not attend   Attendance Duration (min) 46-60   Affect/Mood GAEL

## 2020-04-16 NOTE — PLAN OF CARE
Problem: Alteration in Thoughts and Perception  Goal: Refrain from acting on delusional thinking/internal stimuli  Description  Interventions:  - Monitor patient closely, per order   - Utilize least restrictive measures   - Set reasonable limits, give positive feedback for acceptable   - Administer medications as ordered and monitor of potential side effects  Outcome: Not Progressing  Goal: Agree to be compliant with medication regime, as prescribed and report medication side effects  Description  Interventions:  - Offer appropriate PRN medication and supervise ingestion; conduct AIMS, as needed   Outcome: Not Progressing  Goal: Complete daily ADLs, including personal hygiene independently, as able  Description  Interventions:  - Observe, teach, and assist patient with ADLS  - Monitor and promote a balance of rest/activity, with adequate nutrition and elimination   Outcome: Not Progressing    Michelle Bocanegra has been only visible on the unit to attend fresh air group and for his scheduled meals  Refused medications with prompting  Irritable and preoccupied in conversation, expressing his frustrations with his discharge plans  Non-compliant with his behavorial plan  Less irritable in the afternoon, napped for the majority of the shift  Will continue to monitor

## 2020-04-17 LAB
ATRIAL RATE: 96 BPM
P AXIS: 38 DEGREES
PR INTERVAL: 156 MS
QRS AXIS: -31 DEGREES
QRSD INTERVAL: 104 MS
QT INTERVAL: 342 MS
QTC INTERVAL: 432 MS
T WAVE AXIS: 31 DEGREES
VENTRICULAR RATE: 96 BPM

## 2020-04-17 PROCEDURE — 93010 ELECTROCARDIOGRAM REPORT: CPT | Performed by: INTERNAL MEDICINE

## 2020-04-17 PROCEDURE — 93005 ELECTROCARDIOGRAM TRACING: CPT

## 2020-04-17 PROCEDURE — 99232 SBSQ HOSP IP/OBS MODERATE 35: CPT | Performed by: PSYCHIATRY & NEUROLOGY

## 2020-04-17 RX ADMIN — DOCUSATE SODIUM 100 MG: 100 CAPSULE, LIQUID FILLED ORAL at 08:08

## 2020-04-17 RX ADMIN — PANTOPRAZOLE SODIUM 40 MG: 40 TABLET, DELAYED RELEASE ORAL at 05:21

## 2020-04-17 RX ADMIN — OXYBUTYNIN CHLORIDE 5 MG: 5 TABLET, EXTENDED RELEASE ORAL at 08:08

## 2020-04-17 RX ADMIN — CLOZAPINE 200 MG: 200 TABLET ORAL at 16:39

## 2020-04-17 RX ADMIN — DOCUSATE SODIUM 100 MG: 100 CAPSULE, LIQUID FILLED ORAL at 17:16

## 2020-04-17 RX ADMIN — LEVOTHYROXINE SODIUM 75 MCG: 75 TABLET ORAL at 05:21

## 2020-04-17 RX ADMIN — METFORMIN HYDROCHLORIDE 500 MG: 500 TABLET ORAL at 16:39

## 2020-04-17 RX ADMIN — METFORMIN HYDROCHLORIDE 500 MG: 500 TABLET ORAL at 08:08

## 2020-04-17 RX ADMIN — ALUMINUM HYDROXIDE, MAGNESIUM HYDROXIDE, AND SIMETHICONE 30 ML: 200; 200; 20 SUSPENSION ORAL at 10:16

## 2020-04-17 RX ADMIN — OLANZAPINE 5 MG: 5 TABLET, FILM COATED ORAL at 20:35

## 2020-04-17 RX ADMIN — ATORVASTATIN CALCIUM 10 MG: 10 TABLET, FILM COATED ORAL at 16:39

## 2020-04-17 RX ADMIN — DIVALPROEX SODIUM 1500 MG: 500 TABLET, EXTENDED RELEASE ORAL at 20:35

## 2020-04-17 NOTE — NURSING NOTE
Received pt in bed at change of shift with eyes closed; chest movement noted  Awake and out of his room at 0330 requesting and given a blanket  Also requested and given a snack  Returned to bed without any difficulties  Will continue to monitor on continual rounding

## 2020-04-17 NOTE — PROGRESS NOTES
04/17/20 1100   Activity/Group Checklist   Group Other (Comment)  (IMR - Gratitude)   Attendance Attended   Attendance Duration (min) 46-60   Interactions Interacted appropriately   Affect/Mood Appropriate   Goals Achieved Able to engage in interactions; Increased hopefulness; Able to recieve feedback     Patient attended [facility] today, focused on making Gratitude Wheels  Patient did not make a [employer], but did engage by using the materials to draw something  He was acknowledged for staying in group the entire time, but this therapist suspects his full attendance was related to the fact that we played music throughout the group  Nonetheless, patient was respectful and did not require any redirection for his mask or intrusiveness

## 2020-04-17 NOTE — PROGRESS NOTES
04/17/20 0800   Team Meeting   Meeting Type Daily Rounds   Team Members Present   Team Members Present Physician;Nurse;; Other (Discipline and Name)   Physician Team Member Dr Kalee Hadley Team Member Jannet Alfaro RN   Care Management Team Member EDELMIRA Conteh   Other (Discipline and Name) Karlo Guido LCSW     Still declined EKG

## 2020-04-17 NOTE — PLAN OF CARE
Problem: DISCHARGE PLANNING  Goal: Discharge to home or other facility with appropriate resources  Description    CASE MANAGEMENT INTERVENTIONS:  - Conduct assessment to determine patient/family and health care team treatment goals, and need for post-acute services based on payer coverage, community resources, patient preferences and barriers to discharge  - Address psychosocial, clinical, and financial barriers to discharge as identified in assessment in conjunction with the patient/family and health care team   - Assist the patient in reintegration back into the community by removing barriers which may hinder a successful discharge once deemed stable  - Arrange appropriate level of post-acute services according to patient's needs and preference and payer coverage in collaboration with the physician and health care team   - Communicate with and update the patient/family, physician, and health care team regarding progress on the discharge plan  - Arrange appropriate transportation to post-acute venues  Outcome: Progressing    Patient continues to ruminate on discharge and where he will go  CM assured patient that once the quarantine is lifted, we see him back in community integration, and there is an appropriate bed available he will be discharge to it  CM will continue to follow and provide services as needed

## 2020-04-17 NOTE — PROGRESS NOTES
04/17/20 0900   Activity/Group Checklist   Group Community meeting  (Morning Walk)   Attendance Did not attend     Patient was encouraged to join morning walk this morning, but he was in the shower
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
## 2020-04-17 NOTE — PLAN OF CARE
Problem: Individualized Interventions  Goal: Attend and participate in unit activities, including therapeutic, recreational, and educational groups  Description  PSYCHOTHERAPY INTERVENTIONS:    -Form therapeutic alliance to promote trust and safety within a therapeutic environment    -Engage in individual psychotherapy using evidence-based practices that are specific to individual needs   -Process barriers to community living with an emphasis on solution-based interventions   -Promote self-awareness and identity development   -Identify and process patterns of behavior that have led to decompensation and/or hospitalizations   -Attend psychoeducation groups with licensed psychotherapist to learn about Illness Management Recovery (IMR) concepts and enhance coping skills    -Practice effective communication with staff, peers, family, and community members  Participate in family sessions as necessary   -Encourage reality-based thought content by examining thought processes and cognitive distortions      -Work with treatment team in reintegration back into the community when appropriate  Outcome: Progressing    Therapist and patient had a brief encounter to discuss his group attendance and overall program participation  He was reminded, as he is daily by staff that he must be attending groups  Patient was also reminded that he has a behavioral plan in which he could earn rewards for completing recovery tasks such as ADLs and groups  This encounter took place after patient had been personally prompted for IMR in the morning, by this therapist as well  Patient has poor understanding of the consequences of not attending group, in that when restrictions are lifted, he may not necessarily be able to go out as he has planned  Therapist will continue to encourage patient to engage in recovery activities, with his ultimate goal being to discharge to a group home

## 2020-04-17 NOTE — PLAN OF CARE
Problem: Alteration in Thoughts and Perception  Goal: Refrain from acting on delusional thinking/internal stimuli  Description  Interventions:  - Monitor patient closely, per order   - Utilize least restrictive measures   - Set reasonable limits, give positive feedback for acceptable   - Administer medications as ordered and monitor of potential side effects  Outcome: Progressing  Goal: Agree to be compliant with medication regime, as prescribed and report medication side effects  Description  Interventions:  - Offer appropriate PRN medication and supervise ingestion; conduct AIMS, as needed   Outcome: Progressing  Goal: Attend and participate in unit activities, including therapeutic, recreational, and educational groups  Description  Interventions:  -Encourage Visitation and family involvement in care  Outcome: Progressing  Goal: Complete daily ADLs, including personal hygiene independently, as able  Description  Interventions:  - Observe, teach, and assist patient with ADLS  - Monitor and promote a balance of rest/activity, with adequate nutrition and elimination   Outcome: Progressing    Brielle Wong tended to his hygiene, was compliant with his morning medications and breakfast  Allowed staff to get his EKG, results appear within normal limits  Requested mylanta related to heartburn @ 1015 and upon reassessment it was effective  Less irritable than yesterday and more visible at this time of documentation  Attended IMR group and used the radio while socializing with peers in [employer] area  No complaints of discomfort voiced at this time of documentation  Will continue to monitor for changes in behavior

## 2020-04-18 PROCEDURE — 99232 SBSQ HOSP IP/OBS MODERATE 35: CPT | Performed by: NURSE PRACTITIONER

## 2020-04-18 RX ADMIN — CLOZAPINE 200 MG: 200 TABLET ORAL at 16:49

## 2020-04-18 RX ADMIN — OLANZAPINE 5 MG: 5 TABLET, FILM COATED ORAL at 20:37

## 2020-04-18 RX ADMIN — METFORMIN HYDROCHLORIDE 500 MG: 500 TABLET ORAL at 16:49

## 2020-04-18 RX ADMIN — TRAZODONE HYDROCHLORIDE 50 MG: 50 TABLET ORAL at 22:03

## 2020-04-18 RX ADMIN — ATORVASTATIN CALCIUM 10 MG: 10 TABLET, FILM COATED ORAL at 16:49

## 2020-04-18 RX ADMIN — OXYBUTYNIN CHLORIDE 5 MG: 5 TABLET, EXTENDED RELEASE ORAL at 08:41

## 2020-04-18 RX ADMIN — DOCUSATE SODIUM 100 MG: 100 CAPSULE, LIQUID FILLED ORAL at 08:41

## 2020-04-18 RX ADMIN — DIVALPROEX SODIUM 1500 MG: 500 TABLET, EXTENDED RELEASE ORAL at 20:37

## 2020-04-18 RX ADMIN — PANTOPRAZOLE SODIUM 40 MG: 40 TABLET, DELAYED RELEASE ORAL at 05:18

## 2020-04-18 RX ADMIN — LEVOTHYROXINE SODIUM 75 MCG: 75 TABLET ORAL at 05:18

## 2020-04-18 RX ADMIN — METFORMIN HYDROCHLORIDE 500 MG: 500 TABLET ORAL at 08:41

## 2020-04-18 RX ADMIN — DOCUSATE SODIUM 100 MG: 100 CAPSULE, LIQUID FILLED ORAL at 17:46

## 2020-04-18 NOTE — NURSING NOTE
Received pt in bed at change of shift with eyes closed; chest movement noted  Out of his bed after midnight requesting and given a snack and ice water  Returned to bed without any difficulties and appear to sleep thus this far as per continual rounding  Will continue to monitor

## 2020-04-18 NOTE — PLAN OF CARE
Problem: Alteration in Thoughts and Perception  Goal: Agree to be compliant with medication regime, as prescribed and report medication side effects  Description  Interventions:  - Offer appropriate PRN medication and supervise ingestion; conduct AIMS, as needed   Outcome: Progressing  Goal: Complete daily ADLs, including personal hygiene independently, as able  Description  Interventions:  - Observe, teach, and assist patient with ADLS  - Monitor and promote a balance of rest/activity, with adequate nutrition and elimination   Outcome: Progressing     Problem: GASTROINTESTINAL - ADULT  Goal: Maintains adequate nutritional intake  Description  INTERVENTIONS:  - Monitor percentage of each meal consumed  - Identify factors contributing to decreased intake, treat as appropriate  - Assist with meals as needed  - Monitor I&O, weight, and lab values if indicated  - Obtain nutrition services referral as needed  Outcome: Progressing     Ate 100% of dinner, had snack before bed, compliant with routine medications, visible, social with select peers, gait steady, denied pain, used radio for coping, no behavioral issues will continue to monitor

## 2020-04-18 NOTE — PROGRESS NOTES
Progress Note - Behavioral Health   Tex Crabtree 48 y o  male MRN: [id number]  [facility]-09 Encounter: [id number]    The patient was seen for continuing care and reviewed with treatment team   He is disheveled and scant, guarded, and irritable on approach  Responses are abrupt and given before the entire question was asked  Denies depression and anxiety and says his mood is good  Denies issues with sleep or appetite  Denies medication side effects  Denies auditory hallucinations  Denies SI  He did cooperate with his EKG yesterday which indicated NSR and QTC of 432  There was a previously unreported left axis deviation so Internal Medicine was notified to review  Current Mental Status Evaluation:  Appearance:  Poor eye contact and disheveled   Behavior:  guarded, dismissive   Mood:  irritable   Affect: labile   Speech: Sparse, abrupt   Thought Process:  Unable to assess due to scant verbalization   Thought Content:  Did not spontaneously voice delusional material   Perceptual Disturbances: Denies but uncertain   Risk Potential: No suicidal or homicidal ideation   Orientation:   Oriented x 3     Progress Toward Goals:  No significant change in last 24 hours  Principal Problem:    Schizoaffective disorder, bipolar type ([address] )  Active Problems:    Type 2 diabetes mellitus without complication, without long-term current use of insulin ([facility])    Benign essential hypertension    Gastroesophageal reflux disease without esophagitis    Acquired hypothyroidism    Tobacco abuse    Diabetes insipidus, nephrogenic ([address] )      Recommended Treatment: Continue with pharmacotherapy, group therapy, milieu therapy and occupational therapy    The patient will be maintained on the following medications:    Current Facility-Administered Medications:  acetaminophen 325 mg Oral Q6H PRN Homer Jimenez MD   acetaminophen 650 mg Oral Q6H PRN Homer Jimenez MD   acetaminophen 975 mg Oral Q8H PRN Homer Jimenez MD aluminum-magnesium hydroxide-simethicone 30 mL Oral Q4H PRN Antonia Carver MD   atorvastatin 10 mg Oral Daily With Danish Pacheco MD   benztropine 1 mg Intramuscular Q6H PRN Antonia Carver MD   benztropine 1 mg Oral Q6H PRN Antonia Carver MD   cloZAPine 200 mg Oral Daily Antonia Carver MD   divalproex sodium 1,500 mg Oral HS Antonia Carver, MD   docusate sodium 100 mg Oral BID Antonia Carver MD   haloperidol 5 mg Oral Q6H PRN Antonia Carver MD   haloperidol lactate 5 mg Intramuscular Q6H PRN Antonia Carver MD   levothyroxine 75 mcg Oral Early Morning Antonia Carver MD   LORazepam 1 mg Intramuscular Q6H PRN Antonia Carver MD   LORazepam 1 mg Oral Q6H PRN Antonia Carver MD   magnesium hydroxide 30 mL Oral Daily PRN Antonia Carver MD   metFORMIN 500 mg Oral BID With Meals Antonia Carver MD   nicotine polacrilex 2 mg Oral Q2H PRN Antonia Carver MD   OLANZapine 5 mg Oral HS Antonia Carver MD   oxybutynin 5 mg Oral Daily Antonia Carver MD   pantoprazole 40 mg Oral Early Morning Antonia Carver MD   traZODone 50 mg Oral HS PRN Antonia Carver MD

## 2020-04-18 NOTE — PLAN OF CARE
Problem: Alteration in Thoughts and Perception  Goal: Treatment Goal: Gain control of psychotic behaviors/thinking, reduce/eliminate presenting symptoms and demonstrate improved reality functioning upon discharge  Outcome: Progressing  Goal: Verbalize thoughts and feelings  Description  Interventions:  - Promote a nonjudgmental and trusting relationship with the patient through active listening and therapeutic communication  - Assess patient's level of functioning, behavior and potential for risk  - Engage patient in 1 on 1 interactions  - Encourage patient to express fears, feelings, frustrations, and discuss symptoms    - [place] patient to reality, help patient recognize reality-based thinking   - Administer medications as ordered and assess for potential side effects  - Provide the patient education related to the signs and symptoms of the illness and desired effects of prescribed medications  Outcome: Progressing  Goal: Refrain from acting on delusional thinking/internal stimuli  Description  Interventions:  - Monitor patient closely, per order   - Utilize least restrictive measures   - Set reasonable limits, give positive feedback for acceptable   - Administer medications as ordered and monitor of potential side effects  Outcome: Progressing  Goal: Agree to be compliant with medication regime, as prescribed and report medication side effects  Description  Interventions:  - Offer appropriate PRN medication and supervise ingestion; conduct AIMS, as needed   Outcome: Progressing  Goal: Attend and participate in unit activities, including therapeutic, recreational, and educational groups  Description  Interventions:  -Encourage Visitation and family involvement in care  Outcome: Progressing  Goal: Recognize dysfunctional thoughts, communicate reality-based thoughts at the time of discharge  Description  Interventions:  - Provide medication and psycho-education to assist patient in compliance and developing insight into his/her illness   Outcome: Progressing  Goal: Complete daily ADLs, including personal hygiene independently, as able  Description  Interventions:  - Observe, teach, and assist patient with ADLS  - Monitor and promote a balance of rest/activity, with adequate nutrition and elimination   Outcome: Progressing     Problem: Ineffective Coping  Goal: Identifies ineffective coping skills  Outcome: Progressing  Goal: Identifies healthy coping skills  Outcome: Progressing  Goal: Demonstrates healthy coping skills  Outcome: Progressing  Goal: Patient/Family participate in treatment and DC plans  Description  Interventions:  - Provide therapeutic environment  Outcome: Progressing  Goal: Patient/Family verbalizes awareness of resources  Outcome: Progressing  Goal: Understands least restrictive measures  Description  Interventions:  - Utilize least restrictive behavior  Outcome: Progressing     Problem: RESPIRATORY - ADULT  Goal: Achieves optimal ventilation and oxygenation  Description  INTERVENTIONS:  - Assess for changes in respiratory status  - Assess for changes in mentation and behavior  - Position to facilitate oxygenation and minimize respiratory effort  - Oxygen administered by appropriate delivery if ordered  - Initiate smoking cessation education as indicated  - Encourage broncho-pulmonary hygiene including cough, deep breathe, Incentive Spirometry  - Assess the need for suctioning and aspirate as needed  - Assess and instruct to report SOB or any respiratory difficulty  - Respiratory Therapy support as indicated  Outcome: Progressing     Problem: GASTROINTESTINAL - ADULT  Goal: Minimal or absence of nausea and/or vomiting  Description  INTERVENTIONS:  - Administer IV fluids if ordered to ensure adequate hydration  - Maintain NPO status until nausea and vomiting are resolved  - Nasogastric tube if ordered  - Administer ordered antiemetic medications as needed  - Provide nonpharmacologic comfort measures as appropriate  - Advance diet as tolerated, if ordered  - Consider nutrition services referral to assist patient with adequate nutrition and appropriate food choices  Outcome: Progressing  Goal: Maintains or returns to baseline bowel function  Description  INTERVENTIONS:  - Assess bowel function  - Encourage oral fluids to ensure adequate hydration  - Administer IV fluids if ordered to ensure adequate hydration  - Administer ordered medications as needed  - Encourage mobilization and activity  - Consider nutritional services referral to assist patient with adequate nutrition and appropriate food choices  Outcome: Progressing  Goal: Maintains adequate nutritional intake  Description  INTERVENTIONS:  - Monitor percentage of each meal consumed  - Identify factors contributing to decreased intake, treat as appropriate  - Assist with meals as needed  - Monitor I&O, weight, and lab values if indicated  - Obtain nutrition services referral as needed  Outcome: Progressing     Problem: METABOLIC, FLUID AND ELECTROLYTES - ADULT  Goal: Glucose maintained within target range  Description  INTERVENTIONS:  - Monitor Blood Glucose as ordered  - Assess for signs and symptoms of hyperglycemia and hypoglycemia  - Administer ordered medications to maintain glucose within target range  - Assess nutritional intake and initiate nutrition service referral as needed  Outcome: Progressing     Problem: DISCHARGE PLANNING  Goal: Discharge to home or other facility with appropriate resources  Description    CASE MANAGEMENT INTERVENTIONS:  - Conduct assessment to determine patient/family and health care team treatment goals, and need for post-acute services based on payer coverage, community resources, patient preferences and barriers to discharge    - Address psychosocial, clinical, and financial barriers to discharge as identified in assessment in conjunction with the patient/family and health care team   - Assist the patient in reintegration back into the community by removing barriers which may hinder a successful discharge once deemed stable  - Arrange appropriate level of post-acute services according to patient's needs and preference and payer coverage in collaboration with the physician and health care team   - Communicate with and update the patient/family, physician, and health care team regarding progress on the discharge plan  - Arrange appropriate transportation to post-acute venues  Outcome: Progressing    Quiet, cooperative, med and meal compliant  Visible intermittently  Denies depression, anxiety, SI, HI and pain  Attended community meeting and journaling  Was compliant with vitals  Got up for bin  Ate 100% of both meals    Will continue to monitor progress in recovery program

## 2020-04-19 PROCEDURE — 99232 SBSQ HOSP IP/OBS MODERATE 35: CPT | Performed by: NURSE PRACTITIONER

## 2020-04-19 RX ADMIN — CLOZAPINE 200 MG: 200 TABLET ORAL at 16:47

## 2020-04-19 RX ADMIN — DOCUSATE SODIUM 100 MG: 100 CAPSULE, LIQUID FILLED ORAL at 09:12

## 2020-04-19 RX ADMIN — ATORVASTATIN CALCIUM 10 MG: 10 TABLET, FILM COATED ORAL at 16:47

## 2020-04-19 RX ADMIN — METFORMIN HYDROCHLORIDE 500 MG: 500 TABLET ORAL at 16:47

## 2020-04-19 RX ADMIN — PANTOPRAZOLE SODIUM 40 MG: 40 TABLET, DELAYED RELEASE ORAL at 06:15

## 2020-04-19 RX ADMIN — OLANZAPINE 5 MG: 5 TABLET, FILM COATED ORAL at 20:49

## 2020-04-19 RX ADMIN — LEVOTHYROXINE SODIUM 75 MCG: 75 TABLET ORAL at 06:15

## 2020-04-19 RX ADMIN — DOCUSATE SODIUM 100 MG: 100 CAPSULE, LIQUID FILLED ORAL at 17:12

## 2020-04-19 RX ADMIN — METFORMIN HYDROCHLORIDE 500 MG: 500 TABLET ORAL at 09:12

## 2020-04-19 RX ADMIN — OXYBUTYNIN CHLORIDE 5 MG: 5 TABLET, EXTENDED RELEASE ORAL at 09:12

## 2020-04-19 RX ADMIN — DIVALPROEX SODIUM 1500 MG: 500 TABLET, EXTENDED RELEASE ORAL at 20:49

## 2020-04-19 NOTE — PLAN OF CARE
Problem: Alteration in Thoughts and Perception  Goal: Verbalize thoughts and feelings  Description  Interventions:  - Promote a nonjudgmental and trusting relationship with the patient through active listening and therapeutic communication  - Assess patient's level of functioning, behavior and potential for risk  - Engage patient in 1 on 1 interactions  - Encourage patient to express fears, feelings, frustrations, and discuss symptoms    - [place] patient to reality, help patient recognize reality-based thinking   - Administer medications as ordered and assess for potential side effects  - Provide the patient education related to the signs and symptoms of the illness and desired effects of prescribed medications  Outcome: Progressing  Goal: Refrain from acting on delusional thinking/internal stimuli  Description  Interventions:  - Monitor patient closely, per order   - Utilize least restrictive measures   - Set reasonable limits, give positive feedback for acceptable   - Administer medications as ordered and monitor of potential side effects  Outcome: Progressing  Goal: Agree to be compliant with medication regime, as prescribed and report medication side effects  Description  Interventions:  - Offer appropriate PRN medication and supervise ingestion; conduct AIMS, as needed   Outcome: Progressing  Goal: Attend and participate in unit activities, including therapeutic, recreational, and educational groups  Description  Interventions:  -Encourage Visitation and family involvement in care  Outcome: Progressing  Goal: Complete daily ADLs, including personal hygiene independently, as able  Description  Interventions:  - Observe, teach, and assist patient with ADLS  - Monitor and promote a balance of rest/activity, with adequate nutrition and elimination   Outcome: Progressing     Problem: Ineffective Coping  Goal: Demonstrates healthy coping skills  Outcome: Progressing  Goal: Understands least restrictive measures  Description  Interventions:  - Utilize least restrictive behavior  Outcome: Progressing     Problem: GASTROINTESTINAL - ADULT  Goal: Maintains adequate nutritional intake  Description  INTERVENTIONS:  - Monitor percentage of each meal consumed  - Identify factors contributing to decreased intake, treat as appropriate  - Assist with meals as needed  - Monitor I&O, weight, and lab values if indicated  - Obtain nutrition services referral as needed  Outcome: Amada Reed has been awake, alert, and visible intermittently out in the milieu  Pt completed his daily ADL's today and showered  Pt ate 100% supper  Social with peers when out in milieu and listened to music on radio in [facility] and watched tv in living room  Compliant with all scheduled meds without prompting  No behavioral issues  Remains disheveled in appearance and dressed in layers  Pt continues to be preoccupied with discharge  Rests in room at intervals  Did not attend evening groups but came out for snack after  Continue to monitor/assess for any changes

## 2020-04-19 NOTE — PLAN OF CARE
Problem: Alteration in Thoughts and Perception  Goal: Treatment Goal: Gain control of psychotic behaviors/thinking, reduce/eliminate presenting symptoms and demonstrate improved reality functioning upon discharge  Outcome: Progressing  Goal: Verbalize thoughts and feelings  Description  Interventions:  - Promote a nonjudgmental and trusting relationship with the patient through active listening and therapeutic communication  - Assess patient's level of functioning, behavior and potential for risk  - Engage patient in 1 on 1 interactions  - Encourage patient to express fears, feelings, frustrations, and discuss symptoms    - [place] patient to reality, help patient recognize reality-based thinking   - Administer medications as ordered and assess for potential side effects  - Provide the patient education related to the signs and symptoms of the illness and desired effects of prescribed medications  Outcome: Progressing  Goal: Refrain from acting on delusional thinking/internal stimuli  Description  Interventions:  - Monitor patient closely, per order   - Utilize least restrictive measures   - Set reasonable limits, give positive feedback for acceptable   - Administer medications as ordered and monitor of potential side effects  Outcome: Progressing  Goal: Agree to be compliant with medication regime, as prescribed and report medication side effects  Description  Interventions:  - Offer appropriate PRN medication and supervise ingestion; conduct AIMS, as needed   Outcome: Progressing  Goal: Attend and participate in unit activities, including therapeutic, recreational, and educational groups  Description  Interventions:  -Encourage Visitation and family involvement in care  Outcome: Progressing  Goal: Recognize dysfunctional thoughts, communicate reality-based thoughts at the time of discharge  Description  Interventions:  - Provide medication and psycho-education to assist patient in compliance and developing insight into his/her illness   Outcome: Progressing  Goal: Complete daily ADLs, including personal hygiene independently, as able  Description  Interventions:  - Observe, teach, and assist patient with ADLS  - Monitor and promote a balance of rest/activity, with adequate nutrition and elimination   Outcome: Progressing     Problem: Ineffective Coping  Goal: Identifies ineffective coping skills  Outcome: Progressing  Goal: Identifies healthy coping skills  Outcome: Progressing  Goal: Demonstrates healthy coping skills  Outcome: Progressing  Goal: Patient/Family participate in treatment and DC plans  Description  Interventions:  - Provide therapeutic environment  Outcome: Progressing  Goal: Patient/Family verbalizes awareness of resources  Outcome: Progressing  Goal: Understands least restrictive measures  Description  Interventions:  - Utilize least restrictive behavior  Outcome: Progressing     Problem: RESPIRATORY - ADULT  Goal: Achieves optimal ventilation and oxygenation  Description  INTERVENTIONS:  - Assess for changes in respiratory status  - Assess for changes in mentation and behavior  - Position to facilitate oxygenation and minimize respiratory effort  - Oxygen administered by appropriate delivery if ordered  - Initiate smoking cessation education as indicated  - Encourage broncho-pulmonary hygiene including cough, deep breathe, Incentive Spirometry  - Assess the need for suctioning and aspirate as needed  - Assess and instruct to report SOB or any respiratory difficulty  - Respiratory Therapy support as indicated  Outcome: Progressing     Problem: GASTROINTESTINAL - ADULT  Goal: Minimal or absence of nausea and/or vomiting  Description  INTERVENTIONS:  - Administer IV fluids if ordered to ensure adequate hydration  - Maintain NPO status until nausea and vomiting are resolved  - Nasogastric tube if ordered  - Administer ordered antiemetic medications as needed  - Provide nonpharmacologic comfort measures as appropriate  - Advance diet as tolerated, if ordered  - Consider nutrition services referral to assist patient with adequate nutrition and appropriate food choices  Outcome: Progressing  Goal: Maintains or returns to baseline bowel function  Description  INTERVENTIONS:  - Assess bowel function  - Encourage oral fluids to ensure adequate hydration  - Administer IV fluids if ordered to ensure adequate hydration  - Administer ordered medications as needed  - Encourage mobilization and activity  - Consider nutritional services referral to assist patient with adequate nutrition and appropriate food choices  Outcome: Progressing  Goal: Maintains adequate nutritional intake  Description  INTERVENTIONS:  - Monitor percentage of each meal consumed  - Identify factors contributing to decreased intake, treat as appropriate  - Assist with meals as needed  - Monitor I&O, weight, and lab values if indicated  - Obtain nutrition services referral as needed  Outcome: Progressing     Problem: METABOLIC, FLUID AND ELECTROLYTES - ADULT  Goal: Glucose maintained within target range  Description  INTERVENTIONS:  - Monitor Blood Glucose as ordered  - Assess for signs and symptoms of hyperglycemia and hypoglycemia  - Administer ordered medications to maintain glucose within target range  - Assess nutritional intake and initiate nutrition service referral as needed  Outcome: Progressing     Problem: DISCHARGE PLANNING  Goal: Discharge to home or other facility with appropriate resources  Description    CASE MANAGEMENT INTERVENTIONS:  - Conduct assessment to determine patient/family and health care team treatment goals, and need for post-acute services based on payer coverage, community resources, patient preferences and barriers to discharge    - Address psychosocial, clinical, and financial barriers to discharge as identified in assessment in conjunction with the patient/family and health care team   - Assist the patient in reintegration back into the community by removing barriers which may hinder a successful discharge once deemed stable  - Arrange appropriate level of post-acute services according to patient's needs and preference and payer coverage in collaboration with the physician and health care team   - Communicate with and update the patient/family, physician, and health care team regarding progress on the discharge plan  - Arrange appropriate transportation to post-acute venues  Outcome: Progressing    Quiet, cooperative, med and meal compliant  Visible intermittently  Looking to do his wash  Denies depression, anxiety, SI, HI and pain  Attended community meeting, fresh air and journaling group  Was compliant with vitals  Was up and got bin for hygiene  Did not shower  Is disheveled and clothing layered  Wants pants from closet and to wash a hat  Patient ate 100% of both meals    Will continue to monitor progress in recovery program

## 2020-04-19 NOTE — PROGRESS NOTES
Progress Note - Behavioral Health   Lei Rosenthaly 48 y o  male MRN: [id number]  Unit/Bed#: [facility] 543-13 Encounter: [id number]    The patient was seen for continuing care and reviewed with treatment team   He is disheveled and wearing multiple layers of clothing  He has poor eye contact and is irritable, guarded and scant on approach  Indicated his sleep and appetite are normal  He is feeling annoyed about being here  "I just want to leave this place"  He is dismissive and got up abruptly and said are we done? As he walked away  Internal Medicine reviewed the EKG from yesterday and noted no concerns  Current Mental Status Evaluation:  Appearance:  Poor eye contact and disheveled   Behavior:  guarded and Dismissive   Mood:  irritable   Affect: labile   Speech: Sparse   Thought Process:  Unable to assess due to scant verbalization   Thought Content:  Cannot be assessed due to patient factors   Perceptual Disturbances: Uncertain   Risk Potential: No suicidal or homicidal ideation   Orientation:   Unable to assess due to patient factors     Progress Toward Goals:  No significant change in the last 24 hours  Principal Problem:    Schizoaffective disorder, bipolar type ([address] )  Active Problems:    Type 2 diabetes mellitus without complication, without long-term current use of insulin ([facility])    Benign essential hypertension    Gastroesophageal reflux disease without esophagitis    Acquired hypothyroidism    Tobacco abuse    Diabetes insipidus, nephrogenic ([facility] )      Recommended Treatment: Continue with pharmacotherapy, group therapy, milieu therapy and occupational therapy    The patient will be maintained on the following medications:    Current Facility-Administered Medications:  acetaminophen 325 mg Oral Q6H PRN Loren Singer MD   acetaminophen 650 mg Oral Q6H PRN Loren Singer MD   acetaminophen 975 mg Oral Q8H PRN Loren Singer MD   aluminum-magnesium hydroxide-simethicone 30 mL Oral Q4H PRN Loren Singer MD atorvastatin 10 mg Oral Daily With Edmundo Kat MD   benztropine 1 mg Intramuscular Q6H PRN Jordan Parra MD   benztropine 1 mg Oral Q6H PRN Jordan Parra MD   cloZAPine 200 mg Oral Daily Jordan Parra MD   divalproex sodium 1,500 mg Oral HS Jordan Parra MD   docusate sodium 100 mg Oral BID Jordan Parra MD   haloperidol 5 mg Oral Q6H PRN Jordan Parra MD   haloperidol lactate 5 mg Intramuscular Q6H PRN Jordan Parra MD   levothyroxine 75 mcg Oral Early Morning Jordan Parra MD   LORazepam 1 mg Intramuscular Q6H PRN Jordan Parra MD   LORazepam 1 mg Oral Q6H PRN Jordan Parra MD   magnesium hydroxide 30 mL Oral Daily PRN Jordan Parra MD   metFORMIN 500 mg Oral BID With Meals Jordan Parra MD   nicotine polacrilex 2 mg Oral Q2H PRN Jordan Parra MD   OLANZapine 5 mg Oral HS Jordan Parra MD   oxybutynin 5 mg Oral Daily Jordan Parra MD   pantoprazole 40 mg Oral Early Morning Jordan Parra MD   traZODone 50 mg Oral HS PRN Joradn Parra MD

## 2020-04-20 PROCEDURE — 99233 SBSQ HOSP IP/OBS HIGH 50: CPT | Performed by: PSYCHIATRY & NEUROLOGY

## 2020-04-20 RX ADMIN — DIVALPROEX SODIUM 1500 MG: 500 TABLET, EXTENDED RELEASE ORAL at 20:37

## 2020-04-20 RX ADMIN — ATORVASTATIN CALCIUM 10 MG: 10 TABLET, FILM COATED ORAL at 16:50

## 2020-04-20 RX ADMIN — PANTOPRAZOLE SODIUM 40 MG: 40 TABLET, DELAYED RELEASE ORAL at 06:05

## 2020-04-20 RX ADMIN — DOCUSATE SODIUM 100 MG: 100 CAPSULE, LIQUID FILLED ORAL at 17:03

## 2020-04-20 RX ADMIN — METFORMIN HYDROCHLORIDE 500 MG: 500 TABLET ORAL at 16:50

## 2020-04-20 RX ADMIN — CLOZAPINE 200 MG: 200 TABLET ORAL at 16:50

## 2020-04-20 RX ADMIN — OXYBUTYNIN CHLORIDE 5 MG: 5 TABLET, EXTENDED RELEASE ORAL at 08:21

## 2020-04-20 RX ADMIN — DOCUSATE SODIUM 100 MG: 100 CAPSULE, LIQUID FILLED ORAL at 08:21

## 2020-04-20 RX ADMIN — OLANZAPINE 5 MG: 5 TABLET, FILM COATED ORAL at 20:37

## 2020-04-20 RX ADMIN — LEVOTHYROXINE SODIUM 75 MCG: 75 TABLET ORAL at 06:05

## 2020-04-20 RX ADMIN — METFORMIN HYDROCHLORIDE 500 MG: 500 TABLET ORAL at 08:21

## 2020-04-20 NOTE — PROGRESS NOTES
04/20/20 1051   Team Meeting   Meeting Type Daily Rounds   Team Members Present   Team Members Present Physician;Nurse;   Physician Team Member Dr Diallo Cali Team Member Sim Stokes, RN   Care Management Team Member EDELMIRA Singh   Other (Discipline and Name) Suzan Roberts and controlled over weekend  Dr Wendy Sheehan aware of EKG results, no behavioral issues

## 2020-04-20 NOTE — TREATMENT TEAM
Patient declined      04/20/20 0900   Activity/Group Checklist   Group Community meeting  (Goal Cards )   Attendance Did not attend   Attendance Duration (min) 16-30   Affect/Mood GAEL

## 2020-04-20 NOTE — PLAN OF CARE
Problem: Alteration in Thoughts and Perception  Goal: Agree to be compliant with medication regime, as prescribed and report medication side effects  Description  Interventions:  - Offer appropriate PRN medication and supervise ingestion; conduct AIMS, as needed   Outcome: Progressing  Goal: Attend and participate in unit activities, including therapeutic, recreational, and educational groups  Description  Interventions:  -Encourage Visitation and family involvement in care  Outcome: Not Progressing  Goal: Complete daily ADLs, including personal hygiene independently, as able  Description  Interventions:  - Observe, teach, and assist patient with ADLS  - Monitor and promote a balance of rest/activity, with adequate nutrition and elimination   Outcome: Not Progressing    Fermin Grayson has been intermittently visible on the unit, coming out of his room as needed, for meals and for medications  Did not attend groups today other than fresh air  Did not tend to his hygiene on this shift  Pleasant during interactions, less preoccupied about discharge during interactions  Denies feeling depressed, anxious or having thoughts to harm himself or others  Behaviors controlled at this time

## 2020-04-20 NOTE — PROGRESS NOTES
Pt sleeping beginning of shift  Woke up @ 0215 requesting water & something out of the closet  Water given & informed pt he had to wait till morning to retrieve  Immediately went back to room  Sleeping @ present, no behavioral issues   Will continue to monitor

## 2020-04-20 NOTE — PLAN OF CARE
Problem: DISCHARGE PLANNING  Goal: Discharge to home or other facility with appropriate resources  Description    CASE MANAGEMENT INTERVENTIONS:  - Conduct assessment to determine patient/family and health care team treatment goals, and need for post-acute services based on payer coverage, community resources, patient preferences and barriers to discharge  - Address psychosocial, clinical, and financial barriers to discharge as identified in assessment in conjunction with the patient/family and health care team   - Assist the patient in reintegration back into the community by removing barriers which may hinder a successful discharge once deemed stable  - Arrange appropriate level of post-acute services according to patient's needs and preference and payer coverage in collaboration with the physician and health care team   - Communicate with and update the patient/family, physician, and health care team regarding progress on the discharge plan  - Arrange appropriate transportation to post-acute venues  Outcome: Progressing    Patient with less behavioral outbursts  Continues to be fixated on discharge and desire to leave  Patient continues to need constant redirection regarding COVID-19 quarantine and standstill with community reintegration  CM will continue to follow and provide services as needed
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
## 2020-04-20 NOTE — PLAN OF CARE
Problem: Alteration in Thoughts and Perception  Goal: Verbalize thoughts and feelings  Description  Interventions:  - Promote a nonjudgmental and trusting relationship with the patient through active listening and therapeutic communication  - Assess patient's level of functioning, behavior and potential for risk  - Engage patient in 1 on 1 interactions  - Encourage patient to express fears, feelings, frustrations, and discuss symptoms    - [place] patient to reality, help patient recognize reality-based thinking   - Administer medications as ordered and assess for potential side effects  - Provide the patient education related to the signs and symptoms of the illness and desired effects of prescribed medications  Outcome: Progressing  Goal: Refrain from acting on delusional thinking/internal stimuli  Description  Interventions:  - Monitor patient closely, per order   - Utilize least restrictive measures   - Set reasonable limits, give positive feedback for acceptable   - Administer medications as ordered and monitor of potential side effects  Outcome: Progressing  Goal: Agree to be compliant with medication regime, as prescribed and report medication side effects  Description  Interventions:  - Offer appropriate PRN medication and supervise ingestion; conduct AIMS, as needed   Outcome: Progressing  Goal: Attend and participate in unit activities, including therapeutic, recreational, and educational groups  Description  Interventions:  -Encourage Visitation and family involvement in care  Outcome: Not Progressing     Problem: Ineffective Coping  Goal: Demonstrates healthy coping skills  Outcome: Progressing  Goal: Understands least restrictive measures  Description  Interventions:  - Utilize least restrictive behavior  Outcome: Progressing     Problem: GASTROINTESTINAL - ADULT  Goal: Maintains adequate nutritional intake  Description  INTERVENTIONS:  - Monitor percentage of each meal consumed  - Identify factors contributing to decreased intake, treat as appropriate  - Assist with meals as needed  - Monitor I&O, weight, and lab values if indicated  - Obtain nutrition services referral as needed  Outcome: Zion Wintersr has been awake, alert, and visible intermittently out in the milieu  Ate 100% supper  Less preoccupied with discharge  Listens to music on radio in [employer] with peers at times and rests in room at intervals  Disheveled in appearance and dressed in layers  No behavioral issues  Pt did not attend any evening groups but came out for snack after  Compliant with scheduled meds without prompting  Continue to monitor/assess for any changes

## 2020-04-20 NOTE — PLAN OF CARE
Problem: Alteration in Thoughts and Perception  Goal: Verbalize thoughts and feelings  Description  Interventions:  - Promote a nonjudgmental and trusting relationship with the patient through active listening and therapeutic communication  - Assess patient's level of functioning, behavior and potential for risk  - Engage patient in 1 on 1 interactions  - Encourage patient to express fears, feelings, frustrations, and discuss symptoms    - [place] patient to reality, help patient recognize reality-based thinking   - Administer medications as ordered and assess for potential side effects  - Provide the patient education related to the signs and symptoms of the illness and desired effects of prescribed medications  Outcome: Progressing  Goal: Refrain from acting on delusional thinking/internal stimuli  Description  Interventions:  - Monitor patient closely, per order   - Utilize least restrictive measures   - Set reasonable limits, give positive feedback for acceptable   - Administer medications as ordered and monitor of potential side effects  Outcome: Progressing  Goal: Agree to be compliant with medication regime, as prescribed and report medication side effects  Description  Interventions:  - Offer appropriate PRN medication and supervise ingestion; conduct AIMS, as needed   Outcome: Progressing  Goal: Attend and participate in unit activities, including therapeutic, recreational, and educational groups  Description  Interventions:  -Encourage Visitation and family involvement in care  Outcome: Progressing     Problem: Ineffective Coping  Goal: Demonstrates healthy coping skills  Outcome: Progressing  Goal: Understands least restrictive measures  Description  Interventions:  - Utilize least restrictive behavior  Outcome: Progressing     Problem: GASTROINTESTINAL - ADULT  Goal: Maintains adequate nutritional intake  Description  INTERVENTIONS:  - Monitor percentage of each meal consumed  - Identify factors contributing to decreased intake, treat as appropriate  - Assist with meals as needed  - Monitor I&O, weight, and lab values if indicated  - Obtain nutrition services referral as needed  Outcome: Malgorzata Gill has been awake, alert, and visible intermittently out in the milieu  Pt enjoys listening to music on radio with peers in [facility]  Less preoccupied with discharge  Ate 100% supper  Disheveled in appearance and dressed in layers  Pleasant, polite, and cooperative  Denies any depression, anxiety, si/hi, intent, plan, a/v hallucinations, and has not verbalized any delusions  Rests in room and naps at intervals  Attended fresh air group out on deck with staff and peers  Attended and participated in evening group  Had evening snack  Compliant with all scheduled meds without prompting  No behavioral issues  Continue to monitor/assess for any changes

## 2020-04-20 NOTE — TREATMENT TEAM
Patient declined      04/20/20 1100   Activity/Group Checklist   Group   (IMR/Cognitive Restructuring )   Attendance Did not attend   Attendance Duration (min) 46-60   Affect/Mood GAEL
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
## 2020-04-21 PROCEDURE — 99232 SBSQ HOSP IP/OBS MODERATE 35: CPT | Performed by: PSYCHIATRY & NEUROLOGY

## 2020-04-21 RX ADMIN — OLANZAPINE 5 MG: 5 TABLET, FILM COATED ORAL at 20:49

## 2020-04-21 RX ADMIN — DIVALPROEX SODIUM 1500 MG: 500 TABLET, EXTENDED RELEASE ORAL at 20:49

## 2020-04-21 RX ADMIN — OXYBUTYNIN CHLORIDE 5 MG: 5 TABLET, EXTENDED RELEASE ORAL at 09:01

## 2020-04-21 RX ADMIN — PANTOPRAZOLE SODIUM 40 MG: 40 TABLET, DELAYED RELEASE ORAL at 05:44

## 2020-04-21 RX ADMIN — CLOZAPINE 200 MG: 200 TABLET ORAL at 17:00

## 2020-04-21 RX ADMIN — TRAZODONE HYDROCHLORIDE 50 MG: 50 TABLET ORAL at 22:03

## 2020-04-21 RX ADMIN — LEVOTHYROXINE SODIUM 75 MCG: 75 TABLET ORAL at 05:44

## 2020-04-21 RX ADMIN — DOCUSATE SODIUM 100 MG: 100 CAPSULE, LIQUID FILLED ORAL at 17:10

## 2020-04-21 RX ADMIN — ATORVASTATIN CALCIUM 10 MG: 10 TABLET, FILM COATED ORAL at 17:10

## 2020-04-21 RX ADMIN — METFORMIN HYDROCHLORIDE 500 MG: 500 TABLET ORAL at 17:11

## 2020-04-21 RX ADMIN — METFORMIN HYDROCHLORIDE 500 MG: 500 TABLET ORAL at 09:01

## 2020-04-21 RX ADMIN — DOCUSATE SODIUM 100 MG: 100 CAPSULE, LIQUID FILLED ORAL at 09:01

## 2020-04-21 NOTE — PROGRESS NOTES
04/21/20 1117   Team Meeting   Meeting Type Tx Team Meeting   Initial Conference Date 04/21/20   Next Conference Date 04/28/20   Team Members Present   Team Members Present Physician;Nurse;; Other (Discipline and Name)   Physician Team Member Dr Lam Rosenthal Team Member Dre Wooten, RN   Care Management Team Member EDELMIRA Ma   Other (Discipline and Name) [facility]   Patient/Family Present   Patient Present No   Patient's Family Present No     Patient refused to participate in treatment team today despite encouragement from staff to attend  Patient non compliant with mask procedure today  Patient's group attendance was select  CM received update from Patrice Felder that a referral to [place] [id number] will be appropriate for patient and MD agrees  Patrice Felder to run by Kelly Musa to make sure this is the correct plan and then will let CM know

## 2020-04-21 NOTE — TREATMENT TEAM
Patient declined      04/21/20 0900   Activity/Group Checklist   Group Community meeting  (Morning Stretch )   Attendance Did not attend   Attendance Duration (min) 16-30   Affect/Mood GAEL
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
## 2020-04-21 NOTE — NURSING NOTE
Pt awake and out of his room time one for a drink of water and a snack which was given and returned to bed without any difficulties  Otherwise, appears to sleep thus this far as per continual rounding  Will  continue to monitor

## 2020-04-21 NOTE — PROGRESS NOTES
04/21/20 0800   Team Meeting   Meeting Type Daily Rounds   Team Members Present   Team Members Present Physician;Nurse;; Other (Discipline and Name)   Physician Team Member Dr Diallo Rizzo Team Member Griffin Harris RN   Care Management Team Member EDELMIRA Paredes   Other (Discipline and Name) Rolando Gonzalez LCSW; [facility] [place]  CPS     Not compliant with behavioral plan  Needs redirection to wear mask properly  Not attending groups

## 2020-04-21 NOTE — TREATMENT TEAM
Patient declined      04/21/20 1100   Activity/Group Checklist   Group   (IMR/Open Discussion)   Attendance Did not attend   Attendance Duration (min) 46-60   Affect/Mood GAEL

## 2020-04-21 NOTE — PLAN OF CARE
Problem: Alteration in Thoughts and Perception  Goal: Treatment Goal: Gain control of psychotic behaviors/thinking, reduce/eliminate presenting symptoms and demonstrate improved reality functioning upon discharge  Outcome: Progressing  Goal: Verbalize thoughts and feelings  Description  Interventions:  - Promote a nonjudgmental and trusting relationship with the patient through active listening and therapeutic communication  - Assess patient's level of functioning, behavior and potential for risk  - Engage patient in 1 on 1 interactions  - Encourage patient to express fears, feelings, frustrations, and discuss symptoms    - [place] patient to reality, help patient recognize reality-based thinking   - Administer medications as ordered and assess for potential side effects  - Provide the patient education related to the signs and symptoms of the illness and desired effects of prescribed medications  Outcome: Progressing  Goal: Refrain from acting on delusional thinking/internal stimuli  Description  Interventions:  - Monitor patient closely, per order   - Utilize least restrictive measures   - Set reasonable limits, give positive feedback for acceptable   - Administer medications as ordered and monitor of potential side effects  Outcome: Progressing  Goal: Agree to be compliant with medication regime, as prescribed and report medication side effects  Description  Interventions:  - Offer appropriate PRN medication and supervise ingestion; conduct AIMS, as needed   Outcome: Progressing  Goal: Attend and participate in unit activities, including therapeutic, recreational, and educational groups  Description  Interventions:  -Encourage Visitation and family involvement in care  Outcome: Progressing  Goal: Recognize dysfunctional thoughts, communicate reality-based thoughts at the time of discharge  Description  Interventions:  - Provide medication and psycho-education to assist patient in compliance and developing insight into his/her illness   Outcome: Progressing  Goal: Complete daily ADLs, including personal hygiene independently, as able  Description  Interventions:  - Observe, teach, and assist patient with ADLS  - Monitor and promote a balance of rest/activity, with adequate nutrition and elimination   Outcome: Progressing     Problem: Ineffective Coping  Goal: Identifies ineffective coping skills  Outcome: Progressing  Goal: Identifies healthy coping skills  Outcome: Progressing  Goal: Demonstrates healthy coping skills  Outcome: Progressing  Goal: Patient/Family participate in treatment and DC plans  Description  Interventions:  - Provide therapeutic environment  Outcome: Progressing  Goal: Patient/Family verbalizes awareness of resources  Outcome: Progressing  Goal: Understands least restrictive measures  Description  Interventions:  - Utilize least restrictive behavior  Outcome: Progressing     Problem: RESPIRATORY - ADULT  Goal: Achieves optimal ventilation and oxygenation  Description  INTERVENTIONS:  - Assess for changes in respiratory status  - Assess for changes in mentation and behavior  - Position to facilitate oxygenation and minimize respiratory effort  - Oxygen administered by appropriate delivery if ordered  - Initiate smoking cessation education as indicated  - Encourage broncho-pulmonary hygiene including cough, deep breathe, Incentive Spirometry  - Assess the need for suctioning and aspirate as needed  - Assess and instruct to report SOB or any respiratory difficulty  - Respiratory Therapy support as indicated  Outcome: Progressing     Problem: GASTROINTESTINAL - ADULT  Goal: Minimal or absence of nausea and/or vomiting  Description  INTERVENTIONS:  - Administer IV fluids if ordered to ensure adequate hydration  - Maintain NPO status until nausea and vomiting are resolved  - Nasogastric tube if ordered  - Administer ordered antiemetic medications as needed  - Provide nonpharmacologic comfort measures as appropriate  - Advance diet as tolerated, if ordered  - Consider nutrition services referral to assist patient with adequate nutrition and appropriate food choices  Outcome: Progressing  Goal: Maintains or returns to baseline bowel function  Description  INTERVENTIONS:  - Assess bowel function  - Encourage oral fluids to ensure adequate hydration  - Administer IV fluids if ordered to ensure adequate hydration  - Administer ordered medications as needed  - Encourage mobilization and activity  - Consider nutritional services referral to assist patient with adequate nutrition and appropriate food choices  Outcome: Progressing  Goal: Maintains adequate nutritional intake  Description  INTERVENTIONS:  - Monitor percentage of each meal consumed  - Identify factors contributing to decreased intake, treat as appropriate  - Assist with meals as needed  - Monitor I&O, weight, and lab values if indicated  - Obtain nutrition services referral as needed  Outcome: Progressing     Problem: METABOLIC, FLUID AND ELECTROLYTES - ADULT  Goal: Glucose maintained within target range  Description  INTERVENTIONS:  - Monitor Blood Glucose as ordered  - Assess for signs and symptoms of hyperglycemia and hypoglycemia  - Administer ordered medications to maintain glucose within target range  - Assess nutritional intake and initiate nutrition service referral as needed  Outcome: Progressing     Problem: DISCHARGE PLANNING  Goal: Discharge to home or other facility with appropriate resources  Description    CASE MANAGEMENT INTERVENTIONS:  - Conduct assessment to determine patient/family and health care team treatment goals, and need for post-acute services based on payer coverage, community resources, patient preferences and barriers to discharge    - Address psychosocial, clinical, and financial barriers to discharge as identified in assessment in conjunction with the patient/family and health care team   - Assist the patient in reintegration back into the community by removing barriers which may hinder a successful discharge once deemed stable  - Arrange appropriate level of post-acute services according to patient's needs and preference and payer coverage in collaboration with the physician and health care team   - Communicate with and update the patient/family, physician, and health care team regarding progress on the discharge plan  - Arrange appropriate transportation to post-acute venues  Outcome: Progressing    Quiet, cooperative, med and meal compliant  Denies depression, anxiety, SI, HI and pain  Refused to attend treatment team   Did not attend any groups  Isolative to room and self  Visible intermittently  Was compliant with vitals  Is disheveled and clothing layered  Patient ate 100% of both meals    Will continue to monitor progress in recovery program

## 2020-04-22 PROCEDURE — 99232 SBSQ HOSP IP/OBS MODERATE 35: CPT | Performed by: PSYCHIATRY & NEUROLOGY

## 2020-04-22 RX ADMIN — LEVOTHYROXINE SODIUM 75 MCG: 75 TABLET ORAL at 05:27

## 2020-04-22 RX ADMIN — DIVALPROEX SODIUM 1500 MG: 500 TABLET, EXTENDED RELEASE ORAL at 20:56

## 2020-04-22 RX ADMIN — OLANZAPINE 5 MG: 5 TABLET, FILM COATED ORAL at 20:56

## 2020-04-22 RX ADMIN — ACETAMINOPHEN 325 MG: 325 TABLET ORAL at 16:49

## 2020-04-22 RX ADMIN — DOCUSATE SODIUM 100 MG: 100 CAPSULE, LIQUID FILLED ORAL at 17:16

## 2020-04-22 RX ADMIN — OXYBUTYNIN CHLORIDE 5 MG: 5 TABLET, EXTENDED RELEASE ORAL at 08:36

## 2020-04-22 RX ADMIN — DOCUSATE SODIUM 100 MG: 100 CAPSULE, LIQUID FILLED ORAL at 08:36

## 2020-04-22 RX ADMIN — METFORMIN HYDROCHLORIDE 500 MG: 500 TABLET ORAL at 16:47

## 2020-04-22 RX ADMIN — ATORVASTATIN CALCIUM 10 MG: 10 TABLET, FILM COATED ORAL at 16:47

## 2020-04-22 RX ADMIN — CLOZAPINE 200 MG: 200 TABLET ORAL at 16:47

## 2020-04-22 RX ADMIN — METFORMIN HYDROCHLORIDE 500 MG: 500 TABLET ORAL at 08:36

## 2020-04-22 RX ADMIN — PANTOPRAZOLE SODIUM 40 MG: 40 TABLET, DELAYED RELEASE ORAL at 05:27

## 2020-04-22 NOTE — PLAN OF CARE
Problem: Alteration in Thoughts and Perception  Goal: Verbalize thoughts and feelings  Description  Interventions:  - Promote a nonjudgmental and trusting relationship with the patient through active listening and therapeutic communication  - Assess patient's level of functioning, behavior and potential for risk  - Engage patient in 1 on 1 interactions  - Encourage patient to express fears, feelings, frustrations, and discuss symptoms    - [place] patient to reality, help patient recognize reality-based thinking   - Administer medications as ordered and assess for potential side effects  - Provide the patient education related to the signs and symptoms of the illness and desired effects of prescribed medications  Outcome: Progressing  Goal: Agree to be compliant with medication regime, as prescribed and report medication side effects  Description  Interventions:  - Offer appropriate PRN medication and supervise ingestion; conduct AIMS, as needed   Outcome: Progressing     Problem: RESPIRATORY - ADULT  Goal: Achieves optimal ventilation and oxygenation  Description  INTERVENTIONS:  - Assess for changes in respiratory status  - Assess for changes in mentation and behavior  - Position to facilitate oxygenation and minimize respiratory effort  - Oxygen administered by appropriate delivery if ordered  - Initiate smoking cessation education as indicated  - Encourage broncho-pulmonary hygiene including cough, deep breathe, Incentive Spirometry  - Assess the need for suctioning and aspirate as needed  - Assess and instruct to report SOB or any respiratory difficulty  - Respiratory Therapy support as indicated  Outcome: Progressing     Problem: Alteration in Thoughts and Perception  Goal: Attend and participate in unit activities, including therapeutic, recreational, and educational groups  Description  Interventions:  -Encourage Visitation and family involvement in care  Outcome: Not Progressing  Goal: Complete daily ADLs, including personal hygiene independently, as able  Description  Interventions:  - Observe, teach, and assist patient with ADLS  - Monitor and promote a balance of rest/activity, with adequate nutrition and elimination   Outcome: Yeison Eliezer Sanderson was visible intermittently in the milieu  Quiet most of the time  Minimal interaction noted  Pleasant and cooperative upon approach by staff  Able to make needs known  Disheveled appearance  Continues to wear layers of clothing  Did not shower  Took medications after eating 100% of his meal  Did not attend morning walk/ journal group, IMR or Art  Out and about more in the afternoon  Continue to monitor  Precautions maintained

## 2020-04-22 NOTE — PLAN OF CARE
Problem: Alteration in Thoughts and Perception  Goal: Attend and participate in unit activities, including therapeutic, recreational, and educational groups  Description  Interventions:  -Encourage Visitation and family involvement in care  Outcome: Not Progressing     Problem: Ineffective Coping  Goal: Demonstrates healthy coping skills  Outcome: Not Progressing  Patient attending less than 50% of groups and is often prompted when walking around the hallway  Patient continued to ramble on with the same concerns and questions often speaking over this writer and other staff members  Patient at this time, is unable to comprehend the importance of the development/demonstration of coping skills and goals at this time  Writer will continue to work with patient in this particular area and encourage group attendance/unit activities

## 2020-04-22 NOTE — NURSING NOTE
Received pt awake at change of shift in his room  Appeared quiet  Requested and given ice water and a pack of PB crackers  Return to bed and appears to sleep thus this far  On continual rounding

## 2020-04-22 NOTE — PROGRESS NOTES
04/22/20 0800   Team Meeting   Meeting Type Daily Rounds   Team Members Present   Team Members Present Physician;Nurse; Other (Discipline and Name)   Physician Team Member Dr Olivia Leo Team Member Marcos Ortiz RN   Other (Discipline and Name) Arminda Thurston LCSW; MCKAYLA Salazar     Patient's behaviors were controlled  Attempted IMR  Disheveled

## 2020-04-22 NOTE — PLAN OF CARE
Problem: Alteration in Thoughts and Perception  Goal: Agree to be compliant with medication regime, as prescribed and report medication side effects  Description  Interventions:  - Offer appropriate PRN medication and supervise ingestion; conduct AIMS, as needed   Outcome: Progressing       Compliant with routine medications, gait steady, denied pain, ate 100% of dinner, visible this evening will continue to monitor, no behavioral issues this evening
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
## 2020-04-22 NOTE — TREATMENT TEAM
Patient declined      04/22/20 1000   Activity/Group Checklist   Group Community meeting  (Morning Walk/Coffee Talk )   Attendance Did not attend   Attendance Duration (min) 16-30   Affect/Mood GAEL

## 2020-04-22 NOTE — TREATMENT TEAM
04/22/20 1100   Activity/Group Checklist   Group   (IMR/Recovery Betty )   Attendance Did not attend   Attendance Duration (min) 46-60   Affect/Mood GAEL

## 2020-04-22 NOTE — PLAN OF CARE
Problem: Ineffective Coping  Goal: Demonstrates healthy coping skills  Outcome: Not Progressing   Writer met with Patient to discuss to importance of coping skills at this time where as Patient continues to be repetitive and fixated on his discharge  Patient becomes distraught and approaches various staff throughout the day asking the same question  Writer spoke to Patient about how upset this continues to make him and how it elevates his frustration and anxiety  Patient continued to ramble on with the same concerns and questions often speaking over this writer  Patient was unable to comprehend the importance of the development and demonstration of coping skills and goals at this time  Writer will continue to work with patient in this particular area and encourage group attendance/unit activities

## 2020-04-23 PROCEDURE — 99232 SBSQ HOSP IP/OBS MODERATE 35: CPT | Performed by: PSYCHIATRY & NEUROLOGY

## 2020-04-23 RX ADMIN — ATORVASTATIN CALCIUM 10 MG: 10 TABLET, FILM COATED ORAL at 16:39

## 2020-04-23 RX ADMIN — METFORMIN HYDROCHLORIDE 500 MG: 500 TABLET ORAL at 08:53

## 2020-04-23 RX ADMIN — OLANZAPINE 5 MG: 5 TABLET, FILM COATED ORAL at 21:04

## 2020-04-23 RX ADMIN — OXYBUTYNIN CHLORIDE 5 MG: 5 TABLET, EXTENDED RELEASE ORAL at 08:53

## 2020-04-23 RX ADMIN — DOCUSATE SODIUM 100 MG: 100 CAPSULE, LIQUID FILLED ORAL at 17:55

## 2020-04-23 RX ADMIN — METFORMIN HYDROCHLORIDE 500 MG: 500 TABLET ORAL at 16:39

## 2020-04-23 RX ADMIN — DIVALPROEX SODIUM 1500 MG: 500 TABLET, EXTENDED RELEASE ORAL at 21:04

## 2020-04-23 RX ADMIN — LEVOTHYROXINE SODIUM 75 MCG: 75 TABLET ORAL at 05:30

## 2020-04-23 RX ADMIN — CLOZAPINE 200 MG: 200 TABLET ORAL at 16:39

## 2020-04-23 RX ADMIN — DOCUSATE SODIUM 100 MG: 100 CAPSULE, LIQUID FILLED ORAL at 08:53

## 2020-04-23 RX ADMIN — PANTOPRAZOLE SODIUM 40 MG: 40 TABLET, DELAYED RELEASE ORAL at 05:30

## 2020-04-23 NOTE — TREATMENT TEAM
04/23/20 1100   Activity/Group Checklist   Group   (IMR/Depression and Anxiety )   Attendance Attended   Attendance Duration (min) 46-60   Interactions Interacted appropriately   Affect/Mood Appropriate;Normal range;Calm   Goals Achieved Identified feelings; Able to listen to others; Able to engage in interactions; Able to self-disclose

## 2020-04-23 NOTE — PLAN OF CARE
Problem: Alteration in Thoughts and Perception  Goal: Attend and participate in unit activities, including therapeutic, recreational, and educational groups  Description  Interventions:  -Encourage Visitation and family involvement in care  4/23/2020 1045 by Janelle Piedra  Outcome: Not Progressing  4/23/2020 1038 by Janelle Piedra  Outcome: Not Progressing   Patient did not complete goal card for the week of 4/20/2020

## 2020-04-23 NOTE — TREATMENT TEAM
Patient declined      04/23/20 0900   Activity/Group Checklist   Group Community meeting  (Morning Walk )   Attendance Did not attend   Attendance Duration (min) 16-30   Affect/Mood GAEL

## 2020-04-23 NOTE — PLAN OF CARE
Problem: Alteration in Thoughts and Perception  Goal: Verbalize thoughts and feelings  Description  Interventions:  - Promote a nonjudgmental and trusting relationship with the patient through active listening and therapeutic communication  - Assess patient's level of functioning, behavior and potential for risk  - Engage patient in 1 on 1 interactions  - Encourage patient to express fears, feelings, frustrations, and discuss symptoms    - [place] patient to reality, help patient recognize reality-based thinking   - Administer medications as ordered and assess for potential side effects  - Provide the patient education related to the signs and symptoms of the illness and desired effects of prescribed medications  Outcome: Progressing  Goal: Refrain from acting on delusional thinking/internal stimuli  Description  Interventions:  - Monitor patient closely, per order   - Utilize least restrictive measures   - Set reasonable limits, give positive feedback for acceptable   - Administer medications as ordered and monitor of potential side effects  Outcome: Progressing  Goal: Agree to be compliant with medication regime, as prescribed and report medication side effects  Description  Interventions:  - Offer appropriate PRN medication and supervise ingestion; conduct AIMS, as needed   Outcome: Progressing  Goal: Attend and participate in unit activities, including therapeutic, recreational, and educational groups  Description  Interventions:  -Encourage Visitation and family involvement in care  Outcome: Not Progressing  Goal: Complete daily ADLs, including personal hygiene independently, as able  Description  Interventions:  - Observe, teach, and assist patient with ADLS  - Monitor and promote a balance of rest/activity, with adequate nutrition and elimination   Outcome: Not Progressing     Problem: Ineffective Coping  Goal: Demonstrates healthy coping skills  Outcome: Progressing  Goal: Understands least restrictive measures  Description  Interventions:  - Utilize least restrictive behavior  Outcome: Progressing     Problem: GASTROINTESTINAL - ADULT  Goal: Maintains adequate nutritional intake  Description  INTERVENTIONS:  - Monitor percentage of each meal consumed  - Identify factors contributing to decreased intake, treat as appropriate  - Assist with meals as needed  - Monitor I&O, weight, and lab values if indicated  - Obtain nutrition services referral as needed  Outcome: Milly Gain has been awake, alert, and visible intermittently out in the milieu  Pt requested prn Tylenol for left foot pain 2#3/10  Tylenol 325 mg po given at 1649 and effective - sleeping at 1749-no pain  Ate 100% supper  Compliant with all scheduled meds without prompting  Remains disheveled in appearance and dressed in layers  Continues to be focused on discharge  Rests in room at intervals  No behavioral issues  Listened to music on radio in [facility] with peers  Pt did not attend evening groups but came out for snack after  Continue to monitor/assess for any changes
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
## 2020-04-23 NOTE — PLAN OF CARE
Problem: Alteration in Thoughts and Perception  Goal: Verbalize thoughts and feelings  Description  Interventions:  - Promote a nonjudgmental and trusting relationship with the patient through active listening and therapeutic communication  - Assess patient's level of functioning, behavior and potential for risk  - Engage patient in 1 on 1 interactions  - Encourage patient to express fears, feelings, frustrations, and discuss symptoms    - [place] patient to reality, help patient recognize reality-based thinking   - Administer medications as ordered and assess for potential side effects  - Provide the patient education related to the signs and symptoms of the illness and desired effects of prescribed medications  Outcome: Progressing  Goal: Refrain from acting on delusional thinking/internal stimuli  Description  Interventions:  - Monitor patient closely, per order   - Utilize least restrictive measures   - Set reasonable limits, give positive feedback for acceptable   - Administer medications as ordered and monitor of potential side effects  Outcome: Progressing  Goal: Agree to be compliant with medication regime, as prescribed and report medication side effects  Description  Interventions:  - Offer appropriate PRN medication and supervise ingestion; conduct AIMS, as needed   Outcome: Progressing  Goal: Attend and participate in unit activities, including therapeutic, recreational, and educational groups  Description  Interventions:  -Encourage Visitation and family involvement in care  Outcome: Progressing    Disheveled appearance, visible, used the radio for coping, social with select peeer, compliant with routine medications and meals, gait steady, denied pain, will continue to monitor

## 2020-04-23 NOTE — PLAN OF CARE
Problem: Alteration in Thoughts and Perception  Goal: Attend and participate in unit activities, including therapeutic, recreational, and educational groups  Description  Interventions:  -Encourage Visitation and family involvement in care  Outcome: Not Progressing   Patient did not complete goal card for the week of 4/20/2020

## 2020-04-23 NOTE — PROGRESS NOTES
04/23/20 0800   Team Meeting   Meeting Type Daily Rounds   Team Members Present   Team Members Present Physician;Nurse;; Other (Discipline and Name)   Physician Team Member Dr Zane Elizabeth Team Member Tami Fish, RN   Care Management Team Member EDELMIRA Givens   Other (Discipline and Name) RACHELE MolinaW; MKCAYLA Major     Isolative 3-11  Out during day  Tylenol for foot pain

## 2020-04-23 NOTE — PLAN OF CARE
Problem: Alteration in Thoughts and Perception  Goal: Verbalize thoughts and feelings  Description  Interventions:  - Promote a nonjudgmental and trusting relationship with the patient through active listening and therapeutic communication  - Assess patient's level of functioning, behavior and potential for risk  - Engage patient in 1 on 1 interactions  - Encourage patient to express fears, feelings, frustrations, and discuss symptoms    - [place] patient to reality, help patient recognize reality-based thinking   - Administer medications as ordered and assess for potential side effects  - Provide the patient education related to the signs and symptoms of the illness and desired effects of prescribed medications  Outcome: Progressing  Goal: Refrain from acting on delusional thinking/internal stimuli  Description  Interventions:  - Monitor patient closely, per order   - Utilize least restrictive measures   - Set reasonable limits, give positive feedback for acceptable   - Administer medications as ordered and monitor of potential side effects  Outcome: Progressing  Goal: Agree to be compliant with medication regime, as prescribed and report medication side effects  Description  Interventions:  - Offer appropriate PRN medication and supervise ingestion; conduct AIMS, as needed   Outcome: Progressing  Goal: Attend and participate in unit activities, including therapeutic, recreational, and educational groups  Description  Interventions:  -Encourage Visitation and family involvement in care  Outcome: Progressing  Goal: Complete daily ADLs, including personal hygiene independently, as able  Description  Interventions:  - Observe, teach, and assist patient with ADLS  - Monitor and promote a balance of rest/activity, with adequate nutrition and elimination   Outcome: Not Progressing     Problem: Ineffective Coping  Goal: Demonstrates healthy coping skills  Outcome: Progressing  Goal: Understands least restrictive measures  Description  Interventions:  - Utilize least restrictive behavior  Outcome: Progressing     Problem: GASTROINTESTINAL - ADULT  Goal: Maintains adequate nutritional intake  Description  INTERVENTIONS:  - Monitor percentage of each meal consumed  - Identify factors contributing to decreased intake, treat as appropriate  - Assist with meals as needed  - Monitor I&O, weight, and lab values if indicated  - Obtain nutrition services referral as needed  Outcome: Amada Reed has been awake, alert, and visible intermittently out in the milieu  Pt remains disheveled in appearance and dressed in layers  Pt has irritable edge  Remains preoccupied with discharge  Ate 100% supper  Compliant with all scheduled meds when called  Listens to music on radio with peers in [place] [id number] and dances and sings  Denies any depression, anxiety, si/hi, intent, plan, a/v hallucinations, and has not verbalized any delusions  Pt did not attend evening group but came out for snack after  Continue to monitor/assess for any changes
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
## 2020-04-24 PROCEDURE — 99232 SBSQ HOSP IP/OBS MODERATE 35: CPT | Performed by: PSYCHIATRY & NEUROLOGY

## 2020-04-24 RX ADMIN — ATORVASTATIN CALCIUM 10 MG: 10 TABLET, FILM COATED ORAL at 16:45

## 2020-04-24 RX ADMIN — DOCUSATE SODIUM 100 MG: 100 CAPSULE, LIQUID FILLED ORAL at 08:22

## 2020-04-24 RX ADMIN — CLOZAPINE 200 MG: 200 TABLET ORAL at 16:45

## 2020-04-24 RX ADMIN — PANTOPRAZOLE SODIUM 40 MG: 40 TABLET, DELAYED RELEASE ORAL at 05:32

## 2020-04-24 RX ADMIN — DOCUSATE SODIUM 100 MG: 100 CAPSULE, LIQUID FILLED ORAL at 17:09

## 2020-04-24 RX ADMIN — METFORMIN HYDROCHLORIDE 500 MG: 500 TABLET ORAL at 08:22

## 2020-04-24 RX ADMIN — DIVALPROEX SODIUM 1500 MG: 500 TABLET, EXTENDED RELEASE ORAL at 20:57

## 2020-04-24 RX ADMIN — METFORMIN HYDROCHLORIDE 500 MG: 500 TABLET ORAL at 16:45

## 2020-04-24 RX ADMIN — OLANZAPINE 5 MG: 5 TABLET, FILM COATED ORAL at 20:56

## 2020-04-24 RX ADMIN — LEVOTHYROXINE SODIUM 75 MCG: 75 TABLET ORAL at 05:32

## 2020-04-24 RX ADMIN — OXYBUTYNIN CHLORIDE 5 MG: 5 TABLET, EXTENDED RELEASE ORAL at 08:21

## 2020-04-24 NOTE — PLAN OF CARE
Problem: Alteration in Thoughts and Perception  Goal: Treatment Goal: Gain control of psychotic behaviors/thinking, reduce/eliminate presenting symptoms and demonstrate improved reality functioning upon discharge  Outcome: Progressing  Goal: Verbalize thoughts and feelings  Description  Interventions:  - Promote a nonjudgmental and trusting relationship with the patient through active listening and therapeutic communication  - Assess patient's level of functioning, behavior and potential for risk  - Engage patient in 1 on 1 interactions  - Encourage patient to express fears, feelings, frustrations, and discuss symptoms    - [place] patient to reality, help patient recognize reality-based thinking   - Administer medications as ordered and assess for potential side effects  - Provide the patient education related to the signs and symptoms of the illness and desired effects of prescribed medications  Outcome: Progressing  Goal: Refrain from acting on delusional thinking/internal stimuli  Description  Interventions:  - Monitor patient closely, per order   - Utilize least restrictive measures   - Set reasonable limits, give positive feedback for acceptable   - Administer medications as ordered and monitor of potential side effects  Outcome: Progressing  Goal: Agree to be compliant with medication regime, as prescribed and report medication side effects  Description  Interventions:  - Offer appropriate PRN medication and supervise ingestion; conduct AIMS, as needed   Outcome: Progressing  Goal: Attend and participate in unit activities, including therapeutic, recreational, and educational groups  Description  Interventions:  -Encourage Visitation and family involvement in care  Outcome: Progressing  Goal: Recognize dysfunctional thoughts, communicate reality-based thoughts at the time of discharge  Description  Interventions:  - Provide medication and psycho-education to assist patient in compliance and developing insight into his/her illness   Outcome: Progressing  Goal: Complete daily ADLs, including personal hygiene independently, as able  Description  Interventions:  - Observe, teach, and assist patient with ADLS  - Monitor and promote a balance of rest/activity, with adequate nutrition and elimination   Outcome: Progressing     Problem: Ineffective Coping  Goal: Identifies ineffective coping skills  Outcome: Progressing  Goal: Identifies healthy coping skills  Outcome: Progressing  Goal: Demonstrates healthy coping skills  Outcome: Progressing  Goal: Patient/Family participate in treatment and DC plans  Description  Interventions:  - Provide therapeutic environment  Outcome: Progressing  Goal: Patient/Family verbalizes awareness of resources  Outcome: Progressing  Goal: Understands least restrictive measures  Description  Interventions:  - Utilize least restrictive behavior  Outcome: Progressing     Problem: RESPIRATORY - ADULT  Goal: Achieves optimal ventilation and oxygenation  Description  INTERVENTIONS:  - Assess for changes in respiratory status  - Assess for changes in mentation and behavior  - Position to facilitate oxygenation and minimize respiratory effort  - Oxygen administered by appropriate delivery if ordered  - Initiate smoking cessation education as indicated  - Encourage broncho-pulmonary hygiene including cough, deep breathe, Incentive Spirometry  - Assess the need for suctioning and aspirate as needed  - Assess and instruct to report SOB or any respiratory difficulty  - Respiratory Therapy support as indicated  Outcome: Progressing     Problem: GASTROINTESTINAL - ADULT  Goal: Minimal or absence of nausea and/or vomiting  Description  INTERVENTIONS:  - Administer IV fluids if ordered to ensure adequate hydration  - Maintain NPO status until nausea and vomiting are resolved  - Nasogastric tube if ordered  - Administer ordered antiemetic medications as needed  - Provide nonpharmacologic comfort measures as appropriate  - Advance diet as tolerated, if ordered  - Consider nutrition services referral to assist patient with adequate nutrition and appropriate food choices  Outcome: Progressing  Goal: Maintains or returns to baseline bowel function  Description  INTERVENTIONS:  - Assess bowel function  - Encourage oral fluids to ensure adequate hydration  - Administer IV fluids if ordered to ensure adequate hydration  - Administer ordered medications as needed  - Encourage mobilization and activity  - Consider nutritional services referral to assist patient with adequate nutrition and appropriate food choices  Outcome: Progressing  Goal: Maintains adequate nutritional intake  Description  INTERVENTIONS:  - Monitor percentage of each meal consumed  - Identify factors contributing to decreased intake, treat as appropriate  - Assist with meals as needed  - Monitor I&O, weight, and lab values if indicated  - Obtain nutrition services referral as needed  Outcome: Progressing     Problem: METABOLIC, FLUID AND ELECTROLYTES - ADULT  Goal: Glucose maintained within target range  Description  INTERVENTIONS:  - Monitor Blood Glucose as ordered  - Assess for signs and symptoms of hyperglycemia and hypoglycemia  - Administer ordered medications to maintain glucose within target range  - Assess nutritional intake and initiate nutrition service referral as needed  Outcome: Progressing     Problem: DISCHARGE PLANNING  Goal: Discharge to home or other facility with appropriate resources  Description    CASE MANAGEMENT INTERVENTIONS:  - Conduct assessment to determine patient/family and health care team treatment goals, and need for post-acute services based on payer coverage, community resources, patient preferences and barriers to discharge    - Address psychosocial, clinical, and financial barriers to discharge as identified in assessment in conjunction with the patient/family and health care team   - Assist the patient in reintegration back into the community by removing barriers which may hinder a successful discharge once deemed stable  - Arrange appropriate level of post-acute services according to patient's needs and preference and payer coverage in collaboration with the physician and health care team   - Communicate with and update the patient/family, physician, and health care team regarding progress on the discharge plan  - Arrange appropriate transportation to post-acute venues  Outcome: Progressing    Quiet, cooperative, visible intermittently  Med and meal compliant  Isolative to room and self  Behaviors controlled  Did not attend groups  Denies depression, anxiety, si, hi and pain    Will continue to monitor progress in recovery program

## 2020-04-24 NOTE — PROGRESS NOTES
04/24/20 1100   Activity/Group Checklist   Group Other (Comment)  ([facility] [employer] [facility])   Attendance Did not attend

## 2020-04-24 NOTE — PROGRESS NOTES
04/24/20 1300   Activity/Group Checklist   Group Other (Comment)   Attendance Did not attend  (Prompted, declined from bed )

## 2020-04-24 NOTE — PROGRESS NOTES
04/24/20 0900   Activity/Group Checklist   Group Community meeting  ([facility])   Attendance Did not attend  (Prompted, declined, "want to stay in bed")

## 2020-04-24 NOTE — PROGRESS NOTES
04/24/20 0942   Team Meeting   Meeting Type Daily Rounds   Team Members Present   Team Members Present Nurse;Physician;; Other (Discipline and Name)   Physician Team Member Dr Mukul Medrano, RN   Care Management Team Member EDELMIRA Rush   Other (Discipline and Name) Keesha Cox LCSW     Attended [facility] on 3-11  Presents with an irritable edge at times
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
## 2020-04-25 RX ADMIN — ATORVASTATIN CALCIUM 10 MG: 10 TABLET, FILM COATED ORAL at 17:00

## 2020-04-25 RX ADMIN — OLANZAPINE 5 MG: 5 TABLET, FILM COATED ORAL at 20:41

## 2020-04-25 RX ADMIN — TRAZODONE HYDROCHLORIDE 50 MG: 50 TABLET ORAL at 21:40

## 2020-04-25 RX ADMIN — OXYBUTYNIN CHLORIDE 5 MG: 5 TABLET, EXTENDED RELEASE ORAL at 09:12

## 2020-04-25 RX ADMIN — DOCUSATE SODIUM 100 MG: 100 CAPSULE, LIQUID FILLED ORAL at 17:00

## 2020-04-25 RX ADMIN — DOCUSATE SODIUM 100 MG: 100 CAPSULE, LIQUID FILLED ORAL at 09:12

## 2020-04-25 RX ADMIN — CLOZAPINE 200 MG: 200 TABLET ORAL at 17:00

## 2020-04-25 RX ADMIN — DIVALPROEX SODIUM 1500 MG: 500 TABLET, EXTENDED RELEASE ORAL at 20:41

## 2020-04-25 RX ADMIN — LEVOTHYROXINE SODIUM 75 MCG: 75 TABLET ORAL at 06:28

## 2020-04-25 RX ADMIN — METFORMIN HYDROCHLORIDE 500 MG: 500 TABLET ORAL at 09:12

## 2020-04-25 RX ADMIN — METFORMIN HYDROCHLORIDE 500 MG: 500 TABLET ORAL at 17:00

## 2020-04-25 RX ADMIN — PANTOPRAZOLE SODIUM 40 MG: 40 TABLET, DELAYED RELEASE ORAL at 06:28

## 2020-04-25 NOTE — PLAN OF CARE
Problem: Alteration in Thoughts and Perception  Goal: Verbalize thoughts and feelings  Description  Interventions:  - Promote a nonjudgmental and trusting relationship with the patient through active listening and therapeutic communication  - Assess patient's level of functioning, behavior and potential for risk  - Engage patient in 1 on 1 interactions  - Encourage patient to express fears, feelings, frustrations, and discuss symptoms    - [employer] patient to reality, help patient recognize reality-based thinking   - Administer medications as ordered and assess for potential side effects  - Provide the patient education related to the signs and symptoms of the illness and desired effects of prescribed medications  Outcome: Progressing  Goal: Refrain from acting on delusional thinking/internal stimuli  Description  Interventions:  - Monitor patient closely, per order   - Utilize least restrictive measures   - Set reasonable limits, give positive feedback for acceptable   - Administer medications as ordered and monitor of potential side effects  Outcome: Progressing  Goal: Agree to be compliant with medication regime, as prescribed and report medication side effects  Description  Interventions:  - Offer appropriate PRN medication and supervise ingestion; conduct AIMS, as needed   Outcome: Progressing  Goal: Attend and participate in unit activities, including therapeutic, recreational, and educational groups  Description  Interventions:  -Encourage Visitation and family involvement in care  Outcome: Not Progressing  Goal: Complete daily ADLs, including personal hygiene independently, as able  Description  Interventions:  - Observe, teach, and assist patient with ADLS  - Monitor and promote a balance of rest/activity, with adequate nutrition and elimination   Outcome: Not Progressing     Problem: Ineffective Coping  Goal: Demonstrates healthy coping skills  Outcome: Progressing  Goal: Understands least restrictive measures  Description  Interventions:  - Utilize least restrictive behavior  Outcome: Progressing     Problem: GASTROINTESTINAL - ADULT  Goal: Maintains adequate nutritional intake  Description  INTERVENTIONS:  - Monitor percentage of each meal consumed  - Identify factors contributing to decreased intake, treat as appropriate  - Assist with meals as needed  - Monitor I&O, weight, and lab values if indicated  - Obtain nutrition services referral as needed  Outcome: Sepideh Sensor has been awake, alert, and visible intermittently out in the milieu  Pt refused to have vital signs taken  Ate 100% supper  Pt remains preoccupied with discharge and attempts to bargain with staff for discharge as states he won't attend any more groups if he is not told when he will be discharged  Compliant with all scheduled meds without prompting  Listens to music on radio in [facility] with peers  Did not attend evening group but came out for snack after  Disheveled in appearance and dressed in layers  Continue to monitor/assess for any changes

## 2020-04-25 NOTE — PROGRESS NOTES
Pt sleeping beginning of shift, woke up X1 for water   No behavioral issues, will continue tomonitor

## 2020-04-25 NOTE — PLAN OF CARE
Problem: Alteration in Thoughts and Perception  Goal: Verbalize thoughts and feelings  Description  Interventions:  - Promote a nonjudgmental and trusting relationship with the patient through active listening and therapeutic communication  - Assess patient's level of functioning, behavior and potential for risk  - Engage patient in 1 on 1 interactions  - Encourage patient to express fears, feelings, frustrations, and discuss symptoms    - [place] patient to reality, help patient recognize reality-based thinking   - Administer medications as ordered and assess for potential side effects  - Provide the patient education related to the signs and symptoms of the illness and desired effects of prescribed medications  Outcome: Progressing  Goal: Agree to be compliant with medication regime, as prescribed and report medication side effects  Description  Interventions:  - Offer appropriate PRN medication and supervise ingestion; conduct AIMS, as needed   Outcome: Marissa Allen has been intermittently visible, requiring about 5 prompts to get him to take his medications  Meal compliant without prompting  Less preoccupied in conversation about discharge, more visible in the afternoon attending [employer] group  Did not get his bin or shower today  Jokes with staff and with peers  Pleasant during interactions, behaviors remain controlled  Will continue to monitor for changes

## 2020-04-25 NOTE — PROGRESS NOTES
Psychiatry Progress Note    Subjective: Interval History     The patient is lying in bed  He states that he slept okay  No behavioral issues noted per staff  Patient denies depression and anxiety  He has been compliant with medications  He reports that he wants to leave  Staff reports he has been preoccupied with discharge  Patient has been attending some of the groups      Behavior over the last 24 hours:  unchanged  Sleep: normal  Appetite: normal  Medication side effects: No  ROS: no complaints    Current medications:    Current Facility-Administered Medications:     acetaminophen (TYLENOL) tablet 325 mg, 325 mg, Oral, Q6H PRN, Sruthi Brink MD, 325 mg at 04/22/20 1649    acetaminophen (TYLENOL) tablet 650 mg, 650 mg, Oral, Q6H PRN, Sruthi Brink MD, 650 mg at 04/16/20 2052    acetaminophen (TYLENOL) tablet 975 mg, 975 mg, Oral, Q8H PRN, Sruthi Brink MD, 975 mg at 03/29/20 1759    aluminum-magnesium hydroxide-simethicone (MYLANTA) 200-200-20 mg/5 mL oral suspension 30 mL, 30 mL, Oral, Q4H PRN, Sruthi Brink MD, 30 mL at 04/17/20 1016    atorvastatin (LIPITOR) tablet 10 mg, 10 mg, Oral, Daily With Abe Castleman, MD, 10 mg at 04/24/20 1645    benztropine (COGENTIN) injection 1 mg, 1 mg, Intramuscular, Q6H PRN, Sruthi Brink MD    benztropine (COGENTIN) tablet 1 mg, 1 mg, Oral, Q6H PRN, Sruthi Brink MD, 1 mg at 04/07/20 2031    clozapine (CLOZARIL) tablet 200 mg, 200 mg, Oral, Daily, Sruthi Brink MD, 200 mg at 04/24/20 1645    divalproex sodium (DEPAKOTE ER) 24 hr tablet 1,500 mg, 1,500 mg, Oral, HS, Sruthi Brink MD, 1,500 mg at 04/24/20 2057    docusate sodium (COLACE) capsule 100 mg, 100 mg, Oral, BID, Sruthi Brink MD, 100 mg at 04/25/20 0912    haloperidol (HALDOL) tablet 5 mg, 5 mg, Oral, Q6H PRN, Sruthi Brink MD, 5 mg at 03/10/20 2102    haloperidol lactate (HALDOL) injection 5 mg, 5 mg, Intramuscular, Q6H PRN, Sruthi Brink MD    levothyroxine tablet 75 mcg, 75 mcg, Oral, Early Morning, Homer Jimenez MD, 75 mcg at 04/25/20 1467    LORazepam (ATIVAN) 2 mg/mL injection 1 mg, 1 mg, Intramuscular, Q6H PRN, Homer Jimenez MD    LORazepam (ATIVAN) tablet 1 mg, 1 mg, Oral, Q6H PRN, Homer Jimenez MD    magnesium hydroxide (MILK OF MAGNESIA) 400 mg/5 mL oral suspension 30 mL, 30 mL, Oral, Daily PRN, Homer Jimenez MD, 30 mL at 03/14/20 2032    metFORMIN (GLUCOPHAGE) tablet 500 mg, 500 mg, Oral, BID With Meals, Homer Jimenez MD, 500 mg at 04/25/20 0912    nicotine polacrilex (NICORETTE) gum 2 mg, 2 mg, Oral, Q2H PRN, Homer Jimenez MD, 2 mg at 03/23/20 1630    OLANZapine (ZyPREXA) tablet 5 mg, 5 mg, Oral, HS, Homer Jimenez MD, 5 mg at 04/24/20 2056    oxybutynin (DITROPAN-XL) 24 hr tablet 5 mg, 5 mg, Oral, Daily, Homer Jimenez MD, 5 mg at 04/25/20 0912    pantoprazole (PROTONIX) EC tablet 40 mg, 40 mg, Oral, Early Morning, Homer Jimenez MD, 40 mg at 04/25/20 3255    traZODone (DESYREL) tablet 50 mg, 50 mg, Oral, HS PRN, Homer Jimenez MD, 50 mg at 04/21/20 2203    Current Problem List:    Patient Active Problem List   Diagnosis    Schizoaffective disorder, bipolar type (HCC)    Constipation, chronic    Type 2 diabetes mellitus without complication, without long-term current use of insulin (HCC)    Chronic airway obstruction, not elsewhere classified    Benign essential hypertension    Disorder of lipoprotein and lipid metabolism    Foot callus    Gastroesophageal reflux disease without esophagitis    Acquired hypothyroidism    [facility]    BMI 34 0-34 9,adult    Nail abnormality    Encounter for well adult exam with abnormal findings    Tobacco abuse    Diabetes insipidus, nephrogenic ([facility] )       Problem list reviewed 04/25/20     Objective:     Vital Signs:  Vitals:    04/22/20 1600 04/23/20 0700 04/24/20 0730 04/25/20 0700   BP: 129/87 134/78 112/79 121/68   BP Location: Left arm Right arm Left arm Left arm   Pulse: 101 89 91 71   Resp: 19 18 18 21   Temp:  97 6 °F (36 4 °C) (!) 97 °F (36 1 °C) 97 8 °F (36 6 °C)   TempSrc: Temporal  Temporal Temporal   SpO2: 96%      Weight:       Height:             Appearance:  age appropriate and disheveled   Behavior:  evasive and guarded   Speech:  normal volume   Mood:  irritable and labile   Affect:  labile   Thought Process:  circumstantial   Thought Content:  normal   Perceptual Disturbances: None   Risk Potential: none   Sensorium:  person, place, situation and time   Cognition:  intact   Consciousness:  alert and awake    Attention: attention span and concentration were age appropriate   Intellect: average   Insight:  limited   Judgment: limited      Motor Activity: no abnormal movements       I/O Past 24 hours:  No intake/output data recorded  No intake/output data recorded  Labs:  Reviewed 04/25/20      Assessment / Plan:     [facility]    Recommended Treatment:      Medication changes:  1) continue current medication regimen    Non-pharmacological treatments  1) Continue with group therapy, milieu therapy and occupational therapy  Safety  1) Safety/communication plan established targeting dynamic risk factors above  2) Risks, benefits, and possible side effects of medications explained to patient and patient verbalizes understanding  Counseling / Coordination of Care    Total floor / unit time spent today 20 minutes  Greater than 50% of total time was spent with the patient and / or family counseling and / or coordination of care  A description of the counseling / coordination of care  Patient's Rights, confidentiality and exceptions to confidentiality, use of automated medical record, [facility] kev staff access to medical record, and consent to treatment reviewed      Anahi Marmolejo PA-C

## 2020-04-26 RX ADMIN — METFORMIN HYDROCHLORIDE 500 MG: 500 TABLET ORAL at 17:06

## 2020-04-26 RX ADMIN — OLANZAPINE 5 MG: 5 TABLET, FILM COATED ORAL at 20:45

## 2020-04-26 RX ADMIN — ATORVASTATIN CALCIUM 10 MG: 10 TABLET, FILM COATED ORAL at 17:06

## 2020-04-26 RX ADMIN — DOCUSATE SODIUM 100 MG: 100 CAPSULE, LIQUID FILLED ORAL at 17:06

## 2020-04-26 RX ADMIN — OXYBUTYNIN CHLORIDE 5 MG: 5 TABLET, EXTENDED RELEASE ORAL at 08:24

## 2020-04-26 RX ADMIN — DIVALPROEX SODIUM 1500 MG: 500 TABLET, EXTENDED RELEASE ORAL at 20:45

## 2020-04-26 RX ADMIN — PANTOPRAZOLE SODIUM 40 MG: 40 TABLET, DELAYED RELEASE ORAL at 05:23

## 2020-04-26 RX ADMIN — METFORMIN HYDROCHLORIDE 500 MG: 500 TABLET ORAL at 08:24

## 2020-04-26 RX ADMIN — LEVOTHYROXINE SODIUM 75 MCG: 75 TABLET ORAL at 05:23

## 2020-04-26 RX ADMIN — CLOZAPINE 200 MG: 200 TABLET ORAL at 17:00

## 2020-04-26 RX ADMIN — DOCUSATE SODIUM 100 MG: 100 CAPSULE, LIQUID FILLED ORAL at 08:24

## 2020-04-26 NOTE — NURSING NOTE
Received pt in bed at change of shift appearing to sleep  Awake and out of his room at around mid-night requesting and given  ice water and a snack  Retired to bed and appears to sleep thus this far as per continual rounding

## 2020-04-26 NOTE — PLAN OF CARE
Problem: Alteration in Thoughts and Perception  Goal: Verbalize thoughts and feelings  Description  Interventions:  - Promote a nonjudgmental and trusting relationship with the patient through active listening and therapeutic communication  - Assess patient's level of functioning, behavior and potential for risk  - Engage patient in 1 on 1 interactions  - Encourage patient to express fears, feelings, frustrations, and discuss symptoms    - [place] patient to reality, help patient recognize reality-based thinking   - Administer medications as ordered and assess for potential side effects  - Provide the patient education related to the signs and symptoms of the illness and desired effects of prescribed medications  Outcome: Progressing  Goal: Agree to be compliant with medication regime, as prescribed and report medication side effects  Description  Interventions:  - Offer appropriate PRN medication and supervise ingestion; conduct AIMS, as needed   Outcome: Diane Gates has been visible on the unit, compliant with his scheduled medications and meals  Did not attend groups today or tend to his hygiene  More social and less isolated as he spent a majority of this time with his peers in [employer] listening to music  Appetite intact, behaviors controlled  Will continue to monitor for changes

## 2020-04-26 NOTE — PROGRESS NOTES
Psychiatry Progress Note    Subjective: Interval History     The patient has been visible at times on the unit  He ate breakfast and returned to bed this morning  Patient is evasive during conversation  Patient denies all symptoms such as depression, anxiety, hallucinations, and suicidal and homicidal ideation  He has been compliant with medications and needs prompting at times  He attends some of the group activities      Behavior over the last 24 hours:  unchanged  Sleep: normal  Appetite: normal  Medication side effects: No  ROS: no complaints    Current medications:    Current Facility-Administered Medications:     acetaminophen (TYLENOL) tablet 325 mg, 325 mg, Oral, Q6H PRN, Letty Andrea MD, 325 mg at 04/22/20 1649    acetaminophen (TYLENOL) tablet 650 mg, 650 mg, Oral, Q6H PRN, Letty Andrea MD, 650 mg at 04/16/20 2052    acetaminophen (TYLENOL) tablet 975 mg, 975 mg, Oral, Q8H PRN, Letty Andrea MD, 975 mg at 03/29/20 1759    aluminum-magnesium hydroxide-simethicone (MYLANTA) 200-200-20 mg/5 mL oral suspension 30 mL, 30 mL, Oral, Q4H PRN, Letty Andrea MD, 30 mL at 04/17/20 1016    atorvastatin (LIPITOR) tablet 10 mg, 10 mg, Oral, Daily With Elza Hunter MD, 10 mg at 04/25/20 1700    benztropine (COGENTIN) injection 1 mg, 1 mg, Intramuscular, Q6H PRN, Letty Andrea MD    benztropine (COGENTIN) tablet 1 mg, 1 mg, Oral, Q6H PRN, Letty Andrea MD, 1 mg at 04/07/20 2031    clozapine (CLOZARIL) tablet 200 mg, 200 mg, Oral, Daily, Letty Andrea MD, 200 mg at 04/25/20 1700    divalproex sodium (DEPAKOTE ER) 24 hr tablet 1,500 mg, 1,500 mg, Oral, HS, Letty Andrea MD, 1,500 mg at 04/25/20 2041    docusate sodium (COLACE) capsule 100 mg, 100 mg, Oral, BID, Letty Andrea MD, 100 mg at 04/26/20 2696    haloperidol (HALDOL) tablet 5 mg, 5 mg, Oral, Q6H PRN, Letty Andrea MD, 5 mg at 03/10/20 2102    haloperidol lactate (HALDOL) injection 5 mg, 5 mg, Intramuscular, Q6H PRN, MD Karyn Quinn levothyroxine tablet 75 mcg, 75 mcg, Oral, Early Morning, Guera Bonilla MD, 75 mcg at 04/26/20 0523    LORazepam (ATIVAN) 2 mg/mL injection 1 mg, 1 mg, Intramuscular, Q6H PRN, Guera Bonilla MD    LORazepam (ATIVAN) tablet 1 mg, 1 mg, Oral, Q6H PRN, Guera Bonilla MD    magnesium hydroxide (MILK OF MAGNESIA) 400 mg/5 mL oral suspension 30 mL, 30 mL, Oral, Daily PRN, Guera Bonilla MD, 30 mL at 03/14/20 2032    metFORMIN (GLUCOPHAGE) tablet 500 mg, 500 mg, Oral, BID With Meals, Guera Bonilla MD, 500 mg at 04/26/20 6295    nicotine polacrilex (NICORETTE) gum 2 mg, 2 mg, Oral, Q2H PRN, Guera Bonilla MD, 2 mg at 03/23/20 1630    OLANZapine (ZyPREXA) tablet 5 mg, 5 mg, Oral, HS, Guera Bonilla MD, 5 mg at 04/25/20 2041    oxybutynin (DITROPAN-XL) 24 hr tablet 5 mg, 5 mg, Oral, Daily, Guera Bonilla MD, 5 mg at 04/26/20 1660    pantoprazole (PROTONIX) EC tablet 40 mg, 40 mg, Oral, Early Morning, Guera Bonilla MD, 40 mg at 04/26/20 0523    traZODone (DESYREL) tablet 50 mg, 50 mg, Oral, HS PRN, Guera Bonilla MD, 50 mg at 04/25/20 2140    Current Problem List:    Patient Active Problem List   Diagnosis    Schizoaffective disorder, bipolar type ([facility]    Constipation, chronic    Type 2 diabetes mellitus without complication, without long-term current use of insulin ([facility]    Chronic airway obstruction, not elsewhere classified    Benign essential hypertension    Disorder of lipoprotein and lipid metabolism    Foot callus    Gastroesophageal reflux disease without esophagitis    Acquired hypothyroidism    Morbid obesity ([facility]    BMI 34 0-34 9,adult    Nail abnormality    Encounter for well adult exam with abnormal findings    Tobacco abuse    [facility]       Problem list reviewed 04/26/20     Objective:     Vital Signs:  Vitals:    04/23/20 0700 04/24/20 0730 04/25/20 0700 04/26/20 0700   BP: 134/78 112/79 121/68 111/64   BP Location: Right arm Left arm Left arm Left arm   Pulse: 89 91 71 78 Resp: 18 18 21 20   Temp:  (!) 97 °F (36 1 °C) 97 8 °F (36 6 °C) 98 4 °F (36 9 °C)   TempSrc:  Temporal Temporal Temporal   SpO2:       Weight:    105 kg (231 lb 12 8 oz)   Height:             Appearance:  age appropriate and disheveled   Behavior:  evasive and guarded   Speech:  normal volume   Mood:  irritable and labile   Affect:  labile   Thought Process:  circumstantial   Thought Content:  normal   Perceptual Disturbances: None   Risk Potential: none   Sensorium:  person, place, situation and time   Cognition:  intact   Consciousness:  alert and awake    Attention: attention span and concentration were age appropriate   Intellect: average   Insight:  limited   Judgment: limited      Motor Activity: no abnormal movements       I/O Past 24 hours:  I/O last 3 completed shifts: In: 480 [P O :480]  Out: -   No intake/output data recorded  Labs:  Reviewed 04/26/20      Assessment / Plan:     [facility]ffective [facility], bipolar type [facility]    Recommended Treatment:      Medication changes:  1) continue current medication regimen    Non-pharmacological treatments  1) Continue with group therapy, milieu therapy and occupational therapy  Safety  1) Safety/communication plan established targeting dynamic risk factors above  2) Risks, benefits, and possible side effects of medications explained to patient and patient verbalizes understanding  Counseling / Coordination of Care    Total floor / unit time spent today 20 minutes  Greater than 50% of total time was spent with the patient and / or family counseling and / or coordination of care  A description of the counseling / coordination of care  Patient's Rights, confidentiality and exceptions to confidentiality, use of automated medical record, [facility] kev staff access to medical record, and consent to treatment reviewed      Mena Coon PA-C

## 2020-04-26 NOTE — PLAN OF CARE
Problem: Alteration in Thoughts and Perception  Goal: Agree to be compliant with medication regime, as prescribed and report medication side effects  Description  Interventions:  - Offer appropriate PRN medication and supervise ingestion; conduct AIMS, as needed   Outcome: Progressing  Goal: Complete daily ADLs, including personal hygiene independently, as able  Description  Interventions:  - Observe, teach, and assist patient with ADLS  - Monitor and promote a balance of rest/activity, with adequate nutrition and elimination   Outcome: Progressing     Problem: Alteration in Thoughts and Perception  Goal: Attend and participate in unit activities, including therapeutic, recreational, and educational groups  Description  Interventions:  -Encourage Visitation and family involvement in care  Outcome: Not Progressing     Visible, social with staff and select peers, showered this evening, gait steady, denied pain, made several phone calls, used the radio for coping, did not attend groups, compliant with routine medications did not require prompting

## 2020-04-27 PROCEDURE — 99233 SBSQ HOSP IP/OBS HIGH 50: CPT | Performed by: PSYCHIATRY & NEUROLOGY

## 2020-04-27 RX ADMIN — CLOZAPINE 200 MG: 200 TABLET ORAL at 16:57

## 2020-04-27 RX ADMIN — OLANZAPINE 5 MG: 5 TABLET, FILM COATED ORAL at 21:05

## 2020-04-27 RX ADMIN — DOCUSATE SODIUM 100 MG: 100 CAPSULE, LIQUID FILLED ORAL at 08:30

## 2020-04-27 RX ADMIN — DOCUSATE SODIUM 100 MG: 100 CAPSULE, LIQUID FILLED ORAL at 18:00

## 2020-04-27 RX ADMIN — METFORMIN HYDROCHLORIDE 500 MG: 500 TABLET ORAL at 08:30

## 2020-04-27 RX ADMIN — LEVOTHYROXINE SODIUM 75 MCG: 75 TABLET ORAL at 05:25

## 2020-04-27 RX ADMIN — DIVALPROEX SODIUM 1500 MG: 500 TABLET, EXTENDED RELEASE ORAL at 21:05

## 2020-04-27 RX ADMIN — OXYBUTYNIN CHLORIDE 5 MG: 5 TABLET, EXTENDED RELEASE ORAL at 08:30

## 2020-04-27 RX ADMIN — TRAZODONE HYDROCHLORIDE 50 MG: 50 TABLET ORAL at 22:13

## 2020-04-27 RX ADMIN — METFORMIN HYDROCHLORIDE 500 MG: 500 TABLET ORAL at 16:57

## 2020-04-27 RX ADMIN — ATORVASTATIN CALCIUM 10 MG: 10 TABLET, FILM COATED ORAL at 16:57

## 2020-04-27 RX ADMIN — PANTOPRAZOLE SODIUM 40 MG: 40 TABLET, DELAYED RELEASE ORAL at 05:25

## 2020-04-27 NOTE — PLAN OF CARE
Problem: Alteration in Thoughts and Perception  Goal: Verbalize thoughts and feelings  Description  Interventions:  - Promote a nonjudgmental and trusting relationship with the patient through active listening and therapeutic communication  - Assess patient's level of functioning, behavior and potential for risk  - Engage patient in 1 on 1 interactions  - Encourage patient to express fears, feelings, frustrations, and discuss symptoms    - [place] patient to reality, help patient recognize reality-based thinking   - Administer medications as ordered and assess for potential side effects  - Provide the patient education related to the signs and symptoms of the illness and desired effects of prescribed medications  Outcome: Progressing  Goal: Agree to be compliant with medication regime, as prescribed and report medication side effects  Description  Interventions:  - Offer appropriate PRN medication and supervise ingestion; conduct AIMS, as needed   Outcome: Progressing     Problem: GASTROINTESTINAL - ADULT  Goal: Minimal or absence of nausea and/or vomiting  Description  INTERVENTIONS:  - Administer IV fluids if ordered to ensure adequate hydration  - Maintain NPO status until nausea and vomiting are resolved  - Nasogastric tube if ordered  - Administer ordered antiemetic medications as needed  - Provide nonpharmacologic comfort measures as appropriate  - Advance diet as tolerated, if ordered  - Consider nutrition services referral to assist patient with adequate nutrition and appropriate food choices  Outcome: Progressing  Goal: Maintains or returns to baseline bowel function  Description  INTERVENTIONS:  - Assess bowel function  - Encourage oral fluids to ensure adequate hydration  - Administer IV fluids if ordered to ensure adequate hydration  - Administer ordered medications as needed  - Encourage mobilization and activity  - Consider nutritional services referral to assist patient with adequate nutrition and appropriate food choices  Outcome: Progressing  Goal: Maintains adequate nutritional intake  Description  INTERVENTIONS:  - Monitor percentage of each meal consumed  - Identify factors contributing to decreased intake, treat as appropriate  - Assist with meals as needed  - Monitor I&O, weight, and lab values if indicated  - Obtain nutrition services referral as needed  Outcome: Progressing     Problem: Alteration in Thoughts and Perception  Goal: Treatment Goal: Gain control of psychotic behaviors/thinking, reduce/eliminate presenting symptoms and demonstrate improved reality functioning upon discharge  Outcome: Not Progressing  Goal: Refrain from acting on delusional thinking/internal stimuli  Description  Interventions:  - Monitor patient closely, per order   - Utilize least restrictive measures   - Set reasonable limits, give positive feedback for acceptable   - Administer medications as ordered and monitor of potential side effects  Outcome: Not Progressing  Goal: Attend and participate in unit activities, including therapeutic, recreational, and educational groups  Description  Interventions:  -Encourage Visitation and family involvement in care  Outcome: Not Progressing  Goal: Recognize dysfunctional thoughts, communicate reality-based thoughts at the time of discharge  Description  Interventions:  - Provide medication and psycho-education to assist patient in compliance and developing insight into his/her illness   Outcome: Not Progressing  Goal: Complete daily ADLs, including personal hygiene independently, as able  Description  Interventions:  - Observe, teach, and assist patient with ADLS  - Monitor and promote a balance of rest/activity, with adequate nutrition and elimination   Outcome: Not Progressing     Problem: Ineffective Coping  Goal: Identifies ineffective coping skills  Outcome: Not Progressing  Goal: Identifies healthy coping skills  Outcome: Not Progressing  Goal: Demonstrates healthy coping skills  Outcome: Not Progressing  Goal: Patient/Family verbalizes awareness of resources  Outcome: Not Progressing  Goal: Understands least restrictive measures  Description  Interventions:  - Utilize least restrictive behavior  Outcome: Not Progressing   Visible intermittently, mostly compliant but had 1 verbal outburst when staff encouraged him to clean his room / wash his clothes  Was redirectable but has no insight or understanding into his illness, his current situation, or what's happening in the world around him  Repeatedly asking to be discharged  Poor focus with very short attention span  Disorganized, disheveled, wearing multiple layers of clothing  Fixated on getting more clothes from closet off-unit  Easily redirectable, but quickly forgets and then asks again  Did not attend any groups even though was prompted and encouraged to go  Ate 100% of both meals and came for meds without prompting  No other behaviors or issues noted  Continue to monitor

## 2020-04-27 NOTE — PLAN OF CARE
Problem: Alteration in Thoughts and Perception  Goal: Attend and participate in unit activities, including therapeutic, recreational, and educational groups  Description  Interventions:  -Encourage Visitation and family involvement in care  4/27/2020 1547 by Leonor Pratt  Outcome: Not Progressing  4/27/2020 1009 by Leonor Pratt  Outcome: Not Progressing   Patient intrusive and demanding when this writer is meeting with other Patient's  Librado Forward wanting to talk and after writer was finished with another Patient, this writer attempted to meet with him  Patient wanting to ask the same questions about [facility] and discharge  Writer attempted to talk about coping skills and group attendance to help with his anxiety however, Patient was not hearing what he wanted to hear and walked out  Writer, Unit Manager and  had a face to face with Patient to brainstorm ideas to assist Patient with his racing thoughts, repetitiveness and extreme anxiety  Patient struggled to stay focused and continued to jump from one topic to the next  Writer will consult with Team for interventions

## 2020-04-27 NOTE — NURSING NOTE
Received pt in bed at change of shift with eyes closed; chest movement noted  Awake times one for a drink of water and PB crackers  Retired to bed after snack and appears to sleep at this time as per continual rounding  Will continue to monitor

## 2020-04-27 NOTE — PROGRESS NOTES
04/27/20 0949   Team Meeting   Meeting Type Daily Rounds   Team Members Present   Team Members Present Nurse;;Physician; Other (Discipline and Name)   Physician Team Member Dr Palmer Romberg Team Member Dolly Watt RN   Care Management Team Member EDELMIRA Lewis   Other (Discipline and Name) Mihir Velasquez 37 on clothing and discharge  Remains disheveled

## 2020-04-27 NOTE — TREATMENT TEAM
Patient declined      04/27/20 1100   Activity/Group Checklist   Group   (IMR)   Attendance Did not attend   Attendance Duration (min) 46-60   Affect/Mood GAEL

## 2020-04-27 NOTE — PLAN OF CARE
Problem: Alteration in Thoughts and Perception  Goal: Agree to be compliant with medication regime, as prescribed and report medication side effects  Description  Interventions:  - Offer appropriate PRN medication and supervise ingestion; conduct AIMS, as needed   Outcome: Progressing     Problem: Ineffective Coping  Goal: Demonstrates healthy coping skills  Outcome: Progressing       Visible, social with staff and select peers, gait steady, denied pain, made several phone calls, used the radio for coping, did not attend groups, compliant with routine medications did not require prompting, ate 100% of dinner

## 2020-04-27 NOTE — TREATMENT TEAM
Patient declined      04/27/20 0900   Activity/Group Checklist   Group Community meeting  (Goal Cards )   Attendance Did not attend   Attendance Duration (min) 16-30   Affect/Mood GAEL
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
## 2020-04-27 NOTE — PLAN OF CARE
Problem: Alteration in Thoughts and Perception  Goal: Attend and participate in unit activities, including therapeutic, recreational, and educational groups  Description  Interventions:  -Encourage Visitation and family involvement in care  Outcome: Not Progressing   Patient did not complete Goal Card for the week of 4/27/2020

## 2020-04-28 PROCEDURE — 99233 SBSQ HOSP IP/OBS HIGH 50: CPT | Performed by: PSYCHIATRY & NEUROLOGY

## 2020-04-28 RX ADMIN — LEVOTHYROXINE SODIUM 75 MCG: 75 TABLET ORAL at 06:19

## 2020-04-28 RX ADMIN — DIVALPROEX SODIUM 1500 MG: 500 TABLET, EXTENDED RELEASE ORAL at 21:06

## 2020-04-28 RX ADMIN — OLANZAPINE 5 MG: 5 TABLET, FILM COATED ORAL at 21:04

## 2020-04-28 RX ADMIN — OXYBUTYNIN CHLORIDE 5 MG: 5 TABLET, EXTENDED RELEASE ORAL at 09:10

## 2020-04-28 RX ADMIN — DOCUSATE SODIUM 100 MG: 100 CAPSULE, LIQUID FILLED ORAL at 09:10

## 2020-04-28 RX ADMIN — CLOZAPINE 200 MG: 200 TABLET ORAL at 16:42

## 2020-04-28 RX ADMIN — ATORVASTATIN CALCIUM 10 MG: 10 TABLET, FILM COATED ORAL at 16:42

## 2020-04-28 RX ADMIN — METFORMIN HYDROCHLORIDE 500 MG: 500 TABLET ORAL at 07:47

## 2020-04-28 RX ADMIN — METFORMIN HYDROCHLORIDE 500 MG: 500 TABLET ORAL at 16:42

## 2020-04-28 RX ADMIN — DOCUSATE SODIUM 100 MG: 100 CAPSULE, LIQUID FILLED ORAL at 17:09

## 2020-04-28 RX ADMIN — PANTOPRAZOLE SODIUM 40 MG: 40 TABLET, DELAYED RELEASE ORAL at 06:19

## 2020-04-28 NOTE — PLAN OF CARE
Problem: Individualized Interventions  Goal: Attend and participate in unit activities, including therapeutic, recreational, and educational groups  Description  PSYCHOTHERAPY INTERVENTIONS:    -Form therapeutic alliance to promote trust and safety within a therapeutic environment    -Engage in individual psychotherapy using evidence-based practices that are specific to individual needs   -Process barriers to community living with an emphasis on solution-based interventions   -Promote self-awareness and identity development   -Identify and process patterns of behavior that have led to decompensation and/or hospitalizations   -Attend psychoeducation groups with licensed psychotherapist to learn about Illness Management Recovery (IMR) concepts and enhance coping skills    -Practice effective communication with staff, peers, family, and community members  Participate in family sessions as necessary   -Encourage reality-based thought content by examining thought processes and cognitive distortions      -Work with treatment team in reintegration back into the community when appropriate  Outcome: Not Progressing    Patient is unfortunately not progressing in many of the areas listed above  He does not recognize his barriers to community living, and despite consistent encouragement from staff, treatment team, family, and Big Brother, patient is not motivated to change  He is not engaged in the recovery process, nor is he engaged in the process of better understanding the situation he is currently in  His behavior remains unmotivated, childlike, and impulsive  He does not engage in his treatment whatsoever  His strengths include family support, funny, entertaining, and redirectable at times  Patient has been barely redirectable as of late  For example, this therapist has been prompting patient for IMR nearly everyday for the past couple of weeks  Patient refuses, if he even responds from his bed    Patient was asked this morning, after he left treatment team, if he believed his effort would be acceptable in a group home setting  Patient ignored therapist  Therapist also reminded patient that if he cannot discharge to a group home, he will likely be referred to [facility]   Therapist continues to receive calls from Director of Therapeutic Services stating patient continues to call him to plead for discharge  Patient is calling through hospital  and Director of Therapeutic Services has asked patient and  multiple times now not to direct these calls  Therapist will continue to encourage patient to consider his residential options when choosing not to engage/comply

## 2020-04-28 NOTE — PLAN OF CARE
Problem: Alteration in Thoughts and Perception  Goal: Verbalize thoughts and feelings  Description  Interventions:  - Promote a nonjudgmental and trusting relationship with the patient through active listening and therapeutic communication  - Assess patient's level of functioning, behavior and potential for risk  - Engage patient in 1 on 1 interactions  - Encourage patient to express fears, feelings, frustrations, and discuss symptoms    - [place] patient to reality, help patient recognize reality-based thinking   - Administer medications as ordered and assess for potential side effects  - Provide the patient education related to the signs and symptoms of the illness and desired effects of prescribed medications  Outcome: Progressing  Goal: Refrain from acting on delusional thinking/internal stimuli  Description  Interventions:  - Monitor patient closely, per order   - Utilize least restrictive measures   - Set reasonable limits, give positive feedback for acceptable   - Administer medications as ordered and monitor of potential side effects  Outcome: Progressing  Goal: Agree to be compliant with medication regime, as prescribed and report medication side effects  Description  Interventions:  - Offer appropriate PRN medication and supervise ingestion; conduct AIMS, as needed   Outcome: Progressing  Goal: Attend and participate in unit activities, including therapeutic, recreational, and educational groups  Description  Interventions:  -Encourage Visitation and family involvement in care  Outcome: Progressing     Problem: Ineffective Coping  Goal: Demonstrates healthy coping skills  Outcome: Progressing  Goal: Understands least restrictive measures  Description  Interventions:  - Utilize least restrictive behavior  Outcome: Progressing     Problem: GASTROINTESTINAL - ADULT  Goal: Maintains adequate nutritional intake  Description  INTERVENTIONS:  - Monitor percentage of each meal consumed  - Identify factors contributing to decreased intake, treat as appropriate  - Assist with meals as needed  - Monitor I&O, weight, and lab values if indicated  - Obtain nutrition services referral as needed  Outcome: Mike Gibbs has been awake, alert, and visible intermittently out in the milieu  Disheveled in appearance and dressed in layers  Pt ate 100% supper  Remains preoccupied with discharge  Listens to music on radio in [employer] with peers, rests in room,  and sits in dining room  Compliant with scheduled meds when called  No behavioral issues  Did not attend evening group but had evening snack  Continue to monitor/assess for any changes
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
## 2020-04-28 NOTE — PROGRESS NOTES
04/28/20 1416   Team Meeting   Meeting Type Tx Team Meeting   Initial Conference Date 04/28/20   Next Conference Date 05/05/20   Team Members Present   Team Members Present Physician;; Other (Discipline and Name)   Physician Team Member Dr Beena Dos Santos Team Member EDELMIRA Whitlock   Other (Discipline and Name) Lucy Card LCSW; Linda Seals, [facility]; Frankrjulio Manrique, [facility] [address]   Patient/Family Present   Patient Present Yes   Patient's Family Present No       Patient attended treatment team meeting this morning prepared without self-assessment  Patient's group attendance for last week was 29%  Team and patient completed risk assessment and the patient did not verbalize any desire to elope from the program  Patient verbalized understanding of consequences of eloping from treatment while on a commitment  Patient verbalized no further questions or concerns at the conclusion of the meeting  Next team meeting scheduled for 5/5/2020  Patient was not able to stay focused in team and was no receptive to anything being said  Patient left in frustration  Team and [facility] spoke, [facility] referral to be made

## 2020-04-28 NOTE — PLAN OF CARE
Problem: Alteration in Thoughts and Perception  Goal: Verbalize thoughts and feelings  Description  Interventions:  - Promote a nonjudgmental and trusting relationship with the patient through active listening and therapeutic communication  - Assess patient's level of functioning, behavior and potential for risk  - Engage patient in 1 on 1 interactions  - Encourage patient to express fears, feelings, frustrations, and discuss symptoms    - [place] patient to reality, help patient recognize reality-based thinking   - Administer medications as ordered and assess for potential side effects  - Provide the patient education related to the signs and symptoms of the illness and desired effects of prescribed medications  Outcome: Progressing  Goal: Refrain from acting on delusional thinking/internal stimuli  Description  Interventions:  - Monitor patient closely, per order   - Utilize least restrictive measures   - Set reasonable limits, give positive feedback for acceptable   - Administer medications as ordered and monitor of potential side effects  Outcome: Progressing  Goal: Agree to be compliant with medication regime, as prescribed and report medication side effects  Description  Interventions:  - Offer appropriate PRN medication and supervise ingestion; conduct AIMS, as needed   Outcome: Progressing  Goal: Attend and participate in unit activities, including therapeutic, recreational, and educational groups  Description  Interventions:  -Encourage Visitation and family involvement in care  Outcome: Not Progressing  Goal: Complete daily ADLs, including personal hygiene independently, as able  Description  Interventions:  - Observe, teach, and assist patient with ADLS  - Monitor and promote a balance of rest/activity, with adequate nutrition and elimination   Outcome: Progressing     Problem: Ineffective Coping  Goal: Demonstrates healthy coping skills  Outcome: Progressing  Goal: Understands least restrictive measures  Description  Interventions:  - Utilize least restrictive behavior  Outcome: Progressing     Problem: GASTROINTESTINAL - ADULT  Goal: Maintains adequate nutritional intake  Description  INTERVENTIONS:  - Monitor percentage of each meal consumed  - Identify factors contributing to decreased intake, treat as appropriate  - Assist with meals as needed  - Monitor I&O, weight, and lab values if indicated  - Obtain nutrition services referral as needed  Outcome: Todd Haney has been awake, alert, and visible intermittently out in the milieu  Pt completed his daily ADL's today  Pt continues to be preoccupied with his clothes and discharge  Pt refused routine vital signs to be checked and yelled at male T during supper as he was instructed that he is not to take food/drink from peers as was going to accept cup of coffee from female peer  Pt also cursed at female [facility] because he wanted clothes that were locked up  Pt ate 100% supper  Compliant with scheduled 1700 meds when called  Listened to music on radio in [employer] with peers  Did not attend any evening groups but came out for snack after  Pt initially refused scheduled 2100 meds but came out and took them after writer spoke to him  Pt requested prn for sleep  Trazodone 50 mg po given at 2213  Continue to monitor/assess for any changes

## 2020-04-28 NOTE — TREATMENT TEAM
Declined      04/28/20 0900   Activity/Group Checklist   Group Community meeting  (Morning walk )   Attendance Did not attend   Attendance Duration (min) 16-30   Affect/Mood GAEL

## 2020-04-28 NOTE — TREATMENT TEAM
Patient declined      04/28/20 1100   Activity/Group Checklist   Group   (IMR )   Attendance Did not attend   Attendance Duration (min) 46-60   Affect/Mood GAEL

## 2020-04-28 NOTE — PROGRESS NOTES
04/28/20 1314   Team Meeting   Meeting Type Daily Rounds   Team Members Present   Team Members Present Physician;Nurse;; Other (Discipline and Name)   Physician Team Member Dr Etienne Jonas Team Member Giovanni Castillo, TABBY   Care Management Team Member EDELMIRA Aviles   Other (Discipline and Name) Lita Cohen, CPS; Shay Malloy LCSW     Regressing, irritable, and unable to keep focus   Continues to be repetitive
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
## 2020-04-28 NOTE — PLAN OF CARE
Problem: Alteration in Thoughts and Perception  Goal: Attend and participate in unit activities, including therapeutic, recreational, and educational groups  Description  Interventions:  -Encourage Visitation and family involvement in care  Outcome: Not Progressing   Patient not progressing in group attendance even with ongoing encouragement and prompting he is at 29%  Patient is childlike at times, intrusive and not able to focus on the task at hand  Patient jumps from topic to topic when meeting face to face and conversations seem to get nowhere  Patient unable to sit through groups and either leaves or is found poking or trying to talk to the person aside of him  Patient often walks out of Treatment Team or away from a face to face meeting when he is not hearing what he wants to

## 2020-04-28 NOTE — PLAN OF CARE
Problem: Alteration in Thoughts and Perception  Goal: Agree to be compliant with medication regime, as prescribed and report medication side effects  Description  Interventions:  - Offer appropriate PRN medication and supervise ingestion; conduct AIMS, as needed   Outcome: Progressing       Compliant with routine medications but did require prompting, ate 100% of meals, compliant with routine medications will continue to monitor

## 2020-04-29 PROCEDURE — 99233 SBSQ HOSP IP/OBS HIGH 50: CPT | Performed by: PSYCHIATRY & NEUROLOGY

## 2020-04-29 RX ADMIN — CLOZAPINE 200 MG: 200 TABLET ORAL at 17:06

## 2020-04-29 RX ADMIN — LEVOTHYROXINE SODIUM 75 MCG: 75 TABLET ORAL at 05:27

## 2020-04-29 RX ADMIN — OLANZAPINE 5 MG: 5 TABLET, FILM COATED ORAL at 20:50

## 2020-04-29 RX ADMIN — DOCUSATE SODIUM 100 MG: 100 CAPSULE, LIQUID FILLED ORAL at 17:06

## 2020-04-29 RX ADMIN — ATORVASTATIN CALCIUM 10 MG: 10 TABLET, FILM COATED ORAL at 17:07

## 2020-04-29 RX ADMIN — METFORMIN HYDROCHLORIDE 500 MG: 500 TABLET ORAL at 17:06

## 2020-04-29 RX ADMIN — DIVALPROEX SODIUM 1500 MG: 500 TABLET, EXTENDED RELEASE ORAL at 20:50

## 2020-04-29 RX ADMIN — PANTOPRAZOLE SODIUM 40 MG: 40 TABLET, DELAYED RELEASE ORAL at 05:27

## 2020-04-29 NOTE — PROGRESS NOTES
04/29/20 0900   Team Meeting   Meeting Type Daily Rounds   Team Members Present   Team Members Present Physician;Nurse; Other (Discipline and Name)   Physician Team Member Dr Diallo Cali Team Member Tejinder Plaza RN   Other (Discipline and Name) Colette Alberto LCSW; MCKAYLA Roberts     Accepting of vital signs being taken this morning  Poor participation

## 2020-04-29 NOTE — PLAN OF CARE
Problem: Alteration in Thoughts and Perception  Goal: Refrain from acting on delusional thinking/internal stimuli  Description  Interventions:  - Monitor patient closely, per order   - Utilize least restrictive measures   - Set reasonable limits, give positive feedback for acceptable   - Administer medications as ordered and monitor of potential side effects  Outcome: Not Progressing  Goal: Agree to be compliant with medication regime, as prescribed and report medication side effects  Description  Interventions:  - Offer appropriate PRN medication and supervise ingestion; conduct AIMS, as needed   Outcome: Yeison Ulloaoscar Lyonsjosephine has been generally isolative to himself on the unit visible at times in the afternoon  Refused morning medications, disheveled and unkept, wearing multiple layers and not attending groups  Became boisterous and loud when staff was interacting in a 1 on 1 conversation  Preoccupied in content about discharge and how he is frustrated  Verbalizing that the doctors are keeping him here and is very concrete in his thinking as he lacks insight  Currently resting in his room  No groups attended, only coming out of his room for meals  Will continue to monitor for changes

## 2020-04-29 NOTE — TREATMENT TEAM
04/28/20 1400   Activity/Group Checklist   Group   (Recovery Workshop )   Attendance Attended   Attendance Duration (min) 46-60   Interactions Interacted appropriately   Affect/Mood Appropriate;Normal range   Goals Achieved Identified feelings; Able to listen to others; Able to engage in interactions; Able to self-disclose

## 2020-04-29 NOTE — NURSING NOTE
Received pt in bed at change of shift with eyes closed; chest movement noted  Awake and out of his room at midnight for a drink of water and PB crackers; returning to bed and appearing to sleep thus this far as per continual rounding  Will continue to monitor

## 2020-04-29 NOTE — TREATMENT TEAM
04/29/20 1100   Activity/Group Checklist   Group   (IMR/Stigma)   Attendance Did not attend   Attendance Duration (min) 46-60   Affect/Mood GAEL

## 2020-04-29 NOTE — PLAN OF CARE
Problem: Alteration in Thoughts and Perception  Goal: Verbalize thoughts and feelings  Description  Interventions:  - Promote a nonjudgmental and trusting relationship with the patient through active listening and therapeutic communication  - Assess patient's level of functioning, behavior and potential for risk  - Engage patient in 1 on 1 interactions  - Encourage patient to express fears, feelings, frustrations, and discuss symptoms    - [place] patient to reality, help patient recognize reality-based thinking   - Administer medications as ordered and assess for potential side effects  - Provide the patient education related to the signs and symptoms of the illness and desired effects of prescribed medications  Outcome: Progressing  Goal: Agree to be compliant with medication regime, as prescribed and report medication side effects  Description  Interventions:  - Offer appropriate PRN medication and supervise ingestion; conduct AIMS, as needed   Outcome: Progressing     Problem: RESPIRATORY - ADULT  Goal: Achieves optimal ventilation and oxygenation  Description  INTERVENTIONS:  - Assess for changes in respiratory status  - Assess for changes in mentation and behavior  - Position to facilitate oxygenation and minimize respiratory effort  - Oxygen administered by appropriate delivery if ordered  - Initiate smoking cessation education as indicated  - Encourage broncho-pulmonary hygiene including cough, deep breathe, Incentive Spirometry  - Assess the need for suctioning and aspirate as needed  - Assess and instruct to report SOB or any respiratory difficulty  - Respiratory Therapy support as indicated  Outcome: Progressing     Problem: GASTROINTESTINAL - ADULT  Goal: Maintains adequate nutritional intake  Description  INTERVENTIONS:  - Monitor percentage of each meal consumed  - Identify factors contributing to decreased intake, treat as appropriate  - Assist with meals as needed  - Monitor I&O, weight, and lab values if indicated  - Obtain nutrition services referral as needed  Outcome: Progressing     Problem: Alteration in Thoughts and Perception  Goal: Attend and participate in unit activities, including therapeutic, recreational, and educational groups  Description  Interventions:  -Encourage Visitation and family involvement in care  Outcome: Not Progressing  Goal: Complete daily ADLs, including personal hygiene independently, as able  Description  Interventions:  - Observe, teach, and assist patient with ADLS  - Monitor and promote a balance of rest/activity, with adequate nutrition and elimination   Outcome: Not [place] Ravinder has been visible and ambulating in the hallway  Denies anxiety and depression  Just focused on getting out of here  Disheveled appearance and wearing layers of clothing  Keeps requesting more clothing from the closet  Refused to have vitals done at 1600  Needs reminding to wear his mask when out of his room  He was in [employer] area listening to the radio and singing  Took medication without difficulty and ate 100% of his meal  Will ask for water often  Did not go out for fresh air or attend evening group  Took HS medication  Ate snack  Continue to monitor  Precautions maintained
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
## 2020-04-29 NOTE — TREATMENT TEAM
04/29/20 0945   Activity/Group Checklist   Group Community meeting   Attendance Did not attend   Attendance Duration (min) 16-30   Affect/Mood GAEL
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
## 2020-04-30 PROCEDURE — 99232 SBSQ HOSP IP/OBS MODERATE 35: CPT | Performed by: PSYCHIATRY & NEUROLOGY

## 2020-04-30 RX ADMIN — METFORMIN HYDROCHLORIDE 500 MG: 500 TABLET ORAL at 17:15

## 2020-04-30 RX ADMIN — PANTOPRAZOLE SODIUM 40 MG: 40 TABLET, DELAYED RELEASE ORAL at 06:14

## 2020-04-30 RX ADMIN — DOCUSATE SODIUM 100 MG: 100 CAPSULE, LIQUID FILLED ORAL at 17:14

## 2020-04-30 RX ADMIN — OLANZAPINE 5 MG: 5 TABLET, FILM COATED ORAL at 20:58

## 2020-04-30 RX ADMIN — CLOZAPINE 200 MG: 200 TABLET ORAL at 17:14

## 2020-04-30 RX ADMIN — LEVOTHYROXINE SODIUM 75 MCG: 75 TABLET ORAL at 06:15

## 2020-04-30 RX ADMIN — ATORVASTATIN CALCIUM 10 MG: 10 TABLET, FILM COATED ORAL at 17:14

## 2020-04-30 RX ADMIN — DIVALPROEX SODIUM 1500 MG: 500 TABLET, EXTENDED RELEASE ORAL at 20:58

## 2020-04-30 NOTE — PROGRESS NOTES
04/30/20 0887   Team Meeting   Meeting Type Daily Rounds   Team Members Present   Team Members Present Physician;Nurse;; Other (Discipline and Name)   Physician Team Member Dr Reinaldo Hawkins Team Member Yaneth Franco RN   Care Management Team Member Crecencio Meigs, MSW   Other (Discipline and Name) Danis Cordero LCSW     No groups, irritable  Refused morning meds

## 2020-04-30 NOTE — PLAN OF CARE
Problem: Alteration in Thoughts and Perception  Goal: Treatment Goal: Gain control of psychotic behaviors/thinking, reduce/eliminate presenting symptoms and demonstrate improved reality functioning upon discharge  Outcome: Progressing  Goal: Verbalize thoughts and feelings  Description  Interventions:  - Promote a nonjudgmental and trusting relationship with the patient through active listening and therapeutic communication  - Assess patient's level of functioning, behavior and potential for risk  - Engage patient in 1 on 1 interactions  - Encourage patient to express fears, feelings, frustrations, and discuss symptoms    - [place] patient to reality, help patient recognize reality-based thinking   - Administer medications as ordered and assess for potential side effects  - Provide the patient education related to the signs and symptoms of the illness and desired effects of prescribed medications  Outcome: Progressing     Patient in bed all shift, appears sleeping, eyes closed, chest rises and falls  No apparent signs/symptoms of pain or discomfort  Patient safety maintained via continual rounding
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
## 2020-05-01 PROCEDURE — 99232 SBSQ HOSP IP/OBS MODERATE 35: CPT | Performed by: PSYCHIATRY & NEUROLOGY

## 2020-05-01 RX ORDER — DEXTROAMPHETAMINE SACCHARATE, AMPHETAMINE ASPARTATE, DEXTROAMPHETAMINE SULFATE AND AMPHETAMINE SULFATE 1.25; 1.25; 1.25; 1.25 MG/1; MG/1; MG/1; MG/1
5 TABLET ORAL DAILY
Status: DISCONTINUED | OUTPATIENT
Start: 2020-05-01 | End: 2020-05-04

## 2020-05-01 RX ADMIN — CLOZAPINE 200 MG: 200 TABLET ORAL at 17:00

## 2020-05-01 RX ADMIN — DIVALPROEX SODIUM 1500 MG: 500 TABLET, EXTENDED RELEASE ORAL at 20:50

## 2020-05-01 RX ADMIN — ATORVASTATIN CALCIUM 10 MG: 10 TABLET, FILM COATED ORAL at 17:04

## 2020-05-01 RX ADMIN — PANTOPRAZOLE SODIUM 40 MG: 40 TABLET, DELAYED RELEASE ORAL at 05:32

## 2020-05-01 RX ADMIN — DOCUSATE SODIUM 100 MG: 100 CAPSULE, LIQUID FILLED ORAL at 17:04

## 2020-05-01 RX ADMIN — OLANZAPINE 5 MG: 5 TABLET, FILM COATED ORAL at 20:50

## 2020-05-01 RX ADMIN — LEVOTHYROXINE SODIUM 75 MCG: 75 TABLET ORAL at 05:32

## 2020-05-01 RX ADMIN — METFORMIN HYDROCHLORIDE 500 MG: 500 TABLET ORAL at 17:04

## 2020-05-01 NOTE — PROGRESS NOTES
04/30/20 1300   Activity/Group Checklist   Group Other (Comment)  ([facility])   Attendance Attended   Attendance Duration (min) 31-45   Interactions Interacted appropriately   Affect/Mood Appropriate   Goals Achieved Able to listen to others; Displayed empathy

## 2020-05-01 NOTE — PLAN OF CARE
Problem: Alteration in Thoughts and Perception  Goal: Agree to be compliant with medication regime, as prescribed and report medication side effects  Description  Interventions:  - Offer appropriate PRN medication and supervise ingestion; conduct AIMS, as needed   Outcome: Not Progressing  Goal: Attend and participate in unit activities, including therapeutic, recreational, and educational groups  Description  Interventions:  -Encourage Visitation and family involvement in care  Outcome: Not Progressing  Goal: Complete daily ADLs, including personal hygiene independently, as able  Description  Interventions:  - Observe, teach, and assist patient with ADLS  - Monitor and promote a balance of rest/activity, with adequate nutrition and elimination   Outcome: Not Progressing     Clementina Franks has not been compliant with his behavorial plan, refused morning medications and his lab draw for his ammonia level after this writer and staff prompted/encouraged him several times to do so  Meal compliant, preoccupied with discharge and asking about group homes  More visible in the afternoon, napped for the majority of the shift  Will continue to monitor for changes

## 2020-05-01 NOTE — NURSING NOTE
Received pt in bed at change of shift with eyes closed; chest movement noted  Continues the same thus this far as continual observation  Will continue to monitor

## 2020-05-01 NOTE — PROGRESS NOTES
04/30/20 1100   Activity/Group Checklist   Group Other (Comment)  ([facility] - CBT/Socratic Questioning)   Attendance Did not attend     Patient was encouraged to attend [facility]  He was laying in bed, quite disheveled, and opened one eye for therapist  Patient declined
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
## 2020-05-01 NOTE — PROGRESS NOTES
Progress Note - Behavioral Health   Alyssa Coyle 48 y o  male MRN: [id number]  Unit/Bed#: [facility] 105-01 Encounter: [id number]    Assessment/Plan   Principal Problem:    Schizoaffective disorder, bipolar type ([facility] [address] )  Active Problems:    Type 2 diabetes mellitus without complication, without long-term current use of insulin (HCC)    Benign essential hypertension    Gastroesophageal reflux disease without esophagitis    Acquired hypothyroidism    Tobacco abuse    Diabetes insipidus, nephrogenic ([facility] [address] )      Subjective:Patient was seen today for continuation of care, records reviewed and  patient was discussed with the morning case review team  William St was seen in his room in bed resting and had his back turned to this writer during our encounter  He had poor eye contact, had a irritable edge and initially refused to speak with this writer  He reported his overall mood as I am feeling good his appetite is good and he is sleeping well  William St currently denies psychiatric symptoms and states "nothing is wrong with me", he reported not attending groups and has limited socialization with his peers  Staff reported that he has been irritable,  has been refusing blood work and will have outbursts at times  William St denies endorsing any suicidal or homicidal ideation  Does not seem to be experiencing any manic or psychotic symptoms during our encounter  He remains medication compliance and denies any side effects from medications  Will continue on current medication regimen  Vitals:  Vitals:    05/01/20 0700   BP: 108/64   Pulse: 78   Resp: 20   Temp: 98 2 °F (36 8 °C)       Laboratory results:    I have personally reviewed all pertinent laboratory/tests results    Most Recent Labs:   Lab Results   Component Value Date    WBC 8 70 04/13/2020    RBC 4 77 04/13/2020    HGB 14 4 04/13/2020    HCT 41 5 04/13/2020     04/13/2020    RDW 13 9 04/13/2020    NEUTROABS 3 22 04/13/2020    SODIUM 138 04/13/2020    K 4 3 04/13/2020     04/13/2020    CO2 28 04/13/2020    BUN 7 04/13/2020    CREATININE 0 54 (L) 04/13/2020    GLUC 107 (H) 04/13/2020    GLUF 107 (H) 04/13/2020    CALCIUM 9 1 04/13/2020    AST 21 04/13/2020    ALT 23 04/13/2020    ALKPHOS 63 04/13/2020    TP 6 8 04/13/2020    ALB 3 8 04/13/2020    TBILI 0 20 04/13/2020    CHOLESTEROL 102 04/13/2020    HDL 26 (L) 04/13/2020    TRIG 172 (H) 04/13/2020    LDLCALC 42 04/13/2020    NONHDLC 76 04/13/2020    VALPROICTOT 88 2 04/13/2020    LITHIUM 0 6 03/27/2014    AMMONIA 39 (H) 04/01/2020    BWS2EYXQFYAF 3 810 12/24/2019    FREET4 0 93 08/23/2019    HGBA1C 6 1 12/03/2019     12/03/2019       Psychiatric Review of Systems:    Behavior over the last 24 hours:  unchanged  Sleep: normal  Appetite: normal  Medication side effects: No  ROS: no complaints, denies any shortness of breath or chest pain and all other systems are negative      Mental Status Evaluation:    Appearance:  disheveled, marginal hygiene   Behavior:  pleasant, calm, guarded, poor eye contact   Speech:  normal rate and volume   Mood:  labile, slightly less irritable   Affect:  constricted   Thought Process:  concrete   Thought Content:  no overt delusions   Perceptual Disturbances: no auditory hallucinations, no visual hallucinations, denies when asked, does not appear responding to internal stimuli   Risk Potential: Suicidal ideation - None at present, contracts for safety on the unit, would talk to staff if not feeling safe on the unit  Homicidal ideation - None  Potential for aggression - No   Memory:  recent and remote memory grossly intact   Consciousness:  alert and awake   Attention: attention span and concentration appear shorter than expected for age   Insight:  limited   Judgment: limited   Gait/Station: normal gait/station, normal balance   Motor Activity: no abnormal movements       Progress Toward Goals:     No significant improvement    Assessment/Plan   Principal Problem: Schizoaffective disorder, bipolar type ([facility] )  Active Problems:    Type 2 diabetes mellitus without complication, without long-term current use of insulin ([facility])    Benign essential hypertension    Gastroesophageal reflux disease without esophagitis    Acquired hypothyroidism    Tobacco abuse    Diabetes insipidus, nephrogenic ([facility] )      Recommended Treatment: Treatment plan and medication changes discussed and per the attending physician the plan is: 1  Continue with group therapy, milieu therapy and occupational therapy  2  Behavioral Health checks every 7 minutes  3  Continue with current medication regimen  4  Will review labs in the a m   5  Disposition Planning:    Behavioral Health Medications: all current active meds have been reviewed and continue current psychiatric medications        Current Facility-Administered Medications:  acetaminophen 325 mg Oral Q6H PRN Harris Chamberlain MD   acetaminophen 650 mg Oral Q6H PRN Harris Chamberlain MD   acetaminophen 975 mg Oral Q8H PRN Harris Chamberlain MD   aluminum-magnesium hydroxide-simethicone 30 mL Oral Q4H PRN Harris Chamberlain MD   amphetamine-dextroamphetamine 5 mg Oral Daily Harris Chamberlain MD   atorvastatin 10 mg Oral Daily With Simin Dempsey MD   benztropine 1 mg Intramuscular Q6H PRN Harris Chamberlain MD   benztropine 1 mg Oral Q6H PRN Harris Chamberlain MD   cloZAPine 200 mg Oral Daily Harris Chamberlain MD   divalproex sodium 1,500 mg Oral HS Harris Chamberlain MD   docusate sodium 100 mg Oral BID Harris Chamberlain MD   haloperidol 5 mg Oral Q6H PRN Harris Chamberlain MD   haloperidol lactate 5 mg Intramuscular Q6H PRN Harris Chamberlain MD   levothyroxine 75 mcg Oral Early Morning Harris Chamberlain MD   LORazepam 1 mg Intramuscular Q6H PRN Harris Chamberlain MD   LORazepam 1 mg Oral Q6H PRN Harris Chamberlain MD   magnesium hydroxide 30 mL Oral Daily PRN Harris Chamberlain MD   metFORMIN 500 mg Oral BID With Meals Harris Chamberlain MD   nicotine polacrilex 2 mg Oral Q2H PRN Harris Chamberlain MD   OLANZapine 5 mg Oral HS Harris Chamberlain MD oxybutynin 5 mg Oral Daily Myrna Merrill MD   pantoprazole 40 mg Oral Early Morning Myrna Merrill MD   traZODone 50 mg Oral HS PRN Myrna Merrill MD       Risks / Benefits of Treatment:     Risks, benefits, and possible side effects of medications explained to patient and patient verbalizes understanding and agreement for treatment  Counseling / Coordination of Care:     Patient's progress reviewed with nursing staff  Medications, treatment progress and treatment plan reviewed with patient  Supportive counseling provided to the patient            JOVITA Copeland

## 2020-05-01 NOTE — PROGRESS NOTES
05/01/20 1305   Team Meeting   Meeting Type Daily Rounds   Team Members Present   Team Members Present Nurse;;Physician; Other (Discipline and Name)   Physician Team Member Dr Ramón Parson Team Member Dusty Cam RN   Care Management Team Member Laci Duque MSW   Other (Discipline and Name) Cecily Marmolejo LCSW     Refused ammonia level, started new med

## 2020-05-01 NOTE — PLAN OF CARE
Problem: Alteration in Thoughts and Perception  Goal: Verbalize thoughts and feelings  Description  Interventions:  - Promote a nonjudgmental and trusting relationship with the patient through active listening and therapeutic communication  - Assess patient's level of functioning, behavior and potential for risk  - Engage patient in 1 on 1 interactions  - Encourage patient to express fears, feelings, frustrations, and discuss symptoms    - [place] patient to reality, help patient recognize reality-based thinking   - Administer medications as ordered and assess for potential side effects  - Provide the patient education related to the signs and symptoms of the illness and desired effects of prescribed medications  Outcome: Progressing  Goal: Refrain from acting on delusional thinking/internal stimuli  Description  Interventions:  - Monitor patient closely, per order   - Utilize least restrictive measures   - Set reasonable limits, give positive feedback for acceptable   - Administer medications as ordered and monitor of potential side effects  Outcome: Progressing  Goal: Agree to be compliant with medication regime, as prescribed and report medication side effects  Description  Interventions:  - Offer appropriate PRN medication and supervise ingestion; conduct AIMS, as needed   Outcome: Progressing  Goal: Attend and participate in unit activities, including therapeutic, recreational, and educational groups  Description  Interventions:  -Encourage Visitation and family involvement in care  Outcome: Not Progressing  Goal: Complete daily ADLs, including personal hygiene independently, as able  Description  Interventions:  - Observe, teach, and assist patient with ADLS  - Monitor and promote a balance of rest/activity, with adequate nutrition and elimination   Outcome: Not Progressing     Problem: Ineffective Coping  Goal: Demonstrates healthy coping skills  Outcome: Progressing  Goal: Understands least restrictive measures  Description  Interventions:  - Utilize least restrictive behavior  Outcome: Progressing     Problem: GASTROINTESTINAL - ADULT  Goal: Maintains adequate nutritional intake  Description  INTERVENTIONS:  - Monitor percentage of each meal consumed  - Identify factors contributing to decreased intake, treat as appropriate  - Assist with meals as needed  - Monitor I&O, weight, and lab values if indicated  - Obtain nutrition services referral as needed  Outcome: Zion Vickers has been awake, alert, and visible intermittently out in the milieu  Pt continues to be preoccupied with discharge  Disheveled in appearance and dressed in layers  Ate 100% supper  Behavior has been controlled  Rests in room at intervals  Minimal interaction noted with peers  Lacks insight into his illness  Pt did not attend any evening groups but came out for snack after  Compliant with scheduled meds when called  Continue to monitor/assess for any changes

## 2020-05-01 NOTE — PLAN OF CARE
Problem: DISCHARGE PLANNING  Goal: Discharge to home or other facility with appropriate resources  Description    CASE MANAGEMENT INTERVENTIONS:  - Conduct assessment to determine patient/family and health care team treatment goals, and need for post-acute services based on payer coverage, community resources, patient preferences and barriers to discharge  - Address psychosocial, clinical, and financial barriers to discharge as identified in assessment in conjunction with the patient/family and health care team   - Assist the patient in reintegration back into the community by removing barriers which may hinder a successful discharge once deemed stable  - Arrange appropriate level of post-acute services according to patient's needs and preference and payer coverage in collaboration with the physician and health care team   - Communicate with and update the patient/family, physician, and health care team regarding progress on the discharge plan  - Arrange appropriate transportation to post-acute venues  Outcome: Progressing     Patient continues to show a lack of participation, lack of motivation, and poor insight into his mental health  CM submitted [facility] referral to [facility] and [facility] today  CM will continue to follow and provide services as needed

## 2020-05-02 PROCEDURE — 99232 SBSQ HOSP IP/OBS MODERATE 35: CPT | Performed by: NURSE PRACTITIONER

## 2020-05-02 RX ADMIN — DEXTROAMPHETAMINE SACCHARATE, AMPHETAMINE ASPARTATE, DEXTROAMPHETAMINE SULFATE AND AMPHETAMINE SULFATE 5 MG: 1.25; 1.25; 1.25; 1.25 TABLET ORAL at 12:21

## 2020-05-02 RX ADMIN — OLANZAPINE 5 MG: 5 TABLET, FILM COATED ORAL at 20:58

## 2020-05-02 RX ADMIN — ATORVASTATIN CALCIUM 10 MG: 10 TABLET, FILM COATED ORAL at 16:41

## 2020-05-02 RX ADMIN — DOCUSATE SODIUM 100 MG: 100 CAPSULE, LIQUID FILLED ORAL at 17:31

## 2020-05-02 RX ADMIN — CLOZAPINE 200 MG: 200 TABLET ORAL at 16:41

## 2020-05-02 RX ADMIN — OXYBUTYNIN CHLORIDE 5 MG: 5 TABLET, EXTENDED RELEASE ORAL at 12:21

## 2020-05-02 RX ADMIN — LEVOTHYROXINE SODIUM 75 MCG: 75 TABLET ORAL at 05:41

## 2020-05-02 RX ADMIN — PANTOPRAZOLE SODIUM 40 MG: 40 TABLET, DELAYED RELEASE ORAL at 05:41

## 2020-05-02 RX ADMIN — METFORMIN HYDROCHLORIDE 500 MG: 500 TABLET ORAL at 12:20

## 2020-05-02 RX ADMIN — DIVALPROEX SODIUM 1500 MG: 500 TABLET, EXTENDED RELEASE ORAL at 20:58

## 2020-05-02 RX ADMIN — DOCUSATE SODIUM 100 MG: 100 CAPSULE, LIQUID FILLED ORAL at 12:21

## 2020-05-02 RX ADMIN — METFORMIN HYDROCHLORIDE 500 MG: 500 TABLET ORAL at 16:41

## 2020-05-02 NOTE — PLAN OF CARE
Problem: Alteration in Thoughts and Perception  Goal: Treatment Goal: Gain control of psychotic behaviors/thinking, reduce/eliminate presenting symptoms and demonstrate improved reality functioning upon discharge  Outcome: Progressing  Goal: Verbalize thoughts and feelings  Description  Interventions:  - Promote a nonjudgmental and trusting relationship with the patient through active listening and therapeutic communication  - Assess patient's level of functioning, behavior and potential for risk  - Engage patient in 1 on 1 interactions  - Encourage patient to express fears, feelings, frustrations, and discuss symptoms    - [place] patient to reality, help patient recognize reality-based thinking   - Administer medications as ordered and assess for potential side effects  - Provide the patient education related to the signs and symptoms of the illness and desired effects of prescribed medications  Outcome: Progressing  Goal: Refrain from acting on delusional thinking/internal stimuli  Description  Interventions:  - Monitor patient closely, per order   - Utilize least restrictive measures   - Set reasonable limits, give positive feedback for acceptable   - Administer medications as ordered and monitor of potential side effects  Outcome: Progressing  Goal: Agree to be compliant with medication regime, as prescribed and report medication side effects  Description  Interventions:  - Offer appropriate PRN medication and supervise ingestion; conduct AIMS, as needed   Outcome: Progressing  Goal: Attend and participate in unit activities, including therapeutic, recreational, and educational groups  Description  Interventions:  -Encourage Visitation and family involvement in care  Outcome: Progressing  Goal: Recognize dysfunctional thoughts, communicate reality-based thoughts at the time of discharge  Description  Interventions:  - Provide medication and psycho-education to assist patient in compliance and developing insight into his/her illness   Outcome: Progressing  Goal: Complete daily ADLs, including personal hygiene independently, as able  Description  Interventions:  - Observe, teach, and assist patient with ADLS  - Monitor and promote a balance of rest/activity, with adequate nutrition and elimination   Outcome: Progressing     Problem: Ineffective Coping  Goal: Identifies ineffective coping skills  Outcome: Progressing  Goal: Identifies healthy coping skills  Outcome: Progressing  Goal: Demonstrates healthy coping skills  Outcome: Progressing  Goal: Patient/Family participate in treatment and DC plans  Description  Interventions:  - Provide therapeutic environment  Outcome: Progressing  Goal: Patient/Family verbalizes awareness of resources  Outcome: Progressing  Goal: Understands least restrictive measures  Description  Interventions:  - Utilize least restrictive behavior  Outcome: Progressing     Problem: RESPIRATORY - ADULT  Goal: Achieves optimal ventilation and oxygenation  Description  INTERVENTIONS:  - Assess for changes in respiratory status  - Assess for changes in mentation and behavior  - Position to facilitate oxygenation and minimize respiratory effort  - Oxygen administered by appropriate delivery if ordered  - Initiate smoking cessation education as indicated  - Encourage broncho-pulmonary hygiene including cough, deep breathe, Incentive Spirometry  - Assess the need for suctioning and aspirate as needed  - Assess and instruct to report SOB or any respiratory difficulty  - Respiratory Therapy support as indicated  Outcome: Progressing     Problem: GASTROINTESTINAL - ADULT  Goal: Minimal or absence of nausea and/or vomiting  Description  INTERVENTIONS:  - Administer IV fluids if ordered to ensure adequate hydration  - Maintain NPO status until nausea and vomiting are resolved  - Nasogastric tube if ordered  - Administer ordered antiemetic medications as needed  - Provide nonpharmacologic comfort measures as appropriate  - Advance diet as tolerated, if ordered  - Consider nutrition services referral to assist patient with adequate nutrition and appropriate food choices  Outcome: Progressing  Goal: Maintains or returns to baseline bowel function  Description  INTERVENTIONS:  - Assess bowel function  - Encourage oral fluids to ensure adequate hydration  - Administer IV fluids if ordered to ensure adequate hydration  - Administer ordered medications as needed  - Encourage mobilization and activity  - Consider nutritional services referral to assist patient with adequate nutrition and appropriate food choices  Outcome: Progressing  Goal: Maintains adequate nutritional intake  Description  INTERVENTIONS:  - Monitor percentage of each meal consumed  - Identify factors contributing to decreased intake, treat as appropriate  - Assist with meals as needed  - Monitor I&O, weight, and lab values if indicated  - Obtain nutrition services referral as needed  Outcome: Progressing     Problem: METABOLIC, FLUID AND ELECTROLYTES - ADULT  Goal: Glucose maintained within target range  Description  INTERVENTIONS:  - Monitor Blood Glucose as ordered  - Assess for signs and symptoms of hyperglycemia and hypoglycemia  - Administer ordered medications to maintain glucose within target range  - Assess nutritional intake and initiate nutrition service referral as needed  Outcome: Progressing     Problem: DISCHARGE PLANNING  Goal: Discharge to home or other facility with appropriate resources  Description    CASE MANAGEMENT INTERVENTIONS:  - Conduct assessment to determine patient/family and health care team treatment goals, and need for post-acute services based on payer coverage, community resources, patient preferences and barriers to discharge    - Address psychosocial, clinical, and financial barriers to discharge as identified in assessment in conjunction with the patient/family and health care team   - Assist the patient in reintegration back into the community by removing barriers which may hinder a successful discharge once deemed stable  - Arrange appropriate level of post-acute services according to patient's needs and preference and payer coverage in collaboration with the physician and health care team   - Communicate with and update the patient/family, physician, and health care team regarding progress on the discharge plan  - Arrange appropriate transportation to post-acute venues  Outcome: Progressing    Patient irritable, uncooperative and isolative to room and self  Refused AM meds  At noon took metformin and colace  Refused to take the "blue pill aka Adderall and the pink pill aka Oxybutynin" and spit them out  Refused blood work (ammonia level) this morning  Patient ate 100% of both meals  Did not attend groups  No verbal or physical outbursts  Maintained on patient safety precautions w/o incident    Will continue to monitor progress in recovery program

## 2020-05-02 NOTE — PLAN OF CARE
Problem: Alteration in Thoughts and Perception  Goal: Agree to be compliant with medication regime, as prescribed and report medication side effects  Description  Interventions:  - Offer appropriate PRN medication and supervise ingestion; conduct AIMS, as needed   Outcome: Progressing     Problem: Alteration in Thoughts and Perception  Goal: Attend and participate in unit activities, including therapeutic, recreational, and educational groups  Description  Interventions:  -Encourage Visitation and family involvement in care  Outcome: Not Progressing    Compliant with routine medications, ate 100% of dinner and had snack with peers, did not attend groups this evening will continue to monitor

## 2020-05-02 NOTE — NURSING NOTE
Received pt in bed at change of shift with eyes closed; chest movement noted  Out of his room times one for a drink of water,  Refusing his blood work scheduled for this morning  On continual rounding  Will continue to monitor

## 2020-05-03 LAB — AMMONIA PLAS-SCNC: 38 UMOL/L (ref 9–33)

## 2020-05-03 PROCEDURE — 82140 ASSAY OF AMMONIA: CPT | Performed by: NURSE PRACTITIONER

## 2020-05-03 PROCEDURE — 99232 SBSQ HOSP IP/OBS MODERATE 35: CPT | Performed by: NURSE PRACTITIONER

## 2020-05-03 RX ADMIN — METFORMIN HYDROCHLORIDE 500 MG: 500 TABLET ORAL at 17:04

## 2020-05-03 RX ADMIN — CLOZAPINE 200 MG: 200 TABLET ORAL at 17:04

## 2020-05-03 RX ADMIN — DOCUSATE SODIUM 100 MG: 100 CAPSULE, LIQUID FILLED ORAL at 17:04

## 2020-05-03 RX ADMIN — OLANZAPINE 5 MG: 5 TABLET, FILM COATED ORAL at 20:53

## 2020-05-03 RX ADMIN — METFORMIN HYDROCHLORIDE 500 MG: 500 TABLET ORAL at 13:06

## 2020-05-03 RX ADMIN — OXYBUTYNIN CHLORIDE 5 MG: 5 TABLET, EXTENDED RELEASE ORAL at 13:06

## 2020-05-03 RX ADMIN — ATORVASTATIN CALCIUM 10 MG: 10 TABLET, FILM COATED ORAL at 17:04

## 2020-05-03 RX ADMIN — DEXTROAMPHETAMINE SACCHARATE, AMPHETAMINE ASPARTATE, DEXTROAMPHETAMINE SULFATE AND AMPHETAMINE SULFATE 5 MG: 1.25; 1.25; 1.25; 1.25 TABLET ORAL at 13:05

## 2020-05-03 RX ADMIN — DOCUSATE SODIUM 100 MG: 100 CAPSULE, LIQUID FILLED ORAL at 13:06

## 2020-05-03 RX ADMIN — TRAZODONE HYDROCHLORIDE 50 MG: 50 TABLET ORAL at 21:57

## 2020-05-03 RX ADMIN — DIVALPROEX SODIUM 1500 MG: 500 TABLET, EXTENDED RELEASE ORAL at 20:53

## 2020-05-03 NOTE — PROGRESS NOTES
Progress Note - Behavioral Health   Teresa Rose 48 y o  male MRN: [id number]  [facility] Encounter: [id number]    Assessment/Plan   Principal Problem:    Schizoaffective disorder, bipolar type ([facility] [address] )  Active Problems:    Type 2 diabetes mellitus without complication, without long-term current use of insulin (HCC)    Benign essential hypertension    Gastroesophageal reflux disease without esophagitis    Acquired hypothyroidism    Tobacco abuse    Diabetes insipidus, nephrogenic ([facility] [address] )      Subjective:Patient was seen today for continuation of care, records reviewed and  patient was discussed with the morning case review team  Mark Pabon presented agitated and irritable this morning; he had pressured speech, dhe was isheveled and had several layers on clothing on  He was focused on being discharged today and states there was no reason for him to be here  Staff reported that he continues to refused blood work and is being selective with which medications he takes  Yesterday evening, he was upset and punched the nursing station glass - but later apologized for his behavior  Mark Pabon denies endorsing any suicidal or homicidal ideation, denies auditory and visual hallucinations, denies paranoia and delusions  He appears to be  paranoid, distracted, agitated and walked away from this writer during our encounter   He remains medication compliance (selective) and denies any side effects from medications  Will continue on current medication regimen  Vitals:  Vitals:    05/02/20 0732   BP: 106/57   Pulse: 75   Resp:    Temp:        Laboratory results:    I have personally reviewed all pertinent laboratory/tests results    Most Recent Labs:   Lab Results   Component Value Date    WBC 8 70 04/13/2020    RBC 4 77 04/13/2020    HGB 14 4 04/13/2020    HCT 41 5 04/13/2020     04/13/2020    RDW 13 9 04/13/2020    NEUTROABS 3 22 04/13/2020    SODIUM 138 04/13/2020    K 4 3 04/13/2020     04/13/2020 CO2 28 04/13/2020    BUN 7 04/13/2020    CREATININE 0 54 (L) 04/13/2020    GLUC 107 (H) 04/13/2020    GLUF 107 (H) 04/13/2020    CALCIUM 9 1 04/13/2020    AST 21 04/13/2020    ALT 23 04/13/2020    ALKPHOS 63 04/13/2020    TP 6 8 04/13/2020    ALB 3 8 04/13/2020    TBILI 0 20 04/13/2020    CHOLESTEROL 102 04/13/2020    HDL 26 (L) 04/13/2020    TRIG 172 (H) 04/13/2020    LDLCALC 42 04/13/2020    NONHDLC 76 04/13/2020    VALPROICTOT 88 2 04/13/2020    LITHIUM 0 6 03/27/2014    AMMONIA 39 (H) 04/01/2020    JRM3PTEIKLFB 3 810 12/24/2019    FREET4 0 93 08/23/2019    HGBA1C 6 1 12/03/2019     12/03/2019       Psychiatric Review of Systems:    Behavior over the last 24 hours:  unchanged  Sleep: normal  Appetite: normal  Medication side effects: No  ROS: no complaints, denies any shortness of breath or chest pain and all other systems are negative      Mental Status Evaluation:    Appearance:  disheveled, marginal hygiene   Behavior:  agitated, bizarre, guarded   Speech:  normal rate and volume   Mood:  labile, irritable   Affect:  constricted   Thought Process:  concrete   Thought Content:  some paranoia, intrusive thoughts, still somewhat preoccupied   Perceptual Disturbances: no auditory hallucinations, no visual hallucinations, appears distracted   Risk Potential: Suicidal ideation - None  Homicidal ideation - None  Potential for aggression - No   Memory:  recent and remote memory grossly intact   Consciousness:  alert and awake   Attention: attention span and concentration appear shorter than expected for age   Insight:  limited   Judgment: limited   Gait/Station: normal gait/station, normal balance   Motor Activity: no abnormal movements       Progress Toward Goals:     No significant improvement    Assessment/Plan   Principal Problem:    Schizoaffective disorder, bipolar type (HCC)  Active Problems:    Type 2 diabetes mellitus without complication, without long-term current use of insulin (HCC)    Benign essential hypertension    Gastroesophageal reflux disease without esophagitis    Acquired hypothyroidism    Tobacco abuse    Diabetes insipidus, nephrogenic ([facility] [address] )      Recommended Treatment: Treatment plan and medication changes discussed and per the attending physician the plan is: 1  Continue with group therapy, milieu therapy and occupational therapy  2  Behavioral Health checks every 7 minutes  3  Continue with current medication regimen  4  Will review labs in the a m   5  Disposition Planning:    Behavioral Health Medications: all current active meds have been reviewed and continue current psychiatric medications        Current Facility-Administered Medications:  acetaminophen 325 mg Oral Q6H PRN Kalli Joshi MD   acetaminophen 650 mg Oral Q6H PRN Kalli Joshi MD   acetaminophen 975 mg Oral Q8H PRN Kalli Joshi MD   aluminum-magnesium hydroxide-simethicone 30 mL Oral Q4H PRN Kalli Joshi MD   amphetamine-dextroamphetamine 5 mg Oral Daily Kalli Joshi MD   atorvastatin 10 mg Oral Daily With Aubrey Fierro MD   benztropine 1 mg Intramuscular Q6H PRN Kalli Joshi MD   benztropine 1 mg Oral Q6H PRN Kalli Joshi MD   cloZAPine 200 mg Oral Daily Kalli Joshi MD   divalproex sodium 1,500 mg Oral HS Kalli Joshi MD   docusate sodium 100 mg Oral BID Kalli Joshi MD   haloperidol 5 mg Oral Q6H PRN Kalli Joshi MD   haloperidol lactate 5 mg Intramuscular Q6H PRN Kalli Joshi MD   levothyroxine 75 mcg Oral Early Morning Kalli Joshi MD   LORazepam 1 mg Intramuscular Q6H PRN Kalli Joshi MD   LORazepam 1 mg Oral Q6H PRN Kalli Joshi MD   magnesium hydroxide 30 mL Oral Daily PRN Kalli Joshi MD   metFORMIN 500 mg Oral BID With Meals Kalli Joshi MD   nicotine polacrilex 2 mg Oral Q2H PRN Kalli Joshi MD   OLANZapine 5 mg Oral HS Kalli Joshi MD   oxybutynin 5 mg Oral Daily Kalli Joshi MD   pantoprazole 40 mg Oral Early Morning Kalli Joshi MD   traZODone 50 mg Oral HS PRN Kalli Joshi MD       Risks / Benefits of Treatment:     Risks, benefits, and possible side effects of medications explained to patient and patient verbalizes understanding and agreement for treatment  Counseling / Coordination of Care:     Patient's progress reviewed with nursing staff  Medications, treatment progress and treatment plan reviewed with patient  Supportive counseling provided to the patient            JOVITA Prado

## 2020-05-03 NOTE — PROGRESS NOTES
Patient sleeping throughout the night  Refused 0600 medications of Synthroid and Protonix  "No, I'm alright"  Refused bloodwork, ammonia level  No agitation

## 2020-05-03 NOTE — PLAN OF CARE
Problem: Alteration in Thoughts and Perception  Goal: Verbalize thoughts and feelings  Description  Interventions:  - Promote a nonjudgmental and trusting relationship with the patient through active listening and therapeutic communication  - Assess patient's level of functioning, behavior and potential for risk  - Engage patient in 1 on 1 interactions  - Encourage patient to express fears, feelings, frustrations, and discuss symptoms    - [place] patient to reality, help patient recognize reality-based thinking   - Administer medications as ordered and assess for potential side effects  - Provide the patient education related to the signs and symptoms of the illness and desired effects of prescribed medications  Outcome: Progressing  Goal: Refrain from acting on delusional thinking/internal stimuli  Description  Interventions:  - Monitor patient closely, per order   - Utilize least restrictive measures   - Set reasonable limits, give positive feedback for acceptable   - Administer medications as ordered and monitor of potential side effects  Outcome: Not Progressing  Goal: Agree to be compliant with medication regime, as prescribed and report medication side effects  Description  Interventions:  - Offer appropriate PRN medication and supervise ingestion; conduct AIMS, as needed   Outcome: Progressing  Goal: Attend and participate in unit activities, including therapeutic, recreational, and educational groups  Description  Interventions:  -Encourage Visitation and family involvement in care  Outcome: Not Progressing  Goal: Complete daily ADLs, including personal hygiene independently, as able  Description  Interventions:  - Observe, teach, and assist patient with ADLS  - Monitor and promote a balance of rest/activity, with adequate nutrition and elimination   Outcome: Progressing     Problem: Ineffective Coping  Goal: Demonstrates healthy coping skills  Outcome: Not Progressing  Goal: Understands least restrictive measures  Description  Interventions:  - Utilize least restrictive behavior  Outcome: Progressing     Problem: GASTROINTESTINAL - ADULT  Goal: Maintains adequate nutritional intake  Description  INTERVENTIONS:  - Monitor percentage of each meal consumed  - Identify factors contributing to decreased intake, treat as appropriate  - Assist with meals as needed  - Monitor I&O, weight, and lab values if indicated  - Obtain nutrition services referral as needed  Outcome: Kyle Jones has been awake, alert, and visible intermittently out in the milieu  At [address] pt asked writer when he was being discharged and became angry because he hasn't been given a discharge date yet and banged once on window at nurse's station and a few minutes after pt apologized for his behavior but continues to be preoccupied with discharge  Pt showered this shift  Ate 100% supper  Compliant with all scheduled meds when called  Rests in room at intervals and sits in [employer] and listens to music on radio with peers  Still disheveled in appearance and dressed and in layers  Pt did not attend evening group but came out for snack after  No further agitation noted this shift  Continue to monitor/assess for any changes

## 2020-05-03 NOTE — PLAN OF CARE
Problem: Alteration in Thoughts and Perception  Goal: Treatment Goal: Gain control of psychotic behaviors/thinking, reduce/eliminate presenting symptoms and demonstrate improved reality functioning upon discharge  Outcome: Not Progressing  Goal: Verbalize thoughts and feelings  Description  Interventions:  - Promote a nonjudgmental and trusting relationship with the patient through active listening and therapeutic communication  - Assess patient's level of functioning, behavior and potential for risk  - Engage patient in 1 on 1 interactions  - Encourage patient to express fears, feelings, frustrations, and discuss symptoms    - [place] patient to reality, help patient recognize reality-based thinking   - Administer medications as ordered and assess for potential side effects  - Provide the patient education related to the signs and symptoms of the illness and desired effects of prescribed medications  Outcome: Not Progressing  Goal: Refrain from acting on delusional thinking/internal stimuli  Description  Interventions:  - Monitor patient closely, per order   - Utilize least restrictive measures   - Set reasonable limits, give positive feedback for acceptable   - Administer medications as ordered and monitor of potential side effects  Outcome: Not Progressing  Goal: Agree to be compliant with medication regime, as prescribed and report medication side effects  Description  Interventions:  - Offer appropriate PRN medication and supervise ingestion; conduct AIMS, as needed   Outcome: Not Progressing  Goal: Attend and participate in unit activities, including therapeutic, recreational, and educational groups  Description  Interventions:  -Encourage Visitation and family involvement in care  Outcome: Not Progressing  Goal: Recognize dysfunctional thoughts, communicate reality-based thoughts at the time of discharge  Description  Interventions:  - Provide medication and psycho-education to assist patient in compliance and developing insight into his/her illness   Outcome: Not Progressing  Goal: Complete daily ADLs, including personal hygiene independently, as able  Description  Interventions:  - Observe, teach, and assist patient with ADLS  - Monitor and promote a balance of rest/activity, with adequate nutrition and elimination   Outcome: Not Progressing     Problem: Ineffective Coping  Goal: Identifies ineffective coping skills  Outcome: Not Progressing  Goal: Identifies healthy coping skills  Outcome: Not Progressing  Goal: Demonstrates healthy coping skills  Outcome: Not Progressing  Goal: Patient/Family participate in treatment and DC plans  Description  Interventions:  - Provide therapeutic environment  Outcome: Not Progressing  Goal: Patient/Family verbalizes awareness of resources  Outcome: Not Progressing  Goal: Understands least restrictive measures  Description  Interventions:  - Utilize least restrictive behavior  Outcome: Not Progressing     Problem: RESPIRATORY - ADULT  Goal: Achieves optimal ventilation and oxygenation  Description  INTERVENTIONS:  - Assess for changes in respiratory status  - Assess for changes in mentation and behavior  - Position to facilitate oxygenation and minimize respiratory effort  - Oxygen administered by appropriate delivery if ordered  - Initiate smoking cessation education as indicated  - Encourage broncho-pulmonary hygiene including cough, deep breathe, Incentive Spirometry  - Assess the need for suctioning and aspirate as needed  - Assess and instruct to report SOB or any respiratory difficulty  - Respiratory Therapy support as indicated  Outcome: Not Progressing     Problem: GASTROINTESTINAL - ADULT  Goal: Minimal or absence of nausea and/or vomiting  Description  INTERVENTIONS:  - Administer IV fluids if ordered to ensure adequate hydration  - Maintain NPO status until nausea and vomiting are resolved  - Nasogastric tube if ordered  - Administer ordered antiemetic medications as needed  - Provide nonpharmacologic comfort measures as appropriate  - Advance diet as tolerated, if ordered  - Consider nutrition services referral to assist patient with adequate nutrition and appropriate food choices  Outcome: Not Progressing  Goal: Maintains or returns to baseline bowel function  Description  INTERVENTIONS:  - Assess bowel function  - Encourage oral fluids to ensure adequate hydration  - Administer IV fluids if ordered to ensure adequate hydration  - Administer ordered medications as needed  - Encourage mobilization and activity  - Consider nutritional services referral to assist patient with adequate nutrition and appropriate food choices  Outcome: Not Progressing  Goal: Maintains adequate nutritional intake  Description  INTERVENTIONS:  - Monitor percentage of each meal consumed  - Identify factors contributing to decreased intake, treat as appropriate  - Assist with meals as needed  - Monitor I&O, weight, and lab values if indicated  - Obtain nutrition services referral as needed  Outcome: Not Progressing     Problem: METABOLIC, FLUID AND ELECTROLYTES - ADULT  Goal: Glucose maintained within target range  Description  INTERVENTIONS:  - Monitor Blood Glucose as ordered  - Assess for signs and symptoms of hyperglycemia and hypoglycemia  - Administer ordered medications to maintain glucose within target range  - Assess nutritional intake and initiate nutrition service referral as needed  Outcome: Not Progressing     Problem: DISCHARGE PLANNING  Goal: Discharge to home or other facility with appropriate resources  Description    CASE MANAGEMENT INTERVENTIONS:  - Conduct assessment to determine patient/family and health care team treatment goals, and need for post-acute services based on payer coverage, community resources, patient preferences and barriers to discharge    - Address psychosocial, clinical, and financial barriers to discharge as identified in assessment in conjunction with the patient/family and health care team   - Assist the patient in reintegration back into the community by removing barriers which may hinder a successful discharge once deemed stable  - Arrange appropriate level of post-acute services according to patient's needs and preference and payer coverage in collaboration with the physician and health care team   - Communicate with and update the patient/family, physician, and health care team regarding progress on the discharge plan  - Arrange appropriate transportation to post-acute venues  Outcome: Not Progressing    Patient irritable, uncooperative and isolative to room and self  Refused AM meds  Took them at noon in order to get radio  Refused blood work (ammonia level) this morning then later let MHT get it  Patient ate 100% of both meals  Did not attend groups  No verbal or physical outbursts  Social with peers in the afternoon  Maintained on patient safety precautions w/o incident    Will continue to monitor progress in recovery program

## 2020-05-04 PROCEDURE — 99232 SBSQ HOSP IP/OBS MODERATE 35: CPT | Performed by: PSYCHIATRY & NEUROLOGY

## 2020-05-04 RX ORDER — DEXTROAMPHETAMINE SACCHARATE, AMPHETAMINE ASPARTATE, DEXTROAMPHETAMINE SULFATE AND AMPHETAMINE SULFATE 1.25; 1.25; 1.25; 1.25 MG/1; MG/1; MG/1; MG/1
5 TABLET ORAL DAILY
Status: DISCONTINUED | OUTPATIENT
Start: 2020-05-05 | End: 2020-05-07

## 2020-05-04 RX ADMIN — OLANZAPINE 5 MG: 5 TABLET, FILM COATED ORAL at 20:56

## 2020-05-04 RX ADMIN — LEVOTHYROXINE SODIUM 75 MCG: 75 TABLET ORAL at 06:03

## 2020-05-04 RX ADMIN — ALUMINUM HYDROXIDE, MAGNESIUM HYDROXIDE, AND SIMETHICONE 30 ML: 200; 200; 20 SUSPENSION ORAL at 23:41

## 2020-05-04 RX ADMIN — TRAZODONE HYDROCHLORIDE 50 MG: 50 TABLET ORAL at 21:51

## 2020-05-04 RX ADMIN — PANTOPRAZOLE SODIUM 40 MG: 40 TABLET, DELAYED RELEASE ORAL at 06:03

## 2020-05-04 RX ADMIN — DOCUSATE SODIUM 100 MG: 100 CAPSULE, LIQUID FILLED ORAL at 17:36

## 2020-05-04 RX ADMIN — DIVALPROEX SODIUM 1500 MG: 500 TABLET, EXTENDED RELEASE ORAL at 20:56

## 2020-05-04 RX ADMIN — CLOZAPINE 200 MG: 200 TABLET ORAL at 16:46

## 2020-05-04 RX ADMIN — ATORVASTATIN CALCIUM 10 MG: 10 TABLET, FILM COATED ORAL at 16:46

## 2020-05-04 RX ADMIN — METFORMIN HYDROCHLORIDE 500 MG: 500 TABLET ORAL at 16:46

## 2020-05-04 NOTE — PLAN OF CARE
Problem: Alteration in Thoughts and Perception  Goal: Verbalize thoughts and feelings  Description  Interventions:  - Promote a nonjudgmental and trusting relationship with the patient through active listening and therapeutic communication  - Assess patient's level of functioning, behavior and potential for risk  - Engage patient in 1 on 1 interactions  - Encourage patient to express fears, feelings, frustrations, and discuss symptoms    - [place] patient to reality, help patient recognize reality-based thinking   - Administer medications as ordered and assess for potential side effects  - Provide the patient education related to the signs and symptoms of the illness and desired effects of prescribed medications  Outcome: Progressing  Goal: Refrain from acting on delusional thinking/internal stimuli  Description  Interventions:  - Monitor patient closely, per order   - Utilize least restrictive measures   - Set reasonable limits, give positive feedback for acceptable   - Administer medications as ordered and monitor of potential side effects  Outcome: Progressing  Goal: Agree to be compliant with medication regime, as prescribed and report medication side effects  Description  Interventions:  - Offer appropriate PRN medication and supervise ingestion; conduct AIMS, as needed   Outcome: Progressing  Goal: Attend and participate in unit activities, including therapeutic, recreational, and educational groups  Description  Interventions:  -Encourage Visitation and family involvement in care  Outcome: Progressing     Problem: Ineffective Coping  Goal: Demonstrates healthy coping skills  Outcome: Progressing  Goal: Understands least restrictive measures  Description  Interventions:  - Utilize least restrictive behavior  Outcome: Progressing     Problem: GASTROINTESTINAL - ADULT  Goal: Maintains adequate nutritional intake  Description  INTERVENTIONS:  - Monitor percentage of each meal consumed  - Identify factors contributing to decreased intake, treat as appropriate  - Assist with meals as needed  - Monitor I&O, weight, and lab values if indicated  - Obtain nutrition services referral as needed  Outcome: Araceli Davenport has been awake, alert, and visible intermittently out in the milieu  Pt noted calling hospital  in beginning of shift asking for Pastoral Care to get him  a Bible  Pt instructed not to call  and to ask staff for items if needed  No agitation this shift  Ate 100% supper  Compliant with all scheduled meds when called  Remains preoccupied with discharge  Listens to music on radio in [facility] with peers  Attended evening group and had snack after  Pt requested prn for sleep  Trazodone 50 mg po given at 2157  Continue to monitor/assess for any changes

## 2020-05-04 NOTE — PROGRESS NOTES
Emilia Niru declined breakfast and his morning medications after several attempts to encourage him to do so from this writer and staff  Patient currently is sleeping in his room  Will continue to monitor

## 2020-05-04 NOTE — TREATMENT TEAM
05/04/20 0830   Team Meeting   Meeting Type Daily Rounds   Team Members Present   Team Members Present Physician;Nurse; Other (Discipline and Name)  (CPS)   Physician Team Member Dr Corinne Folds, RN   Other (Discipline and Name) MCKAYLA Montes     Irritable and labile throughout the weekend, but  Redirectable  Frustrated he is still hospitalized

## 2020-05-04 NOTE — TREATMENT TEAM
05/04/20 1100   Activity/Group Checklist   Group   (IMR/My Plate )   Attendance Did not attend   Attendance Duration (min) 46-60   Affect/Mood GAEL

## 2020-05-04 NOTE — PLAN OF CARE
Problem: Alteration in Thoughts and Perception  Goal: Agree to be compliant with medication regime, as prescribed and report medication side effects  Description  Interventions:  - Offer appropriate PRN medication and supervise ingestion; conduct AIMS, as needed   Outcome: Not Progressing  Goal: Attend and participate in unit activities, including therapeutic, recreational, and educational groups  Description  Interventions:  -Encourage Visitation and family involvement in care  Outcome: Not Progressing  Goal: Complete daily ADLs, including personal hygiene independently, as able  Description  Interventions:  - Observe, teach, and assist patient with ADLS  - Monitor and promote a balance of rest/activity, with adequate nutrition and elimination   Outcome: Not Progressing     Jessica Garcia has been isolative to his room, incompliant with his behavorial plan, only coming out of his room to eat lunch  Declined morning medications, scheduled groups, hygiene and breakfast  Irritable edge during interactions, remains preoccupied on discharge even though he is not working with his behavorial plan  Will continue to monitor for changes  Patient remains in his room

## 2020-05-04 NOTE — PROGRESS NOTES
Erma Kennedy maintained on ongoing SAFE precaution without incident on this shift  Laying in bed with eyes closed, breath even and unlabored   Continuous rounding implemented   No behavioral noted   Will continue to monitor
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
## 2020-05-04 NOTE — PLAN OF CARE
Problem: Alteration in Thoughts and Perception  Goal: Attend and participate in unit activities, including therapeutic, recreational, and educational groups  Description  Interventions:  -Encourage Visitation and family involvement in care  Outcome: Not Progressing   Patient does not attend or participate in unit activities during the day shift hours  Patient irritable, focused on discharge and lacks boundaries

## 2020-05-04 NOTE — TREATMENT TEAM
05/04/20 0900   Activity/Group Checklist   Group Community meeting   Attendance Did not attend   Attendance Duration (min) 16-30   Affect/Mood GAEL

## 2020-05-05 PROBLEM — F90.9 ADHD: Status: ACTIVE | Noted: 2020-05-05

## 2020-05-05 PROCEDURE — 99232 SBSQ HOSP IP/OBS MODERATE 35: CPT | Performed by: PSYCHIATRY & NEUROLOGY

## 2020-05-05 RX ADMIN — DOCUSATE SODIUM 100 MG: 100 CAPSULE, LIQUID FILLED ORAL at 08:44

## 2020-05-05 RX ADMIN — OXYBUTYNIN CHLORIDE 5 MG: 5 TABLET, EXTENDED RELEASE ORAL at 08:44

## 2020-05-05 RX ADMIN — TRAZODONE HYDROCHLORIDE 50 MG: 50 TABLET ORAL at 22:07

## 2020-05-05 RX ADMIN — HALOPERIDOL 5 MG: 5 TABLET ORAL at 16:48

## 2020-05-05 RX ADMIN — DOCUSATE SODIUM 100 MG: 100 CAPSULE, LIQUID FILLED ORAL at 17:16

## 2020-05-05 RX ADMIN — LEVOTHYROXINE SODIUM 75 MCG: 75 TABLET ORAL at 05:59

## 2020-05-05 RX ADMIN — METFORMIN HYDROCHLORIDE 500 MG: 500 TABLET ORAL at 08:44

## 2020-05-05 RX ADMIN — METFORMIN HYDROCHLORIDE 500 MG: 500 TABLET ORAL at 17:16

## 2020-05-05 RX ADMIN — DIVALPROEX SODIUM 1500 MG: 500 TABLET, EXTENDED RELEASE ORAL at 20:49

## 2020-05-05 RX ADMIN — OLANZAPINE 5 MG: 5 TABLET, FILM COATED ORAL at 20:50

## 2020-05-05 RX ADMIN — CLOZAPINE 200 MG: 200 TABLET ORAL at 16:48

## 2020-05-05 RX ADMIN — ATORVASTATIN CALCIUM 10 MG: 10 TABLET, FILM COATED ORAL at 16:48

## 2020-05-05 RX ADMIN — PANTOPRAZOLE SODIUM 40 MG: 40 TABLET, DELAYED RELEASE ORAL at 05:59

## 2020-05-05 RX ADMIN — DEXTROAMPHETAMINE SACCHARATE, AMPHETAMINE ASPARTATE, DEXTROAMPHETAMINE SULFATE AND AMPHETAMINE SULFATE 5 MG: 1.25; 1.25; 1.25; 1.25 TABLET ORAL at 05:59

## 2020-05-05 NOTE — PLAN OF CARE
Problem: Alteration in Thoughts and Perception  Goal: Refrain from acting on delusional thinking/internal stimuli  Description  Interventions:  - Monitor patient closely, per order   - Utilize least restrictive measures   - Set reasonable limits, give positive feedback for acceptable   - Administer medications as ordered and monitor of potential side effects  Outcome: Not Progressing  Goal: Attend and participate in unit activities, including therapeutic, recreational, and educational groups  Description  Interventions:  -Encourage Visitation and family involvement in care  Outcome: Not Mo Francisco has been intermittently visible on the unit today, when visible, he has been boisterous during interactions with peers and staff  Compliant for his medications and meals today  Presents with more of an irritable edge and agitation than previously noticed  Did not attend scheduled programming today besides his treatment team  Naps for most of the shift on his own  Will continue to monitor for changes

## 2020-05-05 NOTE — TREATMENT TEAM
05/05/20 0900   Activity/Group Checklist   Group Community meeting   Attendance Did not attend   Attendance Duration (min) 16-30   Affect/Mood GAEL

## 2020-05-05 NOTE — PROGRESS NOTES
05/05/20 0930   Team Meeting   Meeting Type Tx Team Meeting   Initial Conference Date 05/05/20   Next Conference Date 05/12/20   Team Members Present   Team Members Present Physician;Nurse;; Other (Discipline and Name)   Physician Team Member Dr Sharita Brown, RN   Other (Discipline and Name) Leticia Lloyd, MORGAN; Chris Baires, [facility] KAILA   Patient/Family Present   Patient Present Yes   Patient's Family Present Yes   Family Relationship Sibling  (Sister Margie Dietrich by conference line)     Patient attended treatment team meeting this morning prepared without self-assessment  Patient's group attendance for last week was not available at the time of meeting  [facility] confirmed having received patient's [facility] referral   Patient was intrusive and argumentative with team, which his sister reports is "baseline behavior "  Patient becomes visibly upset in treatment team and therefore difficult to redirect  Patient is not engaged in the recovery process  He was encouraged to review and sign his 30-day treatment plan, but declined Team was unable to complete elopement risk assessment, as patient left the meeting before it was over  Next team meeting scheduled for 5/12

## 2020-05-05 NOTE — TREATMENT TEAM
05/05/20 1400   Activity/Group Checklist   Group   (Recovery Workshop )   Attendance Did not attend   Attendance Duration (min) 46-60   Affect/Mood GAEL
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
## 2020-05-05 NOTE — TREATMENT TEAM
05/05/20 1100   Activity/Group Checklist   Group   ([facility]/Health and Wellness )   Attendance Did not attend   Attendance Duration (min) Greater than 60   Affect/Mood GAEL

## 2020-05-05 NOTE — PROGRESS NOTES
05/05/20 0900   Team Meeting   Meeting Type Daily Rounds   Team Members Present   Team Members Present Physician;Nurse; Other (Discipline and Name)   Physician Team Member Dr Ramón Parson Team Member Ivana Pérez RN   Other (Discipline and Name) Cecily Marmolejo LCSW; Gilford Dull, CPS     Preoccupied with discharge  Disheveled  Refused 0900 Adderall yesterday  Took 0600 today  Slept most of the day yesterday

## 2020-05-05 NOTE — PLAN OF CARE
Problem: Alteration in Thoughts and Perception  Goal: Verbalize thoughts and feelings  Description  Interventions:  - Promote a nonjudgmental and trusting relationship with the patient through active listening and therapeutic communication  - Assess patient's level of functioning, behavior and potential for risk  - Engage patient in 1 on 1 interactions  - Encourage patient to express fears, feelings, frustrations, and discuss symptoms    - [place] patient to reality, help patient recognize reality-based thinking   - Administer medications as ordered and assess for potential side effects  - Provide the patient education related to the signs and symptoms of the illness and desired effects of prescribed medications  Outcome: Progressing  Goal: Refrain from acting on delusional thinking/internal stimuli  Description  Interventions:  - Monitor patient closely, per order   - Utilize least restrictive measures   - Set reasonable limits, give positive feedback for acceptable   - Administer medications as ordered and monitor of potential side effects  Outcome: Progressing  Goal: Agree to be compliant with medication regime, as prescribed and report medication side effects  Description  Interventions:  - Offer appropriate PRN medication and supervise ingestion; conduct AIMS, as needed   Outcome: Progressing  Goal: Attend and participate in unit activities, including therapeutic, recreational, and educational groups  Description  Interventions:  -Encourage Visitation and family involvement in care  Outcome: Progressing  Goal: Complete daily ADLs, including personal hygiene independently, as able  Description  Interventions:  - Observe, teach, and assist patient with ADLS  - Monitor and promote a balance of rest/activity, with adequate nutrition and elimination   Outcome: Progressing     Problem: Ineffective Coping  Goal: Demonstrates healthy coping skills  Outcome: Progressing  Goal: Understands least restrictive measures  Description  Interventions:  - Utilize least restrictive behavior  Outcome: Progressing     Problem: GASTROINTESTINAL - ADULT  Goal: Maintains adequate nutritional intake  Description  INTERVENTIONS:  - Monitor percentage of each meal consumed  - Identify factors contributing to decreased intake, treat as appropriate  - Assist with meals as needed  - Monitor I&O, weight, and lab values if indicated  - Obtain nutrition services referral as needed  Outcome: Tuan Damico has been awake, alert, and visible intermittently out in the milieu  Pt stated, "I'm doing good today "  Remains preoccupied with discharge  Dressed in layers  Pt completed his daily ADL's today  Ate 100% supper  No behavioral issues  Naps at intervals in his room  Compliant with all scheduled meds when called  Pt did not attend evening groups but came out for snack after  Pt requested prn Trazodone for sleep  Trazodone 50 mg po given at 2151  Continue to monitor/assess for any changes
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
## 2020-05-06 PROCEDURE — 99232 SBSQ HOSP IP/OBS MODERATE 35: CPT | Performed by: PSYCHIATRY & NEUROLOGY

## 2020-05-06 RX ADMIN — ATORVASTATIN CALCIUM 10 MG: 10 TABLET, FILM COATED ORAL at 16:45

## 2020-05-06 RX ADMIN — LEVOTHYROXINE SODIUM 75 MCG: 75 TABLET ORAL at 06:10

## 2020-05-06 RX ADMIN — METFORMIN HYDROCHLORIDE 500 MG: 500 TABLET ORAL at 16:45

## 2020-05-06 RX ADMIN — DEXTROAMPHETAMINE SACCHARATE, AMPHETAMINE ASPARTATE, DEXTROAMPHETAMINE SULFATE AND AMPHETAMINE SULFATE 5 MG: 1.25; 1.25; 1.25; 1.25 TABLET ORAL at 06:10

## 2020-05-06 RX ADMIN — PANTOPRAZOLE SODIUM 40 MG: 40 TABLET, DELAYED RELEASE ORAL at 06:10

## 2020-05-06 RX ADMIN — TRAZODONE HYDROCHLORIDE 50 MG: 50 TABLET ORAL at 23:54

## 2020-05-06 RX ADMIN — ALUMINUM HYDROXIDE, MAGNESIUM HYDROXIDE, AND SIMETHICONE 30 ML: 200; 200; 20 SUSPENSION ORAL at 20:56

## 2020-05-06 RX ADMIN — CLOZAPINE 200 MG: 200 TABLET ORAL at 16:45

## 2020-05-06 RX ADMIN — DOCUSATE SODIUM 100 MG: 100 CAPSULE, LIQUID FILLED ORAL at 17:06

## 2020-05-06 RX ADMIN — DIVALPROEX SODIUM 1500 MG: 500 TABLET, EXTENDED RELEASE ORAL at 20:54

## 2020-05-06 RX ADMIN — OLANZAPINE 5 MG: 5 TABLET, FILM COATED ORAL at 20:54

## 2020-05-06 NOTE — PROGRESS NOTES
05/06/20 1000   Activity/Group Checklist   Group Other (Comment)  (Journaling/Electronics - Graduation)   Attendance Did not attend     Patient was encouraged to attend graduation for a peer  Patient declined, as he was sleeping in bed when prompted

## 2020-05-06 NOTE — PLAN OF CARE
Problem: Alteration in Thoughts and Perception  Goal: Treatment Goal: Gain control of psychotic behaviors/thinking, reduce/eliminate presenting symptoms and demonstrate improved reality functioning upon discharge  Outcome: Progressing  Goal: Verbalize thoughts and feelings  Description  Interventions:  - Promote a nonjudgmental and trusting relationship with the patient through active listening and therapeutic communication  - Assess patient's level of functioning, behavior and potential for risk  - Engage patient in 1 on 1 interactions  - Encourage patient to express fears, feelings, frustrations, and discuss symptoms    - Two Dot patient to reality, help patient recognize reality-based thinking   - Administer medications as ordered and assess for potential side effects  - Provide the patient education related to the signs and symptoms of the illness and desired effects of prescribed medications  Outcome: Progressing  Goal: Refrain from acting on delusional thinking/internal stimuli  Description  Interventions:  - Monitor patient closely, per order   - Utilize least restrictive measures   - Set reasonable limits, give positive feedback for acceptable   - Administer medications as ordered and monitor of potential side effects  Outcome: Progressing  Goal: Agree to be compliant with medication regime, as prescribed and report medication side effects  Description  Interventions:  - Offer appropriate PRN medication and supervise ingestion; conduct AIMS, as needed   Outcome: Progressing  Goal: Attend and participate in unit activities, including therapeutic, recreational, and educational groups  Description  Interventions:  -Encourage Visitation and family involvement in care  Outcome: Progressing  Goal: Recognize dysfunctional thoughts, communicate reality-based thoughts at the time of discharge  Description  Interventions:  - Provide medication and psycho-education to assist patient in compliance and developing insight into his/her illness   Outcome: Progressing  Goal: Complete daily ADLs, including personal hygiene independently, as able  Description  Interventions:  - Observe, teach, and assist patient with ADLS  - Monitor and promote a balance of rest/activity, with adequate nutrition and elimination   Outcome: Progressing     Problem: Ineffective Coping  Goal: Identifies ineffective coping skills  Outcome: Progressing  Goal: Identifies healthy coping skills  Outcome: Progressing  Goal: Demonstrates healthy coping skills  Outcome: Progressing  Goal: Patient/Family participate in treatment and DC plans  Description  Interventions:  - Provide therapeutic environment  Outcome: Progressing  Goal: Patient/Family verbalizes awareness of resources  Outcome: Progressing  Goal: Understands least restrictive measures  Description  Interventions:  - Utilize least restrictive behavior  Outcome: Progressing     Problem: RESPIRATORY - ADULT  Goal: Achieves optimal ventilation and oxygenation  Description  INTERVENTIONS:  - Assess for changes in respiratory status  - Assess for changes in mentation and behavior  - Position to facilitate oxygenation and minimize respiratory effort  - Oxygen administered by appropriate delivery if ordered  - Initiate smoking cessation education as indicated  - Encourage broncho-pulmonary hygiene including cough, deep breathe, Incentive Spirometry  - Assess the need for suctioning and aspirate as needed  - Assess and instruct to report SOB or any respiratory difficulty  - Respiratory Therapy support as indicated  Outcome: Progressing     Problem: GASTROINTESTINAL - ADULT  Goal: Minimal or absence of nausea and/or vomiting  Description  INTERVENTIONS:  - Administer IV fluids if ordered to ensure adequate hydration  - Maintain NPO status until nausea and vomiting are resolved  - Nasogastric tube if ordered  - Administer ordered antiemetic medications as needed  - Provide nonpharmacologic comfort measures as appropriate  - Advance diet as tolerated, if ordered  - Consider nutrition services referral to assist patient with adequate nutrition and appropriate food choices  Outcome: Progressing  Goal: Maintains or returns to baseline bowel function  Description  INTERVENTIONS:  - Assess bowel function  - Encourage oral fluids to ensure adequate hydration  - Administer IV fluids if ordered to ensure adequate hydration  - Administer ordered medications as needed  - Encourage mobilization and activity  - Consider nutritional services referral to assist patient with adequate nutrition and appropriate food choices  Outcome: Progressing  Goal: Maintains adequate nutritional intake  Description  INTERVENTIONS:  - Monitor percentage of each meal consumed  - Identify factors contributing to decreased intake, treat as appropriate  - Assist with meals as needed  - Monitor I&O, weight, and lab values if indicated  - Obtain nutrition services referral as needed  Outcome: Progressing     Problem: METABOLIC, FLUID AND ELECTROLYTES - ADULT  Goal: Glucose maintained within target range  Description  INTERVENTIONS:  - Monitor Blood Glucose as ordered  - Assess for signs and symptoms of hyperglycemia and hypoglycemia  - Administer ordered medications to maintain glucose within target range  - Assess nutritional intake and initiate nutrition service referral as needed  Outcome: Progressing     Problem: DISCHARGE PLANNING  Goal: Discharge to home or other facility with appropriate resources  Description    CASE MANAGEMENT INTERVENTIONS:  - Conduct assessment to determine patient/family and health care team treatment goals, and need for post-acute services based on payer coverage, community resources, patient preferences and barriers to discharge    - Address psychosocial, clinical, and financial barriers to discharge as identified in assessment in conjunction with the patient/family and health care team   - Assist the patient in reintegration back into the community by removing barriers which may hinder a successful discharge once deemed stable  - Arrange appropriate level of post-acute services according to patient's needs and preference and payer coverage in collaboration with the physician and health care team   - Communicate with and update the patient/family, physician, and health care team regarding progress on the discharge plan  - Arrange appropriate transportation to post-acute venues  Outcome: Progressing     Patient irritable, uncooperative and isolative to room and self  Refused AM meds  Offered x 3  Time change noted on Aderall 5mg to be give at 0600  Patient ate 100% of both meals  Attended fresh air group  No verbal or physical outbursts  Social with peers in the afternoon  Maintained on patient safety precautions w/o incident    Will continue to monitor progress in recovery program

## 2020-05-06 NOTE — TREATMENT TEAM
05/06/20 1100   Activity/Group Checklist   Group   (IMR/Recovery Anonymous)   Attendance Did not attend   Attendance Duration (min) 46-60   Affect/Mood GAEL

## 2020-05-06 NOTE — TREATMENT TEAM
Patients group refusal on 05-05-20 05/05/20 1100 05/05/20 1330 05/05/20 1400   Activity/Group Checklist   Group   (IMR/Health and Wellness ) Other (Comment)  (FRESH AIR)   (Recovery Workshop )   Attendance Did not attend Did not attend Did not attend   Attendance Duration (min) Greater than 60 16-30 46-60   Affect/Mood GAEL GAEL GAEL      05/05/20 2000   Activity/Group Checklist   Group   (Evening Group)   Attendance Did not attend   Attendance Duration (min) 0-15   Affect/Mood  --
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
## 2020-05-06 NOTE — PROGRESS NOTES
Attempted to see pt for PT;  Pt currently in process of trying to take her morning meds with RN.  Will attempt to see later this date if time permits.     Emilia Marrufo came out for water at 2300 and went back to his room after getting his drink  Approached the nurses station again at [place] [id number] for more water  He went back to his room to try and sleep with encouragement of staff  Slept until 0610 when medication was taken to him  No issues or behaviors  Disheveled appearance  Layered clothing and also carries clothing with him  Continue to monitor  Precautions maintained
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
## 2020-05-06 NOTE — PLAN OF CARE
Problem: Alteration in Thoughts and Perception  Goal: Verbalize thoughts and feelings  Description  Interventions:  - Promote a nonjudgmental and trusting relationship with the patient through active listening and therapeutic communication  - Assess patient's level of functioning, behavior and potential for risk  - Engage patient in 1 on 1 interactions  - Encourage patient to express fears, feelings, frustrations, and discuss symptoms    - [place] patient to reality, help patient recognize reality-based thinking   - Administer medications as ordered and assess for potential side effects  - Provide the patient education related to the signs and symptoms of the illness and desired effects of prescribed medications  Outcome: Progressing     Problem: GASTROINTESTINAL - ADULT  Goal: Maintains adequate nutritional intake  Description  INTERVENTIONS:  - Monitor percentage of each meal consumed  - Identify factors contributing to decreased intake, treat as appropriate  - Assist with meals as needed  - Monitor I&O, weight, and lab values if indicated  - Obtain nutrition services referral as needed  Outcome: Progressing     Very irritable, loud pressured speech but redirectable, preoccupied with discharge plan, joined peers on the deck for fresh air this evening, ate 100% of dinner and had a snack before bed, compliant with routine medications will continue to monitor and redirect as needed

## 2020-05-06 NOTE — TREATMENT TEAM
05/06/20 0900   Activity/Group Checklist   Group Community meeting  (Guided Meditation )   Attendance Did not attend   Attendance Duration (min) 16-30   Affect/Mood GAEL

## 2020-05-06 NOTE — PLAN OF CARE
Problem: Alteration in Thoughts and Perception  Goal: Verbalize thoughts and feelings  Description  Interventions:  - Promote a nonjudgmental and trusting relationship with the patient through active listening and therapeutic communication  - Assess patient's level of functioning, behavior and potential for risk  - Engage patient in 1 on 1 interactions  - Encourage patient to express fears, feelings, frustrations, and discuss symptoms    - [place] patient to reality, help patient recognize reality-based thinking   - Administer medications as ordered and assess for potential side effects  - Provide the patient education related to the signs and symptoms of the illness and desired effects of prescribed medications  Outcome: Progressing  Goal: Refrain from acting on delusional thinking/internal stimuli  Description  Interventions:  - Monitor patient closely, per order   - Utilize least restrictive measures   - Set reasonable limits, give positive feedback for acceptable   - Administer medications as ordered and monitor of potential side effects  Outcome: Progressing  Goal: Agree to be compliant with medication regime, as prescribed and report medication side effects  Description  Interventions:  - Offer appropriate PRN medication and supervise ingestion; conduct AIMS, as needed   Outcome: Progressing  Goal: Attend and participate in unit activities, including therapeutic, recreational, and educational groups  Description  Interventions:  -Encourage Visitation and family involvement in care  Outcome: Progressing  Goal: Complete daily ADLs, including personal hygiene independently, as able  Description  Interventions:  - Observe, teach, and assist patient with ADLS  - Monitor and promote a balance of rest/activity, with adequate nutrition and elimination   Outcome: Progressing     Problem: Ineffective Coping  Goal: Demonstrates healthy coping skills  Outcome: Progressing  Goal: Understands least restrictive measures  Description  Interventions:  - Utilize least restrictive behavior  Outcome: Progressing     Problem: GASTROINTESTINAL - ADULT  Goal: Maintains adequate nutritional intake  Description  INTERVENTIONS:  - Monitor percentage of each meal consumed  - Identify factors contributing to decreased intake, treat as appropriate  - Assist with meals as needed  - Monitor I&O, weight, and lab values if indicated  - Obtain nutrition services referral as needed  Outcome: Shweta Gresham has been awake, alert, and visible intermittently out in the milieu  Pt completed his daily ADL's today  Ate 100% supper  No outbursts or agitation noted  Behavior has been controlled  Pt listens to music on radio in [facility] with peers  Went out on deck with staff and peers for fresh air  Still preoccupied with discharge  Disheveled in appearance  Compliant with scheduled meds without prompting  Pt requested prn Mylanta for stomach upset and Mylanta 30 cc po at 2056 and effective  Did not attend evening group  Had evening snack  Continue to monitor/assess for any changes

## 2020-05-07 PROCEDURE — 99232 SBSQ HOSP IP/OBS MODERATE 35: CPT | Performed by: PSYCHIATRY & NEUROLOGY

## 2020-05-07 RX ORDER — DEXTROAMPHETAMINE SACCHARATE, AMPHETAMINE ASPARTATE, DEXTROAMPHETAMINE SULFATE AND AMPHETAMINE SULFATE 2.5; 2.5; 2.5; 2.5 MG/1; MG/1; MG/1; MG/1
10 TABLET ORAL DAILY
Status: DISCONTINUED | OUTPATIENT
Start: 2020-05-08 | End: 2020-05-07

## 2020-05-07 RX ORDER — DEXTROAMPHETAMINE SACCHARATE, AMPHETAMINE ASPARTATE, DEXTROAMPHETAMINE SULFATE AND AMPHETAMINE SULFATE 2.5; 2.5; 2.5; 2.5 MG/1; MG/1; MG/1; MG/1
10 TABLET ORAL DAILY
Status: DISCONTINUED | OUTPATIENT
Start: 2020-05-08 | End: 2020-06-01

## 2020-05-07 RX ORDER — DEXTROAMPHETAMINE SACCHARATE, AMPHETAMINE ASPARTATE, DEXTROAMPHETAMINE SULFATE AND AMPHETAMINE SULFATE 1.25; 1.25; 1.25; 1.25 MG/1; MG/1; MG/1; MG/1
5 TABLET ORAL ONCE
Status: COMPLETED | OUTPATIENT
Start: 2020-05-07 | End: 2020-05-07

## 2020-05-07 RX ADMIN — DOCUSATE SODIUM 100 MG: 100 CAPSULE, LIQUID FILLED ORAL at 17:15

## 2020-05-07 RX ADMIN — DEXTROAMPHETAMINE SACCHARATE, AMPHETAMINE ASPARTATE, DEXTROAMPHETAMINE SULFATE AND AMPHETAMINE SULFATE 5 MG: 1.25; 1.25; 1.25; 1.25 TABLET ORAL at 06:04

## 2020-05-07 RX ADMIN — DOCUSATE SODIUM 100 MG: 100 CAPSULE, LIQUID FILLED ORAL at 09:08

## 2020-05-07 RX ADMIN — OLANZAPINE 5 MG: 5 TABLET, FILM COATED ORAL at 20:44

## 2020-05-07 RX ADMIN — ATORVASTATIN CALCIUM 10 MG: 10 TABLET, FILM COATED ORAL at 16:56

## 2020-05-07 RX ADMIN — METFORMIN HYDROCHLORIDE 500 MG: 500 TABLET ORAL at 09:08

## 2020-05-07 RX ADMIN — ACETAMINOPHEN 325 MG: 325 TABLET ORAL at 12:28

## 2020-05-07 RX ADMIN — TRAZODONE HYDROCHLORIDE 50 MG: 50 TABLET ORAL at 21:46

## 2020-05-07 RX ADMIN — PANTOPRAZOLE SODIUM 40 MG: 40 TABLET, DELAYED RELEASE ORAL at 06:04

## 2020-05-07 RX ADMIN — CLOZAPINE 200 MG: 200 TABLET ORAL at 16:56

## 2020-05-07 RX ADMIN — METFORMIN HYDROCHLORIDE 500 MG: 500 TABLET ORAL at 16:56

## 2020-05-07 RX ADMIN — OXYBUTYNIN CHLORIDE 5 MG: 5 TABLET, EXTENDED RELEASE ORAL at 09:08

## 2020-05-07 RX ADMIN — NICOTINE POLACRILEX 2 MG: 2 GUM, CHEWING ORAL at 15:23

## 2020-05-07 RX ADMIN — LEVOTHYROXINE SODIUM 75 MCG: 75 TABLET ORAL at 06:04

## 2020-05-07 RX ADMIN — DIVALPROEX SODIUM 1500 MG: 500 TABLET, EXTENDED RELEASE ORAL at 20:44

## 2020-05-07 RX ADMIN — DEXTROAMPHETAMINE SACCHARATE, AMPHETAMINE ASPARTATE, DEXTROAMPHETAMINE SULFATE AND AMPHETAMINE SULFATE 5 MG: 1.25; 1.25; 1.25; 1.25 TABLET ORAL at 09:07

## 2020-05-07 NOTE — PROGRESS NOTES
05/07/20 1242   Team Meeting   Meeting Type Daily Rounds   Team Members Present   Team Members Present Physician;Nurse;; Other (Discipline and Name)   Physician Team Member Dr Danish Bianchi, [facility]   Care Management Team Member Beth Lambert   Other (Discipline and Name) Cecily Marmolejo LCSW; Gilford Dull, MCKAYLA     Remains on Adderall  Irritable uncooperative

## 2020-05-07 NOTE — NURSING NOTE
Patient appears to be slept all night   Trazodone PRN was effective  No discomfort noted  Safety maintained
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
## 2020-05-07 NOTE — TREATMENT TEAM
Patient walked 15 minutes with this writer and another Peer     05/07/20 0900   Activity/Group Checklist   Group Community meeting  (Morning Walk )   Attendance Attended   Attendance Duration (min) 16-30   Interactions Interacted appropriately   Affect/Mood Appropriate;Calm;Normal range   Goals Achieved Identified feelings; Able to listen to others; Able to engage in interactions; Able to self-disclose
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
## 2020-05-07 NOTE — PLAN OF CARE
Problem: Alteration in Thoughts and Perception  Goal: Verbalize thoughts and feelings  Description  Interventions:  - Promote a nonjudgmental and trusting relationship with the patient through active listening and therapeutic communication  - Assess patient's level of functioning, behavior and potential for risk  - Engage patient in 1 on 1 interactions  - Encourage patient to express fears, feelings, frustrations, and discuss symptoms    - [place] patient to reality, help patient recognize reality-based thinking   - Administer medications as ordered and assess for potential side effects  - Provide the patient education related to the signs and symptoms of the illness and desired effects of prescribed medications  Outcome: Progressing  Goal: Agree to be compliant with medication regime, as prescribed and report medication side effects  Description  Interventions:  - Offer appropriate PRN medication and supervise ingestion; conduct AIMS, as needed   Outcome: Progressing  Goal: Attend and participate in unit activities, including therapeutic, recreational, and educational groups  Description  Interventions:  -Encourage Visitation and family involvement in care  Outcome: Progressing     Problem: RESPIRATORY - ADULT  Goal: Achieves optimal ventilation and oxygenation  Description  INTERVENTIONS:  - Assess for changes in respiratory status  - Assess for changes in mentation and behavior  - Position to facilitate oxygenation and minimize respiratory effort  - Oxygen administered by appropriate delivery if ordered  - Initiate smoking cessation education as indicated  - Encourage broncho-pulmonary hygiene including cough, deep breathe, Incentive Spirometry  - Assess the need for suctioning and aspirate as needed  - Assess and instruct to report SOB or any respiratory difficulty  - Respiratory Therapy support as indicated  Outcome: Progressing     Problem: Alteration in Thoughts and Perception  Goal: Complete daily ADLs, including personal hygiene independently, as able  Description  Interventions:  - Observe, teach, and assist patient with ADLS  - Monitor and promote a balance of rest/activity, with adequate nutrition and elimination   Outcome: Yeison Abdon Castellon has been pleasant, cooperative and visible throughout the shift  Social with staff and peers  Denies anxiety and depression  Brighter affect today  Smiling and talkative  Participated in all groups this shift  Took AM medication without prompting  Adderall increased to 10mg po daily (started today)  No shower or BM  Ate 100% of his meals  Medicated at 1228 with Tylenol 325mg po for 3/10 right foot pain  Relief obtained  Continue to monitor  Precautions maintained

## 2020-05-08 PROCEDURE — 99232 SBSQ HOSP IP/OBS MODERATE 35: CPT | Performed by: PSYCHIATRY & NEUROLOGY

## 2020-05-08 RX ADMIN — DOCUSATE SODIUM 100 MG: 100 CAPSULE, LIQUID FILLED ORAL at 17:13

## 2020-05-08 RX ADMIN — METFORMIN HYDROCHLORIDE 500 MG: 500 TABLET ORAL at 16:37

## 2020-05-08 RX ADMIN — DOCUSATE SODIUM 100 MG: 100 CAPSULE, LIQUID FILLED ORAL at 08:55

## 2020-05-08 RX ADMIN — DEXTROAMPHETAMINE SACCHARATE, AMPHETAMINE ASPARTATE, DEXTROAMPHETAMINE SULFATE AND AMPHETAMINE SULFATE 10 MG: 2.5; 2.5; 2.5; 2.5 TABLET ORAL at 05:56

## 2020-05-08 RX ADMIN — CLOZAPINE 200 MG: 200 TABLET ORAL at 16:37

## 2020-05-08 RX ADMIN — DIVALPROEX SODIUM 1500 MG: 500 TABLET, EXTENDED RELEASE ORAL at 21:00

## 2020-05-08 RX ADMIN — ATORVASTATIN CALCIUM 10 MG: 10 TABLET, FILM COATED ORAL at 16:37

## 2020-05-08 RX ADMIN — METFORMIN HYDROCHLORIDE 500 MG: 500 TABLET ORAL at 08:55

## 2020-05-08 RX ADMIN — OXYBUTYNIN CHLORIDE 5 MG: 5 TABLET, EXTENDED RELEASE ORAL at 08:55

## 2020-05-08 RX ADMIN — PANTOPRAZOLE SODIUM 40 MG: 40 TABLET, DELAYED RELEASE ORAL at 05:56

## 2020-05-08 RX ADMIN — OLANZAPINE 5 MG: 5 TABLET, FILM COATED ORAL at 21:00

## 2020-05-08 RX ADMIN — LEVOTHYROXINE SODIUM 75 MCG: 75 TABLET ORAL at 05:56

## 2020-05-08 NOTE — PLAN OF CARE
Problem: Alteration in Thoughts and Perception  Goal: Verbalize thoughts and feelings  Description  Interventions:  - Promote a nonjudgmental and trusting relationship with the patient through active listening and therapeutic communication  - Assess patient's level of functioning, behavior and potential for risk  - Engage patient in 1 on 1 interactions  - Encourage patient to express fears, feelings, frustrations, and discuss symptoms    - [place] patient to reality, help patient recognize reality-based thinking   - Administer medications as ordered and assess for potential side effects  - Provide the patient education related to the signs and symptoms of the illness and desired effects of prescribed medications  Outcome: Progressing  Goal: Refrain from acting on delusional thinking/internal stimuli  Description  Interventions:  - Monitor patient closely, per order   - Utilize least restrictive measures   - Set reasonable limits, give positive feedback for acceptable   - Administer medications as ordered and monitor of potential side effects  Outcome: Progressing  Goal: Agree to be compliant with medication regime, as prescribed and report medication side effects  Description  Interventions:  - Offer appropriate PRN medication and supervise ingestion; conduct AIMS, as needed   Outcome: Progressing  Goal: Attend and participate in unit activities, including therapeutic, recreational, and educational groups  Description  Interventions:  -Encourage Visitation and family involvement in care  Outcome: Progressing     Problem: Ineffective Coping  Goal: Demonstrates healthy coping skills  Outcome: Progressing  Goal: Understands least restrictive measures  Description  Interventions:  - Utilize least restrictive behavior  Outcome: Progressing     Problem: GASTROINTESTINAL - ADULT  Goal: Maintains adequate nutritional intake  Description  INTERVENTIONS:  - Monitor percentage of each meal consumed  - Identify factors contributing to decreased intake, treat as appropriate  - Assist with meals as needed  - Monitor I&O, weight, and lab values if indicated  - Obtain nutrition services referral as needed  Outcome: Zion Poser has been awake, alert, and visible intermittently out in the milieu  Pt continues to be preoccupied with discharge  Ate 100% supper  Compliant with all scheduled meds when called and cooperative with mouth checks  Enjoys listening to music on radio in [facility] 19 with peers  Rests in room at intervals  Remains disheveled in appearance and dressed in layers  No behavioral issues  Denies any depression, anxiety, si/hi, intent, plan, a/v hallucinations, and has not verbalized any delusions  Pt did not attend evening group but came out for evening snack after  Continue to monitor/assess for any changes

## 2020-05-08 NOTE — PROGRESS NOTES
05/08/20 1100   Activity/Group Checklist   Group Other (Comment)  (IMR - Music Appreciation)   Attendance Attended   Attendance Duration (min) 46-60   Interactions Interacted appropriately   Affect/Mood Appropriate;Bright   Goals Achieved Able to self-disclose; Able to recieve feedback; Able to engage in interactions; Able to listen to others; Displayed empathy

## 2020-05-08 NOTE — PROGRESS NOTES
05/08/20 0900   Activity/Group Checklist   Group Community meeting  (Morning Walk)   Attendance Attended   Attendance Duration (min) 16-30   Interactions Disorganized interaction   Affect/Mood Appropriate;Bright   Goals Achieved Able to engage in interactions; Able to recieve feedback     Patient was initially resistant to attending the walk this morning, but once reminded of his behavioral plan, he did get up and join  Inconsistent effort, as he was redirected out of his room a few times, but effort nonetheless

## 2020-05-08 NOTE — PLAN OF CARE
Problem: Alteration in Thoughts and Perception  Goal: Verbalize thoughts and feelings  Description  Interventions:  - Promote a nonjudgmental and trusting relationship with the patient through active listening and therapeutic communication  - Assess patient's level of functioning, behavior and potential for risk  - Engage patient in 1 on 1 interactions  - Encourage patient to express fears, feelings, frustrations, and discuss symptoms    - [place] patient to reality, help patient recognize reality-based thinking   - Administer medications as ordered and assess for potential side effects  - Provide the patient education related to the signs and symptoms of the illness and desired effects of prescribed medications  Outcome: Progressing  Goal: Refrain from acting on delusional thinking/internal stimuli  Description  Interventions:  - Monitor patient closely, per order   - Utilize least restrictive measures   - Set reasonable limits, give positive feedback for acceptable   - Administer medications as ordered and monitor of potential side effects  Outcome: Progressing  Goal: Agree to be compliant with medication regime, as prescribed and report medication side effects  Description  Interventions:  - Offer appropriate PRN medication and supervise ingestion; conduct AIMS, as needed   Outcome: Progressing  Goal: Attend and participate in unit activities, including therapeutic, recreational, and educational groups  Description  Interventions:  -Encourage Visitation and family involvement in care  Outcome: Not Progressing  Goal: Complete daily ADLs, including personal hygiene independently, as able  Description  Interventions:  - Observe, teach, and assist patient with ADLS  - Monitor and promote a balance of rest/activity, with adequate nutrition and elimination   Outcome: Progressing     Problem: Ineffective Coping  Goal: Demonstrates healthy coping skills  Outcome: Progressing  Goal: Understands least restrictive measures  Description  Interventions:  - Utilize least restrictive behavior  Outcome: Progressing     Problem: GASTROINTESTINAL - ADULT  Goal: Maintains adequate nutritional intake  Description  INTERVENTIONS:  - Monitor percentage of each meal consumed  - Identify factors contributing to decreased intake, treat as appropriate  - Assist with meals as needed  - Monitor I&O, weight, and lab values if indicated  - Obtain nutrition services referral as needed  Outcome: Sepideh Sensor has been awake, alert, and visible intermittently out in the milieu  Ate 100% supper  No behavioral issues this shift  Spends time listening to music with peers on radio in [employer]  Remains disheveled in appearance  Still preoccupied with discharge  Did not attend evening group but came out for snack after  Pt requested prn for sleep  Trazodone 50 mg po given at 2146  Continue to monitor/assess for any changes

## 2020-05-08 NOTE — PROGRESS NOTES
Tonya Murphy was awake and in the bathroom at [phone]  Appeared to be asleep by 0115, but was talking/mumbling  No issues or distress  Compliant with medication  Continue to monitor  Precautions maintained

## 2020-05-08 NOTE — PROGRESS NOTES
05/08/20 0994   Team Meeting   Meeting Type Daily Rounds   Team Members Present   Team Members Present Physician;Nurse;; Other (Discipline and Name)   Physician Team Member Dr Mariella Driscoll, RN   Care Management Team Member EDELMIRA Paredes   Other (Discipline and Name) Rolando Gonzalez LCSW     Disheveled, play ping pong   Completed new behavioral plan

## 2020-05-08 NOTE — PLAN OF CARE
Problem: Alteration in Thoughts and Perception  Goal: Attend and participate in unit activities, including therapeutic, recreational, and educational groups  Description  Interventions:  -Encourage Visitation and family involvement in care  Outcome: Progressing     Problem: Alteration in Thoughts and Perception  Goal: Agree to be compliant with medication regime, as prescribed and report medication side effects  Description  Interventions:  - Offer appropriate PRN medication and supervise ingestion; conduct AIMS, as needed   Outcome: Not Progressing    Three pills were found underneath mattress during environmental rounds, mouth checks with medications ordered, attended groups this morning behavior controlled will continue to monitor

## 2020-05-09 RX ADMIN — ATORVASTATIN CALCIUM 10 MG: 10 TABLET, FILM COATED ORAL at 17:09

## 2020-05-09 RX ADMIN — DEXTROAMPHETAMINE SACCHARATE, AMPHETAMINE ASPARTATE, DEXTROAMPHETAMINE SULFATE AND AMPHETAMINE SULFATE 10 MG: 2.5; 2.5; 2.5; 2.5 TABLET ORAL at 06:00

## 2020-05-09 RX ADMIN — CLOZAPINE 200 MG: 200 TABLET ORAL at 17:09

## 2020-05-09 RX ADMIN — DOCUSATE SODIUM 100 MG: 100 CAPSULE, LIQUID FILLED ORAL at 08:39

## 2020-05-09 RX ADMIN — NICOTINE POLACRILEX 2 MG: 2 GUM, CHEWING ORAL at 19:20

## 2020-05-09 RX ADMIN — PANTOPRAZOLE SODIUM 40 MG: 40 TABLET, DELAYED RELEASE ORAL at 06:00

## 2020-05-09 RX ADMIN — METFORMIN HYDROCHLORIDE 500 MG: 500 TABLET ORAL at 17:09

## 2020-05-09 RX ADMIN — DIVALPROEX SODIUM 1500 MG: 500 TABLET, EXTENDED RELEASE ORAL at 21:29

## 2020-05-09 RX ADMIN — OLANZAPINE 5 MG: 5 TABLET, FILM COATED ORAL at 21:29

## 2020-05-09 RX ADMIN — LEVOTHYROXINE SODIUM 75 MCG: 75 TABLET ORAL at 06:00

## 2020-05-09 RX ADMIN — DOCUSATE SODIUM 100 MG: 100 CAPSULE, LIQUID FILLED ORAL at 17:09

## 2020-05-09 RX ADMIN — METFORMIN HYDROCHLORIDE 500 MG: 500 TABLET ORAL at 08:39

## 2020-05-09 RX ADMIN — OXYBUTYNIN CHLORIDE 5 MG: 5 TABLET, EXTENDED RELEASE ORAL at 08:39

## 2020-05-09 RX ADMIN — TRAZODONE HYDROCHLORIDE 50 MG: 50 TABLET ORAL at 22:01

## 2020-05-09 NOTE — PROGRESS NOTES
Psychiatry Progress Note    Subjective: Interval History     Pt isolative to his room; poor hygiene; disheveled  Pt evasive; reports that he is doing good and asking about discharge  Pt provided education and support  Overall still evasive with staff and irritable at times  Lacking insight into his symptoms and need for medication  Has been compliant with medications  Denies any somatic complaints  Slept      Behavior over the last 24 hours:  unchanged  Sleep: normal  Appetite: normal  Medication side effects: No  ROS: no complaints    Current medications:    Current Facility-Administered Medications:     acetaminophen (TYLENOL) tablet 325 mg, 325 mg, Oral, Q6H PRN, Elijah Chauhan MD, 325 mg at 05/07/20 1228    acetaminophen (TYLENOL) tablet 650 mg, 650 mg, Oral, Q6H PRN, Elijah Chauhan MD, 650 mg at 04/16/20 2052    acetaminophen (TYLENOL) tablet 975 mg, 975 mg, Oral, Q8H PRN, Elijah Chauhan MD, 975 mg at 03/29/20 1759    aluminum-magnesium hydroxide-simethicone (MYLANTA) 200-200-20 mg/5 mL oral suspension 30 mL, 30 mL, Oral, Q4H PRN, Elijah Chauhan MD, 30 mL at 05/06/20 2056    amphetamine-dextroamphetamine (ADDERALL) tablet 10 mg, 10 mg, Oral, Daily, Elijah Chauhan MD, 10 mg at 05/09/20 0600    atorvastatin (LIPITOR) tablet 10 mg, 10 mg, Oral, Daily With Fabian Pelletier MD, 10 mg at 05/08/20 1637    benztropine (COGENTIN) injection 1 mg, 1 mg, Intramuscular, Q6H PRN, Elijah Chauhan MD    benztropine (COGENTIN) tablet 1 mg, 1 mg, Oral, Q6H PRN, Elijah Chauhan MD, 1 mg at 04/07/20 2031    clozapine (CLOZARIL) tablet 200 mg, 200 mg, Oral, Daily, Elijah Chauhan MD, 200 mg at 05/08/20 1637    divalproex sodium (DEPAKOTE ER) 24 hr tablet 1,500 mg, 1,500 mg, Oral, HS, Elijah Chauhna MD, 1,500 mg at 05/08/20 2100    docusate sodium (COLACE) capsule 100 mg, 100 mg, Oral, BID, Elijah Chauhan MD, 100 mg at 05/09/20 0839    haloperidol (HALDOL) tablet 5 mg, 5 mg, Oral, Q6H PRN, Elijah Chauhan MD, 5 mg at 05/05/20 4734   haloperidol lactate (HALDOL) injection 5 mg, 5 mg, Intramuscular, Q6H PRN, Letty Andrea MD    levothyroxine tablet 75 mcg, 75 mcg, Oral, Early Morning, Letty Andrea MD, 75 mcg at 05/09/20 0600    LORazepam (ATIVAN) 2 mg/mL injection 1 mg, 1 mg, Intramuscular, Q6H PRN, Letty Andrea MD    LORazepam (ATIVAN) tablet 1 mg, 1 mg, Oral, Q6H PRN, Letty Andrea MD    magnesium hydroxide (MILK OF MAGNESIA) 400 mg/5 mL oral suspension 30 mL, 30 mL, Oral, Daily PRN, Letty Andrea MD, 30 mL at 03/14/20 2032    metFORMIN (GLUCOPHAGE) tablet 500 mg, 500 mg, Oral, BID With Meals, Letty Andrea MD, 500 mg at 05/09/20 5102    nicotine polacrilex (NICORETTE) gum 2 mg, 2 mg, Oral, Q2H PRN, Letty Andrea MD, 2 mg at 05/07/20 1523    OLANZapine (ZyPREXA) tablet 5 mg, 5 mg, Oral, HS, Letty Andrea MD, 5 mg at 05/08/20 2100    oxybutynin (DITROPAN-XL) 24 hr tablet 5 mg, 5 mg, Oral, Daily, Letty Andrea MD, 5 mg at 05/09/20 0839    pantoprazole (PROTONIX) EC tablet 40 mg, 40 mg, Oral, Early Morning, Letty Andrea MD, 40 mg at 05/09/20 0600    traZODone (DESYREL) tablet 50 mg, 50 mg, Oral, HS PRN, Letty Andrea MD, 50 mg at 05/07/20 2146    Current Problem List:    Patient Active Problem List   Diagnosis    Schizoaffective disorder, bipolar type ([facility] )    Constipation, chronic    Type 2 diabetes mellitus without complication, without long-term current use of insulin ([facility])    Chronic airway obstruction, not elsewhere classified    Benign essential hypertension    Disorder of lipoprotein and lipid metabolism    Foot callus    Gastroesophageal reflux disease without esophagitis    Acquired hypothyroidism    [facility]    BMI 34 0-34 9,adult    Nail abnormality    Encounter for well adult exam with abnormal findings    Tobacco abuse    Diabetes insipidus, nephrogenic ([facility] )    ADHD       Problem list reviewed 05/09/20     Objective:     Vital Signs:  Vitals:    05/07/20 1703 05/08/20 0700 05/08/20 1600 05/09/20 0700 BP: 138/68 99/55 122/86 112/62   BP Location: Left arm Left arm Left arm Left arm   Pulse: 95 73 100 81   Resp: 18 18 18 20   Temp: 97 7 °F (36 5 °C) (!) 97 °F (36 1 °C) (!) 96 5 °F (35 8 °C) 97 6 °F (36 4 °C)   TempSrc: Temporal Temporal Temporal Temporal   SpO2:   95%    Weight:       Height:             Appearance:  age appropriate, casually dressed and disheveled   Behavior:  evasive   Speech:  soft   Mood:  constricted   Affect:  flat   Thought Process:  flight of ideas   Thought Content:  normal   Perceptual Disturbances: None   Risk Potential: none   Sensorium:  person, place and time   Cognition:  intact   Consciousness:  alert and awake    Attention: attention span and concentration were age appropriate   Intellect: average   Insight:  limited   Judgment: limited      Motor Activity: no abnormal movements       I/O Past 24 hours:  No intake/output data recorded  No intake/output data recorded  Labs:  Reviewed 05/09/20    Progress Toward Goals:  Unchanged    Assessment / Plan:     [facility] ([facility] )    Recommended Treatment:      Medication changes:  1) continue current treatment plan    Non-pharmacological treatments  1) Continue with group therapy, milieu therapy and occupational therapy  Safety  1) Safety/communication plan established targeting dynamic risk factors above  2) Risks, benefits, and possible side effects of medications explained to patient and patient verbalizes understanding  Counseling / Coordination of Care    Total floor / unit time spent today 20 minutes  Greater than 50% of total time was spent with the patient and / or family counseling and / or coordination of care  A description of the counseling / coordination of care  Patient's Rights, confidentiality and exceptions to confidentiality, use of automated medical record, [facility] staff access to medical record, and consent to treatment reviewed      Felix Sims Dulce Cogan, PA-C

## 2020-05-09 NOTE — PLAN OF CARE
Problem: Alteration in Thoughts and Perception  Goal: Treatment Goal: Gain control of psychotic behaviors/thinking, reduce/eliminate presenting symptoms and demonstrate improved reality functioning upon discharge  Outcome: Progressing  Goal: Verbalize thoughts and feelings  Description  Interventions:  - Promote a nonjudgmental and trusting relationship with the patient through active listening and therapeutic communication  - Assess patient's level of functioning, behavior and potential for risk  - Engage patient in 1 on 1 interactions  - Encourage patient to express fears, feelings, frustrations, and discuss symptoms    - [place] patient to reality, help patient recognize reality-based thinking   - Administer medications as ordered and assess for potential side effects  - Provide the patient education related to the signs and symptoms of the illness and desired effects of prescribed medications  Outcome: Progressing  Goal: Refrain from acting on delusional thinking/internal stimuli  Description  Interventions:  - Monitor patient closely, per order   - Utilize least restrictive measures   - Set reasonable limits, give positive feedback for acceptable   - Administer medications as ordered and monitor of potential side effects  Outcome: Progressing  Goal: Agree to be compliant with medication regime, as prescribed and report medication side effects  Description  Interventions:  - Offer appropriate PRN medication and supervise ingestion; conduct AIMS, as needed   Outcome: Progressing  Goal: Attend and participate in unit activities, including therapeutic, recreational, and educational groups  Description  Interventions:  -Encourage Visitation and family involvement in care  Outcome: Progressing  Goal: Recognize dysfunctional thoughts, communicate reality-based thoughts at the time of discharge  Description  Interventions:  - Provide medication and psycho-education to assist patient in compliance and developing insight into his/her illness   Outcome: Progressing  Goal: Complete daily ADLs, including personal hygiene independently, as able  Description  Interventions:  - Observe, teach, and assist patient with ADLS  - Monitor and promote a balance of rest/activity, with adequate nutrition and elimination   Outcome: Progressing     Problem: Ineffective Coping  Goal: Identifies ineffective coping skills  Outcome: Progressing  Goal: Identifies healthy coping skills  Outcome: Progressing  Goal: Demonstrates healthy coping skills  Outcome: Progressing  Goal: Patient/Family participate in treatment and DC plans  Description  Interventions:  - Provide therapeutic environment  Outcome: Progressing  Goal: Patient/Family verbalizes awareness of resources  Outcome: Progressing  Goal: Understands least restrictive measures  Description  Interventions:  - Utilize least restrictive behavior  Outcome: Progressing     Problem: RESPIRATORY - ADULT  Goal: Achieves optimal ventilation and oxygenation  Description  INTERVENTIONS:  - Assess for changes in respiratory status  - Assess for changes in mentation and behavior  - Position to facilitate oxygenation and minimize respiratory effort  - Oxygen administered by appropriate delivery if ordered  - Initiate smoking cessation education as indicated  - Encourage broncho-pulmonary hygiene including cough, deep breathe, Incentive Spirometry  - Assess the need for suctioning and aspirate as needed  - Assess and instruct to report SOB or any respiratory difficulty  - Respiratory Therapy support as indicated  Outcome: Progressing     Problem: GASTROINTESTINAL - ADULT  Goal: Minimal or absence of nausea and/or vomiting  Description  INTERVENTIONS:  - Administer IV fluids if ordered to ensure adequate hydration  - Maintain NPO status until nausea and vomiting are resolved  - Nasogastric tube if ordered  - Administer ordered antiemetic medications as needed  - Provide nonpharmacologic comfort measures as appropriate  - Advance diet as tolerated, if ordered  - Consider nutrition services referral to assist patient with adequate nutrition and appropriate food choices  Outcome: Progressing  Goal: Maintains or returns to baseline bowel function  Description  INTERVENTIONS:  - Assess bowel function  - Encourage oral fluids to ensure adequate hydration  - Administer IV fluids if ordered to ensure adequate hydration  - Administer ordered medications as needed  - Encourage mobilization and activity  - Consider nutritional services referral to assist patient with adequate nutrition and appropriate food choices  Outcome: Progressing  Goal: Maintains adequate nutritional intake  Description  INTERVENTIONS:  - Monitor percentage of each meal consumed  - Identify factors contributing to decreased intake, treat as appropriate  - Assist with meals as needed  - Monitor I&O, weight, and lab values if indicated  - Obtain nutrition services referral as needed  Outcome: Progressing     Problem: METABOLIC, FLUID AND ELECTROLYTES - ADULT  Goal: Glucose maintained within target range  Description  INTERVENTIONS:  - Monitor Blood Glucose as ordered  - Assess for signs and symptoms of hyperglycemia and hypoglycemia  - Administer ordered medications to maintain glucose within target range  - Assess nutritional intake and initiate nutrition service referral as needed  Outcome: Progressing     Problem: DISCHARGE PLANNING  Goal: Discharge to home or other facility with appropriate resources  Description    CASE MANAGEMENT INTERVENTIONS:  - Conduct assessment to determine patient/family and health care team treatment goals, and need for post-acute services based on payer coverage, community resources, patient preferences and barriers to discharge    - Address psychosocial, clinical, and financial barriers to discharge as identified in assessment in conjunction with the patient/family and health care team   - Assist the patient in reintegration back into the community by removing barriers which may hinder a successful discharge once deemed stable  - Arrange appropriate level of post-acute services according to patient's needs and preference and payer coverage in collaboration with the physician and health care team   - Communicate with and update the patient/family, physician, and health care team regarding progress on the discharge plan  - Arrange appropriate transportation to post-acute venues  Outcome: Progressing  Patient visible   Pleasant  Cooperative   Pre occupied with getting in a group home  Redirectable , Attended nutritional group  Med and meal compliant

## 2020-05-10 RX ADMIN — TRAZODONE HYDROCHLORIDE 50 MG: 50 TABLET ORAL at 21:33

## 2020-05-10 RX ADMIN — DOCUSATE SODIUM 100 MG: 100 CAPSULE, LIQUID FILLED ORAL at 08:13

## 2020-05-10 RX ADMIN — OXYBUTYNIN CHLORIDE 5 MG: 5 TABLET, EXTENDED RELEASE ORAL at 08:13

## 2020-05-10 RX ADMIN — LEVOTHYROXINE SODIUM 75 MCG: 75 TABLET ORAL at 05:45

## 2020-05-10 RX ADMIN — CLOZAPINE 200 MG: 200 TABLET ORAL at 16:45

## 2020-05-10 RX ADMIN — PANTOPRAZOLE SODIUM 40 MG: 40 TABLET, DELAYED RELEASE ORAL at 05:45

## 2020-05-10 RX ADMIN — DIVALPROEX SODIUM 1500 MG: 500 TABLET, EXTENDED RELEASE ORAL at 20:50

## 2020-05-10 RX ADMIN — METFORMIN HYDROCHLORIDE 500 MG: 500 TABLET ORAL at 08:13

## 2020-05-10 RX ADMIN — METFORMIN HYDROCHLORIDE 500 MG: 500 TABLET ORAL at 16:45

## 2020-05-10 RX ADMIN — DEXTROAMPHETAMINE SACCHARATE, AMPHETAMINE ASPARTATE, DEXTROAMPHETAMINE SULFATE AND AMPHETAMINE SULFATE 10 MG: 2.5; 2.5; 2.5; 2.5 TABLET ORAL at 05:45

## 2020-05-10 RX ADMIN — ATORVASTATIN CALCIUM 10 MG: 10 TABLET, FILM COATED ORAL at 16:45

## 2020-05-10 RX ADMIN — DOCUSATE SODIUM 100 MG: 100 CAPSULE, LIQUID FILLED ORAL at 17:07

## 2020-05-10 RX ADMIN — OLANZAPINE 5 MG: 5 TABLET, FILM COATED ORAL at 20:50

## 2020-05-10 NOTE — PROGRESS NOTES
Tally Brine slept throughout the night  Respirations easy and non labored  No issues or behaviors  Compliant with medications  Mouth check was done  Swallowed all pills  Continue to monitor  Precautions maintained

## 2020-05-10 NOTE — NURSING NOTE
Patient visible on unit this evening, behaviors controlled, cooperative, med and meal compliant  Patient showered today however appears disheveled and disorganized on unit, reminded to pull pants up, also directed to wear mask properly frequently  Patient attended evening group and optional group in patio  Will continue to monitor

## 2020-05-10 NOTE — PROGRESS NOTES
Psychiatry Progress Note    Subjective: Interval History     Patient visible on the unit today  Poor hygiene  Patient intrusive with author on multiple occasions throughout the morning inquiring about his discharge  Also asking about obtaining  telephone numbers to try to get dismissed from the hospital   Able to be redirected  Continues to lack insight into his symptoms along with his medications and admission  Has been compliant with medication  Slept      Behavior over the last 24 hours:  unchanged  Sleep: normal  Appetite: normal  Medication side effects: No  ROS: no complaints    Current medications:    Current Facility-Administered Medications:     acetaminophen (TYLENOL) tablet 325 mg, 325 mg, Oral, Q6H PRN, Jordan Parra MD, 325 mg at 05/07/20 1228    acetaminophen (TYLENOL) tablet 650 mg, 650 mg, Oral, Q6H PRN, Jordan Parra MD, 650 mg at 04/16/20 2052    acetaminophen (TYLENOL) tablet 975 mg, 975 mg, Oral, Q8H PRN, Jordan Parra MD, 975 mg at 03/29/20 1759    aluminum-magnesium hydroxide-simethicone (MYLANTA) 200-200-20 mg/5 mL oral suspension 30 mL, 30 mL, Oral, Q4H PRN, Jordan Parra MD, 30 mL at 05/06/20 2056    amphetamine-dextroamphetamine (ADDERALL) tablet 10 mg, 10 mg, Oral, Daily, Jordan Parra MD, 10 mg at 05/10/20 0545    atorvastatin (LIPITOR) tablet 10 mg, 10 mg, Oral, Daily With Chuckie Beltre MD, 10 mg at 05/09/20 1709    benztropine (COGENTIN) injection 1 mg, 1 mg, Intramuscular, Q6H PRN, Jordan Parra MD    benztropine (COGENTIN) tablet 1 mg, 1 mg, Oral, Q6H PRN, Jordan Parra MD, 1 mg at 04/07/20 2031    clozapine (CLOZARIL) tablet 200 mg, 200 mg, Oral, Daily, Jordan Parra MD, 200 mg at 05/09/20 1709    divalproex sodium (DEPAKOTE ER) 24 hr tablet 1,500 mg, 1,500 mg, Oral, HS, Jordan Parra MD, 1,500 mg at 05/09/20 2129    docusate sodium (COLACE) capsule 100 mg, 100 mg, Oral, BID, Jordan Parra MD, 100 mg at 05/10/20 0813    haloperidol (HALDOL) tablet 5 mg, 5 mg, Oral, Q6H PRN, Letty Andrea MD, 5 mg at 05/05/20 1648    haloperidol lactate (HALDOL) injection 5 mg, 5 mg, Intramuscular, Q6H PRN, Letty Andrea MD    levothyroxine tablet 75 mcg, 75 mcg, Oral, Early Morning, Letty Andrea MD, 75 mcg at 05/10/20 0545    LORazepam (ATIVAN) 2 mg/mL injection 1 mg, 1 mg, Intramuscular, Q6H PRN, Letty Andrea MD    LORazepam (ATIVAN) tablet 1 mg, 1 mg, Oral, Q6H PRN, Letty Andrea MD    magnesium hydroxide (MILK OF MAGNESIA) 400 mg/5 mL oral suspension 30 mL, 30 mL, Oral, Daily PRN, Letty Andrea MD, 30 mL at 03/14/20 2032    metFORMIN (GLUCOPHAGE) tablet 500 mg, 500 mg, Oral, BID With Meals, Letty Andrea MD, 500 mg at 05/10/20 0813    nicotine polacrilex (NICORETTE) gum 2 mg, 2 mg, Oral, Q2H PRN, Letty Andrea MD, 2 mg at 05/09/20 1920    OLANZapine (ZyPREXA) tablet 5 mg, 5 mg, Oral, HS, Letty Andrea MD, 5 mg at 05/09/20 2129    oxybutynin (DITROPAN-XL) 24 hr tablet 5 mg, 5 mg, Oral, Daily, Letty Andrea MD, 5 mg at 05/10/20 0813    pantoprazole (PROTONIX) EC tablet 40 mg, 40 mg, Oral, Early Morning, Letty Andrea MD, 40 mg at 05/10/20 0545    traZODone (DESYREL) tablet 50 mg, 50 mg, Oral, HS PRN, Letty Andrea MD, 50 mg at 05/09/20 2201    Current Problem List:    Patient Active Problem List   Diagnosis    [facility]oaffective disorder, [facility] ([facility] )    Constipation, chronic    Type 2 diabetes mellitus without complication, without long-term current use of insulin ([facility])    Chronic airway obstruction, not elsewhere classified    Benign essential hypertension    Disorder of lipoprotein and lipid metabolism    Foot callus    Gastroesophageal reflux disease without esophagitis    Acquired hypothyroidism    [facility]    BMI 34 0-34 9,adult    Nail abnormality    Encounter for well adult exam with abnormal findings    Tobacco abuse    Diabetes insipidus, nephrogenic ([facility] )    ADHD       Problem list reviewed 05/10/20     Objective:     Vital Signs:  Vitals: 05/09/20 0700 05/09/20 1600 05/10/20 0700 05/10/20 0702   BP: 112/62 129/86 132/71 141/79   BP Location: Left arm Left arm Left arm    Pulse: 81 101 92 104   Resp: 20 20 19    Temp: 97 6 °F (36 4 °C) (!) 96 8 °F (36 °C) (!) 97 1 °F (36 2 °C)    TempSrc: Temporal Temporal Temporal    SpO2:  99%     Weight:   110 kg (242 lb)    Height:             Appearance:  age appropriate, casually dressed and disheveled   Behavior:  evasive   Speech:  soft   Mood:  constricted   Affect:  flat   Thought Process:  flight of ideas   Thought Content:  normal   Perceptual Disturbances: None   Risk Potential: none   Sensorium:  person, place and time   Cognition:  intact   Consciousness:  alert and awake    Attention: attention span and concentration were age appropriate   Intellect: average   Insight:  limited   Judgment: limited      Motor Activity: no abnormal movements       I/O Past 24 hours:  No intake/output data recorded  No intake/output data recorded  Labs:  Reviewed 05/10/20    Progress Toward Goals:  Unchanged    Assessment / Plan:     Schizoaffective disorder, bipolar type ([facility] )    Recommended Treatment:      Medication changes:  1) continue current treatment plan    Non-pharmacological treatments  1) Continue with group therapy, milieu therapy and occupational therapy  Safety  1) Safety/communication plan established targeting dynamic risk factors above  2) Risks, benefits, and possible side effects of medications explained to patient and patient verbalizes understanding  Counseling / Coordination of Care    Total floor / unit time spent today 20 minutes  Greater than 50% of total time was spent with the patient and / or family counseling and / or coordination of care  A description of the counseling / coordination of care       Patient's Rights, confidentiality and exceptions to confidentiality, use of automated medical record, [facility] staff access to medical record, and consent to treatment reviewed      Alonso Bright PA-C

## 2020-05-10 NOTE — PLAN OF CARE
Problem: Alteration in Thoughts and Perception  Goal: Treatment Goal: Gain control of psychotic behaviors/thinking, reduce/eliminate presenting symptoms and demonstrate improved reality functioning upon discharge  Outcome: Progressing  Goal: Verbalize thoughts and feelings  Description  Interventions:  - Promote a nonjudgmental and trusting relationship with the patient through active listening and therapeutic communication  - Assess patient's level of functioning, behavior and potential for risk  - Engage patient in 1 on 1 interactions  - Encourage patient to express fears, feelings, frustrations, and discuss symptoms    - [place] patient to reality, help patient recognize reality-based thinking   - Administer medications as ordered and assess for potential side effects  - Provide the patient education related to the signs and symptoms of the illness and desired effects of prescribed medications  Outcome: Progressing  Goal: Refrain from acting on delusional thinking/internal stimuli  Description  Interventions:  - Monitor patient closely, per order   - Utilize least restrictive measures   - Set reasonable limits, give positive feedback for acceptable   - Administer medications as ordered and monitor of potential side effects  Outcome: Progressing  Goal: Agree to be compliant with medication regime, as prescribed and report medication side effects  Description  Interventions:  - Offer appropriate PRN medication and supervise ingestion; conduct AIMS, as needed   Outcome: Progressing  Goal: Attend and participate in unit activities, including therapeutic, recreational, and educational groups  Description  Interventions:  -Encourage Visitation and family involvement in care  Outcome: Progressing  Goal: Recognize dysfunctional thoughts, communicate reality-based thoughts at the time of discharge  Description  Interventions:  - Provide medication and psycho-education to assist patient in compliance and developing insight into his/her illness   Outcome: Progressing  Goal: Complete daily ADLs, including personal hygiene independently, as able  Description  Interventions:  - Observe, teach, and assist patient with ADLS  - Monitor and promote a balance of rest/activity, with adequate nutrition and elimination   Outcome: Progressing     Problem: Ineffective Coping  Goal: Identifies ineffective coping skills  Outcome: Progressing  Goal: Identifies healthy coping skills  Outcome: Progressing  Goal: Demonstrates healthy coping skills  Outcome: Progressing  Goal: Patient/Family participate in treatment and DC plans  Description  Interventions:  - Provide therapeutic environment  Outcome: Progressing  Goal: Patient/Family verbalizes awareness of resources  Outcome: Progressing  Goal: Understands least restrictive measures  Description  Interventions:  - Utilize least restrictive behavior  Outcome: Progressing     Problem: RESPIRATORY - ADULT  Goal: Achieves optimal ventilation and oxygenation  Description  INTERVENTIONS:  - Assess for changes in respiratory status  - Assess for changes in mentation and behavior  - Position to facilitate oxygenation and minimize respiratory effort  - Oxygen administered by appropriate delivery if ordered  - Initiate smoking cessation education as indicated  - Encourage broncho-pulmonary hygiene including cough, deep breathe, Incentive Spirometry  - Assess the need for suctioning and aspirate as needed  - Assess and instruct to report SOB or any respiratory difficulty  - Respiratory Therapy support as indicated  Outcome: Progressing     Problem: GASTROINTESTINAL - ADULT  Goal: Minimal or absence of nausea and/or vomiting  Description  INTERVENTIONS:  - Administer IV fluids if ordered to ensure adequate hydration  - Maintain NPO status until nausea and vomiting are resolved  - Nasogastric tube if ordered  - Administer ordered antiemetic medications as needed  - Provide nonpharmacologic comfort measures as appropriate  - Advance diet as tolerated, if ordered  - Consider nutrition services referral to assist patient with adequate nutrition and appropriate food choices  Outcome: Progressing  Goal: Maintains or returns to baseline bowel function  Description  INTERVENTIONS:  - Assess bowel function  - Encourage oral fluids to ensure adequate hydration  - Administer IV fluids if ordered to ensure adequate hydration  - Administer ordered medications as needed  - Encourage mobilization and activity  - Consider nutritional services referral to assist patient with adequate nutrition and appropriate food choices  Outcome: Progressing  Goal: Maintains adequate nutritional intake  Description  INTERVENTIONS:  - Monitor percentage of each meal consumed  - Identify factors contributing to decreased intake, treat as appropriate  - Assist with meals as needed  - Monitor I&O, weight, and lab values if indicated  - Obtain nutrition services referral as needed  Outcome: Progressing     Problem: METABOLIC, FLUID AND ELECTROLYTES - ADULT  Goal: Glucose maintained within target range  Description  INTERVENTIONS:  - Monitor Blood Glucose as ordered  - Assess for signs and symptoms of hyperglycemia and hypoglycemia  - Administer ordered medications to maintain glucose within target range  - Assess nutritional intake and initiate nutrition service referral as needed  Outcome: Progressing     Problem: DISCHARGE PLANNING  Goal: Discharge to home or other facility with appropriate resources  Description    CASE MANAGEMENT INTERVENTIONS:  - Conduct assessment to determine patient/family and health care team treatment goals, and need for post-acute services based on payer coverage, community resources, patient preferences and barriers to discharge    - Address psychosocial, clinical, and financial barriers to discharge as identified in assessment in conjunction with the patient/family and health care team   - Assist the patient in reintegration back into the community by removing barriers which may hinder a successful discharge once deemed stable  - Arrange appropriate level of post-acute services according to patient's needs and preference and payer coverage in collaboration with the physician and health care team   - Communicate with and update the patient/family, physician, and health care team regarding progress on the discharge plan  - Arrange appropriate transportation to post-acute venues  Outcome: Progressing    Pleasant, cooperative, visible intermittently  Med and meal compliant  Ate 100% of both meals  Denies depression, anxiety, si, hi and pain  Complained of feeling bloated and patient requested and recived prune juice  Attended journaling, community meeting, spirituality and fresh air groups  Compliant with mouth checks  Maintained on patient safety precautions w/o incident   Will continue to monitor progress in recovery program

## 2020-05-11 PROCEDURE — 99232 SBSQ HOSP IP/OBS MODERATE 35: CPT | Performed by: PSYCHIATRY & NEUROLOGY

## 2020-05-11 RX ADMIN — OXYBUTYNIN CHLORIDE 5 MG: 5 TABLET, EXTENDED RELEASE ORAL at 09:04

## 2020-05-11 RX ADMIN — DIVALPROEX SODIUM 1500 MG: 500 TABLET, EXTENDED RELEASE ORAL at 21:07

## 2020-05-11 RX ADMIN — PANTOPRAZOLE SODIUM 40 MG: 40 TABLET, DELAYED RELEASE ORAL at 06:04

## 2020-05-11 RX ADMIN — DEXTROAMPHETAMINE SACCHARATE, AMPHETAMINE ASPARTATE, DEXTROAMPHETAMINE SULFATE AND AMPHETAMINE SULFATE 10 MG: 2.5; 2.5; 2.5; 2.5 TABLET ORAL at 06:04

## 2020-05-11 RX ADMIN — NICOTINE POLACRILEX 2 MG: 2 GUM, CHEWING ORAL at 19:46

## 2020-05-11 RX ADMIN — DOCUSATE SODIUM 100 MG: 100 CAPSULE, LIQUID FILLED ORAL at 09:04

## 2020-05-11 RX ADMIN — LEVOTHYROXINE SODIUM 75 MCG: 75 TABLET ORAL at 06:04

## 2020-05-11 RX ADMIN — OLANZAPINE 5 MG: 5 TABLET, FILM COATED ORAL at 21:07

## 2020-05-11 RX ADMIN — NICOTINE POLACRILEX 2 MG: 2 GUM, CHEWING ORAL at 11:46

## 2020-05-11 RX ADMIN — METFORMIN HYDROCHLORIDE 500 MG: 500 TABLET ORAL at 09:04

## 2020-05-11 NOTE — PLAN OF CARE
Problem: Alteration in Thoughts and Perception  Goal: Verbalize thoughts and feelings  Description  Interventions:  - Promote a nonjudgmental and trusting relationship with the patient through active listening and therapeutic communication  - Assess patient's level of functioning, behavior and potential for risk  - Engage patient in 1 on 1 interactions  - Encourage patient to express fears, feelings, frustrations, and discuss symptoms    - [place] patient to reality, help patient recognize reality-based thinking   - Administer medications as ordered and assess for potential side effects  - Provide the patient education related to the signs and symptoms of the illness and desired effects of prescribed medications  Outcome: Progressing  Goal: Agree to be compliant with medication regime, as prescribed and report medication side effects  Description  Interventions:  - Offer appropriate PRN medication and supervise ingestion; conduct AIMS, as needed   Outcome: Progressing  Goal: Attend and participate in unit activities, including therapeutic, recreational, and educational groups  Description  Interventions:  -Encourage Visitation and family involvement in care  Outcome: Progressing    Ate 100% of dinner and had a snack before bed, attended groups, gait steady denied pain, remains disheveled, dressed in layers, compliant with medications and mouth checks, took trazodone 50mg before bed will continue to monitor

## 2020-05-11 NOTE — TREATMENT TEAM
Left group/Writer would not discuss his discharge      05/11/20 0900   Activity/Group Checklist   Group Community meeting   Attendance Did not attend   Attendance Duration (min) 31-45   Affect/Mood GAEL

## 2020-05-11 NOTE — PLAN OF CARE
Problem: Alteration in Thoughts and Perception  Goal: Verbalize thoughts and feelings  Description  Interventions:  - Promote a nonjudgmental and trusting relationship with the patient through active listening and therapeutic communication  - Assess patient's level of functioning, behavior and potential for risk  - Engage patient in 1 on 1 interactions  - Encourage patient to express fears, feelings, frustrations, and discuss symptoms    - [place] patient to reality, help patient recognize reality-based thinking   - Administer medications as ordered and assess for potential side effects  - Provide the patient education related to the signs and symptoms of the illness and desired effects of prescribed medications  Outcome: Progressing  Goal: Agree to be compliant with medication regime, as prescribed and report medication side effects  Description  Interventions:  - Offer appropriate PRN medication and supervise ingestion; conduct AIMS, as needed   Outcome: Progressing  Goal: Attend and participate in unit activities, including therapeutic, recreational, and educational groups  Description  Interventions:  -Encourage Visitation and family involvement in care  Outcome: Progressing     Problem: Ineffective Coping  Goal: Patient/Family verbalizes awareness of resources  Outcome: Progressing  Goal: Understands least restrictive measures  Description  Interventions:  - Utilize least restrictive behavior  Outcome: Progressing     Problem: GASTROINTESTINAL - ADULT  Goal: Minimal or absence of nausea and/or vomiting  Description  INTERVENTIONS:  - Administer IV fluids if ordered to ensure adequate hydration  - Maintain NPO status until nausea and vomiting are resolved  - Nasogastric tube if ordered  - Administer ordered antiemetic medications as needed  - Provide nonpharmacologic comfort measures as appropriate  - Advance diet as tolerated, if ordered  - Consider nutrition services referral to assist patient with adequate nutrition and appropriate food choices  Outcome: Progressing  Goal: Maintains adequate nutritional intake  Description  INTERVENTIONS:  - Monitor percentage of each meal consumed  - Identify factors contributing to decreased intake, treat as appropriate  - Assist with meals as needed  - Monitor I&O, weight, and lab values if indicated  - Obtain nutrition services referral as needed  Outcome: Progressing     Problem: Alteration in Thoughts and Perception  Goal: Treatment Goal: Gain control of psychotic behaviors/thinking, reduce/eliminate presenting symptoms and demonstrate improved reality functioning upon discharge  Outcome: Not Progressing  Goal: Refrain from acting on delusional thinking/internal stimuli  Description  Interventions:  - Monitor patient closely, per order   - Utilize least restrictive measures   - Set reasonable limits, give positive feedback for acceptable   - Administer medications as ordered and monitor of potential side effects  Outcome: Not Progressing  Goal: Recognize dysfunctional thoughts, communicate reality-based thoughts at the time of discharge  Description  Interventions:  - Provide medication and psycho-education to assist patient in compliance and developing insight into his/her illness   Outcome: Not Progressing  Goal: Complete daily ADLs, including personal hygiene independently, as able  Description  Interventions:  - Observe, teach, and assist patient with ADLS  - Monitor and promote a balance of rest/activity, with adequate nutrition and elimination   Outcome: Not Progressing     Problem: Ineffective Coping  Goal: Identifies ineffective coping skills  Outcome: Not Progressing  Goal: Identifies healthy coping skills  Outcome: Not Progressing  Goal: Demonstrates healthy coping skills  Outcome: Not Progressing  Did not get up for vitals, came out for breakfast, then went back to bed without taking his meds   Needed multiple prompts to come up to the station to take his meds and then following was encouraged to attend groups  Went to [employer] but left almost immediately so he did not get credit  Attended journaling, IMR, and fresh air groups  [id number]% of breakfast and 100% of lunch  Remains focused on discharge, going off unit, and getting clothes from the off unit closet  Noticeably more intrusive, repetitive, and impulsive  Very pressured in speech, rambling with disorganized thought process  Needed frequent redirection, forgets almost as soon as he is told something  No insight or understanding into illness, what is going on around him, or what he needs to do  No other behaviors or issues noted  Continue to monitor

## 2020-05-11 NOTE — PLAN OF CARE
Problem: Alteration in Thoughts and Perception  Goal: Attend and participate in unit activities, including therapeutic, recreational, and educational groups  Description  Interventions:  -Encourage Visitation and family involvement in care  Outcome: Not Progressing    Patient did not complete Goal Card for the week of 5/11/2020

## 2020-05-11 NOTE — PLAN OF CARE
Problem: DISCHARGE PLANNING  Goal: Discharge to home or other facility with appropriate resources  Description    CASE MANAGEMENT INTERVENTIONS:  - Conduct assessment to determine patient/family and health care team treatment goals, and need for post-acute services based on payer coverage, community resources, patient preferences and barriers to discharge  - Address psychosocial, clinical, and financial barriers to discharge as identified in assessment in conjunction with the patient/family and health care team   - Assist the patient in reintegration back into the community by removing barriers which may hinder a successful discharge once deemed stable  - Arrange appropriate level of post-acute services according to patient's needs and preference and payer coverage in collaboration with the physician and health care team   - Communicate with and update the patient/family, physician, and health care team regarding progress on the discharge plan  - Arrange appropriate transportation to post-acute venues  Outcome: Progressing     Patient has been improving with groups but is unable to demonstrated consistency with such  Patient unable to process and retain information regarding stay at home order and the inability to leave on passes   Patient was irritable with CM walking away stating, "fine I don't do anything and you can just keep me here against my will " Remains awaiting a bed at [facility]

## 2020-05-11 NOTE — TREATMENT TEAM
Patient not able to stay in group/left when group began      05/11/20 1100   Activity/Group Checklist   Group   (IMR/Maslow's Hierarchy )   Attendance Did not attend   Attendance Duration (min) 46-60   Affect/Mood GAEL

## 2020-05-11 NOTE — PROGRESS NOTES
05/11/20 0838   Team Meeting   Meeting Type Daily Rounds   Team Members Present   Team Members Present Physician;Nurse;; Other (Discipline and Name)   Physician Team Member Dr Deion Miranda Team Member Estefania Meza RN   Care Management Team Member EDELMIRA Oh   Other (Discipline and Name) Meryle Laws, CPS     No agitation, mouth checks

## 2020-05-11 NOTE — PROGRESS NOTES
Aubrey Gabriel came out for water once during the night, otherwise slept throughout the night  Respirations easy and non labored (snoring noted)  At times he was heard singing and talking in his sleep  Disheveled appearance  Compliant with medication  Continue to monitor  Precautions maintained
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
## 2020-05-12 PROCEDURE — 99232 SBSQ HOSP IP/OBS MODERATE 35: CPT | Performed by: PSYCHIATRY & NEUROLOGY

## 2020-05-12 RX ADMIN — DOCUSATE SODIUM 100 MG: 100 CAPSULE, LIQUID FILLED ORAL at 17:04

## 2020-05-12 RX ADMIN — METFORMIN HYDROCHLORIDE 500 MG: 500 TABLET ORAL at 16:45

## 2020-05-12 RX ADMIN — ATORVASTATIN CALCIUM 10 MG: 10 TABLET, FILM COATED ORAL at 16:45

## 2020-05-12 RX ADMIN — NICOTINE POLACRILEX 2 MG: 2 GUM, CHEWING ORAL at 13:56

## 2020-05-12 RX ADMIN — LEVOTHYROXINE SODIUM 75 MCG: 75 TABLET ORAL at 06:10

## 2020-05-12 RX ADMIN — DOCUSATE SODIUM 100 MG: 100 CAPSULE, LIQUID FILLED ORAL at 10:13

## 2020-05-12 RX ADMIN — NICOTINE POLACRILEX 2 MG: 2 GUM, CHEWING ORAL at 10:37

## 2020-05-12 RX ADMIN — PANTOPRAZOLE SODIUM 40 MG: 40 TABLET, DELAYED RELEASE ORAL at 06:10

## 2020-05-12 RX ADMIN — DEXTROAMPHETAMINE SACCHARATE, AMPHETAMINE ASPARTATE, DEXTROAMPHETAMINE SULFATE AND AMPHETAMINE SULFATE 10 MG: 2.5; 2.5; 2.5; 2.5 TABLET ORAL at 06:10

## 2020-05-12 RX ADMIN — METFORMIN HYDROCHLORIDE 500 MG: 500 TABLET ORAL at 10:13

## 2020-05-12 RX ADMIN — DIVALPROEX SODIUM 1500 MG: 500 TABLET, EXTENDED RELEASE ORAL at 21:17

## 2020-05-12 RX ADMIN — OXYBUTYNIN CHLORIDE 5 MG: 5 TABLET, EXTENDED RELEASE ORAL at 10:13

## 2020-05-12 RX ADMIN — OLANZAPINE 5 MG: 5 TABLET, FILM COATED ORAL at 21:17

## 2020-05-12 RX ADMIN — CLOZAPINE 200 MG: 200 TABLET ORAL at 16:45

## 2020-05-12 NOTE — PROGRESS NOTES
Patient initially awake at shift onset  Verbalized to MHT he was anxious about treatment team today d/t phone call he had with sister  No further elaboration  Returned to room around 2400, noted sleeping thereafter  No behaviors or outbursts overnight   Compliant with medication and mouth check for this am

## 2020-05-12 NOTE — CASE MANAGEMENT
CM and patient met to discuss his want for continued things out of locker  CM explained that in order to get anything more out he would need to change into hospital gown and pants while everything is being washed and then picked three outfits out  Earlier in the morning patient agreed to such  Patient became upset, irritable, and unhappy when CM approached him as he wanted to do previous arraignment  Patient expressed that he no longer wished to participate and walked away rambling  CM will continue to follow and provide services as needed

## 2020-05-12 NOTE — PLAN OF CARE
Problem: Alteration in Thoughts and Perception  Goal: Attend and participate in unit activities, including therapeutic, recreational, and educational groups  Description  Interventions:  -Encourage Visitation and family involvement in care  Outcome: Progressing   Patient group attendance has been improving slowly and is currently at 41%  Patient does participate in group settings but mind wanders always back to discharge and community  Patient did attend a few unit activities such as morning walk, journaling group and movie time  Patient continues to be anxious about discharge, staff splitting and repetitive however, does make an attempt to the best of his ability
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
## 2020-05-12 NOTE — TREATMENT TEAM
05/12/20 1400   Activity/Group Checklist   Group   (Recovery Workshop )   Attendance Attended   Attendance Duration (min) 46-60   Interactions Interacted appropriately   Affect/Mood Appropriate;Wide;Normal range   Goals Achieved Identified feelings; Identified triggers; Identified resources and support systems; Able to listen to others; Able to engage in interactions; Able to self-disclose

## 2020-05-12 NOTE — PROGRESS NOTES
05/12/20 1535   Team Meeting   Meeting Type Tx Team Meeting   Initial Conference Date 05/12/20   Next Conference Date 05/19/20   Team Members Present   Team Members Present Physician;Nurse;; Other (Discipline and Name)   Physician Team Member Dr Arelis Vences Team Member Colton Aldridge, RN   Care Management Team Member Beth Begum   Other (Discipline and Name) Arabella Gorman, [facility]; [facility] [address]   Patient/Family Present   Patient Present Yes   Patient's Family Present Yes   Family Relationship Sibling   Sibling Kristy Panda/Reji, sister via conference line     Patient attended treatment team meeting this morning prepared without self-assessment  Patient's   group attendance for last week was 41%  Team and patient completed risk assessment and the patient did not verbalize any desire to elope from the program  Patient verbalized understanding of consequences of eloping from treatment while on a commitment  Patient verbalized no further questions or concerns at the conclusion of the meeting  Next team meeting scheduled for 5/19/2020  Patient presented to treatment and then left after 2 minutes  Patient became unhappy once he was told he could not go to [facility]  Sister informed of [facility] referral being made and does not believe this is appropriate and we need other options  Sister informed that a group home would be ideal however due to lack of progress and behaviors displayed this may not be an option

## 2020-05-12 NOTE — PLAN OF CARE
Problem: Alteration in Thoughts and Perception  Goal: Verbalize thoughts and feelings  Description  Interventions:  - Promote a nonjudgmental and trusting relationship with the patient through active listening and therapeutic communication  - Assess patient's level of functioning, behavior and potential for risk  - Engage patient in 1 on 1 interactions  - Encourage patient to express fears, feelings, frustrations, and discuss symptoms    - [place] patient to reality, help patient recognize reality-based thinking   - Administer medications as ordered and assess for potential side effects  - Provide the patient education related to the signs and symptoms of the illness and desired effects of prescribed medications  Outcome: Progressing  Goal: Refrain from acting on delusional thinking/internal stimuli  Description  Interventions:  - Monitor patient closely, per order   - Utilize least restrictive measures   - Set reasonable limits, give positive feedback for acceptable   - Administer medications as ordered and monitor of potential side effects  Outcome: Progressing  Goal: Agree to be compliant with medication regime, as prescribed and report medication side effects  Description  Interventions:  - Offer appropriate PRN medication and supervise ingestion; conduct AIMS, as needed   Outcome: Progressing  Goal: Attend and participate in unit activities, including therapeutic, recreational, and educational groups  Description  Interventions:  -Encourage Visitation and family involvement in care  Outcome: Progressing  Goal: Complete daily ADLs, including personal hygiene independently, as able  Description  Interventions:  - Observe, teach, and assist patient with ADLS  - Monitor and promote a balance of rest/activity, with adequate nutrition and elimination   Outcome: Not Progressing     Problem: Ineffective Coping  Goal: Demonstrates healthy coping skills  Outcome: Progressing  Goal: Understands least restrictive measures  Description  Interventions:  - Utilize least restrictive behavior  Outcome: Progressing     Problem: GASTROINTESTINAL - ADULT  Goal: Maintains adequate nutritional intake  Description  INTERVENTIONS:  - Monitor percentage of each meal consumed  - Identify factors contributing to decreased intake, treat as appropriate  - Assist with meals as needed  - Monitor I&O, weight, and lab values if indicated  - Obtain nutrition services referral as needed  Outcome: Shweta Gresham has been awake, alert, and visible intermittently out in the milieu  Pt became loud with female MHT at [phone] over getting clothing out of closet  Pt needed redirection from staff as he was calling hospital  to speak to individuals in the hospital   Speech is rapid, pressured, repetitive, and intrusive at times  Ate 100% supper  Pt went out on deck for fresh air with staff and peers  Pt refused all supper time meds  Pt requested Nicotine gum and one piece 2 mg given at 1946  Attended and participated in evening group and had snack after  Compliant with taking scheduled 2100 meds  Continue to monitor/assess for any changes

## 2020-05-12 NOTE — TREATMENT TEAM
05/12/20 0900   Activity/Group Checklist   Group Community meeting   Attendance Did not attend   Attendance Duration (min) 16-30   Affect/Mood GAEL

## 2020-05-12 NOTE — TREATMENT TEAM
Patient did very well in IMR/Engaged/On topic      05/12/20 1100   Activity/Group Checklist   Group   (IMR/Personal Growth )   Attendance Attended   Attendance Duration (min) 46-60   Interactions Interacted appropriately   Affect/Mood Appropriate;Wide;Bright   Goals Achieved Identified feelings; Discussed coping strategies; Identified resources and support systems; Able to listen to others; Able to engage in interactions; Able to manage/cope with feelings; Able to self-disclose

## 2020-05-12 NOTE — PLAN OF CARE
Problem: Alteration in Thoughts and Perception  Goal: Verbalize thoughts and feelings  Description  Interventions:  - Promote a nonjudgmental and trusting relationship with the patient through active listening and therapeutic communication  - Assess patient's level of functioning, behavior and potential for risk  - Engage patient in 1 on 1 interactions  - Encourage patient to express fears, feelings, frustrations, and discuss symptoms    - [place] patient to reality, help patient recognize reality-based thinking   - Administer medications as ordered and assess for potential side effects  - Provide the patient education related to the signs and symptoms of the illness and desired effects of prescribed medications  Outcome: Progressing  Goal: Refrain from acting on delusional thinking/internal stimuli  Description  Interventions:  - Monitor patient closely, per order   - Utilize least restrictive measures   - Set reasonable limits, give positive feedback for acceptable   - Administer medications as ordered and monitor of potential side effects  Outcome: Progressing  Goal: Agree to be compliant with medication regime, as prescribed and report medication side effects  Description  Interventions:  - Offer appropriate PRN medication and supervise ingestion; conduct AIMS, as needed   Outcome: Progressing  Goal: Attend and participate in unit activities, including therapeutic, recreational, and educational groups  Description  Interventions:  -Encourage Visitation and family involvement in care  Outcome: Not Progressing  Goal: Complete daily ADLs, including personal hygiene independently, as able  Description  Interventions:  - Observe, teach, and assist patient with ADLS  - Monitor and promote a balance of rest/activity, with adequate nutrition and elimination   Outcome: Progressing     Problem: Ineffective Coping  Goal: Demonstrates healthy coping skills  Outcome: Progressing  Goal: Understands least restrictive measures  Description  Interventions:  - Utilize least restrictive behavior  Outcome: Progressing     Problem: GASTROINTESTINAL - ADULT  Goal: Maintains adequate nutritional intake  Description  INTERVENTIONS:  - Monitor percentage of each meal consumed  - Identify factors contributing to decreased intake, treat as appropriate  - Assist with meals as needed  - Monitor I&O, weight, and lab values if indicated  - Obtain nutrition services referral as needed  Outcome: Patti Vann has been awake, alert, and visible intermittently out in the milieu  No behavioral issues this shift  Pt showered but remains disheveled and dressed in layers  Continues to be preoccupied with his clothes, going shopping at [employer], and discharge  Ate 100% supper  Compliant with all scheduled meds when called and had evening snack after  Continue to monitor/assess for any changes

## 2020-05-12 NOTE — PROGRESS NOTES
05/12/20 0851   Team Meeting   Meeting Type Daily Rounds   Team Members Present   Team Members Present Physician;Nurse;; Other (Discipline and Name)   Physician Team Member Dr Meera Cedeno Team Member Phani Cerrato RN   Care Management Team Member Jarrell Loza MSW   Other (Discipline and Name) RACHELE LopezW; MCKAYLA Lopez     Groups, intrusive, repetitive, pressured  Awaiting [facility] bed

## 2020-05-12 NOTE — PLAN OF CARE
Problem: Alteration in Thoughts and Perception  Goal: Verbalize thoughts and feelings  Description  Interventions:  - Promote a nonjudgmental and trusting relationship with the patient through active listening and therapeutic communication  - Assess patient's level of functioning, behavior and potential for risk  - Engage patient in 1 on 1 interactions  - Encourage patient to express fears, feelings, frustrations, and discuss symptoms    - [place] patient to reality, help patient recognize reality-based thinking   - Administer medications as ordered and assess for potential side effects  - Provide the patient education related to the signs and symptoms of the illness and desired effects of prescribed medications  Outcome: Progressing  Goal: Agree to be compliant with medication regime, as prescribed and report medication side effects  Description  Interventions:  - Offer appropriate PRN medication and supervise ingestion; conduct AIMS, as needed   Outcome: Progressing  Goal: Attend and participate in unit activities, including therapeutic, recreational, and educational groups  Description  Interventions:  -Encourage Visitation and family involvement in care  Outcome: Progressing     Problem: Ineffective Coping  Goal: Patient/Family verbalizes awareness of resources  Outcome: Progressing  Goal: Understands least restrictive measures  Description  Interventions:  - Utilize least restrictive behavior  Outcome: Progressing     Problem: GASTROINTESTINAL - ADULT  Goal: Minimal or absence of nausea and/or vomiting  Description  INTERVENTIONS:  - Administer IV fluids if ordered to ensure adequate hydration  - Maintain NPO status until nausea and vomiting are resolved  - Nasogastric tube if ordered  - Administer ordered antiemetic medications as needed  - Provide nonpharmacologic comfort measures as appropriate  - Advance diet as tolerated, if ordered  - Consider nutrition services referral to assist patient with adequate nutrition and appropriate food choices  Outcome: Progressing  Goal: Maintains or returns to baseline bowel function  Description  INTERVENTIONS:  - Assess bowel function  - Encourage oral fluids to ensure adequate hydration  - Administer IV fluids if ordered to ensure adequate hydration  - Administer ordered medications as needed  - Encourage mobilization and activity  - Consider nutritional services referral to assist patient with adequate nutrition and appropriate food choices  Outcome: Progressing  Goal: Maintains adequate nutritional intake  Description  INTERVENTIONS:  - Monitor percentage of each meal consumed  - Identify factors contributing to decreased intake, treat as appropriate  - Assist with meals as needed  - Monitor I&O, weight, and lab values if indicated  - Obtain nutrition services referral as needed  Outcome: Progressing     Problem: Alteration in Thoughts and Perception  Goal: Treatment Goal: Gain control of psychotic behaviors/thinking, reduce/eliminate presenting symptoms and demonstrate improved reality functioning upon discharge  Outcome: Not Progressing  Goal: Refrain from acting on delusional thinking/internal stimuli  Description  Interventions:  - Monitor patient closely, per order   - Utilize least restrictive measures   - Set reasonable limits, give positive feedback for acceptable   - Administer medications as ordered and monitor of potential side effects  Outcome: Not Progressing  Goal: Recognize dysfunctional thoughts, communicate reality-based thoughts at the time of discharge  Description  Interventions:  - Provide medication and psycho-education to assist patient in compliance and developing insight into his/her illness   Outcome: Not Progressing  Goal: Complete daily ADLs, including personal hygiene independently, as able  Description  Interventions:  - Observe, teach, and assist patient with ADLS  - Monitor and promote a balance of rest/activity, with adequate nutrition and elimination   Outcome: Not Progressing   Did not get up for vitalsf or breakfast despite multiple prompts from staff  Initially refused to take his morning meds but later agreed to take  Attended IMR and fresh air groups  Ate 100% of lunch  Remains focused on discharge, going off unit for trips, and getting clothes from the off unit closet  Continued to need frequent redirection and then forgets almost as soon as he is told something  Continues to lack insight and understanding into his illness or what he needs to do to progress in his recovery  He remained redirectable with no outbursts noted  No other behaviors or issues noted  Continue to monitor

## 2020-05-13 PROCEDURE — 99232 SBSQ HOSP IP/OBS MODERATE 35: CPT | Performed by: PSYCHIATRY & NEUROLOGY

## 2020-05-13 RX ADMIN — CLOZAPINE 200 MG: 200 TABLET ORAL at 17:02

## 2020-05-13 RX ADMIN — ALUMINUM HYDROXIDE, MAGNESIUM HYDROXIDE, AND SIMETHICONE 30 ML: 200; 200; 20 SUSPENSION ORAL at 10:42

## 2020-05-13 RX ADMIN — OXYBUTYNIN CHLORIDE 5 MG: 5 TABLET, EXTENDED RELEASE ORAL at 08:15

## 2020-05-13 RX ADMIN — PANTOPRAZOLE SODIUM 40 MG: 40 TABLET, DELAYED RELEASE ORAL at 05:41

## 2020-05-13 RX ADMIN — DOCUSATE SODIUM 100 MG: 100 CAPSULE, LIQUID FILLED ORAL at 08:15

## 2020-05-13 RX ADMIN — DOCUSATE SODIUM 100 MG: 100 CAPSULE, LIQUID FILLED ORAL at 17:02

## 2020-05-13 RX ADMIN — METFORMIN HYDROCHLORIDE 500 MG: 500 TABLET ORAL at 08:15

## 2020-05-13 RX ADMIN — ATORVASTATIN CALCIUM 10 MG: 10 TABLET, FILM COATED ORAL at 17:02

## 2020-05-13 RX ADMIN — DEXTROAMPHETAMINE SACCHARATE, AMPHETAMINE ASPARTATE, DEXTROAMPHETAMINE SULFATE AND AMPHETAMINE SULFATE 10 MG: 2.5; 2.5; 2.5; 2.5 TABLET ORAL at 05:44

## 2020-05-13 RX ADMIN — DIVALPROEX SODIUM 1500 MG: 500 TABLET, EXTENDED RELEASE ORAL at 20:47

## 2020-05-13 RX ADMIN — ACETAMINOPHEN 325 MG: 325 TABLET ORAL at 10:06

## 2020-05-13 RX ADMIN — OLANZAPINE 5 MG: 5 TABLET, FILM COATED ORAL at 20:47

## 2020-05-13 RX ADMIN — LEVOTHYROXINE SODIUM 75 MCG: 75 TABLET ORAL at 05:41

## 2020-05-13 RX ADMIN — METFORMIN HYDROCHLORIDE 500 MG: 500 TABLET ORAL at 17:02

## 2020-05-13 NOTE — TREATMENT TEAM
05/13/20 0900   Activity/Group Checklist   Group Community meeting   Attendance Attended   Attendance Duration (min) 31-45   Interactions Interacted appropriately   Affect/Mood Appropriate;Bright;Normal range   Goals Achieved Identified feelings; Discussed coping strategies; Able to listen to others; Able to engage in interactions; Able to self-disclose
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
## 2020-05-13 NOTE — PLAN OF CARE
Problem: Individualized Interventions  Goal: Attend and participate in unit activities, including therapeutic, recreational, and educational groups  Description  PSYCHOTHERAPY INTERVENTIONS:    -Form therapeutic alliance to promote trust and safety within a therapeutic environment    -Promote self-awareness and identity development   -Practice effective communication with staff, peers, family, and community members  Participate in family sessions as necessary   -Encourage reality-based thought content by examining thought processes and cognitive distortions  Outcome: Progressing    Patient demonstrated insight today when he approached therapist in [facility] for brief encounter  Patient asked, "why does it feel like I will never get out of here, why does it feel like I can't trust you or anyone else? Is it because there is something wrong with my head? With my mind? Is it because of other people in my life?"  Therapist processed this with patient, who used good eye contact and did not interrupt therapist speaking  To elaborate on patient's specific point, therapist and patient agreed that people in patient's life have often been untrustworthy, which makes him afraid to trust people  Therapist and patient walked to Unit Manager's office to share this insight  Patient was given positive reinforcement and was encouraged to continue trying to understand why his mind works the way it does  Patient with increase in focus and calm since stimulant added to medication regimen last week  Later, therapist sat with patient to play him two songs, as he was able to accurately identify his criteria for discharge, and has been up and in groups for almost two full days now  Patient able to dance and sing, expressing himself in a positive way  Therapist will continue to support patient insight and behavioral plan

## 2020-05-13 NOTE — PROGRESS NOTES
05/13/20 0800   Team Meeting   Meeting Type Daily Rounds   Team Members Present   Team Members Present Physician;Nurse; Other (Discipline and Name)   Physician Team Member Dr Alba Moncada, RN   Other (Discipline and Name) Martha Cook LCSW; MCKAYLA Silva     Controlled 3-11  Attended groups 3-11  Bloodwork refused

## 2020-05-13 NOTE — PLAN OF CARE
Problem: Alteration in Thoughts and Perception  Goal: Verbalize thoughts and feelings  Description  Interventions:  - Promote a nonjudgmental and trusting relationship with the patient through active listening and therapeutic communication  - Assess patient's level of functioning, behavior and potential for risk  - Engage patient in 1 on 1 interactions  - Encourage patient to express fears, feelings, frustrations, and discuss symptoms    - [place] patient to reality, help patient recognize reality-based thinking   - Administer medications as ordered and assess for potential side effects  - Provide the patient education related to the signs and symptoms of the illness and desired effects of prescribed medications  Outcome: Progressing  Goal: Agree to be compliant with medication regime, as prescribed and report medication side effects  Description  Interventions:  - Offer appropriate PRN medication and supervise ingestion; conduct AIMS, as needed   Outcome: Progressing  Goal: Attend and participate in unit activities, including therapeutic, recreational, and educational groups  Description  Interventions:  -Encourage Visitation and family involvement in care  Outcome: Progressing     Problem: Ineffective Coping  Goal: Patient/Family verbalizes awareness of resources  Outcome: Progressing  Goal: Understands least restrictive measures  Description  Interventions:  - Utilize least restrictive behavior  Outcome: Progressing     Problem: GASTROINTESTINAL - ADULT  Goal: Minimal or absence of nausea and/or vomiting  Description  INTERVENTIONS:  - Administer IV fluids if ordered to ensure adequate hydration  - Maintain NPO status until nausea and vomiting are resolved  - Nasogastric tube if ordered  - Administer ordered antiemetic medications as needed  - Provide nonpharmacologic comfort measures as appropriate  - Advance diet as tolerated, if ordered  - Consider nutrition services referral to assist patient with adequate nutrition and appropriate food choices  Outcome: Progressing  Goal: Maintains adequate nutritional intake  Description  INTERVENTIONS:  - Monitor percentage of each meal consumed  - Identify factors contributing to decreased intake, treat as appropriate  - Assist with meals as needed  - Monitor I&O, weight, and lab values if indicated  - Obtain nutrition services referral as needed  Outcome: Progressing     Problem: Alteration in Thoughts and Perception  Goal: Treatment Goal: Gain control of psychotic behaviors/thinking, reduce/eliminate presenting symptoms and demonstrate improved reality functioning upon discharge  Outcome: Not Progressing  Goal: Refrain from acting on delusional thinking/internal stimuli  Description  Interventions:  - Monitor patient closely, per order   - Utilize least restrictive measures   - Set reasonable limits, give positive feedback for acceptable   - Administer medications as ordered and monitor of potential side effects  Outcome: Not Progressing  Goal: Recognize dysfunctional thoughts, communicate reality-based thoughts at the time of discharge  Description  Interventions:  - Provide medication and psycho-education to assist patient in compliance and developing insight into his/her illness   Outcome: Not Progressing  More visible today, allowed staff to take his vitals this morning, came for his meds without prompting, attended morning walk, journaling, and IMR groups  Ate 100% of both meals  Remains intrusive, impulsive, disorganized in thought, and needy; constantly asking staff for different things and will interrupt staff and other peers to ask the same questions over and over again  Remains focused on discharge, going off unit for trips, and getting clothes from the off unit closet  Continues to lack insight and understanding into his illness or what he needs to do to attain his goal of discharge   PRN Tylenol 325mg  was given at 1006 for 2/10 right foot pain which fully resolved upon follow up  Requested PRN Mylanta was given at 1042 for heartburn and was reported to be effective  No other behaviors or issues noted  He is currently out on the deck for fresh air with his peers and staff  Continue to monitor
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
## 2020-05-14 LAB
ALBUMIN SERPL BCP-MCNC: 3.9 G/DL (ref 3–5.2)
ALP SERPL-CCNC: 69 U/L (ref 43–122)
ALT SERPL W P-5'-P-CCNC: 23 U/L (ref 9–52)
ANION GAP SERPL CALCULATED.3IONS-SCNC: 9 MMOL/L (ref 5–14)
AST SERPL W P-5'-P-CCNC: 28 U/L (ref 17–59)
BILIRUB SERPL-MCNC: 0.3 MG/DL
BUN SERPL-MCNC: 10 MG/DL (ref 5–25)
CALCIUM SERPL-MCNC: 8.8 MG/DL (ref 8.4–10.2)
CHLORIDE SERPL-SCNC: 98 MMOL/L (ref 97–108)
CO2 SERPL-SCNC: 26 MMOL/L (ref 22–30)
CREAT SERPL-MCNC: 0.58 MG/DL (ref 0.7–1.5)
ERYTHROCYTE [DISTWIDTH] IN BLOOD BY AUTOMATED COUNT: 13.4 %
GFR SERPL CREATININE-BSD FRML MDRD: 117 ML/MIN/1.73SQ M
GLUCOSE P FAST SERPL-MCNC: 121 MG/DL (ref 70–99)
GLUCOSE SERPL-MCNC: 121 MG/DL (ref 70–99)
HCT VFR BLD AUTO: 40 % (ref 41–53)
HGB BLD-MCNC: 14 G/DL (ref 13.5–17.5)
MCH RBC QN AUTO: 29.9 PG (ref 26–34)
MCHC RBC AUTO-ENTMCNC: 35.1 G/DL (ref 31–36)
MCV RBC AUTO: 85 FL (ref 80–100)
PLATELET # BLD AUTO: 178 THOUSANDS/UL (ref 150–450)
PMV BLD AUTO: 8 FL (ref 8.9–12.7)
POTASSIUM SERPL-SCNC: 4.3 MMOL/L (ref 3.6–5)
PROT SERPL-MCNC: 6.7 G/DL (ref 5.9–8.4)
RBC # BLD AUTO: 4.69 MILLION/UL (ref 4.5–5.9)
SODIUM SERPL-SCNC: 133 MMOL/L (ref 137–147)
VALPROATE SERPL-MCNC: 65.1 UG/ML (ref 50–120)
WBC # BLD AUTO: 9.5 THOUSAND/UL (ref 4.5–11)

## 2020-05-14 PROCEDURE — 85007 BL SMEAR W/DIFF WBC COUNT: CPT | Performed by: PSYCHIATRY & NEUROLOGY

## 2020-05-14 PROCEDURE — 85027 COMPLETE CBC AUTOMATED: CPT | Performed by: PSYCHIATRY & NEUROLOGY

## 2020-05-14 PROCEDURE — 99232 SBSQ HOSP IP/OBS MODERATE 35: CPT | Performed by: PSYCHIATRY & NEUROLOGY

## 2020-05-14 PROCEDURE — 80053 COMPREHEN METABOLIC PANEL: CPT | Performed by: PSYCHIATRY & NEUROLOGY

## 2020-05-14 PROCEDURE — 80164 ASSAY DIPROPYLACETIC ACD TOT: CPT | Performed by: PSYCHIATRY & NEUROLOGY

## 2020-05-14 RX ADMIN — OLANZAPINE 5 MG: 5 TABLET, FILM COATED ORAL at 21:01

## 2020-05-14 RX ADMIN — CLOZAPINE 200 MG: 200 TABLET ORAL at 17:05

## 2020-05-14 RX ADMIN — DOCUSATE SODIUM 100 MG: 100 CAPSULE, LIQUID FILLED ORAL at 17:06

## 2020-05-14 RX ADMIN — DEXTROAMPHETAMINE SACCHARATE, AMPHETAMINE ASPARTATE, DEXTROAMPHETAMINE SULFATE AND AMPHETAMINE SULFATE 10 MG: 2.5; 2.5; 2.5; 2.5 TABLET ORAL at 05:23

## 2020-05-14 RX ADMIN — LEVOTHYROXINE SODIUM 75 MCG: 75 TABLET ORAL at 05:23

## 2020-05-14 RX ADMIN — METFORMIN HYDROCHLORIDE 500 MG: 500 TABLET ORAL at 17:06

## 2020-05-14 RX ADMIN — ATORVASTATIN CALCIUM 10 MG: 10 TABLET, FILM COATED ORAL at 17:06

## 2020-05-14 RX ADMIN — PANTOPRAZOLE SODIUM 40 MG: 40 TABLET, DELAYED RELEASE ORAL at 05:23

## 2020-05-14 RX ADMIN — DIVALPROEX SODIUM 1500 MG: 500 TABLET, EXTENDED RELEASE ORAL at 21:01

## 2020-05-14 RX ADMIN — ACETAMINOPHEN 325 MG: 325 TABLET ORAL at 17:07

## 2020-05-14 NOTE — NURSING NOTE
Received pt in bed at change of shift with eyes closed; chest movement noted  Continues the same thus this far as per continual rounding  Will continue to monitor  Due for his blood work that he refused on 5/13/20

## 2020-05-14 NOTE — PLAN OF CARE
Problem: Alteration in Thoughts and Perception  Goal: Verbalize thoughts and feelings  Description  Interventions:  - Promote a nonjudgmental and trusting relationship with the patient through active listening and therapeutic communication  - Assess patient's level of functioning, behavior and potential for risk  - Engage patient in 1 on 1 interactions  - Encourage patient to express fears, feelings, frustrations, and discuss symptoms    - [place] patient to reality, help patient recognize reality-based thinking   - Administer medications as ordered and assess for potential side effects  - Provide the patient education related to the signs and symptoms of the illness and desired effects of prescribed medications  Outcome: Progressing  Goal: Refrain from acting on delusional thinking/internal stimuli  Description  Interventions:  - Monitor patient closely, per order   - Utilize least restrictive measures   - Set reasonable limits, give positive feedback for acceptable   - Administer medications as ordered and monitor of potential side effects  Outcome: Progressing  Goal: Agree to be compliant with medication regime, as prescribed and report medication side effects  Description  Interventions:  - Offer appropriate PRN medication and supervise ingestion; conduct AIMS, as needed   Outcome: Progressing     Problem: Ineffective Coping  Goal: Demonstrates healthy coping skills  Outcome: Progressing  Goal: Understands least restrictive measures  Description  Interventions:  - Utilize least restrictive behavior  Outcome: Progressing     Problem: GASTROINTESTINAL - ADULT  Goal: Maintains adequate nutritional intake  Description  INTERVENTIONS:  - Monitor percentage of each meal consumed  - Identify factors contributing to decreased intake, treat as appropriate  - Assist with meals as needed  - Monitor I&O, weight, and lab values if indicated  - Obtain nutrition services referral as needed  Outcome: Todd Haney has been awake, alert, and visible intermittently out in the milieu  Remain disheveled in appearance and dressed in layers  Ate 100% supper  Sits in [facility] 19 and listens to music on radio and rests in room at intervals  No behavioral issues  Pt remains preoccupied, focused on discharge, and wanting belongings out of closet  Minimal interaction noted with peers  Compliant with all scheduled meds when called  Pt requested prn Tylenol for left foot pain #3/10  Tylenol 325 mg po given at 1707 and effective, at 1807-#0/10  Pt did not attend evening group but had evening snack  Continue to monitor/assess for any changes

## 2020-05-14 NOTE — PLAN OF CARE
Problem: Alteration in Thoughts and Perception  Goal: Verbalize thoughts and feelings  Description  Interventions:  - Promote a nonjudgmental and trusting relationship with the patient through active listening and therapeutic communication  - Assess patient's level of functioning, behavior and potential for risk  - Engage patient in 1 on 1 interactions  - Encourage patient to express fears, feelings, frustrations, and discuss symptoms    - [place] patient to reality, help patient recognize reality-based thinking   - Administer medications as ordered and assess for potential side effects  - Provide the patient education related to the signs and symptoms of the illness and desired effects of prescribed medications  Outcome: Progressing     Problem: Alteration in Thoughts and Perception  Goal: Treatment Goal: Gain control of psychotic behaviors/thinking, reduce/eliminate presenting symptoms and demonstrate improved reality functioning upon discharge  Outcome: Not Progressing  Goal: Refrain from acting on delusional thinking/internal stimuli  Description  Interventions:  - Monitor patient closely, per order   - Utilize least restrictive measures   - Set reasonable limits, give positive feedback for acceptable   - Administer medications as ordered and monitor of potential side effects  Outcome: Not Progressing  Goal: Agree to be compliant with medication regime, as prescribed and report medication side effects  Description  Interventions:  - Offer appropriate PRN medication and supervise ingestion; conduct AIMS, as needed   Outcome: Not Progressing  Goal: Attend and participate in unit activities, including therapeutic, recreational, and educational groups  Description  Interventions:  -Encourage Visitation and family involvement in care  Outcome: Not Progressing  Goal: Recognize dysfunctional thoughts, communicate reality-based thoughts at the time of discharge  Description  Interventions:  - Provide medication and psycho-education to assist patient in compliance and developing insight into his/her illness   Outcome: Not Progressing  Goal: Complete daily ADLs, including personal hygiene independently, as able  Description  Interventions:  - Observe, teach, and assist patient with ADLS  - Monitor and promote a balance of rest/activity, with adequate nutrition and elimination   Outcome: Not Progressing     Problem: Ineffective Coping  Goal: Identifies ineffective coping skills  Outcome: Not Progressing  Goal: Identifies healthy coping skills  Outcome: Not Progressing  Goal: Demonstrates healthy coping skills  Outcome: Not Progressing  Isolative to his room this morning did not come out for vitals when prompted, refused breakfast, refused meds despite multiple prompts and chances to take  Refused to allow staff to draw blood for labs  Did eat 100% of lunch  Only group attended was fresh air, ignored prompts to attend other unit programming  No insight or understanding into his illness, remains focused on discharge, going off unit for trips, and getting clothes from the off unit closet  Not willing to listen to staff and walked away any time staff would set limits or remind him of what is needed from him in regards to his recovery  No other behaviors or issues noted  Continue to monitor

## 2020-05-14 NOTE — PROGRESS NOTES
05/14/20 1039   Team Meeting   Meeting Type Daily Rounds   Team Members Present   Team Members Present Physician;Nurse;; Other (Discipline and Name)   Physician Team Member Dr Lola Mack, RN   Care Management Team Member EDELMIRA Sawnn   Other (Discipline and Name) Hubert Garzon, [facility]; Bernadette Queen, MCKAYLA     Refused lab work  Awaiting [facility] bed

## 2020-05-14 NOTE — TREATMENT TEAM
05/14/20 1100   Activity/Group Checklist   Group   (IMR/Pictionary )   Attendance Did not attend   Attendance Duration (min) 46-60   Affect/Mood GAEL
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
## 2020-05-14 NOTE — PLAN OF CARE
Problem: Alteration in Thoughts and Perception  Goal: Verbalize thoughts and feelings  Description  Interventions:  - Promote a nonjudgmental and trusting relationship with the patient through active listening and therapeutic communication  - Assess patient's level of functioning, behavior and potential for risk  - Engage patient in 1 on 1 interactions  - Encourage patient to express fears, feelings, frustrations, and discuss symptoms    - [place] patient to reality, help patient recognize reality-based thinking   - Administer medications as ordered and assess for potential side effects  - Provide the patient education related to the signs and symptoms of the illness and desired effects of prescribed medications  Outcome: Progressing  Goal: Refrain from acting on delusional thinking/internal stimuli  Description  Interventions:  - Monitor patient closely, per order   - Utilize least restrictive measures   - Set reasonable limits, give positive feedback for acceptable   - Administer medications as ordered and monitor of potential side effects  Outcome: Progressing  Goal: Agree to be compliant with medication regime, as prescribed and report medication side effects  Description  Interventions:  - Offer appropriate PRN medication and supervise ingestion; conduct AIMS, as needed   Outcome: Progressing  Goal: Attend and participate in unit activities, including therapeutic, recreational, and educational groups  Description  Interventions:  -Encourage Visitation and family involvement in care  Outcome: Not Progressing     Problem: Ineffective Coping  Goal: Demonstrates healthy coping skills  Outcome: Progressing  Goal: Understands least restrictive measures  Description  Interventions:  - Utilize least restrictive behavior  Outcome: Progressing     Problem: GASTROINTESTINAL - ADULT  Goal: Maintains adequate nutritional intake  Description  INTERVENTIONS:  - Monitor percentage of each meal consumed  - Identify factors contributing to decreased intake, treat as appropriate  - Assist with meals as needed  - Monitor I&O, weight, and lab values if indicated  - Obtain nutrition services referral as needed  Outcome: Malgorzata Gill has been awake, alert, and visible intermittently out in the milieu  Ate 100% supper  Little interaction noted with peers  Disheveled in appearance and dressed in layers  Speech rapid, pressured  Remains preoccupied with discharge  Rests in room at intervals and listens to music on radio in [facility] 19 with peers  No behavioral issues this shift  Pt did not attend evening groups but came out for snack after  Compliant with scheduled meds when called  Continue to monitor/assess for any changes

## 2020-05-14 NOTE — TREATMENT TEAM
05/14/20 0900   Activity/Group Checklist   Group Community meeting   Attendance Did not attend   Attendance Duration (min) 31-45   Affect/Mood GAEL

## 2020-05-15 LAB
ERYTHROCYTE [DISTWIDTH] IN BLOOD BY AUTOMATED COUNT: 13.4 %
HCT VFR BLD AUTO: 40 % (ref 41–53)
HGB BLD-MCNC: 14 G/DL (ref 13.5–17.5)
LYMPHOCYTES # BLD AUTO: 4.28 THOUSAND/UL (ref 0.5–4)
LYMPHOCYTES # BLD AUTO: 45 % (ref 25–45)
MCH RBC QN AUTO: 29.9 PG (ref 26–34)
MCHC RBC AUTO-ENTMCNC: 35.1 G/DL (ref 31–36)
MCV RBC AUTO: 85 FL (ref 80–100)
MONOCYTES # BLD AUTO: 0.76 THOUSAND/UL (ref 0.2–0.9)
MONOCYTES NFR BLD AUTO: 8 % (ref 1–10)
NEUTS SEG # BLD: 4.47 THOUSAND/UL (ref 1.8–7.8)
NEUTS SEG NFR BLD AUTO: 47 %
PLATELET # BLD AUTO: 178 THOUSANDS/UL (ref 150–450)
PLATELET BLD QL SMEAR: ADEQUATE
PMV BLD AUTO: 8 FL (ref 8.9–12.7)
RBC # BLD AUTO: 4.69 MILLION/UL (ref 4.5–5.9)
RBC MORPH BLD: NORMAL
TOTAL CELLS COUNTED SPEC: 100
WBC # BLD AUTO: 9.5 THOUSAND/UL (ref 4.5–11)

## 2020-05-15 PROCEDURE — 99232 SBSQ HOSP IP/OBS MODERATE 35: CPT | Performed by: PSYCHIATRY & NEUROLOGY

## 2020-05-15 PROCEDURE — NC001 PR NO CHARGE: Performed by: PSYCHIATRY & NEUROLOGY

## 2020-05-15 RX ADMIN — LEVOTHYROXINE SODIUM 75 MCG: 75 TABLET ORAL at 05:21

## 2020-05-15 RX ADMIN — METFORMIN HYDROCHLORIDE 500 MG: 500 TABLET ORAL at 08:25

## 2020-05-15 RX ADMIN — OLANZAPINE 5 MG: 5 TABLET, FILM COATED ORAL at 20:55

## 2020-05-15 RX ADMIN — ATORVASTATIN CALCIUM 10 MG: 10 TABLET, FILM COATED ORAL at 16:11

## 2020-05-15 RX ADMIN — DOCUSATE SODIUM 100 MG: 100 CAPSULE, LIQUID FILLED ORAL at 17:08

## 2020-05-15 RX ADMIN — DOCUSATE SODIUM 100 MG: 100 CAPSULE, LIQUID FILLED ORAL at 08:25

## 2020-05-15 RX ADMIN — DEXTROAMPHETAMINE SACCHARATE, AMPHETAMINE ASPARTATE, DEXTROAMPHETAMINE SULFATE AND AMPHETAMINE SULFATE 10 MG: 2.5; 2.5; 2.5; 2.5 TABLET ORAL at 05:21

## 2020-05-15 RX ADMIN — ACETAMINOPHEN 650 MG: 325 TABLET ORAL at 12:52

## 2020-05-15 RX ADMIN — OXYBUTYNIN CHLORIDE 5 MG: 5 TABLET, EXTENDED RELEASE ORAL at 08:25

## 2020-05-15 RX ADMIN — NICOTINE POLACRILEX 2 MG: 2 GUM, CHEWING ORAL at 11:30

## 2020-05-15 RX ADMIN — METFORMIN HYDROCHLORIDE 500 MG: 500 TABLET ORAL at 16:11

## 2020-05-15 RX ADMIN — PANTOPRAZOLE SODIUM 40 MG: 40 TABLET, DELAYED RELEASE ORAL at 05:20

## 2020-05-15 RX ADMIN — CLOZAPINE 200 MG: 200 TABLET ORAL at 16:10

## 2020-05-15 RX ADMIN — DIVALPROEX SODIUM 1500 MG: 500 TABLET, EXTENDED RELEASE ORAL at 20:55

## 2020-05-15 NOTE — PROGRESS NOTES
Keturah Calzada reported no longer feeling pain in his left foot  MAR intervention effective  Will continue to monitor
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
## 2020-05-15 NOTE — PROGRESS NOTES
05/15/20 0900   Activity/Group Checklist   Group Community meeting  ([place])   Attendance Attended   Attendance Duration (min) 16-30   Interactions Did not interact   Affect/Mood Appropriate;Bright   Goals Achieved Increased hopefulness; Able to listen to others; Able to engage in interactions
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
## 2020-05-15 NOTE — PLAN OF CARE
Problem: Alteration in Thoughts and Perception  Goal: Refrain from acting on delusional thinking/internal stimuli  Description  Interventions:  - Monitor patient closely, per order   - Utilize least restrictive measures   - Set reasonable limits, give positive feedback for acceptable   - Administer medications as ordered and monitor of potential side effects  Outcome: Progressing  Goal: Agree to be compliant with medication regime, as prescribed and report medication side effects  Description  Interventions:  - Offer appropriate PRN medication and supervise ingestion; conduct AIMS, as needed   Outcome: Progressing  Goal: Attend and participate in unit activities, including therapeutic, recreational, and educational groups  Description  Interventions:  -Encourage Visitation and family involvement in care  Outcome: Hank Wallace has been visible on the unit, preoccupied in conversation with getting clothing items from the closet but is accepting of redirection  Also preoccupied in conversation regarding is discharge planning  Disorganized and fast when talking, pressured in speech but has moments of clarity where he verbalizes understanding but then will ask another staff member the same question  Attended select groups  Med and meal compliant  Compliant with his mouth checks  Behaviors controlled Will continue to monitor for changes  no

## 2020-05-15 NOTE — PROGRESS NOTES
Bobby Winchester verbalized discomfort in his left foot, describing his pain as aching and gradual also rating it a 5/10  650 mg tylenol administered per [facility] order  Will assess for effectiveness of MAR intervention

## 2020-05-16 PROCEDURE — 99232 SBSQ HOSP IP/OBS MODERATE 35: CPT | Performed by: PHYSICIAN ASSISTANT

## 2020-05-16 RX ADMIN — ATORVASTATIN CALCIUM 10 MG: 10 TABLET, FILM COATED ORAL at 17:19

## 2020-05-16 RX ADMIN — ACETAMINOPHEN 650 MG: 325 TABLET ORAL at 13:16

## 2020-05-16 RX ADMIN — DOCUSATE SODIUM 100 MG: 100 CAPSULE, LIQUID FILLED ORAL at 08:29

## 2020-05-16 RX ADMIN — OXYBUTYNIN CHLORIDE 5 MG: 5 TABLET, EXTENDED RELEASE ORAL at 08:29

## 2020-05-16 RX ADMIN — METFORMIN HYDROCHLORIDE 500 MG: 500 TABLET ORAL at 08:29

## 2020-05-16 RX ADMIN — PANTOPRAZOLE SODIUM 40 MG: 40 TABLET, DELAYED RELEASE ORAL at 05:09

## 2020-05-16 RX ADMIN — NICOTINE POLACRILEX 2 MG: 2 GUM, CHEWING ORAL at 12:49

## 2020-05-16 RX ADMIN — METFORMIN HYDROCHLORIDE 500 MG: 500 TABLET ORAL at 17:19

## 2020-05-16 RX ADMIN — CLOZAPINE 200 MG: 200 TABLET ORAL at 17:19

## 2020-05-16 RX ADMIN — LEVOTHYROXINE SODIUM 75 MCG: 75 TABLET ORAL at 05:09

## 2020-05-16 RX ADMIN — DIVALPROEX SODIUM 1500 MG: 500 TABLET, EXTENDED RELEASE ORAL at 20:58

## 2020-05-16 RX ADMIN — OLANZAPINE 5 MG: 5 TABLET, FILM COATED ORAL at 20:58

## 2020-05-16 RX ADMIN — DEXTROAMPHETAMINE SACCHARATE, AMPHETAMINE ASPARTATE, DEXTROAMPHETAMINE SULFATE AND AMPHETAMINE SULFATE 10 MG: 2.5; 2.5; 2.5; 2.5 TABLET ORAL at 05:09

## 2020-05-16 RX ADMIN — DOCUSATE SODIUM 100 MG: 100 CAPSULE, LIQUID FILLED ORAL at 17:19

## 2020-05-16 NOTE — PLAN OF CARE
Problem: Alteration in Thoughts and Perception  Goal: Treatment Goal: Gain control of psychotic behaviors/thinking, reduce/eliminate presenting symptoms and demonstrate improved reality functioning upon discharge  Outcome: Progressing  Goal: Verbalize thoughts and feelings  Description  Interventions:  - Promote a nonjudgmental and trusting relationship with the patient through active listening and therapeutic communication  - Assess patient's level of functioning, behavior and potential for risk  - Engage patient in 1 on 1 interactions  - Encourage patient to express fears, feelings, frustrations, and discuss symptoms    - [place] patient to reality, help patient recognize reality-based thinking   - Administer medications as ordered and assess for potential side effects  - Provide the patient education related to the signs and symptoms of the illness and desired effects of prescribed medications  Outcome: Progressing  Goal: Refrain from acting on delusional thinking/internal stimuli  Description  Interventions:  - Monitor patient closely, per order   - Utilize least restrictive measures   - Set reasonable limits, give positive feedback for acceptable   - Administer medications as ordered and monitor of potential side effects  Outcome: Progressing  Goal: Agree to be compliant with medication regime, as prescribed and report medication side effects  Description  Interventions:  - Offer appropriate PRN medication and supervise ingestion; conduct AIMS, as needed   Outcome: Progressing  Goal: Attend and participate in unit activities, including therapeutic, recreational, and educational groups  Description  Interventions:  -Encourage Visitation and family involvement in care  Outcome: Progressing  Goal: Recognize dysfunctional thoughts, communicate reality-based thoughts at the time of discharge  Description  Interventions:  - Provide medication and psycho-education to assist patient in compliance and developing insight into his/her illness   Outcome: Progressing  Goal: Complete daily ADLs, including personal hygiene independently, as able  Description  Interventions:  - Observe, teach, and assist patient with ADLS  - Monitor and promote a balance of rest/activity, with adequate nutrition and elimination   Outcome: Progressing     Problem: Ineffective Coping  Goal: Identifies ineffective coping skills  Outcome: Progressing  Goal: Identifies healthy coping skills  Outcome: Progressing  Goal: Demonstrates healthy coping skills  Outcome: Progressing  Goal: Patient/Family participate in treatment and DC plans  Description  Interventions:  - Provide therapeutic environment  Outcome: Progressing  Goal: Patient/Family verbalizes awareness of resources  Outcome: Progressing  Goal: Understands least restrictive measures  Description  Interventions:  - Utilize least restrictive behavior  Outcome: Progressing     Problem: RESPIRATORY - ADULT  Goal: Achieves optimal ventilation and oxygenation  Description  INTERVENTIONS:  - Assess for changes in respiratory status  - Assess for changes in mentation and behavior  - Position to facilitate oxygenation and minimize respiratory effort  - Oxygen administered by appropriate delivery if ordered  - Initiate smoking cessation education as indicated  - Encourage broncho-pulmonary hygiene including cough, deep breathe, Incentive Spirometry  - Assess the need for suctioning and aspirate as needed  - Assess and instruct to report SOB or any respiratory difficulty  - Respiratory Therapy support as indicated  Outcome: Progressing     Problem: GASTROINTESTINAL - ADULT  Goal: Minimal or absence of nausea and/or vomiting  Description  INTERVENTIONS:  - Administer IV fluids if ordered to ensure adequate hydration  - Maintain NPO status until nausea and vomiting are resolved  - Nasogastric tube if ordered  - Administer ordered antiemetic medications as needed  - Provide nonpharmacologic comfort measures as appropriate  - Advance diet as tolerated, if ordered  - Consider nutrition services referral to assist patient with adequate nutrition and appropriate food choices  Outcome: Progressing  Goal: Maintains or returns to baseline bowel function  Description  INTERVENTIONS:  - Assess bowel function  - Encourage oral fluids to ensure adequate hydration  - Administer IV fluids if ordered to ensure adequate hydration  - Administer ordered medications as needed  - Encourage mobilization and activity  - Consider nutritional services referral to assist patient with adequate nutrition and appropriate food choices  Outcome: Progressing  Goal: Maintains adequate nutritional intake  Description  INTERVENTIONS:  - Monitor percentage of each meal consumed  - Identify factors contributing to decreased intake, treat as appropriate  - Assist with meals as needed  - Monitor I&O, weight, and lab values if indicated  - Obtain nutrition services referral as needed  Outcome: Progressing     Problem: METABOLIC, FLUID AND ELECTROLYTES - ADULT  Goal: Glucose maintained within target range  Description  INTERVENTIONS:  - Monitor Blood Glucose as ordered  - Assess for signs and symptoms of hyperglycemia and hypoglycemia  - Administer ordered medications to maintain glucose within target range  - Assess nutritional intake and initiate nutrition service referral as needed  Outcome: Progressing     Problem: DISCHARGE PLANNING  Goal: Discharge to home or other facility with appropriate resources  Description    CASE MANAGEMENT INTERVENTIONS:  - Conduct assessment to determine patient/family and health care team treatment goals, and need for post-acute services based on payer coverage, community resources, patient preferences and barriers to discharge    - Address psychosocial, clinical, and financial barriers to discharge as identified in assessment in conjunction with the patient/family and health care team   - Assist the patient in reintegration back into the community by removing barriers which may hinder a successful discharge once deemed stable  - Arrange appropriate level of post-acute services according to patient's needs and preference and payer coverage in collaboration with the physician and health care team   - Communicate with and update the patient/family, physician, and health care team regarding progress on the discharge plan  - Arrange appropriate transportation to post-acute venues  Outcome: Progressing    Pleasant, cooperative, visible intermittently  Med and meal compliant  Ate 100% of both meals  Denies depression, anxiety, si, hi and pain  Compliant with program requirements  Preoccupied with "trust" and discharge  Contacted hospital  to talk with someone  Patient was redirected  Listened to radio for coping  Behaviors controlled  Attended community meeting and fresh air groups  Compliant with mouth checks  Maintained on patient safety precautions w/o incident   Will continue to monitor progress in recovery program

## 2020-05-16 NOTE — PROGRESS NOTES
Progress Note - Behavioral Health     Justina [facility] 48 y o  male MRN: [id number]   Unit/Bed#: [facility] 105-01 Encounter: [id number]    Per Nursing: Nursing reports that patient refused his morning medications and refused to have his morning vital signs taken  He came to breakfast late as well and demanded to eat  Per Patient: Patient states that he is feeling good overall  He does appear much less anxious today when compared with yesterday  He is planning to take a shower  He is less intrusive and disruptive compared with his behavior yesterday  He denies any anxiety and worrying  He denies any depression or sadness  Patient denies any thoughts of wanting to harm self or others  Patient denies any recent self-injurious behaviors  Patient denies any active or passive suicidal ideation, intent or plan  Patient is able to contract for safety at the present time  Patient remains future-oriented and goal-directed, as well as motivated and help seeking  He does not wish to speak much with provider  He complains that he is unable to leave the unit and expresses frustration with his continued presence on the unit  He also complains of right foot pain  Behavior over the last 24 hours: unchanged  Sleep: normal  Appetite: normal  Medication side effects: No   ROS: reports right foot pain, all other systems are negative  Any positives in the Comprehensive Review of Systems were noted in the HPI  All other Review of Systems were negative          Mental Status Evaluation:    Appearance:  disheveled   Behavior:  normal, cooperative, calm, demanding, evasive   Speech:  normal rate and volume   Mood:  normal, euthymic   Affect:  constricted, labile, irritable at times   Thought Process:  goal directed   Associations: intact associations   Thought Content:  no overt delusions   Perceptual Disturbances: denies auditory hallucinations when asked   Risk Potential: Suicidal ideation - None at present  Homicidal ideation - None at present  Potential for aggression - Not at present   Sensorium:  oriented to person, place and time/date   Memory:  recent and remote memory grossly intact   Consciousness:  alert and awake   Attention: attention span and concentration appear shorter than expected for age   Insight:  limited   Judgment: limited   Gait/Station: normal gait/station   Motor Activity: no abnormal movements     Vital signs in last 24 hours:  Vitals:    05/16/20 1500   BP: 119/66   Pulse: 87   Resp: 18   Temp: (!) 97 4 °F (36 3 °C)   SpO2: 94%         Laboratory results:   I have personally reviewed all pertinent laboratory/tests results    Most Recent Labs:   Lab Results   Component Value Date    WBC 9 50 05/14/2020    WBC 9 50 05/14/2020    RBC 4 69 05/14/2020    RBC 4 69 05/14/2020    HGB 14 0 05/14/2020    HGB 14 0 05/14/2020    HCT 40 0 (L) 05/14/2020    HCT 40 0 (L) 05/14/2020     05/14/2020     05/14/2020    RDW 13 4 05/14/2020    RDW 13 4 05/14/2020    NEUTROABS 4 47 05/14/2020    SODIUM 133 (L) 05/14/2020    K 4 3 05/14/2020    CL 98 05/14/2020    CO2 26 05/14/2020    BUN 10 05/14/2020    CREATININE 0 58 (L) 05/14/2020    GLUC 121 (H) 05/14/2020    GLUF 121 (H) 05/14/2020    CALCIUM 8 8 05/14/2020    AST 28 05/14/2020    ALT 23 05/14/2020    ALKPHOS 69 05/14/2020    TP 6 7 05/14/2020    ALB 3 9 05/14/2020    TBILI 0 30 05/14/2020    CHOLESTEROL 102 04/13/2020    HDL 26 (L) 04/13/2020    TRIG 172 (H) 04/13/2020    LDLCALC 42 04/13/2020    NONHDLC 76 04/13/2020    VALPROICTOT 65 1 05/14/2020    LITHIUM 0 6 03/27/2014    AMMONIA 38 (H) 05/03/2020    [id number] 3 810 12/24/2019    FREET4 0 93 08/23/2019    HGBA1C 6 1 12/03/2019     12/03/2019       Progress Toward Goals: progressing    Assessment/Plan   Principal Problem:    Schizoaffective disorder, bipolar type ([address] )  Active Problems:    Type 2 diabetes mellitus without complication, without long-term current use of insulin (HCC)    Benign essential hypertension    Gastroesophageal reflux disease without esophagitis    Acquired hypothyroidism    Tobacco abuse    Diabetes insipidus, nephrogenic ([facility] [address] )    ADHD    Recommended Treatment:     Planned medication and treatment changes: All current active medications have been reviewed  Encourage group therapy, milieu therapy and occupational therapy  Behavioral Health checks every 7 minutes  Continue current medications:    Current Facility-Administered Medications:  acetaminophen 325 mg Oral Q6H PRN Sruthi Brink MD   acetaminophen 650 mg Oral Q6H PRN Sruthi Brink MD   acetaminophen 975 mg Oral Q8H PRN Sruthi Brink MD   aluminum-magnesium hydroxide-simethicone 30 mL Oral Q4H PRN Sruthi Brink MD   amphetamine-dextroamphetamine 10 mg Oral Daily Sruthi Brink MD   atorvastatin 10 mg Oral Daily With Winnie Woods MD   benztropine 1 mg Intramuscular Q6H PRN Sruthi Brink MD   benztropine 1 mg Oral Q6H PRN Surthi Brink MD   cloZAPine 200 mg Oral Daily Sruthi Brink MD   divalproex sodium 1,500 mg Oral HS Sruthi Brink MD   docusate sodium 100 mg Oral BID Sruthi Brink MD   haloperidol 5 mg Oral Q6H PRN Sruthi Brink MD   haloperidol lactate 5 mg Intramuscular Q6H PRN Sruthi Brink MD   levothyroxine 75 mcg Oral Early Morning Sruthi Brink MD   LORazepam 1 mg Intramuscular Q6H PRN Sruthi Brink MD   LORazepam 1 mg Oral Q6H PRN Sruthi Brink MD   magnesium hydroxide 30 mL Oral Daily PRN Sruthi Brink MD   metFORMIN 500 mg Oral BID With Meals Sruthi Brink MD   nicotine polacrilex 2 mg Oral Q2H PRN Sruthi Brink MD   OLANZapine 5 mg Oral HS Sruthi Brink MD   oxybutynin 5 mg Oral Daily Sruthi Brink MD   pantoprazole 40 mg Oral Early Morning Sruthi Brink MD   traZODone 50 mg Oral HS PRN Sruthi Brink MD           Plan:  1) Patient refused his medications this morning and appears slightly irritable  He continues to remain fixated on his discharge disposition  He denies all complaints  Will continue to monitor on the unit    2) Continue all current medications as directed:    Adderall IR 10 mg once daily by mouth in the morning   Clozaril 200 mg once daily by mouth   Depakote ER 1500 mg once daily by mouth at bedtime   Zyprexa 5 mg once daily by mouth at bedtime  3) Medical   All medical comorbidities are under the care of [place] [address] [facility]  4) Continue to work with Case Management to determine patient's disposition following discharge  Risks / Benefits of Treatment:    Risks, benefits, and possible side effects of medications explained to patient and patient verbalizes understanding and agreement for treatment  Counseling / Coordination of Care:    Patient's progress reviewed with nursing staff  Medications, treatment progress and treatment plan reviewed with patient      Delmar Brand PA-C 05/17/20

## 2020-05-16 NOTE — PLAN OF CARE
Problem: Alteration in Thoughts and Perception  Goal: Agree to be compliant with medication regime, as prescribed and report medication side effects  Description  Interventions:  - Offer appropriate PRN medication and supervise ingestion; conduct AIMS, as needed   Outcome: Progressing       Ate 100% of dinner and had a snack before bed, attended groups, gait steady denied pain, remains disheveled, dressed in layers, compliant with medications and mouth checks, will continue to monitor

## 2020-05-17 PROCEDURE — 99232 SBSQ HOSP IP/OBS MODERATE 35: CPT | Performed by: PHYSICIAN ASSISTANT

## 2020-05-17 RX ADMIN — LEVOTHYROXINE SODIUM 75 MCG: 75 TABLET ORAL at 06:01

## 2020-05-17 RX ADMIN — NICOTINE POLACRILEX 2 MG: 2 GUM, CHEWING ORAL at 13:06

## 2020-05-17 RX ADMIN — DOCUSATE SODIUM 100 MG: 100 CAPSULE, LIQUID FILLED ORAL at 09:08

## 2020-05-17 RX ADMIN — ATORVASTATIN CALCIUM 10 MG: 10 TABLET, FILM COATED ORAL at 17:10

## 2020-05-17 RX ADMIN — OXYBUTYNIN CHLORIDE 5 MG: 5 TABLET, EXTENDED RELEASE ORAL at 09:08

## 2020-05-17 RX ADMIN — METFORMIN HYDROCHLORIDE 500 MG: 500 TABLET ORAL at 17:10

## 2020-05-17 RX ADMIN — OLANZAPINE 5 MG: 5 TABLET, FILM COATED ORAL at 21:12

## 2020-05-17 RX ADMIN — DOCUSATE SODIUM 100 MG: 100 CAPSULE, LIQUID FILLED ORAL at 17:10

## 2020-05-17 RX ADMIN — DEXTROAMPHETAMINE SACCHARATE, AMPHETAMINE ASPARTATE, DEXTROAMPHETAMINE SULFATE AND AMPHETAMINE SULFATE 10 MG: 2.5; 2.5; 2.5; 2.5 TABLET ORAL at 06:01

## 2020-05-17 RX ADMIN — DIVALPROEX SODIUM 1500 MG: 500 TABLET, EXTENDED RELEASE ORAL at 21:12

## 2020-05-17 RX ADMIN — METFORMIN HYDROCHLORIDE 500 MG: 500 TABLET ORAL at 09:08

## 2020-05-17 RX ADMIN — CLOZAPINE 200 MG: 200 TABLET ORAL at 17:10

## 2020-05-17 RX ADMIN — ACETAMINOPHEN 650 MG: 325 TABLET ORAL at 10:23

## 2020-05-17 RX ADMIN — PANTOPRAZOLE SODIUM 40 MG: 40 TABLET, DELAYED RELEASE ORAL at 06:01

## 2020-05-17 NOTE — PLAN OF CARE
Problem: Alteration in Thoughts and Perception  Goal: Treatment Goal: Gain control of psychotic behaviors/thinking, reduce/eliminate presenting symptoms and demonstrate improved reality functioning upon discharge  Outcome: Progressing  Goal: Verbalize thoughts and feelings  Description  Interventions:  - Promote a nonjudgmental and trusting relationship with the patient through active listening and therapeutic communication  - Assess patient's level of functioning, behavior and potential for risk  - Engage patient in 1 on 1 interactions  - Encourage patient to express fears, feelings, frustrations, and discuss symptoms    - [place] patient to reality, help patient recognize reality-based thinking   - Administer medications as ordered and assess for potential side effects  - Provide the patient education related to the signs and symptoms of the illness and desired effects of prescribed medications  Outcome: Progressing  Goal: Refrain from acting on delusional thinking/internal stimuli  Description  Interventions:  - Monitor patient closely, per order   - Utilize least restrictive measures   - Set reasonable limits, give positive feedback for acceptable   - Administer medications as ordered and monitor of potential side effects  Outcome: Progressing  Goal: Agree to be compliant with medication regime, as prescribed and report medication side effects  Description  Interventions:  - Offer appropriate PRN medication and supervise ingestion; conduct AIMS, as needed   Outcome: Progressing  Goal: Attend and participate in unit activities, including therapeutic, recreational, and educational groups  Description  Interventions:  -Encourage Visitation and family involvement in care  Outcome: Progressing  Goal: Recognize dysfunctional thoughts, communicate reality-based thoughts at the time of discharge  Description  Interventions:  - Provide medication and psycho-education to assist patient in compliance and developing insight into his/her illness   Outcome: Progressing  Goal: Complete daily ADLs, including personal hygiene independently, as able  Description  Interventions:  - Observe, teach, and assist patient with ADLS  - Monitor and promote a balance of rest/activity, with adequate nutrition and elimination   Outcome: Progressing     Problem: Ineffective Coping  Goal: Identifies ineffective coping skills  Outcome: Progressing  Goal: Identifies healthy coping skills  Outcome: Progressing  Goal: Demonstrates healthy coping skills  Outcome: Progressing  Goal: Patient/Family participate in treatment and DC plans  Description  Interventions:  - Provide therapeutic environment  Outcome: Progressing  Goal: Patient/Family verbalizes awareness of resources  Outcome: Progressing  Goal: Understands least restrictive measures  Description  Interventions:  - Utilize least restrictive behavior  Outcome: Progressing     Problem: RESPIRATORY - ADULT  Goal: Achieves optimal ventilation and oxygenation  Description  INTERVENTIONS:  - Assess for changes in respiratory status  - Assess for changes in mentation and behavior  - Position to facilitate oxygenation and minimize respiratory effort  - Oxygen administered by appropriate delivery if ordered  - Initiate smoking cessation education as indicated  - Encourage broncho-pulmonary hygiene including cough, deep breathe, Incentive Spirometry  - Assess the need for suctioning and aspirate as needed  - Assess and instruct to report SOB or any respiratory difficulty  - Respiratory Therapy support as indicated  Outcome: Progressing     Problem: GASTROINTESTINAL - ADULT  Goal: Minimal or absence of nausea and/or vomiting  Description  INTERVENTIONS:  - Administer IV fluids if ordered to ensure adequate hydration  - Maintain NPO status until nausea and vomiting are resolved  - Nasogastric tube if ordered  - Administer ordered antiemetic medications as needed  - Provide nonpharmacologic comfort measures as appropriate  - Advance diet as tolerated, if ordered  - Consider nutrition services referral to assist patient with adequate nutrition and appropriate food choices  Outcome: Progressing  Goal: Maintains or returns to baseline bowel function  Description  INTERVENTIONS:  - Assess bowel function  - Encourage oral fluids to ensure adequate hydration  - Administer IV fluids if ordered to ensure adequate hydration  - Administer ordered medications as needed  - Encourage mobilization and activity  - Consider nutritional services referral to assist patient with adequate nutrition and appropriate food choices  Outcome: Progressing  Goal: Maintains adequate nutritional intake  Description  INTERVENTIONS:  - Monitor percentage of each meal consumed  - Identify factors contributing to decreased intake, treat as appropriate  - Assist with meals as needed  - Monitor I&O, weight, and lab values if indicated  - Obtain nutrition services referral as needed  Outcome: Progressing     Problem: METABOLIC, FLUID AND ELECTROLYTES - ADULT  Goal: Glucose maintained within target range  Description  INTERVENTIONS:  - Monitor Blood Glucose as ordered  - Assess for signs and symptoms of hyperglycemia and hypoglycemia  - Administer ordered medications to maintain glucose within target range  - Assess nutritional intake and initiate nutrition service referral as needed  Outcome: Progressing     Problem: DISCHARGE PLANNING  Goal: Discharge to home or other facility with appropriate resources  Description    CASE MANAGEMENT INTERVENTIONS:  - Conduct assessment to determine patient/family and health care team treatment goals, and need for post-acute services based on payer coverage, community resources, patient preferences and barriers to discharge    - Address psychosocial, clinical, and financial barriers to discharge as identified in assessment in conjunction with the patient/family and health care team   - Assist the patient in reintegration back into the community by removing barriers which may hinder a successful discharge once deemed stable  - Arrange appropriate level of post-acute services according to patient's needs and preference and payer coverage in collaboration with the physician and health care team   - Communicate with and update the patient/family, physician, and health care team regarding progress on the discharge plan  - Arrange appropriate transportation to post-acute venues  Outcome: Progressing    Pleasant, cooperative, visible intermittently  Med and meal compliant  Ate 100% of both meals  Denies depression, anxiety, si, hi and pain  Behaviors controlled  Attended journaling and fresh air  Compliant with mouth checks  Maintained on patient safety precautions w/o incident   Will continue to monitor progress in recovery program

## 2020-05-17 NOTE — PROGRESS NOTES
Kate Winchester maintained on ongoing SAFE precaution without incident on this shift  Laying in bed with eyes closed, breath even and unlabored   Continuous rounding implemented   No behavioral noted   Will continue to monitor

## 2020-05-17 NOTE — PLAN OF CARE
Problem: Alteration in Thoughts and Perception  Goal: Verbalize thoughts and feelings  Description  Interventions:  - Promote a nonjudgmental and trusting relationship with the patient through active listening and therapeutic communication  - Assess patient's level of functioning, behavior and potential for risk  - Engage patient in 1 on 1 interactions  - Encourage patient to express fears, feelings, frustrations, and discuss symptoms    - [place] patient to reality, help patient recognize reality-based thinking   - Administer medications as ordered and assess for potential side effects  - Provide the patient education related to the signs and symptoms of the illness and desired effects of prescribed medications  Outcome: Progressing  Goal: Refrain from acting on delusional thinking/internal stimuli  Description  Interventions:  - Monitor patient closely, per order   - Utilize least restrictive measures   - Set reasonable limits, give positive feedback for acceptable   - Administer medications as ordered and monitor of potential side effects  Outcome: Progressing  Goal: Agree to be compliant with medication regime, as prescribed and report medication side effects  Description  Interventions:  - Offer appropriate PRN medication and supervise ingestion; conduct AIMS, as needed   Outcome: Progressing  Goal: Attend and participate in unit activities, including therapeutic, recreational, and educational groups  Description  Interventions:  -Encourage Visitation and family involvement in care  Outcome: Progressing  Goal: Complete daily ADLs, including personal hygiene independently, as able  Description  Interventions:  - Observe, teach, and assist patient with ADLS  - Monitor and promote a balance of rest/activity, with adequate nutrition and elimination   Outcome: Not Progressing     Problem: Ineffective Coping  Goal: Demonstrates healthy coping skills  Outcome: Progressing  Goal: Understands least restrictive measures  Description  Interventions:  - Utilize least restrictive behavior  Outcome: Progressing     Problem: GASTROINTESTINAL - ADULT  Goal: Maintains adequate nutritional intake  Description  INTERVENTIONS:  - Monitor percentage of each meal consumed  - Identify factors contributing to decreased intake, treat as appropriate  - Assist with meals as needed  - Monitor I&O, weight, and lab values if indicated  - Obtain nutrition services referral as needed  Outcome: Sepideh Whitt has been awake, alert, and visible intermittently out in the milieu  Pt rests in room and listens to music on radio in [facility] with peers  Remains preoccupied with discharge and verbalizes that he wants to get out of here  Ate 100% supper  Some socialization noted with peers  Dressed in layers and disheveled  No behavioral issues  Compliant with all scheduled meds when called  Attended and participated in evening group and had snack after  Continue to monitor/assess for any changes

## 2020-05-17 NOTE — PROGRESS NOTES
Ruchi Julian refused his morning med  He was reminded that was part of his treatment  Med are wasted and witness by RN protonix, adderall and synthroid

## 2020-05-18 RX ADMIN — DOCUSATE SODIUM 100 MG: 100 CAPSULE, LIQUID FILLED ORAL at 08:14

## 2020-05-18 RX ADMIN — METFORMIN HYDROCHLORIDE 500 MG: 500 TABLET ORAL at 08:14

## 2020-05-18 RX ADMIN — DOCUSATE SODIUM 100 MG: 100 CAPSULE, LIQUID FILLED ORAL at 17:04

## 2020-05-18 RX ADMIN — DIVALPROEX SODIUM 1500 MG: 500 TABLET, EXTENDED RELEASE ORAL at 21:06

## 2020-05-18 RX ADMIN — OXYBUTYNIN CHLORIDE 5 MG: 5 TABLET, EXTENDED RELEASE ORAL at 08:14

## 2020-05-18 RX ADMIN — NICOTINE POLACRILEX 2 MG: 2 GUM, CHEWING ORAL at 10:25

## 2020-05-18 RX ADMIN — DEXTROAMPHETAMINE SACCHARATE, AMPHETAMINE ASPARTATE, DEXTROAMPHETAMINE SULFATE AND AMPHETAMINE SULFATE 10 MG: 2.5; 2.5; 2.5; 2.5 TABLET ORAL at 05:17

## 2020-05-18 RX ADMIN — METFORMIN HYDROCHLORIDE 500 MG: 500 TABLET ORAL at 16:55

## 2020-05-18 RX ADMIN — NICOTINE POLACRILEX 2 MG: 2 GUM, CHEWING ORAL at 14:03

## 2020-05-18 RX ADMIN — ATORVASTATIN CALCIUM 10 MG: 10 TABLET, FILM COATED ORAL at 16:55

## 2020-05-18 RX ADMIN — CLOZAPINE 200 MG: 200 TABLET ORAL at 16:55

## 2020-05-18 RX ADMIN — OLANZAPINE 5 MG: 5 TABLET, FILM COATED ORAL at 21:06

## 2020-05-18 RX ADMIN — PANTOPRAZOLE SODIUM 40 MG: 40 TABLET, DELAYED RELEASE ORAL at 05:17

## 2020-05-18 RX ADMIN — LEVOTHYROXINE SODIUM 75 MCG: 75 TABLET ORAL at 05:17

## 2020-05-18 NOTE — PLAN OF CARE
Problem: DISCHARGE PLANNING  Goal: Discharge to home or other facility with appropriate resources  Description    CASE MANAGEMENT INTERVENTIONS:  - Conduct assessment to determine patient/family and health care team treatment goals, and need for post-acute services based on payer coverage, community resources, patient preferences and barriers to discharge  - Address psychosocial, clinical, and financial barriers to discharge as identified in assessment in conjunction with the patient/family and health care team   - Assist the patient in reintegration back into the community by removing barriers which may hinder a successful discharge once deemed stable  - Arrange appropriate level of post-acute services according to patient's needs and preference and payer coverage in collaboration with the physician and health care team   - Communicate with and update the patient/family, physician, and health care team regarding progress on the discharge plan  - Arrange appropriate transportation to post-acute venues  Outcome: Progressing     ACT Team: Not completed as of yet  Discharge disposition: [facility], awaiting bed   Vs improvement of systems  Discharge date: TBD

## 2020-05-18 NOTE — PLAN OF CARE
Problem: Alteration in Thoughts and Perception  Goal: Verbalize thoughts and feelings  Description  Interventions:  - Promote a nonjudgmental and trusting relationship with the patient through active listening and therapeutic communication  - Assess patient's level of functioning, behavior and potential for risk  - Engage patient in 1 on 1 interactions  - Encourage patient to express fears, feelings, frustrations, and discuss symptoms    - [place] patient to reality, help patient recognize reality-based thinking   - Administer medications as ordered and assess for potential side effects  - Provide the patient education related to the signs and symptoms of the illness and desired effects of prescribed medications  Outcome: Progressing  Goal: Refrain from acting on delusional thinking/internal stimuli  Description  Interventions:  - Monitor patient closely, per order   - Utilize least restrictive measures   - Set reasonable limits, give positive feedback for acceptable   - Administer medications as ordered and monitor of potential side effects  Outcome: Progressing  Goal: Agree to be compliant with medication regime, as prescribed and report medication side effects  Description  Interventions:  - Offer appropriate PRN medication and supervise ingestion; conduct AIMS, as needed   Outcome: Progressing  Goal: Attend and participate in unit activities, including therapeutic, recreational, and educational groups  Description  Interventions:  -Encourage Visitation and family involvement in care  Outcome: Progressing     Problem: Ineffective Coping  Goal: Patient/Family verbalizes awareness of resources  Outcome: Progressing  Goal: Understands least restrictive measures  Description  Interventions:  - Utilize least restrictive behavior  Outcome: Progressing     Problem: GASTROINTESTINAL - ADULT  Goal: Minimal or absence of nausea and/or vomiting  Description  INTERVENTIONS:  - Administer IV fluids if ordered to ensure adequate hydration  - Maintain NPO status until nausea and vomiting are resolved  - Nasogastric tube if ordered  - Administer ordered antiemetic medications as needed  - Provide nonpharmacologic comfort measures as appropriate  - Advance diet as tolerated, if ordered  - Consider nutrition services referral to assist patient with adequate nutrition and appropriate food choices  Outcome: Progressing  Goal: Maintains or returns to baseline bowel function  Description  INTERVENTIONS:  - Assess bowel function  - Encourage oral fluids to ensure adequate hydration  - Administer IV fluids if ordered to ensure adequate hydration  - Administer ordered medications as needed  - Encourage mobilization and activity  - Consider nutritional services referral to assist patient with adequate nutrition and appropriate food choices  Outcome: Progressing  Goal: Maintains adequate nutritional intake  Description  INTERVENTIONS:  - Monitor percentage of each meal consumed  - Identify factors contributing to decreased intake, treat as appropriate  - Assist with meals as needed  - Monitor I&O, weight, and lab values if indicated  - Obtain nutrition services referral as needed  Outcome: Progressing     Problem: Alteration in Thoughts and Perception  Goal: Treatment Goal: Gain control of psychotic behaviors/thinking, reduce/eliminate presenting symptoms and demonstrate improved reality functioning upon discharge  Outcome: Not Progressing  Goal: Recognize dysfunctional thoughts, communicate reality-based thoughts at the time of discharge  Description  Interventions:  - Provide medication and psycho-education to assist patient in compliance and developing insight into his/her illness   Outcome: Not Progressing  Goal: Complete daily ADLs, including personal hygiene independently, as able  Description  Interventions:  - Observe, teach, and assist patient with ADLS  - Monitor and promote a balance of rest/activity, with adequate nutrition and elimination   Outcome: Not Progressing  Visible in the milieu, complied with allowing staff to take his vitals this morning, and came for his meds without prompting  Remains focused on discharge, pointing out that other's are talking about being discharged soon but doesn't understand why he is not  Continues to lack insight and understanding into his illness or what he needs to do to be able to be discharged  Needs constant reminders and prompting from staff as to what he needs to do  Attended fresh air group only despite reminders from staff to attend the other offered groups  Ate 100% of both meals  Requested PRN nicotine given twice today  No other behaviors or issues noted  Continue to monitor

## 2020-05-18 NOTE — PLAN OF CARE
Problem: Alteration in Thoughts and Perception  Goal: Verbalize thoughts and feelings  Description  Interventions:  - Promote a nonjudgmental and trusting relationship with the patient through active listening and therapeutic communication  - Assess patient's level of functioning, behavior and potential for risk  - Engage patient in 1 on 1 interactions  - Encourage patient to express fears, feelings, frustrations, and discuss symptoms    - [place] patient to reality, help patient recognize reality-based thinking   - Administer medications as ordered and assess for potential side effects  - Provide the patient education related to the signs and symptoms of the illness and desired effects of prescribed medications  Outcome: Progressing  Goal: Refrain from acting on delusional thinking/internal stimuli  Description  Interventions:  - Monitor patient closely, per order   - Utilize least restrictive measures   - Set reasonable limits, give positive feedback for acceptable   - Administer medications as ordered and monitor of potential side effects  Outcome: Progressing  Goal: Agree to be compliant with medication regime, as prescribed and report medication side effects  Description  Interventions:  - Offer appropriate PRN medication and supervise ingestion; conduct AIMS, as needed   Outcome: Progressing  Goal: Attend and participate in unit activities, including therapeutic, recreational, and educational groups  Description  Interventions:  -Encourage Visitation and family involvement in care  Outcome: Not Progressing  Goal: Complete daily ADLs, including personal hygiene independently, as able  Description  Interventions:  - Observe, teach, and assist patient with ADLS  - Monitor and promote a balance of rest/activity, with adequate nutrition and elimination   Outcome: Not Progressing     Problem: Ineffective Coping  Goal: Demonstrates healthy coping skills  Outcome: Not Progressing  Goal: Understands least restrictive measures  Description  Interventions:  - Utilize least restrictive behavior  Outcome: Progressing     Problem: GASTROINTESTINAL - ADULT  Goal: Maintains adequate nutritional intake  Description  INTERVENTIONS:  - Monitor percentage of each meal consumed  - Identify factors contributing to decreased intake, treat as appropriate  - Assist with meals as needed  - Monitor I&O, weight, and lab values if indicated  - Obtain nutrition services referral as needed  Outcome: Yanni Ng has been awake, alert, and visible intermittently out in the milieu  Refused 1600 vital signs to checked  Ate 100% supper  Pt continues to be preoccupied with discharge  Disheveled in appearance and has not completed his ADL's today  Dressed in layers  Rests in room at intervals  Listens to music on radio in [facility] with peers at times  Denies any depression, anxiety, si/hi, intent, plan, a/v hallucinations, and has not verbalized any delusions  Pt did not attend evening group but came out for snack after  Compliant with all scheduled meds when called and cooperative with mouth checks  No behavioral issues  Continue to monitor/assess for any changes

## 2020-05-18 NOTE — TREATMENT TEAM
05/18/20 0830   Team Meeting   Meeting Type Daily Rounds   Team Members Present   Team Members Present Physician;Nurse;   Physician Team Member Dr Bryant Contreras Team Member Lita Levy RN   Care Management Team Member EDELMIRA Sarkar     Attended select groups over the weekend  Behaviors were controlled  Working on behavioral plan

## 2020-05-18 NOTE — CASE MANAGEMENT
CM completed 305 to [id number] for a hearing on 5/19/2020  Patient aware and would like to attend  CM will continue to follow and provide services as needed

## 2020-05-19 PROCEDURE — 99232 SBSQ HOSP IP/OBS MODERATE 35: CPT | Performed by: PSYCHIATRY & NEUROLOGY

## 2020-05-19 RX ADMIN — NICOTINE POLACRILEX 2 MG: 2 GUM, CHEWING ORAL at 15:56

## 2020-05-19 RX ADMIN — LEVOTHYROXINE SODIUM 75 MCG: 75 TABLET ORAL at 05:18

## 2020-05-19 RX ADMIN — OLANZAPINE 5 MG: 5 TABLET, FILM COATED ORAL at 20:42

## 2020-05-19 RX ADMIN — CLOZAPINE 200 MG: 200 TABLET ORAL at 16:39

## 2020-05-19 RX ADMIN — OXYBUTYNIN CHLORIDE 5 MG: 5 TABLET, EXTENDED RELEASE ORAL at 10:24

## 2020-05-19 RX ADMIN — PANTOPRAZOLE SODIUM 40 MG: 40 TABLET, DELAYED RELEASE ORAL at 05:18

## 2020-05-19 RX ADMIN — DOCUSATE SODIUM 100 MG: 100 CAPSULE, LIQUID FILLED ORAL at 17:05

## 2020-05-19 RX ADMIN — DEXTROAMPHETAMINE SACCHARATE, AMPHETAMINE ASPARTATE, DEXTROAMPHETAMINE SULFATE AND AMPHETAMINE SULFATE 10 MG: 2.5; 2.5; 2.5; 2.5 TABLET ORAL at 05:18

## 2020-05-19 RX ADMIN — DOCUSATE SODIUM 100 MG: 100 CAPSULE, LIQUID FILLED ORAL at 10:24

## 2020-05-19 RX ADMIN — ATORVASTATIN CALCIUM 10 MG: 10 TABLET, FILM COATED ORAL at 16:39

## 2020-05-19 RX ADMIN — DIVALPROEX SODIUM 1500 MG: 500 TABLET, EXTENDED RELEASE ORAL at 20:42

## 2020-05-19 RX ADMIN — METFORMIN HYDROCHLORIDE 500 MG: 500 TABLET ORAL at 10:24

## 2020-05-19 RX ADMIN — METFORMIN HYDROCHLORIDE 500 MG: 500 TABLET ORAL at 16:39

## 2020-05-19 NOTE — PLAN OF CARE
Problem: Alteration in Thoughts and Perception  Goal: Verbalize thoughts and feelings  Description  Interventions:  - Promote a nonjudgmental and trusting relationship with the patient through active listening and therapeutic communication  - Assess patient's level of functioning, behavior and potential for risk  - Engage patient in 1 on 1 interactions  - Encourage patient to express fears, feelings, frustrations, and discuss symptoms    - [place] patient to reality, help patient recognize reality-based thinking   - Administer medications as ordered and assess for potential side effects  - Provide the patient education related to the signs and symptoms of the illness and desired effects of prescribed medications  Outcome: Progressing  Goal: Refrain from acting on delusional thinking/internal stimuli  Description  Interventions:  - Monitor patient closely, per order   - Utilize least restrictive measures   - Set reasonable limits, give positive feedback for acceptable   - Administer medications as ordered and monitor of potential side effects  Outcome: Progressing  Goal: Agree to be compliant with medication regime, as prescribed and report medication side effects  Description  Interventions:  - Offer appropriate PRN medication and supervise ingestion; conduct AIMS, as needed   Outcome: Progressing  Goal: Attend and participate in unit activities, including therapeutic, recreational, and educational groups  Description  Interventions:  -Encourage Visitation and family involvement in care  Outcome: Progressing  Goal: Complete daily ADLs, including personal hygiene independently, as able  Description  Interventions:  - Observe, teach, and assist patient with ADLS  - Monitor and promote a balance of rest/activity, with adequate nutrition and elimination   Outcome: Not Progressing     Problem: Ineffective Coping  Goal: Demonstrates healthy coping skills  Outcome: Progressing  Goal: Understands least restrictive measures  Description  Interventions:  - Utilize least restrictive behavior  Outcome: Progressing     Problem: GASTROINTESTINAL - ADULT  Goal: Maintains adequate nutritional intake  Description  INTERVENTIONS:  - Monitor percentage of each meal consumed  - Identify factors contributing to decreased intake, treat as appropriate  - Assist with meals as needed  - Monitor I&O, weight, and lab values if indicated  - Obtain nutrition services referral as needed  Outcome: [place] has been awake, alert, and visible intermittently out in the milieu  Ate 100% supper  Pt went out on deck for fresh air with staff and peers  Enjoys listening to music on radio in [employer] with peers  Compliant with all scheduled meds when called and cooperative with mouth checks  Pt remains disheveled, dressed in layers and has not showered  Still preoccupied with discharge  Attended evening group and had snack after  No behavioral issues  Continue to monitor/assess for any changes
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
## 2020-05-19 NOTE — CASE MANAGEMENT
CM, patient, and MD met for patient's 305 hearing with [facility]  Patient was able to express that he did not wish to be here  MD expressed that patient is not eligible for outpatient services as this time due to his lack of insight and noncompliance at times  CM requested that order has "[place] [id number] and/or [facility]  CM will continue to follow and provide services as needed

## 2020-05-19 NOTE — PROGRESS NOTES
05/19/20 1306   Team Meeting   Meeting Type Tx Team Meeting   Initial Conference Date 05/19/20   Next Conference Date 05/26/20   Team Members Present   Team Members Present Physician;;;Nurse; Other (Discipline and Name)   Physician Team Member Dr Olivia Leo Team Member Marcos Ortiz RN   Care Management Team Member EDELMIRA Villanueva   Social Work Team Member Arminda Thurston LCSW/Psychotherapist    Other (Discipline and Name) Janeen Frankel, [facility]; Haley Bonilla, [facility]   Patient/Family Present   Patient Present Yes   Patient's Family Present No     Patient attended treatment team meeting this morning prepared without self-assessment  Patient's   group attendance for last week was 53%  Team and patient completed risk assessment and the patient did not verbalize any desire to elope from the program  Patient verbalized understanding of consequences of eloping from treatment while on a commitment  Patient verbalized no further questions or concerns at the conclusion of the meeting  Next team meeting scheduled for 5/26/2020  Patient being explained process of how and when team can start discharging him  Patient was unhappy with answers and left treatment team  Patient left as MD was explaining why he is still patient, patient continued out of team  Patient remains awaiting bed at [facility]

## 2020-05-19 NOTE — PLAN OF CARE
Problem: Alteration in Thoughts and Perception  Goal: Verbalize thoughts and feelings  Description  Interventions:  - Promote a nonjudgmental and trusting relationship with the patient through active listening and therapeutic communication  - Assess patient's level of functioning, behavior and potential for risk  - Engage patient in 1 on 1 interactions  - Encourage patient to express fears, feelings, frustrations, and discuss symptoms    - [place] patient to reality, help patient recognize reality-based thinking   - Administer medications as ordered and assess for potential side effects  - Provide the patient education related to the signs and symptoms of the illness and desired effects of prescribed medications  5/19/2020 1153 by Zachary Abbott RN  Outcome: Progressing     Problem: Alteration in Thoughts and Perception  Goal: Complete daily ADLs, including personal hygiene independently, as able  Description  Interventions:  - Observe, teach, and assist patient with ADLS  - Monitor and promote a balance of rest/activity, with adequate nutrition and elimination   5/19/2020 1153 by Zachary Abbott RN  Outcome: Not Progressing    Problem: Alteration in Thoughts and Perception  Goal: Attend and participate in unit activities, including therapeutic, recreational, and educational groups  Description  Interventions:  -Encourage Visitation and family involvement in care  5/19/2020 1153 by Zachary Abbott RN  Outcome: Not Progressing     Problem: Alteration in Thoughts and Perception  Goal: Refrain from acting on delusional thinking/internal stimuli  Description  Interventions:  - Monitor patient closely, per order   - Utilize least restrictive measures   - Set reasonable limits, give positive feedback for acceptable   - Administer medications as ordered and monitor of potential side effects  5/19/2020 1153 by Zachary Abbott RN  Outcome: Not Progressing      Problem: Alteration in Thoughts and Perception  Goal: Agree to be compliant with medication regime, as prescribed and report medication side effects  Description  Interventions:  - Offer appropriate PRN medication and supervise ingestion; conduct AIMS, as needed   5/19/2020 1153 by Blanca Mckinney RN  Outcome: Not Progressing     Quiet, cooperative, isolative to room and self  Out for meals  Ate 100% of breakfast and lunch  Multiple prompts and refusals for taking AM meds  Patient later approached nurses station at [phone] and was compliant with taking of medications at that time  He denies any depression, anxiety, si and hi  No c/o pain  He continues preoccupied with "getting out of here"  Did not attend group  Maintained on patient safety precautions w/o incident    Will continue to monitor progress in recovery program

## 2020-05-19 NOTE — PROGRESS NOTES
05/19/20 0857   Team Meeting   Meeting Type Daily Rounds   Team Members Present   Team Members Present Physician;Nurse;; Other (Discipline and Name)   Physician Team Member Dr Eyal Guerrero Team Member Erica Castellanos RN   Care Management Team Member EDELMIRA Wolf   Other (Discipline and Name) Murray Edwards LCSW     Preoccupied, select groups, and slept  305 hearing held today, 180 days granted

## 2020-05-20 PROCEDURE — 99232 SBSQ HOSP IP/OBS MODERATE 35: CPT | Performed by: PSYCHIATRY & NEUROLOGY

## 2020-05-20 RX ADMIN — OLANZAPINE 5 MG: 5 TABLET, FILM COATED ORAL at 20:46

## 2020-05-20 RX ADMIN — DEXTROAMPHETAMINE SACCHARATE, AMPHETAMINE ASPARTATE, DEXTROAMPHETAMINE SULFATE AND AMPHETAMINE SULFATE 10 MG: 2.5; 2.5; 2.5; 2.5 TABLET ORAL at 05:41

## 2020-05-20 RX ADMIN — PANTOPRAZOLE SODIUM 40 MG: 40 TABLET, DELAYED RELEASE ORAL at 05:41

## 2020-05-20 RX ADMIN — LEVOTHYROXINE SODIUM 75 MCG: 75 TABLET ORAL at 05:41

## 2020-05-20 RX ADMIN — METFORMIN HYDROCHLORIDE 500 MG: 500 TABLET ORAL at 16:41

## 2020-05-20 RX ADMIN — CLOZAPINE 200 MG: 200 TABLET ORAL at 16:41

## 2020-05-20 RX ADMIN — METFORMIN HYDROCHLORIDE 500 MG: 500 TABLET ORAL at 08:20

## 2020-05-20 RX ADMIN — DIVALPROEX SODIUM 1500 MG: 500 TABLET, EXTENDED RELEASE ORAL at 20:46

## 2020-05-20 RX ADMIN — ATORVASTATIN CALCIUM 10 MG: 10 TABLET, FILM COATED ORAL at 16:41

## 2020-05-20 RX ADMIN — OXYBUTYNIN CHLORIDE 5 MG: 5 TABLET, EXTENDED RELEASE ORAL at 08:20

## 2020-05-20 RX ADMIN — DOCUSATE SODIUM 100 MG: 100 CAPSULE, LIQUID FILLED ORAL at 17:07

## 2020-05-20 RX ADMIN — DOCUSATE SODIUM 100 MG: 100 CAPSULE, LIQUID FILLED ORAL at 08:20

## 2020-05-20 RX ADMIN — NICOTINE POLACRILEX 2 MG: 2 GUM, CHEWING ORAL at 09:37

## 2020-05-20 NOTE — PLAN OF CARE
Problem: Alteration in Thoughts and Perception  Goal: Verbalize thoughts and feelings  Description  Interventions:  - Promote a nonjudgmental and trusting relationship with the patient through active listening and therapeutic communication  - Assess patient's level of functioning, behavior and potential for risk  - Engage patient in 1 on 1 interactions  - Encourage patient to express fears, feelings, frustrations, and discuss symptoms    - [place] patient to reality, help patient recognize reality-based thinking   - Administer medications as ordered and assess for potential side effects  - Provide the patient education related to the signs and symptoms of the illness and desired effects of prescribed medications  Outcome: Progressing  Goal: Refrain from acting on delusional thinking/internal stimuli  Description  Interventions:  - Monitor patient closely, per order   - Utilize least restrictive measures   - Set reasonable limits, give positive feedback for acceptable   - Administer medications as ordered and monitor of potential side effects  Outcome: Progressing  Goal: Agree to be compliant with medication regime, as prescribed and report medication side effects  Description  Interventions:  - Offer appropriate PRN medication and supervise ingestion; conduct AIMS, as needed   Outcome: Progressing     Problem: Ineffective Coping  Goal: Demonstrates healthy coping skills  Outcome: Progressing  Goal: Understands least restrictive measures  Description  Interventions:  - Utilize least restrictive behavior  Outcome: Progressing     Problem: GASTROINTESTINAL - ADULT  Goal: Maintains adequate nutritional intake  Description  INTERVENTIONS:  - Monitor percentage of each meal consumed  - Identify factors contributing to decreased intake, treat as appropriate  - Assist with meals as needed  - Monitor I&O, weight, and lab values if indicated  - Obtain nutrition services referral as needed  Outcome: Larry Khalil has been awake, alert, and visible intermittently out in the milieu  Enjoys listening to music on radio in [facility] with peers  Rests in room at intervals  Ate 100% supper  No behavioral issues  Compliant with scheduled meds when called  Remains disheveled in appearance and dressed in layers  Continues to be preoccupied with discharge  Did not attend evening group but had snack after  Will monitor/assess for any changes

## 2020-05-20 NOTE — PROGRESS NOTES
05/19/20 1430   Activity/Group Checklist   Group Other (Comment)  (Recovery Workshop)   Attendance Did not attend     Patient was encouraged to attend group, but declined  In hallway when prompted
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
## 2020-05-20 NOTE — PROGRESS NOTES
05/20/20 1100   Activity/Group Checklist   Group Other (Comment)  (IMR - Authenticity)   Attendance Attended   Attendance Duration (min) 16-30   Interactions Disorganized interaction   Affect/Mood Appropriate   Goals Achieved Able to listen to others; Able to engage in interactions; Able to recieve feedback

## 2020-05-20 NOTE — PLAN OF CARE
Problem: Alteration in Thoughts and Perception  Goal: Treatment Goal: Gain control of psychotic behaviors/thinking, reduce/eliminate presenting symptoms and demonstrate improved reality functioning upon discharge  Outcome: Progressing  Goal: Verbalize thoughts and feelings  Description  Interventions:  - Promote a nonjudgmental and trusting relationship with the patient through active listening and therapeutic communication  - Assess patient's level of functioning, behavior and potential for risk  - Engage patient in 1 on 1 interactions  - Encourage patient to express fears, feelings, frustrations, and discuss symptoms    - [place] patient to reality, help patient recognize reality-based thinking   - Administer medications as ordered and assess for potential side effects  - Provide the patient education related to the signs and symptoms of the illness and desired effects of prescribed medications  Outcome: Progressing  Goal: Refrain from acting on delusional thinking/internal stimuli  Description  Interventions:  - Monitor patient closely, per order   - Utilize least restrictive measures   - Set reasonable limits, give positive feedback for acceptable   - Administer medications as ordered and monitor of potential side effects  Outcome: Progressing  Goal: Agree to be compliant with medication regime, as prescribed and report medication side effects  Description  Interventions:  - Offer appropriate PRN medication and supervise ingestion; conduct AIMS, as needed   Outcome: Progressing  Goal: Recognize dysfunctional thoughts, communicate reality-based thoughts at the time of discharge  Description  Interventions:  - Provide medication and psycho-education to assist patient in compliance and developing insight into his/her illness   Outcome: Progressing  Goal: Complete daily ADLs, including personal hygiene independently, as able  Description  Interventions:  - Observe, teach, and assist patient with ADLS  - Monitor and promote a balance of rest/activity, with adequate nutrition and elimination   Outcome: Progressing     Problem: Ineffective Coping  Goal: Patient/Family verbalizes awareness of resources  Outcome: Progressing  Goal: Understands least restrictive measures  Description  Interventions:  - Utilize least restrictive behavior  Outcome: Progressing     Problem: GASTROINTESTINAL - ADULT  Goal: Minimal or absence of nausea and/or vomiting  Description  INTERVENTIONS:  - Administer IV fluids if ordered to ensure adequate hydration  - Maintain NPO status until nausea and vomiting are resolved  - Nasogastric tube if ordered  - Administer ordered antiemetic medications as needed  - Provide nonpharmacologic comfort measures as appropriate  - Advance diet as tolerated, if ordered  - Consider nutrition services referral to assist patient with adequate nutrition and appropriate food choices  Outcome: Progressing  Goal: Maintains adequate nutritional intake  Description  INTERVENTIONS:  - Monitor percentage of each meal consumed  - Identify factors contributing to decreased intake, treat as appropriate  - Assist with meals as needed  - Monitor I&O, weight, and lab values if indicated  - Obtain nutrition services referral as needed  Outcome: Progressing     Problem: Alteration in Thoughts and Perception  Goal: Attend and participate in unit activities, including therapeutic, recreational, and educational groups  Description  Interventions:  -Encourage Visitation and family involvement in care  Outcome: Not Progressing   Visible in the milieu, got up for vitals this morning, came for his meds without prompting, ate 100% of both meals  Disheveled even though reportedly showered last night, however did brush his teeth  Remains focused on discharge, pointing out that other's are talking about being discharged soon and continues to lack insight and understanding into his illness or what he needs to do to be able to be discharged   Needs constant reminders and prompting from staff as to what he needs to do  Attempted groups, but too impulsive/restless/distracted to stay for more than the first few minutes  Requested PRN nicotine was given at 0937  No other behaviors or issues noted  Continue to monitor

## 2020-05-20 NOTE — PROGRESS NOTES
05/19/20 1100   Activity/Group Checklist   Group Other (Comment)  ([employer])   Attendance Did not attend     Patient was encouraged to attend group, but declined  He was walking the hallway when prompted

## 2020-05-20 NOTE — TREATMENT TEAM
Co-occurring group 5-20  1:00    Patient was present and remained the entire group  He remained the entire group  He was not following the  Topic of the group     He had poor boundaries when verbalized his disorganized thought pattern off thing in his past

## 2020-05-20 NOTE — PLAN OF CARE
Problem: Alteration in Thoughts and Perception  Goal: Verbalize thoughts and feelings  Description  Interventions:  - Promote a nonjudgmental and trusting relationship with the patient through active listening and therapeutic communication  - Assess patient's level of functioning, behavior and potential for risk  - Engage patient in 1 on 1 interactions  - Encourage patient to express fears, feelings, frustrations, and discuss symptoms    - [place] patient to reality, help patient recognize reality-based thinking   - Administer medications as ordered and assess for potential side effects  - Provide the patient education related to the signs and symptoms of the illness and desired effects of prescribed medications  Outcome: Progressing  Goal: Agree to be compliant with medication regime, as prescribed and report medication side effects  Description  Interventions:  - Offer appropriate PRN medication and supervise ingestion; conduct AIMS, as needed   Outcome: Progressing  Goal: Complete daily ADLs, including personal hygiene independently, as able  Description  Interventions:  - Observe, teach, and assist patient with ADLS  - Monitor and promote a balance of rest/activity, with adequate nutrition and elimination   Outcome: Progressing     Problem: Alteration in Thoughts and Perception  Goal: Verbalize thoughts and feelings  Description  Interventions:  - Promote a nonjudgmental and trusting relationship with the patient through active listening and therapeutic communication  - Assess patient's level of functioning, behavior and potential for risk  - Engage patient in 1 on 1 interactions  - Encourage patient to express fears, feelings, frustrations, and discuss symptoms    - [place] patient to reality, help patient recognize reality-based thinking   - Administer medications as ordered and assess for potential side effects  - Provide the patient education related to the signs and symptoms of the illness and desired effects of prescribed medications  Outcome: Progressing  Goal: Agree to be compliant with medication regime, as prescribed and report medication side effects  Description  Interventions:  - Offer appropriate PRN medication and supervise ingestion; conduct AIMS, as needed   Outcome: Progressing  Goal: Complete daily ADLs, including personal hygiene independently, as able  Description  Interventions:  - Observe, teach, and assist patient with ADLS  - Monitor and promote a balance of rest/activity, with adequate nutrition and elimination   Outcome: Progressing     Ate 100% of dinner and had a snack before bed, attended groups, gait steady denied pain, showered this evening but appearance remains disheveled, dressed in layers, compliant with medications and mouth checks, will continue to monitor

## 2020-05-20 NOTE — PROGRESS NOTES
05/20/20 0800   Team Meeting   Meeting Type Daily Rounds   Team Members Present   Team Members Present Physician;Nurse; Other (Discipline and Name)   Physician Team Member Dr Zane Elizabeth Team Member Tami Fish RN   Other (Discipline and Name) Timothy Modi [facility]     Groups 3-11  Attempts to hide Adderall but does eventually take it  Slammed the door last evening when he was "not getting his way" regarding belongings

## 2020-05-21 LAB
ATRIAL RATE: 100 BPM
P AXIS: 17 DEGREES
PR INTERVAL: 168 MS
QRS AXIS: -30 DEGREES
QRSD INTERVAL: 98 MS
QT INTERVAL: 334 MS
QTC INTERVAL: 430 MS
T WAVE AXIS: 6 DEGREES
VENTRICULAR RATE: 100 BPM

## 2020-05-21 PROCEDURE — 99232 SBSQ HOSP IP/OBS MODERATE 35: CPT | Performed by: PSYCHIATRY & NEUROLOGY

## 2020-05-21 PROCEDURE — 93005 ELECTROCARDIOGRAM TRACING: CPT

## 2020-05-21 PROCEDURE — 93010 ELECTROCARDIOGRAM REPORT: CPT | Performed by: INTERNAL MEDICINE

## 2020-05-21 RX ADMIN — METFORMIN HYDROCHLORIDE 500 MG: 500 TABLET ORAL at 08:42

## 2020-05-21 RX ADMIN — OXYBUTYNIN CHLORIDE 5 MG: 5 TABLET, EXTENDED RELEASE ORAL at 08:42

## 2020-05-21 RX ADMIN — CLOZAPINE 200 MG: 200 TABLET ORAL at 16:33

## 2020-05-21 RX ADMIN — DOCUSATE SODIUM 100 MG: 100 CAPSULE, LIQUID FILLED ORAL at 17:08

## 2020-05-21 RX ADMIN — METFORMIN HYDROCHLORIDE 500 MG: 500 TABLET ORAL at 16:33

## 2020-05-21 RX ADMIN — OLANZAPINE 5 MG: 5 TABLET, FILM COATED ORAL at 21:14

## 2020-05-21 RX ADMIN — DOCUSATE SODIUM 100 MG: 100 CAPSULE, LIQUID FILLED ORAL at 08:42

## 2020-05-21 RX ADMIN — ATORVASTATIN CALCIUM 10 MG: 10 TABLET, FILM COATED ORAL at 16:33

## 2020-05-21 RX ADMIN — DIVALPROEX SODIUM 1500 MG: 500 TABLET, EXTENDED RELEASE ORAL at 21:14

## 2020-05-21 RX ADMIN — PANTOPRAZOLE SODIUM 40 MG: 40 TABLET, DELAYED RELEASE ORAL at 05:32

## 2020-05-21 RX ADMIN — LEVOTHYROXINE SODIUM 75 MCG: 75 TABLET ORAL at 05:32

## 2020-05-21 RX ADMIN — DEXTROAMPHETAMINE SACCHARATE, AMPHETAMINE ASPARTATE, DEXTROAMPHETAMINE SULFATE AND AMPHETAMINE SULFATE 10 MG: 2.5; 2.5; 2.5; 2.5 TABLET ORAL at 05:32

## 2020-05-21 NOTE — PROGRESS NOTES
05/21/20 1100   Activity/Group Checklist   Group Other (Comment)  ([facility] - Making History)   Attendance Attended   Attendance Duration (min) 46-60   Interactions Interacted appropriately   Affect/Mood Appropriate   Goals Achieved Able to listen to others; Able to engage in interactions; Able to manage/cope with feelings; Identified feelings; Identified triggers; Discussed discharge plans; Able to recieve feedback; Able to self-disclose     Patient attended [facility] and stayed for the entire time  Patient was cooperative and respectful, needed to be redirected for bringing up [employer] only two times  Impulses controlled after redirection  Patient was alert and engaged the entire group
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
## 2020-05-21 NOTE — PROGRESS NOTES
05/21/20 1044   Team Meeting   Meeting Type Daily Rounds   Team Members Present   Team Members Present Physician;;Nurse;   Physician Team Member Dr Lorri Wagner, RN   Care Management Team Member Beth Lambert   Social Work Team Member Colette Alberto [facility]     Groups, no issues  Disheveled, pill under bed

## 2020-05-21 NOTE — PLAN OF CARE
Problem: Alteration in Thoughts and Perception  Goal: Verbalize thoughts and feelings  Description  Interventions:  - Promote a nonjudgmental and trusting relationship with the patient through active listening and therapeutic communication  - Assess patient's level of functioning, behavior and potential for risk  - Engage patient in 1 on 1 interactions  - Encourage patient to express fears, feelings, frustrations, and discuss symptoms    - [place] patient to reality, help patient recognize reality-based thinking   - Administer medications as ordered and assess for potential side effects  - Provide the patient education related to the signs and symptoms of the illness and desired effects of prescribed medications  Outcome: Progressing  Goal: Agree to be compliant with medication regime, as prescribed and report medication side effects  Description  Interventions:  - Offer appropriate PRN medication and supervise ingestion; conduct AIMS, as needed   Outcome: Progressing  Goal: Attend and participate in unit activities, including therapeutic, recreational, and educational groups  Description  Interventions:  -Encourage Visitation and family involvement in care  Outcome: Progressing     Problem: GASTROINTESTINAL - ADULT  Goal: Maintains adequate nutritional intake  Description  INTERVENTIONS:  - Monitor percentage of each meal consumed  - Identify factors contributing to decreased intake, treat as appropriate  - Assist with meals as needed  - Monitor I&O, weight, and lab values if indicated  - Obtain nutrition services referral as needed  Outcome: Progressing     Problem: Alteration in Thoughts and Perception  Goal: Treatment Goal: Gain control of psychotic behaviors/thinking, reduce/eliminate presenting symptoms and demonstrate improved reality functioning upon discharge  Outcome: Not Progressing  Goal: Refrain from acting on delusional thinking/internal stimuli  Description  Interventions:  - Monitor patient closely, per order   - Utilize least restrictive measures   - Set reasonable limits, give positive feedback for acceptable   - Administer medications as ordered and monitor of potential side effects  Outcome: Not Progressing  Goal: Recognize dysfunctional thoughts, communicate reality-based thoughts at the time of discharge  Description  Interventions:  - Provide medication and psycho-education to assist patient in compliance and developing insight into his/her illness   Outcome: Not Progressing  Goal: Complete daily ADLs, including personal hygiene independently, as able  Description  Interventions:  - Observe, teach, and assist patient with ADLS  - Monitor and promote a balance of rest/activity, with adequate nutrition and elimination   Outcome: Not Progressing     Problem: Ineffective Coping  Goal: Identifies ineffective coping skills  Outcome: Not Progressing  Goal: Identifies healthy coping skills  Outcome: Not Progressing  Goal: Demonstrates healthy coping skills  Outcome: Not Progressing  Visible in the milieu, up for vitals, came for his meds without prompting, attended IMR group  Ate 100% of both meals  Disheveled in appearance, continues to wear multiple layers of clothing  Remains focused on discharge  No insight into illness or recovery process  Needs constant reminders, prompting, and reassurance from staff to be compliant with programming  Easily frustrated, continues to focus on peers' progress and complain about being "here too long"  Initially refused to allow staff to obtain EKG but then did comply after much reassurance was given  No other behaviors or issues noted  Continue to monitor

## 2020-05-22 PROCEDURE — 99232 SBSQ HOSP IP/OBS MODERATE 35: CPT | Performed by: PSYCHIATRY & NEUROLOGY

## 2020-05-22 RX ADMIN — OXYBUTYNIN CHLORIDE 5 MG: 5 TABLET, EXTENDED RELEASE ORAL at 08:28

## 2020-05-22 RX ADMIN — PANTOPRAZOLE SODIUM 40 MG: 40 TABLET, DELAYED RELEASE ORAL at 06:01

## 2020-05-22 RX ADMIN — DIVALPROEX SODIUM 1500 MG: 500 TABLET, EXTENDED RELEASE ORAL at 21:02

## 2020-05-22 RX ADMIN — CLOZAPINE 200 MG: 200 TABLET ORAL at 17:08

## 2020-05-22 RX ADMIN — DOCUSATE SODIUM 100 MG: 100 CAPSULE, LIQUID FILLED ORAL at 08:28

## 2020-05-22 RX ADMIN — DEXTROAMPHETAMINE SACCHARATE, AMPHETAMINE ASPARTATE, DEXTROAMPHETAMINE SULFATE AND AMPHETAMINE SULFATE 10 MG: 2.5; 2.5; 2.5; 2.5 TABLET ORAL at 06:01

## 2020-05-22 RX ADMIN — ATORVASTATIN CALCIUM 10 MG: 10 TABLET, FILM COATED ORAL at 17:08

## 2020-05-22 RX ADMIN — LEVOTHYROXINE SODIUM 75 MCG: 75 TABLET ORAL at 06:01

## 2020-05-22 RX ADMIN — OLANZAPINE 5 MG: 5 TABLET, FILM COATED ORAL at 21:02

## 2020-05-22 RX ADMIN — METFORMIN HYDROCHLORIDE 500 MG: 500 TABLET ORAL at 08:28

## 2020-05-22 RX ADMIN — ACETAMINOPHEN 325 MG: 325 TABLET ORAL at 10:38

## 2020-05-22 RX ADMIN — METFORMIN HYDROCHLORIDE 500 MG: 500 TABLET ORAL at 17:08

## 2020-05-22 RX ADMIN — DOCUSATE SODIUM 100 MG: 100 CAPSULE, LIQUID FILLED ORAL at 17:08

## 2020-05-22 NOTE — PLAN OF CARE
Problem: Alteration in Thoughts and Perception  Goal: Verbalize thoughts and feelings  Description  Interventions:  - Promote a nonjudgmental and trusting relationship with the patient through active listening and therapeutic communication  - Assess patient's level of functioning, behavior and potential for risk  - Engage patient in 1 on 1 interactions  - Encourage patient to express fears, feelings, frustrations, and discuss symptoms    - [place] patient to reality, help patient recognize reality-based thinking   - Administer medications as ordered and assess for potential side effects  - Provide the patient education related to the signs and symptoms of the illness and desired effects of prescribed medications  Outcome: Progressing  Goal: Refrain from acting on delusional thinking/internal stimuli  Description  Interventions:  - Monitor patient closely, per order   - Utilize least restrictive measures   - Set reasonable limits, give positive feedback for acceptable   - Administer medications as ordered and monitor of potential side effects  Outcome: Progressing  Goal: Agree to be compliant with medication regime, as prescribed and report medication side effects  Description  Interventions:  - Offer appropriate PRN medication and supervise ingestion; conduct AIMS, as needed   Outcome: Progressing  Goal: Attend and participate in unit activities, including therapeutic, recreational, and educational groups  Description  Interventions:  -Encourage Visitation and family involvement in care  Outcome: Progressing  Goal: Complete daily ADLs, including personal hygiene independently, as able  Description  Interventions:  - Observe, teach, and assist patient with ADLS  - Monitor and promote a balance of rest/activity, with adequate nutrition and elimination   Outcome: Progressing     Problem: Ineffective Coping  Goal: Demonstrates healthy coping skills  Outcome: Progressing  Goal: Understands least restrictive measures  Description  Interventions:  - Utilize least restrictive behavior  Outcome: Progressing     Problem: GASTROINTESTINAL - ADULT  Goal: Maintains adequate nutritional intake  Description  INTERVENTIONS:  - Monitor percentage of each meal consumed  - Identify factors contributing to decreased intake, treat as appropriate  - Assist with meals as needed  - Monitor I&O, weight, and lab values if indicated  - Obtain nutrition services referral as needed  Outcome: Zeferino Hatch has been awake, alert, and visible intermittently out in the milieu  Remains preoccupied with discharge  Completed daily ADL's and showered this shift  Ate 100% supper  Pt did not attend evening group but came out for snack after  No behavioral issues  Continue to monitor/assess for any changes

## 2020-05-22 NOTE — PROGRESS NOTES
05/22/20 1100   Activity/Group Checklist   Group Other (Comment)  (IMR - [employer]rec[employer])   Attendance Attended   Attendance Duration (min) 46-60   Interactions Interacted appropriately   Affect/Mood Appropriate   Goals Achieved Able to listen to others; Able to engage in interactions; Able to manage/cope with feelings

## 2020-05-22 NOTE — PROGRESS NOTES
05/22/20 0900   Activity/Group Checklist   Group Community meeting  ([facility] Walk)   Attendance Attended   Attendance Duration (min) 16-30   Interactions Interacted appropriately   Affect/Mood Appropriate;Bright   Goals Achieved Able to listen to others; Able to engage in interactions

## 2020-05-22 NOTE — PROGRESS NOTES
05/22/20 0959   Team Meeting   Meeting Type Daily Rounds   Team Members Present   Team Members Present Physician;;Nurse;   Physician Team Member Dr Sharita Brown, RN   Care Management Team Member EDELMIRA Wren   Social Work Team Member Leticia Lloyd, RACHELEW     Abnormal EKG but not changes since last  Easily frustrated, disheveled

## 2020-05-22 NOTE — TREATMENT TEAM
Pt did not attend group when promrpted or offered       05/22/20 1500   Activity/Group Checklist   Group Other (Comment)  (relaxation and goal setting)   Attendance Did not attend

## 2020-05-22 NOTE — PLAN OF CARE
Problem: Alteration in Thoughts and Perception  Goal: Verbalize thoughts and feelings  Description  Interventions:  - Promote a nonjudgmental and trusting relationship with the patient through active listening and therapeutic communication  - Assess patient's level of functioning, behavior and potential for risk  - Engage patient in 1 on 1 interactions  - Encourage patient to express fears, feelings, frustrations, and discuss symptoms    - [place] patient to reality, help patient recognize reality-based thinking   - Administer medications as ordered and assess for potential side effects  - Provide the patient education related to the signs and symptoms of the illness and desired effects of prescribed medications  Outcome: Progressing  Goal: Agree to be compliant with medication regime, as prescribed and report medication side effects  Description  Interventions:  - Offer appropriate PRN medication and supervise ingestion; conduct AIMS, as needed   Outcome: Progressing  Goal: Attend and participate in unit activities, including therapeutic, recreational, and educational groups  Description  Interventions:  -Encourage Visitation and family involvement in care  Outcome: Progressing     Problem: GASTROINTESTINAL - ADULT  Goal: Minimal or absence of nausea and/or vomiting  Description  INTERVENTIONS:  - Administer IV fluids if ordered to ensure adequate hydration  - Maintain NPO status until nausea and vomiting are resolved  - Nasogastric tube if ordered  - Administer ordered antiemetic medications as needed  - Provide nonpharmacologic comfort measures as appropriate  - Advance diet as tolerated, if ordered  - Consider nutrition services referral to assist patient with adequate nutrition and appropriate food choices  Outcome: Progressing  Goal: Maintains adequate nutritional intake  Description  INTERVENTIONS:  - Monitor percentage of each meal consumed  - Identify factors contributing to decreased intake, treat as appropriate  - Assist with meals as needed  - Monitor I&O, weight, and lab values if indicated  - Obtain nutrition services referral as needed  Outcome: Progressing     Problem: Alteration in Thoughts and Perception  Goal: Treatment Goal: Gain control of psychotic behaviors/thinking, reduce/eliminate presenting symptoms and demonstrate improved reality functioning upon discharge  Outcome: Not Progressing  Goal: Refrain from acting on delusional thinking/internal stimuli  Description  Interventions:  - Monitor patient closely, per order   - Utilize least restrictive measures   - Set reasonable limits, give positive feedback for acceptable   - Administer medications as ordered and monitor of potential side effects  Outcome: Not Progressing  Goal: Recognize dysfunctional thoughts, communicate reality-based thoughts at the time of discharge  Description  Interventions:  - Provide medication and psycho-education to assist patient in compliance and developing insight into his/her illness   Outcome: Not Progressing  Goal: Complete daily ADLs, including personal hygiene independently, as able  Description  Interventions:  - Observe, teach, and assist patient with ADLS  - Monitor and promote a balance of rest/activity, with adequate nutrition and elimination   Outcome: Not Progressing   Visible in the milieu, compliant with vitals, meds, and meals  Attended morning walk, jounaling, IMR, and fresh air groups  Ate 100% of both meals  Disheveled in appearance, continues to wear multiple layers of clothing, did not shower or do hygiene  Remains focused on discharge  No insight into illness or recovery process  Needs constant reminders, prompting, and reassurance from staff to be compliant with programming  PRN Tylenol 325mg PO was given at 1038 for 2/10 right foot pain and upon follow up was effective in eliminating the pain  Noticed a pattern of the patient consistently c/o of pain in his feet so podiatry was consulted   When podiatrist asked patient to remove shoes and socks, patient started rambling, saying "no, no, I don't want it" then jumped up, and ran out of the room  Attempted to reassure and redirect the patient, but with no success  No other behaviors or issues noted  Continue to monitor
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
## 2020-05-22 NOTE — PROGRESS NOTES
Progress Note - Behavioral Health     Gregory Fernandez 48 y o  male MRN: [id number]   Unit/Bed#: [facility] 909-14 Encounter: [id number]    Per Nursing: Nursing reports that patient refuses to take his Adderall, Synthroid and Protonix, but he does not provide any reasoning behind his refusal and non-compliance  He has been isolative to himself in his room for the majority of the time, and when he interacts with staff, he remains fixated on discharge  Per Patient: Patient states that he is tired  He states that his mood is good  Cathlean Zay He denies any anxiety or depression  He states, "I don't know what they're keeping me for " He denies any thoughts of wanting to harm himself  He denies any active or passive suicidal ideation, intent or plan  He denies any auditory or visual hallucinations  He states that he does not want to talk to staff today because he knows that he will just end up complaining to them about wanting to be discharged  As a result, he plans to stay in his room for the day  He states that he just wants to rest         Behavior over the last 24 hours: unchanged  Sleep: normal  Appetite: normal  Medication side effects: No   ROS: no complaints, all other systems are negative  Any positives in the Comprehensive Review of Systems were noted in the HPI  All other Review of Systems were negative          Mental Status Evaluation:    Appearance:  disheveled   Behavior:  guarded, evasive, laying in bed   Speech:  soft   Mood:  irritable edge   Affect:  constricted, reactive and can be labile   Thought Process:  perseverative, concrete   Associations: perseveration, concrete associations   Thought Content:  no overt delusions   Perceptual Disturbances: denies auditory hallucinations when asked   Risk Potential: Suicidal ideation - None at present  Homicidal ideation - None at present  Potential for aggression - Not at present   Sensorium:  oriented to person, place and time/date   Memory:  recent and remote memory grossly intact   Consciousness:  alert and awake   Attention: attention span and concentration are age appropriate   Insight:  limited   Judgment: limited   Gait/Station: in bed   Motor Activity: no abnormal movements     Vital signs in last 24 hours:  Vitals:    05/23/20 1625   Pulse: 105   Resp: 20   SpO2:          Laboratory results:   I have personally reviewed all pertinent laboratory/tests results    Most Recent Labs:   Lab Results   Component Value Date    WBC 9 50 05/14/2020    WBC 9 50 05/14/2020    RBC 4 69 05/14/2020    RBC 4 69 05/14/2020    HGB 14 0 05/14/2020    HGB 14 0 05/14/2020    HCT 40 0 (L) 05/14/2020    HCT 40 0 (L) 05/14/2020     05/14/2020     05/14/2020    RDW 13 4 05/14/2020    RDW 13 4 05/14/2020    NEUTROABS 4 47 05/14/2020    SODIUM 133 (L) 05/14/2020    K 4 3 05/14/2020    CL 98 05/14/2020    CO2 26 05/14/2020    BUN 10 05/14/2020    CREATININE 0 58 (L) 05/14/2020    GLUC 121 (H) 05/14/2020    GLUF 121 (H) 05/14/2020    CALCIUM 8 8 05/14/2020    AST 28 05/14/2020    ALT 23 05/14/2020    ALKPHOS 69 05/14/2020    TP 6 7 05/14/2020    ALB 3 9 05/14/2020    TBILI 0 30 05/14/2020    CHOLESTEROL 102 04/13/2020    HDL 26 (L) 04/13/2020    TRIG 172 (H) 04/13/2020    LDLCALC 42 04/13/2020    [facility] 76 04/13/2020    VALPROICTOT 65 1 05/14/2020    LITHIUM 0 6 03/27/2014    AMMONIA 38 (H) 05/03/2020    DPP1YHDGMTQY 3 810 12/24/2019    FREET4 0 93 08/23/2019    HGBA1C 6 1 12/03/2019     12/03/2019       Progress Toward Goals: progressing    Assessment/Plan   Principal Problem:    Schizoaffective disorder, bipolar type ([facility] )  Active Problems:    Type 2 diabetes mellitus without complication, without long-term current use of insulin (HCC)    Benign essential hypertension    Gastroesophageal reflux disease without esophagitis    Acquired hypothyroidism    Tobacco abuse    Diabetes insipidus, nephrogenic ([address] )    ADHD    Recommended Treatment:     Planned medication and treatment changes: All current active medications have been reviewed  Encourage group therapy, milieu therapy and occupational therapy  Behavioral Health checks every 7 minutes  Continue current medications:    Current Facility-Administered Medications:  acetaminophen 325 mg Oral Q6H PRN Dragan Man MD   acetaminophen 650 mg Oral Q6H PRN Dragan Man MD   acetaminophen 975 mg Oral Q8H PRN Dragan Man MD   aluminum-magnesium hydroxide-simethicone 30 mL Oral Q4H PRN Dragan Man MD   amphetamine-dextroamphetamine 10 mg Oral Daily Dragan Man MD   atorvastatin 10 mg Oral Daily With Earma Setters, MD   benztropine 1 mg Intramuscular Q6H PRN Dragan Man MD   benztropine 1 mg Oral Q6H PRN Dragan Man MD   cloZAPine 200 mg Oral Daily Dragan Man MD   divalproex sodium 1,500 mg Oral HS Dragan Man MD   docusate sodium 100 mg Oral BID Dragan Man MD   haloperidol 5 mg Oral Q6H PRN Dragan Man MD   haloperidol lactate 5 mg Intramuscular Q6H PRN Dragan Man MD   levothyroxine 75 mcg Oral Early Morning Dragan Man MD   LORazepam 1 mg Intramuscular Q6H PRN Dragan Man MD   LORazepam 1 mg Oral Q6H PRN Dragan Man MD   magnesium hydroxide 30 mL Oral Daily PRN Dragan Man MD   metFORMIN 500 mg Oral BID With Meals Dragan Man MD   nicotine polacrilex 2 mg Oral Q2H PRN Dragan Man MD   OLANZapine 5 mg Oral HS Dragan Man MD   oxybutynin 5 mg Oral Daily Dragan Man MD   pantoprazole 40 mg Oral Early Morning Dragan Man MD   traZODone 50 mg Oral HS PRN Dragan Man MD           Plan:  1) Patient continues to remain preoccupied with discharge planning  He does express today that he does not wish to speak with staff because he knows that he will just complain to them, so he will keep to himself in his room  He continued to refuse morning medications  Will continue to monitor on the unit    2) Continue all current medications as directed:    Adderall IR 10 mg once daily by mouth in the morning   Clozaril 200 mg once daily by mouth   Depakote ER 1500 mg once daily by mouth at bedtime   Zyprexa 5 mg once daily by mouth at bedtime  3) Medical   All medical comorbidities [facility] the [facility]  4) Continue to work with Case Management to determine patient's disposition following discharge  Risks / Benefits of Treatment:    Risks, benefits, and possible side effects of medications explained to patient and patient verbalizes understanding and agreement for treatment  Counseling / Coordination of Care:    Patient's progress reviewed with nursing staff  Medications, treatment progress and treatment plan reviewed with patient      Daysi Bustamante PA-C 05/25/20

## 2020-05-23 RX ADMIN — OXYBUTYNIN CHLORIDE 5 MG: 5 TABLET, EXTENDED RELEASE ORAL at 08:36

## 2020-05-23 RX ADMIN — PANTOPRAZOLE SODIUM 40 MG: 40 TABLET, DELAYED RELEASE ORAL at 05:45

## 2020-05-23 RX ADMIN — NICOTINE POLACRILEX 2 MG: 2 GUM, CHEWING ORAL at 20:34

## 2020-05-23 RX ADMIN — DIVALPROEX SODIUM 1500 MG: 500 TABLET, EXTENDED RELEASE ORAL at 20:33

## 2020-05-23 RX ADMIN — DEXTROAMPHETAMINE SACCHARATE, AMPHETAMINE ASPARTATE, DEXTROAMPHETAMINE SULFATE AND AMPHETAMINE SULFATE 10 MG: 2.5; 2.5; 2.5; 2.5 TABLET ORAL at 05:44

## 2020-05-23 RX ADMIN — CLOZAPINE 200 MG: 200 TABLET ORAL at 16:47

## 2020-05-23 RX ADMIN — METFORMIN HYDROCHLORIDE 500 MG: 500 TABLET ORAL at 08:36

## 2020-05-23 RX ADMIN — DOCUSATE SODIUM 100 MG: 100 CAPSULE, LIQUID FILLED ORAL at 08:36

## 2020-05-23 RX ADMIN — NICOTINE POLACRILEX 2 MG: 2 GUM, CHEWING ORAL at 08:36

## 2020-05-23 RX ADMIN — DOCUSATE SODIUM 100 MG: 100 CAPSULE, LIQUID FILLED ORAL at 17:09

## 2020-05-23 RX ADMIN — OLANZAPINE 5 MG: 5 TABLET, FILM COATED ORAL at 20:33

## 2020-05-23 RX ADMIN — ATORVASTATIN CALCIUM 10 MG: 10 TABLET, FILM COATED ORAL at 16:47

## 2020-05-23 RX ADMIN — METFORMIN HYDROCHLORIDE 500 MG: 500 TABLET ORAL at 16:47

## 2020-05-23 RX ADMIN — LEVOTHYROXINE SODIUM 75 MCG: 75 TABLET ORAL at 05:45

## 2020-05-23 NOTE — PLAN OF CARE
Problem: Alteration in Thoughts and Perception  Goal: Agree to be compliant with medication regime, as prescribed and report medication side effects  Description  Interventions:  - Offer appropriate PRN medication and supervise ingestion; conduct AIMS, as needed   Outcome: Progressing  Goal: Attend and participate in unit activities, including therapeutic, recreational, and educational groups  Description  Interventions:  -Encourage Visitation and family involvement in care  Outcome: Progressing  Goal: Complete daily ADLs, including personal hygiene independently, as able  Description  Interventions:  - Observe, teach, and assist patient with ADLS  - Monitor and promote a balance of rest/activity, with adequate nutrition and elimination   Outcome: Not Progressing     Nicci Manzanares initially refused his 0600 medications but took with them with his 0900 medications  Compliant with medications and meals  Compliant with mouth checks  Remains disheveled in appearance, layered clothing and disorganized during interactions, content focused on discharge and getting more clothing items  Able to be redirected verbally  Social with select peers and staff  Behaviors controlled  Will continue to monitor

## 2020-05-23 NOTE — PLAN OF CARE
Problem: Alteration in Thoughts and Perception  Goal: Verbalize thoughts and feelings  Description  Interventions:  - Promote a nonjudgmental and trusting relationship with the patient through active listening and therapeutic communication  - Assess patient's level of functioning, behavior and potential for risk  - Engage patient in 1 on 1 interactions  - Encourage patient to express fears, feelings, frustrations, and discuss symptoms    - [place] patient to reality, help patient recognize reality-based thinking   - Administer medications as ordered and assess for potential side effects  - Provide the patient education related to the signs and symptoms of the illness and desired effects of prescribed medications  Outcome: Progressing  Goal: Refrain from acting on delusional thinking/internal stimuli  Description  Interventions:  - Monitor patient closely, per order   - Utilize least restrictive measures   - Set reasonable limits, give positive feedback for acceptable   - Administer medications as ordered and monitor of potential side effects  Outcome: Progressing  Goal: Agree to be compliant with medication regime, as prescribed and report medication side effects  Description  Interventions:  - Offer appropriate PRN medication and supervise ingestion; conduct AIMS, as needed   Outcome: Progressing  Goal: Attend and participate in unit activities, including therapeutic, recreational, and educational groups  Description  Interventions:  -Encourage Visitation and family involvement in care  Outcome: Not Progressing     Problem: Ineffective Coping  Goal: Demonstrates healthy coping skills  Outcome: Progressing  Goal: Understands least restrictive measures  Description  Interventions:  - Utilize least restrictive behavior  Outcome: Progressing     Problem: GASTROINTESTINAL - ADULT  Goal: Maintains adequate nutritional intake  Description  INTERVENTIONS:  - Monitor percentage of each meal consumed  - Identify factors contributing to decreased intake, treat as appropriate  - Assist with meals as needed  - Monitor I&O, weight, and lab values if indicated  - Obtain nutrition services referral as needed  Outcome: Michelle Bentley has been awake, alert, and visible intermittently out in the milieu  Ate 100% supper  Remains disheveled in appearance and dressed in layers  Continues to be preoccupied with discharge  Listens to music on radio with peers in [facility] at times  Rests in room at intervals  No behavioral issues  Denies any depression, anxiety, si/hi, intent, plan, a/v hallucinations, and has not verbalized any delusions  Compliant with scheduled meds when called  Pt did not attend evening group but came out for snack after  Continue to monitor/assess for any changes

## 2020-05-23 NOTE — PROGRESS NOTES
Psychiatry Progress Note    Subjective: Interval History     The patient is visible walking around the unit this morning  Appearance is disheveled  He is very fixated on discharge  He reports that he wants to go to [facility]  Patient did sleep through the night  Staff reports he refused morning meds  He has been eating meals  He does have an irritable edge  Patient attends some of the group activities      Behavior over the last 24 hours:  unchanged  Sleep: normal  Appetite: normal  Medication side effects: No  ROS: no complaints    Current medications:    Current Facility-Administered Medications:     acetaminophen (TYLENOL) tablet 325 mg, 325 mg, Oral, Q6H PRN, Jone Damian MD, 325 mg at 05/22/20 1038    acetaminophen (TYLENOL) tablet 650 mg, 650 mg, Oral, Q6H PRN, Jone Damian MD, 650 mg at 05/17/20 1023    acetaminophen (TYLENOL) tablet 975 mg, 975 mg, Oral, Q8H PRN, Jone Damian MD, 975 mg at 03/29/20 1759    aluminum-magnesium hydroxide-simethicone (MYLANTA) 200-200-20 mg/5 mL oral suspension 30 mL, 30 mL, Oral, Q4H PRN, Jone Damian MD, 30 mL at 05/13/20 1042    amphetamine-dextroamphetamine (ADDERALL) tablet 10 mg, 10 mg, Oral, Daily, Jone Damian MD, 10 mg at 05/23/20 0544    atorvastatin (LIPITOR) tablet 10 mg, 10 mg, Oral, Daily With Ovidio Watt MD, 10 mg at 05/22/20 1708    benztropine (COGENTIN) injection 1 mg, 1 mg, Intramuscular, Q6H PRN, Jone Damian MD    benztropine (COGENTIN) tablet 1 mg, 1 mg, Oral, Q6H PRN, Jone Damian MD, 1 mg at 04/07/20 2031    clozapine (CLOZARIL) tablet 200 mg, 200 mg, Oral, Daily, Jone Damian MD, 200 mg at 05/22/20 1708    divalproex sodium (DEPAKOTE ER) 24 hr tablet 1,500 mg, 1,500 mg, Oral, HS, Jone Damian MD, 1,500 mg at 05/22/20 2102    docusate sodium (COLACE) capsule 100 mg, 100 mg, Oral, BID, Jone Damian MD, 100 mg at 05/23/20 0836    haloperidol (HALDOL) tablet 5 mg, 5 mg, Oral, Q6H PRN, Jone Damian MD, 5 mg at 05/05/20 1648    haloperidol lactate (HALDOL) injection 5 mg, 5 mg, Intramuscular, Q6H PRN, Kayleigh Prather MD    levothyroxine tablet 75 mcg, 75 mcg, Oral, Early Morning, Kayleigh Prather MD, 75 mcg at 05/23/20 0545    LORazepam (ATIVAN) 2 mg/mL injection 1 mg, 1 mg, Intramuscular, Q6H PRN, Kayleigh Prather MD    LORazepam (ATIVAN) tablet 1 mg, 1 mg, Oral, Q6H PRN, Kayleigh Prather MD    magnesium hydroxide (MILK OF MAGNESIA) 400 mg/5 mL oral suspension 30 mL, 30 mL, Oral, Daily PRN, Kayleigh Prather MD, 30 mL at 03/14/20 2032    metFORMIN (GLUCOPHAGE) tablet 500 mg, 500 mg, Oral, BID With Meals, Kayleigh Prather MD, 500 mg at 05/23/20 0836    nicotine polacrilex (NICORETTE) gum 2 mg, 2 mg, Oral, Q2H PRN, Kayleigh Prather MD, 2 mg at 05/23/20 0836    OLANZapine (ZyPREXA) tablet 5 mg, 5 mg, Oral, HS, Kayleigh Prather MD, 5 mg at 05/22/20 2102    oxybutynin (DITROPAN-XL) 24 hr tablet 5 mg, 5 mg, Oral, Daily, Kayleigh Prather MD, 5 mg at 05/23/20 0836    pantoprazole (PROTONIX) EC tablet 40 mg, 40 mg, Oral, Early Morning, Kayleigh Prather MD, 40 mg at 05/23/20 0545    traZODone (DESYREL) tablet 50 mg, 50 mg, Oral, HS PRN, Kayleigh Prather MD, 50 mg at 05/10/20 2133    Current Problem List:    Patient Active Problem List   Diagnosis    [facility]    Constipation, chronic    Type 2 diabetes mellitus without complication, without long-term current use of insulin ([facility])    Chronic airway obstruction, not elsewhere classified    Benign essential hypertension    Disorder of lipoprotein and lipid metabolism    Foot callus    Gastroesophageal reflux disease without esophagitis    Acquired hypothyroidism    Morbid obesity ([address] )    BMI 34 0-34 9,adult    Nail abnormality    Encounter for well adult exam with abnormal findings    Tobacco abuse    Diabetes insipidus, nephrogenic ([address] )    ADHD       Problem list reviewed 05/23/20     Objective:     Vital Signs:  Vitals:    05/21/20 0700 05/22/20 0730 05/23/20 0700 05/23/20 0702   BP:  118/77 123/76 119/77   BP Location:  Right arm Right arm Right arm   Pulse:  94 78 84   Resp: 16 17 18    Temp: (!) 97 °F (36 1 °C) (!) 97 1 °F (36 2 °C) (!) 97 2 °F (36 2 °C)    TempSrc:   Temporal    SpO2:       Weight:       Height:             Appearance:  age appropriate, casually dressed and disheveled   Behavior:  demanding at times   Speech:  repetitive   Mood:  irritable   Affect:  labile   Thought Process:  concrete   Thought Content:  normal   Perceptual Disturbances: None   Risk Potential: none   Sensorium:  person, place, situation and time   Cognition:  intact   Consciousness:  alert and awake    Attention: attention span and concentration were age appropriate   Intellect: average   Insight:  limited   Judgment: limited      Motor Activity: no abnormal movements       I/O Past 24 hours:  No intake/output data recorded  No intake/output data recorded  Labs:  Reviewed 05/23/20      Assessment / Plan:     Sch[facility]ffective disorder, [facility] type ([facility] )    Recommended Treatment:      Medication changes:  1) continue current medication regimen  Non-pharmacological treatments  1) Continue with group therapy, milieu therapy and occupational therapy  Safety  1) Safety/communication plan established targeting dynamic risk factors above  2) Risks, benefits, and possible side effects of medications explained to patient and patient verbalizes understanding  Counseling / Coordination of Care    Total floor / unit time spent today 20 minutes  Greater than 50% of total time was spent with the patient and / or family counseling and / or coordination of care  A description of the counseling / coordination of care  Patient's Rights, confidentiality and exceptions to confidentiality, use of automated medical record, [facility] staff access to medical record, and consent to treatment reviewed      Scot Moore PA-C

## 2020-05-24 RX ADMIN — METFORMIN HYDROCHLORIDE 500 MG: 500 TABLET ORAL at 16:51

## 2020-05-24 RX ADMIN — OLANZAPINE 5 MG: 5 TABLET, FILM COATED ORAL at 20:48

## 2020-05-24 RX ADMIN — ATORVASTATIN CALCIUM 10 MG: 10 TABLET, FILM COATED ORAL at 16:51

## 2020-05-24 RX ADMIN — DIVALPROEX SODIUM 1500 MG: 500 TABLET, EXTENDED RELEASE ORAL at 20:48

## 2020-05-24 RX ADMIN — DOCUSATE SODIUM 100 MG: 100 CAPSULE, LIQUID FILLED ORAL at 17:03

## 2020-05-24 RX ADMIN — CLOZAPINE 200 MG: 200 TABLET ORAL at 16:52

## 2020-05-24 NOTE — PLAN OF CARE
Problem: Alteration in Thoughts and Perception  Goal: Refrain from acting on delusional thinking/internal stimuli  Description  Interventions:  - Monitor patient closely, per order   - Utilize least restrictive measures   - Set reasonable limits, give positive feedback for acceptable   - Administer medications as ordered and monitor of potential side effects  Outcome: Not Progressing  Goal: Agree to be compliant with medication regime, as prescribed and report medication side effects  Description  Interventions:  - Offer appropriate PRN medication and supervise ingestion; conduct AIMS, as needed   Outcome: Not Progressing  Goal: Attend and participate in unit activities, including therapeutic, recreational, and educational groups  Description  Interventions:  -Encourage Visitation and family involvement in care  Outcome: Not Shara Bañuelos has been isolative to himself in his room, refused morning vital signs and his morning medications  Disheveled in appearance, wearing multiple layers of clothing and continued to refuse staff interventions when offered  Still remains preoccupied with wanting to be discharged but lacks insight regarding compliance with the program and his recovery  More visible in the afternoon, social with select peers, attended fresh air group  Will continue to monitor

## 2020-05-24 NOTE — PLAN OF CARE
Problem: Alteration in Thoughts and Perception  Goal: Agree to be compliant with medication regime, as prescribed and report medication side effects  Description  Interventions:  - Offer appropriate PRN medication and supervise ingestion; conduct AIMS, as needed   Outcome: Progressing  Goal: Complete daily ADLs, including personal hygiene independently, as able  Description  Interventions:  - Observe, teach, and assist patient with ADLS  - Monitor and promote a balance of rest/activity, with adequate nutrition and elimination   Outcome: Progressing       Visible, cooperative with redirection, showered tonight and did laundry, compliant with routine medications, denied pain, gait steady, attended evening group, ate 100% of dinner and had a snack before bed will continue to monitor

## 2020-05-24 NOTE — PROGRESS NOTES
Psychiatry Progress Note    Subjective: Interval History     The patient is lying in bed this morning  He has an irritable edge  He does not make eye contact  Staff reports he has been irritable this morning and refused morning medications as well as vital signs  No reason was stated  Patient reports he did sleep last night  Staff states he did shower last evening and did laundry  He has been eating his meals  He attended evening group last night  Patient continues to be focused on discharge      Behavior over the last 24 hours:  unchanged  Sleep: normal  Appetite: normal  Medication side effects: No  ROS: no complaints    Current medications:    Current Facility-Administered Medications:     acetaminophen (TYLENOL) tablet 325 mg, 325 mg, Oral, Q6H PRN, David Estrada MD, 325 mg at 05/22/20 1038    acetaminophen (TYLENOL) tablet 650 mg, 650 mg, Oral, Q6H PRN, David Estrada MD, 650 mg at 05/17/20 1023    acetaminophen (TYLENOL) tablet 975 mg, 975 mg, Oral, Q8H PRN, David Estrada MD, 975 mg at 03/29/20 1759    aluminum-magnesium hydroxide-simethicone (MYLANTA) 200-200-20 mg/5 mL oral suspension 30 mL, 30 mL, Oral, Q4H PRN, David Estrada MD, 30 mL at 05/13/20 1042    amphetamine-dextroamphetamine (ADDERALL) tablet 10 mg, 10 mg, Oral, Daily, David Estrada MD, 10 mg at 05/23/20 0544    atorvastatin (LIPITOR) tablet 10 mg, 10 mg, Oral, Daily With Maryann Joy MD, 10 mg at 05/23/20 1647    benztropine (COGENTIN) injection 1 mg, 1 mg, Intramuscular, Q6H PRN, David Estrada MD    benztropine (COGENTIN) tablet 1 mg, 1 mg, Oral, Q6H PRN, David Estrada MD, 1 mg at 04/07/20 2031    clozapine (CLOZARIL) tablet 200 mg, 200 mg, Oral, Daily, David Estrada MD, 200 mg at 05/23/20 1647    divalproex sodium (DEPAKOTE ER) 24 hr tablet 1,500 mg, 1,500 mg, Oral, HS, David Estrada MD, 1,500 mg at 05/23/20 2033    docusate sodium (COLACE) capsule 100 mg, 100 mg, Oral, BID, David Estrada MD, 100 mg at 05/23/20 1709   haloperidol (HALDOL) tablet 5 mg, 5 mg, Oral, Q6H PRN, Lisha Milan MD, 5 mg at 05/05/20 1648    haloperidol lactate (HALDOL) injection 5 mg, 5 mg, Intramuscular, Q6H PRN, Lisha Milan MD    levothyroxine tablet 75 mcg, 75 mcg, Oral, Early Morning, Lisha Milan MD, 75 mcg at 05/23/20 0545    LORazepam (ATIVAN) 2 mg/mL injection 1 mg, 1 mg, Intramuscular, Q6H PRN, Lisha Milan MD    LORazepam (ATIVAN) tablet 1 mg, 1 mg, Oral, Q6H PRN, Lisha Milan MD    magnesium hydroxide (MILK OF MAGNESIA) 400 mg/5 mL oral suspension 30 mL, 30 mL, Oral, Daily PRN, Lisha Milan MD, 30 mL at 03/14/20 2032    metFORMIN (GLUCOPHAGE) tablet 500 mg, 500 mg, Oral, BID With Meals, Lisha Milan MD, 500 mg at 05/23/20 1647    nicotine polacrilex (NICORETTE) gum 2 mg, 2 mg, Oral, Q2H PRN, Lisha Milan MD, 2 mg at 05/23/20 2034    OLANZapine (ZyPREXA) tablet 5 mg, 5 mg, Oral, HS, Lisha Milan MD, 5 mg at 05/23/20 2033    oxybutynin (DITROPAN-XL) 24 hr tablet 5 mg, 5 mg, Oral, Daily, Lisha Milan MD, 5 mg at 05/23/20 0836    pantoprazole (PROTONIX) EC tablet 40 mg, 40 mg, Oral, Early Morning, Lisha Milan MD, 40 mg at 05/23/20 0545    traZODone (DESYREL) tablet 50 mg, 50 mg, Oral, HS PRN, Lisha Milan MD, 50 mg at 05/10/20 2133    Current Problem List:    Patient Active Problem List   Diagnosis    [facility]    Constipation, chronic    Type 2 diabetes mellitus without complication, without long-term current use of insulin ([facility])    Chronic airway obstruction, not elsewhere classified    Benign essential hypertension    Disorder of lipoprotein and lipid metabolism    Foot callus    Gastroesophageal reflux disease without esophagitis    Acquired hypothyroidism    Morbid obesity ([address] )    BMI 34 0-34 9,adult    Nail abnormality    Encounter for well adult exam with abnormal findings    Tobacco abuse    Diabetes insipidus, nephrogenic ([facility] )    ADHD       Problem list reviewed 05/24/20 Objective:     Vital Signs:  Vitals:    05/22/20 0730 05/23/20 0700 05/23/20 0702 05/23/20 1625   BP:  123/76 119/77 142/82   BP Location:  Right arm Right arm Left arm   Pulse: 94 78 84 105   Resp: 17 18 20   Temp: (!) 97 1 °F (36 2 °C) (!) 97 2 °F (36 2 °C)  97 8 °F (36 6 °C)   TempSrc:  Temporal  Temporal   SpO2:       Weight:       Height:             Appearance:  age appropriate, casually dressed and disheveled   Behavior:  demanding at times   Speech:  repetitive   Mood:  irritable   Affect:  labile   Thought Process:  concrete   Thought Content:  normal   Perceptual Disturbances: None   Risk Potential: none   Sensorium:  person, place, situation and time   Cognition:  intact   Consciousness:  alert and awake    Attention: attention span and concentration were age appropriate   Intellect: average   Insight:  limited   Judgment: limited      Motor Activity: no abnormal movements       I/O Past 24 hours:  No intake/output data recorded  No intake/output data recorded  Labs:  Reviewed 05/24/20      Assessment / Plan:     [facility]ffect[facility]    Recommended Treatment:      Medication changes:  1) continue current medication regimen  Non-pharmacological treatments  1) Continue with group therapy, milieu therapy and occupational therapy  Safety  1) Safety/communication plan established targeting dynamic risk factors above  2) Risks, benefits, and possible side effects of medications explained to patient and patient verbalizes understanding  Counseling / Coordination of Care    Total floor / unit time spent today 20 minutes  Greater than 50% of total time was spent with the patient and / or family counseling and / or coordination of care  A description of the counseling / coordination of care       Patient's Rights, confidentiality and exceptions to confidentiality, use of automated medical record, [facility] staff access to medical record, and consent to treatment reviewed      Lyle Diaz PA-C

## 2020-05-25 PROCEDURE — 99232 SBSQ HOSP IP/OBS MODERATE 35: CPT | Performed by: PHYSICIAN ASSISTANT

## 2020-05-25 RX ADMIN — METFORMIN HYDROCHLORIDE 500 MG: 500 TABLET ORAL at 08:33

## 2020-05-25 RX ADMIN — OXYBUTYNIN CHLORIDE 5 MG: 5 TABLET, EXTENDED RELEASE ORAL at 08:34

## 2020-05-25 RX ADMIN — DIVALPROEX SODIUM 1500 MG: 500 TABLET, EXTENDED RELEASE ORAL at 20:57

## 2020-05-25 RX ADMIN — CLOZAPINE 200 MG: 200 TABLET ORAL at 16:55

## 2020-05-25 RX ADMIN — METFORMIN HYDROCHLORIDE 500 MG: 500 TABLET ORAL at 16:54

## 2020-05-25 RX ADMIN — ATORVASTATIN CALCIUM 10 MG: 10 TABLET, FILM COATED ORAL at 16:54

## 2020-05-25 RX ADMIN — OLANZAPINE 5 MG: 5 TABLET, FILM COATED ORAL at 20:57

## 2020-05-25 RX ADMIN — DOCUSATE SODIUM 100 MG: 100 CAPSULE, LIQUID FILLED ORAL at 17:11

## 2020-05-25 RX ADMIN — DOCUSATE SODIUM 100 MG: 100 CAPSULE, LIQUID FILLED ORAL at 08:33

## 2020-05-25 NOTE — PLAN OF CARE
Problem: Alteration in Thoughts and Perception  Goal: Refrain from acting on delusional thinking/internal stimuli  Description  Interventions:  - Monitor patient closely, per order   - Utilize least restrictive measures   - Set reasonable limits, give positive feedback for acceptable   - Administer medications as ordered and monitor of potential side effects  Outcome: Not Progressing  Goal: Agree to be compliant with medication regime, as prescribed and report medication side effects  Description  Interventions:  - Offer appropriate PRN medication and supervise ingestion; conduct AIMS, as needed   Outcome: Progressing  Goal: Complete daily ADLs, including personal hygiene independently, as able  Description  Interventions:  - Observe, teach, and assist patient with ADLS  - Monitor and promote a balance of rest/activity, with adequate nutrition and elimination   Outcome: Not Aquilino Meehan has been isolative to himself in his room, refused morning vital signs and his morning medications  Disheveled in appearance, wearing multiple layers of clothing  Still remains preoccupied with wanting to be discharged which was discussed during interactions with this writer when he was visible  More visible in the afternoon, social with select peers, attended fresh air group  Will continue to monitor

## 2020-05-25 NOTE — PLAN OF CARE
Problem: Alteration in Thoughts and Perception  Goal: Agree to be compliant with medication regime, as prescribed and report medication side effects  Description  Interventions:  - Offer appropriate PRN medication and supervise ingestion; conduct AIMS, as needed   Outcome: Progressing     Problem: Alteration in Thoughts and Perception  Goal: Attend and participate in unit activities, including therapeutic, recreational, and educational groups  Description  Interventions:  -Encourage Visitation and family involvement in care  Outcome: Not Progressing       Visible, cooperative with redirection, compliant with routine medications, denied pain, gait steady, attended evening group, ate 100% of dinner and had a snack before bed will continue to monitor

## 2020-05-26 PROCEDURE — 99232 SBSQ HOSP IP/OBS MODERATE 35: CPT | Performed by: PSYCHIATRY & NEUROLOGY

## 2020-05-26 RX ADMIN — DOCUSATE SODIUM 100 MG: 100 CAPSULE, LIQUID FILLED ORAL at 08:14

## 2020-05-26 RX ADMIN — DOCUSATE SODIUM 100 MG: 100 CAPSULE, LIQUID FILLED ORAL at 17:09

## 2020-05-26 RX ADMIN — ATORVASTATIN CALCIUM 10 MG: 10 TABLET, FILM COATED ORAL at 16:39

## 2020-05-26 RX ADMIN — DEXTROAMPHETAMINE SACCHARATE, AMPHETAMINE ASPARTATE, DEXTROAMPHETAMINE SULFATE AND AMPHETAMINE SULFATE 10 MG: 2.5; 2.5; 2.5; 2.5 TABLET ORAL at 06:16

## 2020-05-26 RX ADMIN — PANTOPRAZOLE SODIUM 40 MG: 40 TABLET, DELAYED RELEASE ORAL at 06:16

## 2020-05-26 RX ADMIN — OLANZAPINE 5 MG: 5 TABLET, FILM COATED ORAL at 21:10

## 2020-05-26 RX ADMIN — DIVALPROEX SODIUM 1500 MG: 500 TABLET, EXTENDED RELEASE ORAL at 21:10

## 2020-05-26 RX ADMIN — OXYBUTYNIN CHLORIDE 5 MG: 5 TABLET, EXTENDED RELEASE ORAL at 08:14

## 2020-05-26 RX ADMIN — METFORMIN HYDROCHLORIDE 500 MG: 500 TABLET ORAL at 16:39

## 2020-05-26 RX ADMIN — LEVOTHYROXINE SODIUM 75 MCG: 75 TABLET ORAL at 06:16

## 2020-05-26 RX ADMIN — CLOZAPINE 200 MG: 200 TABLET ORAL at 16:39

## 2020-05-26 RX ADMIN — METFORMIN HYDROCHLORIDE 500 MG: 500 TABLET ORAL at 08:13

## 2020-05-26 NOTE — PLAN OF CARE
Problem: Alteration in Thoughts and Perception  Goal: Treatment Goal: Gain control of psychotic behaviors/thinking, reduce/eliminate presenting symptoms and demonstrate improved reality functioning upon discharge  Outcome: Not Progressing  Goal: Attend and participate in unit activities, including therapeutic, recreational, and educational groups  Description  Interventions:  -Encourage Visitation and family involvement in care  Outcome: Not Progressing    Individual has had minimal interactions with others this shift  He has not participated in programming, no groups attended and he declined treatment team meeting  Behaviors have been controlled, but does have irritable edge at times  He does not listen to staff when they are offering direction  Compliant with meds  Mouth check completed  Attempted to  interact but walked away  Availability of staff made known  Will continue to monitor

## 2020-05-26 NOTE — PROGRESS NOTES
Jessica Garcia slept all night  Respirations easy and non labored  No issues or behaviors  Compliant with medication  Mouth check was done  Swallowed all pills  Continue to monitor  Precautions maintained

## 2020-05-26 NOTE — TREATMENT TEAM
05/26/20 0900   Activity/Group Checklist   Group Community meeting   Attendance Did not attend   Attendance Duration (min) 31-45   Affect/Mood GAEL

## 2020-05-26 NOTE — PLAN OF CARE
Problem: Alteration in Thoughts and Perception  Goal: Verbalize thoughts and feelings  Description  Interventions:  - Promote a nonjudgmental and trusting relationship with the patient through active listening and therapeutic communication  - Assess patient's level of functioning, behavior and potential for risk  - Engage patient in 1 on 1 interactions  - Encourage patient to express fears, feelings, frustrations, and discuss symptoms    - Energy patient to reality, help patient recognize reality-based thinking   - Administer medications as ordered and assess for potential side effects  - Provide the patient education related to the signs and symptoms of the illness and desired effects of prescribed medications  Outcome: Progressing  Goal: Refrain from acting on delusional thinking/internal stimuli  Description  Interventions:  - Monitor patient closely, per order   - Utilize least restrictive measures   - Set reasonable limits, give positive feedback for acceptable   - Administer medications as ordered and monitor of potential side effects  Outcome: Progressing  Goal: Agree to be compliant with medication regime, as prescribed and report medication side effects  Description  Interventions:  - Offer appropriate PRN medication and supervise ingestion; conduct AIMS, as needed   Outcome: Progressing  Goal: Attend and participate in unit activities, including therapeutic, recreational, and educational groups  Description  Interventions:  -Encourage Visitation and family involvement in care  Outcome: Not Progressing     Problem: Ineffective Coping  Goal: Demonstrates healthy coping skills  Outcome: Not Progressing  Goal: Understands least restrictive measures  Description  Interventions:  - Utilize least restrictive behavior  Outcome: Progressing     Problem: GASTROINTESTINAL - ADULT  Goal: Maintains adequate nutritional intake  Description  INTERVENTIONS:  - Monitor percentage of each meal consumed  - Identify factors contributing to decreased intake, treat as appropriate  - Assist with meals as needed  - Monitor I&O, weight, and lab values if indicated  - Obtain nutrition services referral as needed  Outcome: Harini Vora has been awake, alert, and visible intermittently out in the milieu  Pt asked staff who the head of the hospital is because he wants to talk to them about getting out of here  Staff instructed pt to address discharge with his team   Ate 100% supper  Rests in room at intervals  Preoccupied with discharge  Disheveled in appearance and dressed in layers  Denies any depression, anxiety, si/hi, intent, plan, a/v hallucinations, and has not verbalized any delusions  Compliant with scheduled meds when called  Did not attend evening group but came out for snack after  Continue to monitor/assess for any changes

## 2020-05-26 NOTE — PROGRESS NOTES
05/26/20 0807   Team Meeting   Meeting Type Daily Rounds   Team Members Present   Team Members Present Physician;;Nurse; Other (Discipline and Name)   Physician Team Member Dr Sully Willingham Team Member Taina Gooden RN   Care Management Team Member EDELMIRA Gallo   Other (Discipline and Name) Mike Holt, MCKAYLA     Controlled, repetitive

## 2020-05-26 NOTE — PROGRESS NOTES
05/26/20 1245   Team Meeting   Meeting Type Tx Team Meeting   Initial Conference Date 05/26/20   Next Conference Date 06/02/20   Team Members Present   Team Members Present Physician;;Nurse; Other (Discipline and Name)   Physician Team Member Dr Bryant Contreras Team Member Lita Levy, RN   Care Management Team Member EDELMIRA Sarkar   Other (Discipline and Name) [facility]; [facility]   Patient/Family Present   Patient Present No   Patient's Family Present No     Patient refused  Patient had 52% group  Patient's sister never called into meeting as she stated she was going to this week again  Patient remains unable to be put back into community with current behaviors  Patient remains disheveled, irritable, and resistant with medications

## 2020-05-26 NOTE — TREATMENT TEAM
05/26/20 1100   Activity/Group Checklist   Group   (IMR/Barriers )   Attendance Did not attend   Attendance Duration (min) 46-60   Affect/Mood GAEL
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
## 2020-05-27 PROCEDURE — 99232 SBSQ HOSP IP/OBS MODERATE 35: CPT | Performed by: PSYCHIATRY & NEUROLOGY

## 2020-05-27 RX ADMIN — DOCUSATE SODIUM 100 MG: 100 CAPSULE, LIQUID FILLED ORAL at 17:08

## 2020-05-27 RX ADMIN — DOCUSATE SODIUM 100 MG: 100 CAPSULE, LIQUID FILLED ORAL at 08:53

## 2020-05-27 RX ADMIN — CLOZAPINE 200 MG: 200 TABLET ORAL at 17:08

## 2020-05-27 RX ADMIN — DIVALPROEX SODIUM 1500 MG: 500 TABLET, EXTENDED RELEASE ORAL at 21:08

## 2020-05-27 RX ADMIN — OLANZAPINE 5 MG: 5 TABLET, FILM COATED ORAL at 21:08

## 2020-05-27 RX ADMIN — METFORMIN HYDROCHLORIDE 500 MG: 500 TABLET ORAL at 08:53

## 2020-05-27 RX ADMIN — ATORVASTATIN CALCIUM 10 MG: 10 TABLET, FILM COATED ORAL at 17:08

## 2020-05-27 RX ADMIN — OXYBUTYNIN CHLORIDE 5 MG: 5 TABLET, EXTENDED RELEASE ORAL at 08:54

## 2020-05-27 RX ADMIN — NICOTINE POLACRILEX 2 MG: 2 GUM, CHEWING ORAL at 08:57

## 2020-05-27 RX ADMIN — METFORMIN HYDROCHLORIDE 500 MG: 500 TABLET ORAL at 17:08
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
## 2020-05-27 NOTE — CASE MANAGEMENT
CM called patient's sister, Dai Pavon, and provided update regarding patient's progress  Patient's sister satisfied with call however disappointed in his behaviors  Sister hopes he trys more and suggests giving rewards for patient and he responds well  CM will continue to follow and provide services as needed

## 2020-05-27 NOTE — PLAN OF CARE
Problem: Alteration in Thoughts and Perception  Goal: Verbalize thoughts and feelings  Description  Interventions:  - Promote a nonjudgmental and trusting relationship with the patient through active listening and therapeutic communication  - Assess patient's level of functioning, behavior and potential for risk  - Engage patient in 1 on 1 interactions  - Encourage patient to express fears, feelings, frustrations, and discuss symptoms    - [place] patient to reality, help patient recognize reality-based thinking   - Administer medications as ordered and assess for potential side effects  - Provide the patient education related to the signs and symptoms of the illness and desired effects of prescribed medications  Outcome: Progressing  Goal: Refrain from acting on delusional thinking/internal stimuli  Description  Interventions:  - Monitor patient closely, per order   - Utilize least restrictive measures   - Set reasonable limits, give positive feedback for acceptable   - Administer medications as ordered and monitor of potential side effects  Outcome: Progressing  Goal: Agree to be compliant with medication regime, as prescribed and report medication side effects  Description  Interventions:  - Offer appropriate PRN medication and supervise ingestion; conduct AIMS, as needed   Outcome: Progressing  Goal: Attend and participate in unit activities, including therapeutic, recreational, and educational groups  Description  Interventions:  -Encourage Visitation and family involvement in care  Outcome: Not Progressing  Goal: Complete daily ADLs, including personal hygiene independently, as able  Description  Interventions:  - Observe, teach, and assist patient with ADLS  - Monitor and promote a balance of rest/activity, with adequate nutrition and elimination   Outcome: Progressing     Problem: Ineffective Coping  Goal: Demonstrates healthy coping skills  Outcome: Progressing  Goal: Understands least restrictive measures  Description  Interventions:  - Utilize least restrictive behavior  Outcome: Progressing     Problem: GASTROINTESTINAL - ADULT  Goal: Maintains adequate nutritional intake  Description  INTERVENTIONS:  - Monitor percentage of each meal consumed  - Identify factors contributing to decreased intake, treat as appropriate  - Assist with meals as needed  - Monitor I&O, weight, and lab values if indicated  - Obtain nutrition services referral as needed  Outcome: Haley Mar has been awake, alert, and visible intermittently out in the milieu  Refused at 1600 to have VS checked  Pt completed his daily ADL's today but put his dirty clothes back on  Ate 100% supper  Pt spoke to his sister on phone  Continues to be preoccupied with discharge and asking who is the head of the hospital   Resting in room at intervals  Compliant with all scheduled meds when called  Pt did not attend any evening groups but came out for snack after  Continue to monitor/assess for any changes

## 2020-05-27 NOTE — NURSING NOTE
Refused Synthroid, Adderall and Protonix this am 5/27    No reason stated rx for doxycycline sent to pharmacy.
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
## 2020-05-27 NOTE — PLAN OF CARE
Problem: Alteration in Thoughts and Perception  Goal: Treatment Goal: Gain control of psychotic behaviors/thinking, reduce/eliminate presenting symptoms and demonstrate improved reality functioning upon discharge  Outcome: Progressing  Goal: Verbalize thoughts and feelings  Description  Interventions:  - Promote a nonjudgmental and trusting relationship with the patient through active listening and therapeutic communication  - Assess patient's level of functioning, behavior and potential for risk  - Engage patient in 1 on 1 interactions  - Encourage patient to express fears, feelings, frustrations, and discuss symptoms    - [place] patient to reality, help patient recognize reality-based thinking   - Administer medications as ordered and assess for potential side effects  - Provide the patient education related to the signs and symptoms of the illness and desired effects of prescribed medications  Outcome: Progressing  Goal: Attend and participate in unit activities, including therapeutic, recreational, and educational groups  Description  Interventions:  -Encourage Visitation and family involvement in care  Outcome: Progressing  Goal: Complete daily ADLs, including personal hygiene independently, as able  Description  Interventions:  - Observe, teach, and assist patient with ADLS  - Monitor and promote a balance of rest/activity, with adequate nutrition and elimination   Outcome: Progressing     Problem: Alteration in Thoughts and Perception  Goal: Refrain from acting on delusional thinking/internal stimuli  Description  Interventions:  - Monitor patient closely, per order   - Utilize least restrictive measures   - Set reasonable limits, give positive feedback for acceptable   - Administer medications as ordered and monitor of potential side effects  Outcome: Not Progressing  Goal: Agree to be compliant with medication regime, as prescribed and report medication side effects  Description  Interventions:  - Offer appropriate PRN medication and supervise ingestion; conduct AIMS, as needed   Outcome: Not Progressing  Goal: Recognize dysfunctional thoughts, communicate reality-based thoughts at the time of discharge  Description  Interventions:  - Provide medication and psycho-education to assist patient in compliance and developing insight into his/her illness   Outcome: Not Progressing     Quiet, cooperative, visible intermittently  Med and meal compliant  Ate 100% of breakfast and lunch  No prompting needed to take meds  He denies any depression, anxiety, si and hi  No c/o pain  He continues preoccupied with "getting out of here"  [facility] wants him to wash his clothing  He is refusing and continues to wear layers of clothing  Patient showers and puts the layers of clothing back on  We continue to encourage him to wash this clothing  He attended journaling, and morning walk  Maintained on patient safety and mouth check precautions w/o incident    Will continue to monitor progress in recovery program

## 2020-05-27 NOTE — TREATMENT TEAM
05/27/20 1001   Activity/Group Checklist   Group Community meeting  (Coffee/Journaling )   Attendance Attended   Attendance Duration (min) 31-45   Interactions Interacted appropriately   Affect/Mood Appropriate;Bright;Normal range   Goals Achieved Identified feelings; Discussed coping strategies; Able to listen to others; Able to engage in interactions; Able to self-disclose

## 2020-05-28 PROCEDURE — 99232 SBSQ HOSP IP/OBS MODERATE 35: CPT | Performed by: PSYCHIATRY & NEUROLOGY

## 2020-05-28 RX ADMIN — DEXTROAMPHETAMINE SACCHARATE, AMPHETAMINE ASPARTATE, DEXTROAMPHETAMINE SULFATE AND AMPHETAMINE SULFATE 10 MG: 2.5; 2.5; 2.5; 2.5 TABLET ORAL at 05:52

## 2020-05-28 RX ADMIN — DOCUSATE SODIUM 100 MG: 100 CAPSULE, LIQUID FILLED ORAL at 08:30

## 2020-05-28 RX ADMIN — METFORMIN HYDROCHLORIDE 500 MG: 500 TABLET ORAL at 16:44

## 2020-05-28 RX ADMIN — LEVOTHYROXINE SODIUM 75 MCG: 75 TABLET ORAL at 05:52

## 2020-05-28 RX ADMIN — DIVALPROEX SODIUM 1500 MG: 500 TABLET, EXTENDED RELEASE ORAL at 20:59

## 2020-05-28 RX ADMIN — DOCUSATE SODIUM 100 MG: 100 CAPSULE, LIQUID FILLED ORAL at 17:02

## 2020-05-28 RX ADMIN — NICOTINE POLACRILEX 2 MG: 2 GUM, CHEWING ORAL at 09:30

## 2020-05-28 RX ADMIN — OXYBUTYNIN CHLORIDE 5 MG: 5 TABLET, EXTENDED RELEASE ORAL at 08:29

## 2020-05-28 RX ADMIN — CLOZAPINE 200 MG: 200 TABLET ORAL at 16:44

## 2020-05-28 RX ADMIN — ATORVASTATIN CALCIUM 10 MG: 10 TABLET, FILM COATED ORAL at 16:44

## 2020-05-28 RX ADMIN — PANTOPRAZOLE SODIUM 40 MG: 40 TABLET, DELAYED RELEASE ORAL at 05:52

## 2020-05-28 RX ADMIN — OLANZAPINE 5 MG: 5 TABLET, FILM COATED ORAL at 20:59

## 2020-05-28 RX ADMIN — METFORMIN HYDROCHLORIDE 500 MG: 500 TABLET ORAL at 08:29

## 2020-05-28 NOTE — TREATMENT TEAM
05/28/20 1100   Activity/Group Checklist   Group   (IMR/Pictionary )   Attendance Attended   Attendance Duration (min) 46-60   Interactions Interacted appropriately   Affect/Mood Appropriate;Bright;Normal range   Goals Achieved Identified feelings; Able to listen to others; Able to engage in interactions; Able to self-disclose

## 2020-05-28 NOTE — PROGRESS NOTES
05/28/20 0850   Team Meeting   Meeting Type Daily Rounds   Team Members Present   Team Members Present Physician;;Nurse;; Other (Discipline and Name)   Physician Team Member Dr Lawrence Reeves, RN   Care Management Team Member Meredith Fernandes, MSw   Social Work Team Member [facility] Contre[facility], [facility]   Other (Discipline and Name) Matt Rogers, CPS     Med non compliant at times, irritable, yelling, disheveled
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
## 2020-05-28 NOTE — PLAN OF CARE
Problem: Alteration in Thoughts and Perception  Goal: Treatment Goal: Gain control of psychotic behaviors/thinking, reduce/eliminate presenting symptoms and demonstrate improved reality functioning upon discharge  Outcome: Progressing  Goal: Verbalize thoughts and feelings  Description  Interventions:  - Promote a nonjudgmental and trusting relationship with the patient through active listening and therapeutic communication  - Assess patient's level of functioning, behavior and potential for risk  - Engage patient in 1 on 1 interactions  - Encourage patient to express fears, feelings, frustrations, and discuss symptoms    - [place] patient to reality, help patient recognize reality-based thinking   - Administer medications as ordered and assess for potential side effects  - Provide the patient education related to the signs and symptoms of the illness and desired effects of prescribed medications  Outcome: Progressing  Goal: Refrain from acting on delusional thinking/internal stimuli  Description  Interventions:  - Monitor patient closely, per order   - Utilize least restrictive measures   - Set reasonable limits, give positive feedback for acceptable   - Administer medications as ordered and monitor of potential side effects  Outcome: Progressing  Goal: Agree to be compliant with medication regime, as prescribed and report medication side effects  Description  Interventions:  - Offer appropriate PRN medication and supervise ingestion; conduct AIMS, as needed   Outcome: Progressing  Goal: Attend and participate in unit activities, including therapeutic, recreational, and educational groups  Description  Interventions:  -Encourage Visitation and family involvement in care  Outcome: Progressing  Goal: Recognize dysfunctional thoughts, communicate reality-based thoughts at the time of discharge  Description  Interventions:  - Provide medication and psycho-education to assist patient in compliance and developing insight into his/her illness   Outcome: Progressing  Goal: Complete daily ADLs, including personal hygiene independently, as able  Description  Interventions:  - Observe, teach, and assist patient with ADLS  - Monitor and promote a balance of rest/activity, with adequate nutrition and elimination   Outcome: Progressing     Problem: Ineffective Coping  Goal: Identifies ineffective coping skills  Outcome: Progressing  Goal: Identifies healthy coping skills  Outcome: Progressing  Goal: Demonstrates healthy coping skills  Outcome: Progressing  Goal: Patient/Family participate in treatment and DC plans  Description  Interventions:  - Provide therapeutic environment  Outcome: Progressing  Goal: Patient/Family verbalizes awareness of resources  Outcome: Progressing  Goal: Understands least restrictive measures  Description  Interventions:  - Utilize least restrictive behavior  Outcome: Progressing     Problem: RESPIRATORY - ADULT  Goal: Achieves optimal ventilation and oxygenation  Description  INTERVENTIONS:  - Assess for changes in respiratory status  - Assess for changes in mentation and behavior  - Position to facilitate oxygenation and minimize respiratory effort  - Oxygen administered by appropriate delivery if ordered  - Initiate smoking cessation education as indicated  - Encourage broncho-pulmonary hygiene including cough, deep breathe, Incentive Spirometry  - Assess the need for suctioning and aspirate as needed  - Assess and instruct to report SOB or any respiratory difficulty  - Respiratory Therapy support as indicated  Outcome: Progressing     Problem: GASTROINTESTINAL - ADULT  Goal: Minimal or absence of nausea and/or vomiting  Description  INTERVENTIONS:  - Administer IV fluids if ordered to ensure adequate hydration  - Maintain NPO status until nausea and vomiting are resolved  - Nasogastric tube if ordered  - Administer ordered antiemetic medications as needed  - Provide nonpharmacologic comfort measures as appropriate  - Advance diet as tolerated, if ordered  - Consider nutrition services referral to assist patient with adequate nutrition and appropriate food choices  Outcome: Progressing  Goal: Maintains or returns to baseline bowel function  Description  INTERVENTIONS:  - Assess bowel function  - Encourage oral fluids to ensure adequate hydration  - Administer IV fluids if ordered to ensure adequate hydration  - Administer ordered medications as needed  - Encourage mobilization and activity  - Consider nutritional services referral to assist patient with adequate nutrition and appropriate food choices  Outcome: Progressing  Goal: Maintains adequate nutritional intake  Description  INTERVENTIONS:  - Monitor percentage of each meal consumed  - Identify factors contributing to decreased intake, treat as appropriate  - Assist with meals as needed  - Monitor I&O, weight, and lab values if indicated  - Obtain nutrition services referral as needed  Outcome: Progressing     Problem: METABOLIC, FLUID AND ELECTROLYTES - ADULT  Goal: Glucose maintained within target range  Description  INTERVENTIONS:  - Monitor Blood Glucose as ordered  - Assess for signs and symptoms of hyperglycemia and hypoglycemia  - Administer ordered medications to maintain glucose within target range  - Assess nutritional intake and initiate nutrition service referral as needed  Outcome: Progressing     Problem: DISCHARGE PLANNING  Goal: Discharge to home or other facility with appropriate resources  Description    CASE MANAGEMENT INTERVENTIONS:  - Conduct assessment to determine patient/family and health care team treatment goals, and need for post-acute services based on payer coverage, community resources, patient preferences and barriers to discharge    - Address psychosocial, clinical, and financial barriers to discharge as identified in assessment in conjunction with the patient/family and health care team   - Assist the patient in reintegration back into the community by removing barriers which may hinder a successful discharge once deemed stable  - Arrange appropriate level of post-acute services according to patient's needs and preference and payer coverage in collaboration with the physician and health care team   - Communicate with and update the patient/family, physician, and health care team regarding progress on the discharge plan  - Arrange appropriate transportation to post-acute venues  Outcome: Progressing    Quiet, cooperative and visible on unit  Med and meal compliant  Ate 100% of both meals  Denies depression, anxiety, SI and HI  No c/o s/s pain or discomfort  Compliant with behavior plan  Used radio after lunch for coping  Nicorette gum for coping  Still needs to do laundry but refuses to remove layers of clothing  Patient attended IMR  Maintained on patient safety precautions and mouth checks w/o incident    Will continue to monitor progress in recovery program

## 2020-05-29 PROCEDURE — 99232 SBSQ HOSP IP/OBS MODERATE 35: CPT | Performed by: PSYCHIATRY & NEUROLOGY

## 2020-05-29 RX ADMIN — DIVALPROEX SODIUM 1500 MG: 500 TABLET, EXTENDED RELEASE ORAL at 20:47

## 2020-05-29 RX ADMIN — TRAZODONE HYDROCHLORIDE 50 MG: 50 TABLET ORAL at 21:42

## 2020-05-29 RX ADMIN — DOCUSATE SODIUM 100 MG: 100 CAPSULE, LIQUID FILLED ORAL at 08:23

## 2020-05-29 RX ADMIN — METFORMIN HYDROCHLORIDE 500 MG: 500 TABLET ORAL at 16:55

## 2020-05-29 RX ADMIN — DOCUSATE SODIUM 100 MG: 100 CAPSULE, LIQUID FILLED ORAL at 17:02

## 2020-05-29 RX ADMIN — OLANZAPINE 5 MG: 5 TABLET, FILM COATED ORAL at 20:47

## 2020-05-29 RX ADMIN — OXYBUTYNIN CHLORIDE 5 MG: 5 TABLET, EXTENDED RELEASE ORAL at 08:23

## 2020-05-29 RX ADMIN — METFORMIN HYDROCHLORIDE 500 MG: 500 TABLET ORAL at 08:23

## 2020-05-29 RX ADMIN — NICOTINE POLACRILEX 2 MG: 2 GUM, CHEWING ORAL at 12:27

## 2020-05-29 RX ADMIN — CLOZAPINE 200 MG: 200 TABLET ORAL at 16:55

## 2020-05-29 RX ADMIN — ATORVASTATIN CALCIUM 10 MG: 10 TABLET, FILM COATED ORAL at 16:55

## 2020-05-29 NOTE — PROGRESS NOTES
05/29/20 0914   Team Meeting   Meeting Type Daily Rounds   Team Members Present   Team Members Present Nurse;; ;Physician   Physician Team Member Dr Saud Caro, RN   Care Management Team Member EDELMIRA Espinoza   Social Work Team Member Zeb Barahona LCSW     No 3-11 groups  Mouth Checks, refusing to change clothes   Refused early meds

## 2020-05-29 NOTE — PLAN OF CARE
Problem: Alteration in Thoughts and Perception  Goal: Attend and participate in unit activities, including therapeutic, recreational, and educational groups  Description  Interventions:  -Encourage Visitation and family involvement in care  Outcome: Progressing     Problem: Alteration in Thoughts and Perception  Goal: Complete daily ADLs, including personal hygiene independently, as able  Description  Interventions:  - Observe, teach, and assist patient with ADLS  - Monitor and promote a balance of rest/activity, with adequate nutrition and elimination   Outcome: Not Progressing   Individual has been up and about, visible at times throughout the shift  Behaviors have been controlled, no loud outbursts  He participated in programming, attended select groups  Presentation remains disheveled, not changing out of clothes  Compliant with his medications  Appetite good  Availability of staff made known  Will continue to monitor

## 2020-05-29 NOTE — PROGRESS NOTES
05/29/20 0900   Activity/Group Checklist   Group Community meeting  ([place]ning Walk)   Attendance Attended   Attendance Duration (min) 16-30   Interactions Interacted appropriately   Affect/Mood Appropriate   Goals Achieved Able to listen to others; Able to engage in interactions
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
## 2020-05-29 NOTE — PROGRESS NOTES
05/29/20 1100   Activity/Group Checklist   Group Other (Comment)  ([facility] - [facility])   Attendance Did not attend     Patient was encouraged to attend group, but declined once informed there would be no "Dor[place]" in group  In bed when prompted

## 2020-05-29 NOTE — PLAN OF CARE
Problem: Alteration in Thoughts and Perception  Goal: Verbalize thoughts and feelings  Description  Interventions:  - Promote a nonjudgmental and trusting relationship with the patient through active listening and therapeutic communication  - Assess patient's level of functioning, behavior and potential for risk  - Engage patient in 1 on 1 interactions  - Encourage patient to express fears, feelings, frustrations, and discuss symptoms    - [place] patient to reality, help patient recognize reality-based thinking   - Administer medications as ordered and assess for potential side effects  - Provide the patient education related to the signs and symptoms of the illness and desired effects of prescribed medications  Outcome: Progressing  Goal: Refrain from acting on delusional thinking/internal stimuli  Description  Interventions:  - Monitor patient closely, per order   - Utilize least restrictive measures   - Set reasonable limits, give positive feedback for acceptable   - Administer medications as ordered and monitor of potential side effects  Outcome: Progressing  Goal: Agree to be compliant with medication regime, as prescribed and report medication side effects  Description  Interventions:  - Offer appropriate PRN medication and supervise ingestion; conduct AIMS, as needed   Outcome: Progressing  Goal: Attend and participate in unit activities, including therapeutic, recreational, and educational groups  Description  Interventions:  -Encourage Visitation and family involvement in care  Outcome: Not Progressing     Problem: Ineffective Coping  Goal: Demonstrates healthy coping skills  Outcome: Progressing  Goal: Understands least restrictive measures  Description  Interventions:  - Utilize least restrictive behavior  Outcome: Progressing     Problem: GASTROINTESTINAL - ADULT  Goal: Maintains adequate nutritional intake  Description  INTERVENTIONS:  - Monitor percentage of each meal consumed  - Identify factors contributing to decreased intake, treat as appropriate  - Assist with meals as needed  - Monitor I&O, weight, and lab values if indicated  - Obtain nutrition services referral as needed  Outcome: Zion Vickers has been awake, alert, and visible intermittently out in the milieu  Pt ate 100% supper  Naps at intervals in room  Social with select peers when out in the milieu  Pt remains preoccupied with discharge  No behavioral issues  Pt did not attend evening group but came out for snack after  Compliant with all scheduled meds when called  Remains disheveled in appearance and dressed in layers  Continue to monitor/assess for any changes

## 2020-05-30 PROCEDURE — 99232 SBSQ HOSP IP/OBS MODERATE 35: CPT | Performed by: NURSE PRACTITIONER

## 2020-05-30 RX ADMIN — DOCUSATE SODIUM 100 MG: 100 CAPSULE, LIQUID FILLED ORAL at 17:17

## 2020-05-30 RX ADMIN — DOCUSATE SODIUM 100 MG: 100 CAPSULE, LIQUID FILLED ORAL at 08:59

## 2020-05-30 RX ADMIN — OLANZAPINE 5 MG: 5 TABLET, FILM COATED ORAL at 20:58

## 2020-05-30 RX ADMIN — DIVALPROEX SODIUM 1500 MG: 500 TABLET, EXTENDED RELEASE ORAL at 20:58

## 2020-05-30 RX ADMIN — LEVOTHYROXINE SODIUM 75 MCG: 75 TABLET ORAL at 05:59

## 2020-05-30 RX ADMIN — NICOTINE POLACRILEX 2 MG: 2 GUM, CHEWING ORAL at 09:19

## 2020-05-30 RX ADMIN — DEXTROAMPHETAMINE SACCHARATE, AMPHETAMINE ASPARTATE, DEXTROAMPHETAMINE SULFATE AND AMPHETAMINE SULFATE 10 MG: 2.5; 2.5; 2.5; 2.5 TABLET ORAL at 05:59

## 2020-05-30 RX ADMIN — METFORMIN HYDROCHLORIDE 500 MG: 500 TABLET ORAL at 09:00

## 2020-05-30 RX ADMIN — PANTOPRAZOLE SODIUM 40 MG: 40 TABLET, DELAYED RELEASE ORAL at 05:59

## 2020-05-30 RX ADMIN — CLOZAPINE 200 MG: 200 TABLET ORAL at 16:37

## 2020-05-30 RX ADMIN — METFORMIN HYDROCHLORIDE 500 MG: 500 TABLET ORAL at 16:37

## 2020-05-30 RX ADMIN — ATORVASTATIN CALCIUM 10 MG: 10 TABLET, FILM COATED ORAL at 16:37

## 2020-05-30 RX ADMIN — OXYBUTYNIN CHLORIDE 5 MG: 5 TABLET, EXTENDED RELEASE ORAL at 08:59

## 2020-05-30 NOTE — PLAN OF CARE
Problem: Alteration in Thoughts and Perception  Goal: Verbalize thoughts and feelings  Description  Interventions:  - Promote a nonjudgmental and trusting relationship with the patient through active listening and therapeutic communication  - Assess patient's level of functioning, behavior and potential for risk  - Engage patient in 1 on 1 interactions  - Encourage patient to express fears, feelings, frustrations, and discuss symptoms    - [place] patient to reality, help patient recognize reality-based thinking   - Administer medications as ordered and assess for potential side effects  - Provide the patient education related to the signs and symptoms of the illness and desired effects of prescribed medications  Outcome: Progressing  Goal: Refrain from acting on delusional thinking/internal stimuli  Description  Interventions:  - Monitor patient closely, per order   - Utilize least restrictive measures   - Set reasonable limits, give positive feedback for acceptable   - Administer medications as ordered and monitor of potential side effects  Outcome: Progressing  Goal: Agree to be compliant with medication regime, as prescribed and report medication side effects  Description  Interventions:  - Offer appropriate PRN medication and supervise ingestion; conduct AIMS, as needed   Outcome: Progressing  Goal: Attend and participate in unit activities, including therapeutic, recreational, and educational groups  Description  Interventions:  -Encourage Visitation and family involvement in care  Outcome: Progressing     Problem: Ineffective Coping  Goal: Demonstrates healthy coping skills  Outcome: Progressing  Goal: Understands least restrictive measures  Description  Interventions:  - Utilize least restrictive behavior  Outcome: Progressing     Problem: GASTROINTESTINAL - ADULT  Goal: Maintains adequate nutritional intake  Description  INTERVENTIONS:  - Monitor percentage of each meal consumed  - Identify factors contributing to decreased intake, treat as appropriate  - Assist with meals as needed  - Monitor I&O, weight, and lab values if indicated  - Obtain nutrition services referral as needed  Outcome: Amanda Bermeo has been awake, alert, and visible intermittently out in the milieu  Ate 100% supper  Pleasant, polite, and cooperative  Enjoys listening to music on radio and dancing in [facility]  Went out on deck with staff and peers for fresh air  Compliant with all scheduled meds when called  Less preoccupied with discharge  Remains dressed in layers  Pt did not attend evening group but came out for snack after  Continue to monitor/assess for any changes  Will continues to monitor/assess for any changes

## 2020-05-30 NOTE — PLAN OF CARE
Problem: Alteration in Thoughts and Perception  Goal: Treatment Goal: Gain control of psychotic behaviors/thinking, reduce/eliminate presenting symptoms and demonstrate improved reality functioning upon discharge  Outcome: Progressing  Goal: Verbalize thoughts and feelings  Description  Interventions:  - Promote a nonjudgmental and trusting relationship with the patient through active listening and therapeutic communication  - Assess patient's level of functioning, behavior and potential for risk  - Engage patient in 1 on 1 interactions  - Encourage patient to express fears, feelings, frustrations, and discuss symptoms    - [place] patient to reality, help patient recognize reality-based thinking   - Administer medications as ordered and assess for potential side effects  - Provide the patient education related to the signs and symptoms of the illness and desired effects of prescribed medications  Outcome: Progressing  Goal: Refrain from acting on delusional thinking/internal stimuli  Description  Interventions:  - Monitor patient closely, per order   - Utilize least restrictive measures   - Set reasonable limits, give positive feedback for acceptable   - Administer medications as ordered and monitor of potential side effects  Outcome: Progressing  Goal: Agree to be compliant with medication regime, as prescribed and report medication side effects  Description  Interventions:  - Offer appropriate PRN medication and supervise ingestion; conduct AIMS, as needed   Outcome: Progressing  Goal: Attend and participate in unit activities, including therapeutic, recreational, and educational groups  Description  Interventions:  -Encourage Visitation and family involvement in care  Outcome: Progressing  Goal: Recognize dysfunctional thoughts, communicate reality-based thoughts at the time of discharge  Description  Interventions:  - Provide medication and psycho-education to assist patient in compliance and developing insight into his/her illness   Outcome: Progressing  Goal: Complete daily ADLs, including personal hygiene independently, as able  Description  Interventions:  - Observe, teach, and assist patient with ADLS  - Monitor and promote a balance of rest/activity, with adequate nutrition and elimination   Outcome: Progressing     Problem: Ineffective Coping  Goal: Identifies ineffective coping skills  Outcome: Progressing  Goal: Identifies healthy coping skills  Outcome: Progressing  Goal: Demonstrates healthy coping skills  Outcome: Progressing  Goal: Patient/Family participate in treatment and DC plans  Description  Interventions:  - Provide therapeutic environment  Outcome: Progressing  Goal: Patient/Family verbalizes awareness of resources  Outcome: Progressing  Goal: Understands least restrictive measures  Description  Interventions:  - Utilize least restrictive behavior  Outcome: Progressing     Problem: RESPIRATORY - ADULT  Goal: Achieves optimal ventilation and oxygenation  Description  INTERVENTIONS:  - Assess for changes in respiratory status  - Assess for changes in mentation and behavior  - Position to facilitate oxygenation and minimize respiratory effort  - Oxygen administered by appropriate delivery if ordered  - Initiate smoking cessation education as indicated  - Encourage broncho-pulmonary hygiene including cough, deep breathe, Incentive Spirometry  - Assess the need for suctioning and aspirate as needed  - Assess and instruct to report SOB or any respiratory difficulty  - Respiratory Therapy support as indicated  Outcome: Progressing     Problem: GASTROINTESTINAL - ADULT  Goal: Minimal or absence of nausea and/or vomiting  Description  INTERVENTIONS:  - Administer IV fluids if ordered to ensure adequate hydration  - Maintain NPO status until nausea and vomiting are resolved  - Nasogastric tube if ordered  - Administer ordered antiemetic medications as needed  - Provide nonpharmacologic comfort measures as appropriate  - Advance diet as tolerated, if ordered  - Consider nutrition services referral to assist patient with adequate nutrition and appropriate food choices  Outcome: Progressing  Goal: Maintains or returns to baseline bowel function  Description  INTERVENTIONS:  - Assess bowel function  - Encourage oral fluids to ensure adequate hydration  - Administer IV fluids if ordered to ensure adequate hydration  - Administer ordered medications as needed  - Encourage mobilization and activity  - Consider nutritional services referral to assist patient with adequate nutrition and appropriate food choices  Outcome: Progressing  Goal: Maintains adequate nutritional intake  Description  INTERVENTIONS:  - Monitor percentage of each meal consumed  - Identify factors contributing to decreased intake, treat as appropriate  - Assist with meals as needed  - Monitor I&O, weight, and lab values if indicated  - Obtain nutrition services referral as needed  Outcome: Progressing     Problem: METABOLIC, FLUID AND ELECTROLYTES - ADULT  Goal: Glucose maintained within target range  Description  INTERVENTIONS:  - Monitor Blood Glucose as ordered  - Assess for signs and symptoms of hyperglycemia and hypoglycemia  - Administer ordered medications to maintain glucose within target range  - Assess nutritional intake and initiate nutrition service referral as needed  Outcome: Progressing     Problem: DISCHARGE PLANNING  Goal: Discharge to home or other facility with appropriate resources  Description    CASE MANAGEMENT INTERVENTIONS:  - Conduct assessment to determine patient/family and health care team treatment goals, and need for post-acute services based on payer coverage, community resources, patient preferences and barriers to discharge    - Address psychosocial, clinical, and financial barriers to discharge as identified in assessment in conjunction with the patient/family and health care team   - Assist the patient in reintegration back into the community by removing barriers which may hinder a successful discharge once deemed stable  - Arrange appropriate level of post-acute services according to patient's needs and preference and payer coverage in collaboration with the physician and health care team   - Communicate with and update the patient/family, physician, and health care team regarding progress on the discharge plan  - Arrange appropriate transportation to post-acute venues  Outcome: Progressing     Quiet, cooperative and visible on unit  Med and meal compliant  Ate 100% of both meals  Denies depression, anxiety, SI and HI  No c/o s/s pain or discomfort  Compliant with behavior plan  Used radio after lunch for coping  Nicorette gum for coping  Disheveled and layered clothing  Patient attended community meeting and fresh air group  Maintained on patient safety precautions and mouth checks w/o incident  Due for labs 6/13/20    Will continue to monitor progress in recovery program

## 2020-05-30 NOTE — NURSING NOTE
Received pt in bed at change of shift with eyes closed; chest movement noted  Continues the same thus this far as per per continual rounding  Will continue to monitor

## 2020-05-30 NOTE — PROGRESS NOTES
Progress Note - Behavioral Health   Teresa Rose 48 y o  male MRN: [id number]  Unit/Bed#: [facility] 105-01 Encounter: [id number]    The patient was seen for continuing care and reviewed with treatment team     Vital signs in last 24 hours:  Temp:  [97 °F (36 1 °C)-97 6 °F (36 4 °C)] 97 6 °F (36 4 °C)  HR:  [72-82] 72  Resp:  [18] 18  BP: (123-143)/(75-80) 143/80    Mental Status Evaluation:    Appearance Marginal/poor hygiene   Behavior cooperative   Mood angry and irritable   Speech Normal rate and volume   Affect constricted   Thought Processes Goal directed and coherent   Thought Content Paranoid and mistrustful   Perceptual Disturbances Denies hallucinations and does not appear to be responding to internal stimuli   Risk Potential Suicidal/Homicidal Ideation - No evidence of suicidal or homicidal ideation and Patient does not verbalize suicidal or homicidal ideation  Risk of Violence - No evidence of risk for violence found on assessment  Risk of Self Mutilation - No evidence of risk for self mutilation found on assessment   Sensorium oriented to person, place, time/date and situation   Cognition/Memory recent and remote memory grossly intact   Consciousness alert and awake   Attention/Concentration attention span and concentration are age appropriate   Insight poor and improved   Judgement fair   Muscle Strength and Gait/Station normal muscle strength and normal muscle tone, normal gait/station and normal balance   Motor Activity no abnormal movements       Progress Toward Goals:  Patient observed walking on the unit  Patient states he feels he has been kidnapped and is being held against his will, asked to be let out  Patient encouraged to participate in groups and continue taking medication to make progress towards eventual discharge  States he is sleeping and eating well  Adequate daytime energy  No specific complaints  No medication issues        Recommended Treatment: Continue with pharmacotherapy, group therapy, milieu therapy and occupational therapy    The patient will be maintained on the following medications:    Current Facility-Administered Medications:  acetaminophen 325 mg Oral Q6H PRN Krystal Causey MD   acetaminophen 650 mg Oral Q6H PRN Krystal Causey MD   acetaminophen 975 mg Oral Q8H PRN Krystal Causey MD   aluminum-magnesium hydroxide-simethicone 30 mL Oral Q4H PRN Krystal Causey MD   amphetamine-dextroamphetamine 10 mg Oral Daily Krystal Causey MD   atorvastatin 10 mg Oral Daily With Tawanatheresa Inman MD   benztropine 1 mg Intramuscular Q6H PRN Krystal Causey MD   benztropine 1 mg Oral Q6H PRN Krystal Causey MD   cloZAPine 200 mg Oral Daily Krystal Causey MD   divalproex sodium 1,500 mg Oral HS Krystal Causey MD   docusate sodium 100 mg Oral BID Krystal Causey MD   haloperidol 5 mg Oral Q6H PRN Krystal Causey MD   haloperidol lactate 5 mg Intramuscular Q6H PRN Krystal Causey MD   levothyroxine 75 mcg Oral Early Morning Krystal Causey MD   LORazepam 1 mg Intramuscular Q6H PRN Krystal Causey MD   LORazepam 1 mg Oral Q6H PRN Krystal Causey MD   magnesium hydroxide 30 mL Oral Daily PRN Krystal Causey MD   metFORMIN 500 mg Oral BID With Meals Krystal Causey MD   nicotine polacrilex 2 mg Oral Q2H PRN Krystal Causey MD   OLANZapine 5 mg Oral HS Krystal Causey MD   oxybutynin 5 mg Oral Daily Krystal Causey MD   pantoprazole 40 mg Oral Early Morning Krystal Causey MD   traZODone 50 mg Oral HS PRN Krystal Causey MD       Schizoaffective disorder, bipolar type ([facility] [address] )

## 2020-05-30 NOTE — PLAN OF CARE
Problem: Alteration in Thoughts and Perception  Goal: Complete daily ADLs, including personal hygiene independently, as able  Description  Interventions:  - Observe, teach, and assist patient with ADLS  - Monitor and promote a balance of rest/activity, with adequate nutrition and elimination   Outcome: Progressing       Visible, cooperative with redirection, compliant with routine medications, denied pain, gait steady, showered this evening, ate 100% of dinner, listened to music with peers, will continue to monitor

## 2020-05-31 PROCEDURE — 99232 SBSQ HOSP IP/OBS MODERATE 35: CPT | Performed by: NURSE PRACTITIONER

## 2020-05-31 RX ADMIN — DEXTROAMPHETAMINE SACCHARATE, AMPHETAMINE ASPARTATE, DEXTROAMPHETAMINE SULFATE AND AMPHETAMINE SULFATE 10 MG: 2.5; 2.5; 2.5; 2.5 TABLET ORAL at 06:09

## 2020-05-31 RX ADMIN — OXYBUTYNIN CHLORIDE 5 MG: 5 TABLET, EXTENDED RELEASE ORAL at 08:09

## 2020-05-31 RX ADMIN — PANTOPRAZOLE SODIUM 40 MG: 40 TABLET, DELAYED RELEASE ORAL at 06:09

## 2020-05-31 RX ADMIN — LEVOTHYROXINE SODIUM 75 MCG: 75 TABLET ORAL at 06:10

## 2020-05-31 RX ADMIN — METFORMIN HYDROCHLORIDE 500 MG: 500 TABLET ORAL at 16:47

## 2020-05-31 RX ADMIN — NICOTINE POLACRILEX 2 MG: 2 GUM, CHEWING ORAL at 13:11

## 2020-05-31 RX ADMIN — DOCUSATE SODIUM 100 MG: 100 CAPSULE, LIQUID FILLED ORAL at 08:09

## 2020-05-31 RX ADMIN — OLANZAPINE 5 MG: 5 TABLET, FILM COATED ORAL at 20:50

## 2020-05-31 RX ADMIN — ATORVASTATIN CALCIUM 10 MG: 10 TABLET, FILM COATED ORAL at 16:47

## 2020-05-31 RX ADMIN — DIVALPROEX SODIUM 1500 MG: 500 TABLET, EXTENDED RELEASE ORAL at 20:50

## 2020-05-31 RX ADMIN — CLOZAPINE 200 MG: 200 TABLET ORAL at 16:47

## 2020-05-31 RX ADMIN — METFORMIN HYDROCHLORIDE 500 MG: 500 TABLET ORAL at 07:56

## 2020-05-31 RX ADMIN — DOCUSATE SODIUM 100 MG: 100 CAPSULE, LIQUID FILLED ORAL at 17:12

## 2020-05-31 NOTE — PLAN OF CARE
Problem: Alteration in Thoughts and Perception  Goal: Treatment Goal: Gain control of psychotic behaviors/thinking, reduce/eliminate presenting symptoms and demonstrate improved reality functioning upon discharge  Outcome: Progressing  Goal: Verbalize thoughts and feelings  Description  Interventions:  - Promote a nonjudgmental and trusting relationship with the patient through active listening and therapeutic communication  - Assess patient's level of functioning, behavior and potential for risk  - Engage patient in 1 on 1 interactions  - Encourage patient to express fears, feelings, frustrations, and discuss symptoms    - [place] patient to reality, help patient recognize reality-based thinking   - Administer medications as ordered and assess for potential side effects  - Provide the patient education related to the signs and symptoms of the illness and desired effects of prescribed medications  Outcome: Progressing  Goal: Refrain from acting on delusional thinking/internal stimuli  Description  Interventions:  - Monitor patient closely, per order   - Utilize least restrictive measures   - Set reasonable limits, give positive feedback for acceptable   - Administer medications as ordered and monitor of potential side effects  Outcome: Progressing  Goal: Agree to be compliant with medication regime, as prescribed and report medication side effects  Description  Interventions:  - Offer appropriate PRN medication and supervise ingestion; conduct AIMS, as needed   Outcome: Progressing  Goal: Attend and participate in unit activities, including therapeutic, recreational, and educational groups  Description  Interventions:  -Encourage Visitation and family involvement in care  Outcome: Progressing  Goal: Recognize dysfunctional thoughts, communicate reality-based thoughts at the time of discharge  Description  Interventions:  - Provide medication and psycho-education to assist patient in compliance and developing insight into his/her illness   Outcome: Progressing  Goal: Complete daily ADLs, including personal hygiene independently, as able  Description  Interventions:  - Observe, teach, and assist patient with ADLS  - Monitor and promote a balance of rest/activity, with adequate nutrition and elimination   Outcome: Progressing     Problem: Ineffective Coping  Goal: Identifies ineffective coping skills  Outcome: Progressing  Goal: Identifies healthy coping skills  Outcome: Progressing  Goal: Demonstrates healthy coping skills  Outcome: Progressing  Goal: Patient/Family participate in treatment and DC plans  Description  Interventions:  - Provide therapeutic environment  Outcome: Progressing  Goal: Patient/Family verbalizes awareness of resources  Outcome: Progressing  Goal: Understands least restrictive measures  Description  Interventions:  - Utilize least restrictive behavior  Outcome: Progressing     Problem: RESPIRATORY - ADULT  Goal: Achieves optimal ventilation and oxygenation  Description  INTERVENTIONS:  - Assess for changes in respiratory status  - Assess for changes in mentation and behavior  - Position to facilitate oxygenation and minimize respiratory effort  - Oxygen administered by appropriate delivery if ordered  - Initiate smoking cessation education as indicated  - Encourage broncho-pulmonary hygiene including cough, deep breathe, Incentive Spirometry  - Assess the need for suctioning and aspirate as needed  - Assess and instruct to report SOB or any respiratory difficulty  - Respiratory Therapy support as indicated  Outcome: Progressing     Problem: GASTROINTESTINAL - ADULT  Goal: Minimal or absence of nausea and/or vomiting  Description  INTERVENTIONS:  - Administer IV fluids if ordered to ensure adequate hydration  - Maintain NPO status until nausea and vomiting are resolved  - Nasogastric tube if ordered  - Administer ordered antiemetic medications as needed  - Provide nonpharmacologic comfort measures as appropriate  - Advance diet as tolerated, if ordered  - Consider nutrition services referral to assist patient with adequate nutrition and appropriate food choices  Outcome: Progressing  Goal: Maintains or returns to baseline bowel function  Description  INTERVENTIONS:  - Assess bowel function  - Encourage oral fluids to ensure adequate hydration  - Administer IV fluids if ordered to ensure adequate hydration  - Administer ordered medications as needed  - Encourage mobilization and activity  - Consider nutritional services referral to assist patient with adequate nutrition and appropriate food choices  Outcome: Progressing  Goal: Maintains adequate nutritional intake  Description  INTERVENTIONS:  - Monitor percentage of each meal consumed  - Identify factors contributing to decreased intake, treat as appropriate  - Assist with meals as needed  - Monitor I&O, weight, and lab values if indicated  - Obtain nutrition services referral as needed  Outcome: Progressing     Problem: METABOLIC, FLUID AND ELECTROLYTES - ADULT  Goal: Glucose maintained within target range  Description  INTERVENTIONS:  - Monitor Blood Glucose as ordered  - Assess for signs and symptoms of hyperglycemia and hypoglycemia  - Administer ordered medications to maintain glucose within target range  - Assess nutritional intake and initiate nutrition service referral as needed  Outcome: Progressing     Problem: DISCHARGE PLANNING  Goal: Discharge to home or other facility with appropriate resources  Description    CASE MANAGEMENT INTERVENTIONS:  - Conduct assessment to determine patient/family and health care team treatment goals, and need for post-acute services based on payer coverage, community resources, patient preferences and barriers to discharge    - Address psychosocial, clinical, and financial barriers to discharge as identified in assessment in conjunction with the patient/family and health care team   - Assist the patient in reintegration back into the community by removing barriers which may hinder a successful discharge once deemed stable  - Arrange appropriate level of post-acute services according to patient's needs and preference and payer coverage in collaboration with the physician and health care team   - Communicate with and update the patient/family, physician, and health care team regarding progress on the discharge plan  - Arrange appropriate transportation to post-acute venues  Outcome: Progressing     Quiet, cooperative and visible on unit  Med and meal compliant  Ate 100% of both meals  Denies depression, anxiety, SI and HI  No c/o s/s pain or discomfort  Compliant with behavior plan  Less preoccupied with discharge today  Nicorette gum for coping  Disheveled and layered clothing  Patient attended community meeting, journaling and fresh air group  Maintained on patient safety precautions and mouth checks w/o incident  Due for labs 6/13/20    Will continue to monitor progress in recovery program

## 2020-05-31 NOTE — PROGRESS NOTES
Progress Note - Behavioral Health   Tex Crabtree 48 y o  male MRN: [id number]  Unit/Bed#: [facility] 105-01 Encounter: [id number]    The patient was seen for continuing care and reviewed with treatment team     Schizoaffective disorder, bipolar type ([address] )    Vital signs in last 24 hours:  Temp:  [97 2 °F (36 2 °C)] 97 2 °F (36 2 °C)  HR:  [89-91] 89  Resp:  [20] 20  BP: (120)/(75-76) 120/75    Mental Status Evaluation:    Appearance Adequate hygiene and grooming   Behavior cooperative   Mood irritable   Speech Normal rate and volume   Affect mood-congruent   Thought Processes Goal directed and coherent   Thought Content Does not verbalize delusional material   Perceptual Disturbances Denies hallucinations and does not appear to be responding to internal stimuli   Risk Potential Suicidal/Homicidal Ideation - No evidence of suicidal or homicidal ideation and Patient does not verbalize suicidal or homicidal ideation  Risk of Violence - No evidence of risk for violence found on assessment  Risk of Self Mutilation - No evidence of risk for self mutilation found on assessment   Sensorium oriented to person, place, time/date and situation   Cognition/Memory recent and remote memory grossly intact   Consciousness alert and awake   Attention/Concentration attention span and concentration are age appropriate   Insight poor and improved   Judgement fair   Muscle Strength and Gait/Station normal muscle strength and normal muscle tone, normal gait/station and normal balance   Motor Activity no abnormal movements       Progress Toward Goals:  Patient observed sitting in group room with other patients  Patient continues to be focused on discharge and asked sleeve multiple times  Desires to be discharged to group home  States he is eating and sleeping well  Denies medication issues  No other complaints  Recommended Treatment: Continue with pharmacotherapy, group therapy, milieu therapy and occupational therapy    The patient will be maintained on the following medications:    Current Facility-Administered Medications:  acetaminophen 325 mg Oral Q6H PRN Talon Speaks, MD   acetaminophen 650 mg Oral Q6H PRN [place] Speaks, MD   acetaminophen 975 mg Oral Q8H PRN [place] Speaks, MD   aluminum-magnesium hydroxide-simethicone 30 mL Oral Q4H PRN Talon Speaks, MD   amphetamine-dextroamphetamine 10 mg Oral Daily [place] Speaks, MD   atorvastatin 10 mg Oral Daily With Jessica Will MD   benztropine 1 mg Intramuscular Q6H PRN Talon Speaks, MD   benztropine 1 mg Oral Q6H PRN [place] Speaks, MD   cloZAPine 200 mg Oral Daily [place] Speaks, MD   divalproex sodium 1,500 mg Oral HS Talon Speaks, MD   docusate sodium 100 mg Oral BID Talon Speaks, MD   haloperidol 5 mg Oral Q6H PRN [place] Speaks, MD   haloperidol lactate 5 mg Intramuscular Q6H PRN Talon Speaks, MD   levothyroxine 75 mcg Oral Early Morning Talon Speaks, MD   LORazepam 1 mg Intramuscular Q6H PRN [place] Speaks, MD   LORazepam 1 mg Oral Q6H PRN Talon Speaks, MD   magnesium hydroxide 30 mL Oral Daily PRN Talon Speaks, MD   metFORMIN 500 mg Oral BID With Meals Talon Higuera, MD   nicotine polacrilex 2 mg Oral Q2H PRN [place] Speaks, MD   OLANZapine 5 mg Oral HS Talon Speaks, MD   oxybutynin 5 mg Oral Daily [place] Speaks, MD   pantoprazole 40 mg Oral Early Morning Talon Speaks, MD   traZODone 50 mg Oral HS PRN Talon Speaks, MD       Schizoaffective disorder, bipolar type ([facility] [address] )

## 2020-05-31 NOTE — PLAN OF CARE
Problem: Alteration in Thoughts and Perception  Goal: Verbalize thoughts and feelings  Description  Interventions:  - Promote a nonjudgmental and trusting relationship with the patient through active listening and therapeutic communication  - Assess patient's level of functioning, behavior and potential for risk  - Engage patient in 1 on 1 interactions  - Encourage patient to express fears, feelings, frustrations, and discuss symptoms    - [place] patient to reality, help patient recognize reality-based thinking   - Administer medications as ordered and assess for potential side effects  - Provide the patient education related to the signs and symptoms of the illness and desired effects of prescribed medications  Outcome: Progressing  Goal: Refrain from acting on delusional thinking/internal stimuli  Description  Interventions:  - Monitor patient closely, per order   - Utilize least restrictive measures   - Set reasonable limits, give positive feedback for acceptable   - Administer medications as ordered and monitor of potential side effects  Outcome: Progressing  Goal: Agree to be compliant with medication regime, as prescribed and report medication side effects  Description  Interventions:  - Offer appropriate PRN medication and supervise ingestion; conduct AIMS, as needed   Outcome: Progressing  Goal: Complete daily ADLs, including personal hygiene independently, as able  Description  Interventions:  - Observe, teach, and assist patient with ADLS  - Monitor and promote a balance of rest/activity, with adequate nutrition and elimination   Outcome: Not Progressing     Problem: Ineffective Coping  Goal: Demonstrates healthy coping skills  Outcome: Progressing  Goal: Understands least restrictive measures  Description  Interventions:  - Utilize least restrictive behavior  Outcome: Progressing     Problem: GASTROINTESTINAL - ADULT  Goal: Maintains adequate nutritional intake  Description  INTERVENTIONS:  - Monitor percentage of each meal consumed  - Identify factors contributing to decreased intake, treat as appropriate  - Assist with meals as needed  - Monitor I&O, weight, and lab values if indicated  - Obtain nutrition services referral as needed  Outcome: Win Sharif has been awake, alert, and visible intermittently out in the milieu  Naps at interval in room and enjoys listening to music on radio with peers in [facility] and singing and dancing with peers  Compliant with all scheduled meds when called  Less preoccupied with discharge  Pleasant, polite, and cooperative  Ate 100% supper  Disheveled in appearance and dressed in layers  Attended and participated in evening group  Had evening snack  Continue to monitor/assess for any changes

## 2020-06-01 PROCEDURE — 99232 SBSQ HOSP IP/OBS MODERATE 35: CPT | Performed by: PSYCHIATRY & NEUROLOGY

## 2020-06-01 RX ADMIN — NICOTINE POLACRILEX 2 MG: 2 GUM, CHEWING ORAL at 09:57

## 2020-06-01 RX ADMIN — DIVALPROEX SODIUM 1500 MG: 500 TABLET, EXTENDED RELEASE ORAL at 21:05

## 2020-06-01 RX ADMIN — ACETAMINOPHEN 325 MG: 325 TABLET ORAL at 08:37

## 2020-06-01 RX ADMIN — DEXTROAMPHETAMINE SACCHARATE, AMPHETAMINE ASPARTATE, DEXTROAMPHETAMINE SULFATE AND AMPHETAMINE SULFATE 10 MG: 2.5; 2.5; 2.5; 2.5 TABLET ORAL at 05:25

## 2020-06-01 RX ADMIN — LEVOTHYROXINE SODIUM 75 MCG: 75 TABLET ORAL at 05:25

## 2020-06-01 RX ADMIN — DOCUSATE SODIUM 100 MG: 100 CAPSULE, LIQUID FILLED ORAL at 08:37

## 2020-06-01 RX ADMIN — ATORVASTATIN CALCIUM 10 MG: 10 TABLET, FILM COATED ORAL at 17:04

## 2020-06-01 RX ADMIN — OXYBUTYNIN CHLORIDE 5 MG: 5 TABLET, EXTENDED RELEASE ORAL at 08:37

## 2020-06-01 RX ADMIN — PANTOPRAZOLE SODIUM 40 MG: 40 TABLET, DELAYED RELEASE ORAL at 05:25

## 2020-06-01 RX ADMIN — CLOZAPINE 200 MG: 200 TABLET ORAL at 17:03

## 2020-06-01 RX ADMIN — DOCUSATE SODIUM 100 MG: 100 CAPSULE, LIQUID FILLED ORAL at 17:04

## 2020-06-01 RX ADMIN — METFORMIN HYDROCHLORIDE 500 MG: 500 TABLET ORAL at 08:37

## 2020-06-01 RX ADMIN — OLANZAPINE 5 MG: 5 TABLET, FILM COATED ORAL at 21:05

## 2020-06-01 RX ADMIN — METFORMIN HYDROCHLORIDE 500 MG: 500 TABLET ORAL at 17:04
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
## 2020-06-01 NOTE — CASE MANAGEMENT
CM spoke with Sofya Mohan regarding patient's care  Chelita Ordoñez is on board and understanding of what is going on  hCelita Ordoñez expressed frustrations that he is not allowed out however, understands due to Niyah Ordoñez expressed that he is happy the patient has been doing better today  Chelita Ordoñez will be calling into treatment team on Tuesday to see if he can explain things to patient so he can understand it, as he usually listens to Chelita Ordoñez CM and Chelita Ordoñez to speak weekly now  CM will continue to follow and provide services as needed

## 2020-06-01 NOTE — PLAN OF CARE
Problem: Alteration in Thoughts and Perception  Goal: Attend and participate in unit activities, including therapeutic, recreational, and educational groups  Description  Interventions:  -Encourage Visitation and family involvement in care  Outcome: Progressing      Patient completed Goal card for the week of 6/01/2020  Goals:  Take medications              Go to groups              Stay positive

## 2020-06-01 NOTE — TREATMENT TEAM
06/01/20 1100   Activity/Group Checklist   Group   (IMR)   Attendance Attended   Attendance Duration (min) 46-60   Affect/Mood Appropriate; Wide   Goals Achieved Identified feelings; Discussed discharge plans; Able to listen to others; Able to engage in interactions

## 2020-06-01 NOTE — PLAN OF CARE
Problem: Alteration in Thoughts and Perception  Goal: Refrain from acting on delusional thinking/internal stimuli  Description  Interventions:  - Monitor patient closely, per order   - Utilize least restrictive measures   - Set reasonable limits, give positive feedback for acceptable   - Administer medications as ordered and monitor of potential side effects  Outcome: Progressing  Goal: Agree to be compliant with medication regime, as prescribed and report medication side effects  Description  Interventions:  - Offer appropriate PRN medication and supervise ingestion; conduct AIMS, as needed   Outcome: Progressing  Goal: Attend and participate in unit activities, including therapeutic, recreational, and educational groups  Description  Interventions:  -Encourage Visitation and family involvement in care  Outcome: Lise Barton has been visible on the unit, med and meal compliant with some prompting  Remains disheveled in appearance, multiple layers of clothes  Did attend groups today but attention remains disorganized and is pressured in speech, focusing on discharge  Behaviors controlled otherwise  Will continue to monitor  coffee

## 2020-06-01 NOTE — PROGRESS NOTES
06/01/20 0832   Team Meeting   Meeting Type Daily Rounds   Team Members Present   Team Members Present Physician;;Nurse; Other (Discipline and Name)   Physician Team Member Dr Griffin Brock Team Member Felix Garza RN   Care Management Team Member EDELMIRA Longo   Other (Discipline and Name) MCKAYLA Bentley     Compliant with medications, pleasant, less preoccupation with d/C   Last shower was 5/29

## 2020-06-02 PROCEDURE — 99232 SBSQ HOSP IP/OBS MODERATE 35: CPT | Performed by: PSYCHIATRY & NEUROLOGY

## 2020-06-02 RX ADMIN — METFORMIN HYDROCHLORIDE 500 MG: 500 TABLET ORAL at 08:09

## 2020-06-02 RX ADMIN — OXYBUTYNIN CHLORIDE 5 MG: 5 TABLET, EXTENDED RELEASE ORAL at 08:09

## 2020-06-02 RX ADMIN — DOCUSATE SODIUM 100 MG: 100 CAPSULE, LIQUID FILLED ORAL at 17:07

## 2020-06-02 RX ADMIN — DIVALPROEX SODIUM 1500 MG: 500 TABLET, EXTENDED RELEASE ORAL at 20:48

## 2020-06-02 RX ADMIN — METFORMIN HYDROCHLORIDE 500 MG: 500 TABLET ORAL at 16:27

## 2020-06-02 RX ADMIN — CLOZAPINE 200 MG: 200 TABLET ORAL at 16:27

## 2020-06-02 RX ADMIN — DEXTROAMPHETAMINE SACCHARATE, AMPHETAMINE ASPARTATE, DEXTROAMPHETAMINE SULFATE AND AMPHETAMINE SULFATE 15 MG: 2.5; 2.5; 2.5; 2.5 TABLET ORAL at 05:32

## 2020-06-02 RX ADMIN — PANTOPRAZOLE SODIUM 40 MG: 40 TABLET, DELAYED RELEASE ORAL at 05:32

## 2020-06-02 RX ADMIN — ATORVASTATIN CALCIUM 10 MG: 10 TABLET, FILM COATED ORAL at 16:27

## 2020-06-02 RX ADMIN — OLANZAPINE 5 MG: 5 TABLET, FILM COATED ORAL at 20:48

## 2020-06-02 RX ADMIN — LEVOTHYROXINE SODIUM 75 MCG: 75 TABLET ORAL at 05:32

## 2020-06-02 RX ADMIN — DOCUSATE SODIUM 100 MG: 100 CAPSULE, LIQUID FILLED ORAL at 08:09

## 2020-06-02 RX ADMIN — ACETAMINOPHEN 325 MG: 325 TABLET ORAL at 14:21

## 2020-06-02 NOTE — PLAN OF CARE
Problem: DISCHARGE PLANNING  Goal: Discharge to home or other facility with appropriate resources  Description    CASE MANAGEMENT INTERVENTIONS:  - Conduct assessment to determine patient/family and health care team treatment goals, and need for post-acute services based on payer coverage, community resources, patient preferences and barriers to discharge  - Address psychosocial, clinical, and financial barriers to discharge as identified in assessment in conjunction with the patient/family and health care team   - Assist the patient in reintegration back into the community by removing barriers which may hinder a successful discharge once deemed stable  - Arrange appropriate level of post-acute services according to patient's needs and preference and payer coverage in collaboration with the physician and health care team   - Communicate with and update the patient/family, physician, and health care team regarding progress on the discharge plan  - Arrange appropriate transportation to post-acute venues     Outcome: Progressing     D/C Plan: State vs Group home  D/C date: TBD    Family wants patient to [facility] to [facility]

## 2020-06-02 NOTE — PLAN OF CARE
Problem: Alteration in Thoughts and Perception  Goal: Verbalize thoughts and feelings  Description  Interventions:  - Promote a nonjudgmental and trusting relationship with the patient through active listening and therapeutic communication  - Assess patient's level of functioning, behavior and potential for risk  - Engage patient in 1 on 1 interactions  - Encourage patient to express fears, feelings, frustrations, and discuss symptoms    - [place] patient to reality, help patient recognize reality-based thinking   - Administer medications as ordered and assess for potential side effects  - Provide the patient education related to the signs and symptoms of the illness and desired effects of prescribed medications  Outcome: Progressing  Goal: Refrain from acting on delusional thinking/internal stimuli  Description  Interventions:  - Monitor patient closely, per order   - Utilize least restrictive measures   - Set reasonable limits, give positive feedback for acceptable   - Administer medications as ordered and monitor of potential side effects  Outcome: Progressing  Goal: Agree to be compliant with medication regime, as prescribed and report medication side effects  Description  Interventions:  - Offer appropriate PRN medication and supervise ingestion; conduct AIMS, as needed   Outcome: Progressing  Goal: Attend and participate in unit activities, including therapeutic, recreational, and educational groups  Description  Interventions:  -Encourage Visitation and family involvement in care  Outcome: Not Progressing  Goal: Complete daily ADLs, including personal hygiene independently, as able  Description  Interventions:  - Observe, teach, and assist patient with ADLS  - Monitor and promote a balance of rest/activity, with adequate nutrition and elimination   Outcome: Not Progressing     Problem: Ineffective Coping  Goal: Demonstrates healthy coping skills  Outcome: Progressing  Goal: Understands least restrictive measures  Description  Interventions:  - Utilize least restrictive behavior  Outcome: Progressing     Problem: GASTROINTESTINAL - ADULT  Goal: Maintains adequate nutritional intake  Description  INTERVENTIONS:  - Monitor percentage of each meal consumed  - Identify factors contributing to decreased intake, treat as appropriate  - Assist with meals as needed  - Monitor I&O, weight, and lab values if indicated  - Obtain nutrition services referral as needed  Outcome: Silvia Ennis has been awake, alert, and visible intermittently out in the milieu  Ate 100% supper  Pt remains disheveled in appearance and dressed in layers  Continues to be preoccupied with his clothes in the closet and discharge  Compliant with all scheduled meds when called  Rests in room at intervals and enjoys listening to music on radio in [facility] with peers  Pt did not attend evening group but came out for snack after  Slight irritability noted but no behavioral issues  Continue to monitor/assess for any changes

## 2020-06-02 NOTE — TREATMENT TEAM
Patient left group when he was redirected/ Patient focused on himself and his discharge when group was attempting to focus on a Peer that is being discharged      06/02/20 1100   Activity/Group Checklist   Group   (IMR/Graduation/Elements of Recovery )   Attendance Did not attend   Attendance Duration (min) 46-60   Affect/Mood Blunted/flat; Angry

## 2020-06-02 NOTE — PROGRESS NOTES
06/02/20 1503   Team Meeting   Meeting Type Tx Team Meeting   Initial Conference Date 06/02/20   Next Conference Date 06/09/20   Team Members Present   Team Members Present ;Nurse;;Physician; Other (Discipline and Name)   Physician Team Member Dr Abilio Arias Team Member Margie Espitia RN   Care Management Team Member Beth Howell   Social Work Team Member Daniela Vieira   Other (Discipline and Name) Rafael Mueller; Rafael Spears [address]   Patient/Family Present   Patient Present Yes   Patient's Family Present No     Patient attended treatment team meeting this morning prepared without self-assessment  Patient's   group attendance for last week was 41%  Team and patient completed risk assessment and the patient did not verbalize any desire to elope from the program  Patient verbalized understanding of consequences of eloping from treatment while on a commitment  Patient verbalized no further questions or concerns at the conclusion of the meeting  Next team meeting scheduled for 6/9/2020  Patient presented to treatment and left with in 2 minutes  Patient was not pleased with efforts being made and conversation held  MD told patient that the meeting was not over, however patient continued to walk out  Patient aware that Zoltan Cortez will be on the call next Tuesday  Patient refused to sign 30 review of treatment plan

## 2020-06-02 NOTE — TREATMENT TEAM
06/02/20 0900   Activity/Group Checklist   Group Community meeting  (Morning walk/coffee )   Attendance Attended   Attendance Duration (min) 31-45   Interactions Interacted appropriately   Affect/Mood Appropriate;Bright;Normal range   Goals Achieved Identified feelings; Discussed coping strategies; Discussed discharge plans; Able to listen to others; Able to engage in interactions; Able to recieve feedback

## 2020-06-02 NOTE — PROGRESS NOTES
06/02/20 0846   Team Meeting   Meeting Type Daily Rounds   Team Members Present   Team Members Present Physician;;Nurse;; Other (Discipline and Name)   Physician Team Member Dr Rodney Roberts, RN   Care Management Team Member Tuan Blas MSw   Social Work Team Member RACHELE DawkinsW   Other (Discipline and Name) Milton Oats, CPS     Social, rambling, prune juice, disheveled
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
## 2020-06-02 NOTE — PLAN OF CARE
Problem: Alteration in Thoughts and Perception  Goal: Verbalize thoughts and feelings  Description  Interventions:  - Promote a nonjudgmental and trusting relationship with the patient through active listening and therapeutic communication  - Assess patient's level of functioning, behavior and potential for risk  - Engage patient in 1 on 1 interactions  - Encourage patient to express fears, feelings, frustrations, and discuss symptoms    - [place] patient to reality, help patient recognize reality-based thinking   - Administer medications as ordered and assess for potential side effects  - Provide the patient education related to the signs and symptoms of the illness and desired effects of prescribed medications  Outcome: Progressing  Goal: Agree to be compliant with medication regime, as prescribed and report medication side effects  Description  Interventions:  - Offer appropriate PRN medication and supervise ingestion; conduct AIMS, as needed   Outcome: Progressing  Goal: Attend and participate in unit activities, including therapeutic, recreational, and educational groups  Description  Interventions:  -Encourage Visitation and family involvement in care  Outcome: Progressing     Problem: Ineffective Coping  Goal: Understands least restrictive measures  Description  Interventions:  - Utilize least restrictive behavior  Outcome: Progressing     Problem: GASTROINTESTINAL - ADULT  Goal: Maintains adequate nutritional intake  Description  INTERVENTIONS:  - Monitor percentage of each meal consumed  - Identify factors contributing to decreased intake, treat as appropriate  - Assist with meals as needed  - Monitor I&O, weight, and lab values if indicated  - Obtain nutrition services referral as needed  Outcome: Progressing     Problem: Alteration in Thoughts and Perception  Goal: Treatment Goal: Gain control of psychotic behaviors/thinking, reduce/eliminate presenting symptoms and demonstrate improved reality functioning upon discharge  Outcome: Not Progressing  Goal: Refrain from acting on delusional thinking/internal stimuli  Description  Interventions:  - Monitor patient closely, per order   - Utilize least restrictive measures   - Set reasonable limits, give positive feedback for acceptable   - Administer medications as ordered and monitor of potential side effects  Outcome: Not Progressing  Goal: Recognize dysfunctional thoughts, communicate reality-based thoughts at the time of discharge  Description  Interventions:  - Provide medication and psycho-education to assist patient in compliance and developing insight into his/her illness   Outcome: Not Progressing  Goal: Complete daily ADLs, including personal hygiene independently, as able  Description  Interventions:  - Observe, teach, and assist patient with ADLS  - Monitor and promote a balance of rest/activity, with adequate nutrition and elimination   Outcome: Not Progressing     Problem: Ineffective Coping  Goal: Identifies ineffective coping skills  Outcome: Not Progressing  Goal: Identifies healthy coping skills  Outcome: Not Progressing  Goal: Demonstrates healthy coping skills  Outcome: Not Progressing  Goal: Patient/Family participate in treatment and DC plans  Description  Interventions:  - Provide therapeutic environment  Outcome: Not Progressing  Goal: Patient/Family verbalizes awareness of resources  Outcome: Not Progressing    Quiet, cooperative and visible on unit  Med and meal compliant  Ate 100% of both meals  Denies depression, anxiety, SI and HI  C/o of 2:10 left leg/foot pain, given 325mg PRN APAP  One outburst when staff tried to redirect patient out of preoccupation with getting things out of outer closet  Disheveled and layered clothing  Patient refusing to remove and wash layers of clothing  Patient attended morning walk  Maintained on patient safety precautions and mouth checks w/o incident  Due for labs 6/13/20    Will continue to monitor progress in recovery program

## 2020-06-02 NOTE — TREATMENT TEAM
06/02/20 1400   Activity/Group Checklist   Group   (Recovery Workshop )   Attendance Attended   Attendance Duration (min) 46-60   Affect/Mood Appropriate;Normal range   Goals Achieved Identified feelings; Able to listen to others; Able to engage in interactions

## 2020-06-03 PROCEDURE — 99232 SBSQ HOSP IP/OBS MODERATE 35: CPT | Performed by: PSYCHIATRY & NEUROLOGY

## 2020-06-03 RX ADMIN — DOCUSATE SODIUM 100 MG: 100 CAPSULE, LIQUID FILLED ORAL at 17:15

## 2020-06-03 RX ADMIN — ATORVASTATIN CALCIUM 10 MG: 10 TABLET, FILM COATED ORAL at 16:52

## 2020-06-03 RX ADMIN — LEVOTHYROXINE SODIUM 75 MCG: 75 TABLET ORAL at 06:03

## 2020-06-03 RX ADMIN — OLANZAPINE 5 MG: 5 TABLET, FILM COATED ORAL at 20:52

## 2020-06-03 RX ADMIN — DOCUSATE SODIUM 100 MG: 100 CAPSULE, LIQUID FILLED ORAL at 08:14

## 2020-06-03 RX ADMIN — DEXTROAMPHETAMINE SACCHARATE, AMPHETAMINE ASPARTATE, DEXTROAMPHETAMINE SULFATE AND AMPHETAMINE SULFATE 15 MG: 2.5; 2.5; 2.5; 2.5 TABLET ORAL at 06:03

## 2020-06-03 RX ADMIN — OXYBUTYNIN CHLORIDE 5 MG: 5 TABLET, EXTENDED RELEASE ORAL at 08:15

## 2020-06-03 RX ADMIN — METFORMIN HYDROCHLORIDE 500 MG: 500 TABLET ORAL at 08:14

## 2020-06-03 RX ADMIN — DIVALPROEX SODIUM 1500 MG: 500 TABLET, EXTENDED RELEASE ORAL at 20:52

## 2020-06-03 RX ADMIN — CLOZAPINE 200 MG: 200 TABLET ORAL at 16:52

## 2020-06-03 RX ADMIN — METFORMIN HYDROCHLORIDE 500 MG: 500 TABLET ORAL at 16:52

## 2020-06-03 RX ADMIN — PANTOPRAZOLE SODIUM 40 MG: 40 TABLET, DELAYED RELEASE ORAL at 06:03

## 2020-06-03 NOTE — TREATMENT TEAM
06/03/20 0915   Activity/Group Checklist   Group Community meeting  (Morning walk/Coffee )   Attendance Attended   Attendance Duration (min) 31-45   Interactions Interacted appropriately   Affect/Mood Appropriate;Bright;Normal range   Goals Achieved Identified feelings; Discussed coping strategies; Identified resources and support systems; Able to listen to others; Able to engage in interactions

## 2020-06-03 NOTE — TREATMENT TEAM
06/03/20 1100   Activity/Group Checklist   Group   (IMR/Recovery Anonymous )   Attendance Attended   Attendance Duration (min) 46-60   Interactions Interacted appropriately   Affect/Mood Appropriate;Bright;Normal range   Goals Achieved Identified feelings; Discussed discharge plans; Discussed coping strategies; Able to listen to others; Able to engage in interactions; Able to self-disclose

## 2020-06-03 NOTE — PLAN OF CARE
Problem: Alteration in Thoughts and Perception  Goal: Verbalize thoughts and feelings  Description  Interventions:  - Promote a nonjudgmental and trusting relationship with the patient through active listening and therapeutic communication  - Assess patient's level of functioning, behavior and potential for risk  - Engage patient in 1 on 1 interactions  - Encourage patient to express fears, feelings, frustrations, and discuss symptoms    - [place] patient to reality, help patient recognize reality-based thinking   - Administer medications as ordered and assess for potential side effects  - Provide the patient education related to the signs and symptoms of the illness and desired effects of prescribed medications  Outcome: Progressing  Goal: Agree to be compliant with medication regime, as prescribed and report medication side effects  Description  Interventions:  - Offer appropriate PRN medication and supervise ingestion; conduct AIMS, as needed   Outcome: Progressing     Problem: Alteration in Thoughts and Perception  Goal: Complete daily ADLs, including personal hygiene independently, as able  Description  Interventions:  - Observe, teach, and assist patient with ADLS  - Monitor and promote a balance of rest/activity, with adequate nutrition and elimination   Outcome: Not Progressing     Problem: Alteration in Thoughts and Perception  Goal: Complete daily ADLs, including personal hygiene independently, as able  Description  Interventions:  - Observe, teach, and assist patient with ADLS  - Monitor and promote a balance of rest/activity, with adequate nutrition and elimination   Outcome: Not Progressing    Visible, cooperative with redirection, compliant with routine medications, denied pain, gait steady, ate 100% of dinner, listened to music with peers, will continue to monitor

## 2020-06-03 NOTE — NURSING NOTE
Received pt in bed at change of shift with eyes closed; chest movement noted  Awake and out of his room times one for water returning to bed  Appears to sleep thus this far as per continual rounding
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
## 2020-06-03 NOTE — PROGRESS NOTES
06/03/20 0800   Team Meeting   Meeting Type Daily Rounds   Team Members Present   Team Members Present Physician;Nurse; Other (Discipline and Name)   Physician Team Member Dr Arelis Vences Team Member Colton Aldridge RN   Other (Discipline and Name) Arabella Gorman LCSW; MCKAYLA Mcgee     Disheveled, irritable, no 3-11 groups

## 2020-06-03 NOTE — PLAN OF CARE
Problem: Alteration in Thoughts and Perception  Goal: Agree to be compliant with medication regime, as prescribed and report medication side effects  Description  Interventions:  - Offer appropriate PRN medication and supervise ingestion; conduct AIMS, as needed   Outcome: Progressing  Goal: Attend and participate in unit activities, including therapeutic, recreational, and educational groups  Description  Interventions:  -Encourage Visitation and family involvement in care  Outcome: Progressing     Problem: Alteration in Thoughts and Perception  Goal: Complete daily ADLs, including personal hygiene independently, as able  Description  Interventions:  - Observe, teach, and assist patient with ADLS  - Monitor and promote a balance of rest/activity, with adequate nutrition and elimination   Outcome: Not Progressing  Individual has been up and about the unit, visible  He remains disheveled, with multiple layers of clothes  He continues to complete hygiene an does not change from already worn clothing  Behaviors ha ve been controlled  He remains disorganized and rambles in speech  He actively participated in programming, attended groups  Compliant with medications  Appetite good  Will continue to monitor

## 2020-06-04 PROCEDURE — 99232 SBSQ HOSP IP/OBS MODERATE 35: CPT | Performed by: PSYCHIATRY & NEUROLOGY

## 2020-06-04 RX ADMIN — LEVOTHYROXINE SODIUM 75 MCG: 75 TABLET ORAL at 05:52

## 2020-06-04 RX ADMIN — OLANZAPINE 5 MG: 5 TABLET, FILM COATED ORAL at 21:00

## 2020-06-04 RX ADMIN — NICOTINE POLACRILEX 2 MG: 2 GUM, CHEWING ORAL at 08:41

## 2020-06-04 RX ADMIN — PANTOPRAZOLE SODIUM 40 MG: 40 TABLET, DELAYED RELEASE ORAL at 05:52

## 2020-06-04 RX ADMIN — DIVALPROEX SODIUM 1500 MG: 500 TABLET, EXTENDED RELEASE ORAL at 21:00

## 2020-06-04 RX ADMIN — DOCUSATE SODIUM 100 MG: 100 CAPSULE, LIQUID FILLED ORAL at 17:00

## 2020-06-04 RX ADMIN — DOCUSATE SODIUM 100 MG: 100 CAPSULE, LIQUID FILLED ORAL at 08:41

## 2020-06-04 RX ADMIN — CLOZAPINE 200 MG: 200 TABLET ORAL at 16:37

## 2020-06-04 RX ADMIN — METFORMIN HYDROCHLORIDE 500 MG: 500 TABLET ORAL at 08:40

## 2020-06-04 RX ADMIN — ACETAMINOPHEN 650 MG: 325 TABLET ORAL at 09:00

## 2020-06-04 RX ADMIN — DEXTROAMPHETAMINE SACCHARATE, AMPHETAMINE ASPARTATE, DEXTROAMPHETAMINE SULFATE AND AMPHETAMINE SULFATE 15 MG: 2.5; 2.5; 2.5; 2.5 TABLET ORAL at 05:52

## 2020-06-04 RX ADMIN — OXYBUTYNIN CHLORIDE 5 MG: 5 TABLET, EXTENDED RELEASE ORAL at 08:40

## 2020-06-04 RX ADMIN — ATORVASTATIN CALCIUM 10 MG: 10 TABLET, FILM COATED ORAL at 16:37

## 2020-06-04 RX ADMIN — METFORMIN HYDROCHLORIDE 500 MG: 500 TABLET ORAL at 16:37

## 2020-06-04 NOTE — PROGRESS NOTES
Gina Gonzales verbalized feeling 5/10 moderate pain in his left ankle  650 mg acetaminophen administered per order  Will continue to monitor for effectiveness of MAR intervention

## 2020-06-04 NOTE — PLAN OF CARE
Problem: Alteration in Thoughts and Perception  Goal: Verbalize thoughts and feelings  Description  Interventions:  - Promote a nonjudgmental and trusting relationship with the patient through active listening and therapeutic communication  - Assess patient's level of functioning, behavior and potential for risk  - Engage patient in 1 on 1 interactions  - Encourage patient to express fears, feelings, frustrations, and discuss symptoms    - [place] patient to reality, help patient recognize reality-based thinking   - Administer medications as ordered and assess for potential side effects  - Provide the patient education related to the signs and symptoms of the illness and desired effects of prescribed medications  Outcome: Progressing  Goal: Refrain from acting on delusional thinking/internal stimuli  Description  Interventions:  - Monitor patient closely, per order   - Utilize least restrictive measures   - Set reasonable limits, give positive feedback for acceptable   - Administer medications as ordered and monitor of potential side effects  Outcome: Progressing  Goal: Agree to be compliant with medication regime, as prescribed and report medication side effects  Description  Interventions:  - Offer appropriate PRN medication and supervise ingestion; conduct AIMS, as needed   Outcome: Progressing  Goal: Attend and participate in unit activities, including therapeutic, recreational, and educational groups  Description  Interventions:  -Encourage Visitation and family involvement in care  Outcome: Progressing     Problem: Ineffective Coping  Goal: Demonstrates healthy coping skills  Outcome: Progressing  Goal: Understands least restrictive measures  Description  Interventions:  - Utilize least restrictive behavior  Outcome: Progressing     Problem: GASTROINTESTINAL - ADULT  Goal: Maintains adequate nutritional intake  Description  INTERVENTIONS:  - Monitor percentage of each meal consumed  - Identify factors contributing to decreased intake, treat as appropriate  - Assist with meals as needed  - Monitor I&O, weight, and lab values if indicated  - Obtain nutrition services referral as needed  Outcome: Patti Vann has been awake, alert, and visible intermittently out in the milieu  Rests in room at intervals and listens to music on radio with peers in [facility]  Attended and participated in evening group  Ate 100% supper  Remains disheveled in appearance and dressed in layers  Continues to be preoccupied with discharge  Went out  on deck with staff and peers for fresh air  Attended and participated in evening group and had snack after  Compliant with all scheduled meds without prompting  No behavioral issues  Continue to monitor/assess for any changes
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
## 2020-06-04 NOTE — PLAN OF CARE
Problem: Alteration in Thoughts and Perception  Goal: Refrain from acting on delusional thinking/internal stimuli  Description  Interventions:  - Monitor patient closely, per order   - Utilize least restrictive measures   - Set reasonable limits, give positive feedback for acceptable   - Administer medications as ordered and monitor of potential side effects  Outcome: Progressing  Goal: Agree to be compliant with medication regime, as prescribed and report medication side effects  Description  Interventions:  - Offer appropriate PRN medication and supervise ingestion; conduct AIMS, as needed   Outcome: Progressing  Goal: Attend and participate in unit activities, including therapeutic, recreational, and educational groups  Description  Interventions:  -Encourage Visitation and family involvement in care  Outcome: Yeison Reed has been visible on the unit intermittently, disorganized in content and preoccupied with his clothing and discharge planning  Remains disheveled with multiple layers  Med and meal compliant and his behaviors remain controlled  No further complaints of discomfort or pain verbalized after MAR intervention earlier in the shift in his left ankle  Will continue to monitor

## 2020-06-04 NOTE — PROGRESS NOTES
06/04/20 0900   Activity/Group Checklist   Group Community meeting   Attendance Did not attend   Attendance Duration (min) 31-45   Affect/Mood GAEL

## 2020-06-04 NOTE — PROGRESS NOTES
06/04/20 1100   Activity/Group Checklist   Group   (IMR/Spiritual Principals of Recovery )   Attendance Did not attend   Attendance Duration (min) 46-60   Affect/Mood GAEL

## 2020-06-05 PROCEDURE — 99232 SBSQ HOSP IP/OBS MODERATE 35: CPT | Performed by: PSYCHIATRY & NEUROLOGY

## 2020-06-05 RX ADMIN — METFORMIN HYDROCHLORIDE 500 MG: 500 TABLET ORAL at 08:26

## 2020-06-05 RX ADMIN — DOCUSATE SODIUM 100 MG: 100 CAPSULE, LIQUID FILLED ORAL at 17:11

## 2020-06-05 RX ADMIN — ATORVASTATIN CALCIUM 10 MG: 10 TABLET, FILM COATED ORAL at 16:40

## 2020-06-05 RX ADMIN — METFORMIN HYDROCHLORIDE 500 MG: 500 TABLET ORAL at 16:40

## 2020-06-05 RX ADMIN — ACETAMINOPHEN 325 MG: 325 TABLET ORAL at 08:41

## 2020-06-05 RX ADMIN — OXYBUTYNIN CHLORIDE 5 MG: 5 TABLET, EXTENDED RELEASE ORAL at 08:26

## 2020-06-05 RX ADMIN — OLANZAPINE 5 MG: 5 TABLET, FILM COATED ORAL at 21:15

## 2020-06-05 RX ADMIN — DOCUSATE SODIUM 100 MG: 100 CAPSULE, LIQUID FILLED ORAL at 08:26

## 2020-06-05 RX ADMIN — DIVALPROEX SODIUM 1500 MG: 500 TABLET, EXTENDED RELEASE ORAL at 21:15

## 2020-06-05 RX ADMIN — CLOZAPINE 200 MG: 200 TABLET ORAL at 16:40

## 2020-06-05 NOTE — PLAN OF CARE
Problem: Alteration in Thoughts and Perception  Goal: Verbalize thoughts and feelings  Description  Interventions:  - Promote a nonjudgmental and trusting relationship with the patient through active listening and therapeutic communication  - Assess patient's level of functioning, behavior and potential for risk  - Engage patient in 1 on 1 interactions  - Encourage patient to express fears, feelings, frustrations, and discuss symptoms    - [place] patient to reality, help patient recognize reality-based thinking   - Administer medications as ordered and assess for potential side effects  - Provide the patient education related to the signs and symptoms of the illness and desired effects of prescribed medications  Outcome: Progressing  Goal: Refrain from acting on delusional thinking/internal stimuli  Description  Interventions:  - Monitor patient closely, per order   - Utilize least restrictive measures   - Set reasonable limits, give positive feedback for acceptable   - Administer medications as ordered and monitor of potential side effects  Outcome: Progressing  Goal: Agree to be compliant with medication regime, as prescribed and report medication side effects  Description  Interventions:  - Offer appropriate PRN medication and supervise ingestion; conduct AIMS, as needed   Outcome: Progressing  Goal: Attend and participate in unit activities, including therapeutic, recreational, and educational groups  Description  Interventions:  -Encourage Visitation and family involvement in care  Outcome: Not Progressing     Problem: Ineffective Coping  Goal: Demonstrates healthy coping skills  Outcome: Progressing  Goal: Understands least restrictive measures  Description  Interventions:  - Utilize least restrictive behavior  Outcome: Progressing     Problem: GASTROINTESTINAL - ADULT  Goal: Maintains adequate nutritional intake  Description  INTERVENTIONS:  - Monitor percentage of each meal consumed  - Identify factors contributing to decreased intake, treat as appropriate  - Assist with meals as needed  - Monitor I&O, weight, and lab values if indicated  - Obtain nutrition services referral as needed  Outcome: Nikole Pichardo has been awake, alert, and visible intermittently out in the milieu  Pt rests in room at intervals and listens to music on radio with peers in [employer]  Ate 100% supper  Compliant with all scheduled meds without prompting and cooperative with mouth checks  Still preoccupied with discharge  Disheveled in appearance and dressed and in layers  Pt spoke of want to go to [facility] and out on a pass  Pt did not attend evening group  Had evening snack  No behavioral issues  Continue to monitor/assess for any changes

## 2020-06-05 NOTE — PLAN OF CARE
Problem: Alteration in Thoughts and Perception  Goal: Verbalize thoughts and feelings  Description  Interventions:  - Promote a nonjudgmental and trusting relationship with the patient through active listening and therapeutic communication  - Assess patient's level of functioning, behavior and potential for risk  - Engage patient in 1 on 1 interactions  - Encourage patient to express fears, feelings, frustrations, and discuss symptoms    - [place] patient to reality, help patient recognize reality-based thinking   - Administer medications as ordered and assess for potential side effects  - Provide the patient education related to the signs and symptoms of the illness and desired effects of prescribed medications  Outcome: Progressing  Goal: Refrain from acting on delusional thinking/internal stimuli  Description  Interventions:  - Monitor patient closely, per order   - Utilize least restrictive measures   - Set reasonable limits, give positive feedback for acceptable   - Administer medications as ordered and monitor of potential side effects  Outcome: Progressing  Goal: Agree to be compliant with medication regime, as prescribed and report medication side effects  Description  Interventions:  - Offer appropriate PRN medication and supervise ingestion; conduct AIMS, as needed   Outcome: Progressing  Goal: Attend and participate in unit activities, including therapeutic, recreational, and educational groups  Description  Interventions:  -Encourage Visitation and family involvement in care  Outcome: Not Progressing  Goal: Complete daily ADLs, including personal hygiene independently, as able  Description  Interventions:  - Observe, teach, and assist patient with ADLS  - Monitor and promote a balance of rest/activity, with adequate nutrition and elimination   Outcome: Not Progressing     Problem: Ineffective Coping  Goal: Demonstrates healthy coping skills  Outcome: Progressing  Goal: Understands least restrictive measures  Description  Interventions:  - Utilize least restrictive behavior  Outcome: Zion Vickers has been awake, alert, and visible intermittently out in the milieu  Ate 100% supper  Naps in room at intervals and listens to music on radio in [employer] with peers  Pt remains preoccupied with discharge  Disheveled in appearance and dressed in layers  Pt did not attend evening group but came out for snack after  Compliant with all scheduled meds without when called  No behavioral issues  Continue to monitor/assess for any changes

## 2020-06-05 NOTE — PROGRESS NOTES
06/05/20 1100   Activity/Group Checklist   Group Other (Comment)  (IMR - Music Appreciation)   Attendance Did not attend     Patient was encouraged to attend, but declined  Patient was in the hallway when prompted  He has been irritable this morning, overall
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
## 2020-06-05 NOTE — PROGRESS NOTES
06/05/20 0900   Activity/Group Checklist   Group Community meeting  (Morning Walk)   Attendance Attended   Attendance Duration (min) 16-30   Interactions Interacted appropriately   Affect/Mood Appropriate   Goals Achieved Able to listen to others; Able to engage in interactions; Able to manage/cope with feelings; Discussed discharge plans

## 2020-06-05 NOTE — PROGRESS NOTES
06/05/20 1035   Team Meeting   Meeting Type Daily Rounds   Team Members Present   Team Members Present Physician;;;Nurse; Other (Discipline and Name)   Physician Team Member Dr Sara Hernandez, RN   Care Management Team Member Alva Carias MSW   Social Work Team Member [facility]; ELIZABETH Mayfield     Disheveled, refused early AM medications, disorganized, animated at times

## 2020-06-06 PROCEDURE — 99232 SBSQ HOSP IP/OBS MODERATE 35: CPT | Performed by: PSYCHIATRY & NEUROLOGY

## 2020-06-06 RX ADMIN — OLANZAPINE 5 MG: 5 TABLET, FILM COATED ORAL at 20:24

## 2020-06-06 RX ADMIN — ATORVASTATIN CALCIUM 10 MG: 10 TABLET, FILM COATED ORAL at 16:26

## 2020-06-06 RX ADMIN — CLOZAPINE 200 MG: 200 TABLET ORAL at 16:26

## 2020-06-06 RX ADMIN — DIVALPROEX SODIUM 1500 MG: 500 TABLET, EXTENDED RELEASE ORAL at 20:24

## 2020-06-06 RX ADMIN — DOCUSATE SODIUM 100 MG: 100 CAPSULE, LIQUID FILLED ORAL at 17:08

## 2020-06-06 RX ADMIN — ALUMINUM HYDROXIDE, MAGNESIUM HYDROXIDE, AND SIMETHICONE 30 ML: 200; 200; 20 SUSPENSION ORAL at 20:57

## 2020-06-06 RX ADMIN — METFORMIN HYDROCHLORIDE 500 MG: 500 TABLET ORAL at 16:26

## 2020-06-06 NOTE — PROGRESS NOTES
Progress Notes- Behavioral Health       Assessment/Plan  Principal Problem:  Schizoaffective disorder, bipolar type ([address] )    PLAN:   1) Continue current medications  Patient has refused this morning to medication this writer had a discussion with patient that he will want to take it but he wants to go to a group home  2) Continue Group and individual therapy  3) Discharge planing  Patient also this writer several times that he wants to go to either [facility] group home or a:  Group home he often several times if I would communicated to the team  4) discussed with staff  INTERVAL HISTORY:  This is a 61-year-old white male who was seen for a follow-up appointment at [facility] unit the patient is disheveled with poor hygiene disorganized flat affect  Patient told this writer that he does not want to stay here anymore and that is why he did not take medication  However after some discussion he said that he will take it  He told this writer that he wants to go to [facility] group home or a:  Group home  He repeated that several times  He denied any suicidal homicidal ideas denied any hallucinations or delusions  Staff reports the patient is compliant is variable for the upper today he did not eat properly 20 patient sometimes he is poorly compliant  He is sleeping well  He is not aggressive on the unit    Scheduled Meds:  Scheduled Meds:  Current Facility-Administered Medications:  acetaminophen 325 mg Oral Q6H PRN Elijah Chauhan, MD   acetaminophen 650 mg Oral Q6H PRN Elijah Seeds, MD   acetaminophen 975 mg Oral Q8H PRN [place] Seeds, MD   aluminum-magnesium hydroxide-simethicone 30 mL Oral Q4H PRN Elijah Chauhan, MD   amphetamine-dextroamphetamine 15 mg Oral Daily Elijah Chauhan, MD   atorvastatin 10 mg Oral Daily With Fabian Pelletier MD   benztropine 1 mg Intramuscular Q6H PRN Elijah Seeds, MD   benztropine 1 mg Oral Q6H PRN Elijah Chauhan, MD   cloZAPine 200 mg Oral Daily Elijah Seeds, MD   divalproex sodium 1,500 mg Oral HS Letty HandleMD dawna   docusate sodium 100 mg Oral BID Letty Andrea MD   haloperidol 5 mg Oral Q6H PRN Letty Andrea MD   haloperidol lactate 5 mg Intramuscular Q6H PRN Letty Andrea MD   levothyroxine 75 mcg Oral Early Morning Letty Andrea MD   LORazepam 1 mg Intramuscular Q6H PRN Letty Andrea MD   LORazepam 1 mg Oral Q6H PRN Letty Andrea MD   magnesium hydroxide 30 mL Oral Daily PRN Letty Andrea MD   metFORMIN 500 mg Oral BID With Meals Letty Andrea MD   nicotine polacrilex 2 mg Oral Q2H PRN Letty Andrea MD   OLANZapine 5 mg Oral HS Letty Andrea MD   oxybutynin 5 mg Oral Daily Letty Andrea MD   pantoprazole 40 mg Oral Early Morning Letty Andrea MD   traZODone 50 mg Oral HS PRN Letty Andrea MD     Continuous Infusions:   PRN Meds:   acetaminophen    acetaminophen    acetaminophen    aluminum-magnesium hydroxide-simethicone    benztropine    benztropine    haloperidol    haloperidol lactate    LORazepam    LORazepam    magnesium hydroxide    nicotine polacrilex    traZODone     Patient was several different psychotropic medications he says that mostly he takes his medications  Lab:  His last Depakote level was done on May 15 it was 60 565 5 decided not seen at the valproic acid level  Other there are no other recent labs done  Allergies:   Allergies   Allergen Reactions    Penicillins Anaphylaxis    Carbapenems     Cephalosporins      Review of systems:    Past Medical History:   Diagnosis Date    Asthma     Bipolar 1 disorder ([facility] ) 2011    Colitis 03/27/2014    Constipation     COPD (chronic obstructive pulmonary disease) ([facility] )     Diabetes ([place] [id number] ) 2011    Diabetes insipidus, nephrogenic ([facility] ) 3/6/2020    Disease of thyroid gland     Encephalopathy acute 3/14/2016    GERD (gastroesophageal reflux disease) 2011    Hyperlipidemia     Hypertension     Hyponatremia     Hypothyroidism 2011    Ischemic colitis ([facility] ) 4/30/2014    Lipoprotein deficiency     Moderate cigarette smoker (10-19 per day) 9/15/2016    Obesity     Plantar fasciitis     Psychiatric disorder     Schizo affective schizophrenia [facility]) 2011    Sebaceous gland disease     Tobacco abuse     Tracheobronchitis 3/14/2016       Patient has multiple medical problems as mentioned past medical history however currently symptoms are stable  Vitals:    06/03/20 1600 06/04/20 0700 06/05/20 0700 06/06/20 0700   BP:  150/90 133/78 130/89   BP Location:  Right arm Right arm Left arm   Pulse:  (!) 110 87 89   Resp:  16 20 18   Temp:  (!) 97 3 °F (36 3 °C) 97 5 °F (36 4 °C) 97 8 °F (36 6 °C)   TempSrc: Temporal   Temporal   SpO2: 94%      Weight:       Height:         Mental status examination: This is a 43-year-old  appeared of his age had poor hygiene very disheveled  He was cooperative with this like today  He said that he wanted to go out of her that is why he refused the medication this morning  He has lab disorganized affect he has dry denying any suicidal homicidal ideas  Denying any hallucinations or delusions  His gait is mildly shuffling  Staff reports that at times patient is compliant time he is not compliant but patient seems to have poor insight into his problem but is overall exhibiting good judgment on day-to-day activity he still wants to go to different group homes  Psychomotor activity is normal thought processes is did at times disorganized  Memory has some deficits he could not recall immediate recall and could not tell me some details about his previous life  NOTE:  Total of  20  minutes were spent in talking to patient completing this medical record reviewing medical chart medical decision making    Paris Brooks MD

## 2020-06-06 NOTE — PROGRESS NOTES
Piotr Schneider refused 0900 meds and attempted to administer the 0600 meds that he also refused  Continued to say that he was not want to take the medications and remained preoccupied in conversation about discharge  Irritable edge  Will continue to monitor

## 2020-06-06 NOTE — PLAN OF CARE
Problem: Alteration in Thoughts and Perception  Goal: Agree to be compliant with medication regime, as prescribed and report medication side effects  Description  Interventions:  - Offer appropriate PRN medication and supervise ingestion; conduct AIMS, as needed   Outcome: Not Progressing  Goal: Attend and participate in unit activities, including therapeutic, recreational, and educational groups  Description  Interventions:  -Encourage Visitation and family involvement in care  Outcome: Not Progressing  Goal: Complete daily ADLs, including personal hygiene independently, as able  Description  Interventions:  - Observe, teach, and assist patient with ADLS  - Monitor and promote a balance of rest/activity, with adequate nutrition and elimination   Outcome: Not Progressing    David Donnelly refused his medications this shift, verbalizing that he is frustrated that he still here and remains preoccupied with discharge  No groups attended  Disheveled in appearance, body odor present and is dressed in multiple layers  Otherwise no complaints at this time  Will continue to monitor for changes

## 2020-06-06 NOTE — PLAN OF CARE
Problem: Alteration in Thoughts and Perception  Goal: Refrain from acting on delusional thinking/internal stimuli  Description  Interventions:  - Monitor patient closely, per order   - Utilize least restrictive measures   - Set reasonable limits, give positive feedback for acceptable   - Administer medications as ordered and monitor of potential side effects  Outcome: Progressing  Goal: Agree to be compliant with medication regime, as prescribed and report medication side effects  Description  Interventions:  - Offer appropriate PRN medication and supervise ingestion; conduct AIMS, as needed   Outcome: Progressing     Problem: Alteration in Thoughts and Perception  Goal: Complete daily ADLs, including personal hygiene independently, as able  Description  Interventions:  - Observe, teach, and assist patient with ADLS  - Monitor and promote a balance of rest/activity, with adequate nutrition and elimination   Outcome: Not Progressing     Visible, cooperative with redirection, compliant with routine medications, denied pain, gait steady, ate 100% of dinner, listened to music with peers, behavior controlled, will continue to monitor

## 2020-06-07 PROCEDURE — 99232 SBSQ HOSP IP/OBS MODERATE 35: CPT | Performed by: PSYCHIATRY & NEUROLOGY

## 2020-06-07 RX ADMIN — CLOZAPINE 200 MG: 200 TABLET ORAL at 15:54

## 2020-06-07 RX ADMIN — DOCUSATE SODIUM 100 MG: 100 CAPSULE, LIQUID FILLED ORAL at 17:10

## 2020-06-07 RX ADMIN — LEVOTHYROXINE SODIUM 75 MCG: 75 TABLET ORAL at 05:54

## 2020-06-07 RX ADMIN — PANTOPRAZOLE SODIUM 40 MG: 40 TABLET, DELAYED RELEASE ORAL at 05:54

## 2020-06-07 RX ADMIN — ATORVASTATIN CALCIUM 10 MG: 10 TABLET, FILM COATED ORAL at 15:54

## 2020-06-07 RX ADMIN — OLANZAPINE 5 MG: 5 TABLET, FILM COATED ORAL at 20:45

## 2020-06-07 RX ADMIN — DEXTROAMPHETAMINE SACCHARATE, AMPHETAMINE ASPARTATE, DEXTROAMPHETAMINE SULFATE AND AMPHETAMINE SULFATE 15 MG: 2.5; 2.5; 2.5; 2.5 TABLET ORAL at 05:54

## 2020-06-07 RX ADMIN — METFORMIN HYDROCHLORIDE 500 MG: 500 TABLET ORAL at 15:54

## 2020-06-07 RX ADMIN — DOCUSATE SODIUM 100 MG: 100 CAPSULE, LIQUID FILLED ORAL at 08:04

## 2020-06-07 RX ADMIN — NICOTINE POLACRILEX 2 MG: 2 GUM, CHEWING ORAL at 12:10

## 2020-06-07 RX ADMIN — DIVALPROEX SODIUM 1500 MG: 500 TABLET, EXTENDED RELEASE ORAL at 20:45

## 2020-06-07 RX ADMIN — OXYBUTYNIN CHLORIDE 5 MG: 5 TABLET, EXTENDED RELEASE ORAL at 08:04

## 2020-06-07 RX ADMIN — METFORMIN HYDROCHLORIDE 500 MG: 500 TABLET ORAL at 08:04

## 2020-06-07 NOTE — PROGRESS NOTES
"Principal problem:  Schizoaffective disorder, bipolar type ([facility] )  plan:  1  Continue patient on current medications  2  Continue patient group therapy, individual having milieu therapy  3  Continue discharge planning  4  Discussed with staff member  Interval history:  Patient was laying in his bed barely open his eyes but says that he made this writer  He is speech is still loud and he said he did not want to be here however he did not discuss with decided in detail about where he wants to go and what she wants to do he denied any hallucinations or delusions  Staff also reported no issues with him overall he is compliant he takes his medication is getting along with peers and staff  Patient reports that his mood is good affect is irritable time  Did not appear to be responding to any unseen stimuli      Scheduled Meds:  Current Facility-Administered Medications:  acetaminophen 325 mg Oral Q6H PRN Dragan Man MD   acetaminophen 650 mg Oral Q6H PRN Dragan Man MD   acetaminophen 975 mg Oral Q8H PRN Dragan Man MD   aluminum-magnesium hydroxide-simethicone 30 mL Oral Q4H PRN Dragan Man MD   amphetamine-dextroamphetamine 15 mg Oral Daily Dragan Man MD   atorvastatin 10 mg Oral Daily With Bryant Duggan MD   benztropine 1 mg Intramuscular Q6H PRN Dragan Man MD   benztropine 1 mg Oral Q6H PRN Dragan Man MD   cloZAPine 200 mg Oral Daily Dragan Man MD   divalproex sodium 1,500 mg Oral HS Dragan Man MD   docusate sodium 100 mg Oral BID Dragan Man MD   haloperidol 5 mg Oral Q6H PRN Dragan Man MD   haloperidol lactate 5 mg Intramuscular Q6H PRN Dragan Man MD   levothyroxine 75 mcg Oral Early Morning Dragan Man MD   LORazepam 1 mg Intramuscular Q6H PRN Dragan Man MD   LORazepam 1 mg Oral Q6H PRN Dragan Man MD   magnesium hydroxide 30 mL Oral Daily PRN Dragan Man MD   metFORMIN 500 mg Oral BID With Meals Dragan Man MD   nicotine polacrilex 2 mg Oral Q2H PRN Dragan Man MD " OLANZapine 5 mg Oral HS Meldon Curling, MD   oxybutynin 5 mg Oral Daily Meldon Curling, MD   pantoprazole 40 mg Oral Early Morning Meldon Curling, MD   traZODone 50 mg Oral HS PRN Meldon Curling, MD     Continuous Infusions:   PRN Meds:   acetaminophen    acetaminophen    acetaminophen    aluminum-magnesium hydroxide-simethicone    benztropine    benztropine    haloperidol    haloperidol lactate    LORazepam    LORazepam    magnesium hydroxide    nicotine polacrilex    traZODone   No side effects of medication reported    Allergies   Allergen Reactions    Penicillins Anaphylaxis    Carbapenems     Cephalosporins        Vitals:    06/05/20 0700 06/06/20 0700 06/07/20 0700 06/07/20 0702   BP: 133/78 130/89 115/77 121/71   BP Location: Right arm Left arm Right arm Right arm   Pulse: 87 89 87 89   Resp: 20 18 20    Temp: 97 5 °F (36 4 °C) 97 8 °F (36 6 °C) 98 °F (36 7 °C)    TempSrc:  Temporal Temporal    SpO2:       Weight:   108 kg (238 lb)    Height:           Review of system:  Patient has multiple medical problems as indicated by past medical history currently he is stable    Past Medical History:   Diagnosis Date    Asthma     Bipolar 1 disorder ([address] ) 2011    Colitis 03/27/2014    Constipation     [facility]) [facility]     Diabetes ([facility] ) 2011    Diabetes insipidus, nephrogenic ([facility] ) 3/6/2020    Disease of thyroid gland     Encephalopathy acute 3/14/2016    GERD (gastroesophageal reflux disease) 2011    Hyperlipidemia     Hypertension     Hyponatremia     Hypothyroidism 2011    Ischemic colitis ([facility] ) 4/30/2014    Lipoprotein deficiency     Moderate cigarette smoker (10-19 per day) 9/15/2016    Obesity     Plantar fasciitis     Psychiatric disorder     Schizo affective schizophrenia ([facility] ) 2011    Sebaceous gland disease     Tobacco abuse     Tracheobronchitis 3/14/2016       Labs:  No recent labs were done    Mental status examination:  Patient appeared of his age was laying in the bed barely open his eyes a however he talked to his writer told this writer that he wants to leave here  His psychomotor activities normal gait was not assessed  He was alert oriented x3 and awake  Thought processes is linear concrete  He is denying any hallucinations delusions  He is not aggressive or agitated  He has fair insight and poor judgment  Fund of knowledge below average and intellect is not assessed  Gait was not assessed either  This writer spent 20 minutes completing is not talking to patient, talking to staff and getting information  Paris Brooks MD

## 2020-06-07 NOTE — PLAN OF CARE
Problem: Alteration in Thoughts and Perception  Goal: Agree to be compliant with medication regime, as prescribed and report medication side effects  Description  Interventions:  - Offer appropriate PRN medication and supervise ingestion; conduct AIMS, as needed   Outcome: Progressing  Goal: Attend and participate in unit activities, including therapeutic, recreational, and educational groups  Description  Interventions:  -Encourage Visitation and family involvement in care  Outcome: Not Progressing     Masha Quiroz was med and meal compliant, isolative for the majority of the shift, napping frequently but did not attend groups  Blunted, disheveled, lacks insight about his illness and remains preoccupied with discharge  Otherwise behaviors have been controlled  Will continue to monitor for changes

## 2020-06-08 PROCEDURE — 99232 SBSQ HOSP IP/OBS MODERATE 35: CPT | Performed by: PSYCHIATRY & NEUROLOGY

## 2020-06-08 RX ADMIN — NICOTINE POLACRILEX 2 MG: 2 GUM, CHEWING ORAL at 13:16

## 2020-06-08 RX ADMIN — DOCUSATE SODIUM 100 MG: 100 CAPSULE, LIQUID FILLED ORAL at 08:29

## 2020-06-08 RX ADMIN — OXYBUTYNIN CHLORIDE 5 MG: 5 TABLET, EXTENDED RELEASE ORAL at 08:29

## 2020-06-08 RX ADMIN — DIVALPROEX SODIUM 1500 MG: 500 TABLET, EXTENDED RELEASE ORAL at 21:04

## 2020-06-08 RX ADMIN — DOCUSATE SODIUM 100 MG: 100 CAPSULE, LIQUID FILLED ORAL at 17:18

## 2020-06-08 RX ADMIN — DEXTROAMPHETAMINE SACCHARATE, AMPHETAMINE ASPARTATE, DEXTROAMPHETAMINE SULFATE AND AMPHETAMINE SULFATE 15 MG: 2.5; 2.5; 2.5; 2.5 TABLET ORAL at 05:55

## 2020-06-08 RX ADMIN — PANTOPRAZOLE SODIUM 40 MG: 40 TABLET, DELAYED RELEASE ORAL at 05:55

## 2020-06-08 RX ADMIN — ATORVASTATIN CALCIUM 10 MG: 10 TABLET, FILM COATED ORAL at 16:44

## 2020-06-08 RX ADMIN — METFORMIN HYDROCHLORIDE 500 MG: 500 TABLET ORAL at 16:43

## 2020-06-08 RX ADMIN — OLANZAPINE 5 MG: 5 TABLET, FILM COATED ORAL at 21:04

## 2020-06-08 RX ADMIN — METFORMIN HYDROCHLORIDE 500 MG: 500 TABLET ORAL at 08:29

## 2020-06-08 RX ADMIN — CLOZAPINE 200 MG: 200 TABLET ORAL at 16:43

## 2020-06-08 RX ADMIN — LEVOTHYROXINE SODIUM 75 MCG: 75 TABLET ORAL at 05:55

## 2020-06-08 NOTE — PROGRESS NOTES
Patient declined      06/08/20 0900   Activity/Group Checklist   Group Community meeting   Attendance Did not attend   Attendance Duration (min) 31-45   Affect/Mood GAEL

## 2020-06-08 NOTE — PLAN OF CARE
Problem: Alteration in Thoughts and Perception  Goal: Treatment Goal: Gain control of psychotic behaviors/thinking, reduce/eliminate presenting symptoms and demonstrate improved reality functioning upon discharge  Outcome: Progressing  Goal: Verbalize thoughts and feelings  Description  Interventions:  - Promote a nonjudgmental and trusting relationship with the patient through active listening and therapeutic communication  - Assess patient's level of functioning, behavior and potential for risk  - Engage patient in 1 on 1 interactions  - Encourage patient to express fears, feelings, frustrations, and discuss symptoms    - [place] patient to reality, help patient recognize reality-based thinking   - Administer medications as ordered and assess for potential side effects  - Provide the patient education related to the signs and symptoms of the illness and desired effects of prescribed medications  Outcome: Progressing  Goal: Refrain from acting on delusional thinking/internal stimuli  Description  Interventions:  - Monitor patient closely, per order   - Utilize least restrictive measures   - Set reasonable limits, give positive feedback for acceptable   - Administer medications as ordered and monitor of potential side effects  Outcome: Progressing  Goal: Agree to be compliant with medication regime, as prescribed and report medication side effects  Description  Interventions:  - Offer appropriate PRN medication and supervise ingestion; conduct AIMS, as needed   Outcome: Progressing  Goal: Attend and participate in unit activities, including therapeutic, recreational, and educational groups  Description  Interventions:  -Encourage Visitation and family involvement in care  Outcome: Progressing  Goal: Complete daily ADLs, including personal hygiene independently, as able  Description  Interventions:  - Observe, teach, and assist patient with ADLS  - Monitor and promote a balance of rest/activity, with adequate nutrition and elimination   Outcome: Progressing     Problem: Alteration in Thoughts and Perception  Goal: Recognize dysfunctional thoughts, communicate reality-based thoughts at the time of discharge  Description  Interventions:  - Provide medication and psycho-education to assist patient in compliance and developing insight into his/her illness   Outcome: Not Progressing    Quiet, cooperative and visible on unit  Med and meal compliant  Ate 100% of both meals  Denies depression, anxiety, SI and HI  Behaviors controlled, no outbursts  Disheveled and layered clothing  Did not attend any groups  Nicorette gum for coping  Maintained on patient safety precautions and mouth checks w/o incident  Due for labs 6/13/20    Will continue to monitor progress in recovery program
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
## 2020-06-08 NOTE — TREATMENT TEAM
06/08/20 0830   Team Meeting   Meeting Type Daily Rounds   Team Members Present   Team Members Present Physician;Nurse;   Physician Team Member Dr Lam Rosenthal Team Member Dre Wooten RN   Care Management Team Member EDELMIRA Brian     Remains disheveled  Behaviors controlled

## 2020-06-08 NOTE — CASE MANAGEMENT
PC attempts to patient's contact Patten Yolanda, friend/big brother  It was discussed last week he will be dialing in to the treatment team tomorrow  VM was left with contact info

## 2020-06-08 NOTE — PLAN OF CARE
Problem: Alteration in Thoughts and Perception  Goal: Verbalize thoughts and feelings  Description  Interventions:  - Promote a nonjudgmental and trusting relationship with the patient through active listening and therapeutic communication  - Assess patient's level of functioning, behavior and potential for risk  - Engage patient in 1 on 1 interactions  - Encourage patient to express fears, feelings, frustrations, and discuss symptoms    - [place] patient to reality, help patient recognize reality-based thinking   - Administer medications as ordered and assess for potential side effects  - Provide the patient education related to the signs and symptoms of the illness and desired effects of prescribed medications  Outcome: Progressing  Goal: Agree to be compliant with medication regime, as prescribed and report medication side effects  Description  Interventions:  - Offer appropriate PRN medication and supervise ingestion; conduct AIMS, as needed   Outcome: Progressing     Visible, cooperative with redirection, compliant with routine medications, denied pain, gait steady, ate 100% of dinner, listened to music with peers, behavior controlled, will continue to monitor

## 2020-06-08 NOTE — PLAN OF CARE
Problem: Alteration in Thoughts and Perception  Goal: Verbalize thoughts and feelings  Description  Interventions:  - Promote a nonjudgmental and trusting relationship with the patient through active listening and therapeutic communication  - Assess patient's level of functioning, behavior and potential for risk  - Engage patient in 1 on 1 interactions  - Encourage patient to express fears, feelings, frustrations, and discuss symptoms    - [place] patient to reality, help patient recognize reality-based thinking   - Administer medications as ordered and assess for potential side effects  - Provide the patient education related to the signs and symptoms of the illness and desired effects of prescribed medications  Outcome: Progressing  Goal: Refrain from acting on delusional thinking/internal stimuli  Description  Interventions:  - Monitor patient closely, per order   - Utilize least restrictive measures   - Set reasonable limits, give positive feedback for acceptable   - Administer medications as ordered and monitor of potential side effects  Outcome: Progressing  Goal: Agree to be compliant with medication regime, as prescribed and report medication side effects  Description  Interventions:  - Offer appropriate PRN medication and supervise ingestion; conduct AIMS, as needed   Outcome: Progressing  Goal: Complete daily ADLs, including personal hygiene independently, as able  Description  Interventions:  - Observe, teach, and assist patient with ADLS  - Monitor and promote a balance of rest/activity, with adequate nutrition and elimination   Outcome: Progressing     Problem: Ineffective Coping  Goal: Demonstrates healthy coping skills  Outcome: Progressing  Goal: Understands least restrictive measures  Description  Interventions:  - Utilize least restrictive behavior  Outcome: Progressing     Problem: GASTROINTESTINAL - ADULT  Goal: Maintains adequate nutritional intake  Description  INTERVENTIONS:  - Monitor percentage of each meal consumed  - Identify factors contributing to decreased intake, treat as appropriate  - Assist with meals as needed  - Monitor I&O, weight, and lab values if indicated  - Obtain nutrition services referral as needed  Outcome: Luciana Patrick has been awake, alert, and visible intermittently out in the milieu  Pt completed his daily ADL's today but still dressed in layers  Ate 100% supper  Pt continues to be preoccupied with discharge  Naps in room at intervals and listens to music on radio in [facility] with peers at times  No behavioral issues  Compliant with all scheduled meds without prompting and cooperative with mouth checks  Pt did not attend evening group  Had evening snack  Continue to monitor/assess for any changes

## 2020-06-08 NOTE — PROGRESS NOTES
Patient did not attend long enough to receive credit      06/08/20 1100   Activity/Group Checklist   Group   (IMR/[employer] )   Attendance Did not attend   Attendance Duration (min) 46-60   Affect/Mood GAEL
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
## 2020-06-08 NOTE — PLAN OF CARE
Problem: Alteration in Thoughts and Perception  Goal: Attend and participate in unit activities, including therapeutic, recreational, and educational groups  Description  Interventions:  -Encourage Visitation and family involvement in care  Outcome: Not Progressing     Patient did not complete Goal Card for the week of 6/8/2020

## 2020-06-09 PROCEDURE — 99232 SBSQ HOSP IP/OBS MODERATE 35: CPT | Performed by: PSYCHIATRY & NEUROLOGY

## 2020-06-09 RX ADMIN — DIVALPROEX SODIUM 1500 MG: 500 TABLET, EXTENDED RELEASE ORAL at 21:01

## 2020-06-09 RX ADMIN — OXYBUTYNIN CHLORIDE 5 MG: 5 TABLET, EXTENDED RELEASE ORAL at 08:00

## 2020-06-09 RX ADMIN — ATORVASTATIN CALCIUM 10 MG: 10 TABLET, FILM COATED ORAL at 16:38

## 2020-06-09 RX ADMIN — METFORMIN HYDROCHLORIDE 500 MG: 500 TABLET ORAL at 16:38

## 2020-06-09 RX ADMIN — CLOZAPINE 200 MG: 200 TABLET ORAL at 16:38

## 2020-06-09 RX ADMIN — DOCUSATE SODIUM 100 MG: 100 CAPSULE, LIQUID FILLED ORAL at 17:08

## 2020-06-09 RX ADMIN — METFORMIN HYDROCHLORIDE 500 MG: 500 TABLET ORAL at 08:00

## 2020-06-09 RX ADMIN — DOCUSATE SODIUM 100 MG: 100 CAPSULE, LIQUID FILLED ORAL at 08:00

## 2020-06-09 RX ADMIN — OLANZAPINE 5 MG: 5 TABLET, FILM COATED ORAL at 21:01

## 2020-06-09 NOTE — PLAN OF CARE
Problem: Alteration in Thoughts and Perception  Goal: Treatment Goal: Gain control of psychotic behaviors/thinking, reduce/eliminate presenting symptoms and demonstrate improved reality functioning upon discharge  Outcome: Progressing  Individual continues with disheveled appearance  His speech is pressured and rambling  He had one episode of agitation this morning, upset when peer was yelling  He was able to de-escalate verbally, talking with writer  He did not attend groups, but participated in his treatment team meeting  He was able to stay for the whole meeting and given positive feedback for same  Compliant with medications  Appetite good  Will continue to monitor

## 2020-06-09 NOTE — PROGRESS NOTES
Pt received @ 2300, slept all night  Pt agreed to take morning meds then refused when meds were given to him  Attempted twice   No behavioral issues,  Will continue to monitor

## 2020-06-09 NOTE — PROGRESS NOTES
Patient declined     06/09/20 1100   Activity/Group Checklist   Group   (IMR/Understanding Your Diagnosis )   Attendance Did not attend   Attendance Duration (min) 46-60   Affect/Mood GAEL
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
## 2020-06-09 NOTE — PROGRESS NOTES
Prompted but declined      06/09/20 0900   Activity/Group Checklist   Group Community meeting   Attendance Did not attend   Attendance Duration (min) 31-45   Affect/Mood GAEL

## 2020-06-09 NOTE — PROGRESS NOTES
06/09/20 1023   Team Meeting   Meeting Type Daily Rounds   Initial Conference Date 06/09/20   Patient/Family Present   Patient Present No   Patient's Family Present No     Daily Rounds Documentation    Team Members Present:   MORGAN Francis, Wanaina [place], [place]  Devendra Cam, CPS  Dr Belgica Eid, RN-Unit Manager  Sylvia Woods, RN    Showered over the weekend  Controlled

## 2020-06-10 PROCEDURE — 99232 SBSQ HOSP IP/OBS MODERATE 35: CPT | Performed by: PSYCHIATRY & NEUROLOGY

## 2020-06-10 RX ADMIN — LEVOTHYROXINE SODIUM 75 MCG: 75 TABLET ORAL at 06:20

## 2020-06-10 RX ADMIN — OXYBUTYNIN CHLORIDE 5 MG: 5 TABLET, EXTENDED RELEASE ORAL at 08:53

## 2020-06-10 RX ADMIN — DIVALPROEX SODIUM 1500 MG: 500 TABLET, EXTENDED RELEASE ORAL at 20:45

## 2020-06-10 RX ADMIN — ALUMINUM HYDROXIDE, MAGNESIUM HYDROXIDE, AND SIMETHICONE 30 ML: 200; 200; 20 SUSPENSION ORAL at 16:45

## 2020-06-10 RX ADMIN — ATORVASTATIN CALCIUM 10 MG: 10 TABLET, FILM COATED ORAL at 16:44

## 2020-06-10 RX ADMIN — METFORMIN HYDROCHLORIDE 500 MG: 500 TABLET ORAL at 16:44

## 2020-06-10 RX ADMIN — DOCUSATE SODIUM 100 MG: 100 CAPSULE, LIQUID FILLED ORAL at 17:03

## 2020-06-10 RX ADMIN — OLANZAPINE 5 MG: 5 TABLET, FILM COATED ORAL at 20:45

## 2020-06-10 RX ADMIN — ACETAMINOPHEN 325 MG: 325 TABLET ORAL at 12:48

## 2020-06-10 RX ADMIN — METFORMIN HYDROCHLORIDE 500 MG: 500 TABLET ORAL at 08:53

## 2020-06-10 RX ADMIN — CLOZAPINE 200 MG: 200 TABLET ORAL at 16:44

## 2020-06-10 RX ADMIN — PANTOPRAZOLE SODIUM 40 MG: 40 TABLET, DELAYED RELEASE ORAL at 06:20

## 2020-06-10 RX ADMIN — ALUMINUM HYDROXIDE, MAGNESIUM HYDROXIDE, AND SIMETHICONE 30 ML: 200; 200; 20 SUSPENSION ORAL at 10:00

## 2020-06-10 RX ADMIN — DOCUSATE SODIUM 100 MG: 100 CAPSULE, LIQUID FILLED ORAL at 08:53

## 2020-06-10 RX ADMIN — DEXTROAMPHETAMINE SACCHARATE, AMPHETAMINE ASPARTATE, DEXTROAMPHETAMINE SULFATE AND AMPHETAMINE SULFATE 15 MG: 2.5; 2.5; 2.5; 2.5 TABLET ORAL at 06:21

## 2020-06-10 NOTE — PROGRESS NOTES
06/10/20 1240   Team Meeting   Meeting Type Daily Rounds   Initial Conference Date 06/10/20   Team Members Present   Team Members Present Physician;Nurse;;    Physician Team Member Dr Raymond Flannery RN   Care Management Team Member Kale Damon [place]   Social Work Team Member Fady Brice LCSW   Patient/Family Present   Patient Present No   Patient's Family Present No     Patient refused vitals 3-11pm  Medication compliant  [facility] referral already made [facility]  Not determined yet

## 2020-06-10 NOTE — PLAN OF CARE
Problem: Alteration in Thoughts and Perception  Goal: Verbalize thoughts and feelings  Description  Interventions:  - Promote a nonjudgmental and trusting relationship with the patient through active listening and therapeutic communication  - Assess patient's level of functioning, behavior and potential for risk  - Engage patient in 1 on 1 interactions  - Encourage patient to express fears, feelings, frustrations, and discuss symptoms    - [place] patient to reality, help patient recognize reality-based thinking   - Administer medications as ordered and assess for potential side effects  - Provide the patient education related to the signs and symptoms of the illness and desired effects of prescribed medications  Outcome: Progressing  Goal: Refrain from acting on delusional thinking/internal stimuli  Description  Interventions:  - Monitor patient closely, per order   - Utilize least restrictive measures   - Set reasonable limits, give positive feedback for acceptable   - Administer medications as ordered and monitor of potential side effects  Outcome: Progressing  Goal: Agree to be compliant with medication regime, as prescribed and report medication side effects  Description  Interventions:  - Offer appropriate PRN medication and supervise ingestion; conduct AIMS, as needed   Outcome: Progressing  Goal: Attend and participate in unit activities, including therapeutic, recreational, and educational groups  Description  Interventions:  -Encourage Visitation and family involvement in care  Outcome: Not Progressing     Problem: Ineffective Coping  Goal: Demonstrates healthy coping skills  Outcome: Progressing  Goal: Understands least restrictive measures  Description  Interventions:  - Utilize least restrictive behavior  Outcome: Progressing     Problem: GASTROINTESTINAL - ADULT  Goal: Maintains adequate nutritional intake  Description  INTERVENTIONS:  - Monitor percentage of each meal consumed  - Identify factors contributing to decreased intake, treat as appropriate  - Assist with meals as needed  - Monitor I&O, weight, and lab values if indicated  - Obtain nutrition services referral as needed  Outcome: Garcia Messer has been awake, alert, and visible intermittently out in the milieu  Ate 100% supper  Enjoys listening to music on radio in [employer] with peers  Rests in room at intervals  No behavioral issues  Less preoccupied with discharge  Compliant with all scheduled meds when called  Pt did not attend evening group but came out for snack after  Continue to monitor/assess for any changes

## 2020-06-10 NOTE — PLAN OF CARE
Problem: Alteration in Thoughts and Perception  Goal: Verbalize thoughts and feelings  Description  Interventions:  - Promote a nonjudgmental and trusting relationship with the patient through active listening and therapeutic communication  - Assess patient's level of functioning, behavior and potential for risk  - Engage patient in 1 on 1 interactions  - Encourage patient to express fears, feelings, frustrations, and discuss symptoms    - [place] patient to reality, help patient recognize reality-based thinking   - Administer medications as ordered and assess for potential side effects  - Provide the patient education related to the signs and symptoms of the illness and desired effects of prescribed medications  Outcome: Progressing  Goal: Agree to be compliant with medication regime, as prescribed and report medication side effects  Description  Interventions:  - Offer appropriate PRN medication and supervise ingestion; conduct AIMS, as needed   Outcome: Progressing  Goal: Complete daily ADLs, including personal hygiene independently, as able  Description  Interventions:  - Observe, teach, and assist patient with ADLS  - Monitor and promote a balance of rest/activity, with adequate nutrition and elimination   Outcome: Progressing     Problem: GASTROINTESTINAL - ADULT  Goal: Maintains adequate nutritional intake  Description  INTERVENTIONS:  - Monitor percentage of each meal consumed  - Identify factors contributing to decreased intake, treat as appropriate  - Assist with meals as needed  - Monitor I&O, weight, and lab values if indicated  - Obtain nutrition services referral as needed  Outcome: Sarah Cuff has been visible in the milieu  Disheveled appearance  Layered clothing  Social with select peers  Pleasant and cooperative with staff  Able to make needs known  Still preoccupied with wanting to leave here  Took medications without difficulty  Also given Mylanta at 1645 per request for indigestion  Relief obtained  Ate 100% of his meal  Listened to music in [employer] area  Did not attend Men's group, but did go out on the patio for evening group  Lays in bed at times  No complaint of pain  Took HS medication  Ate snack  Continue to monitor  Precautions maintained
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
## 2020-06-10 NOTE — PLAN OF CARE
Problem: Alteration in Thoughts and Perception  Goal: Treatment Goal: Gain control of psychotic behaviors/thinking, reduce/eliminate presenting symptoms and demonstrate improved reality functioning upon discharge  Outcome: Progressing  Goal: Verbalize thoughts and feelings  Description  Interventions:  - Promote a nonjudgmental and trusting relationship with the patient through active listening and therapeutic communication  - Assess patient's level of functioning, behavior and potential for risk  - Engage patient in 1 on 1 interactions  - Encourage patient to express fears, feelings, frustrations, and discuss symptoms    - [place] patient to reality, help patient recognize reality-based thinking   - Administer medications as ordered and assess for potential side effects  - Provide the patient education related to the signs and symptoms of the illness and desired effects of prescribed medications  Outcome: Progressing  Goal: Refrain from acting on delusional thinking/internal stimuli  Description  Interventions:  - Monitor patient closely, per order   - Utilize least restrictive measures   - Set reasonable limits, give positive feedback for acceptable   - Administer medications as ordered and monitor of potential side effects  Outcome: Progressing  Goal: Agree to be compliant with medication regime, as prescribed and report medication side effects  Description  Interventions:  - Offer appropriate PRN medication and supervise ingestion; conduct AIMS, as needed   Outcome: Progressing  Goal: Attend and participate in unit activities, including therapeutic, recreational, and educational groups  Description  Interventions:  -Encourage Visitation and family involvement in care  Outcome: Progressing  Goal: Recognize dysfunctional thoughts, communicate reality-based thoughts at the time of discharge  Description  Interventions:  - Provide medication and psycho-education to assist patient in compliance and developing insight into his/her illness   Outcome: Progressing  Goal: Complete daily ADLs, including personal hygiene independently, as able  Description  Interventions:  - Observe, teach, and assist patient with ADLS  - Monitor and promote a balance of rest/activity, with adequate nutrition and elimination   Outcome: Progressing     Problem: Ineffective Coping  Goal: Identifies ineffective coping skills  Outcome: Progressing  Goal: Identifies healthy coping skills  Outcome: Progressing  Goal: Demonstrates healthy coping skills  Outcome: Progressing  Goal: Patient/Family participate in treatment and DC plans  Description  Interventions:  - Provide therapeutic environment  Outcome: Progressing  Goal: Patient/Family verbalizes awareness of resources  Outcome: Progressing  Goal: Understands least restrictive measures  Description  Interventions:  - Utilize least restrictive behavior  Outcome: Progressing     Problem: RESPIRATORY - ADULT  Goal: Achieves optimal ventilation and oxygenation  Description  INTERVENTIONS:  - Assess for changes in respiratory status  - Assess for changes in mentation and behavior  - Position to facilitate oxygenation and minimize respiratory effort  - Oxygen administered by appropriate delivery if ordered  - Initiate smoking cessation education as indicated  - Encourage broncho-pulmonary hygiene including cough, deep breathe, Incentive Spirometry  - Assess the need for suctioning and aspirate as needed  - Assess and instruct to report SOB or any respiratory difficulty  - Respiratory Therapy support as indicated  Outcome: Progressing     Problem: GASTROINTESTINAL - ADULT  Goal: Minimal or absence of nausea and/or vomiting  Description  INTERVENTIONS:  - Administer IV fluids if ordered to ensure adequate hydration  - Maintain NPO status until nausea and vomiting are resolved  - Nasogastric tube if ordered  - Administer ordered antiemetic medications as needed  - Provide nonpharmacologic comfort measures as appropriate  - Advance diet as tolerated, if ordered  - Consider nutrition services referral to assist patient with adequate nutrition and appropriate food choices  Outcome: Progressing  Goal: Maintains or returns to baseline bowel function  Description  INTERVENTIONS:  - Assess bowel function  - Encourage oral fluids to ensure adequate hydration  - Administer IV fluids if ordered to ensure adequate hydration  - Administer ordered medications as needed  - Encourage mobilization and activity  - Consider nutritional services referral to assist patient with adequate nutrition and appropriate food choices  Outcome: Progressing  Goal: Maintains adequate nutritional intake  Description  INTERVENTIONS:  - Monitor percentage of each meal consumed  - Identify factors contributing to decreased intake, treat as appropriate  - Assist with meals as needed  - Monitor I&O, weight, and lab values if indicated  - Obtain nutrition services referral as needed  Outcome: Progressing     Problem: METABOLIC, FLUID AND ELECTROLYTES - ADULT  Goal: Glucose maintained within target range  Description  INTERVENTIONS:  - Monitor Blood Glucose as ordered  - Assess for signs and symptoms of hyperglycemia and hypoglycemia  - Administer ordered medications to maintain glucose within target range  - Assess nutritional intake and initiate nutrition service referral as needed  Outcome: Progressing     Problem: DISCHARGE PLANNING  Goal: Discharge to home or other facility with appropriate resources  Description    CASE MANAGEMENT INTERVENTIONS:  - Conduct assessment to determine patient/family and health care team treatment goals, and need for post-acute services based on payer coverage, community resources, patient preferences and barriers to discharge    - Address psychosocial, clinical, and financial barriers to discharge as identified in assessment in conjunction with the patient/family and health care team   - Assist the patient in reintegration back into the community by removing barriers which may hinder a successful discharge once deemed stable  - Arrange appropriate level of post-acute services according to patient's needs and preference and payer coverage in collaboration with the physician and health care team   - Communicate with and update the patient/family, physician, and health care team regarding progress on the discharge plan  - Arrange appropriate transportation to post-acute venues  Outcome: Progressing       Quiet, cooperative and visible on unit  Med and meal compliant  Ate 100% of both meals  Denies depression, anxiety, SI and HI  Behaviors controlled, no outbursts  Disheveled and layered clothing  Did not attend any groups  Maintained on patient safety precautions and mouth checks w/o incident  Due for labs 6/13/20    Will continue to monitor progress in recovery program

## 2020-06-10 NOTE — PROGRESS NOTES
Patient declined      06/09/20 1400   Activity/Group Checklist   Group   (Recovery Workshop )   Attendance Did not attend   Attendance Duration (min) 46-60   Affect/Mood GAEL

## 2020-06-10 NOTE — PROGRESS NOTES
06/10/20 1100   Activity/Group Checklist   Group   (IMR/Recovery Anonymous )   Attendance Did not attend   Attendance Duration (min) 46-60   Affect/Mood GAEL

## 2020-06-11 PROCEDURE — 99232 SBSQ HOSP IP/OBS MODERATE 35: CPT | Performed by: PSYCHIATRY & NEUROLOGY

## 2020-06-11 RX ADMIN — LEVOTHYROXINE SODIUM 75 MCG: 75 TABLET ORAL at 05:45

## 2020-06-11 RX ADMIN — DOCUSATE SODIUM 100 MG: 100 CAPSULE, LIQUID FILLED ORAL at 08:31

## 2020-06-11 RX ADMIN — PANTOPRAZOLE SODIUM 40 MG: 40 TABLET, DELAYED RELEASE ORAL at 05:44

## 2020-06-11 RX ADMIN — ACETAMINOPHEN 325 MG: 325 TABLET ORAL at 10:41

## 2020-06-11 RX ADMIN — DIVALPROEX SODIUM 1500 MG: 500 TABLET, EXTENDED RELEASE ORAL at 20:56

## 2020-06-11 RX ADMIN — DOCUSATE SODIUM 100 MG: 100 CAPSULE, LIQUID FILLED ORAL at 17:09

## 2020-06-11 RX ADMIN — CLOZAPINE 200 MG: 200 TABLET ORAL at 16:55

## 2020-06-11 RX ADMIN — NICOTINE POLACRILEX 2 MG: 2 GUM, CHEWING ORAL at 08:46

## 2020-06-11 RX ADMIN — DEXTROAMPHETAMINE SACCHARATE, AMPHETAMINE ASPARTATE, DEXTROAMPHETAMINE SULFATE AND AMPHETAMINE SULFATE 15 MG: 2.5; 2.5; 2.5; 2.5 TABLET ORAL at 05:44

## 2020-06-11 RX ADMIN — OLANZAPINE 5 MG: 5 TABLET, FILM COATED ORAL at 20:56

## 2020-06-11 RX ADMIN — ATORVASTATIN CALCIUM 10 MG: 10 TABLET, FILM COATED ORAL at 16:55

## 2020-06-11 RX ADMIN — METFORMIN HYDROCHLORIDE 500 MG: 500 TABLET ORAL at 08:31

## 2020-06-11 RX ADMIN — OXYBUTYNIN CHLORIDE 5 MG: 5 TABLET, EXTENDED RELEASE ORAL at 08:31

## 2020-06-11 RX ADMIN — METFORMIN HYDROCHLORIDE 500 MG: 500 TABLET ORAL at 16:55

## 2020-06-11 RX ADMIN — ALUMINUM HYDROXIDE, MAGNESIUM HYDROXIDE, AND SIMETHICONE 30 ML: 200; 200; 20 SUSPENSION ORAL at 08:58

## 2020-06-11 RX ADMIN — TRAZODONE HYDROCHLORIDE 50 MG: 50 TABLET ORAL at 21:45

## 2020-06-11 NOTE — PROGRESS NOTES
06/11/20 0800   Team Meeting   Meeting Type Daily Rounds   Team Members Present   Team Members Present Physician;;Nurse; Other (Discipline and Name)   Physician Team Member Dr Millie Damon, RN, Sandy Bhatt, RN   Care Management Team Member Sharan [place]   Other (Discipline and Name) Arabella Staples LCSW; MCKAYLA Barragan     Patient attended evening group and was cooperative with meds this morning

## 2020-06-11 NOTE — PROGRESS NOTES
06/11/20 1100   Activity/Group Checklist   Group   (IMR/Graduation )   Attendance Attended   Attendance Duration (min) 46-60   Interactions Did not interact   Affect/Mood Appropriate   Goals Achieved Able to listen to others
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
## 2020-06-11 NOTE — PROGRESS NOTES
06/11/20 0900   Activity/Group Checklist   Group Community meeting   Attendance Did not attend   Attendance Duration (min) 31-45   Affect/Mood GAEL

## 2020-06-11 NOTE — PROGRESS NOTES
George Saleh reported having stomach pain @ 0900 and received Mylanta, also requested nicorette gum  Received 325 mg of tylenol @ 1041 for mild left foot pain, rating it a 3/10  Will continue to monitor for effectiveness of MAR interventions

## 2020-06-11 NOTE — PLAN OF CARE
Problem: Alteration in Thoughts and Perception  Goal: Refrain from acting on delusional thinking/internal stimuli  Description  Interventions:  - Monitor patient closely, per order   - Utilize least restrictive measures   - Set reasonable limits, give positive feedback for acceptable   - Administer medications as ordered and monitor of potential side effects  Outcome: Not Progressing  Goal: Agree to be compliant with medication regime, as prescribed and report medication side effects  Description  Interventions:  - Offer appropriate PRN medication and supervise ingestion; conduct AIMS, as needed   Outcome: Lise Barton has been visible on the unit, attended select groups  Compliant with his medications and meals, remains frustrated and preoccupied with discharge during interactions  Layered in clothing but will not wash his clothes, remains disheveled in appearance  Behaviors controlled, will continue to monitor for changes

## 2020-06-12 PROCEDURE — 99232 SBSQ HOSP IP/OBS MODERATE 35: CPT | Performed by: PSYCHIATRY & NEUROLOGY

## 2020-06-12 RX ADMIN — CLOZAPINE 200 MG: 200 TABLET ORAL at 16:38

## 2020-06-12 RX ADMIN — PANTOPRAZOLE SODIUM 40 MG: 40 TABLET, DELAYED RELEASE ORAL at 05:32

## 2020-06-12 RX ADMIN — LEVOTHYROXINE SODIUM 75 MCG: 75 TABLET ORAL at 05:32

## 2020-06-12 RX ADMIN — NICOTINE POLACRILEX 2 MG: 2 GUM, CHEWING ORAL at 13:45

## 2020-06-12 RX ADMIN — NICOTINE POLACRILEX 2 MG: 2 GUM, CHEWING ORAL at 08:35

## 2020-06-12 RX ADMIN — DEXTROAMPHETAMINE SACCHARATE, AMPHETAMINE ASPARTATE, DEXTROAMPHETAMINE SULFATE AND AMPHETAMINE SULFATE 15 MG: 2.5; 2.5; 2.5; 2.5 TABLET ORAL at 05:32

## 2020-06-12 RX ADMIN — OLANZAPINE 5 MG: 5 TABLET, FILM COATED ORAL at 20:45

## 2020-06-12 RX ADMIN — ACETAMINOPHEN 325 MG: 325 TABLET ORAL at 08:33

## 2020-06-12 RX ADMIN — DOCUSATE SODIUM 100 MG: 100 CAPSULE, LIQUID FILLED ORAL at 17:01

## 2020-06-12 RX ADMIN — DIVALPROEX SODIUM 1500 MG: 500 TABLET, EXTENDED RELEASE ORAL at 20:45

## 2020-06-12 RX ADMIN — ATORVASTATIN CALCIUM 10 MG: 10 TABLET, FILM COATED ORAL at 16:38

## 2020-06-12 RX ADMIN — OXYBUTYNIN CHLORIDE 5 MG: 5 TABLET, EXTENDED RELEASE ORAL at 08:33

## 2020-06-12 RX ADMIN — METFORMIN HYDROCHLORIDE 500 MG: 500 TABLET ORAL at 08:33

## 2020-06-12 RX ADMIN — DOCUSATE SODIUM 100 MG: 100 CAPSULE, LIQUID FILLED ORAL at 08:33

## 2020-06-12 RX ADMIN — METFORMIN HYDROCHLORIDE 500 MG: 500 TABLET ORAL at 16:38

## 2020-06-12 NOTE — PROGRESS NOTES
06/12/20 0900   Activity/Group Checklist   Group Community meeting  (Morning Walk)   Attendance Did not attend     Patient was encouraged to attend Morning Walk, but declined  Patient was in bed when prompted

## 2020-06-12 NOTE — PLAN OF CARE
Problem: Alteration in Thoughts and Perception  Goal: Verbalize thoughts and feelings  Description  Interventions:  - Promote a nonjudgmental and trusting relationship with the patient through active listening and therapeutic communication  - Assess patient's level of functioning, behavior and potential for risk  - Engage patient in 1 on 1 interactions  - Encourage patient to express fears, feelings, frustrations, and discuss symptoms    - [place] patient to reality, help patient recognize reality-based thinking   - Administer medications as ordered and assess for potential side effects  - Provide the patient education related to the signs and symptoms of the illness and desired effects of prescribed medications  Outcome: Progressing  Goal: Refrain from acting on delusional thinking/internal stimuli  Description  Interventions:  - Monitor patient closely, per order   - Utilize least restrictive measures   - Set reasonable limits, give positive feedback for acceptable   - Administer medications as ordered and monitor of potential side effects  Outcome: Progressing  Goal: Agree to be compliant with medication regime, as prescribed and report medication side effects  Description  Interventions:  - Offer appropriate PRN medication and supervise ingestion; conduct AIMS, as needed   Outcome: Progressing  Goal: Attend and participate in unit activities, including therapeutic, recreational, and educational groups  Description  Interventions:  -Encourage Visitation and family involvement in care  Outcome: Progressing  Goal: Complete daily ADLs, including personal hygiene independently, as able  Description  Interventions:  - Observe, teach, and assist patient with ADLS  - Monitor and promote a balance of rest/activity, with adequate nutrition and elimination   Outcome: Progressing     Problem: Ineffective Coping  Goal: Demonstrates healthy coping skills  Outcome: Progressing  Goal: Understands least restrictive measures  Description  Interventions:  - Utilize least restrictive behavior  Outcome: Progressing     Problem: GASTROINTESTINAL - ADULT  Goal: Maintains adequate nutritional intake  Description  INTERVENTIONS:  - Monitor percentage of each meal consumed  - Identify factors contributing to decreased intake, treat as appropriate  - Assist with meals as needed  - Monitor I&O, weight, and lab values if indicated  - Obtain nutrition services referral as needed  Outcome: Shweta Gresham has been awake, alert, and visible intermittently out in the milieu  Pt enjoys listening to music on radio with peers in [facility]  Ate 100% supper  Less preoccupied with discharge  Pleasant and cooperative  Pt went out on deck for fresh air  Naps at intervals in room  Remains disheveled in appearance and dressed in layers  Pt did not attend evening group but came out for snack after  Compliant with scheduled meds when called  Pt requested prn Trazodone for sleep and Trazodone 50 mg po given at 2145  Denies any depression, anxiety, si/hi, intent, plan, a/v hallucinations, and has not verbalized any delusions  No behavioral issues  Continue to monitor/assess for any changes

## 2020-06-12 NOTE — PROGRESS NOTES
06/12/20 1100   Activity/Group Checklist   Group Other (Comment)  (IMR - [facility])   Attendance Did not attend     Patient was encouraged to attend IMR, but declined  Patient in bed when prompted
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
## 2020-06-12 NOTE — PROGRESS NOTES
06/12/20 1250   Team Meeting   Meeting Type Daily Rounds   Initial Conference Date 06/12/20   Team Members Present   Team Members Present Physician;Nurse;;   Physician Team Member Dr Rodney Roberts RN   Care Management Team Member Leila Fish [place]   Social Work Team Member Dee Benson LCSW   Patient/Family Present   Patient Present No   Patient's Family Present No     Case reviewed  Controlled, med compliant, labs due tomorrow

## 2020-06-13 RX ADMIN — CLOZAPINE 200 MG: 200 TABLET ORAL at 16:39

## 2020-06-13 RX ADMIN — NICOTINE POLACRILEX 2 MG: 2 GUM, CHEWING ORAL at 08:41

## 2020-06-13 RX ADMIN — DOCUSATE SODIUM 100 MG: 100 CAPSULE, LIQUID FILLED ORAL at 08:18

## 2020-06-13 RX ADMIN — ATORVASTATIN CALCIUM 10 MG: 10 TABLET, FILM COATED ORAL at 16:39

## 2020-06-13 RX ADMIN — METFORMIN HYDROCHLORIDE 500 MG: 500 TABLET ORAL at 16:39

## 2020-06-13 RX ADMIN — METFORMIN HYDROCHLORIDE 500 MG: 500 TABLET ORAL at 08:18

## 2020-06-13 RX ADMIN — PANTOPRAZOLE SODIUM 40 MG: 40 TABLET, DELAYED RELEASE ORAL at 05:16

## 2020-06-13 RX ADMIN — LEVOTHYROXINE SODIUM 75 MCG: 75 TABLET ORAL at 05:16

## 2020-06-13 RX ADMIN — DIVALPROEX SODIUM 1500 MG: 500 TABLET, EXTENDED RELEASE ORAL at 20:53

## 2020-06-13 RX ADMIN — OLANZAPINE 5 MG: 5 TABLET, FILM COATED ORAL at 20:53

## 2020-06-13 RX ADMIN — DEXTROAMPHETAMINE SACCHARATE, AMPHETAMINE ASPARTATE, DEXTROAMPHETAMINE SULFATE AND AMPHETAMINE SULFATE 15 MG: 2.5; 2.5; 2.5; 2.5 TABLET ORAL at 05:16

## 2020-06-13 RX ADMIN — DOCUSATE SODIUM 100 MG: 100 CAPSULE, LIQUID FILLED ORAL at 17:00

## 2020-06-13 RX ADMIN — NICOTINE POLACRILEX 2 MG: 2 GUM, CHEWING ORAL at 13:52

## 2020-06-13 RX ADMIN — OXYBUTYNIN CHLORIDE 5 MG: 5 TABLET, EXTENDED RELEASE ORAL at 08:18

## 2020-06-13 NOTE — PLAN OF CARE
Problem: Alteration in Thoughts and Perception  Goal: Treatment Goal: Gain control of psychotic behaviors/thinking, reduce/eliminate presenting symptoms and demonstrate improved reality functioning upon discharge  6/13/2020 1049 by Gregory Vincent RN  Outcome: Progressing  6/13/2020 1048 by Gregory Vincent RN  Outcome: Progressing  Goal: Verbalize thoughts and feelings  Description  Interventions:  - Promote a nonjudgmental and trusting relationship with the patient through active listening and therapeutic communication  - Assess patient's level of functioning, behavior and potential for risk  - Engage patient in 1 on 1 interactions  - Encourage patient to express fears, feelings, frustrations, and discuss symptoms    - [place] patient to reality, help patient recognize reality-based thinking   - Administer medications as ordered and assess for potential side effects  - Provide the patient education related to the signs and symptoms of the illness and desired effects of prescribed medications  6/13/2020 1049 by Gregory Vincent RN  Outcome: Progressing  6/13/2020 1048 by Gregory Vincent RN  Outcome: Progressing  Goal: Agree to be compliant with medication regime, as prescribed and report medication side effects  Description  Interventions:  - Offer appropriate PRN medication and supervise ingestion; conduct AIMS, as needed   6/13/2020 1049 by Gregory Vincent RN  Outcome: Progressing  6/13/2020 1048 by Gregory Vincent RN  Outcome: Progressing     Problem: Ineffective Coping  Goal: Identifies ineffective coping skills  Outcome: Progressing  Goal: Identifies healthy coping skills  Outcome: Progressing  Goal: Demonstrates healthy coping skills  Outcome: Progressing  Goal: Patient/Family participate in treatment and DC plans  Description  Interventions:  - Provide therapeutic environment  Outcome: Progressing  Goal: Patient/Family verbalizes awareness of resources  Outcome: Progressing  Goal: Understands least restrictive measures  Description  Interventions:  - Utilize least restrictive behavior  Outcome: Progressing     Problem: RESPIRATORY - ADULT  Goal: Achieves optimal ventilation and oxygenation  Description  INTERVENTIONS:  - Assess for changes in respiratory status  - Assess for changes in mentation and behavior  - Position to facilitate oxygenation and minimize respiratory effort  - Oxygen administered by appropriate delivery if ordered  - Initiate smoking cessation education as indicated  - Encourage broncho-pulmonary hygiene including cough, deep breathe, Incentive Spirometry  - Assess the need for suctioning and aspirate as needed  - Assess and instruct to report SOB or any respiratory difficulty  - Respiratory Therapy support as indicated  Outcome: Progressing     Problem: GASTROINTESTINAL - ADULT  Goal: Minimal or absence of nausea and/or vomiting  Description  INTERVENTIONS:  - Administer IV fluids if ordered to ensure adequate hydration  - Maintain NPO status until nausea and vomiting are resolved  - Nasogastric tube if ordered  - Administer ordered antiemetic medications as needed  - Provide nonpharmacologic comfort measures as appropriate  - Advance diet as tolerated, if ordered  - Consider nutrition services referral to assist patient with adequate nutrition and appropriate food choices  Outcome: Progressing  Goal: Maintains or returns to baseline bowel function  Description  INTERVENTIONS:  - Assess bowel function  - Encourage oral fluids to ensure adequate hydration  - Administer IV fluids if ordered to ensure adequate hydration  - Administer ordered medications as needed  - Encourage mobilization and activity  - Consider nutritional services referral to assist patient with adequate nutrition and appropriate food choices  Outcome: Progressing  Goal: Maintains adequate nutritional intake  Description  INTERVENTIONS:  - Monitor percentage of each meal consumed  - Identify factors contributing to decreased intake, treat as appropriate  - Assist with meals as needed  - Monitor I&O, weight, and lab values if indicated  - Obtain nutrition services referral as needed  Outcome: Progressing     Problem: METABOLIC, FLUID AND ELECTROLYTES - ADULT  Goal: Glucose maintained within target range  Description  INTERVENTIONS:  - Monitor Blood Glucose as ordered  - Assess for signs and symptoms of hyperglycemia and hypoglycemia  - Administer ordered medications to maintain glucose within target range  - Assess nutritional intake and initiate nutrition service referral as needed  Outcome: Progressing     Problem: DISCHARGE PLANNING  Goal: Discharge to home or other facility with appropriate resources  Description    CASE MANAGEMENT INTERVENTIONS:  - Conduct assessment to determine patient/family and health care team treatment goals, and need for post-acute services based on payer coverage, community resources, patient preferences and barriers to discharge  - Address psychosocial, clinical, and financial barriers to discharge as identified in assessment in conjunction with the patient/family and health care team   - Assist the patient in reintegration back into the community by removing barriers which may hinder a successful discharge once deemed stable  - Arrange appropriate level of post-acute services according to patient's needs and preference and payer coverage in collaboration with the physician and health care team   - Communicate with and update the patient/family, physician, and health care team regarding progress on the discharge plan  - Arrange appropriate transportation to post-acute venues     Outcome: Progressing     Problem: Alteration in Thoughts and Perception  Goal: Refrain from acting on delusional thinking/internal stimuli  Description  Interventions:  - Monitor patient closely, per order   - Utilize least restrictive measures   - Set reasonable limits, give positive feedback for acceptable   - Administer medications as ordered and monitor of potential side effects  6/13/2020 1049 by Edenilson Serrano RN  Outcome: Not Progressing  6/13/2020 1048 by Edenilson Serrano RN  Outcome: Progressing  Goal: Attend and participate in unit activities, including therapeutic, recreational, and educational groups  Description  Interventions:  -Encourage Visitation and family involvement in care  6/13/2020 1049 by Edenilson Serrano RN  Outcome: Not Progressing  6/13/2020 1048 by Edenilson Serrano RN  Outcome: Progressing  Goal: Recognize dysfunctional thoughts, communicate reality-based thoughts at the time of discharge  Description  Interventions:  - Provide medication and psycho-education to assist patient in compliance and developing insight into his/her illness   6/13/2020 1049 by Edenilson Serrano RN  Outcome: Not Progressing  6/13/2020 1048 by Edenilson Serrano RN  Outcome: Progressing  Goal: Complete daily ADLs, including personal hygiene independently, as able  Description  Interventions:  - Observe, teach, and assist patient with ADLS  - Monitor and promote a balance of rest/activity, with adequate nutrition and elimination   6/13/2020 1049 by Edenilson Serrano RN  Outcome: Not Progressing  6/13/2020 1048 by Edenilson Serrano RN  Outcome: Progressing     Quiet, cooperative and visible on unit  Med and meal compliant  Ate 100% of both meals  Denies depression, anxiety, SI and HI  Behaviors controlled, no outbursts  Still preoccupied with discharge  Disheveled and layered clothing  Attended community meeting and fresh air groups  Maintained on patient safety precautions and mouth checks w/o incident  Due for labs 6/13/20 refused    Will continue to monitor progress in recovery program

## 2020-06-13 NOTE — PLAN OF CARE
Problem: Alteration in Thoughts and Perception  Goal: Verbalize thoughts and feelings  Description  Interventions:  - Promote a nonjudgmental and trusting relationship with the patient through active listening and therapeutic communication  - Assess patient's level of functioning, behavior and potential for risk  - Engage patient in 1 on 1 interactions  - Encourage patient to express fears, feelings, frustrations, and discuss symptoms    - [place] patient to reality, help patient recognize reality-based thinking   - Administer medications as ordered and assess for potential side effects  - Provide the patient education related to the signs and symptoms of the illness and desired effects of prescribed medications  Outcome: Progressing  Goal: Refrain from acting on delusional thinking/internal stimuli  Description  Interventions:  - Monitor patient closely, per order   - Utilize least restrictive measures   - Set reasonable limits, give positive feedback for acceptable   - Administer medications as ordered and monitor of potential side effects  Outcome: Progressing  Goal: Agree to be compliant with medication regime, as prescribed and report medication side effects  Description  Interventions:  - Offer appropriate PRN medication and supervise ingestion; conduct AIMS, as needed   Outcome: Progressing       Visible, cooperative with redirection, compliant with routine medications, denied pain, gait steady, ate 100% of dinner, listened to music with peers, behavior controlled, will continue to monitor

## 2020-06-13 NOTE — PROGRESS NOTES
Psychiatry Progress Note    Subjective: Interval History     Patient visible on the unit today  Poor hygiene  Wearing multiple layers of clothing  Remains preoccupied with discharge; intrusive with author on multiple occasions in regards to discharge date  Wanting author to get in touch with whoever is in charge of all doctors to get him discharges  Insight remains poor  Refused blood work this am  Has been compliant with medications  Eating meals  Sleeping       Behavior over the last 24 hours:  unchanged  Sleep: normal  Appetite: normal  Medication side effects: No  ROS: no complaints    Current medications:    Current Facility-Administered Medications:     acetaminophen (TYLENOL) tablet 325 mg, 325 mg, Oral, Q6H PRN, Sruthi Brink MD, 325 mg at 06/12/20 0833    acetaminophen (TYLENOL) tablet 650 mg, 650 mg, Oral, Q6H PRN, Sruthi Brink MD, 650 mg at 06/04/20 0900    acetaminophen (TYLENOL) tablet 975 mg, 975 mg, Oral, Q8H PRN, Sruthi Brink MD, 975 mg at 03/29/20 1759    aluminum-magnesium hydroxide-simethicone (MYLANTA) 200-200-20 mg/5 mL oral suspension 30 mL, 30 mL, Oral, Q4H PRN, Sruthi Brink MD, 30 mL at 06/11/20 0858    amphetamine-dextroamphetamine (ADDERALL) tablet 15 mg, 15 mg, Oral, Daily, Sruthi Brink MD, 15 mg at 06/13/20 0516    atorvastatin (LIPITOR) tablet 10 mg, 10 mg, Oral, Daily With Winnie Woods MD, 10 mg at 06/12/20 1638    benztropine (COGENTIN) injection 1 mg, 1 mg, Intramuscular, Q6H PRN, Sruthi Brink MD    benztropine (COGENTIN) tablet 1 mg, 1 mg, Oral, Q6H PRN, Sruthi Brink MD, 1 mg at 04/07/20 2031    clozapine (CLOZARIL) tablet 200 mg, 200 mg, Oral, Daily, Sruthi Brink MD, 200 mg at 06/12/20 1638    divalproex sodium (DEPAKOTE ER) 24 hr tablet 1,500 mg, 1,500 mg, Oral, HS, Sruthi Brink MD, 1,500 mg at 06/12/20 2045    docusate sodium (COLACE) capsule 100 mg, 100 mg, Oral, BID, Sruthi Brink MD, 100 mg at 06/13/20 0818    haloperidol (HALDOL) tablet 5 mg, 5 mg, Oral, Q6H PRN, Louie Theodore MD, 5 mg at 05/05/20 1648    haloperidol lactate (HALDOL) injection 5 mg, 5 mg, Intramuscular, Q6H PRN, Louie Theodore MD    levothyroxine tablet 75 mcg, 75 mcg, Oral, Early Morning, Louie Theodore MD, 75 mcg at 06/13/20 0516    LORazepam (ATIVAN) 2 mg/mL injection 1 mg, 1 mg, Intramuscular, Q6H PRN, Louie Theodore MD    LORazepam (ATIVAN) tablet 1 mg, 1 mg, Oral, Q6H PRN, Louie Theodore MD    magnesium hydroxide (MILK OF MAGNESIA) 400 mg/5 mL oral suspension 30 mL, 30 mL, Oral, Daily PRN, Louie Theodore MD, 30 mL at 03/14/20 2032    metFORMIN (GLUCOPHAGE) tablet 500 mg, 500 mg, Oral, BID With Meals, Louie Theodore MD, 500 mg at 06/13/20 0818    nicotine polacrilex (NICORETTE) gum 2 mg, 2 mg, Oral, Q2H PRN, Louie Theodore MD, 2 mg at 06/13/20 0841    OLANZapine (ZyPREXA) tablet 5 mg, 5 mg, Oral, HS, Louie Theodore MD, 5 mg at 06/12/20 2045    oxybutynin (DITROPAN-XL) 24 hr tablet 5 mg, 5 mg, Oral, Daily, Louie Theodore MD, 5 mg at 06/13/20 0818    pantoprazole (PROTONIX) EC tablet 40 mg, 40 mg, Oral, Early Morning, Louie Theodore MD, 40 mg at 06/13/20 0516    traZODone (DESYREL) tablet 50 mg, 50 mg, Oral, HS PRN, Louie Theodore MD, 50 mg at 06/11/20 2145    Current Problem List:    Patient Active Problem List   Diagnosis    Schizoaffective disorder, bipolar type ([facility] [address]    Constipation, chronic    Type 2 diabetes mellitus without complication, without long-term current use of insulin ([facility])    Chronic airway obstruction, not elsewhere classified    Benign essential hypertension    Disorder of lipoprotein and lipid metabolism    Foot callus    Gastroesophageal reflux disease without esophagitis    Acquired hypothyroidism    Morbid obesity ([facility] [address] )    BMI 34 0-34 9,adult    Nail abnormality    Encounter for well adult exam with abnormal findings    Tobacco abuse    Diabetes insipidus, nephrogenic ([facility] [address] )    ADHD       Problem list reviewed 06/13/20     Objective:     Vital Signs:  Vitals:    06/11/20 1600 06/12/20 0700 06/12/20 0705 06/13/20 0700   BP: 135/82 138/86 164/94 150/72   BP Location: Left arm Left arm Left arm Right arm   Pulse: 102 95 104 92   Resp: 19 18 18 18   Temp: (!) 96 8 °F (36 °C) (!) 97 3 °F (36 3 °C)  97 6 °F (36 4 °C)   TempSrc: Temporal Temporal     SpO2: 97%      Weight:       Height:             Appearance:  age appropriate, casually dressed and disheveled   Behavior:  evasive   Speech:  soft   Mood:  constricted   Affect:  flat   Thought Process:  perserverative   Thought Content:  normal   Perceptual Disturbances: None   Risk Potential: none   Sensorium:  person, place and time   Cognition:  intact   Consciousness:  alert and awake    Attention: attention span and concentration were age appropriate   Intellect: average   Insight:  limited   Judgment: limited      Motor Activity: no abnormal movements       I/O Past 24 hours:  No intake/output data recorded  No intake/output data recorded  Labs:  Reviewed 06/13/20    Progress Toward Goals:  Unchanged    Assessment / Plan:     Schizoaffective disorder, bipolar type ([facility] [address] )    Recommended Treatment:      Medication changes:  1) continue current treatment plan    Non-pharmacological treatments  1) Continue with group therapy, milieu therapy and occupational therapy  Safety  1) Safety/communication plan established targeting dynamic risk factors above  2) Risks, benefits, and possible side effects of medications explained to patient and patient verbalizes understanding  Counseling / Coordination of Care    Total floor / unit time spent today 20 minutes  Greater than 50% of total time was spent with the patient and / or family counseling and / or coordination of care  A description of the counseling / coordination of care       Patient's Rights, confidentiality and exceptions to confidentiality, use of automated medical record, [employer] staff access to medical record, and consent to treatment reviewed      Baldo Cabral PA-C

## 2020-06-14 RX ADMIN — METFORMIN HYDROCHLORIDE 500 MG: 500 TABLET ORAL at 16:56

## 2020-06-14 RX ADMIN — PANTOPRAZOLE SODIUM 40 MG: 40 TABLET, DELAYED RELEASE ORAL at 08:13

## 2020-06-14 RX ADMIN — LEVOTHYROXINE SODIUM 75 MCG: 75 TABLET ORAL at 08:14

## 2020-06-14 RX ADMIN — DEXTROAMPHETAMINE SACCHARATE, AMPHETAMINE ASPARTATE, DEXTROAMPHETAMINE SULFATE AND AMPHETAMINE SULFATE 15 MG: 2.5; 2.5; 2.5; 2.5 TABLET ORAL at 08:13

## 2020-06-14 RX ADMIN — OXYBUTYNIN CHLORIDE 5 MG: 5 TABLET, EXTENDED RELEASE ORAL at 08:13

## 2020-06-14 RX ADMIN — DOCUSATE SODIUM 100 MG: 100 CAPSULE, LIQUID FILLED ORAL at 08:13

## 2020-06-14 RX ADMIN — DOCUSATE SODIUM 100 MG: 100 CAPSULE, LIQUID FILLED ORAL at 17:21

## 2020-06-14 RX ADMIN — DIVALPROEX SODIUM 1500 MG: 500 TABLET, EXTENDED RELEASE ORAL at 20:54

## 2020-06-14 RX ADMIN — OLANZAPINE 5 MG: 5 TABLET, FILM COATED ORAL at 20:54

## 2020-06-14 RX ADMIN — NICOTINE POLACRILEX 2 MG: 2 GUM, CHEWING ORAL at 09:19

## 2020-06-14 RX ADMIN — METFORMIN HYDROCHLORIDE 500 MG: 500 TABLET ORAL at 08:13

## 2020-06-14 RX ADMIN — ATORVASTATIN CALCIUM 10 MG: 10 TABLET, FILM COATED ORAL at 16:56

## 2020-06-14 RX ADMIN — CLOZAPINE 200 MG: 200 TABLET ORAL at 16:56

## 2020-06-14 NOTE — PROGRESS NOTES
Psychiatry Progress Note    Subjective: Interval History     Pt remains with poor hygiene; continues to wear multiple layers of clothing  Remains fixated on discharge; reporting that he is doing everything he needs to however still refusing lab work and refused vital signs at one point yesterday  Wanting again for the head doctor of the hospital to come discharge him  Education and support provided  Has been compliant with medications; eating meals      Behavior over the last 24 hours:  unchanged  Sleep: normal  Appetite: normal  Medication side effects: No  ROS: no complaints    Current medications:    Current Facility-Administered Medications:     acetaminophen (TYLENOL) tablet 325 mg, 325 mg, Oral, Q6H PRN, Toña Harris MD, 325 mg at 06/12/20 0833    acetaminophen (TYLENOL) tablet 650 mg, 650 mg, Oral, Q6H PRN, Toña Harris MD, 650 mg at 06/04/20 0900    acetaminophen (TYLENOL) tablet 975 mg, 975 mg, Oral, Q8H PRN, Toña Harris MD, 975 mg at 03/29/20 1759    aluminum-magnesium hydroxide-simethicone (MYLANTA) 200-200-20 mg/5 mL oral suspension 30 mL, 30 mL, Oral, Q4H PRN, Toña Harris MD, 30 mL at 06/11/20 0858    amphetamine-dextroamphetamine (ADDERALL) tablet 15 mg, 15 mg, Oral, Daily, Toña Harris MD, 15 mg at 06/14/20 0813    atorvastatin (LIPITOR) tablet 10 mg, 10 mg, Oral, Daily With Kevin Lyle MD, 10 mg at 06/13/20 1639    benztropine (COGENTIN) injection 1 mg, 1 mg, Intramuscular, Q6H PRN, Toña Harris MD    benztropine (COGENTIN) tablet 1 mg, 1 mg, Oral, Q6H PRN, Toña Harris MD, 1 mg at 04/07/20 2031    clozapine (CLOZARIL) tablet 200 mg, 200 mg, Oral, Daily, Toña Harris MD, 200 mg at 06/13/20 1639    divalproex sodium (DEPAKOTE ER) 24 hr tablet 1,500 mg, 1,500 mg, Oral, HS, Toña Harris MD, 1,500 mg at 06/13/20 2053    docusate sodium (COLACE) capsule 100 mg, 100 mg, Oral, BID, Toña Harris MD, 100 mg at 06/14/20 0813    haloperidol (HALDOL) tablet 5 mg, 5 mg, Oral, Q6H PRN, Sonam Gamboa [facility], MD, 5 mg at 05/05/20 1648    haloperidol lactate (HALDOL) injection 5 mg, 5 mg, Intramuscular, Q6H PRN, Judie Blas MD    levothyroxine tablet 75 mcg, 75 mcg, Oral, Early Morning, Judie Blas MD, 75 mcg at 06/14/20 0814    LORazepam (ATIVAN) 2 mg/mL injection 1 mg, 1 mg, Intramuscular, Q6H PRN, Judie Blas MD    LORazepam (ATIVAN) tablet 1 mg, 1 mg, Oral, Q6H PRN, Judie Blas MD    magnesium hydroxide (MILK OF MAGNESIA) 400 mg/5 mL oral suspension 30 mL, 30 mL, Oral, Daily PRN, Judie Blas MD, 30 mL at 03/14/20 2032    metFORMIN (GLUCOPHAGE) tablet 500 mg, 500 mg, Oral, BID With Meals, Judie Blas MD, 500 mg at 06/14/20 0813    nicotine polacrilex (NICORETTE) gum 2 mg, 2 mg, Oral, Q2H PRN, Judie Blas MD, 2 mg at 06/14/20 0919    OLANZapine (ZyPREXA) tablet 5 mg, 5 mg, Oral, HS, Judie Blas MD, 5 mg at 06/13/20 2053    oxybutynin (DITROPAN-XL) 24 hr tablet 5 mg, 5 mg, Oral, Daily, Judie Blas MD, 5 mg at 06/14/20 0813    pantoprazole (PROTONIX) EC tablet 40 mg, 40 mg, Oral, Early Morning, Judie Blas MD, 40 mg at 06/14/20 0813    traZODone (DESYREL) tablet 50 mg, 50 mg, Oral, HS PRN, Judie Blas MD, 50 mg at 06/11/20 2145    Current Problem List:    Patient Active Problem List   Diagnosis    Schizoaffective disorder, bipolar type ([facility] [address] )    Constipation, chronic    Type 2 diabetes mellitus without complication, without long-term current use of insulin ([facility] [address] )    Chronic airway obstruction, not elsewhere classified    Benign essential hypertension    Disorder of lipoprotein and lipid metabolism    Foot callus    Gastroesophageal reflux disease without esophagitis    Acquired hypothyroidism    Morbid obesity ([facility] [address] )    BMI 34 0-34 9,adult    Nail abnormality    Encounter for well adult exam with abnormal findings    Tobacco abuse    Diabetes insipidus, nephrogenic ([facility] [address] )    ADHD       Problem list reviewed 06/14/20     Objective:     Vital Signs:  Vitals:    06/12/20 0700 06/12/20 0705 06/13/20 0700 06/14/20 0739   BP: 138/86 164/94 150/72 144/93   BP Location: Left arm Left arm Right arm Right arm   Pulse: 95 104 92 (!) 112   Resp: 18 18 18 20   Temp: (!) 97 3 °F (36 3 °C)  97 6 °F (36 4 °C) 97 5 °F (36 4 °C)   TempSrc: Temporal      SpO2:       Weight:    112 kg (246 lb)   Height:             Appearance:  age appropriate, casually dressed and disheveled   Behavior:  evasive   Speech:  soft   Mood:  constricted   Affect:  flat   Thought Process:  perserverative   Thought Content:  normal   Perceptual Disturbances: None   Risk Potential: none   Sensorium:  person, place and time   Cognition:  intact   Consciousness:  alert and awake    Attention: attention span and concentration were age appropriate   Intellect: average   Insight:  limited   Judgment: limited      Motor Activity: no abnormal movements       I/O Past 24 hours:  No intake/output data recorded  No intake/output data recorded  Labs:  Reviewed 06/14/20    Progress Toward Goals:  Unchanged    Assessment / Plan:     Schizoaffective disorder, bipolar type ([facility] [address] )    Recommended Treatment:      Medication changes:  1) continue current treatment plan    Non-pharmacological treatments  1) Continue with group therapy, milieu therapy and occupational therapy  Safety  1) Safety/communication plan established targeting dynamic risk factors above  2) Risks, benefits, and possible side effects of medications explained to patient and patient verbalizes understanding  Counseling / Coordination of Care    Total floor / unit time spent today 20 minutes  Greater than 50% of total time was spent with the patient and / or family counseling and / or coordination of care  A description of the counseling / coordination of care       Patient's Rights, confidentiality and exceptions to confidentiality, use of automated medical record, [facility] kev staff access to medical record, and consent to treatment reviewed      Willie Waldron PA-C

## 2020-06-14 NOTE — PLAN OF CARE
Problem: Alteration in Thoughts and Perception  Goal: Verbalize thoughts and feelings  Description  Interventions:  - Promote a nonjudgmental and trusting relationship with the patient through active listening and therapeutic communication  - Assess patient's level of functioning, behavior and potential for risk  - Engage patient in 1 on 1 interactions  - Encourage patient to express fears, feelings, frustrations, and discuss symptoms    - [place] patient to reality, help patient recognize reality-based thinking   - Administer medications as ordered and assess for potential side effects  - Provide the patient education related to the signs and symptoms of the illness and desired effects of prescribed medications  Outcome: Progressing  Goal: Refrain from acting on delusional thinking/internal stimuli  Description  Interventions:  - Monitor patient closely, per order   - Utilize least restrictive measures   - Set reasonable limits, give positive feedback for acceptable   - Administer medications as ordered and monitor of potential side effects  Outcome: Progressing  Goal: Agree to be compliant with medication regime, as prescribed and report medication side effects  Description  Interventions:  - Offer appropriate PRN medication and supervise ingestion; conduct AIMS, as needed   Outcome: Progressing  Goal: Attend and participate in unit activities, including therapeutic, recreational, and educational groups  Description  Interventions:  -Encourage Visitation and family involvement in care  Outcome: Progressing  Goal: Complete daily ADLs, including personal hygiene independently, as able  Description  Interventions:  - Observe, teach, and assist patient with ADLS  - Monitor and promote a balance of rest/activity, with adequate nutrition and elimination   Outcome: Not Progressing     Problem: Ineffective Coping  Goal: Demonstrates healthy coping skills  Outcome: Progressing  Goal: Understands least restrictive measures  Description  Interventions:  - Utilize least restrictive behavior  Outcome: Progressing     Problem: GASTROINTESTINAL - ADULT  Goal: Maintains adequate nutritional intake  Description  INTERVENTIONS:  - Monitor percentage of each meal consumed  - Identify factors contributing to decreased intake, treat as appropriate  - Assist with meals as needed  - Monitor I&O, weight, and lab values if indicated  - Obtain nutrition services referral as needed  Outcome: Annette Renee has been awake, alert, and visible intermittently out in the milieu  Pt refused to have his vital signs taken  Ate 100% supper  Disheveled in appearance and dressed in layers  Pt still preoccupied with discharge at times  Compliant with all scheduled meds without prompting  Enjoys listening and singing to music on radio in [employer] with peers and resting in room at intervals  Pt went out on deck for fresh air with staff and peers  Did not attend evening group but came out for snack after  Continue to monitor/assess for any changes

## 2020-06-14 NOTE — PLAN OF CARE
Problem: Alteration in Thoughts and Perception  Goal: Treatment Goal: Gain control of psychotic behaviors/thinking, reduce/eliminate presenting symptoms and demonstrate improved reality functioning upon discharge  Outcome: Progressing  Goal: Verbalize thoughts and feelings  Description  Interventions:  - Promote a nonjudgmental and trusting relationship with the patient through active listening and therapeutic communication  - Assess patient's level of functioning, behavior and potential for risk  - Engage patient in 1 on 1 interactions  - Encourage patient to express fears, feelings, frustrations, and discuss symptoms    - [place] patient to reality, help patient recognize reality-based thinking   - Administer medications as ordered and assess for potential side effects  - Provide the patient education related to the signs and symptoms of the illness and desired effects of prescribed medications  Outcome: Progressing  Goal: Agree to be compliant with medication regime, as prescribed and report medication side effects  Description  Interventions:  - Offer appropriate PRN medication and supervise ingestion; conduct AIMS, as needed   Outcome: Progressing  Goal: Attend and participate in unit activities, including therapeutic, recreational, and educational groups  Description  Interventions:  -Encourage Visitation and family involvement in care  Outcome: Progressing     Problem: Ineffective Coping  Goal: Patient/Family participate in treatment and DC plans  Description  Interventions:  - Provide therapeutic environment  Outcome: Progressing     Problem: Alteration in Thoughts and Perception  Goal: Refrain from acting on delusional thinking/internal stimuli  Description  Interventions:  - Monitor patient closely, per order   - Utilize least restrictive measures   - Set reasonable limits, give positive feedback for acceptable   - Administer medications as ordered and monitor of potential side effects  Outcome: Not Progressing  Goal: Recognize dysfunctional thoughts, communicate reality-based thoughts at the time of discharge  Description  Interventions:  - Provide medication and psycho-education to assist patient in compliance and developing insight into his/her illness   Outcome: Not Progressing  Goal: Complete daily ADLs, including personal hygiene independently, as able  Description  Interventions:  - Observe, teach, and assist patient with ADLS  - Monitor and promote a balance of rest/activity, with adequate nutrition and elimination   Outcome: Not Progressing     Problem: Ineffective Coping  Goal: Identifies ineffective coping skills  Outcome: Not Progressing  Goal: Identifies healthy coping skills  Outcome: Not Progressing  Goal: Demonstrates healthy coping skills  Outcome: Not Progressing  Goal: Patient/Family verbalizes awareness of resources  Outcome: Not Progressing  Goal: Understands least restrictive measures  Description  Interventions:  - Utilize least restrictive behavior  Outcome: Not Progressing       Quiet, cooperative and visible on unit   Med and meal compliant   Ate 100% of both meals   Denies depression, anxiety, SI and HI  Behaviors controlled, no outbursts  Still preoccupied with discharge  Disheveled and layered clothing   No c/o s/s pain, discomfort or distress  Attended community meeting  Maintained on patient safety precautions and mouth checks w/o incident   Due for labs 6/13/20 refused and refused again today    Will continue to monitor progress in recovery program  crowded

## 2020-06-14 NOTE — PLAN OF CARE
Problem: Alteration in Thoughts and Perception  Goal: Verbalize thoughts and feelings  Description  Interventions:  - Promote a nonjudgmental and trusting relationship with the patient through active listening and therapeutic communication  - Assess patient's level of functioning, behavior and potential for risk  - Engage patient in 1 on 1 interactions  - Encourage patient to express fears, feelings, frustrations, and discuss symptoms    - [place] patient to reality, help patient recognize reality-based thinking   - Administer medications as ordered and assess for potential side effects  - Provide the patient education related to the signs and symptoms of the illness and desired effects of prescribed medications  Outcome: Progressing  Goal: Refrain from acting on delusional thinking/internal stimuli  Description  Interventions:  - Monitor patient closely, per order   - Utilize least restrictive measures   - Set reasonable limits, give positive feedback for acceptable   - Administer medications as ordered and monitor of potential side effects  Outcome: Progressing  Goal: Agree to be compliant with medication regime, as prescribed and report medication side effects  Description  Interventions:  - Offer appropriate PRN medication and supervise ingestion; conduct AIMS, as needed   Outcome: Progressing  Goal: Attend and participate in unit activities, including therapeutic, recreational, and educational groups  Description  Interventions:  -Encourage Visitation and family involvement in care  Outcome: Progressing  Goal: Complete daily ADLs, including personal hygiene independently, as able  Description  Interventions:  - Observe, teach, and assist patient with ADLS  - Monitor and promote a balance of rest/activity, with adequate nutrition and elimination   Outcome: Not Progressing     Problem: Ineffective Coping  Goal: Demonstrates healthy coping skills  Outcome: Progressing  Goal: Understands least restrictive measures  Description  Interventions:  - Utilize least restrictive behavior  Outcome: Progressing     Problem: GASTROINTESTINAL - ADULT  Goal: Maintains adequate nutritional intake  Description  INTERVENTIONS:  - Monitor percentage of each meal consumed  - Identify factors contributing to decreased intake, treat as appropriate  - Assist with meals as needed  - Monitor I&O, weight, and lab values if indicated  - Obtain nutrition services referral as needed  Outcome: Fadi Lopez has been awake, alert, and visible intermittently out in the milieu  Pt enjoys listening to music on radio, singing and dancing in [facility]  Social with peers  Ate 100% supper  Pt did his laundry this shift  Refused to have vital signs done this shift  Rests in room at intervals  Makes phone calls  Attended and participated in evening group and had snack after  Compliant with all scheduled meds when called and cooperative with mouth checks  Less preoccupied with discharge  Dressed in layers and disheveled in appearance  Continue to monitor/assess for any changes

## 2020-06-15 PROCEDURE — 99232 SBSQ HOSP IP/OBS MODERATE 35: CPT | Performed by: PSYCHIATRY & NEUROLOGY

## 2020-06-15 RX ADMIN — OLANZAPINE 5 MG: 5 TABLET, FILM COATED ORAL at 21:00

## 2020-06-15 RX ADMIN — OXYBUTYNIN CHLORIDE 5 MG: 5 TABLET, EXTENDED RELEASE ORAL at 08:42

## 2020-06-15 RX ADMIN — METFORMIN HYDROCHLORIDE 500 MG: 500 TABLET ORAL at 16:48

## 2020-06-15 RX ADMIN — METFORMIN HYDROCHLORIDE 500 MG: 500 TABLET ORAL at 08:42

## 2020-06-15 RX ADMIN — CLOZAPINE 200 MG: 200 TABLET ORAL at 16:48

## 2020-06-15 RX ADMIN — DOCUSATE SODIUM 100 MG: 100 CAPSULE, LIQUID FILLED ORAL at 08:42

## 2020-06-15 RX ADMIN — ATORVASTATIN CALCIUM 10 MG: 10 TABLET, FILM COATED ORAL at 16:48

## 2020-06-15 RX ADMIN — NICOTINE POLACRILEX 2 MG: 2 GUM, CHEWING ORAL at 11:58

## 2020-06-15 RX ADMIN — DOCUSATE SODIUM 100 MG: 100 CAPSULE, LIQUID FILLED ORAL at 17:04

## 2020-06-15 RX ADMIN — DIVALPROEX SODIUM 1500 MG: 500 TABLET, EXTENDED RELEASE ORAL at 21:00

## 2020-06-15 NOTE — PROGRESS NOTES
Patient declined group      06/15/20 1100   Activity/Group Checklist   Group   (IMR/Styles of Communication )   Attendance Did not attend   Attendance Duration (min) 46-60   Affect/Mood GAEL

## 2020-06-15 NOTE — PROGRESS NOTES
06/15/20 0854   Team Meeting   Meeting Type Daily Rounds   Initial Conference Date 06/15/20   Patient/Family Present   Patient Present No   Patient's Family Present No     Daily Rounds Documentation    Team Members Present:   ELIZABETH [place] Jillene Late, MCKAYLA George, RN    Refused labs over the weekend  Refused early AM medications  Disheveled
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
## 2020-06-15 NOTE — PLAN OF CARE
Patient's compliance with treatment has been inconsistent; therefore; he remains on the waiting list for [facility]   If pt does improve we will consider [facility]; referral is complete  Patient expressed that he prefers [facility]

## 2020-06-15 NOTE — PROGRESS NOTES
Patient declined      06/15/20 0900   Activity/Group Checklist   Group Community meeting   Attendance Did not attend   Attendance Duration (min) 31-45   Affect/Mood GAEL

## 2020-06-15 NOTE — PLAN OF CARE
Problem: Alteration in Thoughts and Perception  Goal: Attend and participate in unit activities, including therapeutic, recreational, and educational groups  Description  Interventions:  -Encourage Visitation and family involvement in care  Outcome: Not Progressing    Patient did not complete Goal Card for the week of 6/15/2020

## 2020-06-15 NOTE — PLAN OF CARE
Problem: Alteration in Thoughts and Perception  Goal: Agree to be compliant with medication regime, as prescribed and report medication side effects  Description  Interventions:  - Offer appropriate PRN medication and supervise ingestion; conduct AIMS, as needed   Outcome: Not Progressing  Goal: Attend and participate in unit activities, including therapeutic, recreational, and educational groups  Description  Interventions:  -Encourage Visitation and family involvement in care  Outcome: Not Progressing  Goal: Complete daily ADLs, including personal hygiene independently, as able  Description  Interventions:  - Observe, teach, and assist patient with ADLS  - Monitor and promote a balance of rest/activity, with adequate nutrition and elimination   Outcome: Not Progressing     [address] has been visible on the unit for his 0900 medications and meals  Refused 0600 medications today  Otherwise he has been napping intermittently throughout the shift  Disheveled in appearance, layered in dirty clothing and refused to let staff assist him with washing his laundry  Last shower on 6/10  No groups attended this shift  Somewhat irritable during interactions as he is frustrated as to why he is not being discharged  Will continue to monitor for changes in behaviors
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
## 2020-06-15 NOTE — PROGRESS NOTES
Pt slept all night  Refused morning meds, stated "No, I'm good"   No behavioral issues, will continue to monitor

## 2020-06-16 LAB
ALBUMIN SERPL BCP-MCNC: 3.7 G/DL (ref 3–5.2)
ALP SERPL-CCNC: 88 U/L (ref 43–122)
ALT SERPL W P-5'-P-CCNC: 29 U/L (ref 9–52)
AMMONIA PLAS-SCNC: <9 UMOL/L (ref 9–33)
ANION GAP SERPL CALCULATED.3IONS-SCNC: 8 MMOL/L (ref 5–14)
AST SERPL W P-5'-P-CCNC: 37 U/L (ref 17–59)
BASOPHILS # BLD AUTO: 0.08 THOUSAND/UL (ref 0–0.1)
BASOPHILS NFR MAR MANUAL: 1 % (ref 0–1)
BILIRUB SERPL-MCNC: 0.3 MG/DL
BUN SERPL-MCNC: 7 MG/DL (ref 5–25)
CALCIUM SERPL-MCNC: 8.9 MG/DL (ref 8.4–10.2)
CHLORIDE SERPL-SCNC: 98 MMOL/L (ref 97–108)
CK MB SERPL-MCNC: 0.9 NG/ML (ref 0–2.4)
CK MB SERPL-MCNC: <1 % (ref 0–2.5)
CK SERPL-CCNC: 168 U/L (ref 55–170)
CO2 SERPL-SCNC: 27 MMOL/L (ref 22–30)
CREAT SERPL-MCNC: 0.57 MG/DL (ref 0.7–1.5)
CRP SERPL QL: <5 MG/L
ERYTHROCYTE [DISTWIDTH] IN BLOOD BY AUTOMATED COUNT: 13.4 %
GFR SERPL CREATININE-BSD FRML MDRD: 118 ML/MIN/1.73SQ M
GLUCOSE SERPL-MCNC: 163 MG/DL (ref 70–99)
HCT VFR BLD AUTO: 40.6 % (ref 41–53)
HGB BLD-MCNC: 14 G/DL (ref 13.5–17.5)
LYMPHOCYTES # BLD AUTO: 3.45 THOUSAND/UL (ref 0.5–4)
LYMPHOCYTES # BLD AUTO: 46 % (ref 25–45)
MCH RBC QN AUTO: 29.8 PG (ref 26–34)
MCHC RBC AUTO-ENTMCNC: 34.5 G/DL (ref 31–36)
MCV RBC AUTO: 86 FL (ref 80–100)
MONOCYTES # BLD AUTO: 0.6 THOUSAND/UL (ref 0.2–0.9)
MONOCYTES NFR BLD AUTO: 8 % (ref 1–10)
NEUTS BAND NFR BLD MANUAL: 1 % (ref 0–8)
NEUTS SEG # BLD: 3.38 THOUSAND/UL (ref 1.8–7.8)
NEUTS SEG NFR BLD AUTO: 44 %
NT-PROBNP SERPL-MCNC: 24.3 PG/ML (ref 0–299)
PLATELET # BLD AUTO: 168 THOUSANDS/UL (ref 150–450)
PLATELET BLD QL SMEAR: ADEQUATE
PMV BLD AUTO: 8.3 FL (ref 8.9–12.7)
POTASSIUM SERPL-SCNC: 4.3 MMOL/L (ref 3.6–5)
PROT SERPL-MCNC: 6.5 G/DL (ref 5.9–8.4)
RBC # BLD AUTO: 4.69 MILLION/UL (ref 4.5–5.9)
RBC MORPH BLD: NORMAL
SODIUM SERPL-SCNC: 133 MMOL/L (ref 137–147)
TOTAL CELLS COUNTED SPEC: 100
TROPONIN I SERPL-MCNC: <0.01 NG/ML (ref 0–0.03)
VALPROATE SERPL-MCNC: 75.6 UG/ML (ref 50–120)
WBC # BLD AUTO: 7.5 THOUSAND/UL (ref 4.5–11)

## 2020-06-16 PROCEDURE — 80159 DRUG ASSAY CLOZAPINE: CPT | Performed by: PSYCHIATRY & NEUROLOGY

## 2020-06-16 PROCEDURE — 80164 ASSAY DIPROPYLACETIC ACD TOT: CPT | Performed by: PSYCHIATRY & NEUROLOGY

## 2020-06-16 PROCEDURE — 99232 SBSQ HOSP IP/OBS MODERATE 35: CPT | Performed by: PSYCHIATRY & NEUROLOGY

## 2020-06-16 PROCEDURE — 85027 COMPLETE CBC AUTOMATED: CPT | Performed by: PSYCHIATRY & NEUROLOGY

## 2020-06-16 PROCEDURE — 85007 BL SMEAR W/DIFF WBC COUNT: CPT | Performed by: PSYCHIATRY & NEUROLOGY

## 2020-06-16 PROCEDURE — 82140 ASSAY OF AMMONIA: CPT | Performed by: PSYCHIATRY & NEUROLOGY

## 2020-06-16 PROCEDURE — 86140 C-REACTIVE PROTEIN: CPT | Performed by: PSYCHIATRY & NEUROLOGY

## 2020-06-16 PROCEDURE — 84484 ASSAY OF TROPONIN QUANT: CPT | Performed by: PSYCHIATRY & NEUROLOGY

## 2020-06-16 PROCEDURE — 82550 ASSAY OF CK (CPK): CPT | Performed by: PSYCHIATRY & NEUROLOGY

## 2020-06-16 PROCEDURE — 83880 ASSAY OF NATRIURETIC PEPTIDE: CPT | Performed by: PSYCHIATRY & NEUROLOGY

## 2020-06-16 PROCEDURE — 82553 CREATINE MB FRACTION: CPT | Performed by: PSYCHIATRY & NEUROLOGY

## 2020-06-16 PROCEDURE — 80053 COMPREHEN METABOLIC PANEL: CPT | Performed by: PSYCHIATRY & NEUROLOGY

## 2020-06-16 RX ADMIN — NICOTINE POLACRILEX 2 MG: 2 GUM, CHEWING ORAL at 08:41

## 2020-06-16 RX ADMIN — ACETAMINOPHEN 650 MG: 325 TABLET ORAL at 16:51

## 2020-06-16 RX ADMIN — CLOZAPINE 200 MG: 200 TABLET ORAL at 16:23

## 2020-06-16 RX ADMIN — DOCUSATE SODIUM 100 MG: 100 CAPSULE, LIQUID FILLED ORAL at 08:17

## 2020-06-16 RX ADMIN — METFORMIN HYDROCHLORIDE 500 MG: 500 TABLET ORAL at 08:17

## 2020-06-16 RX ADMIN — ATORVASTATIN CALCIUM 10 MG: 10 TABLET, FILM COATED ORAL at 16:23

## 2020-06-16 RX ADMIN — LEVOTHYROXINE SODIUM 75 MCG: 75 TABLET ORAL at 05:31

## 2020-06-16 RX ADMIN — DEXTROAMPHETAMINE SACCHARATE, AMPHETAMINE ASPARTATE, DEXTROAMPHETAMINE SULFATE AND AMPHETAMINE SULFATE 15 MG: 2.5; 2.5; 2.5; 2.5 TABLET ORAL at 05:31

## 2020-06-16 RX ADMIN — OXYBUTYNIN CHLORIDE 5 MG: 5 TABLET, EXTENDED RELEASE ORAL at 08:17

## 2020-06-16 RX ADMIN — DIVALPROEX SODIUM 1500 MG: 500 TABLET, EXTENDED RELEASE ORAL at 20:51

## 2020-06-16 RX ADMIN — METFORMIN HYDROCHLORIDE 500 MG: 500 TABLET ORAL at 16:23

## 2020-06-16 RX ADMIN — DOCUSATE SODIUM 100 MG: 100 CAPSULE, LIQUID FILLED ORAL at 17:10

## 2020-06-16 RX ADMIN — OLANZAPINE 5 MG: 5 TABLET, FILM COATED ORAL at 20:51

## 2020-06-16 RX ADMIN — PANTOPRAZOLE SODIUM 40 MG: 40 TABLET, DELAYED RELEASE ORAL at 05:31

## 2020-06-16 NOTE — PROGRESS NOTES
06/16/20 1100   Activity/Group Checklist   Group   (IMR/What is an ACT Team? )   Attendance Did not attend   Attendance Duration (min) 46-60   Affect/Mood GAEL

## 2020-06-16 NOTE — SOCIAL WORK
ISRA met with pt privately to discuss treatment plan again; pt continues to be focused on discharge and presents with some paranoia  SW again explained to pt that the team does not feel he is discharge ready yet; ISRA addressed concerns  ISRA changed subject back to the treatment plan  SW reviewed goals and pt again declined to sign his treatment plan

## 2020-06-16 NOTE — PROGRESS NOTES
06/16/20 0930   Team Meeting   Meeting Type Tx Team Meeting   Initial Conference Date 06/16/20   Next Conference Date 06/23/20   Team Members Present   Team Members Present Physician;Nurse;; Other (Discipline and Name)   Physician Team Member Dr Rashaad Caballero, RN   Care Management Team Member Taniya Valle 55 Work Team Member [facility]   Other (Discipline and Name) Anca Wills, [address]   Patient/Family Present   Patient Present Yes   Patient's Family Present No     Patient attended the treatment team meeting this morning; he did not have his Self Assessment prepared  He was excited to share that he complied with his labs this morning  He was encouraged to complete the self assessment verbally during the meeting but was easily distracted  He started speaking about the [place]  Dr Naren orourke reminded him that he will either go to [facility] or [facility]  Dr  [place] and SW attempted to remind him what needs to happen in order to go to [facility]  He was informed that his "Big Brother" wants him to go to [facility]  He agreed to sit down with SW later and discuss expectations  He denies missing any medications, but was reminded that he has refused several of his morning medications  Able to recall a majority of his medications  He denies having any reactions to his medications  Denies any medical concerns  Self care included going to groups and showering  RN notes that pt's group participate has been pretty good  No questions or concerns reported by pt  Treatment plan reviewed and signed by the team; pt declined to sign the treatment plan  Team and patient completed risk assessment and the patient did not verbalize any desire to elope from the program   Next treatment team meeting will be 6/23/2020

## 2020-06-16 NOTE — PROGRESS NOTES
06/16/20 1400   Activity/Group Checklist   Group   (Recovery Workshop )   Attendance Did not attend   Attendance Duration (min) 46-60   Affect/Mood GAEL

## 2020-06-16 NOTE — PROGRESS NOTES
Patient declined      06/16/20 0900   Activity/Group Checklist   Group Community meeting   Attendance Did not attend   Attendance Duration (min) 31-45   Affect/Mood GAEL

## 2020-06-16 NOTE — NURSING NOTE
Received pt in bed at change of shift with eyes closed; chest movement noted  Continues the same thus this far as per continual rounding  Will continue to monitor  Due for blood work  Pt has been refusing for past few days

## 2020-06-16 NOTE — PLAN OF CARE
Problem: Alteration in Thoughts and Perception  Goal: Refrain from acting on delusional thinking/internal stimuli  Description  Interventions:  - Monitor patient closely, per order   - Utilize least restrictive measures   - Set reasonable limits, give positive feedback for acceptable   - Administer medications as ordered and monitor of potential side effects  Outcome: Not Progressing  Goal: Agree to be compliant with medication regime, as prescribed and report medication side effects  Description  Interventions:  - Offer appropriate PRN medication and supervise ingestion; conduct AIMS, as needed   Outcome: Progressing  Goal: Attend and participate in unit activities, including therapeutic, recreational, and educational groups  Description  Interventions:  -Encourage Visitation and family involvement in care  Outcome: Yeison Stephens has been intermittently visible on the unit, med and meal compliant and allowed staff to draw his labs  No groups attended other than spirituality group  Pleasant during interactions with this writer and is able to make his needs known to staff  Remains disheveled in multiple layers of clothing  Behaviors controlled, less preoccupied with discharge but still brings it up on occasion  Will continue to monitor

## 2020-06-16 NOTE — PLAN OF CARE
Problem: Alteration in Thoughts and Perception  Goal: Verbalize thoughts and feelings  Description  Interventions:  - Promote a nonjudgmental and trusting relationship with the patient through active listening and therapeutic communication  - Assess patient's level of functioning, behavior and potential for risk  - Engage patient in 1 on 1 interactions  - Encourage patient to express fears, feelings, frustrations, and discuss symptoms    - [place] patient to reality, help patient recognize reality-based thinking   - Administer medications as ordered and assess for potential side effects  - Provide the patient education related to the signs and symptoms of the illness and desired effects of prescribed medications  Outcome: Progressing  Goal: Refrain from acting on delusional thinking/internal stimuli  Description  Interventions:  - Monitor patient closely, per order   - Utilize least restrictive measures   - Set reasonable limits, give positive feedback for acceptable   - Administer medications as ordered and monitor of potential side effects  Outcome: Progressing  Goal: Agree to be compliant with medication regime, as prescribed and report medication side effects  Description  Interventions:  - Offer appropriate PRN medication and supervise ingestion; conduct AIMS, as needed   Outcome: Progressing  Goal: Complete daily ADLs, including personal hygiene independently, as able  Description  Interventions:  - Observe, teach, and assist patient with ADLS  - Monitor and promote a balance of rest/activity, with adequate nutrition and elimination   Outcome: Progressing     Problem: Ineffective Coping  Goal: Demonstrates healthy coping skills  Outcome: Progressing  Goal: Understands least restrictive measures  Description  Interventions:  - Utilize least restrictive behavior  Outcome: Progressing     Problem: GASTROINTESTINAL - ADULT  Goal: Maintains adequate nutritional intake  Description  INTERVENTIONS:  - Monitor percentage of each meal consumed  - Identify factors contributing to decreased intake, treat as appropriate  - Assist with meals as needed  - Monitor I&O, weight, and lab values if indicated  - Obtain nutrition services referral as needed  Outcome: Michelle Bentley has been awake, alert, and visible intermittently out in the milieu  Ate 100% supper  Naps in room at intervals and listens to music on radio with peers in [facility]  Pt completed his daily ADL's today but still disheveled in appearance  Pleasant and cooperative  No behavioral issues  Pt did not attend evening group but came out for snack after  Compliant with all scheduled meds when called  Less preoccupied with discharge  Continue to monitor/assess for any changes
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
## 2020-06-16 NOTE — PROGRESS NOTES
06/16/20 0902   Team Meeting   Meeting Type Daily Rounds   Initial Conference Date 06/16/20   Patient/Family Present   Patient Present No   Patient's Family Present No     Daily Rounds Documentation     Team Members Present:   ELIZABETH Funes CPS Dr Josephina Hires, RN    No groups 3-11  Refused labs again

## 2020-06-17 PROCEDURE — 99232 SBSQ HOSP IP/OBS MODERATE 35: CPT | Performed by: PSYCHIATRY & NEUROLOGY

## 2020-06-17 RX ADMIN — DOCUSATE SODIUM 100 MG: 100 CAPSULE, LIQUID FILLED ORAL at 17:14

## 2020-06-17 RX ADMIN — OLANZAPINE 5 MG: 5 TABLET, FILM COATED ORAL at 20:55

## 2020-06-17 RX ADMIN — DOCUSATE SODIUM 100 MG: 100 CAPSULE, LIQUID FILLED ORAL at 08:24

## 2020-06-17 RX ADMIN — OXYBUTYNIN CHLORIDE 5 MG: 5 TABLET, EXTENDED RELEASE ORAL at 08:24

## 2020-06-17 RX ADMIN — NICOTINE POLACRILEX 2 MG: 2 GUM, CHEWING ORAL at 08:26

## 2020-06-17 RX ADMIN — LEVOTHYROXINE SODIUM 75 MCG: 75 TABLET ORAL at 06:03

## 2020-06-17 RX ADMIN — CLOZAPINE 200 MG: 200 TABLET ORAL at 16:37

## 2020-06-17 RX ADMIN — DEXTROAMPHETAMINE SACCHARATE, AMPHETAMINE ASPARTATE, DEXTROAMPHETAMINE SULFATE AND AMPHETAMINE SULFATE 15 MG: 2.5; 2.5; 2.5; 2.5 TABLET ORAL at 06:03

## 2020-06-17 RX ADMIN — PANTOPRAZOLE SODIUM 40 MG: 40 TABLET, DELAYED RELEASE ORAL at 06:03

## 2020-06-17 RX ADMIN — METFORMIN HYDROCHLORIDE 500 MG: 500 TABLET ORAL at 08:26

## 2020-06-17 RX ADMIN — METFORMIN HYDROCHLORIDE 500 MG: 500 TABLET ORAL at 16:37

## 2020-06-17 RX ADMIN — DIVALPROEX SODIUM 1500 MG: 500 TABLET, EXTENDED RELEASE ORAL at 20:54

## 2020-06-17 RX ADMIN — ATORVASTATIN CALCIUM 10 MG: 10 TABLET, FILM COATED ORAL at 16:37

## 2020-06-17 NOTE — PROGRESS NOTES
06/17/20 0900   Activity/Group Checklist   Group Community meeting   Attendance Did not attend   Attendance Duration (min) 31-45   Affect/Mood GAEL

## 2020-06-17 NOTE — SOCIAL WORK
SW attempted to return pt's sister's Nate Billing [phone]) call  ISRA left a message and requested a return phone call

## 2020-06-17 NOTE — PLAN OF CARE
Problem: Alteration in Thoughts and Perception  Goal: Verbalize thoughts and feelings  Description  Interventions:  - Promote a nonjudgmental and trusting relationship with the patient through active listening and therapeutic communication  - Assess patient's level of functioning, behavior and potential for risk  - Engage patient in 1 on 1 interactions  - Encourage patient to express fears, feelings, frustrations, and discuss symptoms    - [place] patient to reality, help patient recognize reality-based thinking   - Administer medications as ordered and assess for potential side effects  - Provide the patient education related to the signs and symptoms of the illness and desired effects of prescribed medications  Outcome: Progressing  Goal: Refrain from acting on delusional thinking/internal stimuli  Description  Interventions:  - Monitor patient closely, per order   - Utilize least restrictive measures   - Set reasonable limits, give positive feedback for acceptable   - Administer medications as ordered and monitor of potential side effects  Outcome: Progressing  Goal: Agree to be compliant with medication regime, as prescribed and report medication side effects  Description  Interventions:  - Offer appropriate PRN medication and supervise ingestion; conduct AIMS, as needed   Outcome: Progressing  Goal: Complete daily ADLs, including personal hygiene independently, as able  Description  Interventions:  - Observe, teach, and assist patient with ADLS  - Monitor and promote a balance of rest/activity, with adequate nutrition and elimination   Outcome: Progressing     Problem: Ineffective Coping  Goal: Demonstrates healthy coping skills  Outcome: Progressing  Goal: Understands least restrictive measures  Description  Interventions:  - Utilize least restrictive behavior  Outcome: Progressing     Problem: GASTROINTESTINAL - ADULT  Goal: Maintains adequate nutritional intake  Description  INTERVENTIONS:  - Monitor percentage of each meal consumed  - Identify factors contributing to decreased intake, treat as appropriate  - Assist with meals as needed  - Monitor I&O, weight, and lab values if indicated  - Obtain nutrition services referral as needed  Outcome: Norma Johns has been awake, alert, and visible intermittently out in the milieu  Ate 100% supper  Pt listens to music on radio in [facility] with peers and rests in room at intervals  Pt did not attend evening group  Compliant with scheduled meds when called and cooperative with mouth checks  Had evening snack  No behavioral issues  Less focused on discharge  Continue to monitor/assess for any changes

## 2020-06-17 NOTE — PROGRESS NOTES
Left in beginning of group      06/17/20 1100   Activity/Group Checklist   Group   (IMR/What is a Group Home? )   Attendance Did not attend   Attendance Duration (min) Greater than 60   Affect/Mood GAEL

## 2020-06-17 NOTE — PLAN OF CARE
Problem: Alteration in Thoughts and Perception  Goal: Treatment Goal: Gain control of psychotic behaviors/thinking, reduce/eliminate presenting symptoms and demonstrate improved reality functioning upon discharge  Outcome: Progressing  Goal: Verbalize thoughts and feelings  Description  Interventions:  - Promote a nonjudgmental and trusting relationship with the patient through active listening and therapeutic communication  - Assess patient's level of functioning, behavior and potential for risk  - Engage patient in 1 on 1 interactions  - Encourage patient to express fears, feelings, frustrations, and discuss symptoms    - [place] patient to reality, help patient recognize reality-based thinking   - Administer medications as ordered and assess for potential side effects  - Provide the patient education related to the signs and symptoms of the illness and desired effects of prescribed medications  Outcome: Progressing  Goal: Refrain from acting on delusional thinking/internal stimuli  Description  Interventions:  - Monitor patient closely, per order   - Utilize least restrictive measures   - Set reasonable limits, give positive feedback for acceptable   - Administer medications as ordered and monitor of potential side effects  Outcome: Progressing  Goal: Agree to be compliant with medication regime, as prescribed and report medication side effects  Description  Interventions:  - Offer appropriate PRN medication and supervise ingestion; conduct AIMS, as needed   Outcome: Progressing  Goal: Attend and participate in unit activities, including therapeutic, recreational, and educational groups  Description  Interventions:  -Encourage Visitation and family involvement in care  Outcome: Progressing  Goal: Recognize dysfunctional thoughts, communicate reality-based thoughts at the time of discharge  Description  Interventions:  - Provide medication and psycho-education to assist patient in compliance and developing insight into his/her illness   Outcome: Progressing     Problem: Ineffective Coping  Goal: Patient/Family verbalizes awareness of resources  Outcome: Progressing  Goal: Understands least restrictive measures  Description  Interventions:  - Utilize least restrictive behavior  Outcome: Progressing     Problem: GASTROINTESTINAL - ADULT  Goal: Minimal or absence of nausea and/or vomiting  Description  INTERVENTIONS:  - Administer IV fluids if ordered to ensure adequate hydration  - Maintain NPO status until nausea and vomiting are resolved  - Nasogastric tube if ordered  - Administer ordered antiemetic medications as needed  - Provide nonpharmacologic comfort measures as appropriate  - Advance diet as tolerated, if ordered  - Consider nutrition services referral to assist patient with adequate nutrition and appropriate food choices  Outcome: Progressing  Goal: Maintains or returns to baseline bowel function  Description  INTERVENTIONS:  - Assess bowel function  - Encourage oral fluids to ensure adequate hydration  - Administer IV fluids if ordered to ensure adequate hydration  - Administer ordered medications as needed  - Encourage mobilization and activity  - Consider nutritional services referral to assist patient with adequate nutrition and appropriate food choices  Outcome: Progressing  Goal: Maintains adequate nutritional intake  Description  INTERVENTIONS:  - Monitor percentage of each meal consumed  - Identify factors contributing to decreased intake, treat as appropriate  - Assist with meals as needed  - Monitor I&O, weight, and lab values if indicated  - Obtain nutrition services referral as needed  Outcome: Progressing   Visible in the milieu, pleasant and friendly with others, attended [employer] and fresh air groups  Continues to lack insight and ask about discharge, but has been more accepting and redirectable  Ate 100% of both meals  No other behaviors or issues noted  Med compliant  Continue to monitor

## 2020-06-17 NOTE — PLAN OF CARE
Problem: Alteration in Thoughts and Perception  Goal: Verbalize thoughts and feelings  Description  Interventions:  - Promote a nonjudgmental and trusting relationship with the patient through active listening and therapeutic communication  - Assess patient's level of functioning, behavior and potential for risk  - Engage patient in 1 on 1 interactions  - Encourage patient to express fears, feelings, frustrations, and discuss symptoms    - [place] patient to reality, help patient recognize reality-based thinking   - Administer medications as ordered and assess for potential side effects  - Provide the patient education related to the signs and symptoms of the illness and desired effects of prescribed medications  Outcome: Progressing  Goal: Refrain from acting on delusional thinking/internal stimuli  Description  Interventions:  - Monitor patient closely, per order   - Utilize least restrictive measures   - Set reasonable limits, give positive feedback for acceptable   - Administer medications as ordered and monitor of potential side effects  Outcome: Progressing  Goal: Agree to be compliant with medication regime, as prescribed and report medication side effects  Description  Interventions:  - Offer appropriate PRN medication and supervise ingestion; conduct AIMS, as needed   Outcome: Progressing     Problem: Alteration in Thoughts and Perception  Goal: Attend and participate in unit activities, including therapeutic, recreational, and educational groups  Description  Interventions:  -Encourage Visitation and family involvement in care  Outcome: Not Progressing     Visible, cooperative with redirection, compliant with routine medications but needed prompting, gait steady, ate 100% of dinner, listened to music with peers, behavior controlled, sleeping on and off, did not attend groups, will continue to monitor

## 2020-06-17 NOTE — PROGRESS NOTES
06/17/20 0859   Team Meeting   Meeting Type Daily Rounds   Initial Conference Date 06/17/20   Patient/Family Present   Patient Present No   Patient's Family Present No     Daily Rounds Documentation     Team Members Present:   ELIZABETH Brian Linward Amos, CPS Dr Orlinda Mott, MD Tomi Lai, RN    Good day yesterday  Medication compliant this morning
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
## 2020-06-18 LAB
CLOZAPINE SERPL-MCNC: 209 NG/ML (ref 350–650)
CLOZAPINE+NOR SERPL-MCNC: 267 NG/ML
NORCLOZAPINE SERPL-MCNC: 58 NG/ML

## 2020-06-18 PROCEDURE — 99232 SBSQ HOSP IP/OBS MODERATE 35: CPT | Performed by: PSYCHIATRY & NEUROLOGY

## 2020-06-18 RX ADMIN — LEVOTHYROXINE SODIUM 75 MCG: 75 TABLET ORAL at 05:30

## 2020-06-18 RX ADMIN — DOCUSATE SODIUM 100 MG: 100 CAPSULE, LIQUID FILLED ORAL at 17:08

## 2020-06-18 RX ADMIN — ACETAMINOPHEN 650 MG: 325 TABLET ORAL at 13:09

## 2020-06-18 RX ADMIN — ATORVASTATIN CALCIUM 10 MG: 10 TABLET, FILM COATED ORAL at 16:34

## 2020-06-18 RX ADMIN — OXYBUTYNIN CHLORIDE 5 MG: 5 TABLET, EXTENDED RELEASE ORAL at 08:32

## 2020-06-18 RX ADMIN — DIVALPROEX SODIUM 1500 MG: 500 TABLET, EXTENDED RELEASE ORAL at 21:00

## 2020-06-18 RX ADMIN — CLOZAPINE 200 MG: 200 TABLET ORAL at 16:34

## 2020-06-18 RX ADMIN — METFORMIN HYDROCHLORIDE 500 MG: 500 TABLET ORAL at 16:34

## 2020-06-18 RX ADMIN — PANTOPRAZOLE SODIUM 40 MG: 40 TABLET, DELAYED RELEASE ORAL at 05:30

## 2020-06-18 RX ADMIN — DOCUSATE SODIUM 100 MG: 100 CAPSULE, LIQUID FILLED ORAL at 08:33

## 2020-06-18 RX ADMIN — DEXTROAMPHETAMINE SACCHARATE, AMPHETAMINE ASPARTATE, DEXTROAMPHETAMINE SULFATE AND AMPHETAMINE SULFATE 15 MG: 2.5; 2.5; 2.5; 2.5 TABLET ORAL at 05:30

## 2020-06-18 RX ADMIN — OLANZAPINE 5 MG: 5 TABLET, FILM COATED ORAL at 21:00

## 2020-06-18 RX ADMIN — NICOTINE POLACRILEX 2 MG: 2 GUM, CHEWING ORAL at 10:52

## 2020-06-18 RX ADMIN — METFORMIN HYDROCHLORIDE 500 MG: 500 TABLET ORAL at 08:32

## 2020-06-18 NOTE — PROGRESS NOTES
06/18/20 0900   Activity/Group Checklist   Group Community meeting   Attendance Did not attend   Attendance Duration (min) 31-45   Affect/Mood GAEL

## 2020-06-18 NOTE — PROGRESS NOTES
PRN Tylenol 650mg PO given at 1309 for 5/10 left foot pain  Upon follow up patient reported pain had fully resolved
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
## 2020-06-18 NOTE — PROGRESS NOTES
06/18/20 1100   Activity/Group Checklist   Group   (IMR/Recovery Anonymous )   Attendance Attended   Attendance Duration (min) 46-60   Interactions Interacted appropriately   Affect/Mood Appropriate;Calm   Goals Achieved Identified feelings; Able to listen to others; Able to engage in interactions

## 2020-06-18 NOTE — NURSING NOTE
Received pt in bed at change of shift with eyes closed; chest movement noted  Continues the same thus this far as per continual rounding  Will continue to monitor  MHT reports that Karen Chen gets up to close the door during round checks  MHT explained the reason for the rounding  yes

## 2020-06-18 NOTE — SOCIAL WORK
Patient has approached SW multiple times today to ask about a discharge  He believes that SW is going to help him get into an apartment  SW has explained to patient many times that he has not been approved for discharge at this time; patient always accepts this news without issue  Email sent to Olena at [facility] regarding residential options for patient  SW inquired about [facility] [place]; does this still exist and if so would they be an appropriate fit for consumer

## 2020-06-18 NOTE — PLAN OF CARE
Problem: Alteration in Thoughts and Perception  Goal: Verbalize thoughts and feelings  Description  Interventions:  - Promote a nonjudgmental and trusting relationship with the patient through active listening and therapeutic communication  - Assess patient's level of functioning, behavior and potential for risk  - Engage patient in 1 on 1 interactions  - Encourage patient to express fears, feelings, frustrations, and discuss symptoms    - Gerry patient to reality, help patient recognize reality-based thinking   - Administer medications as ordered and assess for potential side effects  - Provide the patient education related to the signs and symptoms of the illness and desired effects of prescribed medications  Outcome: Progressing  Goal: Agree to be compliant with medication regime, as prescribed and report medication side effects  Description  Interventions:  - Offer appropriate PRN medication and supervise ingestion; conduct AIMS, as needed   Outcome: Progressing  Goal: Attend and participate in unit activities, including therapeutic, recreational, and educational groups  Description  Interventions:  -Encourage Visitation and family involvement in care  Outcome: Progressing  Goal: Complete daily ADLs, including personal hygiene independently, as able  Description  Interventions:  - Observe, teach, and assist patient with ADLS  - Monitor and promote a balance of rest/activity, with adequate nutrition and elimination   Outcome: Progressing     Problem: Ineffective Coping  Goal: Patient/Family verbalizes awareness of resources  Outcome: Progressing  Goal: Understands least restrictive measures  Description  Interventions:  - Utilize least restrictive behavior  Outcome: Progressing     Problem: GASTROINTESTINAL - ADULT  Goal: Minimal or absence of nausea and/or vomiting  Description  INTERVENTIONS:  - Administer IV fluids if ordered to ensure adequate hydration  - Maintain NPO status until nausea and vomiting are resolved  - Nasogastric tube if ordered  - Administer ordered antiemetic medications as needed  - Provide nonpharmacologic comfort measures as appropriate  - Advance diet as tolerated, if ordered  - Consider nutrition services referral to assist patient with adequate nutrition and appropriate food choices  Outcome: Progressing  Goal: Maintains or returns to baseline bowel function  Description  INTERVENTIONS:  - Assess bowel function  - Encourage oral fluids to ensure adequate hydration  - Administer IV fluids if ordered to ensure adequate hydration  - Administer ordered medications as needed  - Encourage mobilization and activity  - Consider nutritional services referral to assist patient with adequate nutrition and appropriate food choices  Outcome: Progressing  Goal: Maintains adequate nutritional intake  Description  INTERVENTIONS:  - Monitor percentage of each meal consumed  - Identify factors contributing to decreased intake, treat as appropriate  - Assist with meals as needed  - Monitor I&O, weight, and lab values if indicated  - Obtain nutrition services referral as needed  Outcome: Progressing     Problem: Alteration in Thoughts and Perception  Goal: Treatment Goal: Gain control of psychotic behaviors/thinking, reduce/eliminate presenting symptoms and demonstrate improved reality functioning upon discharge  Outcome: Not Progressing  Goal: Refrain from acting on delusional thinking/internal stimuli  Description  Interventions:  - Monitor patient closely, per order   - Utilize least restrictive measures   - Set reasonable limits, give positive feedback for acceptable   - Administer medications as ordered and monitor of potential side effects  Outcome: Not Progressing  Goal: Recognize dysfunctional thoughts, communicate reality-based thoughts at the time of discharge  Description  Interventions:  - Provide medication and psycho-education to assist patient in compliance and developing insight into his/her illness   Outcome: Not Progressing   Visible in the milieu, pleasant and friendly with others, attended IMR group which was held outside on the deck  Remains focused on discharge and continues to lack insight into illness and what he needs to do to move towards his goal of discharge  Redirectable but quickly forgets and needs frequent reminders  Showered but put same dirty clothes back on and refused to wash more than a few items at a time  Ate 100% of both meals  No other behaviors or issues noted  Med compliant  Requested PRN nicotine gum was given at 1052  Continue to monitor

## 2020-06-18 NOTE — PROGRESS NOTES
06/18/20 1611   Team Meeting   Meeting Type Daily Rounds   Initial Conference Date 06/18/20   Team Members Present   Team Members Present Nurse;Physician;;; Other (Discipline and Name)   Physician Team Member Dr Saurabh Henry RN   Care Management Team Member Tamra Lopez [place]   Social Work Team Member Rosetta Dukes [place]   Other (Discipline and Name) MCKAYLA Salazar   Patient/Family Present   Patient Present No   Patient's Family Present No     Case reviewed  Patient is less preoccupied, clozapine pending; controlled

## 2020-06-19 PROCEDURE — 99232 SBSQ HOSP IP/OBS MODERATE 35: CPT | Performed by: PSYCHIATRY & NEUROLOGY

## 2020-06-19 RX ADMIN — METFORMIN HYDROCHLORIDE 500 MG: 500 TABLET ORAL at 16:32

## 2020-06-19 RX ADMIN — NICOTINE POLACRILEX 2 MG: 2 GUM, CHEWING ORAL at 08:41

## 2020-06-19 RX ADMIN — METFORMIN HYDROCHLORIDE 500 MG: 500 TABLET ORAL at 08:22

## 2020-06-19 RX ADMIN — DIVALPROEX SODIUM 1500 MG: 500 TABLET, EXTENDED RELEASE ORAL at 20:53

## 2020-06-19 RX ADMIN — DOCUSATE SODIUM 100 MG: 100 CAPSULE, LIQUID FILLED ORAL at 08:22

## 2020-06-19 RX ADMIN — CLOZAPINE 200 MG: 200 TABLET ORAL at 16:32

## 2020-06-19 RX ADMIN — OLANZAPINE 5 MG: 5 TABLET, FILM COATED ORAL at 20:53

## 2020-06-19 RX ADMIN — DEXTROAMPHETAMINE SACCHARATE, AMPHETAMINE ASPARTATE, DEXTROAMPHETAMINE SULFATE AND AMPHETAMINE SULFATE 15 MG: 2.5; 2.5; 2.5; 2.5 TABLET ORAL at 05:21

## 2020-06-19 RX ADMIN — DOCUSATE SODIUM 100 MG: 100 CAPSULE, LIQUID FILLED ORAL at 17:09

## 2020-06-19 RX ADMIN — LEVOTHYROXINE SODIUM 75 MCG: 75 TABLET ORAL at 05:21

## 2020-06-19 RX ADMIN — OXYBUTYNIN CHLORIDE 5 MG: 5 TABLET, EXTENDED RELEASE ORAL at 08:22

## 2020-06-19 RX ADMIN — PANTOPRAZOLE SODIUM 40 MG: 40 TABLET, DELAYED RELEASE ORAL at 05:21

## 2020-06-19 RX ADMIN — ATORVASTATIN CALCIUM 10 MG: 10 TABLET, FILM COATED ORAL at 16:32

## 2020-06-19 NOTE — NURSING NOTE
Received pt in bed at change of shift with eyes closed; chest movement noted  Awake times one briefly for a drink of water; returning to bed without any difficulties  Appears to sleep thus this far as per continual rounding  Will continue to monitor
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
## 2020-06-19 NOTE — PROGRESS NOTES
06/19/20 1100   Activity/Group Checklist   Group Other (Comment)  (IMR group: Meet and Greet/Self Assessment Review)   Attendance Attended   Attendance Duration (min) 0-15   Interactions Did not interact   Affect/Mood Other (Comment)  (Guarded)   Goals Achieved Other (Comment)  (None)     Pt was present for no more than 15 minutes  Pt left the group room twice and came back and after his third time leaving he did not return

## 2020-06-19 NOTE — PLAN OF CARE
Problem: Alteration in Thoughts and Perception  Goal: Verbalize thoughts and feelings  Description  Interventions:  - Promote a nonjudgmental and trusting relationship with the patient through active listening and therapeutic communication  - Assess patient's level of functioning, behavior and potential for risk  - Engage patient in 1 on 1 interactions  - Encourage patient to express fears, feelings, frustrations, and discuss symptoms    - [place] patient to reality, help patient recognize reality-based thinking   - Administer medications as ordered and assess for potential side effects  - Provide the patient education related to the signs and symptoms of the illness and desired effects of prescribed medications  Outcome: Progressing  Goal: Refrain from acting on delusional thinking/internal stimuli  Description  Interventions:  - Monitor patient closely, per order   - Utilize least restrictive measures   - Set reasonable limits, give positive feedback for acceptable   - Administer medications as ordered and monitor of potential side effects  Outcome: Progressing  Goal: Agree to be compliant with medication regime, as prescribed and report medication side effects  Description  Interventions:  - Offer appropriate PRN medication and supervise ingestion; conduct AIMS, as needed   Outcome: Progressing  Goal: Attend and participate in unit activities, including therapeutic, recreational, and educational groups  Description  Interventions:  -Encourage Visitation and family involvement in care  Outcome: Not Progressing     Problem: Ineffective Coping  Goal: Demonstrates healthy coping skills  Outcome: Progressing  Goal: Understands least restrictive measures  Description  Interventions:  - Utilize least restrictive behavior  Outcome: Progressing     Problem: GASTROINTESTINAL - ADULT  Goal: Maintains adequate nutritional intake  Description  INTERVENTIONS:  - Monitor percentage of each meal consumed  - Identify factors contributing to decreased intake, treat as appropriate  - Assist with meals as needed  - Monitor I&O, weight, and lab values if indicated  - Obtain nutrition services referral as needed  Outcome: Marlys Perea has been awake, alert, and visible intermittently out in the milieu  Pt refused to have his EKG done and attempted unrealistic bargaining with staff  Pt rests in room at intervals  Listens to music on radio in [facility] 19 with peers  Ate 100% supper  Disheveled in appearance and dressed in layers  Compliant with scheduled meds when called and cooperative with mouth checks  Continues to be preoccupied with discharge  Pt did not attend evening group but came out for snack after  Continue to monitor/assess for any changes

## 2020-06-19 NOTE — PLAN OF CARE
Problem: Alteration in Thoughts and Perception  Goal: Verbalize thoughts and feelings  Description  Interventions:  - Promote a nonjudgmental and trusting relationship with the patient through active listening and therapeutic communication  - Assess patient's level of functioning, behavior and potential for risk  - Engage patient in 1 on 1 interactions  - Encourage patient to express fears, feelings, frustrations, and discuss symptoms    - [place] patient to reality, help patient recognize reality-based thinking   - Administer medications as ordered and assess for potential side effects  - Provide the patient education related to the signs and symptoms of the illness and desired effects of prescribed medications  Outcome: Progressing  Goal: Refrain from acting on delusional thinking/internal stimuli  Description  Interventions:  - Monitor patient closely, per order   - Utilize least restrictive measures   - Set reasonable limits, give positive feedback for acceptable   - Administer medications as ordered and monitor of potential side effects  Outcome: Progressing  Goal: Agree to be compliant with medication regime, as prescribed and report medication side effects  Description  Interventions:  - Offer appropriate PRN medication and supervise ingestion; conduct AIMS, as needed   Outcome: Progressing  Goal: Attend and participate in unit activities, including therapeutic, recreational, and educational groups  Description  Interventions:  -Encourage Visitation and family involvement in care  Outcome: Not Progressing  Goal: Complete daily ADLs, including personal hygiene independently, as able  Description  Interventions:  - Observe, teach, and assist patient with ADLS  - Monitor and promote a balance of rest/activity, with adequate nutrition and elimination   Outcome: Progressing     Problem: Ineffective Coping  Goal: Demonstrates healthy coping skills  Outcome: Progressing  Goal: Understands least restrictive measures  Description  Interventions:  - Utilize least restrictive behavior  Outcome: Progressing     Problem: GASTROINTESTINAL - ADULT  Goal: Maintains adequate nutritional intake  Description  INTERVENTIONS:  - Monitor percentage of each meal consumed  - Identify factors contributing to decreased intake, treat as appropriate  - Assist with meals as needed  - Monitor I&O, weight, and lab values if indicated  - Obtain nutrition services referral as needed  Outcome: Raoul Diggs has been awake, alert, and visible intermittently out in the milieu  Pt completed his daily ADL's today  Ate 100% supper  Remains focused on discharge  Pt rests in room at intervals and listens to music on radio in [employer] with peers at times  Pt did not attend evening groups but came out for snack after  Compliant with all scheduled meds when called and cooperative with mouth checks  No behavioral issues  Continue to monitor/assess for any changes

## 2020-06-19 NOTE — PROGRESS NOTES
06/19/20 1018   Team Meeting   Meeting Type Daily Rounds   Initial Conference Date 06/19/20   Team Members Present   Team Members Present Physician;Nurse;;; Other (Discipline and Name)   Physician Team Member Dr Keya Roy, RN   Care Management Team Member Taniya Hughes 55 Work Team Member [phone] [place], [place]   Other (Discipline and Name) Luis Shelton RN   Patient/Family Present   Patient Present No   Patient's Family Present No     Case reviewed  18 BM  Showered  [place] 209, total 267

## 2020-06-20 PROCEDURE — 99232 SBSQ HOSP IP/OBS MODERATE 35: CPT | Performed by: PSYCHIATRY & NEUROLOGY

## 2020-06-20 PROCEDURE — 93005 ELECTROCARDIOGRAM TRACING: CPT

## 2020-06-20 RX ADMIN — PANTOPRAZOLE SODIUM 40 MG: 40 TABLET, DELAYED RELEASE ORAL at 06:26

## 2020-06-20 RX ADMIN — NICOTINE POLACRILEX 2 MG: 2 GUM, CHEWING ORAL at 09:31

## 2020-06-20 RX ADMIN — DOCUSATE SODIUM 100 MG: 100 CAPSULE, LIQUID FILLED ORAL at 17:07

## 2020-06-20 RX ADMIN — LEVOTHYROXINE SODIUM 75 MCG: 75 TABLET ORAL at 06:26

## 2020-06-20 RX ADMIN — NICOTINE POLACRILEX 2 MG: 2 GUM, CHEWING ORAL at 14:12

## 2020-06-20 RX ADMIN — METFORMIN HYDROCHLORIDE 500 MG: 500 TABLET ORAL at 16:18

## 2020-06-20 RX ADMIN — ATORVASTATIN CALCIUM 10 MG: 10 TABLET, FILM COATED ORAL at 16:18

## 2020-06-20 RX ADMIN — DOCUSATE SODIUM 100 MG: 100 CAPSULE, LIQUID FILLED ORAL at 08:25

## 2020-06-20 RX ADMIN — OLANZAPINE 5 MG: 5 TABLET, FILM COATED ORAL at 20:35

## 2020-06-20 RX ADMIN — METFORMIN HYDROCHLORIDE 500 MG: 500 TABLET ORAL at 08:25

## 2020-06-20 RX ADMIN — CLOZAPINE 200 MG: 200 TABLET ORAL at 16:18

## 2020-06-20 RX ADMIN — DEXTROAMPHETAMINE SACCHARATE, AMPHETAMINE ASPARTATE, DEXTROAMPHETAMINE SULFATE AND AMPHETAMINE SULFATE 15 MG: 2.5; 2.5; 2.5; 2.5 TABLET ORAL at 06:26

## 2020-06-20 RX ADMIN — DIVALPROEX SODIUM 1500 MG: 500 TABLET, EXTENDED RELEASE ORAL at 20:35

## 2020-06-20 RX ADMIN — OXYBUTYNIN CHLORIDE 5 MG: 5 TABLET, EXTENDED RELEASE ORAL at 08:25
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
## 2020-06-20 NOTE — PLAN OF CARE
Problem: Alteration in Thoughts and Perception  Goal: Verbalize thoughts and feelings  Description  Interventions:  - Promote a nonjudgmental and trusting relationship with the patient through active listening and therapeutic communication  - Assess patient's level of functioning, behavior and potential for risk  - Engage patient in 1 on 1 interactions  - Encourage patient to express fears, feelings, frustrations, and discuss symptoms    - [place] patient to reality, help patient recognize reality-based thinking   - Administer medications as ordered and assess for potential side effects  - Provide the patient education related to the signs and symptoms of the illness and desired effects of prescribed medications  Outcome: Progressing  Goal: Refrain from acting on delusional thinking/internal stimuli  Description  Interventions:  - Monitor patient closely, per order   - Utilize least restrictive measures   - Set reasonable limits, give positive feedback for acceptable   - Administer medications as ordered and monitor of potential side effects  Outcome: Progressing  Goal: Agree to be compliant with medication regime, as prescribed and report medication side effects  Description  Interventions:  - Offer appropriate PRN medication and supervise ingestion; conduct AIMS, as needed   Outcome: Oral Andrzej has been visible on the unit, tended to his laundry, med and meal compliant  Less preoccupied during interactions with discharge planning and has been appropriate during interactions with this writer  Attended select groups and makes his needs known to staff  Otherwise behaviors controlled  Will continue to monitor for changes

## 2020-06-21 LAB
ATRIAL RATE: 81 BPM
P AXIS: 51 DEGREES
PR INTERVAL: 166 MS
QRS AXIS: -35 DEGREES
QRSD INTERVAL: 108 MS
QT INTERVAL: 364 MS
QTC INTERVAL: 422 MS
T WAVE AXIS: 31 DEGREES
VENTRICULAR RATE: 81 BPM

## 2020-06-21 PROCEDURE — 99232 SBSQ HOSP IP/OBS MODERATE 35: CPT | Performed by: PSYCHIATRY & NEUROLOGY

## 2020-06-21 PROCEDURE — 93010 ELECTROCARDIOGRAM REPORT: CPT | Performed by: INTERNAL MEDICINE

## 2020-06-21 RX ADMIN — METFORMIN HYDROCHLORIDE 500 MG: 500 TABLET ORAL at 16:20

## 2020-06-21 RX ADMIN — CLOZAPINE 200 MG: 200 TABLET ORAL at 16:20

## 2020-06-21 RX ADMIN — OXYBUTYNIN CHLORIDE 5 MG: 5 TABLET, EXTENDED RELEASE ORAL at 08:29

## 2020-06-21 RX ADMIN — LEVOTHYROXINE SODIUM 75 MCG: 75 TABLET ORAL at 05:37

## 2020-06-21 RX ADMIN — NICOTINE POLACRILEX 2 MG: 2 GUM, CHEWING ORAL at 08:30

## 2020-06-21 RX ADMIN — DOCUSATE SODIUM 100 MG: 100 CAPSULE, LIQUID FILLED ORAL at 08:29

## 2020-06-21 RX ADMIN — DOCUSATE SODIUM 100 MG: 100 CAPSULE, LIQUID FILLED ORAL at 17:02

## 2020-06-21 RX ADMIN — ACETAMINOPHEN 650 MG: 325 TABLET ORAL at 07:35

## 2020-06-21 RX ADMIN — DIVALPROEX SODIUM 1500 MG: 500 TABLET, EXTENDED RELEASE ORAL at 20:28

## 2020-06-21 RX ADMIN — DEXTROAMPHETAMINE SACCHARATE, AMPHETAMINE ASPARTATE, DEXTROAMPHETAMINE SULFATE AND AMPHETAMINE SULFATE 15 MG: 2.5; 2.5; 2.5; 2.5 TABLET ORAL at 05:37

## 2020-06-21 RX ADMIN — PANTOPRAZOLE SODIUM 40 MG: 40 TABLET, DELAYED RELEASE ORAL at 05:37

## 2020-06-21 RX ADMIN — OLANZAPINE 5 MG: 5 TABLET, FILM COATED ORAL at 20:28

## 2020-06-21 RX ADMIN — ATORVASTATIN CALCIUM 10 MG: 10 TABLET, FILM COATED ORAL at 16:20

## 2020-06-21 RX ADMIN — METFORMIN HYDROCHLORIDE 500 MG: 500 TABLET ORAL at 08:29

## 2020-06-21 NOTE — NURSING NOTE
Patient visible on unit all day, showered today, appears disheveled, multiple layers of clothing  Patient cooperative upon approach, med and meal compliant  Behaviors controlled, c/o back pain, medicated with PRN, effective  Patient attended 2/3 groups and spirituality  Will continue to monitor
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
## 2020-06-21 NOTE — PLAN OF CARE
Problem: Alteration in Thoughts and Perception  Goal: Agree to be compliant with medication regime, as prescribed and report medication side effects  Description  Interventions:  - Offer appropriate PRN medication and supervise ingestion; conduct AIMS, as needed   Outcome: Progressing  Goal: Attend and participate in unit activities, including therapeutic, recreational, and educational groups  Description  Interventions:  -Encourage Visitation and family involvement in care  Outcome: Progressing     Problem: Ineffective Coping  Goal: Demonstrates healthy coping skills  Outcome: Progressing     Visible, compliant with routine medications but needed prompting, gait steady, ate 100% of dinner, listened to music with peers, behavior controlled, attended evening group, will continue to monitor

## 2020-06-22 PROCEDURE — 99232 SBSQ HOSP IP/OBS MODERATE 35: CPT | Performed by: PSYCHIATRY & NEUROLOGY

## 2020-06-22 RX ADMIN — ATORVASTATIN CALCIUM 10 MG: 10 TABLET, FILM COATED ORAL at 16:57

## 2020-06-22 RX ADMIN — OLANZAPINE 5 MG: 5 TABLET, FILM COATED ORAL at 20:57

## 2020-06-22 RX ADMIN — OXYBUTYNIN CHLORIDE 5 MG: 5 TABLET, EXTENDED RELEASE ORAL at 08:26

## 2020-06-22 RX ADMIN — DEXTROAMPHETAMINE SACCHARATE, AMPHETAMINE ASPARTATE, DEXTROAMPHETAMINE SULFATE AND AMPHETAMINE SULFATE 15 MG: 2.5; 2.5; 2.5; 2.5 TABLET ORAL at 05:36

## 2020-06-22 RX ADMIN — LEVOTHYROXINE SODIUM 75 MCG: 75 TABLET ORAL at 05:36

## 2020-06-22 RX ADMIN — PANTOPRAZOLE SODIUM 40 MG: 40 TABLET, DELAYED RELEASE ORAL at 05:36

## 2020-06-22 RX ADMIN — CLOZAPINE 200 MG: 200 TABLET ORAL at 16:57

## 2020-06-22 RX ADMIN — METFORMIN HYDROCHLORIDE 500 MG: 500 TABLET ORAL at 08:26

## 2020-06-22 RX ADMIN — NICOTINE POLACRILEX 2 MG: 2 GUM, CHEWING ORAL at 10:50

## 2020-06-22 RX ADMIN — DOCUSATE SODIUM 100 MG: 100 CAPSULE, LIQUID FILLED ORAL at 08:26

## 2020-06-22 RX ADMIN — DIVALPROEX SODIUM 1500 MG: 500 TABLET, EXTENDED RELEASE ORAL at 20:57

## 2020-06-22 RX ADMIN — DOCUSATE SODIUM 100 MG: 100 CAPSULE, LIQUID FILLED ORAL at 17:02

## 2020-06-22 RX ADMIN — METFORMIN HYDROCHLORIDE 500 MG: 500 TABLET ORAL at 16:57
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
## 2020-06-22 NOTE — PROGRESS NOTES
06/22/20 0900   Activity/Group Checklist   Group Community meeting   Attendance Did not attend   Attendance Duration (min) 31-45   Affect/Mood GAEL

## 2020-06-22 NOTE — PROGRESS NOTES
06/22/20 1100   Activity/Group Checklist   Group   (IMR/Graduation )   Attendance Attended   Attendance Duration (min) 31-45   Interactions Did not interact   Affect/Mood Appropriate   Goals Achieved Able to listen to others

## 2020-06-22 NOTE — PLAN OF CARE
Problem: Alteration in Thoughts and Perception  Goal: Treatment Goal: Gain control of psychotic behaviors/thinking, reduce/eliminate presenting symptoms and demonstrate improved reality functioning upon discharge  Outcome: Progressing  Goal: Verbalize thoughts and feelings  Description  Interventions:  - Promote a nonjudgmental and trusting relationship with the patient through active listening and therapeutic communication  - Assess patient's level of functioning, behavior and potential for risk  - Engage patient in 1 on 1 interactions  - Encourage patient to express fears, feelings, frustrations, and discuss symptoms    - [place] patient to reality, help patient recognize reality-based thinking   - Administer medications as ordered and assess for potential side effects  - Provide the patient education related to the signs and symptoms of the illness and desired effects of prescribed medications  Outcome: Progressing  Goal: Refrain from acting on delusional thinking/internal stimuli  Description  Interventions:  - Monitor patient closely, per order   - Utilize least restrictive measures   - Set reasonable limits, give positive feedback for acceptable   - Administer medications as ordered and monitor of potential side effects  Outcome: Progressing  Goal: Agree to be compliant with medication regime, as prescribed and report medication side effects  Description  Interventions:  - Offer appropriate PRN medication and supervise ingestion; conduct AIMS, as needed   Outcome: Progressing  Goal: Attend and participate in unit activities, including therapeutic, recreational, and educational groups  Description  Interventions:  -Encourage Visitation and family involvement in care  Outcome: Progressing  Goal: Recognize dysfunctional thoughts, communicate reality-based thoughts at the time of discharge  Description  Interventions:  - Provide medication and psycho-education to assist patient in compliance and developing insight into his/her illness   Outcome: Progressing  Goal: Complete daily ADLs, including personal hygiene independently, as able  Description  Interventions:  - Observe, teach, and assist patient with ADLS  - Monitor and promote a balance of rest/activity, with adequate nutrition and elimination   Outcome: Progressing     Problem: Ineffective Coping  Goal: Identifies ineffective coping skills  Outcome: Progressing  Goal: Identifies healthy coping skills  Outcome: Progressing  Goal: Demonstrates healthy coping skills  Outcome: Progressing  Goal: Patient/Family participate in treatment and DC plans  Description  Interventions:  - Provide therapeutic environment  Outcome: Progressing  Goal: Patient/Family verbalizes awareness of resources  Outcome: Progressing  Goal: Understands least restrictive measures  Description  Interventions:  - Utilize least restrictive behavior  Outcome: Progressing     Problem: RESPIRATORY - ADULT  Goal: Achieves optimal ventilation and oxygenation  Description  INTERVENTIONS:  - Assess for changes in respiratory status  - Assess for changes in mentation and behavior  - Position to facilitate oxygenation and minimize respiratory effort  - Oxygen administered by appropriate delivery if ordered  - Initiate smoking cessation education as indicated  - Encourage broncho-pulmonary hygiene including cough, deep breathe, Incentive Spirometry  - Assess the need for suctioning and aspirate as needed  - Assess and instruct to report SOB or any respiratory difficulty  - Respiratory Therapy support as indicated  Outcome: Progressing     Problem: GASTROINTESTINAL - ADULT  Goal: Minimal or absence of nausea and/or vomiting  Description  INTERVENTIONS:  - Administer IV fluids if ordered to ensure adequate hydration  - Maintain NPO status until nausea and vomiting are resolved  - Nasogastric tube if ordered  - Administer ordered antiemetic medications as needed  - Provide nonpharmacologic comfort measures as appropriate  - Advance diet as tolerated, if ordered  - Consider nutrition services referral to assist patient with adequate nutrition and appropriate food choices  Outcome: Progressing  Goal: Maintains or returns to baseline bowel function  Description  INTERVENTIONS:  - Assess bowel function  - Encourage oral fluids to ensure adequate hydration  - Administer IV fluids if ordered to ensure adequate hydration  - Administer ordered medications as needed  - Encourage mobilization and activity  - Consider nutritional services referral to assist patient with adequate nutrition and appropriate food choices  Outcome: Progressing  Goal: Maintains adequate nutritional intake  Description  INTERVENTIONS:  - Monitor percentage of each meal consumed  - Identify factors contributing to decreased intake, treat as appropriate  - Assist with meals as needed  - Monitor I&O, weight, and lab values if indicated  - Obtain nutrition services referral as needed  Outcome: Progressing     Problem: METABOLIC, FLUID AND ELECTROLYTES - ADULT  Goal: Glucose maintained within target range  Description  INTERVENTIONS:  - Monitor Blood Glucose as ordered  - Assess for signs and symptoms of hyperglycemia and hypoglycemia  - Administer ordered medications to maintain glucose within target range  - Assess nutritional intake and initiate nutrition service referral as needed  Outcome: Progressing     Problem: DISCHARGE PLANNING  Goal: Discharge to home or other facility with appropriate resources  Description    CASE MANAGEMENT INTERVENTIONS:  - Conduct assessment to determine patient/family and health care team treatment goals, and need for post-acute services based on payer coverage, community resources, patient preferences and barriers to discharge    - Address psychosocial, clinical, and financial barriers to discharge as identified in assessment in conjunction with the patient/family and health care team   - Assist the patient in reintegration back into the community by removing barriers which may hinder a successful discharge once deemed stable  - Arrange appropriate level of post-acute services according to patient's needs and preference and payer coverage in collaboration with the physician and health care team   - Communicate with and update the patient/family, physician, and health care team regarding progress on the discharge plan  - Arrange appropriate transportation to post-acute venues  Outcome: Progressing     Quiet, cooperative, visible intermittently  Med and meal compliant  Ate 100% of breakfast and 100% of lunch  Denies depression, anxiety, SI, HI and pain  Preoccupied with "getting out of here"  Less intrusive  Behaviors controlled  Attended IMR, Journaling and Sprituality groups  Maintained on patient safety precautions and mouth checks w/o incident    Will continue to monotiro progress in recovery program

## 2020-06-22 NOTE — PLAN OF CARE
Problem: Alteration in Thoughts and Perception  Goal: Verbalize thoughts and feelings  Description  Interventions:  - Promote a nonjudgmental and trusting relationship with the patient through active listening and therapeutic communication  - Assess patient's level of functioning, behavior and potential for risk  - Engage patient in 1 on 1 interactions  - Encourage patient to express fears, feelings, frustrations, and discuss symptoms    - [place] patient to reality, help patient recognize reality-based thinking   - Administer medications as ordered and assess for potential side effects  - Provide the patient education related to the signs and symptoms of the illness and desired effects of prescribed medications  Outcome: Progressing  Goal: Agree to be compliant with medication regime, as prescribed and report medication side effects  Description  Interventions:  - Offer appropriate PRN medication and supervise ingestion; conduct AIMS, as needed   Outcome: Progressing  Goal: Complete daily ADLs, including personal hygiene independently, as able  Description  Interventions:  - Observe, teach, and assist patient with ADLS  - Monitor and promote a balance of rest/activity, with adequate nutrition and elimination   Outcome: Progressing     Problem: Alteration in Thoughts and Perception  Goal: Attend and participate in unit activities, including therapeutic, recreational, and educational groups  Description  Interventions:  -Encourage Visitation and family involvement in care  Outcome: Not Progressing     Visible, compliant with routine medications but needed prompting, gait steady, ate 100% of dinner, listened to music with peers, behavior controlled, did not attend evening group, will continue to monitor
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
## 2020-06-22 NOTE — PLAN OF CARE
Problem: Alteration in Thoughts and Perception  Goal: Verbalize thoughts and feelings  Description  Interventions:  - Promote a nonjudgmental and trusting relationship with the patient through active listening and therapeutic communication  - Assess patient's level of functioning, behavior and potential for risk  - Engage patient in 1 on 1 interactions  - Encourage patient to express fears, feelings, frustrations, and discuss symptoms    - [place] patient to reality, help patient recognize reality-based thinking   - Administer medications as ordered and assess for potential side effects  - Provide the patient education related to the signs and symptoms of the illness and desired effects of prescribed medications  Outcome: Progressing  Goal: Refrain from acting on delusional thinking/internal stimuli  Description  Interventions:  - Monitor patient closely, per order   - Utilize least restrictive measures   - Set reasonable limits, give positive feedback for acceptable   - Administer medications as ordered and monitor of potential side effects  Outcome: Progressing  Goal: Agree to be compliant with medication regime, as prescribed and report medication side effects  Description  Interventions:  - Offer appropriate PRN medication and supervise ingestion; conduct AIMS, as needed   Outcome: Progressing  Goal: Complete daily ADLs, including personal hygiene independently, as able  Description  Interventions:  - Observe, teach, and assist patient with ADLS  - Monitor and promote a balance of rest/activity, with adequate nutrition and elimination   Outcome: Progressing     Problem: Ineffective Coping  Goal: Demonstrates healthy coping skills  Outcome: Progressing  Goal: Understands least restrictive measures  Description  Interventions:  - Utilize least restrictive behavior  Outcome: Progressing     Problem: GASTROINTESTINAL - ADULT  Goal: Maintains adequate nutritional intake  Description  INTERVENTIONS:  - Monitor percentage of each meal consumed  - Identify factors contributing to decreased intake, treat as appropriate  - Assist with meals as needed  - Monitor I&O, weight, and lab values if indicated  - Obtain nutrition services referral as needed  Outcome: Kirk Cummins has been awake, alert, and visible intermittently out in the milieu  Pt refused to have his routine 1600 vital signs taken  Ate 100% supper  Rests in room and listens to music on radio in [facility] 19 at times  Pt completed his daily ADL's today but remains disheveled in appearance and dressed in layers  Social with select peers when out in milieu  Remains focused on discharge  No behavioral issues  Pt did not attend evening group but came out for snack after  Continue to monitor/assess for any changes

## 2020-06-22 NOTE — PROGRESS NOTES
06/22/20 0850   Team Meeting   Meeting Type Daily Rounds   Initial Conference Date 06/22/20   Patient/Family Present   Patient Present No   Patient's Family Present No     Daily Rounds Documentation     Team Members Present:   Gladis Butler, [address], [place]  Alexandria Flores, [facility]  [facility], RN    Had a good weekend; no issues

## 2020-06-22 NOTE — PLAN OF CARE
Patient is no discharge ready at this time; once he starts improving we will have him reassessed for residential placement at [facility]

## 2020-06-23 PROCEDURE — 99232 SBSQ HOSP IP/OBS MODERATE 35: CPT | Performed by: PSYCHIATRY & NEUROLOGY

## 2020-06-23 RX ADMIN — DEXTROAMPHETAMINE SACCHARATE, AMPHETAMINE ASPARTATE, DEXTROAMPHETAMINE SULFATE AND AMPHETAMINE SULFATE 15 MG: 2.5; 2.5; 2.5; 2.5 TABLET ORAL at 06:17

## 2020-06-23 RX ADMIN — OLANZAPINE 5 MG: 5 TABLET, FILM COATED ORAL at 20:33

## 2020-06-23 RX ADMIN — NICOTINE POLACRILEX 2 MG: 2 GUM, CHEWING ORAL at 10:51

## 2020-06-23 RX ADMIN — DOCUSATE SODIUM 100 MG: 100 CAPSULE, LIQUID FILLED ORAL at 17:08

## 2020-06-23 RX ADMIN — CLOZAPINE 200 MG: 200 TABLET ORAL at 16:44

## 2020-06-23 RX ADMIN — METFORMIN HYDROCHLORIDE 500 MG: 500 TABLET ORAL at 08:00

## 2020-06-23 RX ADMIN — ACETAMINOPHEN 325 MG: 325 TABLET ORAL at 10:49

## 2020-06-23 RX ADMIN — ATORVASTATIN CALCIUM 10 MG: 10 TABLET, FILM COATED ORAL at 16:44

## 2020-06-23 RX ADMIN — DOCUSATE SODIUM 100 MG: 100 CAPSULE, LIQUID FILLED ORAL at 08:00

## 2020-06-23 RX ADMIN — LEVOTHYROXINE SODIUM 75 MCG: 75 TABLET ORAL at 06:17

## 2020-06-23 RX ADMIN — METFORMIN HYDROCHLORIDE 500 MG: 500 TABLET ORAL at 16:43

## 2020-06-23 RX ADMIN — PANTOPRAZOLE SODIUM 40 MG: 40 TABLET, DELAYED RELEASE ORAL at 06:17

## 2020-06-23 RX ADMIN — DIVALPROEX SODIUM 1500 MG: 500 TABLET, EXTENDED RELEASE ORAL at 20:33

## 2020-06-23 RX ADMIN — NICOTINE POLACRILEX 2 MG: 2 GUM, CHEWING ORAL at 08:00

## 2020-06-23 RX ADMIN — OXYBUTYNIN CHLORIDE 5 MG: 5 TABLET, EXTENDED RELEASE ORAL at 08:00
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
## 2020-06-23 NOTE — PROGRESS NOTES
06/23/20 1400   Activity/Group Checklist   Group   (Recovery Workshop )   Attendance Did not attend   Attendance Duration (min) 46-60   Affect/Mood GAEL

## 2020-06-23 NOTE — PROGRESS NOTES
Lucie Bolden verbalized having 2/10 left foot pain @ 1049, per MAR order 325 mg of acetaminophen administered  Will continue to monitor for effectiveness

## 2020-06-23 NOTE — PROGRESS NOTES
06/23/20 1100   Activity/Group Checklist   Group   (IMR/Community Support Plan )   Attendance Attended   Attendance Duration (min) 46-60   Interactions Interacted appropriately   Affect/Mood Appropriate;Bright;Calm;Normal range   Goals Achieved Identified feelings; Able to listen to others; Able to engage in interactions; Identified relapse prevention strategies

## 2020-06-23 NOTE — PLAN OF CARE
Problem: Alteration in Thoughts and Perception  Goal: Verbalize thoughts and feelings  Description  Interventions:  - Promote a nonjudgmental and trusting relationship with the patient through active listening and therapeutic communication  - Assess patient's level of functioning, behavior and potential for risk  - Engage patient in 1 on 1 interactions  - Encourage patient to express fears, feelings, frustrations, and discuss symptoms    - [place] patient to reality, help patient recognize reality-based thinking   - Administer medications as ordered and assess for potential side effects  - Provide the patient education related to the signs and symptoms of the illness and desired effects of prescribed medications  Outcome: Progressing  Goal: Agree to be compliant with medication regime, as prescribed and report medication side effects  Description  Interventions:  - Offer appropriate PRN medication and supervise ingestion; conduct AIMS, as needed   Outcome: Progressing  Goal: Attend and participate in unit activities, including therapeutic, recreational, and educational groups  Description  Interventions:  -Encourage Visitation and family involvement in care    CERTIFIED PEER SPECIALIST INTERVENTIONS:    Complete peer assessment with patient to assess their needs and identify their goals to complete while in the recovery program as well as once discharged into the community  Patient will complete WRAP Plan, Crisis Plan and 5 Life Domains  Patient will attend 50% of groups offered on the unit  Outcome: Arian Atkinson has been intermittently visible on the unit, attended [facility] and [place] briefly  Less preoccupied with discharge during interactions  Makes his needs known to staff as necessary  No more complaints of pain at this time of documentation  Med and meal compliant  Behaviors controlled   Will continue to monitor
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
## 2020-06-23 NOTE — PROGRESS NOTES
06/23/20 0847   Team Meeting   Meeting Type Daily Rounds   Initial Conference Date 06/23/20   Patient/Family Present   Patient Present No   Patient's Family Present No     Daily Rounds Documentation     Team Members Present:   Rivka Allen, [facility], [address] [place] [address], [facility] Bobby Perez RN    Showered  Taking her medications  Fixated on discharge  Up early this morning

## 2020-06-23 NOTE — PROGRESS NOTES
06/23/20 0900   Activity/Group Checklist   Group Community meeting   Attendance Did not attend   Attendance Duration (min) 31-45   Affect/Mood GAEL

## 2020-06-24 PROCEDURE — 99232 SBSQ HOSP IP/OBS MODERATE 35: CPT | Performed by: PSYCHIATRY & NEUROLOGY

## 2020-06-24 RX ADMIN — METFORMIN HYDROCHLORIDE 500 MG: 500 TABLET ORAL at 08:41

## 2020-06-24 RX ADMIN — DIVALPROEX SODIUM 1500 MG: 500 TABLET, EXTENDED RELEASE ORAL at 20:54

## 2020-06-24 RX ADMIN — DOCUSATE SODIUM 100 MG: 100 CAPSULE, LIQUID FILLED ORAL at 08:34

## 2020-06-24 RX ADMIN — NICOTINE POLACRILEX 2 MG: 2 GUM, CHEWING ORAL at 08:41

## 2020-06-24 RX ADMIN — ATORVASTATIN CALCIUM 10 MG: 10 TABLET, FILM COATED ORAL at 16:42

## 2020-06-24 RX ADMIN — CLOZAPINE 200 MG: 200 TABLET ORAL at 16:42

## 2020-06-24 RX ADMIN — OXYBUTYNIN CHLORIDE 5 MG: 5 TABLET, EXTENDED RELEASE ORAL at 08:41

## 2020-06-24 RX ADMIN — DOCUSATE SODIUM 100 MG: 100 CAPSULE, LIQUID FILLED ORAL at 17:13

## 2020-06-24 RX ADMIN — PANTOPRAZOLE SODIUM 40 MG: 40 TABLET, DELAYED RELEASE ORAL at 05:39

## 2020-06-24 RX ADMIN — LEVOTHYROXINE SODIUM 75 MCG: 75 TABLET ORAL at 05:39

## 2020-06-24 RX ADMIN — DEXTROAMPHETAMINE SACCHARATE, AMPHETAMINE ASPARTATE, DEXTROAMPHETAMINE SULFATE AND AMPHETAMINE SULFATE 15 MG: 2.5; 2.5; 2.5; 2.5 TABLET ORAL at 05:39

## 2020-06-24 RX ADMIN — OLANZAPINE 5 MG: 5 TABLET, FILM COATED ORAL at 20:54

## 2020-06-24 RX ADMIN — METFORMIN HYDROCHLORIDE 500 MG: 500 TABLET ORAL at 16:42
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
## 2020-06-24 NOTE — PROGRESS NOTES
06/24/20 1100   Activity/Group Checklist   Group   (IMR/Community Meeting )   Attendance Did not attend   Attendance Duration (min) 46-60   Affect/Mood GAEL

## 2020-06-24 NOTE — PROGRESS NOTES
06/24/20 1634   Team Meeting   Meeting Type Daily Rounds   Initial Conference Date 06/24/20   Team Members Present   Team Members Present Physician;Nurse;;; Other (Discipline and Name)   Physician Team Member Dr Hermelinda Vallecillo MD   Nursing Team Member Lita Levy, TABBY   Care Management Team Member Honey Hughes, [place]   Social Work Team Member Alex Chacon LSSTUART   Other (Discipline and Name) [employer], CPS   Patient/Family Present   Patient Present No   Patient's Family Present No     No issues reported

## 2020-06-24 NOTE — PROGRESS NOTES
06/24/20 0915   Activity/Group Checklist   Group Community meeting   Attendance Attended   Attendance Duration (min) 31-45   Interactions Interacted appropriately   Affect/Mood Appropriate;Bright;Normal range   Goals Achieved Identified feelings; Able to listen to others; Able to engage in interactions

## 2020-06-24 NOTE — PLAN OF CARE
Problem: Alteration in Thoughts and Perception  Goal: Verbalize thoughts and feelings  Description  Interventions:  - Promote a nonjudgmental and trusting relationship with the patient through active listening and therapeutic communication  - Assess patient's level of functioning, behavior and potential for risk  - Engage patient in 1 on 1 interactions  - Encourage patient to express fears, feelings, frustrations, and discuss symptoms    - [place] patient to reality, help patient recognize reality-based thinking   - Administer medications as ordered and assess for potential side effects  - Provide the patient education related to the signs and symptoms of the illness and desired effects of prescribed medications  Outcome: Progressing  Goal: Refrain from acting on delusional thinking/internal stimuli  Description  Interventions:  - Monitor patient closely, per order   - Utilize least restrictive measures   - Set reasonable limits, give positive feedback for acceptable   - Administer medications as ordered and monitor of potential side effects  Outcome: Progressing  Goal: Agree to be compliant with medication regime, as prescribed and report medication side effects  Description  Interventions:  - Offer appropriate PRN medication and supervise ingestion; conduct AIMS, as needed   Outcome: Progressing  Goal: Attend and participate in unit activities, including therapeutic, recreational, and educational groups  Description  Interventions:  -Encourage Visitation and family involvement in care    CERTIFIED PEER SPECIALIST INTERVENTIONS:    Complete peer assessment with patient to assess their needs and identify their goals to complete while in the recovery program as well as once discharged into the community  Patient will complete WRAP Plan, Crisis Plan and 5 Life Domains  Patient will attend 50% of groups offered on the unit        Outcome: Not Progressing     Problem: Ineffective Coping  Goal: Demonstrates healthy coping skills  Outcome: Progressing  Goal: Understands least restrictive measures  Description  Interventions:  - Utilize least restrictive behavior  Outcome: Progressing     Problem: GASTROINTESTINAL - ADULT  Goal: Maintains adequate nutritional intake  Description  INTERVENTIONS:  - Monitor percentage of each meal consumed  - Identify factors contributing to decreased intake, treat as appropriate  - Assist with meals as needed  - Monitor I&O, weight, and lab values if indicated  - Obtain nutrition services referral as needed  Outcome: Kirk Cummins has been awake, alert, and visible intermittently out in the milieu  Ate 100% supper  Naps at intervals in room and listens to music on radio with peers in [facility] 19  Dressed in layers and disheveled in appearance  Pt did not attend evening group but came out for snack after  Less preoccupied with discharge  No behavioral issues  Continue to monitor/assess for any changes  Continue to monitor/assess for any changes

## 2020-06-24 NOTE — PLAN OF CARE
Problem: Alteration in Thoughts and Perception  Goal: Treatment Goal: Gain control of psychotic behaviors/thinking, reduce/eliminate presenting symptoms and demonstrate improved reality functioning upon discharge  Outcome: Progressing  Goal: Verbalize thoughts and feelings  Description  Interventions:  - Promote a nonjudgmental and trusting relationship with the patient through active listening and therapeutic communication  - Assess patient's level of functioning, behavior and potential for risk  - Engage patient in 1 on 1 interactions  - Encourage patient to express fears, feelings, frustrations, and discuss symptoms    - [place] patient to reality, help patient recognize reality-based thinking   - Administer medications as ordered and assess for potential side effects  - Provide the patient education related to the signs and symptoms of the illness and desired effects of prescribed medications  Outcome: Progressing  Goal: Refrain from acting on delusional thinking/internal stimuli  Description  Interventions:  - Monitor patient closely, per order   - Utilize least restrictive measures   - Set reasonable limits, give positive feedback for acceptable   - Administer medications as ordered and monitor of potential side effects  Outcome: Progressing  Goal: Agree to be compliant with medication regime, as prescribed and report medication side effects  Description  Interventions:  - Offer appropriate PRN medication and supervise ingestion; conduct AIMS, as needed   Outcome: Progressing  Goal: Attend and participate in unit activities, including therapeutic, recreational, and educational groups  Description  Interventions:  -Encourage Visitation and family involvement in care    CERTIFIED PEER SPECIALIST INTERVENTIONS:    Complete peer assessment with patient to assess their needs and identify their goals to complete while in the recovery program as well as once discharged into the community       Patient will complete WRAP Plan, Crisis Plan and 5 Life Domains  Patient will attend 50% of groups offered on the unit        Outcome: Progressing  Goal: Recognize dysfunctional thoughts, communicate reality-based thoughts at the time of discharge  Description  Interventions:  - Provide medication and psycho-education to assist patient in compliance and developing insight into his/her illness   Outcome: Progressing  Goal: Complete daily ADLs, including personal hygiene independently, as able  Description  Interventions:  - Observe, teach, and assist patient with ADLS  - Monitor and promote a balance of rest/activity, with adequate nutrition and elimination   Outcome: Progressing  Goal: Attend and participate in unit activities, including therapeutic, recreational, and educational groups  Description  Interventions:  -Encourage Visitation and family involvement in care  Outcome: Progressing     Problem: Ineffective Coping  Goal: Identifies ineffective coping skills  Outcome: Progressing  Goal: Identifies healthy coping skills  Outcome: Progressing  Goal: Demonstrates healthy coping skills  Outcome: Progressing  Goal: Patient/Family participate in treatment and DC plans  Description  Interventions:  - Provide therapeutic environment  Outcome: Progressing  Goal: Patient/Family verbalizes awareness of resources  Outcome: Progressing  Goal: Understands least restrictive measures  Description  Interventions:  - Utilize least restrictive behavior  Outcome: Progressing     Problem: RESPIRATORY - ADULT  Goal: Achieves optimal ventilation and oxygenation  Description  INTERVENTIONS:  - Assess for changes in respiratory status  - Assess for changes in mentation and behavior  - Position to facilitate oxygenation and minimize respiratory effort  - Oxygen administered by appropriate delivery if ordered  - Initiate smoking cessation education as indicated  - Encourage broncho-pulmonary hygiene including cough, deep breathe, Incentive Spirometry  - Assess the need for suctioning and aspirate as needed  - Assess and instruct to report SOB or any respiratory difficulty  - Respiratory Therapy support as indicated  Outcome: Progressing     Problem: GASTROINTESTINAL - ADULT  Goal: Minimal or absence of nausea and/or vomiting  Description  INTERVENTIONS:  - Administer IV fluids if ordered to ensure adequate hydration  - Maintain NPO status until nausea and vomiting are resolved  - Nasogastric tube if ordered  - Administer ordered antiemetic medications as needed  - Provide nonpharmacologic comfort measures as appropriate  - Advance diet as tolerated, if ordered  - Consider nutrition services referral to assist patient with adequate nutrition and appropriate food choices  Outcome: Progressing  Goal: Maintains or returns to baseline bowel function  Description  INTERVENTIONS:  - Assess bowel function  - Encourage oral fluids to ensure adequate hydration  - Administer IV fluids if ordered to ensure adequate hydration  - Administer ordered medications as needed  - Encourage mobilization and activity  - Consider nutritional services referral to assist patient with adequate nutrition and appropriate food choices  Outcome: Progressing  Goal: Maintains adequate nutritional intake  Description  INTERVENTIONS:  - Monitor percentage of each meal consumed  - Identify factors contributing to decreased intake, treat as appropriate  - Assist with meals as needed  - Monitor I&O, weight, and lab values if indicated  - Obtain nutrition services referral as needed  Outcome: Progressing     Problem: METABOLIC, FLUID AND ELECTROLYTES - ADULT  Goal: Glucose maintained within target range  Description  INTERVENTIONS:  - Monitor Blood Glucose as ordered  - Assess for signs and symptoms of hyperglycemia and hypoglycemia  - Administer ordered medications to maintain glucose within target range  - Assess nutritional intake and initiate nutrition service referral as needed  Outcome: Progressing     Problem: DISCHARGE PLANNING  Goal: Discharge to home or other facility with appropriate resources  Description    CASE MANAGEMENT INTERVENTIONS:  - Conduct assessment to determine patient/family and health care team treatment goals, and need for post-acute services based on payer coverage, community resources, patient preferences and barriers to discharge  - Address psychosocial, clinical, and financial barriers to discharge as identified in assessment in conjunction with the patient/family and health care team   - Assist the patient in reintegration back into the community by removing barriers which may hinder a successful discharge once deemed stable  - Arrange appropriate level of post-acute services according to patient's needs and preference and payer coverage in collaboration with the physician and health care team   - Communicate with and update the patient/family, physician, and health care team regarding progress on the discharge plan  - Arrange appropriate transportation to post-acute venues  Outcome: Progressing     Quiet, cooperative, visible intermittently  Med and meal compliant  Ate 100% of both meals  Denies depression, anxiety, SI and HI  No preoccupation with discharge today  No c/o pain  Attended morning walking group  Maintained on patient safety and mouth checks w/o incident    Will continue to monitor progress in recovery program

## 2020-06-25 PROCEDURE — 99232 SBSQ HOSP IP/OBS MODERATE 35: CPT | Performed by: PSYCHIATRY & NEUROLOGY

## 2020-06-25 RX ADMIN — DIVALPROEX SODIUM 1500 MG: 500 TABLET, EXTENDED RELEASE ORAL at 20:53

## 2020-06-25 RX ADMIN — METFORMIN HYDROCHLORIDE 500 MG: 500 TABLET ORAL at 16:37

## 2020-06-25 RX ADMIN — ATORVASTATIN CALCIUM 10 MG: 10 TABLET, FILM COATED ORAL at 16:37

## 2020-06-25 RX ADMIN — OXYBUTYNIN CHLORIDE 5 MG: 5 TABLET, EXTENDED RELEASE ORAL at 08:45

## 2020-06-25 RX ADMIN — PANTOPRAZOLE SODIUM 40 MG: 40 TABLET, DELAYED RELEASE ORAL at 05:29

## 2020-06-25 RX ADMIN — DOCUSATE SODIUM 100 MG: 100 CAPSULE, LIQUID FILLED ORAL at 08:45

## 2020-06-25 RX ADMIN — LEVOTHYROXINE SODIUM 75 MCG: 75 TABLET ORAL at 05:29

## 2020-06-25 RX ADMIN — DOCUSATE SODIUM 100 MG: 100 CAPSULE, LIQUID FILLED ORAL at 17:09

## 2020-06-25 RX ADMIN — DEXTROAMPHETAMINE SACCHARATE, AMPHETAMINE ASPARTATE, DEXTROAMPHETAMINE SULFATE AND AMPHETAMINE SULFATE 15 MG: 2.5; 2.5; 2.5; 2.5 TABLET ORAL at 05:29

## 2020-06-25 RX ADMIN — CLOZAPINE 200 MG: 200 TABLET ORAL at 16:37

## 2020-06-25 RX ADMIN — METFORMIN HYDROCHLORIDE 500 MG: 500 TABLET ORAL at 08:45

## 2020-06-25 RX ADMIN — NICOTINE POLACRILEX 2 MG: 2 GUM, CHEWING ORAL at 08:56

## 2020-06-25 RX ADMIN — OLANZAPINE 5 MG: 5 TABLET, FILM COATED ORAL at 20:53

## 2020-06-25 NOTE — PLAN OF CARE
Problem: Alteration in Thoughts and Perception  Goal: Verbalize thoughts and feelings  Description  Interventions:  - Promote a nonjudgmental and trusting relationship with the patient through active listening and therapeutic communication  - Assess patient's level of functioning, behavior and potential for risk  - Engage patient in 1 on 1 interactions  - Encourage patient to express fears, feelings, frustrations, and discuss symptoms    - [place] patient to reality, help patient recognize reality-based thinking   - Administer medications as ordered and assess for potential side effects  - Provide the patient education related to the signs and symptoms of the illness and desired effects of prescribed medications  Outcome: Progressing  Goal: Refrain from acting on delusional thinking/internal stimuli  Description  Interventions:  - Monitor patient closely, per order   - Utilize least restrictive measures   - Set reasonable limits, give positive feedback for acceptable   - Administer medications as ordered and monitor of potential side effects  Outcome: Progressing  Goal: Agree to be compliant with medication regime, as prescribed and report medication side effects  Description  Interventions:  - Offer appropriate PRN medication and supervise ingestion; conduct AIMS, as needed   Outcome: Progressing     Problem: Ineffective Coping  Goal: Demonstrates healthy coping skills  Outcome: Progressing  Goal: Understands least restrictive measures  Description  Interventions:  - Utilize least restrictive behavior  Outcome: Progressing     Problem: GASTROINTESTINAL - ADULT  Goal: Maintains adequate nutritional intake  Description  INTERVENTIONS:  - Monitor percentage of each meal consumed  - Identify factors contributing to decreased intake, treat as appropriate  - Assist with meals as needed  - Monitor I&O, weight, and lab values if indicated  - Obtain nutrition services referral as needed  Outcome: Progressing     Problem: GASTROINTESTINAL - ADULT  Goal: Maintains adequate nutritional intake  Description  INTERVENTIONS:  - Monitor percentage of each meal consumed  - Identify factors contributing to decreased intake, treat as appropriate  - Assist with meals as needed  - Monitor I&O, weight, and lab values if indicated  - Obtain nutrition services referral as needed  Outcome: Edson Collins has been awake, alert, and visible intermittently out in the milieu  Ate 100% supper  Listens to music on radio in [facility] 19 with peers and rests in room at intervals  Preoccupied at times with discharge  No behavioral issues  Disheveled in appearance and dressed in layers  Denies any depression or anxiety and has not verbalized any delusions  Pt did not attend evening group but came out for snack after  Compliant with all scheduled meds without prompting and cooperative with mouth checks  Continue to monitor/assess for any changes

## 2020-06-25 NOTE — NURSING NOTE
Pt is calm and visible on the unit walking the hallway  Pt denies s/s currently including SI and HI  Pt remains disheveled and in layered clothing  Pt preoccupied with discharge, but redirectable  Will continue to monitor

## 2020-06-25 NOTE — PROGRESS NOTES
06/25/20 0900   Activity/Group Checklist   Group Community meeting   Attendance Did not attend   Attendance Duration (min) 31-45   Affect/Mood GAEL

## 2020-06-25 NOTE — PROGRESS NOTES
06/25/20 1100   Activity/Group Checklist   Group   (IMR/Goals and Discharge )   Attendance Attended   Attendance Duration (min) 46-60   Interactions Interacted appropriately   Affect/Mood Appropriate; Constricted   Goals Achieved Identified feelings; Able to listen to others; Able to engage in interactions
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
## 2020-06-26 PROCEDURE — 99232 SBSQ HOSP IP/OBS MODERATE 35: CPT | Performed by: PSYCHIATRY & NEUROLOGY

## 2020-06-26 RX ADMIN — DIVALPROEX SODIUM 1500 MG: 500 TABLET, EXTENDED RELEASE ORAL at 20:50

## 2020-06-26 RX ADMIN — METFORMIN HYDROCHLORIDE 500 MG: 500 TABLET ORAL at 16:31

## 2020-06-26 RX ADMIN — LEVOTHYROXINE SODIUM 75 MCG: 75 TABLET ORAL at 05:30

## 2020-06-26 RX ADMIN — PANTOPRAZOLE SODIUM 40 MG: 40 TABLET, DELAYED RELEASE ORAL at 05:30

## 2020-06-26 RX ADMIN — DEXTROAMPHETAMINE SACCHARATE, AMPHETAMINE ASPARTATE, DEXTROAMPHETAMINE SULFATE AND AMPHETAMINE SULFATE 15 MG: 2.5; 2.5; 2.5; 2.5 TABLET ORAL at 05:30

## 2020-06-26 RX ADMIN — CLOZAPINE 200 MG: 200 TABLET ORAL at 16:31

## 2020-06-26 RX ADMIN — ATORVASTATIN CALCIUM 10 MG: 10 TABLET, FILM COATED ORAL at 16:31

## 2020-06-26 RX ADMIN — DOCUSATE SODIUM 100 MG: 100 CAPSULE, LIQUID FILLED ORAL at 17:07

## 2020-06-26 RX ADMIN — OLANZAPINE 5 MG: 5 TABLET, FILM COATED ORAL at 20:50

## 2020-06-26 NOTE — PROGRESS NOTES
Jimsincere Madden refused his 0900 medications with several prompts from this writer and staff  Patient remained in bed and told this writer to go away  Will continue to monitor

## 2020-06-26 NOTE — PROGRESS NOTES
06/26/20 0900   Activity/Group Checklist   Group Community meeting   Attendance Other (Comment)  (Came and went several times)   Attendance Duration (min) 0-15   Interactions Did not interact   Affect/Mood GAEL   Goals Achieved Other (Comment)  (None)     SW encouraged group and interactions multiple times; pt declined but was seen walking on him own on and off throughout the duration of the group

## 2020-06-26 NOTE — PROGRESS NOTES
06/26/20 1100   Activity/Group Checklist   Group Other (Comment)  (IMR: Music Expression)   Attendance Did not attend   Affect/Mood GAEL     Encouraged to come to group but declined
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
## 2020-06-26 NOTE — NURSING NOTE
Received pt in bed at change of shift with eyes closed; chest movement noted  Continues the same thus this far as per continual rounding  Awake times one to BR  Will continue to monitor
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
## 2020-06-26 NOTE — PLAN OF CARE
Problem: Alteration in Thoughts and Perception  Goal: Refrain from acting on delusional thinking/internal stimuli  Description  Interventions:  - Monitor patient closely, per order   - Utilize least restrictive measures   - Set reasonable limits, give positive feedback for acceptable   - Administer medications as ordered and monitor of potential side effects  Outcome: Not Progressing  Goal: Agree to be compliant with medication regime, as prescribed and report medication side effects  Description  Interventions:  - Offer appropriate PRN medication and supervise ingestion; conduct AIMS, as needed   Outcome: Not Progressing  Goal: Attend and participate in unit activities, including therapeutic, recreational, and educational groups  Description  Interventions:  -Encourage Visitation and family involvement in care    CERTIFIED PEER SPECIALIST INTERVENTIONS:    Complete peer assessment with patient to assess their needs and identify their goals to complete while in the recovery program as well as once discharged into the community  Patient will complete WRAP Plan, Crisis Plan and 5 Life Domains  Patient will attend 50% of groups offered on the unit  Outcome: Not [place] has been generally isolative to himself in between meals  Denied is 0900 medications as previously noted  Only came out of his room which he napped for the most of the day for meals, no groups attended  Irritable edge upon approach and verbalized "that he just wants to be left alone"  Would no longer interact with this writer  Will continue to monitor for changes

## 2020-06-26 NOTE — PLAN OF CARE
Problem: Alteration in Thoughts and Perception  Goal: Verbalize thoughts and feelings  Description  Interventions:  - Promote a nonjudgmental and trusting relationship with the patient through active listening and therapeutic communication  - Assess patient's level of functioning, behavior and potential for risk  - Engage patient in 1 on 1 interactions  - Encourage patient to express fears, feelings, frustrations, and discuss symptoms    - [place] patient to reality, help patient recognize reality-based thinking   - Administer medications as ordered and assess for potential side effects  - Provide the patient education related to the signs and symptoms of the illness and desired effects of prescribed medications  Outcome: Progressing  Goal: Agree to be compliant with medication regime, as prescribed and report medication side effects  Description  Interventions:  - Offer appropriate PRN medication and supervise ingestion; conduct AIMS, as needed   Outcome: Progressing  Goal: Complete daily ADLs, including personal hygiene independently, as able  Description  Interventions:  - Observe, teach, and assist patient with ADLS  - Monitor and promote a balance of rest/activity, with adequate nutrition and elimination   Outcome: Progressing     Problem: RESPIRATORY - ADULT  Goal: Achieves optimal ventilation and oxygenation  Description  INTERVENTIONS:  - Assess for changes in respiratory status  - Assess for changes in mentation and behavior  - Position to facilitate oxygenation and minimize respiratory effort  - Oxygen administered by appropriate delivery if ordered  - Initiate smoking cessation education as indicated  - Encourage broncho-pulmonary hygiene including cough, deep breathe, Incentive Spirometry  - Assess the need for suctioning and aspirate as needed  - Assess and instruct to report SOB or any respiratory difficulty  - Respiratory Therapy support as indicated  Outcome: Progressing     Problem: GASTROINTESTINAL - ADULT  Goal: Maintains adequate nutritional intake  Description  INTERVENTIONS:  - Monitor percentage of each meal consumed  - Identify factors contributing to decreased intake, treat as appropriate  - Assist with meals as needed  - Monitor I&O, weight, and lab values if indicated  - Obtain nutrition services referral as needed  Outcome: Progressing     Problem: Alteration in Thoughts and Perception  Goal: Verbalize thoughts and feelings  Description  Interventions:  - Promote a nonjudgmental and trusting relationship with the patient through active listening and therapeutic communication  - Assess patient's level of functioning, behavior and potential for risk  - Engage patient in 1 on 1 interactions  - Encourage patient to express fears, feelings, frustrations, and discuss symptoms    - [place] patient to reality, help patient recognize reality-based thinking   - Administer medications as ordered and assess for potential side effects  - Provide the patient education related to the signs and symptoms of the illness and desired effects of prescribed medications  Outcome: Progressing  Goal: Agree to be compliant with medication regime, as prescribed and report medication side effects  Description  Interventions:  - Offer appropriate PRN medication and supervise ingestion; conduct AIMS, as needed   Outcome: Progressing  Goal: Complete daily ADLs, including personal hygiene independently, as able  Description  Interventions:  - Observe, teach, and assist patient with ADLS  - Monitor and promote a balance of rest/activity, with adequate nutrition and elimination   Outcome: Progressing     Problem: RESPIRATORY - ADULT  Goal: Achieves optimal ventilation and oxygenation  Description  INTERVENTIONS:  - Assess for changes in respiratory status  - Assess for changes in mentation and behavior  - Position to facilitate oxygenation and minimize respiratory effort  - Oxygen administered by appropriate delivery if ordered  - Initiate smoking cessation education as indicated  - Encourage broncho-pulmonary hygiene including cough, deep breathe, Incentive Spirometry  - Assess the need for suctioning and aspirate as needed  - Assess and instruct to report SOB or any respiratory difficulty  - Respiratory Therapy support as indicated  Outcome: Progressing     Problem: GASTROINTESTINAL - ADULT  Goal: Maintains adequate nutritional intake  Description  INTERVENTIONS:  - Monitor percentage of each meal consumed  - Identify factors contributing to decreased intake, treat as appropriate  - Assist with meals as needed  - Monitor I&O, weight, and lab values if indicated  - Obtain nutrition services referral as needed  Outcome: Progressing     Problem: Alteration in Thoughts and Perception  Goal: Attend and participate in unit activities, including therapeutic, recreational, and educational groups  Description  Interventions:  -Encourage Visitation and family involvement in care    CERTIFIED PEER SPECIALIST INTERVENTIONS:    Complete peer assessment with patient to assess their needs and identify their goals to complete while in the recovery program as well as once discharged into the community  Patient will complete WRAP Plan, Crisis Plan and 5 Life Domains  Patient will attend 50% of groups offered on the unit  Outcome: Yeison Modi has been ambulating in the hallway intermittently  He did shower, but still has a disheveled appearance, Wears layers of clothing  Social with staff and select peers  Focused on getting a phone number for [employer]  Also wants to get more of his clothing out of the closet  He was reminded that he could only have a certain amount of clothing in his room  Denies anxiety and depression  "I just want to get out of here"  Took all medications without prompting  Ate 100% of his meal  No BM this shift  Did not go out for fresh air or watch the movie for evening group   Prompted for HS medication  Took all pills  Ate snack  Continue to monitor  Precautions maintained

## 2020-06-27 RX ADMIN — DOCUSATE SODIUM 100 MG: 100 CAPSULE, LIQUID FILLED ORAL at 17:13

## 2020-06-27 RX ADMIN — DIVALPROEX SODIUM 1500 MG: 500 TABLET, EXTENDED RELEASE ORAL at 20:55

## 2020-06-27 RX ADMIN — OXYBUTYNIN CHLORIDE 5 MG: 5 TABLET, EXTENDED RELEASE ORAL at 08:31

## 2020-06-27 RX ADMIN — ATORVASTATIN CALCIUM 10 MG: 10 TABLET, FILM COATED ORAL at 16:46

## 2020-06-27 RX ADMIN — METFORMIN HYDROCHLORIDE 500 MG: 500 TABLET ORAL at 16:46

## 2020-06-27 RX ADMIN — DOCUSATE SODIUM 100 MG: 100 CAPSULE, LIQUID FILLED ORAL at 08:31

## 2020-06-27 RX ADMIN — METFORMIN HYDROCHLORIDE 500 MG: 500 TABLET ORAL at 08:31

## 2020-06-27 RX ADMIN — NICOTINE POLACRILEX 2 MG: 2 GUM, CHEWING ORAL at 11:56

## 2020-06-27 RX ADMIN — PANTOPRAZOLE SODIUM 40 MG: 40 TABLET, DELAYED RELEASE ORAL at 05:23

## 2020-06-27 RX ADMIN — LEVOTHYROXINE SODIUM 75 MCG: 75 TABLET ORAL at 05:23

## 2020-06-27 RX ADMIN — DEXTROAMPHETAMINE SACCHARATE, AMPHETAMINE ASPARTATE, DEXTROAMPHETAMINE SULFATE AND AMPHETAMINE SULFATE 15 MG: 2.5; 2.5; 2.5; 2.5 TABLET ORAL at 05:23

## 2020-06-27 RX ADMIN — OLANZAPINE 5 MG: 5 TABLET, FILM COATED ORAL at 20:55

## 2020-06-27 RX ADMIN — CLOZAPINE 200 MG: 200 TABLET ORAL at 16:46

## 2020-06-27 NOTE — PROGRESS NOTES
Psychiatry Progress Note    Subjective: Interval History     The patient is sitting out in the common room area this morning  He is very irritable  He states "I do not want to talk!  Staff states he has been fixated on discharge  He has been visible at times on the unit  He has been compliant with medication and meals      Behavior over the last 24 hours:  unchanged  Sleep: normal  Appetite: normal  Medication side effects: No  ROS: no complaints    Current medications:    Current Facility-Administered Medications:     acetaminophen (TYLENOL) tablet 325 mg, 325 mg, Oral, Q6H PRN, Shey Lange MD, 325 mg at 06/23/20 1049    acetaminophen (TYLENOL) tablet 650 mg, 650 mg, Oral, Q6H PRN, Shey Lange MD, 650 mg at 06/21/20 0735    acetaminophen (TYLENOL) tablet 975 mg, 975 mg, Oral, Q8H PRN, Shey Lange MD, 975 mg at 03/29/20 1759    aluminum-magnesium hydroxide-simethicone (MYLANTA) 200-200-20 mg/5 mL oral suspension 30 mL, 30 mL, Oral, Q4H PRN, Shey Lange MD, 30 mL at 06/11/20 0858    amphetamine-dextroamphetamine (ADDERALL) tablet 15 mg, 15 mg, Oral, Daily, Shey Lange MD, 15 mg at 06/27/20 0523    atorvastatin (LIPITOR) tablet 10 mg, 10 mg, Oral, Daily With Ajay Jay MD, 10 mg at 06/26/20 1631    benztropine (COGENTIN) injection 1 mg, 1 mg, Intramuscular, Q6H PRN, Shey Lange MD    benztropine (COGENTIN) tablet 1 mg, 1 mg, Oral, Q6H PRN, Shey Lange MD, 1 mg at 04/07/20 2031    clozapine (CLOZARIL) tablet 200 mg, 200 mg, Oral, Daily, Shey Lange MD, 200 mg at 06/26/20 1631    divalproex sodium (DEPAKOTE ER) 24 hr tablet 1,500 mg, 1,500 mg, Oral, HS, Shey Lange MD, 1,500 mg at 06/26/20 2050    docusate sodium (COLACE) capsule 100 mg, 100 mg, Oral, BID, Shey Lange MD, 100 mg at 06/27/20 0831    haloperidol (HALDOL) tablet 5 mg, 5 mg, Oral, Q6H PRN, Shey Lange MD, 5 mg at 05/05/20 1648    haloperidol lactate (HALDOL) injection 5 mg, 5 mg, Intramuscular, Q6H PRN, MD Sylvia Tolliver levothyroxine tablet 75 mcg, 75 mcg, Oral, Early Morning, Elijah Chauhan MD, 75 mcg at 06/27/20 0523    LORazepam (ATIVAN) 2 mg/mL injection 1 mg, 1 mg, Intramuscular, Q6H PRN, Elijah Chauhan MD    LORazepam (ATIVAN) tablet 1 mg, 1 mg, Oral, Q6H PRN, Elijah Chauhan MD    magnesium hydroxide (MILK OF MAGNESIA) 400 mg/5 mL oral suspension 30 mL, 30 mL, Oral, Daily PRN, Elijah Chauhan MD, 30 mL at 03/14/20 2032    metFORMIN (GLUCOPHAGE) tablet 500 mg, 500 mg, Oral, BID With Meals, Elijah Chauhan MD, 500 mg at 06/27/20 0831    nicotine polacrilex (NICORETTE) gum 2 mg, 2 mg, Oral, Q2H PRN, Elijah Chauhan MD, 2 mg at 06/27/20 1156    OLANZapine (ZyPREXA) tablet 5 mg, 5 mg, Oral, HS, Elijah Chauhan MD, 5 mg at 06/26/20 2050    oxybutynin (DITROPAN-XL) 24 hr tablet 5 mg, 5 mg, Oral, Daily, Elijah Chauhan MD, 5 mg at 06/27/20 0831    pantoprazole (PROTONIX) EC tablet 40 mg, 40 mg, Oral, Early Morning, Elijah Chauhan MD, 40 mg at 06/27/20 0523    traZODone (DESYREL) tablet 50 mg, 50 mg, Oral, HS PRN, Elijah Chauhan MD, 50 mg at 06/11/20 2145    Current Problem List:    Patient Active Problem List   Diagnosis    Schizoaffective disorder, bipolar type ([facility] )    Constipation, chronic    Type 2 diabetes mellitus without complication, without long-term current use of insulin ([facility])    Chronic airway obstruction, not elsewhere classified    Benign essential hypertension    Disorder of lipoprotein and lipid metabolism    Foot callus    Gastroesophageal reflux disease without esophagitis    Acquired hypothyroidism    [facility]    BMI 34 0-34 9,adult    Nail abnormality    Encounter for well adult exam with abnormal findings    Tobacco abuse    Diabetes insipidus, nephrogenic ([facility] )    ADHD       Problem list reviewed 06/27/20     Objective:     Vital Signs:  Vitals:    06/25/20 0700 06/25/20 0702 06/26/20 0700 06/27/20 0740   BP: 133/87 133/81 139/85 120/72   BP Location: Right arm Right arm Right arm Right arm   Pulse: (!) 106 88 85 92   Resp: 17  18 20   Temp: (!) 96 8 °F (36 °C)  98 6 °F (37 °C) 97 6 °F (36 4 °C)   TempSrc: Temporal      SpO2:       Weight:       Height:             Appearance:  age appropriate, disheveled and overweight   Behavior:  evasive   Speech:  normal volume   Mood:  labile   Affect:  labile   Thought Process:  circumstantial   Thought Content:  delusions  persecutory   Perceptual Disturbances: None   Risk Potential: none   Sensorium:  person, place and time   Cognition:  recent and remote memory grossly intact   Consciousness:  alert and awake    Attention: attention span and concentration were age appropriate   Intellect: average   Insight:  limited   Judgment: limited      Motor Activity: no abnormal movements       I/O Past 24 hours:  No intake/output data recorded  No intake/output data recorded  Labs:  Reviewed 06/27/20    Assessment / Plan:     Schizoaffective disorder, bipolar type ([facility] )    Recommended Treatment:      Medication changes:  1) continue current medication regimen  Non-pharmacological treatments  1) Continue with group therapy, milieu therapy and occupational therapy  Safety  1) Safety/communication plan established targeting dynamic risk factors above  2) Risks, benefits, and possible side effects of medications explained to patient and patient verbalizes understanding  Counseling / Coordination of Care    Total floor / unit time spent today 20 minutes  Greater than 50% of total time was spent with the patient and / or family counseling and / or coordination of care  A description of the counseling / coordination of care  Patient's Rights, confidentiality and exceptions to confidentiality, use of automated medical record, [facility] staff access to medical record, and consent to treatment reviewed      Rose Hernández PA-C

## 2020-06-27 NOTE — PLAN OF CARE
Problem: Alteration in Thoughts and Perception  Goal: Verbalize thoughts and feelings  Description  Interventions:  - Promote a nonjudgmental and trusting relationship with the patient through active listening and therapeutic communication  - Assess patient's level of functioning, behavior and potential for risk  - Engage patient in 1 on 1 interactions  - Encourage patient to express fears, feelings, frustrations, and discuss symptoms    - [place] patient to reality, help patient recognize reality-based thinking   - Administer medications as ordered and assess for potential side effects  - Provide the patient education related to the signs and symptoms of the illness and desired effects of prescribed medications  Outcome: Progressing  Goal: Refrain from acting on delusional thinking/internal stimuli  Description  Interventions:  - Monitor patient closely, per order   - Utilize least restrictive measures   - Set reasonable limits, give positive feedback for acceptable   - Administer medications as ordered and monitor of potential side effects  Outcome: Progressing  Goal: Agree to be compliant with medication regime, as prescribed and report medication side effects  Description  Interventions:  - Offer appropriate PRN medication and supervise ingestion; conduct AIMS, as needed   Outcome: Progressing  Goal: Attend and participate in unit activities, including therapeutic, recreational, and educational groups  Description  Interventions:  -Encourage Visitation and family involvement in care    CERTIFIED PEER SPECIALIST INTERVENTIONS:    Complete peer assessment with patient to assess their needs and identify their goals to complete while in the recovery program as well as once discharged into the community  Patient will complete WRAP Plan, Crisis Plan and 5 Life Domains  Patient will attend 50% of groups offered on the unit        Outcome: Not Progressing     Problem: Ineffective Coping  Goal: Demonstrates healthy coping skills  Outcome: Progressing  Goal: Understands least restrictive measures  Description  Interventions:  - Utilize least restrictive behavior  Outcome: Progressing     Problem: GASTROINTESTINAL - ADULT  Goal: Maintains adequate nutritional intake  Description  INTERVENTIONS:  - Monitor percentage of each meal consumed  - Identify factors contributing to decreased intake, treat as appropriate  - Assist with meals as needed  - Monitor I&O, weight, and lab values if indicated  - Obtain nutrition services referral as needed  Outcome: Yanni Ng has been awake, alert, and visible intermittently out in the milieu  Ate 100% supper  Pt spends time resting in room and listening to music on radio in [facility] at intervals  Remains preoccupied with discharge but noted less  Disheveled in appearance and dressed in layers  Pt did not attend evening group  Compliant with all scheduled meds without prompting and cooperative with mouth checks  No behavioral issues  Had evening snack  Continue to monitor/assess for any changes

## 2020-06-27 NOTE — PLAN OF CARE
Problem: Alteration in Thoughts and Perception  Goal: Treatment Goal: Gain control of psychotic behaviors/thinking, reduce/eliminate presenting symptoms and demonstrate improved reality functioning upon discharge  Outcome: Progressing  Goal: Verbalize thoughts and feelings  Description  Interventions:  - Promote a nonjudgmental and trusting relationship with the patient through active listening and therapeutic communication  - Assess patient's level of functioning, behavior and potential for risk  - Engage patient in 1 on 1 interactions  - Encourage patient to express fears, feelings, frustrations, and discuss symptoms    - [place] patient to reality, help patient recognize reality-based thinking   - Administer medications as ordered and assess for potential side effects  - Provide the patient education related to the signs and symptoms of the illness and desired effects of prescribed medications  Outcome: Progressing  Goal: Refrain from acting on delusional thinking/internal stimuli  Description  Interventions:  - Monitor patient closely, per order   - Utilize least restrictive measures   - Set reasonable limits, give positive feedback for acceptable   - Administer medications as ordered and monitor of potential side effects  Outcome: Progressing  Goal: Agree to be compliant with medication regime, as prescribed and report medication side effects  Description  Interventions:  - Offer appropriate PRN medication and supervise ingestion; conduct AIMS, as needed   Outcome: Progressing  Goal: Attend and participate in unit activities, including therapeutic, recreational, and educational groups  Description  Interventions:  -Encourage Visitation and family involvement in care    CERTIFIED PEER SPECIALIST INTERVENTIONS:    Complete peer assessment with patient to assess their needs and identify their goals to complete while in the recovery program as well as once discharged into the community       Patient will complete WRAP Plan, Crisis Plan and 5 Life Domains  Patient will attend 50% of groups offered on the unit        Outcome: Progressing  Goal: Recognize dysfunctional thoughts, communicate reality-based thoughts at the time of discharge  Description  Interventions:  - Provide medication and psycho-education to assist patient in compliance and developing insight into his/her illness   Outcome: Progressing  Goal: Complete daily ADLs, including personal hygiene independently, as able  Description  Interventions:  - Observe, teach, and assist patient with ADLS  - Monitor and promote a balance of rest/activity, with adequate nutrition and elimination   Outcome: Progressing  Goal: Attend and participate in unit activities, including therapeutic, recreational, and educational groups  Description  Interventions:  -Encourage Visitation and family involvement in care  Outcome: Progressing     Problem: Ineffective Coping  Goal: Identifies ineffective coping skills  Outcome: Progressing  Goal: Identifies healthy coping skills  Outcome: Progressing  Goal: Demonstrates healthy coping skills  Outcome: Progressing  Goal: Patient/Family participate in treatment and DC plans  Description  Interventions:  - Provide therapeutic environment  Outcome: Progressing  Goal: Patient/Family verbalizes awareness of resources  Outcome: Progressing  Goal: Understands least restrictive measures  Description  Interventions:  - Utilize least restrictive behavior  Outcome: Progressing     Problem: RESPIRATORY - ADULT  Goal: Achieves optimal ventilation and oxygenation  Description  INTERVENTIONS:  - Assess for changes in respiratory status  - Assess for changes in mentation and behavior  - Position to facilitate oxygenation and minimize respiratory effort  - Oxygen administered by appropriate delivery if ordered  - Initiate smoking cessation education as indicated  - Encourage broncho-pulmonary hygiene including cough, deep breathe, Incentive Spirometry  - Assess the need for suctioning and aspirate as needed  - Assess and instruct to report SOB or any respiratory difficulty  - Respiratory Therapy support as indicated  Outcome: Progressing     Problem: GASTROINTESTINAL - ADULT  Goal: Minimal or absence of nausea and/or vomiting  Description  INTERVENTIONS:  - Administer IV fluids if ordered to ensure adequate hydration  - Maintain NPO status until nausea and vomiting are resolved  - Nasogastric tube if ordered  - Administer ordered antiemetic medications as needed  - Provide nonpharmacologic comfort measures as appropriate  - Advance diet as tolerated, if ordered  - Consider nutrition services referral to assist patient with adequate nutrition and appropriate food choices  Outcome: Progressing  Goal: Maintains or returns to baseline bowel function  Description  INTERVENTIONS:  - Assess bowel function  - Encourage oral fluids to ensure adequate hydration  - Administer IV fluids if ordered to ensure adequate hydration  - Administer ordered medications as needed  - Encourage mobilization and activity  - Consider nutritional services referral to assist patient with adequate nutrition and appropriate food choices  Outcome: Progressing  Goal: Maintains adequate nutritional intake  Description  INTERVENTIONS:  - Monitor percentage of each meal consumed  - Identify factors contributing to decreased intake, treat as appropriate  - Assist with meals as needed  - Monitor I&O, weight, and lab values if indicated  - Obtain nutrition services referral as needed  Outcome: Progressing     Problem: METABOLIC, FLUID AND ELECTROLYTES - ADULT  Goal: Glucose maintained within target range  Description  INTERVENTIONS:  - Monitor Blood Glucose as ordered  - Assess for signs and symptoms of hyperglycemia and hypoglycemia  - Administer ordered medications to maintain glucose within target range  - Assess nutritional intake and initiate nutrition service referral as needed  Outcome: Progressing     Problem: DISCHARGE PLANNING  Goal: Discharge to home or other facility with appropriate resources  Description    CASE MANAGEMENT INTERVENTIONS:  - Conduct assessment to determine patient/family and health care team treatment goals, and need for post-acute services based on payer coverage, community resources, patient preferences and barriers to discharge  - Address psychosocial, clinical, and financial barriers to discharge as identified in assessment in conjunction with the patient/family and health care team   - Assist the patient in reintegration back into the community by removing barriers which may hinder a successful discharge once deemed stable  - Arrange appropriate level of post-acute services according to patient's needs and preference and payer coverage in collaboration with the physician and health care team   - Communicate with and update the patient/family, physician, and health care team regarding progress on the discharge plan  - Arrange appropriate transportation to post-acute venues  Outcome: Progressing    Quiet, controlled, visible on unit  Med and meal compliant  Ate 100% of breakfast and 100% of lunch  Denies depression, anxiety, SI, HI and pain  Preoccupied with need for nicotine gum and "getting out of here"  Did not attend groups  Maintained on patient safety precautions and mouth checks w/o incident  Will continue to monitor progress in recovery progress

## 2020-06-28 RX ADMIN — CLOZAPINE 200 MG: 200 TABLET ORAL at 17:00

## 2020-06-28 RX ADMIN — DOCUSATE SODIUM 100 MG: 100 CAPSULE, LIQUID FILLED ORAL at 17:13

## 2020-06-28 RX ADMIN — ATORVASTATIN CALCIUM 10 MG: 10 TABLET, FILM COATED ORAL at 16:57

## 2020-06-28 RX ADMIN — METFORMIN HYDROCHLORIDE 500 MG: 500 TABLET ORAL at 16:57

## 2020-06-28 RX ADMIN — OLANZAPINE 5 MG: 5 TABLET, FILM COATED ORAL at 20:55

## 2020-06-28 RX ADMIN — DIVALPROEX SODIUM 1500 MG: 500 TABLET, EXTENDED RELEASE ORAL at 20:54

## 2020-06-28 NOTE — PROGRESS NOTES
Pt slept all night  Refused morning meds  Informed pt that being compliant with meds is essential for d/c  Pt stated "they want to keep me here forever"  Told pt that [facility] is a recovery unit & patients do not stay here forever  Replied "I don't want it"   Will continue to monitor

## 2020-06-28 NOTE — PROGRESS NOTES
Psychiatry Progress Note    Subjective: Interval History     The patient is lying in bed this morning  Appearance is disheveled  He is irritable and does not make eye contact  He reports that he wants to leave this place  Pt did sleep last night  He refused morning meds today  He has been eating his meals       Behavior over the last 24 hours:  unchanged  Sleep: normal  Appetite: normal  Medication side effects: No  ROS: no complaints    Current medications:    Current Facility-Administered Medications:     acetaminophen (TYLENOL) tablet 325 mg, 325 mg, Oral, Q6H PRN, Don Frey MD, 325 mg at 06/23/20 1049    acetaminophen (TYLENOL) tablet 650 mg, 650 mg, Oral, Q6H PRN, Don Frey MD, 650 mg at 06/21/20 0735    acetaminophen (TYLENOL) tablet 975 mg, 975 mg, Oral, Q8H PRN, Don Frey MD, 975 mg at 03/29/20 1759    aluminum-magnesium hydroxide-simethicone (MYLANTA) 200-200-20 mg/5 mL oral suspension 30 mL, 30 mL, Oral, Q4H PRN, Don Frey MD, 30 mL at 06/11/20 0858    amphetamine-dextroamphetamine (ADDERALL) tablet 15 mg, 15 mg, Oral, Daily, Don Frey MD, 15 mg at 06/27/20 0523    atorvastatin (LIPITOR) tablet 10 mg, 10 mg, Oral, Daily With Umesh Villavicencio MD, 10 mg at 06/27/20 1646    benztropine (COGENTIN) injection 1 mg, 1 mg, Intramuscular, Q6H PRN, Don Frey MD    benztropine (COGENTIN) tablet 1 mg, 1 mg, Oral, Q6H PRN, Don Frey MD, 1 mg at 04/07/20 2031    clozapine (CLOZARIL) tablet 200 mg, 200 mg, Oral, Daily, Don Frey MD, 200 mg at 06/27/20 1646    divalproex sodium (DEPAKOTE ER) 24 hr tablet 1,500 mg, 1,500 mg, Oral, HS, Don Frey MD, 1,500 mg at 06/27/20 2055    docusate sodium (COLACE) capsule 100 mg, 100 mg, Oral, BID, Don Frey MD, 100 mg at 06/28/20 9680    haloperidol (HALDOL) tablet 5 mg, 5 mg, Oral, Q6H PRN, Don Frey MD, 5 mg at 05/05/20 1648    haloperidol lactate (HALDOL) injection 5 mg, 5 mg, Intramuscular, Q6H PRN, MD Edilson Stanfordin levothyroxine tablet 75 mcg, 75 mcg, Oral, Early Morning, Don Frey MD, 75 mcg at 06/27/20 0523    LORazepam (ATIVAN) 2 mg/mL injection 1 mg, 1 mg, Intramuscular, Q6H PRN, Don Frey MD    LORazepam (ATIVAN) tablet 1 mg, 1 mg, Oral, Q6H PRN, Don Frey MD    magnesium hydroxide (MILK OF MAGNESIA) 400 mg/5 mL oral suspension 30 mL, 30 mL, Oral, Daily PRN, Don Frey MD, 30 mL at 03/14/20 2032    metFORMIN (GLUCOPHAGE) tablet 500 mg, 500 mg, Oral, BID With Meals, Don Frey MD, 500 mg at 06/28/20 4626    nicotine polacrilex (NICORETTE) gum 2 mg, 2 mg, Oral, Q2H PRN, Don Frey MD, 2 mg at 06/27/20 1156    OLANZapine (ZyPREXA) tablet 5 mg, 5 mg, Oral, HS, Don Frey MD, 5 mg at 06/27/20 2055    oxybutynin (DITROPAN-XL) 24 hr tablet 5 mg, 5 mg, Oral, Daily, Don Frey MD, 5 mg at 06/28/20 9986    pantoprazole (PROTONIX) EC tablet 40 mg, 40 mg, Oral, Early Morning, Don Frey MD, 40 mg at 06/27/20 0523    traZODone (DESYREL) tablet 50 mg, 50 mg, Oral, HS PRN, Don Frey MD, 50 mg at 06/11/20 2145    Current Problem List:    Patient Active Problem List   Diagnosis    [facility]    Constipation, chronic    Type 2 diabetes mellitus without complication, without long-term current use of insulin ([facility])    Chronic airway obstruction, not elsewhere classified    Benign essential hypertension    Disorder of lipoprotein and lipid metabolism    Foot callus    Gastroesophageal reflux disease without esophagitis    Acquired hypothyroidism    Morbid obesity ([facility]    BMI 34 0-34 9,adult    Nail abnormality    Encounter for well adult exam with abnormal findings    Tobacco abuse    Diabetes insipidus, nephrogenic [facility]    ADHD       Problem list reviewed 06/28/20     Objective:     Vital Signs:  Vitals:    06/25/20 0700 06/26/20 0700 06/27/20 0740 06/28/20 0900   BP:  139/85 120/72 138/76   BP Location:  Right arm Right arm Left arm   Pulse:  85 92 92 Resp:  18 20 18   Temp:  98 6 °F (37 °C) 97 6 °F (36 4 °C) 97 8 °F (36 6 °C)   TempSrc: Temporal   Temporal   SpO2:       Weight:    109 kg (240 lb)   Height:             Appearance:  age appropriate, disheveled and overweight   Behavior:  evasive   Speech:  normal volume   Mood:  labile   Affect:  labile   Thought Process:  circumstantial   Thought Content:  delusions  persecutory   Perceptual Disturbances: None   Risk Potential: none   Sensorium:  person, place and time   Cognition:  recent and remote memory grossly intact   Consciousness:  alert and awake    Attention: attention span and concentration were age appropriate   Intellect: average   Insight:  limited   Judgment: limited      Motor Activity: no abnormal movements       I/O Past 24 hours:  No intake/output data recorded  No intake/output data recorded  Labs:  Reviewed 06/28/20    Assessment / Plan:     [facility]ive [facility], bipolar type [facility]    Recommended Treatment:      Medication changes:  1) continue current medication regimen  Non-pharmacological treatments  1) Continue with group therapy, milieu therapy and occupational therapy  Safety  1) Safety/communication plan established targeting dynamic risk factors above  2) Risks, benefits, and possible side effects of medications explained to patient and patient verbalizes understanding  Counseling / Coordination of Care    Total floor / unit time spent today 20 minutes  Greater than 50% of total time was spent with the patient and / or family counseling and / or coordination of care  A description of the counseling / coordination of care  Patient's Rights, confidentiality and exceptions to confidentiality, use of automated medical record, [facility] staff access to medical record, and consent to treatment reviewed      Jered Arias PA-C

## 2020-06-28 NOTE — PLAN OF CARE
Problem: Alteration in Thoughts and Perception  Goal: Verbalize thoughts and feelings  Description  Interventions:  - Promote a nonjudgmental and trusting relationship with the patient through active listening and therapeutic communication  - Assess patient's level of functioning, behavior and potential for risk  - Engage patient in 1 on 1 interactions  - Encourage patient to express fears, feelings, frustrations, and discuss symptoms    - [place] patient to reality, help patient recognize reality-based thinking   - Administer medications as ordered and assess for potential side effects  - Provide the patient education related to the signs and symptoms of the illness and desired effects of prescribed medications  Outcome: Progressing     Problem: Alteration in Thoughts and Perception  Goal: Treatment Goal: Gain control of psychotic behaviors/thinking, reduce/eliminate presenting symptoms and demonstrate improved reality functioning upon discharge  Outcome: Not Progressing  Goal: Refrain from acting on delusional thinking/internal stimuli  Description  Interventions:  - Monitor patient closely, per order   - Utilize least restrictive measures   - Set reasonable limits, give positive feedback for acceptable   - Administer medications as ordered and monitor of potential side effects  Outcome: Not Progressing  Goal: Agree to be compliant with medication regime, as prescribed and report medication side effects  Description  Interventions:  - Offer appropriate PRN medication and supervise ingestion; conduct AIMS, as needed   Outcome: Not Progressing  Goal: Attend and participate in unit activities, including therapeutic, recreational, and educational groups  Description  Interventions:  -Encourage Visitation and family involvement in care    CERTIFIED PEER SPECIALIST INTERVENTIONS:    Complete peer assessment with patient to assess their needs and identify their goals to complete while in the recovery program as well as once discharged into the community  Patient will complete WRAP Plan, Crisis Plan and 5 Life Domains  Patient will attend 50% of groups offered on the unit  Outcome: Not Progressing  Goal: Recognize dysfunctional thoughts, communicate reality-based thoughts at the time of discharge  Description  Interventions:  - Provide medication and psycho-education to assist patient in compliance and developing insight into his/her illness   Outcome: Not Progressing  Goal: Complete daily ADLs, including personal hygiene independently, as able  Description  Interventions:  - Observe, teach, and assist patient with ADLS  - Monitor and promote a balance of rest/activity, with adequate nutrition and elimination   Outcome: Not Progressing  Goal: Attend and participate in unit activities, including therapeutic, recreational, and educational groups  Description  Interventions:  -Encourage Visitation and family involvement in care  Outcome: Not Progressing     Quiet, uncooperative, visible intermittently  Refused am meds  Meal compliant, ate 100% of both meals  Denies depression, anxiety, SI, HI and pain  Non-compliant with behavior plan, no groups  No intrusive behaviors  Most of shift in bed napping  Maintained on patient safety precautions and mouth checks w/o incident    Will continue to monitor progress in recovery program

## 2020-06-29 PROCEDURE — 99232 SBSQ HOSP IP/OBS MODERATE 35: CPT | Performed by: PSYCHIATRY & NEUROLOGY

## 2020-06-29 RX ADMIN — PANTOPRAZOLE SODIUM 40 MG: 40 TABLET, DELAYED RELEASE ORAL at 07:30

## 2020-06-29 RX ADMIN — NICOTINE POLACRILEX 2 MG: 2 GUM, CHEWING ORAL at 13:50

## 2020-06-29 RX ADMIN — OXYBUTYNIN CHLORIDE 5 MG: 5 TABLET, EXTENDED RELEASE ORAL at 08:13

## 2020-06-29 RX ADMIN — CLOZAPINE 200 MG: 200 TABLET ORAL at 16:37

## 2020-06-29 RX ADMIN — DOCUSATE SODIUM 100 MG: 100 CAPSULE, LIQUID FILLED ORAL at 17:16

## 2020-06-29 RX ADMIN — METFORMIN HYDROCHLORIDE 500 MG: 500 TABLET ORAL at 07:30

## 2020-06-29 RX ADMIN — DEXTROAMPHETAMINE SACCHARATE, AMPHETAMINE ASPARTATE, DEXTROAMPHETAMINE SULFATE AND AMPHETAMINE SULFATE 15 MG: 2.5; 2.5; 2.5; 2.5 TABLET ORAL at 07:29

## 2020-06-29 RX ADMIN — DIVALPROEX SODIUM 1500 MG: 500 TABLET, EXTENDED RELEASE ORAL at 20:42

## 2020-06-29 RX ADMIN — ATORVASTATIN CALCIUM 10 MG: 10 TABLET, FILM COATED ORAL at 16:37

## 2020-06-29 RX ADMIN — DOCUSATE SODIUM 100 MG: 100 CAPSULE, LIQUID FILLED ORAL at 08:13

## 2020-06-29 RX ADMIN — NICOTINE POLACRILEX 2 MG: 2 GUM, CHEWING ORAL at 08:18

## 2020-06-29 RX ADMIN — OLANZAPINE 5 MG: 5 TABLET, FILM COATED ORAL at 20:42

## 2020-06-29 RX ADMIN — LEVOTHYROXINE SODIUM 75 MCG: 75 TABLET ORAL at 07:30

## 2020-06-29 RX ADMIN — METFORMIN HYDROCHLORIDE 500 MG: 500 TABLET ORAL at 16:37

## 2020-06-29 NOTE — PROGRESS NOTES
Roland Mcmullen was initially irritable and became verbally frustrated when getting his vital signs  This writer spoke with him and he was calmer on approach  Compliant with his 0600 medications with encouragement from this writer which he previously refused  Will continue to monitor for changes

## 2020-06-29 NOTE — PLAN OF CARE
Problem: Alteration in Thoughts and Perception  Goal: Attend and participate in unit activities, including therapeutic, recreational, and educational groups  Description  Interventions:  -Encourage Visitation and family involvement in care  Outcome: Progressing  Writer met with patient face to face per his request to have assistance completing his Self Assessment Progress for his Treatment Team on Tuesday  Patient was able to identify his barriers as showering and attending groups but has been asking when groups are  Patient's goal has been to increase his group attendance which is currently at 47% to 50% and will work on reaching 50% this week  Patient claims to know his medications and was able to identify his Nursing staff  Patient also identified how he has incorporated self-care into his daily routine by showering, staying out of trouble, cleaning up his room a bit and being more relaxed  Writer encouraged Patient to attend groups and gave him teri for asking for help with his Assessment

## 2020-06-29 NOTE — PROGRESS NOTES
06/29/20 1100   Activity/Group Checklist   Group   (IMR/Stages of Change )   Attendance Attended   Attendance Duration (min) 46-60   Interactions Did not interact   Affect/Mood Appropriate;Bright;Normal range   Goals Achieved Able to listen to others
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
## 2020-06-29 NOTE — PLAN OF CARE
Problem: Alteration in Thoughts and Perception  Goal: Verbalize thoughts and feelings  Description  Interventions:  - Promote a nonjudgmental and trusting relationship with the patient through active listening and therapeutic communication  - Assess patient's level of functioning, behavior and potential for risk  - Engage patient in 1 on 1 interactions  - Encourage patient to express fears, feelings, frustrations, and discuss symptoms    - [place] patient to reality, help patient recognize reality-based thinking   - Administer medications as ordered and assess for potential side effects  - Provide the patient education related to the signs and symptoms of the illness and desired effects of prescribed medications  Outcome: Progressing  Goal: Refrain from acting on delusional thinking/internal stimuli  Description  Interventions:  - Monitor patient closely, per order   - Utilize least restrictive measures   - Set reasonable limits, give positive feedback for acceptable   - Administer medications as ordered and monitor of potential side effects  Outcome: Not Progressing  Goal: Agree to be compliant with medication regime, as prescribed and report medication side effects  Description  Interventions:  - Offer appropriate PRN medication and supervise ingestion; conduct AIMS, as needed   Outcome: Halima Cook has been intermittently visible on the unit, initially not compliant with his morning medications but with encouragement he took his medications  Attended IMR group this shift but otherwise he has been isolative to himself but makes his needs known to staff  Remains disheveled in appearance and in layered dirty clothing  Behaviors more controlled in the afternoon but remains preoccupied with discharge  Will continue to monitor
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
## 2020-06-29 NOTE — PROGRESS NOTES
06/29/20 0900   Activity/Group Checklist   Group Community meeting   Attendance Did not attend   Attendance Duration (min) 46-60   Affect/Mood GAEL

## 2020-06-29 NOTE — PROGRESS NOTES
06/29/20 0900   Team Meeting   Meeting Type Daily Rounds   Initial Conference Date 06/29/20   Patient/Family Present   Patient Present No   Patient's Family Present No     Daily Rounds Documentation     Team Members Present:   MD Felix Hawk, RN  Tam Chambers, [address] [place] [address]    Refused really AM medications yesterday and today

## 2020-06-30 PROCEDURE — 99232 SBSQ HOSP IP/OBS MODERATE 35: CPT | Performed by: PSYCHIATRY & NEUROLOGY

## 2020-06-30 RX ADMIN — DEXTROAMPHETAMINE SACCHARATE, AMPHETAMINE ASPARTATE, DEXTROAMPHETAMINE SULFATE AND AMPHETAMINE SULFATE 15 MG: 2.5; 2.5; 2.5; 2.5 TABLET ORAL at 06:23

## 2020-06-30 RX ADMIN — METFORMIN HYDROCHLORIDE 500 MG: 500 TABLET ORAL at 16:37

## 2020-06-30 RX ADMIN — DOCUSATE SODIUM 100 MG: 100 CAPSULE, LIQUID FILLED ORAL at 17:07

## 2020-06-30 RX ADMIN — METFORMIN HYDROCHLORIDE 500 MG: 500 TABLET ORAL at 08:23

## 2020-06-30 RX ADMIN — DOCUSATE SODIUM 100 MG: 100 CAPSULE, LIQUID FILLED ORAL at 08:23

## 2020-06-30 RX ADMIN — ATORVASTATIN CALCIUM 10 MG: 10 TABLET, FILM COATED ORAL at 16:37

## 2020-06-30 RX ADMIN — OXYBUTYNIN CHLORIDE 5 MG: 5 TABLET, EXTENDED RELEASE ORAL at 08:23

## 2020-06-30 RX ADMIN — LEVOTHYROXINE SODIUM 75 MCG: 75 TABLET ORAL at 06:23

## 2020-06-30 RX ADMIN — PANTOPRAZOLE SODIUM 40 MG: 40 TABLET, DELAYED RELEASE ORAL at 06:23

## 2020-06-30 RX ADMIN — DIVALPROEX SODIUM 1500 MG: 500 TABLET, EXTENDED RELEASE ORAL at 20:32

## 2020-06-30 RX ADMIN — OLANZAPINE 5 MG: 5 TABLET, FILM COATED ORAL at 20:32

## 2020-06-30 RX ADMIN — NICOTINE POLACRILEX 2 MG: 2 GUM, CHEWING ORAL at 08:35

## 2020-06-30 RX ADMIN — CLOZAPINE 200 MG: 200 TABLET ORAL at 16:37
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
## 2020-06-30 NOTE — PLAN OF CARE
Problem: Alteration in Thoughts and Perception  Goal: Treatment Goal: Gain control of psychotic behaviors/thinking, reduce/eliminate presenting symptoms and demonstrate improved reality functioning upon discharge  Outcome: Progressing  Goal: Verbalize thoughts and feelings  Description  Interventions:  - Promote a nonjudgmental and trusting relationship with the patient through active listening and therapeutic communication  - Assess patient's level of functioning, behavior and potential for risk  - Engage patient in 1 on 1 interactions  - Encourage patient to express fears, feelings, frustrations, and discuss symptoms    - [place] patient to reality, help patient recognize reality-based thinking   - Administer medications as ordered and assess for potential side effects  - Provide the patient education related to the signs and symptoms of the illness and desired effects of prescribed medications  Outcome: Progressing  Goal: Refrain from acting on delusional thinking/internal stimuli  Description  Interventions:  - Monitor patient closely, per order   - Utilize least restrictive measures   - Set reasonable limits, give positive feedback for acceptable   - Administer medications as ordered and monitor of potential side effects  Outcome: Progressing  Goal: Agree to be compliant with medication regime, as prescribed and report medication side effects  Description  Interventions:  - Offer appropriate PRN medication and supervise ingestion; conduct AIMS, as needed   Outcome: Progressing  Goal: Attend and participate in unit activities, including therapeutic, recreational, and educational groups  Description  Interventions:  -Encourage Visitation and family involvement in care    CERTIFIED PEER SPECIALIST INTERVENTIONS:    Complete peer assessment with patient to assess their needs and identify their goals to complete while in the recovery program as well as once discharged into the community       Patient will complete WRAP Plan, Crisis Plan and 5 Life Domains  Patient will attend 50% of groups offered on the unit        Outcome: Progressing  Goal: Recognize dysfunctional thoughts, communicate reality-based thoughts at the time of discharge  Description  Interventions:  - Provide medication and psycho-education to assist patient in compliance and developing insight into his/her illness   Outcome: Progressing  Goal: Complete daily ADLs, including personal hygiene independently, as able  Description  Interventions:  - Observe, teach, and assist patient with ADLS  - Monitor and promote a balance of rest/activity, with adequate nutrition and elimination   Outcome: Progressing  Goal: Attend and participate in unit activities, including therapeutic, recreational, and educational groups  Description  Interventions:  -Encourage Visitation and family involvement in care  Outcome: Progressing     Problem: Ineffective Coping  Goal: Identifies ineffective coping skills  Outcome: Progressing  Goal: Identifies healthy coping skills  Outcome: Progressing  Goal: Demonstrates healthy coping skills  Outcome: Progressing  Goal: Patient/Family participate in treatment and DC plans  Description  Interventions:  - Provide therapeutic environment  Outcome: Progressing  Goal: Patient/Family verbalizes awareness of resources  Outcome: Progressing  Goal: Understands least restrictive measures  Description  Interventions:  - Utilize least restrictive behavior  Outcome: Progressing     Problem: RESPIRATORY - ADULT  Goal: Achieves optimal ventilation and oxygenation  Description  INTERVENTIONS:  - Assess for changes in respiratory status  - Assess for changes in mentation and behavior  - Position to facilitate oxygenation and minimize respiratory effort  - Oxygen administered by appropriate delivery if ordered  - Initiate smoking cessation education as indicated  - Encourage broncho-pulmonary hygiene including cough, deep breathe, Incentive Spirometry  - Assess the need for suctioning and aspirate as needed  - Assess and instruct to report SOB or any respiratory difficulty  - Respiratory Therapy support as indicated  Outcome: Progressing     Problem: GASTROINTESTINAL - ADULT  Goal: Minimal or absence of nausea and/or vomiting  Description  INTERVENTIONS:  - Administer IV fluids if ordered to ensure adequate hydration  - Maintain NPO status until nausea and vomiting are resolved  - Nasogastric tube if ordered  - Administer ordered antiemetic medications as needed  - Provide nonpharmacologic comfort measures as appropriate  - Advance diet as tolerated, if ordered  - Consider nutrition services referral to assist patient with adequate nutrition and appropriate food choices  Outcome: Progressing  Goal: Maintains or returns to baseline bowel function  Description  INTERVENTIONS:  - Assess bowel function  - Encourage oral fluids to ensure adequate hydration  - Administer IV fluids if ordered to ensure adequate hydration  - Administer ordered medications as needed  - Encourage mobilization and activity  - Consider nutritional services referral to assist patient with adequate nutrition and appropriate food choices  Outcome: Progressing  Goal: Maintains adequate nutritional intake  Description  INTERVENTIONS:  - Monitor percentage of each meal consumed  - Identify factors contributing to decreased intake, treat as appropriate  - Assist with meals as needed  - Monitor I&O, weight, and lab values if indicated  - Obtain nutrition services referral as needed  Outcome: Progressing     Problem: METABOLIC, FLUID AND ELECTROLYTES - ADULT  Goal: Glucose maintained within target range  Description  INTERVENTIONS:  - Monitor Blood Glucose as ordered  - Assess for signs and symptoms of hyperglycemia and hypoglycemia  - Administer ordered medications to maintain glucose within target range  - Assess nutritional intake and initiate nutrition service referral as needed  Outcome: Progressing     Problem: DISCHARGE PLANNING  Goal: Discharge to home or other facility with appropriate resources  Description    CASE MANAGEMENT INTERVENTIONS:  - Conduct assessment to determine patient/family and health care team treatment goals, and need for post-acute services based on payer coverage, community resources, patient preferences and barriers to discharge  - Address psychosocial, clinical, and financial barriers to discharge as identified in assessment in conjunction with the patient/family and health care team   - Assist the patient in reintegration back into the community by removing barriers which may hinder a successful discharge once deemed stable  - Arrange appropriate level of post-acute services according to patient's needs and preference and payer coverage in collaboration with the physician and health care team   - Communicate with and update the patient/family, physician, and health care team regarding progress on the discharge plan  - Arrange appropriate transportation to post-acute venues  Outcome: Progressing     Quiet, cooperative, visible intermittently  Med and meal compliant  Ate 100% of both meals  Compliant with behavior plan  Denies depression, anxiety, SI, HI and pain  Remained in treatment team thru duration  Plan in place for possible to discharge to [facility]  Needs assessment for same  Patient attended IMR  He is maintained on patient safety precautions and mouth checks w/o incident    Will continue to monitor progress in recovery program

## 2020-06-30 NOTE — PROGRESS NOTES
06/30/20 1100   Activity/Group Checklist   Group   (IMR)   Attendance Attended   Attendance Duration (min) 46-60   Interactions Interacted appropriately   Affect/Mood Appropriate;Bright;Normal range;Calm   Goals Achieved Identified feelings; Discussed coping strategies; Able to listen to others; Able to engage in interactions; Able to self-disclose

## 2020-06-30 NOTE — PLAN OF CARE
Patient was calm during his treatment team meeting this morning; he was able to listen and accept what the team said in regards to expectations he must meet to discharge to [facility]  His group attendance last week was much improved

## 2020-06-30 NOTE — TREATMENT TEAM
06/30/20 0830   Team Meeting   Meeting Type Daily Rounds   Initial Conference Date 06/30/20   Team Members Present   Team Members Present Physician;Nurse;; Other (Discipline and Name)  (CPS)   Physician Team Member Dr Radha Mack, RN   Social Work Team Member JANUARY Santiago   Other (Discipline and Name) MCKAYLA Steele     He did not attend 3-11 shift groups  Presentation remains disheveled  Irritable at times yesterday

## 2020-06-30 NOTE — PROGRESS NOTES
Patient slept throughout the night  Compliant with early am medications, mouth check  No behaviors or issues overnight
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
## 2020-06-30 NOTE — SOCIAL WORK
Phone call placed to pt's "big brother" Patsy Farley ([phone])  Romina Amanda asked about visitation and SW informed him that restrictions still haven't been lifted  SW explained that she will reach out to him and pt's mother as soon as that changes  SW provided update on today's treatment team meeting  SW again answered questions about [facility]  He asked about sending pt comic books and magazines and was informed that stapes would have to be pulled  No additional questions

## 2020-06-30 NOTE — PROGRESS NOTES
06/30/20 0900   Activity/Group Checklist   Group Community meeting   Attendance Attended   Attendance Duration (min) 31-45   Interactions Interacted appropriately   Affect/Mood Appropriate;Normal range   Goals Achieved Identified feelings; Able to listen to others; Able to engage in interactions

## 2020-07-01 PROCEDURE — 99232 SBSQ HOSP IP/OBS MODERATE 35: CPT | Performed by: PSYCHIATRY & NEUROLOGY

## 2020-07-01 RX ADMIN — DEXTROAMPHETAMINE SACCHARATE, AMPHETAMINE ASPARTATE, DEXTROAMPHETAMINE SULFATE AND AMPHETAMINE SULFATE 15 MG: 2.5; 2.5; 2.5; 2.5 TABLET ORAL at 05:54

## 2020-07-01 RX ADMIN — METFORMIN HYDROCHLORIDE 500 MG: 500 TABLET ORAL at 17:00

## 2020-07-01 RX ADMIN — ATORVASTATIN CALCIUM 10 MG: 10 TABLET, FILM COATED ORAL at 17:00

## 2020-07-01 RX ADMIN — DOCUSATE SODIUM 100 MG: 100 CAPSULE, LIQUID FILLED ORAL at 08:33

## 2020-07-01 RX ADMIN — METFORMIN HYDROCHLORIDE 500 MG: 500 TABLET ORAL at 08:33

## 2020-07-01 RX ADMIN — NICOTINE POLACRILEX 2 MG: 2 GUM, CHEWING ORAL at 08:35

## 2020-07-01 RX ADMIN — DIVALPROEX SODIUM 1500 MG: 500 TABLET, EXTENDED RELEASE ORAL at 20:35

## 2020-07-01 RX ADMIN — OXYBUTYNIN CHLORIDE 5 MG: 5 TABLET, EXTENDED RELEASE ORAL at 08:33

## 2020-07-01 RX ADMIN — DOCUSATE SODIUM 100 MG: 100 CAPSULE, LIQUID FILLED ORAL at 17:00

## 2020-07-01 RX ADMIN — PANTOPRAZOLE SODIUM 40 MG: 40 TABLET, DELAYED RELEASE ORAL at 05:54

## 2020-07-01 RX ADMIN — OLANZAPINE 5 MG: 5 TABLET, FILM COATED ORAL at 20:35

## 2020-07-01 RX ADMIN — LEVOTHYROXINE SODIUM 75 MCG: 75 TABLET ORAL at 05:54

## 2020-07-01 RX ADMIN — CLOZAPINE 200 MG: 200 TABLET ORAL at 17:00

## 2020-07-01 NOTE — TREATMENT TEAM
07/01/20 0830   Team Meeting   Meeting Type Daily Rounds   Initial Conference Date 07/01/20   Team Members Present   Team Members Present Physician;Nurse;; Other (Discipline and Name)  (CPS)   Physician Team Member Dr Jerilyn Shaw, RN   Social Work Team Member Guzman العراقي MSW   Other (Discipline and Name) Mike Holt, CPS

## 2020-07-01 NOTE — PROGRESS NOTES
07/01/20 0900   Activity/Group Checklist   Group Other (Comment)  (Art Therapy Group/Week Reflection, Open Discussion)   Attendance Attended   Attendance Duration (min) 31-45   Interactions Interacted appropriately   Affect/Mood Appropriate   Goals Achieved Able to listen to others; Able to engage in interactions; Able to recieve feedback; Able to give feedback to another  (Able to briefly engage materials; limited participation)

## 2020-07-01 NOTE — PLAN OF CARE
Problem: Alteration in Thoughts and Perception  Goal: Verbalize thoughts and feelings  Description  Interventions:  - Promote a nonjudgmental and trusting relationship with the patient through active listening and therapeutic communication  - Assess patient's level of functioning, behavior and potential for risk  - Engage patient in 1 on 1 interactions  - Encourage patient to express fears, feelings, frustrations, and discuss symptoms    - [place] patient to reality, help patient recognize reality-based thinking   - Administer medications as ordered and assess for potential side effects  - Provide the patient education related to the signs and symptoms of the illness and desired effects of prescribed medications  Outcome: Progressing  Goal: Agree to be compliant with medication regime, as prescribed and report medication side effects  Description  Interventions:  - Offer appropriate PRN medication and supervise ingestion; conduct AIMS, as needed   Outcome: Progressing  Goal: Attend and participate in unit activities, including therapeutic, recreational, and educational groups  Description  Interventions:  -Encourage Visitation and family involvement in care  Outcome: Progressing    Visible, compliant with routine medications, gait steady, ate 100% of dinner, listened to music with peers, behavior controlled, attended evening groups, will continue to monitor
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
## 2020-07-01 NOTE — TREATMENT TEAM
07/01/20 0915   Activity/Group Checklist   Group Community meeting   Attendance Did not attend   Attendance Duration (min) 31-45   Affect/Mood GAEL

## 2020-07-01 NOTE — PLAN OF CARE
Problem: Alteration in Thoughts and Perception  Goal: Attend and participate in unit activities, including therapeutic, recreational, and educational groups  Description  Interventions:  -Encourage Visitation and family involvement in care    CERTIFIED PEER SPECIALIST INTERVENTIONS:    Complete peer assessment with patient to assess their needs and identify their goals to complete while in the recovery program as well as once discharged into the community  Patient will complete WRAP Plan, Crisis Plan and 5 Life Domains  Patient will attend 50% of groups offered on the unit  Outcome: Progressing  Patient presents to be more calm and less fixated on discharge  Patient has been attempting to accomplish 50% of groups and is currently at 47%  Patient staying in more group settings and will participate if the topic is of interest to him  Patient has completed WRAP Plan, Life Domains and BH Relapse Prevention plan  Patient still disheveled and wearing multiple layers of clothing but seems to be showering more often  Patient behaviors are controlled, he remains pleasant and polite

## 2020-07-01 NOTE — PLAN OF CARE
Problem: Alteration in Thoughts and Perception  Goal: Verbalize thoughts and feelings  Description  Interventions:  - Promote a nonjudgmental and trusting relationship with the patient through active listening and therapeutic communication  - Assess patient's level of functioning, behavior and potential for risk  - Engage patient in 1 on 1 interactions  - Encourage patient to express fears, feelings, frustrations, and discuss symptoms    - [place] patient to reality, help patient recognize reality-based thinking   - Administer medications as ordered and assess for potential side effects  - Provide the patient education related to the signs and symptoms of the illness and desired effects of prescribed medications  Outcome: Progressing  Goal: Refrain from acting on delusional thinking/internal stimuli  Description  Interventions:  - Monitor patient closely, per order   - Utilize least restrictive measures   - Set reasonable limits, give positive feedback for acceptable   - Administer medications as ordered and monitor of potential side effects  Outcome: Progressing  Goal: Agree to be compliant with medication regime, as prescribed and report medication side effects  Description  Interventions:  - Offer appropriate PRN medication and supervise ingestion; conduct AIMS, as needed   Outcome: Progressing  Goal: Attend and participate in unit activities, including therapeutic, recreational, and educational groups  Description  Interventions:  -Encourage Visitation and family involvement in care  Outcome: Not Progressing     Problem: Ineffective Coping  Goal: Demonstrates healthy coping skills  Outcome: Progressing  Goal: Understands least restrictive measures  Description  Interventions:  - Utilize least restrictive behavior  Outcome: Progressing     Problem: GASTROINTESTINAL - ADULT  Goal: Maintains adequate nutritional intake  Description  INTERVENTIONS:  - Monitor percentage of each meal consumed  - Identify factors contributing to decreased intake, treat as appropriate  - Assist with meals as needed  - Monitor I&O, weight, and lab values if indicated  - Obtain nutrition services referral as needed  Outcome: Demario Fox has been awake, alert, and visible intermittently out in the milieu  Ate 100% supper  Denies any depression, anxiety, and has not verbalized any delusions  Pt rests in room and listens to music in [facility] with peers at intervals  No behavioral issues  Disheveled in appearance and dressed in layers  Less preoccupied with discharge  Did not attend evening group but came out for snack after  Compliant with all scheduled meds when called  Continue to monitor/assess for any changes

## 2020-07-01 NOTE — PROGRESS NOTES
07/01/20 1100   Activity/Group Checklist   Group   (IMR/Graduation )   Attendance Attended   Attendance Duration (min) 46-60   Interactions Interacted appropriately   Affect/Mood Appropriate;Bright;Normal range   Goals Achieved Identified feelings; Able to listen to others; Able to engage in interactions

## 2020-07-02 PROCEDURE — 99232 SBSQ HOSP IP/OBS MODERATE 35: CPT | Performed by: PSYCHIATRY & NEUROLOGY

## 2020-07-02 RX ADMIN — LEVOTHYROXINE SODIUM 75 MCG: 75 TABLET ORAL at 06:10

## 2020-07-02 RX ADMIN — TRAZODONE HYDROCHLORIDE 50 MG: 50 TABLET ORAL at 22:08

## 2020-07-02 RX ADMIN — DOCUSATE SODIUM 100 MG: 100 CAPSULE, LIQUID FILLED ORAL at 08:20

## 2020-07-02 RX ADMIN — NICOTINE POLACRILEX 2 MG: 2 GUM, CHEWING ORAL at 08:37

## 2020-07-02 RX ADMIN — METFORMIN HYDROCHLORIDE 500 MG: 500 TABLET ORAL at 17:11

## 2020-07-02 RX ADMIN — OLANZAPINE 5 MG: 5 TABLET, FILM COATED ORAL at 20:56

## 2020-07-02 RX ADMIN — DIVALPROEX SODIUM 1500 MG: 500 TABLET, EXTENDED RELEASE ORAL at 20:56

## 2020-07-02 RX ADMIN — CLOZAPINE 200 MG: 200 TABLET ORAL at 17:11

## 2020-07-02 RX ADMIN — METFORMIN HYDROCHLORIDE 500 MG: 500 TABLET ORAL at 08:20

## 2020-07-02 RX ADMIN — PANTOPRAZOLE SODIUM 40 MG: 40 TABLET, DELAYED RELEASE ORAL at 06:10

## 2020-07-02 RX ADMIN — DOCUSATE SODIUM 100 MG: 100 CAPSULE, LIQUID FILLED ORAL at 17:11

## 2020-07-02 RX ADMIN — ATORVASTATIN CALCIUM 10 MG: 10 TABLET, FILM COATED ORAL at 17:11

## 2020-07-02 RX ADMIN — OXYBUTYNIN CHLORIDE 5 MG: 5 TABLET, EXTENDED RELEASE ORAL at 08:20

## 2020-07-02 RX ADMIN — DEXTROAMPHETAMINE SACCHARATE, AMPHETAMINE ASPARTATE, DEXTROAMPHETAMINE SULFATE AND AMPHETAMINE SULFATE 15 MG: 2.5; 2.5; 2.5; 2.5 TABLET ORAL at 06:10

## 2020-07-02 NOTE — PLAN OF CARE
Problem: Alteration in Thoughts and Perception  Goal: Verbalize thoughts and feelings  Description  Interventions:  - Promote a nonjudgmental and trusting relationship with the patient through active listening and therapeutic communication  - Assess patient's level of functioning, behavior and potential for risk  - Engage patient in 1 on 1 interactions  - Encourage patient to express fears, feelings, frustrations, and discuss symptoms    - [place] patient to reality, help patient recognize reality-based thinking   - Administer medications as ordered and assess for potential side effects  - Provide the patient education related to the signs and symptoms of the illness and desired effects of prescribed medications  Outcome: Progressing  Goal: Refrain from acting on delusional thinking/internal stimuli  Description  Interventions:  - Monitor patient closely, per order   - Utilize least restrictive measures   - Set reasonable limits, give positive feedback for acceptable   - Administer medications as ordered and monitor of potential side effects  Outcome: Progressing  Goal: Agree to be compliant with medication regime, as prescribed and report medication side effects  Description  Interventions:  - Offer appropriate PRN medication and supervise ingestion; conduct AIMS, as needed   Outcome: Progressing  Goal: Attend and participate in unit activities, including therapeutic, recreational, and educational groups  Description  Interventions:  -Encourage Visitation and family involvement in care    CERTIFIED PEER SPECIALIST INTERVENTIONS:    Complete peer assessment with patient to assess their needs and identify their goals to complete while in the recovery program as well as once discharged into the community  Patient will complete WRAP Plan, Crisis Plan and 5 Life Domains  Patient will attend 50% of groups offered on the unit        Outcome: Not Progressing  Goal: Complete daily ADLs, including personal hygiene independently, as able  Description  Interventions:  - Observe, teach, and assist patient with ADLS  - Monitor and promote a balance of rest/activity, with adequate nutrition and elimination   Outcome: Progressing     Problem: Ineffective Coping  Goal: Demonstrates healthy coping skills  Outcome: Progressing  Goal: Understands least restrictive measures  Description  Interventions:  - Utilize least restrictive behavior  Outcome: Progressing     Problem: GASTROINTESTINAL - ADULT  Goal: Maintains adequate nutritional intake  Description  INTERVENTIONS:  - Monitor percentage of each meal consumed  - Identify factors contributing to decreased intake, treat as appropriate  - Assist with meals as needed  - Monitor I&O, weight, and lab values if indicated  - Obtain nutrition services referral as needed  Outcome: Larry Khalil has been awake, alert, and visible intermittently out in the milieu  Pt completed his daily ADL's today  Ate 100% supper  Rests in room and listens to music on radio in [facility] 19 at times  Less preoccupied with discharge  Disheveled in appearance and dressed in layers  Compliant with scheduled meds when called and cooperative with mouth checks  Did not attend evening group but came out for snack after  Pt requested prn for sleep  Trazodone 50 mg po given at 2208  No behavioral issues  Continue to monitor/assess for any changes

## 2020-07-02 NOTE — TREATMENT TEAM
07/02/20 0830   Team Meeting   Meeting Type Daily Rounds   Initial Conference Date 07/02/20   Team Members Present   Team Members Present Physician;Nurse;; Other (Discipline and Name)  ([facility], [facility])   Physician Team Member Dr Jacqueline Hylton, RN   Social Work Team Member EDELMIRA Davis   Other (Discipline and Name) Lita Cohen CPS and Alyssa Jensen, [facility]     No changes notes
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
## 2020-07-02 NOTE — PLAN OF CARE
Problem: Alteration in Thoughts and Perception  Goal: Refrain from acting on delusional thinking/internal stimuli  Description  Interventions:  - Monitor patient closely, per order   - Utilize least restrictive measures   - Set reasonable limits, give positive feedback for acceptable   - Administer medications as ordered and monitor of potential side effects  Outcome: Progressing  Goal: Agree to be compliant with medication regime, as prescribed and report medication side effects  Description  Interventions:  - Offer appropriate PRN medication and supervise ingestion; conduct AIMS, as needed   Outcome: Progressing     Problem: Alteration in Thoughts and Perception  Goal: Attend and participate in unit activities, including therapeutic, recreational, and educational groups  Description  Interventions:  -Encourage Visitation and family involvement in care    CERTIFIED PEER SPECIALIST INTERVENTIONS:    Complete peer assessment with patient to assess their needs and identify their goals to complete while in the recovery program as well as once discharged into the community  Patient will complete WRAP Plan, Crisis Plan and 5 Life Domains  Patient will attend 50% of groups offered on the unit  Outcome: Not Progressing   Patient is pleasant, cooperative and visible on the unit  He is noted to interact well with his peer  Continues to be intrusive at the nurse's station at times but he is easily redirectable  Denies SI and Hi's  Also denies having any hallucinations or delusions  Only attended spirituality group this shift  No issues or concerns noted at this time  Continue to monitor

## 2020-07-02 NOTE — PROGRESS NOTES
07/02/20 1100   Activity/Group Checklist   Group   ([facility] )   Attendance Did not attend   Attendance Duration (min) 46-60   Affect/Mood GAEL

## 2020-07-02 NOTE — PROGRESS NOTES
07/02/20 0900   Activity/Group Checklist   Group Community meeting   Attendance Attended   Attendance Duration (min) 31-45   Interactions Interacted appropriately   Affect/Mood Appropriate; Constricted   Goals Achieved Identified feelings; Able to listen to others; Able to engage in interactions

## 2020-07-02 NOTE — SOCIAL WORK
SW met with patient privately to complete DERS self assessment  Results will likely lack accuracy as pt is very "black and white thinking "  During assessment pt was somewhat focused on discharge and when it would happy; pt reminded that he still needs to be assessed but first he needs to take his medications all weekend; pt agreed to do so  Pt also asked who will take him to his new home; SW reminded pt that we still have time before we can figure that out

## 2020-07-03 PROCEDURE — 99232 SBSQ HOSP IP/OBS MODERATE 35: CPT | Performed by: NURSE PRACTITIONER

## 2020-07-03 RX ADMIN — METFORMIN HYDROCHLORIDE 500 MG: 500 TABLET ORAL at 17:06

## 2020-07-03 RX ADMIN — DOCUSATE SODIUM 100 MG: 100 CAPSULE, LIQUID FILLED ORAL at 08:07

## 2020-07-03 RX ADMIN — DEXTROAMPHETAMINE SACCHARATE, AMPHETAMINE ASPARTATE, DEXTROAMPHETAMINE SULFATE AND AMPHETAMINE SULFATE 15 MG: 2.5; 2.5; 2.5; 2.5 TABLET ORAL at 05:47

## 2020-07-03 RX ADMIN — OXYBUTYNIN CHLORIDE 5 MG: 5 TABLET, EXTENDED RELEASE ORAL at 08:07

## 2020-07-03 RX ADMIN — ATORVASTATIN CALCIUM 10 MG: 10 TABLET, FILM COATED ORAL at 17:05

## 2020-07-03 RX ADMIN — CLOZAPINE 200 MG: 200 TABLET ORAL at 17:06

## 2020-07-03 RX ADMIN — DOCUSATE SODIUM 100 MG: 100 CAPSULE, LIQUID FILLED ORAL at 17:06

## 2020-07-03 RX ADMIN — DIVALPROEX SODIUM 1500 MG: 500 TABLET, EXTENDED RELEASE ORAL at 21:22

## 2020-07-03 RX ADMIN — PANTOPRAZOLE SODIUM 40 MG: 40 TABLET, DELAYED RELEASE ORAL at 05:47

## 2020-07-03 RX ADMIN — METFORMIN HYDROCHLORIDE 500 MG: 500 TABLET ORAL at 08:07

## 2020-07-03 RX ADMIN — LEVOTHYROXINE SODIUM 75 MCG: 75 TABLET ORAL at 05:47

## 2020-07-03 RX ADMIN — OLANZAPINE 5 MG: 5 TABLET, FILM COATED ORAL at 21:22

## 2020-07-03 NOTE — PROGRESS NOTES
Progress Note - Behavioral Health   Mercedes Sneed 48 y o  male MRN: [id number]  Unit/Bed#: [facility] 105-01 Encounter: [id number]    Assessment/Plan   Principal Problem:    Schizoaffective disorder, bipolar type ([facility] [address] )  Active Problems:    Type 2 diabetes mellitus without complication, without long-term current use of insulin (HCC)    Benign essential hypertension    Gastroesophageal reflux disease without esophagitis    Acquired hypothyroidism    Tobacco abuse    Diabetes insipidus, nephrogenic ([facility] [address] )    ADHD      Subjective:Patient was seen today for continuation of care, records reviewed and  patient was discussed with the morning case review team Armando was seen in his room where he was laying on his bed and had minimal eye contact  As per nursing report he remains with poor insight into the need for hospitalization  He presented calm, cooperative, stated "I don't trust people in here"  Unable to engage more into it  He has poor ADLs and is wearing multiple, innappropiate layers  Does not attend groups  Denies endorsing SI, HI, AH, and VH however remains paranoid  Remains with good appetite  Does not seem to be experiencing any manic or psychotic symptoms during our encounter  Remains medication compliance  Denies any side effects from medications      Psychiatric Review of Systems:    Sleep: hypersomnia  Appetite: normal  Medication side effects: No   ROS: no complaints, denies any shortness of breath, abdominal discomfort, arm stiffness, dizziness, lightheadedness, muscle twitching or restlessness, all other systems are negative    Vitals:  Vitals:    07/03/20 0734   BP: 128/77   Pulse: 86   Resp: 20   Temp: (!) 97 1 °F (36 2 °C)   SpO2:        Mental Status Evaluation:    Appearance:  disheveled, overweight, wearing different layers and a leather jacket, shorts with leggings   Behavior:  pleasant, cooperative, calm, minimal eye contact   Speech:  clear, pressured, slow, scant   Mood:  less depressed   Affect: constricted   Thought Process:  concrete, poverty of thought   Associations: concrete associations   Thought Content:  paranoid ideation   Perceptual Disturbances: denies auditory or visual hallucinations when asked, but appears preoccupied   Risk Potential: Suicidal ideation - None  Homicidal ideation - None  Potential for aggression - No   Sensorium:  oriented to person and place   Memory:  recent and remote memory grossly intact   Consciousness:  alert and awake   Attention: attention span and concentration appear shorter than expected for age   Insight:  limited   Judgment: limited   Gait/Station: normal gait/station, normal balance   Motor Activity: no abnormal movements     Laboratory results:    I have personally reviewed all pertinent laboratory/tests results    Most Recent Labs:   Lab Results   Component Value Date    WBC 7 50 06/16/2020    RBC 4 69 06/16/2020    HGB 14 0 06/16/2020    HCT 40 6 (L) 06/16/2020     06/16/2020    RDW 13 4 06/16/2020    NEUTROABS 3 38 06/16/2020    SODIUM 133 (L) 06/16/2020    K 4 3 06/16/2020    CL 98 06/16/2020    CO2 27 06/16/2020    BUN 7 06/16/2020    CREATININE 0 57 (L) 06/16/2020    GLUC 163 (H) 06/16/2020    GLUF 121 (H) 05/14/2020    CALCIUM 8 9 06/16/2020    AST 37 06/16/2020    ALT 29 06/16/2020    ALKPHOS 88 06/16/2020    TP 6 5 06/16/2020    ALB 3 7 06/16/2020    TBILI 0 30 06/16/2020    CHOLESTEROL 102 04/13/2020    HDL 26 (L) 04/13/2020    TRIG 172 (H) 04/13/2020    LDLCALC 42 04/13/2020    NONHDLC 76 04/13/2020    VALPROICTOT 75 6 06/16/2020    LITHIUM 0 6 03/27/2014    AMMONIA <9 (L) 06/16/2020    [id number] 3 810 12/24/2019    FREET4 0 93 08/23/2019    HGBA1C 6 1 12/03/2019     12/03/2019       Progress Toward Goals:     Unchanged    Recommended Treatment:     All current active medications have been reviewed  Encourage group therapy, milieu therapy and occupational therapy  Behavioral Health checks every 7 minutes  [facility] current [facility]:    Current Facility-Administered Medications:  acetaminophen 325 mg Oral Q6H PRN Louie Theodore MD   acetaminophen 650 mg Oral Q6H PRN Louie Theodore MD   acetaminophen 975 mg Oral Q8H PRN Louie Theodore MD   aluminum-magnesium hydroxide-simethicone 30 mL Oral Q4H PRN Louie Theodore MD   amphetamine-dextroamphetamine 15 mg Oral Daily Louie Theodore MD   atorvastatin 10 mg Oral Daily With Danica Coil, MD   benztropine 1 mg Intramuscular Q6H PRN Louie Theodore MD   benztropine 1 mg Oral Q6H PRN Louie Theodore MD   cloZAPine 200 mg Oral Daily Louie Theodore MD   divalproex sodium 1,500 mg Oral HS Louie Theodore MD   docusate sodium 100 mg Oral BID Louie Theodore MD   haloperidol 5 mg Oral Q6H PRN Louie Theodore MD   haloperidol lactate 5 mg Intramuscular Q6H PRN Louie Theodore MD   levothyroxine 75 mcg Oral Early Morning Louie Theodore MD   LORazepam 1 mg Intramuscular Q6H PRN Louie Theodore MD   LORazepam 1 mg Oral Q6H PRN Louie Theodore MD   magnesium hydroxide 30 mL Oral Daily PRN Louie Theodore MD   metFORMIN 500 mg Oral BID With Meals Louie Theodore MD   nicotine polacrilex 2 mg Oral Q2H PRN Louie Theodore MD   OLANZapine 5 mg Oral HS Louie Theodore MD   oxybutynin 5 mg Oral Daily Louie Theodore MD   pantoprazole 40 mg Oral Early Morning Louie Theodore MD   traZODone 50 mg Oral HS PRN Louie Theodore MD       Risks / Benefits of Treatment:     Risks, benefits, and possible side effects of medications explained to patient  Patient has limited understanding of risks and benefits of treatment at this time, but agrees to take medications as prescribed  Counseling / Coordination of Care:     Patient's progress reviewed with nursing staff  Medications, treatment progress and treatment plan reviewed with patient  Supportive counseling provided to the patient            Joselo Montes

## 2020-07-03 NOTE — PLAN OF CARE
Problem: Alteration in Thoughts and Perception  Goal: Verbalize thoughts and feelings  Description  Interventions:  - Promote a nonjudgmental and trusting relationship with the patient through active listening and therapeutic communication  - Assess patient's level of functioning, behavior and potential for risk  - Engage patient in 1 on 1 interactions  - Encourage patient to express fears, feelings, frustrations, and discuss symptoms    - [place] patient to reality, help patient recognize reality-based thinking   - Administer medications as ordered and assess for potential side effects  - Provide the patient education related to the signs and symptoms of the illness and desired effects of prescribed medications  Outcome: Progressing  Goal: Agree to be compliant with medication regime, as prescribed and report medication side effects  Description  Interventions:  - Offer appropriate PRN medication and supervise ingestion; conduct AIMS, as needed   Outcome: Progressing  Goal: Complete daily ADLs, including personal hygiene independently, as able  Description  Interventions:  - Observe, teach, and assist patient with ADLS  - Monitor and promote a balance of rest/activity, with adequate nutrition and elimination   Outcome: Not Progressing    Horatio Osler has been intermittently visible on the unit, is able to make his needs known to staff, med and meal compliant  Continues to be focused with discharge and made staff aware that another peer continues to target him, staff encouraged Horatio Osler to keep his distance away from the peer in question  Remains disheveled in multiple layers of clothing  No behavorial issues this shift  Will continue to monitor for changes

## 2020-07-03 NOTE — NURSING NOTE
Patient visible on unit, impatient at times, disheveled, behaviors controlled, offers no complaints, med and meal compliant  No group attendance this evening, will continue to monitor

## 2020-07-04 PROCEDURE — 99231 SBSQ HOSP IP/OBS SF/LOW 25: CPT | Performed by: PSYCHIATRY & NEUROLOGY

## 2020-07-04 RX ADMIN — LEVOTHYROXINE SODIUM 75 MCG: 75 TABLET ORAL at 06:21

## 2020-07-04 RX ADMIN — CLOZAPINE 200 MG: 200 TABLET ORAL at 16:39

## 2020-07-04 RX ADMIN — PANTOPRAZOLE SODIUM 40 MG: 40 TABLET, DELAYED RELEASE ORAL at 06:21

## 2020-07-04 RX ADMIN — DEXTROAMPHETAMINE SACCHARATE, AMPHETAMINE ASPARTATE, DEXTROAMPHETAMINE SULFATE AND AMPHETAMINE SULFATE 15 MG: 2.5; 2.5; 2.5; 2.5 TABLET ORAL at 06:21

## 2020-07-04 RX ADMIN — DIVALPROEX SODIUM 1500 MG: 500 TABLET, EXTENDED RELEASE ORAL at 20:39

## 2020-07-04 RX ADMIN — OLANZAPINE 5 MG: 5 TABLET, FILM COATED ORAL at 20:39

## 2020-07-04 RX ADMIN — DOCUSATE SODIUM 100 MG: 100 CAPSULE, LIQUID FILLED ORAL at 08:25

## 2020-07-04 RX ADMIN — DOCUSATE SODIUM 100 MG: 100 CAPSULE, LIQUID FILLED ORAL at 17:08

## 2020-07-04 RX ADMIN — OXYBUTYNIN CHLORIDE 5 MG: 5 TABLET, EXTENDED RELEASE ORAL at 08:25

## 2020-07-04 RX ADMIN — ATORVASTATIN CALCIUM 10 MG: 10 TABLET, FILM COATED ORAL at 16:39

## 2020-07-04 RX ADMIN — METFORMIN HYDROCHLORIDE 500 MG: 500 TABLET ORAL at 16:39

## 2020-07-04 RX ADMIN — METFORMIN HYDROCHLORIDE 500 MG: 500 TABLET ORAL at 08:25

## 2020-07-04 NOTE — PLAN OF CARE
Problem: Alteration in Thoughts and Perception  Goal: Refrain from acting on delusional thinking/internal stimuli  Description  Interventions:  - Monitor patient closely, per order   - Utilize least restrictive measures   - Set reasonable limits, give positive feedback for acceptable   - Administer medications as ordered and monitor of potential side effects  Outcome: Progressing  Goal: Agree to be compliant with medication regime, as prescribed and report medication side effects  Description  Interventions:  - Offer appropriate PRN medication and supervise ingestion; conduct AIMS, as needed   Outcome: Progressing  Goal: Recognize dysfunctional thoughts, communicate reality-based thoughts at the time of discharge  Description  Interventions:  - Provide medication and psycho-education to assist patient in compliance and developing insight into his/her illness   Outcome: Progressing   Patient is pleasant, cooperative and visible on the unit  He is noted to interact well with his peers  Intrusive at times but very easily redirected  Denies SI and Hi's  Also denies having any hallucinations or delusions  Only attended cooking group this shift  No issues or concerns noted at this time  Continue to monitor

## 2020-07-04 NOTE — PROGRESS NOTES
Progress Note - Behavioral Health     Lei Rosenthaly 48 y o  male MRN: [id number]   Unit/Bed#: [facility] 105-01 Encounter: [id number]      Subjective:  Patient's chart reviewed and case discussed with the nurse  The patient was seen in the milieu today  The patient was not very cooperative and kept walking  He reports his mood has been okay  Denied any concern  The patient was calm but abruptly entered his room and close the door without answering further question  As per the nurse the patient had been pleasant  No behavior issues since last evening  He is very focused on discharge  Had been eating and sleeping well  Compliant with the medication and denied any side effects  ROS: no complaints, all other systems are negative    Mental Status Evaluation:    Appearance:  disheveled, marginal hygiene   Behavior:  calm   Speech:  normal rate, normal volume, normal pitch   Mood:  normal   Affect:  constricted   Thought Process:  illogical   Associations: unable to assess - non-verbal   Thought Content:  some paranoia   Perceptual Disturbances: does not appear responding to internal stimuli   Risk Potential: Suicidal ideation - unable to assess  Homicidal ideation - unable to assess  Potential for aggression - No   Sensorium:  unable to assess   Memory:  patient does not answer   Consciousness:  alert and awake   Attention: decreased attention span   Insight:  limited   Judgment: limited   Gait/Station: normal gait/station   Motor Activity: no abnormal movements     Vital signs in last 24 hours:    Temp:  [97 3 °F (36 3 °C)] 97 3 °F (36 3 °C)  HR:  [92-93] 93  Resp:  [20] 20  BP: (126-129)/(70-93) 129/93    Laboratory results:   I have personally reviewed all pertinent laboratory/tests results    Most Recent Labs:   Lab Results   Component Value Date    WBC 7 50 06/16/2020    RBC 4 69 06/16/2020    HGB 14 0 06/16/2020    HCT 40 6 (L) 06/16/2020     06/16/2020    RDW 13 4 06/16/2020    NEUTROABS 3 38 06/16/2020    SODIUM 133 (L) 06/16/2020    K 4 3 06/16/2020    CL 98 06/16/2020    CO2 27 06/16/2020    BUN 7 06/16/2020    CREATININE 0 57 (L) 06/16/2020    GLUC 163 (H) 06/16/2020    GLUF 121 (H) 05/14/2020    CALCIUM 8 9 06/16/2020    AST 37 06/16/2020    ALT 29 06/16/2020    ALKPHOS 88 06/16/2020    TP 6 5 06/16/2020    ALB 3 7 06/16/2020    TBILI 0 30 06/16/2020    CHOLESTEROL 102 04/13/2020    HDL 26 (L) 04/13/2020    TRIG 172 (H) 04/13/2020    LDLCALC 42 04/13/2020    NONHDLC 76 04/13/2020    VALPROICTOT 75 6 06/16/2020    LITHIUM 0 6 03/27/2014    AMMONIA <9 (L) 06/16/2020    [id number] 3 810 12/24/2019    FREET4 0 93 08/23/2019    HGBA1C 6 1 12/03/2019     12/03/2019       Progress Toward Goals: improving slowly    Assessment/Plan  the patient was not very engaging during the meeting but as per the previous notes and the staff today he has been overall doing well with no concerns  Plan is to continue with his current medication with no changes  Will continue to monitor him for his mood, behavior, sleep, response to meds  Principal Problem:    Schizoaffective disorder, bipolar type ([facility] [address] )  Active Problems:    Type 2 diabetes mellitus without complication, without long-term current use of insulin ([facility])    Benign essential hypertension    Gastroesophageal reflux disease without esophagitis    Acquired hypothyroidism    Tobacco abuse    Diabetes insipidus, nephrogenic ([facility] [address] )    ADHD    Recommended Treatment:     Planned medication and treatment changes:     All current active medications have been reviewed  Encourage group therapy, milieu therapy and occupational therapy  Behavioral Health checks every 7 minutes  Continue current medications:    Current Facility-Administered Medications:  acetaminophen 325 mg Oral Q6H PRN Anton Chang MD   acetaminophen 650 mg Oral Q6H PRN Anton Chang MD   acetaminophen 975 mg Oral Q8H PRN Anton Chang MD   aluminum-magnesium hydroxide-simethicone 30 mL Oral Q4H PRN Kalli Joshi MD   amphetamine-dextroamphetamine 15 mg Oral Daily Kalli Joshi MD   atorvastatin 10 mg Oral Daily With Aubrey Fierro MD   benztropine 1 mg Intramuscular Q6H PRN Kalli Joshi MD   benztropine 1 mg Oral Q6H PRN Kalli Joshi MD   cloZAPine 200 mg Oral Daily Kalli Joshi MD   divalproex sodium 1,500 mg Oral HS Kalli Joshi MD   docusate sodium 100 mg Oral BID Kalli Joshi MD   haloperidol 5 mg Oral Q6H PRN Kalli Joshi MD   haloperidol lactate 5 mg Intramuscular Q6H PRN Kalli Joshi MD   levothyroxine 75 mcg Oral Early Morning Kalli Joshi MD   LORazepam 1 mg Intramuscular Q6H PRN Kalli Joshi MD   LORazepam 1 mg Oral Q6H PRN Kalli Joshi MD   magnesium hydroxide 30 mL Oral Daily PRN Kalli Joshi MD   metFORMIN 500 mg Oral BID With Meals Kalli Joshi MD   nicotine polacrilex 2 mg Oral Q2H PRN Kalli Joshi MD   OLANZapine 5 mg Oral HS Kalli Joshi MD   oxybutynin 5 mg Oral Daily Kalli Joshi MD   pantoprazole 40 mg Oral Early Morning Kalli Joshi MD   traZODone 50 mg Oral HS PRN Kalli Joshi MD       Risks / Benefits of Treatment:    Risks, benefits, and possible side effects of medications explained to patient and patient verbalizes understanding and agreement for treatment  Counseling / Coordination of Care:    Patient's progress discussed with staff in treatment team meeting  Medications, treatment progress and treatment plan reviewed with patient      Atilio Hong MD 07/04/20

## 2020-07-05 PROCEDURE — 99231 SBSQ HOSP IP/OBS SF/LOW 25: CPT | Performed by: PSYCHIATRY & NEUROLOGY

## 2020-07-05 RX ADMIN — DIVALPROEX SODIUM 1500 MG: 500 TABLET, EXTENDED RELEASE ORAL at 20:51

## 2020-07-05 RX ADMIN — NICOTINE POLACRILEX 2 MG: 2 GUM, CHEWING ORAL at 08:34

## 2020-07-05 RX ADMIN — CLOZAPINE 200 MG: 200 TABLET ORAL at 16:41

## 2020-07-05 RX ADMIN — ATORVASTATIN CALCIUM 10 MG: 10 TABLET, FILM COATED ORAL at 16:41

## 2020-07-05 RX ADMIN — PANTOPRAZOLE SODIUM 40 MG: 40 TABLET, DELAYED RELEASE ORAL at 05:22

## 2020-07-05 RX ADMIN — DOCUSATE SODIUM 100 MG: 100 CAPSULE, LIQUID FILLED ORAL at 08:24

## 2020-07-05 RX ADMIN — DEXTROAMPHETAMINE SACCHARATE, AMPHETAMINE ASPARTATE, DEXTROAMPHETAMINE SULFATE AND AMPHETAMINE SULFATE 15 MG: 2.5; 2.5; 2.5; 2.5 TABLET ORAL at 05:22

## 2020-07-05 RX ADMIN — LEVOTHYROXINE SODIUM 75 MCG: 75 TABLET ORAL at 05:22

## 2020-07-05 RX ADMIN — OLANZAPINE 5 MG: 5 TABLET, FILM COATED ORAL at 20:51

## 2020-07-05 RX ADMIN — DOCUSATE SODIUM 100 MG: 100 CAPSULE, LIQUID FILLED ORAL at 17:05

## 2020-07-05 RX ADMIN — METFORMIN HYDROCHLORIDE 500 MG: 500 TABLET ORAL at 08:24

## 2020-07-05 RX ADMIN — OXYBUTYNIN CHLORIDE 5 MG: 5 TABLET, EXTENDED RELEASE ORAL at 08:24

## 2020-07-05 RX ADMIN — METFORMIN HYDROCHLORIDE 500 MG: 500 TABLET ORAL at 16:41

## 2020-07-05 NOTE — PROGRESS NOTES
Progress Note - Behavioral Health     Belvin Scale 48 y o  male MRN: [id number]   Unit/Bed#: [facility] 105-01 Encounter: [id number]      Subjective:  Patient's chart reviewed and case discussed with the nurse  The patient was seen in his room today  The patient was more cooperative today though talked to me while lying on the bed with his back towards me  He reports his mood has been okay  He said he wants to get discharged to attend his brother birthday  Denies any auditory or visual hallucination  Denies any SI or HI  Reports slept good last night  Denied any concern  As per the nurse the patient had been pleasant  No behavior issues since last evening  He is very focused on discharge  Had been eating and sleeping well  Compliant with the medication and denied any side effects  ROS: no complaints, all other systems are negative    Mental Status Evaluation:    Appearance:  disheveled, marginal hygiene   Behavior:  calm   Speech:  normal rate, normal volume, normal pitch   Mood:  normal   Affect:  Unable to assess as the patient lied on the bed facing the other side   Thought Process:  illogical   Associations: Intact association   Thought Content:  some paranoia   Perceptual Disturbances: does not appear responding to internal stimuli   Risk Potential: Suicidal ideation - denies  Homicidal ideation -denies  Potential for aggression - No   Sensorium:  Alert and oriented x3   Memory:  Not assessed today   Consciousness:  alert and awake   Attention: decreased attention span   Insight:  limited   Judgment: limited   Gait/Station: normal gait/station   Motor Activity: no abnormal movements     Vital signs in last 24 hours:    Temp:  [97 2 °F (36 2 °C)-97 5 °F (36 4 °C)] 97 5 °F (36 4 °C)  HR:  [78-89] 89  Resp:  [18-20] 20  BP: (143-166)/(76-99) 160/77    Laboratory results:   I have personally reviewed all pertinent laboratory/tests results    Most Recent Labs:   Lab Results   Component Value Date    WBC 7 50 06/16/2020    RBC 4 69 06/16/2020    HGB 14 0 06/16/2020    HCT 40 6 (L) 06/16/2020     06/16/2020    RDW 13 4 06/16/2020    NEUTROABS 3 38 06/16/2020    SODIUM 133 (L) 06/16/2020    K 4 3 06/16/2020    CL 98 06/16/2020    CO2 27 06/16/2020    BUN 7 06/16/2020    CREATININE 0 57 (L) 06/16/2020    GLUC 163 (H) 06/16/2020    GLUF 121 (H) 05/14/2020    CALCIUM 8 9 06/16/2020    AST 37 06/16/2020    ALT 29 06/16/2020    ALKPHOS 88 06/16/2020    TP 6 5 06/16/2020    ALB 3 7 06/16/2020    TBILI 0 30 06/16/2020    CHOLESTEROL 102 04/13/2020    HDL 26 (L) 04/13/2020    TRIG 172 (H) 04/13/2020    LDLCALC 42 04/13/2020    NONHDLC 76 04/13/2020    VALPROICTOT 75 6 06/16/2020    LITHIUM 0 6 03/27/2014    AMMONIA <9 (L) 06/16/2020    [id number] 3 810 12/24/2019    FREET4 0 93 08/23/2019    HGBA1C 6 1 12/03/2019     12/03/2019       Progress Toward Goals: improving slowly    Assessment/Plan  he has been overall doing well with no concerns  Mostly focused on discharge  Plan is to continue with his current medication with no changes  Will continue to monitor him for his mood, behavior, sleep, response to meds  Principal Problem:    Schizoaffective disorder, bipolar type ([facility] )  Active Problems:    Type 2 diabetes mellitus without complication, without long-term current use of insulin (HCC)    Benign essential hypertension    Gastroesophageal reflux disease without esophagitis    Acquired hypothyroidism    Tobacco abuse    [facility]    ADHD    Recommended Treatment:     Planned medication and treatment changes:     All current active medications have been reviewed  Encourage group therapy, milieu therapy and occupational therapy  Behavioral Health checks every 7 minutes  Continue current medications:    Current Facility-Administered Medications:  acetaminophen 325 mg Oral Q6H PRN David Estrada MD   acetaminophen 650 mg Oral Q6H PRN David Estrada MD   acetaminophen 975 mg Oral Q8H PRN Dragan Man MD   aluminum-magnesium hydroxide-simethicone 30 mL Oral Q4H PRN Dragan Man MD   amphetamine-dextroamphetamine 15 mg Oral Daily Dragan Man MD   atorvastatin 10 mg Oral Daily With Hurtado MD Olga Lidia   benztropine 1 mg Intramuscular Q6H PRN Dragan Man MD   benztropine 1 mg Oral Q6H PRN Dragan Man MD   cloZAPine 200 mg Oral Daily Dragan Man MD   divalproex sodium 1,500 mg Oral HS Dragan Man MD   docusate sodium 100 mg Oral BID Dragan Man MD   haloperidol 5 mg Oral Q6H PRN Dragan Man MD   haloperidol lactate 5 mg Intramuscular Q6H PRN Dragan Man MD   levothyroxine 75 mcg Oral Early Morning Dragan Man MD   LORazepam 1 mg Intramuscular Q6H PRN Dragan Man MD   LORazepam 1 mg Oral Q6H PRN Dragan Man MD   magnesium hydroxide 30 mL Oral Daily PRN Dragan Man MD   metFORMIN 500 mg Oral BID With Meals Dragan Man MD   nicotine polacrilex 2 mg Oral Q2H PRN Dragan Man MD   OLANZapine 5 mg Oral HS Dragan Man MD   oxybutynin 5 mg Oral Daily Dragan Man MD   pantoprazole 40 mg Oral Early Morning Dragan Man MD   traZODone 50 mg Oral HS PRN Dragan Man MD       Risks / Benefits of Treatment:    Risks, benefits, and possible side effects of medications explained to patient and patient verbalizes understanding and agreement for treatment  Counseling / Coordination of Care:    Patient's progress discussed with staff in treatment team meeting  Medications, treatment progress and treatment plan reviewed with patient      Esa Porter MD 07/05/20

## 2020-07-05 NOTE — NURSING NOTE
Received pt in bed at change of shift with eyes closed; chest movement noted  Awake to BR times one otherwise appears to sleep as per continual rounding  Will continue to monitor

## 2020-07-05 NOTE — PLAN OF CARE
Problem: Alteration in Thoughts and Perception  Goal: Verbalize thoughts and feelings  Description  Interventions:  - Promote a nonjudgmental and trusting relationship with the patient through active listening and therapeutic communication  - Assess patient's level of functioning, behavior and potential for risk  - Engage patient in 1 on 1 interactions  - Encourage patient to express fears, feelings, frustrations, and discuss symptoms    - [place] patient to reality, help patient recognize reality-based thinking   - Administer medications as ordered and assess for potential side effects  - Provide the patient education related to the signs and symptoms of the illness and desired effects of prescribed medications  Outcome: Progressing  Goal: Agree to be compliant with medication regime, as prescribed and report medication side effects  Description  Interventions:  - Offer appropriate PRN medication and supervise ingestion; conduct AIMS, as needed   Outcome: Progressing  Goal: Attend and participate in unit activities, including therapeutic, recreational, and educational groups  Description  Interventions:  -Encourage Visitation and family involvement in care  Outcome: Progressing       Visible, compliant with routine medications, gait steady, ate 100% of dinner, played bingo and listened to music with peers, behavior controlled, attended evening groups, will continue to monitor

## 2020-07-05 NOTE — PLAN OF CARE
Problem: Alteration in Thoughts and Perception  Goal: Refrain from acting on delusional thinking/internal stimuli  Description  Interventions:  - Monitor patient closely, per order   - Utilize least restrictive measures   - Set reasonable limits, give positive feedback for acceptable   - Administer medications as ordered and monitor of potential side effects  Outcome: Progressing  Goal: Agree to be compliant with medication regime, as prescribed and report medication side effects  Description  Interventions:  - Offer appropriate PRN medication and supervise ingestion; conduct AIMS, as needed   Outcome: Progressing   Patient is pleasant, cooperative and visible on the unit  He is noted to interact well with his peers  Intrusive at times but very easily redirected  Denies SI and Hi's  Also denies having any hallucinations or delusions  Only attended community meeting this shift  No issues or concerns noted at this time  Continue to monitor

## 2020-07-06 PROCEDURE — 99232 SBSQ HOSP IP/OBS MODERATE 35: CPT | Performed by: NURSE PRACTITIONER

## 2020-07-06 RX ADMIN — NICOTINE POLACRILEX 2 MG: 2 GUM, CHEWING ORAL at 08:25

## 2020-07-06 RX ADMIN — METFORMIN HYDROCHLORIDE 500 MG: 500 TABLET ORAL at 16:41

## 2020-07-06 RX ADMIN — DOCUSATE SODIUM 100 MG: 100 CAPSULE, LIQUID FILLED ORAL at 17:11

## 2020-07-06 RX ADMIN — OXYBUTYNIN CHLORIDE 5 MG: 5 TABLET, EXTENDED RELEASE ORAL at 08:24

## 2020-07-06 RX ADMIN — PANTOPRAZOLE SODIUM 40 MG: 40 TABLET, DELAYED RELEASE ORAL at 05:15

## 2020-07-06 RX ADMIN — DEXTROAMPHETAMINE SACCHARATE, AMPHETAMINE ASPARTATE, DEXTROAMPHETAMINE SULFATE AND AMPHETAMINE SULFATE 15 MG: 2.5; 2.5; 2.5; 2.5 TABLET ORAL at 05:15

## 2020-07-06 RX ADMIN — DIVALPROEX SODIUM 1500 MG: 500 TABLET, EXTENDED RELEASE ORAL at 20:41

## 2020-07-06 RX ADMIN — METFORMIN HYDROCHLORIDE 500 MG: 500 TABLET ORAL at 08:24

## 2020-07-06 RX ADMIN — LEVOTHYROXINE SODIUM 75 MCG: 75 TABLET ORAL at 05:15

## 2020-07-06 RX ADMIN — OLANZAPINE 5 MG: 5 TABLET, FILM COATED ORAL at 20:41

## 2020-07-06 RX ADMIN — ATORVASTATIN CALCIUM 10 MG: 10 TABLET, FILM COATED ORAL at 16:41

## 2020-07-06 RX ADMIN — CLOZAPINE 200 MG: 200 TABLET ORAL at 16:41

## 2020-07-06 RX ADMIN — DOCUSATE SODIUM 100 MG: 100 CAPSULE, LIQUID FILLED ORAL at 08:24

## 2020-07-06 RX ADMIN — NICOTINE POLACRILEX 2 MG: 2 GUM, CHEWING ORAL at 12:03

## 2020-07-06 NOTE — PROGRESS NOTES
07/06/20 0844   Team Meeting   Meeting Type Daily Rounds   Initial Conference Date 07/06/20   Patient/Family Present   Patient Present No   Patient's Family Present No     Daily Rounds Documentation     Team Members Present:   JOVITA Bhatt RN Randee Cid, RN Remo Plumber, [address] [place] [address]    Med compliant  Disheveled
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
## 2020-07-06 NOTE — PLAN OF CARE
Problem: Alteration in Thoughts and Perception  Goal: Treatment Goal: Gain control of psychotic behaviors/thinking, reduce/eliminate presenting symptoms and demonstrate improved reality functioning upon discharge  Outcome: Progressing  Goal: Verbalize thoughts and feelings  Description  Interventions:  - Promote a nonjudgmental and trusting relationship with the patient through active listening and therapeutic communication  - Assess patient's level of functioning, behavior and potential for risk  - Engage patient in 1 on 1 interactions  - Encourage patient to express fears, feelings, frustrations, and discuss symptoms    - [place] patient to reality, help patient recognize reality-based thinking   - Administer medications as ordered and assess for potential side effects  - Provide the patient education related to the signs and symptoms of the illness and desired effects of prescribed medications  Outcome: Progressing  Goal: Refrain from acting on delusional thinking/internal stimuli  Description  Interventions:  - Monitor patient closely, per order   - Utilize least restrictive measures   - Set reasonable limits, give positive feedback for acceptable   - Administer medications as ordered and monitor of potential side effects  Outcome: Progressing  Goal: Agree to be compliant with medication regime, as prescribed and report medication side effects  Description  Interventions:  - Offer appropriate PRN medication and supervise ingestion; conduct AIMS, as needed   Outcome: Progressing  Goal: Complete daily ADLs, including personal hygiene independently, as able  Description  Interventions:  - Observe, teach, and assist patient with ADLS  - Monitor and promote a balance of rest/activity, with adequate nutrition and elimination   Outcome: Progressing     Problem: Alteration in Thoughts and Perception  Goal: Attend and participate in unit activities, including therapeutic, recreational, and educational groups  Description  Interventions:  -Encourage Visitation and family involvement in care    CERTIFIED PEER SPECIALIST INTERVENTIONS:    Complete peer assessment with patient to assess their needs and identify their goals to complete while in the recovery program as well as once discharged into the community  Patient will complete WRAP Plan, Crisis Plan and 5 Life Domains  Patient will attend 50% of groups offered on the unit  Outcome: Not Progressing  Goal: Recognize dysfunctional thoughts, communicate reality-based thoughts at the time of discharge  Description  Interventions:  - Provide medication and psycho-education to assist patient in compliance and developing insight into his/her illness   Outcome: Not Progressing    Quiet, cooperative, visible on unit  Med and meal compliant  Ate 100% of both meals  Compliant with behavior plan  Denies depression, anxiety, SI, HI and pain  Patient did not attend groups  Preoccupied with getting things out of closet  Redirectable wihout issue  He is maintained on patient safety precautions and mouth checks w/o incident    Will continue to monitor progress in recovery program
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
## 2020-07-06 NOTE — PLAN OF CARE
Problem: Alteration in Thoughts and Perception  Goal: Verbalize thoughts and feelings  Description  Interventions:  - Promote a nonjudgmental and trusting relationship with the patient through active listening and therapeutic communication  - Assess patient's level of functioning, behavior and potential for risk  - Engage patient in 1 on 1 interactions  - Encourage patient to express fears, feelings, frustrations, and discuss symptoms    - [place] patient to reality, help patient recognize reality-based thinking   - Administer medications as ordered and assess for potential side effects  - Provide the patient education related to the signs and symptoms of the illness and desired effects of prescribed medications  Outcome: Progressing  Goal: Agree to be compliant with medication regime, as prescribed and report medication side effects  Description  Interventions:  - Offer appropriate PRN medication and supervise ingestion; conduct AIMS, as needed   Outcome: Progressing  Goal: Complete daily ADLs, including personal hygiene independently, as able  Description  Interventions:  - Observe, teach, and assist patient with ADLS  - Monitor and promote a balance of rest/activity, with adequate nutrition and elimination   Outcome: Progressing  Goal: Attend and participate in unit activities, including therapeutic, recreational, and educational groups  Description  Interventions:  -Encourage Visitation and family involvement in care  Outcome: Progressing     Problem: RESPIRATORY - ADULT  Goal: Achieves optimal ventilation and oxygenation  Description  INTERVENTIONS:  - Assess for changes in respiratory status  - Assess for changes in mentation and behavior  - Position to facilitate oxygenation and minimize respiratory effort  - Oxygen administered by appropriate delivery if ordered  - Initiate smoking cessation education as indicated  - Encourage broncho-pulmonary hygiene including cough, deep breathe, Incentive Spirometry  - Assess the need for suctioning and aspirate as needed  - Assess and instruct to report SOB or any respiratory difficulty  - Respiratory Therapy support as indicated  Outcome: Progressing     Problem: Alteration in Thoughts and Perception  Goal: Attend and participate in unit activities, including therapeutic, recreational, and educational groups  Description  Interventions:  -Encourage Visitation and family involvement in care    CERTIFIED PEER SPECIALIST INTERVENTIONS:    Complete peer assessment with patient to assess their needs and identify their goals to complete while in the recovery program as well as once discharged into the community  Patient will complete WRAP Plan, Crisis Plan and 5 Life Domains  Patient will attend 50% of groups offered on the unit  Outcome: Not Sarah Cuff has been visible in the milieu  Pleasant and cooperative upon approach  Able to make needs known  Preoccupied with being discharged  "I just want to get out of here"  No shower or BM this shift  Took medication without an issue  Ate 100% of his meal  Disheveled appearance  Layered clothing  He was singing and dancing while listening to music in [employer] area  Social with peers  Denies anxiety and depression  Did not attend evening group  Took HS medication  Ate snack  Continue to monitor  Precautions maintained
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
## 2020-07-06 NOTE — PROGRESS NOTES
07/06/20 1030   Activity/Group Checklist   Group Other (Comment)  (IMR; Graduation)   Attendance Attended   Attendance Duration (min) 16-30   Interactions Interacted appropriately   Affect/Mood Bright   Goals Achieved Able to listen to others     Patient came late to group but was bright and pleasant  Patient even picked a song to listen and danced

## 2020-07-06 NOTE — PLAN OF CARE
Problem: Alteration in Thoughts and Perception  Goal: Verbalize thoughts and feelings  Description  Interventions:  - Promote a nonjudgmental and trusting relationship with the patient through active listening and therapeutic communication  - Assess patient's level of functioning, behavior and potential for risk  - Engage patient in 1 on 1 interactions  - Encourage patient to express fears, feelings, frustrations, and discuss symptoms    - [place] patient to reality, help patient recognize reality-based thinking   - Administer medications as ordered and assess for potential side effects  - Provide the patient education related to the signs and symptoms of the illness and desired effects of prescribed medications  Outcome: Progressing  Goal: Agree to be compliant with medication regime, as prescribed and report medication side effects  Description  Interventions:  - Offer appropriate PRN medication and supervise ingestion; conduct AIMS, as needed   Outcome: Progressing     Problem: Alteration in Thoughts and Perception  Goal: Attend and participate in unit activities, including therapeutic, recreational, and educational groups  Description  Interventions:  -Encourage Visitation and family involvement in care    CERTIFIED PEER SPECIALIST INTERVENTIONS:    Complete peer assessment with patient to assess their needs and identify their goals to complete while in the recovery program as well as once discharged into the community  Patient will complete WRAP Plan, Crisis Plan and 5 Life Domains  Patient will attend 50% of groups offered on the unit        Outcome: Not Progressing     Visible, compliant with routine medications, gait steady, ate 100% of dinner, listened to music with peers, behavior controlled, did not attend evening groups, will continue to monitor

## 2020-07-07 PROCEDURE — 99233 SBSQ HOSP IP/OBS HIGH 50: CPT | Performed by: NURSE PRACTITIONER

## 2020-07-07 RX ORDER — OLANZAPINE 5 MG/1
5 TABLET ORAL
Status: DISCONTINUED | OUTPATIENT
Start: 2020-07-07 | End: 2020-08-07 | Stop reason: HOSPADM

## 2020-07-07 RX ADMIN — NICOTINE POLACRILEX 2 MG: 2 GUM, CHEWING ORAL at 08:54

## 2020-07-07 RX ADMIN — PANTOPRAZOLE SODIUM 40 MG: 40 TABLET, DELAYED RELEASE ORAL at 05:46

## 2020-07-07 RX ADMIN — METFORMIN HYDROCHLORIDE 500 MG: 500 TABLET ORAL at 16:57

## 2020-07-07 RX ADMIN — DOCUSATE SODIUM 100 MG: 100 CAPSULE, LIQUID FILLED ORAL at 08:13

## 2020-07-07 RX ADMIN — OXYBUTYNIN CHLORIDE 5 MG: 5 TABLET, EXTENDED RELEASE ORAL at 08:13

## 2020-07-07 RX ADMIN — CLOZAPINE 200 MG: 200 TABLET ORAL at 16:57

## 2020-07-07 RX ADMIN — OLANZAPINE 5 MG: 5 TABLET, FILM COATED ORAL at 21:09

## 2020-07-07 RX ADMIN — DOCUSATE SODIUM 100 MG: 100 CAPSULE, LIQUID FILLED ORAL at 17:05

## 2020-07-07 RX ADMIN — DIVALPROEX SODIUM 1500 MG: 500 TABLET, EXTENDED RELEASE ORAL at 21:09

## 2020-07-07 RX ADMIN — DEXTROAMPHETAMINE SACCHARATE, AMPHETAMINE ASPARTATE, DEXTROAMPHETAMINE SULFATE AND AMPHETAMINE SULFATE 15 MG: 2.5; 2.5; 2.5; 2.5 TABLET ORAL at 05:46

## 2020-07-07 RX ADMIN — LEVOTHYROXINE SODIUM 75 MCG: 75 TABLET ORAL at 05:46

## 2020-07-07 RX ADMIN — METFORMIN HYDROCHLORIDE 500 MG: 500 TABLET ORAL at 08:13

## 2020-07-07 RX ADMIN — ATORVASTATIN CALCIUM 10 MG: 10 TABLET, FILM COATED ORAL at 16:57

## 2020-07-07 NOTE — PROGRESS NOTES
07/07/20 1053   Team Meeting   Meeting Type Daily Rounds   Initial Conference Date 07/07/20   Team Members Present   Team Members Present Physician;Nurse;;   Patient/Family Present   Patient Present No   Patient's Family Present No     Daily Rounds Documentation     Team Members Present:   JOVITA Bridges RN Palma Net, RN LORMONT, LSW  [place], [place]    Preoccupied  Med resistive  Refused labs this a m  No change in behaviors

## 2020-07-07 NOTE — PROGRESS NOTES
07/07/20 1400   Activity/Group Checklist   Group   (Recovery Workshop )   Attendance Did not attend   Attendance Duration (min) 46-60   Affect/Mood GAEL [Mother] : mother [Cow's milk (Ounces per day ___)] : consumes [unfilled] oz of Cow's milk per day [Fruit] : fruit [Vegetables] : vegetables [Cereal] : cereal [Meat] : meat [Eggs] : eggs [Table food] : table food [___ stools per day] : [unfilled]  stools per day [Firm] : firm consistency [___ voids per day] : [unfilled] voids per day [Normal] : Normal [In crib] : In crib [Wakes up at night] : Wakes up at night [Sippy cup use] : Sippy cup use [Brushing teeth] : Brushing teeth [Temper Tantrums] : Temper Tantrums [Playtime] : Playtime  [Yes] : Cigarette smoke exposure [Water heater temperature set at <120 degrees F] : Water heater temperature set at <120 degrees F [No] : Not at  exposure [Car seat in back seat] : Car seat in back seat [Smoke Detectors] : Smoke detectors [Carbon Monoxide Detectors] : Carbon monoxide detectors [Up to date] : Up to date [Toothpaste] : Primary Fluoride Source: Toothpaste [Gun in Home] : No gun in home [FreeTextEntry3] : wakes 2x

## 2020-07-07 NOTE — PLAN OF CARE
Problem: Alteration in Thoughts and Perception  Goal: Attend and participate in unit activities, including therapeutic, recreational, and educational groups  Description  Interventions:  -Encourage Visitation and family involvement in care    CERTIFIED PEER SPECIALIST INTERVENTIONS:    Complete peer assessment with patient to assess their needs and identify their goals to complete while in the recovery program as well as once discharged into the community  Patient will complete WRAP Plan, Crisis Plan and 5 Life Domains  Patient will attend 50% of groups offered on the unit  Outcome: Not Progressing  Patient did not complete Goal Card for the week of 7/7/2020

## 2020-07-07 NOTE — PROGRESS NOTES
Patient slept through the night  He took his morning medication with some resistance but was easily convinced  Patient did however refuse to have his blood work drawn therefore, a CMP remains outstanding  No issues or concerns noted  Continue to monitor

## 2020-07-07 NOTE — NURSING NOTE
Pt is visible walking the hallway and asking for water frequently  Pt is is cooperative with redirection to water times  Pt is pleasant and comes out to make needs known  Pt refuses a shower with much encouragement  Pt denies s/s including SI/HI/VH/AH  Pt is currently standing by dayroom and attended only spirituality group  Will continue to monitor

## 2020-07-07 NOTE — PROGRESS NOTES
07/07/20 0900   Activity/Group Checklist   Group Community meeting   Attendance Did not attend   Attendance Duration (min) 31-45   Affect/Mood GAEL

## 2020-07-07 NOTE — PLAN OF CARE
Problem: Alteration in Thoughts and Perception  Goal: Treatment Goal: Gain control of psychotic behaviors/thinking, reduce/eliminate presenting symptoms and demonstrate improved reality functioning upon discharge  7/7/2020 0746 by Yordan Davis LPN  Outcome: Progressing  7/7/2020 0745 by Yordan Davis LPN  Outcome: Progressing  Goal: Verbalize thoughts and feelings  Description  Interventions:  - Promote a nonjudgmental and trusting relationship with the patient through active listening and therapeutic communication  - Assess patient's level of functioning, behavior and potential for risk  - Engage patient in 1 on 1 interactions  - Encourage patient to express fears, feelings, frustrations, and discuss symptoms    - [place] patient to reality, help patient recognize reality-based thinking   - Administer medications as ordered and assess for potential side effects  - Provide the patient education related to the signs and symptoms of the illness and desired effects of prescribed medications  7/7/2020 0746 by Yordan Davis LPN  Outcome: Progressing  7/7/2020 0745 by Yordan Davis LPN  Outcome: Progressing  Goal: Refrain from acting on delusional thinking/internal stimuli  Description  Interventions:  - Monitor patient closely, per order   - Utilize least restrictive measures   - Set reasonable limits, give positive feedback for acceptable   - Administer medications as ordered and monitor of potential side effects  7/7/2020 0746 by Yordan Davis LPN  Outcome: Progressing  7/7/2020 0745 by Yordan Davis LPN  Outcome: Progressing  Goal: Agree to be compliant with medication regime, as prescribed and report medication side effects  Description  Interventions:  - Offer appropriate PRN medication and supervise ingestion; conduct AIMS, as needed   7/7/2020 0746 by Yordan Davis LPN  Outcome: Progressing  7/7/2020 0745 by Yordan Davis LPN  Outcome: Progressing  Goal: Attend and participate in unit activities, including therapeutic, recreational, and educational groups  Description  Interventions:  -Encourage Visitation and family involvement in care    CERTIFIED PEER SPECIALIST INTERVENTIONS:    Complete peer assessment with patient to assess their needs and identify their goals to complete while in the recovery program as well as once discharged into the community  Patient will complete WRAP Plan, Crisis Plan and 5 Life Domains  Patient will attend 50% of groups offered on the unit        7/7/2020 0746 by Bland Spurling, LPN  Outcome: Progressing  7/7/2020 0745 by Bland Spurling, LPN  Outcome: Progressing  Goal: Recognize dysfunctional thoughts, communicate reality-based thoughts at the time of discharge  Description  Interventions:  - Provide medication and psycho-education to assist patient in compliance and developing insight into his/her illness   7/7/2020 0746 by Bland Spurling, LPN  Outcome: Progressing  7/7/2020 0745 by Bland Spurling, LPN  Outcome: Progressing  Goal: Complete daily ADLs, including personal hygiene independently, as able  Description  Interventions:  - Observe, teach, and assist patient with ADLS  - Monitor and promote a balance of rest/activity, with adequate nutrition and elimination   7/7/2020 0746 by Bland Spurling, LPN  Outcome: Progressing  7/7/2020 0745 by Bland Spurling, LPN  Outcome: Progressing  Goal: Attend and participate in unit activities, including therapeutic, recreational, and educational groups  Description  Interventions:  -Encourage Visitation and family involvement in care  7/7/2020 0746 by Bland Spurling, LPN  Outcome: Progressing  7/7/2020 0745 by Bland Spurling, LPN  Outcome: Progressing     Problem: Ineffective Coping  Goal: Identifies ineffective coping skills  7/7/2020 0746 by Bland Spurling, LPN  Outcome: Progressing  7/7/2020 0745 by Bland Spurling, LPN  Outcome: Progressing  Goal: Identifies healthy coping skills  7/7/2020 0746 by Bland Spurling, LPN  Outcome: Progressing  7/7/2020 0745 by Kathryn Garcia LPN  Outcome: Progressing  Goal: Demonstrates healthy coping skills  7/7/2020 0746 by Kathryn Garcia LPN  Outcome: Progressing  7/7/2020 0745 by Kathryn Garcia LPN  Outcome: Progressing  Goal: Patient/Family participate in treatment and DC plans  Description  Interventions:  - Provide therapeutic environment  7/7/2020 0746 by Kathryn Garcia LPN  Outcome: Progressing  7/7/2020 0745 by Kathryn Garcia LPN  Outcome: Progressing  Goal: Patient/Family verbalizes awareness of resources  7/7/2020 0746 by Kathryn Garcia LPN  Outcome: Progressing  7/7/2020 0745 by Kathryn Garcia LPN  Outcome: Progressing  Goal: Understands least restrictive measures  Description  Interventions:  - Utilize least restrictive behavior  7/7/2020 0746 by Kathryn Garcia LPN  Outcome: Progressing  7/7/2020 0745 by Kathryn Garcia LPN  Outcome: Progressing     Problem: RESPIRATORY - ADULT  Goal: Achieves optimal ventilation and oxygenation  Description  INTERVENTIONS:  - Assess for changes in respiratory status  - Assess for changes in mentation and behavior  - Position to facilitate oxygenation and minimize respiratory effort  - Oxygen administered by appropriate delivery if ordered  - Initiate smoking cessation education as indicated  - Encourage broncho-pulmonary hygiene including cough, deep breathe, Incentive Spirometry  - Assess the need for suctioning and aspirate as needed  - Assess and instruct to report SOB or any respiratory difficulty  - Respiratory Therapy support as indicated  7/7/2020 0746 by Kathryn Garcia LPN  Outcome: Progressing  7/7/2020 0745 by Kathryn Garcia LPN  Outcome: Progressing     Problem: GASTROINTESTINAL - ADULT  Goal: Minimal or absence of nausea and/or vomiting  Description  INTERVENTIONS:  - Administer IV fluids if ordered to ensure adequate hydration  - Maintain NPO status until nausea and vomiting are resolved  - Nasogastric tube if ordered  - Administer ordered antiemetic medications as needed  - Provide nonpharmacologic comfort measures as appropriate  - Advance diet as tolerated, if ordered  - Consider nutrition services referral to assist patient with adequate nutrition and appropriate food choices  7/7/2020 0746 by Mariaelena Ortiz LPN  Outcome: Progressing  7/7/2020 0745 by Mariaelena Ortiz LPN  Outcome: Progressing  Goal: Maintains or returns to baseline bowel function  Description  INTERVENTIONS:  - Assess bowel function  - Encourage oral fluids to ensure adequate hydration  - Administer IV fluids if ordered to ensure adequate hydration  - Administer ordered medications as needed  - Encourage mobilization and activity  - Consider nutritional services referral to assist patient with adequate nutrition and appropriate food choices  7/7/2020 0746 by Mariaelena Ortiz LPN  Outcome: Progressing  7/7/2020 0745 by Mariaelena Ortiz LPN  Outcome: Progressing  Goal: Maintains adequate nutritional intake  Description  INTERVENTIONS:  - Monitor percentage of each meal consumed  - Identify factors contributing to decreased intake, treat as appropriate  - Assist with meals as needed  - Monitor I&O, weight, and lab values if indicated  - Obtain nutrition services referral as needed  7/7/2020 0746 by Mariaelena Ortiz LPN  Outcome: Progressing  7/7/2020 0745 by Mariaelena Ortiz LPN  Outcome: Progressing     Problem: METABOLIC, FLUID AND ELECTROLYTES - ADULT  Goal: Glucose maintained within target range  Description  INTERVENTIONS:  - Monitor Blood Glucose as ordered  - Assess for signs and symptoms of hyperglycemia and hypoglycemia  - Administer ordered medications to maintain glucose within target range  - Assess nutritional intake and initiate nutrition service referral as needed  7/7/2020 0746 by Mariaelena Ortiz LPN  Outcome: Progressing  7/7/2020 0745 by Mariaelena Ortiz LPN  Outcome: Progressing     Problem: DISCHARGE PLANNING  Goal: Discharge to home or other facility with appropriate resources  Description    CASE MANAGEMENT INTERVENTIONS:  - Conduct assessment to determine patient/family and health care team treatment goals, and need for post-acute services based on payer coverage, community resources, patient preferences and barriers to discharge  - Address psychosocial, clinical, and financial barriers to discharge as identified in assessment in conjunction with the patient/family and health care team   - Assist the patient in reintegration back into the community by removing barriers which may hinder a successful discharge once deemed stable  - Arrange appropriate level of post-acute services according to patient's needs and preference and payer coverage in collaboration with the physician and health care team   - Communicate with and update the patient/family, physician, and health care team regarding progress on the discharge plan  - Arrange appropriate transportation to post-acute venues     7/7/2020 0746 by Neil Zepeda LPN  Outcome: Progressing  7/7/2020 0745 by Neil Zepeda LPN  Outcome: Progressing

## 2020-07-07 NOTE — SOCIAL WORK
Phone call returned to pt's "big brother," Francois Parrish  ISRA confirmed with him that he can call in for today's treatment team meeting and confirmed conference line and access code  SW asked him to announce himself when he joins that way we can assure that he is on the right pt; he agreed

## 2020-07-07 NOTE — PROGRESS NOTES
07/07/20 1100   Activity/Group Checklist   Group   (IMR/Maslow's Hierarchy of Needs )   Attendance Did not attend   Attendance Duration (min) 46-60   Affect/Mood GAEL

## 2020-07-07 NOTE — PROGRESS NOTES
Progress Note - Behavioral Health     Tex Crabtree 48 y o  male MRN: [id number]   Unit/Bed#: [facility] 362-26 Encounter: [id number]    Behavior over the last 24 hours:      Summer Jefferson was seen for an inpatient follow-up psychiatric visit this date  He remains disheveled and is wearing several layers clothing  His mood is labile and irritable  He is delusional and paranoid  He is not attending group programming  He asserts the reason he was admitted to the hospital and then here was because he was set up  He becomes angry hold he is not ready to be discharged  He denies any auditory or visual hallucinations  ROS: no complaints, all other systems are negative    Mental Status Evaluation:    Appearance:  disheveled, marginal hygiene   Behavior:  angry, some agitation   Speech:  normal rate and volume   Mood:  labile, irritable   Affect:  mood-congruent   Thought Process:  somewhat disorganized   Associations: intact associations   Thought Content:  persecutory delusions, paranoid ideation   Perceptual Disturbances: denies auditory hallucinations when asked   Risk Potential: Suicidal ideation - None  Homicidal ideation - None  Potential for aggression - No   Sensorium:  oriented to person, place and time/date   Memory:  recent and remote memory grossly intact   Consciousness:  alert and awake   Attention: decreased concentration and decreased attention span   Insight:  poor   Judgment: poor   Gait/Station: normal gait/station, normal balance   Motor Activity: no abnormal movements     Vital signs in last 24 hours:    Temp:  [97 5 °F (36 4 °C)] 97 5 °F (36 4 °C)  HR:  [] 102  Resp:  [19] 19  BP: ()/(56-88) 126/88    Laboratory results:  I have personally reviewed all pertinent laboratory/tests results      Progress Toward Goals: regressed    Assessment/Plan   Principal Problem:    Schizoaffective disorder, bipolar type (HCC)  Active Problems:    Type 2 diabetes mellitus without complication, without long-term current use of insulin (HCC)    Benign essential hypertension    Gastroesophageal reflux disease without esophagitis    Acquired hypothyroidism    Tobacco abuse    Diabetes insipidus, nephrogenic ([address] )    ADHD    Recommended Treatment:     Continue current medications as prescribed  Will consider increasing Zyprexa or Clozaril if paranoia and delusions continue  Continue to monitor  Discharge disposition and planning are ongoing      All current active medications have been reviewed  Encourage group therapy, milieu therapy and occupational therapy  Behavioral Health checks every 7 minutes      Current Facility-Administered Medications:  acetaminophen 325 mg Oral Q6H PRN Homer Jimenez MD   acetaminophen 650 mg Oral Q6H PRN Homer Jimenez MD   acetaminophen 975 mg Oral Q8H PRN Homer Jimenez MD   aluminum-magnesium hydroxide-simethicone 30 mL Oral Q4H PRN Homer Jimenez MD   amphetamine-dextroamphetamine 15 mg Oral Daily Homer Jimenez MD   atorvastatin 10 mg Oral Daily With Jignesh Viera MD   benztropine 1 mg Intramuscular Q6H PRN Homer Jimenez MD   benztropine 1 mg Oral Q6H PRN Homer Jimenez MD   cloZAPine 200 mg Oral Daily Homer Jimenez MD   divalproex sodium 1,500 mg Oral HS Homer Jimenez MD   docusate sodium 100 mg Oral BID Homer Jimenez MD   haloperidol 5 mg Oral Q6H PRN Homer Jimenez MD   haloperidol lactate 5 mg Intramuscular Q6H PRN Homer Jimenez MD   levothyroxine 75 mcg Oral Early Morning Homer Jimenez MD   LORazepam 1 mg Intramuscular Q6H PRN Homer Jimenez MD   LORazepam 1 mg Oral Q6H PRN Homer Jimenez MD   magnesium hydroxide 30 mL Oral Daily PRN Homer Jimenez MD   metFORMIN 500 mg Oral BID With Meals Homer Jimenez MD   nicotine polacrilex 2 mg Oral Q2H PRN Homer Jimenez MD   OLANZapine 5 mg Oral HS Homer Jimenez MD   oxybutynin 5 mg Oral Daily Homer Jimenez MD   pantoprazole 40 mg Oral Early Morning Homer Jimenez MD   traZODone 50 mg Oral HS PRN Homer Jimenez MD       Risks / Benefits of Treatment:    Risks, benefits, and possible side effects of medications explained to patient and patient verbalizes understanding and agreement for treatment  Counseling / Coordination of Care:      Patient's progress discussed with staff in treatment team meeting  Medications, treatment progress and treatment plan reviewed with patient

## 2020-07-08 PROCEDURE — 99232 SBSQ HOSP IP/OBS MODERATE 35: CPT | Performed by: NURSE PRACTITIONER

## 2020-07-08 RX ADMIN — METFORMIN HYDROCHLORIDE 500 MG: 500 TABLET ORAL at 16:41

## 2020-07-08 RX ADMIN — DOCUSATE SODIUM 100 MG: 100 CAPSULE, LIQUID FILLED ORAL at 17:11

## 2020-07-08 RX ADMIN — METFORMIN HYDROCHLORIDE 500 MG: 500 TABLET ORAL at 08:10

## 2020-07-08 RX ADMIN — CLOZAPINE 200 MG: 200 TABLET ORAL at 16:41

## 2020-07-08 RX ADMIN — OLANZAPINE 5 MG: 5 TABLET, FILM COATED ORAL at 20:59

## 2020-07-08 RX ADMIN — DIVALPROEX SODIUM 1500 MG: 500 TABLET, EXTENDED RELEASE ORAL at 20:59

## 2020-07-08 RX ADMIN — OXYBUTYNIN CHLORIDE 5 MG: 5 TABLET, EXTENDED RELEASE ORAL at 08:11

## 2020-07-08 RX ADMIN — DOCUSATE SODIUM 100 MG: 100 CAPSULE, LIQUID FILLED ORAL at 08:10

## 2020-07-08 RX ADMIN — ATORVASTATIN CALCIUM 10 MG: 10 TABLET, FILM COATED ORAL at 16:41

## 2020-07-08 NOTE — PROGRESS NOTES
Progress Note - Behavioral Health     Balwinder Mckeon 48 y o  male MRN: [id number]   Unit/Bed#: [facility] 659-01 Encounter: [id number]    Behavior over the last 24 hours:      Debbie Salazar was seen for an inpatient follow-up psychiatric visit this date  He remains child-like and easily angered when told he cannot leave immediately  Per psychiatric rounds, he refused his a m  meds but did later take them when encouraged  He denies any suicidal or homicidal ideation  He abruptly walked out of the room when told he has no definite discharge date yet  ROS: no complaints, all other systems are negative    Mental Status Evaluation:    Appearance:  disheveled   Behavior:  demanding, easily agitated   Speech:  normal rate and volume   Mood:  irritable   Affect:  normal range and intensity   Thought Process:  organized, logical, coherent   Associations: intact associations   Thought Content:  normal, no overt delusions   Perceptual Disturbances: none   Risk Potential: Suicidal ideation - None  Homicidal ideation - None  Potential for aggression - No   Sensorium:  oriented to person, place and time/date   Memory:  recent and remote memory grossly intact   Consciousness:  alert and awake   Attention: poor concentration and poor attention span   Insight:  limited   Judgment: limited   Gait/Station: normal gait/station, normal balance   Motor Activity: no abnormal movements     Vital signs in last 24 hours:    Temp:  [59 °F (15 °C)-97 9 °F (36 6 °C)] 59 °F (15 °C)  HR:  [80] 80  Resp:  [18] 18  BP: (150)/(78) 150/78    Laboratory results:  I have personally reviewed all pertinent laboratory/tests results      Progress Toward Goals: insight remains poor, poor motivation    Assessment/Plan   Principal Problem:    Schizoaffective disorder, bipolar type ([facility])  Active Problems:    Type 2 diabetes mellitus without complication, without long-term current use of insulin ([facility])    Benign essential hypertension    Gastroesophageal reflux disease without esophagitis    Acquired hypothyroidism    Tobacco abuse    Diabetes insipidus, nephrogenic ([address] )    ADHD    Recommended Treatment:     Continue current medications as prescribed  Continue to monitor  Discharge disposition and planning are ongoing  All current active medications have been reviewed  Encourage group therapy, milieu therapy and occupational therapy  Behavioral Health checks every 7 minutes      Current Facility-Administered Medications:  acetaminophen 325 mg Oral Q6H PRN [facility], MD   acetaminophen 650 mg Oral Q6H PRN [facility], MD   acetaminophen 975 mg Oral Q8H PRN [facility], MD   aluminum-magnesium hydroxide-simethicone 30 mL Oral Q4H PRN [facility], MD   amphetamine-dextroamphetamine 15 mg Oral Daily [facility], MD   atorvastatin 10 mg Oral Daily With Katerina Mas, MD   benztropine 1 mg Intramuscular Q6H PRN [facility], MD   benztropine 1 mg Oral Q6H PRN [facility], MD   cloZAPine 200 mg Oral Daily [facility], MD   divalproex sodium 1,500 mg Oral HS [facility], MD   docusate sodium 100 mg Oral BID [facility], MD   haloperidol 5 mg Oral Q6H PRN [facility], MD   haloperidol lactate 5 mg Intramuscular Q6H PRN [facility], MD   levothyroxine 75 mcg Oral Early Morning [facility], MD   LORazepam 1 mg Intramuscular Q6H PRN [facility], MD   LORazepam 1 mg Oral Q6H PRN [facility], MD   magnesium hydroxide 30 mL Oral Daily PRN [facility], MD   metFORMIN 500 mg Oral BID With Meals [facility], MD   nicotine polacrilex 2 mg Oral Q2H PRN [facility], MD   OLANZapine 5 mg Oral HS JOVITA Hodge   oxybutynin 5 mg Oral Daily [facility], MD   pantoprazole 40 mg Oral Early Morning [facility], MD   traZODone 50 mg Oral HS PRN [facility], MD       Risks / Benefits of Treatment:    Risks, benefits, and possible side effects of medications explained to patient and patient verbalizes understanding and agreement for treatment      Counseling / Coordination of Care:      Patient's progress discussed with staff in treatment team meeting  Medications, treatment progress and treatment plan reviewed with patient

## 2020-07-08 NOTE — PLAN OF CARE
Problem: Ineffective Coping  Goal: Demonstrates healthy coping skills  Outcome: Progressing  Goal: Understands least restrictive measures  Description  Interventions:  - Utilize least restrictive behavior  Outcome: Progressing     Problem: GASTROINTESTINAL - ADULT  Goal: Maintains adequate nutritional intake  Description  INTERVENTIONS:  - Monitor percentage of each meal consumed  - Identify factors contributing to decreased intake, treat as appropriate  - Assist with meals as needed  - Monitor I&O, weight, and lab values if indicated  - Obtain nutrition services referral as needed  Outcome: Fdai Lopez has been awake, alert, and visible intermittently out in the milieu  Pt ate 100% supper  Pt did not attend any evening groups  Rests in room at intervals and listens to music on radio in [facility] at times  Pt has been isolative to self  Less preoccupied with discharge  Disheveled in appearance and dressed in layers  Compliant with all scheduled meds without prompting and cooperative with mouth checks  Had evening snack  No behavioral issues  Continue to monitor/assess for any changes

## 2020-07-08 NOTE — PLAN OF CARE
Problem: Alteration in Thoughts and Perception  Goal: Verbalize thoughts and feelings  Description  Interventions:  - Promote a nonjudgmental and trusting relationship with the patient through active listening and therapeutic communication  - Assess patient's level of functioning, behavior and potential for risk  - Engage patient in 1 on 1 interactions  - Encourage patient to express fears, feelings, frustrations, and discuss symptoms    - [place] patient to reality, help patient recognize reality-based thinking   - Administer medications as ordered and assess for potential side effects  - Provide the patient education related to the signs and symptoms of the illness and desired effects of prescribed medications  Outcome: Progressing  Goal: Refrain from acting on delusional thinking/internal stimuli  Description  Interventions:  - Monitor patient closely, per order   - Utilize least restrictive measures   - Set reasonable limits, give positive feedback for acceptable   - Administer medications as ordered and monitor of potential side effects  Outcome: Progressing  Goal: Agree to be compliant with medication regime, as prescribed and report medication side effects  Description  Interventions:  - Offer appropriate PRN medication and supervise ingestion; conduct AIMS, as needed   Outcome: Progressing  Goal: Attend and participate in unit activities, including therapeutic, recreational, and educational groups  Description  Interventions:  -Encourage Visitation and family involvement in care    CERTIFIED PEER SPECIALIST INTERVENTIONS:    Complete peer assessment with patient to assess their needs and identify their goals to complete while in the recovery program as well as once discharged into the community  Patient will complete WRAP Plan, Crisis Plan and 5 Life Domains  Patient will attend 50% of groups offered on the unit        Outcome: Not Progressing  Goal: Complete daily ADLs, including personal hygiene independently, as able  Description  Interventions:  - Observe, teach, and assist patient with ADLS  - Monitor and promote a balance of rest/activity, with adequate nutrition and elimination   Outcome: Progressing     Problem: Ineffective Coping  Goal: Demonstrates healthy coping skills  Outcome: Progressing  Goal: Understands least restrictive measures  Description  Interventions:  - Utilize least restrictive behavior  Outcome: Progressing     Problem: GASTROINTESTINAL - ADULT  Goal: Maintains adequate nutritional intake  Description  INTERVENTIONS:  - Monitor percentage of each meal consumed  - Identify factors contributing to decreased intake, treat as appropriate  - Assist with meals as needed  - Monitor I&O, weight, and lab values if indicated  - Obtain nutrition services referral as needed  Outcome: Isabella Powell has been awake, alert, and visible intermittently out in the milieu  Pt completed his daily ADL's today  Dressed in layers and disheveled  Pt spoke of wanting to be discharged  Ate 100% supper  Napping at intervals in room throughout shift  Pt spoke to his sister on phone  Compliant with scheduled meds without prompting  Pt did not attend evening group but came out for snack after  No behavioral issues  Continue to monitor/assess for any changes

## 2020-07-08 NOTE — PROGRESS NOTES
07/08/20 0913   Team Meeting   Meeting Type Daily Rounds   Initial Conference Date 07/08/20   Team Members Present   Team Members Present Physician;Nurse;;; Other (Discipline and Name)   Patient/Family Present   Patient Present No   Patient's Family Present No     Daily Rounds Documentation    Team Members Present:  JOVITA Bridges 41, RN  ELIZABETH CROWLEY  [facility], [facility], [facility]    No 3-11PM group attendance  Refused early a m  Meds, took routine  Patient had walked out early of his treatment team meeting yesterday  Has had contact by phone with his sister

## 2020-07-08 NOTE — PLAN OF CARE
Problem: Alteration in Thoughts and Perception  Goal: Verbalize thoughts and feelings  Description  Interventions:  - Promote a nonjudgmental and trusting relationship with the patient through active listening and therapeutic communication  - Assess patient's level of functioning, behavior and potential for risk  - Engage patient in 1 on 1 interactions  - Encourage patient to express fears, feelings, frustrations, and discuss symptoms    - [place] patient to reality, help patient recognize reality-based thinking   - Administer medications as ordered and assess for potential side effects  - Provide the patient education related to the signs and symptoms of the illness and desired effects of prescribed medications  Outcome: Not Progressing  Goal: Attend and participate in unit activities, including therapeutic, recreational, and educational groups  Description  Interventions:  -Encourage Visitation and family involvement in care     Outcome: Not Progressing    Individual has been in his room all shift, visible only at meal times  He appeared to have irritable edge  He did not participate in programming, no groups were attended  Compliant with meds this shift  Appetite good  Able to express needs to staff  Availability of staff made known  Will continue to monitor

## 2020-07-08 NOTE — SOCIAL WORK
SW attempted to return pt's sister's (Kristy) call  SW left Kristy a detailed message informing her that she had sent her an email on 6/23/2020; SW repeated email address and asked for confirmation  Additionally, ISRA left her email and requested that she call back by the end of the day so we can discuss any concerns she has

## 2020-07-08 NOTE — PROGRESS NOTES
07/08/20 1100   Activity/Group Checklist   Group   ([facility]R/Recovery [facility] )   Attendance Did not attend   Attendance Duration (min) 46-60   Affect/Mood GAEL

## 2020-07-08 NOTE — PROGRESS NOTES
07/08/20 0915   Activity/Group Checklist   Group Community meeting   Attendance Did not attend   Attendance Duration (min) 31-45   Affect/Mood GAEL

## 2020-07-09 PROCEDURE — 99232 SBSQ HOSP IP/OBS MODERATE 35: CPT | Performed by: NURSE PRACTITIONER

## 2020-07-09 RX ADMIN — ATORVASTATIN CALCIUM 10 MG: 10 TABLET, FILM COATED ORAL at 16:59

## 2020-07-09 RX ADMIN — OLANZAPINE 5 MG: 5 TABLET, FILM COATED ORAL at 20:55

## 2020-07-09 RX ADMIN — METFORMIN HYDROCHLORIDE 500 MG: 500 TABLET ORAL at 16:59

## 2020-07-09 RX ADMIN — DOCUSATE SODIUM 100 MG: 100 CAPSULE, LIQUID FILLED ORAL at 17:17

## 2020-07-09 RX ADMIN — DIVALPROEX SODIUM 1500 MG: 500 TABLET, EXTENDED RELEASE ORAL at 20:54

## 2020-07-09 RX ADMIN — CLOZAPINE 200 MG: 200 TABLET ORAL at 16:59

## 2020-07-09 NOTE — SOCIAL WORK
Message received from pt's sister confirming that she received SW's email from earlier in the week  SW responed and again provided the weekly treatment team information including the call in number and access code  SW received additional email from patient's sister stating that she never received an answer about what she can send pt for his birthday or about visitation  SW forwarded her the email she sent 3 weeks ago addressing both of these questions

## 2020-07-09 NOTE — SOCIAL WORK
Message left for Jesse Sneed in admissions at [facility] regarding pt's spot of the waitlist   SW requested a call back

## 2020-07-09 NOTE — PROGRESS NOTES
07/09/20 0830   Team Meeting   Meeting Type Daily Rounds   Initial Conference Date 07/09/20   Patient/Family Present   Patient Present No   Patient's Family Present No     Daily Rounds Documentation     Team Members Present:   JOVITA Leiva RN  [address]    Refused early morning medications again; states they make him tired

## 2020-07-09 NOTE — PLAN OF CARE
Problem: Alteration in Thoughts and Perception  Goal: Treatment Goal: Gain control of psychotic behaviors/thinking, reduce/eliminate presenting symptoms and demonstrate improved reality functioning upon discharge  Outcome: Progressing  Goal: Verbalize thoughts and feelings  Description  Interventions:  - Promote a nonjudgmental and trusting relationship with the patient through active listening and therapeutic communication  - Assess patient's level of functioning, behavior and potential for risk  - Engage patient in 1 on 1 interactions  - Encourage patient to express fears, feelings, frustrations, and discuss symptoms    - [place] patient to reality, help patient recognize reality-based thinking   - Administer medications as ordered and assess for potential side effects  - Provide the patient education related to the signs and symptoms of the illness and desired effects of prescribed medications  Outcome: Progressing  Goal: Refrain from acting on delusional thinking/internal stimuli  Description  Interventions:  - Monitor patient closely, per order   - Utilize least restrictive measures   - Set reasonable limits, give positive feedback for acceptable   - Administer medications as ordered and monitor of potential side effects  Outcome: Progressing  Goal: Agree to be compliant with medication regime, as prescribed and report medication side effects  Description  Interventions:  - Offer appropriate PRN medication and supervise ingestion; conduct AIMS, as needed   Outcome: Progressing  Goal: Attend and participate in unit activities, including therapeutic, recreational, and educational groups  Description  Interventions:  -Encourage Visitation and family involvement in care    CERTIFIED PEER SPECIALIST INTERVENTIONS:    Complete peer assessment with patient to assess their needs and identify their goals to complete while in the recovery program as well as once discharged into the community       Patient will complete WRAP Plan, Crisis Plan and 5 Life Domains  Patient will attend 50% of groups offered on the unit  Outcome: Progressing  Goal: Recognize dysfunctional thoughts, communicate reality-based thoughts at the time of discharge  Description  Interventions:  - Provide medication and psycho-education to assist patient in compliance and developing insight into his/her illness   Outcome: Progressing  Goal: Complete daily ADLs, including personal hygiene independently, as able  Description  Interventions:  - Observe, teach, and assist patient with ADLS  - Monitor and promote a balance of rest/activity, with adequate nutrition and elimination   Outcome: Progressing  Goal: Attend and participate in unit activities, including therapeutic, recreational, and educational groups  Description  Interventions:  -Encourage Visitation and family involvement in care  Outcome: Progressing     Problem: Ineffective Coping  Goal: Identifies ineffective coping skills  Outcome: Progressing  Goal: Identifies healthy coping skills  Outcome: Progressing  Goal: Demonstrates healthy coping skills  Outcome: Progressing  Goal: Patient/Family participate in treatment and DC plans  Description  Interventions:  - Provide therapeutic environment  Outcome: Progressing  Goal: Patient/Family verbalizes awareness of resources  Outcome: Progressing  Goal: Understands least restrictive measures  Description  Interventions:  - Utilize least restrictive behavior  Outcome: Progressing   Isolative to his room between meals,dismissive and didn't wanted to engage in conversation  Refused morning medication ,stated" I don't need medication"  Declined groups despite encouragement  Disheveled,hygiene remains poor  Will continue to monitor closely and encourage compliance

## 2020-07-09 NOTE — PROGRESS NOTES
Progress Note - Behavioral Health     Teodoro Chang 48 y o  male MRN: [id number]   [facility] Encounter: [id number]    Behavior over the last 24 hours:      France Noland was dismissive and did not want to engage in conversation during today's assessment  He was seen in his room because he was tired  He continues to intermittently refuse his morning Adderall and Levothyroxine despite several administration time changes  He says these medications "make me tired"  He remains disheveled and his hygiene is poor  He is easily agitated when boundaries are enforced and remains discharge-focused  His insight and judgement are poor  ROS: no complaints, all other systems are negative    Mental Status Evaluation:    Appearance:  disheveled, marginal hygiene, dressed inappropropriately in several layers   Behavior:  angry, demanding, dismissive   Speech:  normal rate and volume   Mood:  irritable   Affect:  flat   Thought Process:  logical, coherent   Associations: intact associations   Thought Content:  normal, no overt delusions   Perceptual Disturbances: none   Risk Potential: Suicidal ideation - None  Homicidal ideation - None  Potential for aggression - No   Sensorium:  oriented to person, place and time/date   Memory:  recent and remote memory grossly intact   Consciousness:  alert and awake   Attention: poor concentration and poor attention span   Insight:  limited   Judgment: limited   Gait/Station: normal gait/station, normal balance   Motor Activity: no abnormal movements     Vital signs in last 24 hours:    Temp:  [97 5 °F (36 4 °C)] 97 5 °F (36 4 °C)  HR:  [84-89] 89  Resp:  [18] 18  BP: (122-142)/(70-79) 142/70    Laboratory results:  I have personally reviewed all pertinent laboratory/tests results      Progress Toward Goals: poor insight, poor motivation    Assessment/Plan   Principal Problem:    Schizoaffective disorder, bipolar type (HCC)  Active Problems:    Type 2 diabetes mellitus without complication, without long-term current use of insulin (HCC)    Benign essential hypertension    Gastroesophageal reflux disease without esophagitis    Acquired hypothyroidism    Tobacco abuse    Diabetes insipidus, nephrogenic ([address] )    ADHD    Recommended Treatment:     Continue current medications as prescribed  Continue to monitor  Discharge disposition and planning are ongoing      All current active medications have been reviewed  Encourage group therapy, milieu therapy and occupational therapy  Behavioral Health checks every 7 minutes      Current Facility-Administered Medications:  acetaminophen 325 mg Oral Q6H PRN Michelle Peace MD   acetaminophen 650 mg Oral Q6H PRN Michelle Peace MD   acetaminophen 975 mg Oral Q8H PRN Michelle Peace MD   aluminum-magnesium hydroxide-simethicone 30 mL Oral Q4H PRN Michelle Peace MD   amphetamine-dextroamphetamine 15 mg Oral Daily Michelle Peace MD   atorvastatin 10 mg Oral Daily With Forest Reyes MD   benztropine 1 mg Intramuscular Q6H PRN Michelle Peace MD   benztropine 1 mg Oral Q6H PRN Michelle Peace MD   cloZAPine 200 mg Oral Daily Michelle Peace MD   divalproex sodium 1,500 mg Oral HS Michelle Peace MD   docusate sodium 100 mg Oral BID Michelle Peace MD   haloperidol 5 mg Oral Q6H PRN Michelle Peace MD   haloperidol lactate 5 mg Intramuscular Q6H PRN Michelle Peace MD   levothyroxine 75 mcg Oral Early Morning Michelle Peace MD   LORazepam 1 mg Intramuscular Q6H PRN Michelle Peace MD   LORazepam 1 mg Oral Q6H PRN Michelle Peace MD   magnesium hydroxide 30 mL Oral Daily PRN Michelle Peace MD   metFORMIN 500 mg Oral BID With Meals Michelle Peace MD   nicotine polacrilex 2 mg Oral Q2H PRN Michelle Peace MD   OLANZapine 5 mg Oral HS JOVITA Hodge   oxybutynin 5 mg Oral Daily Michelle Peace MD   pantoprazole 40 mg Oral Early Morning Michelle Peace MD   traZODone 50 mg Oral HS PRN Michelle Peace MD       Risks / Benefits of Treatment:    Risks, benefits, and possible side effects of medications explained to patient and patient verbalizes understanding and agreement for treatment  Counseling / Coordination of Care:      Patient's progress discussed with staff in treatment team meeting  Medications, treatment progress and treatment plan reviewed with patient

## 2020-07-10 PROCEDURE — 99232 SBSQ HOSP IP/OBS MODERATE 35: CPT | Performed by: NURSE PRACTITIONER

## 2020-07-10 RX ADMIN — CLOZAPINE 200 MG: 200 TABLET ORAL at 16:42

## 2020-07-10 RX ADMIN — DOCUSATE SODIUM 100 MG: 100 CAPSULE, LIQUID FILLED ORAL at 08:16

## 2020-07-10 RX ADMIN — OXYBUTYNIN CHLORIDE 5 MG: 5 TABLET, EXTENDED RELEASE ORAL at 08:16

## 2020-07-10 RX ADMIN — DOCUSATE SODIUM 100 MG: 100 CAPSULE, LIQUID FILLED ORAL at 17:11

## 2020-07-10 RX ADMIN — ATORVASTATIN CALCIUM 10 MG: 10 TABLET, FILM COATED ORAL at 16:41

## 2020-07-10 RX ADMIN — PANTOPRAZOLE SODIUM 40 MG: 40 TABLET, DELAYED RELEASE ORAL at 06:08

## 2020-07-10 RX ADMIN — DEXTROAMPHETAMINE SACCHARATE, AMPHETAMINE ASPARTATE, DEXTROAMPHETAMINE SULFATE AND AMPHETAMINE SULFATE 15 MG: 2.5; 2.5; 2.5; 2.5 TABLET ORAL at 06:08

## 2020-07-10 RX ADMIN — NICOTINE POLACRILEX 2 MG: 2 GUM, CHEWING ORAL at 12:03

## 2020-07-10 RX ADMIN — OLANZAPINE 5 MG: 5 TABLET, FILM COATED ORAL at 20:48

## 2020-07-10 RX ADMIN — METFORMIN HYDROCHLORIDE 500 MG: 500 TABLET ORAL at 08:16

## 2020-07-10 RX ADMIN — METFORMIN HYDROCHLORIDE 500 MG: 500 TABLET ORAL at 16:42

## 2020-07-10 RX ADMIN — LEVOTHYROXINE SODIUM 75 MCG: 75 TABLET ORAL at 06:08

## 2020-07-10 RX ADMIN — DIVALPROEX SODIUM 1500 MG: 500 TABLET, EXTENDED RELEASE ORAL at 20:47

## 2020-07-10 NOTE — PLAN OF CARE
Problem: Alteration in Thoughts and Perception  Goal: Verbalize thoughts and feelings  Description  Interventions:  - Promote a nonjudgmental and trusting relationship with the patient through active listening and therapeutic communication  - Assess patient's level of functioning, behavior and potential for risk  - Engage patient in 1 on 1 interactions  - Encourage patient to express fears, feelings, frustrations, and discuss symptoms    - [place] patient to reality, help patient recognize reality-based thinking   - Administer medications as ordered and assess for potential side effects  - Provide the patient education related to the signs and symptoms of the illness and desired effects of prescribed medications  Outcome: Progressing  Goal: Refrain from acting on delusional thinking/internal stimuli  Description  Interventions:  - Monitor patient closely, per order   - Utilize least restrictive measures   - Set reasonable limits, give positive feedback for acceptable   - Administer medications as ordered and monitor of potential side effects  Outcome: Progressing  Goal: Agree to be compliant with medication regime, as prescribed and report medication side effects  Description  Interventions:  - Offer appropriate PRN medication and supervise ingestion; conduct AIMS, as needed   Outcome: Progressing  Goal: Attend and participate in unit activities, including therapeutic, recreational, and educational groups  Description  Interventions:  -Encourage Visitation and family involvement in care    CERTIFIED PEER SPECIALIST INTERVENTIONS:    Complete peer assessment with patient to assess their needs and identify their goals to complete while in the recovery program as well as once discharged into the community  Patient will complete WRAP Plan, Crisis Plan and 5 Life Domains  Patient will attend 50% of groups offered on the unit        Outcome: Not Progressing     Problem: Ineffective Coping  Goal: Demonstrates healthy coping skills  Outcome: Progressing  Goal: Understands least restrictive measures  Description  Interventions:  - Utilize least restrictive behavior  Outcome: Progressing     Problem: GASTROINTESTINAL - ADULT  Goal: Maintains adequate nutritional intake  Description  INTERVENTIONS:  - Monitor percentage of each meal consumed  - Identify factors contributing to decreased intake, treat as appropriate  - Assist with meals as needed  - Monitor I&O, weight, and lab values if indicated  - Obtain nutrition services referral as needed  Outcome: Yanni Ng has been awake, alert, and visible intermittently out in the milieu  Ate 100% supper  Disheveled in appearance and dressed in layers  Pt listens to music on radio in [employer] with peers and rests in room at intervals  No behavioral issues  Less preoccupied with discharge  Denies any depression or anxiety and has not verbalized any delusions  Pt did not attend evening group but came out for snack after  Compliant with all scheduled meds when called and cooperative with mouth checks  No behavioral issues  Continue to monitor/assess for any changes
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
## 2020-07-10 NOTE — PROGRESS NOTES
Progress Note - Behavioral Health     Felix Melgar 48 y o  male MRN: [id number]   Unit/Bed#: [facility] 105-01 Encounter: [id number]    Behavior over the last 24 hours:      Levi Bae remains irritable, dismissive, and discharge-focused  His hygiene is poor  He took his medications this morning but states he will not take them anymore unless discharged  Behavioral expectations for discharge readiness were reviewed  He has been more visible in the milieu and has had no physically or verbally aggressive behaviors although he continues to be easily agitated  He slept through the night without difficulty  ROS: no complaints, all other systems are negative    Mental Status Evaluation:    Appearance:  disheveled, marginal hygiene   Behavior:  demanding, dismissive   Speech:  normal rate and volume   Mood:  labile, irritable   Affect:  reactive   Thought Process:  perseverative, concrete   Associations: intact associations   Thought Content:  no overt delusions   Perceptual Disturbances: none   Risk Potential: Suicidal ideation - None  Homicidal ideation - None  Potential for aggression - No   Sensorium:  oriented to person, place and time/date   Memory:  recent and remote memory grossly intact   Consciousness:  alert and awake   Attention: poor concentration and poor attention span   Insight:  poor   Judgment: poor   Gait/Station: normal gait/station, normal balance   Motor Activity: no abnormal movements     Vital signs in last 24 hours:    Temp:  [97 5 °F (36 4 °C)] 97 5 °F (36 4 °C)  HR:  [85-94] 94  Resp:  [19] 19  BP: (137-138)/(68-72) 137/68    Laboratory results:  I have personally reviewed all pertinent laboratory/tests results      Progress Toward Goals: poor insight, poor motivation    Assessment/Plan   Principal Problem:    Schizoaffective disorder, bipolar type (HCC)  Active Problems:    Type 2 diabetes mellitus without complication, without long-term current use of insulin (HCC)    Benign essential hypertension    Gastroesophageal reflux disease without esophagitis    Acquired hypothyroidism    Tobacco abuse    Diabetes insipidus, nephrogenic ([address] )    ADHD    Recommended Treatment:     Continue current medications as prescribed  Continue to monitor  Discharge disposition and planning are ongoing      All current active medications have been reviewed  Encourage group therapy, milieu therapy and occupational therapy  Behavioral Health checks every 7 minutes      Current Facility-Administered Medications:  acetaminophen 325 mg Oral Q6H PRN Don Frey MD   acetaminophen 650 mg Oral Q6H PRN Don Frey MD   acetaminophen 975 mg Oral Q8H PRN Don Frye MD   aluminum-magnesium hydroxide-simethicone 30 mL Oral Q4H PRN Don Frey MD   amphetamine-dextroamphetamine 15 mg Oral Daily Don Frey MD   atorvastatin 10 mg Oral Daily With Cherylyn Goodpasture, MD   benztropine 1 mg Intramuscular Q6H PRN Don Frey MD   benztropine 1 mg Oral Q6H PRN Don Frey MD   cloZAPine 200 mg Oral Daily Don Frey MD   divalproex sodium 1,500 mg Oral HS Don Frey MD   docusate sodium 100 mg Oral BID Don Frey MD   haloperidol 5 mg Oral Q6H PRN Don Frey MD   haloperidol lactate 5 mg Intramuscular Q6H PRN Don Frey MD   levothyroxine 75 mcg Oral Early Morning Don Frey MD   LORazepam 1 mg Intramuscular Q6H PRN Don Frey MD   LORazepam 1 mg Oral Q6H PRN Don Frey MD   magnesium hydroxide 30 mL Oral Daily PRN Don Frey MD   metFORMIN 500 mg Oral BID With Meals Don Frey MD   nicotine polacrilex 2 mg Oral Q2H PRN Don Frey MD   OLANZapine 5 mg Oral HS JOVITA Hodge   oxybutynin 5 mg Oral Daily Don Frey MD   pantoprazole 40 mg Oral Early Morning Don Frey MD   traZODone 50 mg Oral HS PRN Don Frey MD       Risks / Benefits of Treatment:    Risks, benefits, and possible side effects of medications explained to patient and patient verbalizes understanding and agreement for treatment  Counseling / Coordination of Care:      Patient's progress discussed with staff in treatment team meeting  Medications, treatment progress and treatment plan reviewed with patient

## 2020-07-10 NOTE — SOCIAL WORK
Several emails sent back and fourth with pt's sister Juan F Campos regarding his upcoming birthday  She is considering getting him a tablet but is concerned he won't be able to use it  ISRA explained that staff can assist him initially with using it  She asked if we have electronics he can use on the unit and was informed that we have an IPad that we can use for occasional video conferencing but that it has to be schedule and planned due to confidentiality and that none of our devices are given out freely for patient's to use  ISRA also sent email with updated treatment team schedule and call in information  Received call from Madelaine Boeck at [facility]; she informed me to reach out to the [facility] to find out pt's referral status  ISRA immediately sent an email to Waddy and Patton at [facility] [place]  Response received from Mariel asking if he ever received the [facility] referral   ISRA responded informing him that according to documentation ISRA RICHARDS  Sent it to him and Olena on May 1, 2020  ISRA RICHARDS  cc'd on the email

## 2020-07-10 NOTE — PLAN OF CARE
Problem: Alteration in Thoughts and Perception  Goal: Verbalize thoughts and feelings  Description  Interventions:  - Promote a nonjudgmental and trusting relationship with the patient through active listening and therapeutic communication  - Assess patient's level of functioning, behavior and potential for risk  - Engage patient in 1 on 1 interactions  - Encourage patient to express fears, feelings, frustrations, and discuss symptoms    - [place] patient to reality, help patient recognize reality-based thinking   - Administer medications as ordered and assess for potential side effects  - Provide the patient education related to the signs and symptoms of the illness and desired effects of prescribed medications  Outcome: Progressing  Goal: Agree to be compliant with medication regime, as prescribed and report medication side effects  Description  Interventions:  - Offer appropriate PRN medication and supervise ingestion; conduct AIMS, as needed   Outcome: Progressing  Goal: Attend and participate in unit activities, including therapeutic, recreational, and educational groups  Description  Interventions:  -Encourage Visitation and family involvement in care    CERTIFIED PEER SPECIALIST INTERVENTIONS:    Complete peer assessment with patient to assess their needs and identify their goals to complete while in the recovery program as well as once discharged into the community  Patient will complete WRAP Plan, Crisis Plan and 5 Life Domains  Patient will attend 50% of groups offered on the unit  Outcome: Radha Pineda has been visible on the unit  Med and meal compliant  Remains disheveled with multiple layers of clothes on  Makes needs known to staff as needed, attended select groups  Somewhat preoccupied with discharge planning, this writer encouraged Margy Whalen to continue being compliant with the treatment program  Behaviors controlled  Will continue to monitor for changes

## 2020-07-11 RX ADMIN — METFORMIN HYDROCHLORIDE 500 MG: 500 TABLET ORAL at 16:42

## 2020-07-11 RX ADMIN — CLOZAPINE 200 MG: 200 TABLET ORAL at 16:42

## 2020-07-11 RX ADMIN — PANTOPRAZOLE SODIUM 40 MG: 40 TABLET, DELAYED RELEASE ORAL at 05:44

## 2020-07-11 RX ADMIN — OXYBUTYNIN CHLORIDE 5 MG: 5 TABLET, EXTENDED RELEASE ORAL at 08:24

## 2020-07-11 RX ADMIN — DOCUSATE SODIUM 100 MG: 100 CAPSULE, LIQUID FILLED ORAL at 08:24

## 2020-07-11 RX ADMIN — ATORVASTATIN CALCIUM 10 MG: 10 TABLET, FILM COATED ORAL at 16:42

## 2020-07-11 RX ADMIN — DOCUSATE SODIUM 100 MG: 100 CAPSULE, LIQUID FILLED ORAL at 17:15

## 2020-07-11 RX ADMIN — OLANZAPINE 5 MG: 5 TABLET, FILM COATED ORAL at 20:58

## 2020-07-11 RX ADMIN — DIVALPROEX SODIUM 1500 MG: 500 TABLET, EXTENDED RELEASE ORAL at 20:58

## 2020-07-11 RX ADMIN — METFORMIN HYDROCHLORIDE 500 MG: 500 TABLET ORAL at 08:24

## 2020-07-11 RX ADMIN — LEVOTHYROXINE SODIUM 75 MCG: 75 TABLET ORAL at 05:44

## 2020-07-11 RX ADMIN — DEXTROAMPHETAMINE SACCHARATE, AMPHETAMINE ASPARTATE, DEXTROAMPHETAMINE SULFATE AND AMPHETAMINE SULFATE 15 MG: 2.5; 2.5; 2.5; 2.5 TABLET ORAL at 05:44

## 2020-07-11 NOTE — PLAN OF CARE
Problem: Alteration in Thoughts and Perception  Goal: Treatment Goal: Gain control of psychotic behaviors/thinking, reduce/eliminate presenting symptoms and demonstrate improved reality functioning upon discharge  Outcome: Progressing  Goal: Verbalize thoughts and feelings  Description  Interventions:  - Promote a nonjudgmental and trusting relationship with the patient through active listening and therapeutic communication  - Assess patient's level of functioning, behavior and potential for risk  - Engage patient in 1 on 1 interactions  - Encourage patient to express fears, feelings, frustrations, and discuss symptoms    - [place] patient to reality, help patient recognize reality-based thinking   - Administer medications as ordered and assess for potential side effects  - Provide the patient education related to the signs and symptoms of the illness and desired effects of prescribed medications  Outcome: Progressing  Goal: Refrain from acting on delusional thinking/internal stimuli  Description  Interventions:  - Monitor patient closely, per order   - Utilize least restrictive measures   - Set reasonable limits, give positive feedback for acceptable   - Administer medications as ordered and monitor of potential side effects  Outcome: Progressing  Goal: Agree to be compliant with medication regime, as prescribed and report medication side effects  Description  Interventions:  - Offer appropriate PRN medication and supervise ingestion; conduct AIMS, as needed   Outcome: Progressing  Goal: Attend and participate in unit activities, including therapeutic, recreational, and educational groups  Description  Interventions:  -Encourage Visitation and family involvement in care    CERTIFIED PEER SPECIALIST INTERVENTIONS:    Complete peer assessment with patient to assess their needs and identify their goals to complete while in the recovery program as well as once discharged into the community       Patient will complete WRAP Plan, Crisis Plan and 5 Life Domains  Patient will attend 50% of groups offered on the unit        Outcome: Progressing  Goal: Recognize dysfunctional thoughts, communicate reality-based thoughts at the time of discharge  Description  Interventions:  - Provide medication and psycho-education to assist patient in compliance and developing insight into his/her illness   Outcome: Progressing  Goal: Complete daily ADLs, including personal hygiene independently, as able  Description  Interventions:  - Observe, teach, and assist patient with ADLS  - Monitor and promote a balance of rest/activity, with adequate nutrition and elimination   Outcome: Progressing  Goal: Attend and participate in unit activities, including therapeutic, recreational, and educational groups  Description  Interventions:  -Encourage Visitation and family involvement in care  Outcome: Progressing     Problem: Ineffective Coping  Goal: Identifies ineffective coping skills  Outcome: Progressing  Goal: Identifies healthy coping skills  Outcome: Progressing  Goal: Demonstrates healthy coping skills  Outcome: Progressing  Goal: Patient/Family participate in treatment and DC plans  Description  Interventions:  - Provide therapeutic environment  Outcome: Progressing  Goal: Patient/Family verbalizes awareness of resources  Outcome: Progressing  Goal: Understands least restrictive measures  Description  Interventions:  - Utilize least restrictive behavior  Outcome: Progressing     Problem: RESPIRATORY - ADULT  Goal: Achieves optimal ventilation and oxygenation  Description  INTERVENTIONS:  - Assess for changes in respiratory status  - Assess for changes in mentation and behavior  - Position to facilitate oxygenation and minimize respiratory effort  - Oxygen administered by appropriate delivery if ordered  - Initiate smoking cessation education as indicated  - Encourage broncho-pulmonary hygiene including cough, deep breathe, Incentive Spirometry  - Assess the need for suctioning and aspirate as needed  - Assess and instruct to report SOB or any respiratory difficulty  - Respiratory Therapy support as indicated  Outcome: Progressing     Problem: GASTROINTESTINAL - ADULT  Goal: Minimal or absence of nausea and/or vomiting  Description  INTERVENTIONS:  - Administer IV fluids if ordered to ensure adequate hydration  - Maintain NPO status until nausea and vomiting are resolved  - Nasogastric tube if ordered  - Administer ordered antiemetic medications as needed  - Provide nonpharmacologic comfort measures as appropriate  - Advance diet as tolerated, if ordered  - Consider nutrition services referral to assist patient with adequate nutrition and appropriate food choices  Outcome: Progressing  Goal: Maintains or returns to baseline bowel function  Description  INTERVENTIONS:  - Assess bowel function  - Encourage oral fluids to ensure adequate hydration  - Administer IV fluids if ordered to ensure adequate hydration  - Administer ordered medications as needed  - Encourage mobilization and activity  - Consider nutritional services referral to assist patient with adequate nutrition and appropriate food choices  Outcome: Progressing  Goal: Maintains adequate nutritional intake  Description  INTERVENTIONS:  - Monitor percentage of each meal consumed  - Identify factors contributing to decreased intake, treat as appropriate  - Assist with meals as needed  - Monitor I&O, weight, and lab values if indicated  - Obtain nutrition services referral as needed  Outcome: Progressing     Problem: METABOLIC, FLUID AND ELECTROLYTES - ADULT  Goal: Glucose maintained within target range  Description  INTERVENTIONS:  - Monitor Blood Glucose as ordered  - Assess for signs and symptoms of hyperglycemia and hypoglycemia  - Administer ordered medications to maintain glucose within target range  - Assess nutritional intake and initiate nutrition service referral as needed  Outcome: Progressing     Problem: DISCHARGE PLANNING  Goal: Discharge to home or other facility with appropriate resources  Description    CASE MANAGEMENT INTERVENTIONS:  - Conduct assessment to determine patient/family and health care team treatment goals, and need for post-acute services based on payer coverage, community resources, patient preferences and barriers to discharge  - Address psychosocial, clinical, and financial barriers to discharge as identified in assessment in conjunction with the patient/family and health care team   - Assist the patient in reintegration back into the community by removing barriers which may hinder a successful discharge once deemed stable  - Arrange appropriate level of post-acute services according to patient's needs and preference and payer coverage in collaboration with the physician and health care team   - Communicate with and update the patient/family, physician, and health care team regarding progress on the discharge plan  - Arrange appropriate transportation to post-acute venues  Outcome: Progressing    Quiet, cooperative, visible on unit  Med and meal compliant  Ate 100% of both meals  Denies depression, anxiety, SI, HI and pain  Patient attended art therapy  Preoccupied with discharge  Redirectable wihout issue  He is maintained on patient safety precautions and mouth checks w/o incident    Will continue to monitor progress in recovery program

## 2020-07-11 NOTE — PLAN OF CARE
Problem: Alteration in Thoughts and Perception  Goal: Verbalize thoughts and feelings  Description  Interventions:  - Promote a nonjudgmental and trusting relationship with the patient through active listening and therapeutic communication  - Assess patient's level of functioning, behavior and potential for risk  - Engage patient in 1 on 1 interactions  - Encourage patient to express fears, feelings, frustrations, and discuss symptoms    - [place] patient to reality, help patient recognize reality-based thinking   - Administer medications as ordered and assess for potential side effects  - Provide the patient education related to the signs and symptoms of the illness and desired effects of prescribed medications  Outcome: Progressing  Goal: Agree to be compliant with medication regime, as prescribed and report medication side effects  Description  Interventions:  - Offer appropriate PRN medication and supervise ingestion; conduct AIMS, as needed   Outcome: Progressing  Goal: Complete daily ADLs, including personal hygiene independently, as able  Description  Interventions:  - Observe, teach, and assist patient with ADLS  - Monitor and promote a balance of rest/activity, with adequate nutrition and elimination   Outcome: Progressing     Problem: Alteration in Thoughts and Perception  Goal: Attend and participate in unit activities, including therapeutic, recreational, and educational groups  Description  Interventions:  -Encourage Visitation and family involvement in care    CERTIFIED PEER SPECIALIST INTERVENTIONS:    Complete peer assessment with patient to assess their needs and identify their goals to complete while in the recovery program as well as once discharged into the community  Patient will complete WRAP Plan, Crisis Plan and 5 Life Domains  Patient will attend 50% of groups offered on the unit        Outcome: Not Progressing    Visible, compliant with routine medications, gait steady, ate 100% of dinner, listened to music with peers, behavior controlled, did not attend evening groups, will continue to monitor

## 2020-07-11 NOTE — PROGRESS NOTES
Psychiatry Progress Note    Subjective: Interval History     Pt isolative to his room  Poor hygiene  Continues to wear multiple layers of clothing  Continues to be focused on discharge; no insight into his admission  Continues to then approach author to ask on multiple occasions about his discharge and about making contact with top management doctors about discharging him  No self injurious behaviors or aggressive behaviors  Medication compliant      Behavior over the last 24 hours:  unchanged  Sleep: normal  Appetite: normal  Medication side effects: No  ROS: no complaints    Current medications:    Current Facility-Administered Medications:     acetaminophen (TYLENOL) tablet 325 mg, 325 mg, Oral, Q6H PRN, Guera Bonilla MD, 325 mg at 06/23/20 1049    acetaminophen (TYLENOL) tablet 650 mg, 650 mg, Oral, Q6H PRN, Guera Bonilla MD, 650 mg at 06/21/20 0735    acetaminophen (TYLENOL) tablet 975 mg, 975 mg, Oral, Q8H PRN, Guera Bonilla MD, 975 mg at 03/29/20 1759    aluminum-magnesium hydroxide-simethicone (MYLANTA) 200-200-20 mg/5 mL oral suspension 30 mL, 30 mL, Oral, Q4H PRN, Guera Bonilla MD, 30 mL at 06/11/20 0858    amphetamine-dextroamphetamine (ADDERALL) tablet 15 mg, 15 mg, Oral, Daily, Guera Bonilla MD, 15 mg at 07/11/20 0544    atorvastatin (LIPITOR) tablet 10 mg, 10 mg, Oral, Daily With Vibha Austin MD, 10 mg at 07/10/20 1641    benztropine (COGENTIN) injection 1 mg, 1 mg, Intramuscular, Q6H PRN, Guera Bonilla MD    benztropine (COGENTIN) tablet 1 mg, 1 mg, Oral, Q6H PRN, Guera Bonilla MD, 1 mg at 04/07/20 2031    clozapine (CLOZARIL) tablet 200 mg, 200 mg, Oral, Daily, Guera Bonilla MD, 200 mg at 07/10/20 1642    divalproex sodium (DEPAKOTE ER) 24 hr tablet 1,500 mg, 1,500 mg, Oral, HS, Guera Bonilla MD, 1,500 mg at 07/10/20 2047    docusate sodium (COLACE) capsule 100 mg, 100 mg, Oral, BID, Guera Bonilla MD, 100 mg at 07/11/20 0824    haloperidol (HALDOL) tablet 5 mg, 5 mg, Oral, Q6H PRN, Lazarus Derry Cipriano Jimenez MD, 5 mg at 05/05/20 1648    haloperidol lactate (HALDOL) injection 5 mg, 5 mg, Intramuscular, Q6H PRN, Genaro Glover MD    levothyroxine tablet 75 mcg, 75 mcg, Oral, Early Morning, Genaro Glover MD, 75 mcg at 07/11/20 0544    LORazepam (ATIVAN) 2 mg/mL injection 1 mg, 1 mg, Intramuscular, Q6H PRN, Genaro Glover MD    LORazepam (ATIVAN) tablet 1 mg, 1 mg, Oral, Q6H PRN, Genaro Glover MD    magnesium hydroxide (MILK OF MAGNESIA) 400 mg/5 mL oral suspension 30 mL, 30 mL, Oral, Daily PRN, Genaro Glover MD, 30 mL at 03/14/20 2032    metFORMIN (GLUCOPHAGE) tablet 500 mg, 500 mg, Oral, BID With Meals, Genaro Glover MD, 500 mg at 07/11/20 0824    nicotine polacrilex (NICORETTE) gum 2 mg, 2 mg, Oral, Q2H PRN, Genaro Glover MD, 2 mg at 07/10/20 1203    OLANZapine (ZyPREXA) tablet 5 mg, 5 mg, Oral, HS, Nivia Salas, CRNP, 5 mg at 07/10/20 2048    oxybutynin (DITROPAN-XL) 24 hr tablet 5 mg, 5 mg, Oral, Daily, Genaro Glover MD, 5 mg at 07/11/20 0824    pantoprazole (PROTONIX) EC tablet 40 mg, 40 mg, Oral, Early Morning, Genaro Glover MD, 40 mg at 07/11/20 0544    traZODone (DESYREL) tablet 50 mg, 50 mg, Oral, HS PRN, Genaro Glover MD, 50 mg at 07/02/20 2208    Current Problem List:    Patient Active Problem List   Diagnosis    [facility]    Constipation, chronic    Type 2 diabetes mellitus without complication, without long-term current use of insulin (HCC)    Chronic airway obstruction, not elsewhere classified    Benign essential hypertension    Disorder of lipoprotein and lipid metabolism    Foot callus    Gastroesophageal reflux disease without esophagitis    Acquired hypothyroidism    Morbid obesity ([facility] )    BMI 34 0-34 9,adult    Nail abnormality    Encounter for well adult exam with abnormal findings    Tobacco abuse    Diabetes insipidus, nephrogenic ([facility] )    ADHD       Problem list reviewed 07/11/20     Objective:     Vital Signs:  Vitals:    07/09/20 0705 07/10/20 0700 07/10/20 0705 07/11/20 0732   BP: 142/70 138/72 137/68 122/63   BP Location: Right arm Left arm Left arm Right arm   Pulse: 89 85 94 88   Resp: 18 19 19 18   Temp:  97 5 °F (36 4 °C)  97 8 °F (36 6 °C)   TempSrc:  Temporal     SpO2:       Weight:       Height:             Appearance:  age appropriate, casually dressed and disheveled   Behavior:  evasive   Speech:  soft   Mood:  constricted   Affect:  flat   Thought Process:  perserverative   Thought Content:  normal   Perceptual Disturbances: None   Risk Potential: none   Sensorium:  person, place and time   Cognition:  intact   Consciousness:  alert and awake    Attention: attention span and concentration were age appropriate   Intellect: average   Insight:  limited   Judgment: limited      Motor Activity: no abnormal movements       I/O Past 24 hours:  No intake/output data recorded  No intake/output data recorded  Labs:  Reviewed 07/11/20    Progress Toward Goals:  Unchanged    Assessment / Plan:     Schizoaffective disorder, bipolar type ([facility] )    Recommended Treatment:      Medication changes:  1) continue current treatment plan    Non-pharmacological treatments  1) Continue with group therapy, milieu therapy and occupational therapy  Safety  1) Safety/communication plan established targeting dynamic risk factors above  2) Risks, benefits, and possible side effects of medications explained to patient and patient verbalizes understanding  Counseling / Coordination of Care    Total floor / unit time spent today 20 minutes  Greater than 50% of total time was spent with the patient and / or family counseling and / or coordination of care  A description of the counseling / coordination of care  Patient's Rights, confidentiality and exceptions to confidentiality, use of automated medical record, [facility] kev staff access to medical record, and consent to treatment reviewed      Lenka Griffin PA-C

## 2020-07-12 RX ADMIN — LEVOTHYROXINE SODIUM 75 MCG: 75 TABLET ORAL at 06:00

## 2020-07-12 RX ADMIN — OXYBUTYNIN CHLORIDE 5 MG: 5 TABLET, EXTENDED RELEASE ORAL at 08:13

## 2020-07-12 RX ADMIN — DIVALPROEX SODIUM 1500 MG: 500 TABLET, EXTENDED RELEASE ORAL at 20:55

## 2020-07-12 RX ADMIN — ATORVASTATIN CALCIUM 10 MG: 10 TABLET, FILM COATED ORAL at 16:57

## 2020-07-12 RX ADMIN — PANTOPRAZOLE SODIUM 40 MG: 40 TABLET, DELAYED RELEASE ORAL at 06:00

## 2020-07-12 RX ADMIN — METFORMIN HYDROCHLORIDE 500 MG: 500 TABLET ORAL at 08:13

## 2020-07-12 RX ADMIN — METFORMIN HYDROCHLORIDE 500 MG: 500 TABLET ORAL at 16:57

## 2020-07-12 RX ADMIN — DOCUSATE SODIUM 100 MG: 100 CAPSULE, LIQUID FILLED ORAL at 17:18

## 2020-07-12 RX ADMIN — CLOZAPINE 200 MG: 200 TABLET ORAL at 16:57

## 2020-07-12 RX ADMIN — OLANZAPINE 5 MG: 5 TABLET, FILM COATED ORAL at 20:55

## 2020-07-12 RX ADMIN — DOCUSATE SODIUM 100 MG: 100 CAPSULE, LIQUID FILLED ORAL at 08:13

## 2020-07-12 RX ADMIN — NICOTINE POLACRILEX 2 MG: 2 GUM, CHEWING ORAL at 13:54

## 2020-07-12 RX ADMIN — DEXTROAMPHETAMINE SACCHARATE, AMPHETAMINE ASPARTATE, DEXTROAMPHETAMINE SULFATE AND AMPHETAMINE SULFATE 15 MG: 2.5; 2.5; 2.5; 2.5 TABLET ORAL at 06:00

## 2020-07-12 NOTE — PROGRESS NOTES
07/11/20 1300   Activity/Group Checklist   Group Other (Comment)  (OPEN STUDIO/Art Therapy, Social Interaction-Free Expression)   Attendance Attended   Attendance Duration (min) 46-60   Interactions Interacted appropriately   Affect/Mood Appropriate   Goals Achieved Able to listen to others; Able to engage in interactions

## 2020-07-12 NOTE — PLAN OF CARE
Problem: Alteration in Thoughts and Perception  Goal: Verbalize thoughts and feelings  Description  Interventions:  - Promote a nonjudgmental and trusting relationship with the patient through active listening and therapeutic communication  - Assess patient's level of functioning, behavior and potential for risk  - Engage patient in 1 on 1 interactions  - Encourage patient to express fears, feelings, frustrations, and discuss symptoms    - [place] patient to reality, help patient recognize reality-based thinking   - Administer medications as ordered and assess for potential side effects  - Provide the patient education related to the signs and symptoms of the illness and desired effects of prescribed medications  Outcome: Progressing  Goal: Refrain from acting on delusional thinking/internal stimuli  Description  Interventions:  - Monitor patient closely, per order   - Utilize least restrictive measures   - Set reasonable limits, give positive feedback for acceptable   - Administer medications as ordered and monitor of potential side effects  Outcome: Progressing  Goal: Agree to be compliant with medication regime, as prescribed and report medication side effects  Description  Interventions:  - Offer appropriate PRN medication and supervise ingestion; conduct AIMS, as needed   Outcome: Progressing  Goal: Attend and participate in unit activities, including therapeutic, recreational, and educational groups  Description  Interventions:  -Encourage Visitation and family involvement in care    CERTIFIED PEER SPECIALIST INTERVENTIONS:    Complete peer assessment with patient to assess their needs and identify their goals to complete while in the recovery program as well as once discharged into the community  Patient will complete WRAP Plan, Crisis Plan and 5 Life Domains  Patient will attend 50% of groups offered on the unit        Outcome: Not Progressing  Goal: Complete daily ADLs, including personal hygiene independently, as able  Description  Interventions:  - Observe, teach, and assist patient with ADLS  - Monitor and promote a balance of rest/activity, with adequate nutrition and elimination   Outcome: Progressing     Problem: Ineffective Coping  Goal: Demonstrates healthy coping skills  Outcome: Progressing  Goal: Understands least restrictive measures  Description  Interventions:  - Utilize least restrictive behavior  Outcome: Progressing     Problem: GASTROINTESTINAL - ADULT  Goal: Maintains adequate nutritional intake  Description  INTERVENTIONS:  - Monitor percentage of each meal consumed  - Identify factors contributing to decreased intake, treat as appropriate  - Assist with meals as needed  - Monitor I&O, weight, and lab values if indicated  - Obtain nutrition services referral as needed  Outcome: Amada Reed has been awake, alert, and visible intermittently out in the milieu  Pt showered this shift  Ate 100% supper  Pt continues to state that he wants to be discharged  Disheveled in appearance and dressed in layers  Behavior quiet and mostly isolative to self in room  Pt did not attend evening group but came out for snack after  Compliant with all scheduled meds when called and cooperative with mouth checks  Denies any depression or anxiety and has not verbalized any delusions  No behavioral issues  Continue to monitor/assess for any changes
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
## 2020-07-12 NOTE — PLAN OF CARE
Problem: Alteration in Thoughts and Perception  Goal: Treatment Goal: Gain control of psychotic behaviors/thinking, reduce/eliminate presenting symptoms and demonstrate improved reality functioning upon discharge  Outcome: Progressing  Goal: Verbalize thoughts and feelings  Description  Interventions:  - Promote a nonjudgmental and trusting relationship with the patient through active listening and therapeutic communication  - Assess patient's level of functioning, behavior and potential for risk  - Engage patient in 1 on 1 interactions  - Encourage patient to express fears, feelings, frustrations, and discuss symptoms    - [place] patient to reality, help patient recognize reality-based thinking   - Administer medications as ordered and assess for potential side effects  - Provide the patient education related to the signs and symptoms of the illness and desired effects of prescribed medications  Outcome: Progressing  Goal: Refrain from acting on delusional thinking/internal stimuli  Description  Interventions:  - Monitor patient closely, per order   - Utilize least restrictive measures   - Set reasonable limits, give positive feedback for acceptable   - Administer medications as ordered and monitor of potential side effects  Outcome: Progressing  Goal: Agree to be compliant with medication regime, as prescribed and report medication side effects  Description  Interventions:  - Offer appropriate PRN medication and supervise ingestion; conduct AIMS, as needed   Outcome: Progressing  Goal: Attend and participate in unit activities, including therapeutic, recreational, and educational groups  Description  Interventions:  -Encourage Visitation and family involvement in care    CERTIFIED PEER SPECIALIST INTERVENTIONS:    Complete peer assessment with patient to assess their needs and identify their goals to complete while in the recovery program as well as once discharged into the community       Patient will complete WRAP Plan, Crisis Plan and 5 Life Domains  Patient will attend 50% of groups offered on the unit  Outcome: Progressing  Goal: Complete daily ADLs, including personal hygiene independently, as able  Description  Interventions:  - Observe, teach, and assist patient with ADLS  - Monitor and promote a balance of rest/activity, with adequate nutrition and elimination   Outcome: Progressing  Goal: Attend and participate in unit activities, including therapeutic, recreational, and educational groups  Description  Interventions:  -Encourage Visitation and family involvement in care  Outcome: Progressing     Problem: Alteration in Thoughts and Perception  Goal: Recognize dysfunctional thoughts, communicate reality-based thoughts at the time of discharge  Description  Interventions:  - Provide medication and psycho-education to assist patient in compliance and developing insight into his/her illness   Outcome: Not Progressing    Quiet, cooperative, visible on unit  Med and meal compliant  Ate 100% of both meals  Denies depression, anxiety, SI, HI and  pain  Patient attended fresh air and nutrition group  Behaviors controlled  He is maintained on patient safety precautions and mouth checks w/o incident   Will continue to monitor progress in recovery program

## 2020-07-13 PROCEDURE — 99232 SBSQ HOSP IP/OBS MODERATE 35: CPT | Performed by: PSYCHIATRY & NEUROLOGY

## 2020-07-13 RX ADMIN — DOCUSATE SODIUM 100 MG: 100 CAPSULE, LIQUID FILLED ORAL at 08:27

## 2020-07-13 RX ADMIN — CLOZAPINE 200 MG: 200 TABLET ORAL at 16:57

## 2020-07-13 RX ADMIN — DIVALPROEX SODIUM 1500 MG: 500 TABLET, EXTENDED RELEASE ORAL at 20:49

## 2020-07-13 RX ADMIN — ATORVASTATIN CALCIUM 10 MG: 10 TABLET, FILM COATED ORAL at 16:57

## 2020-07-13 RX ADMIN — NICOTINE POLACRILEX 2 MG: 2 GUM, CHEWING ORAL at 13:55

## 2020-07-13 RX ADMIN — NICOTINE POLACRILEX 2 MG: 2 GUM, CHEWING ORAL at 08:46

## 2020-07-13 RX ADMIN — METFORMIN HYDROCHLORIDE 500 MG: 500 TABLET ORAL at 16:57

## 2020-07-13 RX ADMIN — METFORMIN HYDROCHLORIDE 500 MG: 500 TABLET ORAL at 08:27

## 2020-07-13 RX ADMIN — OLANZAPINE 5 MG: 5 TABLET, FILM COATED ORAL at 20:49

## 2020-07-13 RX ADMIN — DEXTROAMPHETAMINE SACCHARATE, AMPHETAMINE ASPARTATE, DEXTROAMPHETAMINE SULFATE AND AMPHETAMINE SULFATE 15 MG: 2.5; 2.5; 2.5; 2.5 TABLET ORAL at 06:14

## 2020-07-13 RX ADMIN — DOCUSATE SODIUM 100 MG: 100 CAPSULE, LIQUID FILLED ORAL at 17:18

## 2020-07-13 RX ADMIN — OXYBUTYNIN CHLORIDE 5 MG: 5 TABLET, EXTENDED RELEASE ORAL at 08:27

## 2020-07-13 RX ADMIN — PANTOPRAZOLE SODIUM 40 MG: 40 TABLET, DELAYED RELEASE ORAL at 06:14

## 2020-07-13 RX ADMIN — LEVOTHYROXINE SODIUM 75 MCG: 75 TABLET ORAL at 06:14
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
## 2020-07-13 NOTE — PROGRESS NOTES
07/13/20 1100   Activity/Group Checklist   Group   ( IMR/ Self Sabotage )   Attendance Attended   Attendance Duration (min) 46-60   Interactions Interacted appropriately   Affect/Mood Appropriate;Normal range;Blunted/flat   Goals Achieved Identified feelings; Able to listen to others; Able to engage in interactions
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
## 2020-07-13 NOTE — PROGRESS NOTES
07/13/20 0900   Activity/Group Checklist   Group Community meeting   Attendance Attended   Attendance Duration (min) 16-30   Interactions Disorganized interaction   Affect/Mood Appropriate   Goals Achieved Able to listen to others; Able to engage in interactions

## 2020-07-13 NOTE — PLAN OF CARE
Problem: Alteration in Thoughts and Perception  Goal: Refrain from acting on delusional thinking/internal stimuli  Description  Interventions:  - Monitor patient closely, per order   - Utilize least restrictive measures   - Set reasonable limits, give positive feedback for acceptable   - Administer medications as ordered and monitor of potential side effects  Outcome: Progressing  Goal: Agree to be compliant with medication regime, as prescribed and report medication side effects  Description  Interventions:  - Offer appropriate PRN medication and supervise ingestion; conduct AIMS, as needed   Outcome: Progressing    Patient is pleasant, cooperative and visible on the unit  He is noted to interact well with his peers  Intrusive at times but very easily redirected  Denies SI and Hi's  Also denies having any hallucinations or delusions  Only attended community meeting and IMR group this shift  No issues or concerns noted at this time  Continue to monitor
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
## 2020-07-13 NOTE — PLAN OF CARE
Problem: Alteration in Thoughts and Perception  Goal: Attend and participate in unit activities, including therapeutic, recreational, and educational groups  Description  Interventions:  -Encourage Visitation and family involvement in care    CERTIFIED PEER SPECIALIST INTERVENTIONS:    Complete peer assessment with patient to assess their needs and identify their goals to complete while in the recovery program as well as once discharged into the community  Patient will complete WRAP Plan, Crisis Plan and 5 Life Domains  Patient will attend 50% of groups offered on the unit  Outcome: Progressing  Patient completed Goal card for the week of 7/13/2020    Goals: Spend time looking for volunteer work             Attend 2-3 groups a day              Attend Treatment Team and stay in it

## 2020-07-13 NOTE — PLAN OF CARE
Problem: Alteration in Thoughts and Perception  Goal: Verbalize thoughts and feelings  Description  Interventions:  - Promote a nonjudgmental and trusting relationship with the patient through active listening and therapeutic communication  - Assess patient's level of functioning, behavior and potential for risk  - Engage patient in 1 on 1 interactions  - Encourage patient to express fears, feelings, frustrations, and discuss symptoms    - [place] patient to reality, help patient recognize reality-based thinking   - Administer medications as ordered and assess for potential side effects  - Provide the patient education related to the signs and symptoms of the illness and desired effects of prescribed medications  Outcome: Progressing  Goal: Refrain from acting on delusional thinking/internal stimuli  Description  Interventions:  - Monitor patient closely, per order   - Utilize least restrictive measures   - Set reasonable limits, give positive feedback for acceptable   - Administer medications as ordered and monitor of potential side effects  Outcome: Progressing  Goal: Agree to be compliant with medication regime, as prescribed and report medication side effects  Description  Interventions:  - Offer appropriate PRN medication and supervise ingestion; conduct AIMS, as needed   Outcome: Progressing  Goal: Attend and participate in unit activities, including therapeutic, recreational, and educational groups  Description  Interventions:  -Encourage Visitation and family involvement in care    CERTIFIED PEER SPECIALIST INTERVENTIONS:    Complete peer assessment with patient to assess their needs and identify their goals to complete while in the recovery program as well as once discharged into the community  Patient will complete WRAP Plan, Crisis Plan and 5 Life Domains  Patient will attend 50% of groups offered on the unit        Outcome: Not Progressing     Problem: Ineffective Coping  Goal: Demonstrates healthy coping skills  Outcome: Progressing  Goal: Understands least restrictive measures  Description  Interventions:  - Utilize least restrictive behavior  Outcome: Progressing     Problem: GASTROINTESTINAL - ADULT  Goal: Maintains adequate nutritional intake  Description  INTERVENTIONS:  - Monitor percentage of each meal consumed  - Identify factors contributing to decreased intake, treat as appropriate  - Assist with meals as needed  - Monitor I&O, weight, and lab values if indicated  - Obtain nutrition services referral as needed  Outcome: Isabella Powell has been awake, alert, and visible intermittently out in the milieu  Pt walks around unit at intervals and rests in room  Ate 100% supper  Disheveled in appearance and dressed in layers  Less focused on discharged but continues to state that he wants to go home  Compliant with all scheduled meds when called  Pt did not attend evening group but came out for snack after  No behavioral issues  Denies any depression or anxiety and has not verbalized any delusions  Continue to monitor/assess for any changes

## 2020-07-13 NOTE — PROGRESS NOTES
07/13/20 1050   Team Meeting   Meeting Type Daily Rounds   Initial Conference Date 07/13/20   Team Members Present   Team Members Present Physician;Nurse;;; Other (Discipline and Name)   Patient/Family Present   Patient Present No   Patient's Family Present No     Daily Rounds Documentation:  Team members present:    MD Von Abdullahi, RN  Thai Caceres, LSW  Sharan Florence, LSW  Nora Mejia, MCKAYLA    Case reviewed  No changes  Patient focused on discharged

## 2020-07-14 PROCEDURE — 99232 SBSQ HOSP IP/OBS MODERATE 35: CPT | Performed by: PSYCHIATRY & NEUROLOGY

## 2020-07-14 RX ADMIN — OXYBUTYNIN CHLORIDE 5 MG: 5 TABLET, EXTENDED RELEASE ORAL at 08:19

## 2020-07-14 RX ADMIN — LEVOTHYROXINE SODIUM 75 MCG: 75 TABLET ORAL at 05:57

## 2020-07-14 RX ADMIN — ATORVASTATIN CALCIUM 10 MG: 10 TABLET, FILM COATED ORAL at 16:51

## 2020-07-14 RX ADMIN — OLANZAPINE 5 MG: 5 TABLET, FILM COATED ORAL at 20:32

## 2020-07-14 RX ADMIN — DIVALPROEX SODIUM 1500 MG: 500 TABLET, EXTENDED RELEASE ORAL at 20:32

## 2020-07-14 RX ADMIN — DEXTROAMPHETAMINE SACCHARATE, AMPHETAMINE ASPARTATE, DEXTROAMPHETAMINE SULFATE AND AMPHETAMINE SULFATE 15 MG: 2.5; 2.5; 2.5; 2.5 TABLET ORAL at 05:57

## 2020-07-14 RX ADMIN — DOCUSATE SODIUM 100 MG: 100 CAPSULE, LIQUID FILLED ORAL at 17:05

## 2020-07-14 RX ADMIN — METFORMIN HYDROCHLORIDE 500 MG: 500 TABLET ORAL at 08:19

## 2020-07-14 RX ADMIN — PANTOPRAZOLE SODIUM 40 MG: 40 TABLET, DELAYED RELEASE ORAL at 05:58

## 2020-07-14 RX ADMIN — METFORMIN HYDROCHLORIDE 500 MG: 500 TABLET ORAL at 16:51

## 2020-07-14 RX ADMIN — NICOTINE POLACRILEX 2 MG: 2 GUM, CHEWING ORAL at 18:50

## 2020-07-14 RX ADMIN — CLOZAPINE 200 MG: 200 TABLET ORAL at 16:51

## 2020-07-14 RX ADMIN — DOCUSATE SODIUM 100 MG: 100 CAPSULE, LIQUID FILLED ORAL at 08:19

## 2020-07-14 NOTE — PROGRESS NOTES
07/14/20 0920   Team Meeting   Meeting Type Tx Team Meeting   Initial Conference Date 07/14/20   Next Conference Date 07/21/20   Team Members Present   Team Members Present Physician;Nurse;; Other (Discipline and Name);    Physician Team Member Dr Sruthi Brink MD   Nursing Team Member Luann Gallegos, RN   Care Management Team Member Jack Bruce   Social Work Team Member Lima Foster [place]   Other (Discipline and Name) [facility]   Patient/Family Present   Patient Present Yes   Patient's Family Present Yes   Family Relationship Sibling   Sibling [address] [facility]     Patient attended today's treatment team meeting prepared with his Self Assessment; patient needed assistance sharing his Self Assessment  Patient was preoccupied with discharge and needed redirection several times  SW did provide praise multiple times for patient's hard work on the Self Assessment  Patient also focused on his teeth and states that he needs to see a dentist due to tooth pain  Crystal explained to SW that patient has sensitive teeth and may just need toothpaste for sensitive teeth  Crystal informed patient that she sent him a gift and it should arrive by Friday  Dr Valenzuela Has praised patient for his group attendance  After reviewing Self Assessment patient requested that SW call [facility] now to have an interview; SW attempted to explain to patient why we can't make that happen right now; patient quickly became frustrated and left the room  SW and his sister tried to encourage him to stay but he talked over us and proceeded to the exit  Patient did come back several minutes later and said something about the dentist and then left again  Team reviewed treatment plan; SW will review with patient later  Next treatment team meeting is scheduled for 7/21/2020
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
## 2020-07-14 NOTE — PROGRESS NOTES
07/14/20 1100   Activity/Group Checklist   Group   (IMR/Emotional Intelligence )   Attendance Attended   Attendance Duration (min) 46-60   Interactions Disorganized interaction   Affect/Mood Appropriate;Normal range   Goals Achieved Identified feelings; Discussed discharge plans; Able to listen to others; Able to engage in interactions; Able to self-disclose

## 2020-07-14 NOTE — PROGRESS NOTES
07/14/20 0854   Team Meeting   Meeting Type Daily Rounds   Initial Conference Date 07/14/20   Patient/Family Present   Patient Present No   Patient's Family Present No     Daily Rounds Documentation     Team Members Present:   MD Olive Herrera, Silvia Baron LSW    Fixated on discharge  Going to groups  Pleasant and cooperative  Trouble taking responsibility
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
## 2020-07-14 NOTE — PROGRESS NOTES
07/14/20 0900   Activity/Group Checklist   Group Community meeting   Attendance Attended   Attendance Duration (min) 31-45   Interactions Interacted appropriately   Affect/Mood Appropriate;Bright;Normal range;Calm   Goals Achieved Identified feelings; Able to listen to others; Able to engage in interactions; Able to self-disclose

## 2020-07-14 NOTE — PROGRESS NOTES
07/14/20 1400   Activity/Group Checklist   Group   (Recovery Workshop )   Attendance Did not attend   Attendance Duration (min) Greater than 60   Affect/Mood GAEL

## 2020-07-14 NOTE — PLAN OF CARE
Problem: Alteration in Thoughts and Perception  Goal: Verbalize thoughts and feelings  Description  Interventions:  - Promote a nonjudgmental and trusting relationship with the patient through active listening and therapeutic communication  - Assess patient's level of functioning, behavior and potential for risk  - Engage patient in 1 on 1 interactions  - Encourage patient to express fears, feelings, frustrations, and discuss symptoms    - [place] patient to reality, help patient recognize reality-based thinking   - Administer medications as ordered and assess for potential side effects  - Provide the patient education related to the signs and symptoms of the illness and desired effects of prescribed medications  Outcome: Progressing  Goal: Refrain from acting on delusional thinking/internal stimuli  Description  Interventions:  - Monitor patient closely, per order   - Utilize least restrictive measures   - Set reasonable limits, give positive feedback for acceptable   - Administer medications as ordered and monitor of potential side effects  Outcome: Progressing  Goal: Agree to be compliant with medication regime, as prescribed and report medication side effects  Description  Interventions:  - Offer appropriate PRN medication and supervise ingestion; conduct AIMS, as needed   Outcome: Progressing  Goal: Attend and participate in unit activities, including therapeutic, recreational, and educational groups  Description  Interventions:  -Encourage Visitation and family involvement in care    CERTIFIED PEER SPECIALIST INTERVENTIONS:    Complete peer assessment with patient to assess their needs and identify their goals to complete while in the recovery program as well as once discharged into the community  Patient will complete WRAP Plan, Crisis Plan and 5 Life Domains  Patient will attend 50% of groups offered on the unit        Outcome: Not Progressing     Problem: Ineffective Coping  Goal: Demonstrates healthy coping skills  Outcome: Progressing     Problem: GASTROINTESTINAL - ADULT  Goal: Maintains adequate nutritional intake  Description  INTERVENTIONS:  - Monitor percentage of each meal consumed  - Identify factors contributing to decreased intake, treat as appropriate  - Assist with meals as needed  - Monitor I&O, weight, and lab values if indicated  - Obtain nutrition services referral as needed  Outcome: Zion Vickers has been awake, alert, and visible intermittently out in the milieu  Ate 100% supper  Less preoccupied with discharge  Minimal interaction noted with peers  Rest in room and listens to music on radio in [employer] at intervals  Pt denies any depression or anxiety and has not verbalized any delusions  Compliant with all scheduled meds without prompting and cooperative with mouth checks  Pt did not attend evening group but came out for snack after  No behavioral issues  Continue to monitor/assess for any changes

## 2020-07-14 NOTE — PLAN OF CARE
Problem: Alteration in Thoughts and Perception  Goal: Agree to be compliant with medication regime, as prescribed and report medication side effects  Description  Interventions:  - Offer appropriate PRN medication and supervise ingestion; conduct AIMS, as needed   Outcome: Progressing  Goal: Attend and participate in unit activities, including therapeutic, recreational, and educational groups  Description  Interventions:  -Encourage Visitation and family involvement in care    CERTIFIED PEER SPECIALIST INTERVENTIONS:    Complete peer assessment with patient to assess their needs and identify their goals to complete while in the recovery program as well as once discharged into the community  Patient will complete WRAP Plan, Crisis Plan and 5 Life Domains  Patient will attend 50% of groups offered on the unit  Outcome: Progressing  Goal: Complete daily ADLs, including personal hygiene independently, as able  Description  Interventions:  - Observe, teach, and assist patient with ADLS  - Monitor and promote a balance of rest/activity, with adequate nutrition and elimination   Outcome  Patient is pleasant, cooperative and visible on the unit  He is noted to interact well with his peers  Intrusive at times but very easily redirected  Denies SI and Hi's  Also denies having any hallucinations or delusions  Attended 3/4 groups this shift  No issues or concerns noted at this time  Continue to monitor

## 2020-07-14 NOTE — PLAN OF CARE
Problem: Alteration in Thoughts and Perception  Goal: Attend and participate in unit activities, including therapeutic, recreational, and educational groups  Description  Interventions:  -Encourage Visitation and family involvement in care  Outcome: Progressing     Problem: Alteration in Thoughts and Perception  Goal: Verbalize thoughts and feelings  Description  Interventions:  - Promote a nonjudgmental and trusting relationship with the patient through active listening and therapeutic communication  - Assess patient's level of functioning, behavior and potential for risk  - Engage patient in 1 on 1 interactions  - Encourage patient to express fears, feelings, frustrations, and discuss symptoms    - [place] patient to reality, help patient recognize reality-based thinking   - Administer medications as ordered and assess for potential side effects  - Provide the patient education related to the signs and symptoms of the illness and desired effects of prescribed medications  Outcome: Progressing  Writer met with Patient face to face and assisted him in completing his Self Assessment for Treatment Team  Patient was able to identify "not being able to go out" in the community as his most prominent barrier  Patient listed taking his medication, getting his vitals done and going to groups as his SMART goals for the week  Patient continues to have little insight into why placement for him is a difficult task and we spoke about taking responsibility for his actions and not blaming others  Writer continues Patient to look into himself and own up to past mistakes  Writer encouraged Patient not to leave his Team meeting, to attend groups and follow direction

## 2020-07-15 PROCEDURE — 99232 SBSQ HOSP IP/OBS MODERATE 35: CPT | Performed by: PSYCHIATRY & NEUROLOGY

## 2020-07-15 RX ADMIN — METFORMIN HYDROCHLORIDE 500 MG: 500 TABLET ORAL at 08:16

## 2020-07-15 RX ADMIN — METFORMIN HYDROCHLORIDE 500 MG: 500 TABLET ORAL at 16:59

## 2020-07-15 RX ADMIN — LEVOTHYROXINE SODIUM 75 MCG: 75 TABLET ORAL at 05:17

## 2020-07-15 RX ADMIN — OLANZAPINE 5 MG: 5 TABLET, FILM COATED ORAL at 21:04

## 2020-07-15 RX ADMIN — DEXTROAMPHETAMINE SACCHARATE, AMPHETAMINE ASPARTATE, DEXTROAMPHETAMINE SULFATE AND AMPHETAMINE SULFATE 15 MG: 2.5; 2.5; 2.5; 2.5 TABLET ORAL at 05:17

## 2020-07-15 RX ADMIN — CLOZAPINE 200 MG: 200 TABLET ORAL at 16:59

## 2020-07-15 RX ADMIN — PANTOPRAZOLE SODIUM 40 MG: 40 TABLET, DELAYED RELEASE ORAL at 05:17

## 2020-07-15 RX ADMIN — DOCUSATE SODIUM 100 MG: 100 CAPSULE, LIQUID FILLED ORAL at 08:16

## 2020-07-15 RX ADMIN — ATORVASTATIN CALCIUM 10 MG: 10 TABLET, FILM COATED ORAL at 16:59

## 2020-07-15 RX ADMIN — OXYBUTYNIN CHLORIDE 5 MG: 5 TABLET, EXTENDED RELEASE ORAL at 08:16

## 2020-07-15 RX ADMIN — DOCUSATE SODIUM 100 MG: 100 CAPSULE, LIQUID FILLED ORAL at 17:16

## 2020-07-15 RX ADMIN — DIVALPROEX SODIUM 1500 MG: 500 TABLET, EXTENDED RELEASE ORAL at 21:04
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
## 2020-07-15 NOTE — NURSING NOTE
Pt is calm, meal and medication compliant  Pt is visible on the unit for morning art therapy group  Pt denies s/s including SI and HI and is pleasant in conversation  Pt sits with others at meals and is social at times  Will continue to monitor

## 2020-07-15 NOTE — PLAN OF CARE
Problem: Alteration in Thoughts and Perception  Goal: Agree to be compliant with medication regime, as prescribed and report medication side effects  Description  Interventions:  - Offer appropriate PRN medication and supervise ingestion; conduct AIMS, as needed   Outcome: Progressing  Goal: Attend and participate in unit activities, including therapeutic, recreational, and educational groups  Description  Interventions:  -Encourage Visitation and family involvement in care    CERTIFIED PEER SPECIALIST INTERVENTIONS:    Complete peer assessment with patient to assess their needs and identify their goals to complete while in the recovery program as well as once discharged into the community  Patient will complete WRAP Plan, Crisis Plan and 5 Life Domains  Patient will attend 50% of groups offered on the unit        Outcome: Progressing     Visible, compliant with routine medications, gait steady, ate 100% of dinner, listened to music with peers, behavior controlled attended evening group, will continue to monitor

## 2020-07-15 NOTE — PROGRESS NOTES
Attending Art Therapy      07/15/20 0732   Activity/Group Checklist   Group Community meeting   Attendance Did not attend   Attendance Duration (min) 31-45   Affect/Mood GAEL

## 2020-07-15 NOTE — PROGRESS NOTES
07/15/20 0900   Activity/Group Checklist   Group Other (Comment)  (Art Therapy Group/[address] a Road, Open Discussion)   Attendance Attended   Attendance Duration (min) 46-60   Interactions Interacted appropriately   Affect/Mood Appropriate   Goals Achieved Able to listen to others; Able to recieve feedback  (Able to engage materials; limited participation)

## 2020-07-15 NOTE — PROGRESS NOTES
07/15/20 1214   Team Meeting   Meeting Type Daily Rounds   Initial Conference Date 07/15/20   Team Members Present   Team Members Present Physician;Nurse;;   Patient/Family Present   Patient Present No   Patient's Family Present No     Daily Rounds Documentation  Team members present:  MD David Mckenzie, RN  Lilia White, [address] [place] [address]    Case discussed  No changes  Patient stated he is trying to engage in groups  Due for labs tomorrow  Clear bilaterally, pupils equal, round and reactive to light.

## 2020-07-15 NOTE — PROGRESS NOTES
07/15/20 1100   Activity/Group Checklist   Group   (IMR/ABC'S of Emotional Episodes )   Attendance Did not attend   Attendance Duration (min) 46-60   Affect/Mood GAEL

## 2020-07-16 LAB
ALBUMIN SERPL BCP-MCNC: 3.8 G/DL (ref 3–5.2)
ALP SERPL-CCNC: 87 U/L (ref 43–122)
ALT SERPL W P-5'-P-CCNC: 29 U/L (ref 9–52)
AMMONIA PLAS-SCNC: 11 UMOL/L (ref 9–33)
ANION GAP SERPL CALCULATED.3IONS-SCNC: 8 MMOL/L (ref 5–14)
AST SERPL W P-5'-P-CCNC: 25 U/L (ref 17–59)
BILIRUB SERPL-MCNC: 0.2 MG/DL
BUN SERPL-MCNC: 11 MG/DL (ref 5–25)
CALCIUM SERPL-MCNC: 9 MG/DL (ref 8.4–10.2)
CHLORIDE SERPL-SCNC: 98 MMOL/L (ref 97–108)
CO2 SERPL-SCNC: 24 MMOL/L (ref 22–30)
CREAT SERPL-MCNC: 0.6 MG/DL (ref 0.7–1.5)
ERYTHROCYTE [DISTWIDTH] IN BLOOD BY AUTOMATED COUNT: 13.5 %
GFR SERPL CREATININE-BSD FRML MDRD: 116 ML/MIN/1.73SQ M
GLUCOSE P FAST SERPL-MCNC: 141 MG/DL (ref 70–99)
GLUCOSE SERPL-MCNC: 141 MG/DL (ref 70–99)
HCT VFR BLD AUTO: 40.4 % (ref 41–53)
HGB BLD-MCNC: 13.7 G/DL (ref 13.5–17.5)
MCH RBC QN AUTO: 29.7 PG (ref 26–34)
MCHC RBC AUTO-ENTMCNC: 34 G/DL (ref 31–36)
MCV RBC AUTO: 87 FL (ref 80–100)
PLATELET # BLD AUTO: 160 THOUSANDS/UL (ref 150–450)
PMV BLD AUTO: 8.2 FL (ref 8.9–12.7)
POTASSIUM SERPL-SCNC: 4.1 MMOL/L (ref 3.6–5)
PROT SERPL-MCNC: 6.5 G/DL (ref 5.9–8.4)
RBC # BLD AUTO: 4.64 MILLION/UL (ref 4.5–5.9)
SODIUM SERPL-SCNC: 130 MMOL/L (ref 137–147)
VALPROATE SERPL-MCNC: 78.1 UG/ML (ref 50–120)
WBC # BLD AUTO: 8.1 THOUSAND/UL (ref 4.5–11)

## 2020-07-16 PROCEDURE — 80053 COMPREHEN METABOLIC PANEL: CPT | Performed by: PSYCHIATRY & NEUROLOGY

## 2020-07-16 PROCEDURE — 99232 SBSQ HOSP IP/OBS MODERATE 35: CPT | Performed by: PSYCHIATRY & NEUROLOGY

## 2020-07-16 PROCEDURE — 80164 ASSAY DIPROPYLACETIC ACD TOT: CPT | Performed by: PSYCHIATRY & NEUROLOGY

## 2020-07-16 PROCEDURE — 82140 ASSAY OF AMMONIA: CPT | Performed by: PSYCHIATRY & NEUROLOGY

## 2020-07-16 PROCEDURE — 85007 BL SMEAR W/DIFF WBC COUNT: CPT | Performed by: PSYCHIATRY & NEUROLOGY

## 2020-07-16 PROCEDURE — 85027 COMPLETE CBC AUTOMATED: CPT | Performed by: PSYCHIATRY & NEUROLOGY

## 2020-07-16 RX ADMIN — LEVOTHYROXINE SODIUM 75 MCG: 75 TABLET ORAL at 05:28

## 2020-07-16 RX ADMIN — DIVALPROEX SODIUM 1500 MG: 500 TABLET, EXTENDED RELEASE ORAL at 20:52

## 2020-07-16 RX ADMIN — ATORVASTATIN CALCIUM 10 MG: 10 TABLET, FILM COATED ORAL at 17:07

## 2020-07-16 RX ADMIN — CLOZAPINE 200 MG: 200 TABLET ORAL at 17:07

## 2020-07-16 RX ADMIN — DOCUSATE SODIUM 100 MG: 100 CAPSULE, LIQUID FILLED ORAL at 17:07

## 2020-07-16 RX ADMIN — OXYBUTYNIN CHLORIDE 5 MG: 5 TABLET, EXTENDED RELEASE ORAL at 08:13

## 2020-07-16 RX ADMIN — METFORMIN HYDROCHLORIDE 500 MG: 500 TABLET ORAL at 17:07

## 2020-07-16 RX ADMIN — NICOTINE POLACRILEX 2 MG: 2 GUM, CHEWING ORAL at 08:40

## 2020-07-16 RX ADMIN — METFORMIN HYDROCHLORIDE 500 MG: 500 TABLET ORAL at 08:14

## 2020-07-16 RX ADMIN — OLANZAPINE 5 MG: 5 TABLET, FILM COATED ORAL at 20:52

## 2020-07-16 RX ADMIN — DOCUSATE SODIUM 100 MG: 100 CAPSULE, LIQUID FILLED ORAL at 08:13

## 2020-07-16 RX ADMIN — PANTOPRAZOLE SODIUM 40 MG: 40 TABLET, DELAYED RELEASE ORAL at 05:28

## 2020-07-16 RX ADMIN — DEXTROAMPHETAMINE SACCHARATE, AMPHETAMINE ASPARTATE, DEXTROAMPHETAMINE SULFATE AND AMPHETAMINE SULFATE 15 MG: 2.5; 2.5; 2.5; 2.5 TABLET ORAL at 05:28

## 2020-07-16 NOTE — PLAN OF CARE
Problem: Alteration in Thoughts and Perception  Goal: Refrain from acting on delusional thinking/internal stimuli  Description  Interventions:  - Monitor patient closely, per order   - Utilize least restrictive measures   - Set reasonable limits, give positive feedback for acceptable   - Administer medications as ordered and monitor of potential side effects  Outcome: Progressing  Goal: Agree to be compliant with medication regime, as prescribed and report medication side effects  Description  Interventions:  - Offer appropriate PRN medication and supervise ingestion; conduct AIMS, as needed   Outcome: Progressing   Patient is pleasant, cooperative and visible on the unit  He is noted to interact well with his peers  Intrusive at times but very easily redirected  Denies SI and Hi's  Also denies having any hallucinations or delusions  No group attendance noted this shift  No issues or concerns noted at this time  Continue to monitor

## 2020-07-16 NOTE — PLAN OF CARE
Problem: Alteration in Thoughts and Perception  Goal: Verbalize thoughts and feelings  Description  Interventions:  - Promote a nonjudgmental and trusting relationship with the patient through active listening and therapeutic communication  - Assess patient's level of functioning, behavior and potential for risk  - Engage patient in 1 on 1 interactions  - Encourage patient to express fears, feelings, frustrations, and discuss symptoms    - Carter patient to reality, help patient recognize reality-based thinking   - Administer medications as ordered and assess for potential side effects  - Provide the patient education related to the signs and symptoms of the illness and desired effects of prescribed medications  Outcome: Progressing  Goal: Refrain from acting on delusional thinking/internal stimuli  Description  Interventions:  - Monitor patient closely, per order   - Utilize least restrictive measures   - Set reasonable limits, give positive feedback for acceptable   - Administer medications as ordered and monitor of potential side effects  Outcome: Progressing  Goal: Agree to be compliant with medication regime, as prescribed and report medication side effects  Description  Interventions:  - Offer appropriate PRN medication and supervise ingestion; conduct AIMS, as needed   Outcome: Progressing  Goal: Attend and participate in unit activities, including therapeutic, recreational, and educational groups  Description  Interventions:  -Encourage Visitation and family involvement in care    CERTIFIED PEER SPECIALIST INTERVENTIONS:    Complete peer assessment with patient to assess their needs and identify their goals to complete while in the recovery program as well as once discharged into the community  Patient will complete WRAP Plan, Crisis Plan and 5 Life Domains  Patient will attend 50% of groups offered on the unit        Outcome: Not Progressing     Problem: GASTROINTESTINAL - ADULT  Goal: Maintains adequate nutritional intake  Description  INTERVENTIONS:  - Monitor percentage of each meal consumed  - Identify factors contributing to decreased intake, treat as appropriate  - Assist with meals as needed  - Monitor I&O, weight, and lab values if indicated  - Obtain nutrition services referral as needed  Outcome: Todd Haney has been awake, alert, and visible intermittently out in the milieu  Ate 100% supper and then returned to room to rest and napping at intervals  Disheveled in appearance and dressed in layers  Behavior has been quiet and stays to self  Compliant with scheduled meds when called and cooperative with mouth checks  Denies any depression or anxiety  Pt did not attend evening group but came out for snack after  Less preoccupied with discharge  No behavioral issues  Continue to monitor/assess for any changes

## 2020-07-16 NOTE — SOCIAL WORK
Phone call placed to pt's big brother, Penny Washington  ISRA made him aware that he can send a gift or drop it off; gift will arrive at the unit today  Greg Jeff reports that he sent patient manuel cloth wristbands  ISRA also informed him that he can no bring a cake and that the hospital takes care of that

## 2020-07-16 NOTE — PROGRESS NOTES
07/16/20 0841   Team Meeting   Meeting Type Daily Rounds   Initial Conference Date 07/16/20   Patient/Family Present   Patient Present No   Patient's Family Present No     Daily Rounds Documentation     Team Members Present:   MD Jayesh Stanley, RN  Cleven Jeans, [facility], [address] [place] [address]    Disheveled  Pleasant  Going to groups  Fixated on discharge

## 2020-07-16 NOTE — SOCIAL WORK
Psychotherapy Note  2:40-3:40    Goal Objectives:  1  Build trust in the therapeutic relationship  2  Improve feeling identification and expression    Therapist and patient met privately for individual session  Patient did not require prompting to meet with therapist today  Therapist explained to patient that his sister will join the session in 30 minutes  Therapist provided an outline for the session  Initially, patient was still focused on discharge but was much easier to redirect this session  After being redirected 3 time patient dropped the subject and was able to fully focus on topic at hand  First 30 minutes of the session was spent doing exercises that improve ability to identify and express feelings; therapist explained purpose  Therapist had patient watch the same education video from last week; patient made slight improvements in his ability to identify the feelings  Patient appears to view feelings generally; he struggles to visibly differentiate specific feelings that fall under the same category such as, happy, excited, and proud  He would benefit from learning common body language associated with each feeling; therapist will model examples of body language during future sessions  Therapist attempted to work with patient in expressing his feelings today; patient reported feeling happy but was unable to give evidence that supports that  He spoke about how he is no longer worrying about what Dr Jami Manzanares said and that he is feeling more comfortable here than he did last time he was on the unit  He presents as anxious with periods of frustration  Patient started to get restless approximately 20 minutes in, but therapist was able to get him to agree to one more activity  Therapist explained that she has a list of feelings to go over, but they will only go through a few of them at a time    Therapist modeled the use of "I statements" and encouraged patient to share times he has experienced each feeling  Patient did much better with this exercise especially with therapist normalizing the emotions; at times he would deny that he every experienced the feeling but after therapist would share her own experience he would then share his  Therapist provided a lot of praise throughout session  Therapist allowed patient to pick some of his favorite songs to listen to while we waited for his sister  Patient's mood really brightened the more he listened to the music; he was singing along and making gestures  Therapist and patient attempted to call his sister 3x; patient didn't want to try anymore but was very understanding and left a message for her requesting that she call later

## 2020-07-16 NOTE — PROGRESS NOTES
07/16/20 0900   Activity/Group Checklist   Group Community meeting   Attendance Did not attend   Attendance Duration (min) 31-45   Affect/Mood GAEL
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
## 2020-07-16 NOTE — NURSING NOTE
Received pt in bed at change of shift with eyes closed; chest movement noted  Continues the same thus this far as per continual rounding checks  Will continue to monitor

## 2020-07-17 LAB
ERYTHROCYTE [DISTWIDTH] IN BLOOD BY AUTOMATED COUNT: 13.5 %
HCT VFR BLD AUTO: 40.4 % (ref 41–53)
HGB BLD-MCNC: 13.7 G/DL (ref 13.5–17.5)
LYMPHOCYTES # BLD AUTO: 2.84 THOUSAND/UL (ref 0.5–4)
LYMPHOCYTES # BLD AUTO: 35 % (ref 25–45)
MCH RBC QN AUTO: 29.7 PG (ref 26–34)
MCHC RBC AUTO-ENTMCNC: 34 G/DL (ref 31–36)
MCV RBC AUTO: 87 FL (ref 80–100)
MONOCYTES # BLD AUTO: 0.65 THOUSAND/UL (ref 0.2–0.9)
MONOCYTES NFR BLD AUTO: 8 % (ref 1–10)
NEUTS SEG # BLD: 4.62 THOUSAND/UL (ref 1.8–7.8)
NEUTS SEG NFR BLD AUTO: 57 %
PLATELET # BLD AUTO: 160 THOUSANDS/UL (ref 150–450)
PLATELET BLD QL SMEAR: ADEQUATE
PMV BLD AUTO: 9 FL (ref 8.9–12.7)
RBC # BLD AUTO: 4.64 MILLION/UL (ref 4.5–5.9)
RBC MORPH BLD: NORMAL
TOTAL CELLS COUNTED SPEC: 100
WBC # BLD AUTO: 8.1 THOUSAND/UL (ref 4.5–11)

## 2020-07-17 PROCEDURE — 99232 SBSQ HOSP IP/OBS MODERATE 35: CPT | Performed by: PSYCHIATRY & NEUROLOGY

## 2020-07-17 RX ADMIN — OLANZAPINE 5 MG: 5 TABLET, FILM COATED ORAL at 20:53

## 2020-07-17 RX ADMIN — LEVOTHYROXINE SODIUM 75 MCG: 75 TABLET ORAL at 05:46

## 2020-07-17 RX ADMIN — CLOZAPINE 200 MG: 200 TABLET ORAL at 16:48

## 2020-07-17 RX ADMIN — DOCUSATE SODIUM 100 MG: 100 CAPSULE, LIQUID FILLED ORAL at 17:10

## 2020-07-17 RX ADMIN — NICOTINE POLACRILEX 2 MG: 2 GUM, CHEWING ORAL at 08:41

## 2020-07-17 RX ADMIN — METFORMIN HYDROCHLORIDE 500 MG: 500 TABLET ORAL at 08:09

## 2020-07-17 RX ADMIN — DEXTROAMPHETAMINE SACCHARATE, AMPHETAMINE ASPARTATE, DEXTROAMPHETAMINE SULFATE AND AMPHETAMINE SULFATE 15 MG: 2.5; 2.5; 2.5; 2.5 TABLET ORAL at 05:46

## 2020-07-17 RX ADMIN — DOCUSATE SODIUM 100 MG: 100 CAPSULE, LIQUID FILLED ORAL at 08:09

## 2020-07-17 RX ADMIN — DIVALPROEX SODIUM 1500 MG: 500 TABLET, EXTENDED RELEASE ORAL at 20:53

## 2020-07-17 RX ADMIN — OXYBUTYNIN CHLORIDE 5 MG: 5 TABLET, EXTENDED RELEASE ORAL at 08:09

## 2020-07-17 RX ADMIN — PANTOPRAZOLE SODIUM 40 MG: 40 TABLET, DELAYED RELEASE ORAL at 05:46

## 2020-07-17 RX ADMIN — METFORMIN HYDROCHLORIDE 500 MG: 500 TABLET ORAL at 16:48

## 2020-07-17 RX ADMIN — ATORVASTATIN CALCIUM 10 MG: 10 TABLET, FILM COATED ORAL at 16:48
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
## 2020-07-17 NOTE — PROGRESS NOTES
07/17/20 1100   Activity/Group Checklist   Group Other (Comment)  (IMR: [employer] [place] )   Attendance Attended   Attendance Duration (min) 16-30   Interactions Other (Comment)  (Distracted; focused on self )   Affect/Mood Bright   Goals Achieved Able to listen to others     Came and went a few times  Mostly focused on himself and his birthday gifts
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
## 2020-07-17 NOTE — PROGRESS NOTES
07/17/20 0836   Team Meeting   Meeting Type Daily Rounds   Initial Conference Date 07/17/20   Patient/Family Present   Patient Present No   Patient's Family Present No     Daily Rounds Documentation     Team Members Present:   MD Rubio Burton, TABBY Branham, ELIZABETH Borjas    Disheveled  Happy because it is his birthday! Good session with therapist yesterday

## 2020-07-17 NOTE — PLAN OF CARE
Problem: Alteration in Thoughts and Perception  Goal: Verbalize thoughts and feelings  Description  Interventions:  - Promote a nonjudgmental and trusting relationship with the patient through active listening and therapeutic communication  - Assess patient's level of functioning, behavior and potential for risk  - Engage patient in 1 on 1 interactions  - Encourage patient to express fears, feelings, frustrations, and discuss symptoms    - [place] patient to reality, help patient recognize reality-based thinking   - Administer medications as ordered and assess for potential side effects  - Provide the patient education related to the signs and symptoms of the illness and desired effects of prescribed medications  Outcome: Progressing  Goal: Refrain from acting on delusional thinking/internal stimuli  Description  Interventions:  - Monitor patient closely, per order   - Utilize least restrictive measures   - Set reasonable limits, give positive feedback for acceptable   - Administer medications as ordered and monitor of potential side effects  Outcome: Progressing  Goal: Agree to be compliant with medication regime, as prescribed and report medication side effects  Description  Interventions:  - Offer appropriate PRN medication and supervise ingestion; conduct AIMS, as needed   Outcome: Progressing  Goal: Attend and participate in unit activities, including therapeutic, recreational, and educational groups  Description  Interventions:  -Encourage Visitation and family involvement in care    CERTIFIED PEER SPECIALIST INTERVENTIONS:    Complete peer assessment with patient to assess their needs and identify their goals to complete while in the recovery program as well as once discharged into the community  Patient will complete WRAP Plan, Crisis Plan and [facility] Life Domains  Patient will attend 50% of groups offered on the unit        Outcome: Progressing     Polite, cooperative, social with staff and select peers, attended IMR group, compliant with medications and meals will continue to monitor

## 2020-07-17 NOTE — PLAN OF CARE
Problem: Alteration in Thoughts and Perception  Goal: Verbalize thoughts and feelings  Description  Interventions:  - Promote a nonjudgmental and trusting relationship with the patient through active listening and therapeutic communication  - Assess patient's level of functioning, behavior and potential for risk  - Engage patient in 1 on 1 interactions  - Encourage patient to express fears, feelings, frustrations, and discuss symptoms    - [place] patient to reality, help patient recognize reality-based thinking   - Administer medications as ordered and assess for potential side effects  - Provide the patient education related to the signs and symptoms of the illness and desired effects of prescribed medications  Outcome: Progressing  Goal: Refrain from acting on delusional thinking/internal stimuli  Description  Interventions:  - Monitor patient closely, per order   - Utilize least restrictive measures   - Set reasonable limits, give positive feedback for acceptable   - Administer medications as ordered and monitor of potential side effects  Outcome: Progressing  Goal: Agree to be compliant with medication regime, as prescribed and report medication side effects  Description  Interventions:  - Offer appropriate PRN medication and supervise ingestion; conduct AIMS, as needed   Outcome: Progressing  Goal: Attend and participate in unit activities, including therapeutic, recreational, and educational groups  Description  Interventions:  -Encourage Visitation and family involvement in care    CERTIFIED PEER SPECIALIST INTERVENTIONS:    Complete peer assessment with patient to assess their needs and identify their goals to complete while in the recovery program as well as once discharged into the community  Patient will complete WRAP Plan, Crisis Plan and 5 Life Domains  Patient will attend 50% of groups offered on the unit        Outcome: Progressing  Goal: Complete daily ADLs, including personal hygiene independently, as able  Description  Interventions:  - Observe, teach, and assist patient with ADLS  - Monitor and promote a balance of rest/activity, with adequate nutrition and elimination   Outcome: Progressing     Problem: Ineffective Coping  Goal: Demonstrates healthy coping skills  Outcome: Progressing  Goal: Understands least restrictive measures  Description  Interventions:  - Utilize least restrictive behavior  Outcome: Progressing     Problem: GASTROINTESTINAL - ADULT  Goal: Maintains adequate nutritional intake  Description  INTERVENTIONS:  - Monitor percentage of each meal consumed  - Identify factors contributing to decreased intake, treat as appropriate  - Assist with meals as needed  - Monitor I&O, weight, and lab values if indicated  - Obtain nutrition services referral as needed  Outcome: Cammie Dale has been awake, alert, and visible intermittently out in the milieu  Pt completed his daily ADL's and showered this shift  Ate 100% supper  Denies any depression or anxiety and has not verbalized any delusions  Pt attended evening group, listened to music on radio in [place] [id number], and watched movie with peers  Pt spoke of enjoying his birthday today  Compliant with all scheduled meds without prompting  Dressed in layers and disheveled in appearance  No behavioral issues  Had evening snack  Continue to monitor/assess for any changes

## 2020-07-17 NOTE — PLAN OF CARE
Problem: Alteration in Thoughts and Perception  Goal: Verbalize thoughts and feelings  Description  Interventions:  - Promote a nonjudgmental and trusting relationship with the patient through active listening and therapeutic communication  - Assess patient's level of functioning, behavior and potential for risk  - Engage patient in 1 on 1 interactions  - Encourage patient to express fears, feelings, frustrations, and discuss symptoms    - [place] patient to reality, help patient recognize reality-based thinking   - Administer medications as ordered and assess for potential side effects  - Provide the patient education related to the signs and symptoms of the illness and desired effects of prescribed medications  Outcome: Progressing  Goal: Refrain from acting on delusional thinking/internal stimuli  Description  Interventions:  - Monitor patient closely, per order   - Utilize least restrictive measures   - Set reasonable limits, give positive feedback for acceptable   - Administer medications as ordered and monitor of potential side effects  Outcome: Progressing  Goal: Agree to be compliant with medication regime, as prescribed and report medication side effects  Description  Interventions:  - Offer appropriate PRN medication and supervise ingestion; conduct AIMS, as needed   Outcome: Progressing  Goal: Attend and participate in unit activities, including therapeutic, recreational, and educational groups  Description  Interventions:  -Encourage Visitation and family involvement in care    CERTIFIED PEER SPECIALIST INTERVENTIONS:    Complete peer assessment with patient to assess their needs and identify their goals to complete while in the recovery program as well as once discharged into the community  Patient will complete WRAP Plan, Crisis Plan and 5 Life Domains  Patient will attend 50% of groups offered on the unit        Outcome: Progressing  Goal: Complete daily ADLs, including personal hygiene independently, as able  Description  Interventions:  - Observe, teach, and assist patient with ADLS  - Monitor and promote a balance of rest/activity, with adequate nutrition and elimination   Outcome: Progressing  Goal: Attend and participate in unit activities, including therapeutic, recreational, and educational groups  Description  Interventions:  -Encourage Visitation and family involvement in care  Outcome: Not Progressing     Problem: Ineffective Coping  Goal: Demonstrates healthy coping skills  Outcome: Progressing  Goal: Understands least restrictive measures  Description  Interventions:  - Utilize least restrictive behavior  Outcome: Progressing     Problem: GASTROINTESTINAL - ADULT  Goal: Maintains adequate nutritional intake  Description  INTERVENTIONS:  - Monitor percentage of each meal consumed  - Identify factors contributing to decreased intake, treat as appropriate  - Assist with meals as needed  - Monitor I&O, weight, and lab values if indicated  - Obtain nutrition services referral as needed  Outcome: Todd Haney has been awake, alert, and visible intermittently out in the milieu  Pt completed his daily ADL's today  Ate 100% supper  Naps at intervals in room  Minimal interaction noted with peers  Pleasant and cooperative  Pt denies any depression, anxiety, and has not verbalized any delusions  Pt did not attend evening group but came out for snack after  Compliant with scheduled meds when called and cooperative with mouth checks  No behavioral issues  Continue to monitor/assess for any changes
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
## 2020-07-18 RX ADMIN — CLOZAPINE 200 MG: 200 TABLET ORAL at 16:32

## 2020-07-18 RX ADMIN — LEVOTHYROXINE SODIUM 75 MCG: 75 TABLET ORAL at 06:16

## 2020-07-18 RX ADMIN — DOCUSATE SODIUM 100 MG: 100 CAPSULE, LIQUID FILLED ORAL at 17:00

## 2020-07-18 RX ADMIN — OLANZAPINE 5 MG: 5 TABLET, FILM COATED ORAL at 20:42

## 2020-07-18 RX ADMIN — DOCUSATE SODIUM 100 MG: 100 CAPSULE, LIQUID FILLED ORAL at 08:11

## 2020-07-18 RX ADMIN — METFORMIN HYDROCHLORIDE 500 MG: 500 TABLET ORAL at 08:11

## 2020-07-18 RX ADMIN — DIVALPROEX SODIUM 1500 MG: 500 TABLET, EXTENDED RELEASE ORAL at 20:42

## 2020-07-18 RX ADMIN — METFORMIN HYDROCHLORIDE 500 MG: 500 TABLET ORAL at 16:33

## 2020-07-18 RX ADMIN — PANTOPRAZOLE SODIUM 40 MG: 40 TABLET, DELAYED RELEASE ORAL at 06:16

## 2020-07-18 RX ADMIN — DEXTROAMPHETAMINE SACCHARATE, AMPHETAMINE ASPARTATE, DEXTROAMPHETAMINE SULFATE AND AMPHETAMINE SULFATE 15 MG: 2.5; 2.5; 2.5; 2.5 TABLET ORAL at 06:16

## 2020-07-18 RX ADMIN — ATORVASTATIN CALCIUM 10 MG: 10 TABLET, FILM COATED ORAL at 16:33

## 2020-07-18 RX ADMIN — OXYBUTYNIN CHLORIDE 5 MG: 5 TABLET, EXTENDED RELEASE ORAL at 08:11

## 2020-07-18 NOTE — PROGRESS NOTES
Psychiatry Progress Note    Subjective: Interval History     The patient is walking around with multiple layers of clothing on  He is fixated on being discharged  He has an irritable edge  Pt has been attending some groups  He has been compliant with meds/meals  He reports sleeping well at night  His room appears to be unkept with clothing all over the floor around his bed  He is visible at times on the unit  Pt offers no other concerns today       Behavior over the last 24 hours:  unchanged  Sleep: normal  Appetite: normal  Medication side effects: No  ROS: no complaints    Current medications:    Current Facility-Administered Medications:     acetaminophen (TYLENOL) tablet 325 mg, 325 mg, Oral, Q6H PRN, Lisha Milan MD, 325 mg at 06/23/20 1049    acetaminophen (TYLENOL) tablet 650 mg, 650 mg, Oral, Q6H PRN, Lisha Milan MD, 650 mg at 06/21/20 0735    acetaminophen (TYLENOL) tablet 975 mg, 975 mg, Oral, Q8H PRN, Lisha Milan MD, 975 mg at 03/29/20 1759    aluminum-magnesium hydroxide-simethicone (MYLANTA) 200-200-20 mg/5 mL oral suspension 30 mL, 30 mL, Oral, Q4H PRN, Lisha Milan MD, 30 mL at 06/11/20 0858    amphetamine-dextroamphetamine (ADDERALL) tablet 15 mg, 15 mg, Oral, Daily, Lisha Milan MD, 15 mg at 07/18/20 0616    atorvastatin (LIPITOR) tablet 10 mg, 10 mg, Oral, Daily With Gilford Cellar, MD, 10 mg at 07/17/20 1648    benztropine (COGENTIN) injection 1 mg, 1 mg, Intramuscular, Q6H PRN, Lisha Milan MD    benztropine (COGENTIN) tablet 1 mg, 1 mg, Oral, Q6H PRN, Lisha Milan MD, 1 mg at 04/07/20 2031    clozapine (CLOZARIL) tablet 200 mg, 200 mg, Oral, Daily, Lisha Milan MD, 200 mg at 07/17/20 1648    divalproex sodium (DEPAKOTE ER) 24 hr tablet 1,500 mg, 1,500 mg, Oral, HS, Lisha Milan MD, 1,500 mg at 07/17/20 2053    docusate sodium (COLACE) capsule 100 mg, 100 mg, Oral, BID, Lisha Milan MD, 100 mg at 07/18/20 0811    haloperidol (HALDOL) tablet 5 mg, 5 mg, Oral, Q6H PRN, Calista Nieto Ynes Lowe MD, 5 mg at 05/05/20 1648    haloperidol lactate (HALDOL) injection 5 mg, 5 mg, Intramuscular, Q6H PRN, Talon Higuera MD    levothyroxine tablet 75 mcg, 75 mcg, Oral, Early Morning, Talon Higuera MD, 75 mcg at 07/18/20 0616    LORazepam (ATIVAN) 2 mg/mL injection 1 mg, 1 mg, Intramuscular, Q6H PRN, Talon Higuera MD    LORazepam (ATIVAN) tablet 1 mg, 1 mg, Oral, Q6H PRN, Talon Higuera MD    magnesium hydroxide (MILK OF MAGNESIA) 400 mg/5 mL oral suspension 30 mL, 30 mL, Oral, Daily PRN, Talon Higuera MD, 30 mL at 03/14/20 2032    metFORMIN (GLUCOPHAGE) tablet 500 mg, 500 mg, Oral, BID With Meals, Talon Higuera MD, 500 mg at 07/18/20 3966    nicotine polacrilex (NICORETTE) gum 2 mg, 2 mg, Oral, Q2H PRN, Talon Higuera MD, 2 mg at 07/17/20 0841    OLANZapine (ZyPREXA) tablet 5 mg, 5 mg, Oral, HS, Nivia Salas, CRNP, 5 mg at 07/17/20 2053    oxybutynin (DITROPAN-XL) 24 hr tablet 5 mg, 5 mg, Oral, Daily, Talon Higuera MD, 5 mg at 07/18/20 6541    pantoprazole (PROTONIX) EC tablet 40 mg, 40 mg, Oral, Early Morning, Talon Higuera MD, 40 mg at 07/18/20 0616    traZODone (DESYREL) tablet 50 mg, 50 mg, Oral, HS PRN, Talon Higuera MD, 50 mg at 07/02/20 2208    Current Problem List:    Patient Active Problem List   Diagnosis    [facility]    Constipation, chronic    Type 2 diabetes mellitus without complication, without long-term current use of insulin (HCC)    Chronic airway obstruction, not elsewhere classified    Benign essential hypertension    Disorder of lipoprotein and lipid metabolism    Foot callus    Gastroesophageal reflux disease without esophagitis    Acquired hypothyroidism    [facility] obesity [facility]    BMI 34 0-34 9,adult    Nail abnormality    Encounter for well adult exam with abnormal findings    Tobacco abuse    Diabetes insipidus, nephrogenic ([facility] )    ADHD       Problem list reviewed 07/18/20     Objective:     Vital Signs:  Vitals:    07/17/20 0730 07/17/20 0732 07/18/20 0700 07/18/20 0705   BP: 145/69 143/69 126/75 131/66   BP Location: Left arm Left arm Left arm Left arm   Pulse: 99 105 83 81   Resp: 20 20 20   Temp: 97 9 °F (36 6 °C)  97 6 °F (36 4 °C)    TempSrc: Temporal  Temporal    Weight:       Height:             Appearance:  age appropriate and disheveled   Behavior:  guarded   Speech:  normal volume   Mood:  irritable   Affect:  constricted   Thought Process:  circumstantial   Thought Content:  delusions  persecutory   Perceptual Disturbances: None   Risk Potential: none   Sensorium:  person, place, situation and time   Cognition:  intact   Consciousness:  alert and awake    Attention: attention span and concentration were age appropriate   Intellect: average   Insight:  limited   Judgment: limited      Motor Activity: no abnormal movements       I/O Past 24 hours:  No intake/output data recorded  No intake/output data recorded  Labs:  Reviewed 07/18/20    Progress Toward Goals: unchanged    Assessment / Plan:     Schizoaffective disorder, bipolar type ([facility] )    Recommended Treatment:      Medication changes:  1) continue current regimen  Non-pharmacological treatments  1) Continue with group therapy, milieu therapy and occupational therapy  Safety  1) Safety/communication plan established targeting dynamic risk factors above  2) Risks, benefits, and possible side effects of medications explained to patient and patient verbalizes understanding  Counseling / Coordination of Care    Total floor / unit time spent today 20 minutes  Greater than 50% of total time was spent with the patient and / or family counseling and / or coordination of care  A description of the counseling / coordination of care  Patient's Rights, confidentiality and exceptions to confidentiality, use of automated medical record, [facility] staff access to medical record, and consent to treatment reviewed      Tiarra Patel PA-C

## 2020-07-18 NOTE — PLAN OF CARE
Problem: Alteration in Thoughts and Perception  Goal: Refrain from acting on delusional thinking/internal stimuli  Description  Interventions:  - Monitor patient closely, per order   - Utilize least restrictive measures   - Set reasonable limits, give positive feedback for acceptable   - Administer medications as ordered and monitor of potential side effects  Outcome: Progressing  Goal: Agree to be compliant with medication regime, as prescribed and report medication side effects  Description  Interventions:  - Offer appropriate PRN medication and supervise ingestion; conduct AIMS, as needed   Outcome: Progressing   Patient is pleasant, cooperative and visible on the unit  He is noted to interact well with his peers  Intrusive at times but very easily redirected  Denies SI and Hi's  Also denies having any hallucinations or delusions  Attended and participated in 2/4 groups this shift  No issues or concerns noted at this time  Continue to monitor

## 2020-07-19 RX ADMIN — DOCUSATE SODIUM 100 MG: 100 CAPSULE, LIQUID FILLED ORAL at 17:02

## 2020-07-19 RX ADMIN — DOCUSATE SODIUM 100 MG: 100 CAPSULE, LIQUID FILLED ORAL at 08:13

## 2020-07-19 RX ADMIN — DEXTROAMPHETAMINE SACCHARATE, AMPHETAMINE ASPARTATE, DEXTROAMPHETAMINE SULFATE AND AMPHETAMINE SULFATE 15 MG: 2.5; 2.5; 2.5; 2.5 TABLET ORAL at 05:20

## 2020-07-19 RX ADMIN — LEVOTHYROXINE SODIUM 75 MCG: 75 TABLET ORAL at 05:20

## 2020-07-19 RX ADMIN — DIVALPROEX SODIUM 1500 MG: 500 TABLET, EXTENDED RELEASE ORAL at 21:05

## 2020-07-19 RX ADMIN — PANTOPRAZOLE SODIUM 40 MG: 40 TABLET, DELAYED RELEASE ORAL at 05:20

## 2020-07-19 RX ADMIN — METFORMIN HYDROCHLORIDE 500 MG: 500 TABLET ORAL at 17:02

## 2020-07-19 RX ADMIN — CLOZAPINE 200 MG: 200 TABLET ORAL at 17:02

## 2020-07-19 RX ADMIN — OXYBUTYNIN CHLORIDE 5 MG: 5 TABLET, EXTENDED RELEASE ORAL at 08:13

## 2020-07-19 RX ADMIN — METFORMIN HYDROCHLORIDE 500 MG: 500 TABLET ORAL at 08:13

## 2020-07-19 RX ADMIN — ATORVASTATIN CALCIUM 10 MG: 10 TABLET, FILM COATED ORAL at 17:02

## 2020-07-19 RX ADMIN — OLANZAPINE 5 MG: 5 TABLET, FILM COATED ORAL at 21:06

## 2020-07-19 RX ADMIN — NICOTINE POLACRILEX 2 MG: 2 GUM, CHEWING ORAL at 08:46

## 2020-07-19 NOTE — PLAN OF CARE
Problem: Alteration in Thoughts and Perception  Goal: Verbalize thoughts and feelings  Description  Interventions:  - Promote a nonjudgmental and trusting relationship with the patient through active listening and therapeutic communication  - Assess patient's level of functioning, behavior and potential for risk  - Engage patient in 1 on 1 interactions  - Encourage patient to express fears, feelings, frustrations, and discuss symptoms    - [place] patient to reality, help patient recognize reality-based thinking   - Administer medications as ordered and assess for potential side effects  - Provide the patient education related to the signs and symptoms of the illness and desired effects of prescribed medications  Outcome: Progressing  Goal: Refrain from acting on delusional thinking/internal stimuli  Description  Interventions:  - Monitor patient closely, per order   - Utilize least restrictive measures   - Set reasonable limits, give positive feedback for acceptable   - Administer medications as ordered and monitor of potential side effects  Outcome: Progressing  Goal: Agree to be compliant with medication regime, as prescribed and report medication side effects  Description  Interventions:  - Offer appropriate PRN medication and supervise ingestion; conduct AIMS, as needed   Outcome: Progressing  Goal: Complete daily ADLs, including personal hygiene independently, as able  Description  Interventions:  - Observe, teach, and assist patient with ADLS  - Monitor and promote a balance of rest/activity, with adequate nutrition and elimination   Outcome: Progressing  Goal: Attend and participate in unit activities, including therapeutic, recreational, and educational groups  Description  Interventions:  -Encourage Visitation and family involvement in care  Outcome: Progressing     Problem: Ineffective Coping  Goal: Demonstrates healthy coping skills  Outcome: Progressing  Goal: Patient/Family participate in treatment and DC plans  Description  Interventions:  - Provide therapeutic environment  Outcome: Progressing  Goal: Patient/Family verbalizes awareness of resources  Outcome: Progressing     Problem: RESPIRATORY - ADULT  Goal: Achieves optimal ventilation and oxygenation  Description  INTERVENTIONS:  - Assess for changes in respiratory status  - Assess for changes in mentation and behavior  - Position to facilitate oxygenation and minimize respiratory effort  - Oxygen administered by appropriate delivery if ordered  - Initiate smoking cessation education as indicated  - Encourage broncho-pulmonary hygiene including cough, deep breathe, Incentive Spirometry  - Assess the need for suctioning and aspirate as needed  - Assess and instruct to report SOB or any respiratory difficulty  - Respiratory Therapy support as indicated  Outcome: Progressing     Problem: METABOLIC, FLUID AND ELECTROLYTES - ADULT  Goal: Glucose maintained within target range  Description  INTERVENTIONS:  - Monitor Blood Glucose as ordered  - Assess for signs and symptoms of hyperglycemia and hypoglycemia  - Administer ordered medications to maintain glucose within target range  - Assess nutritional intake and initiate nutrition service referral as needed  Outcome: Progressing  Patient is alert, awake and visible in the unit  Denies suicidal ideation, depression, and  anxiety  Pt ambulating on [address]  Social with peers  Med and meal compliant  Pt  remains  compliant with his routine medications, ate 100% of dinner

## 2020-07-19 NOTE — NURSING NOTE
Received pt in bed at change of shift with eyes closed; chest movement noted  Continues the same thus this far as per continual round  checks  Will continue to monitor

## 2020-07-19 NOTE — PROGRESS NOTES
Psychiatry Progress Note    Subjective: Interval History     The patient is sitting out in the TV room this morning with multiple layers of clothing on  He continues to be fixated on his discharge  He reports he feels well and wants to go home  Patient has been compliant with his medication and meals  He has been visible out on the unit  No behavioral issues noted  He does attend some of the groups      Behavior over the last 24 hours:  unchanged  Sleep: normal  Appetite: normal  Medication side effects: No  ROS: no complaints    Current medications:    Current Facility-Administered Medications:     acetaminophen (TYLENOL) tablet 325 mg, 325 mg, Oral, Q6H PRN, Myrna Merrill MD, 325 mg at 06/23/20 1049    acetaminophen (TYLENOL) tablet 650 mg, 650 mg, Oral, Q6H PRN, Myrna Merrill MD, 650 mg at 06/21/20 0735    acetaminophen (TYLENOL) tablet 975 mg, 975 mg, Oral, Q8H PRN, Myrna Merrill MD, 975 mg at 03/29/20 1759    aluminum-magnesium hydroxide-simethicone (MYLANTA) 200-200-20 mg/5 mL oral suspension 30 mL, 30 mL, Oral, Q4H PRN, Myrna Merrill MD, 30 mL at 06/11/20 0858    amphetamine-dextroamphetamine (ADDERALL) tablet 15 mg, 15 mg, Oral, Daily, Myrna Merrill MD, 15 mg at 07/19/20 0520    atorvastatin (LIPITOR) tablet 10 mg, 10 mg, Oral, Daily With Tamra Estrella MD, 10 mg at 07/18/20 1633    benztropine (COGENTIN) injection 1 mg, 1 mg, Intramuscular, Q6H PRN, Myrna Merrill MD    benztropine (COGENTIN) tablet 1 mg, 1 mg, Oral, Q6H PRN, Myrna Merrill MD, 1 mg at 04/07/20 2031    clozapine (CLOZARIL) tablet 200 mg, 200 mg, Oral, Daily, Myrna Merrill MD, 200 mg at 07/18/20 1632    divalproex sodium (DEPAKOTE ER) 24 hr tablet 1,500 mg, 1,500 mg, Oral, HS, Myrna Merrill MD, 1,500 mg at 07/18/20 2042    docusate sodium (COLACE) capsule 100 mg, 100 mg, Oral, BID, Myrna Merrill MD, 100 mg at 07/19/20 0813    haloperidol (HALDOL) tablet 5 mg, 5 mg, Oral, Q6H PRN, Myrna Merrill MD, 5 mg at 05/05/20 5705   haloperidol lactate (HALDOL) injection 5 mg, 5 mg, Intramuscular, Q6H PRN, Genaro Glover MD    levothyroxine tablet 75 mcg, 75 mcg, Oral, Early Morning, Genaro Glover MD, 75 mcg at 07/19/20 0520    LORazepam (ATIVAN) 2 mg/mL injection 1 mg, 1 mg, Intramuscular, Q6H PRN, Genaro Glover MD    LORazepam (ATIVAN) tablet 1 mg, 1 mg, Oral, Q6H PRN, Genaro Glover MD    magnesium hydroxide (MILK OF MAGNESIA) 400 mg/5 mL oral suspension 30 mL, 30 mL, Oral, Daily PRN, Genaro Glover MD, 30 mL at 03/14/20 2032    metFORMIN (GLUCOPHAGE) tablet 500 mg, 500 mg, Oral, BID With Meals, Genaro Glover MD, 500 mg at 07/19/20 0813    nicotine polacrilex (NICORETTE) gum 2 mg, 2 mg, Oral, Q2H PRN, Genaro Glover MD, 2 mg at 07/19/20 0846    OLANZapine (ZyPREXA) tablet 5 mg, 5 mg, Oral, HS, Nivia Salas, CRNP, 5 mg at 07/18/20 2042    oxybutynin (DITROPAN-XL) 24 hr tablet 5 mg, 5 mg, Oral, Daily, Genaro Glover MD, 5 mg at 07/19/20 0813    pantoprazole (PROTONIX) EC tablet 40 mg, 40 mg, Oral, Early Morning, Genaro Glover MD, 40 mg at 07/19/20 0520    traZODone (DESYREL) tablet 50 mg, 50 mg, Oral, HS PRN, Genaro Glover MD, 50 mg at 07/02/20 2208    Current Problem List:    Patient Active Problem List   Diagnosis    [facility]    Constipation, chronic    Type 2 diabetes mellitus without complication, without long-term current use of insulin ([facility] )    Chronic airway obstruction, not elsewhere classified    Benign essential hypertension    Disorder of lipoprotein and lipid metabolism    Foot callus    Gastroesophageal reflux disease without esophagitis    Acquired hypothyroidism    Morbid obesity ([facility] )    BMI 34 0-34 9,adult    Nail abnormality    Encounter for well adult exam with abnormal findings    Tobacco abuse    Diabetes insipidus, nephrogenic ([facility] )    ADHD       Problem list reviewed 07/19/20     Objective:     Vital Signs:  Vitals:    07/18/20 0705 07/18/20 1612 07/19/20 0730 07/19/20 0732   BP: 131/66 156/74 134/76 138/71   BP Location: Left arm Left arm Left arm Left arm   Pulse: 81 (!) 106 86 88   Resp: 20 20 17    Temp:  97 5 °F (36 4 °C) 97 6 °F (36 4 °C)    TempSrc:  Temporal Temporal    Weight:   113 kg (248 lb 3 2 oz)    Height:             Appearance:  age appropriate and disheveled   Behavior:  guarded   Speech:  normal volume   Mood:  irritable   Affect:  constricted   Thought Process:  circumstantial   Thought Content:  delusions  persecutory   Perceptual Disturbances: None   Risk Potential: none   Sensorium:  person, place, situation and time   Cognition:  intact   Consciousness:  alert and awake    Attention: attention span and concentration were age appropriate   Intellect: average   Insight:  limited   Judgment: limited      Motor Activity: no abnormal movements       I/O Past 24 hours:  No intake/output data recorded  No intake/output data recorded  Labs:  Reviewed 07/19/20    Progress Toward Goals: unchanged    Assessment / Plan:     Schizoaffective disorder, bipolar type ([facility] )    Recommended Treatment:      Medication changes:  1) continue current regimen  Non-pharmacological treatments  1) Continue with group therapy, milieu therapy and occupational therapy  Safety  1) Safety/communication plan established targeting dynamic risk factors above  2) Risks, benefits, and possible side effects of medications explained to patient and patient verbalizes understanding  Counseling / Coordination of Care    Total floor / unit time spent today 20 minutes  Greater than 50% of total time was spent with the patient and / or family counseling and / or coordination of care  A description of the counseling / coordination of care  Patient's Rights, confidentiality and exceptions to confidentiality, use of automated medical record, [facility] kev staff access to medical record, and consent to treatment reviewed      Armando Krishna PA-C

## 2020-07-19 NOTE — PROGRESS NOTES
07/18/20 1300   Activity/Group Checklist   Group Other (Comment)  (OPEN STUDIO/Art Therapy, Social Interaction-Free Expression)   Attendance Attended   Attendance Duration (min) 31-45   Interactions Interacted appropriately   Affect/Mood Appropriate   Goals Achieved Able to listen to others; Able to engage in interactions

## 2020-07-19 NOTE — PLAN OF CARE
Problem: Alteration in Thoughts and Perception  Goal: Verbalize thoughts and feelings  Description  Interventions:  - Promote a nonjudgmental and trusting relationship with the patient through active listening and therapeutic communication  - Assess patient's level of functioning, behavior and potential for risk  - Engage patient in 1 on 1 interactions  - Encourage patient to express fears, feelings, frustrations, and discuss symptoms    - [place] patient to reality, help patient recognize reality-based thinking   - Administer medications as ordered and assess for potential side effects  - Provide the patient education related to the signs and symptoms of the illness and desired effects of prescribed medications  Outcome: Progressing  Goal: Agree to be compliant with medication regime, as prescribed and report medication side effects  Description  Interventions:  - Offer appropriate PRN medication and supervise ingestion; conduct AIMS, as needed   Outcome: Progressing     Problem: Alteration in Thoughts and Perception  Goal: Attend and participate in unit activities, including therapeutic, recreational, and educational groups  Description  Interventions:  -Encourage Visitation and family involvement in care    CERTIFIED PEER SPECIALIST INTERVENTIONS:    Complete peer assessment with patient to assess their needs and identify their goals to complete while in the recovery program as well as once discharged into the community  Patient will complete WRAP Plan, Crisis Plan and 5 Life Domains  Patient will attend 50% of groups offered on the unit        Outcome: Not Progressing       Visible, compliant with routine medications, gait steady, ate 100% of dinner, listened to music with peers, behavior controlled, did not attend evening group, will continue to monitor

## 2020-07-19 NOTE — PLAN OF CARE
Problem: Alteration in Thoughts and Perception  Goal: Agree to be compliant with medication regime, as prescribed and report medication side effects  Description  Interventions:  - Offer appropriate PRN medication and supervise ingestion; conduct AIMS, as needed   Outcome: Progressing  Goal: Attend and participate in unit activities, including therapeutic, recreational, and educational groups  Description  Interventions:  -Encourage Visitation and family involvement in care    CERTIFIED PEER SPECIALIST INTERVENTIONS:    Complete peer assessment with patient to assess their needs and identify their goals to complete while in the recovery program as well as once discharged into the community  Patient will complete WRAP Plan, Crisis Plan and 5 Life Domains  Patient will attend 50% of groups offered on the unit  Outcome: Progressing     Problem: Alteration in Thoughts and Perception  Goal: Refrain from acting on delusional thinking/internal stimuli  Description  Interventions:  - Monitor patient closely, per order   - Utilize least restrictive measures   - Set reasonable limits, give positive feedback for acceptable   - Administer medications as ordered and monitor of potential side effects  Outcome: Not Progressing   Patient is pleasant, cooperative and visible on the unit  He is noted to interact well with his peers  Remains intrusive at times but is easily redirected  Denies SI and Hi's  Also denies having any hallucinations or delusions  Only attended community meeting this shift  No issues or concerns noted at this time  Continue to monitor

## 2020-07-20 PROCEDURE — 99232 SBSQ HOSP IP/OBS MODERATE 35: CPT | Performed by: PSYCHIATRY & NEUROLOGY

## 2020-07-20 RX ADMIN — OLANZAPINE 5 MG: 5 TABLET, FILM COATED ORAL at 20:59

## 2020-07-20 RX ADMIN — DOCUSATE SODIUM 100 MG: 100 CAPSULE, LIQUID FILLED ORAL at 08:33

## 2020-07-20 RX ADMIN — DOCUSATE SODIUM 100 MG: 100 CAPSULE, LIQUID FILLED ORAL at 17:13

## 2020-07-20 RX ADMIN — PANTOPRAZOLE SODIUM 40 MG: 40 TABLET, DELAYED RELEASE ORAL at 05:23

## 2020-07-20 RX ADMIN — DEXTROAMPHETAMINE SACCHARATE, AMPHETAMINE ASPARTATE, DEXTROAMPHETAMINE SULFATE AND AMPHETAMINE SULFATE 15 MG: 2.5; 2.5; 2.5; 2.5 TABLET ORAL at 05:23

## 2020-07-20 RX ADMIN — ATORVASTATIN CALCIUM 10 MG: 10 TABLET, FILM COATED ORAL at 16:40

## 2020-07-20 RX ADMIN — METFORMIN HYDROCHLORIDE 500 MG: 500 TABLET ORAL at 08:33

## 2020-07-20 RX ADMIN — LEVOTHYROXINE SODIUM 75 MCG: 75 TABLET ORAL at 05:23

## 2020-07-20 RX ADMIN — DIVALPROEX SODIUM 1500 MG: 500 TABLET, EXTENDED RELEASE ORAL at 21:00

## 2020-07-20 RX ADMIN — METFORMIN HYDROCHLORIDE 500 MG: 500 TABLET ORAL at 16:40

## 2020-07-20 RX ADMIN — CLOZAPINE 200 MG: 200 TABLET ORAL at 16:40

## 2020-07-20 RX ADMIN — OXYBUTYNIN CHLORIDE 5 MG: 5 TABLET, EXTENDED RELEASE ORAL at 08:33

## 2020-07-20 RX ADMIN — NICOTINE POLACRILEX 2 MG: 2 GUM, CHEWING ORAL at 08:33

## 2020-07-20 NOTE — PLAN OF CARE
Problem: Alteration in Thoughts and Perception  Goal: Attend and participate in unit activities, including therapeutic, recreational, and educational groups  Description  Interventions:  -Encourage Visitation and family involvement in care    CERTIFIED PEER SPECIALIST INTERVENTIONS:    Complete peer assessment with patient to assess their needs and identify their goals to complete while in the recovery program as well as once discharged into the community  Patient will complete WRAP Plan, Crisis Plan and 5 Life Domains  Patient will attend 50% of groups offered on the unit  Outcome: Progressing  Patient attends select groups but remains inconsistent with daytime groups  However, Patient has shown some improvement in his behaviors when he does attend  Patient able to sit through more group time than he has in the past  Patient also able to stay a bit more focused on group topic and less on changing subject to his discharge from the [facility]   Patient has completed WRAP Plan, Life Domains, [facility] Relapse prevention plan and often will ask this writer for assistance on his weekly Self Assessment for Treatment Team

## 2020-07-20 NOTE — PLAN OF CARE
Problem: Alteration in Thoughts and Perception  Goal: Refrain from acting on delusional thinking/internal stimuli  Description  Interventions:  - Monitor patient closely, per order   - Utilize least restrictive measures   - Set reasonable limits, give positive feedback for acceptable   - Administer medications as ordered and monitor of potential side effects  Outcome: Progressing  Goal: Agree to be compliant with medication regime, as prescribed and report medication side effects  Description  Interventions:  - Offer appropriate PRN medication and supervise ingestion; conduct AIMS, as needed   Outcome: Progressing     Problem: Alteration in Thoughts and Perception  Goal: Attend and participate in unit activities, including therapeutic, recreational, and educational groups  Description  Interventions:  -Encourage Visitation and family involvement in care    CERTIFIED PEER SPECIALIST INTERVENTIONS:    Complete peer assessment with patient to assess their needs and identify their goals to complete while in the recovery program as well as once discharged into the community  Patient will complete WRAP Plan, Crisis Plan and 5 Life Domains  Patient will attend 50% of groups offered on the unit  Outcome: Not Progressing   Patient is pleasant, cooperative and visible on the unit  He is noted to interact well with his peers  Remains intrusive at times but is easily redirected  Denies SI and Hi's  Also denies having any hallucinations or delusions  No group attendance noted this shift  No issues or concerns noted at this time  Continue to monitor

## 2020-07-20 NOTE — PROGRESS NOTES
07/20/20 1205   Team Meeting   Meeting Type Daily Rounds   Initial Conference Date 07/20/20   Team Members Present   Team Members Present Physician;Nurse;; Other (Discipline and Name)   Patient/Family Present   Patient Present No   Patient's Family Present No     Daily Rounds Documentation  Team Members Present:  MD Cesar Matt, ASHAW  Khris Elizalde, TABBY Rangel, MCKAYLA    Case reviewed  No behavioral issues  No other updates

## 2020-07-20 NOTE — PROGRESS NOTES
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  Pt also shared he likes this unit much better than [place] and feels he is treated much better here  Pt reported he has not missed any medications, he feels his behaviors have been controlled, and he has taken care of himself with hygiene, meditation and eating well  We discussed the hospital has not yet allowed pts to make day trips or leave the unit due to COVID, to which pt was receptive   Pt in agreement with his treatment plan and verbalized understanding, though also stated he feels he is ready to be discharged with an OP therapist and ACT team, stated he does not need a psychiatrist, and also stated he "will pay good money for a private therapist " Pt remains grandiose and delusional
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
## 2020-07-20 NOTE — PLAN OF CARE
Problem: Alteration in Thoughts and Perception  Goal: Verbalize thoughts and feelings  Description  Interventions:  - Promote a nonjudgmental and trusting relationship with the patient through active listening and therapeutic communication  - Assess patient's level of functioning, behavior and potential for risk  - Engage patient in 1 on 1 interactions  - Encourage patient to express fears, feelings, frustrations, and discuss symptoms    - [place] patient to reality, help patient recognize reality-based thinking   - Administer medications as ordered and assess for potential side effects  - Provide the patient education related to the signs and symptoms of the illness and desired effects of prescribed medications  Outcome: Progressing  Goal: Refrain from acting on delusional thinking/internal stimuli  Description  Interventions:  - Monitor patient closely, per order   - Utilize least restrictive measures   - Set reasonable limits, give positive feedback for acceptable   - Administer medications as ordered and monitor of potential side effects  Outcome: Progressing  Goal: Agree to be compliant with medication regime, as prescribed and report medication side effects  Description  Interventions:  - Offer appropriate PRN medication and supervise ingestion; conduct AIMS, as needed   Outcome: Progressing  Goal: Attend and participate in unit activities, including therapeutic, recreational, and educational groups  Description  Interventions:  -Encourage Visitation and family involvement in care    CERTIFIED PEER SPECIALIST INTERVENTIONS:    Complete peer assessment with patient to assess their needs and identify their goals to complete while in the recovery program as well as once discharged into the community  Patient will complete WRAP Plan, Crisis Plan and 5 Life Domains  Patient will attend 50% of groups offered on the unit        Outcome: Not Progressing  Goal: Complete daily ADLs, including personal hygiene independently, as able  Description  Interventions:  - Observe, teach, and assist patient with ADLS  - Monitor and promote a balance of rest/activity, with adequate nutrition and elimination   Outcome: Progressing     Problem: Ineffective Coping  Goal: Demonstrates healthy coping skills  Outcome: Progressing  Goal: Understands least restrictive measures  Description  Interventions:  - Utilize least restrictive behavior  Outcome: Progressing     Problem: GASTROINTESTINAL - ADULT  Goal: Maintains adequate nutritional intake  Description  INTERVENTIONS:  - Monitor percentage of each meal consumed  - Identify factors contributing to decreased intake, treat as appropriate  - Assist with meals as needed  - Monitor I&O, weight, and lab values if indicated  - Obtain nutrition services referral as needed  Outcome: Araceli Davenport has been awake, alert, and visible intermittently out in the milieu  Pt completed his daily ADL's today  Pleasant, polite, and cooperative  No behavioral issues  Ate 100% supper  Less preoccupied with discharge  Spends time resting in room at intervals  No interaction noted with peers  Compliant with all scheduled meds when called and cooperative with mouth checks  Pt did not attend evening group  Had evening snack  Continue to monitor/assess for any changes

## 2020-07-20 NOTE — PROGRESS NOTES
07/20/20 1100   Activity/Group Checklist   Group   (IMR/Mental Strength )   Attendance Did not attend   Attendance Duration (min) 46-60   Affect/Mood GAEL

## 2020-07-20 NOTE — PROGRESS NOTES
07/20/20 0900   Activity/Group Checklist   Group Community meeting   Attendance Did not attend   Attendance Duration (min) 31-45   Affect/Mood GAEL

## 2020-07-21 PROCEDURE — 99232 SBSQ HOSP IP/OBS MODERATE 35: CPT | Performed by: PSYCHIATRY & NEUROLOGY

## 2020-07-21 RX ADMIN — DOCUSATE SODIUM 100 MG: 100 CAPSULE, LIQUID FILLED ORAL at 17:13

## 2020-07-21 RX ADMIN — PANTOPRAZOLE SODIUM 40 MG: 40 TABLET, DELAYED RELEASE ORAL at 06:02

## 2020-07-21 RX ADMIN — METFORMIN HYDROCHLORIDE 500 MG: 500 TABLET ORAL at 16:32

## 2020-07-21 RX ADMIN — DEXTROAMPHETAMINE SACCHARATE, AMPHETAMINE ASPARTATE, DEXTROAMPHETAMINE SULFATE AND AMPHETAMINE SULFATE 15 MG: 2.5; 2.5; 2.5; 2.5 TABLET ORAL at 06:02

## 2020-07-21 RX ADMIN — CLOZAPINE 200 MG: 200 TABLET ORAL at 16:32

## 2020-07-21 RX ADMIN — DOCUSATE SODIUM 100 MG: 100 CAPSULE, LIQUID FILLED ORAL at 08:51

## 2020-07-21 RX ADMIN — ATORVASTATIN CALCIUM 10 MG: 10 TABLET, FILM COATED ORAL at 16:32

## 2020-07-21 RX ADMIN — DIVALPROEX SODIUM 1500 MG: 500 TABLET, EXTENDED RELEASE ORAL at 20:55

## 2020-07-21 RX ADMIN — NICOTINE POLACRILEX 2 MG: 2 GUM, CHEWING ORAL at 08:53

## 2020-07-21 RX ADMIN — OLANZAPINE 5 MG: 5 TABLET, FILM COATED ORAL at 20:55

## 2020-07-21 RX ADMIN — OXYBUTYNIN CHLORIDE 5 MG: 5 TABLET, EXTENDED RELEASE ORAL at 08:52

## 2020-07-21 RX ADMIN — METFORMIN HYDROCHLORIDE 500 MG: 500 TABLET ORAL at 08:52

## 2020-07-21 RX ADMIN — LEVOTHYROXINE SODIUM 75 MCG: 75 TABLET ORAL at 06:02
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
## 2020-07-21 NOTE — PROGRESS NOTES
07/21/20 0900   Activity/Group Checklist   Group Community meeting   Attendance Attended   Attendance Duration (min) 31-45   Interactions Interacted appropriately   Affect/Mood Appropriate;Bright;Normal range   Goals Achieved Identified feelings; Discussed discharge plans; Able to engage in interactions; Able to listen to others

## 2020-07-21 NOTE — CASE MANAGEMENT
Package was received for patient; met with pt while he opened it- clothing, cd player with battery pack, cds, vase, underwear, deodorant, and card from family received  Pt was happy to get these new belongings and already asked if he can use his new cd player  CM and nurse explained clearly that 50% of groups and IMR need to be attended in order for him to have this privilege  Pt had not attended IMR today  Pt verbalized understanding

## 2020-07-21 NOTE — PROGRESS NOTES
07/21/20 1121   Team Meeting   Meeting Type Daily Rounds   Initial Conference Date 07/21/20   Team Members Present   Team Members Present Physician;Nurse;; Other (Discipline and Name)   Patient/Family Present   Patient Present No   Patient's Family Present No     Daily Rounds Documentation  Team Members Present:  MD Danish Miller, RN  Milly Bray, [address] [place] [address]    Case reviewed  Disheveled  Preoccupied  Behaviors controlled

## 2020-07-21 NOTE — PROGRESS NOTES
07/21/20 1100   Activity/Group Checklist   Group   (IMR/6 Pillars of Self Esteem )   Attendance Did not attend   Attendance Duration (min) Greater than 60   Affect/Mood GAEL
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
## 2020-07-22 PROCEDURE — 99232 SBSQ HOSP IP/OBS MODERATE 35: CPT | Performed by: PSYCHIATRY & NEUROLOGY

## 2020-07-22 RX ADMIN — OXYBUTYNIN CHLORIDE 5 MG: 5 TABLET, EXTENDED RELEASE ORAL at 08:22

## 2020-07-22 RX ADMIN — DOCUSATE SODIUM 100 MG: 100 CAPSULE, LIQUID FILLED ORAL at 17:23

## 2020-07-22 RX ADMIN — PANTOPRAZOLE SODIUM 40 MG: 40 TABLET, DELAYED RELEASE ORAL at 05:09

## 2020-07-22 RX ADMIN — ATORVASTATIN CALCIUM 10 MG: 10 TABLET, FILM COATED ORAL at 16:55

## 2020-07-22 RX ADMIN — CLOZAPINE 200 MG: 200 TABLET ORAL at 16:55

## 2020-07-22 RX ADMIN — METFORMIN HYDROCHLORIDE 500 MG: 500 TABLET ORAL at 08:24

## 2020-07-22 RX ADMIN — LEVOTHYROXINE SODIUM 75 MCG: 75 TABLET ORAL at 05:09

## 2020-07-22 RX ADMIN — DOCUSATE SODIUM 100 MG: 100 CAPSULE, LIQUID FILLED ORAL at 08:22

## 2020-07-22 RX ADMIN — DIVALPROEX SODIUM 1500 MG: 500 TABLET, EXTENDED RELEASE ORAL at 21:01

## 2020-07-22 RX ADMIN — OLANZAPINE 5 MG: 5 TABLET, FILM COATED ORAL at 21:01

## 2020-07-22 RX ADMIN — METFORMIN HYDROCHLORIDE 500 MG: 500 TABLET ORAL at 16:55

## 2020-07-22 RX ADMIN — DEXTROAMPHETAMINE SACCHARATE, AMPHETAMINE ASPARTATE, DEXTROAMPHETAMINE SULFATE AND AMPHETAMINE SULFATE 15 MG: 2.5; 2.5; 2.5; 2.5 TABLET ORAL at 05:09

## 2020-07-22 NOTE — PROGRESS NOTES
07/21/20 1400   Activity/Group Checklist   Group   (Recovery Workshop )   Attendance Attended   Attendance Duration (min) 46-60   Interactions Interacted appropriately   Affect/Mood Appropriate;Normal range   Goals Achieved Identified feelings; Able to listen to others; Able to engage in interactions
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
## 2020-07-22 NOTE — PLAN OF CARE
Problem: Alteration in Thoughts and Perception  Goal: Verbalize thoughts and feelings  Description  Interventions:  - Promote a nonjudgmental and trusting relationship with the patient through active listening and therapeutic communication  - Assess patient's level of functioning, behavior and potential for risk  - Engage patient in 1 on 1 interactions  - Encourage patient to express fears, feelings, frustrations, and discuss symptoms    - [employer] patient to reality, help patient recognize reality-based thinking   - Administer medications as ordered and assess for potential side effects  - Provide the patient education related to the signs and symptoms of the illness and desired effects of prescribed medications  Outcome: Progressing  Goal: Refrain from acting on delusional thinking/internal stimuli  Description  Interventions:  - Monitor patient closely, per order   - Utilize least restrictive measures   - Set reasonable limits, give positive feedback for acceptable   - Administer medications as ordered and monitor of potential side effects  Outcome: Progressing  Goal: Agree to be compliant with medication regime, as prescribed and report medication side effects  Description  Interventions:  - Offer appropriate PRN medication and supervise ingestion; conduct AIMS, as needed   Outcome: Progressing  Goal: Attend and participate in unit activities, including therapeutic, recreational, and educational groups  Description  Interventions:  -Encourage Visitation and family involvement in care    CERTIFIED PEER SPECIALIST INTERVENTIONS:    Complete peer assessment with patient to assess their needs and identify their goals to complete while in the recovery program as well as once discharged into the community  Patient will complete WRAP Plan, Crisis Plan and 5 Life Domains  Patient will attend 50% of groups offered on the unit        Outcome: Progressing  Goal: Recognize dysfunctional thoughts, communicate reality-based thoughts at the time of discharge  Description  Interventions:  - Provide medication and psycho-education to assist patient in compliance and developing insight into his/her illness   Outcome: Progressing  Goal: Attend and participate in unit activities, including therapeutic, recreational, and educational groups  Description  Interventions:  -Encourage Visitation and family involvement in care  Outcome: Progressing     Problem: Ineffective Coping  Goal: Patient/Family verbalizes awareness of resources  Outcome: Progressing  Goal: Understands least restrictive measures  Description  Interventions:  - Utilize least restrictive behavior  Outcome: Progressing     Problem: GASTROINTESTINAL - ADULT  Goal: Minimal or absence of nausea and/or vomiting  Description  INTERVENTIONS:  - Administer IV fluids if ordered to ensure adequate hydration  - Maintain NPO status until nausea and vomiting are resolved  - Nasogastric tube if ordered  - Administer ordered antiemetic medications as needed  - Provide nonpharmacologic comfort measures as appropriate  - Advance diet as tolerated, if ordered  - Consider nutrition services referral to assist patient with adequate nutrition and appropriate food choices  Outcome: Progressing  Goal: Maintains or returns to baseline bowel function  Description  INTERVENTIONS:  - Assess bowel function  - Encourage oral fluids to ensure adequate hydration  - Administer IV fluids if ordered to ensure adequate hydration  - Administer ordered medications as needed  - Encourage mobilization and activity  - Consider nutritional services referral to assist patient with adequate nutrition and appropriate food choices  Outcome: Progressing  Goal: Maintains adequate nutritional intake  Description  INTERVENTIONS:  - Monitor percentage of each meal consumed  - Identify factors contributing to decreased intake, treat as appropriate  - Assist with meals as needed  - Monitor I&O, weight, and lab values if indicated  - Obtain nutrition services referral as needed  Outcome: Progressing     Problem: Alteration in Thoughts and Perception  Goal: Treatment Goal: Gain control of psychotic behaviors/thinking, reduce/eliminate presenting symptoms and demonstrate improved reality functioning upon discharge  Outcome: Not Progressing  Goal: Complete daily ADLs, including personal hygiene independently, as able  Description  Interventions:  - Observe, teach, and assist patient with ADLS  - Monitor and promote a balance of rest/activity, with adequate nutrition and elimination   Outcome: Not Progressing   Visible intermittently, pleasant and friendly with others when in the milieu, attended morning walk journaling, and IMR groups  Used his new CD player during journaling group and was informed that if he continues to attend groups that he would be allowed to use it on evening shift during electronics hour  Still continues to lack insight into illness, concrete and child-like in thought process  Ate 100% of both meals  No other behaviors or issues noted  Med compliant  Continue to monitor

## 2020-07-22 NOTE — PLAN OF CARE
Problem: Alteration in Thoughts and Perception  Goal: Verbalize thoughts and feelings  Description  Interventions:  - Promote a nonjudgmental and trusting relationship with the patient through active listening and therapeutic communication  - Assess patient's level of functioning, behavior and potential for risk  - Engage patient in 1 on 1 interactions  - Encourage patient to express fears, feelings, frustrations, and discuss symptoms    - [place] patient to reality, help patient recognize reality-based thinking   - Administer medications as ordered and assess for potential side effects  - Provide the patient education related to the signs and symptoms of the illness and desired effects of prescribed medications  Outcome: Progressing  Goal: Refrain from acting on delusional thinking/internal stimuli  Description  Interventions:  - Monitor patient closely, per order   - Utilize least restrictive measures   - Set reasonable limits, give positive feedback for acceptable   - Administer medications as ordered and monitor of potential side effects  Outcome: Progressing  Goal: Agree to be compliant with medication regime, as prescribed and report medication side effects  Description  Interventions:  - Offer appropriate PRN medication and supervise ingestion; conduct AIMS, as needed   Outcome: Progressing       Visible, compliant with routine medications, gait steady, ate 100% of dinner, listened to music with peers, behavior controlled, did not attend evening group, will continue to monitor

## 2020-07-22 NOTE — NURSING NOTE
Received pt in bed at change of shift with eyes closed; chest movement noted  Continues the same thus this as per continual rounding checks  Med compliant this am   Will continue to monitor

## 2020-07-22 NOTE — PROGRESS NOTES
07/22/20 0900   Activity/Group Checklist   Group Community meeting   Attendance Attended   Attendance Duration (min) 31-45   Interactions Interacted appropriately   Affect/Mood Appropriate;Bright;Normal range   Goals Achieved Identified feelings; Discussed discharge plans; Discussed coping strategies; Able to engage in interactions; Able to listen to others; Able to self-disclose

## 2020-07-22 NOTE — PROGRESS NOTES
07/22/20 1100   Activity/Group Checklist   Group   (IMR/Recovery Anonymous )   Attendance Attended   Attendance Duration (min) 46-60   Interactions Interacted appropriately   Affect/Mood Appropriate;Normal range; Constricted   Goals Achieved Identified feelings; Able to listen to others; Able to engage in interactions

## 2020-07-22 NOTE — PROGRESS NOTES
07/22/20 0918   Team Meeting   Meeting Type Daily Rounds   Initial Conference Date 07/22/20   Team Members Present   Team Members Present Physician;Nurse;; Other (Discipline and Name)   Patient/Family Present   Patient Present No   Patient's Family Present No     Daily Rounds Documentation  Team Members Present:  MD Brannon Brennan, RN  Liana Cabrera, LSW  Richard Ken, MCKAYLA     Case reviewed  No 3-11 groups  Controlled and med compliant  Received his package yesterday and reviewed with nurse the expectations required for him to use CD player during electronics time  Pt reminded he needs to attend 50% of groups

## 2020-07-23 PROCEDURE — 99232 SBSQ HOSP IP/OBS MODERATE 35: CPT | Performed by: PSYCHIATRY & NEUROLOGY

## 2020-07-23 RX ADMIN — LEVOTHYROXINE SODIUM 75 MCG: 75 TABLET ORAL at 06:01

## 2020-07-23 RX ADMIN — CLOZAPINE 200 MG: 200 TABLET ORAL at 17:09

## 2020-07-23 RX ADMIN — PANTOPRAZOLE SODIUM 40 MG: 40 TABLET, DELAYED RELEASE ORAL at 06:01

## 2020-07-23 RX ADMIN — METFORMIN HYDROCHLORIDE 500 MG: 500 TABLET ORAL at 17:09

## 2020-07-23 RX ADMIN — DOCUSATE SODIUM 100 MG: 100 CAPSULE, LIQUID FILLED ORAL at 17:09

## 2020-07-23 RX ADMIN — NICOTINE POLACRILEX 2 MG: 2 GUM, CHEWING ORAL at 08:55

## 2020-07-23 RX ADMIN — DIVALPROEX SODIUM 1500 MG: 500 TABLET, EXTENDED RELEASE ORAL at 20:48

## 2020-07-23 RX ADMIN — METFORMIN HYDROCHLORIDE 500 MG: 500 TABLET ORAL at 08:31

## 2020-07-23 RX ADMIN — OXYBUTYNIN CHLORIDE 5 MG: 5 TABLET, EXTENDED RELEASE ORAL at 08:31

## 2020-07-23 RX ADMIN — DEXTROAMPHETAMINE SACCHARATE, AMPHETAMINE ASPARTATE, DEXTROAMPHETAMINE SULFATE AND AMPHETAMINE SULFATE 15 MG: 2.5; 2.5; 2.5; 2.5 TABLET ORAL at 06:01

## 2020-07-23 RX ADMIN — DOCUSATE SODIUM 100 MG: 100 CAPSULE, LIQUID FILLED ORAL at 08:31

## 2020-07-23 RX ADMIN — ATORVASTATIN CALCIUM 10 MG: 10 TABLET, FILM COATED ORAL at 17:09

## 2020-07-23 RX ADMIN — OLANZAPINE 5 MG: 5 TABLET, FILM COATED ORAL at 20:48

## 2020-07-23 NOTE — PROGRESS NOTES
07/23/20 1100   Activity/Group Checklist   Group   (IMR/Boundaries )   Attendance Did not attend   Attendance Duration (min) 46-60   Affect/Mood GAEL
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
## 2020-07-23 NOTE — PLAN OF CARE
Problem: Alteration in Thoughts and Perception  Goal: Treatment Goal: Gain control of psychotic behaviors/thinking, reduce/eliminate presenting symptoms and demonstrate improved reality functioning upon discharge  Outcome: Progressing  Goal: Verbalize thoughts and feelings  Description  Interventions:  - Promote a nonjudgmental and trusting relationship with the patient through active listening and therapeutic communication  - Assess patient's level of functioning, behavior and potential for risk  - Engage patient in 1 on 1 interactions  - Encourage patient to express fears, feelings, frustrations, and discuss symptoms    - [place] patient to reality, help patient recognize reality-based thinking   - Administer medications as ordered and assess for potential side effects  - Provide the patient education related to the signs and symptoms of the illness and desired effects of prescribed medications  Outcome: Progressing  Goal: Refrain from acting on delusional thinking/internal stimuli  Description  Interventions:  - Monitor patient closely, per order   - Utilize least restrictive measures   - Set reasonable limits, give positive feedback for acceptable   - Administer medications as ordered and monitor of potential side effects  Outcome: Progressing  Goal: Agree to be compliant with medication regime, as prescribed and report medication side effects  Description  Interventions:  - Offer appropriate PRN medication and supervise ingestion; conduct AIMS, as needed   Outcome: Progressing  Goal: Attend and participate in unit activities, including therapeutic, recreational, and educational groups  Description  Interventions:  -Encourage Visitation and family involvement in care    CERTIFIED PEER SPECIALIST INTERVENTIONS:    Complete peer assessment with patient to assess their needs and identify their goals to complete while in the recovery program as well as once discharged into the community       Patient will complete WRAP Plan, Crisis Plan and 5 Life Domains  Patient will attend 50% of groups offered on the unit  Outcome: Progressing  Goal: Attend and participate in unit activities, including therapeutic, recreational, and educational groups  Description  Interventions:  -Encourage Visitation and family involvement in care  Outcome: Progressing     Problem: GASTROINTESTINAL - ADULT  Goal: Minimal or absence of nausea and/or vomiting  Description  INTERVENTIONS:  - Administer IV fluids if ordered to ensure adequate hydration  - Maintain NPO status until nausea and vomiting are resolved  - Nasogastric tube if ordered  - Administer ordered antiemetic medications as needed  - Provide nonpharmacologic comfort measures as appropriate  - Advance diet as tolerated, if ordered  - Consider nutrition services referral to assist patient with adequate nutrition and appropriate food choices  Outcome: Progressing  Goal: Maintains or returns to baseline bowel function  Description  INTERVENTIONS:  - Assess bowel function  - Encourage oral fluids to ensure adequate hydration  - Administer IV fluids if ordered to ensure adequate hydration  - Administer ordered medications as needed  - Encourage mobilization and activity  - Consider nutritional services referral to assist patient with adequate nutrition and appropriate food choices  Outcome: Progressing  Goal: Maintains adequate nutritional intake  Description  INTERVENTIONS:  - Monitor percentage of each meal consumed  - Identify factors contributing to decreased intake, treat as appropriate  - Assist with meals as needed  - Monitor I&O, weight, and lab values if indicated  - Obtain nutrition services referral as needed  Outcome: Progressing   Visible intermittently, pleasant and friendly with others when in the milieu, attended morning walk and journaling groups  Showed up late to [facility] group, so was not let in or given credit   Disheveled and disorganized, still continues to lack insight into illness  Louann and child-like in thought process  Ate 100% of both meals  No other behaviors or issues noted  Med compliant  Continue to monitor

## 2020-07-23 NOTE — PROGRESS NOTES
07/23/20 0851   Team Meeting   Meeting Type Daily Rounds   Initial Conference Date 07/23/20   Patient/Family Present   Patient Present No   Patient's Family Present No     Daily Rounds Documentation     Team Members Present:   MD Gregory Espinoza, RN  Jade Mcgrath, MCKAYLA [place], [address] [place] [address]    No groups 3-11 yesterday  Sleeping well
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
## 2020-07-23 NOTE — PLAN OF CARE
Problem: Alteration in Thoughts and Perception  Goal: Verbalize thoughts and feelings  Description  Interventions:  - Promote a nonjudgmental and trusting relationship with the patient through active listening and therapeutic communication  - Assess patient's level of functioning, behavior and potential for risk  - Engage patient in 1 on 1 interactions  - Encourage patient to express fears, feelings, frustrations, and discuss symptoms    - [place] patient to reality, help patient recognize reality-based thinking   - Administer medications as ordered and assess for potential side effects  - Provide the patient education related to the signs and symptoms of the illness and desired effects of prescribed medications  Outcome: Progressing  Goal: Refrain from acting on delusional thinking/internal stimuli  Description  Interventions:  - Monitor patient closely, per order   - Utilize least restrictive measures   - Set reasonable limits, give positive feedback for acceptable   - Administer medications as ordered and monitor of potential side effects  Outcome: Progressing  Goal: Agree to be compliant with medication regime, as prescribed and report medication side effects  Description  Interventions:  - Offer appropriate PRN medication and supervise ingestion; conduct AIMS, as needed   Outcome: Progressing  Goal: Attend and participate in unit activities, including therapeutic, recreational, and educational groups  Description  Interventions:  -Encourage Visitation and family involvement in care    CERTIFIED PEER SPECIALIST INTERVENTIONS:    Complete peer assessment with patient to assess their needs and identify their goals to complete while in the recovery program as well as once discharged into the community  Patient will complete WRAP Plan, Crisis Plan and 5 Life Domains  Patient will attend 50% of groups offered on the unit        Outcome: Not Progressing  Goal: Attend and participate in unit activities, including therapeutic, recreational, and educational groups  Description  Interventions:  -Encourage Visitation and family involvement in care  Outcome: Progressing     Problem: Ineffective Coping  Goal: Demonstrates healthy coping skills  Outcome: Progressing  Goal: Understands least restrictive measures  Description  Interventions:  - Utilize least restrictive behavior  Outcome: Progressing     Problem: GASTROINTESTINAL - ADULT  Goal: Maintains adequate nutritional intake  Description  INTERVENTIONS:  - Monitor percentage of each meal consumed  - Identify factors contributing to decreased intake, treat as appropriate  - Assist with meals as needed  - Monitor I&O, weight, and lab values if indicated  - Obtain nutrition services referral as needed  Outcome: Edson Collins has been awake, alert, and visible intermittently out in the milieu  Pt used his walkman to listen to music during electronics hour  Ate 100% supper  No interaction noted with peers  Less preoccupied with discharge  Pleasant and cooperative  Disheveled in appearance and dressed in layers  Rests in room at intervals  Pt did not attend evening group but came out for snack after  Pt denies any depression, anxiety, si/hi, intent, plan, a/v hallucinations, and has not verbalized any delusions  Compliant with all scheduled meds when called  No behavioral issues  Continue to monitor/assess for any changes

## 2020-07-23 NOTE — PROGRESS NOTES
07/23/20 0900   Activity/Group Checklist   Group Community meeting   Attendance Attended   Attendance Duration (min) 31-45   Interactions Interacted appropriately   Affect/Mood Appropriate;Bright;Normal range   Goals Achieved Identified feelings; Able to listen to others; Able to engage in interactions; Able to self-disclose

## 2020-07-24 PROCEDURE — 99232 SBSQ HOSP IP/OBS MODERATE 35: CPT | Performed by: PSYCHIATRY & NEUROLOGY

## 2020-07-24 RX ADMIN — OLANZAPINE 5 MG: 5 TABLET, FILM COATED ORAL at 21:01

## 2020-07-24 RX ADMIN — DIVALPROEX SODIUM 1500 MG: 500 TABLET, EXTENDED RELEASE ORAL at 21:01

## 2020-07-24 RX ADMIN — ATORVASTATIN CALCIUM 10 MG: 10 TABLET, FILM COATED ORAL at 16:46

## 2020-07-24 RX ADMIN — OXYBUTYNIN CHLORIDE 5 MG: 5 TABLET, EXTENDED RELEASE ORAL at 08:46

## 2020-07-24 RX ADMIN — METFORMIN HYDROCHLORIDE 500 MG: 500 TABLET ORAL at 08:46

## 2020-07-24 RX ADMIN — DEXTROAMPHETAMINE SACCHARATE, AMPHETAMINE ASPARTATE, DEXTROAMPHETAMINE SULFATE AND AMPHETAMINE SULFATE 15 MG: 2.5; 2.5; 2.5; 2.5 TABLET ORAL at 05:36

## 2020-07-24 RX ADMIN — CLOZAPINE 200 MG: 200 TABLET ORAL at 16:46

## 2020-07-24 RX ADMIN — DOCUSATE SODIUM 100 MG: 100 CAPSULE, LIQUID FILLED ORAL at 17:02

## 2020-07-24 RX ADMIN — DOCUSATE SODIUM 100 MG: 100 CAPSULE, LIQUID FILLED ORAL at 08:46

## 2020-07-24 RX ADMIN — PANTOPRAZOLE SODIUM 40 MG: 40 TABLET, DELAYED RELEASE ORAL at 05:36

## 2020-07-24 RX ADMIN — METFORMIN HYDROCHLORIDE 500 MG: 500 TABLET ORAL at 16:46

## 2020-07-24 RX ADMIN — LEVOTHYROXINE SODIUM 75 MCG: 75 TABLET ORAL at 05:36

## 2020-07-24 NOTE — PROGRESS NOTES
07/24/20 0836   Team Meeting   Meeting Type Daily Rounds   Initial Conference Date 07/24/20   Patient/Family Present   Patient Present No   Patient's Family Present No     Daily Rounds Documentation     Team Members Present:   MD Bertha Rinaldi, TABBY Harmon, ELIZABETH Umana    No 3-11 groups  Disheveled  Controlled  Med compliant

## 2020-07-24 NOTE — SOCIAL WORK
Message left for Trini Mendez at [facility] regarding patient  SW explained how they interviewed patient back in December but went with another candidate; ISRA explained that patient has been at [facility] since end of December and doing well  ISRA asked for a return call to discuss re-referring patient to [facility]  Email sent to Jazmin Roman and Trina Carver at [facility] regarding request to move forward with referral to [facility]  SW asked if new referral should be completed since patient was referred back in December prior to admission to [facility]

## 2020-07-24 NOTE — PLAN OF CARE
Problem: Alteration in Thoughts and Perception  Goal: Verbalize thoughts and feelings  Description  Interventions:  - Promote a nonjudgmental and trusting relationship with the patient through active listening and therapeutic communication  - Assess patient's level of functioning, behavior and potential for risk  - Engage patient in 1 on 1 interactions  - Encourage patient to express fears, feelings, frustrations, and discuss symptoms    - [place] patient to reality, help patient recognize reality-based thinking   - Administer medications as ordered and assess for potential side effects  - Provide the patient education related to the signs and symptoms of the illness and desired effects of prescribed medications  Outcome: Progressing  Goal: Refrain from acting on delusional thinking/internal stimuli  Description  Interventions:  - Monitor patient closely, per order   - Utilize least restrictive measures   - Set reasonable limits, give positive feedback for acceptable   - Administer medications as ordered and monitor of potential side effects  Outcome: Progressing  Goal: Agree to be compliant with medication regime, as prescribed and report medication side effects  Description  Interventions:  - Offer appropriate PRN medication and supervise ingestion; conduct AIMS, as needed   Outcome: Progressing  Goal: Attend and participate in unit activities, including therapeutic, recreational, and educational groups  Description  Interventions:  -Encourage Visitation and family involvement in care    CERTIFIED PEER SPECIALIST INTERVENTIONS:    Complete peer assessment with patient to assess their needs and identify their goals to complete while in the recovery program as well as once discharged into the community  Patient will complete WRAP Plan, Crisis Plan and 5 Life Domains  Patient will attend 50% of groups offered on the unit        Outcome: Not Progressing     Problem: Ineffective Coping  Goal: Demonstrates healthy coping skills  Outcome: Progressing  Goal: Understands least restrictive measures  Description  Interventions:  - Utilize least restrictive behavior  Outcome: Progressing     Problem: GASTROINTESTINAL - ADULT  Goal: Maintains adequate nutritional intake  Description  INTERVENTIONS:  - Monitor percentage of each meal consumed  - Identify factors contributing to decreased intake, treat as appropriate  - Assist with meals as needed  - Monitor I&O, weight, and lab values if indicated  - Obtain nutrition services referral as needed  Outcome: Garcia Messer has been awake, alert, and visible intermittently out in the milieu  Ate 100% supper  Pleasant and cooperative  Singing to self at times  Disheveled in appearance and dressed in layers  Minimal interaction noted with peers  Rests in room at intervals  No behavioral isssues  Denies any depression, anxiety, and has not verbalized any delusions  Compliant with all scheduled meds without prompting  Pt did not attend evening group but came out for snack after  Continue to monitor/assess for any changes

## 2020-07-24 NOTE — PROGRESS NOTES
07/24/20 0900   Activity/Group Checklist   Group Community meeting   Attendance Refused     SW approached patient in bed and encouraged group attendance, but patient declined and stayed in bed

## 2020-07-24 NOTE — NURSING NOTE
Patient visible on unit, med and meal compliant, behaviors controlled, offered no complaints  Patient disheveled, poor hygiene, lacks motivation, attended 1/4 groups today  Will continue to monitor
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
## 2020-07-25 PROCEDURE — 99231 SBSQ HOSP IP/OBS SF/LOW 25: CPT | Performed by: PSYCHIATRY & NEUROLOGY

## 2020-07-25 RX ADMIN — OXYBUTYNIN CHLORIDE 5 MG: 5 TABLET, EXTENDED RELEASE ORAL at 08:43

## 2020-07-25 RX ADMIN — DIVALPROEX SODIUM 1500 MG: 500 TABLET, EXTENDED RELEASE ORAL at 20:53

## 2020-07-25 RX ADMIN — ATORVASTATIN CALCIUM 10 MG: 10 TABLET, FILM COATED ORAL at 16:56

## 2020-07-25 RX ADMIN — DOCUSATE SODIUM 100 MG: 100 CAPSULE, LIQUID FILLED ORAL at 08:43

## 2020-07-25 RX ADMIN — CLOZAPINE 200 MG: 200 TABLET ORAL at 16:56

## 2020-07-25 RX ADMIN — DOCUSATE SODIUM 100 MG: 100 CAPSULE, LIQUID FILLED ORAL at 17:25

## 2020-07-25 RX ADMIN — METFORMIN HYDROCHLORIDE 500 MG: 500 TABLET ORAL at 08:43

## 2020-07-25 RX ADMIN — DEXTROAMPHETAMINE SACCHARATE, AMPHETAMINE ASPARTATE, DEXTROAMPHETAMINE SULFATE AND AMPHETAMINE SULFATE 15 MG: 2.5; 2.5; 2.5; 2.5 TABLET ORAL at 05:17

## 2020-07-25 RX ADMIN — PANTOPRAZOLE SODIUM 40 MG: 40 TABLET, DELAYED RELEASE ORAL at 05:17

## 2020-07-25 RX ADMIN — OLANZAPINE 5 MG: 5 TABLET, FILM COATED ORAL at 20:53

## 2020-07-25 RX ADMIN — LEVOTHYROXINE SODIUM 75 MCG: 75 TABLET ORAL at 05:17

## 2020-07-25 RX ADMIN — METFORMIN HYDROCHLORIDE 500 MG: 500 TABLET ORAL at 16:56

## 2020-07-25 NOTE — PLAN OF CARE
Problem: Alteration in Thoughts and Perception  Goal: Treatment Goal: Gain control of psychotic behaviors/thinking, reduce/eliminate presenting symptoms and demonstrate improved reality functioning upon discharge  Outcome: Progressing  Goal: Verbalize thoughts and feelings  Description  Interventions:  - Promote a nonjudgmental and trusting relationship with the patient through active listening and therapeutic communication  - Assess patient's level of functioning, behavior and potential for risk  - Engage patient in 1 on 1 interactions  - Encourage patient to express fears, feelings, frustrations, and discuss symptoms    - [place] patient to reality, help patient recognize reality-based thinking   - Administer medications as ordered and assess for potential side effects  - Provide the patient education related to the signs and symptoms of the illness and desired effects of prescribed medications  Outcome: Progressing  Goal: Refrain from acting on delusional thinking/internal stimuli  Description  Interventions:  - Monitor patient closely, per order   - Utilize least restrictive measures   - Set reasonable limits, give positive feedback for acceptable   - Administer medications as ordered and monitor of potential side effects  Outcome: Progressing  Goal: Agree to be compliant with medication regime, as prescribed and report medication side effects  Description  Interventions:  - Offer appropriate PRN medication and supervise ingestion; conduct AIMS, as needed   Outcome: Progressing  Goal: Attend and participate in unit activities, including therapeutic, recreational, and educational groups  Description  Interventions:  -Encourage Visitation and family involvement in care    CERTIFIED PEER SPECIALIST INTERVENTIONS:    Complete peer assessment with patient to assess their needs and identify their goals to complete while in the recovery program as well as once discharged into the community       Patient will complete WRAP Plan, Crisis Plan and 5 Life Domains  Patient will attend 50% of groups offered on the unit        Outcome: Progressing  Goal: Recognize dysfunctional thoughts, communicate reality-based thoughts at the time of discharge  Description  Interventions:  - Provide medication and psycho-education to assist patient in compliance and developing insight into his/her illness   Outcome: Progressing  Goal: Attend and participate in unit activities, including therapeutic, recreational, and educational groups  Description  Interventions:  -Encourage Visitation and family involvement in care  Outcome: Progressing     Problem: Ineffective Coping  Goal: Understands least restrictive measures  Description  Interventions:  - Utilize least restrictive behavior  Outcome: Progressing     Problem: GASTROINTESTINAL - ADULT  Goal: Minimal or absence of nausea and/or vomiting  Description  INTERVENTIONS:  - Administer IV fluids if ordered to ensure adequate hydration  - Maintain NPO status until nausea and vomiting are resolved  - Nasogastric tube if ordered  - Administer ordered antiemetic medications as needed  - Provide nonpharmacologic comfort measures as appropriate  - Advance diet as tolerated, if ordered  - Consider nutrition services referral to assist patient with adequate nutrition and appropriate food choices  Outcome: Progressing  Goal: Maintains or returns to baseline bowel function  Description  INTERVENTIONS:  - Assess bowel function  - Encourage oral fluids to ensure adequate hydration  - Administer IV fluids if ordered to ensure adequate hydration  - Administer ordered medications as needed  - Encourage mobilization and activity  - Consider nutritional services referral to assist patient with adequate nutrition and appropriate food choices  Outcome: Progressing  Goal: Maintains adequate nutritional intake  Description  INTERVENTIONS:  - Monitor percentage of each meal consumed  - Identify factors contributing to decreased intake, treat as appropriate  - Assist with meals as needed  - Monitor I&O, weight, and lab values if indicated  - Obtain nutrition services referral as needed  Outcome: Progressing     Problem: Alteration in Thoughts and Perception  Goal: Complete daily ADLs, including personal hygiene independently, as able  Description  Interventions:  - Observe, teach, and assist patient with ADLS  - Monitor and promote a balance of rest/activity, with adequate nutrition and elimination   Outcome: Not Progressing   Visible intermittently, pleasant and friendly with others when in the milieu, attended community meeting and journaling groups  Napped in his bed at different intervals  Disheveled, has not showered since 7/21  Ate 100% of both meals  No behaviors or issues noted  Med compliant  Continue to monitor

## 2020-07-25 NOTE — PROGRESS NOTES
Progress Note - Behavioral Health     Yaya Dean 50 y o  male MRN: [id number]   Unit/Bed#: [facility] 105-01 Encounter: [id number]      Subjective:  Patient's chart reviewed and case discussed with the nurse  The patient was seen in his room today  He reports his mood has been okay  Denies feeling depressed or anxious  Reports slept good last night  Denies any auditory or visual hallucination  Denies any SI or HI  Denied any concern  As per the nurse the patient has been visible in the milieu and compliant with the medications  Though has a poor insight into his mental health issues  Maintaining good hygiene but wearing multi later clothing all the time  As per review of his notes patient tentatively planned to be discharged to [facility] though date of discharge not fixed yet  ROS: no complaints, all other systems are negative    Mental Status Evaluation:    Appearance:  Casually dressed  Reasonable hygiene  Behavior:  calm   Speech:  normal rate, normal volume, normal pitch   Mood:  normal   Affect:  Constricted   Thought Process:  Logical   Associations: Intact association   Thought Content:  some paranoia   Perceptual Disturbances: does not appear responding to internal stimuli   Risk Potential: Suicidal ideation - denies  Homicidal ideation -denies  Potential for aggression - No   Sensorium:  Alert and oriented x3   Memory:  Not assessed today   Consciousness:  alert and awake   Attention: decreased attention span   Insight:  limited   Judgment: limited   Gait/Station: normal gait/station   Motor Activity: no abnormal movements     Vital signs in last 24 hours:    Temp:  [97 °F (36 1 °C)] 97 °F (36 1 °C)  HR:  [86] 86  Resp:  [18] 18  BP: (134)/(79) 134/79    Laboratory results:   I have personally reviewed all pertinent laboratory/tests results    Most Recent Labs:   Lab Results   Component Value Date    WBC 8 10 07/16/2020    WBC 8 10 07/16/2020    RBC 4 64 07/16/2020 RBC 4 64 07/16/2020    HGB 13 7 07/16/2020    HGB 13 7 07/16/2020    HCT 40 4 (L) 07/16/2020    HCT 40 4 (L) 07/16/2020     07/16/2020     07/16/2020    RDW 13 5 07/16/2020    RDW 13 5 07/16/2020    NEUTROABS 4 62 07/16/2020    SODIUM 130 (L) 07/16/2020    K 4 1 07/16/2020    CL 98 07/16/2020    CO2 24 07/16/2020    BUN 11 07/16/2020    CREATININE 0 60 (L) 07/16/2020    GLUC 141 (H) 07/16/2020    GLUF 141 (H) 07/16/2020    CALCIUM 9 0 07/16/2020    AST 25 07/16/2020    ALT 29 07/16/2020    ALKPHOS 87 07/16/2020    TP 6 5 07/16/2020    ALB 3 8 07/16/2020    TBILI 0 20 07/16/2020    CHOLESTEROL 102 04/13/2020    HDL 26 (L) 04/13/2020    TRIG 172 (H) 04/13/2020    LDLCALC 42 04/13/2020    NONHDLC 76 04/13/2020    VALPROICTOT 78 1 07/16/2020    LITHIUM 0 6 03/27/2014    AMMONIA 11 07/16/2020    [id number] 3 810 12/24/2019    FREET4 0 93 08/23/2019    HGBA1C 6 1 12/03/2019     12/03/2019       Progress Toward Goals: improving slowly    Assessment/Plan  he has been overall doing well with no concerns  Plan is to continue with his current medication with no changes  Will continue to monitor him for his mood, behavior, sleep, response to meds  Principal Problem:    Schizoaffective disorder, bipolar type ([facility] 75 )  Active Problems:    Type 2 diabetes mellitus without complication, without long-term current use of insulin (HCC)    Benign essential hypertension    Gastroesophageal reflux disease without esophagitis    Acquired hypothyroidism    Tobacco abuse    Diabetes insipidus, nephrogenic ([facility] )    ADHD    Recommended Treatment:     Planned medication and treatment changes:     All current active medications have been reviewed  Encourage group therapy, milieu therapy and occupational therapy  Behavioral Health checks every 7 minutes  Continue current medications:    Current Facility-Administered Medications:  acetaminophen 325 mg Oral Q6H PRN Don Frey MD   acetaminophen 650 mg Oral Q6H PRN Ashok Polanco Jose Alberto Lehman MD   acetaminophen 975 mg Oral Q8H PRN Don Frey MD   aluminum-magnesium hydroxide-simethicone 30 mL Oral Q4H PRN Don Frey MD   amphetamine-dextroamphetamine 15 mg Oral Daily Don Frey MD   atorvastatin 10 mg Oral Daily With Cherylyn Goodpasture, MD   benztropine 1 mg Intramuscular Q6H PRN Don Frey MD   benztropine 1 mg Oral Q6H PRN Don Frey MD   cloZAPine 200 mg Oral Daily Don Frey MD   divalproex sodium 1,500 mg Oral HS Don Frey MD   docusate sodium 100 mg Oral BID Don Frey MD   haloperidol 5 mg Oral Q6H PRN Don Frey MD   haloperidol lactate 5 mg Intramuscular Q6H PRN Don Frey MD   levothyroxine 75 mcg Oral Early Morning Don Frey MD   LORazepam 1 mg Intramuscular Q6H PRN Don Frey MD   LORazepam 1 mg Oral Q6H PRN Don Frey MD   magnesium hydroxide 30 mL Oral Daily PRN Don Frey MD   metFORMIN 500 mg Oral BID With Meals Don Frey MD   nicotine polacrilex 2 mg Oral Q2H PRN Don Frey MD   OLANZapine 5 mg Oral HS JOVITA Hodge   oxybutynin 5 mg Oral Daily Don Frey MD   pantoprazole 40 mg Oral Early Morning Don Frey MD   traZODone 50 mg Oral HS PRN Don Frey MD       Risks / Benefits of Treatment:    Risks, benefits, and possible side effects of medications explained to patient and patient verbalizes understanding and agreement for treatment  Counseling / Coordination of Care:    Patient's progress discussed with staff in treatment team meeting  Medications, treatment progress and treatment plan reviewed with patient      Agueda Lawrence MD 07/25/20

## 2020-07-25 NOTE — PLAN OF CARE
Problem: Alteration in Thoughts and Perception  Goal: Agree to be compliant with medication regime, as prescribed and report medication side effects  Description  Interventions:  - Offer appropriate PRN medication and supervise ingestion; conduct AIMS, as needed   Outcome: Progressing     Problem: Ineffective Coping  Goal: Demonstrates healthy coping skills  Outcome: Progressing     Problem: Alteration in Thoughts and Perception  Goal: Attend and participate in unit activities, including therapeutic, recreational, and educational groups  Description  Interventions:  -Encourage Visitation and family involvement in care    CERTIFIED PEER SPECIALIST INTERVENTIONS:    Complete peer assessment with patient to assess their needs and identify their goals to complete while in the recovery program as well as once discharged into the community  Patient will complete WRAP Plan, Crisis Plan and 5 Life Domains  Patient will attend 50% of groups offered on the unit        Outcome: Not Progressing     Visible, compliant with routine medications, gait steady, ate 100% of dinner, listened to music with peers, behavior controlled, did not attend evening group, will continue to monitor

## 2020-07-26 PROCEDURE — 99231 SBSQ HOSP IP/OBS SF/LOW 25: CPT | Performed by: PSYCHIATRY & NEUROLOGY

## 2020-07-26 RX ADMIN — DOCUSATE SODIUM 100 MG: 100 CAPSULE, LIQUID FILLED ORAL at 17:05

## 2020-07-26 RX ADMIN — METFORMIN HYDROCHLORIDE 500 MG: 500 TABLET ORAL at 16:45

## 2020-07-26 RX ADMIN — ATORVASTATIN CALCIUM 10 MG: 10 TABLET, FILM COATED ORAL at 16:45

## 2020-07-26 RX ADMIN — OXYBUTYNIN CHLORIDE 5 MG: 5 TABLET, EXTENDED RELEASE ORAL at 08:48

## 2020-07-26 RX ADMIN — DIVALPROEX SODIUM 1500 MG: 500 TABLET, EXTENDED RELEASE ORAL at 20:59

## 2020-07-26 RX ADMIN — CLOZAPINE 200 MG: 200 TABLET ORAL at 16:45

## 2020-07-26 RX ADMIN — DOCUSATE SODIUM 100 MG: 100 CAPSULE, LIQUID FILLED ORAL at 08:48

## 2020-07-26 RX ADMIN — LEVOTHYROXINE SODIUM 75 MCG: 75 TABLET ORAL at 06:20

## 2020-07-26 RX ADMIN — DEXTROAMPHETAMINE SACCHARATE, AMPHETAMINE ASPARTATE, DEXTROAMPHETAMINE SULFATE AND AMPHETAMINE SULFATE 15 MG: 2.5; 2.5; 2.5; 2.5 TABLET ORAL at 06:20

## 2020-07-26 RX ADMIN — PANTOPRAZOLE SODIUM 40 MG: 40 TABLET, DELAYED RELEASE ORAL at 06:20

## 2020-07-26 RX ADMIN — METFORMIN HYDROCHLORIDE 500 MG: 500 TABLET ORAL at 08:48

## 2020-07-26 RX ADMIN — OLANZAPINE 5 MG: 5 TABLET, FILM COATED ORAL at 20:59

## 2020-07-26 NOTE — PLAN OF CARE
Problem: Alteration in Thoughts and Perception  Goal: Verbalize thoughts and feelings  Description  Interventions:  - Promote a nonjudgmental and trusting relationship with the patient through active listening and therapeutic communication  - Assess patient's level of functioning, behavior and potential for risk  - Engage patient in 1 on 1 interactions  - Encourage patient to express fears, feelings, frustrations, and discuss symptoms    - [place] patient to reality, help patient recognize reality-based thinking   - Administer medications as ordered and assess for potential side effects  - Provide the patient education related to the signs and symptoms of the illness and desired effects of prescribed medications  Outcome: Progressing  Goal: Agree to be compliant with medication regime, as prescribed and report medication side effects  Description  Interventions:  - Offer appropriate PRN medication and supervise ingestion; conduct AIMS, as needed   Outcome: Progressing  Goal: Attend and participate in unit activities, including therapeutic, recreational, and educational groups  Description  Interventions:  -Encourage Visitation and family involvement in care    CERTIFIED PEER SPECIALIST INTERVENTIONS:    Complete peer assessment with patient to assess their needs and identify their goals to complete while in the recovery program as well as once discharged into the community  Patient will complete WRAP Plan, Crisis Plan and 5 Life Domains  Patient will attend 50% of groups offered on the unit        Outcome: Progressing  Goal: Attend and participate in unit activities, including therapeutic, recreational, and educational groups  Description  Interventions:  -Encourage Visitation and family involvement in care  Outcome: Progressing     Problem: Alteration in Thoughts and Perception  Goal: Treatment Goal: Gain control of psychotic behaviors/thinking, reduce/eliminate presenting symptoms and demonstrate improved reality functioning upon discharge  Outcome: Not Progressing  Goal: Refrain from acting on delusional thinking/internal stimuli  Description  Interventions:  - Monitor patient closely, per order   - Utilize least restrictive measures   - Set reasonable limits, give positive feedback for acceptable   - Administer medications as ordered and monitor of potential side effects  Outcome: Not Progressing  Goal: Recognize dysfunctional thoughts, communicate reality-based thoughts at the time of discharge  Description  Interventions:  - Provide medication and psycho-education to assist patient in compliance and developing insight into his/her illness   Outcome: Not Progressing   Mostly isolative to his room, staying in bed for most of the day  Paranoid and suspicious in the morning  Became upset when staff asked him to remove some of the many layers of clothing in order to be able to take his vitals  Voicing fears that others would steal his clothing and was not able to be reassured  Quickly walked off clenching his clothing while mumbling in a pressured manner  Ate 100% of both meals  Med compliant  No other behaviors or issues noted  Continue to monitor

## 2020-07-26 NOTE — PROGRESS NOTES
Progress Note - Behavioral Health     Ariel Mast 50 y o  male MRN: [id number]   Unit/Bed#: [facility] 105-01 Encounter: [id number]      Subjective:  Patient's chart reviewed and case discussed with the nurse  The patient was seen in his room today  His clothes were scattered all over the floor  The patient himself seemed very disheveled  The patient maintained poor eye contact and superficial throughout the evaluation  He reports his mood has been okay  Denies feeling depressed or anxious  Reports slept good last night  Denies any auditory or visual hallucination  Denies any SI or HI  Denied any concern  As per the nurse the patient has been visible in the milieu and compliant with the medications  Though has a poor insight into his mental health issues  As per review of his notes patient tentatively planned to be discharged to [facility] though date of discharge not fixed yet  ROS: no complaints, all other systems are negative    Mental Status Evaluation:    Appearance:  disheveled   Behavior:  calm   Speech:  normal rate, normal volume, normal pitch   Mood:  normal   Affect:  Constricted   Thought Process:  Logical   Associations: Intact association   Thought Content:  some paranoia   Perceptual Disturbances: does not appear responding to internal stimuli   Risk Potential: Suicidal ideation - denies  Homicidal ideation -denies  Potential for aggression - No   Sensorium:  Alert and oriented x3   Memory:  Not assessed today   Consciousness:  alert and awake   Attention: decreased attention span   Insight:  limited   Judgment: limited   Gait/Station: normal gait/station   Motor Activity: no abnormal movements     Vital signs in last 24 hours:    Temp:  [98 1 °F (36 7 °C)] 98 1 °F (36 7 °C)  HR:  [83] 83  Resp:  [18] 18  BP: (129)/(79) 129/79    Laboratory results:   I have personally reviewed all pertinent laboratory/tests results    Most Recent Labs:   Lab Results   Component Value Date    WBC 8 10 07/16/2020    WBC 8 10 07/16/2020    RBC 4 64 07/16/2020    RBC 4 64 07/16/2020    HGB 13 7 07/16/2020    HGB 13 7 07/16/2020    HCT 40 4 (L) 07/16/2020    HCT 40 4 (L) 07/16/2020     07/16/2020     07/16/2020    RDW 13 5 07/16/2020    RDW 13 5 07/16/2020    NEUTROABS 4 62 07/16/2020    SODIUM 130 (L) 07/16/2020    K 4 1 07/16/2020    CL 98 07/16/2020    CO2 24 07/16/2020    BUN 11 07/16/2020    CREATININE 0 60 (L) 07/16/2020    GLUC 141 (H) 07/16/2020    GLUF 141 (H) 07/16/2020    CALCIUM 9 0 07/16/2020    AST 25 07/16/2020    ALT 29 07/16/2020    ALKPHOS 87 07/16/2020    TP 6 5 07/16/2020    ALB 3 8 07/16/2020    TBILI 0 20 07/16/2020    CHOLESTEROL 102 04/13/2020    HDL 26 (L) 04/13/2020    TRIG 172 (H) 04/13/2020    LDLCALC 42 04/13/2020    NONHDLC 76 04/13/2020    VALPROICTOT 78 1 07/16/2020    LITHIUM 0 6 03/27/2014    AMMONIA 11 07/16/2020    [id number] 3 810 12/24/2019    FREET4 0 93 08/23/2019    HGBA1C 6 1 12/03/2019     12/03/2019       Progress Toward Goals: improving slowly    Assessment/Plan  he has been overall doing ok with no concerns  Plan is to continue with his current medication with no changes  Will continue to monitor him for his mood, behavior, sleep, response to meds  Principal Problem:    Schizoaffective disorder, bipolar type ([facility] 75 )  Active Problems:    Type 2 diabetes mellitus without complication, without long-term current use of insulin ([facility])    Benign essential hypertension    Gastroesophageal reflux disease without esophagitis    Acquired hypothyroidism    Tobacco abuse    Diabetes insipidus, nephrogenic ([facility] 75 )    ADHD    Recommended Treatment:     Planned medication and treatment changes:     All current active medications have been reviewed  Encourage group therapy, milieu therapy and occupational therapy  Behavioral Health checks every 7 minutes  Continue current medications:    Current Facility-Administered Medications:  acetaminophen 325 mg Oral Q6H PRN Michelle Peace MD   acetaminophen 650 mg Oral Q6H PRN Michelle Peace MD   acetaminophen 975 mg Oral Q8H PRN Michelle Peace MD   aluminum-magnesium hydroxide-simethicone 30 mL Oral Q4H PRN Michelle Peace MD   amphetamine-dextroamphetamine 15 mg Oral Daily Michelle Peace MD   atorvastatin 10 mg Oral Daily With Forest Reyes MD   benztropine 1 mg Intramuscular Q6H PRN Michelle Peace MD   benztropine 1 mg Oral Q6H PRN Michelle Peace MD   cloZAPine 200 mg Oral Daily Michelle Peace MD   divalproex sodium 1,500 mg Oral HS Michelle Peace MD   docusate sodium 100 mg Oral BID Michelle Peace MD   haloperidol 5 mg Oral Q6H PRN Michelle Peace MD   haloperidol lactate 5 mg Intramuscular Q6H PRN Michelle Peace MD   levothyroxine 75 mcg Oral Early Morning Michelle Peace MD   LORazepam 1 mg Intramuscular Q6H PRN Michelle Peace MD   LORazepam 1 mg Oral Q6H PRN Michelle Peace MD   magnesium hydroxide 30 mL Oral Daily PRN Michelle Peace MD   metFORMIN 500 mg Oral BID With Meals Michelle Peace MD   nicotine polacrilex 2 mg Oral Q2H PRN Michelle Peace MD   OLANZapine 5 mg Oral HS JOVITA Hodge   oxybutynin 5 mg Oral Daily Michelle Peace MD   pantoprazole 40 mg Oral Early Morning Michelle Peace MD   traZODone 50 mg Oral HS PRN Michelle Peace MD       Risks / Benefits of Treatment:    Risks, benefits, and possible side effects of medications explained to patient and patient verbalizes understanding and agreement for treatment  Counseling / Coordination of Care:    Patient's progress discussed with staff in treatment team meeting  Medications, treatment progress and treatment plan reviewed with patient      Juan Alberto Guthrie MD 07/26/20

## 2020-07-26 NOTE — PLAN OF CARE
Problem: Alteration in Thoughts and Perception  Goal: Verbalize thoughts and feelings  Description  Interventions:  - Promote a nonjudgmental and trusting relationship with the patient through active listening and therapeutic communication  - Assess patient's level of functioning, behavior and potential for risk  - Engage patient in 1 on 1 interactions  - Encourage patient to express fears, feelings, frustrations, and discuss symptoms    - [place] patient to reality, help patient recognize reality-based thinking   - Administer medications as ordered and assess for potential side effects  - Provide the patient education related to the signs and symptoms of the illness and desired effects of prescribed medications  Outcome: Progressing  Goal: Refrain from acting on delusional thinking/internal stimuli  Description  Interventions:  - Monitor patient closely, per order   - Utilize least restrictive measures   - Set reasonable limits, give positive feedback for acceptable   - Administer medications as ordered and monitor of potential side effects  Outcome: Progressing  Goal: Agree to be compliant with medication regime, as prescribed and report medication side effects  Description  Interventions:  - Offer appropriate PRN medication and supervise ingestion; conduct AIMS, as needed   Outcome: Progressing  Goal: Attend and participate in unit activities, including therapeutic, recreational, and educational groups  Description  Interventions:  -Encourage Visitation and family involvement in care    CERTIFIED PEER SPECIALIST INTERVENTIONS:    Complete peer assessment with patient to assess their needs and identify their goals to complete while in the recovery program as well as once discharged into the community  Patient will complete WRAP Plan, Crisis Plan and 5 Life Domains  Patient will attend 50% of groups offered on the unit        Outcome: Not Progressing  Goal: Complete daily ADLs, including personal hygiene independently, as able  Description  Interventions:  - Observe, teach, and assist patient with ADLS  - Monitor and promote a balance of rest/activity, with adequate nutrition and elimination   Outcome: Not Progressing     Problem: Ineffective Coping  Goal: Understands least restrictive measures  Description  Interventions:  - Utilize least restrictive behavior  Outcome: Progressing     Problem: GASTROINTESTINAL - ADULT  Goal: Maintains adequate nutritional intake  Description  INTERVENTIONS:  - Monitor percentage of each meal consumed  - Identify factors contributing to decreased intake, treat as appropriate  - Assist with meals as needed  - Monitor I&O, weight, and lab values if indicated  - Obtain nutrition services referral as needed  Outcome: Uzma Washington has been awake, alert, and mostly resting in room  Pt refused to shower and stated that he will shower tomorrow  Ate 100% supper  Remains disheveled in appearance and dressed in layers  Affect blunted  Minimal interaction noted with peers  Compliant with all scheduled meds when called and cooperative with mouth checks  Pt did not attend evening groups but came out for snack after  No behavioral issues  Continue to monitor/assess for any changes

## 2020-07-27 PROCEDURE — 99232 SBSQ HOSP IP/OBS MODERATE 35: CPT | Performed by: PSYCHIATRY & NEUROLOGY

## 2020-07-27 RX ADMIN — OLANZAPINE 5 MG: 5 TABLET, FILM COATED ORAL at 20:51

## 2020-07-27 RX ADMIN — METFORMIN HYDROCHLORIDE 500 MG: 500 TABLET ORAL at 16:53

## 2020-07-27 RX ADMIN — DOCUSATE SODIUM 100 MG: 100 CAPSULE, LIQUID FILLED ORAL at 08:21

## 2020-07-27 RX ADMIN — OXYBUTYNIN CHLORIDE 5 MG: 5 TABLET, EXTENDED RELEASE ORAL at 08:22

## 2020-07-27 RX ADMIN — ATORVASTATIN CALCIUM 10 MG: 10 TABLET, FILM COATED ORAL at 16:53

## 2020-07-27 RX ADMIN — DEXTROAMPHETAMINE SACCHARATE, AMPHETAMINE ASPARTATE, DEXTROAMPHETAMINE SULFATE AND AMPHETAMINE SULFATE 15 MG: 2.5; 2.5; 2.5; 2.5 TABLET ORAL at 06:15

## 2020-07-27 RX ADMIN — LEVOTHYROXINE SODIUM 75 MCG: 75 TABLET ORAL at 06:15

## 2020-07-27 RX ADMIN — METFORMIN HYDROCHLORIDE 500 MG: 500 TABLET ORAL at 08:22

## 2020-07-27 RX ADMIN — DOCUSATE SODIUM 100 MG: 100 CAPSULE, LIQUID FILLED ORAL at 17:19

## 2020-07-27 RX ADMIN — DIVALPROEX SODIUM 1500 MG: 500 TABLET, EXTENDED RELEASE ORAL at 20:51

## 2020-07-27 RX ADMIN — PANTOPRAZOLE SODIUM 40 MG: 40 TABLET, DELAYED RELEASE ORAL at 06:15

## 2020-07-27 RX ADMIN — CLOZAPINE 200 MG: 200 TABLET ORAL at 16:53

## 2020-07-27 NOTE — PROGRESS NOTES
07/27/20 0900   Activity/Group Checklist   Group Community meeting   Attendance Did not attend   Attendance Duration (min) 16-30   Affect/Mood GAEL

## 2020-07-27 NOTE — PROGRESS NOTES
07/27/20 1100   Activity/Group Checklist   Group   (IMR/Graduation )   Attendance Attended   Attendance Duration (min) 46-60   Interactions Interacted appropriately   Affect/Mood Appropriate;Normal range   Goals Achieved Identified feelings; Able to listen to others; Able to engage in interactions
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
## 2020-07-27 NOTE — PROGRESS NOTES
07/27/20 0836   Team Meeting   Meeting Type Daily Rounds   Initial Conference Date 07/27/20   Patient/Family Present   Patient Present No   Patient's Family Present No     Daily Rounds Documentation     Team Members Present:   MD Lani Perea, RN  Mahendra Blanton, [address] [place] [address] groups over weekend

## 2020-07-27 NOTE — PLAN OF CARE
Patient is attending an adequate number of groups  He has been brighter and complaint with his treatment  SW is working with the [facility] to get patient another interview with [facility]; a new referral might also be needed  Patient engages in therapy to the best of his ability  Problem: DISCHARGE PLANNING  Goal: Discharge to home or other facility with appropriate resources  Description    CASE MANAGEMENT INTERVENTIONS:  - Conduct assessment to determine patient/family and health care team treatment goals, and need for post-acute services based on payer coverage, community resources, patient preferences and barriers to discharge  - Address psychosocial, clinical, and financial barriers to discharge as identified in assessment in conjunction with the patient/family and health care team   - Assist the patient in reintegration back into the community by removing barriers which may hinder a successful discharge once deemed stable  - Arrange appropriate level of post-acute services according to patient's needs and preference and payer coverage in collaboration with the physician and health care team   - Communicate with and update the patient/family, physician, and health care team regarding progress on the discharge plan  - Arrange appropriate transportation to post-acute venues  Outcome: Progressing     Problem: Individualized Interventions  Goal: Demonstrates healthy coping skills  Description  CERTIFIED PEER SPECIALIST INTERVENTIONS:    -Complete peer assessment with patient to assess their needs and identify their goals to complete while in the recovery program as well as once discharged into the community    -Patient will complete WRAP Plan, Crisis Plan and 5 Life Domains   -Patient will attend 50% of groups offered on the unit    -Patient will complete a goal card weekly       Outcome: Progressing  Goal: Attend and participate in unit activities, including therapeutic, recreational, and educational groups  Description  PSYCHOTHERAPY INTERVENTIONS:    -Form therapeutic alliance to promote trust and safety within a therapeutic environment    -Engage in individual psychotherapy using evidence-based practices that are specific to individual needs   -Process barriers to community living with an emphasis on solution-based interventions   -Promote self-awareness and identity development   -Identify and process patterns of behavior that have led to decompensation and/or hospitalizations   -Attend psychoeducation groups with licensed psychotherapist to learn about Illness Management Recovery (IMR) concepts and enhance coping skills    -Practice effective communication with staff, peers, family, and community members  Participate in family sessions as necessary   -Encourage reality-based thought content by examining thought processes and cognitive distortions      -Work with treatment team in reintegration back into the community when appropriate       Outcome: Progressing

## 2020-07-27 NOTE — SOCIAL WORK
Records faxed to [facility] as requested in order to complete the referral   SW sent email to [facility] and [facility] to inform them that the records have been faxed  Message received from [facility] stating that she received the records and will reach out if she needs any additional records

## 2020-07-27 NOTE — PLAN OF CARE
Problem: Alteration in Thoughts and Perception  Goal: Agree to be compliant with medication regime, as prescribed and report medication side effects  Description  Interventions:  - Offer appropriate PRN medication and supervise ingestion; conduct AIMS, as needed   Outcome: Progressing  Goal: Attend and participate in unit activities, including therapeutic, recreational, and educational groups  Description  Interventions:  -Encourage Visitation and family involvement in care    CERTIFIED PEER SPECIALIST INTERVENTIONS:    Complete peer assessment with patient to assess their needs and identify their goals to complete while in the recovery program as well as once discharged into the community  Patient will complete WRAP Plan, Crisis Plan and 5 Life Domains  Patient will attend 50% of groups offered on the unit  Outcome: Progressing   Patient is pleasant, cooperative and visible on the unit  He is noted to interact well with his peers  Remains intrusive at times but is easily redirected  Continues to appear disheveled and is dressed in multiple layers which he refused to take off  Denies SI and Hi's  Also denies having any hallucinations or delusions  Attended and participated in 2/4 groups this shift  No issues or concerns noted at this time  Continue to monitor

## 2020-07-27 NOTE — PLAN OF CARE
Problem: Alteration in Thoughts and Perception  Goal: Verbalize thoughts and feelings  Description  Interventions:  - Promote a nonjudgmental and trusting relationship with the patient through active listening and therapeutic communication  - Assess patient's level of functioning, behavior and potential for risk  - Engage patient in 1 on 1 interactions  - Encourage patient to express fears, feelings, frustrations, and discuss symptoms    - [place] patient to reality, help patient recognize reality-based thinking   - Administer medications as ordered and assess for potential side effects  - Provide the patient education related to the signs and symptoms of the illness and desired effects of prescribed medications  Outcome: Progressing  Goal: Refrain from acting on delusional thinking/internal stimuli  Description  Interventions:  - Monitor patient closely, per order   - Utilize least restrictive measures   - Set reasonable limits, give positive feedback for acceptable   - Administer medications as ordered and monitor of potential side effects  Outcome: Progressing  Goal: Complete daily ADLs, including personal hygiene independently, as able  Description  Interventions:  - Observe, teach, and assist patient with ADLS  - Monitor and promote a balance of rest/activity, with adequate nutrition and elimination   Outcome: Progressing     Problem: Ineffective Coping  Goal: Demonstrates healthy coping skills  Outcome: Progressing  Goal: Understands least restrictive measures  Description  Interventions:  - Utilize least restrictive behavior  Outcome: Progressing     Problem: GASTROINTESTINAL - ADULT  Goal: Maintains adequate nutritional intake  Description  INTERVENTIONS:  - Monitor percentage of each meal consumed  - Identify factors contributing to decreased intake, treat as appropriate  - Assist with meals as needed  - Monitor I&O, weight, and lab values if indicated  - Obtain nutrition services referral as needed  Outcome: [place] has been awake, alert, and visible intermittently out in the milieu  Pt completed his daily ADL's today  Ate 100% supper  Pt pleasant, polite, and cooperative  Denies any depression, anxiety, si/hi, intent, plan, a/v hallucinations, and has not verbalized any delusions  No behavioral issues  Behavior has been quiet and mostly isolative to self resting in room  Had evening snack  Compliant with all scheduled meds without prompting  Continue to monitor/assess for any changes

## 2020-07-27 NOTE — PLAN OF CARE
Problem: Alteration in Thoughts and Perception  Goal: Attend and participate in unit activities, including therapeutic, recreational, and educational groups  Description  Interventions:  -Encourage Visitation and family involvement in care    CERTIFIED PEER SPECIALIST INTERVENTIONS:    Complete peer assessment with patient to assess their needs and identify their goals to complete while in the recovery program as well as once discharged into the community  Patient will complete WRAP Plan, Crisis Plan and 5 Life Domains  Patient will attend 50% of groups offered on the unit  Outcome: Progressing  Patient attending select groups and does participate when focused  Patient seems to be less fixated on discharge and behaviors have been controlled  Patient less irritable and is cooperative

## 2020-07-28 PROCEDURE — 99232 SBSQ HOSP IP/OBS MODERATE 35: CPT | Performed by: PSYCHIATRY & NEUROLOGY

## 2020-07-28 RX ADMIN — DIVALPROEX SODIUM 1500 MG: 500 TABLET, EXTENDED RELEASE ORAL at 20:47

## 2020-07-28 RX ADMIN — ATORVASTATIN CALCIUM 10 MG: 10 TABLET, FILM COATED ORAL at 16:43

## 2020-07-28 RX ADMIN — METFORMIN HYDROCHLORIDE 500 MG: 500 TABLET ORAL at 16:43

## 2020-07-28 RX ADMIN — DEXTROAMPHETAMINE SACCHARATE, AMPHETAMINE ASPARTATE, DEXTROAMPHETAMINE SULFATE AND AMPHETAMINE SULFATE 15 MG: 2.5; 2.5; 2.5; 2.5 TABLET ORAL at 05:38

## 2020-07-28 RX ADMIN — DOCUSATE SODIUM 100 MG: 100 CAPSULE, LIQUID FILLED ORAL at 08:27

## 2020-07-28 RX ADMIN — CLOZAPINE 200 MG: 200 TABLET ORAL at 16:43

## 2020-07-28 RX ADMIN — DOCUSATE SODIUM 100 MG: 100 CAPSULE, LIQUID FILLED ORAL at 17:05

## 2020-07-28 RX ADMIN — OXYBUTYNIN CHLORIDE 5 MG: 5 TABLET, EXTENDED RELEASE ORAL at 08:27

## 2020-07-28 RX ADMIN — LEVOTHYROXINE SODIUM 75 MCG: 75 TABLET ORAL at 05:38

## 2020-07-28 RX ADMIN — OLANZAPINE 5 MG: 5 TABLET, FILM COATED ORAL at 20:47

## 2020-07-28 RX ADMIN — METFORMIN HYDROCHLORIDE 500 MG: 500 TABLET ORAL at 08:27

## 2020-07-28 RX ADMIN — PANTOPRAZOLE SODIUM 40 MG: 40 TABLET, DELAYED RELEASE ORAL at 05:38

## 2020-07-28 NOTE — PLAN OF CARE
Problem: Alteration in Thoughts and Perception  Goal: Verbalize thoughts and feelings  Description  Interventions:  - Promote a nonjudgmental and trusting relationship with the patient through active listening and therapeutic communication  - Assess patient's level of functioning, behavior and potential for risk  - Engage patient in 1 on 1 interactions  - Encourage patient to express fears, feelings, frustrations, and discuss symptoms    - [place] patient to reality, help patient recognize reality-based thinking   - Administer medications as ordered and assess for potential side effects  - Provide the patient education related to the signs and symptoms of the illness and desired effects of prescribed medications  Outcome: Progressing  Goal: Refrain from acting on delusional thinking/internal stimuli  Description  Interventions:  - Monitor patient closely, per order   - Utilize least restrictive measures   - Set reasonable limits, give positive feedback for acceptable   - Administer medications as ordered and monitor of potential side effects  Outcome: Progressing  Goal: Agree to be compliant with medication regime, as prescribed and report medication side effects  Description  Interventions:  - Offer appropriate PRN medication and supervise ingestion; conduct AIMS, as needed   Outcome: Progressing  Goal: Attend and participate in unit activities, including therapeutic, recreational, and educational groups  Description  Interventions:  -Encourage Visitation and family involvement in care    CERTIFIED PEER SPECIALIST INTERVENTIONS:    Complete peer assessment with patient to assess their needs and identify their goals to complete while in the recovery program as well as once discharged into the community  Patient will complete WRAP Plan, Crisis Plan and 5 Life Domains  Patient will attend 50% of groups offered on the unit        Outcome: Progressing     Problem: Ineffective Coping  Goal: Demonstrates healthy coping skills  Outcome: Progressing  Goal: Understands least restrictive measures  Description  Interventions:  - Utilize least restrictive behavior  Outcome: Progressing     Problem: GASTROINTESTINAL - ADULT  Goal: Maintains adequate nutritional intake  Description  INTERVENTIONS:  - Monitor percentage of each meal consumed  - Identify factors contributing to decreased intake, treat as appropriate  - Assist with meals as needed  - Monitor I&O, weight, and lab values if indicated  - Obtain nutrition services referral as needed  Outcome: Baudilio Roman has been awake, alert, and visible intermittently out in the milieu  Pt used his walkman during electronics hour and noted singing and dancing to music in dining room  Pleasant, polite, and cooperative  Pt ate 100% supper  Minimal interaction noted with peers  Remains disheveled and dressed in layers  No behavioral issues  Rests in room at intervals  Pt did not attend evening group but came out for snack after  Compliant with all scheduled meds when called  Denies any depression, anxiety, and has not verbalized any delusions  Continue to monitor/assess for any changes

## 2020-07-28 NOTE — PROGRESS NOTES
07/28/20 0900   Activity/Group Checklist   Group Community meeting   Attendance Attended   Attendance Duration (min) 16-30   Interactions Interacted appropriately   Affect/Mood Appropriate;Bright;Normal range   Goals Achieved Identified feelings; Able to listen to others; Able to engage in interactions; Able to self-disclose; Able to reflect/comment on own behavior

## 2020-07-28 NOTE — PLAN OF CARE
Problem: Alteration in Thoughts and Perception  Goal: Refrain from acting on delusional thinking/internal stimuli  Description  Interventions:  - Monitor patient closely, per order   - Utilize least restrictive measures   - Set reasonable limits, give positive feedback for acceptable   - Administer medications as ordered and monitor of potential side effects  Outcome: Progressing  Goal: Agree to be compliant with medication regime, as prescribed and report medication side effects  Description  Interventions:  - Offer appropriate PRN medication and supervise ingestion; conduct AIMS, as needed   Outcome: Progressing  Goal: Recognize dysfunctional thoughts, communicate reality-based thoughts at the time of discharge  Description  Interventions:  - Provide medication and psycho-education to assist patient in compliance and developing insight into his/her illness   Outcome: Progressing   Patient is pleasant, cooperative and visible on the unit  He is noted to interact well with his peers  Remains intrusive at times but is easily redirected  Continues to appear disheveled and is dressed in multiple layers which he refused to take off  Denies SI and Hi's  Also denies having any hallucinations or delusions  Attended and participated in 2/4 groups this shift  No issues or concerns noted at this time  Continue to monitor
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
## 2020-07-28 NOTE — PROGRESS NOTES
07/28/20 1100   Activity/Group Checklist   Group   (IMR/Self Discovery and Recovery)   Attendance Attended   Attendance Duration (min) 46-60   Interactions Interacted appropriately   Affect/Mood Appropriate;Bright;Calm;Normal range   Goals Achieved Identified feelings; Discussed coping strategies; Able to listen to others; Able to engage in interactions; Able to self-disclose

## 2020-07-28 NOTE — PROGRESS NOTES
07/28/20 0914   Team Meeting   Meeting Type Daily Rounds   Initial Conference Date 07/28/20   Team Members Present   Team Members Present Physician;Nurse;; Other (Discipline and Name)   Patient/Family Present   Patient Present No   Patient's Family Present No     Daily Rounds Documentation  Team Members Present  MD Phani Diaz, RN  Pola Ledezma [address], CPS    Case reviewed  No 3-11 groups, attended 7-3 groups

## 2020-07-28 NOTE — PROGRESS NOTES
07/28/20 1400   Activity/Group Checklist   Group   (Recovery Workshop )   Attendance Did not attend   Attendance Duration (min) 46-60   Affect/Mood GAEL
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
## 2020-07-29 PROCEDURE — 99232 SBSQ HOSP IP/OBS MODERATE 35: CPT | Performed by: PSYCHIATRY & NEUROLOGY

## 2020-07-29 RX ADMIN — DEXTROAMPHETAMINE SACCHARATE, AMPHETAMINE ASPARTATE, DEXTROAMPHETAMINE SULFATE AND AMPHETAMINE SULFATE 15 MG: 2.5; 2.5; 2.5; 2.5 TABLET ORAL at 05:32

## 2020-07-29 RX ADMIN — LEVOTHYROXINE SODIUM 75 MCG: 75 TABLET ORAL at 05:32

## 2020-07-29 RX ADMIN — CLOZAPINE 200 MG: 200 TABLET ORAL at 17:00

## 2020-07-29 RX ADMIN — METFORMIN HYDROCHLORIDE 500 MG: 500 TABLET ORAL at 17:00

## 2020-07-29 RX ADMIN — PANTOPRAZOLE SODIUM 40 MG: 40 TABLET, DELAYED RELEASE ORAL at 05:32

## 2020-07-29 RX ADMIN — DOCUSATE SODIUM 100 MG: 100 CAPSULE, LIQUID FILLED ORAL at 17:00

## 2020-07-29 RX ADMIN — DIVALPROEX SODIUM 1500 MG: 500 TABLET, EXTENDED RELEASE ORAL at 20:56

## 2020-07-29 RX ADMIN — DOCUSATE SODIUM 100 MG: 100 CAPSULE, LIQUID FILLED ORAL at 08:05

## 2020-07-29 RX ADMIN — METFORMIN HYDROCHLORIDE 500 MG: 500 TABLET ORAL at 08:05

## 2020-07-29 RX ADMIN — ATORVASTATIN CALCIUM 10 MG: 10 TABLET, FILM COATED ORAL at 17:00

## 2020-07-29 RX ADMIN — NICOTINE POLACRILEX 2 MG: 2 GUM, CHEWING ORAL at 08:05

## 2020-07-29 RX ADMIN — OLANZAPINE 5 MG: 5 TABLET, FILM COATED ORAL at 20:56

## 2020-07-29 RX ADMIN — OXYBUTYNIN CHLORIDE 5 MG: 5 TABLET, EXTENDED RELEASE ORAL at 08:05

## 2020-07-29 NOTE — PROGRESS NOTES
07/29/20 1100   Activity/Group Checklist   Group   (IMR/Recovery Anonymous )   Attendance Did not attend   Attendance Duration (min) 46-60   Affect/Mood GAEL

## 2020-07-29 NOTE — PROGRESS NOTES
07/29/20 8088   Team Meeting   Meeting Type Daily Rounds   Initial Conference Date 07/29/20   Team Members Present   Team Members Present Physician;;Nurse; Other (Discipline and Name)   Patient/Family Present   Patient Present No   Patient's Family Present No     Daily Rounds Documentation  Team Members Present:  MD Estefania Perez, RN  Bernard Urrutia, [address] [place] [address]  Meryle Laws, CPS    Case reviewed  State referral- patient is aware  Preadmission meeting for [facility] this Friday at 1430  Attended 7-3 groups

## 2020-07-29 NOTE — PROGRESS NOTES
07/29/20 0900   Activity/Group Checklist   Group Community meeting   Attendance Did not attend   Attendance Duration (min) 16-30   Affect/Mood GAEL

## 2020-07-29 NOTE — PLAN OF CARE
Pre-admission meeting with [facility] scheduled for Friday at 2:30PM   Patient is attending groups and engaging in individual therapy to the best of his ability  Problem: DISCHARGE PLANNING  Goal: Discharge to home or other facility with appropriate resources  Description    CASE MANAGEMENT INTERVENTIONS:  - Conduct assessment to determine patient/family and health care team treatment goals, and need for post-acute services based on payer coverage, community resources, patient preferences and barriers to discharge  - Address psychosocial, clinical, and financial barriers to discharge as identified in assessment in conjunction with the patient/family and health care team   - Assist the patient in reintegration back into the community by removing barriers which may hinder a successful discharge once deemed stable  - Arrange appropriate level of post-acute services according to patient's needs and preference and payer coverage in collaboration with the physician and health care team   - Communicate with and update the patient/family, physician, and health care team regarding progress on the discharge plan  - Arrange appropriate transportation to post-acute venues  Outcome: Progressing     Problem: Individualized Interventions  Goal: Demonstrates healthy coping skills  Description  CERTIFIED PEER SPECIALIST INTERVENTIONS:    -Complete peer assessment with patient to assess their needs and identify their goals to complete while in the recovery program as well as once discharged into the community    -Patient will complete WRAP Plan, Crisis Plan and 5 Life Domains   -Patient will attend 50% of groups offered on the unit    -Patient will complete a goal card weekly       Outcome: Progressing  Goal: Attend and participate in unit activities, including therapeutic, recreational, and educational groups  Description  PSYCHOTHERAPY INTERVENTIONS:    -Form therapeutic alliance to promote trust and safety within a therapeutic environment    -Engage in individual psychotherapy using evidence-based practices that are specific to individual needs   -Process barriers to community living with an emphasis on solution-based interventions   -Promote self-awareness and identity development   -Identify and process patterns of behavior that have led to decompensation and/or hospitalizations   -Attend psychoeducation groups with licensed psychotherapist to learn about Illness Management Recovery (IMR) concepts and enhance coping skills    -Practice effective communication with staff, peers, family, and community members  Participate in family sessions as necessary   -Encourage reality-based thought content by examining thought processes and cognitive distortions      -Work with treatment team in reintegration back into the community when appropriate       Outcome: Progressing
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
## 2020-07-29 NOTE — PLAN OF CARE
Problem: Alteration in Thoughts and Perception  Goal: Verbalize thoughts and feelings  Description  Interventions:  - Promote a nonjudgmental and trusting relationship with the patient through active listening and therapeutic communication  - Assess patient's level of functioning, behavior and potential for risk  - Engage patient in 1 on 1 interactions  - Encourage patient to express fears, feelings, frustrations, and discuss symptoms    - [place] patient to reality, help patient recognize reality-based thinking   - Administer medications as ordered and assess for potential side effects  - Provide the patient education related to the signs and symptoms of the illness and desired effects of prescribed medications  Outcome: Progressing  Goal: Agree to be compliant with medication regime, as prescribed and report medication side effects  Description  Interventions:  - Offer appropriate PRN medication and supervise ingestion; conduct AIMS, as needed   Outcome: Progressing  Goal: Attend and participate in unit activities, including therapeutic, recreational, and educational groups  Description  Interventions:  -Encourage Visitation and family involvement in care    CERTIFIED PEER SPECIALIST INTERVENTIONS:    Complete peer assessment with patient to assess their needs and identify their goals to complete while in the recovery program as well as once discharged into the community  Patient will complete WRAP Plan, Crisis Plan and 5 Life Domains  Patient will attend 50% of groups offered on the unit  Outcome: Kitty Murray has been visible on the unit for his scheduled medications and meals  Tended to his hygiene although he remains disheveled and dressed in multiple layers of clothing  Napped throughout the shift  Less preoccupied in conversation regarding discharge  Behaviors controlled  Will continue to monitor

## 2020-07-30 PROCEDURE — 99232 SBSQ HOSP IP/OBS MODERATE 35: CPT | Performed by: PSYCHIATRY & NEUROLOGY

## 2020-07-30 RX ADMIN — NICOTINE POLACRILEX 2 MG: 2 GUM, CHEWING ORAL at 08:39

## 2020-07-30 RX ADMIN — METFORMIN HYDROCHLORIDE 500 MG: 500 TABLET ORAL at 16:44

## 2020-07-30 RX ADMIN — LEVOTHYROXINE SODIUM 75 MCG: 75 TABLET ORAL at 05:27

## 2020-07-30 RX ADMIN — PANTOPRAZOLE SODIUM 40 MG: 40 TABLET, DELAYED RELEASE ORAL at 05:27

## 2020-07-30 RX ADMIN — DOCUSATE SODIUM 100 MG: 100 CAPSULE, LIQUID FILLED ORAL at 17:17

## 2020-07-30 RX ADMIN — OLANZAPINE 5 MG: 5 TABLET, FILM COATED ORAL at 20:56

## 2020-07-30 RX ADMIN — DEXTROAMPHETAMINE SACCHARATE, AMPHETAMINE ASPARTATE, DEXTROAMPHETAMINE SULFATE AND AMPHETAMINE SULFATE 15 MG: 2.5; 2.5; 2.5; 2.5 TABLET ORAL at 05:27

## 2020-07-30 RX ADMIN — METFORMIN HYDROCHLORIDE 500 MG: 500 TABLET ORAL at 08:38

## 2020-07-30 RX ADMIN — OXYBUTYNIN CHLORIDE 5 MG: 5 TABLET, EXTENDED RELEASE ORAL at 08:38

## 2020-07-30 RX ADMIN — CLOZAPINE 200 MG: 200 TABLET ORAL at 16:44

## 2020-07-30 RX ADMIN — ATORVASTATIN CALCIUM 10 MG: 10 TABLET, FILM COATED ORAL at 16:44

## 2020-07-30 RX ADMIN — DIVALPROEX SODIUM 1500 MG: 500 TABLET, EXTENDED RELEASE ORAL at 20:56

## 2020-07-30 RX ADMIN — DOCUSATE SODIUM 100 MG: 100 CAPSULE, LIQUID FILLED ORAL at 08:38
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
## 2020-07-30 NOTE — PLAN OF CARE
Problem: Alteration in Thoughts and Perception  Goal: Agree to be compliant with medication regime, as prescribed and report medication side effects  Description  Interventions:  - Offer appropriate PRN medication and supervise ingestion; conduct AIMS, as needed   Outcome: Progressing  Goal: Attend and participate in unit activities, including therapeutic, recreational, and educational groups  Description  Interventions:  -Encourage Visitation and family involvement in care    CERTIFIED PEER SPECIALIST INTERVENTIONS:    Complete peer assessment with patient to assess their needs and identify their goals to complete while in the recovery program as well as once discharged into the community  Patient will complete WRAP Plan, Crisis Plan and 5 Life Domains  Patient will attend 50% of groups offered on the unit  Outcome: Progressing     Problem: Ineffective Coping  Goal: Identifies ineffective coping skills  Outcome: Don Millard has been visible on the unit for his scheduled medications and meals, remains disheveled and dressed in multiple layers of clothing  Napped throughout the shift  Less preoccupied in conversation regarding discharge  Behaviors controlled   Will continue to monitor

## 2020-07-30 NOTE — PROGRESS NOTES
07/30/20 0841   Team Meeting   Meeting Type Daily Rounds   Initial Conference Date 07/30/20   Patient/Family Present   Patient Present No   Patient's Family Present No     Daily Rounds Documentation     Team Members Present:   MD Gregory Espinoza, RN  Jade Mcgrath, ELIZABETH Lopez LSW    Crying yesterday in the community room because he wants discharge    Preadmit interview with [facility] tomorrow at 2:30PM

## 2020-07-30 NOTE — NURSING NOTE
Received pt in bed at change of shift with eyes closed; chest movement noted  Continues the same this far as per continual rounding  checks  Will continue to monitor

## 2020-07-30 NOTE — PROGRESS NOTES
07/30/20 0900   Activity/Group Checklist   Group Community meeting   Attendance Did not attend   Attendance Duration (min) 16-30   Affect/Mood GAEL

## 2020-07-30 NOTE — PLAN OF CARE
Problem: Alteration in Thoughts and Perception  Goal: Verbalize thoughts and feelings  Description  Interventions:  - Promote a nonjudgmental and trusting relationship with the patient through active listening and therapeutic communication  - Assess patient's level of functioning, behavior and potential for risk  - Engage patient in 1 on 1 interactions  - Encourage patient to express fears, feelings, frustrations, and discuss symptoms    - [place] patient to reality, help patient recognize reality-based thinking   - Administer medications as ordered and assess for potential side effects  - Provide the patient education related to the signs and symptoms of the illness and desired effects of prescribed medications  Outcome: Progressing  Goal: Refrain from acting on delusional thinking/internal stimuli  Description  Interventions:  - Monitor patient closely, per order   - Utilize least restrictive measures   - Set reasonable limits, give positive feedback for acceptable   - Administer medications as ordered and monitor of potential side effects  Outcome: Progressing  Goal: Agree to be compliant with medication regime, as prescribed and report medication side effects  Description  Interventions:  - Offer appropriate PRN medication and supervise ingestion; conduct AIMS, as needed   Outcome: Progressing  Goal: Attend and participate in unit activities, including therapeutic, recreational, and educational groups  Description  Interventions:  -Encourage Visitation and family involvement in care    CERTIFIED PEER SPECIALIST INTERVENTIONS:    Complete peer assessment with patient to assess their needs and identify their goals to complete while in the recovery program as well as once discharged into the community  Patient will complete WRAP Plan, Crisis Plan and 5 Life Domains  Patient will attend 50% of groups offered on the unit        Outcome: Progressing  Goal: Complete daily ADLs, including personal hygiene independently, as able  Description  Interventions:  - Observe, teach, and assist patient with ADLS  - Monitor and promote a balance of rest/activity, with adequate nutrition and elimination   Outcome: Progressing  Goal: Attend and participate in unit activities, including therapeutic, recreational, and educational groups  Description  Interventions:  -Encourage Visitation and family involvement in care  Outcome: Not Progressing     Problem: Ineffective Coping  Goal: Understands least restrictive measures  Description  Interventions:  - Utilize least restrictive behavior  Outcome: Progressing     Problem: GASTROINTESTINAL - ADULT  Goal: Maintains adequate nutritional intake  Description  INTERVENTIONS:  - Monitor percentage of each meal consumed  - Identify factors contributing to decreased intake, treat as appropriate  - Assist with meals as needed  - Monitor I&O, weight, and lab values if indicated  - Obtain nutrition services referral as needed  Outcome: Demario Fox has been awake, alert, and visible intermittently out in the milieu  Pt completed his daily ADL's today  Ate 100% supper  Pt stated that he was crying while watching tv because he wants to be discharged  Disheveled in appearance and dressed in layers  Compliant with all scheduled meds with little prompting and cooperative with mouth checks  No interaction noted with peers  Pt did not attend evening group but came out for snack after  No behavioral issues  Pt denies any depression, anxiety, si/hi, intent, plan, and has not verbalized any delusions  Continue to monitor/assess for any changes

## 2020-07-30 NOTE — PROGRESS NOTES
07/30/20 1100   Activity/Group Checklist   Group   (IMR/Monthly [employer] )   Attendance Did not attend   Attendance Duration (min) 46-60   Affect/Mood GAEL

## 2020-07-30 NOTE — SOCIAL WORK
Psychotherapy Note  3:00-3:50     Goal Objectives:  1  Build trust in the therapeutic relationship  2  Improve ability to understand emotions and their triggers    Therapist and patient met privately for individual therapy session; patient required some encouragement to meet for today's session  Patient wanted to stay in bed but agreed to meet with therapist after he was told he would have a meeting with [facility] tomorrow  Patient was pleasant and cooperative in his session  Therapist continues to use therapeutic worksheets to aid in treatment sessions  The first worksheet provided facial examples of the different emotions people experience; patient able to appropriately identify the most basic emotions; however, when asked to [place] how he felt he struggled  Therapist then directed patient to draw the face of a particular emotion; patient did well with this  Lastly, he was asked to identify times/triggers that made his feel that emotion; patient struggled with doing so  Therapist and patient will continue to work on his ability to identify triggers of the different emotions  Therapist will normalize the emotions and attempt to teach patient through visual examples such as video clips  Patient may be able to identify triggers of emotions when not directly related to him  Impression: Patient appears somewhat afraid to talk about feelings of sadness and anger and kept reassuring this therapist that he is fine  This therapist gets the impression that patient believes he must be "fine" in order to discharge from this hospital   When therapist attempted to talk about the anger he experienced during team meetings in the past patient was quick to say that he doesn't do that anymore and that he is fine now    Therapist validated those past frustrations and explained that it's how we deal with our frustrations that matter most   With encouragement and warm acceptance patient was eventually even able to admit that he was sad yesterday because he is still in the hospital   Patient is looking forward to discharging to [facility]   Therapist attempted to get patient to share what he is most looking forward to when he leaves the hospital and he responded by saying, "Taking my medication and behaving "

## 2020-07-31 PROCEDURE — 99232 SBSQ HOSP IP/OBS MODERATE 35: CPT | Performed by: PSYCHIATRY & NEUROLOGY

## 2020-07-31 RX ADMIN — DIVALPROEX SODIUM 1500 MG: 500 TABLET, EXTENDED RELEASE ORAL at 21:01

## 2020-07-31 RX ADMIN — OXYBUTYNIN CHLORIDE 5 MG: 5 TABLET, EXTENDED RELEASE ORAL at 08:31

## 2020-07-31 RX ADMIN — ATORVASTATIN CALCIUM 10 MG: 10 TABLET, FILM COATED ORAL at 17:04

## 2020-07-31 RX ADMIN — PANTOPRAZOLE SODIUM 40 MG: 40 TABLET, DELAYED RELEASE ORAL at 05:41

## 2020-07-31 RX ADMIN — LEVOTHYROXINE SODIUM 75 MCG: 75 TABLET ORAL at 05:41

## 2020-07-31 RX ADMIN — DEXTROAMPHETAMINE SACCHARATE, AMPHETAMINE ASPARTATE, DEXTROAMPHETAMINE SULFATE AND AMPHETAMINE SULFATE 15 MG: 2.5; 2.5; 2.5; 2.5 TABLET ORAL at 05:41

## 2020-07-31 RX ADMIN — METFORMIN HYDROCHLORIDE 500 MG: 500 TABLET ORAL at 17:04

## 2020-07-31 RX ADMIN — OLANZAPINE 5 MG: 5 TABLET, FILM COATED ORAL at 21:01

## 2020-07-31 RX ADMIN — DOCUSATE SODIUM 100 MG: 100 CAPSULE, LIQUID FILLED ORAL at 17:04

## 2020-07-31 RX ADMIN — CLOZAPINE 200 MG: 200 TABLET ORAL at 17:04

## 2020-07-31 RX ADMIN — DOCUSATE SODIUM 100 MG: 100 CAPSULE, LIQUID FILLED ORAL at 08:31

## 2020-07-31 RX ADMIN — METFORMIN HYDROCHLORIDE 500 MG: 500 TABLET ORAL at 08:31

## 2020-07-31 NOTE — SOCIAL WORK
SW attempted to contact patient's sister Rasta Martinez  SW left a message and requested a return phone call
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
## 2020-07-31 NOTE — PLAN OF CARE
Problem: Alteration in Thoughts and Perception  Goal: Verbalize thoughts and feelings  Description  Interventions:  - Promote a nonjudgmental and trusting relationship with the patient through active listening and therapeutic communication  - Assess patient's level of functioning, behavior and potential for risk  - Engage patient in 1 on 1 interactions  - Encourage patient to express fears, feelings, frustrations, and discuss symptoms    - [place] patient to reality, help patient recognize reality-based thinking   - Administer medications as ordered and assess for potential side effects  - Provide the patient education related to the signs and symptoms of the illness and desired effects of prescribed medications  Outcome: Progressing  Goal: Refrain from acting on delusional thinking/internal stimuli  Description  Interventions:  - Monitor patient closely, per order   - Utilize least restrictive measures   - Set reasonable limits, give positive feedback for acceptable   - Administer medications as ordered and monitor of potential side effects  Outcome: Progressing  Goal: Agree to be compliant with medication regime, as prescribed and report medication side effects  Description  Interventions:  - Offer appropriate PRN medication and supervise ingestion; conduct AIMS, as needed   Outcome: Progressing  Goal: Attend and participate in unit activities, including therapeutic, recreational, and educational groups  Description  Interventions:  -Encourage Visitation and family involvement in care    CERTIFIED PEER SPECIALIST INTERVENTIONS:    Complete peer assessment with patient to assess their needs and identify their goals to complete while in the recovery program as well as once discharged into the community  Patient will complete WRAP Plan, Crisis Plan and 5 Life Domains  Patient will attend 50% of groups offered on the unit        Outcome: Not Progressing     Problem: Ineffective Coping  Goal: Understands least restrictive measures  Description  Interventions:  - Utilize least restrictive behavior  Outcome: Progressing     Problem: GASTROINTESTINAL - ADULT  Goal: Maintains adequate nutritional intake  Description  INTERVENTIONS:  - Monitor percentage of each meal consumed  - Identify factors contributing to decreased intake, treat as appropriate  - Assist with meals as needed  - Monitor I&O, weight, and lab values if indicated  - Obtain nutrition services referral as needed  Outcome: Malgorzata Gill has been awake, alert, and visible intermittently out in the milieu  Refused vital signs to be checked at 1600  Pt rests in room at intervals  Ate 100% supper  Pt still preoccupied with discharge  Dressed in layers and disheveled in appearance  No interaction noted with peers  Pleasant and cooperative  Compliant with all scheduled meds when called and cooperative with mouth checks  Denies any depression, anxiety, and has not verbalized any delusions  Pt did not attend evening groups but came out for snack after  No behavioral issues  Continue to monitor/assess for any changes

## 2020-07-31 NOTE — PROGRESS NOTES
07/31/20 0905   Team Meeting   Meeting Type Daily Rounds   Initial Conference Date 07/31/20   Team Members Present   Team Members Present Physician;Nurse;;   Patient/Family Present   Patient Present No   Patient's Family Present No     Daily Rounds Documentation  Team Members Present:  MD Ivana Nava, RN  Delia Barreto, MSW, LSW  Phyllis Bob, MSW, LSW    Case reviewed  Pt has preadmission interview today at 230pm for [facility]  Yesterday pt refused vitals, has been med compliant

## 2020-07-31 NOTE — NURSING NOTE
Received pt in bed at change of shift with eyes closed; chest movement noted  Having periods of restleness  Otherwise,  continues the same thus this far as per continual rounding checks  Will continue to monitor

## 2020-07-31 NOTE — PROGRESS NOTES
Cleaning staff found protonix and a 5mg adderrall on the patient's floor while cleaning  It looks to this writer that it fell out of his mouth this morning when he was trying to take them  Patient states he though he took them but "I was half asleep"

## 2020-07-31 NOTE — SOCIAL WORK
Preadmission interview with [facility] held today via telephone  In attendance: Georgi Jeffrey [facility] Buzz Lepe [facility]), Maura Zuniga [facility]), and patient  Patient joined the meeting late and left early once he learned that they don't allow smoking even though he reported that he no longer smokes  Patient's hx reviewed, reason for admission, and update on current status provided  Patient was asked to identify his supports, strengths, needs, triggers, and likes/coping skills; patient required assistance  Caitie informed team that at this time she is unsure of bed availability and will need to review the information from today's interview with the rest of her team   She informed SW that they will be in touch with her to discuss next steps  Patient approached SW at the end of the meeting  Patient stated that he got upset because he doesn't want to be held here  Patient was under the impression that [facility] won't take him  SW explained to patient that they will take him but that it might not happen right away; patient stated that he is okay with that  He denied being upset about the no smoking  Patient then showed SW a sign that was hung today on the whiteboard stating that they must go to four IMR groups in order to earn a pass  SW explained to patient that this will be true once we can give out passes but due to the virus we can't give out passes  Patient insists that someone told him they are giving them out and became angry  Patient stated that he isn't taking his medications tonight and stormed down the hallway  SW tried to provide support but patient did not want to talk

## 2020-07-31 NOTE — PLAN OF CARE
Problem: Alteration in Thoughts and Perception  Goal: Verbalize thoughts and feelings  Description  Interventions:  - Promote a nonjudgmental and trusting relationship with the patient through active listening and therapeutic communication  - Assess patient's level of functioning, behavior and potential for risk  - Engage patient in 1 on 1 interactions  - Encourage patient to express fears, feelings, frustrations, and discuss symptoms    - [place] patient to reality, help patient recognize reality-based thinking   - Administer medications as ordered and assess for potential side effects  - Provide the patient education related to the signs and symptoms of the illness and desired effects of prescribed medications  Outcome: Progressing  Goal: Refrain from acting on delusional thinking/internal stimuli  Description  Interventions:  - Monitor patient closely, per order   - Utilize least restrictive measures   - Set reasonable limits, give positive feedback for acceptable   - Administer medications as ordered and monitor of potential side effects  Outcome: Progressing  Goal: Agree to be compliant with medication regime, as prescribed and report medication side effects  Description  Interventions:  - Offer appropriate PRN medication and supervise ingestion; conduct AIMS, as needed   Outcome: Progressing  Goal: Attend and participate in unit activities, including therapeutic, recreational, and educational groups  Description  Interventions:  -Encourage Visitation and family involvement in care    CERTIFIED PEER SPECIALIST INTERVENTIONS:    Complete peer assessment with patient to assess their needs and identify their goals to complete while in the recovery program as well as once discharged into the community  Patient will complete WRAP Plan, Crisis Plan and 5 Life Domains  Patient will attend 50% of groups offered on the unit        Outcome: Not Progressing     Problem: Ineffective Coping  Goal: Demonstrates healthy coping skills  Outcome: Progressing  Goal: Understands least restrictive measures  Description  Interventions:  - Utilize least restrictive behavior  Outcome: Progressing     Problem: GASTROINTESTINAL - ADULT  Goal: Maintains adequate nutritional intake  Description  INTERVENTIONS:  - Monitor percentage of each meal consumed  - Identify factors contributing to decreased intake, treat as appropriate  - Assist with meals as needed  - Monitor I&O, weight, and lab values if indicated  - Obtain nutrition services referral as needed  Outcome: Edson Collins has been awake, alert, and visible intermittently out in the milieu  Pt remains disheveled in appearance and dressed in layers  Less preoccupation with discharge  Pt denies any depression or anxiety and has not verbalized any delusions  Ate 100% supper  Pt naps at intervals in room  No interaction noted with peers  Compliant with all scheduled meds without prompting  No behavioral issues  Pt did not attend evening group but came out for snack after  Continue to monitor/assess for any changes

## 2020-07-31 NOTE — PLAN OF CARE
Problem: Alteration in Thoughts and Perception  Goal: Refrain from acting on delusional thinking/internal stimuli  Description  Interventions:  - Monitor patient closely, per order   - Utilize least restrictive measures   - Set reasonable limits, give positive feedback for acceptable   - Administer medications as ordered and monitor of potential side effects  Outcome: Progressing  Goal: Agree to be compliant with medication regime, as prescribed and report medication side effects  Description  Interventions:  - Offer appropriate PRN medication and supervise ingestion; conduct AIMS, as needed   Outcome: Progressing     Problem: Alteration in Thoughts and Perception  Goal: Attend and participate in unit activities, including therapeutic, recreational, and educational groups  Description  Interventions:  -Encourage Visitation and family involvement in care    CERTIFIED PEER SPECIALIST INTERVENTIONS:    Complete peer assessment with patient to assess their needs and identify their goals to complete while in the recovery program as well as once discharged into the community  Patient will complete WRAP Plan, Crisis Plan and 5 Life Domains  Patient will attend 50% of groups offered on the unit  Outcome: Not Progressing   Patient is pleasant, cooperative and visible on the unit  He is noted to interact well with his peers  Remains intrusive at times but is easily redirected  Continues to appear disheveled and is dressed in multiple layers which he refused to take off  Denies SI and Hi's  Also denies having any hallucinations or delusions  Attended and participated in 2/4 groups this shift  No issues or concerns noted at this time  Continue to monitor

## 2020-07-31 NOTE — PROGRESS NOTES
07/31/20 1100   Activity/Group Checklist   Group Other (Comment)  (IMR Group: Boundaries and Confidentiality)   Attendance Attended   Attendance Duration (min) 46-60   Interactions Other (Comment)  (Off topic interactions; fixated on interview today )   Affect/Mood Calm   Goals Achieved Able to listen to others     Patient left group once for about 10-15 minutes but did come back  Patient needed some redirection as he kept asking about his meeting today

## 2020-08-01 PROCEDURE — 99232 SBSQ HOSP IP/OBS MODERATE 35: CPT | Performed by: NURSE PRACTITIONER

## 2020-08-01 RX ADMIN — DOCUSATE SODIUM 100 MG: 100 CAPSULE, LIQUID FILLED ORAL at 17:02

## 2020-08-01 RX ADMIN — DEXTROAMPHETAMINE SACCHARATE, AMPHETAMINE ASPARTATE, DEXTROAMPHETAMINE SULFATE AND AMPHETAMINE SULFATE 15 MG: 2.5; 2.5; 2.5; 2.5 TABLET ORAL at 06:27

## 2020-08-01 RX ADMIN — LEVOTHYROXINE SODIUM 75 MCG: 75 TABLET ORAL at 06:28

## 2020-08-01 RX ADMIN — METFORMIN HYDROCHLORIDE 500 MG: 500 TABLET ORAL at 08:23

## 2020-08-01 RX ADMIN — DOCUSATE SODIUM 100 MG: 100 CAPSULE, LIQUID FILLED ORAL at 08:23

## 2020-08-01 RX ADMIN — NICOTINE POLACRILEX 2 MG: 2 GUM, CHEWING ORAL at 08:48

## 2020-08-01 RX ADMIN — METFORMIN HYDROCHLORIDE 500 MG: 500 TABLET ORAL at 16:49

## 2020-08-01 RX ADMIN — ATORVASTATIN CALCIUM 10 MG: 10 TABLET, FILM COATED ORAL at 16:49

## 2020-08-01 RX ADMIN — OXYBUTYNIN CHLORIDE 5 MG: 5 TABLET, EXTENDED RELEASE ORAL at 08:23

## 2020-08-01 RX ADMIN — PANTOPRAZOLE SODIUM 40 MG: 40 TABLET, DELAYED RELEASE ORAL at 06:28

## 2020-08-01 RX ADMIN — OLANZAPINE 5 MG: 5 TABLET, FILM COATED ORAL at 20:42

## 2020-08-01 RX ADMIN — CLOZAPINE 200 MG: 200 TABLET ORAL at 16:49

## 2020-08-01 RX ADMIN — DIVALPROEX SODIUM 1500 MG: 500 TABLET, EXTENDED RELEASE ORAL at 20:42

## 2020-08-01 NOTE — PROGRESS NOTES
Received pt in bed , eyes closed, chest noted to be rising, audible breath sounds, will monitor change in behavior/assessment, with every 7 min rounding checks

## 2020-08-01 NOTE — PROGRESS NOTES
Progress Note - Behavioral Health   Nerissa Gonzalez 50 y o  male MRN: [id number]  [facility] Encounter: [id number]    Assessment/Plan   Principal Problem:    Schizoaffective disorder, bipolar type ([facility] [address] )  Active Problems:    Type 2 diabetes mellitus without complication, without long-term current use of insulin ([facility])    Benign essential hypertension    Gastroesophageal reflux disease without esophagitis    Acquired hypothyroidism    Tobacco abuse    Diabetes insipidus, nephrogenic ([facility] )    ADHD      Subjective:Patient was seen today for continuation of care, records reviewed and  patient was discussed with the primary nurse  Luis presented calm, cooperative and pleasant  He reports sleeping well and has good appetite  He has been attending groups  He initially asked writer to go to [facility] since they have a [facility]  Patient was informed that the plan was to go to [facility] and then to a group home  Patient then was agreeable with statement when stated he wanted to be at [facility] eventually  He denies endorsing auditory visual hallucinations  Denies endorsing suicidal thoughts  He presented wearing different winter layers and a hat  Does not seem to be experiencing still some hypo manic during our encounter  Remains medication compliance  Denies any side effects from medications      Psychiatric Review of Systems:    Sleep: normal  Appetite: normal  Medication side effects: No   ROS: no complaints, denies any headache, shortness of breath, chest pain, chest discomfort, constipation, lightheadedness, muscle cramps, muscle twitching, restlessness or tiredness, all other systems are negative    Vitals:  Vitals:    08/01/20 0705   BP: 135/78   Pulse: 99   Resp:    Temp:    SpO2:        Mental Status Evaluation:    Appearance:  dressed inappropropriately, overweight, wearing a hat, and different layers of clothes (leather jacket)   Behavior:  pleasant, cooperative, calm, good eye contact   Speech:  normal rate and volume, clear, scant   Mood:  still at times manic   Affect:  constricted   Thought Process:  tangential, flooding of thoughts, concrete   Associations: concrete associations   Thought Content:  some paranoia, ruminating thoughts   Perceptual Disturbances: denies auditory or visual hallucinations when asked, but appears distracted   Risk Potential: Suicidal ideation - None  Homicidal ideation - None  Potential for aggression - No   Sensorium:  oriented to person, place, time/date and situation   Memory:  recent and remote memory grossly intact   Consciousness:  alert and awake   Attention: attention span and concentration are improved   Insight:  improving   Judgment: improving   Gait/Station: normal gait/station, normal balance   Motor Activity: no abnormal movements     Laboratory results:    I have personally reviewed all pertinent laboratory/tests results    Most Recent Labs:   Lab Results   Component Value Date    WBC 8 10 07/16/2020    WBC 8 10 07/16/2020    RBC 4 64 07/16/2020    RBC 4 64 07/16/2020    HGB 13 7 07/16/2020    HGB 13 7 07/16/2020    HCT 40 4 (L) 07/16/2020    HCT 40 4 (L) 07/16/2020     07/16/2020     07/16/2020    RDW 13 5 07/16/2020    RDW 13 5 07/16/2020    NEUTROABS 4 62 07/16/2020    SODIUM 130 (L) 07/16/2020    K 4 1 07/16/2020    CL 98 07/16/2020    CO2 24 07/16/2020    BUN 11 07/16/2020    CREATININE 0 60 (L) 07/16/2020    GLUC 141 (H) 07/16/2020    GLUF 141 (H) 07/16/2020    CALCIUM 9 0 07/16/2020    AST 25 07/16/2020    ALT 29 07/16/2020    ALKPHOS 87 07/16/2020    TP 6 5 07/16/2020    ALB 3 8 07/16/2020    TBILI 0 20 07/16/2020    CHOLESTEROL 102 04/13/2020    HDL 26 (L) 04/13/2020    TRIG 172 (H) 04/13/2020    LDLCALC 42 04/13/2020    NONHDLC 76 04/13/2020    VALPROICTOT 78 1 07/16/2020    LITHIUM 0 6 03/27/2014    AMMONIA 11 07/16/2020    [id number] 3 810 12/24/2019    FREET4 0 93 08/23/2019    HGBA1C 6 1 12/03/2019     12/03/2019       Progress Toward Goals:     Improving     Recommended Treatment:     All current active medications have been reviewed  Encourage group therapy, milieu therapy and occupational therapy  [address] checks every 7 minutes  Observe progress over the weekend  Placement pending [address] current medications:    Current Facility-Administered Medications:  acetaminophen 325 mg Oral Q6H PRN Guera Bonilla MD   acetaminophen 650 mg Oral Q6H PRN Guera Bonilla MD   acetaminophen 975 mg Oral Q8H PRN Guera Bonilla MD   aluminum-magnesium hydroxide-simethicone 30 mL Oral Q4H PRN Guera Bonilla MD   amphetamine-dextroamphetamine 15 mg Oral Daily Guera Bonilla MD   atorvastatin 10 mg Oral Daily With Hira Jones MD   benztropine 1 mg Intramuscular Q6H PRN Guera Bonilla MD   benztropine 1 mg Oral Q6H PRN Guera Bonilla MD   cloZAPine 200 mg Oral Daily Guera Bonilla MD   divalproex sodium 1,500 mg Oral HS Guera Bonilla MD   docusate sodium 100 mg Oral BID Guera Bonilla MD   haloperidol 5 mg Oral Q6H PRN Guera Bonilla MD   haloperidol lactate 5 mg Intramuscular Q6H PRN Guera Bonilla MD   levothyroxine 75 mcg Oral Early Morning Guera Bonilla MD   LORazepam 1 mg Intramuscular Q6H PRN Guera Bonilla MD   LORazepam 1 mg Oral Q6H PRN Guera Bonilla MD   magnesium hydroxide 30 mL Oral Daily PRN Guera Bonilla MD   metFORMIN 500 mg Oral BID With Meals Guera Bonilla MD   nicotine polacrilex 2 mg Oral Q2H PRN Guera Bonilla MD   OLANZapine 5 mg Oral HS JOVITA Hodge   oxybutynin 5 mg Oral Daily Guera Bonilla MD   pantoprazole 40 mg Oral Early Morning Guera Bonilla MD   traZODone 50 mg Oral HS PRN Guera Bonilla MD       Risks / Benefits of Treatment:     Risks, benefits, and possible side effects of medications explained to patient and patient verbalizes understanding and agreement for treatment  Counseling / Coordination of Care:     Patient's progress reviewed with nursing staff    Medications, treatment progress and treatment plan reviewed with patient  Supportive counseling provided to the patient            Nakita De León

## 2020-08-01 NOTE — PLAN OF CARE
Problem: Alteration in Thoughts and Perception  Goal: Refrain from acting on delusional thinking/internal stimuli  Description  Interventions:  - Monitor patient closely, per order   - Utilize least restrictive measures   - Set reasonable limits, give positive feedback for acceptable   - Administer medications as ordered and monitor of potential side effects  Outcome: Progressing  Goal: Agree to be compliant with medication regime, as prescribed and report medication side effects  Description  Interventions:  - Offer appropriate PRN medication and supervise ingestion; conduct AIMS, as needed   Outcome: Progressing  Goal: Attend and participate in unit activities, including therapeutic, recreational, and educational groups  Description  Interventions:  -Encourage Visitation and family involvement in care    CERTIFIED PEER SPECIALIST INTERVENTIONS:    Complete peer assessment with patient to assess their needs and identify their goals to complete while in the recovery program as well as once discharged into the community  Patient will complete WRAP Plan, Crisis Plan and 5 Life Domains  Patient will attend 50% of groups offered on the unit  Outcome: Progressing   Patient is pleasant, cooperative and visible on the unit  He is noted to interact well with his peers  Remains intrusive at times but is easily redirected  Continues to appear disheveled and is dressed in multiple layers which he refused to take off  Denies SI and Hi's  Also denies having any hallucinations or delusions  Attended and participated in 2/4 groups this shift  No issues or concerns noted at this time  Continue to monitor

## 2020-08-02 PROCEDURE — 99232 SBSQ HOSP IP/OBS MODERATE 35: CPT | Performed by: NURSE PRACTITIONER

## 2020-08-02 RX ADMIN — METFORMIN HYDROCHLORIDE 500 MG: 500 TABLET ORAL at 08:21

## 2020-08-02 RX ADMIN — DIVALPROEX SODIUM 1500 MG: 500 TABLET, EXTENDED RELEASE ORAL at 20:50

## 2020-08-02 RX ADMIN — ATORVASTATIN CALCIUM 10 MG: 10 TABLET, FILM COATED ORAL at 16:58

## 2020-08-02 RX ADMIN — OXYBUTYNIN CHLORIDE 5 MG: 5 TABLET, EXTENDED RELEASE ORAL at 08:21

## 2020-08-02 RX ADMIN — PANTOPRAZOLE SODIUM 40 MG: 40 TABLET, DELAYED RELEASE ORAL at 05:53

## 2020-08-02 RX ADMIN — DOCUSATE SODIUM 100 MG: 100 CAPSULE, LIQUID FILLED ORAL at 17:12

## 2020-08-02 RX ADMIN — METFORMIN HYDROCHLORIDE 500 MG: 500 TABLET ORAL at 16:58

## 2020-08-02 RX ADMIN — ACETAMINOPHEN 975 MG: 325 TABLET ORAL at 20:49

## 2020-08-02 RX ADMIN — LEVOTHYROXINE SODIUM 75 MCG: 75 TABLET ORAL at 05:53

## 2020-08-02 RX ADMIN — DEXTROAMPHETAMINE SACCHARATE, AMPHETAMINE ASPARTATE, DEXTROAMPHETAMINE SULFATE AND AMPHETAMINE SULFATE 15 MG: 2.5; 2.5; 2.5; 2.5 TABLET ORAL at 05:53

## 2020-08-02 RX ADMIN — DOCUSATE SODIUM 100 MG: 100 CAPSULE, LIQUID FILLED ORAL at 08:21

## 2020-08-02 RX ADMIN — CLOZAPINE 200 MG: 200 TABLET ORAL at 16:58

## 2020-08-02 RX ADMIN — OLANZAPINE 5 MG: 5 TABLET, FILM COATED ORAL at 20:49

## 2020-08-02 NOTE — PLAN OF CARE
Problem: Alteration in Thoughts and Perception  Goal: Refrain from acting on delusional thinking/internal stimuli  Description: Interventions:  - Monitor patient closely, per order   - Utilize least restrictive measures   - Set reasonable limits, give positive feedback for acceptable   - Administer medications as ordered and monitor of potential side effects  Outcome: Progressing  Goal: Agree to be compliant with medication regime, as prescribed and report medication side effects  Description: Interventions:  - Offer appropriate PRN medication and supervise ingestion; conduct AIMS, as needed   Outcome: Progressing     Problem: Alteration in Thoughts and Perception  Goal: Complete daily ADLs, including personal hygiene independently, as able  Description: Interventions:  - Observe, teach, and assist patient with ADLS  - Monitor and promote a balance of rest/activity, with adequate nutrition and elimination   Outcome: Not Progressing   Patient is pleasant, cooperative and visible on the unit  Minimally social with his peers  No intrusive behavior noted this shift  Continues to appear disheveled and is dressed in multiple layers which he refused to take off  Denies SI and Hi's  Also denies having any hallucinations or delusions  Attended and participated in 1/4 groups this shift  No issues or concerns noted at this time  Continue to monitor

## 2020-08-02 NOTE — PROGRESS NOTES
Progress Note - Behavioral Health   Philippe Bianchi 50 y o  male MRN: [id number]  Unit/Bed#: [facility] 105-01 Encounter: [id number]    Assessment/Plan   Principal Problem:    Schizoaffective disorder, bipolar type ([facility] )  Active Problems:    Type 2 diabetes mellitus without complication, without long-term current use of insulin ([facility])    Benign essential hypertension    Gastroesophageal reflux disease without esophagitis    Acquired hypothyroidism    Tobacco abuse    Diabetes insipidus, nephrogenic ([facility] )    ADHD      Subjective:Patient was seen today for continuation of care, records reviewed and  patient was discussed with the primary nurse  Yesika Carter presented cooperative, evasive mildly labile  He reported not attending groups  Reported sleeping well and continues to have good appetite  He presented with poor ADLs and wearing different layers of winter clothing  Today he presented suspicious, paranoid and evasive with writer  He denies endorsing all signs and symptoms such as suicidal, homicidal ideation, auditory visual hallucinations Remains medication compliance  Denies any side effects from medications  Read previous day's note for more info        Psychiatric Review of Systems:    Sleep: normal  Appetite: normal  Medication side effects: No   ROS: no complaints, denies any headache, shortness of breath, chest pain, chest discomfort, diarrhea, dizziness, muscle twitching, restlessness or sedation, all other systems are negative    Vitals:  Vitals:    08/02/20 0732   BP: 130/75   Pulse: 99   Resp:    Temp:    SpO2:        Mental Status Evaluation:    Appearance:  dressed inappropropriately with multiple layers and leather jacket, overweight   Behavior:  cooperative, calm, bizarre, guarded, minimal eye contact   Speech:  normal rate and volume, fluent, clear, scant, poverty of speech   Mood:  mildly irritable, still at times manic   Affect:  constricted   Thought Process:  circumstantial, concrete Associations: concrete associations   Thought Content:  some paranoia   Perceptual Disturbances: denies auditory or visual hallucinations when asked, but appears preoccupied   Risk Potential: Suicidal ideation - None  Homicidal ideation - None  Potential for aggression - No   Sensorium:  oriented to person, place, time/date and situation   Memory:  recent and remote memory grossly intact   Consciousness:  alert and awake   Attention: attention span and concentration are improved   Insight:  improving   Judgment: improving   Gait/Station: normal gait/station, normal balance   Motor Activity: no abnormal movements     Laboratory results:    I have personally reviewed all pertinent laboratory/tests results    Most Recent Labs:   Lab Results   Component Value Date    WBC 8 10 07/16/2020    WBC 8 10 07/16/2020    RBC 4 64 07/16/2020    RBC 4 64 07/16/2020    HGB 13 7 07/16/2020    HGB 13 7 07/16/2020    HCT 40 4 (L) 07/16/2020    HCT 40 4 (L) 07/16/2020     07/16/2020     07/16/2020    RDW 13 5 07/16/2020    RDW 13 5 07/16/2020    NEUTROABS 4 62 07/16/2020    SODIUM 130 (L) 07/16/2020    K 4 1 07/16/2020    CL 98 07/16/2020    CO2 24 07/16/2020    BUN 11 07/16/2020    CREATININE 0 60 (L) 07/16/2020    GLUC 141 (H) 07/16/2020    GLUF 141 (H) 07/16/2020    CALCIUM 9 0 07/16/2020    AST 25 07/16/2020    ALT 29 07/16/2020    ALKPHOS 87 07/16/2020    TP 6 5 07/16/2020    ALB 3 8 07/16/2020    TBILI 0 20 07/16/2020    CHOLESTEROL 102 04/13/2020    HDL 26 (L) 04/13/2020    TRIG 172 (H) 04/13/2020    LDLCALC 42 04/13/2020    NONHDLC 76 04/13/2020    VALPROICTOT 78 1 07/16/2020    LITHIUM 0 6 03/27/2014    AMMONIA 11 07/16/2020    [id number] 3 810 12/24/2019    FREET4 0 93 08/23/2019    HGBA1C 6 1 12/03/2019     12/03/2019       Progress Toward Goals:     Improving     Recommended Treatment:     All current active medications have been reviewed  Encourage group therapy, milieu therapy and occupational therapy  Behavioral Health checks every 7 minutes  Observe progress over the weekend  Placement pending [address] current medications:    Current Facility-Administered Medications:  acetaminophen 325 mg Oral Q6H PRN Sruthi Brink MD   acetaminophen 650 mg Oral Q6H PRN Sruthi Brink MD   acetaminophen 975 mg Oral Q8H PRN Sruthi Brink MD   aluminum-magnesium hydroxide-simethicone 30 mL Oral Q4H PRN Sruthi Brink MD   amphetamine-dextroamphetamine 15 mg Oral Daily Sruthi Brink MD   atorvastatin 10 mg Oral Daily With Abe Castleman, MD   benztropine 1 mg Intramuscular Q6H PRN Sruthi Brink MD   benztropine 1 mg Oral Q6H PRN Sruthi Brink MD   cloZAPine 200 mg Oral Daily Sruthi Brink MD   divalproex sodium 1,500 mg Oral HS Sruthi Brink MD   docusate sodium 100 mg Oral BID Sruthi Brink MD   haloperidol 5 mg Oral Q6H PRN Sruthi Brink MD   haloperidol lactate 5 mg Intramuscular Q6H PRN Sruthi Brink MD   levothyroxine 75 mcg Oral Early Morning Sruthi Brink MD   LORazepam 1 mg Intramuscular Q6H PRN Sruthi Brink MD   LORazepam 1 mg Oral Q6H PRN Sruthi Brink MD   magnesium hydroxide 30 mL Oral Daily PRN Sruthi Brink MD   metFORMIN 500 mg Oral BID With Meals Sruthi Brink MD   nicotine polacrilex 2 mg Oral Q2H PRN Sruthi Brink MD   OLANZapine 5 mg Oral HS JOVITA Hodge   oxybutynin 5 mg Oral Daily Sruthi Brink MD   pantoprazole 40 mg Oral Early Morning Sruthi Brink MD   traZODone 50 mg Oral HS PRN Sruthi Brink MD       Risks / Benefits of Treatment:     Risks, benefits, and possible side effects of medications explained to patient and patient verbalizes understanding and agreement for treatment  Counseling / Coordination of Care:     Patient's progress reviewed with nursing staff  Medications, treatment progress and treatment plan reviewed with patient  Supportive counseling provided to the patient            Keyonna Loyd

## 2020-08-02 NOTE — PLAN OF CARE
Problem: Alteration in Thoughts and Perception  Goal: Verbalize thoughts and feelings  Description  Interventions:  - Promote a nonjudgmental and trusting relationship with the patient through active listening and therapeutic communication  - Assess patient's level of functioning, behavior and potential for risk  - Engage patient in 1 on 1 interactions  - Encourage patient to express fears, feelings, frustrations, and discuss symptoms    - Regina patient to reality, help patient recognize reality-based thinking   - Administer medications as ordered and assess for potential side effects  - Provide the patient education related to the signs and symptoms of the illness and desired effects of prescribed medications  Outcome: Progressing  Goal: Agree to be compliant with medication regime, as prescribed and report medication side effects  Description  Interventions:  - Offer appropriate PRN medication and supervise ingestion; conduct AIMS, as needed   Outcome: Progressing  Goal: Attend and participate in unit activities, including therapeutic, recreational, and educational groups  Description  Interventions:  -Encourage Visitation and family involvement in care    CERTIFIED PEER SPECIALIST INTERVENTIONS:    Complete peer assessment with patient to assess their needs and identify their goals to complete while in the recovery program as well as once discharged into the community  Patient will complete WRAP Plan, Crisis Plan and 5 Life Domains  Patient will attend 50% of groups offered on the unit        Outcome: Progressing    Visible, compliant with routine medications, gait steady, ate 100% of dinner, listened to music with peers, behavior controlled, did not attend evening group, will continue to monitor

## 2020-08-03 PROCEDURE — 99232 SBSQ HOSP IP/OBS MODERATE 35: CPT | Performed by: PSYCHIATRY & NEUROLOGY

## 2020-08-03 RX ADMIN — DEXTROAMPHETAMINE SACCHARATE, AMPHETAMINE ASPARTATE, DEXTROAMPHETAMINE SULFATE AND AMPHETAMINE SULFATE 15 MG: 2.5; 2.5; 2.5; 2.5 TABLET ORAL at 06:02

## 2020-08-03 RX ADMIN — METFORMIN HYDROCHLORIDE 500 MG: 500 TABLET ORAL at 17:03

## 2020-08-03 RX ADMIN — PANTOPRAZOLE SODIUM 40 MG: 40 TABLET, DELAYED RELEASE ORAL at 06:02

## 2020-08-03 RX ADMIN — ATORVASTATIN CALCIUM 10 MG: 10 TABLET, FILM COATED ORAL at 17:03

## 2020-08-03 RX ADMIN — DIVALPROEX SODIUM 1500 MG: 500 TABLET, EXTENDED RELEASE ORAL at 20:54

## 2020-08-03 RX ADMIN — OLANZAPINE 5 MG: 5 TABLET, FILM COATED ORAL at 20:54

## 2020-08-03 RX ADMIN — CLOZAPINE 200 MG: 200 TABLET ORAL at 17:03

## 2020-08-03 RX ADMIN — LEVOTHYROXINE SODIUM 75 MCG: 75 TABLET ORAL at 06:02

## 2020-08-03 RX ADMIN — DOCUSATE SODIUM 100 MG: 100 CAPSULE, LIQUID FILLED ORAL at 17:03

## 2020-08-03 NOTE — PROGRESS NOTES
08/03/20 1100   Activity/Group Checklist   Group   (IMR/SMART Goals)   Attendance Attended   Attendance Duration (min) 46-60   Interactions Interacted appropriately   Affect/Mood Appropriate;Normal range   Goals Achieved Identified feelings; Able to listen to others; Able to engage in interactions; Able to self-disclose

## 2020-08-03 NOTE — PLAN OF CARE
Problem: Alteration in Thoughts and Perception  Goal: Agree to be compliant with medication regime, as prescribed and report medication side effects  Description: Interventions:  - Offer appropriate PRN medication and supervise ingestion; conduct AIMS, as needed   Outcome: Progressing     Problem: Alteration in Thoughts and Perception  Goal: Attend and participate in unit activities, including therapeutic, recreational, and educational groups  Description: Interventions:  -Encourage Visitation and family involvement in care  Outcome: Not Progressing       Visible, compliant with routine medications, gait steady, ate 100% of dinner, listened to music with peers, behavior controlled, did not attend evening group, will continue to monitor

## 2020-08-03 NOTE — PROGRESS NOTES
08/03/20 0900   Activity/Group Checklist   Group Community meeting   Attendance Did not attend   Attendance Duration (min) 31-45   Affect/Mood GAEL

## 2020-08-03 NOTE — PROGRESS NOTES
08/03/20 0836   Team Meeting   Meeting Type Daily Rounds   Initial Conference Date 08/03/20   Patient/Family Present   Patient Present No   Patient's Family Present No     Daily Rounds Documentation     Team Members Present:   MD Maritza Munson, TABBY Shields, [address] [place] [address], [address] [place], [place] [id number]    Went to 3 groups on Saturday  Went to 1 group on Sunday  Social with peers  Found pills on the floor; likely falling back to sleep before swallowing them on night shift  Completed preadmit interview with [facility] on Friday; will receive a call back this week with next steps
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
## 2020-08-03 NOTE — PLAN OF CARE
Problem: Alteration in Thoughts and Perception  Goal: Refrain from acting on delusional thinking/internal stimuli  Description: Interventions:  - Monitor patient closely, per order   - Utilize least restrictive measures   - Set reasonable limits, give positive feedback for acceptable   - Administer medications as ordered and monitor of potential side effects  Outcome: Progressing     Problem: Alteration in Thoughts and Perception  Goal: Agree to be compliant with medication regime, as prescribed and report medication side effects  Description: Interventions:  - Offer appropriate PRN medication and supervise ingestion; conduct AIMS, as needed   Outcome: Not Progressing   Patient is irritable and isoaltive this shift  No intrusive behavior noted this shift  Continues to appear disheveled and is dressed in multiple layers  Denies SI and Hi's  Also denies having any hallucinations or delusions  He refused his 0900 medications this morning  No group attendance noted  No issues or concerns noted at this time  Continue to monitor

## 2020-08-03 NOTE — PLAN OF CARE
Problem: Alteration in Thoughts and Perception  Goal: Attend and participate in unit activities, including therapeutic, recreational, and educational groups  Description: Interventions:  -Encourage Visitation and family involvement in care    CERTIFIED PEER SPECIALIST INTERVENTIONS:    Complete peer assessment with patient to assess their needs and identify their goals to complete while in the recovery program as well as once discharged into the community  Patient will complete WRAP Plan, Crisis Plan and 5 Life Domains  Patient will attend 50% of groups offered on the unit  Outcome: Progressing  Patient continues to be encouraged to attend groups and obtain 50%  Patient was able to obtain 47% for the week of 7/31/2020  Patient pleasant and less fixated/intrusive about D/C  Patient does engage with this writer during morning walk when he attends  Patient did not complete goal card for the week of 8/3/2020

## 2020-08-03 NOTE — PLAN OF CARE
Patient is attending an adequate number of groups  He does his best to engage in groups and individual therapy  SW contacted by Vamshi Butt at [place] stating that she needs court order stating that patient will transfer to [facility] prior to moving forward with referral; SW immediately sent the order over  SW waiting to hear about how long it may take for a bed to become available  Problem: DISCHARGE PLANNING  Goal: Discharge to home or other facility with appropriate resources  Description:   CASE MANAGEMENT INTERVENTIONS:  - Conduct assessment to determine patient/family and health care team treatment goals, and need for post-acute services based on payer coverage, community resources, patient preferences and barriers to discharge  - Address psychosocial, clinical, and financial barriers to discharge as identified in assessment in conjunction with the patient/family and health care team   - Assist the patient in reintegration back into the community by removing barriers which may hinder a successful discharge once deemed stable  - Arrange appropriate level of post-acute services according to patient's needs and preference and payer coverage in collaboration with the physician and health care team   - Communicate with and update the patient/family, physician, and health care team regarding progress on the discharge plan  - Arrange appropriate transportation to post-acute venues  Outcome: Progressing     Problem: Individualized Interventions  Goal: Demonstrates healthy coping skills  Description: CERTIFIED PEER SPECIALIST INTERVENTIONS:    -Complete peer assessment with patient to assess their needs and identify their goals to complete while in the recovery program as well as once discharged into the community    -Patient will complete WRAP Plan, Crisis Plan and 5 Life Domains   -Patient will attend 50% of groups offered on the unit    -Patient will complete a goal card weekly       Outcome: Progressing  Goal: Attend and participate in unit activities, including therapeutic, recreational, and educational groups  Description: PSYCHOTHERAPY INTERVENTIONS:    -Form therapeutic alliance to promote trust and safety within a therapeutic environment    -Engage in individual psychotherapy using evidence-based practices that are specific to individual needs   -Process barriers to community living with an emphasis on solution-based interventions   -Promote self-awareness and identity development   -Identify and process patterns of behavior that have led to decompensation and/or hospitalizations   -Attend psychoeducation groups with licensed psychotherapist to learn about Illness Management Recovery (IMR) concepts and enhance coping skills    -Practice effective communication with staff, peers, family, and community members  Participate in family sessions as necessary   -Encourage reality-based thought content by examining thought processes and cognitive distortions      -Work with treatment team in reintegration back into the community when appropriate       Outcome: Progressing

## 2020-08-04 LAB — SARS-COV-2 RNA RESP QL NAA+PROBE: NEGATIVE

## 2020-08-04 PROCEDURE — 99232 SBSQ HOSP IP/OBS MODERATE 35: CPT | Performed by: PSYCHIATRY & NEUROLOGY

## 2020-08-04 PROCEDURE — 87635 SARS-COV-2 COVID-19 AMP PRB: CPT | Performed by: PSYCHIATRY & NEUROLOGY

## 2020-08-04 RX ADMIN — OXYBUTYNIN CHLORIDE 5 MG: 5 TABLET, EXTENDED RELEASE ORAL at 08:09

## 2020-08-04 RX ADMIN — PANTOPRAZOLE SODIUM 40 MG: 40 TABLET, DELAYED RELEASE ORAL at 06:04

## 2020-08-04 RX ADMIN — CLOZAPINE 200 MG: 200 TABLET ORAL at 16:52

## 2020-08-04 RX ADMIN — METFORMIN HYDROCHLORIDE 500 MG: 500 TABLET ORAL at 16:52

## 2020-08-04 RX ADMIN — METFORMIN HYDROCHLORIDE 500 MG: 500 TABLET ORAL at 08:09

## 2020-08-04 RX ADMIN — LEVOTHYROXINE SODIUM 75 MCG: 75 TABLET ORAL at 06:04

## 2020-08-04 RX ADMIN — DOCUSATE SODIUM 100 MG: 100 CAPSULE, LIQUID FILLED ORAL at 17:16

## 2020-08-04 RX ADMIN — DOCUSATE SODIUM 100 MG: 100 CAPSULE, LIQUID FILLED ORAL at 08:09

## 2020-08-04 RX ADMIN — DIVALPROEX SODIUM 1500 MG: 500 TABLET, EXTENDED RELEASE ORAL at 21:04

## 2020-08-04 RX ADMIN — ATORVASTATIN CALCIUM 10 MG: 10 TABLET, FILM COATED ORAL at 16:52

## 2020-08-04 RX ADMIN — OLANZAPINE 5 MG: 5 TABLET, FILM COATED ORAL at 21:04

## 2020-08-04 RX ADMIN — DEXTROAMPHETAMINE SACCHARATE, AMPHETAMINE ASPARTATE, DEXTROAMPHETAMINE SULFATE AND AMPHETAMINE SULFATE 15 MG: 2.5; 2.5; 2.5; 2.5 TABLET ORAL at 06:04

## 2020-08-04 NOTE — PROGRESS NOTES
08/04/20 1100   Activity/Group Checklist   Group   (IMR/SMART Goals )   Attendance Did not attend   Attendance Duration (min) 46-60   Affect/Mood GAEL

## 2020-08-04 NOTE — PROGRESS NOTES
08/04/20 0932   Team Meeting   Meeting Type Daily Rounds   Initial Conference Date 08/04/20   Team Members Present   Team Members Present Physician;Nurse;;; Other (Discipline and Name)   Patient/Family Present   Patient Present No   Patient's Family Present No     Daily Rounds Documentation  Team Members Present:  MD Danish Miller, RN  Khanh Pelletier, [address] [place] [address]    Case reviewed  Pt remains isolative  Refusing 9AM meds  Discussed possible discharge this week (day TBD- CM will request for Thursday or Friday if possible) to [facility]  Will get COVID test prior

## 2020-08-04 NOTE — PLAN OF CARE
Problem: Alteration in Thoughts and Perception  Goal: Verbalize thoughts and feelings  Description: Interventions:  - Promote a nonjudgmental and trusting relationship with the patient through active listening and therapeutic communication  - Assess patient's level of functioning, behavior and potential for risk  - Engage patient in 1 on 1 interactions  - Encourage patient to express fears, feelings, frustrations, and discuss symptoms    - [place] patient to reality, help patient recognize reality-based thinking   - Administer medications as ordered and assess for potential side effects  - Provide the patient education related to the signs and symptoms of the illness and desired effects of prescribed medications  Outcome: Progressing  Goal: Refrain from acting on delusional thinking/internal stimuli  Description: Interventions:  - Monitor patient closely, per order   - Utilize least restrictive measures   - Set reasonable limits, give positive feedback for acceptable   - Administer medications as ordered and monitor of potential side effects  Outcome: Progressing  Goal: Agree to be compliant with medication regime, as prescribed and report medication side effects  Description: Interventions:  - Offer appropriate PRN medication and supervise ingestion; conduct AIMS, as needed   Outcome: Progressing  Goal: Attend and participate in unit activities, including therapeutic, recreational, and educational groups  Description: Interventions:  -Encourage Visitation and family involvement in care    CERTIFIED PEER SPECIALIST INTERVENTIONS:    Complete peer assessment with patient to assess their needs and identify their goals to complete while in the recovery program as well as once discharged into the community  Patient will complete WRAP Plan, Crisis Plan and 5 Life Domains  Patient will attend 50% of groups offered on the unit        Outcome: Not Progressing  Goal: Complete daily ADLs, including personal hygiene independently, as able  Description: Interventions:  - Observe, teach, and assist patient with ADLS  - Monitor and promote a balance of rest/activity, with adequate nutrition and elimination   Outcome: Progressing  Goal: Attend and participate in unit activities, including therapeutic, recreational, and educational groups  Description: Interventions:  -Encourage Visitation and family involvement in care  Outcome: Not Progressing     Problem: Ineffective Coping  Goal: Demonstrates healthy coping skills  Outcome: Progressing  Goal: Understands least restrictive measures  Description: Interventions:  - Utilize least restrictive behavior  Outcome: Progressing     Problem: GASTROINTESTINAL - ADULT  Goal: Maintains adequate nutritional intake  Description: INTERVENTIONS:  - Monitor percentage of each meal consumed  - Identify factors contributing to decreased intake, treat as appropriate  - Assist with meals as needed  - Monitor I&O, weight, and lab values if indicated  - Obtain nutrition services referral as needed  Outcome: Kirk Cummins has been awake, alert, and visible intermittently out in the milieu  Rests in room at intervals  No interaction noted with peers  Ate 100% supper  Disheveled in appearance and dressed in layers  Less preoccupied with discharge  Compliant with all scheduled meds when called  No behavioral issues  Pt did not attend evening group  Had evening snack  Denies any depression, anxiety, si/hi, intent, plan, a/v hallucinations, and has not verbalized any delusions  Continue to monitor/assess for any changes

## 2020-08-04 NOTE — PLAN OF CARE
Problem: Alteration in Thoughts and Perception  Goal: Attend and participate in unit activities, including therapeutic, recreational, and educational groups  Description: Interventions:  -Encourage Visitation and family involvement in care    CERTIFIED PEER SPECIALIST INTERVENTIONS:    Complete peer assessment with patient to assess their needs and identify their goals to complete while in the recovery program as well as once discharged into the community  Patient will complete WRAP Plan, Crisis Plan and 5 Life Domains  Patient will attend 50% of groups offered on the unit  Outcome: Progressing  Goal: Recognize dysfunctional thoughts, communicate reality-based thoughts at the time of discharge  Description: Interventions:  - Provide medication and psycho-education to assist patient in compliance and developing insight into his/her illness   Outcome: Progressing  I  Individual has been up and about the unit, visible at intervals throughout the shift  He is vague and superficial on approach  He actively participated in programming, attended groups  He has been compliant with medications  Appetite is good  Covid test obtained  Medical aware of need for signature on form requested for transfer to [facility]  Able to verbalize needs, no issues or concerns expressed  Availability of staff made known  Will continue to monitor

## 2020-08-04 NOTE — PLAN OF CARE
Patient is tentatively scheduled for discharge to [facility] on 8/7/2020 so as long as all COVID screening documentation is received  SW received clarification from Sia Delcid regarding what is needed: COVID test done and screen/physician attestation with 72 hours of admission and last 14 days of patient's temperature  Problem: DISCHARGE PLANNING  Goal: Discharge to home or other facility with appropriate resources  Description:   CASE MANAGEMENT INTERVENTIONS:  - Conduct assessment to determine patient/family and health care team treatment goals, and need for post-acute services based on payer coverage, community resources, patient preferences and barriers to discharge  - Address psychosocial, clinical, and financial barriers to discharge as identified in assessment in conjunction with the patient/family and health care team   - Assist the patient in reintegration back into the community by removing barriers which may hinder a successful discharge once deemed stable  - Arrange appropriate level of post-acute services according to patient's needs and preference and payer coverage in collaboration with the physician and health care team   - Communicate with and update the patient/family, physician, and health care team regarding progress on the discharge plan  - Arrange appropriate transportation to post-acute venues     Outcome: Progressing

## 2020-08-04 NOTE — PROGRESS NOTES
08/04/20 0900   Activity/Group Checklist   Group Community meeting   Attendance Did not attend   Attendance Duration (min) 31-45   Affect/Mood GAEL
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
## 2020-08-04 NOTE — PROGRESS NOTES
08/04/20 0920   Team Meeting   Meeting Type Tx Team Meeting   Initial Conference Date 08/04/20   Next Conference Date 08/11/20   Team Members Present   Team Members Present Physician;; Other (Discipline and Name)   Physician Team Member Dr Krystal Causey MD   Social Work Team Member ELIZABETH Fisher   Other (Discipline and Name) [facility] [address]   Patient/Family Present   Patient Present Yes   Patient's Family Present No     Patient present for today's treatment team meeting, but did not have his self assessment prepared  Patient presented as calm and appropriate; he was able to answer some of the questions from the self assessment during the meeting  Patient denies having any concerns to express regarding discharge or medications  Patient reports that he is having tooth pain on and off and would like to see a dentist   Patient informed that he will discharge to [facility] within the next 7 days and that they have a dentist there that will see him; patient okay with this  Patient's group attendance last week was 49%  Patient and team discussed elopement; patient has no desire to elope from the unit  Next treatment team scheduled for 8/11/2020

## 2020-08-04 NOTE — PROGRESS NOTES
Maryan Guillen maintained on ongoing SAFE precaution without incident on this shift, thus far  Laying in bed with eyes closed, breath even and unlabored  Continuous rounding implemented  No behavioral noted  Will continue to monitor

## 2020-08-05 PROCEDURE — 99232 SBSQ HOSP IP/OBS MODERATE 35: CPT | Performed by: PSYCHIATRY & NEUROLOGY

## 2020-08-05 RX ADMIN — CLOZAPINE 200 MG: 200 TABLET ORAL at 16:58

## 2020-08-05 RX ADMIN — OXYBUTYNIN CHLORIDE 5 MG: 5 TABLET, EXTENDED RELEASE ORAL at 08:07

## 2020-08-05 RX ADMIN — DIVALPROEX SODIUM 1500 MG: 500 TABLET, EXTENDED RELEASE ORAL at 20:54

## 2020-08-05 RX ADMIN — METFORMIN HYDROCHLORIDE 500 MG: 500 TABLET ORAL at 16:58

## 2020-08-05 RX ADMIN — PANTOPRAZOLE SODIUM 40 MG: 40 TABLET, DELAYED RELEASE ORAL at 05:27

## 2020-08-05 RX ADMIN — DOCUSATE SODIUM 100 MG: 100 CAPSULE, LIQUID FILLED ORAL at 17:10

## 2020-08-05 RX ADMIN — LEVOTHYROXINE SODIUM 75 MCG: 75 TABLET ORAL at 05:27

## 2020-08-05 RX ADMIN — METFORMIN HYDROCHLORIDE 500 MG: 500 TABLET ORAL at 08:07

## 2020-08-05 RX ADMIN — DEXTROAMPHETAMINE SACCHARATE, AMPHETAMINE ASPARTATE, DEXTROAMPHETAMINE SULFATE AND AMPHETAMINE SULFATE 15 MG: 2.5; 2.5; 2.5; 2.5 TABLET ORAL at 05:27

## 2020-08-05 RX ADMIN — OLANZAPINE 5 MG: 5 TABLET, FILM COATED ORAL at 20:54

## 2020-08-05 RX ADMIN — ATORVASTATIN CALCIUM 10 MG: 10 TABLET, FILM COATED ORAL at 16:58

## 2020-08-05 RX ADMIN — NICOTINE POLACRILEX 2 MG: 2 GUM, CHEWING ORAL at 08:07

## 2020-08-05 RX ADMIN — DOCUSATE SODIUM 100 MG: 100 CAPSULE, LIQUID FILLED ORAL at 08:07
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
## 2020-08-05 NOTE — DISCHARGE INSTR - APPOINTMENTS
You will continue to receive psychiatric and medical care while at  [facility] will also continue to receive case management services at the [facility]   Below are the names of some of your treatment providers: The treating psychiatrist is Dr Zeferino King  The medical physician is Dr David Causey  The  for the unit is [employer]  Your  will continue to work with [place] towards getting you placement at [facility] through [facility]
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
## 2020-08-05 NOTE — PROGRESS NOTES
08/04/20 1400   Activity/Group Checklist   Group   (Recovery Workshop/Timelines )   Attendance Did not attend   Attendance Duration (min) 46-60   Affect/Mood GAEL

## 2020-08-05 NOTE — SOCIAL WORK
COVID test, last 3 weeks of temperatures, and COVID screening tool faxed to Betty Swain at [facility] as requested

## 2020-08-05 NOTE — SOCIAL WORK
Phone call placed to [address]; transportation scheduled for this Friday at 8:30AM   They were informed that a  will meet them outside of building [address] and that they are not to come in      SW received a return call stating that they were looking at the wrong person in the system and can't actually pick patient up until 10:00AM; ISRA will check with [facility] and get back to them  Email received from patient's sister stating that she hasn't heard from me in awhile  ISRA informed her that she has left her two messages in the past week in and attempt to update her on the new discharge plan  ISRA informed her of new discharge plan and informed her that she will call her today  Phone call placed to [employer]; they will not schedule transport this early in advance and instructed SW to call back tomorrow morning  Phone call placed to [facility]; they are able to provide transport  Transportation scheduled for 8:30AM  They were informed that a  will meet them outside of building [address] and that they are not to come in

## 2020-08-05 NOTE — PROGRESS NOTES
08/05/20 0900   Activity/Group Checklist   Group Community meeting   Attendance Did not attend   Attendance Duration (min) 16-30   Affect/Mood GAEL

## 2020-08-05 NOTE — PLAN OF CARE
Problem: Alteration in Thoughts and Perception  Goal: Agree to be compliant with medication regime, as prescribed and report medication side effects  Description: Interventions:  - Offer appropriate PRN medication and supervise ingestion; conduct AIMS, as needed   Outcome: Progressing  Goal: Attend and participate in unit activities, including therapeutic, recreational, and educational groups  Description: Interventions:  -Encourage Visitation and family involvement in care    CERTIFIED PEER SPECIALIST INTERVENTIONS:    Complete peer assessment with patient to assess their needs and identify their goals to complete while in the recovery program as well as once discharged into the community  Patient will complete WRAP Plan, Crisis Plan and [employer]ains  Patient will attend 50% of groups offered on the unit        Outcome: Progressing     Problem: Alteration in Thoughts and Perception  Goal: Attend and participate in unit activities, including therapeutic, recreational, and educational groups  Description: Interventions:  -Encourage Visitation and family involvement in care  Outcome: Not Progressing       Visible, compliant with routine medications, gait steady, ate 100% of dinner, listened to music with peers, behavior controlled, did not attend evening group, will continue to monitor
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
## 2020-08-05 NOTE — PLAN OF CARE
Problem: Alteration in Thoughts and Perception  Goal: Treatment Goal: Gain control of psychotic behaviors/thinking, reduce/eliminate presenting symptoms and demonstrate improved reality functioning upon discharge  Outcome: Progressing  Goal: Agree to be compliant with medication regime, as prescribed and report medication side effects  Description: Interventions:  - Offer appropriate PRN medication and supervise ingestion; conduct AIMS, as needed   Outcome: Progressing  Goal: Attend and participate in unit activities, including therapeutic, recreational, and educational groups  Description: Interventions:  -Encourage Visitation and family involvement in care     Outcome: Progressing    Individual has been up and about the unit, visible at intervals throughout the shift  Participated in programming attended groups  Compliant with medications  Appetite good  Able to express needs, availability of staff made known  Will continue to monitor

## 2020-08-05 NOTE — PROGRESS NOTES
08/05/20 0837   Team Meeting   Meeting Type Daily Rounds   Initial Conference Date 08/05/20   Patient/Family Present   Patient Present No   Patient's Family Present No     Daily Rounds Documentation     Team Members Present:   MD Chaim Flores, TABBY Damon, [address] [place] [address]    Compliant with medications  No behavioral issues  COVID test completed and negative  Sleeping    Discharge scheduled for Friday; transfer to [facility]

## 2020-08-05 NOTE — SOCIAL WORK
Message received from [facility] staff confirming that they received and reviewed the fax  No concerns/needs reported

## 2020-08-05 NOTE — PROGRESS NOTES
08/05/20 1100   Activity/Group Checklist   Group   (IMR/RA/Timelines )   Attendance Did not attend   Attendance Duration (min) 46-60   Affect/Mood GAEL

## 2020-08-06 PROCEDURE — 99232 SBSQ HOSP IP/OBS MODERATE 35: CPT | Performed by: PSYCHIATRY & NEUROLOGY

## 2020-08-06 RX ADMIN — OLANZAPINE 5 MG: 5 TABLET, FILM COATED ORAL at 20:55

## 2020-08-06 RX ADMIN — DIVALPROEX SODIUM 1500 MG: 500 TABLET, EXTENDED RELEASE ORAL at 20:55

## 2020-08-06 RX ADMIN — PANTOPRAZOLE SODIUM 40 MG: 40 TABLET, DELAYED RELEASE ORAL at 05:29

## 2020-08-06 RX ADMIN — LEVOTHYROXINE SODIUM 75 MCG: 75 TABLET ORAL at 05:29

## 2020-08-06 RX ADMIN — CLOZAPINE 200 MG: 200 TABLET ORAL at 16:41

## 2020-08-06 RX ADMIN — METFORMIN HYDROCHLORIDE 500 MG: 500 TABLET ORAL at 16:41

## 2020-08-06 RX ADMIN — DEXTROAMPHETAMINE SACCHARATE, AMPHETAMINE ASPARTATE, DEXTROAMPHETAMINE SULFATE AND AMPHETAMINE SULFATE 15 MG: 2.5; 2.5; 2.5; 2.5 TABLET ORAL at 05:29

## 2020-08-06 RX ADMIN — DOCUSATE SODIUM 100 MG: 100 CAPSULE, LIQUID FILLED ORAL at 08:41

## 2020-08-06 RX ADMIN — OXYBUTYNIN CHLORIDE 5 MG: 5 TABLET, EXTENDED RELEASE ORAL at 08:41

## 2020-08-06 RX ADMIN — DOCUSATE SODIUM 100 MG: 100 CAPSULE, LIQUID FILLED ORAL at 17:11

## 2020-08-06 RX ADMIN — ATORVASTATIN CALCIUM 10 MG: 10 TABLET, FILM COATED ORAL at 16:41

## 2020-08-06 RX ADMIN — METFORMIN HYDROCHLORIDE 500 MG: 500 TABLET ORAL at 08:41

## 2020-08-06 NOTE — PLAN OF CARE
Patient will discharge to [facility] tomorrow for continued inpatient psychiatric care  Patient did his best to participate in groups and individual interventions during his time at the [facility]  Problem: Individualized Interventions  Goal: Demonstrates healthy coping skills  Description: CERTIFIED PEER SPECIALIST INTERVENTIONS:    -Complete peer assessment with patient to assess their needs and identify their goals to complete while in the recovery program as well as once discharged into the community    -Patient will complete WRAP Plan, Crisis Plan and 5 Life Domains   -Patient will attend 50% of groups offered on the unit    -Patient will complete a goal card weekly  Outcome: Adequate for Discharge     Problem: DISCHARGE PLANNING  Goal: Discharge to home or other facility with appropriate resources  Description:   CASE MANAGEMENT INTERVENTIONS:  - Conduct assessment to determine patient/family and health care team treatment goals, and need for post-acute services based on payer coverage, community resources, patient preferences and barriers to discharge  - Address psychosocial, clinical, and financial barriers to discharge as identified in assessment in conjunction with the patient/family and health care team   - Assist the patient in reintegration back into the community by removing barriers which may hinder a successful discharge once deemed stable  - Arrange appropriate level of post-acute services according to patient's needs and preference and payer coverage in collaboration with the physician and health care team   - Communicate with and update the patient/family, physician, and health care team regarding progress on the discharge plan  - Arrange appropriate transportation to post-acute venues     Outcome: Completed     Problem: Individualized Interventions  Goal: Attend and participate in unit activities, including therapeutic, recreational, and educational groups  Description: PSYCHOTHERAPY INTERVENTIONS:    -Form therapeutic alliance to promote trust and safety within a therapeutic environment    -Engage in individual psychotherapy using evidence-based practices that are specific to individual needs   -Process barriers to community living with an emphasis on solution-based interventions   -Promote self-awareness and identity development   -Identify and process patterns of behavior that have led to decompensation and/or hospitalizations   -Attend psychoeducation groups with licensed psychotherapist to learn about Illness Management Recovery (IMR) concepts and enhance coping skills    -Practice effective communication with staff, peers, family, and community members  Participate in family sessions as necessary   -Encourage reality-based thought content by examining thought processes and cognitive distortions      -Work with treatment team in reintegration back into the community when appropriate       Outcome: Completed

## 2020-08-06 NOTE — PROGRESS NOTES
08/06/20 1004   Team Meeting   Meeting Type Daily Rounds   Initial Conference Date 08/06/20   Team Members Present   Team Members Present Physician;Nurse;;   Patient/Family Present   Patient Present No   Patient's Family Present No     Daily Rounds Documentation  Team Members Present:  MD Gregory Schroeder RN  Rivka Allen, [address] [place] [address]    Case reviewed  Preparing for discharge to [facility] Friday  Sister upset that patient is going to [facility] and not [facility]  Gathering at 11am with staff and peers to commemorate his time in program and say goodbye

## 2020-08-06 NOTE — PROGRESS NOTES
08/06/20 1100   Activity/Group Checklist   Group   (IMR/[place] [facility] )   Attendance Attended   Attendance Duration (min) 46-60   Interactions Interacted appropriately   Affect/Mood Appropriate;Bright;Normal range   Goals Achieved Identified feelings; Able to listen to others; Able to engage in interactions; Able to self-disclose

## 2020-08-06 NOTE — NURSING NOTE
Patient compliant with medications  Denies SI/HI  Rates Anxiety 0/4  Rates Depression 0/10    Patient is pleasant and visible during meals in the dining room  Patient is fixated currently on discharge

## 2020-08-06 NOTE — PLAN OF CARE
Problem: Alteration in Thoughts and Perception  Goal: Verbalize thoughts and feelings  Description: Interventions:  - Promote a nonjudgmental and trusting relationship with the patient through active listening and therapeutic communication  - Assess patient's level of functioning, behavior and potential for risk  - Engage patient in 1 on 1 interactions  - Encourage patient to express fears, feelings, frustrations, and discuss symptoms    - [place] patient to reality, help patient recognize reality-based thinking   - Administer medications as ordered and assess for potential side effects  - Provide the patient education related to the signs and symptoms of the illness and desired effects of prescribed medications  Outcome: Progressing  Goal: Refrain from acting on delusional thinking/internal stimuli  Description: Interventions:  - Monitor patient closely, per order   - Utilize least restrictive measures   - Set reasonable limits, give positive feedback for acceptable   - Administer medications as ordered and monitor of potential side effects  Outcome: Progressing  Goal: Agree to be compliant with medication regime, as prescribed and report medication side effects  Description: Interventions:  - Offer appropriate PRN medication and supervise ingestion; conduct AIMS, as needed   Outcome: Progressing  Goal: Attend and participate in unit activities, including therapeutic, recreational, and educational groups  Description: Interventions:  -Encourage Visitation and family involvement in care    CERTIFIED PEER SPECIALIST INTERVENTIONS:    Complete peer assessment with patient to assess their needs and identify their goals to complete while in the recovery program as well as once discharged into the community  Patient will complete WRAP Plan, Crisis Plan and 5 Life Domains  Patient will attend 50% of groups offered on the unit        Outcome: Not Progressing  Goal: Complete daily ADLs, including personal hygiene independently, as able  Description: Interventions:  - Observe, teach, and assist patient with ADLS  - Monitor and promote a balance of rest/activity, with adequate nutrition and elimination   Outcome: Progressing     Problem: Ineffective Coping  Goal: Understands least restrictive measures  Description: Interventions:  - Utilize least restrictive behavior  Outcome: Progressing     Problem: GASTROINTESTINAL - ADULT  Goal: Maintains adequate nutritional intake  Description: INTERVENTIONS:  - Monitor percentage of each meal consumed  - Identify factors contributing to decreased intake, treat as appropriate  - Assist with meals as needed  - Monitor I&O, weight, and lab values if indicated  - Obtain nutrition services referral as needed  Outcome: Demario Fox has been awake, alert, and visible intermittently out in the milieu  Pt completed his daily ADL's today  Ate 100% supper  Behavior has been quiet and mostly isolative to self in room  Pt compliant with all scheduled meds when called  Disheveled in appearance and dressed in layers  No interaction noted with peers  Pt did not attend any evening groups but came out for snack after  Denies any depression, anxiety, si/hi, intent, plan, a/v hallucinations, and has not verbalized any delusions  No interaction noted with peers  Continue to monitor/assess for any changes

## 2020-08-06 NOTE — PLAN OF CARE
Problem: Alteration in Thoughts and Perception  Goal: Verbalize thoughts and feelings  Description: Interventions:  - Promote a nonjudgmental and trusting relationship with the patient through active listening and therapeutic communication  - Assess patient's level of functioning, behavior and potential for risk  - Engage patient in 1 on 1 interactions  - Encourage patient to express fears, feelings, frustrations, and discuss symptoms    - [place] patient to reality, help patient recognize reality-based thinking   - Administer medications as ordered and assess for potential side effects  - Provide the patient education related to the signs and symptoms of the illness and desired effects of prescribed medications  Outcome: Progressing  Goal: Refrain from acting on delusional thinking/internal stimuli  Description: Interventions:  - Monitor patient closely, per order   - Utilize least restrictive measures   - Set reasonable limits, give positive feedback for acceptable   - Administer medications as ordered and monitor of potential side effects  Outcome: Progressing  Goal: Agree to be compliant with medication regime, as prescribed and report medication side effects  Description: Interventions:  - Offer appropriate PRN medication and supervise ingestion; conduct AIMS, as needed   Outcome: Progressing  Goal: Attend and participate in unit activities, including therapeutic, recreational, and educational groups  Description: Interventions:  -Encourage Visitation and family involvement in care    CERTIFIED PEER SPECIALIST INTERVENTIONS:    Complete peer assessment with patient to assess their needs and identify their goals to complete while in the recovery program as well as once discharged into the community  Patient will complete WRAP Plan, Crisis Plan and 5 Life Domains  Patient will attend 50% of groups offered on the unit        Outcome: Not Progressing     Problem: GASTROINTESTINAL - ADULT  Goal: Maintains adequate nutritional intake  Description: INTERVENTIONS:  - Monitor percentage of each meal consumed  - Identify factors contributing to decreased intake, treat as appropriate  - Assist with meals as needed  - Monitor I&O, weight, and lab values if indicated  - Obtain nutrition services referral as needed  Outcome: Zion Wintersr has been awake, alert, but mostly resting quietly in room  Ate 100% supper  No interaction noted with peers  Denies any depression, anxiety, and has not verbalized any delusions  Behavior has been quiet and isolative to self  Pt did not attend evening group but came out for snack after  No behavioral issues  Compliant with all scheduled meds when called and cooperative with mouth checks  Continue to monitor/assess for any changes

## 2020-08-06 NOTE — PROGRESS NOTES
08/06/20 0900   Activity/Group Checklist   Group Community meeting   Attendance Did not attend   Attendance Duration (min) 16-30   Affect/Mood GAEL
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
## 2020-08-07 VITALS
WEIGHT: 253.8 LBS | HEART RATE: 113 BPM | RESPIRATION RATE: 19 BRPM | OXYGEN SATURATION: 97 % | SYSTOLIC BLOOD PRESSURE: 115 MMHG | DIASTOLIC BLOOD PRESSURE: 65 MMHG | BODY MASS INDEX: 37.59 KG/M2 | HEIGHT: 69 IN | TEMPERATURE: 98.3 F

## 2020-08-07 PROCEDURE — 99238 HOSP IP/OBS DSCHRG MGMT 30/<: CPT | Performed by: PSYCHIATRY & NEUROLOGY

## 2020-08-07 RX ORDER — CLOZAPINE 200 MG/1
200 TABLET ORAL DAILY
Qty: 30 TABLET | Refills: 0 | Status: SHIPPED | OUTPATIENT
Start: 2020-08-07

## 2020-08-07 RX ORDER — OXYBUTYNIN CHLORIDE 5 MG/1
5 TABLET, EXTENDED RELEASE ORAL DAILY
Qty: 30 TABLET | Refills: 0 | Status: SHIPPED | OUTPATIENT
Start: 2020-08-08

## 2020-08-07 RX ORDER — ATORVASTATIN CALCIUM 10 MG/1
10 TABLET, FILM COATED ORAL
Qty: 30 TABLET | Refills: 0 | Status: SHIPPED | OUTPATIENT
Start: 2020-08-07

## 2020-08-07 RX ORDER — OLANZAPINE 5 MG/1
5 TABLET ORAL
Qty: 30 TABLET | Refills: 0 | Status: SHIPPED | OUTPATIENT
Start: 2020-08-07

## 2020-08-07 RX ORDER — PANTOPRAZOLE SODIUM 40 MG/1
40 TABLET, DELAYED RELEASE ORAL
Qty: 30 TABLET | Refills: 0 | Status: SHIPPED | OUTPATIENT
Start: 2020-08-08

## 2020-08-07 RX ORDER — LEVOTHYROXINE SODIUM 0.07 MG/1
75 TABLET ORAL
Qty: 30 TABLET | Refills: 0 | Status: SHIPPED | OUTPATIENT
Start: 2020-08-08

## 2020-08-07 RX ORDER — DIVALPROEX SODIUM 500 MG/1
1500 TABLET, EXTENDED RELEASE ORAL
Qty: 90 TABLET | Refills: 0 | Status: SHIPPED | OUTPATIENT
Start: 2020-08-07

## 2020-08-07 RX ORDER — HALOPERIDOL 5 MG
5 TABLET ORAL EVERY 6 HOURS PRN
Qty: 30 TABLET | Refills: 0 | Status: SHIPPED | OUTPATIENT
Start: 2020-08-07

## 2020-08-07 RX ORDER — DEXTROAMPHETAMINE SACCHARATE, AMPHETAMINE ASPARTATE, DEXTROAMPHETAMINE SULFATE AND AMPHETAMINE SULFATE 2.5; 2.5; 2.5; 2.5 MG/1; MG/1; MG/1; MG/1
15 TABLET ORAL DAILY
Qty: 15 TABLET | Refills: 0 | Status: SHIPPED | OUTPATIENT
Start: 2020-08-08 | End: 2020-08-18

## 2020-08-07 RX ADMIN — METFORMIN HYDROCHLORIDE 500 MG: 500 TABLET ORAL at 08:02

## 2020-08-07 RX ADMIN — LEVOTHYROXINE SODIUM 75 MCG: 75 TABLET ORAL at 05:45

## 2020-08-07 RX ADMIN — DEXTROAMPHETAMINE SACCHARATE, AMPHETAMINE ASPARTATE, DEXTROAMPHETAMINE SULFATE AND AMPHETAMINE SULFATE 15 MG: 2.5; 2.5; 2.5; 2.5 TABLET ORAL at 05:45

## 2020-08-07 RX ADMIN — PANTOPRAZOLE SODIUM 40 MG: 40 TABLET, DELAYED RELEASE ORAL at 05:45

## 2020-08-07 RX ADMIN — OXYBUTYNIN CHLORIDE 5 MG: 5 TABLET, EXTENDED RELEASE ORAL at 08:02

## 2020-08-07 RX ADMIN — DOCUSATE SODIUM 100 MG: 100 CAPSULE, LIQUID FILLED ORAL at 08:02

## 2020-08-07 NOTE — DISCHARGE SUMMARY
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    haloperidol (HALDOL) tablet 5 mg, 5 mg, Oral, Q6H PRN, Toña Harris MD, 5 mg at 05/05/20 1648    haloperidol lactate (HALDOL) injection 5 mg, 5 mg, Intramuscular, Q6H PRN, Toña Harris MD    levothyroxine tablet 75 mcg, 75 mcg, Oral, Early Morning, Toña Harris MD, 75 mcg at 08/07/20 0545    LORazepam (ATIVAN) 2 mg/mL injection 1 mg, 1 mg, Intramuscular, Q6H PRN, Toña Harris MD    LORazepam (ATIVAN) tablet 1 mg, 1 mg, Oral, Q6H PRN, Toña Harris MD    magnesium hydroxide (MILK OF MAGNESIA) 400 mg/5 mL oral suspension 30 mL, 30 mL, Oral, Daily PRN, Toña Harris MD, 30 mL at 03/14/20 2032    metFORMIN (GLUCOPHAGE) tablet 500 mg, 500 mg, Oral, BID With Meals, Toña Harris MD, 500 mg at 08/07/20 0802    nicotine polacrilex (NICORETTE) gum 2 mg, 2 mg, Oral, Q2H PRN, Toña Harris MD, 2 mg at 08/05/20 0807    OLANZapine (ZyPREXA) tablet 5 mg, 5 mg, Oral, HS, Nivia Salas, JOVITA, 5 mg at 08/06/20 2055    oxybutynin (DITROPAN-XL) 24 hr tablet 5 mg, 5 mg, Oral, Daily, Katherine Mott MD, 5 mg at 08/07/20 0802    pantoprazole (PROTONIX) EC tablet 40 mg, 40 mg, Oral, Early Morning, Katherine Mott MD, 40 mg at 08/07/20 0545    traZODone (DESYREL) tablet 50 mg, 50 mg, Oral, HS PRN, Katherine Mott MD, 50 mg at 07/02/20 2208    Current Outpatient Medications:     [START ON 8/8/2020] amphetamine-dextroamphetamine (ADDERALL) 10 mg tablet, Take 1 5 tablets (15 mg total) by mouth daily for 10 daysMax Daily Amount: 15 mg, Disp: 15 tablet, Rfl: 0    atorvastatin (LIPITOR) 10 mg tablet, Take 1 tablet (10 mg total) by mouth daily with dinner, Disp: 30 tablet, Rfl: 0    clozapine (CLOZARIL) 200 MG tablet, Take 1 tablet (200 mg total) by mouth daily, Disp: 30 tablet, Rfl: 0    divalproex sodium (DEPAKOTE ER) 500 mg 24 hr tablet, Take 3 tablets (1,500 mg total) by mouth daily at bedtime, Disp: 90 tablet, Rfl: 0    haloperidol (HALDOL) 5 mg tablet, Take 1 tablet (5 mg total) by mouth every 6 (six) hours as needed (moderate agitation), Disp: 30 tablet, Rfl: 0    [START ON 8/8/2020] levothyroxine 75 mcg tablet, Take 1 tablet (75 mcg total) by mouth daily in the early morning, Disp: 30 tablet, Rfl: 0    metFORMIN (GLUCOPHAGE) 500 mg tablet, Take 1 tablet (500 mg total) by mouth 2 (two) times a day with meals, Disp: 30 tablet, Rfl: 0    OLANZapine (ZyPREXA) 5 mg tablet, Take 1 tablet (5 mg total) by mouth daily at bedtime, Disp: 30 tablet, Rfl: 0    [START ON 8/8/2020] oxybutynin (DITROPAN-XL) 5 mg 24 hr tablet, Take 1 tablet (5 mg total) by mouth daily, Disp: 30 tablet, Rfl: 0    [START ON 8/8/2020] pantoprazole (PROTONIX) 40 mg tablet, Take 1 tablet (40 mg total) by mouth daily in the early morning, Disp: 30 tablet, Rfl: 0  Discharge and aftercare plan: To be formulated at the [facility], most likely to all inclusive supervised group home living at [facility]  He needs medical appointment for his multiple physical problems    Discharge diagnosis:  Schizoaffective bipolar, attention deficit disorder with hyperactivity, nicotine dependence  Medical diagnosis:  Morbid obesity, type 2 diabetes, essential hypertension, acquired hypothyroidism, nephrogenic diabetes insipidus, COPD   Sandra Desir MD

## 2020-08-07 NOTE — NURSING NOTE
Patient belongings given to patient and AVS reviewed with PT and receiving facility - [facility]  Patient is calm and cooperative  Patient discharged from the [facility] @ [id number]

## 2020-12-26 NOTE — PROGRESS NOTES
Pt awake several times overnight, requesting water  Appears to fall back to sleep shortly thereafter  Principal Discharge DX:	Unable to ambulate  Secondary Diagnosis:	Acute alcohol abuse

## 2021-02-01 NOTE — PLAN OF CARE
Problem: Alteration in Thoughts and Perception  Goal: Verbalize thoughts and feelings  Description  Interventions:  - Promote a nonjudgmental and trusting relationship with the patient through active listening and therapeutic communication  - Assess patient's level of functioning, behavior and potential for risk  - Engage patient in 1 on 1 interactions  - Encourage patient to express fears, feelings, frustrations, and discuss symptoms    - [place] patient to reality, help patient recognize reality-based thinking   - Administer medications as ordered and assess for potential side effects  - Provide the patient education related to the signs and symptoms of the illness and desired effects of prescribed medications  Outcome: Progressing  Goal: Refrain from acting on delusional thinking/internal stimuli  Description  Interventions:  - Monitor patient closely, per order   - Utilize least restrictive measures   - Set reasonable limits, give positive feedback for acceptable   - Administer medications as ordered and monitor of potential side effects  Outcome: Progressing  Goal: Agree to be compliant with medication regime, as prescribed and report medication side effects  Description  Interventions:  - Offer appropriate PRN medication and supervise ingestion; conduct AIMS, as needed   Outcome: Progressing  Goal: Attend and participate in unit activities, including therapeutic, recreational, and educational groups  Description  Interventions:  -Encourage Visitation and family involvement in care    CERTIFIED PEER SPECIALIST INTERVENTIONS:    Complete peer assessment with patient to assess their needs and identify their goals to complete while in the recovery program as well as once discharged into the community  Patient will complete WRAP Plan, Crisis Plan and [facility] Life Domains  Patient will attend 50% of groups offered on the unit        Outcome: Not Progressing  Goal: Complete daily ADLs, including personal hygiene independently, as able  Description  Interventions:  - Observe, teach, and assist patient with ADLS  - Monitor and promote a balance of rest/activity, with adequate nutrition and elimination   Outcome: Progressing     Problem: Ineffective Coping  Goal: Demonstrates healthy coping skills  Outcome: Progressing  Goal: Understands least restrictive measures  Description  Interventions:  - Utilize least restrictive behavior  Outcome: Progressing     Problem: GASTROINTESTINAL - ADULT  Goal: Maintains adequate nutritional intake  Description  INTERVENTIONS:  - Monitor percentage of each meal consumed  - Identify factors contributing to decreased intake, treat as appropriate  - Assist with meals as needed  - Monitor I&O, weight, and lab values if indicated  - Obtain nutrition services referral as needed  Outcome: Kirk Cummins has been awake, alert, and, visible intermittently out in the milieu  Pt ate 100% supper  Pt completed his daily ADL's today  Disheveled in appearance and dressed in layers with same clothes on  Pt rests in room at intervals and listens to music on radio in [facility] with peers at times  Pt did not attend any evening groups but came out for snack after  Less preoccupied with discharge  Compliant with all scheduled meds without prompting and cooperative with mouth checks  No behavioral issues  Continue to monitor/assess for any changes  Minoxidil Counseling: Minoxidil is a topical medication which can increase blood flow where it is applied. It is uncertain how this medication increases hair growth. Side effects are uncommon and include stinging and allergic reactions. Metronidazole Pregnancy And Lactation Text: This medication is Pregnancy Category B and considered safe during pregnancy. It is also excreted in breast milk. Siliq Pregnancy And Lactation Text: The risk during pregnancy and breastfeeding is uncertain with this medication. Bexarotene Pregnancy And Lactation Text: This medication is Pregnancy Category X and should not be given to women who are pregnant or may become pregnant. This medication should not be used if you are breast feeding. Dapsone Pregnancy And Lactation Text: This medication is Pregnancy Category C and is not considered safe during pregnancy or breast feeding. Rhofade Counseling: Rhofade is a topical medication which can decrease superficial blood flow where applied. Side effects are uncommon and include stinging, redness and allergic reactions. Drysol Pregnancy And Lactation Text: This medication is considered safe during pregnancy and breast feeding. Odomzo Pregnancy And Lactation Text: This medication is Pregnancy Category X and is absolutely contraindicated during pregnancy. It is unknown if it is excreted in breast milk. Cyclosporine Pregnancy And Lactation Text: This medication is Pregnancy Category C and it isn't know if it is safe during pregnancy. This medication is excreted in breast milk. Humira Counseling:  I discussed with the patient the risks of adalimumab including but not limited to myelosuppression, immunosuppression, autoimmune hepatitis, demyelinating diseases, lymphoma, and serious infections. The patient understands that monitoring is required including a PPD at baseline and must alert us or the primary physician if symptoms of infection or other concerning signs are noted. Opioid Pregnancy And Lactation Text: These medications can lead to premature delivery and should be avoided during pregnancy. These medications are also present in breast milk in small amounts. Benzoyl Peroxide Counseling: Patient counseled that medicine may cause skin irritation and bleach clothing. In the event of skin irritation, the patient was advised to reduce the amount of the drug applied or use it less frequently. The patient verbalized understanding of the proper use and possible adverse effects of benzoyl peroxide. All of the patient's questions and concerns were addressed. Doxepin Pregnancy And Lactation Text: This medication is Pregnancy Category C and it isn't known if it is safe during pregnancy. It is also excreted in breast milk and breast feeding isn't recommended. Glycopyrrolate Pregnancy And Lactation Text: This medication is Pregnancy Category B and is considered safe during pregnancy. It is unknown if it is excreted breast milk. Xolair Pregnancy And Lactation Text: This medication is Pregnancy Category B and is considered safe during pregnancy. This medication is excreted in breast milk. Sarecycline Pregnancy And Lactation Text: This medication is Pregnancy Category D and not consider safe during pregnancy. It is also excreted in breast milk. Cephalexin Counseling: I counseled the patient regarding use of cephalexin as an antibiotic for prophylactic and/or therapeutic purposes. Cephalexin (commonly prescribed under brand name Keflex) is a cephalosporin antibiotic which is active against numerous classes of bacteria, including most skin bacteria. Side effects may include nausea, diarrhea, gastrointestinal upset, rash, hives, yeast infections, and in rare cases, hepatitis, kidney disease, seizures, fever, confusion, neurologic symptoms, and others. Patients with severe allergies to penicillin medications are cautioned that there is about a 10% incidence of cross-reactivity with cephalosporins. When possible, patients with penicillin allergies should use alternatives to cephalosporins for antibiotic therapy. Tranexamic Acid Pregnancy And Lactation Text: It is unknown if this medication is safe during pregnancy or breast feeding. Topical Clindamycin Pregnancy And Lactation Text: This medication is Pregnancy Category B and is considered safe during pregnancy. It is unknown if it is excreted in breast milk. Itraconazole Pregnancy And Lactation Text: This medication is Pregnancy Category C and it isn't know if it is safe during pregnancy. It is also excreted in breast milk. Birth Control Pills Pregnancy And Lactation Text: This medication should be avoided if pregnant and for the first 30 days post-partum. Bexarotene Counseling:  I discussed with the patient the risks of bexarotene including but not limited to hair loss, dry lips/skin/eyes, liver abnormalities, hyperlipidemia, pancreatitis, depression/suicidal ideation, photosensitivity, drug rash/allergic reactions, hypothyroidism, anemia, leukopenia, infection, cataracts, and teratogenicity. Patient understands that they will need regular blood tests to check lipid profile, liver function tests, white blood cell count, thyroid function tests and pregnancy test if applicable. Siliq Counseling:  I discussed with the patient the risks of Siliq including but not limited to new or worsening depression, suicidal thoughts and behavior, immunosuppression, malignancy, posterior leukoencephalopathy syndrome, and serious infections. The patient understands that monitoring is required including a PPD at baseline and must alert us or the primary physician if symptoms of infection or other concerning signs are noted. There is also a special program designed to monitor depression which is required with Siliq. Metronidazole Counseling:  I discussed with the patient the risks of metronidazole including but not limited to seizures, nausea/vomiting, a metallic taste in the mouth, nausea/vomiting and severe allergy. Minoxidil Pregnancy And Lactation Text: This medication has not been assigned a Pregnancy Risk Category but animal studies failed to show danger with the topical medication. It is unknown if the medication is excreted in breast milk. Dapsone Counseling: I discussed with the patient the risks of dapsone including but not limited to hemolytic anemia, agranulocytosis, rashes, methemoglobinemia, kidney failure, peripheral neuropathy, headaches, GI upset, and liver toxicity. Patients who start dapsone require monitoring including baseline LFTs and weekly CBCs for the first month, then every month thereafter. The patient verbalized understanding of the proper use and possible adverse effects of dapsone. All of the patient's questions and concerns were addressed. Drysol Counseling:  I discussed with the patient the risks of drysol/aluminum chloride including but not limited to skin rash, itching, irritation, burning. Enbrel Pregnancy And Lactation Text: This medication is Pregnancy Category B and is considered safe during pregnancy. It is unknown if this medication is excreted in breast milk. Odomzo Counseling- I discussed with the patient the risks of Odomzo including but not limited to nausea, vomiting, diarrhea, constipation, weight loss, changes in the sense of taste, decreased appetite, muscle spasms, and hair loss. The patient verbalized understanding of the proper use and possible adverse effects of Odomzo. All of the patient's questions and concerns were addressed. Rhofade Pregnancy And Lactation Text: This medication has not been assigned a Pregnancy Risk Category. It is unknown if the medication is excreted in breast milk. Cyclosporine Counseling:  I discussed with the patient the risks of cyclosporine including but not limited to hypertension, gingival hyperplasia,myelosuppression, immunosuppression, liver damage, kidney damage, neurotoxicity, lymphoma, and serious infections. The patient understands that monitoring is required including baseline blood pressure, CBC, CMP, lipid panel and uric acid, and then 1-2 times monthly CMP and blood pressure. Opioid Counseling: I discussed with the patient the potential side effects of opioids including but not limited to addiction, altered mental status, and depression. I stressed avoiding alcohol, benzodiazepines, muscle relaxants and sleep aids unless specifically okayed by a physician. The patient verbalized understanding of the proper use and possible adverse effects of opioids. All of the patient's questions and concerns were addressed. They were instructed to flush the remaining pills down the toilet if they did not need them for pain. Tranexamic Acid Counseling:  Patient advised of the small risk of bleeding problems with tranexamic acid. They were also instructed to call if they developed any nausea, vomiting or diarrhea. All of the patient's questions and concerns were addressed. Doxepin Counseling:  Patient advised that the medication is sedating and not to drive a car after taking this medication. Patient informed of potential adverse effects including but not limited to dry mouth, urinary retention, and blurry vision. The patient verbalized understanding of the proper use and possible adverse effects of doxepin. All of the patient's questions and concerns were addressed. Glycopyrrolate Counseling:  I discussed with the patient the risks of glycopyrrolate including but not limited to skin rash, drowsiness, dry mouth, difficulty urinating, and blurred vision. Xolair Counseling:  Patient informed of potential adverse effects including but not limited to fever, muscle aches, rash and allergic reactions. The patient verbalized understanding of the proper use and possible adverse effects of Xolair. All of the patient's questions and concerns were addressed. Sarecycline Counseling: Patient advised regarding possible photosensitivity and discoloration of the teeth, skin, lips, tongue and gums. Patient instructed to avoid sunlight, if possible. When exposed to sunlight, patients should wear protective clothing, sunglasses, and sunscreen. The patient was instructed to call the office immediately if the following severe adverse effects occur:  hearing changes, easy bruising/bleeding, severe headache, or vision changes. The patient verbalized understanding of the proper use and possible adverse effects of sarecycline. All of the patient's questions and concerns were addressed. Cephalexin Pregnancy And Lactation Text: This medication is Pregnancy Category B and considered safe during pregnancy. It is also excreted in breast milk but can be used safely for shorter doses. Itraconazole Counseling:  I discussed with the patient the risks of itraconazole including but not limited to liver damage, nausea/vomiting, neuropathy, and severe allergy. The patient understands that this medication is best absorbed when taken with acidic beverages such as non-diet cola or ginger ale. The patient understands that monitoring is required including baseline LFTs and repeat LFTs at intervals. The patient understands that they are to contact us or the primary physician if concerning signs are noted. Birth Control Pills Counseling: Birth Control Pill Counseling: I discussed with the patient the potential side effects of OCPs including but not limited to increased risk of stroke, heart attack, thrombophlebitis, deep venous thrombosis, hepatic adenomas, breast changes, GI upset, headaches, and depression. The patient verbalized understanding of the proper use and possible adverse effects of OCPs. All of the patient's questions and concerns were addressed. Rituxan Pregnancy And Lactation Text: This medication is Pregnancy Category C and it isn't know if it is safe during pregnancy. It is unknown if this medication is excreted in breast milk but similar antibodies are known to be excreted. Acitretin Pregnancy And Lactation Text: This medication is Pregnancy Category X and should not be given to women who are pregnant or may become pregnant in the future. This medication is excreted in breast milk. Nsaids Pregnancy And Lactation Text: These medications are considered safe up to 30 weeks gestation. It is excreted in breast milk. Solaraze Counseling:  I discussed with the patient the risks of Solaraze including but not limited to erythema, scaling, itching, weeping, crusting, and pain. 5-Fu Pregnancy And Lactation Text: This medication is Pregnancy Category X and contraindicated in pregnancy and in women who may become pregnant. It is unknown if this medication is excreted in breast milk. Cyclophosphamide Pregnancy And Lactation Text: This medication is Pregnancy Category D and it isn't considered safe during pregnancy. This medication is excreted in breast milk. Enbrel Counseling:  I discussed with the patient the risks of etanercept including but not limited to myelosuppression, immunosuppression, autoimmune hepatitis, demyelinating diseases, lymphoma, and infections. The patient understands that monitoring is required including a PPD at baseline and must alert us or the primary physician if symptoms of infection or other concerning signs are noted. Topical Clindamycin Counseling: Patient counseled that this medication may cause skin irritation or allergic reactions. In the event of skin irritation, the patient was advised to reduce the amount of the drug applied or use it less frequently. The patient verbalized understanding of the proper use and possible adverse effects of clindamycin. All of the patient's questions and concerns were addressed. Erythromycin Pregnancy And Lactation Text: This medication is Pregnancy Category B and is considered safe during pregnancy. It is also excreted in breast milk. Cimetidine Pregnancy And Lactation Text: This medication is Pregnancy Category B and is considered safe during pregnancy. It is also excreted in breast milk and breast feeding isn't recommended. Gabapentin Pregnancy And Lactation Text: This medication is Pregnancy Category C and isn't considered safe during pregnancy. It is excreted in breast milk. Rifampin Pregnancy And Lactation Text: This medication is Pregnancy Category C and it isn't know if it is safe during pregnancy. It is also excreted in breast milk and should not be used if you are breast feeding. Xeljocelynz Pregnancy And Lactation Text: This medication is Pregnancy Category D and is not considered safe during pregnancy. The risk during breast feeding is also uncertain. Eucrisa Counseling: Patient may experience a mild burning sensation during topical application. Gianfranco Sharona is not approved in children less than 3years of age. Clindamycin Counseling: I counseled the patient regarding use of clindamycin as an antibiotic for prophylactic and/or therapeutic purposes. Clindamycin is active against numerous classes of bacteria, including skin bacteria. Side effects may include nausea, diarrhea, gastrointestinal upset, rash, hives, yeast infections, and in rare cases, colitis. Ivermectin Pregnancy And Lactation Text: This medication is Pregnancy Category C and it isn't known if it is safe during pregnancy. It is also excreted in breast milk. Griseofulvin Pregnancy And Lactation Text: This medication is Pregnancy Category X and is known to cause serious birth defects. It is unknown if this medication is excreted in breast milk but breast feeding should be avoided. Mirvaso Counseling: Vickie Lucila is a topical medication which can decrease superficial blood flow where applied. Side effects are uncommon and include stinging, redness and allergic reactions. Propranolol Pregnancy And Lactation Text: This medication is Pregnancy Category C and it isn't known if it is safe during pregnancy. It is excreted in breast milk. Zyclara Pregnancy And Lactation Text: This medication is Pregnancy Category C. It is unknown if this medication is excreted in breast milk. Acitretin Counseling:  I discussed with the patient the risks of acitretin including but not limited to hair loss, dry lips/skin/eyes, liver damage, hyperlipidemia, depression/suicidal ideation, photosensitivity. Serious rare side effects can include but are not limited to pancreatitis, pseudotumor cerebri, bony changes, clot formation/stroke/heart attack. Patient understands that alcohol is contraindicated since it can result in liver toxicity and significantly prolong the elimination of the drug by many years. Rituxan Counseling:  I discussed with the patient the risks of Rituxan infusions. Side effects can include infusion reactions, severe drug rashes including mucocutaneous reactions, reactivation of latent hepatitis and other infections and rarely progressive multifocal leukoencephalopathy. All of the patient's questions and concerns were addressed. 5-Fu Counseling: 5-Fluorouracil Counseling:  I discussed with the patient the risks of 5-fluorouracil including but not limited to erythema, scaling, itching, weeping, crusting, and pain. Tazorac Pregnancy And Lactation Text: This medication is not safe during pregnancy. It is unknown if this medication is excreted in breast milk. Stelara Counseling:  I discussed with the patient the risks of ustekinumab including but not limited to immunosuppression, malignancy, posterior leukoencephalopathy syndrome, and serious infections. The patient understands that monitoring is required including a PPD at baseline and must alert us or the primary physician if symptoms of infection or other concerning signs are noted. Solaraze Pregnancy And Lactation Text: This medication is Pregnancy Category B and is considered safe. There is some data to suggest avoiding during the third trimester. It is unknown if this medication is excreted in breast milk. Nsaids Counseling: NSAID Counseling: I discussed with the patient that NSAIDs should be taken with food. Prolonged use of NSAIDs can result in the development of stomach ulcers. Patient advised to stop taking NSAIDs if abdominal pain occurs. The patient verbalized understanding of the proper use and possible adverse effects of NSAIDs. All of the patient's questions and concerns were addressed. Dupixent Pregnancy And Lactation Text: This medication likely crosses the placenta but the risk for the fetus is uncertain. This medication is excreted in breast milk. Erythromycin Counseling:  I discussed with the patient the risks of erythromycin including but not limited to GI upset, allergic reaction, drug rash, diarrhea, increase in liver enzymes, and yeast infections. Cyclophosphamide Counseling:  I discussed with the patient the risks of cyclophosphamide including but not limited to hair loss, hormonal abnormalities, decreased fertility, abdominal pain, diarrhea, nausea and vomiting, bone marrow suppression and infection. The patient understands that monitoring is required while taking this medication. Cimetidine Counseling:  I discussed with the patient the risks of Cimetidine including but not limited to gynecomastia, headache, diarrhea, nausea, drowsiness, arrhythmias, pancreatitis, skin rashes, psychosis, bone marrow suppression and kidney toxicity. Thalidomide Counseling: I discussed with the patient the risks of thalidomide including but not limited to birth defects, anxiety, weakness, chest pain, dizziness, cough and severe allergy. Ivermectin Counseling:  Patient instructed to take medication on an empty stomach with a full glass of water. Patient informed of potential adverse effects including but not limited to nausea, diarrhea, dizziness, itching, and swelling of the extremities or lymph nodes. The patient verbalized understanding of the proper use and possible adverse effects of ivermectin. All of the patient's questions and concerns were addressed. Colchicine Counseling:  Patient counseled regarding adverse effects including but not limited to stomach upset (nausea, vomiting, stomach pain, or diarrhea). Patient instructed to limit alcohol consumption while taking this medication. Colchicine may reduce blood counts especially with prolonged use. The patient understands that monitoring of kidney function and blood counts may be required, especially at baseline. The patient verbalized understanding of the proper use and possible adverse effects of colchicine. All of the patient's questions and concerns were addressed. Gabapentin Counseling: I discussed with the patient the risks of gabapentin including but not limited to dizziness, somnolence, fatigue and ataxia. Xeljanz Counseling: Ashley Press Counseling: I discussed with the patient the risks of Ashley Press therapy including increased risk of infection, liver issues, headache, diarrhea, or cold symptoms. Live vaccines should be avoided. They were instructed to call if they have any problems. Propranolol Counseling:  I discussed with the patient the risks of propranolol including but not limited to low heart rate, low blood pressure, low blood sugar, restlessness and increased cold sensitivity. They should call the office if they experience any of these side effects. Rifampin Counseling: I discussed with the patient the risks of rifampin including but not limited to liver damage, kidney damage, red-orange body fluids, nausea/vomiting and severe allergy. Clindamycin Pregnancy And Lactation Text: This medication can be used in pregnancy if certain situations. Clindamycin is also present in breast milk. Griseofulvin Counseling:  I discussed with the patient the risks of griseofulvin including but not limited to photosensitivity, cytopenia, liver damage, nausea/vomiting and severe allergy. The patient understands that this medication is best absorbed when taken with a fatty meal (e.g., ice cream or french fries). Zyclara Counseling:  I discussed with the patient the risks of imiquimod including but not limited to erythema, scaling, itching, weeping, crusting, and pain. Patient understands that the inflammatory response to imiquimod is variable from person to person and was educated regarded proper titration schedule. If flu-like symptoms develop, patient knows to discontinue the medication and contact us. Niacinamide Pregnancy And Lactation Text: These medications are considered safe during pregnancy. Calcipotriene Pregnancy And Lactation Text: This medication has not been proven safe during pregnancy. It is unknown if this medication is excreted in breast milk. Tazorac Counseling:  Patient advised that medication is irritating and drying. Patient may need to apply sparingly and wash off after an hour before eventually leaving it on overnight. The patient verbalized understanding of the proper use and possible adverse effects of tazorac. All of the patient's questions and concerns were addressed. Cellcept Pregnancy And Lactation Text: This medication is Pregnancy Category D and isn't considered safe during pregnancy. It is unknown if this medication is excreted in breast milk. Dupixent Counseling: I discussed with the patient the risks of dupilumab including but not limited to eye infection and irritation, cold sores, injection site reactions, worsening of asthma, allergic reactions and increased risk of parasitic infection. Live vaccines should be avoided while taking dupilumab. Dupilumab will also interact with certain medications such as warfarin and cyclosporine. The patient understands that monitoring is required and they must alert us or the primary physician if symptoms of infection or other concerning signs are noted. Detail Level: Zone Hydroquinone Counseling:  Patient advised that medication may result in skin irritation, lightening (hypopigmentation), dryness, and burning. In the event of skin irritation, the patient was advised to reduce the amount of the drug applied or use it less frequently. Rarely, spots that are treated with hydroquinone can become darker (pseudoochronosis). Should this occur, patient instructed to stop medication and call the office. The patient verbalized understanding of the proper use and possible adverse effects of hydroquinone. All of the patient's questions and concerns were addressed. Doxycycline Counseling:  Patient counseled regarding possible photosensitivity and increased risk for sunburn. Patient instructed to avoid sunlight, if possible. When exposed to sunlight, patients should wear protective clothing, sunglasses, and sunscreen. The patient was instructed to call the office immediately if the following severe adverse effects occur:  hearing changes, easy bruising/bleeding, severe headache, or vision changes. The patient verbalized understanding of the proper use and possible adverse effects of doxycycline. All of the patient's questions and concerns were addressed. Sski Pregnancy And Lactation Text: This medication is Pregnancy Category D and isn't considered safe during pregnancy. It is excreted in breast milk. Finasteride Pregnancy And Lactation Text: This medication is absolutely contraindicated during pregnancy. It is unknown if it is excreted in breast milk. High Dose Vitamin A Pregnancy And Lactation Text: High dose vitamin A therapy is contraindicated during pregnancy and breast feeding. Doxycycline Pregnancy And Lactation Text: This medication is Pregnancy Category D and not consider safe during pregnancy. It is also excreted in breast milk but is considered safe for shorter treatment courses. Picato Counseling:  I discussed with the patient the risks of Picato including but not limited to erythema, scaling, itching, weeping, crusting, and pain. Infliximab Counseling:  I discussed with the patient the risks of infliximab including but not limited to myelosuppression, immunosuppression, autoimmune hepatitis, demyelinating diseases, lymphoma, and serious infections. The patient understands that monitoring is required including a PPD at baseline and must alert us or the primary physician if symptoms of infection or other concerning signs are noted. Calcipotriene Counseling:  I discussed with the patient the risks of calcipotriene including but not limited to erythema, scaling, itching, and irritation. Azithromycin Counseling:  I discussed with the patient the risks of azithromycin including but not limited to GI upset, allergic reaction, drug rash, diarrhea, and yeast infections. Niacinamide Counseling: I recommended taking niacin or niacinamide, also know as vitamin B3, twice daily. Recent evidence suggests that taking vitamin B3 (500 mg twice daily) can reduce the risk of actinic keratoses and non-melanoma skin cancers. Side effects of vitamin B3 include flushing and headache. Cellcept Counseling:  I discussed with the patient the risks of mycophenolate mofetil including but not limited to infection/immunosuppression, GI upset, hypokalemia, hypercholesterolemia, bone marrow suppression, lymphoproliferative disorders, malignancy, GI ulceration/bleed/perforation, colitis, interstitial lung disease, kidney failure, progressive multifocal leukoencephalopathy, and birth defects. The patient understands that monitoring is required including a baseline creatinine and regular CBC testing. In addition, patient must alert us immediately if symptoms of infection or other concerning signs are noted. SSKI Counseling:  I discussed with the patient the risks of SSKI including but not limited to thyroid abnormalities, metallic taste, GI upset, fever, headache, acne, arthralgias, paraesthesias, lymphadenopathy, easy bleeding, arrhythmias, and allergic reaction. Include Pregnancy/Lactation Warning?: No Finasteride Counseling:  I discussed with the patient the risks of use of finasteride including but not limited to decreased libido, decreased ejaculate volume, gynecomastia, and depression. Women should not handle medication. All of the patient's questions and concerns were addressed. Albendazole Counseling:  I discussed with the patient the risks of albendazole including but not limited to cytopenia, kidney damage, nausea/vomiting and severe allergy. The patient understands that this medication is being used in an off-label manner. Quinolones Counseling:  I discussed with the patient the risks of fluoroquinolones including but not limited to GI upset, allergic reaction, drug rash, diarrhea, dizziness, photosensitivity, yeast infections, liver function test abnormalities, tendonitis/tendon rupture. High Dose Vitamin A Counseling: Side effects reviewed, pt to contact office should one occur. Prednisone Counseling:  I discussed with the patient the risks of prolonged use of prednisone including but not limited to weight gain, insomnia, osteoporosis, mood changes, diabetes, susceptibility to infection, glaucoma and high blood pressure. In cases where prednisone use is prolonged, patients should be monitored with blood pressure checks, serum glucose levels and an eye exam.  Additionally, the patient may need to be placed on GI prophylaxis, PCP prophylaxis, and calcium and vitamin D supplementation and/or a bisphosphonate. The patient verbalized understanding of the proper use and the possible adverse effects of prednisone. All of the patient's questions and concerns were addressed. Tremfya Counseling: I discussed with the patient the risks of guselkumab including but not limited to immunosuppression, serious infections, worsening of inflammatory bowel disease and drug reactions. The patient understands that monitoring is required including a PPD at baseline and must alert us or the primary physician if symptoms of infection or other concerning signs are noted. Clofazimine Counseling:  I discussed with the patient the risks of clofazimine including but not limited to skin and eye pigmentation, liver damage, nausea/vomiting, gastrointestinal bleeding and allergy. Skyrizi Counseling: I discussed with the patient the risks of risankizumab-rzaa including but not limited to immunosuppression, and serious infections. The patient understands that monitoring is required including a PPD at baseline and must alert us or the primary physician if symptoms of infection or other concerning signs are noted. Oxybutynin Counseling:  I discussed with the patient the risks of oxybutynin including but not limited to skin rash, drowsiness, dry mouth, difficulty urinating, and blurred vision. Fluconazole Counseling:  Patient counseled regarding adverse effects of fluconazole including but not limited to headache, diarrhea, nausea, upset stomach, liver function test abnormalities, taste disturbance, and stomach pain. There is a rare possibility of liver failure that can occur when taking fluconazole. The patient understands that monitoring of LFTs and kidney function test may be required, especially at baseline. The patient verbalized understanding of the proper use and possible adverse effects of fluconazole. All of the patient's questions and concerns were addressed. Terbinafine Counseling: Patient counseling regarding adverse effects of terbinafine including but not limited to headache, diarrhea, rash, upset stomach, liver function test abnormalities, itching, taste/smell disturbance, nausea, abdominal pain, and flatulence. There is a rare possibility of liver failure that can occur when taking terbinafine. The patient understands that a baseline LFT and kidney function test may be required. The patient verbalized understanding of the proper use and possible adverse effects of terbinafine. All of the patient's questions and concerns were addressed. Wartpeel Counseling:  I discussed with the patient the risks of Wartpeel including but not limited to erythema, scaling, itching, weeping, crusting, and pain. Topical Retinoid counseling:  Patient advised to apply a pea-sized amount only at bedtime and wait 30 minutes after washing their face before applying. If too drying, patient may add a non-comedogenic moisturizer. The patient verbalized understanding of the proper use and possible adverse effects of retinoids. All of the patient's questions and concerns were addressed. Azithromycin Pregnancy And Lactation Text: This medication is considered safe during pregnancy and is also secreted in breast milk. Cosentyx Counseling:  I discussed with the patient the risks of Cosentyx including but not limited to worsening of Crohn's disease, immunosuppression, allergic reactions and infections. The patient understands that monitoring is required including a PPD at baseline and must alert us or the primary physician if symptoms of infection or other concerning signs are noted. Imiquimod Counseling:  I discussed with the patient the risks of imiquimod including but not limited to erythema, scaling, itching, weeping, crusting, and pain. Patient understands that the inflammatory response to imiquimod is variable from person to person and was educated regarded proper titration schedule. If flu-like symptoms develop, patient knows to discontinue the medication and contact us. Hydroxychloroquine Pregnancy And Lactation Text: This medication has been shown to cause fetal harm but it isn't assigned a Pregnancy Risk Category. There are small amounts excreted in breast milk. Isotretinoin Pregnancy And Lactation Text: This medication is Pregnancy Category X and is considered extremely dangerous during pregnancy. It is unknown if it is excreted in breast milk. Otezla Pregnancy And Lactation Text: This medication is Pregnancy Category C and it isn't known if it is safe during pregnancy. It is unknown if it is excreted in breast milk. Protopic Counseling: Patient may experience a mild burning sensation during topical application. Protopic is not approved in children less than 3years of age. There have been case reports of hematologic and skin malignancies in patients using topical calcineurin inhibitors although causality is questionable. Topical Sulfur Applications Pregnancy And Lactation Text: This medication is Pregnancy Category C and has an unknown safety profile during pregnancy. It is unknown if this topical medication is excreted in breast milk. Methotrexate Pregnancy And Lactation Text: This medication is Pregnancy Category X and is known to cause fetal harm. This medication is excreted in breast milk. Ilumya Counseling: I discussed with the patient the risks of tildrakizumab including but not limited to immunosuppression, malignancy, posterior leukoencephalopathy syndrome, and serious infections. The patient understands that monitoring is required including a PPD at baseline and must alert us or the primary physician if symptoms of infection or other concerning signs are noted. Spironolactone Pregnancy And Lactation Text: This medication can cause feminization of the male fetus and should be avoided during pregnancy. The active metabolite is also found in breast milk. Hydroxyzine Pregnancy And Lactation Text: This medication is not safe during pregnancy and should not be taken. It is also excreted in breast milk and breast feeding isn't recommended. Carac Counseling:  I discussed with the patient the risks of Carac including but not limited to erythema, scaling, itching, weeping, crusting, and pain. Bactrim Counseling:  I discussed with the patient the risks of sulfa antibiotics including but not limited to GI upset, allergic reaction, drug rash, diarrhea, dizziness, photosensitivity, and yeast infections. Rarely, more serious reactions can occur including but not limited to aplastic anemia, agranulocytosis, methemoglobinemia, blood dyscrasias, liver or kidney failure, lung infiltrates or desquamative/blistering drug rashes. Valtrex Pregnancy And Lactation Text: this medication is Pregnancy Category B and is considered safe during pregnancy. This medication is not directly found in breast milk but it's metabolite acyclovir is present. Ketoconazole Pregnancy And Lactation Text: This medication is Pregnancy Category C and it isn't know if it is safe during pregnancy. It is also excreted in breast milk and breast feeding isn't recommended. Cimzia Pregnancy And Lactation Text: This medication crosses the placenta but can be considered safe in certain situations. Cimzia may be excreted in breast milk. Azathioprine Counseling:  I discussed with the patient the risks of azathioprine including but not limited to myelosuppression, immunosuppression, hepatotoxicity, lymphoma, and infections. The patient understands that monitoring is required including baseline LFTs, Creatinine, possible TPMP genotyping and weekly CBCs for the first month and then every 2 weeks thereafter. The patient verbalized understanding of the proper use and possible adverse effects of azathioprine. All of the patient's questions and concerns were addressed. Minocycline Counseling: Patient advised regarding possible photosensitivity and discoloration of the teeth, skin, lips, tongue and gums. Patient instructed to avoid sunlight, if possible. When exposed to sunlight, patients should wear protective clothing, sunglasses, and sunscreen. The patient was instructed to call the office immediately if the following severe adverse effects occur:  hearing changes, easy bruising/bleeding, severe headache, or vision changes. The patient verbalized understanding of the proper use and possible adverse effects of minocycline. All of the patient's questions and concerns were addressed. Isotretinoin Counseling: Patient should get monthly blood tests, not donate blood, not drive at night if vision affected, not share medication, and not undergo elective surgery for 6 months after tx completed. Side effects reviewed, pt to contact office should one occur. Taltz Counseling: I discussed with the patient the risks of ixekizumab including but not limited to immunosuppression, serious infections, worsening of inflammatory bowel disease and drug reactions. The patient understands that monitoring is required including a PPD at baseline and must alert us or the primary physician if symptoms of infection or other concerning signs are noted. Arava Counseling:  Patient counseled regarding adverse effects of Arava including but not limited to nausea, vomiting, abnormalities in liver function tests. Patients may develop mouth sores, rash, diarrhea, and abnormalities in blood counts. The patient understands that monitoring is required including LFTs and blood counts. There is a rare possibility of scarring of the liver and lung problems that can occur when taking methotrexate. Persistent nausea, loss of appetite, pale stools, dark urine, cough, and shortness of breath should be reported immediately. Patient advised to discontinue Arava treatment and consult with a physician prior to attempting conception. The patient will have to undergo a treatment to eliminate Arava from the body prior to conception. Simponi Counseling:  I discussed with the patient the risks of golimumab including but not limited to myelosuppression, immunosuppression, autoimmune hepatitis, demyelinating diseases, lymphoma, and serious infections. The patient understands that monitoring is required including a PPD at baseline and must alert us or the primary physician if symptoms of infection or other concerning signs are noted. Elidel Counseling: Patient may experience a mild burning sensation during topical application. Elidel is not approved in children less than 3years of age. There have been case reports of hematologic and skin malignancies in patients using topical calcineurin inhibitors although causality is questionable. Erivedge Counseling- I discussed with the patient the risks of Erivedge including but not limited to nausea, vomiting, diarrhea, constipation, weight loss, changes in the sense of taste, decreased appetite, muscle spasms, and hair loss. The patient verbalized understanding of the proper use and possible adverse effects of Erivedge. All of the patient's questions and concerns were addressed. Hydroxychloroquine Counseling:  I discussed with the patient that a baseline ophthalmologic exam is needed at the start of therapy and every year thereafter while on therapy. A CBC may also be warranted for monitoring. The side effects of this medication were discussed with the patient, including but not limited to agranulocytosis, aplastic anemia, seizures, rashes, retinopathy, and liver toxicity. Patient instructed to call the office should any adverse effect occur. The patient verbalized understanding of the proper use and possible adverse effects of Plaquenil. All the patient's questions and concerns were addressed. Topical Sulfur Applications Counseling: Topical Sulfur Counseling: Patient counseled that this medication may cause skin irritation or allergic reactions. In the event of skin irritation, the patient was advised to reduce the amount of the drug applied or use it less frequently. The patient verbalized understanding of the proper use and possible adverse effects of topical sulfur application. All of the patient's questions and concerns were addressed. Spironolactone Counseling: Patient advised regarding risks of diarrhea, abdominal pain, hyperkalemia, birth defects (for female patients), liver toxicity and renal toxicity. The patient may need blood work to monitor liver and kidney function and potassium levels while on therapy. The patient verbalized understanding of the proper use and possible adverse effects of spironolactone. All of the patient's questions and concerns were addressed. Protopic Pregnancy And Lactation Text: This medication is Pregnancy Category C. It is unknown if this medication is excreted in breast milk when applied topically. Otezla Counseling: Adamaris Larch Counseling: The side effects of Adamaris Larch were discussed with the patient, including but not limited to worsening or new depression, weight loss, diarrhea, nausea, upper respiratory tract infection, and headache. Patient instructed to call the office should any adverse effect occur. The patient verbalized understanding of the proper use and possible adverse effects of Otezla. All the patient's questions and concerns were addressed. Methotrexate Counseling:  Patient counseled regarding adverse effects of methotrexate including but not limited to nausea, vomiting, abnormalities in liver function tests. Patients may develop mouth sores, rash, diarrhea, and abnormalities in blood counts. The patient understands that monitoring is required including LFT's and blood counts. There is a rare possibility of scarring of the liver and lung problems that can occur when taking methotrexate. Persistent nausea, loss of appetite, pale stools, dark urine, cough, and shortness of breath should be reported immediately. Patient advised to discontinue methotrexate treatment at least three months before attempting to become pregnant. I discussed the need for folate supplements while taking methotrexate. These supplements can decrease side effects during methotrexate treatment. The patient verbalized understanding of the proper use and possible adverse effects of methotrexate. All of the patient's questions and concerns were addressed. Valtrex Counseling: I discussed with the patient the risks of valacyclovir including but not limited to kidney damage, nausea, vomiting and severe allergy. The patient understands that if the infection seems to be worsening or is not improving, they are to call. Hydroxyzine Counseling: Patient advised that the medication is sedating and not to drive a car after taking this medication. Patient informed of potential adverse effects including but not limited to dry mouth, urinary retention, and blurry vision. The patient verbalized understanding of the proper use and possible adverse effects of hydroxyzine. All of the patient's questions and concerns were addressed. Benzoyl Peroxide Pregnancy And Lactation Text: This medication is Pregnancy Category C. It is unknown if benzoyl peroxide is excreted in breast milk. Tetracycline Counseling: Patient counseled regarding possible photosensitivity and increased risk for sunburn. Patient instructed to avoid sunlight, if possible. When exposed to sunlight, patients should wear protective clothing, sunglasses, and sunscreen. The patient was instructed to call the office immediately if the following severe adverse effects occur:  hearing changes, easy bruising/bleeding, severe headache, or vision changes. The patient verbalized understanding of the proper use and possible adverse effects of tetracycline. All of the patient's questions and concerns were addressed. Patient understands to avoid pregnancy while on therapy due to potential birth defects. Bactrim Pregnancy And Lactation Text: This medication is Pregnancy Category D and is known to cause fetal risk. It is also excreted in breast milk. Cimzia Counseling:  I discussed with the patient the risks of Cimzia including but not limited to immunosuppression, allergic reactions and infections. The patient understands that monitoring is required including a PPD at baseline and must alert us or the primary physician if symptoms of infection or other concerning signs are noted. Ketoconazole Counseling:   Patient counseled regarding improving absorption with orange juice. Adverse effects include but are not limited to breast enlargement, headache, diarrhea, nausea, upset stomach, liver function test abnormalities, taste disturbance, and stomach pain. There is a rare possibility of liver failure that can occur when taking ketoconazole. The patient understands that monitoring of LFTs may be required, especially at baseline. The patient verbalized understanding of the proper use and possible adverse effects of ketoconazole. All of the patient's questions and concerns were addressed.

## 2021-08-02 NOTE — ASSESSMENT & PLAN NOTE
A chronic asymptomatic fair control continue current management low-salt diet increase physical activity discussed with the patient
Advice and education were given regarding nutrition, aerobic exercises, weight bearing exercises, cardiovascular risk reduction, fall risk reduction, and age appropriate supplements  The patient was counseled regarding instructions for management, risk factor reductions, prognosis, risks and benefits of treatment options, patient and family education, and importance of compliance with treatment       Patient is due for colonoscopy screening for colon cancer declined the fit test is given to the patient
Chronic asymptomatic fair control continue with the levothyroxine proper use of medication discussed with the patient also we the recommend to check a TSH level
Chronic patient does followed by psychiatric patient refused to take his medication up psychiatric is aware of it
Lab Results   Component Value Date    HGBA1C 6 0 07/24/2019     Chronic asymptomatic he fair controlled continue current management encouraged patient to lose weight low carb diet discussed with the patient patient already on statin and already on Ace inhibitor
New diagnosis the thick neck and discoloration on the toenails with patient history of diabetic will refer the patient to see podiatric
The BMI is above average  BMI counseling and education was provided to the patient  Nutrition recommendations include reducing portion sizes, decreasing overall calorie intake, 3-5 servings of fruits/vegetables daily, reducing fast food intake, consuming healthier snacks, decreasing soda and/or juice intake, moderation in carbohydrate intake and reducing intake of saturated fat and trans fat  Exercise recommendations include moderate aerobic physical activity for 150 minutes/week, exercising 3-5 times per week and joining a gym 
advised patient to quit, and offered support  Discussed current use pattern  Asked patient to inform me when they set a quit date     At discussed with the patient complication of for smoking increase the risk of multiple kind of cancer he is aware  We offer him script for nicotine patch patient decline it
finding in the exam and patient with chronic smoking plan for chest x-ray
no

## 2021-09-07 NOTE — PLAN OF CARE
Approved.   Problem: Alteration in Thoughts and Perception  Goal: Treatment Goal: Gain control of psychotic behaviors/thinking, reduce/eliminate presenting symptoms and demonstrate improved reality functioning upon discharge  Outcome: Progressing  Goal: Verbalize thoughts and feelings  Description  Interventions:  - Promote a nonjudgmental and trusting relationship with the patient through active listening and therapeutic communication  - Assess patient's level of functioning, behavior and potential for risk  - Engage patient in 1 on 1 interactions  - Encourage patient to express fears, feelings, frustrations, and discuss symptoms    - [place] patient to reality, help patient recognize reality-based thinking   - Administer medications as ordered and assess for potential side effects  - Provide the patient education related to the signs and symptoms of the illness and desired effects of prescribed medications  Outcome: Progressing  Goal: Refrain from acting on delusional thinking/internal stimuli  Description  Interventions:  - Monitor patient closely, per order   - Utilize least restrictive measures   - Set reasonable limits, give positive feedback for acceptable   - Administer medications as ordered and monitor of potential side effects  Outcome: Progressing  Goal: Agree to be compliant with medication regime, as prescribed and report medication side effects  Description  Interventions:  - Offer appropriate PRN medication and supervise ingestion; conduct AIMS, as needed   Outcome: Progressing  Goal: Attend and participate in unit activities, including therapeutic, recreational, and educational groups  Description  Interventions:  -Encourage Visitation and family involvement in care  Outcome: Progressing  Goal: Recognize dysfunctional thoughts, communicate reality-based thoughts at the time of discharge  Description  Interventions:  - Provide medication and psycho-education to assist patient in compliance and developing insight into his/her illness   Outcome: Progressing  Goal: Complete daily ADLs, including personal hygiene independently, as able  Description  Interventions:  - Observe, teach, and assist patient with ADLS  - Monitor and promote a balance of rest/activity, with adequate nutrition and elimination   Outcome: Progressing     Problem: Ineffective Coping  Goal: Identifies ineffective coping skills  Outcome: Progressing  Goal: Identifies healthy coping skills  Outcome: Progressing  Goal: Demonstrates healthy coping skills  Outcome: Progressing  Goal: Patient/Family participate in treatment and DC plans  Description  Interventions:  - Provide therapeutic environment  Outcome: Progressing  Goal: Patient/Family verbalizes awareness of resources  Outcome: Progressing  Goal: Understands least restrictive measures  Description  Interventions:  - Utilize least restrictive behavior  Outcome: Progressing     Problem: RESPIRATORY - ADULT  Goal: Achieves optimal ventilation and oxygenation  Description  INTERVENTIONS:  - Assess for changes in respiratory status  - Assess for changes in mentation and behavior  - Position to facilitate oxygenation and minimize respiratory effort  - Oxygen administered by appropriate delivery if ordered  - Initiate smoking cessation education as indicated  - Encourage broncho-pulmonary hygiene including cough, deep breathe, Incentive Spirometry  - Assess the need for suctioning and aspirate as needed  - Assess and instruct to report SOB or any respiratory difficulty  - Respiratory Therapy support as indicated  Outcome: Progressing     Problem: GASTROINTESTINAL - ADULT  Goal: Minimal or absence of nausea and/or vomiting  Description  INTERVENTIONS:  - Administer IV fluids if ordered to ensure adequate hydration  - Maintain NPO status until nausea and vomiting are resolved  - Nasogastric tube if ordered  - Administer ordered antiemetic medications as needed  - Provide nonpharmacologic comfort measures as appropriate  - Advance diet as tolerated, if ordered  - Consider nutrition services referral to assist patient with adequate nutrition and appropriate food choices  Outcome: Progressing  Goal: Maintains or returns to baseline bowel function  Description  INTERVENTIONS:  - Assess bowel function  - Encourage oral fluids to ensure adequate hydration  - Administer IV fluids if ordered to ensure adequate hydration  - Administer ordered medications as needed  - Encourage mobilization and activity  - Consider nutritional services referral to assist patient with adequate nutrition and appropriate food choices  Outcome: Progressing  Goal: Maintains adequate nutritional intake  Description  INTERVENTIONS:  - Monitor percentage of each meal consumed  - Identify factors contributing to decreased intake, treat as appropriate  - Assist with meals as needed  - Monitor I&O, weight, and lab values if indicated  - Obtain nutrition services referral as needed  Outcome: Progressing     Problem: METABOLIC, FLUID AND ELECTROLYTES - ADULT  Goal: Glucose maintained within target range  Description  INTERVENTIONS:  - Monitor Blood Glucose as ordered  - Assess for signs and symptoms of hyperglycemia and hypoglycemia  - Administer ordered medications to maintain glucose within target range  - Assess nutritional intake and initiate nutrition service referral as needed  Outcome: Progressing     Problem: DISCHARGE PLANNING  Goal: Discharge to home or other facility with appropriate resources  Description    CASE MANAGEMENT INTERVENTIONS:  - Conduct assessment to determine patient/family and health care team treatment goals, and need for post-acute services based on payer coverage, community resources, patient preferences and barriers to discharge    - Address psychosocial, clinical, and financial barriers to discharge as identified in assessment in conjunction with the patient/family and health care team   - Assist the patient in reintegration back into the community by removing barriers which may hinder a successful discharge once deemed stable  - Arrange appropriate level of post-acute services according to patient's needs and preference and payer coverage in collaboration with the physician and health care team   - Communicate with and update the patient/family, physician, and health care team regarding progress on the discharge plan  - Arrange appropriate transportation to post-acute venues  Outcome: Progressing     Patient quiet, cooperative, visible intermittently  Social with peers  Compliant with behavioral plan  Attended community meeting and journaling  Med and meal compliant  Showered this shift  No c/o pain or discomfort  Denies depression, anxiety, SI and HI    Will continue to monitor progress in recovery program
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
## 2022-02-14 NOTE — PROGRESS NOTES
"Pt arrived to unit from boarding in ED since 2/13 afternoon. Currently pt has no N/V, or any bleeding anywhere She will attempt a full liquid diet this afternoon. She denies pain. She denies any anxiety, though talking about her parents or what she is going to do after hospitalization gets her anxious. She wants her boyfriend to visit. She does not want any information about her condition shared with anyone including her father who is employed here and a Cardiologist. \"[employer]\" was placed on records.  She confirms the positive result from utox of amphetamines and cannabis is from \"taking a friends Adderal and eating an edible\". She denies illegal drug use. Currently she is a college student in [place], since school is online right now she rove home and wants to stay at her boyfriends parents house.  " 05/06/20 1602   Team Meeting   Meeting Type Daily Rounds   Team Members Present   Team Members Present Physician;Nurse;; Other (Discipline and Name)   Physician Team Member Dr Abilio Arias Team Member Margie Espitia RN   Care Management Team Member EDELMIRA Howell   Other (Discipline and Name) Jamir Silva LCSW     Disorganized, repetitive, needs constant redirection  Pushed male staff

## 2022-02-23 NOTE — PLAN OF CARE
Problem: Alteration in Thoughts and Perception  Goal: Verbalize thoughts and feelings  Description  Interventions:  - Promote a nonjudgmental and trusting relationship with the patient through active listening and therapeutic communication  - Assess patient's level of functioning, behavior and potential for risk  - Engage patient in 1 on 1 interactions  - Encourage patient to express fears, feelings, frustrations, and discuss symptoms    - [place] patient to reality, help patient recognize reality-based thinking   - Administer medications as ordered and assess for potential side effects  - Provide the patient education related to the signs and symptoms of the illness and desired effects of prescribed medications  Outcome: Progressing  Goal: Agree to be compliant with medication regime, as prescribed and report medication side effects  Description  Interventions:  - Offer appropriate PRN medication and supervise ingestion; conduct AIMS, as needed   Outcome: Progressing  Goal: Attend and participate in unit activities, including therapeutic, recreational, and educational groups  Description  Interventions:  -Encourage Visitation and family involvement in care  Outcome: Progressing  Goal: Complete daily ADLs, including personal hygiene independently, as able  Description  Interventions:  - Observe, teach, and assist patient with ADLS  - Monitor and promote a balance of rest/activity, with adequate nutrition and elimination   Outcome: Progressing     Problem: Ineffective Coping  Goal: Understands least restrictive measures  Description  Interventions:  - Utilize least restrictive behavior  Outcome: Progressing     Problem: GASTROINTESTINAL - ADULT  Goal: Maintains adequate nutritional intake  Description  INTERVENTIONS:  - Monitor percentage of each meal consumed  - Identify factors contributing to decreased intake, treat as appropriate  - Assist with meals as needed  - Monitor I&O, weight, and lab values if indicated  - Obtain nutrition services referral as needed  Outcome: Patti Vann has been awake, alert, and visible intermittently out in the milieu  Pt went to chap for [facility] with staff and peers  Social with select peers  Disheveled in appearance  Completed daily ADL's today  Ate 100% supper  Denies any depression, anxiety, si/hi, intent, plan, a/v hallucinations, and has not verbalized any delusions  Pt did not attend evening group but came out and had snack after  Compliant with scheduled meds with little prompting  Pt requested prn med for sleep  Trazodone 50 mg po given at 2138  Continue to monitor/assess for any changes

## 2022-03-13 NOTE — PLAN OF CARE
Problem: Alteration in Thoughts and Perception  Goal: Attend and participate in unit activities, including therapeutic, recreational, and educational groups  Description  Interventions:  -Encourage Visitation and family involvement in care  1/13/2020 1621 by Danica Lennon  Outcome: Not Progressing  1/13/2020 1008 by Danica Lennon  Outcome: Progressing   Patient did not complete Goal Card for the week of 1/13/2020  show

## 2022-04-25 NOTE — PLAN OF CARE
"----- Message from Carly Junior sent at 4/25/2022 11:22 AM CDT -----  Regarding: Appt  Contact: Shayla Rosenthal(sister)  Scheduling Request         Patient Status:  Est    Scheduling Appt : follow up    Relationship to Patient?:Sister    Contact Preference?: [phone]    Treating Provider: Darwin    Do you feel you need to be seen today? No           Additional Notes: Calling to change appt date on 05/20/22.  "Thank you for all that you do for our patients'     " Problem: Alteration in Thoughts and Perception  Goal: Treatment Goal: Gain control of psychotic behaviors/thinking, reduce/eliminate presenting symptoms and demonstrate improved reality functioning upon discharge  Outcome: Progressing  Goal: Verbalize thoughts and feelings  Description  Interventions:  - Promote a nonjudgmental and trusting relationship with the patient through active listening and therapeutic communication  - Assess patient's level of functioning, behavior and potential for risk  - Engage patient in 1 on 1 interactions  - Encourage patient to express fears, feelings, frustrations, and discuss symptoms    - [place] patient to reality, help patient recognize reality-based thinking   - Administer medications as ordered and assess for potential side effects  - Provide the patient education related to the signs and symptoms of the illness and desired effects of prescribed medications  Outcome: Progressing  Goal: Refrain from acting on delusional thinking/internal stimuli  Description  Interventions:  - Monitor patient closely, per order   - Utilize least restrictive measures   - Set reasonable limits, give positive feedback for acceptable   - Administer medications as ordered and monitor of potential side effects  Outcome: Progressing  Goal: Agree to be compliant with medication regime, as prescribed and report medication side effects  Description  Interventions:  - Offer appropriate PRN medication and supervise ingestion; conduct AIMS, as needed   Outcome: Progressing  Goal: Attend and participate in unit activities, including therapeutic, recreational, and educational groups  Description  Interventions:  -Encourage Visitation and family involvement in care  Outcome: Progressing  Goal: Recognize dysfunctional thoughts, communicate reality-based thoughts at the time of discharge  Description  Interventions:  - Provide medication and psycho-education to assist patient in compliance and developing insight into his/her illness   Outcome: Progressing  Goal: Complete daily ADLs, including personal hygiene independently, as able  Description  Interventions:  - Observe, teach, and assist patient with ADLS  - Monitor and promote a balance of rest/activity, with adequate nutrition and elimination   Outcome: Progressing     Problem: Ineffective Coping  Goal: Identifies ineffective coping skills  Outcome: Progressing  Goal: Identifies healthy coping skills  Outcome: Progressing  Goal: Demonstrates healthy coping skills  Outcome: Progressing  Goal: Patient/Family participate in treatment and DC plans  Description  Interventions:  - Provide therapeutic environment  Outcome: Progressing  Goal: Patient/Family verbalizes awareness of resources  Outcome: Progressing  Goal: Understands least restrictive measures  Description  Interventions:  - Utilize least restrictive behavior  Outcome: Progressing     Problem: RESPIRATORY - ADULT  Goal: Achieves optimal ventilation and oxygenation  Description  INTERVENTIONS:  - Assess for changes in respiratory status  - Assess for changes in mentation and behavior  - Position to facilitate oxygenation and minimize respiratory effort  - Oxygen administered by appropriate delivery if ordered  - Initiate smoking cessation education as indicated  - Encourage broncho-pulmonary hygiene including cough, deep breathe, Incentive Spirometry  - Assess the need for suctioning and aspirate as needed  - Assess and instruct to report SOB or any respiratory difficulty  - Respiratory Therapy support as indicated  Outcome: Progressing     Problem: GASTROINTESTINAL - ADULT  Goal: Minimal or absence of nausea and/or vomiting  Description  INTERVENTIONS:  - Administer IV fluids if ordered to ensure adequate hydration  - Maintain NPO status until nausea and vomiting are resolved  - Nasogastric tube if ordered  - Administer ordered antiemetic medications as needed  - Provide nonpharmacologic comfort measures as appropriate  - Advance diet as tolerated, if ordered  - Consider nutrition services referral to assist patient with adequate nutrition and appropriate food choices  Outcome: Progressing  Goal: Maintains or returns to baseline bowel function  Description  INTERVENTIONS:  - Assess bowel function  - Encourage oral fluids to ensure adequate hydration  - Administer IV fluids if ordered to ensure adequate hydration  - Administer ordered medications as needed  - Encourage mobilization and activity  - Consider nutritional services referral to assist patient with adequate nutrition and appropriate food choices  Outcome: Progressing  Goal: Maintains adequate nutritional intake  Description  INTERVENTIONS:  - Monitor percentage of each meal consumed  - Identify factors contributing to decreased intake, treat as appropriate  - Assist with meals as needed  - Monitor I&O, weight, and lab values if indicated  - Obtain nutrition services referral as needed  Outcome: Progressing     Problem: METABOLIC, FLUID AND ELECTROLYTES - ADULT  Goal: Glucose maintained within target range  Description  INTERVENTIONS:  - Monitor Blood Glucose as ordered  - Assess for signs and symptoms of hyperglycemia and hypoglycemia  - Administer ordered medications to maintain glucose within target range  - Assess nutritional intake and initiate nutrition service referral as needed  Outcome: Progressing     Problem: DISCHARGE PLANNING  Goal: Discharge to home or other facility with appropriate resources  Description    CASE MANAGEMENT INTERVENTIONS:  - Conduct assessment to determine patient/family and health care team treatment goals, and need for post-acute services based on payer coverage, community resources, patient preferences and barriers to discharge    - Address psychosocial, clinical, and financial barriers to discharge as identified in assessment in conjunction with the patient/family and health care team   - Assist the patient in reintegration back into the community by removing barriers which may hinder a successful discharge once deemed stable  - Arrange appropriate level of post-acute services according to patient's needs and preference and payer coverage in collaboration with the physician and health care team   - Communicate with and update the patient/family, physician, and health care team regarding progress on the discharge plan  - Arrange appropriate transportation to post-acute venues  Outcome: Progressing     Quiet, cooperative and visible intermittently  Social with select peers  Med and meal compliant  Ate 100% of both meals  Denies depression, anxiety, SI, HI and pain  Attended journaling and IMR  EKG ordered and patient refused  Nicorette gum for cravings    Will continue to monitor progress in recovery program
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
## 2023-12-29 NOTE — PROGRESS NOTES
Progress Note - Behavioral Health     Luan Handler 48 y o  male MRN: @MRN   Unit/Bed#: [facility] 256-01 Encounter: [id number]    Per nursing report and sign out, patient has had no acute changes, slept    Luan Handler reports today that he is fine  Wanting to leave  Asking often  Denies all symptoms  Energy: ok  Sleep: normal  Appetite: normal  Medication side effects: No   ROS: no complaints    Mental Status Evaluation:    Appearance:  disheveled   Behavior:  Pleasant & cooperative, guarded   Speech:  Normal volume, regular rate and rhythm   Mood:  euthymic   Affect:  dysphoric, blunted       Thought Process:  perserverative   Associations: intact associations   Thought Content:  paranoid ideation, obsessive   Perceptual Disturbances: no auditory or visual hallcunations   Risk Potential: Suicidal ideation - None  Homicidal ideation - None  Potential for aggression - No   Sensorium:  oriented to person and place   Cognition:  grossly intact   Consciousness:  Alert & Awake   Memory:  recent and remote memory grossly intact   Attention: attention span and concentration appear shorter than expected for age           Insight:  limited   Judgment: limited   Muscle Strength  Muscle Tone normal  normal   Gait/Station: normal gait/station with good balance   Motor Activity: no abnormal movements       Vital signs in last 24 hours:    Temp:  [97 5 °F (36 4 °C)] 97 5 °F (36 4 °C)  HR:  [97] 97  Resp:  [16] 16  BP: (136)/(86) 136/86    Laboratory results:  I have personally reviewed all pertinent laboratory/tests results  Assessment/Plan   Principal Problem:    Schizoaffective disorder (HCC)  Active Problems:    Type 2 diabetes mellitus (HCC)    Benign essential hypertension    BMI 34 0-34 9,adult      Sarah Dy Alpha is stable, awaiting EAC  Recommended Treatment:     Planned medication and treatment changes: All current active medications have been reviewed    Encourage group therapy, milieu therapy and occupational therapy  Behavioral Health checks as unit standard unless ordered or noted otherwise      Current Facility-Administered Medications:  acetaminophen 325 mg Oral Q6H PRN Silverio Smart, PA-C   acetaminophen 650 mg Oral Q6H PRN Silverio Smart, PA-C   acetaminophen 975 mg Oral Q8H PRN Silverio Smart, PA-C   aluminum-magnesium hydroxide-simethicone 30 mL Oral Q4H PRN Gerda Bianchi MD   atorvastatin 10 mg Oral Daily With Efra Julio MD   benztropine 1 mg Intramuscular Q6H PRN Gerda Bianchi MD   benztropine 1 mg Oral Q6H PRN Gerda Bianchi MD   clozapine 100 mg Oral Daily Bruno Monzon MD   cloZAPine 350 mg Oral HS Julio C Gore   divalproex sodium 1,000 mg Oral BID Silverio Smart, PA-PUSHPA   docusate sodium 100 mg Oral BID Silverio Smart, PA-C   haloperidol 5 mg Oral Q6H PRN Gerda Bianchi MD   haloperidol lactate 5 mg Intramuscular Q6H PRN Gerda Bianchi MD   levothyroxine 75 mcg Oral Early Morning Latasha XUAN Quiroz   LORazepam 1 mg Intramuscular Q6H PRN Gerda Bianchi MD   LORazepam 1 mg Oral Q6H PRN Gerda Bianchi MD   magnesium hydroxide 30 mL Oral Daily PRN Gerda Bianchi MD   metFORMIN 500 mg Oral BID With Meals Latasha Quiroz PA-C   nicotine polacrilex 2 mg Oral Q2H PRN Gerda Bianchi MD   OLANZapine 15 mg Oral After Lunch Julio C Bao   oxybutynin 5 mg Oral Daily [place] Smart, PA-C   pantoprazole 40 mg Oral Early Morning Latasha Gabriella, XUAN   traZODone 50 mg Oral HS PRN Gerda Bianchi MD       1) continue current treatment  2) Continue to support patient and engage them in the programs available as feasible and appropriate  Continue case management support and therapy  Continue discharge planning  Risks / Benefits of Treatment:    Risks, benefits, and possible side effects of medications explained to patient and patient verbalizes understanding and agreement for treatment  Counseling / Coordination of Care:    Medication changes reviewed with nursing staff    Medications, treatment progress and No treatment plan reviewed with patient        Jitendra Rubi III, DO  11/30/2019  7:42 AM
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
## 2024-03-18 NOTE — PROGRESS NOTES
Treatment Plan Meeting:  Diagnosis of Schizoaffective disorder, has not changed since admission  Medications are Lipitor, Clozaril, Depakote, Colace, Levothyroixine, Metformin, Zyprexa, Oxybutynin, & Protonix  Pt has been consistently taking Trazodone for sleep with good effect  At this time, Pt is 2nd on the waiting list for [facility] [address], & updates are given every other Tuesday; we should get an update this Tuesday  All parties in agreement, & Tx Plan was signed       12/06/19 1100   Team Meeting   Meeting Type Tx Team Meeting   Initial Conference Date 12/06/19   Next Conference Date 01/05/20   Team Members Present   Team Members Present Physician;Nurse;   Physician Team Member Dr Robinson Kirkland Team Member [facility] Management Team Member Tavo   Patient/Family Present   Patient Present Yes   Patient's Family Present No .
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
## 2024-05-07 NOTE — PLAN OF CARE
Please notify patient results of MRI showing some posterior tibial tendon thickening and tendinosis. F/u to discuss   Problem: Alteration in Thoughts and Perception  Goal: Verbalize thoughts and feelings  Description  Interventions:  - Promote a nonjudgmental and trusting relationship with the patient through active listening and therapeutic communication  - Assess patient's level of functioning, behavior and potential for risk  - Engage patient in 1 on 1 interactions  - Encourage patient to express fears, feelings, frustrations, and discuss symptoms    - [place] patient to reality, help patient recognize reality-based thinking   - Administer medications as ordered and assess for potential side effects  - Provide the patient education related to the signs and symptoms of the illness and desired effects of prescribed medications  Outcome: Progressing  Goal: Agree to be compliant with medication regime, as prescribed and report medication side effects  Description  Interventions:  - Offer appropriate PRN medication and supervise ingestion; conduct AIMS, as needed   Outcome: Progressing     Problem: GASTROINTESTINAL - ADULT  Goal: Maintains adequate nutritional intake  Description  INTERVENTIONS:  - Monitor percentage of each meal consumed  - Identify factors contributing to decreased intake, treat as appropriate  - Assist with meals as needed  - Monitor I&O, weight, and lab values if indicated  - Obtain nutrition services referral as needed  Outcome: Progressing     Problem: Alteration in Thoughts and Perception  Goal: Attend and participate in unit activities, including therapeutic, recreational, and educational groups  Description  Interventions:  -Encourage Visitation and family involvement in care  Outcome: Not Abdon Castellon was in his room sleeping at the beginning of the shift  Denies anxiety and depression  Interacts well with staff and select peers  Able to make needs known  Still with disheveled appearance  Wearing his layers of clothing  He has been in his room most of the time   Needed a few prompts to come out and get his medication  Ate 100% of his meal  Continue to monitor  Precautions maintained

## 2024-05-14 ENCOUNTER — OFFICE VISIT (OUTPATIENT)
Dept: FAMILY MEDICINE CLINIC | Facility: CLINIC | Age: 59
End: 2024-05-14
Payer: MEDICARE

## 2024-05-14 VITALS
DIASTOLIC BLOOD PRESSURE: 80 MMHG | SYSTOLIC BLOOD PRESSURE: 128 MMHG | OXYGEN SATURATION: 98 % | HEIGHT: 67 IN | HEART RATE: 78 BPM | WEIGHT: 233 LBS | BODY MASS INDEX: 36.57 KG/M2 | TEMPERATURE: 97.4 F

## 2024-05-14 DIAGNOSIS — M25.561 ACUTE PAIN OF RIGHT KNEE: ICD-10-CM

## 2024-05-14 DIAGNOSIS — K59.09 CONSTIPATION, CHRONIC: ICD-10-CM

## 2024-05-14 DIAGNOSIS — K59.00 CONSTIPATION, UNSPECIFIED CONSTIPATION TYPE: ICD-10-CM

## 2024-05-14 DIAGNOSIS — E11.9 TYPE 2 DIABETES MELLITUS WITHOUT COMPLICATION, WITHOUT LONG-TERM CURRENT USE OF INSULIN (HCC): Primary | ICD-10-CM

## 2024-05-14 DIAGNOSIS — R32 URINARY INCONTINENCE: ICD-10-CM

## 2024-05-14 DIAGNOSIS — E03.9 ACQUIRED HYPOTHYROIDISM: ICD-10-CM

## 2024-05-14 DIAGNOSIS — K21.9 GASTROESOPHAGEAL REFLUX DISEASE WITHOUT ESOPHAGITIS: ICD-10-CM

## 2024-05-14 LAB
LEFT EYE DIABETIC RETINOPATHY: NORMAL
LEFT EYE IMAGE QUALITY: NORMAL
LEFT EYE MACULAR EDEMA: NORMAL
LEFT EYE OTHER RETINOPATHY: NORMAL
RIGHT EYE DIABETIC RETINOPATHY: NORMAL
RIGHT EYE IMAGE QUALITY: NORMAL
RIGHT EYE MACULAR EDEMA: NORMAL
RIGHT EYE OTHER RETINOPATHY: NORMAL
SEVERITY (EYE EXAM): NORMAL
SL AMB POCT HEMOGLOBIN AIC: 6.9 (ref ?–6.5)

## 2024-05-14 PROCEDURE — 83036 HEMOGLOBIN GLYCOSYLATED A1C: CPT | Performed by: FAMILY MEDICINE

## 2024-05-14 PROCEDURE — 99204 OFFICE O/P NEW MOD 45 MIN: CPT | Performed by: FAMILY MEDICINE

## 2024-05-14 RX ORDER — LEVOTHYROXINE SODIUM 0.07 MG/1
75 TABLET ORAL
Qty: 30 TABLET | Refills: 11 | Status: SHIPPED | OUTPATIENT
Start: 2024-05-14

## 2024-05-14 RX ORDER — IBUPROFEN 600 MG/1
600 TABLET ORAL EVERY 6 HOURS PRN
Qty: 30 TABLET | Refills: 11 | Status: SHIPPED | OUTPATIENT
Start: 2024-05-14

## 2024-05-14 RX ORDER — PANTOPRAZOLE SODIUM 40 MG/1
40 TABLET, DELAYED RELEASE ORAL
Qty: 30 TABLET | Refills: 11 | Status: SHIPPED | OUTPATIENT
Start: 2024-05-14

## 2024-05-14 RX ORDER — IBUPROFEN 600 MG/1
600 TABLET ORAL EVERY 6 HOURS PRN
Qty: 30 TABLET | Refills: 11 | Status: SHIPPED | OUTPATIENT
Start: 2024-05-14 | End: 2024-05-14

## 2024-05-14 RX ORDER — SENNOSIDES A AND B 8.6 MG/1
1 TABLET, FILM COATED ORAL DAILY
COMMUNITY
End: 2024-05-14 | Stop reason: SDUPTHER

## 2024-05-14 RX ORDER — SENNOSIDES A AND B 8.6 MG/1
1 TABLET, FILM COATED ORAL DAILY
Qty: 30 TABLET | Refills: 11 | Status: SHIPPED | OUTPATIENT
Start: 2024-05-14

## 2024-05-14 RX ORDER — MULTIVIT-MIN/IRON/FOLIC ACID/K 18-600-40
CAPSULE ORAL
COMMUNITY
End: 2024-05-21 | Stop reason: SDUPTHER

## 2024-05-14 RX ORDER — ATORVASTATIN CALCIUM 10 MG/1
10 TABLET, FILM COATED ORAL
Qty: 30 TABLET | Refills: 11 | Status: SHIPPED | OUTPATIENT
Start: 2024-05-14

## 2024-05-14 RX ORDER — OXYBUTYNIN CHLORIDE 5 MG/1
5 TABLET, EXTENDED RELEASE ORAL DAILY
Qty: 30 TABLET | Refills: 11 | Status: SHIPPED | OUTPATIENT
Start: 2024-05-14

## 2024-05-14 NOTE — PROGRESS NOTES
Name: Armando Gaitan      : 1965      MRN: [id number]  Encounter Provider: Mikal Williamson MD  Encounter Date: 2024   Encounter department: [facility]    Assessment & Plan     1. Type 2 diabetes mellitus without complication, without long-term current use of insulin (HCC)  -     IRIS Diabetic eye exam  -     POCT hemoglobin A1c  -     Albumin / creatinine urine ratio; Future  -     Basic metabolic panel; Future  -     Lipid panel; Future  -     TSH, 3rd generation with Free T4 reflex; Future  -     CBC and differential; Future  -     metFORMIN (GLUCOPHAGE) 500 mg tablet; Take 1 tablet (500 mg total) by mouth 2 (two) times a day with meals    2. Gastroesophageal reflux disease without esophagitis  -     pantoprazole (PROTONIX) 40 mg tablet; Take 1 tablet (40 mg total) by mouth daily in the early morning    3. Acute pain of right knee  -     Ambulatory Referral to Physical Therapy; Future  -     XR knee 3 vw right non injury; Future; Expected date: 2024  -     ibuprofen (MOTRIN) 600 mg tablet; Take 1 tablet (600 mg total) by mouth every 6 (six) hours as needed for mild pain, fever, headaches or moderate pain    4. Constipation, chronic  -     atorvastatin (LIPITOR) 10 mg tablet; Take 1 tablet (10 mg total) by mouth daily with dinner    5. Acquired hypothyroidism  -     levothyroxine 75 mcg tablet; Take 1 tablet (75 mcg total) by mouth daily in the early morning    6. Constipation, unspecified constipation type  -     senna (SENOKOT) 8.6 MG tablet; Take 1 tablet (8.6 mg total) by mouth daily    7. Urinary incontinence  -     oxybutynin (DITROPAN-XL) 5 mg 24 hr tablet; Take 1 tablet (5 mg total) by mouth daily    Chronic conditions above stable continue current treatment plan.  Refills sent to pharmacy.  He admits acute right knee pain today.  There is some generalized swelling but denies any inciting events.  The exam otherwise unremarkable.  Start ibuprofen every 6 as  needed for pain relief.  Get x-rays and start physical therapy for knee pain.  Follow-up in 4 to 6 weeks.       Subjective      Patient presents to the office today to establish care.  He was recently discharged from [facility] hospital.  He was there for over 4 years.  He is now living at [facility].  He is here with caretaker today.  She is supplying supplemental history as he is poor historian and cannot recall specifics.  He has history of diabetes on metformin.  He denies being on insulin.  He is currently only on metformin.  Has not had any hypoglycemic events.  Sugars at the house are normal.  He also has history of hypothyroidism.  He is on medication.  He follows with psych at the [facility] for his psych conditions.  He has schizoaffective disorder and ADHD.  Caretaker reports these conditions are stable.  He has acid reflux on Protonix also urinary incontinence on oxybutynin.    He has acute concerns for right knee pain.  Started a few weeks ago.  Pain is at the front of the knee.  He does not know what caused the pain.  He has been putting IcyHot which does help a little bit.  No weakness and no trouble bearing weight.  Leg has not given out.  He denies any issues with the right knee before but he says he is not sure.      Review of Systems   All other systems reviewed and are negative.      Current Outpatient Medications on File Prior to Visit   Medication Sig    Cholecalciferol (Vitamin D) 50 MCG (2000 UT) CAPS Take by mouth    clozapine (CLOZARIL) 200 MG tablet Take 1 tablet (200 mg total) by mouth daily    divalproex sodium (DEPAKOTE ER) 500 mg 24 hr tablet Take 3 tablets (1,500 mg total) by mouth daily at bedtime    haloperidol (HALDOL) 5 mg tablet Take 1 tablet (5 mg total) by mouth every 6 (six) hours as needed (moderate agitation)    OLANZapine (ZyPREXA) 5 mg tablet Take 1 tablet (5 mg total) by mouth daily at bedtime    [DISCONTINUED] atorvastatin (LIPITOR) 10 mg tablet Take 1 tablet (10 mg total) by  "mouth daily with dinner    [DISCONTINUED] levothyroxine 75 mcg tablet Take 1 tablet (75 mcg total) by mouth daily in the early morning    [DISCONTINUED] metFORMIN (GLUCOPHAGE) 500 mg tablet Take 1 tablet (500 mg total) by mouth 2 (two) times a day with meals    [DISCONTINUED] oxybutynin (DITROPAN-XL) 5 mg 24 hr tablet Take 1 tablet (5 mg total) by mouth daily    [DISCONTINUED] pantoprazole (PROTONIX) 40 mg tablet Take 1 tablet (40 mg total) by mouth daily in the early morning    [DISCONTINUED] senna (SENOKOT) 8.6 MG tablet Take 1 tablet by mouth daily    [DISCONTINUED] amphetamine-dextroamphetamine (ADDERALL) 10 mg tablet Take 1.5 tablets (15 mg total) by mouth daily for 10 daysMax Daily Amount: 15 mg (Patient not taking: Reported on 5/14/2024)       Objective     /80 (BP Location: Left arm, Patient Position: Sitting, Cuff Size: Standard)   Pulse 78   Temp (!) 97.4 °F (36.3 °C) (Tympanic)   Ht 5' 7\" (1.702 m)   Wt 106 kg (233 lb)   SpO2 98%   BMI 36.49 kg/m²     Physical Exam  Vitals and nursing note reviewed.   Constitutional:       General: He is not in acute distress.     Appearance: Normal appearance. He is not ill-appearing, toxic-appearing or diaphoretic.   Eyes:      General:         Right eye: No discharge.         Left eye: No discharge.      Extraocular Movements: Extraocular movements intact.      Conjunctiva/sclera: Conjunctivae normal.   Cardiovascular:      Rate and Rhythm: Normal rate.      Pulses: no weak pulses.           Dorsalis pedis pulses are 2+ on the right side and 2+ on the left side.        Posterior tibial pulses are 2+ on the right side and 2+ on the left side.   Pulmonary:      Effort: Pulmonary effort is normal.   Musculoskeletal:      Cervical back: Normal range of motion and neck supple.   Feet:      Right foot:      Skin integrity: No ulcer, skin breakdown, erythema, warmth, callus or dry skin.      Left foot:      Skin integrity: No ulcer, skin breakdown, erythema, " warmth, callus or dry skin.   Neurological:      Mental Status: He is alert and oriented to person, place, and time.   Psychiatric:         Mood and Affect: Mood normal.         Behavior: Behavior normal.         Thought Content: Thought content normal.         Judgment: Judgment normal.       Mikal Williamson MD      Diabetic Foot Exam    Patient's shoes and socks removed.    Right Foot/Ankle   Right Foot Inspection  Skin Exam: skin normal and skin intact. No dry skin, no warmth, no callus, no erythema, no maceration, no abnormal color, no pre-ulcer, no ulcer and no callus.     Toe Exam: ROM and strength within normal limits.     Sensory   Monofilament testing: intact    Vascular  The right DP pulse is 2+. The right PT pulse is 2+.     Left Foot/Ankle  Left Foot Inspection  Skin Exam: skin normal and skin intact. No dry skin, no warmth, no erythema, no maceration, normal color, no pre-ulcer, no ulcer and no callus.     Toe Exam: ROM and strength within normal limits.     Sensory   Monofilament testing: intact    Vascular  The left DP pulse is 2+. The left PT pulse is 2+.     Assign Risk Category  No deformity present  No loss of protective sensation  No weak pulses  Risk: 0

## 2024-05-20 ENCOUNTER — HOSPITAL ENCOUNTER (OUTPATIENT)
Dept: RADIOLOGY | Facility: HOSPITAL | Age: 59
Discharge: HOME | End: 2024-05-20
Payer: COMMERCIAL

## 2024-05-20 ENCOUNTER — LAB (OUTPATIENT)
Dept: LAB | Facility: HOSPITAL | Age: 59
End: 2024-05-20
Payer: COMMERCIAL

## 2024-05-20 DIAGNOSIS — E11.9 TYPE 2 DIABETES MELLITUS WITHOUT COMPLICATION, WITHOUT LONG-TERM CURRENT USE OF INSULIN (HCC): ICD-10-CM

## 2024-05-20 DIAGNOSIS — Z79.899 ENCOUNTER FOR LONG-TERM (CURRENT) USE OF OTHER MEDICATIONS: ICD-10-CM

## 2024-05-20 DIAGNOSIS — M25.561 ACUTE PAIN OF RIGHT KNEE: ICD-10-CM

## 2024-05-20 LAB
25(OH)D3 SERPL-MCNC: 45.8 NG/ML (ref 30–100)
ALBUMIN SERPL BCP-MCNC: 4.3 G/DL (ref 3.5–5)
ALP SERPL-CCNC: 53 U/L (ref 34–104)
ALT SERPL W P-5'-P-CCNC: 12 U/L (ref 7–52)
ANION GAP SERPL CALCULATED.3IONS-SCNC: 10 MMOL/L (ref 4–13)
AST SERPL W P-5'-P-CCNC: 12 U/L (ref 13–39)
BASOPHILS # BLD AUTO: 0.05 THOUSANDS/ÂΜL (ref 0–0.1)
BASOPHILS NFR BLD AUTO: 0 % (ref 0–1)
BILIRUB DIRECT SERPL-MCNC: 0.07 MG/DL (ref 0–0.2)
BILIRUB SERPL-MCNC: 0.35 MG/DL (ref 0.2–1)
BUN SERPL-MCNC: 11 MG/DL (ref 5–25)
CALCIUM SERPL-MCNC: 9.5 MG/DL (ref 8.4–10.2)
CHLORIDE SERPL-SCNC: 104 MMOL/L (ref 96–108)
CHOLEST SERPL-MCNC: 112 MG/DL
CO2 SERPL-SCNC: 26 MMOL/L (ref 21–32)
CREAT SERPL-MCNC: 0.69 MG/DL (ref 0.6–1.3)
CREAT UR-MCNC: 83 MG/DL
EOSINOPHIL # BLD AUTO: 0 THOUSAND/ÂΜL (ref 0–0.61)
EOSINOPHIL NFR BLD AUTO: 0 % (ref 0–6)
ERYTHROCYTE [DISTWIDTH] IN BLOOD BY AUTOMATED COUNT: 13.7 % (ref 11.6–15.1)
EST. AVERAGE GLUCOSE BLD GHB EST-MCNC: 157 MG/DL
GFR SERPL CREATININE-BSD FRML MDRD: 106 ML/MIN/1.73SQ M
GLUCOSE P FAST SERPL-MCNC: 149 MG/DL (ref 65–99)
HBA1C MFR BLD: 7.1 %
HCT VFR BLD AUTO: 44.5 % (ref 36.5–49.3)
HDLC SERPL-MCNC: 33 MG/DL
HGB BLD-MCNC: 14.4 G/DL (ref 12–17)
IMM GRANULOCYTES # BLD AUTO: 0.06 THOUSAND/UL (ref 0–0.2)
IMM GRANULOCYTES NFR BLD AUTO: 1 % (ref 0–2)
LDLC SERPL CALC-MCNC: 44 MG/DL (ref 0–100)
LYMPHOCYTES # BLD AUTO: 3.34 THOUSANDS/ÂΜL (ref 0.6–4.47)
LYMPHOCYTES NFR BLD AUTO: 28 % (ref 14–44)
MCH RBC QN AUTO: 27.6 PG (ref 26.8–34.3)
MCHC RBC AUTO-ENTMCNC: 32.4 G/DL (ref 31.4–37.4)
MCV RBC AUTO: 85 FL (ref 82–98)
MICROALBUMIN UR-MCNC: 9.6 MG/L
MICROALBUMIN/CREAT 24H UR: 12 MG/G CREATININE (ref 0–30)
MONOCYTES # BLD AUTO: 1.66 THOUSAND/ÂΜL (ref 0.17–1.22)
MONOCYTES NFR BLD AUTO: 14 % (ref 4–12)
NEUTROPHILS # BLD AUTO: 6.82 THOUSANDS/ÂΜL (ref 1.85–7.62)
NEUTS SEG NFR BLD AUTO: 57 % (ref 43–75)
NONHDLC SERPL-MCNC: 79 MG/DL
NRBC BLD AUTO-RTO: 0 /100 WBCS
PLATELET # BLD AUTO: 221 THOUSANDS/UL (ref 149–390)
PMV BLD AUTO: 10.1 FL (ref 8.9–12.7)
POTASSIUM SERPL-SCNC: 4.5 MMOL/L (ref 3.5–5.3)
PROT SERPL-MCNC: 7 G/DL (ref 6.4–8.4)
RBC # BLD AUTO: 5.22 MILLION/UL (ref 3.88–5.62)
SODIUM SERPL-SCNC: 140 MMOL/L (ref 135–147)
TRIGL SERPL-MCNC: 175 MG/DL
TSH SERPL DL<=0.05 MIU/L-ACNC: 1.52 UIU/ML (ref 0.45–4.5)
VALPROATE SERPL-MCNC: 79 UG/ML (ref 50–100)
WBC # BLD AUTO: 11.93 THOUSAND/UL (ref 4.31–10.16)

## 2024-05-20 PROCEDURE — 80053 COMPREHEN METABOLIC PANEL: CPT

## 2024-05-20 PROCEDURE — 82248 BILIRUBIN DIRECT: CPT

## 2024-05-20 PROCEDURE — 82570 ASSAY OF URINE CREATININE: CPT

## 2024-05-20 PROCEDURE — 84443 ASSAY THYROID STIM HORMONE: CPT

## 2024-05-20 PROCEDURE — 36415 COLL VENOUS BLD VENIPUNCTURE: CPT

## 2024-05-20 PROCEDURE — 82306 VITAMIN D 25 HYDROXY: CPT

## 2024-05-20 PROCEDURE — 80164 ASSAY DIPROPYLACETIC ACD TOT: CPT

## 2024-05-20 PROCEDURE — 73562 X-RAY EXAM OF KNEE 3: CPT

## 2024-05-20 PROCEDURE — 80061 LIPID PANEL: CPT

## 2024-05-20 PROCEDURE — 85025 COMPLETE CBC W/AUTO DIFF WBC: CPT

## 2024-05-20 PROCEDURE — 83036 HEMOGLOBIN GLYCOSYLATED A1C: CPT

## 2024-05-20 PROCEDURE — 82043 UR ALBUMIN QUANTITATIVE: CPT

## 2024-05-21 DIAGNOSIS — E55.9 VITAMIN D DEFICIENCY: Primary | ICD-10-CM

## 2024-05-21 NOTE — TELEPHONE ENCOUNTER
Delia at [facility] contacted Call Center requested refill of their medication.  Asprin 81 mg chew tablets along with Vitamin D 2000 UT caps.

## 2024-05-22 RX ORDER — MULTIVIT-MIN/IRON/FOLIC ACID/K 18-600-40
2000 CAPSULE ORAL DAILY
Qty: 30 CAPSULE | Refills: 6 | Status: SHIPPED | OUTPATIENT
Start: 2024-05-22

## 2024-05-28 ENCOUNTER — TELEPHONE (OUTPATIENT)
Age: 59
End: 2024-05-28

## 2024-05-28 DIAGNOSIS — M25.561 ACUTE PAIN OF RIGHT KNEE: ICD-10-CM

## 2024-05-28 RX ORDER — IBUPROFEN 600 MG/1
600 TABLET ORAL EVERY 6 HOURS PRN
Qty: 30 TABLET | Refills: 11 | Status: SHIPPED | OUTPATIENT
Start: 2024-05-28

## 2024-05-28 NOTE — TELEPHONE ENCOUNTER
[facility]  called in regards to them never receiving the script for patients Ibuprofen prn and is requesting for a new script to be sent over to the pharmacy.

## 2024-06-04 ENCOUNTER — TELEPHONE (OUTPATIENT)
Dept: FAMILY MEDICINE CLINIC | Facility: CLINIC | Age: 59
End: 2024-06-04

## 2024-06-04 NOTE — TELEPHONE ENCOUNTER
Received refill request from [employer] [employer] asking for a refill on aspirin 81 mg chew tablet. The aspirin is not currently in patient's medication list. Patient was also prescribed ibuprofen please review.

## 2024-06-05 NOTE — TELEPHONE ENCOUNTER
I do not see a reason for patient to be on preventative aspirin daily.  Please call patient and clarify what this is for.

## 2024-06-05 NOTE — TELEPHONE ENCOUNTER
Called and spoke with Noelle with facility. She stated patient came to the facility on aspirin, she stated he is having pain and patient could take aspirin PRN. Will forward back to the provider to further advise.

## 2024-06-10 ENCOUNTER — EVALUATION (OUTPATIENT)
Dept: PHYSICAL THERAPY | Facility: CLINIC | Age: 59
End: 2024-06-10
Payer: COMMERCIAL

## 2024-06-10 DIAGNOSIS — M25.561 ACUTE PAIN OF RIGHT KNEE: Primary | ICD-10-CM

## 2024-06-10 PROCEDURE — 97161 PT EVAL LOW COMPLEX 20 MIN: CPT | Performed by: PHYSICAL THERAPIST

## 2024-06-10 PROCEDURE — 97110 THERAPEUTIC EXERCISES: CPT | Performed by: PHYSICAL THERAPIST

## 2024-06-10 NOTE — PROGRESS NOTES
PT Evaluation     Today's date: 6/10/2024  Patient name: Armando Gaitan  : 1965  MRN: [id number]  Referring provider: Mikal Williamson, *  Dx:   Encounter Diagnosis     ICD-10-CM    1. Acute pain of right knee  M25.561 Ambulatory Referral to Physical Therapy          Start Time: 1019          Assessment  Impairments: impaired physical strength, lacks appropriate home exercise program and pain with function  Other impairment: PMH ADHD, psych disorders    Assessment details: Armando Gaitan is a 58 y.o. male presents with chronic R knee pain, pt reporting insidious onset.  Pt with PMH psych disorders possibly affecting PT obtaining accurate HPI.  Pt with (+) varus test of LCL, and (+) Fifi test for meniscus.  At times pt denying pain with testing during eval but showing facial expressions consistent with obvious distress.   Armando Gaitan has the above listed impairments and will benefit from skilled PT to improve deficits to return to prior level of function.  Armando Gatian , caregiver were educated on eval findings and plan for management.  HEP initiated.  Armando would benefit from skilled services to improve strength, flexibility, and function, and to decrease pain.    Barriers to therapy: Psych issues may hinder obtaining accurate HPI, feedback on eval and treatment.  Understanding of Dx/Px/POC: fair     Prognosis: good  Prognosis details: See psych PMH above    Goals  Impairment Goals  - Pt I with initial HEP in 1-2 visits  - Increase strength to 5/5 in all affected areas in 4-6 weeks    Functional Goals  - Increase Functional Status Measure to: 54 in 4-6 weeks (score 38 at eval)  - Patient will be independent with comprehensive HEP in 4-6 weeks  - Ambulation is improved to prior level of function in 4-6 weeks  - Stair climbing is improved to prior level of function in 4-6 weeks, descending pain free  - RLE dynamic strength and balance at least 90% of LLE via single leg step test, in 4-6  "wks.      Plan  Patient would benefit from: skilled physical therapy  Planned modality interventions: cryotherapy    Planned therapy interventions: neuromuscular re-education, patient/caregiver education, postural training, strengthening, stretching, therapeutic activities, therapeutic exercise, flexibility, balance and home exercise program    Frequency: 2x week  Duration in weeks: 4  Treatment plan discussed with: caregiver and patient      Subjective Evaluation    History of Present Illness  Mechanism of injury: Pt reporting \"it used to hurt, the right knee, it doesn't hurt no more.\"  Caregiver (Jeanne) reminding pt that his R knee was painful yesterday.  Per pt \"I don't know where it came from\", insidious onset.  Pain present approximately 1 year.  Pt reporting pain in area of R patellar tendon.  Functional limitations: Pt notes pain descending steps, doing them in step-to pattern.  Pt attending PT today with caregiver Jeanne.  Patient Goals  Patient goals for therapy: decreased pain and independence with ADLs/IADLs  Patient goal: Ride bicycle, be able to walk to club house for recreation  Pain  Current pain ratin  At best pain ratin  At worst pain ratin  Location: Anterior aspect R knee, patellar tendon area  Quality: sharp  Aggravating factors: stair climbing    Social Support  Steps to enter house: no  Stairs in house: yes (Steps to basement where snacks are kept)   16  Lives in: one-story house  Lives with: Group home with other residents.    Employment status: not working    Diagnostic Tests  X-ray: abnormal (Possible bursitis, suprapatellar fluid)  Treatments  No previous or current treatments      Objective    Observation: B knees with no effusion, ecchymosis, or erythema.    Gait: normal, no AD.    Knee A/PROM: B WNL grossly assessed.    MMT: Quads: R 4/5, L 5/5.  B hamstrings 5/5.    Flexibility: R gastroc moderately tight, L WNL.  B hamstrings moderately tight.    Palpation: R patellar " "tendon, quad tendon non-TTP.   R knee medial and lateral joint line TTP.  R lateral collateral ligament TTP.  B patella medial and lateral facets non-TTP.    Special tests: B Lachman, Reverse Lachman, valgus testing (-).   Varus stress testing: R painful, with increased laxity vs L.  R Fifi with clicking, pain, (+) test.  L varus testing, Fifi (-).    Patellar mobility: WNL B.      Flowsheet Rows      Flowsheet Row Most Recent Value   PT/OT G-Codes    Current Score 38   Projected Score 54               Precautions: PMH DM, psych      Manuals 6/10  /24                                                                Neuro Re-Ed             QS 10\"x  10                                                                                          Ther Ex             Strap gastroc str 20\"x5            Nu Step for strength             SAQ             LAQ             TKE w t band             Straddle HS str             Leg press R                          Ther Activity             T ball squat             FSU                                                    Modalities             Ice                               "

## 2024-06-17 ENCOUNTER — TELEPHONE (OUTPATIENT)
Age: 59
End: 2024-06-17

## 2024-06-17 ENCOUNTER — OFFICE VISIT (OUTPATIENT)
Dept: FAMILY MEDICINE CLINIC | Facility: CLINIC | Age: 59
End: 2024-06-17
Payer: COMMERCIAL

## 2024-06-17 VITALS
DIASTOLIC BLOOD PRESSURE: 68 MMHG | SYSTOLIC BLOOD PRESSURE: 100 MMHG | WEIGHT: 238.6 LBS | BODY MASS INDEX: 37.45 KG/M2 | TEMPERATURE: 97.5 F | HEART RATE: 93 BPM | HEIGHT: 67 IN | OXYGEN SATURATION: 96 %

## 2024-06-17 DIAGNOSIS — Z00.00 ANNUAL PHYSICAL EXAM: Primary | ICD-10-CM

## 2024-06-17 DIAGNOSIS — E11.9 TYPE 2 DIABETES MELLITUS WITHOUT COMPLICATION, WITHOUT LONG-TERM CURRENT USE OF INSULIN (HCC): ICD-10-CM

## 2024-06-17 DIAGNOSIS — I10 BENIGN ESSENTIAL HYPERTENSION: ICD-10-CM

## 2024-06-17 DIAGNOSIS — M25.561 ACUTE PAIN OF RIGHT KNEE: ICD-10-CM

## 2024-06-17 DIAGNOSIS — E03.9 ACQUIRED HYPOTHYROIDISM: ICD-10-CM

## 2024-06-17 DIAGNOSIS — F25.0 SCHIZOAFFECTIVE DISORDER, BIPOLAR TYPE (HCC): ICD-10-CM

## 2024-06-17 PROBLEM — Z00.01 ENCOUNTER FOR WELL ADULT EXAM WITH ABNORMAL FINDINGS: Status: RESOLVED | Noted: 2019-07-24 | Resolved: 2024-06-17

## 2024-06-17 PROCEDURE — 99214 OFFICE O/P EST MOD 30 MIN: CPT | Performed by: FAMILY MEDICINE

## 2024-06-17 PROCEDURE — 99396 PREV VISIT EST AGE 40-64: CPT | Performed by: FAMILY MEDICINE

## 2024-06-17 RX ORDER — GUAIFENESIN 200 MG/10ML
200 LIQUID ORAL 3 TIMES DAILY PRN
COMMUNITY

## 2024-06-17 RX ORDER — ASPIRIN 81 MG/1
81 TABLET ORAL DAILY
COMMUNITY

## 2024-06-17 RX ORDER — AMMONIUM LACTATE 12 G/100G
CREAM TOPICAL AS NEEDED
COMMUNITY

## 2024-06-17 RX ORDER — BISACODYL 5 MG/1
5 TABLET, DELAYED RELEASE ORAL DAILY PRN
COMMUNITY

## 2024-06-17 RX ORDER — LANOLIN ALCOHOL/MO/W.PET/CERES
3 CREAM (GRAM) TOPICAL
COMMUNITY

## 2024-06-17 RX ORDER — POLYETHYLENE GLYCOL 3350 17 G/17G
17 POWDER, FOR SOLUTION ORAL DAILY
COMMUNITY

## 2024-06-17 RX ORDER — DEXTROAMPHETAMINE SACCHARATE, AMPHETAMINE ASPARTATE MONOHYDRATE, DEXTROAMPHETAMINE SULFATE AND AMPHETAMINE SULFATE 3.75; 3.75; 3.75; 3.75 MG/1; MG/1; MG/1; MG/1
CAPSULE, EXTENDED RELEASE ORAL
COMMUNITY
Start: 2024-06-04

## 2024-06-17 NOTE — PROGRESS NOTES
Ambulatory Visit  Name: Armando Gaitan      : 1965      MRN: [id number]  Encounter Provider: Mikal Williamson MD  Encounter Date: 2024   Encounter department: [facility] [place]    Assessment & Plan   1. Annual physical exam  2. Benign essential hypertension  3. Type 2 diabetes mellitus without complication, without long-term current use of insulin (HCC)  Assessment & Plan:    Lab Results   Component Value Date    HGBA1C 7.1 (H) 2024   Controlled. Continue metformin   4. Acute pain of right knee  Comments:  X-ray showed effusion.  Patient started physical therapy reports his knee pain is getting better.  5. Acquired hypothyroidism  Assessment & Plan:  Controlled. Continue levothyroxine 75 mcg   6. Schizoaffective disorder, bipolar type (HCC)  -     CBC and differential; Future      Caretaker request CBC today.  Patient needs new CBC before he can be giving his Clozaril and they have not heard back from his psychiatrist.       History of Present Illness     Patient presents office today for annual exam and follow-up.  Feeling well no acute concerns or complaints.  Here with caretaker.  He is still following with psychiatry for psych conditions.  No issues.  Caretaker reports that he is behaving himself at the home he has not had any issues.  At her last office visit we sent him to physical therapy for his right knee swelling and pain.  He says physical therapy has been helping and he does not have any more knee pain.  Knee has gotten a little less swollen as well.  He has history of diabetes on metformin.  Tolerating metformin well no issues.  Denies any GI side effects.  Diabetes has always been well-controlled.  He is also on levothyroxine.  Doing well with levothyroxine no issues.        Review of Systems   All other systems reviewed and are negative.    Medical History Reviewed by provider this encounter:  Tobacco  Allergies  Meds  Problems  Med Hx  Surg Hx  Fam Hx      "  Objective     /68 (BP Location: Left arm, Patient Position: Sitting, Cuff Size: Standard)   Pulse 93   Temp 97.5 °F (36.4 °C) (Tympanic)   Ht 5' 7\" (1.702 m)   Wt 108 kg (238 lb 9.6 oz)   SpO2 96%   BMI 37.37 kg/m²     Physical Exam  Vitals and nursing note reviewed.   Constitutional:       General: He is not in acute distress.     Appearance: Normal appearance. He is well-developed. He is not ill-appearing, toxic-appearing or diaphoretic.   HENT:      Head: Normocephalic and atraumatic.      Nose: Nose normal.      Mouth/Throat:      Mouth: Mucous membranes are moist.      Pharynx: Oropharynx is clear. No oropharyngeal exudate or posterior oropharyngeal erythema.   Eyes:      General: No scleral icterus.        Right eye: No discharge.         Left eye: No discharge.      Extraocular Movements: Extraocular movements intact.      Conjunctiva/sclera: Conjunctivae normal.      Pupils: Pupils are equal, round, and reactive to light.   Cardiovascular:      Rate and Rhythm: Normal rate and regular rhythm.      Pulses: Normal pulses.           Dorsalis pedis pulses are 2+ on the right side and 2+ on the left side.        Posterior tibial pulses are 2+ on the right side and 2+ on the left side.      Heart sounds: Normal heart sounds. No murmur heard.  Pulmonary:      Effort: Pulmonary effort is normal. No respiratory distress.      Breath sounds: Normal breath sounds.   Abdominal:      General: There is no distension.      Palpations: Abdomen is soft. There is no mass.      Tenderness: There is no abdominal tenderness. There is no guarding or rebound.      Hernia: No hernia is present.   Musculoskeletal:         General: No swelling, tenderness, deformity or signs of injury. Normal range of motion.      Cervical back: Normal range of motion and neck supple. No rigidity or tenderness.      Right lower leg: No edema.      Left lower leg: No edema.   Feet:      Right foot:      Skin integrity: No ulcer, skin " breakdown, erythema, warmth, callus or dry skin.      Left foot:      Skin integrity: No ulcer, skin breakdown, erythema, warmth, callus or dry skin.   Lymphadenopathy:      Cervical: No cervical adenopathy.   Skin:     General: Skin is warm and dry.      Capillary Refill: Capillary refill takes less than 2 seconds.   Neurological:      General: No focal deficit present.      Mental Status: He is alert and oriented to person, place, and time.      Cranial Nerves: No cranial nerve deficit.      Motor: No weakness.      Gait: Gait normal.   Psychiatric:         Mood and Affect: Mood normal.         Behavior: Behavior normal.         Thought Content: Thought content normal.         Judgment: Judgment normal.       Administrative Statements

## 2024-06-17 NOTE — ASSESSMENT & PLAN NOTE
Lab Results   Component Value Date    HGBA1C 7.1 (H) 05/20/2024   Controlled. Continue metformin

## 2024-06-17 NOTE — TELEPHONE ENCOUNTER
Noelle with [facility] received a call from patients pharmacy stating he needs a CBC ordered before he can get his Clozaril. Noelle knows that Dr. Williamson is not the ordering DrOrtiz But she cannot get a hold of ordering doc Dr. James Matamoros' office and she would like to know if we would be willing to order the lab? She needs it faxed to [phone], please let her know if  Is willing.

## 2024-06-18 ENCOUNTER — APPOINTMENT (OUTPATIENT)
Dept: LAB | Facility: CLINIC | Age: 59
End: 2024-06-18
Payer: COMMERCIAL

## 2024-06-18 DIAGNOSIS — F25.0 SCHIZOAFFECTIVE DISORDER, BIPOLAR TYPE (HCC): ICD-10-CM

## 2024-06-18 LAB
BASOPHILS # BLD AUTO: 0.06 THOUSANDS/ÂΜL (ref 0–0.1)
BASOPHILS NFR BLD AUTO: 1 % (ref 0–1)
EOSINOPHIL # BLD AUTO: 0 THOUSAND/ÂΜL (ref 0–0.61)
EOSINOPHIL NFR BLD AUTO: 0 % (ref 0–6)
ERYTHROCYTE [DISTWIDTH] IN BLOOD BY AUTOMATED COUNT: 13.3 % (ref 11.6–15.1)
HCT VFR BLD AUTO: 37.8 % (ref 36.5–49.3)
HGB BLD-MCNC: 12.4 G/DL (ref 12–17)
IMM GRANULOCYTES # BLD AUTO: 0.05 THOUSAND/UL (ref 0–0.2)
IMM GRANULOCYTES NFR BLD AUTO: 1 % (ref 0–2)
LYMPHOCYTES # BLD AUTO: 3.51 THOUSANDS/ÂΜL (ref 0.6–4.47)
LYMPHOCYTES NFR BLD AUTO: 38 % (ref 14–44)
MCH RBC QN AUTO: 28.2 PG (ref 26.8–34.3)
MCHC RBC AUTO-ENTMCNC: 32.8 G/DL (ref 31.4–37.4)
MCV RBC AUTO: 86 FL (ref 82–98)
MONOCYTES # BLD AUTO: 1.14 THOUSAND/ÂΜL (ref 0.17–1.22)
MONOCYTES NFR BLD AUTO: 12 % (ref 4–12)
NEUTROPHILS # BLD AUTO: 4.5 THOUSANDS/ÂΜL (ref 1.85–7.62)
NEUTS SEG NFR BLD AUTO: 48 % (ref 43–75)
NRBC BLD AUTO-RTO: 0 /100 WBCS
PLATELET # BLD AUTO: 181 THOUSANDS/UL (ref 149–390)
PMV BLD AUTO: 10.4 FL (ref 8.9–12.7)
RBC # BLD AUTO: 4.39 MILLION/UL (ref 3.88–5.62)
WBC # BLD AUTO: 9.26 THOUSAND/UL (ref 4.31–10.16)

## 2024-06-18 PROCEDURE — 85025 COMPLETE CBC W/AUTO DIFF WBC: CPT

## 2024-06-18 PROCEDURE — 36415 COLL VENOUS BLD VENIPUNCTURE: CPT

## 2024-06-19 ENCOUNTER — OFFICE VISIT (OUTPATIENT)
Dept: PHYSICAL THERAPY | Facility: CLINIC | Age: 59
End: 2024-06-19
Payer: COMMERCIAL

## 2024-06-19 DIAGNOSIS — M25.561 ACUTE PAIN OF RIGHT KNEE: Primary | ICD-10-CM

## 2024-06-19 PROCEDURE — 97110 THERAPEUTIC EXERCISES: CPT

## 2024-06-19 NOTE — PROGRESS NOTES
"Daily Note     Today's date: 2024  Patient name: Armando Gaitan  : 1965  MRN: [id number]  Referring provider: Mikal Williamson, *  Dx:   Encounter Diagnosis     ICD-10-CM    1. Acute pain of right knee  M25.561           Start Time: 1020  Stop Time: 1048  Total time in clinic (min): 28 minutes    Subjective: Patient reports 0/10 pain level and denies waking up with any pain this morning. Notes when he experiences the pain it's usually in the front of his knee (points to patellar tendon).       Objective: See treatment diary below      Assessment: Patient being seen for first PT treatment session following IE. Treatment session consisted of initiating POC. Patient tolerated treatment session fair. Required consistent cuing to stay on task, pacing, and ensure proper technique of exercises with poor carryover after instruction. Patient reported no pain with exercises presented today however, felt fatigued by conclusion of visit. Patient would benefit from continued PT to improve strength, stability, and level of function.      Plan: Continue per POC. Increase reps/resistance as tolerated.      Precautions: PMH DM, psych      Manuals 6/10  /24 6/19/24                                                               Neuro Re-Ed             QS 10\"x  10 10\"x  10                                                                                         Ther Ex             Strap gastroc str 20\"x5 20\"x5           Nu Step for strength  5'            SAQ  x10           LAQ  x10           TKE w t band             Straddle HS str             Leg press R                          Ther Activity             T ball squat  X10           FSU  4\" x 10                                                    Modalities             Ice                                 "

## 2024-06-24 ENCOUNTER — OFFICE VISIT (OUTPATIENT)
Dept: PHYSICAL THERAPY | Facility: CLINIC | Age: 59
End: 2024-06-24
Payer: COMMERCIAL

## 2024-06-24 DIAGNOSIS — M25.561 ACUTE PAIN OF RIGHT KNEE: Primary | ICD-10-CM

## 2024-06-24 PROCEDURE — 97110 THERAPEUTIC EXERCISES: CPT

## 2024-06-24 PROCEDURE — 97530 THERAPEUTIC ACTIVITIES: CPT

## 2024-06-24 NOTE — PROGRESS NOTES
"Daily Note     Today's date: 2024  Patient name: Armando Gaitan  : 1965  MRN: [id number]  Referring provider: Mikal Williamson, *  Dx:   Encounter Diagnosis     ICD-10-CM    1. Acute pain of right knee  M25.561                      Subjective: \"Ok.\" Pt c/o's of R knee pain 5/10 at arrival, localized to patellar tendon area.      Objective: See treatment diary below      Assessment: Tolerated treatment well. Patient would benefit from continued PT For improved strength, flexibility and overall function.  Progressed pt as per flow sheet but requires constant cueing for proper form of exercises.  Pt unable to perform seated HS straddle stretch properly; attempted modified hooklying HS stretch instead but was unable to perform and pt declined.     Plan: Continue per plan of care.      Precautions: PMH DM, psych      Manuals 6/10  /24 6/19/24 6/24                                                              Neuro Re-Ed             QS 10\"x  10 10\"x  10 2x10                                                                                        Ther Ex             Strap gastroc str 20\"x5 20\"x5 x5          Nu Step for strength  5'  7'          SAQ  x10 2x10          LAQ  x10 2x10          TKE w t band   Blk 2x10          Straddle HS str   Unable          Leg press R                          Ther Activity             T ball squat  X10 2x10          FSU  4\" x 10 2x10                                                    Modalities             Ice                                   "

## 2024-07-01 ENCOUNTER — OFFICE VISIT (OUTPATIENT)
Dept: PHYSICAL THERAPY | Facility: CLINIC | Age: 59
End: 2024-07-01
Payer: COMMERCIAL

## 2024-07-01 DIAGNOSIS — M25.561 ACUTE PAIN OF RIGHT KNEE: Primary | ICD-10-CM

## 2024-07-01 PROCEDURE — 97530 THERAPEUTIC ACTIVITIES: CPT

## 2024-07-01 PROCEDURE — 97110 THERAPEUTIC EXERCISES: CPT

## 2024-07-01 NOTE — PROGRESS NOTES
"Daily Note     Today's date: 2024  Patient name: Armando Gaitan  : 1965  MRN: [id number]  Referring provider: Mikal Williamson, *  Dx:   Encounter Diagnosis     ICD-10-CM    1. Acute pain of right knee  M25.561                      Subjective: \"A little bit, not too bad.\" Pt c/o's of R knee pain /10 at arrival.       Objective: See treatment diary below      Assessment: Tolerated treatment well. Patient would benefit from continued PT For improved strength, flexibility and overall function.  Requires almost constant verbal/tactile cueing for proper technique, to keep track of reps/set as pt demonstrates poor carryover of exercises. No increased R knee pain throughout session.       Plan: Continue per plan of care.      Precautions: PMH DM, psych      Manuals 6/10  /24 6/19/24 6/24 7/1                                                             Neuro Re-Ed             QS 10\"x  10 10\"x  10 2x10 3x10                                                                                       Ther Ex             Strap gastroc str 20\"x5 20\"x5 x5 X5          Nu Step for strength  5'  7' 10'         SAQ  x10 2x10 3x10         LAQ  x10 2x10 3x10         TKE w t band   Blk 2x10 3x10         Straddle HS str   Unable          Leg press R seat @ 6    2pl 2x10                      Ther Activity             T ball squat  X10 2x10 3x10          FSU  4\" x 10 2x10  3x10                                                   Modalities             Ice                                     "

## 2024-07-01 NOTE — ED NOTES
Spoke with employee from pt's group home, pt being discharged  Calling supervisor to find out about transport home       Meg Vang RN  12/28/17 3284 Subjective  Anastasia Reese is a 58 year old female.    Chief Complaint   Patient presents with    Office Visit     -Hip and feet pain  -needs inhaler refilled         HPI  Pt presents to office for refill on albuterol inhaler.  Was given a refill on 6/15/24.   Was only given one inhaler.  States that she uses the inhaler regularly.  Does not want to be on daily steroid inhalers.  Was seen by allergist and was told that it was ok to take albuterol regularly.  Has not had a physical in over a year.  Does not understand why she can't get multiple inhalers.    Was seen about a year ago and was complaining of B feet pain.  Was given an order for an xray.  Never went to get it done.  Order is still in the chart and good until 11/20/24.  Wants to see if she has arthritis or if pain is from a bunion.  Does not want to see a specialist until she has the xray done.    Also c/o R latera hip pain x past month.  Does not remember doing anything to injure the area.  No previous injury or surgery to the hip.  No back pain.  Pain does not radiate down the leg.      ROS   Review of Systems   Musculoskeletal:         R lateral hip pain  B feet pain   All other systems reviewed and are negative.      Past Medical History  Patient Active Problem List   Diagnosis    Mild asthma (CMD)    R binder protein deficiency (CMD)    Raynaud's phenomenon without gangrene    Mitral valve prolapse    Irritable bowel syndrome with both constipation and diarrhea    Encounter for routine adult health examination without abnormal findings    Vaginal leiomyoma    Seasonal allergic rhinitis due to pollen    Overweight with body mass index (BMI) of 26 to 26.9 in adult    Refused varicella vaccine       Past Surgical History  Past Surgical History:   Procedure Laterality Date    Ovary surgery      left and myoma     Ovary surgery  12.2021    removed left ovary and tube --dr aparicio    Sinus surgery      [place] tooth extraction         Current  Medications  Current Outpatient Medications   Medication Sig Dispense Refill    albuterol 108 (90 Base) MCG/ACT inhaler Inhale 2 puffs into the lungs every 4 hours as needed for Shortness of Breath or Wheezing. 8.5 g 0    Cetirizine HCl 10 MG Cap Take 10 mg by mouth daily. 30 capsule 1    dicyclomine (BENTYL) 20 MG tablet Take 1 tablet by mouth 3 times daily as needed (ab discomfort). 90 tablet 1    omeprazole (PriLOSEC) 40 MG capsule Take 1 capsule by mouth daily (before breakfast). 90 capsule 1    fluticasone (FLONASE) 50 MCG/ACT nasal spray Spray 2 sprays in each nostril daily. SHAKE LIQUID 16 g 3    fexofenadine-pseudoephedrine (ALLEGRA-D)  MG per 12 hr tablet Take 1 tablet by mouth 2 times daily. 60 tablet 3     No current facility-administered medications for this visit.       Allergies  ALLERGIES:   Allergen Reactions    Dog Dander SHORTNESS OF BREATH     unknown    Dust SHORTNESS OF BREATH     unknown    Grass SHORTNESS OF BREATH    Mold   (Environmental) SHORTNESS OF BREATH     unknown    Trees SHORTNESS OF BREATH     unknown       Family History  Family History   Problem Relation Age of Onset    Patient is unaware of any medical problems Mother     Asthma Father     Cancer Father         throat    Dementia/Alzheimers Father     Asthma Sister     Asthma Sister     Asthma Brother     Patient is unaware of any medical problems Daughter     Patient is unaware of any medical problems Daughter     Cancer Paternal Aunt         uterine    Cancer, Breast Neg Hx     Cancer, Colon Neg Hx     Cancer, Ovarian Neg Hx         Social History  Social History     Tobacco Use    Smoking status: Never    Smokeless tobacco: Never   Vaping Use    Vaping status: never used   Substance Use Topics    Alcohol use: Yes     Alcohol/week: 1.0 standard drink of alcohol     Types: 1 Glasses of wine per week     Comment: Socially    Drug use: Not Currently         Objective  Visit Vitals  /64 (BP Location: RUE - Right upper  extremity, Patient Position: Sitting, Cuff Size: Regular)   Pulse 86   Temp 98.9 °F (37.2 °C) (Tympanic)   Ht 5' 3\" (1.6 m)   Wt 66.2 kg (146 lb)   LMP 07/04/2020 (LMP Unknown)   SpO2 100%   BMI 25.86 kg/m²       Physical Exam  Physical Exam  Vitals and nursing note reviewed.   Constitutional:       Appearance: Normal appearance.   Cardiovascular:      Rate and Rhythm: Normal rate and regular rhythm.   Pulmonary:      Effort: Pulmonary effort is normal.      Breath sounds: Normal breath sounds.   Musculoskeletal:      Right upper leg: Tenderness present. No swelling or deformity.      Comments: Tenderness over IT band   Feet:      Comments: Bunions  Skin:     General: Skin is warm and dry.   Neurological:      Mental Status: She is alert and oriented to person, place, and time.   Psychiatric:         Mood and Affect: Affect is angry.         Behavior: Behavior is agitated.         Procedure  Procedures      Assessment and Plan  Diagnoses and all orders for this visit:  Mild persistent asthma, unspecified whether complicated (CMD)  Comments:  discussed with pt that she should be on daily controlled inhalers if she is taking albuterol regularly. Does not want to be on them. F/u with allergist  Orders:  -     albuterol 108 (90 Base) MCG/ACT inhaler; Inhale 2 puffs into the lungs every 4 hours as needed for Shortness of Breath or Wheezing.  Iliotibial band syndrome of right side  Comments:  Instructed on stretching exercises for the IT band. Does not want to go to physical therapy  Bilateral foot pain  Comments:  xray order is still in the chart and active until November. Can go for xrays at any point    Discussed with pt that she needs to schedule a yearly physical.  Pt very argumentative that she does not need to have a physical.  Wants only to speak with her doctor when she wants to.      F/u prn    7/1/2024  Juli Portillo PA-C    Supervising Physician-- Dr Rayray Blackmon

## 2024-07-03 ENCOUNTER — OFFICE VISIT (OUTPATIENT)
Dept: PHYSICAL THERAPY | Facility: CLINIC | Age: 59
End: 2024-07-03
Payer: COMMERCIAL

## 2024-07-03 DIAGNOSIS — M25.561 ACUTE PAIN OF RIGHT KNEE: Primary | ICD-10-CM

## 2024-07-03 PROCEDURE — 97110 THERAPEUTIC EXERCISES: CPT

## 2024-07-03 PROCEDURE — 97530 THERAPEUTIC ACTIVITIES: CPT

## 2024-07-03 NOTE — PROGRESS NOTES
"Daily Note     Today's date: 7/3/2024  Patient name: Armando Gaitan  : 1965  MRN: [id number]  Referring provider: Mikal Williamson, *  Dx:   Encounter Diagnosis     ICD-10-CM    1. Acute pain of right knee  M25.561                      Subjective: Pt c/o's of R knee pain 4/10 at arrival. \"I hopped out of the car and that hurt.\"      Objective: See treatment diary below      Assessment: Tolerated treatment well. Patient would benefit from continued PT For improved strength, flexibility and overall function.  Advised pt to take his time and exercise caution with car transfers to avoid sx aggravation.   Still requiring constant tactile and verbal cues for counting/reps and proper form with exercises.       Plan: Continue per plan of care.      Precautions: PMH DM, psych      Manuals 6/10  /24 6/19/24 6/24 7/1 7/3                                                            Neuro Re-Ed             QS 10\"x  10 10\"x  10 2x10 3x10 3x10                                                                                       Ther Ex             Strap gastroc str 20\"x5 20\"x5 x5 X5  x5        Nu Step for strength  5'  7' 10' 10'        SAQ  x10 2x10 3x10 3x10        LAQ  x10 2x10 3x10 3x10        TKE w t band   Blk 2x10 3x10 Silver 3x10        Straddle HS str   Unable          Leg press R seat @ 6    2pl 2x10 3x10                     Ther Activity             T ball squat  X10 2x10 3x10  3x10        FSU  4\" x 10 2x10  3x10  3x10                                                 Modalities             Ice                                       "

## 2024-07-08 ENCOUNTER — APPOINTMENT (OUTPATIENT)
Dept: PHYSICAL THERAPY | Facility: CLINIC | Age: 59
End: 2024-07-08
Payer: COMMERCIAL

## 2024-07-10 ENCOUNTER — APPOINTMENT (OUTPATIENT)
Dept: PHYSICAL THERAPY | Facility: CLINIC | Age: 59
End: 2024-07-10
Payer: COMMERCIAL

## 2024-07-17 ENCOUNTER — TELEPHONE (OUTPATIENT)
Age: 59
End: 2024-07-17

## 2024-07-17 NOTE — TELEPHONE ENCOUNTER
Nurse Noelle called in from [facility] requesting Dr. Williamson to issue CBC lab order on monthly basis due around 18 th of every month. As she is unable to get hold of the patient Psychiatrist to issue the order. As the patient is on Clozaril 200 mg he got to monitored regularly so therefore she needs CBC to be done every month. Please do help to place the order and keep her informed on the same. Thanks

## 2024-07-17 NOTE — TELEPHONE ENCOUNTER
Noelle from [facility] is requesting CBC lab work monthly for the patient as she is unable to get ahold of the psychiatrist to issue this order.     Sent over to provider for review. Will address upon return to office tomorrow.    Size Of Lesion In Cm: 0.4

## 2024-07-18 ENCOUNTER — EVALUATION (OUTPATIENT)
Dept: PHYSICAL THERAPY | Facility: CLINIC | Age: 59
End: 2024-07-18
Payer: COMMERCIAL

## 2024-07-18 ENCOUNTER — APPOINTMENT (OUTPATIENT)
Dept: LAB | Facility: CLINIC | Age: 59
End: 2024-07-18
Payer: COMMERCIAL

## 2024-07-18 DIAGNOSIS — M25.561 ACUTE PAIN OF RIGHT KNEE: Primary | ICD-10-CM

## 2024-07-18 DIAGNOSIS — Z79.899 ENCOUNTER FOR LONG-TERM (CURRENT) USE OF OTHER MEDICATIONS: ICD-10-CM

## 2024-07-18 DIAGNOSIS — I10 BENIGN ESSENTIAL HYPERTENSION: Primary | ICD-10-CM

## 2024-07-18 LAB
BASOPHILS # BLD AUTO: 0.04 THOUSANDS/ÂΜL (ref 0–0.1)
BASOPHILS NFR BLD AUTO: 1 % (ref 0–1)
EOSINOPHIL # BLD AUTO: 0 THOUSAND/ÂΜL (ref 0–0.61)
EOSINOPHIL NFR BLD AUTO: 0 % (ref 0–6)
ERYTHROCYTE [DISTWIDTH] IN BLOOD BY AUTOMATED COUNT: 13.3 % (ref 11.6–15.1)
HCT VFR BLD AUTO: 40.4 % (ref 36.5–49.3)
HGB BLD-MCNC: 13.4 G/DL (ref 12–17)
IMM GRANULOCYTES # BLD AUTO: 0.02 THOUSAND/UL (ref 0–0.2)
IMM GRANULOCYTES NFR BLD AUTO: 0 % (ref 0–2)
LYMPHOCYTES # BLD AUTO: 2.15 THOUSANDS/ÂΜL (ref 0.6–4.47)
LYMPHOCYTES NFR BLD AUTO: 33 % (ref 14–44)
MCH RBC QN AUTO: 27.9 PG (ref 26.8–34.3)
MCHC RBC AUTO-ENTMCNC: 33.2 G/DL (ref 31.4–37.4)
MCV RBC AUTO: 84 FL (ref 82–98)
MONOCYTES # BLD AUTO: 0.68 THOUSAND/ÂΜL (ref 0.17–1.22)
MONOCYTES NFR BLD AUTO: 10 % (ref 4–12)
NEUTROPHILS # BLD AUTO: 3.71 THOUSANDS/ÂΜL (ref 1.85–7.62)
NEUTS SEG NFR BLD AUTO: 56 % (ref 43–75)
NRBC BLD AUTO-RTO: 0 /100 WBCS
PLATELET # BLD AUTO: 174 THOUSANDS/UL (ref 149–390)
PMV BLD AUTO: 10.6 FL (ref 8.9–12.7)
RBC # BLD AUTO: 4.81 MILLION/UL (ref 3.88–5.62)
WBC # BLD AUTO: 6.6 THOUSAND/UL (ref 4.31–10.16)

## 2024-07-18 PROCEDURE — 85025 COMPLETE CBC W/AUTO DIFF WBC: CPT

## 2024-07-18 PROCEDURE — 97530 THERAPEUTIC ACTIVITIES: CPT | Performed by: PHYSICAL THERAPIST

## 2024-07-18 PROCEDURE — 36415 COLL VENOUS BLD VENIPUNCTURE: CPT

## 2024-07-18 PROCEDURE — 97110 THERAPEUTIC EXERCISES: CPT | Performed by: PHYSICAL THERAPIST

## 2024-07-18 PROCEDURE — 97164 PT RE-EVAL EST PLAN CARE: CPT | Performed by: PHYSICAL THERAPIST

## 2024-07-18 NOTE — TELEPHONE ENCOUNTER
Called and spoke with Noelle at [facility], understood and had no further questions

## 2024-07-18 NOTE — TELEPHONE ENCOUNTER
Patient had CBC ordered by psychiatrist per chart review. [Mucoid Discharge] : mucoid discharge [NL] : warm, clear [FreeTextEntry3] :  Right TM looks good.  Left TM red and bulging

## 2024-07-18 NOTE — PROGRESS NOTES
"RE-EVALUATION:    Today's date: 2024  Patient name: Armando Gaitan  : 1965  MRN: [id number]  Referring provider: Mikal Williamson, *  Dx:   Encounter Diagnosis     ICD-10-CM    1. Acute pain of right knee  M25.561                      Subjective: R knee pain \"a little bit\" anterior aspect R knee.  Would like to continue PT, reports PT \"is helping\".  Pt not doing steps.  Pain range 0-3/10.  Reports occasional clicking R knee.  \"I've been doing the exercises.\"      Objective: See treatment diary below    RE-EVALUATION:    Pain: 0-3/10 anterior aspect R knee (0-4/10 at 6/10/24 eval).    FOTO functional score 41, projected score 54.  Score at eval 38.  Higher score = higher function.    Gait: normal, no AD (same as eval)    R knee A/PROM: WNL (same as eval, grossly assessed    MMT: R quads 5/5 (4/5 at eval).  R hamstrings 5/5 (same as eval)    RLE dynamic strength and balance 75% of LLE via single leg step test.  Multiple verbal and tactile cues for pt to perform proper test.    R knee special tests: varus stress and Fifi (-) (these tests (+) at eval).    Palpation: R patella medial and lateral facets non-TTP.        Assessment: Tolerated treatment well. Patient would benefit from continued PT    Armando Gaitan has been compliant with attending PT and home exercise program since initial eval.  Armando  has made improvements in objective data since initial eval but is still limited compared to prior level of function. Armando continues with above listed impairments and would benefit from additional skilled PT to address these deficits to return to prior level of function.  Armando has attended 6 visits, missed 3 visits since 6/10/24 eval.      Goals  Impairment Goals  - Pt I with initial HEP in 1-2 visits - met  - Increase strength to 5/5 in all affected areas in 4-6 weeks - met    Functional Goals  - Increase Functional Status Measure to: 54 in 4-6 weeks (score 38 at eval) - not met  - Patient will be " "independent with comprehensive HEP in 4-6 weeks - met  - Ambulation is improved to prior level of function in 4-6 weeks - met  - Stair climbing is improved to prior level of function in 4-6 weeks, descending pain free - not assessed, pt not doing steps.  - RLE dynamic strength and balance at least 90% of LLE via single leg step test, in 4-6 wks. - not met    NEW Goals 2-4 wks    Functional Goals  - Increase Functional Status Measure to: 54 (score 38 at eval)  - Stair climbing is improved to prior level of function, descending pain free  - RLE dynamic strength and balance at least 90% of LLE via single leg step test      Plan: Continue per plan of care.  PT BIW x 2-4 wks.     Precautions: PMH DM, psych      Manuals 6/10  /24 6/19/24 6/24 7/1 7/3 7/18                                                           Neuro Re-Ed             QS 10\"x  10 10\"x  10 2x10 3x10 3x10  D/C                                                                                     Ther Ex             Strap gastroc str 20\"x5 20\"x5 x5 X5  x5 x5       Nu Step for strength  5'  7' 10' 10' 10'       SAQ  x10 2x10 3x10 3x10 D/C       LAQ  x10 2x10 3x10 3x10 2# 3x10       TKE w t band   Blk 2x10 3x10 Silver 3x10 3x10       Straddle HS str   Unable          Leg press R seat @ 6    2pl 2x10 3x10 3x10 3pl                    Ther Activity             T ball squat  X10 2x10 3x10  3x10 3x10       FSU  4\" x 10 2x10  3x10  3x10 6\" 2x10                                                Modalities             Ice                                         "

## 2024-07-23 ENCOUNTER — APPOINTMENT (OUTPATIENT)
Dept: PHYSICAL THERAPY | Facility: CLINIC | Age: 59
End: 2024-07-23
Payer: COMMERCIAL

## 2024-07-24 ENCOUNTER — OFFICE VISIT (OUTPATIENT)
Dept: PHYSICAL THERAPY | Facility: CLINIC | Age: 59
End: 2024-07-24
Payer: COMMERCIAL

## 2024-07-24 DIAGNOSIS — M25.561 ACUTE PAIN OF RIGHT KNEE: Primary | ICD-10-CM

## 2024-07-24 PROCEDURE — 97110 THERAPEUTIC EXERCISES: CPT

## 2024-07-24 PROCEDURE — 97530 THERAPEUTIC ACTIVITIES: CPT

## 2024-07-24 NOTE — PROGRESS NOTES
"Daily Note     Today's date: 2024  Patient name: Armando Gaitan  : 1965  MRN: [id number]  Referring provider: Mikal Williamson, *  Dx:   Encounter Diagnosis     ICD-10-CM    1. Acute pain of right knee  M25.561                      Subjective: \"Better.\" Pt denies R knee pain 0/10 at arrival. Pt arrives accompanied by care taker who states pt was able to descend staircase reciprocally today.      Objective: See treatment diary below      Assessment: Tolerated treatment well. Patient would benefit from continued PT For improved strength, flexibility and overall function.  Making good functional progress towards therapeutic goals with decreased pain overall; D/C planning discussed with pt and pt agreeable.       Plan: Continue per plan of care. D/C to HEP nv.     Precautions: PMH DM, psych      Manuals 6/10  /24 6/19/24 6/24 7/1 7/3 7/18 7/24                                                          Neuro Re-Ed             QS 10\"x  10 10\"x  10 2x10 3x10 3x10  D/C                                                                                     Ther Ex             Strap gastroc str 20\"x5 20\"x5 x5 X5  x5 x5 x5      Nu Step for strength  5'  7' 10' 10' 10' 10'      SAQ  x10 2x10 3x10 3x10 D/C       LAQ  x10 2x10 3x10 3x10 2# 3x10 3x10      TKE w t band   Blk 2x10 3x10 Silver 3x10 3x10 3x10      Straddle HS str   Unable          Leg press R seat @ 6    2pl 2x10 3x10 3x10 3pl 3x10                   Ther Activity             T ball squat  X10 2x10 3x10  3x10 3x10 3x10      FSU  4\" x 10 2x10  3x10  3x10 6\" 2x10 3x10                                               Modalities             Ice                                           "

## 2024-07-30 ENCOUNTER — APPOINTMENT (OUTPATIENT)
Dept: PHYSICAL THERAPY | Facility: CLINIC | Age: 59
End: 2024-07-30
Payer: COMMERCIAL

## 2024-07-31 ENCOUNTER — OFFICE VISIT (OUTPATIENT)
Dept: PHYSICAL THERAPY | Facility: CLINIC | Age: 59
End: 2024-07-31
Payer: COMMERCIAL

## 2024-07-31 DIAGNOSIS — M25.561 ACUTE PAIN OF RIGHT KNEE: Primary | ICD-10-CM

## 2024-07-31 PROCEDURE — 97530 THERAPEUTIC ACTIVITIES: CPT

## 2024-07-31 PROCEDURE — 97110 THERAPEUTIC EXERCISES: CPT

## 2024-07-31 NOTE — PROGRESS NOTES
"Daily Note     Today's date: 2024  Patient name: Armando Gaitan  : 1965  MRN: [id number]  Referring provider: Mikal Williamson, *  Dx:   Encounter Diagnosis     ICD-10-CM    1. Acute pain of right knee  M25.561                      Subjective: Pt offers no complaints at arrival; denies R knee pain 0/10. Today is patient's last day of PT.       Objective: See treatment diary below  FOTO at IE: 38  FOTO at RE: 41  FOTO at D/C: 83      Assessment: Tolerated treatment well. Patient would benefit from continued PT For improved strength, flexibility and overall function.  Discharge planning was discussed last week with patient and caretaker; pt to be D/C'd to HEP following today's session as he is doing better overall with functional improvements/decreased R knee pain.      Plan: D/C to HEP.     Precautions: PMH DM, psych      Manuals 6/10  /24 6/19/24 6/24 7/1 7/3 7/18 7/24 7/31                                                         Neuro Re-Ed             QS 10\"x  10 10\"x  10 2x10 3x10 3x10  D/C                                                                                     Ther Ex             Strap gastroc str 20\"x5 20\"x5 x5 X5  x5 x5 x5 x5     Nu Step for strength  5'  7' 10' 10' 10' 10' 10'     SAQ  x10 2x10 3x10 3x10 D/C       LAQ  x10 2x10 3x10 3x10 2# 3x10 3x10 3x10     TKE w t band   Blk 2x10 3x10 Silver 3x10 3x10 3x10 3x10      Straddle HS str   Unable          Leg press R seat @ 6    2pl 2x10 3x10 3x10 3pl 3x10 3x10                  Ther Activity             T ball squat  X10 2x10 3x10  3x10 3x10 3x10 3x10     FSU  4\" x 10 2x10  3x10  3x10 6\" 2x10 3x10 3x10                                              Modalities             Ice                                             "

## 2024-08-15 ENCOUNTER — APPOINTMENT (OUTPATIENT)
Dept: LAB | Facility: CLINIC | Age: 59
End: 2024-08-15
Payer: COMMERCIAL

## 2024-08-15 DIAGNOSIS — Z79.899 ENCOUNTER FOR LONG-TERM (CURRENT) USE OF OTHER MEDICATIONS: ICD-10-CM

## 2024-08-15 LAB
BASOPHILS # BLD AUTO: 0.05 THOUSANDS/ÂΜL (ref 0–0.1)
BASOPHILS NFR BLD AUTO: 1 % (ref 0–1)
EOSINOPHIL # BLD AUTO: 0.11 THOUSAND/ÂΜL (ref 0–0.61)
EOSINOPHIL NFR BLD AUTO: 2 % (ref 0–6)
ERYTHROCYTE [DISTWIDTH] IN BLOOD BY AUTOMATED COUNT: 13.5 % (ref 11.6–15.1)
HCT VFR BLD AUTO: 42.7 % (ref 36.5–49.3)
HGB BLD-MCNC: 14.1 G/DL (ref 12–17)
IMM GRANULOCYTES # BLD AUTO: 0.04 THOUSAND/UL (ref 0–0.2)
IMM GRANULOCYTES NFR BLD AUTO: 1 % (ref 0–2)
LYMPHOCYTES # BLD AUTO: 3.3 THOUSANDS/ÂΜL (ref 0.6–4.47)
LYMPHOCYTES NFR BLD AUTO: 42 % (ref 14–44)
MCH RBC QN AUTO: 28.4 PG (ref 26.8–34.3)
MCHC RBC AUTO-ENTMCNC: 33 G/DL (ref 31.4–37.4)
MCV RBC AUTO: 86 FL (ref 82–98)
MONOCYTES # BLD AUTO: 1.04 THOUSAND/ÂΜL (ref 0.17–1.22)
MONOCYTES NFR BLD AUTO: 14 % (ref 4–12)
NEUTROPHILS # BLD AUTO: 2.97 THOUSANDS/ÂΜL (ref 1.85–7.62)
NEUTS SEG NFR BLD AUTO: 40 % (ref 43–75)
NRBC BLD AUTO-RTO: 0 /100 WBCS
PLATELET # BLD AUTO: 217 THOUSANDS/UL (ref 149–390)
PMV BLD AUTO: 10.4 FL (ref 8.9–12.7)
RBC # BLD AUTO: 4.97 MILLION/UL (ref 3.88–5.62)
WBC # BLD AUTO: 7.51 THOUSAND/UL (ref 4.31–10.16)

## 2024-08-15 PROCEDURE — 36415 COLL VENOUS BLD VENIPUNCTURE: CPT

## 2024-08-15 PROCEDURE — 85025 COMPLETE CBC W/AUTO DIFF WBC: CPT

## 2024-08-21 ENCOUNTER — TELEPHONE (OUTPATIENT)
Age: 59
End: 2024-08-21

## 2024-08-21 NOTE — TELEPHONE ENCOUNTER
Covering for pt's PMD  On reviewing chart, I see no reason for lac-hydrin rx and no diagnoses for which secondary prevention with ASA would be warranted, so I discontinued both of these meds on paper rx to be faxed

## 2024-08-21 NOTE — TELEPHONE ENCOUNTER
Noelle from [facility] where the patient currently lives called stated that the patient has two older orders from a previous physician that they either need a D/C order for or an updated script -   aspirin 81 mg EC tablet   ammonium lactate (LAC-HYDRIN) 12 % cream     Please advise . Any questions or concerns call Noelle at [phone]. Fax D/C orders or scripts to [phone] .

## 2024-08-28 ENCOUNTER — HOSPITAL ENCOUNTER (EMERGENCY)
Facility: HOSPITAL | Age: 59
Discharge: HOME | End: 2024-08-29
Attending: EMERGENCY MEDICINE
Payer: COMMERCIAL

## 2024-08-28 ENCOUNTER — APPOINTMENT (EMERGENCY)
Dept: RADIOLOGY | Facility: HOSPITAL | Age: 59
End: 2024-08-28
Payer: COMMERCIAL

## 2024-08-28 DIAGNOSIS — R07.9 CHEST PAIN, UNSPECIFIED: Primary | ICD-10-CM

## 2024-08-28 LAB
ALBUMIN SERPL BCG-MCNC: 3.9 G/DL (ref 3.5–5)
ALP SERPL-CCNC: 59 U/L (ref 34–104)
ALT SERPL W P-5'-P-CCNC: 19 U/L (ref 7–52)
ANION GAP SERPL CALCULATED.3IONS-SCNC: 7 MMOL/L (ref 4–13)
AST SERPL W P-5'-P-CCNC: 18 U/L (ref 13–39)
BASOPHILS # BLD AUTO: 0.07 THOUSANDS/ÂΜL (ref 0–0.1)
BASOPHILS NFR BLD AUTO: 1 % (ref 0–1)
BILIRUB SERPL-MCNC: 0.24 MG/DL (ref 0.2–1)
BUN SERPL-MCNC: 10 MG/DL (ref 5–25)
CALCIUM SERPL-MCNC: 8.9 MG/DL (ref 8.4–10.2)
CARDIAC TROPONIN I PNL SERPL HS: 10 NG/L
CHLORIDE SERPL-SCNC: 99 MMOL/L (ref 96–108)
CO2 SERPL-SCNC: 24 MMOL/L (ref 21–32)
CREAT SERPL-MCNC: 0.66 MG/DL (ref 0.6–1.3)
EOSINOPHIL # BLD AUTO: 0.08 THOUSAND/ÂΜL (ref 0–0.61)
EOSINOPHIL NFR BLD AUTO: 1 % (ref 0–6)
ERYTHROCYTE [DISTWIDTH] IN BLOOD BY AUTOMATED COUNT: 13.3 % (ref 11.6–15.1)
GFR SERPL CREATININE-BSD FRML MDRD: 107 ML/MIN/1.73SQ M
GLUCOSE SERPL-MCNC: 119 MG/DL (ref 65–140)
HCT VFR BLD AUTO: 38.1 % (ref 36.5–49.3)
HGB BLD-MCNC: 12.8 G/DL (ref 12–17)
IMM GRANULOCYTES # BLD AUTO: 0.05 THOUSAND/UL (ref 0–0.2)
IMM GRANULOCYTES NFR BLD AUTO: 0 % (ref 0–2)
LYMPHOCYTES # BLD AUTO: 4.46 THOUSANDS/ÂΜL (ref 0.6–4.47)
LYMPHOCYTES NFR BLD AUTO: 37 % (ref 14–44)
MCH RBC QN AUTO: 28.2 PG (ref 26.8–34.3)
MCHC RBC AUTO-ENTMCNC: 33.6 G/DL (ref 31.4–37.4)
MCV RBC AUTO: 84 FL (ref 82–98)
MONOCYTES # BLD AUTO: 1.53 THOUSAND/ÂΜL (ref 0.17–1.22)
MONOCYTES NFR BLD AUTO: 13 % (ref 4–12)
NEUTROPHILS # BLD AUTO: 5.75 THOUSANDS/ÂΜL (ref 1.85–7.62)
NEUTS SEG NFR BLD AUTO: 48 % (ref 43–75)
NRBC BLD AUTO-RTO: 0 /100 WBCS
PLATELET # BLD AUTO: 172 THOUSANDS/UL (ref 149–390)
PMV BLD AUTO: 9.9 FL (ref 8.9–12.7)
POTASSIUM SERPL-SCNC: 3.7 MMOL/L (ref 3.5–5.3)
PROT SERPL-MCNC: 6.4 G/DL (ref 6.4–8.4)
RBC # BLD AUTO: 4.54 MILLION/UL (ref 3.88–5.62)
SODIUM SERPL-SCNC: 130 MMOL/L (ref 135–147)
TSH SERPL DL<=0.05 MIU/L-ACNC: 2.5 UIU/ML (ref 0.45–4.5)
WBC # BLD AUTO: 11.94 THOUSAND/UL (ref 4.31–10.16)

## 2024-08-28 PROCEDURE — 36415 COLL VENOUS BLD VENIPUNCTURE: CPT | Performed by: EMERGENCY MEDICINE

## 2024-08-28 PROCEDURE — 99285 EMERGENCY DEPT VISIT HI MDM: CPT

## 2024-08-28 PROCEDURE — 80053 COMPREHEN METABOLIC PANEL: CPT | Performed by: EMERGENCY MEDICINE

## 2024-08-28 PROCEDURE — 71045 X-RAY EXAM CHEST 1 VIEW: CPT

## 2024-08-28 PROCEDURE — 93005 ELECTROCARDIOGRAM TRACING: CPT

## 2024-08-28 PROCEDURE — 85025 COMPLETE CBC W/AUTO DIFF WBC: CPT | Performed by: EMERGENCY MEDICINE

## 2024-08-28 PROCEDURE — 99285 EMERGENCY DEPT VISIT HI MDM: CPT | Performed by: EMERGENCY MEDICINE

## 2024-08-28 PROCEDURE — 84443 ASSAY THYROID STIM HORMONE: CPT | Performed by: EMERGENCY MEDICINE

## 2024-08-28 PROCEDURE — 84484 ASSAY OF TROPONIN QUANT: CPT | Performed by: EMERGENCY MEDICINE

## 2024-08-28 RX ORDER — ASPIRIN 81 MG/1
324 TABLET, CHEWABLE ORAL ONCE
Status: COMPLETED | OUTPATIENT
Start: 2024-08-28 | End: 2024-08-28

## 2024-08-28 RX ORDER — SODIUM CHLORIDE 9 MG/ML
3 INJECTION INTRAVENOUS
Status: DISCONTINUED | OUTPATIENT
Start: 2024-08-28 | End: 2024-08-29 | Stop reason: HOSPADM

## 2024-08-28 RX ADMIN — ASPIRIN 81 MG 324 MG: 81 TABLET ORAL at 23:02

## 2024-08-29 ENCOUNTER — OFFICE VISIT (OUTPATIENT)
Dept: FAMILY MEDICINE CLINIC | Facility: CLINIC | Age: 59
End: 2024-08-29
Payer: COMMERCIAL

## 2024-08-29 VITALS
RESPIRATION RATE: 22 BRPM | SYSTOLIC BLOOD PRESSURE: 149 MMHG | OXYGEN SATURATION: 96 % | TEMPERATURE: 97 F | DIASTOLIC BLOOD PRESSURE: 83 MMHG | HEART RATE: 77 BPM

## 2024-08-29 VITALS
BODY MASS INDEX: 36.73 KG/M2 | HEIGHT: 67 IN | SYSTOLIC BLOOD PRESSURE: 118 MMHG | DIASTOLIC BLOOD PRESSURE: 70 MMHG | TEMPERATURE: 97.7 F | HEART RATE: 113 BPM | WEIGHT: 234 LBS | OXYGEN SATURATION: 96 %

## 2024-08-29 DIAGNOSIS — R07.9 CHEST PAIN, UNSPECIFIED TYPE: Primary | ICD-10-CM

## 2024-08-29 LAB
2HR DELTA HS TROPONIN: -1 NG/L
ATRIAL RATE: 75 BPM
ATRIAL RATE: 84 BPM
CARDIAC TROPONIN I PNL SERPL HS: 9 NG/L
P AXIS: 44 DEGREES
P AXIS: 60 DEGREES
PR INTERVAL: 156 MS
PR INTERVAL: 162 MS
QRS AXIS: -31 DEGREES
QRS AXIS: -35 DEGREES
QRSD INTERVAL: 108 MS
QRSD INTERVAL: 116 MS
QT INTERVAL: 362 MS
QT INTERVAL: 390 MS
QTC INTERVAL: 427 MS
QTC INTERVAL: 435 MS
T WAVE AXIS: 53 DEGREES
T WAVE AXIS: 60 DEGREES
VENTRICULAR RATE: 75 BPM
VENTRICULAR RATE: 84 BPM

## 2024-08-29 PROCEDURE — 99213 OFFICE O/P EST LOW 20 MIN: CPT | Performed by: FAMILY MEDICINE

## 2024-08-29 PROCEDURE — 84484 ASSAY OF TROPONIN QUANT: CPT | Performed by: EMERGENCY MEDICINE

## 2024-08-29 PROCEDURE — 93005 ELECTROCARDIOGRAM TRACING: CPT

## 2024-08-29 PROCEDURE — 93010 ELECTROCARDIOGRAM REPORT: CPT | Performed by: INTERNAL MEDICINE

## 2024-08-29 PROCEDURE — 36415 COLL VENOUS BLD VENIPUNCTURE: CPT | Performed by: EMERGENCY MEDICINE

## 2024-08-29 NOTE — PROGRESS NOTES
"Ambulatory Visit  Name: Armando Gaitan      : 1965      MRN: [id number]  Encounter Provider: Mikal Williamson MD  Encounter Date: 2024   Encounter department: [facility]    Assessment & Plan   1. Chest pain, unspecified type  -     Stress test only, exercise; Future; Expected date: 2024    Likely related to acid reflux.  Recommended to continue Protonix 40 mg daily.  We will get stress test to rule out CAD.       History of Present Illness     Patient presents office today with caretaker.  Here for chest pain.  Went to the emergency room and workup was negative.  He has an appointment scheduled cardiology in a few months.  He was having chest pain while eating sausage.  Pain resolves after 10 to 15 minutes.  No other symptoms during that episode.  Denies any other episodes of chest pain outside of that episode.  No shortness of breath.  He has history of acid reflux and is on Protonix daily.        Review of Systems   All other systems reviewed and are negative.      Objective     /70 (BP Location: Left arm, Patient Position: Sitting, Cuff Size: Standard)   Pulse (!) 113   Temp 97.7 °F (36.5 °C) (Tympanic)   Ht 5' 7\" (1.702 m)   Wt 106 kg (234 lb)   SpO2 96%   BMI 36.65 kg/m²     Physical Exam  Vitals and nursing note reviewed.   Constitutional:       General: He is not in acute distress.     Appearance: Normal appearance. He is well-developed. He is not ill-appearing, toxic-appearing or diaphoretic.   HENT:      Head: Normocephalic and atraumatic.   Eyes:      General:         Right eye: No discharge.         Left eye: No discharge.      Extraocular Movements: Extraocular movements intact.      Conjunctiva/sclera: Conjunctivae normal.   Cardiovascular:      Rate and Rhythm: Normal rate and regular rhythm.      Pulses: Normal pulses.           Dorsalis pedis pulses are 2+ on the right side and 2+ on the left side.        Posterior tibial pulses are 2+ on the " right side and 2+ on the left side.      Heart sounds: Normal heart sounds.   Pulmonary:      Effort: Pulmonary effort is normal.      Breath sounds: Normal breath sounds.   Abdominal:      General: Abdomen is flat. There is no distension.      Palpations: Abdomen is soft. There is no mass.      Tenderness: There is no abdominal tenderness.      Hernia: No hernia is present.   Musculoskeletal:      Cervical back: Normal range of motion and neck supple.   Feet:      Right foot:      Skin integrity: No ulcer, skin breakdown, erythema, warmth, callus or dry skin.      Left foot:      Skin integrity: No ulcer, skin breakdown, erythema, warmth, callus or dry skin.   Skin:     General: Skin is dry.      Capillary Refill: Capillary refill takes less than 2 seconds.   Neurological:      Mental Status: He is alert and oriented to person, place, and time.   Psychiatric:         Mood and Affect: Mood normal.         Behavior: Behavior normal.         Thought Content: Thought content normal.         Judgment: Judgment normal.       Administrative Statements

## 2024-08-29 NOTE — ED PROVIDER NOTES
History  Chief Complaint   Patient presents with    Chest Pain     Pt reports sudden onset left sided chest pain that radiated down the left arm while eating sausage     59-year-old male complains of chest pain.  States that lasted approximately 15 minutes.  Fully resolved.  Started when he was eating sausages.  States he is never had anything like this before.  States he is otherwise felt well recently.  No sick contacts.  Patient lives in a group home and has some limitations to his ability to answer questions.  States the pain was right sided although he told nursing it was more left-sided.  Denies having any palpitations or shortness of breath.        Prior to Admission Medications   Prescriptions Last Dose Informant Patient Reported? Taking?   Alum & Mag Hydroxide-Simeth (ALMACONE PO)   Yes No   Sig: Take by mouth   Cholecalciferol (Vitamin D) 50 MCG (2000 UT) CAPS   No No   Sig: Take 1 capsule (2,000 Units total) by mouth in the morning   Magnesium Hydroxide (MILK OF MAGNESIA PO)   Yes No   Sig: Take by mouth   Menthol-Methyl Salicylate (MUSCLE RUB EX)   Yes No   Sig: Apply topically PRN   OLANZapine (ZyPREXA) 5 mg tablet   No No   Sig: Take 1 tablet (5 mg total) by mouth daily at bedtime   ammonium lactate (LAC-HYDRIN) 12 % cream   Yes No   Sig: Apply topically as needed for dry skin   amphetamine-dextroamphetamine (ADDERALL XR) 15 MG 24 hr capsule   Yes No   aspirin 81 mg EC tablet   Yes No   Sig: Take 81 mg by mouth daily   atorvastatin (LIPITOR) 10 mg tablet   No No   Sig: Take 1 tablet (10 mg total) by mouth daily with dinner   bisacodyl (DULCOLAX) 5 mg EC tablet   Yes No   Sig: Take 5 mg by mouth daily as needed for constipation PRN   clozapine (CLOZARIL) 200 MG tablet   No No   Sig: Take 1 tablet (200 mg total) by mouth daily   divalproex sodium (DEPAKOTE ER) 500 mg 24 hr tablet   No No   Sig: Take 3 tablets (1,500 mg total) by mouth daily at bedtime   guaiFENesin (ROBITUSSIN) 100 MG/5ML oral liquid    Yes No   Sig: Take 200 mg by mouth 3 (three) times a day as needed for cough PRN   haloperidol (HALDOL) 5 mg tablet   No No   Sig: Take 1 tablet (5 mg total) by mouth every 6 (six) hours as needed (moderate agitation)   Patient not taking: Reported on 6/17/2024   ibuprofen (MOTRIN) 600 mg tablet   No No   Sig: Take 1 tablet (600 mg total) by mouth every 6 (six) hours as needed for mild pain, fever, headaches or moderate pain   levothyroxine 75 mcg tablet   No No   Sig: Take 1 tablet (75 mcg total) by mouth daily in the early morning   melatonin 3 mg   Yes No   Sig: Take 3 mg by mouth daily at bedtime   metFORMIN (GLUCOPHAGE) 500 mg tablet   No No   Sig: Take 1 tablet (500 mg total) by mouth 2 (two) times a day with meals   oxybutynin (DITROPAN-XL) 5 mg 24 hr tablet   No No   Sig: Take 1 tablet (5 mg total) by mouth daily   pantoprazole (PROTONIX) 40 mg tablet   No No   Sig: Take 1 tablet (40 mg total) by mouth daily in the early morning   polyethylene glycol (MIRALAX) 17 g packet   Yes No   Sig: Take 17 g by mouth daily PRN   senna (SENOKOT) 8.6 MG tablet   No No   Sig: Take 1 tablet (8.6 mg total) by mouth daily      Facility-Administered Medications: None       Past Medical History:   Diagnosis Date    Asthma     Bipolar 1 disorder ([facility]) 2011    Colitis 03/27/2014    Constipation     COPD (chronic obstructive pulmonary disease) ([facility])     Diabetes ([facility]) 2011    Diabetes insipidus, 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 No  pertinent surgical history.    Family History   Family history unknown: Yes     I have reviewed and agree with the history as documented.    E-Cigarette/Vaping     E-Cigarette/Vaping Substances     Social History     Tobacco Use    Smoking status: Every Day     Current packs/day: 1.00     Types: Cigarettes    Smokeless tobacco: Current   Substance Use Topics    Alcohol use: No    Drug use: No       Review of Systems   Constitutional:  Negative for chills and fever.   Eyes:  Negative for visual disturbance.   Respiratory:  Negative for shortness of breath.    Cardiovascular:  Positive for chest pain. Negative for palpitations and leg swelling.   Gastrointestinal:  Negative for abdominal distention and abdominal pain.   Endocrine: Negative for polyuria.   Genitourinary:  Negative for decreased urine volume and dysuria.   Neurological:  Negative for dizziness, syncope and weakness.       Physical Exam  Physical Exam  Constitutional:       General: He is not in acute distress.     Appearance: He is well-developed. He is not ill-appearing, toxic-appearing or diaphoretic.   HENT:      Head: Normocephalic and atraumatic.   Eyes:      Conjunctiva/sclera: Conjunctivae normal.      Pupils: Pupils are equal, round, and reactive to light.   Cardiovascular:      Rate and Rhythm: Normal rate and regular rhythm.      Heart sounds: Normal heart sounds.   Pulmonary:      Effort: Pulmonary effort is normal. No tachypnea or respiratory distress.      Breath sounds: Normal breath sounds.   Abdominal:      General: Bowel sounds are normal.      Palpations: Abdomen is soft.   Musculoskeletal:         General: Normal range of motion.      Cervical back: Normal range of motion and neck supple.   Skin:     General: Skin is warm and dry.   Neurological:      Mental Status: He is alert and oriented to person, place, and time.   Psychiatric:         Behavior: Behavior normal.         Thought Content: Thought content normal.         Judgment:  Judgment normal.         Vital Signs  ED Triage Vitals [08/28/24 2227]   Temperature Pulse Respirations Blood Pressure SpO2   (!) 97 °F (36.1 °C) 83 18 142/77 95 %      Temp Source Heart Rate Source Patient Position - Orthostatic VS BP Location FiO2 (%)   Tympanic Monitor -- Left arm --      Pain Score       No Pain           Vitals:    08/28/24 2227   BP: 142/77   Pulse: 83         Visual Acuity      ED Medications  Medications   sodium chloride (PF) 0.9 % injection 3 mL (has no administration in time range)   aspirin chewable tablet 324 mg (324 mg Oral Given 8/28/24 2302)       Diagnostic Studies  Results Reviewed       Procedure Component Value Units Date/Time    TSH, 3rd generation [[id number]]  (Normal) Collected: 08/28/24 2251    Lab Status: Final result Specimen: Blood from Arm, Right Updated: 08/28/24 2329     TSH 3RD GENERATON 2.498 uIU/mL     HS Troponin 0hr (reflex protocol) [[id number]]  (Normal) Collected: 08/28/24 2251    Lab Status: Final result Specimen: Blood from Arm, Right Updated: 08/28/24 2320     hs TnI 0hr 10 ng/L     HS Troponin I 2hr [[id number]]     Lab Status: No result Specimen: Blood     Comprehensive metabolic panel [[id number]]  (Abnormal) Collected: 08/28/24 2251    Lab Status: Final result Specimen: Blood from Arm, Right Updated: 08/28/24 2316     Sodium 130 mmol/L      Potassium 3.7 mmol/L      Chloride 99 mmol/L      CO2 24 mmol/L      ANION GAP 7 mmol/L      BUN 10 mg/dL      Creatinine 0.66 mg/dL      Glucose 119 mg/dL      Calcium 8.9 mg/dL      AST 18 U/L      ALT 19 U/L      Alkaline Phosphatase 59 U/L      Total Protein 6.4 g/dL      Albumin 3.9 g/dL      Total Bilirubin 0.24 mg/dL      eGFR 107 ml/min/1.73sq m     Narrative:      National Kid[employer] Foundation guidelines for Chronic Kidney Disease (CKD):     Stage 1 with normal or high GFR (GFR > 90 mL/min/1.73 square meters)    Stage 2 Mild CKD (GFR = 60-89 mL/min/1.73 square meters)    Stage 3A Moderate CKD (GFR = 45-59  mL/min/1.73 square meters)    Stage 3B Moderate CKD (GFR = 30-44 mL/min/1.73 square meters)    Stage 4 Severe CKD (GFR = 15-29 mL/min/1.73 square meters)    Stage 5 End Stage CKD (GFR <15 mL/min/1.73 square meters)  Note: GFR calculation is accurate only with a steady state creatinine    CBC and differential [[id number]]  (Abnormal) Collected: 08/28/24 2251    Lab Status: Final result Specimen: Blood from Arm, Right Updated: 08/28/24 2258     WBC 11.94 Thousand/uL      RBC 4.54 Million/uL      Hemoglobin 12.8 g/dL      Hematocrit 38.1 %      MCV 84 fL      MCH 28.2 pg      MCHC 33.6 g/dL      RDW 13.3 %      MPV 9.9 fL      Platelets 172 Thousands/uL      nRBC 0 /100 WBCs      Segmented % 48 %      Immature Grans % 0 %      Lymphocytes % 37 %      Monocytes % 13 %      Eosinophils Relative 1 %      Basophils Relative 1 %      Absolute Neutrophils 5.75 Thousands/µL      Absolute Immature Grans 0.05 Thousand/uL      Absolute Lymphocytes 4.46 Thousands/µL      Absolute Monocytes 1.53 Thousand/µL      Eosinophils Absolute 0.08 Thousand/µL      Basophils Absolute 0.07 Thousands/µL                    X-ray chest 1 view portable    (Results Pending)              Procedures  ECG 12 Lead Documentation Only    Date/Time: 8/28/2024 11:56 PM    Performed by: Krishna Trevizo MD  Authorized by: Krishna Trevizo MD    ECG reviewed by me, the ED Provider: yes    Patient location:  ED  Previous ECG:     Comparison to cardiac monitor: Yes    Interpretation:     Interpretation: normal    Rate:     ECG rate assessment: normal    Rhythm:     Rhythm: sinus rhythm    Ectopy:     Ectopy: none    QRS:     QRS axis:  Left  Conduction:     Conduction: normal    ST segments:     ST segments:  Normal  T waves:     T waves: normal             ED Course                                               Medical Decision Making  59-year-old male with chest pain.  Chest pain started just prior to arrival.  Initial troponin 10.  Will need to repeat at 2  hours.  Patient is asymptomatic and looks well.  Stable at the time of signout to Dr. Mcbride.    Amount and/or Complexity of Data Reviewed  Independent Historian: caregiver  External Data Reviewed: notes.  Labs: ordered.  Radiology: ordered.  ECG/medicine tests: ordered and independent interpretation performed.    Risk  OTC drugs.  Prescription drug management.                 Disposition  Final diagnoses:   None     ED Disposition       None          Follow-up Information    None         Patient's Medications   Discharge Prescriptions    No medications on file       No discharge procedures on file.    PDMP Review         Value Time User    PDMP Reviewed  Yes 8/7/2020  8:35 AM James Matamoros MD            ED Provider  Electronically Signed by             Krishna Trevizo MD  08/28/24 7915

## 2024-08-29 NOTE — ED CARE HANDOFF
Emergency Department Sign Out Note        Sign out and transfer of care from Dr. Trevizo. See Separate Emergency Department note.     The patient, Armando Gaitan, was evaluated by the previous provider for chest pain.  Patient is a 59-year-old male who presented to the emergency department from a group home complaining of chest pain after eating sausage.  Upon arrival in the emergency department, the patient's chest pain had resolved.  Patient's EKG appeared to be nonacute.  Patient's initial troponin is 10.  Patient is being held for second troponin and if negative can be discharged.    Workup Completed:  Patient had emergency department work prior to my evaluation which consisted of a chest x-ray, EKG, CMP, TSH and troponin.    ED Course / Workup Pending (followup):  Second troponin is pending.      Second opponent negative.  Patient will be discharged.  Patient still chest pain-free.  Will discharge      HEART Risk Score      Flowsheet Row Most Recent Value   Heart Score Risk Calculator    History 1 Filed at: 08/28/2024 2358   ECG 0 Filed at: 08/28/2024 2358   Age 1 Filed at: 08/28/2024 2358   Risk Factors 2 Filed at: 08/28/2024 2358   Troponin 0 Filed at: 08/28/2024 2358   HEART Score 4 Filed at: 08/28/2024 2358                                       Procedures  Medical Decision Making  Amount and/or Complexity of Data Reviewed  Labs: ordered.  Radiology: ordered.    Risk  OTC drugs.  Prescription drug management.            Disposition  Final diagnoses:   Chest pain, unspecified     Time reflects when diagnosis was documented in both MDM as applicable and the Disposition within this note       Time User Action Codes Description Comment    8/29/2024  1:03 AM Krishna Trevizo Add [R07.9] Chest pain, unspecified           ED Disposition       ED Disposition   Discharge    Condition   Stable    Date/Time   u Aug 29, 2024 0103    Comment   Armando Gaitan discharge to home/self care.                   Follow-up  Information       Follow up With Specialties Details Why Contact Info    Mikal Williamson MD Family Medicine In 1 day  [address] [facility] [address] [phone]            Discharge Medication List as of 8/29/2024  1:03 AM        CONTINUE these medications which have NOT CHANGED    Details   Alum & Mag Hydroxide-Simeth (ALMACONE PO) Take by mouth, Historical Med      ammonium lactate (LAC-HYDRIN) 12 % cream Apply topically as needed for dry skin, Historical Med      amphetamine-dextroamphetamine (ADDERALL XR) 15 MG 24 hr capsule Historical Med      aspirin 81 mg EC tablet Take 81 mg by mouth daily, Historical Med      atorvastatin (LIPITOR) 10 mg tablet Take 1 tablet (10 mg total) by mouth daily with dinner, Starting Tue 5/14/2024, Normal      bisacodyl (DULCOLAX) 5 mg EC tablet Take 5 mg by mouth daily as needed for constipation PRN, Historical Med      Cholecalciferol (Vitamin D) 50 MCG (2000 UT) CAPS Take 1 capsule (2,000 Units total) by mouth in the morning, Starting Wed 5/22/2024, Normal      clozapine (CLOZARIL) 200 MG tablet Take 1 tablet (200 mg total) by mouth daily, Starting Fri 8/7/2020, Print      divalproex sodium (DEPAKOTE ER) 500 mg 24 hr tablet Take 3 tablets (1,500 mg total) by mouth daily at bedtime, Starting Fri 8/7/2020, Print      guaiFENesin (ROBITUSSIN) 100 MG/5ML oral liquid Take 200 mg by mouth 3 (three) times a day as needed for cough PRN, Historical Med      haloperidol (HALDOL) 5 mg tablet Take 1 tablet (5 mg total) by mouth every 6 (six) hours as needed (moderate agitation), Starting Fri 8/7/2020, Print      ibuprofen (MOTRIN) 600 mg tablet Take 1 tablet (600 mg total) by mouth every 6 (six) hours as needed for mild pain, fever, headaches or moderate pain, Starting Tue 5/28/2024, Normal      levothyroxine 75 mcg tablet Take 1 tablet (75 mcg total) by mouth daily in the early morning, Starting Tue 5/14/2024, Normal      Magnesium Hydroxide (MILK OF  MAGNESIA PO) Take by mouth, Historical Med      melatonin 3 mg Take 3 mg by mouth daily at bedtime, Historical Med      Menthol-Methyl Salicylate (MUSCLE RUB EX) Apply topically PRN, Historical Med      metFORMIN (GLUCOPHAGE) 500 mg tablet Take 1 tablet (500 mg total) by mouth 2 (two) times a day with meals, Starting Tue 5/14/2024, Normal      OLANZapine (ZyPREXA) 5 mg tablet Take 1 tablet (5 mg total) by mouth daily at bedtime, Starting Fri 8/7/2020, Print      oxybutynin (DITROPAN-XL) 5 mg 24 hr tablet Take 1 tablet (5 mg total) by mouth daily, Starting Tue 5/14/2024, Normal      pantoprazole (PROTONIX) 40 mg tablet Take 1 tablet (40 mg total) by mouth daily in the early morning, Starting Tue 5/14/2024, Normal      polyethylene glycol (MIRALAX) 17 g packet Take 17 g by mouth daily PRN, Historical Med      senna (SENOKOT) 8.6 MG tablet Take 1 tablet (8.6 mg total) by mouth daily, Starting Tue 5/14/2024, Normal                  ED Provider  Electronically Signed by     Evan Mcbride Jr., DO  08/29/24 9878

## 2024-09-12 ENCOUNTER — APPOINTMENT (OUTPATIENT)
Dept: LAB | Facility: CLINIC | Age: 59
End: 2024-09-12
Payer: COMMERCIAL

## 2024-09-12 DIAGNOSIS — Z79.899 ENCOUNTER FOR LONG-TERM (CURRENT) USE OF OTHER MEDICATIONS: ICD-10-CM

## 2024-09-12 LAB
BASOPHILS # BLD AUTO: 0.06 THOUSANDS/ΜL (ref 0–0.1)
BASOPHILS NFR BLD AUTO: 1 % (ref 0–1)
EOSINOPHIL # BLD AUTO: 0 THOUSAND/ΜL (ref 0–0.61)
EOSINOPHIL NFR BLD AUTO: 0 % (ref 0–6)
ERYTHROCYTE [DISTWIDTH] IN BLOOD BY AUTOMATED COUNT: 13.6 % (ref 11.6–15.1)
HCT VFR BLD AUTO: 38.8 % (ref 36.5–49.3)
HGB BLD-MCNC: 12.9 G/DL (ref 12–17)
IMM GRANULOCYTES # BLD AUTO: 0.05 THOUSAND/UL (ref 0–0.2)
IMM GRANULOCYTES NFR BLD AUTO: 1 % (ref 0–2)
LYMPHOCYTES # BLD AUTO: 3.22 THOUSANDS/ΜL (ref 0.6–4.47)
LYMPHOCYTES NFR BLD AUTO: 33 % (ref 14–44)
MCH RBC QN AUTO: 27.7 PG (ref 26.8–34.3)
MCHC RBC AUTO-ENTMCNC: 33.2 G/DL (ref 31.4–37.4)
MCV RBC AUTO: 83 FL (ref 82–98)
MONOCYTES # BLD AUTO: 1.25 THOUSAND/ΜL (ref 0.17–1.22)
MONOCYTES NFR BLD AUTO: 13 % (ref 4–12)
NEUTROPHILS # BLD AUTO: 5.07 THOUSANDS/ΜL (ref 1.85–7.62)
NEUTS SEG NFR BLD AUTO: 52 % (ref 43–75)
NRBC BLD AUTO-RTO: 0 /100 WBCS
PLATELET # BLD AUTO: 202 THOUSANDS/UL (ref 149–390)
PMV BLD AUTO: 10.4 FL (ref 8.9–12.7)
RBC # BLD AUTO: 4.65 MILLION/UL (ref 3.88–5.62)
WBC # BLD AUTO: 9.65 THOUSAND/UL (ref 4.31–10.16)

## 2024-09-12 PROCEDURE — 85025 COMPLETE CBC W/AUTO DIFF WBC: CPT

## 2024-09-12 PROCEDURE — 36415 COLL VENOUS BLD VENIPUNCTURE: CPT

## 2024-10-09 ENCOUNTER — APPOINTMENT (OUTPATIENT)
Dept: LAB | Facility: CLINIC | Age: 59
End: 2024-10-09
Payer: COMMERCIAL

## 2024-10-09 DIAGNOSIS — Z79.899 ENCOUNTER FOR LONG-TERM (CURRENT) USE OF OTHER MEDICATIONS: ICD-10-CM

## 2024-10-09 LAB
BASOPHILS # BLD AUTO: 0.06 THOUSANDS/ΜL (ref 0–0.1)
BASOPHILS NFR BLD AUTO: 1 % (ref 0–1)
EOSINOPHIL # BLD AUTO: 0.02 THOUSAND/ΜL (ref 0–0.61)
EOSINOPHIL NFR BLD AUTO: 0 % (ref 0–6)
ERYTHROCYTE [DISTWIDTH] IN BLOOD BY AUTOMATED COUNT: 13.5 % (ref 11.6–15.1)
HCT VFR BLD AUTO: 40.9 % (ref 36.5–49.3)
HGB BLD-MCNC: 13.6 G/DL (ref 12–17)
IMM GRANULOCYTES # BLD AUTO: 0.05 THOUSAND/UL (ref 0–0.2)
IMM GRANULOCYTES NFR BLD AUTO: 1 % (ref 0–2)
LYMPHOCYTES # BLD AUTO: 2.17 THOUSANDS/ΜL (ref 0.6–4.47)
LYMPHOCYTES NFR BLD AUTO: 25 % (ref 14–44)
MCH RBC QN AUTO: 28.6 PG (ref 26.8–34.3)
MCHC RBC AUTO-ENTMCNC: 33.3 G/DL (ref 31.4–37.4)
MCV RBC AUTO: 86 FL (ref 82–98)
MONOCYTES # BLD AUTO: 0.81 THOUSAND/ΜL (ref 0.17–1.22)
MONOCYTES NFR BLD AUTO: 9 % (ref 4–12)
NEUTROPHILS # BLD AUTO: 5.57 THOUSANDS/ΜL (ref 1.85–7.62)
NEUTS SEG NFR BLD AUTO: 64 % (ref 43–75)
NRBC BLD AUTO-RTO: 0 /100 WBCS
PLATELET # BLD AUTO: 217 THOUSANDS/UL (ref 149–390)
PMV BLD AUTO: 10.4 FL (ref 8.9–12.7)
RBC # BLD AUTO: 4.75 MILLION/UL (ref 3.88–5.62)
WBC # BLD AUTO: 8.68 THOUSAND/UL (ref 4.31–10.16)

## 2024-10-09 PROCEDURE — 85025 COMPLETE CBC W/AUTO DIFF WBC: CPT

## 2024-10-09 PROCEDURE — 36415 COLL VENOUS BLD VENIPUNCTURE: CPT

## 2024-10-11 NOTE — PROGRESS NOTES
03/10/20 0900   Team Meeting   Meeting Type Tx Team Meeting   Initial Conference Date 03/10/20   Next Conference Date 03/17/20   Team Members Present   Team Members Present Physician;Nurse;; Other (Discipline and Name)   Physician Team Member Dr Tonya Langley Team Member Emiliano Warren RN   Care Management Team Member Lily Todd   Other (Discipline and Name) Trinidad Munoz LCSW   Patient/Family Present   Patient Present No   Patient's Family Present No     Patient declined to attend treatment team meeting this morning  Patient attended 28% of groups offered last week  Patient continues to be preoccupied with going on passes and/or discharge  Patient has been refusing medications and not participating in the recovery program  Team and patient completed risk assessment and the patient did not verbalize any desire to elope from the program  Patient verbalized understanding of consequences of eloping from treatment while on a commitment  Patient verbalized no further questions or concerns at the conclusion of the meeting  I would start with a chest x-ray first. I don't see one in our system. Will order for her.

## 2024-10-14 ENCOUNTER — APPOINTMENT (EMERGENCY)
Dept: RADIOLOGY | Facility: HOSPITAL | Age: 59
End: 2024-10-14
Payer: COMMERCIAL

## 2024-10-14 ENCOUNTER — HOSPITAL ENCOUNTER (EMERGENCY)
Facility: HOSPITAL | Age: 59
Discharge: HOME | End: 2024-10-14
Attending: EMERGENCY MEDICINE
Payer: COMMERCIAL

## 2024-10-14 VITALS
TEMPERATURE: 97.5 F | DIASTOLIC BLOOD PRESSURE: 92 MMHG | HEART RATE: 82 BPM | SYSTOLIC BLOOD PRESSURE: 141 MMHG | RESPIRATION RATE: 18 BRPM | OXYGEN SATURATION: 96 %

## 2024-10-14 DIAGNOSIS — R07.9 CHEST PAIN, UNSPECIFIED: ICD-10-CM

## 2024-10-14 DIAGNOSIS — K21.9 GERD (GASTROESOPHAGEAL REFLUX DISEASE): Primary | ICD-10-CM

## 2024-10-14 LAB
ALBUMIN SERPL BCG-MCNC: 3.9 G/DL (ref 3.5–5)
ALP SERPL-CCNC: 62 U/L (ref 34–104)
ALT SERPL W P-5'-P-CCNC: 17 U/L (ref 7–52)
ANION GAP SERPL CALCULATED.3IONS-SCNC: 9 MMOL/L (ref 4–13)
AST SERPL W P-5'-P-CCNC: 23 U/L (ref 13–39)
BASOPHILS # BLD AUTO: 0.05 THOUSANDS/ΜL (ref 0–0.1)
BASOPHILS NFR BLD AUTO: 1 % (ref 0–1)
BILIRUB SERPL-MCNC: 0.25 MG/DL (ref 0.2–1)
BUN SERPL-MCNC: 8 MG/DL (ref 5–25)
CALCIUM SERPL-MCNC: 9 MG/DL (ref 8.4–10.2)
CARDIAC TROPONIN I PNL SERPL HS: 4 NG/L
CHLORIDE SERPL-SCNC: 97 MMOL/L (ref 96–108)
CO2 SERPL-SCNC: 25 MMOL/L (ref 21–32)
CREAT SERPL-MCNC: 0.7 MG/DL (ref 0.6–1.3)
EOSINOPHIL # BLD AUTO: 0.05 THOUSAND/ΜL (ref 0–0.61)
EOSINOPHIL NFR BLD AUTO: 1 % (ref 0–6)
ERYTHROCYTE [DISTWIDTH] IN BLOOD BY AUTOMATED COUNT: 13 % (ref 11.6–15.1)
GFR SERPL CREATININE-BSD FRML MDRD: 104 ML/MIN/1.73SQ M
GLUCOSE SERPL-MCNC: 138 MG/DL (ref 65–140)
HCT VFR BLD AUTO: 38.7 % (ref 36.5–49.3)
HGB BLD-MCNC: 12.9 G/DL (ref 12–17)
IMM GRANULOCYTES # BLD AUTO: 0.05 THOUSAND/UL (ref 0–0.2)
IMM GRANULOCYTES NFR BLD AUTO: 1 % (ref 0–2)
LIPASE SERPL-CCNC: 21 U/L (ref 11–82)
LYMPHOCYTES # BLD AUTO: 3.9 THOUSANDS/ΜL (ref 0.6–4.47)
LYMPHOCYTES NFR BLD AUTO: 40 % (ref 14–44)
MCH RBC QN AUTO: 28 PG (ref 26.8–34.3)
MCHC RBC AUTO-ENTMCNC: 33.3 G/DL (ref 31.4–37.4)
MCV RBC AUTO: 84 FL (ref 82–98)
MONOCYTES # BLD AUTO: 1.24 THOUSAND/ΜL (ref 0.17–1.22)
MONOCYTES NFR BLD AUTO: 13 % (ref 4–12)
NEUTROPHILS # BLD AUTO: 4.45 THOUSANDS/ΜL (ref 1.85–7.62)
NEUTS SEG NFR BLD AUTO: 44 % (ref 43–75)
NRBC BLD AUTO-RTO: 0 /100 WBCS
PLATELET # BLD AUTO: 196 THOUSANDS/UL (ref 149–390)
PMV BLD AUTO: 9.5 FL (ref 8.9–12.7)
POTASSIUM SERPL-SCNC: 4.5 MMOL/L (ref 3.5–5.3)
PROT SERPL-MCNC: 6.6 G/DL (ref 6.4–8.4)
RBC # BLD AUTO: 4.6 MILLION/UL (ref 3.88–5.62)
SODIUM SERPL-SCNC: 131 MMOL/L (ref 135–147)
WBC # BLD AUTO: 9.74 THOUSAND/UL (ref 4.31–10.16)

## 2024-10-14 PROCEDURE — 84484 ASSAY OF TROPONIN QUANT: CPT | Performed by: EMERGENCY MEDICINE

## 2024-10-14 PROCEDURE — 85025 COMPLETE CBC W/AUTO DIFF WBC: CPT | Performed by: EMERGENCY MEDICINE

## 2024-10-14 PROCEDURE — 83690 ASSAY OF LIPASE: CPT | Performed by: EMERGENCY MEDICINE

## 2024-10-14 PROCEDURE — 93005 ELECTROCARDIOGRAM TRACING: CPT

## 2024-10-14 PROCEDURE — 80053 COMPREHEN METABOLIC PANEL: CPT | Performed by: EMERGENCY MEDICINE

## 2024-10-14 PROCEDURE — 99285 EMERGENCY DEPT VISIT HI MDM: CPT | Performed by: EMERGENCY MEDICINE

## 2024-10-14 PROCEDURE — 99285 EMERGENCY DEPT VISIT HI MDM: CPT

## 2024-10-14 PROCEDURE — 71045 X-RAY EXAM CHEST 1 VIEW: CPT

## 2024-10-14 PROCEDURE — 36415 COLL VENOUS BLD VENIPUNCTURE: CPT | Performed by: EMERGENCY MEDICINE

## 2024-10-14 RX ORDER — SUCRALFATE 1 G/1
1 TABLET ORAL ONCE
Status: COMPLETED | OUTPATIENT
Start: 2024-10-14 | End: 2024-10-14

## 2024-10-14 RX ADMIN — SUCRALFATE 1 G: 1 TABLET ORAL at 22:14

## 2024-10-15 NOTE — ED NOTES
Pts sister called and asked to be contacted if admission were to occur (yee).     Allissa M Costenbader  10/14/24 9650

## 2024-10-15 NOTE — ED PROVIDER NOTES
Time reflects when diagnosis was documented in both MDM as applicable and the Disposition within this note       Time User Action Codes Description Comment    10/14/2024 11:06 PM Remy Yusuf Add [K29.60] Reflux gastritis     10/14/2024 11:06 PM Remy Yusuf Remove [K29.60] Reflux gastritis     10/14/2024 11:06 PM Remy Yusuf Add [K21.9] GERD (gastroesophageal reflux disease)     10/14/2024 11:06 PM Remy Yusuf Add [R07.9] Chest pain, unspecified           ED Disposition       ED Disposition   Discharge    Condition   Stable    Date/Time   Mon Oct 14, 2024 11:06 PM    Comment   Armando Gaitan discharge to home/self care.                   Assessment & Plan       Medical Decision Making  59-year-old male presenting for evaluation of substernal chest pain.  Started around 5:00 after eating sausage at his group home.  Pain is since resolved.  Vitals are within normal limits.  Was seen here for similar symptoms in August with negative cardiac workup.  Differential includes reflux, gastritis, ACS.  Will obtain cardiac workup.  Will obtain chest x-ray.  Chest x-ray shows no acute cardiopulmonary disease.  Labs within normal limits.  Troponin at 4, given pain for greater than 5 hours, does not require repeat.  Patient discharged back to facility.  Believe symptoms are secondary to reflux    Problems Addressed:  Chest pain, unspecified: acute illness or injury  GERD (gastroesophageal reflux disease): acute illness or injury    Amount and/or Complexity of Data Reviewed  Labs: ordered.  Radiology: ordered and independent interpretation performed.    Risk  Prescription drug management.             Medications   sucralfate (CARAFATE) tablet 1 g (1 g Oral Given 10/14/24 2214)       ED Risk Strat Scores   HEART Risk Score      Flowsheet Row Most Recent Value   Heart Score Risk Calculator    History 0 Filed at: 10/14/2024 2306   ECG 0 Filed at: 10/14/2024 2306   Age 1 Filed at: 10/14/2024 2306   Risk Factors 2 Filed at:  "10/14/2024 2306   Troponin 0 Filed at: 10/14/2024 2306   HEART Score 3 Filed at: 10/14/2024 2306                                                   History of Present Illness       Chief Complaint   Patient presents with    Chest Pain     Pt notes intermittent chest pain that started this morning with relief after EMS administered ASA       Past Medical History:   Diagnosis Date    Asthma     Bipolar 1 disorder ([facility]) 2011    Colitis 03/27/2014    Constipation     COPD (chronic obstructive pulmonary disease) ([facility])     Diabetes ([facility]) 2011    Diabetes insipidus, 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 No pertinent surgical history.   Family History   Family history unknown: Yes      Social History     Tobacco Use    Smoking status: Every Day     Current packs/day: 1.00     Types: Cigarettes    Smokeless tobacco: Current   Substance Use Topics    Alcohol use: No    Drug use: No      E-Cigarette/Vaping      E-Cigarette/Vaping Substances      I have reviewed and agree with the history as documented.     59-year-old male with history of bipolar, schizoaffective, GERD, diabetes, who presents for evaluation of chest discomfort.  Patient says this started around 5:00 this afternoon after he eating \"pork and sausages.\"  Patient says it is located in the lower chest as well as the substernal chest area.  The patient is coming from a group home.  The patient has extensive psych history is not the best historian.  He was seen here in August for " similar symptoms with a negative cardiac workup.  Patient denies any pain at this time.  His vitals are within normal limits.        Review of Systems   Constitutional:  Negative for chills, fever and unexpected weight change.   HENT:  Negative for congestion, sore throat and trouble swallowing.    Eyes:  Negative for pain, discharge and itching.   Respiratory:  Positive for chest tightness. Negative for cough, shortness of breath and wheezing.    Cardiovascular:  Negative for chest pain, palpitations and leg swelling.   Gastrointestinal:  Negative for abdominal pain, blood in stool, diarrhea, nausea and vomiting.   Endocrine: Negative for polyuria.   Genitourinary:  Negative for difficulty urinating, dysuria, frequency and hematuria.   Musculoskeletal:  Negative for arthralgias and back pain.   Neurological:  Negative for dizziness, syncope, weakness, light-headedness and headaches.           Objective       ED Triage Vitals [10/14/24 2154]   Temperature Pulse Blood Pressure Respirations SpO2 Patient Position - Orthostatic VS   97.5 °F (36.4 °C) 82 141/92 18 96 % --      Temp Source Heart Rate Source BP Location FiO2 (%) Pain Score    Tympanic Monitor Left arm -- No Pain      Vitals      Date and Time Temp Pulse SpO2 Resp BP Pain Score FACES Pain Rating User   10/14/24 2154 97.5 °F (36.4 °C) 82 96 % 18 141/92 No Pain -- MD            Physical Exam  Vitals and nursing note reviewed.   Constitutional:       General: He is not in acute distress.     Appearance: He is well-developed.   HENT:      Head: Normocephalic and atraumatic.      Right Ear: External ear normal.      Left Ear: External ear normal.   Eyes:      Conjunctiva/sclera: Conjunctivae normal.      Pupils: Pupils are equal, round, and reactive to light.   Cardiovascular:      Rate and Rhythm: Normal rate and regular rhythm.      Heart sounds: Normal heart sounds. No murmur heard.     No friction rub. No gallop.   Pulmonary:      Effort: Pulmonary effort is  normal. No respiratory distress.      Breath sounds: Normal breath sounds. No wheezing or rales.   Abdominal:      General: Bowel sounds are normal. There is no distension.      Palpations: Abdomen is soft.      Tenderness: There is no abdominal tenderness. There is no guarding.   Musculoskeletal:         General: No tenderness or deformity. Normal range of motion.      Cervical back: Normal range of motion.   Lymphadenopathy:      Cervical: No cervical adenopathy.   Skin:     General: Skin is warm and dry.   Neurological:      General: No focal deficit present.      Mental Status: He is alert and oriented to person, place, and time. Mental status is at baseline.      Cranial Nerves: No cranial nerve deficit.      Sensory: No sensory deficit.      Motor: No weakness or abnormal muscle tone.   Psychiatric:         Behavior: Behavior normal.         Results Reviewed       Procedure Component Value Units Date/Time    Lipase [[id number]]  (Normal) Collected: 10/14/24 2156    Lab Status: Final result Specimen: Blood from Arm, Right Updated: 10/14/24 2304     Lipase 21 u/L     Narrative:      Slightly Hemolyzed:Results may be affected.    HS Troponin 0hr (reflex protocol) [[id number]]  (Normal) Collected: 10/14/24 2156    Lab Status: Final result Specimen: Blood from Arm, Right Updated: 10/14/24 2223     hs TnI 0hr 4 ng/L     Comprehensive metabolic panel [[id number]]  (Abnormal) Collected: 10/14/24 2156    Lab Status: Final result Specimen: Blood from Arm, Right Updated: 10/14/24 2223     Sodium 131 mmol/L      Potassium 4.5 mmol/L      Chloride 97 mmol/L      CO2 25 mmol/L      ANION GAP 9 mmol/L      BUN 8 mg/dL      Creatinine 0.70 mg/dL      Glucose 138 mg/dL      Calcium 9.0 mg/dL      AST 23 U/L      ALT 17 U/L      Alkaline Phosphatase 62 U/L      Total Protein 6.6 g/dL      Albumin 3.9 g/dL      Total Bilirubin 0.25 mg/dL      eGFR 104 ml/min/1.73sq m     Narrative:      Slightly Hemolyzed:Results may be  affected.  National Kid[employer] Foundation guidelines for Chronic Kidney Disease (CKD):     Stage 1 with normal or high GFR (GFR > 90 mL/min/1.73 square meters)    Stage 2 Mild CKD (GFR = 60-89 mL/min/1.73 square meters)    Stage 3A Moderate CKD (GFR = 45-59 mL/min/1.73 square meters)    Stage 3B Moderate CKD (GFR = 30-44 mL/min/1.73 square meters)    Stage 4 Severe CKD (GFR = 15-29 mL/min/1.73 square meters)    Stage 5 End Stage CKD (GFR <15 mL/min/1.73 square meters)  Note: GFR calculation is accurate only with a steady state creatinine    CBC and differential [[id number]]  (Abnormal) Collected: 10/14/24 2156    Lab Status: Final result Specimen: Blood from Arm, Right Updated: 10/14/24 2201     WBC 9.74 Thousand/uL      RBC 4.60 Million/uL      Hemoglobin 12.9 g/dL      Hematocrit 38.7 %      MCV 84 fL      MCH 28.0 pg      MCHC 33.3 g/dL      RDW 13.0 %      MPV 9.5 fL      Platelets 196 Thousands/uL      nRBC 0 /100 WBCs      Segmented % 44 %      Immature Grans % 1 %      Lymphocytes % 40 %      Monocytes % 13 %      Eosinophils Relative 1 %      Basophils Relative 1 %      Absolute Neutrophils 4.45 Thousands/µL      Absolute Immature Grans 0.05 Thousand/uL      Absolute Lymphocytes 3.90 Thousands/µL      Absolute Monocytes 1.24 Thousand/µL      Eosinophils Absolute 0.05 Thousand/µL      Basophils Absolute 0.05 Thousands/µL             XR chest 1 view portable   ED Interpretation by Remy Yusuf DO (10/14 2890)   No acute cardiopulmonary disease          ECG 12 Lead Documentation Only    Date/Time: 10/14/2024 10:21 PM    Performed by: Remy Yusuf DO  Authorized by: Remy Yusuf DO    Indications / Diagnosis:  Chest pain  ECG reviewed by me, the ED Provider: no    Patient location:  ED  Previous ECG:     Previous ECG:  Compared to current    Similarity:  No change    Comparison to cardiac monitor: Yes    Interpretation:     Interpretation: normal    Rate:     ECG rate:  83    ECG rate assessment:  normal    Rhythm:     Rhythm: sinus rhythm    Ectopy:     Ectopy: none    QRS:     QRS axis:  Normal  Conduction:     Conduction: normal    ST segments:     ST segments:  Normal  T waves:     T waves: normal        ED Medication and Procedure Management   Prior to Admission Medications   Prescriptions Last Dose Informant Patient Reported? Taking?   Alum & Mag Hydroxide-Simeth (ALMACONE PO)   Yes No   Sig: Take by mouth   Cholecalciferol (Vitamin D) 50 MCG (2000 UT) CAPS   No No   Sig: Take 1 capsule (2,000 Units total) by mouth in the morning   Magnesium Hydroxide (MILK OF MAGNESIA PO)   Yes No   Sig: Take by mouth   Menthol-Methyl Salicylate (MUSCLE RUB EX)   Yes No   Sig: Apply topically PRN   OLANZapine (ZyPREXA) 5 mg tablet   No No   Sig: Take 1 tablet (5 mg total) by mouth daily at bedtime   ammonium lactate (LAC-HYDRIN) 12 % cream   Yes No   Sig: Apply topically as needed for dry skin   amphetamine-dextroamphetamine (ADDERALL XR) 15 MG 24 hr capsule   Yes No   aspirin 81 mg EC tablet   Yes No   Sig: Take 81 mg by mouth daily   atorvastatin (LIPITOR) 10 mg tablet   No No   Sig: Take 1 tablet (10 mg total) by mouth daily with dinner   bisacodyl (DULCOLAX) 5 mg EC tablet   Yes No   Sig: Take 5 mg by mouth daily as needed for constipation PRN   clozapine (CLOZARIL) 200 MG tablet   No No   Sig: Take 1 tablet (200 mg total) by mouth daily   divalproex sodium (DEPAKOTE ER) 500 mg 24 hr tablet   No No   Sig: Take 3 tablets (1,500 mg total) by mouth daily at bedtime   guaiFENesin (ROBITUSSIN) 100 MG/5ML oral liquid   Yes No   Sig: Take 200 mg by mouth 3 (three) times a day as needed for cough PRN   haloperidol (HALDOL) 5 mg tablet   No No   Sig: Take 1 tablet (5 mg total) by mouth every 6 (six) hours as needed (moderate agitation)   Patient not taking: Reported on 6/17/2024   ibuprofen (MOTRIN) 600 mg tablet   No No   Sig: Take 1 tablet (600 mg total) by mouth every 6 (six) hours as needed for mild pain, fever,  headaches or moderate pain   levothyroxine 75 mcg tablet   No No   Sig: Take 1 tablet (75 mcg total) by mouth daily in the early morning   melatonin 3 mg   Yes No   Sig: Take 3 mg by mouth daily at bedtime   metFORMIN (GLUCOPHAGE) 500 mg tablet   No No   Sig: Take 1 tablet (500 mg total) by mouth 2 (two) times a day with meals   oxybutynin (DITROPAN-XL) 5 mg 24 hr tablet   No No   Sig: Take 1 tablet (5 mg total) by mouth daily   pantoprazole (PROTONIX) 40 mg tablet   No No   Sig: Take 1 tablet (40 mg total) by mouth daily in the early morning   polyethylene glycol (MIRALAX) 17 g packet   Yes No   Sig: Take 17 g by mouth daily PRN   senna (SENOKOT) 8.6 MG tablet   No No   Sig: Take 1 tablet (8.6 mg total) by mouth daily      Facility-Administered Medications: None     Discharge Medication List as of 10/14/2024 11:08 PM        CONTINUE these medications which have NOT CHANGED    Details   Alum & Mag Hydroxide-Simeth (ALMACONE PO) Take by mouth, Historical Med      ammonium lactate (LAC-HYDRIN) 12 % cream Apply topically as needed for dry skin, Historical Med      amphetamine-dextroamphetamine (ADDERALL XR) 15 MG 24 hr capsule Historical Med      aspirin 81 mg EC tablet Take 81 mg by mouth daily, Historical Med      atorvastatin (LIPITOR) 10 mg tablet Take 1 tablet (10 mg total) by mouth daily with dinner, Starting Tue 5/14/2024, Normal      bisacodyl (DULCOLAX) 5 mg EC tablet Take 5 mg by mouth daily as needed for constipation PRN, Historical Med      Cholecalciferol (Vitamin D) 50 MCG (2000 UT) CAPS Take 1 capsule (2,000 Units total) by mouth in the morning, Starting Wed 5/22/2024, Normal      clozapine (CLOZARIL) 200 MG tablet Take 1 tablet (200 mg total) by mouth daily, Starting Fri 8/7/2020, Print      divalproex sodium (DEPAKOTE ER) 500 mg 24 hr tablet Take 3 tablets (1,500 mg total) by mouth daily at bedtime, Starting Fri 8/7/2020, Print      guaiFENesin (ROBITUSSIN) 100 MG/5ML oral liquid Take 200 mg by mouth 3  (three) times a day as needed for cough PRN, Historical Med      haloperidol (HALDOL) 5 mg tablet Take 1 tablet (5 mg total) by mouth every 6 (six) hours as needed (moderate agitation), Starting Fri 8/7/2020, Print      ibuprofen (MOTRIN) 600 mg tablet Take 1 tablet (600 mg total) by mouth every 6 (six) hours as needed for mild pain, fever, headaches or moderate pain, Starting Tue 5/28/2024, Normal      levothyroxine 75 mcg tablet Take 1 tablet (75 mcg total) by mouth daily in the early morning, Starting Tue 5/14/2024, Normal      Magnesium Hydroxide (MILK OF MAGNESIA PO) Take by mouth, Historical Med      melatonin 3 mg Take 3 mg by mouth daily at bedtime, Historical Med      Menthol-Methyl Salicylate (MUSCLE RUB EX) Apply topically PRN, Historical Med      metFORMIN (GLUCOPHAGE) 500 mg tablet Take 1 tablet (500 mg total) by mouth 2 (two) times a day with meals, Starting Tue 5/14/2024, Normal      OLANZapine (ZyPREXA) 5 mg tablet Take 1 tablet (5 mg total) by mouth daily at bedtime, Starting Fri 8/7/2020, Print      oxybutynin (DITROPAN-XL) 5 mg 24 hr tablet Take 1 tablet (5 mg total) by mouth daily, Starting Tue 5/14/2024, Normal      pantoprazole (PROTONIX) 40 mg tablet Take 1 tablet (40 mg total) by mouth daily in the early morning, Starting Tue 5/14/2024, Normal      polyethylene glycol (MIRALAX) 17 g packet Take 17 g by mouth daily PRN, Historical Med      senna (SENOKOT) 8.6 MG tablet Take 1 tablet (8.6 mg total) by mouth daily, Starting Tue 5/14/2024, Normal           No discharge procedures on file.  ED SEPSIS DOCUMENTATION   Time reflects when diagnosis was documented in both MDM as applicable and the Disposition within this note       Time User Action Codes Description Comment    10/14/2024 11:06 PM Remy Yusuf Add [K29.60] Reflux gastritis     10/14/2024 11:06 PM Remy Yusuf Remove [K29.60] Reflux gastritis     10/14/2024 11:06 PM Remy Yusuf Add [K21.9] GERD (gastroesophageal reflux disease)      10/14/2024 11:06 PM Remy Yusuf Add [R07.9] Chest pain, unspecified                  Remy Yusuf DO  10/15/24 0020

## 2024-10-16 LAB
ATRIAL RATE: 83 BPM
P AXIS: 37 DEGREES
PR INTERVAL: 158 MS
QRS AXIS: -28 DEGREES
QRSD INTERVAL: 106 MS
QT INTERVAL: 352 MS
QTC INTERVAL: 413 MS
T WAVE AXIS: 48 DEGREES
VENTRICULAR RATE: 83 BPM

## 2024-10-16 PROCEDURE — 93010 ELECTROCARDIOGRAM REPORT: CPT | Performed by: INTERNAL MEDICINE

## 2024-10-22 ENCOUNTER — OFFICE VISIT (OUTPATIENT)
Dept: FAMILY MEDICINE CLINIC | Facility: CLINIC | Age: 59
End: 2024-10-22
Payer: COMMERCIAL

## 2024-10-22 VITALS
WEIGHT: 245.4 LBS | DIASTOLIC BLOOD PRESSURE: 78 MMHG | BODY MASS INDEX: 38.52 KG/M2 | TEMPERATURE: 95 F | OXYGEN SATURATION: 99 % | RESPIRATION RATE: 17 BRPM | SYSTOLIC BLOOD PRESSURE: 140 MMHG | HEART RATE: 108 BPM | HEIGHT: 67 IN

## 2024-10-22 DIAGNOSIS — Z12.11 SCREENING FOR MALIGNANT NEOPLASM OF COLON: ICD-10-CM

## 2024-10-22 DIAGNOSIS — E11.9 TYPE 2 DIABETES MELLITUS WITHOUT COMPLICATION, WITHOUT LONG-TERM CURRENT USE OF INSULIN (HCC): ICD-10-CM

## 2024-10-22 DIAGNOSIS — F25.0 SCHIZOAFFECTIVE DISORDER, BIPOLAR TYPE (HCC): ICD-10-CM

## 2024-10-22 DIAGNOSIS — R11.11 VOMITING WITHOUT NAUSEA, UNSPECIFIED VOMITING TYPE: Primary | ICD-10-CM

## 2024-10-22 PROCEDURE — 99214 OFFICE O/P EST MOD 30 MIN: CPT | Performed by: FAMILY MEDICINE

## 2024-10-22 NOTE — ASSESSMENT & PLAN NOTE
Well-controlled.  Continue current treatment plan.  We had follow-up scheduled for November but was canceled after they made this appointment.  They will reschedule appointment for November for A1c recheck and other chronic conditions.  Lab Results   Component Value Date    HGBA1C 7.1 (H) 05/20/2024

## 2024-10-22 NOTE — PROGRESS NOTES
Ambulatory Visit  Name: Armando Gaitan      : 1965      MRN: [id number]  Encounter Provider: Mikal Williamson MD  Encounter Date: 10/22/2024   Encounter department: [facility]    Assessment & Plan  Screening for malignant neoplasm of colon    Orders:    Ambulatory referral to Gastroenterology; Future    Schizoaffective disorder, bipolar type (HCC)  Stable.  Following with psychiatry and group home.       Vomiting without nausea, unspecified vomiting type  I recommend they follow-up with his psychiatrist for his eating behavior.  Will place referral to GI for his vomiting.  He is also due for colon cancer screening.  Orders:    Ambulatory referral to Gastroenterology; Future    Type 2 diabetes mellitus without complication, without long-term current use of insulin (HCC)  Well-controlled.  Continue current treatment plan.  We had follow-up scheduled for November but was canceled after they made this appointment.  They will reschedule appointment for November for A1c recheck and other chronic conditions.  Lab Results   Component Value Date    HGBA1C 7.1 (H) 2024               History of Present Illness     Patient presents to the office today with caretaker.  Caretaker supplies history as well as patient.  They bring him in because he cannot stop eating things and has been having vomiting.  This is a recent development and was never a problem for the patient in the past.  They say he is even eating uncooked things.  Patient does not know why and cannot comment further.  No diarrhea or constipation.  No blood in the stool or vomit.  Patient says vomiting happened once but caretaker disagrees.  He follows with psychiatry at the group home.  No recent med changes.          Review of Systems   All other systems reviewed and are negative.          Objective     /78 (BP Location: Left arm, Patient Position: Sitting, Cuff Size: Large)   Pulse (!) 108   Temp (!) 95 °F (35 °C)  "(Tympanic)   Resp 17   Ht 5' 7\" (1.702 m)   Wt 111 kg (245 lb 6.4 oz)   SpO2 99%   BMI 38.44 kg/m²     Physical Exam  Vitals and nursing note reviewed.   Constitutional:       General: He is not in acute distress.     Appearance: Normal appearance. He is well-developed. He is not ill-appearing, toxic-appearing or diaphoretic.   HENT:      Head: Normocephalic and atraumatic.   Eyes:      General:         Right eye: No discharge.         Left eye: No discharge.      Extraocular Movements: Extraocular movements intact.      Conjunctiva/sclera: Conjunctivae normal.   Cardiovascular:      Rate and Rhythm: Normal rate and regular rhythm.      Pulses: Normal pulses.           Dorsalis pedis pulses are 2+ on the right side and 2+ on the left side.        Posterior tibial pulses are 2+ on the right side and 2+ on the left side.      Heart sounds: Normal heart sounds.   Pulmonary:      Effort: Pulmonary effort is normal.      Breath sounds: Normal breath sounds.   Abdominal:      General: Bowel sounds are normal. There is no distension.      Palpations: Abdomen is soft. There is no mass.   Musculoskeletal:      Cervical back: Normal range of motion and neck supple.   Feet:      Right foot:      Skin integrity: No ulcer, skin breakdown, erythema, warmth, callus or dry skin.      Left foot:      Skin integrity: No ulcer, skin breakdown, erythema, warmth, callus or dry skin.   Skin:     General: Skin is dry.      Capillary Refill: Capillary refill takes less than 2 seconds.   Neurological:      Mental Status: He is alert and oriented to person, place, and time.   Psychiatric:         Mood and Affect: Mood normal.         Behavior: Behavior normal.         Thought Content: Thought content normal.         Judgment: Judgment normal.         "

## 2024-11-05 DIAGNOSIS — E55.9 VITAMIN D DEFICIENCY: ICD-10-CM

## 2024-11-06 ENCOUNTER — TELEPHONE (OUTPATIENT)
Dept: FAMILY MEDICINE CLINIC | Facility: CLINIC | Age: 59
End: 2024-11-06

## 2024-11-06 RX ORDER — CHOLECALCIFEROL (VITAMIN D3) 50 MCG
2000 TABLET ORAL EVERY MORNING
Qty: 30 TABLET | Refills: 5 | Status: SHIPPED | OUTPATIENT
Start: 2024-11-06

## 2024-11-06 NOTE — TELEPHONE ENCOUNTER
Noelle from [facility] called in regards to pt. Looking for a PRN medication for gas, indigestion, constipation, and an upset stomach. I stated I would send a message to provider but this can be discuss at upcoming appt on 11/25. Noelle understood.

## 2024-11-07 ENCOUNTER — APPOINTMENT (OUTPATIENT)
Dept: LAB | Facility: CLINIC | Age: 59
End: 2024-11-07
Payer: COMMERCIAL

## 2024-11-07 DIAGNOSIS — Z79.899 NEED FOR PROPHYLACTIC CHEMOTHERAPY: Primary | ICD-10-CM

## 2024-11-07 DIAGNOSIS — Z79.899 ENCOUNTER FOR LONG-TERM (CURRENT) USE OF OTHER MEDICATIONS: ICD-10-CM

## 2024-11-07 LAB
ALBUMIN SERPL BCG-MCNC: 4.2 G/DL (ref 3.5–5)
ALP SERPL-CCNC: 71 U/L (ref 34–104)
ALT SERPL W P-5'-P-CCNC: 17 U/L (ref 7–52)
AST SERPL W P-5'-P-CCNC: 17 U/L (ref 13–39)
BASOPHILS # BLD AUTO: 0.08 THOUSANDS/ÂΜL (ref 0–0.1)
BASOPHILS NFR BLD AUTO: 1 % (ref 0–1)
BILIRUB DIRECT SERPL-MCNC: 0.05 MG/DL (ref 0–0.2)
BILIRUB SERPL-MCNC: 0.25 MG/DL (ref 0.2–1)
EOSINOPHIL # BLD AUTO: 0.03 THOUSAND/ÂΜL (ref 0–0.61)
EOSINOPHIL NFR BLD AUTO: 0 % (ref 0–6)
ERYTHROCYTE [DISTWIDTH] IN BLOOD BY AUTOMATED COUNT: 12.9 % (ref 11.6–15.1)
HCT VFR BLD AUTO: 40.1 % (ref 36.5–49.3)
HGB BLD-MCNC: 13.6 G/DL (ref 12–17)
IMM GRANULOCYTES # BLD AUTO: 0.06 THOUSAND/UL (ref 0–0.2)
IMM GRANULOCYTES NFR BLD AUTO: 1 % (ref 0–2)
LYMPHOCYTES # BLD AUTO: 3.54 THOUSANDS/ÂΜL (ref 0.6–4.47)
LYMPHOCYTES NFR BLD AUTO: 36 % (ref 14–44)
MCH RBC QN AUTO: 27.9 PG (ref 26.8–34.3)
MCHC RBC AUTO-ENTMCNC: 33.9 G/DL (ref 31.4–37.4)
MCV RBC AUTO: 82 FL (ref 82–98)
MONOCYTES # BLD AUTO: 1.17 THOUSAND/ÂΜL (ref 0.17–1.22)
MONOCYTES NFR BLD AUTO: 12 % (ref 4–12)
NEUTROPHILS # BLD AUTO: 4.87 THOUSANDS/ÂΜL (ref 1.85–7.62)
NEUTS SEG NFR BLD AUTO: 50 % (ref 43–75)
NRBC BLD AUTO-RTO: 0 /100 WBCS
PLATELET # BLD AUTO: 234 THOUSANDS/UL (ref 149–390)
PMV BLD AUTO: 10 FL (ref 8.9–12.7)
PROT SERPL-MCNC: 6.8 G/DL (ref 6.4–8.4)
RBC # BLD AUTO: 4.88 MILLION/UL (ref 3.88–5.62)
VALPROATE SERPL-MCNC: 69 UG/ML (ref 50–100)
WBC # BLD AUTO: 9.75 THOUSAND/UL (ref 4.31–10.16)

## 2024-11-07 PROCEDURE — 36415 COLL VENOUS BLD VENIPUNCTURE: CPT

## 2024-11-07 PROCEDURE — 80076 HEPATIC FUNCTION PANEL: CPT

## 2024-11-07 PROCEDURE — 80164 ASSAY DIPROPYLACETIC ACD TOT: CPT

## 2024-11-07 PROCEDURE — 85025 COMPLETE CBC W/AUTO DIFF WBC: CPT

## 2024-11-14 ENCOUNTER — OFFICE VISIT (OUTPATIENT)
Dept: PODIATRY | Facility: CLINIC | Age: 59
End: 2024-11-14
Payer: COMMERCIAL

## 2024-11-14 VITALS
HEART RATE: 117 BPM | SYSTOLIC BLOOD PRESSURE: 133 MMHG | RESPIRATION RATE: 16 BRPM | WEIGHT: 246 LBS | OXYGEN SATURATION: 98 % | BODY MASS INDEX: 38.61 KG/M2 | TEMPERATURE: 98.8 F | DIASTOLIC BLOOD PRESSURE: 83 MMHG | HEIGHT: 67 IN

## 2024-11-14 DIAGNOSIS — E11.9 CONTROLLED TYPE 2 DIABETES MELLITUS WITHOUT COMPLICATION, WITHOUT LONG-TERM CURRENT USE OF INSULIN (HCC): ICD-10-CM

## 2024-11-14 DIAGNOSIS — G62.9 NEUROPATHY: ICD-10-CM

## 2024-11-14 DIAGNOSIS — B35.1 ONYCHOMYCOSIS: Primary | ICD-10-CM

## 2024-11-14 PROCEDURE — 99202 OFFICE O/P NEW SF 15 MIN: CPT | Performed by: PODIATRIST

## 2024-11-14 PROCEDURE — 11721 DEBRIDE NAIL 6 OR MORE: CPT | Performed by: PODIATRIST

## 2024-11-14 NOTE — PROGRESS NOTES
Name: Armando Gaitan      : 1965      MRN: [id number]  Encounter Provider: Eliud Moore DPM  Encounter Date: 2024   Encounter department: [facility]OD[facility] [place]  :  Assessment & Plan  Onychomycosis       Debride mycotic nails and thin the nail plates x 6 with the use of a nail nipper manually and an electric Dremel bur was used to reduce the thickness of the nail beds and smoothed the distal aspect of the nails.   Controlled type 2 diabetes mellitus without complication, without long-term current use of insulin (formerly [facility])    Lab Results   Component Value Date    HGBA1C 7.1 (H) 2024            Neuropathy         Discussed proper shoe gear, daily inspections of feet, and general foot health with patient. Patient has Q9  findings and is recommended for at risk foot care every 9-10 weeks.     Patients most recent complete clinical foot exam was on: 2024      Return in about 10 weeks (around 2025).     History of Present Illness   HPI  Armando Gaitan is a 59 y.o. male who presents chief complaint of painful thick nails on both feet.  He is a diabetic.  His staff was present in the room for the visit todayPatient presents for at-risk foot care.  Patient has no acute concerns today.  Patient has significant lower extremity risk due to neuropathy, parasthesia, edema, and trophic skin changes to the lower extremity.   .  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 No pertinent surgical history.  Family History   Family history unknown: Yes      reports that he has been smoking cigarettes. He uses smokeless tobacco. He reports that he does not drink alcohol and does not use drugs.  Current Outpatient Medications on File Prior to Visit   Medication Sig Dispense Refill    Alum & Mag Hydroxide-Simeth (ALMACONE PO) Take by mouth      ammonium lactate (LAC-HYDRIN) 12 % cream Apply topically as needed for dry skin      amphetamine-dextroamphetamine (ADDERALL XR) 15 MG 24 hr capsule       aspirin 81 mg EC tablet Take 81 mg by mouth daily      atorvastatin (LIPITOR) 10 mg tablet Take 1 tablet (10 mg total) by mouth daily with dinner 30 tablet 11    bisacodyl (DULCOLAX) 5 mg EC tablet Take 5 mg by mouth daily as needed for constipation PRN      Cholecalciferol (Vitamin D3) 50 MCG (2000 UT) TABS 1 TAB ORALLY EVERY MORNING DX: SUPPLEMENT 30 tablet 5    clozapine (CLOZARIL) 200 MG tablet Take 1 tablet (200 mg total) by mouth daily 30 tablet 0    divalproex sodium (DEPAKOTE ER) 500 mg 24 hr tablet Take 3 tablets (1,500 mg total) by mouth daily at bedtime 90 tablet 0    guaiFENesin (ROBITUSSIN) 100 MG/5ML oral liquid Take 200 mg by mouth 3 (three) times a day as needed for cough PRN      ibuprofen (MOTRIN) 600 mg tablet Take 1 tablet (600 mg total) by mouth every 6 (six) hours as needed for mild pain, fever, headaches or moderate pain 30 tablet 11    levothyroxine 75 mcg tablet Take 1 tablet (75 mcg total) by mouth daily in the early morning 30 tablet 11    Magnesium Hydroxide (MILK OF MAGNESIA PO) Take by mouth      melatonin 3 mg Take 3 mg by mouth daily at bedtime      Menthol-Methyl Salicylate (MUSCLE RUB EX)  Apply topically PRN      metFORMIN (GLUCOPHAGE) 500 mg tablet Take 1 tablet (500 mg total) by mouth 2 (two) times a day with meals 30 tablet 11    OLANZapine (ZyPREXA) 5 mg tablet Take 1 tablet (5 mg total) by mouth daily at bedtime 30 tablet 0    oxybutynin (DITROPAN-XL) 5 mg 24 hr tablet Take 1 tablet (5 mg total) by mouth daily 30 tablet 11    pantoprazole (PROTONIX) 40 mg tablet Take 1 tablet (40 mg total) by mouth daily in the early morning 30 tablet 11    polyethylene glycol (MIRALAX) 17 g packet Take 17 g by mouth daily PRN      senna (SENOKOT) 8.6 MG tablet Take 1 tablet (8.6 mg total) by mouth daily 30 tablet 11    haloperidol (HALDOL) 5 mg tablet Take 1 tablet (5 mg total) by mouth every 6 (six) hours as needed (moderate agitation) (Patient not taking: Reported on 6/17/2024) 30 tablet 0     No current facility-administered medications on file prior to visit.     Allergies   Allergen Reactions    Penicillins Anaphylaxis    Carbapenems     Cephalosporins       Medical History Reviewed by provider this encounter:     .  Current Outpatient Medications on File Prior to Visit   Medication Sig Dispense Refill    Alum & Mag Hydroxide-Simeth (ALMACONE PO) Take by mouth      ammonium lactate (LAC-HYDRIN) 12 % cream Apply topically as needed for dry skin      amphetamine-dextroamphetamine (ADDERALL XR) 15 MG 24 hr capsule       aspirin 81 mg EC tablet Take 81 mg by mouth daily      atorvastatin (LIPITOR) 10 mg tablet Take 1 tablet (10 mg total) by mouth daily with dinner 30 tablet 11    bisacodyl (DULCOLAX) 5 mg EC tablet Take 5 mg by mouth daily as needed for constipation PRN      Cholecalciferol (Vitamin D3) 50 MCG (2000 UT) TABS 1 TAB ORALLY EVERY MORNING DX: SUPPLEMENT 30 tablet 5    clozapine (CLOZARIL) 200 MG tablet Take 1 tablet (200 mg total) by mouth daily 30 tablet 0    divalproex sodium (DEPAKOTE ER) 500 mg 24 hr tablet Take 3 tablets (1,500 mg total) by mouth daily at bedtime 90 tablet 0    guaiFENesin  "(ROBITUSSIN) 100 MG/5ML oral liquid Take 200 mg by mouth 3 (three) times a day as needed for cough PRN      ibuprofen (MOTRIN) 600 mg tablet Take 1 tablet (600 mg total) by mouth every 6 (six) hours as needed for mild pain, fever, headaches or moderate pain 30 tablet 11    levothyroxine 75 mcg tablet Take 1 tablet (75 mcg total) by mouth daily in the early morning 30 tablet 11    Magnesium Hydroxide (MILK OF MAGNESIA PO) Take by mouth      melatonin 3 mg Take 3 mg by mouth daily at bedtime      Menthol-Methyl Salicylate (MUSCLE RUB EX) Apply topically PRN      metFORMIN (GLUCOPHAGE) 500 mg tablet Take 1 tablet (500 mg total) by mouth 2 (two) times a day with meals 30 tablet 11    OLANZapine (ZyPREXA) 5 mg tablet Take 1 tablet (5 mg total) by mouth daily at bedtime 30 tablet 0    oxybutynin (DITROPAN-XL) 5 mg 24 hr tablet Take 1 tablet (5 mg total) by mouth daily 30 tablet 11    pantoprazole (PROTONIX) 40 mg tablet Take 1 tablet (40 mg total) by mouth daily in the early morning 30 tablet 11    polyethylene glycol (MIRALAX) 17 g packet Take 17 g by mouth daily PRN      senna (SENOKOT) 8.6 MG tablet Take 1 tablet (8.6 mg total) by mouth daily 30 tablet 11    haloperidol (HALDOL) 5 mg tablet Take 1 tablet (5 mg total) by mouth every 6 (six) hours as needed (moderate agitation) (Patient not taking: Reported on 6/17/2024) 30 tablet 0     No current facility-administered medications on file prior to visit.      Social History     Tobacco Use    Smoking status: Every Day     Current packs/day: 1.00     Types: Cigarettes    Smokeless tobacco: Current   Substance and Sexual Activity    Alcohol use: No    Drug use: No    Sexual activity: Not on file        Objective   /83   Pulse (!) 117   Temp 98.8 °F (37.1 °C)   Resp 16   Ht 5' 7\" (1.702 m)   Wt 112 kg (246 lb)   SpO2 98%   BMI 38.53 kg/m²      Physical Exam  Cardiovascular:      Pulses: Pulses are weak.           Dorsalis pedis pulses are 1+ on the right side " and 1+ on the left side.        Posterior tibial pulses are 0 on the right side and 0 on the left side.   Feet:      Right foot:      Skin integrity: Dry skin present. No ulcer, skin breakdown, erythema, warmth or callus.      Left foot:      Skin integrity: Dry skin present. No ulcer, skin breakdown, erythema, warmth or callus.       Vascular status is 1/4 DP PT negative digital hair slightly delayed capillary refill and normal distal cooling bilaterally.  The capillary refill is approximately 3 seconds.    Derm nails are brittle elongated hypertrophic white-yellow discoloration with subungual debris x 6.  There is an increased thickness and the nails are approximately 1 to 2 mm with the hallux nails being the worst.  Onset a few of the nails the mycosis is present on the distal third of the nail.  There is slight loss of turgor present bilaterally.  Slight scaling to the skin was noted on the plantar aspect of the feet especially in the area of the heels.    Neuro light touch is present bilaterally 0.5 monofilament test failed at all sites.  The sites that were tested were the plantar aspect of the hallux, plantar aspect of the third and fourth toes, submet 3, and the plantar aspect of the arch.  It was even tested further up on the dorsum of the foot and the leg.    Ortho hammertoe deformities are present digits 2 through 5 bilaterally with the fifth digit being in adductovarus position and there is pes planus noted bilaterally.    Diabetic Foot Exam    Patient's shoes and socks removed.    Right Foot/Ankle   Right Foot Inspection  Skin Exam: dry skin. Skin not intact, no warmth, no callus, no erythema, no maceration, no abnormal color, no pre-ulcer, no ulcer and no callus.     Toe Exam: ROM and strength within normal limits and right toe deformity. No swelling, no tenderness and erythema    Sensory   Vibration: diminished  Proprioception: diminished  Monofilament testing: absent    Vascular  Capillary refills: <  3 seconds  The right DP pulse is 1+. The right PT pulse is 0.     Left Foot/Ankle  Left Foot Inspection  Skin Exam: dry skin. Skin not intact, no warmth, no erythema, no maceration, normal color, no pre-ulcer, no ulcer and no callus.     Toe Exam: ROM and strength within normal limits, erythema and left toe deformity. No swelling and no tenderness.     Sensory   Vibration: diminished  Proprioception: diminished  Monofilament testing: absent    Vascular  Capillary refills: < 3 seconds  The left DP pulse is 1+. The left PT pulse is 0.     Assign Risk Category  Deformity present  Loss of protective sensation  Weak pulses  Risk: 2    Administrative Statements   I have spent a total time of 16 minutes in caring for this patient on the day of the visit/encounter including Risks and benefits of tx options, Instructions for management, Patient and family education, Importance of tx compliance, Counseling / Coordination of care, and Documenting in the medical record.

## 2024-11-20 ENCOUNTER — OFFICE VISIT (OUTPATIENT)
Dept: GASTROENTEROLOGY | Facility: CLINIC | Age: 59
End: 2024-11-20
Payer: COMMERCIAL

## 2024-11-20 VITALS
OXYGEN SATURATION: 98 % | TEMPERATURE: 97.9 F | BODY MASS INDEX: 39.55 KG/M2 | HEIGHT: 67 IN | HEART RATE: 116 BPM | DIASTOLIC BLOOD PRESSURE: 72 MMHG | SYSTOLIC BLOOD PRESSURE: 138 MMHG | WEIGHT: 252 LBS

## 2024-11-20 DIAGNOSIS — F25.0 SCHIZOAFFECTIVE DISORDER, BIPOLAR TYPE (HCC): ICD-10-CM

## 2024-11-20 DIAGNOSIS — K21.9 GASTROESOPHAGEAL REFLUX DISEASE WITHOUT ESOPHAGITIS: ICD-10-CM

## 2024-11-20 DIAGNOSIS — R11.2 NAUSEA AND VOMITING, UNSPECIFIED VOMITING TYPE: Primary | ICD-10-CM

## 2024-11-20 DIAGNOSIS — Z53.20 COLON CANCER SCREENING DECLINED: ICD-10-CM

## 2024-11-20 PROCEDURE — 99214 OFFICE O/P EST MOD 30 MIN: CPT | Performed by: STUDENT IN AN ORGANIZED HEALTH CARE EDUCATION/TRAINING PROGRAM

## 2024-11-20 RX ORDER — CLOZAPINE 100 MG/1
100 TABLET ORAL DAILY
COMMUNITY
Start: 2024-11-13

## 2024-11-20 RX ORDER — OMEPRAZOLE 40 MG/1
40 CAPSULE, DELAYED RELEASE ORAL DAILY
Qty: 90 CAPSULE | Refills: 3 | Status: SHIPPED | OUTPATIENT
Start: 2024-11-20

## 2024-11-20 NOTE — PROGRESS NOTES
Name: Armando Gaitan      : 1965      MRN: [id number]  Encounter Provider: Britany Nuno MD  Encounter Date: 2024   Encounter department: [facility] [place]  :  Assessment & Plan  Nausea and vomiting, unspecified vomiting type  Unclear if his vomiting of undigested food represents a primary GI process such as GERD, PUD, delayed gastric emptying versus medication side effect versus behavioral issue.  Denies any baseline GI symptoms.  Will trial increased acid suppression with omeprazole 40 mg daily instead of pantoprazole.  Discussed potentially pursuing EGD to assess for PUD, gastritis, etc. which patient declines.  Will try to delineate upper GI anatomy with upper GI series though it is not clear if patient is willing to undergo this.  Orders:    omeprazole (PriLOSEC) 40 MG capsule; Take 1 capsule (40 mg total) by mouth daily    FL upper GI UGI; Future    Gastroesophageal reflux disease without esophagitis    Orders:    omeprazole (PriLOSEC) 40 MG capsule; Take 1 capsule (40 mg total) by mouth daily    FL upper GI UGI; Future    Schizoaffective disorder, bipolar type (HCC)  Unclear if underlying symptoms are GI abnormality versus behavioral versus medication effect.       Colon cancer screening declined  Discussed colon cancer screening using stool testing versus colonoscopy.  Patient declines any colon cancer screening today.           History of Present Illness     HPI  59-year-old man with schizoaffective bipolar, diabetes, hypertension, GERD, BMI 39 here for intermittent vomiting of undigested food.    History was obtained from nurse at care home.  Has been vomiting.  Will eat excessively and also drink excessive water, facility tries to limit as able.  Has had intermittent vomiting for a while but has been worse in the past couple months.  Vomit is typically ingested material, no blood or bile.  No abdominal pain.  No reflux.    Taking pantoprazole. Not clear how  "long he's been taking it.    Per patient, has BM every day. BM are easy to pass. No blood in stool.  No nausea, not sure why he's vomiting. No reflux or stomach pain.    No family history of GI malignancy as far as patient and caregiver know.    No tobacco use.          Review of Systems   Constitutional:  Negative for chills and fever.   HENT:  Negative for ear pain and sore throat.    Eyes:  Negative for pain and visual disturbance.   Respiratory:  Negative for cough and shortness of breath.    Cardiovascular:  Negative for chest pain and palpitations.   Gastrointestinal:  Positive for vomiting. Negative for abdominal pain and nausea.   Genitourinary:  Negative for dysuria and hematuria.   Musculoskeletal:  Negative for arthralgias and back pain.   Skin:  Negative for color change and rash.   Neurological:  Negative for seizures and syncope.   All other systems reviewed and are negative.         Objective   /72 (BP Location: Left arm, Patient Position: Sitting, Cuff Size: Adult)   Pulse (!) 116   Temp 97.9 °F (36.6 °C) (Temporal)   Ht 5' 7\" (1.702 m)   Wt 114 kg (252 lb)   SpO2 98%   BMI 39.47 kg/m²      Physical Exam  Vitals and nursing note reviewed.   Constitutional:       General: He is not in acute distress.     Appearance: He is well-developed.   HENT:      Head: Normocephalic and atraumatic.   Eyes:      Conjunctiva/sclera: Conjunctivae normal.   Cardiovascular:      Rate and Rhythm: Normal rate and regular rhythm.      Heart sounds: No murmur heard.  Pulmonary:      Effort: Pulmonary effort is normal. No respiratory distress.      Breath sounds: Wheezing present.   Abdominal:      Palpations: Abdomen is soft.      Tenderness: There is no abdominal tenderness.   Musculoskeletal:         General: No swelling.      Cervical back: Neck supple.   Skin:     General: Skin is warm and dry.      Capillary Refill: Capillary refill takes less than 2 seconds.   Neurological:      Mental Status: He is " alert.   Psychiatric:         Mood and Affect: Mood normal.      Comments: Wearing many layers of clothing

## 2024-11-20 NOTE — ASSESSMENT & PLAN NOTE
Unclear if underlying symptoms are GI abnormality versus behavioral versus medication effect.

## 2024-11-20 NOTE — ASSESSMENT & PLAN NOTE
Orders:    omeprazole (PriLOSEC) 40 MG capsule; Take 1 capsule (40 mg total) by mouth daily    FL upper GI UGI; Future

## 2024-11-27 ENCOUNTER — OFFICE VISIT (OUTPATIENT)
Dept: CARDIOLOGY CLINIC | Facility: CLINIC | Age: 59
End: 2024-11-27
Payer: COMMERCIAL

## 2024-11-27 VITALS
DIASTOLIC BLOOD PRESSURE: 70 MMHG | BODY MASS INDEX: 39.33 KG/M2 | SYSTOLIC BLOOD PRESSURE: 140 MMHG | HEIGHT: 67 IN | OXYGEN SATURATION: 95 % | HEART RATE: 96 BPM | WEIGHT: 250.6 LBS

## 2024-11-27 DIAGNOSIS — R07.89 ATYPICAL CHEST PAIN: Primary | ICD-10-CM

## 2024-11-27 DIAGNOSIS — R07.9 CHEST PAIN, UNSPECIFIED: ICD-10-CM

## 2024-11-27 PROCEDURE — 99243 OFF/OP CNSLTJ NEW/EST LOW 30: CPT | Performed by: INTERNAL MEDICINE

## 2024-11-27 NOTE — PROGRESS NOTES
Consultation - Cardiology   Armando Gaitan 59 y.o. male MRN: [id number]  Unit/Bed#:  Encounter: [id number]  Physician Requesting Consult: No att. providers found  Reason for Consult / Principal Problem: Chest pain    Assessment:  Atypical CP    Plan:  I reviewed his ER reports and agree with proceeding with a regular EST. His CP is  very atypical for myocardial ischemia especially with normal ECGs and troponin levels.  If his EST is normal, I will see him back on an as needed basis.     History of Present Illness     HPI: Armando Gaitan is a 59 y.o. year old male who denies any past cardiac history. He has HTN, DM, hypercholesterolemia. He is a former smoker.  He is seen for evaluation of CP.  He was seen in the ER on 8/28/2024 and 10/14/2024 for CP which occurring after eating sausage. It lasted for about 15 minutes.  ECGs were normal.  All troponin levels were normal.    A stress test was ordered. Not done yet.    He did see GI on 11/20/2024 for N/V.  He was prescribed Prilosec.     He denies any further CP. He is not very active.      Review of Systems:    Alert awake oriented, comfortable, denies any complaints  No fevers chills nausea vomiting  No weakness, dizziness, seizures  no cough, shortness of breath, or wheezing  Denies any palpitations, chest pain, diaphoresis  Denies leg edema, pain or paresthesias  Denies any skin rashes  Denies abdominal pain, bloody stools, masses  Denies any depression or suicidal ideations      Historical Information   Past Medical History:   Diagnosis Date    Asthma     Bipolar 1 disorder ([facility]) 2011    Colitis 03/27/2014    Constipation     COPD (chronic obstructive pulmonary disease) ([facility])     Diabetes ([facility]) 2011    Diabetes insipidus, nephrogenic ([facility]) 3/6/2020    Disease of thyroid gland     Encephalopathy acute 3/14/2016    GERD (gastroesophageal reflux disease) 2011    Hyperlipidemia     Hypertension     Hyponatremia     Hypothyroidism 2011    Ischemic colitis ([facility])  "4/30/2014    Lipoprotein deficiency     Moderate cigarette smoker (10-19 per day) 9/15/2016    Obesity     Plantar fasciitis     Psychiatric disorder     Schizo affective schizophrenia (HCC) 2011    Sebaceous gland disease     Tobacco abuse     Tracheobronchitis 3/14/2016     No past surgical history on file.  Social History     Substance and Sexual Activity   Alcohol Use Not Currently     Social History     Substance and Sexual Activity   Drug Use Not Currently     Social History     Tobacco Use   Smoking Status Former    Current packs/day: 1.00    Average packs/day: 1 pack/day for 10.0 years (10.0 ttl pk-yrs)    Types: Cigarettes   Smokeless Tobacco Never     Family History: Family history non-contributory    Meds/Allergies   all current active meds have been reviewed  Allergies   Allergen Reactions    Penicillins Anaphylaxis    Carbapenems     Cephalosporins     Chlorpromazine Other (See Comments)     Unknown allergie reaction     Clonazepam Other (See Comments)     Unknown allergie reaction     Topiramate Other (See Comments)     Unknown allergie reaction        Objective   Vitals: Blood pressure 140/70, pulse 96, height 5' 7\" (1.702 m), weight 114 kg (250 lb 9.6 oz), SpO2 95%., Body mass index is 39.25 kg/m².,   Weight (last 2 days)       Date/Time Weight    11/27/24 0835 114 (250.6)                      Physical Exam:  GEN: Armando Gaitan appears well, alert and oriented x 3, pleasant and cooperative   HEENT: pupils equal, round, and reactive to light; extraocular muscles intact  NECK: supple, no carotid bruits   HEART: regular rhythm, normal S1 and S2, no murmurs, clicks, gallops or rubs   LUNGS: clear to auscultation bilaterally; no wheezes, rales, or rhonchi   ABDOMEN: normal bowel sounds, soft, no tenderness, no distention  EXTREMITIES: peripheral pulses normal; no clubbing, cyanosis, or edema  NEURO: no focal findings   SKIN: normal without suspicious lesions on exposed skin    Lab Results:   No visits " with results within 1 Day(s) from this visit.   Latest known visit with results is:   Transcribe Orders on 11/07/2024   Component Date Value Ref Range Status    Color, UA 11/07/2024 Colorless   Final    Clarity, UA 11/07/2024 Clear   Final    Specific Gravity, UA 11/07/2024 1.004  1.003 - 1.030 Final    pH, UA 11/07/2024 7.0  4.5, 5.0, 5.5, 6.0, 6.5, 7.0, 7.5, 8.0 Final    Leukocytes, UA 11/07/2024 Negative  Negative Final    Nitrite, UA 11/07/2024 Negative  Negative Final    Protein, UA 11/07/2024 Negative  Negative mg/dl Final    Glucose, UA 11/07/2024 Negative  Negative mg/dl Final    Ketones, UA 11/07/2024 Negative  Negative mg/dl Final    Urobilinogen, UA 11/07/2024 <2.0  <2.0 mg/dl mg/dl Final    Bilirubin, UA 11/07/2024 Negative  Negative Final    Occult Blood, UA 11/07/2024 Negative  Negative Final                     Imaging: Results Review Statement: No pertinent imaging studies reviewed.

## 2024-12-03 ENCOUNTER — OFFICE VISIT (OUTPATIENT)
Dept: URGENT CARE | Facility: CLINIC | Age: 59
End: 2024-12-03
Payer: COMMERCIAL

## 2024-12-03 VITALS
DIASTOLIC BLOOD PRESSURE: 88 MMHG | WEIGHT: 250 LBS | BODY MASS INDEX: 39.16 KG/M2 | TEMPERATURE: 98.2 F | HEART RATE: 82 BPM | SYSTOLIC BLOOD PRESSURE: 140 MMHG | RESPIRATION RATE: 16 BRPM | OXYGEN SATURATION: 99 %

## 2024-12-03 DIAGNOSIS — S05.02XA LEFT CORNEAL ABRASION, INITIAL ENCOUNTER: Primary | ICD-10-CM

## 2024-12-03 PROCEDURE — 99203 OFFICE O/P NEW LOW 30 MIN: CPT | Performed by: NURSE PRACTITIONER

## 2024-12-03 PROCEDURE — S9083 URGENT CARE CENTER GLOBAL: HCPCS | Performed by: NURSE PRACTITIONER

## 2024-12-03 RX ORDER — TETRACAINE HYDROCHLORIDE 5 MG/ML
2 SOLUTION OPHTHALMIC ONCE
Status: COMPLETED | OUTPATIENT
Start: 2024-12-03 | End: 2024-12-03

## 2024-12-03 RX ORDER — TOBRAMYCIN 3 MG/ML
1 SOLUTION/ DROPS OPHTHALMIC 4 TIMES DAILY
Qty: 5 ML | Refills: 1 | Status: SHIPPED | OUTPATIENT
Start: 2024-12-03

## 2024-12-03 RX ADMIN — TETRACAINE HYDROCHLORIDE 2 DROP: 5 SOLUTION OPHTHALMIC at 12:14

## 2024-12-04 ENCOUNTER — DOCUMENTATION (OUTPATIENT)
Dept: ADMINISTRATIVE | Facility: OTHER | Age: 59
End: 2024-12-04

## 2024-12-04 ENCOUNTER — TELEPHONE (OUTPATIENT)
Age: 59
End: 2024-12-04

## 2024-12-04 NOTE — PROGRESS NOTES
[facility]        NAME: Armando Gaitan is a 59 y.o. male  : 1965    MRN: [id number]  DATE: December 3, 2024  TIME: 8:35 PM      Assessment and Plan     Left corneal abrasion, initial encounter [S05.02XA]  1. Left corneal abrasion, initial encounter  tobramycin (TOBREX) 0.3 % SOLN    tetracaine 0.5 % ophthalmic solution 2 drop    fluorescein sodium sterile 1 mg ophthalmic strip 1 strip            Patient Instructions   There are no Patient Instructions on file for this visit.    Follow up with PCP in 3-5 days.  Proceed to  ER if symptoms worsen.    Chief Complaint     Chief Complaint   Patient presents with    Eye Pain     Left eye pain started last night, Feel likes something in it.         History of Present Illness     Onset of left eye pain last night.  Feels like something is in it.  Presents accompanied by group home staff member.          Review of Systems     Review of Systems   Eyes:  Positive for pain.   All other systems reviewed and are negative.        Current Medications       Current Outpatient Medications:     amphetamine-dextroamphetamine (ADDERALL XR) 15 MG 24 hr capsule, 30 mg every morning, Disp: , Rfl:     atorvastatin (LIPITOR) 10 mg tablet, Take 1 tablet (10 mg total) by mouth daily with dinner, Disp: 30 tablet, Rfl: 11    divalproex sodium (DEPAKOTE ER) 500 mg 24 hr tablet, Take 3 tablets (1,500 mg total) by mouth daily at bedtime, Disp: 90 tablet, Rfl: 0    levothyroxine 75 mcg tablet, Take 1 tablet (75 mcg total) by mouth daily in the early morning, Disp: 30 tablet, Rfl: 11    melatonin 3 mg, Take 3 mg by mouth daily at bedtime, Disp: , Rfl:     metFORMIN (GLUCOPHAGE) 500 mg tablet, Take 1 tablet (500 mg total) by mouth 2 (two) times a day with meals, Disp: 30 tablet, Rfl: 11    omeprazole (PriLOSEC) 40 MG capsule, Take 1 capsule (40 mg total) by mouth daily, Disp: 90 capsule, Rfl: 3    polyethylene glycol (MIRALAX) 17 g packet, Take 17 g by mouth if needed PRN, Disp: ,  Rfl:     tobramycin (TOBREX) 0.3 % SOLN, Administer 1 drop into the left eye 4 (four) times a day, Disp: 5 mL, Rfl: 1    Alum & Mag Hydroxide-Simeth (ALMACONE PO), Take by mouth if needed (Patient not taking: Reported on 11/27/2024), Disp: , Rfl:     ammonium lactate (LAC-HYDRIN) 12 % cream, Apply topically as needed for dry skin (Patient not taking: Reported on 11/27/2024), Disp: , Rfl:     bisacodyl (DULCOLAX) 5 mg EC tablet, Take 5 mg by mouth daily as needed for constipation PRN (Patient not taking: Reported on 11/27/2024), Disp: , Rfl:     Cholecalciferol (Vitamin D3) 50 MCG (2000 UT) TABS, 1 TAB ORALLY EVERY MORNING DX: SUPPLEMENT (Patient not taking: Reported on 11/27/2024), Disp: 30 tablet, Rfl: 5    cloZAPine (CLOZARIL) 100 mg tablet, Take 100 mg by mouth daily (Patient not taking: Reported on 12/3/2024), Disp: , Rfl:     guaiFENesin (ROBITUSSIN) 100 MG/5ML oral liquid, Take 200 mg by mouth 3 (three) times a day as needed for cough PRN (Patient not taking: Reported on 11/27/2024), Disp: , Rfl:     ibuprofen (MOTRIN) 600 mg tablet, Take 1 tablet (600 mg total) by mouth every 6 (six) hours as needed for mild pain, fever, headaches or moderate pain (Patient not taking: Reported on 11/27/2024), Disp: 30 tablet, Rfl: 11    Magnesium Hydroxide (MILK OF MAGNESIA PO), Take by mouth if needed (Patient not taking: Reported on 11/27/2024), Disp: , Rfl:     Menthol-Methyl Salicylate (MUSCLE RUB EX), Apply topically if needed PRN (Patient not taking: Reported on 11/27/2024), Disp: , Rfl:     OLANZapine (ZyPREXA) 5 mg tablet, Take 1 tablet (5 mg total) by mouth daily at bedtime (Patient not taking: Reported on 12/3/2024), Disp: 30 tablet, Rfl: 0    oxybutynin (DITROPAN-XL) 5 mg 24 hr tablet, Take 1 tablet (5 mg total) by mouth daily (Patient not taking: Reported on 11/27/2024), Disp: 30 tablet, Rfl: 11    senna (SENOKOT) 8.6 MG tablet, Take 1 tablet (8.6 mg total) by mouth daily (Patient not taking: Reported on  11/27/2024), Disp: 30 tablet, Rfl: 11  No current facility-administered medications for this visit.    Current Allergies     Allergies as of 12/03/2024 - Reviewed 12/03/2024   Allergen Reaction Noted    Penicillins Anaphylaxis 03/14/2016    Carbapenems  03/14/2016    Cephalosporins  03/14/2016    Chlorpromazine Other (See Comments) 11/27/2024    Clonazepam Other (See Comments) 11/27/2024    Topiramate Other (See Comments) 11/27/2024              The following portions of the patient's history were reviewed and updated as appropriate: allergies, current medications, past family history, past medical history, past social history, past surgical history and problem list.     Past Medical History:   Diagnosis Date    Asthma     Bipolar 1 disorder ([facility]) 2011    Colitis 03/27/2014    Constipation     COPD (chronic obstructive pulmonary disease) ([facility])     Diabetes ([facility]) 2011    Diabetes insipidus, nephrogenic ([facility]) 3/6/2020    Disease of thyroid gland     Encephalopathy acute 3/14/2016    GERD (gastroesophageal reflux disease) 2011    Hyperlipidemia     Hypertension     Hyponatremia     Hypothyroidism 2011    Ischemic colitis ([facility]) 4/30/2014    Lipoprotein deficiency     Moderate cigarette smoker (10-19 per day) 9/15/2016    Obesity     Plantar fasciitis     Psychiatric disorder     Schizo affective schizophrenia ([facility]) 2011    Sebaceous gland disease     Tobacco abuse     Tracheobronchitis 3/14/2016       History reviewed. No pertinent surgical history.    Family History   Family history unknown: Yes         Medications have been verified.        Objective     /88   Pulse 82   Temp 98.2 °F (36.8 °C)   Resp 16   Wt 113 kg (250 lb)   SpO2 99%   BMI 39.16 kg/m²   No LMP for male patient.         Physical Exam     Physical Exam  Vitals and nursing note reviewed.   Constitutional:       General: He is not in acute distress.     Appearance: Normal appearance. He is well-developed. He is not ill-appearing,  toxic-appearing or diaphoretic.   HENT:      Head: Normocephalic and atraumatic.   Eyes:      General: Lids are normal. Lids are everted, no foreign bodies appreciated. Vision grossly intact. Gaze aligned appropriately.         Left eye: No foreign body.      Conjunctiva/sclera:      Left eye: Left conjunctiva is injected. No exudate.     Pupils: Pupils are equal, round, and reactive to light.      Left eye: Corneal abrasion (tiny, superficial) present.   Pulmonary:      Effort: Pulmonary effort is normal. No respiratory distress.   Musculoskeletal:         General: Normal range of motion.   Skin:     General: Skin is warm and dry.      Capillary Refill: Capillary refill takes less than 2 seconds.   Neurological:      General: No focal deficit present.      Mental Status: He is alert.   Psychiatric:         Mood and Affect: Mood normal.         Behavior: Behavior normal.

## 2024-12-04 NOTE — PROGRESS NOTES
JOVITA Flores  P Patient Reported Team         Blood pressure elevated  Appointment department: [facility]  Appointment provider: JOVITA Flores  Blood pressure  12/03/24 1208 140/88  12/03/24 1134 150/88  12/04/24 2:00 PM    Patient was called after the Urgent Care visit ; there was no answer/ a message could not be left.    Thank you.  Alejandro Dsouza MA  [facility]

## 2024-12-05 ENCOUNTER — APPOINTMENT (OUTPATIENT)
Dept: LAB | Facility: CLINIC | Age: 59
End: 2024-12-05
Payer: COMMERCIAL

## 2024-12-05 DIAGNOSIS — Z79.899 ENCOUNTER FOR LONG-TERM (CURRENT) USE OF OTHER MEDICATIONS: ICD-10-CM

## 2024-12-05 LAB
BASOPHILS # BLD AUTO: 0.06 THOUSANDS/ÂΜL (ref 0–0.1)
BASOPHILS NFR BLD AUTO: 1 % (ref 0–1)
EOSINOPHIL # BLD AUTO: 0 THOUSAND/ÂΜL (ref 0–0.61)
EOSINOPHIL NFR BLD AUTO: 0 % (ref 0–6)
ERYTHROCYTE [DISTWIDTH] IN BLOOD BY AUTOMATED COUNT: 13.2 % (ref 11.6–15.1)
HCT VFR BLD AUTO: 40.9 % (ref 36.5–49.3)
HGB BLD-MCNC: 13.9 G/DL (ref 12–17)
IMM GRANULOCYTES # BLD AUTO: 0.03 THOUSAND/UL (ref 0–0.2)
IMM GRANULOCYTES NFR BLD AUTO: 0 % (ref 0–2)
LYMPHOCYTES # BLD AUTO: 2.73 THOUSANDS/ÂΜL (ref 0.6–4.47)
LYMPHOCYTES NFR BLD AUTO: 33 % (ref 14–44)
MCH RBC QN AUTO: 28.4 PG (ref 26.8–34.3)
MCHC RBC AUTO-ENTMCNC: 34 G/DL (ref 31.4–37.4)
MCV RBC AUTO: 84 FL (ref 82–98)
MONOCYTES # BLD AUTO: 0.97 THOUSAND/ÂΜL (ref 0.17–1.22)
MONOCYTES NFR BLD AUTO: 12 % (ref 4–12)
NEUTROPHILS # BLD AUTO: 4.48 THOUSANDS/ÂΜL (ref 1.85–7.62)
NEUTS SEG NFR BLD AUTO: 54 % (ref 43–75)
NRBC BLD AUTO-RTO: 0 /100 WBCS
PLATELET # BLD AUTO: 224 THOUSANDS/UL (ref 149–390)
PMV BLD AUTO: 10.4 FL (ref 8.9–12.7)
RBC # BLD AUTO: 4.9 MILLION/UL (ref 3.88–5.62)
WBC # BLD AUTO: 8.27 THOUSAND/UL (ref 4.31–10.16)

## 2024-12-05 PROCEDURE — 85025 COMPLETE CBC W/AUTO DIFF WBC: CPT

## 2024-12-05 PROCEDURE — 36415 COLL VENOUS BLD VENIPUNCTURE: CPT

## 2025-01-01 ENCOUNTER — HOSPITAL ENCOUNTER (EMERGENCY)
Facility: HOSPITAL | Age: 60
Discharge: HOME | End: 2025-01-01
Attending: EMERGENCY MEDICINE
Payer: COMMERCIAL

## 2025-01-01 VITALS
RESPIRATION RATE: 18 BRPM | HEART RATE: 78 BPM | DIASTOLIC BLOOD PRESSURE: 82 MMHG | OXYGEN SATURATION: 98 % | SYSTOLIC BLOOD PRESSURE: 142 MMHG | TEMPERATURE: 98.2 F | BODY MASS INDEX: 39.24 KG/M2 | WEIGHT: 250 LBS | HEIGHT: 67 IN

## 2025-01-01 DIAGNOSIS — Z00.8 ENCOUNTER FOR PSYCHOLOGICAL EVALUATION: Primary | ICD-10-CM

## 2025-01-01 LAB
AMPHETAMINES SERPL QL SCN: NEGATIVE
ANION GAP SERPL CALCULATED.3IONS-SCNC: 8 MMOL/L (ref 4–13)
BARBITURATES UR QL: NEGATIVE
BASOPHILS # BLD AUTO: 0.07 THOUSANDS/ΜL (ref 0–0.1)
BASOPHILS NFR BLD AUTO: 1 % (ref 0–1)
BENZODIAZ UR QL: NEGATIVE
BUN SERPL-MCNC: 8 MG/DL (ref 5–25)
CALCIUM SERPL-MCNC: 9.1 MG/DL (ref 8.4–10.2)
CHLORIDE SERPL-SCNC: 102 MMOL/L (ref 96–108)
CO2 SERPL-SCNC: 25 MMOL/L (ref 21–32)
COCAINE UR QL: NEGATIVE
CREAT SERPL-MCNC: 0.69 MG/DL (ref 0.6–1.3)
EOSINOPHIL # BLD AUTO: 0.06 THOUSAND/ΜL (ref 0–0.61)
EOSINOPHIL NFR BLD AUTO: 1 % (ref 0–6)
ERYTHROCYTE [DISTWIDTH] IN BLOOD BY AUTOMATED COUNT: 13.2 % (ref 11.6–15.1)
ETHANOL SERPL-MCNC: <10 MG/DL
FENTANYL UR QL SCN: NEGATIVE
GFR SERPL CREATININE-BSD FRML MDRD: 105 ML/MIN/1.73SQ M
GLUCOSE SERPL-MCNC: 125 MG/DL (ref 65–140)
HCT VFR BLD AUTO: 42.1 % (ref 36.5–49.3)
HGB BLD-MCNC: 14.1 G/DL (ref 12–17)
HYDROCODONE UR QL SCN: NEGATIVE
IMM GRANULOCYTES # BLD AUTO: 0.05 THOUSAND/UL (ref 0–0.2)
IMM GRANULOCYTES NFR BLD AUTO: 1 % (ref 0–2)
LYMPHOCYTES # BLD AUTO: 2.76 THOUSANDS/ΜL (ref 0.6–4.47)
LYMPHOCYTES NFR BLD AUTO: 28 % (ref 14–44)
MCH RBC QN AUTO: 28.3 PG (ref 26.8–34.3)
MCHC RBC AUTO-ENTMCNC: 33.5 G/DL (ref 31.4–37.4)
MCV RBC AUTO: 85 FL (ref 82–98)
METHADONE UR QL: NEGATIVE
MONOCYTES # BLD AUTO: 1.27 THOUSAND/ΜL (ref 0.17–1.22)
MONOCYTES NFR BLD AUTO: 13 % (ref 4–12)
NEUTROPHILS # BLD AUTO: 5.64 THOUSANDS/ΜL (ref 1.85–7.62)
NEUTS SEG NFR BLD AUTO: 56 % (ref 43–75)
NRBC BLD AUTO-RTO: 0 /100 WBCS
OPIATES UR QL SCN: NEGATIVE
OXYCODONE+OXYMORPHONE UR QL SCN: NEGATIVE
PCP UR QL: NEGATIVE
PLATELET # BLD AUTO: 218 THOUSANDS/UL (ref 149–390)
PMV BLD AUTO: 9.1 FL (ref 8.9–12.7)
POTASSIUM SERPL-SCNC: 4 MMOL/L (ref 3.5–5.3)
RBC # BLD AUTO: 4.98 MILLION/UL (ref 3.88–5.62)
SODIUM SERPL-SCNC: 135 MMOL/L (ref 135–147)
THC UR QL: NEGATIVE
VALPROATE SERPL-MCNC: 60 UG/ML (ref 50–100)
WBC # BLD AUTO: 9.85 THOUSAND/UL (ref 4.31–10.16)

## 2025-01-01 PROCEDURE — 99285 EMERGENCY DEPT VISIT HI MDM: CPT | Performed by: EMERGENCY MEDICINE

## 2025-01-01 PROCEDURE — 99285 EMERGENCY DEPT VISIT HI MDM: CPT

## 2025-01-01 PROCEDURE — 93005 ELECTROCARDIOGRAM TRACING: CPT

## 2025-01-01 PROCEDURE — 80164 ASSAY DIPROPYLACETIC ACD TOT: CPT | Performed by: EMERGENCY MEDICINE

## 2025-01-01 PROCEDURE — 85025 COMPLETE CBC W/AUTO DIFF WBC: CPT | Performed by: EMERGENCY MEDICINE

## 2025-01-01 PROCEDURE — 80048 BASIC METABOLIC PNL TOTAL CA: CPT | Performed by: EMERGENCY MEDICINE

## 2025-01-01 PROCEDURE — 82077 ASSAY SPEC XCP UR&BREATH IA: CPT | Performed by: EMERGENCY MEDICINE

## 2025-01-01 PROCEDURE — 36415 COLL VENOUS BLD VENIPUNCTURE: CPT | Performed by: EMERGENCY MEDICINE

## 2025-01-01 PROCEDURE — 80307 DRUG TEST PRSMV CHEM ANLYZR: CPT | Performed by: EMERGENCY MEDICINE

## 2025-01-01 PROCEDURE — 99284 EMERGENCY DEPT VISIT MOD MDM: CPT | Performed by: STUDENT IN AN ORGANIZED HEALTH CARE EDUCATION/TRAINING PROGRAM

## 2025-01-01 RX ORDER — CLOZAPINE 50 MG/1
50 TABLET ORAL DAILY
COMMUNITY

## 2025-01-01 NOTE — ED NOTES
CW was able to meet with pt. PT stated that he wants to go home. Pt reports that his group home sent him here. Pt stated that he has been living there for about 6 months and this is the first time he has been to the ED. PT stated that he has been IP in the past. PTs record show last time being IP was 2019. Pt reports that he has been taking his medications. PT reports that his sleep and appetite is good, no issues reported. PT reports that he has no issues with staff. Pt stated that he did slam the door on the staff members hand. Pt stated that he was scared. When speaking with this writer he seemed very remoursful. Pt stated that he did it by an accident. PT stated that he just wants to go home. Pt denied SI/HI/AH/VH at this time. No self harming. Pt stated that he doesnt work. PT was calm and cooperative when speaking with this writer. Pt declined to sign himself in at this time. CW was able to speak with Delia from pts group home. Delia stated that he is there vountarily. Cw explained that he doesnt want to go anymore, and reports that he doesnt meet criteria for a 302 on our end. Delia question who seen the pt. CW explained it was this writer and the Dr. Lin stated that she wants him to see a psychiatrist to clear the pt not a medical Dr. Delia stated that he needs to be clear to return to the facility. Delia stated that he slammed the door on a staff members hang, his room is a mess. He locked himself in the his room stated that he was going to kill anyone that enters. Delia is willing to complete a 302 on pt. Delia provided with [facility]. Psych consult ordered. Amwell consult placed     Group home-

## 2025-01-01 NOTE — DISCHARGE INSTRUCTIONS
Follow up with his primary physician  Armando exhibited no psychiatric or medical complaints in the emergency department
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
## 2025-01-02 ENCOUNTER — OFFICE VISIT (OUTPATIENT)
Dept: FAMILY MEDICINE CLINIC | Facility: CLINIC | Age: 60
End: 2025-01-02
Payer: COMMERCIAL

## 2025-01-02 VITALS
BODY MASS INDEX: 37.2 KG/M2 | SYSTOLIC BLOOD PRESSURE: 130 MMHG | WEIGHT: 237 LBS | TEMPERATURE: 96 F | HEART RATE: 103 BPM | HEIGHT: 67 IN | OXYGEN SATURATION: 97 % | DIASTOLIC BLOOD PRESSURE: 70 MMHG

## 2025-01-02 DIAGNOSIS — E11.9 CONTROLLED TYPE 2 DIABETES MELLITUS WITHOUT COMPLICATION, WITHOUT LONG-TERM CURRENT USE OF INSULIN (HCC): ICD-10-CM

## 2025-01-02 DIAGNOSIS — I10 BENIGN ESSENTIAL HYPERTENSION: Primary | ICD-10-CM

## 2025-01-02 DIAGNOSIS — F25.0 SCHIZOAFFECTIVE DISORDER, BIPOLAR TYPE (HCC): ICD-10-CM

## 2025-01-02 LAB
ATRIAL RATE: 102 BPM
P AXIS: 56 DEGREES
PR INTERVAL: 154 MS
QRS AXIS: -45 DEGREES
QRSD INTERVAL: 102 MS
QT INTERVAL: 344 MS
QTC INTERVAL: 449 MS
SL AMB POCT HEMOGLOBIN AIC: 6.8 (ref ?–6.5)
T WAVE AXIS: 49 DEGREES
VENTRICULAR RATE: 102 BPM

## 2025-01-02 PROCEDURE — 99214 OFFICE O/P EST MOD 30 MIN: CPT | Performed by: FAMILY MEDICINE

## 2025-01-02 PROCEDURE — 93010 ELECTROCARDIOGRAM REPORT: CPT | Performed by: INTERNAL MEDICINE

## 2025-01-02 PROCEDURE — 83036 HEMOGLOBIN GLYCOSYLATED A1C: CPT | Performed by: FAMILY MEDICINE
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
## 2025-01-02 NOTE — CONSULTS
TeleConsultation - Behavioral Health   Armando Gaitan 59 y.o. male MRN: [id number]  Unit/Bed#: SH 01 Encounter: [id number]        REQUIRED DOCUMENTATION:     1. This service was provided via Telemedicine.  2. Provider located off-site in private office  3. TeleMed provider: Franchesca Lundy MD.  4. Identify all parties in room with patient during tele consult:  Patient  5.Patient was then informed that this was a Telemedicine visit and that the exam was being conducted confidentially over secure lines. My office door was closed. No one else was in the room.  Patient acknowledged consent and understanding of privacy and security of the Telemedicine visit, and gave us permission to have the assistant stay in the room in order to assist with the history and to conduct the exam.  I informed the patient that I have reviewed their record in Epic and presented the opportunity for them to ask any questions regarding the visit today.  The patient agreed to participate.       Assessment & Plan     Active Problems:  There are no active Hospital Problems.        Assessment  -Schizophrenia  ADHD    Recommendations & Treatment Plan:  -Increase Depakote ER from 1500mg qhs to 1750mg QHS (level 60-- pt historically stable in range of 70's). Also obtain new set of LFTs  -Patient has been calm and cooperative in the ER with no signs of agitation or abnormal behavior while under observation for several hours.  Patient consistently has denied intentionally hurting anybody and is remorseful about the accident dual harm caused to group home staff, provided a logical rationale for the incident, denies any suicidal or homicidal ideation, does not present with acute psychosis apart from baseline paranoia, exhibits insight into his condition and demonstrates good judgment with regards to taking his medications.  Depakote level within therapeutic range, and reports that he has mostly compliant with his medications otherwise as well.  He is  "willing to follow up with his psychiatric provider.  He verbalizes need for healthier coping strategies when having interpersonal disagreement with group home staff.   At this time there is limited grounds to uphold a 302 given lack of imminent danger to self or others.  Follow up with outpatient psychiatric provider.   Recommend increasing peer support and behavioral/therapy support  Reconsult psychiatry as needed      Findings and recommendations communicated to primary team/staff.    Current Medications:       Risks / Benefits of Treatment:  Risks, benefits, and possible side effects of medications explained to patient and patient verbalizes understanding.      Inpatient consult to Psychiatry  Consult performed by: Franchesca Lundy MD  Consult ordered by: Eliud Martinez DO        Physician Requesting Consult: Eliud Martinez DO  Principal Problem:<principal problem not specified>    Reason for Consult: Psych evaluation      History of Present Illness:  59-year-old male with schizophrenia and ADHD who presented to the ER from group home ambulance for behavioral disturbance and a alleged harm to group home staff.  Patient was evaluated by [facility], as following \"Pt reports that his group home sent him here. Pt stated that he has been living there for about 6 months and this is the first time he has been to the ED. PT stated that he has been IP in the past. PTs record show last time being IP was 2019. Pt reports that he has been taking his medications. PT reports that his sleep and appetite is good, no issues reported. PT reports that he has no issues with staff. Pt stated that he did slam the door on the staff members hand. Pt stated that he was scared. When speaking with this writer he seemed very remoursful. Pt stated that he did it by an accident. PT stated that he just wants to go home. Pt denied SI/HI/AH/VH at this time. No self harming. Pt stated that he doesnt work. PT was calm and cooperative when " "speaking with this writer. Pt declined to sign himself in at this time. CW was able to speak with Delia from pts group home. Delia stated that he is there vountarily. Cw explained that he doesnt want to go anymore, and reports that he doesnt meet criteria for a 302 on our end. Delia question who seen the pt. ERICK explained it was this writer and the Dr. Lin stated that she wants him to see a psychiatrist to clear the pt not a medical Dr. Delia stated that he needs to be clear to return to the facility. Delia stated that he slammed the door on a staff members hang, his room is a mess. He locked himself in the his room stated that he was going to kill anyone that enters. Delia is willing to complete a 302 on pt. Delia provided with [facility] crisis number.\" The 302 was filed by group home staff Delia, as follows, \"My client is Dx with schizophrenia disorder and ADHD. He refuses to shower and wash his clothes. Today he barricaded himself inside his room after being asked to clean it. Client yelled at me when I attempted to speak to him and he proceeded to slam the door shutting it on my fingers. My client eats of the trash, overeats until he pukes himself, refuses to clean and lives in unsanitary conditions as he will not allow staff to clean his area. When staff attempted to clean his area he stated \" if anybody comes in my room, Im gonna kill them\". Client is inconsistent with his medication compliance including his diabetes medication as recently as last week. Client has been on a decline for the past month and was nude in the [place] yesterday. Client also has a HX of hallucinations, HI and arson\".      Patient has been calm and cooperative while in the ER.  Upon evaluation patient was awake and alert.  Did not appear to be responding to any internal stimuli or demonstrating bizarre behavior.  He demonstrated a baseline cognitive deficit, similar to that reflected in prior records " "as his baseline.  Patient was forthcoming and cooperative.  He stated that he has been living at this group home for about 6 months and overall things were going fine.  He states that lately he has been having interpersonal conflicts with one of the group home staff.  He states that he has had a little bit of trouble keeping his room clean however he plans to clean it up when he returns.  He states that he has been showering including last time was yesterday.  He denies eating out of the trash and states that that is a disgusting thing to do and he would never do that.  He reports complying with his medications, and was able to state that he takes Depakote, Clozapine, lorazepam, and metformin.  When asked if he intended to hurt anybody, he adamantly denies.  He states that the group home staff's fingers accidentally got caught but he had no intention to hurt them, he was only trying to slam the door shut because he wanted to be alone.  He denies making homicidal threats towards anybody, stated that \" I am not a murderers, murderers go to long-term.\"  He states that he understands needing better coping strategies for dealing with interpersonal conflict, including walking away from the situation if he is feeling upset.  He denies any suicidal ideation or homicidal ideation.  He denies depressed mood. He denies any hallucinations at this time.  He reports sleeping well and has a good appetite.  He denies wanting to be admitted for inpatient psychiatric treatment.        Psychiatric Review Of Systems:  Negative except as reported or endorsed in HPI    Historical Information     Past Psychiatric History:     Psychiatric Hospitalizations:   Yes, last known in 2019  Outpatient Treatment:   Yes  Suicide Attempts:   3 times while incarcerated in 1990   Violence History:   Yes, in past  Current Psychotropic regimen:   No current facility-administered medications for this encounter.    Current Outpatient Medications:     " amphetamine-dextroamphetamine (ADDERALL XR) 15 MG 24 hr capsule, 30 mg every morning, Disp: , Rfl:     atorvastatin (LIPITOR) 10 mg tablet, Take 1 tablet (10 mg total) by mouth daily with dinner, Disp: 30 tablet, Rfl: 11    Cholecalciferol (Vitamin D3) 50 MCG (2000 UT) TABS, 1 TAB ORALLY EVERY MORNING DX: SUPPLEMENT, Disp: 30 tablet, Rfl: 5    cloZAPine (CLOZARIL) 100 mg tablet, Take 100 mg by mouth 2 (two) times a day, Disp: , Rfl:     clozapine (CLOZARIL) 50 MG tablet, Take 50 mg by mouth daily At bedtime, Disp: , Rfl:     divalproex sodium (DEPAKOTE ER) 500 mg 24 hr tablet, Take 3 tablets (1,500 mg total) by mouth daily at bedtime, Disp: 90 tablet, Rfl: 0    levothyroxine 75 mcg tablet, Take 1 tablet (75 mcg total) by mouth daily in the early morning, Disp: 30 tablet, Rfl: 11    melatonin 3 mg, Take 3 mg by mouth daily at bedtime, Disp: , Rfl:     metFORMIN (GLUCOPHAGE) 500 mg tablet, Take 1 tablet (500 mg total) by mouth 2 (two) times a day with meals, Disp: 30 tablet, Rfl: 11    OLANZapine (ZyPREXA) 5 mg tablet, Take 1 tablet (5 mg total) by mouth daily at bedtime (Patient taking differently: Take 20 mg by mouth daily at bedtime), Disp: 30 tablet, Rfl: 0    omeprazole (PriLOSEC) 40 MG capsule, Take 1 capsule (40 mg total) by mouth daily, Disp: 90 capsule, Rfl: 3    oxybutynin (DITROPAN-XL) 5 mg 24 hr tablet, Take 1 tablet (5 mg total) by mouth daily, Disp: 30 tablet, Rfl: 11    senna (SENOKOT) 8.6 MG tablet, Take 1 tablet (8.6 mg total) by mouth daily, Disp: 30 tablet, Rfl: 11    tobramycin (TOBREX) 0.3 % SOLN, Administer 1 drop into the left eye 4 (four) times a day (Patient taking differently: Administer 1 drop into the left eye 4 (four) times a day as needed), Disp: 5 mL, Rfl: 1    Alum & Mag Hydroxide-Simeth (ALMACONE PO), Take by mouth if needed (Patient not taking: Reported on 11/27/2024), Disp: , Rfl:     ammonium lactate (LAC-HYDRIN) 12 % cream, Apply topically as needed for dry skin (Patient not  taking: Reported on 11/27/2024), Disp: , Rfl:     bisacodyl (DULCOLAX) 5 mg EC tablet, Take 5 mg by mouth daily as needed for constipation PRN (Patient not taking: Reported on 11/27/2024), Disp: , Rfl:     guaiFENesin (ROBITUSSIN) 100 MG/5ML oral liquid, Take 200 mg by mouth 3 (three) times a day as needed for cough PRN (Patient not taking: Reported on 11/27/2024), Disp: , Rfl:     ibuprofen (MOTRIN) 600 mg tablet, Take 1 tablet (600 mg total) by mouth every 6 (six) hours as needed for mild pain, fever, headaches or moderate pain (Patient not taking: Reported on 11/27/2024), Disp: 30 tablet, Rfl: 11    Magnesium Hydroxide (MILK OF MAGNESIA PO), Take by mouth if needed (Patient not taking: Reported on 11/27/2024), Disp: , Rfl:     Menthol-Methyl Salicylate (MUSCLE RUB EX), Apply topically if needed PRN (Patient not taking: Reported on 11/27/2024), Disp: , Rfl:     polyethylene glycol (MIRALAX) 17 g packet, Take 17 g by mouth if needed PRN, Disp: , Rfl:     Past Psychiatric medication trials: GAEL    Substance Abuse History:  Social History     Substance and Sexual Activity   Drug Use Not Currently         Family Psychiatric History:      Family History   Family history unknown: Yes         Social History:  Per chart,   Social History     Socioeconomic History    Marital status: Single     Spouse name: None    Number of children: None    Years of education: None    Highest education level: None   Occupational History    None   Tobacco Use    Smoking status: Former     Current packs/day: 1.00     Average packs/day: 1 pack/day for 10.0 years (10.0 ttl pk-yrs)     Types: Cigarettes    Smokeless tobacco: Never   Vaping Use    Vaping status: Never Used   Substance and Sexual Activity    Alcohol use: Not Currently    Drug use: Not Currently    Sexual activity: Not Currently   Other Topics Concern    None   Social History Narrative    No risks for falls    Activity level: moderate    No sleep changes    No animals    No  "firearms    Always uses a seatbelt     Social Drivers of Health     Financial Resource Strain: Not on file   Food Insecurity: Not on file   Transportation Needs: Not on file   Physical Activity: Not on file   Stress: Not on file   Social Connections: Not on file   Intimate Partner Violence: Not on file   Housing Stability: Not on file     Lives at group home x 6 months      Past Medical History:   Diagnosis Date    Asthma     Bipolar 1 disorder ([facility]) 2011    Colitis 03/27/2014    Constipation     COPD (chronic obstructive pulmonary disease) ([facility])     Diabetes ([facility]) 2011    Diabetes insipidus, 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    Disease of thyroid gland     Encephalopathy acute 03/14/2016    GERD (gastroesophageal reflux disease) 2011    Hyperlipidemia     Hypertension     Hyponatremia     Hypothyroidism 2011    Ischemic colitis ([facility]) 04/30/2014    Lipoprotein deficiency     Moderate cigarette smoker (10-19 per day) 09/15/2016    Obesity     Plantar fasciitis     Psychiatric disorder     Schizo affective schizophrenia ([facility]) 2011    Sebaceous gland disease     Tobacco abuse     Tracheobronchitis 03/14/2016       Medical Review Of Systems:    Review of Systems    Meds/Allergies     all current active meds have been reviewed  Allergies   Allergen Reactions    Penicillins Anaphylaxis    Carbapenems     Cephalosporins     Chlorpromazine Other (See Comments)     Unknown allergie reaction     Clonazepam Other (See Comments)     Unknown allergie reaction     Topiramate Other (See Comments)     Unknown allergie reaction        Objective     Vital signs in last 24 hours:  Temp:  [98.2 °F (36.8 °C)] 98.2 °F (36.8 °C)  HR:  [111] 111  BP: (147)/(93) 147/93  Resp:  [18] 18  SpO2:  [99 %] 99 %  O2 Device: None (Room air)    No intake or output data in the 24 hours ending 01/01/25 2045    Mental Status Evaluation:    Appearance:  age appropriate   Behavior:  normal   Speech:  Mildly increased rate   Mood:  \"Ok\"   Affect:  constricted "   Thought Process:  goal directed, logical, and organized   Associations intact associations   Thought Content:  normal   Perceptual Disturbances: None   Risk Potential: Suicidal Ideations none  Homicidal Ideations none   Sensorium:  person, place, and situation   Cognition:  recent and remote memory grossly intact   Consciousness:  alert and awake    Attention: attention span and concentration were age appropriate   Insight:  fair   Judgment: fair       Lab Results: I have personally reviewed all pertinent laboratory/tests results.     Most Recent Labs: @RESUFAST(WBC,RBC,HGB,HCT,PLT, RBC,RDW,NEUTROABS,SODIUM,K,CL,CO2,BUN,CREATININE,GLUC,GLUF,CALCIUM,AST,ALT,ALKPHOS,TP,ALB,TBILI,CHOLESTEROL,HDL,TRIG,LDLCALC,NONHDLC,VALPROICTOT,CARBAMAZEPIN,LITHIUM,AMMONIA,QFO5WQAYOUTA,FREET4,T3FREE,EXTPREGUR,PREGSERUM,HCG,HCGQUANT,RPR,HGBA1C,EAG)@    Imaging Studies: No results found.  EKG/Pathology/Other Studies:   Lab Results   Component Value Date    VENTRATE 83 10/14/2024    ATRIALRATE 83 10/14/2024    PRINT 158 10/14/2024    QRSDINT 106 10/14/2024    QTINT 352 10/14/2024    QTCINT 413 10/14/2024    PAXIS 37 10/14/2024    QRSAXIS -28 10/14/2024    TWAVEAXIS 48 10/14/2024        Code Status: Prior  Advance Directive and Living Will:      Power of :    POLST:      Screenings:    1. Nutrition Screening  Nutrition Assessment (completed by Staff): Nutrition  Appetite: Good  Nutrition Screen:     Nutrition Assessment:      2. Pain Screening  Pain Screening: Pain Assessment  Pain Assessment Tool: 0-10  Pain Score: 0    3. Suicide Screening  Suicide Risk Assessment: Not available on chart                                                         [place]-SUICIDE SEVERITY RATING SCALE (C-SSRS)                            1. In the last month have you wished you were dead or wished you could go to sleep and not wake up? No  2. In the last month, have you actually had thoughts about killing yourself? No  6. Have you done anything,  started to do anything, or prepared to do anything to end your life in the last 3 months? No  Suicide Risk Level : Low    Counseling / Coordination of Care:    Total floor / unit time spent today 50 minutes. Greater than 50% of total time was spent with the patient and / or family counseling and / or coordination of care. A description of the counseling / coordination of care: Direct Patient Care, Chart Review, and Documentation     Disclaimer: Portions of this note may have been generated by a front end voice activated word recognition program. There may be typographical grammar or word substitution errors generated by the program or my typing skills in this note that may have been escaped my editorial review.

## 2025-01-02 NOTE — ED NOTES
CW followed up Delia to see if she was able to speak with the [facility]. She stated that due to the incident they are not able to transport him back to the facility. CW asked if we were able to which she said yes. That he is going to have to get reassessed in the morning again.

## 2025-01-04 NOTE — PROGRESS NOTES
"Name: Armando Gaitan      : 1965      MRN: [id number]  Encounter Provider: Mikal Williamson MD  Encounter Date: 2025   Encounter department: [facility]  :  Assessment & Plan  Controlled type 2 diabetes mellitus without complication, without long-term current use of insulin (HCC)  cpm  Lab Results   Component Value Date    HGBA1C 6.8 (A) 2025       Orders:    POCT hemoglobin A1c    Benign essential hypertension  Controled. Cpm.          Schizoaffective disorder, bipolar type (HCC)  Patient is stable today.  We discussed the events at the group home and he is very remorseful and says it was an accident.  He is in no apparent distress today and is cooperative.  He does not appear to be any harm of himself or anyone else.  I have recommended follow-up with his psychiatrist as caretaker today would like psych eval.  I recommended this be done by his psychiatrist.              History of Present Illness     Presents to the office today with caretaker for ED follow-up.  Was taken to the emergency room after he slammed the door on a residence or caretakers hand.  Today he says this was an accident and he was very frustrated and was scared someone was stealing his stuff.  Since being discharged from the emergency room he has not had any outburst or events.  Caretaker says they are trying to remove him from the home because he does not very hygiene friendly and there is bugs in his room.  He has not hurt anyone since discharge.  During the ED visit he was evaluated by psychiatry.  They wanted to 302 him however psychiatry reported there is no grounds uphold this as he was no eminent danger to himself or anyone else at the moment.      Review of Systems   All other systems reviewed and are negative.      Objective   /70 (BP Location: Left arm, Patient Position: Sitting, Cuff Size: Standard)   Pulse 103   Temp (!) 96 °F (35.6 °C) (Tympanic)   Ht 5' 7\" (1.702 m)   Wt 108 kg " (237 lb)   SpO2 97%   BMI 37.12 kg/m²      Physical Exam  Vitals and nursing note reviewed.   Constitutional:       General: He is not in acute distress.     Appearance: Normal appearance. He is well-developed. He is not ill-appearing, toxic-appearing or diaphoretic.   HENT:      Head: Normocephalic and atraumatic.   Eyes:      General:         Right eye: No discharge.         Left eye: No discharge.      Extraocular Movements: Extraocular movements intact.      Conjunctiva/sclera: Conjunctivae normal.   Cardiovascular:      Rate and Rhythm: Normal rate.      Pulses:           Dorsalis pedis pulses are 2+ on the right side and 2+ on the left side.        Posterior tibial pulses are 2+ on the right side and 2+ on the left side.   Pulmonary:      Effort: Pulmonary effort is normal.   Musculoskeletal:      Cervical back: Normal range of motion and neck supple.   Feet:      Right foot:      Skin integrity: No ulcer, skin breakdown, erythema, warmth, callus or dry skin.      Left foot:      Skin integrity: No ulcer, skin breakdown, erythema, warmth, callus or dry skin.   Skin:     General: Skin is dry.      Capillary Refill: Capillary refill takes less than 2 seconds.   Neurological:      Mental Status: He is alert and oriented to person, place, and time.   Psychiatric:         Mood and Affect: Mood normal.         Behavior: Behavior normal.         Thought Content: Thought content normal.         Judgment: Judgment normal.

## 2025-01-04 NOTE — ASSESSMENT & PLAN NOTE
Patient is stable today.  We discussed the events at the group home and he is very remorseful and says it was an accident.  He is in no apparent distress today and is cooperative.  He does not appear to be any harm of himself or anyone else.  I have recommended follow-up with his psychiatrist as caretaker today would like psych eval.  I recommended this be done by his psychiatrist.

## 2025-01-08 ENCOUNTER — APPOINTMENT (OUTPATIENT)
Dept: LAB | Facility: CLINIC | Age: 60
End: 2025-01-08
Payer: COMMERCIAL

## 2025-01-08 DIAGNOSIS — Z79.899 ENCOUNTER FOR LONG-TERM (CURRENT) USE OF OTHER MEDICATIONS: ICD-10-CM

## 2025-01-08 LAB
BASOPHILS # BLD AUTO: 0.04 THOUSANDS/ΜL (ref 0–0.1)
BASOPHILS NFR BLD AUTO: 0 % (ref 0–1)
EOSINOPHIL # BLD AUTO: 0 THOUSAND/ΜL (ref 0–0.61)
EOSINOPHIL NFR BLD AUTO: 0 % (ref 0–6)
ERYTHROCYTE [DISTWIDTH] IN BLOOD BY AUTOMATED COUNT: 13.1 % (ref 11.6–15.1)
HCT VFR BLD AUTO: 42.6 % (ref 36.5–49.3)
HGB BLD-MCNC: 14 G/DL (ref 12–17)
IMM GRANULOCYTES # BLD AUTO: 0.04 THOUSAND/UL (ref 0–0.2)
IMM GRANULOCYTES NFR BLD AUTO: 0 % (ref 0–2)
LYMPHOCYTES # BLD AUTO: 2.75 THOUSANDS/ΜL (ref 0.6–4.47)
LYMPHOCYTES NFR BLD AUTO: 28 % (ref 14–44)
MCH RBC QN AUTO: 27.6 PG (ref 26.8–34.3)
MCHC RBC AUTO-ENTMCNC: 32.9 G/DL (ref 31.4–37.4)
MCV RBC AUTO: 84 FL (ref 82–98)
MONOCYTES # BLD AUTO: 1.28 THOUSAND/ΜL (ref 0.17–1.22)
MONOCYTES NFR BLD AUTO: 13 % (ref 4–12)
NEUTROPHILS # BLD AUTO: 5.87 THOUSANDS/ΜL (ref 1.85–7.62)
NEUTS SEG NFR BLD AUTO: 59 % (ref 43–75)
NRBC BLD AUTO-RTO: 0 /100 WBCS
PLATELET # BLD AUTO: 213 THOUSANDS/UL (ref 149–390)
PMV BLD AUTO: 9.9 FL (ref 8.9–12.7)
RBC # BLD AUTO: 5.07 MILLION/UL (ref 3.88–5.62)
WBC # BLD AUTO: 9.98 THOUSAND/UL (ref 4.31–10.16)

## 2025-01-08 PROCEDURE — 85025 COMPLETE CBC W/AUTO DIFF WBC: CPT

## 2025-01-08 PROCEDURE — 36415 COLL VENOUS BLD VENIPUNCTURE: CPT

## 2025-01-23 ENCOUNTER — OFFICE VISIT (OUTPATIENT)
Dept: PODIATRY | Facility: CLINIC | Age: 60
End: 2025-01-23
Payer: COMMERCIAL

## 2025-01-23 VITALS
WEIGHT: 255 LBS | BODY MASS INDEX: 40.02 KG/M2 | HEART RATE: 111 BPM | RESPIRATION RATE: 16 BRPM | OXYGEN SATURATION: 95 % | HEIGHT: 67 IN | TEMPERATURE: 97.1 F

## 2025-01-23 DIAGNOSIS — B35.1 ONYCHOMYCOSIS: Primary | ICD-10-CM

## 2025-01-23 DIAGNOSIS — G62.9 NEUROPATHY: ICD-10-CM

## 2025-01-23 DIAGNOSIS — L85.3 XEROSIS OF SKIN: ICD-10-CM

## 2025-01-23 DIAGNOSIS — E11.9 CONTROLLED TYPE 2 DIABETES MELLITUS WITHOUT COMPLICATION, WITHOUT LONG-TERM CURRENT USE OF INSULIN (HCC): ICD-10-CM

## 2025-01-23 PROCEDURE — RECHECK: Performed by: PODIATRIST

## 2025-01-23 PROCEDURE — 11721 DEBRIDE NAIL 6 OR MORE: CPT | Performed by: PODIATRIST

## 2025-01-23 NOTE — PROGRESS NOTES
Name: Armando Gaitan      : 1965      MRN: [id number]  Encounter Provider: Eliud Moore DPM  Encounter Date: 2025   Encounter department: [facility] [place]  :  Assessment & Plan  Onychomycosis       Debride mycotic nails and thin the nail plates x 6 with the use of a nail nipper manually and an electric Dremel bur was used to reduce the thickness of the nail beds and smoothed the distal aspect of the nails.   Controlled type 2 diabetes mellitus without complication, without long-term current use of insulin ([facility])    Lab Results   Component Value Date    HGBA1C 6.8 (A) 2025            Neuropathy         Xerosis of skin       Patient was reminded about using his lotion that he has at his house and daily around 8 PM after his showers until the spring time comes.  It was also strongly suggested to the patient that when he is out of the house to wear socks in his shoes rather than just the shoes alone.    Discussed proper shoe gear, daily inspections of feet, and general foot health with patient. Patient has Q9  findings and is recommended for at risk foot care every 9-10 weeks.    Patients most recent complete clinical foot exam was on: 2024      Return in about 10 weeks (around 4/3/2025).     History of Present Illness   HPI  Armando Gaitan is a 59 y.o. male who presents with chief complaint of painful thick nails on both feet.  He is a diabetic with peripheral neuropathy.  His support staff was present in the room for today's visit.Patient presents for at-risk foot care.  Patient has no acute concerns today.  Patient has significant lower extremity risk due to neuropathy, parasthesia, edema, and trophic skin changes to the lower extremity.   History obtained from: patient's Legal Guardian    Review of Systems  Medical History Reviewed by provider this encounter:     .  Current Outpatient Medications on File Prior to Visit   Medication Sig Dispense Refill     amphetamine-dextroamphetamine (ADDERALL XR) 15 MG 24 hr capsule 30 mg every morning      atorvastatin (LIPITOR) 10 mg tablet Take 1 tablet (10 mg total) by mouth daily with dinner 30 tablet 11    Cholecalciferol (Vitamin D3) 50 MCG (2000 UT) TABS 1 TAB ORALLY EVERY MORNING DX: SUPPLEMENT 30 tablet 5    cloZAPine (CLOZARIL) 100 mg tablet Take 100 mg by mouth 2 (two) times a day      clozapine (CLOZARIL) 50 MG tablet Take 50 mg by mouth daily At bedtime      divalproex sodium (DEPAKOTE ER) 500 mg 24 hr tablet Take 3 tablets (1,500 mg total) by mouth daily at bedtime 90 tablet 0    levothyroxine 75 mcg tablet Take 1 tablet (75 mcg total) by mouth daily in the early morning 30 tablet 11    melatonin 3 mg Take 3 mg by mouth daily at bedtime      metFORMIN (GLUCOPHAGE) 500 mg tablet Take 1 tablet (500 mg total) by mouth 2 (two) times a day with meals 30 tablet 11    omeprazole (PriLOSEC) 40 MG capsule Take 1 capsule (40 mg total) by mouth daily 90 capsule 3    oxybutynin (DITROPAN-XL) 5 mg 24 hr tablet Take 1 tablet (5 mg total) by mouth daily 30 tablet 11    polyethylene glycol (MIRALAX) 17 g packet Take 17 g by mouth if needed PRN      senna (SENOKOT) 8.6 MG tablet Take 1 tablet (8.6 mg total) by mouth daily 30 tablet 11    tobramycin (TOBREX) 0.3 % SOLN Administer 1 drop into the left eye 4 (four) times a day (Patient taking differently: Administer 1 drop into the left eye 4 (four) times a day as needed) 5 mL 1    Alum & Mag Hydroxide-Simeth (ALMACONE PO) Take by mouth if needed (Patient not taking: Reported on 11/27/2024)      ammonium lactate (LAC-HYDRIN) 12 % cream Apply topically as needed for dry skin (Patient not taking: Reported on 11/27/2024)      bisacodyl (DULCOLAX) 5 mg EC tablet Take 5 mg by mouth daily as needed for constipation PRN (Patient not taking: Reported on 11/27/2024)      guaiFENesin (ROBITUSSIN) 100 MG/5ML oral liquid Take 200 mg by mouth 3 (three) times a day as needed for cough PRN (Patient  "not taking: Reported on 11/27/2024)      ibuprofen (MOTRIN) 600 mg tablet Take 1 tablet (600 mg total) by mouth every 6 (six) hours as needed for mild pain, fever, headaches or moderate pain (Patient not taking: Reported on 11/27/2024) 30 tablet 11    Magnesium Hydroxide (MILK OF MAGNESIA PO) Take by mouth if needed (Patient not taking: Reported on 11/27/2024)      Menthol-Methyl Salicylate (MUSCLE RUB EX) Apply topically if needed PRN (Patient not taking: Reported on 11/27/2024)      OLANZapine (ZyPREXA) 5 mg tablet Take 1 tablet (5 mg total) by mouth daily at bedtime (Patient not taking: Reported on 1/23/2025) 30 tablet 0     No current facility-administered medications on file prior to visit.      Social History     Tobacco Use    Smoking status: Former     Current packs/day: 1.00     Average packs/day: 1 pack/day for 10.0 years (10.0 ttl pk-yrs)     Types: Cigarettes    Smokeless tobacco: Never   Vaping Use    Vaping status: Never Used   Substance and Sexual Activity    Alcohol use: Not Currently    Drug use: Not Currently    Sexual activity: Not Currently        Objective   Pulse (!) 111   Temp (!) 97.1 °F (36.2 °C) (Temporal)   Resp 16   Ht 5' 7\" (1.702 m)   Wt 116 kg (255 lb)   SpO2 95%   BMI 39.94 kg/m²      Physical Exam  Vascular status is a faint 1/4 DP 1/4 PT negative digital hair normal distal cooling slightly delayed capillary refill bilaterally.  Capillary refill is approximately 3 to 4 seconds.  Slight mottling was noted to the lower extremity especially in the toe area.    Derm nails are brittle elongated hypertrophic white-yellow discoloration with subungual debris x 6.  There is an increased thickness and the nails are approximately 1 to 2 mm.  There is white flaky skin tissue present on the plantar aspect of both feet especially in the area of the heel.  Negative sign of any dried blood or fissures present within the area.    Ortho and neuro are unchanged from his visit on " 11/14/2024.    Administrative Statements   I have spent a total time of 15 minutes in caring for this patient on the day of the visit/encounter including Risks and benefits of tx options, Instructions for management, Patient and family education, Importance of tx compliance, Risk factor reductions, Counseling / Coordination of care, Documenting in the medical record, and Obtaining or reviewing history  .

## 2025-02-04 ENCOUNTER — APPOINTMENT (OUTPATIENT)
Dept: LAB | Facility: CLINIC | Age: 60
End: 2025-02-04
Payer: COMMERCIAL

## 2025-02-04 DIAGNOSIS — Z79.899 ENCOUNTER FOR LONG-TERM (CURRENT) USE OF OTHER MEDICATIONS: ICD-10-CM

## 2025-02-04 LAB
BASOPHILS # BLD AUTO: 0.06 THOUSANDS/ΜL (ref 0–0.1)
BASOPHILS NFR BLD AUTO: 1 % (ref 0–1)
EOSINOPHIL # BLD AUTO: 0 THOUSAND/ΜL (ref 0–0.61)
EOSINOPHIL NFR BLD AUTO: 0 % (ref 0–6)
ERYTHROCYTE [DISTWIDTH] IN BLOOD BY AUTOMATED COUNT: 13.4 % (ref 11.6–15.1)
HCT VFR BLD AUTO: 43 % (ref 36.5–49.3)
HGB BLD-MCNC: 14.1 G/DL (ref 12–17)
IMM GRANULOCYTES # BLD AUTO: 0.03 THOUSAND/UL (ref 0–0.2)
IMM GRANULOCYTES NFR BLD AUTO: 0 % (ref 0–2)
LYMPHOCYTES # BLD AUTO: 2.29 THOUSANDS/ΜL (ref 0.6–4.47)
LYMPHOCYTES NFR BLD AUTO: 29 % (ref 14–44)
MCH RBC QN AUTO: 27.6 PG (ref 26.8–34.3)
MCHC RBC AUTO-ENTMCNC: 32.8 G/DL (ref 31.4–37.4)
MCV RBC AUTO: 84 FL (ref 82–98)
MONOCYTES # BLD AUTO: 0.8 THOUSAND/ΜL (ref 0.17–1.22)
MONOCYTES NFR BLD AUTO: 10 % (ref 4–12)
NEUTROPHILS # BLD AUTO: 4.75 THOUSANDS/ΜL (ref 1.85–7.62)
NEUTS SEG NFR BLD AUTO: 60 % (ref 43–75)
NRBC BLD AUTO-RTO: 0 /100 WBCS
PLATELET # BLD AUTO: 207 THOUSANDS/UL (ref 149–390)
PMV BLD AUTO: 10.5 FL (ref 8.9–12.7)
RBC # BLD AUTO: 5.11 MILLION/UL (ref 3.88–5.62)
WBC # BLD AUTO: 7.93 THOUSAND/UL (ref 4.31–10.16)

## 2025-02-04 PROCEDURE — 85025 COMPLETE CBC W/AUTO DIFF WBC: CPT

## 2025-02-04 PROCEDURE — 36415 COLL VENOUS BLD VENIPUNCTURE: CPT

## 2025-02-12 ENCOUNTER — APPOINTMENT (EMERGENCY)
Dept: RADIOLOGY | Facility: HOSPITAL | Age: 60
End: 2025-02-12
Payer: COMMERCIAL

## 2025-02-12 ENCOUNTER — HOSPITAL ENCOUNTER (EMERGENCY)
Facility: HOSPITAL | Age: 60
Discharge: HOME | End: 2025-02-12
Attending: INTERNAL MEDICINE | Admitting: INTERNAL MEDICINE
Payer: COMMERCIAL

## 2025-02-12 VITALS
OXYGEN SATURATION: 97 % | HEART RATE: 79 BPM | DIASTOLIC BLOOD PRESSURE: 71 MMHG | RESPIRATION RATE: 16 BRPM | SYSTOLIC BLOOD PRESSURE: 128 MMHG | TEMPERATURE: 97.7 F

## 2025-02-12 DIAGNOSIS — R07.89 ATYPICAL CHEST PAIN: Primary | ICD-10-CM

## 2025-02-12 LAB
ALBUMIN SERPL BCG-MCNC: 4 G/DL (ref 3.5–5)
ALP SERPL-CCNC: 47 U/L (ref 34–104)
ALT SERPL W P-5'-P-CCNC: 14 U/L (ref 7–52)
ANION GAP SERPL CALCULATED.3IONS-SCNC: 8 MMOL/L (ref 4–13)
AST SERPL W P-5'-P-CCNC: 15 U/L (ref 13–39)
BASOPHILS # BLD AUTO: 0.04 THOUSANDS/ΜL (ref 0–0.1)
BASOPHILS NFR BLD AUTO: 0 % (ref 0–1)
BILIRUB SERPL-MCNC: 0.44 MG/DL (ref 0.2–1)
BUN SERPL-MCNC: 8 MG/DL (ref 5–25)
CALCIUM SERPL-MCNC: 9 MG/DL (ref 8.4–10.2)
CARDIAC TROPONIN I PNL SERPL HS: 4 NG/L (ref ?–50)
CHLORIDE SERPL-SCNC: 98 MMOL/L (ref 96–108)
CO2 SERPL-SCNC: 26 MMOL/L (ref 21–32)
CREAT SERPL-MCNC: 0.68 MG/DL (ref 0.6–1.3)
EOSINOPHIL # BLD AUTO: 0.03 THOUSAND/ΜL (ref 0–0.61)
EOSINOPHIL NFR BLD AUTO: 0 % (ref 0–6)
ERYTHROCYTE [DISTWIDTH] IN BLOOD BY AUTOMATED COUNT: 13.3 % (ref 11.6–15.1)
GFR SERPL CREATININE-BSD FRML MDRD: 106 ML/MIN/1.73SQ M
GLUCOSE SERPL-MCNC: 118 MG/DL (ref 65–140)
HCT VFR BLD AUTO: 40.7 % (ref 36.5–49.3)
HGB BLD-MCNC: 13.3 G/DL (ref 12–17)
IMM GRANULOCYTES # BLD AUTO: 0.02 THOUSAND/UL (ref 0–0.2)
IMM GRANULOCYTES NFR BLD AUTO: 0 % (ref 0–2)
LIPASE SERPL-CCNC: 44 U/L (ref 11–82)
LYMPHOCYTES # BLD AUTO: 3.06 THOUSANDS/ΜL (ref 0.6–4.47)
LYMPHOCYTES NFR BLD AUTO: 30 % (ref 14–44)
MCH RBC QN AUTO: 27.8 PG (ref 26.8–34.3)
MCHC RBC AUTO-ENTMCNC: 32.7 G/DL (ref 31.4–37.4)
MCV RBC AUTO: 85 FL (ref 82–98)
MONOCYTES # BLD AUTO: 1.12 THOUSAND/ΜL (ref 0.17–1.22)
MONOCYTES NFR BLD AUTO: 11 % (ref 4–12)
NEUTROPHILS # BLD AUTO: 5.85 THOUSANDS/ΜL (ref 1.85–7.62)
NEUTS SEG NFR BLD AUTO: 59 % (ref 43–75)
NRBC BLD AUTO-RTO: 0 /100 WBCS
PLATELET # BLD AUTO: 209 THOUSANDS/UL (ref 149–390)
PMV BLD AUTO: 10.3 FL (ref 8.9–12.7)
POTASSIUM SERPL-SCNC: 3.6 MMOL/L (ref 3.5–5.3)
PROT SERPL-MCNC: 6.5 G/DL (ref 6.4–8.4)
RBC # BLD AUTO: 4.78 MILLION/UL (ref 3.88–5.62)
SODIUM SERPL-SCNC: 132 MMOL/L (ref 135–147)
WBC # BLD AUTO: 10.12 THOUSAND/UL (ref 4.31–10.16)

## 2025-02-12 PROCEDURE — 99285 EMERGENCY DEPT VISIT HI MDM: CPT

## 2025-02-12 PROCEDURE — 93005 ELECTROCARDIOGRAM TRACING: CPT

## 2025-02-12 PROCEDURE — 83690 ASSAY OF LIPASE: CPT | Performed by: INTERNAL MEDICINE

## 2025-02-12 PROCEDURE — 87086 URINE CULTURE/COLONY COUNT: CPT | Performed by: INTERNAL MEDICINE

## 2025-02-12 PROCEDURE — 99285 EMERGENCY DEPT VISIT HI MDM: CPT | Performed by: INTERNAL MEDICINE

## 2025-02-12 PROCEDURE — 36415 COLL VENOUS BLD VENIPUNCTURE: CPT | Performed by: INTERNAL MEDICINE

## 2025-02-12 PROCEDURE — 71045 X-RAY EXAM CHEST 1 VIEW: CPT

## 2025-02-12 PROCEDURE — 80053 COMPREHEN METABOLIC PANEL: CPT | Performed by: INTERNAL MEDICINE

## 2025-02-12 PROCEDURE — 84484 ASSAY OF TROPONIN QUANT: CPT | Performed by: INTERNAL MEDICINE

## 2025-02-12 PROCEDURE — 85025 COMPLETE CBC W/AUTO DIFF WBC: CPT | Performed by: INTERNAL MEDICINE

## 2025-02-12 RX ORDER — PANTOPRAZOLE SODIUM 40 MG/1
40 TABLET, DELAYED RELEASE ORAL ONCE
Status: COMPLETED | OUTPATIENT
Start: 2025-02-12 | End: 2025-02-12

## 2025-02-12 RX ORDER — SODIUM CHLORIDE 9 MG/ML
3 INJECTION INTRAVENOUS
Status: DISCONTINUED | OUTPATIENT
Start: 2025-02-12 | End: 2025-02-12 | Stop reason: HOSPADM

## 2025-02-12 RX ADMIN — PANTOPRAZOLE SODIUM 40 MG: 40 TABLET, DELAYED RELEASE ORAL at 20:52

## 2025-02-12 NOTE — ED PROVIDER NOTES
Time reflects when diagnosis was documented in both MDM as applicable and the Disposition within this note       Time User Action Codes Description Comment    2/12/2025  8:19 PM Alonso Arnett Add [R07.89] Atypical chest pain           ED Disposition       ED Disposition   Discharge    Condition   Stable    Date/Time   Wed Feb 12, 2025  8:19 PM    Comment   Armando Gaitan discharge to home/self care.                   Assessment & Plan       Medical Decision Making  Patient is a poor historian who comes from a group psychiatric group home.  Initially described chest pain starting in the morning, now states started around 4:30 in the afternoon prior to coming in today.  Pain came on lasted for 5-15 to 30 minutes, then dissipated.  Does not have pain anymore.  Apparently vitals were taken at the time and he had an elevated blood pressure, so they called 911.  His blood pressure currently stable.    Does have a prior history of chest pains, hyperlipidemia hypertension, prior history of ischemic colitis, underlying COPD for which she no longer smokes, as well as diabetes mellitus.    Given his risk factors, proceed with MI profile/cardiac workup.  Patient appears in no acute distress.  Notably states he also has a sore throat, does have a mild uvulitis.    Amount and/or Complexity of Data Reviewed  Labs: ordered.     Details: Labs appear essentially normal.  Initial troponin, roughly 3 hours of the onset of his chest pain, is 3.  Radiology: ordered and independent interpretation performed.     Details: No active disease in the chest.  ECG/medicine tests: ordered and independent interpretation performed.     Details: EKG ordered and interpreted by me revealing normal sinus rhythm with left axis deviation and probable LVH.    Risk  Prescription drug management.  Risk Details: Given lack of EKG changes, exception of his LVH essentially unremarkable.  Normal troponin at 3- 3 hours after onset of symptoms it is unlikely  that his symptoms, while are quite vague on in description, related any coronary insufficiency.  Feel it is safe for him to return back to the group home at this time continue his usual medications.  Monitoring outpatient blood pressures.        ED Course as of 02/12/25 2040 Wed Feb 12, 2025 2018 Patient is feeling well.  Asked for discharge.  Ambulating in the halls requesting to go home.  Given his initial blood troponin of 3, 3 hours after onset of pain, feel it is safe for him to go home.  Symptoms are quite vague to begin with.  They will monitor his blood pressure as an outpatient.       Medications   sodium chloride (PF) 0.9 % injection 3 mL (has no administration in time range)   pantoprazole (PROTONIX) EC tablet 40 mg (has no administration in time range)       ED Risk Strat Scores   HEART Risk Score      Flowsheet Row Most Recent Value   Heart Score Risk Calculator    History 0 Filed at: 02/12/2025 2013   ECG 1 Filed at: 02/12/2025 2013   Age 1 Filed at: 02/12/2025 2013   Risk Factors 1 Filed at: 02/12/2025 2013   Troponin 0 Filed at: 02/12/2025 2013   HEART Score 3 Filed at: 02/12/2025 2013          HEART Risk Score      Flowsheet Row Most Recent Value   Heart Score Risk Calculator    History 0 Filed at: 02/12/2025 2013   ECG 1 Filed at: 02/12/2025 2013   Age 1 Filed at: 02/12/2025 2013   Risk Factors 1 Filed at: 02/12/2025 2013   Troponin 0 Filed at: 02/12/2025 2013   HEART Score 3 Filed at: 02/12/2025 2013                            SBIRT 22yo+      Flowsheet Row Most Recent Value   Initial Alcohol Screen: US AUDIT-C     1. How often do you have a drink containing alcohol? 0 Filed at: 02/12/2025 1824   2. How many drinks containing alcohol do you have on a typical day you are drinking?  0 Filed at: 02/12/2025 1824   3a. Male UNDER 65: How often do you have five or more drinks on one occasion? 0 Filed at: 02/12/2025 1824   3b. FEMALE Any Age, or MALE 65+: How often do you have 4 or more drinks on one  occassion? 0 Filed at: 02/12/2025 1824   Audit-C Score 0 Filed at: 02/12/2025 1824   DAVIDE: How many times in the past year have you...    Used an illegal drug or used a prescription medication for non-medical reasons? Never Filed at: 02/12/2025 1824                            History of Present Illness       Chief Complaint   Patient presents with    Chest Pain     Patient reports having chest pain earlier today. Patient states he currently has no pain. Patient lives at group [facility] in [place].        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 No pertinent surgical history.   Family History   Family history unknown: Yes      Social History     Tobacco Use    Smoking status: Former     Current packs/day: 1.00     Average packs/day: 1 pack/day for 10.0 years (10.0 ttl pk-yrs)     Types: Cigarettes    Smokeless tobacco: Never   Vaping Use    Vaping status: Never Used   Substance Use Topics    Alcohol use: Not Currently    Drug use: Not Currently      E-Cigarette/Vaping    E-Cigarette Use Never User       E-Cigarette/Vaping Substances      I have reviewed and agree with the history as documented.     Patient is a poor historian who comes from a group psychiatric group home.  Initially described chest pain  starting in the morning, now states started around 4:30 in the afternoon prior to coming in today.  Pain came on lasted for 5-15 to 30 minutes, then dissipated.  Does not have pain anymore.  Apparently vitals were taken at the time and he had an elevated blood pressure, so they called 911.  His blood pressure currently stable.        Review of Systems   Constitutional:  Negative for chills and fever.   HENT:  Positive for sore throat. Negative for congestion.    Eyes:  Negative for pain and visual disturbance.   Respiratory:  Negative for cough and shortness of breath.    Cardiovascular:  Positive for chest pain. Negative for palpitations.   Gastrointestinal:  Negative for abdominal pain and vomiting.   Genitourinary:  Negative for dysuria and hematuria.   Musculoskeletal:  Positive for arthralgias and back pain.   Skin:  Negative for color change and rash.   Neurological:  Negative for seizures and syncope.   All other systems reviewed and are negative.          Objective       ED Triage Vitals [02/12/25 1824]   Temperature Pulse Blood Pressure Respirations SpO2 Patient Position - Orthostatic VS   97.7 °F (36.5 °C) 85 (!) 177/84 16 97 % --      Temp Source Heart Rate Source BP Location FiO2 (%) Pain Score    Tympanic -- -- -- --      Vitals      Date and Time Temp Pulse SpO2 Resp BP Pain Score FACES Pain Rating User   02/12/25 1845 -- 77 97 % 16 137/69 -- -- AM   02/12/25 1824 97.7 °F (36.5 °C) 85 97 % 16 177/84 -- -- AM            Physical Exam  Vitals and nursing note reviewed.   Constitutional:       General: He is not in acute distress.     Appearance: He is well-developed.      Comments: No acute distress   HENT:      Head: Normocephalic and atraumatic.      Comments: Mild inflammatory changes of the uvula  Eyes:      Conjunctiva/sclera: Conjunctivae normal.   Neck:      Thyroid: No thyromegaly.   Cardiovascular:      Rate and Rhythm: Normal rate and regular rhythm.      Heart sounds: Normal heart sounds. No  murmur heard.  Pulmonary:      Effort: Pulmonary effort is normal. No respiratory distress.      Breath sounds: Normal breath sounds.   Abdominal:      Palpations: Abdomen is soft.      Tenderness: There is no abdominal tenderness.   Musculoskeletal:         General: No swelling. Normal range of motion.      Cervical back: Neck supple.   Lymphadenopathy:      Cervical: No cervical adenopathy.   Skin:     General: Skin is warm and dry.      Capillary Refill: Capillary refill takes less than 2 seconds.   Neurological:      General: No focal deficit present.      Mental Status: He is alert.   Psychiatric:         Mood and Affect: Mood normal.         Results Reviewed       Procedure Component Value Units Date/Time    HS Troponin 0hr (reflex protocol) [[id number]]  (Normal) Collected: 02/12/25 1908    Lab Status: Final result Specimen: Blood from Arm, Right Updated: 02/12/25 1936     hs TnI 0hr 4 ng/L     Lipase [[id number]]  (Normal) Collected: 02/12/25 1908    Lab Status: Final result Specimen: Blood from Arm, Right Updated: 02/12/25 1929     Lipase 44 u/L     Comprehensive metabolic panel [[id number]]  (Abnormal) Collected: 02/12/25 1908    Lab Status: Final result Specimen: Blood from Arm, Right Updated: 02/12/25 1929     Sodium 132 mmol/L      Potassium 3.6 mmol/L      Chloride 98 mmol/L      CO2 26 mmol/L      ANION GAP 8 mmol/L      BUN 8 mg/dL      Creatinine 0.68 mg/dL      Glucose 118 mg/dL      Calcium 9.0 mg/dL      AST 15 U/L      ALT 14 U/L      Alkaline Phosphatase 47 U/L      Total Protein 6.5 g/dL      Albumin 4.0 g/dL      Total Bilirubin 0.44 mg/dL      eGFR 106 ml/min/1.73sq m     Narrative:      National Kid[employer] Foundation guidelines for Chronic Kidney Disease (CKD):     Stage 1 with normal or high GFR (GFR > 90 mL/min/1.73 square meters)    Stage 2 Mild CKD (GFR = 60-89 mL/min/1.73 square meters)    Stage 3A Moderate CKD (GFR = 45-59 mL/min/1.73 square meters)    Stage 3B Moderate CKD (GFR =  30-44 mL/min/1.73 square meters)    Stage 4 Severe CKD (GFR = 15-29 mL/min/1.73 square meters)    Stage 5 End Stage CKD (GFR <15 mL/min/1.73 square meters)  Note: GFR calculation is accurate only with a steady state creatinine    CBC and differential [[id number]] Collected: 02/12/25 1908    Lab Status: Final result Specimen: Blood from Arm, Right Updated: 02/12/25 1915     WBC 10.12 Thousand/uL      RBC 4.78 Million/uL      Hemoglobin 13.3 g/dL      Hematocrit 40.7 %      MCV 85 fL      MCH 27.8 pg      MCHC 32.7 g/dL      RDW 13.3 %      MPV 10.3 fL      Platelets 209 Thousands/uL      nRBC 0 /100 WBCs      Segmented % 59 %      Immature Grans % 0 %      Lymphocytes % 30 %      Monocytes % 11 %      Eosinophils Relative 0 %      Basophils Relative 0 %      Absolute Neutrophils 5.85 Thousands/µL      Absolute Immature Grans 0.02 Thousand/uL      Absolute Lymphocytes 3.06 Thousands/µL      Absolute Monocytes 1.12 Thousand/µL      Eosinophils Absolute 0.03 Thousand/µL      Basophils Absolute 0.04 Thousands/µL     Urine culture [[id number]] Collected: 02/12/25 1908    Lab Status: In process Specimen: Urine, Clean Catch Updated: 02/12/25 1912            X-ray chest 1 view portable   ED Interpretation by Alonso Arnett DO (02/12 2015)   No active disease in the chest          ECG 12 Lead Documentation Only    Date/Time: 2/12/2025 7:29 PM    Performed by: Alonso Arnett DO  Authorized by: Alonso Arnett DO    Indications / Diagnosis:  Chest pain  Patient location:  ED  Previous ECG:     Previous ECG:  Compared to current    Comparison ECG info:  Resolution of tachycardia    Similarity:  Changes noted  Interpretation:     Interpretation: abnormal    Rate:     ECG rate:  84    ECG rate assessment: normal    Rhythm:     Rhythm: sinus rhythm    Ectopy:     Ectopy: none    QRS:     QRS axis:  Left    QRS intervals:  Normal  Conduction:     Conduction: normal    ST segments:     ST segments:  Normal  T waves:     T  waves: normal    Other findings:     Other findings: LVH        ED Medication and Procedure Management   Prior to Admission Medications   Prescriptions Last Dose Informant Patient Reported? Taking?   Alum & Mag Hydroxide-Simeth (ALMACONE PO)  Outside Facility (Specify), Self Yes No   Sig: Take by mouth if needed   Patient not taking: Reported on 2024   Cholecalciferol (Vitamin D3) 50 MCG (2000 UT) TABS  Outside Facility (Specify), Self No No   Si TAB ORALLY EVERY MORNING DX: SUPPLEMENT   Magnesium Hydroxide (MILK OF MAGNESIA PO)  Outside Facility (Specify), Self Yes No   Sig: Take by mouth if needed   Patient not taking: Reported on 2024   Menthol-Methyl Salicylate (MUSCLE RUB EX)  Outside Facility (Specify), Self Yes No   Sig: Apply topically if needed PRN   Patient not taking: Reported on 2024   OLANZapine (ZyPREXA) 5 mg tablet  Outside Facility (Specify), Self No No   Sig: Take 1 tablet (5 mg total) by mouth daily at bedtime   Patient not taking: Reported on 2025   ammonium lactate (LAC-HYDRIN) 12 % cream  Outside Facility (Specify), Self Yes No   Sig: Apply topically as needed for dry skin   Patient not taking: Reported on 2024   amphetamine-dextroamphetamine (ADDERALL XR) 15 MG 24 hr capsule  Outside Facility (Specify), Self Yes No   Si mg every morning   atorvastatin (LIPITOR) 10 mg tablet  Outside Facility (Specify), Self No No   Sig: Take 1 tablet (10 mg total) by mouth daily with dinner   bisacodyl (DULCOLAX) 5 mg EC tablet  Outside Facility (Specify), Self Yes No   Sig: Take 5 mg by mouth daily as needed for constipation PRN   Patient not taking: Reported on 2024   cloZAPine (CLOZARIL) 100 mg tablet  Outside Facility (Specify), Self Yes No   Sig: Take 100 mg by mouth 2 (two) times a day   clozapine (CLOZARIL) 50 MG tablet  Outside Facility (Specify), Self Yes No   Sig: Take 50 mg by mouth daily At bedtime   divalproex sodium (DEPAKOTE ER) 500 mg 24 hr tablet   Outside Facility (Specify), Self No No   Sig: Take 3 tablets (1,500 mg total) by mouth daily at bedtime   guaiFENesin (ROBITUSSIN) 100 MG/5ML oral liquid  Outside Facility (Specify), Self Yes No   Sig: Take 200 mg by mouth 3 (three) times a day as needed for cough PRN   Patient not taking: Reported on 11/27/2024   ibuprofen (MOTRIN) 600 mg tablet  Outside Facility (Specify), Self No No   Sig: Take 1 tablet (600 mg total) by mouth every 6 (six) hours as needed for mild pain, fever, headaches or moderate pain   Patient not taking: Reported on 11/27/2024   levothyroxine 75 mcg tablet  Outside Facility (Specify), Self No No   Sig: Take 1 tablet (75 mcg total) by mouth daily in the early morning   melatonin 3 mg  Outside Facility (Specify), Self Yes No   Sig: Take 3 mg by mouth daily at bedtime   metFORMIN (GLUCOPHAGE) 500 mg tablet  Outside Facility (Specify), Self No No   Sig: Take 1 tablet (500 mg total) by mouth 2 (two) times a day with meals   omeprazole (PriLOSEC) 40 MG capsule  Outside Facility (Specify), Self No No   Sig: Take 1 capsule (40 mg total) by mouth daily   oxybutynin (DITROPAN-XL) 5 mg 24 hr tablet  Outside Facility (Specify), Self No No   Sig: Take 1 tablet (5 mg total) by mouth daily   polyethylene glycol (MIRALAX) 17 g packet  Outside Facility (Specify), Self Yes No   Sig: Take 17 g by mouth if needed PRN   senna (SENOKOT) 8.6 MG tablet  Outside Facility (Specify), Self No No   Sig: Take 1 tablet (8.6 mg total) by mouth daily   tobramycin (TOBREX) 0.3 % SOLN  Outside Facility (Specify), Self No No   Sig: Administer 1 drop into the left eye 4 (four) times a day   Patient taking differently: Administer 1 drop into the left eye 4 (four) times a day as needed      Facility-Administered Medications: None     Current Discharge Medication List        CONTINUE these medications which have NOT CHANGED    Details   Alum & Mag Hydroxide-Simeth (ALMACONE PO) Take by mouth if needed      ammonium lactate  (LAC-HYDRIN) 12 % cream Apply topically as needed for dry skin      amphetamine-dextroamphetamine (ADDERALL XR) 15 MG 24 hr capsule 30 mg every morning      atorvastatin (LIPITOR) 10 mg tablet Take 1 tablet (10 mg total) by mouth daily with dinner  Qty: 30 tablet, Refills: 11    Associated Diagnoses: Constipation, chronic      bisacodyl (DULCOLAX) 5 mg EC tablet Take 5 mg by mouth daily as needed for constipation PRN      Cholecalciferol (Vitamin D3) 50 MCG (2000 UT) TABS 1 TAB ORALLY EVERY MORNING DX: SUPPLEMENT  Qty: 30 tablet, Refills: 5    Associated Diagnoses: Vitamin D deficiency      !! cloZAPine (CLOZARIL) 100 mg tablet Take 100 mg by mouth 2 (two) times a day      !! clozapine (CLOZARIL) 50 MG tablet Take 50 mg by mouth daily At bedtime      divalproex sodium (DEPAKOTE ER) 500 mg 24 hr tablet Take 3 tablets (1,500 mg total) by mouth daily at bedtime  Qty: 90 tablet, Refills: 0    Associated Diagnoses: Schizoaffective disorder, bipolar type (HCC)      guaiFENesin (ROBITUSSIN) 100 MG/5ML oral liquid Take 200 mg by mouth 3 (three) times a day as needed for cough PRN      ibuprofen (MOTRIN) 600 mg tablet Take 1 tablet (600 mg total) by mouth every 6 (six) hours as needed for mild pain, fever, headaches or moderate pain  Qty: 30 tablet, Refills: 11    Associated Diagnoses: Acute pain of right knee      levothyroxine 75 mcg tablet Take 1 tablet (75 mcg total) by mouth daily in the early morning  Qty: 30 tablet, Refills: 11    Associated Diagnoses: Acquired hypothyroidism      Magnesium Hydroxide (MILK OF MAGNESIA PO) Take by mouth if needed      melatonin 3 mg Take 3 mg by mouth daily at bedtime      Menthol-Methyl Salicylate (MUSCLE RUB EX) Apply topically if needed PRN      metFORMIN (GLUCOPHAGE) 500 mg tablet Take 1 tablet (500 mg total) by mouth 2 (two) times a day with meals  Qty: 30 tablet, Refills: 11    Associated Diagnoses: Type 2 diabetes mellitus without complication, without long-term current use of  insulin (HCC)      OLANZapine (ZyPREXA) 5 mg tablet Take 1 tablet (5 mg total) by mouth daily at bedtime  Qty: 30 tablet, Refills: 0    Associated Diagnoses: Schizoaffective disorder, bipolar type (HCC)      omeprazole (PriLOSEC) 40 MG capsule Take 1 capsule (40 mg total) by mouth daily  Qty: 90 capsule, Refills: 3    Associated Diagnoses: Nausea and vomiting, unspecified vomiting type; Gastroesophageal reflux disease without esophagitis      oxybutynin (DITROPAN-XL) 5 mg 24 hr tablet Take 1 tablet (5 mg total) by mouth daily  Qty: 30 tablet, Refills: 11    Associated Diagnoses: Urinary incontinence      polyethylene glycol (MIRALAX) 17 g packet Take 17 g by mouth if needed PRN      senna (SENOKOT) 8.6 MG tablet Take 1 tablet (8.6 mg total) by mouth daily  Qty: 30 tablet, Refills: 11    Associated Diagnoses: Constipation, unspecified constipation type      tobramycin (TOBREX) 0.3 % SOLN Administer 1 drop into the left eye 4 (four) times a day  Qty: 5 mL, Refills: 1    Associated Diagnoses: Left corneal abrasion, initial encounter       !! - Potential duplicate medications found. Please discuss with provider.        No discharge procedures on file.  ED SEPSIS DOCUMENTATION   Time reflects when diagnosis was documented in both MDM as applicable and the Disposition within this note       Time User Action Codes Description Comment    2/12/2025  8:19 PM Alonso Arnett Add [R07.89] Atypical chest pain                  Alonso Arnett DO  02/12/25 2041

## 2025-02-13 NOTE — DISCHARGE INSTRUCTIONS
Continue current medical regimen.  Consider increasing omeprazole to twice daily.  Be sure to follow-up with primary care with regards to blood pressure issues.

## 2025-02-14 LAB — BACTERIA UR CULT: NORMAL
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
## 2025-02-20 LAB
ATRIAL RATE: 84 BPM
P AXIS: 47 DEGREES
PR INTERVAL: 154 MS
QRS AXIS: -36 DEGREES
QRSD INTERVAL: 112 MS
QT INTERVAL: 356 MS
QTC INTERVAL: 420 MS
T WAVE AXIS: 25 DEGREES
VENTRICULAR RATE: 84 BPM

## 2025-02-20 PROCEDURE — 93010 ELECTROCARDIOGRAM REPORT: CPT | Performed by: INTERNAL MEDICINE

## 2025-02-20 NOTE — PROGRESS NOTES
05/15/20 1100   Activity/Group Checklist   Group Other (Comment)  ([facility] - Music Appreciation)   Attendance Did not attend     Patient was encouraged to attend, especially since patient loves music, but he declined  105 - 110

## 2025-02-25 DIAGNOSIS — K59.00 CONSTIPATION, UNSPECIFIED CONSTIPATION TYPE: ICD-10-CM

## 2025-02-25 DIAGNOSIS — K59.09 CONSTIPATION, CHRONIC: ICD-10-CM

## 2025-02-26 RX ORDER — ATORVASTATIN CALCIUM 10 MG/1
10 TABLET, FILM COATED ORAL EVERY EVENING
Qty: 30 TABLET | Refills: 5 | Status: SHIPPED | OUTPATIENT
Start: 2025-02-26

## 2025-02-26 RX ORDER — SENNOSIDES 8.6 MG
TABLET ORAL
Qty: 30 TABLET | Refills: 5 | Status: SHIPPED | OUTPATIENT
Start: 2025-02-26

## 2025-03-03 ENCOUNTER — TELEPHONE (OUTPATIENT)
Age: 60
End: 2025-03-03

## 2025-03-03 NOTE — TELEPHONE ENCOUNTER
Last visit 01/02/2025  Next appt 04/03/2025    Kimi from the [facility] stated that the patient needs a referral to [facility] for [facility]. Recommendations are welcomed. Please advise.

## 2025-03-03 NOTE — TELEPHONE ENCOUNTER
Kimi called back requesting a status on this request.    Notified Kimi that she is calling the wrong practice ([facility]).   Pt is seen by Dr. Williamson at [facility].    Forwarding to [facility].

## 2025-03-04 DIAGNOSIS — F25.0 SCHIZOAFFECTIVE DISORDER, BIPOLAR TYPE (HCC): Primary | ICD-10-CM

## 2025-03-14 ENCOUNTER — TELEPHONE (OUTPATIENT)
Age: 60
End: 2025-03-14

## 2025-03-14 NOTE — TELEPHONE ENCOUNTER
received a call from a nurse at [facility], who was calling to verify pt.'s provider's address and fax number. She reported that she was informed that he sees Dr. Brenden Rios [facility], which  did not see in his chart. It does not appear he is a patient of [facility], but there is no LYNDA to be able to provide this info to the nurse. Nurse ended call.

## 2025-03-19 ENCOUNTER — TELEPHONE (OUTPATIENT)
Age: 60
End: 2025-03-19

## 2025-03-19 NOTE — TELEPHONE ENCOUNTER
Called Pt regarding a routine referral, in an attempt to verify services needed/interested, and advise of current wait list. Spoke to Pt whom is interested in med mgmt services. Writer informed of current [facility], Pt ended call.    Closing referral.

## 2025-03-25 DIAGNOSIS — I10 BENIGN ESSENTIAL HYPERTENSION: Primary | ICD-10-CM

## 2025-03-27 ENCOUNTER — HOSPITAL ENCOUNTER (EMERGENCY)
Facility: HOSPITAL | Age: 60
Discharge: HOME | End: 2025-03-27
Attending: EMERGENCY MEDICINE
Payer: COMMERCIAL

## 2025-03-27 VITALS
BODY MASS INDEX: 37.57 KG/M2 | HEART RATE: 100 BPM | WEIGHT: 239.86 LBS | TEMPERATURE: 98 F | DIASTOLIC BLOOD PRESSURE: 85 MMHG | OXYGEN SATURATION: 95 % | SYSTOLIC BLOOD PRESSURE: 140 MMHG | RESPIRATION RATE: 20 BRPM

## 2025-03-27 DIAGNOSIS — R11.0 NAUSEA: ICD-10-CM

## 2025-03-27 DIAGNOSIS — T73.0XXA HUNGRY, INITIAL ENCOUNTER: Primary | ICD-10-CM

## 2025-03-27 LAB — GLUCOSE SERPL-MCNC: 180 MG/DL (ref 65–140)

## 2025-03-27 PROCEDURE — 99283 EMERGENCY DEPT VISIT LOW MDM: CPT

## 2025-03-27 PROCEDURE — 82948 REAGENT STRIP/BLOOD GLUCOSE: CPT

## 2025-03-27 PROCEDURE — 99284 EMERGENCY DEPT VISIT MOD MDM: CPT

## 2025-03-27 RX ORDER — ONDANSETRON 4 MG/1
4 TABLET, FILM COATED ORAL EVERY 6 HOURS
Qty: 12 TABLET | Refills: 0 | Status: SHIPPED | OUTPATIENT
Start: 2025-03-27

## 2025-03-27 RX ORDER — HALOPERIDOL 5 MG/1
TABLET ORAL
Qty: 30 TABLET | Refills: 9 | OUTPATIENT
Start: 2025-03-27

## 2025-03-27 RX ORDER — TOPIRAMATE 100 MG/1
TABLET, FILM COATED ORAL
Qty: 180 TABLET | Refills: 1 | OUTPATIENT
Start: 2025-03-27

## 2025-03-27 RX ORDER — LISINOPRIL 5 MG/1
TABLET ORAL
Qty: 90 TABLET | Refills: 1 | Status: SHIPPED | OUTPATIENT
Start: 2025-03-27

## 2025-03-27 RX ORDER — BENZTROPINE MESYLATE 0.5 MG/1
TABLET ORAL
Qty: 90 TABLET | Refills: 1 | OUTPATIENT
Start: 2025-03-27

## 2025-03-27 NOTE — TELEPHONE ENCOUNTER
Please call patient let him know he needs to get his psych meds from his psychiatrist.  I will refill his lisinopril however the rest of the meds needs to come from managing provider.

## 2025-03-29 ENCOUNTER — APPOINTMENT (EMERGENCY)
Dept: RADIOLOGY | Facility: HOSPITAL | Age: 60
End: 2025-03-29
Payer: COMMERCIAL

## 2025-03-29 ENCOUNTER — HOSPITAL ENCOUNTER (EMERGENCY)
Facility: HOSPITAL | Age: 60
Discharge: HOME | End: 2025-03-29
Attending: EMERGENCY MEDICINE
Payer: COMMERCIAL

## 2025-03-29 VITALS
WEIGHT: 229.06 LBS | RESPIRATION RATE: 20 BRPM | BODY MASS INDEX: 35.88 KG/M2 | SYSTOLIC BLOOD PRESSURE: 132 MMHG | HEART RATE: 99 BPM | TEMPERATURE: 99.1 F | DIASTOLIC BLOOD PRESSURE: 78 MMHG | OXYGEN SATURATION: 99 %

## 2025-03-29 DIAGNOSIS — R73.9 HYPERGLYCEMIA: ICD-10-CM

## 2025-03-29 DIAGNOSIS — U07.1 COVID-19: Primary | ICD-10-CM

## 2025-03-29 LAB
FLUAV AG UPPER RESP QL IA.RAPID: NEGATIVE
FLUBV AG UPPER RESP QL IA.RAPID: NEGATIVE
GLUCOSE SERPL-MCNC: 256 MG/DL (ref 65–140)
SARS-COV+SARS-COV-2 AG RESP QL IA.RAPID: POSITIVE

## 2025-03-29 PROCEDURE — 99283 EMERGENCY DEPT VISIT LOW MDM: CPT

## 2025-03-29 PROCEDURE — 87811 SARS-COV-2 COVID19 W/OPTIC: CPT | Performed by: EMERGENCY MEDICINE

## 2025-03-29 PROCEDURE — 87804 INFLUENZA ASSAY W/OPTIC: CPT | Performed by: EMERGENCY MEDICINE

## 2025-03-29 PROCEDURE — 82948 REAGENT STRIP/BLOOD GLUCOSE: CPT

## 2025-03-29 PROCEDURE — 99284 EMERGENCY DEPT VISIT MOD MDM: CPT | Performed by: EMERGENCY MEDICINE

## 2025-03-29 NOTE — ED PROVIDER NOTES
"Time reflects when diagnosis was documented in both MDM as applicable and the Disposition within this note       Time User Action Codes Description Comment    3/29/2025  9:16 AM Sage Rowe Add [U07.1] COVID-19     3/29/2025  9:16 AM Sage Rowe Add [R73.9] Hyperglycemia           ED Disposition       ED Disposition   Left from Room before Provider Exam    Condition   --    Date/Time   Sat Mar 29, 2025  8:36 AM    Comment   --             Assessment & Plan       Medical Decision Making  Amount and/or Complexity of Data Reviewed  Labs: ordered.          Cough with congestion x 48 hours:       Well appearing, 60 y/o w male, [place], history of hypertension, GERD, 2 diabetes without complication without long-term use of insulin, schizoaffective disorder by Polar subtype, ADHD, presents with cough, exact onset timeline of symptoms unknown because the patient is a poor historian, upon walking into the room the patient stated he wanted a soda prior to assessment lungs were clear to auscultation with exception of some scattered wheezes of the left upper base posteriorly.  Patient refused chest x-ray, patient visibly upset that he did not get a turkey sandwich and a soda.  Attempted to redirect pt into room, pt declined, again tried to redirect pt to room to discuss about paxlovid, pt walked out of ED.    Focused differential diagnosis initially in this patient was as follows: URI versus flu versus COVID versus bronchitis versus pneumonia (doubt, pt is well appearing, only had isolated wheezing on L upper lobe, no ronchi, no hx of fevers, sputum was not productive, speaking in full sentences, O2 99 %).    Prior to patient leaving, patient informed about COVID +, low risk by hx except+ DM hx, accu check: > 250, vital other wise negative, quick covid severity score low risk zero.      Portions of the record may have been created with voice recognition software. Occasional wrong word or \"sound a " "like\" substitutions may have occurred due to the inherent limitations of voice recognition software. Read the chart carefully and recognize, using context, where substitutions have occurred.           ED Course as of 03/29/25 0918   Sat Mar 29, 2025   0803 Patient seen, evaluated, examined, viral URI symptoms, isolated wheezing in the left posterior lobe see MDM for specific.   0834 COVID-positive, quick COVID severity index score of 0, nontoxic-appearing,, it is unclear the clinical temporal profile of the patient's presentation he claims to hours ago but also he reporting that he has been sick for a week, to be outside the clinical temporal timeline to initiate Paxlovid regiment, patient is declining a chest x-ray, walked out of the emergency department prior to giving him a discharge instruction.       Medications - No data to display    ED Risk Strat Scores                                                History of Present Illness       Chief Complaint   Patient presents with    Cold Like Symptoms     [place] via ems due to cold symptoms, persistent cough with mucous and vomiting a day ago after coughing fit. 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    Disease of thyroid gland     Encephalopathy acute 03/14/2016    GERD (gastroesophageal reflux disease) 2011    Hyperlipidemia     Hypertension     Hyponatremia     Hypothyroidism 2011    Ischemic colitis ([facility]) 04/30/2014    Lipoprotein deficiency     Moderate cigarette smoker (10-19 per day) 09/15/2016    Obesity     Plantar fasciitis     Psychiatric disorder     Schizo affective schizophrenia ([facility]) 2011    Sebaceous gland disease     Tobacco abuse  "    Tracheobronchitis 03/14/2016      History reviewed. No pertinent surgical history.   Family History   Family history unknown: Yes      Social History     Tobacco Use    Smoking status: Former     Current packs/day: 1.00     Average packs/day: 1 pack/day for 10.0 years (10.0 ttl pk-yrs)     Types: Cigarettes    Smokeless tobacco: Never   Vaping Use    Vaping status: Never Used   Substance Use Topics    Alcohol use: Not Currently    Drug use: Not Currently      E-Cigarette/Vaping    E-Cigarette Use Never User       E-Cigarette/Vaping Substances      I have reviewed and agree with the history as documented.     HPI    Cough x 2 hours, immediately wanted a soda, no coughing up blood, [place], subjective fever 48 hours ago, temp here 99.1.    Seen in ED: 03/27: accucheckm 180.  Review of Systems   Constitutional: Negative.  Negative for chills, diaphoresis, fatigue and fever.   HENT: Negative.     Eyes: Negative.    Respiratory:  Positive for cough. Negative for chest tightness and shortness of breath.    Cardiovascular: Negative.  Negative for chest pain.   Gastrointestinal: Negative.  Negative for abdominal pain, diarrhea and vomiting.   Endocrine: Negative.    Genitourinary: Negative.    Musculoskeletal: Negative.    Skin: Negative.    Allergic/Immunologic: Negative.    Neurological: Negative.    Hematological: Negative.            Objective       ED Triage Vitals [03/29/25 0758]   Temperature Pulse Blood Pressure Respirations SpO2 Patient Position - Orthostatic VS   99.1 °F (37.3 °C) 99 132/78 20 99 % Sitting      Temp Source Heart Rate Source BP Location FiO2 (%) Pain Score    Oral Monitor Left arm -- --      Vitals      Date and Time Temp Pulse SpO2 Resp BP Pain Score FACES Pain Rating User   03/29/25 0758 99.1 °F (37.3 °C) 99 99 % 20 132/78 -- -- FK            Physical Exam  Vitals and nursing note reviewed.   Constitutional:       General: He is not in acute distress.     Appearance: Normal  appearance. He is normal weight. He is not ill-appearing, toxic-appearing or diaphoretic.   HENT:      Head: Normocephalic and atraumatic.      Right Ear: External ear normal.      Left Ear: External ear normal.      Nose: Nose normal.      Mouth/Throat:      Mouth: Mucous membranes are moist.      Pharynx: Oropharynx is clear.   Eyes:      Extraocular Movements: Extraocular movements intact.      Conjunctiva/sclera: Conjunctivae normal.      Pupils: Pupils are equal, round, and reactive to light.   Cardiovascular:      Rate and Rhythm: Normal rate and regular rhythm.      Pulses: Normal pulses.      Heart sounds: Normal heart sounds.   Pulmonary:      Effort: Pulmonary effort is normal. No respiratory distress.      Breath sounds: No stridor. Examination of the left-upper field reveals wheezing. Wheezing present. No rhonchi or rales.   Chest:      Chest wall: No tenderness.   Abdominal:      General: Abdomen is flat. Bowel sounds are normal.   Musculoskeletal:         General: Normal range of motion.      Cervical back: Normal range of motion.   Skin:     General: Skin is warm.      Capillary Refill: Capillary refill takes less than 2 seconds.   Neurological:      General: No focal deficit present.      Mental Status: He is alert and oriented to person, place, and time. Mental status is at baseline.   Psychiatric:         Mood and Affect: Mood normal.         Behavior: Behavior normal.         Thought Content: Thought content normal.         Judgment: Judgment normal.         Results Reviewed       Procedure Component Value Units Date/Time    FLU/COVID Rapid Antigen (30 min. TAT) - Preferred screening test in ED [[id number]]  (Abnormal) Collected: 03/29/25 0807    Lab Status: Final result Specimen: Nares from Nose Updated: 03/29/25 0829     SARS COV Rapid Antigen Positive     Influenza A Rapid Antigen Negative     Influenza B Rapid Antigen Negative    Narrative:      This test has been performed using the Quidel  Lillian 2 FLU+SARS Antigen test under the Emergency Use Authorization (EUA). This test has been validated by the  and verified by the performing laboratory. The Lillian uses lateral flow immunofluorescent sandwich assay to detect SARS-COV, Influenza A and Influenza B Antigen.     The USB Promosidel Lillian 2 SARS Antigen test does not differentiate between SARS-CoV and SARS-CoV-2.     Negative results are presumptive and may be confirmed with a molecular assay, if necessary, for patient management. Negative results do not rule out SARS-CoV-2 or influenza infection and should not be used as the sole basis for treatment or patient management decisions. A negative test result may occur if the level of antigen in a sample is below the limit of detection of this test.     Positive results are indicative of the presence of viral antigens, but do not rule out bacterial infection or co-infection with other viruses.     All test results should be used as an adjunct to clinical observations and other information available to the provider.    FOR PEDIATRIC PATIENTS - copy/paste COVID Guidelines URL to browser: [email or web address]    Fingerstick Glucose (POCT) [[id number]]  (Abnormal) Collected: 25    Lab Status: Final result Specimen: Blood Updated: 25     POC Glucose 256 mg/dl             No orders to display       Procedures    ED Medication and Procedure Management   Prior to Admission Medications   Prescriptions Last Dose Informant Patient Reported? Taking?   Alum & Mag Hydroxide-Simeth (ALMACONE PO)  Outside Facility (Specify), Self Yes No   Sig: Take by mouth if needed   Patient not taking: Reported on 2024   Cholecalciferol (Vitamin D3) 50 MCG ( UT) TABS  Outside Facility (Specify), Self No No   Si TAB ORALLY EVERY MORNING DX: SUPPLEMENT   Magnesium Hydroxide (MILK OF MAGNESIA PO)  Outside Facility (Specify), Self Yes No   Sig: Take  by mouth if needed   Patient not taking: Reported on 2024   Menthol-Methyl Salicylate (MUSCLE RUB EX)  Outside Facility (Specify), Self Yes No   Sig: Apply topically if needed PRN   Patient not taking: Reported on 2024   OLANZapine (ZyPREXA) 5 mg tablet  Outside Facility (Specify), Self No No   Sig: Take 1 tablet (5 mg total) by mouth daily at bedtime   Patient not taking: Reported on 2025   ammonium lactate (LAC-HYDRIN) 12 % cream  Outside Facility (Specify), Self Yes No   Sig: Apply topically as needed for dry skin   Patient not taking: Reported on 2024   amphetamine-dextroamphetamine (ADDERALL XR) 15 MG 24 hr capsule  Outside Facility (Specify), Self Yes No   Si mg every morning   atorvastatin (LIPITOR) 10 mg tablet   No No   Si TAB ORALLY EVERY EVENING DX:HYPERLIPIDEMIA   bisacodyl (DULCOLAX) 5 mg EC tablet  Outside Facility (Specify), Self Yes No   Sig: Take 5 mg by mouth daily as needed for constipation PRN   Patient not taking: Reported on 2024   cloZAPine (CLOZARIL) 100 mg tablet  Outside Facility (Specify), Self Yes No   Sig: Take 100 mg by mouth 2 (two) times a day   clozapine (CLOZARIL) 50 MG tablet  Outside Facility (Specify), Self Yes No   Sig: Take 50 mg by mouth daily At bedtime   divalproex sodium (DEPAKOTE ER) 500 mg 24 hr tablet  Outside Facility (Specify), Self No No   Sig: Take 3 tablets (1,500 mg total) by mouth daily at bedtime   guaiFENesin (ROBITUSSIN) 100 MG/5ML oral liquid  Outside Facility (Specify), Self Yes No   Sig: Take 200 mg by mouth 3 (three) times a day as needed for cough PRN   Patient not taking: Reported on 2024   ibuprofen (MOTRIN) 600 mg tablet  Outside Facility (Specify), Self No No   Sig: Take 1 tablet (600 mg total) by mouth every 6 (six) hours as needed for mild pain, fever, headaches or moderate pain   Patient not taking: Reported on 2024   levothyroxine 75 mcg tablet  Outside Facility (Specify), Self No No   Sig: Take 1  tablet (75 mcg total) by mouth daily in the early morning   lisinopril (ZESTRIL) 5 mg tablet   No No   Si TAB ORALLY EVERY MORNING DX: HYPERTENSION *NOT ON CYCLE-NO REFILL*   melatonin 3 mg  Outside Facility (Specify), Self Yes No   Sig: Take 3 mg by mouth daily at bedtime   metFORMIN (GLUCOPHAGE) 500 mg tablet  Outside Facility (Specify), Self No No   Sig: Take 1 tablet (500 mg total) by mouth 2 (two) times a day with meals   omeprazole (PriLOSEC) 40 MG capsule  Outside Facility (Specify), Self No No   Sig: Take 1 capsule (40 mg total) by mouth daily   ondansetron (ZOFRAN) 4 mg tablet   No No   Sig: Take 1 tablet (4 mg total) by mouth every 6 (six) hours   oxybutynin (DITROPAN-XL) 5 mg 24 hr tablet  Outside Facility (Specify), Self No No   Sig: Take 1 tablet (5 mg total) by mouth daily   polyethylene glycol (MIRALAX) 17 g packet  Outside Facility (Specify), Self Yes No   Sig: Take 17 g by mouth if needed PRN   senna (SENOKOT) 8.6 mg   No No   Si TAB ORALLY EVERY MORNING DX: CONSTIPATION   tobramycin (TOBREX) 0.3 % SOLN  Outside Facility (Specify), Self No No   Sig: Administer 1 drop into the left eye 4 (four) times a day   Patient taking differently: Administer 1 drop into the left eye 4 (four) times a day as needed      Facility-Administered Medications: None     Discharge Medication List as of 3/29/2025  8:40 AM        CONTINUE these medications which have NOT CHANGED    Details   Alum & Mag Hydroxide-Simeth (ALMACONE PO) Take by mouth if needed, Historical Med      ammonium lactate (LAC-HYDRIN) 12 % cream Apply topically as needed for dry skin, Historical Med      amphetamine-dextroamphetamine (ADDERALL XR) 15 MG 24 hr capsule 30 mg every morning, Starting 2024, Historical Med      atorvastatin (LIPITOR) 10 mg tablet 1 TAB ORALLY EVERY EVENING DX:HYPERLIPIDEMIA, Starting 2025, Normal      bisacodyl (DULCOLAX) 5 mg EC tablet Take 5 mg by mouth daily as needed for constipation PRN,  Historical Med      Cholecalciferol (Vitamin D3) 50 MCG (2000 UT) TABS 1 TAB ORALLY EVERY MORNING DX: SUPPLEMENT, Starting Wed 11/6/2024, Normal      !! cloZAPine (CLOZARIL) 100 mg tablet Take 100 mg by mouth 2 (two) times a day, Starting Wed 11/13/2024, Historical Med      !! clozapine (CLOZARIL) 50 MG tablet Take 50 mg by mouth daily At bedtime, Historical Med      divalproex sodium (DEPAKOTE ER) 500 mg 24 hr tablet Take 3 tablets (1,500 mg total) by mouth daily at bedtime, Starting Fri 8/7/2020, Print      guaiFENesin (ROBITUSSIN) 100 MG/5ML oral liquid Take 200 mg by mouth 3 (three) times a day as needed for cough PRN, Historical Med      ibuprofen (MOTRIN) 600 mg tablet Take 1 tablet (600 mg total) by mouth every 6 (six) hours as needed for mild pain, fever, headaches or moderate pain, Starting Tue 5/28/2024, Normal      levothyroxine 75 mcg tablet Take 1 tablet (75 mcg total) by mouth daily in the early morning, Starting Tue 5/14/2024, Normal      lisinopril (ZESTRIL) 5 mg tablet 1 TAB ORALLY EVERY MORNING DX: HYPERTENSION *NOT ON CYCLE-NO REFILL*, Normal      Magnesium Hydroxide (MILK OF MAGNESIA PO) Take by mouth if needed, Historical Med      melatonin 3 mg Take 3 mg by mouth daily at bedtime, Historical Med      Menthol-Methyl Salicylate (MUSCLE RUB EX) Apply topically if needed PRN, Historical Med      metFORMIN (GLUCOPHAGE) 500 mg tablet Take 1 tablet (500 mg total) by mouth 2 (two) times a day with meals, Starting Tue 5/14/2024, Normal      OLANZapine (ZyPREXA) 5 mg tablet Take 1 tablet (5 mg total) by mouth daily at bedtime, Starting Fri 8/7/2020, Print      omeprazole (PriLOSEC) 40 MG capsule Take 1 capsule (40 mg total) by mouth daily, Starting Wed 11/20/2024, Normal      ondansetron (ZOFRAN) 4 mg tablet Take 1 tablet (4 mg total) by mouth every 6 (six) hours, Starting u 3/27/2025, Normal      oxybutynin (DITROPAN-XL) 5 mg 24 hr tablet Take 1 tablet (5 mg total) by mouth daily, Starting Tue  5/14/2024, Normal      polyethylene glycol (MIRALAX) 17 g packet Take 17 g by mouth if needed PRN, Historical Med      senna (SENOKOT) 8.6 mg 1 TAB ORALLY EVERY MORNING DX: CONSTIPATION, Normal      tobramycin (TOBREX) 0.3 % SOLN Administer 1 drop into the left eye 4 (four) times a day, Starting Tue 12/3/2024, Normal       !! - Potential duplicate medications found. Please discuss with provider.        No discharge procedures on file.  ED SEPSIS DOCUMENTATION   Time reflects when diagnosis was documented in both MDM as applicable and the Disposition within this note       Time User Action Codes Description Comment    3/29/2025  9:16 AM Sage Rowe [U07.1] COVID-19     3/29/2025  9:16 AM Sage Rowe [R73.9] Hyperglycemia                  Sage Rowe III, DO  03/29/25 0918

## 2025-03-29 NOTE — ED PROVIDER NOTES
"Time reflects when diagnosis was documented in both MDM as applicable and the Disposition within this note       Time User Action Codes Description Comment    3/27/2025  9:18 AM Arian Clemente [T73.0XXA] Owen, initial encounter     3/27/2025  9:23 AM Arian Clemente [R11.0] Nausea           ED Disposition       ED Disposition   Discharge    Condition   Stable    Date/Time   Thu Mar 27, 2025  9:23 AM    Comment   Armando Gaitan discharge to home/self care.                   Assessment & Plan       Medical Decision Making  59 year old male presenting today via ems for concerns of being nauseous over the last ocuple of days. Able to tolerate PO.  Discharge with zofran if nausea returns.  ------------------------------------------------------------  Strict return precautions discussed. Patient at time of discharge well-appearing in no acute distress, all questions answered. Patient agreeable to plan.  Patient's vitals, lab/imaging results, diagnosis, and treatment plan were discussed with the patient. All new/changed medications were discussed with patient, specifically, route of administration, how often and when to take, and where they can be picked up. Strict return precautions as well as close follow up with PCP was discussed with the patient and the patient was agreeable to my recommendations.  Patient verbally acknowledged understanding of the above communications. All labs reviewed and utilized in the medical decision making process (if labs were ordered). Portions of the record may have been created with voice recognition software.  Occasional wrong word or \"sound a like\" substitutions may have occurred due to the inherent limitations of voice recognition software.  Read the chart carefully and recognize, using context, where substitutions have occurred.      Amount and/or Complexity of Data Reviewed  Labs: ordered.    Risk  Prescription drug management.             Medications - No data to " display    ED Risk Strat Scores                            SBIRT 20yo+      Flowsheet Row Most Recent Value   Initial Alcohol Screen: US AUDIT-C     1. How often do you have a drink containing alcohol? 0 Filed at: 03/27/2025 0914   2. How many drinks containing alcohol do you have on a typical day you are drinking?  0 Filed at: 03/27/2025 0914   3a. Male UNDER 65: How often do you have five or more drinks on one occasion? 0 Filed at: 03/27/2025 0914   Audit-C Score 0 Filed at: 03/27/2025 0914   DAVIDE: How many times in the past year have you...    Used an illegal drug or used a prescription medication for non-medical reasons? Never Filed at: 03/27/2025 0914                            History of Present Illness       Chief Complaint   Patient presents with    Flu Symptoms     Pt c/o cough, nausea, vomiting for a few days. Denies symptoms at his time.        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 No pertinent surgical history.   Family History   Family history unknown: Yes      Social History     Tobacco Use    Smoking status: Former     Current packs/day: 1.00     Average packs/day: 1 pack/day for 10.0 years (10.0 ttl pk-yrs)     Types: Cigarettes    Smokeless tobacco: Never   Vaping Use    Vaping status:  Never Used   Substance Use Topics    Alcohol use: Not Currently    Drug use: Not Currently      E-Cigarette/Vaping    E-Cigarette Use Never User       E-Cigarette/Vaping Substances      I have reviewed and agree with the history as documented.     59 year old male presenting via EMS for nausea and vomiting over the last couple of days. Upon arrival, patient states that he has no symptoms currently but would like a turkey sandwich and a diet coke.        Review of Systems   Constitutional:  Negative for chills and fever.   HENT:  Negative for ear pain and sore throat.    Eyes:  Negative for pain and visual disturbance.   Respiratory:  Negative for cough and shortness of breath.    Cardiovascular:  Negative for chest pain and palpitations.   Gastrointestinal:  Negative for abdominal pain and vomiting.   Genitourinary:  Negative for dysuria and hematuria.   Musculoskeletal:  Negative for arthralgias and back pain.   Skin:  Negative for color change and rash.   Neurological:  Negative for seizures and syncope.   All other systems reviewed and are negative.          Objective       ED Triage Vitals [03/27/25 0914]   Temperature Pulse Blood Pressure Respirations SpO2 Patient Position - Orthostatic VS   98 °F (36.7 °C) 100 140/85 20 95 % Sitting      Temp Source Heart Rate Source BP Location FiO2 (%) Pain Score    Oral Monitor Left arm -- --      Vitals      Date and Time Temp Pulse SpO2 Resp BP Pain Score FACES Pain Rating User   03/27/25 0914 98 °F (36.7 °C) 100 95 % 20 140/85 -- --             Physical Exam  Vitals and nursing note reviewed.   Constitutional:       General: He is not in acute distress.  HENT:      Head: Normocephalic and atraumatic.   Eyes:      General: No scleral icterus.     Conjunctiva/sclera: Conjunctivae normal.      Pupils: Pupils are equal, round, and reactive to light.   Cardiovascular:      Rate and Rhythm: Normal rate and regular rhythm.      Pulses: Normal pulses.   Pulmonary:      Effort:  Pulmonary effort is normal. No respiratory distress.   Abdominal:      Tenderness: There is no abdominal tenderness.   Musculoskeletal:         General: Normal range of motion.      Cervical back: Normal range of motion.   Skin:     General: Skin is warm and dry.      Capillary Refill: Capillary refill takes less than 2 seconds.      Coloration: Skin is not jaundiced.   Neurological:      Mental Status: He is alert and oriented to person, place, and time.         Results Reviewed       Procedure Component Value Units Date/Time    Fingerstick Glucose (POCT) [[id number]]  (Abnormal) Collected: 25    Lab Status: Final result Specimen: Blood Updated: 25     POC Glucose 180 mg/dl             No orders to display       Procedures    ED Medication and Procedure Management   Prior to Admission Medications   Prescriptions Last Dose Informant Patient Reported? Taking?   Alum & Mag Hydroxide-Simeth (ALMACONE PO)  Outside Facility (Specify), Self Yes No   Sig: Take by mouth if needed   Patient not taking: Reported on 2024   Cholecalciferol (Vitamin D3) 50 MCG ( UT) TABS  Outside Facility (Specify), Self No No   Si TAB ORALLY EVERY MORNING DX: SUPPLEMENT   Magnesium Hydroxide (MILK OF MAGNESIA PO)  Outside Facility (Specify), Self Yes No   Sig: Take by mouth if needed   Patient not taking: Reported on 2024   Menthol-Methyl Salicylate (MUSCLE RUB EX)  Outside Facility (Specify), Self Yes No   Sig: Apply topically if needed PRN   Patient not taking: Reported on 2024   OLANZapine (ZyPREXA) 5 mg tablet  Outside Facility (Specify), Self No No   Sig: Take 1 tablet (5 mg total) by mouth daily at bedtime   Patient not taking: Reported on 2025   ammonium lactate (LAC-HYDRIN) 12 % cream  Outside Facility (Specify), Self Yes No   Sig: Apply topically as needed for dry skin   Patient not taking: Reported on 2024   amphetamine-dextroamphetamine (ADDERALL XR) 15 MG 24 hr capsule   Outside Facility (Specify), Self Yes No   Si mg every morning   atorvastatin (LIPITOR) 10 mg tablet   No No   Si TAB ORALLY EVERY EVENING DX:HYPERLIPIDEMIA   bisacodyl (DULCOLAX) 5 mg EC tablet  Outside Facility (Specify), Self Yes No   Sig: Take 5 mg by mouth daily as needed for constipation PRN   Patient not taking: Reported on 2024   cloZAPine (CLOZARIL) 100 mg tablet  Outside Facility (Specify), Self Yes No   Sig: Take 100 mg by mouth 2 (two) times a day   clozapine (CLOZARIL) 50 MG tablet  Outside Facility (Specify), Self Yes No   Sig: Take 50 mg by mouth daily At bedtime   divalproex sodium (DEPAKOTE ER) 500 mg 24 hr tablet  Outside Facility (Specify), Self No No   Sig: Take 3 tablets (1,500 mg total) by mouth daily at bedtime   guaiFENesin (ROBITUSSIN) 100 MG/5ML oral liquid  Outside Facility (Specify), Self Yes No   Sig: Take 200 mg by mouth 3 (three) times a day as needed for cough PRN   Patient not taking: Reported on 2024   ibuprofen (MOTRIN) 600 mg tablet  Outside Facility (Specify), Self No No   Sig: Take 1 tablet (600 mg total) by mouth every 6 (six) hours as needed for mild pain, fever, headaches or moderate pain   Patient not taking: Reported on 2024   levothyroxine 75 mcg tablet  Outside Facility (Specify), Self No No   Sig: Take 1 tablet (75 mcg total) by mouth daily in the early morning   lisinopril (ZESTRIL) 5 mg tablet   No No   Si TAB ORALLY EVERY MORNING DX: HYPERTENSION *NOT ON CYCLE-NO REFILL*   melatonin 3 mg  Outside Facility (Specify), Self Yes No   Sig: Take 3 mg by mouth daily at bedtime   metFORMIN (GLUCOPHAGE) 500 mg tablet  Outside Facility (Specify), Self No No   Sig: Take 1 tablet (500 mg total) by mouth 2 (two) times a day with meals   omeprazole (PriLOSEC) 40 MG capsule  Outside Facility (Specify), Self No No   Sig: Take 1 capsule (40 mg total) by mouth daily   oxybutynin (DITROPAN-XL) 5 mg 24 hr tablet  Outside Facility (Specify), Self No No   Sig:  Take 1 tablet (5 mg total) by mouth daily   polyethylene glycol (MIRALAX) 17 g packet  Outside Facility (Specify), Self Yes No   Sig: Take 17 g by mouth if needed PRN   senna (SENOKOT) 8.6 mg   No No   Si TAB ORALLY EVERY MORNING DX: CONSTIPATION   tobramycin (TOBREX) 0.3 % SOLN  Outside Facility (Specify), Self No No   Sig: Administer 1 drop into the left eye 4 (four) times a day   Patient taking differently: Administer 1 drop into the left eye 4 (four) times a day as needed      Facility-Administered Medications: None     Discharge Medication List as of 3/27/2025  9:39 AM        START taking these medications    Details   ondansetron (ZOFRAN) 4 mg tablet Take 1 tablet (4 mg total) by mouth every 6 (six) hours, Starting u 3/27/2025, Normal           CONTINUE these medications which have NOT CHANGED    Details   Alum & Mag Hydroxide-Simeth (ALMACONE PO) Take by mouth if needed, Historical Med      ammonium lactate (LAC-HYDRIN) 12 % cream Apply topically as needed for dry skin, Historical Med      amphetamine-dextroamphetamine (ADDERALL XR) 15 MG 24 hr capsule 30 mg every morning, Starting 2024, Historical Med      atorvastatin (LIPITOR) 10 mg tablet 1 TAB ORALLY EVERY EVENING DX:HYPERLIPIDEMIA, Starting 2025, Normal      bisacodyl (DULCOLAX) 5 mg EC tablet Take 5 mg by mouth daily as needed for constipation PRN, Historical Med      Cholecalciferol (Vitamin D3) 50 MCG (2000) TABS 1 TAB ORALLY EVERY MORNING DX: SUPPLEMENT, Starting 2024, Normal      !! cloZAPine (CLOZARIL) 100 mg tablet Take 100 mg by mouth 2 (two) times a day, Starting 2024, Historical Med      !! clozapine (CLOZARIL) 50 MG tablet Take 50 mg by mouth daily At bedtime, Historical Med      divalproex sodium (DEPAKOTE ER) 500 mg 24 hr tablet Take 3 tablets (1,500 mg total) by mouth daily at bedtime, Starting 2020, Print      guaiFENesin (ROBITUSSIN) 100 MG/5ML oral liquid Take 200 mg by mouth 3  (three) times a day as needed for cough PRN, Historical Med      ibuprofen (MOTRIN) 600 mg tablet Take 1 tablet (600 mg total) by mouth every 6 (six) hours as needed for mild pain, fever, headaches or moderate pain, Starting Tue 5/28/2024, Normal      levothyroxine 75 mcg tablet Take 1 tablet (75 mcg total) by mouth daily in the early morning, Starting Tue 5/14/2024, Normal      lisinopril (ZESTRIL) 5 mg tablet 1 TAB ORALLY EVERY MORNING DX: HYPERTENSION *NOT ON CYCLE-NO REFILL*, Normal      Magnesium Hydroxide (MILK OF MAGNESIA PO) Take by mouth if needed, Historical Med      melatonin 3 mg Take 3 mg by mouth daily at bedtime, Historical Med      Menthol-Methyl Salicylate (MUSCLE RUB EX) Apply topically if needed PRN, Historical Med      metFORMIN (GLUCOPHAGE) 500 mg tablet Take 1 tablet (500 mg total) by mouth 2 (two) times a day with meals, Starting Tue 5/14/2024, Normal      OLANZapine (ZyPREXA) 5 mg tablet Take 1 tablet (5 mg total) by mouth daily at bedtime, Starting Fri 8/7/2020, Print      omeprazole (PriLOSEC) 40 MG capsule Take 1 capsule (40 mg total) by mouth daily, Starting Wed 11/20/2024, Normal      oxybutynin (DITROPAN-XL) 5 mg 24 hr tablet Take 1 tablet (5 mg total) by mouth daily, Starting Tue 5/14/2024, Normal      polyethylene glycol (MIRALAX) 17 g packet Take 17 g by mouth if needed PRN, Historical Med      senna (SENOKOT) 8.6 mg 1 TAB ORALLY EVERY MORNING DX: CONSTIPATION, Normal      tobramycin (TOBREX) 0.3 % SOLN Administer 1 drop into the left eye 4 (four) times a day, Starting Tue 12/3/2024, Normal       !! - Potential duplicate medications found. Please discuss with provider.        No discharge procedures on file.  ED SEPSIS DOCUMENTATION   Time reflects when diagnosis was documented in both MDM as applicable and the Disposition within this note       Time User Action Codes Description Comment    3/27/2025  9:18 AM Arian Clemente Add [T73.0XXA] Owen, initial encounter     3/27/2025  9:23  AM Arian Clemente Add [R11.0] Nausea                  Arian Clemente PA-C  03/29/25 0606

## 2025-03-31 NOTE — PROGRESS NOTES
Psychiatry Progress Note    Subjective: Interval History     The patient is lying in bed this morning  He continues to be isolative to his room  He remains preoccupied on discharge  He is denying depression or anxiety  He denies SI, HI, AH, VH  He has a flat affect  He has been compliant with medications and meals  He states he slept well  He had trazodone prn  Pt did attend evening group yesterday  He has been social with select peers      Behavior over the last 24 hours:  unchanged  Sleep: normal  Appetite: normal  Medication side effects: No  ROS: no complaints    Current medications:    Current Facility-Administered Medications:     acetaminophen (TYLENOL) tablet 325 mg, 325 mg, Oral, Q6H PRN, Jayme Myers PA-C    acetaminophen (TYLENOL) tablet 650 mg, 650 mg, Oral, Q6H PRN, Jayme Myers PA-C    acetaminophen (TYLENOL) tablet 975 mg, 975 mg, Oral, Q8H PRN, Jayme Myers PA-C    aluminum-magnesium hydroxide-simethicone (MYLANTA) 200-200-20 mg/5 mL oral suspension 30 mL, 30 mL, Oral, Q4H PRN, Jayme Myers PA-C, 30 mL at 01/24/20 2352    atorvastatin (LIPITOR) tablet 10 mg, 10 mg, Oral, Daily With Dinner, Jayme Myers PA-C, 10 mg at 01/25/20 1649    benztropine (COGENTIN) injection 1 mg, 1 mg, Intramuscular, Q6H PRN, Jayme Myers PA-C    benztropine (COGENTIN) tablet 1 mg, 1 mg, Oral, Q6H PRN, Jayme Myers PA-C    clozapine (CLOZARIL) tablet 200 mg, 200 mg, Oral, Daily, Farrah Paniagua MD, 200 mg at 01/25/20 1649    divalproex sodium (DEPAKOTE) EC tablet 1,000 mg, 1,000 mg, Oral, BID, Farrah Paniagua MD, 1,000 mg at 01/25/20 2058    docusate sodium (COLACE) capsule 100 mg, 100 mg, Oral, BID, Jayme Myers PA-C, 100 mg at 01/26/20 0901    haloperidol (HALDOL) tablet 5 mg, 5 mg, Oral, Q6H PRN, Jayme Myers PA-C    haloperidol lactate (HALDOL) injection 5 mg, 5 mg, Intramuscular, Q6H PRN, Jayme Myers PA-C    levothyroxine tablet 75 mcg, 75 mcg, no rashes , no suspicious lesions Oral, Early Morning, Bassem Mouse, PA-C, 75 mcg at 01/26/20 0620    LORazepam (ATIVAN) 2 mg/mL injection 1 mg, 1 mg, Intramuscular, Q6H PRN, Bassem Mouse, PA-C    LORazepam (ATIVAN) tablet 1 mg, 1 mg, Oral, Q6H PRN, Bassem Mouse, PA-C    magnesium hydroxide (MILK OF MAGNESIA) 400 mg/5 mL oral suspension 30 mL, 30 mL, Oral, Daily PRN, Bassem Mouse, PA-C    metFORMIN (GLUCOPHAGE) tablet 500 mg, 500 mg, Oral, BID With Meals, Bassem Mouse, PA-C, 500 mg at 01/26/20 0901    nicotine polacrilex (NICORETTE) gum 2 mg, 2 mg, Oral, Q2H PRN, Bassem Mouse, PA-C    OLANZapine (ZyPREXA) tablet 5 mg, 5 mg, Oral, After Lunch, Louie Theodore MD, 5 mg at 01/25/20 1301    oxybutynin (DITROPAN-XL) 24 hr tablet 5 mg, 5 mg, Oral, Daily, Bassem Mouse, PA-C, 5 mg at 01/26/20 0901    pantoprazole (PROTONIX) EC tablet 40 mg, 40 mg, Oral, Early Morning, Bassem Mouse, PA-C, 40 mg at 01/26/20 0620    traZODone (DESYREL) tablet 50 mg, 50 mg, Oral, HS PRN, Bassem Mouse, PA-C, 50 mg at 01/25/20 2140    Current Problem List:    Patient Active Problem List   Diagnosis    Encephalopathy acute    [facility] [address] [facility]    Schizoaffective disorder, bipolar type ([facility] [address] )    Constipation, chronic    Hyperammonemia (HCC)    Type 2 diabetes mellitus without complication, without long-term current use of insulin (HCC)    Tracheobronchitis    Streptococcus pneumoniae    Chronic airway obstruction, not elsewhere classified    Benign essential hypertension    Disorder of lipoprotein and lipid metabolism    Plantar fasciitis    Foot callus    Gastroesophageal reflux disease without esophagitis    GI bleed    Hyponatremia    Acquired hypothyroidism    Ischemic colitis ([facility] [address] [facility]    Moderate cigarette smoker (10-19 per day)    Morbid obesity (HCC)    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    BMI 34 0-34 9,adult    Nail abnormality    Encounter for well adult exam with abnormal findings    Wheezing on right side of chest on exhalation    Tobacco abuse       Problem list reviewed 01/26/20     Objective:     Vital Signs:  Vitals:    01/23/20 0732 01/25/20 0700 01/26/20 0730 01/26/20 0732   BP: 155/80 138/78 102/63 103/60   BP Location: Left arm Left arm Left arm Left arm   Pulse: 103 88 78 80   Resp:  18 18    Temp:  97 7 °F (36 5 °C) 97 7 °F (36 5 °C)    TempSrc:   Temporal    Weight:       Height:             Appearance:  age appropriate and disheveled   Behavior:  evasive and guarded   Speech:  normal volume   Mood:  irritable   Affect:  constricted   Thought Process:  circumstantial   Thought Content:  delusions  persecutory   Perceptual Disturbances: None   Risk Potential: none   Sensorium:  person, place, situation and time   Cognition:  intact   Consciousness:  alert and awake    Attention: attention span and concentration were age appropriate   Intellect: average   Insight:  poor   Judgment: poor      Motor Activity: no abnormal movements       I/O Past 24 hours:  No intake/output data recorded  No intake/output data recorded  Labs:  Reviewed 01/26/20    Progress Toward Goals:  Unchanged    Assessment / Plan:     Schizoaffective disorder, bipolar type ([facility] [address] )    Recommended Treatment:      Medication changes:  1) continue current medication regimen    Non-pharmacological treatments  1) Continue with group therapy, milieu therapy and occupational therapy  Safety  1) Safety/communication plan established targeting dynamic risk factors above  2) Risks, benefits, and possible side effects of medications explained to patient and patient verbalizes understanding  Counseling / Coordination of Care    Total floor / unit time spent today 20 minutes  Greater than 50% of total time was spent with the patient and / or family counseling and / or coordination of care  A description of the counseling / coordination of care       Patient's Rights, confidentiality and exceptions to confidentiality, use of automated medical record, [facility] staff access to medical record, and consent to treatment reviewed      Rose Hernández PA-C

## 2025-04-06 ENCOUNTER — HOSPITAL ENCOUNTER (INPATIENT)
Facility: HOSPITAL | Age: 60
LOS: 122 days | DRG: 760 | End: 2025-08-08
Attending: EMERGENCY MEDICINE | Admitting: FAMILY MEDICINE
Payer: COMMERCIAL

## 2025-04-06 DIAGNOSIS — Z13.9 ENCOUNTER FOR SCREENING INVOLVING SOCIAL DETERMINANTS OF HEALTH (SDOH): ICD-10-CM

## 2025-04-06 DIAGNOSIS — Z00.8 MEDICAL CLEARANCE FOR PSYCHIATRIC ADMISSION: ICD-10-CM

## 2025-04-06 DIAGNOSIS — F25.0 SCHIZOAFFECTIVE DISORDER, BIPOLAR TYPE (HCC): ICD-10-CM

## 2025-04-06 DIAGNOSIS — Z59.00 HOMELESS: Primary | ICD-10-CM

## 2025-04-06 DIAGNOSIS — F81.9 INTELLECTUAL DELAY: ICD-10-CM

## 2025-04-06 DIAGNOSIS — F90.9 ATTENTION DEFICIT HYPERACTIVITY DISORDER (ADHD), UNSPECIFIED ADHD TYPE: ICD-10-CM

## 2025-04-06 LAB — GLUCOSE SERPL-MCNC: 193 MG/DL (ref 65–140)

## 2025-04-06 PROCEDURE — 99222 1ST HOSP IP/OBS MODERATE 55: CPT | Performed by: INTERNAL MEDICINE

## 2025-04-06 PROCEDURE — 82948 REAGENT STRIP/BLOOD GLUCOSE: CPT

## 2025-04-06 PROCEDURE — 99283 EMERGENCY DEPT VISIT LOW MDM: CPT

## 2025-04-06 PROCEDURE — 99284 EMERGENCY DEPT VISIT MOD MDM: CPT

## 2025-04-06 RX ORDER — TOPIRAMATE 100 MG/1
200 TABLET, FILM COATED ORAL 2 TIMES DAILY
Status: DISCONTINUED | OUTPATIENT
Start: 2025-04-06 | End: 2025-04-06

## 2025-04-06 RX ORDER — DEXTROAMPHETAMINE SACCHARATE, AMPHETAMINE ASPARTATE, DEXTROAMPHETAMINE SULFATE AND AMPHETAMINE SULFATE 2.5; 2.5; 2.5; 2.5 MG/1; MG/1; MG/1; MG/1
30 TABLET ORAL DAILY
Refills: 0 | Status: DISCONTINUED | OUTPATIENT
Start: 2025-04-07 | End: 2025-04-06

## 2025-04-06 RX ORDER — LISINOPRIL 10 MG/1
5 TABLET ORAL DAILY
Status: DISCONTINUED | OUTPATIENT
Start: 2025-04-07 | End: 2025-04-06

## 2025-04-06 RX ORDER — DIVALPROEX SODIUM 500 MG/1
1500 TABLET, FILM COATED, EXTENDED RELEASE ORAL
Status: DISCONTINUED | OUTPATIENT
Start: 2025-04-07 | End: 2025-06-10

## 2025-04-06 RX ORDER — DIVALPROEX SODIUM 500 MG/1
1500 TABLET, DELAYED RELEASE ORAL DAILY
Status: DISCONTINUED | OUTPATIENT
Start: 2025-04-06 | End: 2025-04-06

## 2025-04-06 RX ORDER — DEXTROAMPHETAMINE SACCHARATE, AMPHETAMINE ASPARTATE, DEXTROAMPHETAMINE SULFATE AND AMPHETAMINE SULFATE 1.25; 1.25; 1.25; 1.25 MG/1; MG/1; MG/1; MG/1
30 TABLET ORAL DAILY
Refills: 0 | Status: DISCONTINUED | OUTPATIENT
Start: 2025-04-07 | End: 2025-04-06

## 2025-04-06 RX ORDER — DEXTROAMPHETAMINE SACCHARATE, AMPHETAMINE ASPARTATE, DEXTROAMPHETAMINE SULFATE AND AMPHETAMINE SULFATE 2.5; 2.5; 2.5; 2.5 MG/1; MG/1; MG/1; MG/1
30 TABLET ORAL DAILY
Status: DISCONTINUED | OUTPATIENT
Start: 2025-04-07 | End: 2025-07-01

## 2025-04-06 RX ORDER — SENNOSIDES 8.6 MG
1 TABLET ORAL
Status: DISCONTINUED | OUTPATIENT
Start: 2025-04-06 | End: 2025-08-08 | Stop reason: HOSPADM

## 2025-04-06 RX ORDER — LEVOTHYROXINE SODIUM 75 UG/1
75 TABLET ORAL
Status: DISCONTINUED | OUTPATIENT
Start: 2025-04-07 | End: 2025-08-08 | Stop reason: HOSPADM

## 2025-04-06 RX ORDER — PANTOPRAZOLE SODIUM 40 MG/1
40 TABLET, DELAYED RELEASE ORAL
Status: DISCONTINUED | OUTPATIENT
Start: 2025-04-07 | End: 2025-08-08 | Stop reason: HOSPADM

## 2025-04-06 RX ORDER — ATORVASTATIN CALCIUM 10 MG/1
10 TABLET, FILM COATED ORAL EVERY EVENING
Status: DISCONTINUED | OUTPATIENT
Start: 2025-04-06 | End: 2025-08-08 | Stop reason: HOSPADM

## 2025-04-06 RX ORDER — BENZTROPINE MESYLATE 0.5 MG/1
0.5 TABLET ORAL 2 TIMES DAILY
Status: DISCONTINUED | OUTPATIENT
Start: 2025-04-06 | End: 2025-04-06

## 2025-04-06 RX ORDER — LEVOTHYROXINE SODIUM 75 UG/1
75 TABLET ORAL
Status: DISCONTINUED | OUTPATIENT
Start: 2025-04-07 | End: 2025-04-06

## 2025-04-06 RX ORDER — OLANZAPINE 10 MG/1
20 TABLET, FILM COATED ORAL
Status: DISCONTINUED | OUTPATIENT
Start: 2025-04-06 | End: 2025-08-08 | Stop reason: HOSPADM

## 2025-04-06 RX ORDER — POLYETHYLENE GLYCOL 3350 17 G/17G
17 POWDER, FOR SOLUTION ORAL DAILY PRN
Status: DISCONTINUED | OUTPATIENT
Start: 2025-04-06 | End: 2025-04-07

## 2025-04-06 RX ORDER — ATORVASTATIN CALCIUM 20 MG/1
10 TABLET, FILM COATED ORAL
Status: DISCONTINUED | OUTPATIENT
Start: 2025-04-06 | End: 2025-04-06

## 2025-04-06 RX ORDER — CHLORPROMAZINE HYDROCHLORIDE 100 MG/1
100 TABLET, FILM COATED ORAL 4 TIMES DAILY
Status: DISCONTINUED | OUTPATIENT
Start: 2025-04-06 | End: 2025-04-06

## 2025-04-06 RX ADMIN — ATORVASTATIN CALCIUM 10 MG: 10 TABLET, FILM COATED ORAL at 19:24

## 2025-04-06 RX ADMIN — SENNOSIDES 8.6 MG: 8.6 TABLET, FILM COATED ORAL at 21:05

## 2025-04-06 RX ADMIN — Medication 3 MG: at 21:05

## 2025-04-06 RX ADMIN — OLANZAPINE 20 MG: 10 TABLET, FILM COATED ORAL at 21:05

## 2025-04-06 RX ADMIN — DIVALPROEX SODIUM 1500 MG: 500 TABLET, DELAYED RELEASE ORAL at 18:07

## 2025-04-06 RX ADMIN — METFORMIN HYDROCHLORIDE 500 MG: 500 TABLET ORAL at 18:07

## 2025-04-06 SDOH — ECONOMIC STABILITY - HOUSING INSECURITY: HOMELESSNESS UNSPECIFIED: Z59.00

## 2025-04-07 LAB
GLUCOSE SERPL-MCNC: 125 MG/DL (ref 65–140)
GLUCOSE SERPL-MCNC: 127 MG/DL (ref 65–140)

## 2025-04-07 PROCEDURE — 99214 OFFICE O/P EST MOD 30 MIN: CPT | Performed by: PSYCHIATRY & NEUROLOGY

## 2025-04-07 PROCEDURE — 99232 SBSQ HOSP IP/OBS MODERATE 35: CPT | Performed by: FAMILY MEDICINE

## 2025-04-07 PROCEDURE — 82948 REAGENT STRIP/BLOOD GLUCOSE: CPT

## 2025-04-07 RX ORDER — ACETAMINOPHEN 325 MG/1
650 TABLET ORAL EVERY 6 HOURS PRN
Status: DISCONTINUED | OUTPATIENT
Start: 2025-04-07 | End: 2025-08-08 | Stop reason: HOSPADM

## 2025-04-07 RX ORDER — POLYETHYLENE GLYCOL 3350 17 G/17G
17 POWDER, FOR SOLUTION ORAL DAILY
Status: DISCONTINUED | OUTPATIENT
Start: 2025-04-07 | End: 2025-04-08

## 2025-04-07 RX ADMIN — PANTOPRAZOLE SODIUM 40 MG: 40 TABLET, DELAYED RELEASE ORAL at 06:21

## 2025-04-07 RX ADMIN — DEXTROAMPHETAMINE SACCHARATE, AMPHETAMINE ASPARTATE, DEXTROAMPHETAMINE SULFATE AND AMPHETAMINE SULFATE 30 MG: 2.5; 2.5; 2.5; 2.5 TABLET ORAL at 08:00

## 2025-04-07 RX ADMIN — SENNOSIDES 8.6 MG: 8.6 TABLET, FILM COATED ORAL at 22:30

## 2025-04-07 RX ADMIN — ACETAMINOPHEN 650 MG: 325 TABLET, FILM COATED ORAL at 22:34

## 2025-04-07 RX ADMIN — LEVOTHYROXINE SODIUM 75 MCG: 0.07 TABLET ORAL at 06:21

## 2025-04-07 RX ADMIN — OLANZAPINE 20 MG: 10 TABLET, FILM COATED ORAL at 22:29

## 2025-04-07 RX ADMIN — METFORMIN HYDROCHLORIDE 500 MG: 500 TABLET, FILM COATED ORAL at 08:00

## 2025-04-07 RX ADMIN — ATORVASTATIN CALCIUM 10 MG: 10 TABLET, FILM COATED ORAL at 17:43

## 2025-04-07 RX ADMIN — Medication 3 MG: at 22:30

## 2025-04-07 RX ADMIN — METFORMIN HYDROCHLORIDE 500 MG: 500 TABLET, FILM COATED ORAL at 16:03

## 2025-04-07 RX ADMIN — POLYETHYLENE GLYCOL 3350 17 G: 17 POWDER, FOR SOLUTION ORAL at 14:28

## 2025-04-07 RX ADMIN — DIVALPROEX SODIUM 1500 MG: 500 TABLET, FILM COATED, EXTENDED RELEASE ORAL at 22:29

## 2025-04-08 PROBLEM — U07.1 LAB TEST POSITIVE FOR DETECTION OF COVID-19 VIRUS: Status: ACTIVE | Noted: 2025-04-08

## 2025-04-08 LAB — GLUCOSE SERPL-MCNC: 151 MG/DL (ref 65–140)

## 2025-04-08 PROCEDURE — 82948 REAGENT STRIP/BLOOD GLUCOSE: CPT

## 2025-04-08 PROCEDURE — 99232 SBSQ HOSP IP/OBS MODERATE 35: CPT | Performed by: FAMILY MEDICINE

## 2025-04-08 RX ORDER — MAGNESIUM HYDROXIDE/ALUMINUM HYDROXICE/SIMETHICONE 120; 1200; 1200 MG/30ML; MG/30ML; MG/30ML
30 SUSPENSION ORAL EVERY 4 HOURS PRN
Status: DISCONTINUED | OUTPATIENT
Start: 2025-04-08 | End: 2025-08-08 | Stop reason: HOSPADM

## 2025-04-08 RX ORDER — POLYETHYLENE GLYCOL 3350 17 G/17G
17 POWDER, FOR SOLUTION ORAL DAILY PRN
Status: DISCONTINUED | OUTPATIENT
Start: 2025-04-08 | End: 2025-08-08 | Stop reason: HOSPADM

## 2025-04-08 RX ADMIN — METFORMIN HYDROCHLORIDE 500 MG: 500 TABLET, FILM COATED ORAL at 16:52

## 2025-04-08 RX ADMIN — ACETAMINOPHEN 650 MG: 325 TABLET, FILM COATED ORAL at 09:08

## 2025-04-08 RX ADMIN — PANTOPRAZOLE SODIUM 40 MG: 40 TABLET, DELAYED RELEASE ORAL at 05:32

## 2025-04-08 RX ADMIN — DEXTROAMPHETAMINE SACCHARATE, AMPHETAMINE ASPARTATE, DEXTROAMPHETAMINE SULFATE AND AMPHETAMINE SULFATE 30 MG: 2.5; 2.5; 2.5; 2.5 TABLET ORAL at 08:00

## 2025-04-08 RX ADMIN — METFORMIN HYDROCHLORIDE 500 MG: 500 TABLET, FILM COATED ORAL at 08:00

## 2025-04-08 RX ADMIN — LEVOTHYROXINE SODIUM 75 MCG: 0.07 TABLET ORAL at 05:32

## 2025-04-08 RX ADMIN — SENNOSIDES 8.6 MG: 8.6 TABLET, FILM COATED ORAL at 21:24

## 2025-04-08 RX ADMIN — DIVALPROEX SODIUM 1500 MG: 500 TABLET, FILM COATED, EXTENDED RELEASE ORAL at 21:24

## 2025-04-08 RX ADMIN — Medication 3 MG: at 21:24

## 2025-04-08 RX ADMIN — POLYETHYLENE GLYCOL 3350 17 G: 17 POWDER, FOR SOLUTION ORAL at 08:00

## 2025-04-08 RX ADMIN — OLANZAPINE 20 MG: 10 TABLET, FILM COATED ORAL at 21:24

## 2025-04-08 RX ADMIN — ATORVASTATIN CALCIUM 10 MG: 10 TABLET, FILM COATED ORAL at 17:55

## 2025-04-09 LAB
ANION GAP SERPL CALCULATED.3IONS-SCNC: 10 MMOL/L (ref 4–13)
BUN SERPL-MCNC: 8 MG/DL (ref 5–25)
CALCIUM SERPL-MCNC: 9.3 MG/DL (ref 8.4–10.2)
CHLORIDE SERPL-SCNC: 104 MMOL/L (ref 96–108)
CO2 SERPL-SCNC: 25 MMOL/L (ref 21–32)
CREAT SERPL-MCNC: 0.58 MG/DL (ref 0.6–1.3)
ERYTHROCYTE [DISTWIDTH] IN BLOOD BY AUTOMATED COUNT: 13.5 % (ref 11.6–15.1)
GFR SERPL CREATININE-BSD FRML MDRD: 113 ML/MIN/1.73SQ M
GLUCOSE SERPL-MCNC: 141 MG/DL (ref 65–140)
HCT VFR BLD AUTO: 40.1 % (ref 36.5–49.3)
HGB BLD-MCNC: 13.2 G/DL (ref 12–17)
MCH RBC QN AUTO: 28.1 PG (ref 26.8–34.3)
MCHC RBC AUTO-ENTMCNC: 32.9 G/DL (ref 31.4–37.4)
MCV RBC AUTO: 86 FL (ref 82–98)
PLATELET # BLD AUTO: 288 THOUSANDS/UL (ref 149–390)
PMV BLD AUTO: 9.9 FL (ref 8.9–12.7)
POTASSIUM SERPL-SCNC: 3.8 MMOL/L (ref 3.5–5.3)
RBC # BLD AUTO: 4.69 MILLION/UL (ref 3.88–5.62)
SODIUM SERPL-SCNC: 139 MMOL/L (ref 135–147)
TSH SERPL DL<=0.05 MIU/L-ACNC: 2 UIU/ML (ref 0.45–4.5)
WBC # BLD AUTO: 9.31 THOUSAND/UL (ref 4.31–10.16)

## 2025-04-09 PROCEDURE — 84443 ASSAY THYROID STIM HORMONE: CPT | Performed by: FAMILY MEDICINE

## 2025-04-09 PROCEDURE — 80048 BASIC METABOLIC PNL TOTAL CA: CPT | Performed by: FAMILY MEDICINE

## 2025-04-09 PROCEDURE — 85027 COMPLETE CBC AUTOMATED: CPT | Performed by: FAMILY MEDICINE

## 2025-04-09 PROCEDURE — 99232 SBSQ HOSP IP/OBS MODERATE 35: CPT | Performed by: FAMILY MEDICINE

## 2025-04-09 RX ADMIN — PANTOPRAZOLE SODIUM 40 MG: 40 TABLET, DELAYED RELEASE ORAL at 05:18

## 2025-04-09 RX ADMIN — ATORVASTATIN CALCIUM 10 MG: 10 TABLET, FILM COATED ORAL at 17:27

## 2025-04-09 RX ADMIN — METFORMIN HYDROCHLORIDE 500 MG: 500 TABLET, FILM COATED ORAL at 08:11

## 2025-04-09 RX ADMIN — OLANZAPINE 20 MG: 10 TABLET, FILM COATED ORAL at 21:40

## 2025-04-09 RX ADMIN — LEVOTHYROXINE SODIUM 75 MCG: 0.07 TABLET ORAL at 05:18

## 2025-04-09 RX ADMIN — DEXTROAMPHETAMINE SACCHARATE, AMPHETAMINE ASPARTATE, DEXTROAMPHETAMINE SULFATE AND AMPHETAMINE SULFATE 30 MG: 2.5; 2.5; 2.5; 2.5 TABLET ORAL at 08:11

## 2025-04-09 RX ADMIN — Medication 3 MG: at 21:40

## 2025-04-09 RX ADMIN — METFORMIN HYDROCHLORIDE 500 MG: 500 TABLET, FILM COATED ORAL at 17:27

## 2025-04-09 RX ADMIN — DIVALPROEX SODIUM 1500 MG: 500 TABLET, FILM COATED, EXTENDED RELEASE ORAL at 21:40

## 2025-04-09 RX ADMIN — SENNOSIDES 8.6 MG: 8.6 TABLET, FILM COATED ORAL at 21:40

## 2025-04-10 PROCEDURE — 99231 SBSQ HOSP IP/OBS SF/LOW 25: CPT | Performed by: FAMILY MEDICINE

## 2025-04-10 RX ADMIN — ATORVASTATIN CALCIUM 10 MG: 10 TABLET, FILM COATED ORAL at 17:09

## 2025-04-10 RX ADMIN — LEVOTHYROXINE SODIUM 75 MCG: 0.07 TABLET ORAL at 05:18

## 2025-04-10 RX ADMIN — METFORMIN HYDROCHLORIDE 500 MG: 500 TABLET, FILM COATED ORAL at 08:07

## 2025-04-10 RX ADMIN — METFORMIN HYDROCHLORIDE 500 MG: 500 TABLET, FILM COATED ORAL at 17:09

## 2025-04-10 RX ADMIN — DEXTROAMPHETAMINE SACCHARATE, AMPHETAMINE ASPARTATE, DEXTROAMPHETAMINE SULFATE AND AMPHETAMINE SULFATE 30 MG: 2.5; 2.5; 2.5; 2.5 TABLET ORAL at 08:10

## 2025-04-10 RX ADMIN — PANTOPRAZOLE SODIUM 40 MG: 40 TABLET, DELAYED RELEASE ORAL at 05:18

## 2025-04-10 RX ADMIN — SENNOSIDES 8.6 MG: 8.6 TABLET, FILM COATED ORAL at 22:09

## 2025-04-10 RX ADMIN — DIVALPROEX SODIUM 1500 MG: 500 TABLET, FILM COATED, EXTENDED RELEASE ORAL at 22:09

## 2025-04-10 RX ADMIN — OLANZAPINE 20 MG: 10 TABLET, FILM COATED ORAL at 22:09

## 2025-04-10 RX ADMIN — Medication 3 MG: at 22:09

## 2025-04-11 PROBLEM — F79 INTELLECTUAL DISABILITY: Status: ACTIVE | Noted: 2025-04-11

## 2025-04-11 PROCEDURE — 99232 SBSQ HOSP IP/OBS MODERATE 35: CPT | Performed by: FAMILY MEDICINE

## 2025-04-11 RX ADMIN — ATORVASTATIN CALCIUM 10 MG: 10 TABLET, FILM COATED ORAL at 17:03

## 2025-04-11 RX ADMIN — SENNOSIDES 8.6 MG: 8.6 TABLET, FILM COATED ORAL at 22:22

## 2025-04-11 RX ADMIN — OLANZAPINE 20 MG: 10 TABLET, FILM COATED ORAL at 22:22

## 2025-04-11 RX ADMIN — LEVOTHYROXINE SODIUM 75 MCG: 0.07 TABLET ORAL at 05:20

## 2025-04-11 RX ADMIN — DIVALPROEX SODIUM 1500 MG: 500 TABLET, FILM COATED, EXTENDED RELEASE ORAL at 22:22

## 2025-04-11 RX ADMIN — Medication 3 MG: at 22:22

## 2025-04-11 RX ADMIN — METFORMIN HYDROCHLORIDE 500 MG: 500 TABLET, FILM COATED ORAL at 08:33

## 2025-04-11 RX ADMIN — DEXTROAMPHETAMINE SACCHARATE, AMPHETAMINE ASPARTATE, DEXTROAMPHETAMINE SULFATE AND AMPHETAMINE SULFATE 30 MG: 2.5; 2.5; 2.5; 2.5 TABLET ORAL at 08:31

## 2025-04-11 RX ADMIN — PANTOPRAZOLE SODIUM 40 MG: 40 TABLET, DELAYED RELEASE ORAL at 05:20

## 2025-04-11 RX ADMIN — METFORMIN HYDROCHLORIDE 500 MG: 500 TABLET, FILM COATED ORAL at 15:34

## 2025-04-12 PROCEDURE — 99232 SBSQ HOSP IP/OBS MODERATE 35: CPT | Performed by: FAMILY MEDICINE

## 2025-04-12 RX ADMIN — PANTOPRAZOLE SODIUM 40 MG: 40 TABLET, DELAYED RELEASE ORAL at 05:53

## 2025-04-12 RX ADMIN — OLANZAPINE 20 MG: 10 TABLET, FILM COATED ORAL at 21:33

## 2025-04-12 RX ADMIN — DEXTROAMPHETAMINE SACCHARATE, AMPHETAMINE ASPARTATE, DEXTROAMPHETAMINE SULFATE AND AMPHETAMINE SULFATE 30 MG: 2.5; 2.5; 2.5; 2.5 TABLET ORAL at 08:08

## 2025-04-12 RX ADMIN — ATORVASTATIN CALCIUM 10 MG: 10 TABLET, FILM COATED ORAL at 17:02

## 2025-04-12 RX ADMIN — DIVALPROEX SODIUM 1500 MG: 500 TABLET, FILM COATED, EXTENDED RELEASE ORAL at 21:33

## 2025-04-12 RX ADMIN — METFORMIN HYDROCHLORIDE 500 MG: 500 TABLET, FILM COATED ORAL at 15:32

## 2025-04-12 RX ADMIN — Medication 3 MG: at 21:33

## 2025-04-12 RX ADMIN — SENNOSIDES 8.6 MG: 8.6 TABLET, FILM COATED ORAL at 21:33

## 2025-04-12 RX ADMIN — LEVOTHYROXINE SODIUM 75 MCG: 0.07 TABLET ORAL at 05:53

## 2025-04-12 RX ADMIN — METFORMIN HYDROCHLORIDE 500 MG: 500 TABLET, FILM COATED ORAL at 08:08

## 2025-04-13 PROCEDURE — 99232 SBSQ HOSP IP/OBS MODERATE 35: CPT | Performed by: FAMILY MEDICINE

## 2025-04-13 RX ADMIN — SENNOSIDES 8.6 MG: 8.6 TABLET, FILM COATED ORAL at 21:26

## 2025-04-13 RX ADMIN — PANTOPRAZOLE SODIUM 40 MG: 40 TABLET, DELAYED RELEASE ORAL at 05:35

## 2025-04-13 RX ADMIN — METFORMIN HYDROCHLORIDE 500 MG: 500 TABLET, FILM COATED ORAL at 08:01

## 2025-04-13 RX ADMIN — ATORVASTATIN CALCIUM 10 MG: 10 TABLET, FILM COATED ORAL at 17:18

## 2025-04-13 RX ADMIN — METFORMIN HYDROCHLORIDE 500 MG: 500 TABLET, FILM COATED ORAL at 17:18

## 2025-04-13 RX ADMIN — DEXTROAMPHETAMINE SACCHARATE, AMPHETAMINE ASPARTATE, DEXTROAMPHETAMINE SULFATE AND AMPHETAMINE SULFATE 30 MG: 2.5; 2.5; 2.5; 2.5 TABLET ORAL at 08:01

## 2025-04-13 RX ADMIN — ACETAMINOPHEN 650 MG: 325 TABLET, FILM COATED ORAL at 09:42

## 2025-04-13 RX ADMIN — Medication 3 MG: at 21:26

## 2025-04-13 RX ADMIN — DIVALPROEX SODIUM 1500 MG: 500 TABLET, FILM COATED, EXTENDED RELEASE ORAL at 21:26

## 2025-04-13 RX ADMIN — OLANZAPINE 20 MG: 10 TABLET, FILM COATED ORAL at 21:26

## 2025-04-13 RX ADMIN — LEVOTHYROXINE SODIUM 75 MCG: 0.07 TABLET ORAL at 05:34

## 2025-04-14 LAB
ANION GAP SERPL CALCULATED.3IONS-SCNC: 8 MMOL/L (ref 4–13)
BASOPHILS # BLD AUTO: 0.05 THOUSANDS/ÂΜL (ref 0–0.1)
BASOPHILS NFR BLD AUTO: 1 % (ref 0–1)
BUN SERPL-MCNC: 12 MG/DL (ref 5–25)
CALCIUM SERPL-MCNC: 9.3 MG/DL (ref 8.4–10.2)
CHLORIDE SERPL-SCNC: 100 MMOL/L (ref 96–108)
CO2 SERPL-SCNC: 30 MMOL/L (ref 21–32)
CREAT SERPL-MCNC: 0.57 MG/DL (ref 0.6–1.3)
EOSINOPHIL # BLD AUTO: 0 THOUSAND/ÂΜL (ref 0–0.61)
EOSINOPHIL NFR BLD AUTO: 0 % (ref 0–6)
ERYTHROCYTE [DISTWIDTH] IN BLOOD BY AUTOMATED COUNT: 13.2 % (ref 11.6–15.1)
EST. AVERAGE GLUCOSE BLD GHB EST-MCNC: 163 MG/DL
GFR SERPL CREATININE-BSD FRML MDRD: 113 ML/MIN/1.73SQ M
GLUCOSE SERPL-MCNC: 144 MG/DL (ref 65–140)
HBA1C MFR BLD: 7.3 %
HCT VFR BLD AUTO: 39.3 % (ref 36.5–49.3)
HGB BLD-MCNC: 13.1 G/DL (ref 12–17)
IMM GRANULOCYTES # BLD AUTO: 0.05 THOUSAND/UL (ref 0–0.2)
IMM GRANULOCYTES NFR BLD AUTO: 1 % (ref 0–2)
LYMPHOCYTES # BLD AUTO: 3.23 THOUSANDS/ÂΜL (ref 0.6–4.47)
LYMPHOCYTES NFR BLD AUTO: 32 % (ref 14–44)
MCH RBC QN AUTO: 28.5 PG (ref 26.8–34.3)
MCHC RBC AUTO-ENTMCNC: 33.3 G/DL (ref 31.4–37.4)
MCV RBC AUTO: 85 FL (ref 82–98)
MONOCYTES # BLD AUTO: 1.04 THOUSAND/ÂΜL (ref 0.17–1.22)
MONOCYTES NFR BLD AUTO: 10 % (ref 4–12)
NEUTROPHILS # BLD AUTO: 5.74 THOUSANDS/ÂΜL (ref 1.85–7.62)
NEUTS SEG NFR BLD AUTO: 56 % (ref 43–75)
NRBC BLD AUTO-RTO: 0 /100 WBCS
PLATELET # BLD AUTO: 260 THOUSANDS/UL (ref 149–390)
PMV BLD AUTO: 9.8 FL (ref 8.9–12.7)
POTASSIUM SERPL-SCNC: 4 MMOL/L (ref 3.5–5.3)
RBC # BLD AUTO: 4.6 MILLION/UL (ref 3.88–5.62)
SODIUM SERPL-SCNC: 138 MMOL/L (ref 135–147)
WBC # BLD AUTO: 10.11 THOUSAND/UL (ref 4.31–10.16)

## 2025-04-14 PROCEDURE — 80048 BASIC METABOLIC PNL TOTAL CA: CPT | Performed by: FAMILY MEDICINE

## 2025-04-14 PROCEDURE — 99231 SBSQ HOSP IP/OBS SF/LOW 25: CPT

## 2025-04-14 PROCEDURE — 85025 COMPLETE CBC W/AUTO DIFF WBC: CPT | Performed by: FAMILY MEDICINE

## 2025-04-14 PROCEDURE — 83036 HEMOGLOBIN GLYCOSYLATED A1C: CPT | Performed by: FAMILY MEDICINE

## 2025-04-14 RX ADMIN — METFORMIN HYDROCHLORIDE 500 MG: 500 TABLET, FILM COATED ORAL at 08:22

## 2025-04-14 RX ADMIN — PANTOPRAZOLE SODIUM 40 MG: 40 TABLET, DELAYED RELEASE ORAL at 05:47

## 2025-04-14 RX ADMIN — SENNOSIDES 8.6 MG: 8.6 TABLET, FILM COATED ORAL at 21:43

## 2025-04-14 RX ADMIN — DIVALPROEX SODIUM 1500 MG: 500 TABLET, FILM COATED, EXTENDED RELEASE ORAL at 21:43

## 2025-04-14 RX ADMIN — METFORMIN HYDROCHLORIDE 500 MG: 500 TABLET, FILM COATED ORAL at 17:03

## 2025-04-14 RX ADMIN — OLANZAPINE 20 MG: 10 TABLET, FILM COATED ORAL at 21:43

## 2025-04-14 RX ADMIN — LEVOTHYROXINE SODIUM 75 MCG: 0.07 TABLET ORAL at 05:47

## 2025-04-14 RX ADMIN — DEXTROAMPHETAMINE SACCHARATE, AMPHETAMINE ASPARTATE, DEXTROAMPHETAMINE SULFATE AND AMPHETAMINE SULFATE 30 MG: 2.5; 2.5; 2.5; 2.5 TABLET ORAL at 08:22

## 2025-04-14 RX ADMIN — ATORVASTATIN CALCIUM 10 MG: 10 TABLET, FILM COATED ORAL at 17:03

## 2025-04-14 RX ADMIN — Medication 3 MG: at 21:43

## 2025-04-15 PROCEDURE — 99231 SBSQ HOSP IP/OBS SF/LOW 25: CPT

## 2025-04-15 RX ADMIN — PANTOPRAZOLE SODIUM 40 MG: 40 TABLET, DELAYED RELEASE ORAL at 05:53

## 2025-04-15 RX ADMIN — ATORVASTATIN CALCIUM 10 MG: 10 TABLET, FILM COATED ORAL at 17:04

## 2025-04-15 RX ADMIN — METFORMIN HYDROCHLORIDE 500 MG: 500 TABLET, FILM COATED ORAL at 08:24

## 2025-04-15 RX ADMIN — OLANZAPINE 20 MG: 10 TABLET, FILM COATED ORAL at 21:07

## 2025-04-15 RX ADMIN — DEXTROAMPHETAMINE SACCHARATE, AMPHETAMINE ASPARTATE, DEXTROAMPHETAMINE SULFATE AND AMPHETAMINE SULFATE 30 MG: 2.5; 2.5; 2.5; 2.5 TABLET ORAL at 08:24

## 2025-04-15 RX ADMIN — SENNOSIDES 8.6 MG: 8.6 TABLET, FILM COATED ORAL at 21:07

## 2025-04-15 RX ADMIN — DIVALPROEX SODIUM 1500 MG: 500 TABLET, FILM COATED, EXTENDED RELEASE ORAL at 21:07

## 2025-04-15 RX ADMIN — METFORMIN HYDROCHLORIDE 500 MG: 500 TABLET, FILM COATED ORAL at 17:04

## 2025-04-15 RX ADMIN — LEVOTHYROXINE SODIUM 75 MCG: 0.07 TABLET ORAL at 05:53

## 2025-04-15 RX ADMIN — Medication 3 MG: at 21:07

## 2025-04-16 PROCEDURE — 99231 SBSQ HOSP IP/OBS SF/LOW 25: CPT

## 2025-04-16 RX ORDER — ENOXAPARIN SODIUM 100 MG/ML
40 INJECTION SUBCUTANEOUS
Status: DISCONTINUED | OUTPATIENT
Start: 2025-04-16 | End: 2025-06-07

## 2025-04-16 RX ADMIN — ATORVASTATIN CALCIUM 10 MG: 10 TABLET, FILM COATED ORAL at 17:08

## 2025-04-16 RX ADMIN — PANTOPRAZOLE SODIUM 40 MG: 40 TABLET, DELAYED RELEASE ORAL at 05:15

## 2025-04-16 RX ADMIN — LEVOTHYROXINE SODIUM 75 MCG: 0.07 TABLET ORAL at 05:15

## 2025-04-16 RX ADMIN — DIVALPROEX SODIUM 1500 MG: 500 TABLET, FILM COATED, EXTENDED RELEASE ORAL at 21:24

## 2025-04-16 RX ADMIN — Medication 3 MG: at 21:24

## 2025-04-16 RX ADMIN — DEXTROAMPHETAMINE SACCHARATE, AMPHETAMINE ASPARTATE, DEXTROAMPHETAMINE SULFATE AND AMPHETAMINE SULFATE 30 MG: 2.5; 2.5; 2.5; 2.5 TABLET ORAL at 08:00

## 2025-04-16 RX ADMIN — OLANZAPINE 20 MG: 10 TABLET, FILM COATED ORAL at 21:24

## 2025-04-16 RX ADMIN — SENNOSIDES 8.6 MG: 8.6 TABLET, FILM COATED ORAL at 21:24

## 2025-04-16 RX ADMIN — METFORMIN HYDROCHLORIDE 500 MG: 500 TABLET, FILM COATED ORAL at 07:57

## 2025-04-16 RX ADMIN — METFORMIN HYDROCHLORIDE 500 MG: 500 TABLET, FILM COATED ORAL at 15:37

## 2025-04-17 PROCEDURE — 99231 SBSQ HOSP IP/OBS SF/LOW 25: CPT

## 2025-04-17 RX ADMIN — METFORMIN HYDROCHLORIDE 500 MG: 500 TABLET, FILM COATED ORAL at 08:24

## 2025-04-17 RX ADMIN — LEVOTHYROXINE SODIUM 75 MCG: 0.07 TABLET ORAL at 05:13

## 2025-04-17 RX ADMIN — ATORVASTATIN CALCIUM 10 MG: 10 TABLET, FILM COATED ORAL at 18:33

## 2025-04-17 RX ADMIN — SENNOSIDES 8.6 MG: 8.6 TABLET, FILM COATED ORAL at 21:29

## 2025-04-17 RX ADMIN — DEXTROAMPHETAMINE SACCHARATE, AMPHETAMINE ASPARTATE, DEXTROAMPHETAMINE SULFATE AND AMPHETAMINE SULFATE 30 MG: 2.5; 2.5; 2.5; 2.5 TABLET ORAL at 08:24

## 2025-04-17 RX ADMIN — METFORMIN HYDROCHLORIDE 500 MG: 500 TABLET, FILM COATED ORAL at 15:38

## 2025-04-17 RX ADMIN — PANTOPRAZOLE SODIUM 40 MG: 40 TABLET, DELAYED RELEASE ORAL at 05:13

## 2025-04-17 RX ADMIN — Medication 3 MG: at 21:30

## 2025-04-17 RX ADMIN — DIVALPROEX SODIUM 1500 MG: 500 TABLET, FILM COATED, EXTENDED RELEASE ORAL at 21:29

## 2025-04-17 RX ADMIN — OLANZAPINE 20 MG: 10 TABLET, FILM COATED ORAL at 21:29

## 2025-04-18 PROCEDURE — 99231 SBSQ HOSP IP/OBS SF/LOW 25: CPT

## 2025-04-18 RX ADMIN — OLANZAPINE 20 MG: 10 TABLET, FILM COATED ORAL at 22:29

## 2025-04-18 RX ADMIN — PANTOPRAZOLE SODIUM 40 MG: 40 TABLET, DELAYED RELEASE ORAL at 05:19

## 2025-04-18 RX ADMIN — ATORVASTATIN CALCIUM 10 MG: 10 TABLET, FILM COATED ORAL at 17:22

## 2025-04-18 RX ADMIN — DEXTROAMPHETAMINE SACCHARATE, AMPHETAMINE ASPARTATE, DEXTROAMPHETAMINE SULFATE AND AMPHETAMINE SULFATE 30 MG: 2.5; 2.5; 2.5; 2.5 TABLET ORAL at 08:36

## 2025-04-18 RX ADMIN — DIVALPROEX SODIUM 1500 MG: 500 TABLET, FILM COATED, EXTENDED RELEASE ORAL at 22:29

## 2025-04-18 RX ADMIN — Medication 3 MG: at 22:29

## 2025-04-18 RX ADMIN — LEVOTHYROXINE SODIUM 75 MCG: 0.07 TABLET ORAL at 05:19

## 2025-04-18 RX ADMIN — METFORMIN HYDROCHLORIDE 500 MG: 500 TABLET, FILM COATED ORAL at 08:36

## 2025-04-18 RX ADMIN — METFORMIN HYDROCHLORIDE 500 MG: 500 TABLET, FILM COATED ORAL at 17:22

## 2025-04-18 RX ADMIN — SENNOSIDES 8.6 MG: 8.6 TABLET, FILM COATED ORAL at 22:29

## 2025-04-19 PROCEDURE — 99231 SBSQ HOSP IP/OBS SF/LOW 25: CPT

## 2025-04-19 RX ADMIN — Medication 3 MG: at 21:11

## 2025-04-19 RX ADMIN — LEVOTHYROXINE SODIUM 75 MCG: 0.07 TABLET ORAL at 05:54

## 2025-04-19 RX ADMIN — METFORMIN HYDROCHLORIDE 500 MG: 500 TABLET, FILM COATED ORAL at 08:03

## 2025-04-19 RX ADMIN — DIVALPROEX SODIUM 1500 MG: 500 TABLET, FILM COATED, EXTENDED RELEASE ORAL at 21:11

## 2025-04-19 RX ADMIN — SENNOSIDES 8.6 MG: 8.6 TABLET, FILM COATED ORAL at 21:11

## 2025-04-19 RX ADMIN — DEXTROAMPHETAMINE SACCHARATE, AMPHETAMINE ASPARTATE, DEXTROAMPHETAMINE SULFATE AND AMPHETAMINE SULFATE 30 MG: 2.5; 2.5; 2.5; 2.5 TABLET ORAL at 08:03

## 2025-04-19 RX ADMIN — METFORMIN HYDROCHLORIDE 500 MG: 500 TABLET, FILM COATED ORAL at 17:05

## 2025-04-19 RX ADMIN — OLANZAPINE 20 MG: 10 TABLET, FILM COATED ORAL at 21:11

## 2025-04-19 RX ADMIN — PANTOPRAZOLE SODIUM 40 MG: 40 TABLET, DELAYED RELEASE ORAL at 05:54

## 2025-04-19 RX ADMIN — ATORVASTATIN CALCIUM 10 MG: 10 TABLET, FILM COATED ORAL at 17:05

## 2025-04-20 PROCEDURE — 99231 SBSQ HOSP IP/OBS SF/LOW 25: CPT

## 2025-04-20 RX ADMIN — METFORMIN HYDROCHLORIDE 500 MG: 500 TABLET, FILM COATED ORAL at 08:52

## 2025-04-20 RX ADMIN — DIVALPROEX SODIUM 1500 MG: 500 TABLET, FILM COATED, EXTENDED RELEASE ORAL at 21:05

## 2025-04-20 RX ADMIN — DEXTROAMPHETAMINE SACCHARATE, AMPHETAMINE ASPARTATE, DEXTROAMPHETAMINE SULFATE AND AMPHETAMINE SULFATE 30 MG: 2.5; 2.5; 2.5; 2.5 TABLET ORAL at 08:52

## 2025-04-20 RX ADMIN — SENNOSIDES 8.6 MG: 8.6 TABLET, FILM COATED ORAL at 21:05

## 2025-04-20 RX ADMIN — Medication 3 MG: at 21:05

## 2025-04-20 RX ADMIN — METFORMIN HYDROCHLORIDE 500 MG: 500 TABLET, FILM COATED ORAL at 17:05

## 2025-04-20 RX ADMIN — OLANZAPINE 20 MG: 10 TABLET, FILM COATED ORAL at 21:05

## 2025-04-20 RX ADMIN — ATORVASTATIN CALCIUM 10 MG: 10 TABLET, FILM COATED ORAL at 17:05

## 2025-04-20 RX ADMIN — PANTOPRAZOLE SODIUM 40 MG: 40 TABLET, DELAYED RELEASE ORAL at 06:40

## 2025-04-20 RX ADMIN — LEVOTHYROXINE SODIUM 75 MCG: 0.07 TABLET ORAL at 06:40

## 2025-04-21 LAB
ANION GAP SERPL CALCULATED.3IONS-SCNC: 8 MMOL/L (ref 4–13)
BUN SERPL-MCNC: 7 MG/DL (ref 5–25)
CALCIUM SERPL-MCNC: 9.2 MG/DL (ref 8.4–10.2)
CHLORIDE SERPL-SCNC: 104 MMOL/L (ref 96–108)
CO2 SERPL-SCNC: 31 MMOL/L (ref 21–32)
CREAT SERPL-MCNC: 0.55 MG/DL (ref 0.6–1.3)
ERYTHROCYTE [DISTWIDTH] IN BLOOD BY AUTOMATED COUNT: 13 % (ref 11.6–15.1)
GFR SERPL CREATININE-BSD FRML MDRD: 115 ML/MIN/1.73SQ M
GLUCOSE SERPL-MCNC: 110 MG/DL (ref 65–140)
HCT VFR BLD AUTO: 41.8 % (ref 36.5–49.3)
HGB BLD-MCNC: 13.4 G/DL (ref 12–17)
MCH RBC QN AUTO: 27.2 PG (ref 26.8–34.3)
MCHC RBC AUTO-ENTMCNC: 32.1 G/DL (ref 31.4–37.4)
MCV RBC AUTO: 85 FL (ref 82–98)
PLATELET # BLD AUTO: 214 THOUSANDS/UL (ref 149–390)
PMV BLD AUTO: 9.9 FL (ref 8.9–12.7)
POTASSIUM SERPL-SCNC: 4.2 MMOL/L (ref 3.5–5.3)
RBC # BLD AUTO: 4.92 MILLION/UL (ref 3.88–5.62)
SODIUM SERPL-SCNC: 143 MMOL/L (ref 135–147)
WBC # BLD AUTO: 9.94 THOUSAND/UL (ref 4.31–10.16)

## 2025-04-21 PROCEDURE — 85027 COMPLETE CBC AUTOMATED: CPT

## 2025-04-21 PROCEDURE — 80048 BASIC METABOLIC PNL TOTAL CA: CPT

## 2025-04-21 PROCEDURE — 99232 SBSQ HOSP IP/OBS MODERATE 35: CPT | Performed by: INTERNAL MEDICINE

## 2025-04-21 RX ADMIN — ATORVASTATIN CALCIUM 10 MG: 10 TABLET, FILM COATED ORAL at 17:00

## 2025-04-21 RX ADMIN — DIVALPROEX SODIUM 1500 MG: 500 TABLET, FILM COATED, EXTENDED RELEASE ORAL at 21:41

## 2025-04-21 RX ADMIN — METFORMIN HYDROCHLORIDE 500 MG: 500 TABLET, FILM COATED ORAL at 08:39

## 2025-04-21 RX ADMIN — PANTOPRAZOLE SODIUM 40 MG: 40 TABLET, DELAYED RELEASE ORAL at 05:33

## 2025-04-21 RX ADMIN — SENNOSIDES 8.6 MG: 8.6 TABLET, FILM COATED ORAL at 21:41

## 2025-04-21 RX ADMIN — OLANZAPINE 20 MG: 10 TABLET, FILM COATED ORAL at 21:41

## 2025-04-21 RX ADMIN — Medication 3 MG: at 21:42

## 2025-04-21 RX ADMIN — LEVOTHYROXINE SODIUM 75 MCG: 0.07 TABLET ORAL at 05:33

## 2025-04-21 RX ADMIN — METFORMIN HYDROCHLORIDE 500 MG: 500 TABLET, FILM COATED ORAL at 16:59

## 2025-04-21 RX ADMIN — ACETAMINOPHEN 650 MG: 325 TABLET, FILM COATED ORAL at 11:24

## 2025-04-21 RX ADMIN — DEXTROAMPHETAMINE SACCHARATE, AMPHETAMINE ASPARTATE, DEXTROAMPHETAMINE SULFATE AND AMPHETAMINE SULFATE 30 MG: 2.5; 2.5; 2.5; 2.5 TABLET ORAL at 08:39

## 2025-04-22 PROCEDURE — 99232 SBSQ HOSP IP/OBS MODERATE 35: CPT | Performed by: INTERNAL MEDICINE

## 2025-04-22 RX ADMIN — DIVALPROEX SODIUM 1500 MG: 500 TABLET, FILM COATED, EXTENDED RELEASE ORAL at 22:31

## 2025-04-22 RX ADMIN — METFORMIN HYDROCHLORIDE 500 MG: 500 TABLET, FILM COATED ORAL at 08:08

## 2025-04-22 RX ADMIN — Medication 3 MG: at 22:31

## 2025-04-22 RX ADMIN — DEXTROAMPHETAMINE SACCHARATE, AMPHETAMINE ASPARTATE, DEXTROAMPHETAMINE SULFATE AND AMPHETAMINE SULFATE 30 MG: 2.5; 2.5; 2.5; 2.5 TABLET ORAL at 08:08

## 2025-04-22 RX ADMIN — METFORMIN HYDROCHLORIDE 500 MG: 500 TABLET, FILM COATED ORAL at 17:00

## 2025-04-22 RX ADMIN — PANTOPRAZOLE SODIUM 40 MG: 40 TABLET, DELAYED RELEASE ORAL at 05:38

## 2025-04-22 RX ADMIN — OLANZAPINE 20 MG: 10 TABLET, FILM COATED ORAL at 22:31

## 2025-04-22 RX ADMIN — SENNOSIDES 8.6 MG: 8.6 TABLET, FILM COATED ORAL at 22:31

## 2025-04-22 RX ADMIN — LEVOTHYROXINE SODIUM 75 MCG: 0.07 TABLET ORAL at 05:38

## 2025-04-22 RX ADMIN — ATORVASTATIN CALCIUM 10 MG: 10 TABLET, FILM COATED ORAL at 17:00

## 2025-04-23 PROCEDURE — 99232 SBSQ HOSP IP/OBS MODERATE 35: CPT | Performed by: INTERNAL MEDICINE

## 2025-04-23 RX ADMIN — Medication 3 MG: at 22:32

## 2025-04-23 RX ADMIN — OLANZAPINE 20 MG: 10 TABLET, FILM COATED ORAL at 22:32

## 2025-04-23 RX ADMIN — METFORMIN HYDROCHLORIDE 500 MG: 500 TABLET, FILM COATED ORAL at 15:59

## 2025-04-23 RX ADMIN — ATORVASTATIN CALCIUM 10 MG: 10 TABLET, FILM COATED ORAL at 16:00

## 2025-04-23 RX ADMIN — PANTOPRAZOLE SODIUM 40 MG: 40 TABLET, DELAYED RELEASE ORAL at 05:37

## 2025-04-23 RX ADMIN — ACETAMINOPHEN 650 MG: 325 TABLET, FILM COATED ORAL at 15:15

## 2025-04-23 RX ADMIN — DEXTROAMPHETAMINE SACCHARATE, AMPHETAMINE ASPARTATE, DEXTROAMPHETAMINE SULFATE AND AMPHETAMINE SULFATE 30 MG: 2.5; 2.5; 2.5; 2.5 TABLET ORAL at 08:04

## 2025-04-23 RX ADMIN — SENNOSIDES 8.6 MG: 8.6 TABLET, FILM COATED ORAL at 22:32

## 2025-04-23 RX ADMIN — LEVOTHYROXINE SODIUM 75 MCG: 0.07 TABLET ORAL at 05:37

## 2025-04-23 RX ADMIN — METFORMIN HYDROCHLORIDE 500 MG: 500 TABLET, FILM COATED ORAL at 08:04

## 2025-04-23 RX ADMIN — DIVALPROEX SODIUM 1500 MG: 500 TABLET, FILM COATED, EXTENDED RELEASE ORAL at 22:32

## 2025-04-23 NOTE — PLAN OF CARE
I have reviewed the notes, assessments, and/or procedures performed by Dr. Harris, I concur with her/his documentation of Jorge Bourne.  Date of Service: 4/22/2025   Problem: Alteration in Thoughts and Perception  Goal: Agree to be compliant with medication regime, as prescribed and report medication side effects  Description  Interventions:  - Offer appropriate PRN medication and supervise ingestion; conduct AIMS, as needed   Outcome: Progressing  Goal: Complete daily ADLs, including personal hygiene independently, as able  Description  Interventions:  - Observe, teach, and assist patient with ADLS  - Monitor and promote a balance of rest/activity, with adequate nutrition and elimination   Outcome: Yeison Mark Beebe has been visible on the unit, attended select groups  Compliant with his medications and meals, remains frustrated and preoccupied with discharge during interactions  Made multiple phone calls and was having his peers make calls for him  Layered in clothing but will not wash his clothes, remains disheveled in appearance  Received tylenol for left foot pain @ 0841 for 2/10 discomfort, effective upon reassessment  Behaviors controlled, will continue to monitor for changes

## 2025-04-24 PROCEDURE — 99232 SBSQ HOSP IP/OBS MODERATE 35: CPT | Performed by: INTERNAL MEDICINE

## 2025-04-24 RX ADMIN — METFORMIN HYDROCHLORIDE 500 MG: 500 TABLET, FILM COATED ORAL at 08:00

## 2025-04-24 RX ADMIN — METFORMIN HYDROCHLORIDE 500 MG: 500 TABLET, FILM COATED ORAL at 16:34

## 2025-04-24 RX ADMIN — LEVOTHYROXINE SODIUM 75 MCG: 0.07 TABLET ORAL at 06:23

## 2025-04-24 RX ADMIN — DEXTROAMPHETAMINE SACCHARATE, AMPHETAMINE ASPARTATE, DEXTROAMPHETAMINE SULFATE AND AMPHETAMINE SULFATE 30 MG: 2.5; 2.5; 2.5; 2.5 TABLET ORAL at 08:00

## 2025-04-24 RX ADMIN — Medication 3 MG: at 21:01

## 2025-04-24 RX ADMIN — SENNOSIDES 8.6 MG: 8.6 TABLET, FILM COATED ORAL at 21:00

## 2025-04-24 RX ADMIN — DIVALPROEX SODIUM 1500 MG: 500 TABLET, FILM COATED, EXTENDED RELEASE ORAL at 21:00

## 2025-04-24 RX ADMIN — ATORVASTATIN CALCIUM 10 MG: 10 TABLET, FILM COATED ORAL at 17:10

## 2025-04-24 RX ADMIN — OLANZAPINE 20 MG: 10 TABLET, FILM COATED ORAL at 21:01

## 2025-04-24 RX ADMIN — PANTOPRAZOLE SODIUM 40 MG: 40 TABLET, DELAYED RELEASE ORAL at 06:23

## 2025-04-25 PROCEDURE — 99232 SBSQ HOSP IP/OBS MODERATE 35: CPT | Performed by: INTERNAL MEDICINE

## 2025-04-25 RX ADMIN — METFORMIN HYDROCHLORIDE 500 MG: 500 TABLET, FILM COATED ORAL at 17:22

## 2025-04-25 RX ADMIN — SENNOSIDES 8.6 MG: 8.6 TABLET, FILM COATED ORAL at 21:18

## 2025-04-25 RX ADMIN — DIVALPROEX SODIUM 1500 MG: 500 TABLET, FILM COATED, EXTENDED RELEASE ORAL at 21:17

## 2025-04-25 RX ADMIN — ATORVASTATIN CALCIUM 10 MG: 10 TABLET, FILM COATED ORAL at 17:22

## 2025-04-25 RX ADMIN — LEVOTHYROXINE SODIUM 75 MCG: 0.07 TABLET ORAL at 05:00

## 2025-04-25 RX ADMIN — OLANZAPINE 20 MG: 10 TABLET, FILM COATED ORAL at 21:17

## 2025-04-25 RX ADMIN — METFORMIN HYDROCHLORIDE 500 MG: 500 TABLET, FILM COATED ORAL at 08:38

## 2025-04-25 RX ADMIN — Medication 3 MG: at 21:18

## 2025-04-25 RX ADMIN — DEXTROAMPHETAMINE SACCHARATE, AMPHETAMINE ASPARTATE, DEXTROAMPHETAMINE SULFATE AND AMPHETAMINE SULFATE 30 MG: 2.5; 2.5; 2.5; 2.5 TABLET ORAL at 08:38

## 2025-04-25 RX ADMIN — PANTOPRAZOLE SODIUM 40 MG: 40 TABLET, DELAYED RELEASE ORAL at 05:00

## 2025-04-26 PROCEDURE — 99232 SBSQ HOSP IP/OBS MODERATE 35: CPT | Performed by: INTERNAL MEDICINE

## 2025-04-26 RX ADMIN — LEVOTHYROXINE SODIUM 75 MCG: 0.07 TABLET ORAL at 05:32

## 2025-04-26 RX ADMIN — Medication 3 MG: at 22:47

## 2025-04-26 RX ADMIN — METFORMIN HYDROCHLORIDE 500 MG: 500 TABLET, FILM COATED ORAL at 17:01

## 2025-04-26 RX ADMIN — PANTOPRAZOLE SODIUM 40 MG: 40 TABLET, DELAYED RELEASE ORAL at 05:32

## 2025-04-26 RX ADMIN — DEXTROAMPHETAMINE SACCHARATE, AMPHETAMINE ASPARTATE, DEXTROAMPHETAMINE SULFATE AND AMPHETAMINE SULFATE 30 MG: 2.5; 2.5; 2.5; 2.5 TABLET ORAL at 08:27

## 2025-04-26 RX ADMIN — OLANZAPINE 20 MG: 10 TABLET, FILM COATED ORAL at 22:47

## 2025-04-26 RX ADMIN — METFORMIN HYDROCHLORIDE 500 MG: 500 TABLET, FILM COATED ORAL at 08:27

## 2025-04-26 RX ADMIN — DIVALPROEX SODIUM 1500 MG: 500 TABLET, FILM COATED, EXTENDED RELEASE ORAL at 22:47

## 2025-04-26 RX ADMIN — SENNOSIDES 8.6 MG: 8.6 TABLET, FILM COATED ORAL at 22:47

## 2025-04-26 RX ADMIN — ATORVASTATIN CALCIUM 10 MG: 10 TABLET, FILM COATED ORAL at 17:01

## 2025-04-27 PROCEDURE — 99232 SBSQ HOSP IP/OBS MODERATE 35: CPT | Performed by: INTERNAL MEDICINE

## 2025-04-27 RX ADMIN — PANTOPRAZOLE SODIUM 40 MG: 40 TABLET, DELAYED RELEASE ORAL at 06:35

## 2025-04-27 RX ADMIN — ENOXAPARIN SODIUM 40 MG: 40 INJECTION SUBCUTANEOUS at 08:36

## 2025-04-27 RX ADMIN — METFORMIN HYDROCHLORIDE 500 MG: 500 TABLET, FILM COATED ORAL at 15:51

## 2025-04-27 RX ADMIN — DEXTROAMPHETAMINE SACCHARATE, AMPHETAMINE ASPARTATE, DEXTROAMPHETAMINE SULFATE AND AMPHETAMINE SULFATE 30 MG: 2.5; 2.5; 2.5; 2.5 TABLET ORAL at 08:36

## 2025-04-27 RX ADMIN — ATORVASTATIN CALCIUM 10 MG: 10 TABLET, FILM COATED ORAL at 17:05

## 2025-04-27 RX ADMIN — Medication 3 MG: at 21:45

## 2025-04-27 RX ADMIN — DIVALPROEX SODIUM 1500 MG: 500 TABLET, FILM COATED, EXTENDED RELEASE ORAL at 21:44

## 2025-04-27 RX ADMIN — SENNOSIDES 8.6 MG: 8.6 TABLET, FILM COATED ORAL at 21:45

## 2025-04-27 RX ADMIN — METFORMIN HYDROCHLORIDE 500 MG: 500 TABLET, FILM COATED ORAL at 08:37

## 2025-04-27 RX ADMIN — LEVOTHYROXINE SODIUM 75 MCG: 0.07 TABLET ORAL at 06:35

## 2025-04-27 RX ADMIN — OLANZAPINE 20 MG: 10 TABLET, FILM COATED ORAL at 21:45

## 2025-04-28 PROCEDURE — 99233 SBSQ HOSP IP/OBS HIGH 50: CPT | Performed by: INTERNAL MEDICINE

## 2025-04-28 RX ADMIN — ACETAMINOPHEN 650 MG: 325 TABLET, FILM COATED ORAL at 16:37

## 2025-04-28 RX ADMIN — LEVOTHYROXINE SODIUM 75 MCG: 0.07 TABLET ORAL at 05:40

## 2025-04-28 RX ADMIN — ATORVASTATIN CALCIUM 10 MG: 10 TABLET, FILM COATED ORAL at 17:13

## 2025-04-28 RX ADMIN — METFORMIN HYDROCHLORIDE 500 MG: 500 TABLET, FILM COATED ORAL at 08:14

## 2025-04-28 RX ADMIN — Medication 3 MG: at 21:01

## 2025-04-28 RX ADMIN — OLANZAPINE 20 MG: 10 TABLET, FILM COATED ORAL at 21:01

## 2025-04-28 RX ADMIN — PANTOPRAZOLE SODIUM 40 MG: 40 TABLET, DELAYED RELEASE ORAL at 05:40

## 2025-04-28 RX ADMIN — DEXTROAMPHETAMINE SACCHARATE, AMPHETAMINE ASPARTATE, DEXTROAMPHETAMINE SULFATE AND AMPHETAMINE SULFATE 30 MG: 2.5; 2.5; 2.5; 2.5 TABLET ORAL at 08:14

## 2025-04-28 RX ADMIN — SENNOSIDES 8.6 MG: 8.6 TABLET, FILM COATED ORAL at 21:01

## 2025-04-28 RX ADMIN — ACETAMINOPHEN 650 MG: 325 TABLET, FILM COATED ORAL at 08:14

## 2025-04-28 RX ADMIN — DIVALPROEX SODIUM 1500 MG: 500 TABLET, FILM COATED, EXTENDED RELEASE ORAL at 21:01

## 2025-04-28 RX ADMIN — METFORMIN HYDROCHLORIDE 500 MG: 500 TABLET, FILM COATED ORAL at 16:36

## 2025-04-29 PROCEDURE — 99233 SBSQ HOSP IP/OBS HIGH 50: CPT | Performed by: INTERNAL MEDICINE

## 2025-04-29 RX ADMIN — DEXTROAMPHETAMINE SACCHARATE, AMPHETAMINE ASPARTATE, DEXTROAMPHETAMINE SULFATE AND AMPHETAMINE SULFATE 30 MG: 2.5; 2.5; 2.5; 2.5 TABLET ORAL at 08:12

## 2025-04-29 RX ADMIN — Medication 3 MG: at 21:14

## 2025-04-29 RX ADMIN — METFORMIN HYDROCHLORIDE 500 MG: 500 TABLET, FILM COATED ORAL at 17:01

## 2025-04-29 RX ADMIN — DIVALPROEX SODIUM 1500 MG: 500 TABLET, FILM COATED, EXTENDED RELEASE ORAL at 21:14

## 2025-04-29 RX ADMIN — SENNOSIDES 8.6 MG: 8.6 TABLET, FILM COATED ORAL at 21:14

## 2025-04-29 RX ADMIN — LEVOTHYROXINE SODIUM 75 MCG: 0.07 TABLET ORAL at 05:11

## 2025-04-29 RX ADMIN — PANTOPRAZOLE SODIUM 40 MG: 40 TABLET, DELAYED RELEASE ORAL at 05:11

## 2025-04-29 RX ADMIN — OLANZAPINE 20 MG: 10 TABLET, FILM COATED ORAL at 21:14

## 2025-04-29 RX ADMIN — ATORVASTATIN CALCIUM 10 MG: 10 TABLET, FILM COATED ORAL at 17:01

## 2025-04-29 RX ADMIN — METFORMIN HYDROCHLORIDE 500 MG: 500 TABLET, FILM COATED ORAL at 08:13

## 2025-04-30 PROCEDURE — 99232 SBSQ HOSP IP/OBS MODERATE 35: CPT | Performed by: FAMILY MEDICINE

## 2025-04-30 RX ADMIN — OLANZAPINE 20 MG: 10 TABLET, FILM COATED ORAL at 21:05

## 2025-04-30 RX ADMIN — Medication 3 MG: at 21:05

## 2025-04-30 RX ADMIN — ACETAMINOPHEN 650 MG: 325 TABLET, FILM COATED ORAL at 21:39

## 2025-04-30 RX ADMIN — METFORMIN HYDROCHLORIDE 500 MG: 500 TABLET, FILM COATED ORAL at 15:41

## 2025-04-30 RX ADMIN — LEVOTHYROXINE SODIUM 75 MCG: 0.07 TABLET ORAL at 05:23

## 2025-04-30 RX ADMIN — PANTOPRAZOLE SODIUM 40 MG: 40 TABLET, DELAYED RELEASE ORAL at 05:23

## 2025-04-30 RX ADMIN — ATORVASTATIN CALCIUM 10 MG: 10 TABLET, FILM COATED ORAL at 17:08

## 2025-04-30 RX ADMIN — METFORMIN HYDROCHLORIDE 500 MG: 500 TABLET, FILM COATED ORAL at 08:13

## 2025-04-30 RX ADMIN — SENNOSIDES 8.6 MG: 8.6 TABLET, FILM COATED ORAL at 21:05

## 2025-04-30 RX ADMIN — DEXTROAMPHETAMINE SACCHARATE, AMPHETAMINE ASPARTATE, DEXTROAMPHETAMINE SULFATE AND AMPHETAMINE SULFATE 30 MG: 2.5; 2.5; 2.5; 2.5 TABLET ORAL at 08:13

## 2025-04-30 RX ADMIN — DIVALPROEX SODIUM 1500 MG: 500 TABLET, FILM COATED, EXTENDED RELEASE ORAL at 21:05

## 2025-05-01 PROCEDURE — 99232 SBSQ HOSP IP/OBS MODERATE 35: CPT | Performed by: FAMILY MEDICINE

## 2025-05-01 RX ADMIN — ATORVASTATIN CALCIUM 10 MG: 10 TABLET, FILM COATED ORAL at 17:07

## 2025-05-01 RX ADMIN — METFORMIN HYDROCHLORIDE 500 MG: 500 TABLET, FILM COATED ORAL at 15:48

## 2025-05-01 RX ADMIN — PANTOPRAZOLE SODIUM 40 MG: 40 TABLET, DELAYED RELEASE ORAL at 05:26

## 2025-05-01 RX ADMIN — OLANZAPINE 20 MG: 10 TABLET, FILM COATED ORAL at 21:39

## 2025-05-01 RX ADMIN — ACETAMINOPHEN 650 MG: 325 TABLET, FILM COATED ORAL at 17:44

## 2025-05-01 RX ADMIN — DEXTROAMPHETAMINE SACCHARATE, AMPHETAMINE ASPARTATE, DEXTROAMPHETAMINE SULFATE AND AMPHETAMINE SULFATE 30 MG: 2.5; 2.5; 2.5; 2.5 TABLET ORAL at 08:28

## 2025-05-01 RX ADMIN — LEVOTHYROXINE SODIUM 75 MCG: 0.07 TABLET ORAL at 05:26

## 2025-05-01 RX ADMIN — DIVALPROEX SODIUM 1500 MG: 500 TABLET, FILM COATED, EXTENDED RELEASE ORAL at 21:39

## 2025-05-01 RX ADMIN — Medication 3 MG: at 21:39

## 2025-05-01 RX ADMIN — METFORMIN HYDROCHLORIDE 500 MG: 500 TABLET, FILM COATED ORAL at 08:28

## 2025-05-01 RX ADMIN — SENNOSIDES 8.6 MG: 8.6 TABLET, FILM COATED ORAL at 21:39

## 2025-05-02 PROCEDURE — 99233 SBSQ HOSP IP/OBS HIGH 50: CPT | Performed by: INTERNAL MEDICINE

## 2025-05-02 RX ADMIN — SENNOSIDES 8.6 MG: 8.6 TABLET, FILM COATED ORAL at 21:37

## 2025-05-02 RX ADMIN — LEVOTHYROXINE SODIUM 75 MCG: 0.07 TABLET ORAL at 06:24

## 2025-05-02 RX ADMIN — METFORMIN HYDROCHLORIDE 500 MG: 500 TABLET, FILM COATED ORAL at 15:33

## 2025-05-02 RX ADMIN — PANTOPRAZOLE SODIUM 40 MG: 40 TABLET, DELAYED RELEASE ORAL at 06:24

## 2025-05-02 RX ADMIN — ATORVASTATIN CALCIUM 10 MG: 10 TABLET, FILM COATED ORAL at 17:16

## 2025-05-02 RX ADMIN — OLANZAPINE 20 MG: 10 TABLET, FILM COATED ORAL at 21:36

## 2025-05-02 RX ADMIN — DIVALPROEX SODIUM 1500 MG: 500 TABLET, FILM COATED, EXTENDED RELEASE ORAL at 21:37

## 2025-05-02 RX ADMIN — METFORMIN HYDROCHLORIDE 500 MG: 500 TABLET, FILM COATED ORAL at 08:23

## 2025-05-02 RX ADMIN — ENOXAPARIN SODIUM 40 MG: 40 INJECTION SUBCUTANEOUS at 08:23

## 2025-05-02 RX ADMIN — DEXTROAMPHETAMINE SACCHARATE, AMPHETAMINE ASPARTATE, DEXTROAMPHETAMINE SULFATE AND AMPHETAMINE SULFATE 30 MG: 2.5; 2.5; 2.5; 2.5 TABLET ORAL at 08:24

## 2025-05-02 RX ADMIN — Medication 3 MG: at 21:37

## 2025-05-03 PROBLEM — Z75.1 AWAITING ADMISSION TO ADEQUATE FACILITY ELSEWHERE: Status: ACTIVE | Noted: 2025-05-03

## 2025-05-03 PROBLEM — U07.1 LAB TEST POSITIVE FOR DETECTION OF COVID-19 VIRUS: Status: RESOLVED | Noted: 2025-04-08 | Resolved: 2025-05-03

## 2025-05-03 PROCEDURE — 99233 SBSQ HOSP IP/OBS HIGH 50: CPT | Performed by: INTERNAL MEDICINE

## 2025-05-03 RX ADMIN — DEXTROAMPHETAMINE SACCHARATE, AMPHETAMINE ASPARTATE, DEXTROAMPHETAMINE SULFATE AND AMPHETAMINE SULFATE 30 MG: 2.5; 2.5; 2.5; 2.5 TABLET ORAL at 08:17

## 2025-05-03 RX ADMIN — ATORVASTATIN CALCIUM 10 MG: 10 TABLET, FILM COATED ORAL at 17:00

## 2025-05-03 RX ADMIN — SENNOSIDES 8.6 MG: 8.6 TABLET, FILM COATED ORAL at 21:20

## 2025-05-03 RX ADMIN — METFORMIN HYDROCHLORIDE 500 MG: 500 TABLET, FILM COATED ORAL at 08:17

## 2025-05-03 RX ADMIN — METFORMIN HYDROCHLORIDE 500 MG: 500 TABLET, FILM COATED ORAL at 15:47

## 2025-05-03 RX ADMIN — Medication 3 MG: at 21:20

## 2025-05-03 RX ADMIN — LEVOTHYROXINE SODIUM 75 MCG: 0.07 TABLET ORAL at 05:43

## 2025-05-03 RX ADMIN — DIVALPROEX SODIUM 1500 MG: 500 TABLET, FILM COATED, EXTENDED RELEASE ORAL at 21:20

## 2025-05-03 RX ADMIN — PANTOPRAZOLE SODIUM 40 MG: 40 TABLET, DELAYED RELEASE ORAL at 05:43

## 2025-05-03 RX ADMIN — OLANZAPINE 20 MG: 10 TABLET, FILM COATED ORAL at 21:19

## 2025-05-04 PROCEDURE — 99232 SBSQ HOSP IP/OBS MODERATE 35: CPT | Performed by: INTERNAL MEDICINE

## 2025-05-04 RX ADMIN — SENNOSIDES 8.6 MG: 8.6 TABLET, FILM COATED ORAL at 21:18

## 2025-05-04 RX ADMIN — LEVOTHYROXINE SODIUM 75 MCG: 0.07 TABLET ORAL at 05:49

## 2025-05-04 RX ADMIN — DIVALPROEX SODIUM 1500 MG: 500 TABLET, FILM COATED, EXTENDED RELEASE ORAL at 21:18

## 2025-05-04 RX ADMIN — Medication 3 MG: at 21:19

## 2025-05-04 RX ADMIN — METFORMIN HYDROCHLORIDE 500 MG: 500 TABLET, FILM COATED ORAL at 08:10

## 2025-05-04 RX ADMIN — METFORMIN HYDROCHLORIDE 500 MG: 500 TABLET, FILM COATED ORAL at 16:47

## 2025-05-04 RX ADMIN — DEXTROAMPHETAMINE SACCHARATE, AMPHETAMINE ASPARTATE, DEXTROAMPHETAMINE SULFATE AND AMPHETAMINE SULFATE 30 MG: 2.5; 2.5; 2.5; 2.5 TABLET ORAL at 08:10

## 2025-05-04 RX ADMIN — OLANZAPINE 20 MG: 10 TABLET, FILM COATED ORAL at 21:18

## 2025-05-04 RX ADMIN — ATORVASTATIN CALCIUM 10 MG: 10 TABLET, FILM COATED ORAL at 17:03

## 2025-05-04 RX ADMIN — PANTOPRAZOLE SODIUM 40 MG: 40 TABLET, DELAYED RELEASE ORAL at 05:49

## 2025-05-05 PROCEDURE — 99232 SBSQ HOSP IP/OBS MODERATE 35: CPT | Performed by: INTERNAL MEDICINE

## 2025-05-05 RX ADMIN — LEVOTHYROXINE SODIUM 75 MCG: 0.07 TABLET ORAL at 05:27

## 2025-05-05 RX ADMIN — ATORVASTATIN CALCIUM 10 MG: 10 TABLET, FILM COATED ORAL at 17:15

## 2025-05-05 RX ADMIN — DEXTROAMPHETAMINE SACCHARATE, AMPHETAMINE ASPARTATE, DEXTROAMPHETAMINE SULFATE AND AMPHETAMINE SULFATE 30 MG: 2.5; 2.5; 2.5; 2.5 TABLET ORAL at 08:54

## 2025-05-05 RX ADMIN — METFORMIN HYDROCHLORIDE 500 MG: 500 TABLET, FILM COATED ORAL at 17:15

## 2025-05-05 RX ADMIN — PANTOPRAZOLE SODIUM 40 MG: 40 TABLET, DELAYED RELEASE ORAL at 05:27

## 2025-05-05 RX ADMIN — OLANZAPINE 20 MG: 10 TABLET, FILM COATED ORAL at 21:23

## 2025-05-05 RX ADMIN — DIVALPROEX SODIUM 1500 MG: 500 TABLET, FILM COATED, EXTENDED RELEASE ORAL at 21:23

## 2025-05-05 RX ADMIN — SENNOSIDES 8.6 MG: 8.6 TABLET, FILM COATED ORAL at 21:24

## 2025-05-05 RX ADMIN — METFORMIN HYDROCHLORIDE 500 MG: 500 TABLET, FILM COATED ORAL at 08:54

## 2025-05-05 RX ADMIN — Medication 3 MG: at 21:24

## 2025-05-06 PROCEDURE — 99232 SBSQ HOSP IP/OBS MODERATE 35: CPT | Performed by: INTERNAL MEDICINE

## 2025-05-06 RX ADMIN — ATORVASTATIN CALCIUM 10 MG: 10 TABLET, FILM COATED ORAL at 17:37

## 2025-05-06 RX ADMIN — Medication 3 MG: at 21:18

## 2025-05-06 RX ADMIN — METFORMIN HYDROCHLORIDE 500 MG: 500 TABLET, FILM COATED ORAL at 16:51

## 2025-05-06 RX ADMIN — PANTOPRAZOLE SODIUM 40 MG: 40 TABLET, DELAYED RELEASE ORAL at 05:23

## 2025-05-06 RX ADMIN — METFORMIN HYDROCHLORIDE 500 MG: 500 TABLET, FILM COATED ORAL at 08:11

## 2025-05-06 RX ADMIN — LEVOTHYROXINE SODIUM 75 MCG: 0.07 TABLET ORAL at 05:23

## 2025-05-06 RX ADMIN — DIVALPROEX SODIUM 1500 MG: 500 TABLET, FILM COATED, EXTENDED RELEASE ORAL at 21:17

## 2025-05-06 RX ADMIN — DEXTROAMPHETAMINE SACCHARATE, AMPHETAMINE ASPARTATE, DEXTROAMPHETAMINE SULFATE AND AMPHETAMINE SULFATE 30 MG: 2.5; 2.5; 2.5; 2.5 TABLET ORAL at 08:11

## 2025-05-06 RX ADMIN — SENNOSIDES 8.6 MG: 8.6 TABLET, FILM COATED ORAL at 21:18

## 2025-05-06 RX ADMIN — OLANZAPINE 20 MG: 10 TABLET, FILM COATED ORAL at 21:17

## 2025-05-07 PROCEDURE — 99232 SBSQ HOSP IP/OBS MODERATE 35: CPT | Performed by: INTERNAL MEDICINE

## 2025-05-07 RX ADMIN — LEVOTHYROXINE SODIUM 75 MCG: 0.07 TABLET ORAL at 05:17

## 2025-05-07 RX ADMIN — METFORMIN HYDROCHLORIDE 500 MG: 500 TABLET, FILM COATED ORAL at 18:38

## 2025-05-07 RX ADMIN — Medication 3 MG: at 21:58

## 2025-05-07 RX ADMIN — DIVALPROEX SODIUM 1500 MG: 500 TABLET, FILM COATED, EXTENDED RELEASE ORAL at 21:58

## 2025-05-07 RX ADMIN — ATORVASTATIN CALCIUM 10 MG: 10 TABLET, FILM COATED ORAL at 18:38

## 2025-05-07 RX ADMIN — METFORMIN HYDROCHLORIDE 500 MG: 500 TABLET, FILM COATED ORAL at 08:34

## 2025-05-07 RX ADMIN — SENNOSIDES 8.6 MG: 8.6 TABLET, FILM COATED ORAL at 21:58

## 2025-05-07 RX ADMIN — DEXTROAMPHETAMINE SACCHARATE, AMPHETAMINE ASPARTATE, DEXTROAMPHETAMINE SULFATE AND AMPHETAMINE SULFATE 30 MG: 2.5; 2.5; 2.5; 2.5 TABLET ORAL at 08:34

## 2025-05-07 RX ADMIN — PANTOPRAZOLE SODIUM 40 MG: 40 TABLET, DELAYED RELEASE ORAL at 05:17

## 2025-05-07 RX ADMIN — OLANZAPINE 20 MG: 10 TABLET, FILM COATED ORAL at 21:58

## 2025-05-08 PROCEDURE — 99232 SBSQ HOSP IP/OBS MODERATE 35: CPT | Performed by: INTERNAL MEDICINE

## 2025-05-08 RX ADMIN — ACETAMINOPHEN 650 MG: 325 TABLET, FILM COATED ORAL at 09:21

## 2025-05-08 RX ADMIN — METFORMIN HYDROCHLORIDE 500 MG: 500 TABLET, FILM COATED ORAL at 08:35

## 2025-05-08 RX ADMIN — DIVALPROEX SODIUM 1500 MG: 500 TABLET, FILM COATED, EXTENDED RELEASE ORAL at 21:55

## 2025-05-08 RX ADMIN — SENNOSIDES 8.6 MG: 8.6 TABLET, FILM COATED ORAL at 21:55

## 2025-05-08 RX ADMIN — Medication 3 MG: at 21:55

## 2025-05-08 RX ADMIN — LEVOTHYROXINE SODIUM 75 MCG: 0.07 TABLET ORAL at 05:08

## 2025-05-08 RX ADMIN — DEXTROAMPHETAMINE SACCHARATE, AMPHETAMINE ASPARTATE, DEXTROAMPHETAMINE SULFATE AND AMPHETAMINE SULFATE 30 MG: 2.5; 2.5; 2.5; 2.5 TABLET ORAL at 08:35

## 2025-05-08 RX ADMIN — ATORVASTATIN CALCIUM 10 MG: 10 TABLET, FILM COATED ORAL at 17:16

## 2025-05-08 RX ADMIN — OLANZAPINE 20 MG: 10 TABLET, FILM COATED ORAL at 21:55

## 2025-05-08 RX ADMIN — PANTOPRAZOLE SODIUM 40 MG: 40 TABLET, DELAYED RELEASE ORAL at 05:08

## 2025-05-08 RX ADMIN — METFORMIN HYDROCHLORIDE 500 MG: 500 TABLET, FILM COATED ORAL at 17:16

## 2025-05-08 RX ADMIN — ENOXAPARIN SODIUM 40 MG: 40 INJECTION SUBCUTANEOUS at 08:35

## 2025-05-09 PROCEDURE — 99232 SBSQ HOSP IP/OBS MODERATE 35: CPT | Performed by: INTERNAL MEDICINE

## 2025-05-09 RX ADMIN — METFORMIN HYDROCHLORIDE 500 MG: 500 TABLET, FILM COATED ORAL at 08:29

## 2025-05-09 RX ADMIN — ACETAMINOPHEN 650 MG: 325 TABLET, FILM COATED ORAL at 19:22

## 2025-05-09 RX ADMIN — PANTOPRAZOLE SODIUM 40 MG: 40 TABLET, DELAYED RELEASE ORAL at 05:21

## 2025-05-09 RX ADMIN — OLANZAPINE 20 MG: 10 TABLET, FILM COATED ORAL at 21:03

## 2025-05-09 RX ADMIN — DIVALPROEX SODIUM 1500 MG: 500 TABLET, FILM COATED, EXTENDED RELEASE ORAL at 21:03

## 2025-05-09 RX ADMIN — ACETAMINOPHEN 650 MG: 325 TABLET, FILM COATED ORAL at 08:30

## 2025-05-09 RX ADMIN — METFORMIN HYDROCHLORIDE 500 MG: 500 TABLET, FILM COATED ORAL at 15:36

## 2025-05-09 RX ADMIN — Medication 3 MG: at 21:03

## 2025-05-09 RX ADMIN — ATORVASTATIN CALCIUM 10 MG: 10 TABLET, FILM COATED ORAL at 18:07

## 2025-05-09 RX ADMIN — DEXTROAMPHETAMINE SACCHARATE, AMPHETAMINE ASPARTATE, DEXTROAMPHETAMINE SULFATE AND AMPHETAMINE SULFATE 30 MG: 2.5; 2.5; 2.5; 2.5 TABLET ORAL at 08:29

## 2025-05-09 RX ADMIN — SENNOSIDES 8.6 MG: 8.6 TABLET, FILM COATED ORAL at 21:03

## 2025-05-09 RX ADMIN — LEVOTHYROXINE SODIUM 75 MCG: 0.07 TABLET ORAL at 05:22

## 2025-05-10 PROCEDURE — 99232 SBSQ HOSP IP/OBS MODERATE 35: CPT | Performed by: INTERNAL MEDICINE

## 2025-05-10 RX ADMIN — PANTOPRAZOLE SODIUM 40 MG: 40 TABLET, DELAYED RELEASE ORAL at 05:20

## 2025-05-10 RX ADMIN — METFORMIN HYDROCHLORIDE 500 MG: 500 TABLET, FILM COATED ORAL at 08:08

## 2025-05-10 RX ADMIN — METFORMIN HYDROCHLORIDE 500 MG: 500 TABLET, FILM COATED ORAL at 16:41

## 2025-05-10 RX ADMIN — SENNOSIDES 8.6 MG: 8.6 TABLET, FILM COATED ORAL at 21:22

## 2025-05-10 RX ADMIN — DIVALPROEX SODIUM 1500 MG: 500 TABLET, FILM COATED, EXTENDED RELEASE ORAL at 21:22

## 2025-05-10 RX ADMIN — LEVOTHYROXINE SODIUM 75 MCG: 0.07 TABLET ORAL at 05:20

## 2025-05-10 RX ADMIN — Medication 3 MG: at 21:23

## 2025-05-10 RX ADMIN — OLANZAPINE 20 MG: 10 TABLET, FILM COATED ORAL at 21:23

## 2025-05-10 RX ADMIN — DEXTROAMPHETAMINE SACCHARATE, AMPHETAMINE ASPARTATE, DEXTROAMPHETAMINE SULFATE AND AMPHETAMINE SULFATE 30 MG: 2.5; 2.5; 2.5; 2.5 TABLET ORAL at 08:09

## 2025-05-10 RX ADMIN — ATORVASTATIN CALCIUM 10 MG: 10 TABLET, FILM COATED ORAL at 17:11

## 2025-05-11 PROCEDURE — 99232 SBSQ HOSP IP/OBS MODERATE 35: CPT | Performed by: INTERNAL MEDICINE

## 2025-05-11 RX ADMIN — Medication 3 MG: at 21:15

## 2025-05-11 RX ADMIN — DEXTROAMPHETAMINE SACCHARATE, AMPHETAMINE ASPARTATE, DEXTROAMPHETAMINE SULFATE AND AMPHETAMINE SULFATE 30 MG: 2.5; 2.5; 2.5; 2.5 TABLET ORAL at 09:36

## 2025-05-11 RX ADMIN — PANTOPRAZOLE SODIUM 40 MG: 40 TABLET, DELAYED RELEASE ORAL at 05:45

## 2025-05-11 RX ADMIN — SENNOSIDES 8.6 MG: 8.6 TABLET, FILM COATED ORAL at 21:15

## 2025-05-11 RX ADMIN — ATORVASTATIN CALCIUM 10 MG: 10 TABLET, FILM COATED ORAL at 17:11

## 2025-05-11 RX ADMIN — METFORMIN HYDROCHLORIDE 500 MG: 500 TABLET, FILM COATED ORAL at 15:42

## 2025-05-11 RX ADMIN — METFORMIN HYDROCHLORIDE 500 MG: 500 TABLET, FILM COATED ORAL at 09:36

## 2025-05-11 RX ADMIN — LEVOTHYROXINE SODIUM 75 MCG: 0.07 TABLET ORAL at 05:45

## 2025-05-11 RX ADMIN — OLANZAPINE 20 MG: 10 TABLET, FILM COATED ORAL at 21:15

## 2025-05-11 RX ADMIN — DIVALPROEX SODIUM 1500 MG: 500 TABLET, FILM COATED, EXTENDED RELEASE ORAL at 21:15

## 2025-05-11 RX ADMIN — ACETAMINOPHEN 650 MG: 325 TABLET, FILM COATED ORAL at 09:36

## 2025-05-12 PROCEDURE — 99232 SBSQ HOSP IP/OBS MODERATE 35: CPT | Performed by: HOSPITALIST

## 2025-05-12 RX ADMIN — ATORVASTATIN CALCIUM 10 MG: 10 TABLET, FILM COATED ORAL at 17:54

## 2025-05-12 RX ADMIN — ACETAMINOPHEN 650 MG: 325 TABLET, FILM COATED ORAL at 21:19

## 2025-05-12 RX ADMIN — PANTOPRAZOLE SODIUM 40 MG: 40 TABLET, DELAYED RELEASE ORAL at 05:32

## 2025-05-12 RX ADMIN — METFORMIN HYDROCHLORIDE 500 MG: 500 TABLET, FILM COATED ORAL at 16:28

## 2025-05-12 RX ADMIN — DIVALPROEX SODIUM 1500 MG: 500 TABLET, FILM COATED, EXTENDED RELEASE ORAL at 21:20

## 2025-05-12 RX ADMIN — Medication 3 MG: at 21:20

## 2025-05-12 RX ADMIN — LEVOTHYROXINE SODIUM 75 MCG: 0.07 TABLET ORAL at 05:32

## 2025-05-12 RX ADMIN — SENNOSIDES 8.6 MG: 8.6 TABLET, FILM COATED ORAL at 21:20

## 2025-05-12 RX ADMIN — OLANZAPINE 20 MG: 10 TABLET, FILM COATED ORAL at 21:20

## 2025-05-12 RX ADMIN — DEXTROAMPHETAMINE SACCHARATE, AMPHETAMINE ASPARTATE, DEXTROAMPHETAMINE SULFATE AND AMPHETAMINE SULFATE 30 MG: 2.5; 2.5; 2.5; 2.5 TABLET ORAL at 08:12

## 2025-05-12 RX ADMIN — METFORMIN HYDROCHLORIDE 500 MG: 500 TABLET, FILM COATED ORAL at 08:12

## 2025-05-13 PROCEDURE — 99231 SBSQ HOSP IP/OBS SF/LOW 25: CPT | Performed by: HOSPITALIST

## 2025-05-13 RX ADMIN — DIVALPROEX SODIUM 1500 MG: 500 TABLET, FILM COATED, EXTENDED RELEASE ORAL at 21:05

## 2025-05-13 RX ADMIN — OLANZAPINE 20 MG: 10 TABLET, FILM COATED ORAL at 21:06

## 2025-05-13 RX ADMIN — LEVOTHYROXINE SODIUM 75 MCG: 0.07 TABLET ORAL at 05:32

## 2025-05-13 RX ADMIN — ATORVASTATIN CALCIUM 10 MG: 10 TABLET, FILM COATED ORAL at 17:16

## 2025-05-13 RX ADMIN — PANTOPRAZOLE SODIUM 40 MG: 40 TABLET, DELAYED RELEASE ORAL at 05:32

## 2025-05-13 RX ADMIN — DEXTROAMPHETAMINE SACCHARATE, AMPHETAMINE ASPARTATE, DEXTROAMPHETAMINE SULFATE AND AMPHETAMINE SULFATE 30 MG: 2.5; 2.5; 2.5; 2.5 TABLET ORAL at 08:01

## 2025-05-13 RX ADMIN — SENNOSIDES 8.6 MG: 8.6 TABLET, FILM COATED ORAL at 21:06

## 2025-05-13 RX ADMIN — METFORMIN HYDROCHLORIDE 500 MG: 500 TABLET, FILM COATED ORAL at 16:24

## 2025-05-13 RX ADMIN — METFORMIN HYDROCHLORIDE 500 MG: 500 TABLET, FILM COATED ORAL at 08:01

## 2025-05-13 RX ADMIN — Medication 3 MG: at 21:06

## 2025-05-14 PROCEDURE — 99231 SBSQ HOSP IP/OBS SF/LOW 25: CPT | Performed by: HOSPITALIST

## 2025-05-14 RX ADMIN — SENNOSIDES 8.6 MG: 8.6 TABLET, FILM COATED ORAL at 21:08

## 2025-05-14 RX ADMIN — METFORMIN HYDROCHLORIDE 500 MG: 500 TABLET, FILM COATED ORAL at 15:43

## 2025-05-14 RX ADMIN — ATORVASTATIN CALCIUM 10 MG: 10 TABLET, FILM COATED ORAL at 18:26

## 2025-05-14 RX ADMIN — LEVOTHYROXINE SODIUM 75 MCG: 0.07 TABLET ORAL at 05:36

## 2025-05-14 RX ADMIN — METFORMIN HYDROCHLORIDE 500 MG: 500 TABLET, FILM COATED ORAL at 08:22

## 2025-05-14 RX ADMIN — Medication 3 MG: at 21:08

## 2025-05-14 RX ADMIN — OLANZAPINE 20 MG: 10 TABLET, FILM COATED ORAL at 21:08

## 2025-05-14 RX ADMIN — DEXTROAMPHETAMINE SACCHARATE, AMPHETAMINE ASPARTATE, DEXTROAMPHETAMINE SULFATE AND AMPHETAMINE SULFATE 30 MG: 2.5; 2.5; 2.5; 2.5 TABLET ORAL at 08:22

## 2025-05-14 RX ADMIN — DIVALPROEX SODIUM 1500 MG: 500 TABLET, FILM COATED, EXTENDED RELEASE ORAL at 21:08

## 2025-05-14 RX ADMIN — PANTOPRAZOLE SODIUM 40 MG: 40 TABLET, DELAYED RELEASE ORAL at 05:36

## 2025-05-15 PROCEDURE — 99231 SBSQ HOSP IP/OBS SF/LOW 25: CPT | Performed by: HOSPITALIST

## 2025-05-15 RX ADMIN — METFORMIN HYDROCHLORIDE 500 MG: 500 TABLET, FILM COATED ORAL at 08:29

## 2025-05-15 RX ADMIN — METFORMIN HYDROCHLORIDE 500 MG: 500 TABLET, FILM COATED ORAL at 17:20

## 2025-05-15 RX ADMIN — PANTOPRAZOLE SODIUM 40 MG: 40 TABLET, DELAYED RELEASE ORAL at 05:34

## 2025-05-15 RX ADMIN — OLANZAPINE 20 MG: 10 TABLET, FILM COATED ORAL at 22:20

## 2025-05-15 RX ADMIN — LEVOTHYROXINE SODIUM 75 MCG: 0.07 TABLET ORAL at 05:34

## 2025-05-15 RX ADMIN — ATORVASTATIN CALCIUM 10 MG: 10 TABLET, FILM COATED ORAL at 17:20

## 2025-05-15 RX ADMIN — DIVALPROEX SODIUM 1500 MG: 500 TABLET, FILM COATED, EXTENDED RELEASE ORAL at 22:20

## 2025-05-15 RX ADMIN — DEXTROAMPHETAMINE SACCHARATE, AMPHETAMINE ASPARTATE, DEXTROAMPHETAMINE SULFATE AND AMPHETAMINE SULFATE 30 MG: 2.5; 2.5; 2.5; 2.5 TABLET ORAL at 08:29

## 2025-05-15 RX ADMIN — Medication 3 MG: at 22:20

## 2025-05-15 RX ADMIN — SENNOSIDES 8.6 MG: 8.6 TABLET, FILM COATED ORAL at 22:19

## 2025-05-16 PROCEDURE — 99231 SBSQ HOSP IP/OBS SF/LOW 25: CPT | Performed by: HOSPITALIST

## 2025-05-16 RX ADMIN — Medication 6 MG: at 21:41

## 2025-05-16 RX ADMIN — ENOXAPARIN SODIUM 40 MG: 40 INJECTION SUBCUTANEOUS at 08:15

## 2025-05-16 RX ADMIN — PANTOPRAZOLE SODIUM 40 MG: 40 TABLET, DELAYED RELEASE ORAL at 05:54

## 2025-05-16 RX ADMIN — SENNOSIDES 8.6 MG: 8.6 TABLET, FILM COATED ORAL at 21:41

## 2025-05-16 RX ADMIN — DEXTROAMPHETAMINE SACCHARATE, AMPHETAMINE ASPARTATE, DEXTROAMPHETAMINE SULFATE AND AMPHETAMINE SULFATE 30 MG: 2.5; 2.5; 2.5; 2.5 TABLET ORAL at 08:15

## 2025-05-16 RX ADMIN — ACETAMINOPHEN 650 MG: 325 TABLET, FILM COATED ORAL at 21:41

## 2025-05-16 RX ADMIN — ATORVASTATIN CALCIUM 10 MG: 10 TABLET, FILM COATED ORAL at 17:06

## 2025-05-16 RX ADMIN — LEVOTHYROXINE SODIUM 75 MCG: 0.07 TABLET ORAL at 05:54

## 2025-05-16 RX ADMIN — OLANZAPINE 20 MG: 10 TABLET, FILM COATED ORAL at 21:41

## 2025-05-16 RX ADMIN — METFORMIN HYDROCHLORIDE 500 MG: 500 TABLET, FILM COATED ORAL at 15:41

## 2025-05-16 RX ADMIN — DIVALPROEX SODIUM 1500 MG: 500 TABLET, FILM COATED, EXTENDED RELEASE ORAL at 21:41

## 2025-05-16 RX ADMIN — METFORMIN HYDROCHLORIDE 500 MG: 500 TABLET, FILM COATED ORAL at 08:15

## 2025-05-17 LAB
PLATELET # BLD AUTO: 220 THOUSANDS/UL (ref 149–390)
PMV BLD AUTO: 9.7 FL (ref 8.9–12.7)

## 2025-05-17 PROCEDURE — 99231 SBSQ HOSP IP/OBS SF/LOW 25: CPT | Performed by: HOSPITALIST

## 2025-05-17 PROCEDURE — 85049 AUTOMATED PLATELET COUNT: CPT

## 2025-05-17 RX ORDER — ZOLPIDEM TARTRATE 5 MG/1
5 TABLET ORAL
Status: DISCONTINUED | OUTPATIENT
Start: 2025-05-17 | End: 2025-08-08 | Stop reason: HOSPADM

## 2025-05-17 RX ADMIN — ATORVASTATIN CALCIUM 10 MG: 10 TABLET, FILM COATED ORAL at 17:13

## 2025-05-17 RX ADMIN — METFORMIN HYDROCHLORIDE 500 MG: 500 TABLET, FILM COATED ORAL at 08:22

## 2025-05-17 RX ADMIN — METFORMIN HYDROCHLORIDE 500 MG: 500 TABLET, FILM COATED ORAL at 16:13

## 2025-05-17 RX ADMIN — ZOLPIDEM TARTRATE 5 MG: 5 TABLET, FILM COATED ORAL at 22:21

## 2025-05-17 RX ADMIN — SENNOSIDES 8.6 MG: 8.6 TABLET, FILM COATED ORAL at 22:21

## 2025-05-17 RX ADMIN — DIVALPROEX SODIUM 1500 MG: 500 TABLET, FILM COATED, EXTENDED RELEASE ORAL at 22:21

## 2025-05-17 RX ADMIN — PANTOPRAZOLE SODIUM 40 MG: 40 TABLET, DELAYED RELEASE ORAL at 08:25

## 2025-05-17 RX ADMIN — DEXTROAMPHETAMINE SACCHARATE, AMPHETAMINE ASPARTATE, DEXTROAMPHETAMINE SULFATE AND AMPHETAMINE SULFATE 30 MG: 2.5; 2.5; 2.5; 2.5 TABLET ORAL at 08:22

## 2025-05-17 RX ADMIN — ACETAMINOPHEN 650 MG: 325 TABLET, FILM COATED ORAL at 22:21

## 2025-05-17 RX ADMIN — LEVOTHYROXINE SODIUM 75 MCG: 0.07 TABLET ORAL at 07:39

## 2025-05-17 RX ADMIN — OLANZAPINE 20 MG: 10 TABLET, FILM COATED ORAL at 22:21

## 2025-05-18 PROCEDURE — 99232 SBSQ HOSP IP/OBS MODERATE 35: CPT | Performed by: HOSPITALIST

## 2025-05-18 RX ADMIN — ZOLPIDEM TARTRATE 5 MG: 5 TABLET, FILM COATED ORAL at 21:13

## 2025-05-18 RX ADMIN — OLANZAPINE 20 MG: 10 TABLET, FILM COATED ORAL at 21:13

## 2025-05-18 RX ADMIN — DEXTROAMPHETAMINE SACCHARATE, AMPHETAMINE ASPARTATE, DEXTROAMPHETAMINE SULFATE AND AMPHETAMINE SULFATE 30 MG: 2.5; 2.5; 2.5; 2.5 TABLET ORAL at 08:39

## 2025-05-18 RX ADMIN — ATORVASTATIN CALCIUM 10 MG: 10 TABLET, FILM COATED ORAL at 17:06

## 2025-05-18 RX ADMIN — METFORMIN HYDROCHLORIDE 500 MG: 500 TABLET, FILM COATED ORAL at 15:51

## 2025-05-18 RX ADMIN — ACETAMINOPHEN 650 MG: 325 TABLET, FILM COATED ORAL at 09:46

## 2025-05-18 RX ADMIN — PANTOPRAZOLE SODIUM 40 MG: 40 TABLET, DELAYED RELEASE ORAL at 05:44

## 2025-05-18 RX ADMIN — LEVOTHYROXINE SODIUM 75 MCG: 0.07 TABLET ORAL at 05:44

## 2025-05-18 RX ADMIN — METFORMIN HYDROCHLORIDE 500 MG: 500 TABLET, FILM COATED ORAL at 08:39

## 2025-05-18 RX ADMIN — SENNOSIDES 8.6 MG: 8.6 TABLET, FILM COATED ORAL at 21:13

## 2025-05-18 RX ADMIN — DIVALPROEX SODIUM 1500 MG: 500 TABLET, FILM COATED, EXTENDED RELEASE ORAL at 21:13

## 2025-05-19 PROCEDURE — 99233 SBSQ HOSP IP/OBS HIGH 50: CPT | Performed by: INTERNAL MEDICINE

## 2025-05-19 RX ADMIN — OLANZAPINE 20 MG: 10 TABLET, FILM COATED ORAL at 21:05

## 2025-05-19 RX ADMIN — METFORMIN HYDROCHLORIDE 500 MG: 500 TABLET, FILM COATED ORAL at 08:24

## 2025-05-19 RX ADMIN — METFORMIN HYDROCHLORIDE 500 MG: 500 TABLET, FILM COATED ORAL at 17:38

## 2025-05-19 RX ADMIN — DEXTROAMPHETAMINE SACCHARATE, AMPHETAMINE ASPARTATE, DEXTROAMPHETAMINE SULFATE AND AMPHETAMINE SULFATE 30 MG: 2.5; 2.5; 2.5; 2.5 TABLET ORAL at 08:24

## 2025-05-19 RX ADMIN — LEVOTHYROXINE SODIUM 75 MCG: 0.07 TABLET ORAL at 06:56

## 2025-05-19 RX ADMIN — ATORVASTATIN CALCIUM 10 MG: 10 TABLET, FILM COATED ORAL at 17:38

## 2025-05-19 RX ADMIN — DIVALPROEX SODIUM 1500 MG: 500 TABLET, FILM COATED, EXTENDED RELEASE ORAL at 21:04

## 2025-05-19 RX ADMIN — Medication 6 MG: at 21:05

## 2025-05-19 RX ADMIN — PANTOPRAZOLE SODIUM 40 MG: 40 TABLET, DELAYED RELEASE ORAL at 06:56

## 2025-05-19 RX ADMIN — ZOLPIDEM TARTRATE 5 MG: 5 TABLET, FILM COATED ORAL at 22:35

## 2025-05-19 RX ADMIN — SENNOSIDES 8.6 MG: 8.6 TABLET, FILM COATED ORAL at 21:04

## 2025-05-20 PROCEDURE — 99233 SBSQ HOSP IP/OBS HIGH 50: CPT | Performed by: INTERNAL MEDICINE

## 2025-05-20 RX ORDER — AMLODIPINE BESYLATE 2.5 MG/1
2.5 TABLET ORAL DAILY
Status: DISCONTINUED | OUTPATIENT
Start: 2025-05-20 | End: 2025-05-21

## 2025-05-20 RX ORDER — LABETALOL HYDROCHLORIDE 5 MG/ML
10 INJECTION, SOLUTION INTRAVENOUS ONCE
Status: COMPLETED | OUTPATIENT
Start: 2025-05-20 | End: 2025-05-20

## 2025-05-20 RX ADMIN — ATORVASTATIN CALCIUM 10 MG: 10 TABLET, FILM COATED ORAL at 17:58

## 2025-05-20 RX ADMIN — ACETAMINOPHEN 650 MG: 325 TABLET, FILM COATED ORAL at 08:15

## 2025-05-20 RX ADMIN — LABETALOL HYDROCHLORIDE 10 MG: 5 INJECTION, SOLUTION INTRAVENOUS at 19:39

## 2025-05-20 RX ADMIN — DIVALPROEX SODIUM 1500 MG: 500 TABLET, FILM COATED, EXTENDED RELEASE ORAL at 21:01

## 2025-05-20 RX ADMIN — METFORMIN HYDROCHLORIDE 500 MG: 500 TABLET, FILM COATED ORAL at 17:58

## 2025-05-20 RX ADMIN — METFORMIN HYDROCHLORIDE 500 MG: 500 TABLET, FILM COATED ORAL at 08:15

## 2025-05-20 RX ADMIN — DEXTROAMPHETAMINE SACCHARATE, AMPHETAMINE ASPARTATE, DEXTROAMPHETAMINE SULFATE AND AMPHETAMINE SULFATE 30 MG: 2.5; 2.5; 2.5; 2.5 TABLET ORAL at 08:15

## 2025-05-20 RX ADMIN — PANTOPRAZOLE SODIUM 40 MG: 40 TABLET, DELAYED RELEASE ORAL at 05:15

## 2025-05-20 RX ADMIN — AMLODIPINE BESYLATE 2.5 MG: 2.5 TABLET ORAL at 17:58

## 2025-05-20 RX ADMIN — SENNOSIDES 8.6 MG: 8.6 TABLET, FILM COATED ORAL at 21:01

## 2025-05-20 RX ADMIN — LEVOTHYROXINE SODIUM 75 MCG: 0.07 TABLET ORAL at 05:15

## 2025-05-20 RX ADMIN — Medication 6 MG: at 21:01

## 2025-05-20 RX ADMIN — OLANZAPINE 20 MG: 10 TABLET, FILM COATED ORAL at 21:01

## 2025-05-21 PROBLEM — I10 HYPERTENSION: Status: ACTIVE | Noted: 2025-05-21

## 2025-05-21 PROCEDURE — 99233 SBSQ HOSP IP/OBS HIGH 50: CPT | Performed by: INTERNAL MEDICINE

## 2025-05-21 RX ORDER — AMLODIPINE BESYLATE 5 MG/1
5 TABLET ORAL DAILY
Status: DISCONTINUED | OUTPATIENT
Start: 2025-05-22 | End: 2025-08-08 | Stop reason: HOSPADM

## 2025-05-21 RX ADMIN — PANTOPRAZOLE SODIUM 40 MG: 40 TABLET, DELAYED RELEASE ORAL at 05:02

## 2025-05-21 RX ADMIN — METFORMIN HYDROCHLORIDE 500 MG: 500 TABLET, FILM COATED ORAL at 16:49

## 2025-05-21 RX ADMIN — DIVALPROEX SODIUM 1500 MG: 500 TABLET, FILM COATED, EXTENDED RELEASE ORAL at 21:06

## 2025-05-21 RX ADMIN — AMLODIPINE BESYLATE 2.5 MG: 2.5 TABLET ORAL at 08:29

## 2025-05-21 RX ADMIN — LEVOTHYROXINE SODIUM 75 MCG: 0.07 TABLET ORAL at 05:02

## 2025-05-21 RX ADMIN — ATORVASTATIN CALCIUM 10 MG: 10 TABLET, FILM COATED ORAL at 16:49

## 2025-05-21 RX ADMIN — SENNOSIDES 8.6 MG: 8.6 TABLET, FILM COATED ORAL at 21:06

## 2025-05-21 RX ADMIN — METFORMIN HYDROCHLORIDE 500 MG: 500 TABLET, FILM COATED ORAL at 08:29

## 2025-05-21 RX ADMIN — Medication 6 MG: at 21:07

## 2025-05-21 RX ADMIN — OLANZAPINE 20 MG: 10 TABLET, FILM COATED ORAL at 21:06

## 2025-05-21 RX ADMIN — DEXTROAMPHETAMINE SACCHARATE, AMPHETAMINE ASPARTATE, DEXTROAMPHETAMINE SULFATE AND AMPHETAMINE SULFATE 30 MG: 2.5; 2.5; 2.5; 2.5 TABLET ORAL at 08:28

## 2025-05-22 PROCEDURE — 99233 SBSQ HOSP IP/OBS HIGH 50: CPT | Performed by: INTERNAL MEDICINE

## 2025-05-22 RX ADMIN — DEXTROAMPHETAMINE SACCHARATE, AMPHETAMINE ASPARTATE, DEXTROAMPHETAMINE SULFATE AND AMPHETAMINE SULFATE 30 MG: 2.5; 2.5; 2.5; 2.5 TABLET ORAL at 08:24

## 2025-05-22 RX ADMIN — SENNOSIDES 8.6 MG: 8.6 TABLET, FILM COATED ORAL at 21:08

## 2025-05-22 RX ADMIN — Medication 6 MG: at 21:08

## 2025-05-22 RX ADMIN — LEVOTHYROXINE SODIUM 75 MCG: 0.07 TABLET ORAL at 05:52

## 2025-05-22 RX ADMIN — OLANZAPINE 20 MG: 10 TABLET, FILM COATED ORAL at 21:08

## 2025-05-22 RX ADMIN — DIVALPROEX SODIUM 1500 MG: 500 TABLET, FILM COATED, EXTENDED RELEASE ORAL at 21:08

## 2025-05-22 RX ADMIN — PANTOPRAZOLE SODIUM 40 MG: 40 TABLET, DELAYED RELEASE ORAL at 05:52

## 2025-05-22 RX ADMIN — AMLODIPINE BESYLATE 5 MG: 5 TABLET ORAL at 08:24

## 2025-05-22 RX ADMIN — METFORMIN HYDROCHLORIDE 500 MG: 500 TABLET, FILM COATED ORAL at 08:24

## 2025-05-22 RX ADMIN — ATORVASTATIN CALCIUM 10 MG: 10 TABLET, FILM COATED ORAL at 17:18

## 2025-05-22 RX ADMIN — METFORMIN HYDROCHLORIDE 500 MG: 500 TABLET, FILM COATED ORAL at 16:16

## 2025-05-23 PROCEDURE — 99233 SBSQ HOSP IP/OBS HIGH 50: CPT | Performed by: INTERNAL MEDICINE

## 2025-05-23 RX ADMIN — SENNOSIDES 8.6 MG: 8.6 TABLET, FILM COATED ORAL at 21:45

## 2025-05-23 RX ADMIN — DEXTROAMPHETAMINE SACCHARATE, AMPHETAMINE ASPARTATE, DEXTROAMPHETAMINE SULFATE AND AMPHETAMINE SULFATE 30 MG: 2.5; 2.5; 2.5; 2.5 TABLET ORAL at 08:12

## 2025-05-23 RX ADMIN — ACETAMINOPHEN 650 MG: 325 TABLET, FILM COATED ORAL at 10:40

## 2025-05-23 RX ADMIN — LEVOTHYROXINE SODIUM 75 MCG: 0.07 TABLET ORAL at 06:13

## 2025-05-23 RX ADMIN — DIVALPROEX SODIUM 1500 MG: 500 TABLET, FILM COATED, EXTENDED RELEASE ORAL at 21:45

## 2025-05-23 RX ADMIN — METFORMIN HYDROCHLORIDE 500 MG: 500 TABLET, FILM COATED ORAL at 16:47

## 2025-05-23 RX ADMIN — AMLODIPINE BESYLATE 5 MG: 5 TABLET ORAL at 08:12

## 2025-05-23 RX ADMIN — PANTOPRAZOLE SODIUM 40 MG: 40 TABLET, DELAYED RELEASE ORAL at 06:13

## 2025-05-23 RX ADMIN — METFORMIN HYDROCHLORIDE 500 MG: 500 TABLET, FILM COATED ORAL at 07:50

## 2025-05-23 RX ADMIN — ATORVASTATIN CALCIUM 10 MG: 10 TABLET, FILM COATED ORAL at 17:53

## 2025-05-23 RX ADMIN — Medication 6 MG: at 21:45

## 2025-05-23 RX ADMIN — OLANZAPINE 20 MG: 10 TABLET, FILM COATED ORAL at 21:45

## 2025-05-24 PROCEDURE — 99233 SBSQ HOSP IP/OBS HIGH 50: CPT | Performed by: INTERNAL MEDICINE

## 2025-05-24 RX ADMIN — DEXTROAMPHETAMINE SACCHARATE, AMPHETAMINE ASPARTATE, DEXTROAMPHETAMINE SULFATE AND AMPHETAMINE SULFATE 30 MG: 2.5; 2.5; 2.5; 2.5 TABLET ORAL at 08:06

## 2025-05-24 RX ADMIN — METFORMIN HYDROCHLORIDE 500 MG: 500 TABLET, FILM COATED ORAL at 08:05

## 2025-05-24 RX ADMIN — OLANZAPINE 20 MG: 10 TABLET, FILM COATED ORAL at 21:11

## 2025-05-24 RX ADMIN — ATORVASTATIN CALCIUM 10 MG: 10 TABLET, FILM COATED ORAL at 17:20

## 2025-05-24 RX ADMIN — Medication 6 MG: at 21:11

## 2025-05-24 RX ADMIN — METFORMIN HYDROCHLORIDE 500 MG: 500 TABLET, FILM COATED ORAL at 16:53

## 2025-05-24 RX ADMIN — AMLODIPINE BESYLATE 5 MG: 5 TABLET ORAL at 08:06

## 2025-05-24 RX ADMIN — DIVALPROEX SODIUM 1500 MG: 500 TABLET, FILM COATED, EXTENDED RELEASE ORAL at 21:11

## 2025-05-24 RX ADMIN — LEVOTHYROXINE SODIUM 75 MCG: 0.07 TABLET ORAL at 05:54

## 2025-05-24 RX ADMIN — PANTOPRAZOLE SODIUM 40 MG: 40 TABLET, DELAYED RELEASE ORAL at 05:54

## 2025-05-24 RX ADMIN — SENNOSIDES 8.6 MG: 8.6 TABLET, FILM COATED ORAL at 21:11

## 2025-05-25 PROCEDURE — 99233 SBSQ HOSP IP/OBS HIGH 50: CPT | Performed by: INTERNAL MEDICINE

## 2025-05-25 RX ADMIN — METFORMIN HYDROCHLORIDE 500 MG: 500 TABLET, FILM COATED ORAL at 15:49

## 2025-05-25 RX ADMIN — PANTOPRAZOLE SODIUM 40 MG: 40 TABLET, DELAYED RELEASE ORAL at 06:16

## 2025-05-25 RX ADMIN — METFORMIN HYDROCHLORIDE 500 MG: 500 TABLET, FILM COATED ORAL at 07:58

## 2025-05-25 RX ADMIN — OLANZAPINE 20 MG: 10 TABLET, FILM COATED ORAL at 21:01

## 2025-05-25 RX ADMIN — ATORVASTATIN CALCIUM 10 MG: 10 TABLET, FILM COATED ORAL at 18:13

## 2025-05-25 RX ADMIN — DEXTROAMPHETAMINE SACCHARATE, AMPHETAMINE ASPARTATE, DEXTROAMPHETAMINE SULFATE AND AMPHETAMINE SULFATE 30 MG: 2.5; 2.5; 2.5; 2.5 TABLET ORAL at 08:00

## 2025-05-25 RX ADMIN — SENNOSIDES 8.6 MG: 8.6 TABLET, FILM COATED ORAL at 21:02

## 2025-05-25 RX ADMIN — DIVALPROEX SODIUM 1500 MG: 500 TABLET, FILM COATED, EXTENDED RELEASE ORAL at 21:01

## 2025-05-25 RX ADMIN — LEVOTHYROXINE SODIUM 75 MCG: 0.07 TABLET ORAL at 06:16

## 2025-05-25 RX ADMIN — Medication 6 MG: at 21:01

## 2025-05-25 RX ADMIN — AMLODIPINE BESYLATE 5 MG: 5 TABLET ORAL at 08:00

## 2025-05-26 PROCEDURE — 99231 SBSQ HOSP IP/OBS SF/LOW 25: CPT | Performed by: STUDENT IN AN ORGANIZED HEALTH CARE EDUCATION/TRAINING PROGRAM

## 2025-05-26 RX ADMIN — DIVALPROEX SODIUM 1500 MG: 500 TABLET, FILM COATED, EXTENDED RELEASE ORAL at 21:51

## 2025-05-26 RX ADMIN — METFORMIN HYDROCHLORIDE 500 MG: 500 TABLET, FILM COATED ORAL at 08:29

## 2025-05-26 RX ADMIN — Medication 6 MG: at 21:51

## 2025-05-26 RX ADMIN — ATORVASTATIN CALCIUM 10 MG: 10 TABLET, FILM COATED ORAL at 17:18

## 2025-05-26 RX ADMIN — PANTOPRAZOLE SODIUM 40 MG: 40 TABLET, DELAYED RELEASE ORAL at 05:51

## 2025-05-26 RX ADMIN — OLANZAPINE 20 MG: 10 TABLET, FILM COATED ORAL at 21:51

## 2025-05-26 RX ADMIN — LEVOTHYROXINE SODIUM 75 MCG: 0.07 TABLET ORAL at 05:51

## 2025-05-26 RX ADMIN — AMLODIPINE BESYLATE 5 MG: 5 TABLET ORAL at 08:29

## 2025-05-26 RX ADMIN — DEXTROAMPHETAMINE SACCHARATE, AMPHETAMINE ASPARTATE, DEXTROAMPHETAMINE SULFATE AND AMPHETAMINE SULFATE 30 MG: 2.5; 2.5; 2.5; 2.5 TABLET ORAL at 08:28

## 2025-05-26 RX ADMIN — SENNOSIDES 8.6 MG: 8.6 TABLET, FILM COATED ORAL at 21:51

## 2025-05-26 RX ADMIN — METFORMIN HYDROCHLORIDE 500 MG: 500 TABLET, FILM COATED ORAL at 16:16

## 2025-05-27 PROCEDURE — 99231 SBSQ HOSP IP/OBS SF/LOW 25: CPT | Performed by: STUDENT IN AN ORGANIZED HEALTH CARE EDUCATION/TRAINING PROGRAM

## 2025-05-27 RX ADMIN — DEXTROAMPHETAMINE SACCHARATE, AMPHETAMINE ASPARTATE, DEXTROAMPHETAMINE SULFATE AND AMPHETAMINE SULFATE 30 MG: 2.5; 2.5; 2.5; 2.5 TABLET ORAL at 08:16

## 2025-05-27 RX ADMIN — PANTOPRAZOLE SODIUM 40 MG: 40 TABLET, DELAYED RELEASE ORAL at 06:39

## 2025-05-27 RX ADMIN — LEVOTHYROXINE SODIUM 75 MCG: 0.07 TABLET ORAL at 06:39

## 2025-05-27 RX ADMIN — OLANZAPINE 20 MG: 10 TABLET, FILM COATED ORAL at 21:12

## 2025-05-27 RX ADMIN — Medication 6 MG: at 21:13

## 2025-05-27 RX ADMIN — DIVALPROEX SODIUM 1500 MG: 500 TABLET, FILM COATED, EXTENDED RELEASE ORAL at 21:12

## 2025-05-27 RX ADMIN — METFORMIN HYDROCHLORIDE 500 MG: 500 TABLET, FILM COATED ORAL at 08:16

## 2025-05-27 RX ADMIN — ATORVASTATIN CALCIUM 10 MG: 10 TABLET, FILM COATED ORAL at 17:10

## 2025-05-27 RX ADMIN — METFORMIN HYDROCHLORIDE 500 MG: 500 TABLET, FILM COATED ORAL at 17:10

## 2025-05-27 RX ADMIN — SENNOSIDES 8.6 MG: 8.6 TABLET, FILM COATED ORAL at 21:13

## 2025-05-28 PROCEDURE — 99231 SBSQ HOSP IP/OBS SF/LOW 25: CPT | Performed by: STUDENT IN AN ORGANIZED HEALTH CARE EDUCATION/TRAINING PROGRAM

## 2025-05-28 RX ADMIN — OLANZAPINE 20 MG: 10 TABLET, FILM COATED ORAL at 21:03

## 2025-05-28 RX ADMIN — Medication 6 MG: at 21:03

## 2025-05-28 RX ADMIN — LEVOTHYROXINE SODIUM 75 MCG: 0.07 TABLET ORAL at 06:13

## 2025-05-28 RX ADMIN — METFORMIN HYDROCHLORIDE 500 MG: 500 TABLET, FILM COATED ORAL at 17:22

## 2025-05-28 RX ADMIN — SENNOSIDES 8.6 MG: 8.6 TABLET, FILM COATED ORAL at 21:03

## 2025-05-28 RX ADMIN — DIVALPROEX SODIUM 1500 MG: 500 TABLET, FILM COATED, EXTENDED RELEASE ORAL at 21:03

## 2025-05-28 RX ADMIN — ACETAMINOPHEN 650 MG: 325 TABLET, FILM COATED ORAL at 03:03

## 2025-05-28 RX ADMIN — METFORMIN HYDROCHLORIDE 500 MG: 500 TABLET, FILM COATED ORAL at 08:54

## 2025-05-28 RX ADMIN — DEXTROAMPHETAMINE SACCHARATE, AMPHETAMINE ASPARTATE, DEXTROAMPHETAMINE SULFATE AND AMPHETAMINE SULFATE 30 MG: 2.5; 2.5; 2.5; 2.5 TABLET ORAL at 08:54

## 2025-05-28 RX ADMIN — AMLODIPINE BESYLATE 5 MG: 5 TABLET ORAL at 08:54

## 2025-05-28 RX ADMIN — ATORVASTATIN CALCIUM 10 MG: 10 TABLET, FILM COATED ORAL at 17:22

## 2025-05-28 RX ADMIN — PANTOPRAZOLE SODIUM 40 MG: 40 TABLET, DELAYED RELEASE ORAL at 06:13

## 2025-05-29 PROCEDURE — 99231 SBSQ HOSP IP/OBS SF/LOW 25: CPT | Performed by: STUDENT IN AN ORGANIZED HEALTH CARE EDUCATION/TRAINING PROGRAM

## 2025-05-29 RX ADMIN — Medication 6 MG: at 21:04

## 2025-05-29 RX ADMIN — DIVALPROEX SODIUM 1500 MG: 500 TABLET, FILM COATED, EXTENDED RELEASE ORAL at 21:03

## 2025-05-29 RX ADMIN — SENNOSIDES 8.6 MG: 8.6 TABLET, FILM COATED ORAL at 21:03

## 2025-05-29 RX ADMIN — OLANZAPINE 20 MG: 10 TABLET, FILM COATED ORAL at 21:03

## 2025-05-29 RX ADMIN — METFORMIN HYDROCHLORIDE 500 MG: 500 TABLET, FILM COATED ORAL at 17:06

## 2025-05-29 RX ADMIN — DEXTROAMPHETAMINE SACCHARATE, AMPHETAMINE ASPARTATE, DEXTROAMPHETAMINE SULFATE AND AMPHETAMINE SULFATE 30 MG: 2.5; 2.5; 2.5; 2.5 TABLET ORAL at 08:19

## 2025-05-29 RX ADMIN — ATORVASTATIN CALCIUM 10 MG: 10 TABLET, FILM COATED ORAL at 17:06

## 2025-05-29 RX ADMIN — PANTOPRAZOLE SODIUM 40 MG: 40 TABLET, DELAYED RELEASE ORAL at 05:49

## 2025-05-29 RX ADMIN — METFORMIN HYDROCHLORIDE 500 MG: 500 TABLET, FILM COATED ORAL at 08:19

## 2025-05-29 RX ADMIN — AMLODIPINE BESYLATE 5 MG: 5 TABLET ORAL at 08:19

## 2025-05-29 RX ADMIN — LEVOTHYROXINE SODIUM 75 MCG: 0.07 TABLET ORAL at 05:49

## 2025-05-30 PROBLEM — F32.A DEPRESSION: Status: ACTIVE | Noted: 2025-05-30

## 2025-05-30 PROBLEM — F32.A DEPRESSION: Status: RESOLVED | Noted: 2025-05-30 | Resolved: 2025-05-30

## 2025-05-30 PROCEDURE — 99232 SBSQ HOSP IP/OBS MODERATE 35: CPT | Performed by: STUDENT IN AN ORGANIZED HEALTH CARE EDUCATION/TRAINING PROGRAM

## 2025-05-30 PROCEDURE — 99232 SBSQ HOSP IP/OBS MODERATE 35: CPT | Performed by: PSYCHIATRY & NEUROLOGY

## 2025-05-30 RX ADMIN — DEXTROAMPHETAMINE SACCHARATE, AMPHETAMINE ASPARTATE, DEXTROAMPHETAMINE SULFATE AND AMPHETAMINE SULFATE 30 MG: 2.5; 2.5; 2.5; 2.5 TABLET ORAL at 08:19

## 2025-05-30 RX ADMIN — PANTOPRAZOLE SODIUM 40 MG: 40 TABLET, DELAYED RELEASE ORAL at 05:04

## 2025-05-30 RX ADMIN — SENNOSIDES 8.6 MG: 8.6 TABLET, FILM COATED ORAL at 21:12

## 2025-05-30 RX ADMIN — DIVALPROEX SODIUM 1500 MG: 500 TABLET, FILM COATED, EXTENDED RELEASE ORAL at 21:12

## 2025-05-30 RX ADMIN — METFORMIN HYDROCHLORIDE 500 MG: 500 TABLET, FILM COATED ORAL at 16:33

## 2025-05-30 RX ADMIN — ATORVASTATIN CALCIUM 10 MG: 10 TABLET, FILM COATED ORAL at 17:25

## 2025-05-30 RX ADMIN — METFORMIN HYDROCHLORIDE 500 MG: 500 TABLET, FILM COATED ORAL at 08:19

## 2025-05-30 RX ADMIN — LEVOTHYROXINE SODIUM 75 MCG: 0.07 TABLET ORAL at 05:04

## 2025-05-30 RX ADMIN — Medication 6 MG: at 21:12

## 2025-05-30 RX ADMIN — AMLODIPINE BESYLATE 5 MG: 5 TABLET ORAL at 08:19

## 2025-05-30 RX ADMIN — OLANZAPINE 20 MG: 10 TABLET, FILM COATED ORAL at 21:12

## 2025-05-31 PROCEDURE — 99231 SBSQ HOSP IP/OBS SF/LOW 25: CPT | Performed by: INTERNAL MEDICINE

## 2025-05-31 RX ADMIN — METFORMIN HYDROCHLORIDE 500 MG: 500 TABLET, FILM COATED ORAL at 08:10

## 2025-05-31 RX ADMIN — AMLODIPINE BESYLATE 5 MG: 5 TABLET ORAL at 08:10

## 2025-05-31 RX ADMIN — METFORMIN HYDROCHLORIDE 500 MG: 500 TABLET, FILM COATED ORAL at 17:28

## 2025-05-31 RX ADMIN — SENNOSIDES 8.6 MG: 8.6 TABLET, FILM COATED ORAL at 23:56

## 2025-05-31 RX ADMIN — Medication 6 MG: at 23:55

## 2025-05-31 RX ADMIN — ATORVASTATIN CALCIUM 10 MG: 10 TABLET, FILM COATED ORAL at 17:28

## 2025-05-31 RX ADMIN — OLANZAPINE 20 MG: 10 TABLET, FILM COATED ORAL at 23:56

## 2025-05-31 RX ADMIN — PANTOPRAZOLE SODIUM 40 MG: 40 TABLET, DELAYED RELEASE ORAL at 05:26

## 2025-05-31 RX ADMIN — DIVALPROEX SODIUM 1500 MG: 500 TABLET, FILM COATED, EXTENDED RELEASE ORAL at 23:55

## 2025-05-31 RX ADMIN — DEXTROAMPHETAMINE SACCHARATE, AMPHETAMINE ASPARTATE, DEXTROAMPHETAMINE SULFATE AND AMPHETAMINE SULFATE 30 MG: 2.5; 2.5; 2.5; 2.5 TABLET ORAL at 08:10

## 2025-05-31 RX ADMIN — LEVOTHYROXINE SODIUM 75 MCG: 0.07 TABLET ORAL at 05:26

## 2025-06-01 PROCEDURE — 99232 SBSQ HOSP IP/OBS MODERATE 35: CPT | Performed by: FAMILY MEDICINE

## 2025-06-01 RX ADMIN — METFORMIN HYDROCHLORIDE 500 MG: 500 TABLET, FILM COATED ORAL at 06:52

## 2025-06-01 RX ADMIN — OLANZAPINE 20 MG: 10 TABLET, FILM COATED ORAL at 21:23

## 2025-06-01 RX ADMIN — METFORMIN HYDROCHLORIDE 500 MG: 500 TABLET, FILM COATED ORAL at 17:34

## 2025-06-01 RX ADMIN — DIVALPROEX SODIUM 1500 MG: 500 TABLET, FILM COATED, EXTENDED RELEASE ORAL at 21:23

## 2025-06-01 RX ADMIN — AMLODIPINE BESYLATE 5 MG: 5 TABLET ORAL at 08:23

## 2025-06-01 RX ADMIN — SENNOSIDES 8.6 MG: 8.6 TABLET, FILM COATED ORAL at 21:23

## 2025-06-01 RX ADMIN — DEXTROAMPHETAMINE SACCHARATE, AMPHETAMINE ASPARTATE, DEXTROAMPHETAMINE SULFATE AND AMPHETAMINE SULFATE 30 MG: 2.5; 2.5; 2.5; 2.5 TABLET ORAL at 08:23

## 2025-06-01 RX ADMIN — LEVOTHYROXINE SODIUM 75 MCG: 0.07 TABLET ORAL at 06:52

## 2025-06-01 RX ADMIN — ATORVASTATIN CALCIUM 10 MG: 10 TABLET, FILM COATED ORAL at 17:34

## 2025-06-01 RX ADMIN — Medication 6 MG: at 21:23

## 2025-06-01 RX ADMIN — PANTOPRAZOLE SODIUM 40 MG: 40 TABLET, DELAYED RELEASE ORAL at 06:52

## 2025-06-02 PROCEDURE — 99232 SBSQ HOSP IP/OBS MODERATE 35: CPT | Performed by: INTERNAL MEDICINE

## 2025-06-02 RX ADMIN — PANTOPRAZOLE SODIUM 40 MG: 40 TABLET, DELAYED RELEASE ORAL at 05:49

## 2025-06-02 RX ADMIN — LEVOTHYROXINE SODIUM 75 MCG: 0.07 TABLET ORAL at 05:49

## 2025-06-02 RX ADMIN — SENNOSIDES 8.6 MG: 8.6 TABLET, FILM COATED ORAL at 21:07

## 2025-06-02 RX ADMIN — METFORMIN HYDROCHLORIDE 500 MG: 500 TABLET, FILM COATED ORAL at 16:45

## 2025-06-02 RX ADMIN — METFORMIN HYDROCHLORIDE 500 MG: 500 TABLET, FILM COATED ORAL at 07:57

## 2025-06-02 RX ADMIN — Medication 6 MG: at 21:07

## 2025-06-02 RX ADMIN — ACETAMINOPHEN 650 MG: 325 TABLET, FILM COATED ORAL at 15:12

## 2025-06-02 RX ADMIN — AMLODIPINE BESYLATE 5 MG: 5 TABLET ORAL at 08:01

## 2025-06-02 RX ADMIN — ATORVASTATIN CALCIUM 10 MG: 10 TABLET, FILM COATED ORAL at 17:18

## 2025-06-02 RX ADMIN — DEXTROAMPHETAMINE SACCHARATE, AMPHETAMINE ASPARTATE, DEXTROAMPHETAMINE SULFATE AND AMPHETAMINE SULFATE 30 MG: 2.5; 2.5; 2.5; 2.5 TABLET ORAL at 08:01

## 2025-06-02 RX ADMIN — DIVALPROEX SODIUM 1500 MG: 500 TABLET, FILM COATED, EXTENDED RELEASE ORAL at 21:07

## 2025-06-02 RX ADMIN — OLANZAPINE 20 MG: 10 TABLET, FILM COATED ORAL at 21:07

## 2025-06-03 PROCEDURE — 99231 SBSQ HOSP IP/OBS SF/LOW 25: CPT | Performed by: INTERNAL MEDICINE

## 2025-06-03 RX ORDER — LORAZEPAM 0.5 MG/1
0.5 TABLET ORAL EVERY 8 HOURS PRN
Status: DISCONTINUED | OUTPATIENT
Start: 2025-06-03 | End: 2025-06-22

## 2025-06-03 RX ADMIN — AMLODIPINE BESYLATE 5 MG: 5 TABLET ORAL at 08:39

## 2025-06-03 RX ADMIN — LEVOTHYROXINE SODIUM 75 MCG: 0.07 TABLET ORAL at 05:45

## 2025-06-03 RX ADMIN — DEXTROAMPHETAMINE SACCHARATE, AMPHETAMINE ASPARTATE, DEXTROAMPHETAMINE SULFATE AND AMPHETAMINE SULFATE 30 MG: 2.5; 2.5; 2.5; 2.5 TABLET ORAL at 08:39

## 2025-06-03 RX ADMIN — LORAZEPAM 0.5 MG: 0.5 TABLET ORAL at 21:30

## 2025-06-03 RX ADMIN — SENNOSIDES 8.6 MG: 8.6 TABLET, FILM COATED ORAL at 21:30

## 2025-06-03 RX ADMIN — METFORMIN HYDROCHLORIDE 500 MG: 500 TABLET, FILM COATED ORAL at 16:49

## 2025-06-03 RX ADMIN — Medication 6 MG: at 21:30

## 2025-06-03 RX ADMIN — ATORVASTATIN CALCIUM 10 MG: 10 TABLET, FILM COATED ORAL at 17:47

## 2025-06-03 RX ADMIN — METFORMIN HYDROCHLORIDE 500 MG: 500 TABLET, FILM COATED ORAL at 08:39

## 2025-06-03 RX ADMIN — PANTOPRAZOLE SODIUM 40 MG: 40 TABLET, DELAYED RELEASE ORAL at 05:45

## 2025-06-03 RX ADMIN — DIVALPROEX SODIUM 1500 MG: 500 TABLET, FILM COATED, EXTENDED RELEASE ORAL at 21:30

## 2025-06-03 RX ADMIN — LORAZEPAM 0.5 MG: 0.5 TABLET ORAL at 11:48

## 2025-06-03 RX ADMIN — OLANZAPINE 20 MG: 10 TABLET, FILM COATED ORAL at 21:30

## 2025-06-04 PROCEDURE — 99231 SBSQ HOSP IP/OBS SF/LOW 25: CPT | Performed by: INTERNAL MEDICINE

## 2025-06-04 RX ADMIN — LEVOTHYROXINE SODIUM 75 MCG: 0.07 TABLET ORAL at 05:09

## 2025-06-04 RX ADMIN — Medication 6 MG: at 21:07

## 2025-06-04 RX ADMIN — METFORMIN HYDROCHLORIDE 500 MG: 500 TABLET, FILM COATED ORAL at 17:05

## 2025-06-04 RX ADMIN — ATORVASTATIN CALCIUM 10 MG: 10 TABLET, FILM COATED ORAL at 17:05

## 2025-06-04 RX ADMIN — SENNOSIDES 8.6 MG: 8.6 TABLET, FILM COATED ORAL at 21:07

## 2025-06-04 RX ADMIN — DIVALPROEX SODIUM 1500 MG: 500 TABLET, FILM COATED, EXTENDED RELEASE ORAL at 21:07

## 2025-06-04 RX ADMIN — METFORMIN HYDROCHLORIDE 500 MG: 500 TABLET, FILM COATED ORAL at 08:26

## 2025-06-04 RX ADMIN — AMLODIPINE BESYLATE 5 MG: 5 TABLET ORAL at 08:26

## 2025-06-04 RX ADMIN — PANTOPRAZOLE SODIUM 40 MG: 40 TABLET, DELAYED RELEASE ORAL at 05:09

## 2025-06-04 RX ADMIN — OLANZAPINE 20 MG: 10 TABLET, FILM COATED ORAL at 21:07

## 2025-06-04 RX ADMIN — DEXTROAMPHETAMINE SACCHARATE, AMPHETAMINE ASPARTATE, DEXTROAMPHETAMINE SULFATE AND AMPHETAMINE SULFATE 30 MG: 2.5; 2.5; 2.5; 2.5 TABLET ORAL at 08:26

## 2025-06-05 PROCEDURE — 99231 SBSQ HOSP IP/OBS SF/LOW 25: CPT | Performed by: INTERNAL MEDICINE

## 2025-06-05 RX ADMIN — PANTOPRAZOLE SODIUM 40 MG: 40 TABLET, DELAYED RELEASE ORAL at 05:15

## 2025-06-05 RX ADMIN — SENNOSIDES 8.6 MG: 8.6 TABLET, FILM COATED ORAL at 21:17

## 2025-06-05 RX ADMIN — OLANZAPINE 20 MG: 10 TABLET, FILM COATED ORAL at 21:16

## 2025-06-05 RX ADMIN — ATORVASTATIN CALCIUM 10 MG: 10 TABLET, FILM COATED ORAL at 17:12

## 2025-06-05 RX ADMIN — LEVOTHYROXINE SODIUM 75 MCG: 0.07 TABLET ORAL at 05:15

## 2025-06-05 RX ADMIN — Medication 6 MG: at 21:17

## 2025-06-05 RX ADMIN — AMLODIPINE BESYLATE 5 MG: 5 TABLET ORAL at 08:20

## 2025-06-05 RX ADMIN — DEXTROAMPHETAMINE SACCHARATE, AMPHETAMINE ASPARTATE, DEXTROAMPHETAMINE SULFATE AND AMPHETAMINE SULFATE 30 MG: 2.5; 2.5; 2.5; 2.5 TABLET ORAL at 08:20

## 2025-06-05 RX ADMIN — METFORMIN HYDROCHLORIDE 500 MG: 500 TABLET, FILM COATED ORAL at 08:20

## 2025-06-05 RX ADMIN — DIVALPROEX SODIUM 1500 MG: 500 TABLET, FILM COATED, EXTENDED RELEASE ORAL at 21:17

## 2025-06-05 RX ADMIN — METFORMIN HYDROCHLORIDE 500 MG: 500 TABLET, FILM COATED ORAL at 17:12

## 2025-06-06 PROCEDURE — 99232 SBSQ HOSP IP/OBS MODERATE 35: CPT | Performed by: STUDENT IN AN ORGANIZED HEALTH CARE EDUCATION/TRAINING PROGRAM

## 2025-06-06 RX ADMIN — OLANZAPINE 20 MG: 10 TABLET, FILM COATED ORAL at 21:23

## 2025-06-06 RX ADMIN — ACETAMINOPHEN 650 MG: 325 TABLET, FILM COATED ORAL at 08:14

## 2025-06-06 RX ADMIN — METFORMIN HYDROCHLORIDE 500 MG: 500 TABLET, FILM COATED ORAL at 08:15

## 2025-06-06 RX ADMIN — Medication 6 MG: at 21:22

## 2025-06-06 RX ADMIN — DIVALPROEX SODIUM 1500 MG: 500 TABLET, FILM COATED, EXTENDED RELEASE ORAL at 21:21

## 2025-06-06 RX ADMIN — SENNOSIDES 8.6 MG: 8.6 TABLET, FILM COATED ORAL at 21:22

## 2025-06-06 RX ADMIN — AMLODIPINE BESYLATE 5 MG: 5 TABLET ORAL at 08:15

## 2025-06-06 RX ADMIN — METFORMIN HYDROCHLORIDE 500 MG: 500 TABLET, FILM COATED ORAL at 17:08

## 2025-06-06 RX ADMIN — LEVOTHYROXINE SODIUM 75 MCG: 0.07 TABLET ORAL at 06:29

## 2025-06-06 RX ADMIN — DEXTROAMPHETAMINE SACCHARATE, AMPHETAMINE ASPARTATE, DEXTROAMPHETAMINE SULFATE AND AMPHETAMINE SULFATE 30 MG: 2.5; 2.5; 2.5; 2.5 TABLET ORAL at 08:15

## 2025-06-06 RX ADMIN — ATORVASTATIN CALCIUM 10 MG: 10 TABLET, FILM COATED ORAL at 17:08

## 2025-06-06 RX ADMIN — PANTOPRAZOLE SODIUM 40 MG: 40 TABLET, DELAYED RELEASE ORAL at 06:29

## 2025-06-06 RX ADMIN — LORAZEPAM 0.5 MG: 0.5 TABLET ORAL at 18:36

## 2025-06-06 RX ADMIN — ZOLPIDEM TARTRATE 5 MG: 5 TABLET, FILM COATED ORAL at 20:47

## 2025-06-07 LAB
ANION GAP SERPL CALCULATED.3IONS-SCNC: 8 MMOL/L (ref 4–13)
BASOPHILS # BLD AUTO: 0.05 THOUSANDS/ÂΜL (ref 0–0.1)
BASOPHILS NFR BLD AUTO: 1 % (ref 0–1)
BUN SERPL-MCNC: 9 MG/DL (ref 5–25)
CALCIUM SERPL-MCNC: 8.7 MG/DL (ref 8.4–10.2)
CHLORIDE SERPL-SCNC: 95 MMOL/L (ref 96–108)
CO2 SERPL-SCNC: 26 MMOL/L (ref 21–32)
CREAT SERPL-MCNC: 0.66 MG/DL (ref 0.6–1.3)
EOSINOPHIL # BLD AUTO: 0 THOUSAND/ÂΜL (ref 0–0.61)
EOSINOPHIL NFR BLD AUTO: 0 % (ref 0–6)
ERYTHROCYTE [DISTWIDTH] IN BLOOD BY AUTOMATED COUNT: 13 % (ref 11.6–15.1)
GFR SERPL CREATININE-BSD FRML MDRD: 107 ML/MIN/1.73SQ M
GLUCOSE SERPL-MCNC: 205 MG/DL (ref 65–140)
HCT VFR BLD AUTO: 38.7 % (ref 36.5–49.3)
HGB BLD-MCNC: 12.4 G/DL (ref 12–17)
IMM GRANULOCYTES # BLD AUTO: 0.03 THOUSAND/UL (ref 0–0.2)
IMM GRANULOCYTES NFR BLD AUTO: 0 % (ref 0–2)
LYMPHOCYTES # BLD AUTO: 2.53 THOUSANDS/ÂΜL (ref 0.6–4.47)
LYMPHOCYTES NFR BLD AUTO: 28 % (ref 14–44)
MCH RBC QN AUTO: 27 PG (ref 26.8–34.3)
MCHC RBC AUTO-ENTMCNC: 32 G/DL (ref 31.4–37.4)
MCV RBC AUTO: 84 FL (ref 82–98)
MONOCYTES # BLD AUTO: 1.12 THOUSAND/ÂΜL (ref 0.17–1.22)
MONOCYTES NFR BLD AUTO: 13 % (ref 4–12)
NEUTROPHILS # BLD AUTO: 5.24 THOUSANDS/ÂΜL (ref 1.85–7.62)
NEUTS SEG NFR BLD AUTO: 58 % (ref 43–75)
NRBC BLD AUTO-RTO: 0 /100 WBCS
PLATELET # BLD AUTO: 187 THOUSANDS/UL (ref 149–390)
PMV BLD AUTO: 10.3 FL (ref 8.9–12.7)
POTASSIUM SERPL-SCNC: 3.8 MMOL/L (ref 3.5–5.3)
RBC # BLD AUTO: 4.6 MILLION/UL (ref 3.88–5.62)
SODIUM SERPL-SCNC: 129 MMOL/L (ref 135–147)
WBC # BLD AUTO: 8.97 THOUSAND/UL (ref 4.31–10.16)

## 2025-06-07 PROCEDURE — 99232 SBSQ HOSP IP/OBS MODERATE 35: CPT | Performed by: STUDENT IN AN ORGANIZED HEALTH CARE EDUCATION/TRAINING PROGRAM

## 2025-06-07 PROCEDURE — 85025 COMPLETE CBC W/AUTO DIFF WBC: CPT | Performed by: STUDENT IN AN ORGANIZED HEALTH CARE EDUCATION/TRAINING PROGRAM

## 2025-06-07 PROCEDURE — 80048 BASIC METABOLIC PNL TOTAL CA: CPT | Performed by: STUDENT IN AN ORGANIZED HEALTH CARE EDUCATION/TRAINING PROGRAM

## 2025-06-07 RX ADMIN — LEVOTHYROXINE SODIUM 75 MCG: 0.07 TABLET ORAL at 06:22

## 2025-06-07 RX ADMIN — ATORVASTATIN CALCIUM 10 MG: 10 TABLET, FILM COATED ORAL at 17:01

## 2025-06-07 RX ADMIN — METFORMIN HYDROCHLORIDE 500 MG: 500 TABLET, FILM COATED ORAL at 17:01

## 2025-06-07 RX ADMIN — SENNOSIDES 8.6 MG: 8.6 TABLET, FILM COATED ORAL at 21:09

## 2025-06-07 RX ADMIN — ZOLPIDEM TARTRATE 5 MG: 5 TABLET, FILM COATED ORAL at 20:45

## 2025-06-07 RX ADMIN — METFORMIN HYDROCHLORIDE 500 MG: 500 TABLET, FILM COATED ORAL at 06:53

## 2025-06-07 RX ADMIN — DIVALPROEX SODIUM 1500 MG: 500 TABLET, FILM COATED, EXTENDED RELEASE ORAL at 21:09

## 2025-06-07 RX ADMIN — PANTOPRAZOLE SODIUM 40 MG: 40 TABLET, DELAYED RELEASE ORAL at 06:22

## 2025-06-07 RX ADMIN — ACETAMINOPHEN 650 MG: 325 TABLET, FILM COATED ORAL at 20:45

## 2025-06-07 RX ADMIN — Medication 6 MG: at 21:09

## 2025-06-07 RX ADMIN — DEXTROAMPHETAMINE SACCHARATE, AMPHETAMINE ASPARTATE, DEXTROAMPHETAMINE SULFATE AND AMPHETAMINE SULFATE 30 MG: 2.5; 2.5; 2.5; 2.5 TABLET ORAL at 08:19

## 2025-06-07 RX ADMIN — AMLODIPINE BESYLATE 5 MG: 5 TABLET ORAL at 08:19

## 2025-06-07 RX ADMIN — OLANZAPINE 20 MG: 10 TABLET, FILM COATED ORAL at 21:09

## 2025-06-08 LAB

## 2025-06-08 PROCEDURE — 99232 SBSQ HOSP IP/OBS MODERATE 35: CPT | Performed by: STUDENT IN AN ORGANIZED HEALTH CARE EDUCATION/TRAINING PROGRAM

## 2025-06-08 PROCEDURE — 81001 URINALYSIS AUTO W/SCOPE: CPT | Performed by: STUDENT IN AN ORGANIZED HEALTH CARE EDUCATION/TRAINING PROGRAM

## 2025-06-08 RX ORDER — OLANZAPINE 10 MG/2ML
5 INJECTION, POWDER, FOR SOLUTION INTRAMUSCULAR ONCE
Status: DISCONTINUED | OUTPATIENT
Start: 2025-06-08 | End: 2025-06-08

## 2025-06-08 RX ADMIN — DIVALPROEX SODIUM 1500 MG: 500 TABLET, FILM COATED, EXTENDED RELEASE ORAL at 21:40

## 2025-06-08 RX ADMIN — SENNOSIDES 8.6 MG: 8.6 TABLET, FILM COATED ORAL at 21:40

## 2025-06-08 RX ADMIN — ZOLPIDEM TARTRATE 5 MG: 5 TABLET, FILM COATED ORAL at 21:40

## 2025-06-08 RX ADMIN — PANTOPRAZOLE SODIUM 40 MG: 40 TABLET, DELAYED RELEASE ORAL at 07:39

## 2025-06-08 RX ADMIN — OLANZAPINE 20 MG: 10 TABLET, FILM COATED ORAL at 21:40

## 2025-06-08 RX ADMIN — AMLODIPINE BESYLATE 5 MG: 5 TABLET ORAL at 08:04

## 2025-06-08 RX ADMIN — Medication 6 MG: at 21:40

## 2025-06-08 RX ADMIN — LEVOTHYROXINE SODIUM 75 MCG: 0.07 TABLET ORAL at 07:39

## 2025-06-08 RX ADMIN — ATORVASTATIN CALCIUM 10 MG: 10 TABLET, FILM COATED ORAL at 17:00

## 2025-06-08 RX ADMIN — METFORMIN HYDROCHLORIDE 500 MG: 500 TABLET, FILM COATED ORAL at 08:04

## 2025-06-08 RX ADMIN — ACETAMINOPHEN 650 MG: 325 TABLET, FILM COATED ORAL at 21:40

## 2025-06-08 RX ADMIN — METFORMIN HYDROCHLORIDE 500 MG: 500 TABLET, FILM COATED ORAL at 16:59

## 2025-06-08 RX ADMIN — DEXTROAMPHETAMINE SACCHARATE, AMPHETAMINE ASPARTATE, DEXTROAMPHETAMINE SULFATE AND AMPHETAMINE SULFATE 30 MG: 2.5; 2.5; 2.5; 2.5 TABLET ORAL at 08:04

## 2025-06-09 LAB
ANION GAP SERPL CALCULATED.3IONS-SCNC: 9 MMOL/L (ref 4–13)
BUN SERPL-MCNC: 10 MG/DL (ref 5–25)
CALCIUM SERPL-MCNC: 9.2 MG/DL (ref 8.4–10.2)
CHLORIDE SERPL-SCNC: 103 MMOL/L (ref 96–108)
CO2 SERPL-SCNC: 26 MMOL/L (ref 21–32)
CREAT SERPL-MCNC: 0.57 MG/DL (ref 0.6–1.3)
ERYTHROCYTE [DISTWIDTH] IN BLOOD BY AUTOMATED COUNT: 13.2 % (ref 11.6–15.1)
GFR SERPL CREATININE-BSD FRML MDRD: 113 ML/MIN/1.73SQ M
GLUCOSE SERPL-MCNC: 115 MG/DL (ref 65–140)
HCT VFR BLD AUTO: 42.7 % (ref 36.5–49.3)
HGB BLD-MCNC: 13.3 G/DL (ref 12–17)
MCH RBC QN AUTO: 26.7 PG (ref 26.8–34.3)
MCHC RBC AUTO-ENTMCNC: 31.1 G/DL (ref 31.4–37.4)
MCV RBC AUTO: 86 FL (ref 82–98)
PLATELET # BLD AUTO: 213 THOUSANDS/UL (ref 149–390)
PMV BLD AUTO: 9.6 FL (ref 8.9–12.7)
POTASSIUM SERPL-SCNC: 3.8 MMOL/L (ref 3.5–5.3)
RBC # BLD AUTO: 4.99 MILLION/UL (ref 3.88–5.62)
SODIUM SERPL-SCNC: 138 MMOL/L (ref 135–147)
VALPROATE SERPL-MCNC: 43 UG/ML (ref 50–125)
WBC # BLD AUTO: 9.68 THOUSAND/UL (ref 4.31–10.16)

## 2025-06-09 PROCEDURE — 80048 BASIC METABOLIC PNL TOTAL CA: CPT | Performed by: STUDENT IN AN ORGANIZED HEALTH CARE EDUCATION/TRAINING PROGRAM

## 2025-06-09 PROCEDURE — 85027 COMPLETE CBC AUTOMATED: CPT | Performed by: STUDENT IN AN ORGANIZED HEALTH CARE EDUCATION/TRAINING PROGRAM

## 2025-06-09 PROCEDURE — 99232 SBSQ HOSP IP/OBS MODERATE 35: CPT | Performed by: STUDENT IN AN ORGANIZED HEALTH CARE EDUCATION/TRAINING PROGRAM

## 2025-06-09 PROCEDURE — 80164 ASSAY DIPROPYLACETIC ACD TOT: CPT

## 2025-06-09 RX ORDER — ENOXAPARIN SODIUM 100 MG/ML
40 INJECTION SUBCUTANEOUS
Status: DISCONTINUED | OUTPATIENT
Start: 2025-06-10 | End: 2025-07-03

## 2025-06-09 RX ADMIN — AMLODIPINE BESYLATE 5 MG: 5 TABLET ORAL at 08:53

## 2025-06-09 RX ADMIN — SENNOSIDES 8.6 MG: 8.6 TABLET, FILM COATED ORAL at 21:24

## 2025-06-09 RX ADMIN — DIVALPROEX SODIUM 1500 MG: 500 TABLET, FILM COATED, EXTENDED RELEASE ORAL at 21:23

## 2025-06-09 RX ADMIN — ZOLPIDEM TARTRATE 5 MG: 5 TABLET, FILM COATED ORAL at 22:28

## 2025-06-09 RX ADMIN — METFORMIN HYDROCHLORIDE 500 MG: 500 TABLET, FILM COATED ORAL at 16:31

## 2025-06-09 RX ADMIN — OLANZAPINE 20 MG: 10 TABLET, FILM COATED ORAL at 21:24

## 2025-06-09 RX ADMIN — METFORMIN HYDROCHLORIDE 500 MG: 500 TABLET, FILM COATED ORAL at 08:53

## 2025-06-09 RX ADMIN — LEVOTHYROXINE SODIUM 75 MCG: 0.07 TABLET ORAL at 05:38

## 2025-06-09 RX ADMIN — Medication 6 MG: at 21:24

## 2025-06-09 RX ADMIN — ATORVASTATIN CALCIUM 10 MG: 10 TABLET, FILM COATED ORAL at 17:07

## 2025-06-09 RX ADMIN — PANTOPRAZOLE SODIUM 40 MG: 40 TABLET, DELAYED RELEASE ORAL at 05:38

## 2025-06-09 RX ADMIN — DEXTROAMPHETAMINE SACCHARATE, AMPHETAMINE ASPARTATE, DEXTROAMPHETAMINE SULFATE AND AMPHETAMINE SULFATE 30 MG: 2.5; 2.5; 2.5; 2.5 TABLET ORAL at 08:53

## 2025-06-10 PROCEDURE — 99232 SBSQ HOSP IP/OBS MODERATE 35: CPT | Performed by: STUDENT IN AN ORGANIZED HEALTH CARE EDUCATION/TRAINING PROGRAM

## 2025-06-10 RX ORDER — DIVALPROEX SODIUM 500 MG/1
2000 TABLET, FILM COATED, EXTENDED RELEASE ORAL
Status: DISCONTINUED | OUTPATIENT
Start: 2025-06-10 | End: 2025-08-08 | Stop reason: HOSPADM

## 2025-06-10 RX ADMIN — DIVALPROEX SODIUM 2000 MG: 500 TABLET, FILM COATED, EXTENDED RELEASE ORAL at 21:04

## 2025-06-10 RX ADMIN — METFORMIN HYDROCHLORIDE 500 MG: 500 TABLET, FILM COATED ORAL at 08:15

## 2025-06-10 RX ADMIN — LEVOTHYROXINE SODIUM 75 MCG: 0.07 TABLET ORAL at 05:34

## 2025-06-10 RX ADMIN — SENNOSIDES 8.6 MG: 8.6 TABLET, FILM COATED ORAL at 21:04

## 2025-06-10 RX ADMIN — METFORMIN HYDROCHLORIDE 500 MG: 500 TABLET, FILM COATED ORAL at 17:11

## 2025-06-10 RX ADMIN — AMLODIPINE BESYLATE 5 MG: 5 TABLET ORAL at 08:15

## 2025-06-10 RX ADMIN — OLANZAPINE 20 MG: 10 TABLET, FILM COATED ORAL at 21:04

## 2025-06-10 RX ADMIN — LORAZEPAM 0.5 MG: 0.5 TABLET ORAL at 17:12

## 2025-06-10 RX ADMIN — PANTOPRAZOLE SODIUM 40 MG: 40 TABLET, DELAYED RELEASE ORAL at 05:34

## 2025-06-10 RX ADMIN — Medication 6 MG: at 21:04

## 2025-06-10 RX ADMIN — DEXTROAMPHETAMINE SACCHARATE, AMPHETAMINE ASPARTATE, DEXTROAMPHETAMINE SULFATE AND AMPHETAMINE SULFATE 30 MG: 2.5; 2.5; 2.5; 2.5 TABLET ORAL at 08:15

## 2025-06-10 RX ADMIN — ATORVASTATIN CALCIUM 10 MG: 10 TABLET, FILM COATED ORAL at 17:11

## 2025-06-11 LAB
ATRIAL RATE: 73 BPM
ATRIAL RATE: 74 BPM
CARDIAC TROPONIN I PNL SERPL HS: 6 NG/L (ref 8–18)
P AXIS: 37 DEGREES
P AXIS: 41 DEGREES
PR INTERVAL: 150 MS
PR INTERVAL: 150 MS
QRS AXIS: -41 DEGREES
QRS AXIS: -44 DEGREES
QRSD INTERVAL: 110 MS
QRSD INTERVAL: 110 MS
QT INTERVAL: 376 MS
QT INTERVAL: 376 MS
QTC INTERVAL: 415 MS
QTC INTERVAL: 418 MS
T WAVE AXIS: 35 DEGREES
T WAVE AXIS: 38 DEGREES
VENTRICULAR RATE: 73 BPM
VENTRICULAR RATE: 74 BPM

## 2025-06-11 PROCEDURE — 93010 ELECTROCARDIOGRAM REPORT: CPT | Performed by: INTERNAL MEDICINE

## 2025-06-11 PROCEDURE — 84484 ASSAY OF TROPONIN QUANT: CPT | Performed by: NURSE PRACTITIONER

## 2025-06-11 PROCEDURE — 99232 SBSQ HOSP IP/OBS MODERATE 35: CPT | Performed by: STUDENT IN AN ORGANIZED HEALTH CARE EDUCATION/TRAINING PROGRAM

## 2025-06-11 PROCEDURE — 93005 ELECTROCARDIOGRAM TRACING: CPT

## 2025-06-11 RX ADMIN — OLANZAPINE 20 MG: 10 TABLET, FILM COATED ORAL at 21:57

## 2025-06-11 RX ADMIN — PANTOPRAZOLE SODIUM 40 MG: 40 TABLET, DELAYED RELEASE ORAL at 06:02

## 2025-06-11 RX ADMIN — ACETAMINOPHEN 650 MG: 325 TABLET, FILM COATED ORAL at 02:59

## 2025-06-11 RX ADMIN — AMLODIPINE BESYLATE 5 MG: 5 TABLET ORAL at 08:00

## 2025-06-11 RX ADMIN — SENNOSIDES 8.6 MG: 8.6 TABLET, FILM COATED ORAL at 21:57

## 2025-06-11 RX ADMIN — METFORMIN HYDROCHLORIDE 500 MG: 500 TABLET, FILM COATED ORAL at 08:00

## 2025-06-11 RX ADMIN — ALUMINUM HYDROXIDE, MAGNESIUM HYDROXIDE, AND DIMETHICONE 30 ML: 200; 20; 200 SUSPENSION ORAL at 02:59

## 2025-06-11 RX ADMIN — METFORMIN HYDROCHLORIDE 500 MG: 500 TABLET, FILM COATED ORAL at 17:11

## 2025-06-11 RX ADMIN — DIVALPROEX SODIUM 2000 MG: 500 TABLET, FILM COATED, EXTENDED RELEASE ORAL at 21:58

## 2025-06-11 RX ADMIN — ATORVASTATIN CALCIUM 10 MG: 10 TABLET, FILM COATED ORAL at 17:11

## 2025-06-11 RX ADMIN — Medication 6 MG: at 21:57

## 2025-06-11 RX ADMIN — LEVOTHYROXINE SODIUM 75 MCG: 0.07 TABLET ORAL at 06:02

## 2025-06-11 RX ADMIN — DEXTROAMPHETAMINE SACCHARATE, AMPHETAMINE ASPARTATE, DEXTROAMPHETAMINE SULFATE AND AMPHETAMINE SULFATE 30 MG: 2.5; 2.5; 2.5; 2.5 TABLET ORAL at 08:00

## 2025-06-12 PROCEDURE — 99232 SBSQ HOSP IP/OBS MODERATE 35: CPT | Performed by: STUDENT IN AN ORGANIZED HEALTH CARE EDUCATION/TRAINING PROGRAM

## 2025-06-12 RX ADMIN — METFORMIN HYDROCHLORIDE 500 MG: 500 TABLET, FILM COATED ORAL at 08:35

## 2025-06-12 RX ADMIN — OLANZAPINE 20 MG: 10 TABLET, FILM COATED ORAL at 21:07

## 2025-06-12 RX ADMIN — DIVALPROEX SODIUM 2000 MG: 500 TABLET, FILM COATED, EXTENDED RELEASE ORAL at 21:07

## 2025-06-12 RX ADMIN — AMLODIPINE BESYLATE 5 MG: 5 TABLET ORAL at 08:35

## 2025-06-12 RX ADMIN — PANTOPRAZOLE SODIUM 40 MG: 40 TABLET, DELAYED RELEASE ORAL at 05:44

## 2025-06-12 RX ADMIN — SENNOSIDES 8.6 MG: 8.6 TABLET, FILM COATED ORAL at 21:07

## 2025-06-12 RX ADMIN — DEXTROAMPHETAMINE SACCHARATE, AMPHETAMINE ASPARTATE, DEXTROAMPHETAMINE SULFATE AND AMPHETAMINE SULFATE 30 MG: 2.5; 2.5; 2.5; 2.5 TABLET ORAL at 08:35

## 2025-06-12 RX ADMIN — LEVOTHYROXINE SODIUM 75 MCG: 0.07 TABLET ORAL at 05:44

## 2025-06-12 RX ADMIN — Medication 6 MG: at 21:07

## 2025-06-12 RX ADMIN — METFORMIN HYDROCHLORIDE 500 MG: 500 TABLET, FILM COATED ORAL at 15:32

## 2025-06-12 RX ADMIN — ATORVASTATIN CALCIUM 10 MG: 10 TABLET, FILM COATED ORAL at 17:10

## 2025-06-13 LAB — VALPROATE SERPL-MCNC: 65 ÂΜG/ML (ref 50–125)

## 2025-06-13 PROCEDURE — 97167 OT EVAL HIGH COMPLEX 60 MIN: CPT

## 2025-06-13 PROCEDURE — 80164 ASSAY DIPROPYLACETIC ACD TOT: CPT | Performed by: PSYCHIATRY & NEUROLOGY

## 2025-06-13 PROCEDURE — 99231 SBSQ HOSP IP/OBS SF/LOW 25: CPT | Performed by: STUDENT IN AN ORGANIZED HEALTH CARE EDUCATION/TRAINING PROGRAM

## 2025-06-13 RX ADMIN — Medication 6 MG: at 21:13

## 2025-06-13 RX ADMIN — METFORMIN HYDROCHLORIDE 500 MG: 500 TABLET, FILM COATED ORAL at 08:11

## 2025-06-13 RX ADMIN — LORAZEPAM 0.5 MG: 0.5 TABLET ORAL at 02:19

## 2025-06-13 RX ADMIN — SENNOSIDES 8.6 MG: 8.6 TABLET, FILM COATED ORAL at 21:13

## 2025-06-13 RX ADMIN — PANTOPRAZOLE SODIUM 40 MG: 40 TABLET, DELAYED RELEASE ORAL at 05:21

## 2025-06-13 RX ADMIN — OLANZAPINE 20 MG: 10 TABLET, FILM COATED ORAL at 21:13

## 2025-06-13 RX ADMIN — METFORMIN HYDROCHLORIDE 500 MG: 500 TABLET, FILM COATED ORAL at 15:53

## 2025-06-13 RX ADMIN — AMLODIPINE BESYLATE 5 MG: 5 TABLET ORAL at 08:11

## 2025-06-13 RX ADMIN — LEVOTHYROXINE SODIUM 75 MCG: 0.07 TABLET ORAL at 05:21

## 2025-06-13 RX ADMIN — ATORVASTATIN CALCIUM 10 MG: 10 TABLET, FILM COATED ORAL at 17:02

## 2025-06-13 RX ADMIN — DIVALPROEX SODIUM 2000 MG: 500 TABLET, FILM COATED, EXTENDED RELEASE ORAL at 21:12

## 2025-06-13 RX ADMIN — DEXTROAMPHETAMINE SACCHARATE, AMPHETAMINE ASPARTATE, DEXTROAMPHETAMINE SULFATE AND AMPHETAMINE SULFATE 30 MG: 2.5; 2.5; 2.5; 2.5 TABLET ORAL at 08:10

## 2025-06-14 PROCEDURE — 99232 SBSQ HOSP IP/OBS MODERATE 35: CPT | Performed by: STUDENT IN AN ORGANIZED HEALTH CARE EDUCATION/TRAINING PROGRAM

## 2025-06-14 RX ADMIN — METFORMIN HYDROCHLORIDE 500 MG: 500 TABLET, FILM COATED ORAL at 15:37

## 2025-06-14 RX ADMIN — Medication 6 MG: at 20:54

## 2025-06-14 RX ADMIN — PANTOPRAZOLE SODIUM 40 MG: 40 TABLET, DELAYED RELEASE ORAL at 06:01

## 2025-06-14 RX ADMIN — AMLODIPINE BESYLATE 5 MG: 5 TABLET ORAL at 08:09

## 2025-06-14 RX ADMIN — LEVOTHYROXINE SODIUM 75 MCG: 0.07 TABLET ORAL at 06:01

## 2025-06-14 RX ADMIN — DEXTROAMPHETAMINE SACCHARATE, AMPHETAMINE ASPARTATE, DEXTROAMPHETAMINE SULFATE AND AMPHETAMINE SULFATE 30 MG: 2.5; 2.5; 2.5; 2.5 TABLET ORAL at 08:09

## 2025-06-14 RX ADMIN — ATORVASTATIN CALCIUM 10 MG: 10 TABLET, FILM COATED ORAL at 17:04

## 2025-06-14 RX ADMIN — OLANZAPINE 20 MG: 10 TABLET, FILM COATED ORAL at 20:54

## 2025-06-14 RX ADMIN — METFORMIN HYDROCHLORIDE 500 MG: 500 TABLET, FILM COATED ORAL at 08:09

## 2025-06-14 RX ADMIN — DIVALPROEX SODIUM 2000 MG: 500 TABLET, FILM COATED, EXTENDED RELEASE ORAL at 20:54

## 2025-06-14 RX ADMIN — SENNOSIDES 8.6 MG: 8.6 TABLET, FILM COATED ORAL at 20:54

## 2025-06-15 PROCEDURE — 99231 SBSQ HOSP IP/OBS SF/LOW 25: CPT | Performed by: STUDENT IN AN ORGANIZED HEALTH CARE EDUCATION/TRAINING PROGRAM

## 2025-06-15 RX ADMIN — Medication 6 MG: at 21:01

## 2025-06-15 RX ADMIN — ATORVASTATIN CALCIUM 10 MG: 10 TABLET, FILM COATED ORAL at 17:35

## 2025-06-15 RX ADMIN — SENNOSIDES 8.6 MG: 8.6 TABLET, FILM COATED ORAL at 21:01

## 2025-06-15 RX ADMIN — OLANZAPINE 20 MG: 10 TABLET, FILM COATED ORAL at 21:01

## 2025-06-15 RX ADMIN — METFORMIN HYDROCHLORIDE 500 MG: 500 TABLET, FILM COATED ORAL at 08:05

## 2025-06-15 RX ADMIN — AMLODIPINE BESYLATE 5 MG: 5 TABLET ORAL at 08:05

## 2025-06-15 RX ADMIN — DEXTROAMPHETAMINE SACCHARATE, AMPHETAMINE ASPARTATE, DEXTROAMPHETAMINE SULFATE AND AMPHETAMINE SULFATE 30 MG: 2.5; 2.5; 2.5; 2.5 TABLET ORAL at 08:04

## 2025-06-15 RX ADMIN — LORAZEPAM 0.5 MG: 0.5 TABLET ORAL at 14:46

## 2025-06-15 RX ADMIN — PANTOPRAZOLE SODIUM 40 MG: 40 TABLET, DELAYED RELEASE ORAL at 04:33

## 2025-06-15 RX ADMIN — METFORMIN HYDROCHLORIDE 500 MG: 500 TABLET, FILM COATED ORAL at 15:40

## 2025-06-15 RX ADMIN — DIVALPROEX SODIUM 2000 MG: 500 TABLET, FILM COATED, EXTENDED RELEASE ORAL at 21:01

## 2025-06-15 RX ADMIN — LEVOTHYROXINE SODIUM 75 MCG: 0.07 TABLET ORAL at 04:33

## 2025-06-16 PROCEDURE — 99231 SBSQ HOSP IP/OBS SF/LOW 25: CPT | Performed by: STUDENT IN AN ORGANIZED HEALTH CARE EDUCATION/TRAINING PROGRAM

## 2025-06-16 RX ADMIN — METFORMIN HYDROCHLORIDE 500 MG: 500 TABLET, FILM COATED ORAL at 08:07

## 2025-06-16 RX ADMIN — DIVALPROEX SODIUM 2000 MG: 500 TABLET, FILM COATED, EXTENDED RELEASE ORAL at 21:00

## 2025-06-16 RX ADMIN — ACETAMINOPHEN 650 MG: 325 TABLET, FILM COATED ORAL at 08:08

## 2025-06-16 RX ADMIN — SENNOSIDES 8.6 MG: 8.6 TABLET, FILM COATED ORAL at 21:00

## 2025-06-16 RX ADMIN — PANTOPRAZOLE SODIUM 40 MG: 40 TABLET, DELAYED RELEASE ORAL at 05:14

## 2025-06-16 RX ADMIN — ATORVASTATIN CALCIUM 10 MG: 10 TABLET, FILM COATED ORAL at 17:00

## 2025-06-16 RX ADMIN — LORAZEPAM 0.5 MG: 0.5 TABLET ORAL at 08:07

## 2025-06-16 RX ADMIN — ACETAMINOPHEN 650 MG: 325 TABLET, FILM COATED ORAL at 17:00

## 2025-06-16 RX ADMIN — LORAZEPAM 0.5 MG: 0.5 TABLET ORAL at 17:00

## 2025-06-16 RX ADMIN — LEVOTHYROXINE SODIUM 75 MCG: 0.07 TABLET ORAL at 05:14

## 2025-06-16 RX ADMIN — METFORMIN HYDROCHLORIDE 500 MG: 500 TABLET, FILM COATED ORAL at 15:44

## 2025-06-16 RX ADMIN — AMLODIPINE BESYLATE 5 MG: 5 TABLET ORAL at 08:08

## 2025-06-16 RX ADMIN — Medication 6 MG: at 21:00

## 2025-06-16 RX ADMIN — OLANZAPINE 20 MG: 10 TABLET, FILM COATED ORAL at 21:00

## 2025-06-16 RX ADMIN — DEXTROAMPHETAMINE SACCHARATE, AMPHETAMINE ASPARTATE, DEXTROAMPHETAMINE SULFATE AND AMPHETAMINE SULFATE 30 MG: 2.5; 2.5; 2.5; 2.5 TABLET ORAL at 08:07

## 2025-06-17 LAB
ALBUMIN SERPL BCG-MCNC: 4.1 G/DL (ref 3.5–5)
ALP SERPL-CCNC: 59 U/L (ref 34–104)
ALT SERPL W P-5'-P-CCNC: 12 U/L (ref 7–52)
ANION GAP SERPL CALCULATED.3IONS-SCNC: 6 MMOL/L (ref 4–13)
AST SERPL W P-5'-P-CCNC: 12 U/L (ref 13–39)
BASOPHILS # BLD AUTO: 0.06 THOUSANDS/ÂΜL (ref 0–0.1)
BASOPHILS NFR BLD AUTO: 1 % (ref 0–1)
BILIRUB SERPL-MCNC: 0.21 MG/DL (ref 0.2–1)
BUN SERPL-MCNC: 12 MG/DL (ref 5–25)
CALCIUM SERPL-MCNC: 9.6 MG/DL (ref 8.4–10.2)
CHLORIDE SERPL-SCNC: 103 MMOL/L (ref 96–108)
CO2 SERPL-SCNC: 32 MMOL/L (ref 21–32)
CREAT SERPL-MCNC: 0.66 MG/DL (ref 0.6–1.3)
EOSINOPHIL # BLD AUTO: 0 THOUSAND/ÂΜL (ref 0–0.61)
EOSINOPHIL NFR BLD AUTO: 0 % (ref 0–6)
ERYTHROCYTE [DISTWIDTH] IN BLOOD BY AUTOMATED COUNT: 13.2 % (ref 11.6–15.1)
GFR SERPL CREATININE-BSD FRML MDRD: 107 ML/MIN/1.73SQ M
GLUCOSE SERPL-MCNC: 112 MG/DL (ref 65–140)
HCT VFR BLD AUTO: 43.3 % (ref 36.5–49.3)
HGB BLD-MCNC: 13.5 G/DL (ref 12–17)
IMM GRANULOCYTES # BLD AUTO: 0.04 THOUSAND/UL (ref 0–0.2)
IMM GRANULOCYTES NFR BLD AUTO: 0 % (ref 0–2)
LYMPHOCYTES # BLD AUTO: 3.75 THOUSANDS/ÂΜL (ref 0.6–4.47)
LYMPHOCYTES NFR BLD AUTO: 41 % (ref 14–44)
MAGNESIUM SERPL-MCNC: 2 MG/DL (ref 1.9–2.7)
MCH RBC QN AUTO: 26.9 PG (ref 26.8–34.3)
MCHC RBC AUTO-ENTMCNC: 31.2 G/DL (ref 31.4–37.4)
MCV RBC AUTO: 86 FL (ref 82–98)
MONOCYTES # BLD AUTO: 1.12 THOUSAND/ÂΜL (ref 0.17–1.22)
MONOCYTES NFR BLD AUTO: 12 % (ref 4–12)
NEUTROPHILS # BLD AUTO: 4.27 THOUSANDS/ÂΜL (ref 1.85–7.62)
NEUTS SEG NFR BLD AUTO: 46 % (ref 43–75)
NRBC BLD AUTO-RTO: 0 /100 WBCS
PLATELET # BLD AUTO: 209 THOUSANDS/UL (ref 149–390)
PMV BLD AUTO: 10 FL (ref 8.9–12.7)
POTASSIUM SERPL-SCNC: 3.8 MMOL/L (ref 3.5–5.3)
PROT SERPL-MCNC: 6.3 G/DL (ref 6.4–8.4)
RBC # BLD AUTO: 5.02 MILLION/UL (ref 3.88–5.62)
SODIUM SERPL-SCNC: 141 MMOL/L (ref 135–147)
WBC # BLD AUTO: 9.3 THOUSAND/UL (ref 4.31–10.16)

## 2025-06-17 PROCEDURE — 80053 COMPREHEN METABOLIC PANEL: CPT | Performed by: STUDENT IN AN ORGANIZED HEALTH CARE EDUCATION/TRAINING PROGRAM

## 2025-06-17 PROCEDURE — 85025 COMPLETE CBC W/AUTO DIFF WBC: CPT | Performed by: STUDENT IN AN ORGANIZED HEALTH CARE EDUCATION/TRAINING PROGRAM

## 2025-06-17 PROCEDURE — 83735 ASSAY OF MAGNESIUM: CPT | Performed by: STUDENT IN AN ORGANIZED HEALTH CARE EDUCATION/TRAINING PROGRAM

## 2025-06-17 PROCEDURE — 99232 SBSQ HOSP IP/OBS MODERATE 35: CPT | Performed by: INTERNAL MEDICINE

## 2025-06-17 RX ADMIN — ATORVASTATIN CALCIUM 10 MG: 10 TABLET, FILM COATED ORAL at 16:34

## 2025-06-17 RX ADMIN — PANTOPRAZOLE SODIUM 40 MG: 40 TABLET, DELAYED RELEASE ORAL at 05:50

## 2025-06-17 RX ADMIN — DIVALPROEX SODIUM 2000 MG: 500 TABLET, FILM COATED, EXTENDED RELEASE ORAL at 20:22

## 2025-06-17 RX ADMIN — METFORMIN HYDROCHLORIDE 500 MG: 500 TABLET, FILM COATED ORAL at 08:12

## 2025-06-17 RX ADMIN — ZOLPIDEM TARTRATE 5 MG: 5 TABLET, FILM COATED ORAL at 20:23

## 2025-06-17 RX ADMIN — OLANZAPINE 20 MG: 10 TABLET, FILM COATED ORAL at 21:41

## 2025-06-17 RX ADMIN — AMLODIPINE BESYLATE 5 MG: 5 TABLET ORAL at 08:12

## 2025-06-17 RX ADMIN — Medication 6 MG: at 20:23

## 2025-06-17 RX ADMIN — LEVOTHYROXINE SODIUM 75 MCG: 0.07 TABLET ORAL at 05:50

## 2025-06-17 RX ADMIN — SENNOSIDES 8.6 MG: 8.6 TABLET, FILM COATED ORAL at 20:23

## 2025-06-17 RX ADMIN — METFORMIN HYDROCHLORIDE 500 MG: 500 TABLET, FILM COATED ORAL at 16:34

## 2025-06-17 RX ADMIN — DEXTROAMPHETAMINE SACCHARATE, AMPHETAMINE ASPARTATE, DEXTROAMPHETAMINE SULFATE AND AMPHETAMINE SULFATE 30 MG: 2.5; 2.5; 2.5; 2.5 TABLET ORAL at 08:12

## 2025-06-18 PROCEDURE — 99232 SBSQ HOSP IP/OBS MODERATE 35: CPT | Performed by: INTERNAL MEDICINE

## 2025-06-18 PROCEDURE — NC001 PR NO CHARGE: Performed by: PSYCHIATRY & NEUROLOGY

## 2025-06-18 RX ADMIN — SENNOSIDES 8.6 MG: 8.6 TABLET, FILM COATED ORAL at 21:04

## 2025-06-18 RX ADMIN — DEXTROAMPHETAMINE SACCHARATE, AMPHETAMINE ASPARTATE, DEXTROAMPHETAMINE SULFATE AND AMPHETAMINE SULFATE 30 MG: 2.5; 2.5; 2.5; 2.5 TABLET ORAL at 08:24

## 2025-06-18 RX ADMIN — METFORMIN HYDROCHLORIDE 500 MG: 500 TABLET, FILM COATED ORAL at 07:10

## 2025-06-18 RX ADMIN — PANTOPRAZOLE SODIUM 40 MG: 40 TABLET, DELAYED RELEASE ORAL at 05:03

## 2025-06-18 RX ADMIN — ATORVASTATIN CALCIUM 10 MG: 10 TABLET, FILM COATED ORAL at 17:22

## 2025-06-18 RX ADMIN — LEVOTHYROXINE SODIUM 75 MCG: 0.07 TABLET ORAL at 05:03

## 2025-06-18 RX ADMIN — AMLODIPINE BESYLATE 5 MG: 5 TABLET ORAL at 08:24

## 2025-06-18 RX ADMIN — Medication 6 MG: at 21:04

## 2025-06-18 RX ADMIN — DIVALPROEX SODIUM 2000 MG: 500 TABLET, FILM COATED, EXTENDED RELEASE ORAL at 21:04

## 2025-06-18 RX ADMIN — OLANZAPINE 20 MG: 10 TABLET, FILM COATED ORAL at 21:04

## 2025-06-18 RX ADMIN — METFORMIN HYDROCHLORIDE 500 MG: 500 TABLET, FILM COATED ORAL at 17:22

## 2025-06-18 RX ADMIN — ACETAMINOPHEN 650 MG: 325 TABLET, FILM COATED ORAL at 07:10

## 2025-06-19 PROCEDURE — 99232 SBSQ HOSP IP/OBS MODERATE 35: CPT | Performed by: INTERNAL MEDICINE

## 2025-06-19 RX ADMIN — Medication 6 MG: at 21:29

## 2025-06-19 RX ADMIN — AMLODIPINE BESYLATE 5 MG: 5 TABLET ORAL at 08:12

## 2025-06-19 RX ADMIN — PANTOPRAZOLE SODIUM 40 MG: 40 TABLET, DELAYED RELEASE ORAL at 05:53

## 2025-06-19 RX ADMIN — ATORVASTATIN CALCIUM 10 MG: 10 TABLET, FILM COATED ORAL at 17:01

## 2025-06-19 RX ADMIN — OLANZAPINE 20 MG: 10 TABLET, FILM COATED ORAL at 21:29

## 2025-06-19 RX ADMIN — DEXTROAMPHETAMINE SACCHARATE, AMPHETAMINE ASPARTATE, DEXTROAMPHETAMINE SULFATE AND AMPHETAMINE SULFATE 30 MG: 2.5; 2.5; 2.5; 2.5 TABLET ORAL at 08:13

## 2025-06-19 RX ADMIN — DIVALPROEX SODIUM 2000 MG: 500 TABLET, FILM COATED, EXTENDED RELEASE ORAL at 21:29

## 2025-06-19 RX ADMIN — LEVOTHYROXINE SODIUM 75 MCG: 0.07 TABLET ORAL at 05:53

## 2025-06-19 RX ADMIN — SENNOSIDES 8.6 MG: 8.6 TABLET, FILM COATED ORAL at 21:29

## 2025-06-19 RX ADMIN — METFORMIN HYDROCHLORIDE 500 MG: 500 TABLET, FILM COATED ORAL at 08:12

## 2025-06-19 RX ADMIN — METFORMIN HYDROCHLORIDE 500 MG: 500 TABLET, FILM COATED ORAL at 17:01

## 2025-06-20 PROCEDURE — 99232 SBSQ HOSP IP/OBS MODERATE 35: CPT | Performed by: INTERNAL MEDICINE

## 2025-06-20 RX ADMIN — OLANZAPINE 20 MG: 10 TABLET, FILM COATED ORAL at 21:15

## 2025-06-20 RX ADMIN — METFORMIN HYDROCHLORIDE 500 MG: 500 TABLET, FILM COATED ORAL at 08:12

## 2025-06-20 RX ADMIN — SENNOSIDES 8.6 MG: 8.6 TABLET, FILM COATED ORAL at 21:15

## 2025-06-20 RX ADMIN — DIVALPROEX SODIUM 2000 MG: 500 TABLET, FILM COATED, EXTENDED RELEASE ORAL at 21:14

## 2025-06-20 RX ADMIN — LEVOTHYROXINE SODIUM 75 MCG: 0.07 TABLET ORAL at 05:39

## 2025-06-20 RX ADMIN — DEXTROAMPHETAMINE SACCHARATE, AMPHETAMINE ASPARTATE, DEXTROAMPHETAMINE SULFATE AND AMPHETAMINE SULFATE 30 MG: 2.5; 2.5; 2.5; 2.5 TABLET ORAL at 08:11

## 2025-06-20 RX ADMIN — AMLODIPINE BESYLATE 5 MG: 5 TABLET ORAL at 08:12

## 2025-06-20 RX ADMIN — ATORVASTATIN CALCIUM 10 MG: 10 TABLET, FILM COATED ORAL at 17:25

## 2025-06-20 RX ADMIN — Medication 6 MG: at 21:15

## 2025-06-20 RX ADMIN — METFORMIN HYDROCHLORIDE 500 MG: 500 TABLET, FILM COATED ORAL at 17:25

## 2025-06-20 RX ADMIN — PANTOPRAZOLE SODIUM 40 MG: 40 TABLET, DELAYED RELEASE ORAL at 05:39

## 2025-06-21 LAB
ALBUMIN SERPL BCG-MCNC: 4.2 G/DL (ref 3.5–5)
ALP SERPL-CCNC: 59 U/L (ref 34–104)
ALT SERPL W P-5'-P-CCNC: 14 U/L (ref 7–52)
ANION GAP SERPL CALCULATED.3IONS-SCNC: 9 MMOL/L (ref 4–13)
ANISOCYTOSIS BLD QL SMEAR: PRESENT
AST SERPL W P-5'-P-CCNC: 13 U/L (ref 13–39)
BASOPHILS # BLD MANUAL: 0 THOUSAND/UL (ref 0–0.1)
BASOPHILS NFR MAR MANUAL: 0 % (ref 0–1)
BILIRUB SERPL-MCNC: 0.31 MG/DL (ref 0.2–1)
BUN SERPL-MCNC: 12 MG/DL (ref 5–25)
CALCIUM SERPL-MCNC: 9.4 MG/DL (ref 8.4–10.2)
CHLORIDE SERPL-SCNC: 102 MMOL/L (ref 96–108)
CO2 SERPL-SCNC: 29 MMOL/L (ref 21–32)
CREAT SERPL-MCNC: 0.61 MG/DL (ref 0.6–1.3)
EOSINOPHIL # BLD MANUAL: 0 THOUSAND/UL (ref 0–0.4)
EOSINOPHIL NFR BLD MANUAL: 0 % (ref 0–6)
ERYTHROCYTE [DISTWIDTH] IN BLOOD BY AUTOMATED COUNT: 13.4 % (ref 11.6–15.1)
GFR SERPL CREATININE-BSD FRML MDRD: 110 ML/MIN/1.73SQ M
GIANT PLATELETS BLD QL SMEAR: PRESENT
GLUCOSE SERPL-MCNC: 137 MG/DL (ref 65–140)
HCT VFR BLD AUTO: 42.5 % (ref 36.5–49.3)
HGB BLD-MCNC: 13.5 G/DL (ref 12–17)
LYMPHOCYTES # BLD AUTO: 36 % (ref 14–44)
LYMPHOCYTES # BLD AUTO: 4.19 THOUSAND/UL (ref 0.6–4.47)
MAGNESIUM SERPL-MCNC: 2 MG/DL (ref 1.9–2.7)
MCH RBC QN AUTO: 26.9 PG (ref 26.8–34.3)
MCHC RBC AUTO-ENTMCNC: 31.8 G/DL (ref 31.4–37.4)
MCV RBC AUTO: 85 FL (ref 82–98)
MONOCYTES # BLD AUTO: 1.51 THOUSAND/UL (ref 0–1.22)
MONOCYTES NFR BLD: 13 % (ref 4–12)
NEUTROPHILS # BLD MANUAL: 5.94 THOUSAND/UL (ref 1.85–7.62)
NEUTS BAND NFR BLD MANUAL: 1 % (ref 0–8)
NEUTS SEG NFR BLD AUTO: 50 % (ref 43–75)
PHOSPHATE SERPL-MCNC: 3.5 MG/DL (ref 2.7–4.5)
PLATELET # BLD AUTO: 210 THOUSANDS/UL (ref 149–390)
PLATELET BLD QL SMEAR: ADEQUATE
PLATELET CLUMP BLD QL SMEAR: PRESENT
PMV BLD AUTO: 10.4 FL (ref 8.9–12.7)
POTASSIUM SERPL-SCNC: 3.8 MMOL/L (ref 3.5–5.3)
PROT SERPL-MCNC: 6.8 G/DL (ref 6.4–8.4)
RBC # BLD AUTO: 5.01 MILLION/UL (ref 3.88–5.62)
RBC MORPH BLD: PRESENT
SODIUM SERPL-SCNC: 140 MMOL/L (ref 135–147)
WBC # BLD AUTO: 11.64 THOUSAND/UL (ref 4.31–10.16)

## 2025-06-21 PROCEDURE — 83735 ASSAY OF MAGNESIUM: CPT | Performed by: INTERNAL MEDICINE

## 2025-06-21 PROCEDURE — 85007 BL SMEAR W/DIFF WBC COUNT: CPT | Performed by: INTERNAL MEDICINE

## 2025-06-21 PROCEDURE — 80053 COMPREHEN METABOLIC PANEL: CPT | Performed by: INTERNAL MEDICINE

## 2025-06-21 PROCEDURE — 99232 SBSQ HOSP IP/OBS MODERATE 35: CPT | Performed by: INTERNAL MEDICINE

## 2025-06-21 PROCEDURE — 85027 COMPLETE CBC AUTOMATED: CPT | Performed by: INTERNAL MEDICINE

## 2025-06-21 PROCEDURE — 84100 ASSAY OF PHOSPHORUS: CPT | Performed by: INTERNAL MEDICINE

## 2025-06-21 RX ADMIN — LEVOTHYROXINE SODIUM 75 MCG: 0.07 TABLET ORAL at 06:48

## 2025-06-21 RX ADMIN — METFORMIN HYDROCHLORIDE 500 MG: 500 TABLET, FILM COATED ORAL at 16:04

## 2025-06-21 RX ADMIN — DIVALPROEX SODIUM 2000 MG: 500 TABLET, FILM COATED, EXTENDED RELEASE ORAL at 21:24

## 2025-06-21 RX ADMIN — ATORVASTATIN CALCIUM 10 MG: 10 TABLET, FILM COATED ORAL at 17:01

## 2025-06-21 RX ADMIN — AMLODIPINE BESYLATE 5 MG: 5 TABLET ORAL at 08:13

## 2025-06-21 RX ADMIN — Medication 6 MG: at 21:24

## 2025-06-21 RX ADMIN — SENNOSIDES 8.6 MG: 8.6 TABLET, FILM COATED ORAL at 21:24

## 2025-06-21 RX ADMIN — PANTOPRAZOLE SODIUM 40 MG: 40 TABLET, DELAYED RELEASE ORAL at 06:48

## 2025-06-21 RX ADMIN — METFORMIN HYDROCHLORIDE 500 MG: 500 TABLET, FILM COATED ORAL at 08:14

## 2025-06-21 RX ADMIN — OLANZAPINE 20 MG: 10 TABLET, FILM COATED ORAL at 21:24

## 2025-06-21 RX ADMIN — LORAZEPAM 0.5 MG: 0.5 TABLET ORAL at 14:07

## 2025-06-21 RX ADMIN — DEXTROAMPHETAMINE SACCHARATE, AMPHETAMINE ASPARTATE, DEXTROAMPHETAMINE SULFATE AND AMPHETAMINE SULFATE 30 MG: 2.5; 2.5; 2.5; 2.5 TABLET ORAL at 08:13

## 2025-06-22 LAB
BASOPHILS # BLD AUTO: 0.05 THOUSANDS/ÂΜL (ref 0–0.1)
BASOPHILS NFR BLD AUTO: 1 % (ref 0–1)
EOSINOPHIL # BLD AUTO: 0 THOUSAND/ÂΜL (ref 0–0.61)
EOSINOPHIL NFR BLD AUTO: 0 % (ref 0–6)
ERYTHROCYTE [DISTWIDTH] IN BLOOD BY AUTOMATED COUNT: 13.2 % (ref 11.6–15.1)
HCT VFR BLD AUTO: 40 % (ref 36.5–49.3)
HGB BLD-MCNC: 12.9 G/DL (ref 12–17)
IMM GRANULOCYTES # BLD AUTO: 0.04 THOUSAND/UL (ref 0–0.2)
IMM GRANULOCYTES NFR BLD AUTO: 0 % (ref 0–2)
LYMPHOCYTES # BLD AUTO: 2.57 THOUSANDS/ÂΜL (ref 0.6–4.47)
LYMPHOCYTES NFR BLD AUTO: 28 % (ref 14–44)
MCH RBC QN AUTO: 27.4 PG (ref 26.8–34.3)
MCHC RBC AUTO-ENTMCNC: 32.3 G/DL (ref 31.4–37.4)
MCV RBC AUTO: 85 FL (ref 82–98)
MONOCYTES # BLD AUTO: 0.96 THOUSAND/ÂΜL (ref 0.17–1.22)
MONOCYTES NFR BLD AUTO: 11 % (ref 4–12)
NEUTROPHILS # BLD AUTO: 5.44 THOUSANDS/ÂΜL (ref 1.85–7.62)
NEUTS SEG NFR BLD AUTO: 60 % (ref 43–75)
NRBC BLD AUTO-RTO: 0 /100 WBCS
PLATELET # BLD AUTO: 180 THOUSANDS/UL (ref 149–390)
PMV BLD AUTO: 10.3 FL (ref 8.9–12.7)
RBC # BLD AUTO: 4.7 MILLION/UL (ref 3.88–5.62)
WBC # BLD AUTO: 9.06 THOUSAND/UL (ref 4.31–10.16)

## 2025-06-22 PROCEDURE — 99232 SBSQ HOSP IP/OBS MODERATE 35: CPT | Performed by: INTERNAL MEDICINE

## 2025-06-22 PROCEDURE — 85025 COMPLETE CBC W/AUTO DIFF WBC: CPT | Performed by: INTERNAL MEDICINE

## 2025-06-22 RX ORDER — WATER 10 ML/10ML
INJECTION INTRAMUSCULAR; INTRAVENOUS; SUBCUTANEOUS
Status: COMPLETED
Start: 2025-06-22 | End: 2025-06-22

## 2025-06-22 RX ORDER — OLANZAPINE 10 MG/2ML
2.5 INJECTION, POWDER, FOR SOLUTION INTRAMUSCULAR
Status: DISCONTINUED | OUTPATIENT
Start: 2025-06-22 | End: 2025-07-03

## 2025-06-22 RX ORDER — LORAZEPAM 0.5 MG/1
0.5 TABLET ORAL EVERY 6 HOURS PRN
Status: DISCONTINUED | OUTPATIENT
Start: 2025-06-22 | End: 2025-08-08 | Stop reason: HOSPADM

## 2025-06-22 RX ADMIN — PANTOPRAZOLE SODIUM 40 MG: 40 TABLET, DELAYED RELEASE ORAL at 05:49

## 2025-06-22 RX ADMIN — METFORMIN HYDROCHLORIDE 500 MG: 500 TABLET, FILM COATED ORAL at 08:04

## 2025-06-22 RX ADMIN — WATER 10 ML: 1 INJECTION INTRAMUSCULAR; INTRAVENOUS; SUBCUTANEOUS at 18:21

## 2025-06-22 RX ADMIN — DEXTROAMPHETAMINE SACCHARATE, AMPHETAMINE ASPARTATE, DEXTROAMPHETAMINE SULFATE AND AMPHETAMINE SULFATE 30 MG: 2.5; 2.5; 2.5; 2.5 TABLET ORAL at 08:04

## 2025-06-22 RX ADMIN — ATORVASTATIN CALCIUM 10 MG: 10 TABLET, FILM COATED ORAL at 17:07

## 2025-06-22 RX ADMIN — AMLODIPINE BESYLATE 5 MG: 5 TABLET ORAL at 08:55

## 2025-06-22 RX ADMIN — SENNOSIDES 8.6 MG: 8.6 TABLET, FILM COATED ORAL at 21:01

## 2025-06-22 RX ADMIN — OLANZAPINE 2.5 MG: 10 INJECTION, POWDER, FOR SOLUTION INTRAMUSCULAR at 18:21

## 2025-06-22 RX ADMIN — OLANZAPINE 20 MG: 10 TABLET, FILM COATED ORAL at 21:01

## 2025-06-22 RX ADMIN — Medication 6 MG: at 21:01

## 2025-06-22 RX ADMIN — LORAZEPAM 0.5 MG: 0.5 TABLET ORAL at 14:49

## 2025-06-22 RX ADMIN — METFORMIN HYDROCHLORIDE 500 MG: 500 TABLET, FILM COATED ORAL at 16:28

## 2025-06-22 RX ADMIN — LEVOTHYROXINE SODIUM 75 MCG: 0.07 TABLET ORAL at 05:49

## 2025-06-22 RX ADMIN — LORAZEPAM 0.5 MG: 0.5 TABLET ORAL at 07:33

## 2025-06-22 RX ADMIN — DIVALPROEX SODIUM 2000 MG: 500 TABLET, FILM COATED, EXTENDED RELEASE ORAL at 21:01

## 2025-06-22 RX ADMIN — ACETAMINOPHEN 650 MG: 325 TABLET, FILM COATED ORAL at 08:04

## 2025-06-23 PROCEDURE — 99232 SBSQ HOSP IP/OBS MODERATE 35: CPT | Performed by: INTERNAL MEDICINE

## 2025-06-23 RX ADMIN — DEXTROAMPHETAMINE SACCHARATE, AMPHETAMINE ASPARTATE, DEXTROAMPHETAMINE SULFATE AND AMPHETAMINE SULFATE 30 MG: 2.5; 2.5; 2.5; 2.5 TABLET ORAL at 08:00

## 2025-06-23 RX ADMIN — SENNOSIDES 8.6 MG: 8.6 TABLET, FILM COATED ORAL at 21:05

## 2025-06-23 RX ADMIN — METFORMIN HYDROCHLORIDE 500 MG: 500 TABLET, FILM COATED ORAL at 17:17

## 2025-06-23 RX ADMIN — METFORMIN HYDROCHLORIDE 500 MG: 500 TABLET, FILM COATED ORAL at 08:00

## 2025-06-23 RX ADMIN — OLANZAPINE 20 MG: 10 TABLET, FILM COATED ORAL at 21:05

## 2025-06-23 RX ADMIN — Medication 6 MG: at 21:05

## 2025-06-23 RX ADMIN — AMLODIPINE BESYLATE 5 MG: 5 TABLET ORAL at 08:00

## 2025-06-23 RX ADMIN — PANTOPRAZOLE SODIUM 40 MG: 40 TABLET, DELAYED RELEASE ORAL at 05:51

## 2025-06-23 RX ADMIN — DIVALPROEX SODIUM 2000 MG: 500 TABLET, FILM COATED, EXTENDED RELEASE ORAL at 21:05

## 2025-06-23 RX ADMIN — ATORVASTATIN CALCIUM 10 MG: 10 TABLET, FILM COATED ORAL at 17:17

## 2025-06-23 RX ADMIN — LEVOTHYROXINE SODIUM 75 MCG: 0.07 TABLET ORAL at 05:51

## 2025-06-24 PROCEDURE — 99231 SBSQ HOSP IP/OBS SF/LOW 25: CPT

## 2025-06-24 RX ADMIN — DIVALPROEX SODIUM 2000 MG: 500 TABLET, FILM COATED, EXTENDED RELEASE ORAL at 21:06

## 2025-06-24 RX ADMIN — DEXTROAMPHETAMINE SACCHARATE, AMPHETAMINE ASPARTATE, DEXTROAMPHETAMINE SULFATE AND AMPHETAMINE SULFATE 30 MG: 2.5; 2.5; 2.5; 2.5 TABLET ORAL at 08:00

## 2025-06-24 RX ADMIN — Medication 6 MG: at 21:06

## 2025-06-24 RX ADMIN — AMLODIPINE BESYLATE 5 MG: 5 TABLET ORAL at 08:00

## 2025-06-24 RX ADMIN — LORAZEPAM 0.5 MG: 0.5 TABLET ORAL at 13:30

## 2025-06-24 RX ADMIN — LEVOTHYROXINE SODIUM 75 MCG: 0.07 TABLET ORAL at 05:05

## 2025-06-24 RX ADMIN — SENNOSIDES 8.6 MG: 8.6 TABLET, FILM COATED ORAL at 21:06

## 2025-06-24 RX ADMIN — METFORMIN HYDROCHLORIDE 500 MG: 500 TABLET, FILM COATED ORAL at 16:27

## 2025-06-24 RX ADMIN — PANTOPRAZOLE SODIUM 40 MG: 40 TABLET, DELAYED RELEASE ORAL at 05:05

## 2025-06-24 RX ADMIN — METFORMIN HYDROCHLORIDE 500 MG: 500 TABLET, FILM COATED ORAL at 07:57

## 2025-06-24 RX ADMIN — ATORVASTATIN CALCIUM 10 MG: 10 TABLET, FILM COATED ORAL at 17:13

## 2025-06-24 RX ADMIN — OLANZAPINE 20 MG: 10 TABLET, FILM COATED ORAL at 21:06

## 2025-06-25 PROCEDURE — 99231 SBSQ HOSP IP/OBS SF/LOW 25: CPT | Performed by: INTERNAL MEDICINE

## 2025-06-25 RX ADMIN — SENNOSIDES 8.6 MG: 8.6 TABLET, FILM COATED ORAL at 21:37

## 2025-06-25 RX ADMIN — METFORMIN HYDROCHLORIDE 500 MG: 500 TABLET, FILM COATED ORAL at 16:56

## 2025-06-25 RX ADMIN — PANTOPRAZOLE SODIUM 40 MG: 40 TABLET, DELAYED RELEASE ORAL at 05:44

## 2025-06-25 RX ADMIN — METFORMIN HYDROCHLORIDE 500 MG: 500 TABLET, FILM COATED ORAL at 08:21

## 2025-06-25 RX ADMIN — LEVOTHYROXINE SODIUM 75 MCG: 0.07 TABLET ORAL at 05:44

## 2025-06-25 RX ADMIN — Medication 6 MG: at 21:37

## 2025-06-25 RX ADMIN — OLANZAPINE 20 MG: 10 TABLET, FILM COATED ORAL at 21:37

## 2025-06-25 RX ADMIN — AMLODIPINE BESYLATE 5 MG: 5 TABLET ORAL at 08:22

## 2025-06-25 RX ADMIN — ATORVASTATIN CALCIUM 10 MG: 10 TABLET, FILM COATED ORAL at 17:00

## 2025-06-25 RX ADMIN — LORAZEPAM 0.5 MG: 0.5 TABLET ORAL at 18:04

## 2025-06-25 RX ADMIN — DEXTROAMPHETAMINE SACCHARATE, AMPHETAMINE ASPARTATE, DEXTROAMPHETAMINE SULFATE AND AMPHETAMINE SULFATE 30 MG: 2.5; 2.5; 2.5; 2.5 TABLET ORAL at 08:21

## 2025-06-25 RX ADMIN — DIVALPROEX SODIUM 2000 MG: 500 TABLET, FILM COATED, EXTENDED RELEASE ORAL at 21:37

## 2025-06-26 PROCEDURE — 99231 SBSQ HOSP IP/OBS SF/LOW 25: CPT | Performed by: INTERNAL MEDICINE

## 2025-06-26 RX ADMIN — METFORMIN HYDROCHLORIDE 500 MG: 500 TABLET, FILM COATED ORAL at 16:59

## 2025-06-26 RX ADMIN — PANTOPRAZOLE SODIUM 40 MG: 40 TABLET, DELAYED RELEASE ORAL at 05:04

## 2025-06-26 RX ADMIN — METFORMIN HYDROCHLORIDE 500 MG: 500 TABLET, FILM COATED ORAL at 08:25

## 2025-06-26 RX ADMIN — AMLODIPINE BESYLATE 5 MG: 5 TABLET ORAL at 08:25

## 2025-06-26 RX ADMIN — DEXTROAMPHETAMINE SACCHARATE, AMPHETAMINE ASPARTATE, DEXTROAMPHETAMINE SULFATE AND AMPHETAMINE SULFATE 30 MG: 2.5; 2.5; 2.5; 2.5 TABLET ORAL at 08:24

## 2025-06-26 RX ADMIN — ATORVASTATIN CALCIUM 10 MG: 10 TABLET, FILM COATED ORAL at 17:00

## 2025-06-26 RX ADMIN — OLANZAPINE 20 MG: 10 TABLET, FILM COATED ORAL at 21:29

## 2025-06-26 RX ADMIN — Medication 6 MG: at 21:29

## 2025-06-26 RX ADMIN — LEVOTHYROXINE SODIUM 75 MCG: 0.07 TABLET ORAL at 05:04

## 2025-06-26 RX ADMIN — SENNOSIDES 8.6 MG: 8.6 TABLET, FILM COATED ORAL at 21:29

## 2025-06-26 RX ADMIN — DIVALPROEX SODIUM 2000 MG: 500 TABLET, FILM COATED, EXTENDED RELEASE ORAL at 21:29

## 2025-06-27 PROCEDURE — 99232 SBSQ HOSP IP/OBS MODERATE 35: CPT | Performed by: INTERNAL MEDICINE

## 2025-06-27 RX ORDER — WATER 10 ML/10ML
INJECTION INTRAMUSCULAR; INTRAVENOUS; SUBCUTANEOUS
Status: DISPENSED
Start: 2025-06-27 | End: 2025-06-28

## 2025-06-27 RX ADMIN — PANTOPRAZOLE SODIUM 40 MG: 40 TABLET, DELAYED RELEASE ORAL at 05:08

## 2025-06-27 RX ADMIN — DEXTROAMPHETAMINE SACCHARATE, AMPHETAMINE ASPARTATE, DEXTROAMPHETAMINE SULFATE AND AMPHETAMINE SULFATE 30 MG: 2.5; 2.5; 2.5; 2.5 TABLET ORAL at 08:48

## 2025-06-27 RX ADMIN — METFORMIN HYDROCHLORIDE 500 MG: 500 TABLET, FILM COATED ORAL at 08:48

## 2025-06-27 RX ADMIN — LEVOTHYROXINE SODIUM 75 MCG: 0.07 TABLET ORAL at 05:08

## 2025-06-27 RX ADMIN — METFORMIN HYDROCHLORIDE 500 MG: 500 TABLET, FILM COATED ORAL at 16:28

## 2025-06-27 RX ADMIN — AMLODIPINE BESYLATE 5 MG: 5 TABLET ORAL at 08:48

## 2025-06-27 RX ADMIN — DIVALPROEX SODIUM 2000 MG: 500 TABLET, FILM COATED, EXTENDED RELEASE ORAL at 21:21

## 2025-06-27 RX ADMIN — Medication 6 MG: at 21:21

## 2025-06-27 RX ADMIN — ATORVASTATIN CALCIUM 10 MG: 10 TABLET, FILM COATED ORAL at 17:11

## 2025-06-27 RX ADMIN — SENNOSIDES 8.6 MG: 8.6 TABLET, FILM COATED ORAL at 21:21

## 2025-06-27 RX ADMIN — OLANZAPINE 20 MG: 10 TABLET, FILM COATED ORAL at 21:21

## 2025-06-27 RX ADMIN — LORAZEPAM 0.5 MG: 0.5 TABLET ORAL at 16:49

## 2025-06-28 PROCEDURE — 99231 SBSQ HOSP IP/OBS SF/LOW 25: CPT | Performed by: INTERNAL MEDICINE

## 2025-06-28 RX ADMIN — METFORMIN HYDROCHLORIDE 500 MG: 500 TABLET, FILM COATED ORAL at 16:58

## 2025-06-28 RX ADMIN — AMLODIPINE BESYLATE 5 MG: 5 TABLET ORAL at 08:06

## 2025-06-28 RX ADMIN — OLANZAPINE 20 MG: 10 TABLET, FILM COATED ORAL at 21:04

## 2025-06-28 RX ADMIN — DIVALPROEX SODIUM 2000 MG: 500 TABLET, FILM COATED, EXTENDED RELEASE ORAL at 21:04

## 2025-06-28 RX ADMIN — SENNOSIDES 8.6 MG: 8.6 TABLET, FILM COATED ORAL at 21:04

## 2025-06-28 RX ADMIN — ATORVASTATIN CALCIUM 10 MG: 10 TABLET, FILM COATED ORAL at 17:18

## 2025-06-28 RX ADMIN — LEVOTHYROXINE SODIUM 75 MCG: 0.07 TABLET ORAL at 06:29

## 2025-06-28 RX ADMIN — METFORMIN HYDROCHLORIDE 500 MG: 500 TABLET, FILM COATED ORAL at 06:30

## 2025-06-28 RX ADMIN — DEXTROAMPHETAMINE SACCHARATE, AMPHETAMINE ASPARTATE, DEXTROAMPHETAMINE SULFATE AND AMPHETAMINE SULFATE 30 MG: 2.5; 2.5; 2.5; 2.5 TABLET ORAL at 08:06

## 2025-06-28 RX ADMIN — Medication 6 MG: at 21:04

## 2025-06-28 RX ADMIN — PANTOPRAZOLE SODIUM 40 MG: 40 TABLET, DELAYED RELEASE ORAL at 06:29

## 2025-06-29 PROCEDURE — 99231 SBSQ HOSP IP/OBS SF/LOW 25: CPT | Performed by: INTERNAL MEDICINE

## 2025-06-29 RX ADMIN — ZOLPIDEM TARTRATE 5 MG: 5 TABLET, FILM COATED ORAL at 20:20

## 2025-06-29 RX ADMIN — SENNOSIDES 8.6 MG: 8.6 TABLET, FILM COATED ORAL at 20:20

## 2025-06-29 RX ADMIN — AMLODIPINE BESYLATE 5 MG: 5 TABLET ORAL at 08:57

## 2025-06-29 RX ADMIN — LEVOTHYROXINE SODIUM 75 MCG: 0.07 TABLET ORAL at 06:10

## 2025-06-29 RX ADMIN — PANTOPRAZOLE SODIUM 40 MG: 40 TABLET, DELAYED RELEASE ORAL at 06:10

## 2025-06-29 RX ADMIN — LORAZEPAM 0.5 MG: 0.5 TABLET ORAL at 18:36

## 2025-06-29 RX ADMIN — LORAZEPAM 0.5 MG: 0.5 TABLET ORAL at 07:32

## 2025-06-29 RX ADMIN — OLANZAPINE 20 MG: 10 TABLET, FILM COATED ORAL at 21:23

## 2025-06-29 RX ADMIN — ATORVASTATIN CALCIUM 10 MG: 10 TABLET, FILM COATED ORAL at 17:00

## 2025-06-29 RX ADMIN — DEXTROAMPHETAMINE SACCHARATE, AMPHETAMINE ASPARTATE, DEXTROAMPHETAMINE SULFATE AND AMPHETAMINE SULFATE 30 MG: 2.5; 2.5; 2.5; 2.5 TABLET ORAL at 08:13

## 2025-06-29 RX ADMIN — METFORMIN HYDROCHLORIDE 500 MG: 500 TABLET, FILM COATED ORAL at 06:39

## 2025-06-29 RX ADMIN — Medication 6 MG: at 20:20

## 2025-06-29 RX ADMIN — DIVALPROEX SODIUM 2000 MG: 500 TABLET, FILM COATED, EXTENDED RELEASE ORAL at 20:20

## 2025-06-29 RX ADMIN — ACETAMINOPHEN 650 MG: 325 TABLET, FILM COATED ORAL at 18:36

## 2025-06-29 RX ADMIN — METFORMIN HYDROCHLORIDE 500 MG: 500 TABLET, FILM COATED ORAL at 16:20

## 2025-06-30 PROCEDURE — 99232 SBSQ HOSP IP/OBS MODERATE 35: CPT | Performed by: FAMILY MEDICINE

## 2025-06-30 RX ADMIN — SENNOSIDES 8.6 MG: 8.6 TABLET, FILM COATED ORAL at 21:47

## 2025-06-30 RX ADMIN — OLANZAPINE 20 MG: 10 TABLET, FILM COATED ORAL at 21:47

## 2025-06-30 RX ADMIN — AMLODIPINE BESYLATE 5 MG: 5 TABLET ORAL at 08:03

## 2025-06-30 RX ADMIN — METFORMIN HYDROCHLORIDE 500 MG: 500 TABLET, FILM COATED ORAL at 17:04

## 2025-06-30 RX ADMIN — ATORVASTATIN CALCIUM 10 MG: 10 TABLET, FILM COATED ORAL at 17:04

## 2025-06-30 RX ADMIN — PANTOPRAZOLE SODIUM 40 MG: 40 TABLET, DELAYED RELEASE ORAL at 05:32

## 2025-06-30 RX ADMIN — METFORMIN HYDROCHLORIDE 500 MG: 500 TABLET, FILM COATED ORAL at 08:03

## 2025-06-30 RX ADMIN — LORAZEPAM 0.5 MG: 0.5 TABLET ORAL at 15:01

## 2025-06-30 RX ADMIN — DIVALPROEX SODIUM 2000 MG: 500 TABLET, FILM COATED, EXTENDED RELEASE ORAL at 21:47

## 2025-06-30 RX ADMIN — Medication 6 MG: at 21:47

## 2025-06-30 RX ADMIN — DEXTROAMPHETAMINE SACCHARATE, AMPHETAMINE ASPARTATE, DEXTROAMPHETAMINE SULFATE AND AMPHETAMINE SULFATE 30 MG: 2.5; 2.5; 2.5; 2.5 TABLET ORAL at 08:03

## 2025-06-30 RX ADMIN — LEVOTHYROXINE SODIUM 75 MCG: 0.07 TABLET ORAL at 05:32

## 2025-07-01 PROCEDURE — 99232 SBSQ HOSP IP/OBS MODERATE 35: CPT | Performed by: PSYCHIATRY & NEUROLOGY

## 2025-07-01 PROCEDURE — 99232 SBSQ HOSP IP/OBS MODERATE 35: CPT | Performed by: FAMILY MEDICINE

## 2025-07-01 RX ORDER — DEXTROAMPHETAMINE SACCHARATE, AMPHETAMINE ASPARTATE, DEXTROAMPHETAMINE SULFATE AND AMPHETAMINE SULFATE 2.5; 2.5; 2.5; 2.5 MG/1; MG/1; MG/1; MG/1
25 TABLET ORAL DAILY
Refills: 0 | Status: DISCONTINUED | OUTPATIENT
Start: 2025-07-02 | End: 2025-07-03

## 2025-07-01 RX ORDER — OLANZAPINE 5 MG/1
5 TABLET, FILM COATED ORAL EVERY MORNING
Status: DISCONTINUED | OUTPATIENT
Start: 2025-07-01 | End: 2025-07-03

## 2025-07-01 RX ADMIN — METFORMIN HYDROCHLORIDE 500 MG: 500 TABLET, FILM COATED ORAL at 08:00

## 2025-07-01 RX ADMIN — PANTOPRAZOLE SODIUM 40 MG: 40 TABLET, DELAYED RELEASE ORAL at 06:05

## 2025-07-01 RX ADMIN — SENNOSIDES 8.6 MG: 8.6 TABLET, FILM COATED ORAL at 21:13

## 2025-07-01 RX ADMIN — Medication 6 MG: at 21:13

## 2025-07-01 RX ADMIN — AMLODIPINE BESYLATE 5 MG: 5 TABLET ORAL at 08:00

## 2025-07-01 RX ADMIN — OLANZAPINE 20 MG: 10 TABLET, FILM COATED ORAL at 21:13

## 2025-07-01 RX ADMIN — METFORMIN HYDROCHLORIDE 500 MG: 500 TABLET, FILM COATED ORAL at 15:48

## 2025-07-01 RX ADMIN — OLANZAPINE 5 MG: 5 TABLET, FILM COATED ORAL at 11:14

## 2025-07-01 RX ADMIN — LEVOTHYROXINE SODIUM 75 MCG: 0.07 TABLET ORAL at 06:05

## 2025-07-01 RX ADMIN — DEXTROAMPHETAMINE SACCHARATE, AMPHETAMINE ASPARTATE, DEXTROAMPHETAMINE SULFATE AND AMPHETAMINE SULFATE 30 MG: 2.5; 2.5; 2.5; 2.5 TABLET ORAL at 08:01

## 2025-07-01 RX ADMIN — DIVALPROEX SODIUM 2000 MG: 500 TABLET, FILM COATED, EXTENDED RELEASE ORAL at 21:13

## 2025-07-01 RX ADMIN — ATORVASTATIN CALCIUM 10 MG: 10 TABLET, FILM COATED ORAL at 17:26

## 2025-07-02 PROCEDURE — 99232 SBSQ HOSP IP/OBS MODERATE 35: CPT | Performed by: PSYCHIATRY & NEUROLOGY

## 2025-07-02 PROCEDURE — 99232 SBSQ HOSP IP/OBS MODERATE 35: CPT | Performed by: FAMILY MEDICINE

## 2025-07-02 RX ADMIN — METFORMIN HYDROCHLORIDE 500 MG: 500 TABLET, FILM COATED ORAL at 10:21

## 2025-07-02 RX ADMIN — Medication 6 MG: at 22:03

## 2025-07-02 RX ADMIN — OLANZAPINE 20 MG: 10 TABLET, FILM COATED ORAL at 22:03

## 2025-07-02 RX ADMIN — AMLODIPINE BESYLATE 5 MG: 5 TABLET ORAL at 10:21

## 2025-07-02 RX ADMIN — DEXTROAMPHETAMINE SACCHARATE, AMPHETAMINE ASPARTATE, DEXTROAMPHETAMINE SULFATE AND AMPHETAMINE SULFATE 25 MG: 2.5; 2.5; 2.5; 2.5 TABLET ORAL at 10:21

## 2025-07-02 RX ADMIN — ATORVASTATIN CALCIUM 10 MG: 10 TABLET, FILM COATED ORAL at 17:01

## 2025-07-02 RX ADMIN — DIVALPROEX SODIUM 2000 MG: 500 TABLET, FILM COATED, EXTENDED RELEASE ORAL at 22:03

## 2025-07-02 RX ADMIN — OLANZAPINE 5 MG: 5 TABLET, FILM COATED ORAL at 10:21

## 2025-07-02 RX ADMIN — METFORMIN HYDROCHLORIDE 500 MG: 500 TABLET, FILM COATED ORAL at 15:30

## 2025-07-02 RX ADMIN — PANTOPRAZOLE SODIUM 40 MG: 40 TABLET, DELAYED RELEASE ORAL at 05:24

## 2025-07-02 RX ADMIN — SENNOSIDES 8.6 MG: 8.6 TABLET, FILM COATED ORAL at 22:03

## 2025-07-02 RX ADMIN — LEVOTHYROXINE SODIUM 75 MCG: 0.07 TABLET ORAL at 05:24

## 2025-07-03 PROCEDURE — 99232 SBSQ HOSP IP/OBS MODERATE 35: CPT | Performed by: PSYCHIATRY & NEUROLOGY

## 2025-07-03 PROCEDURE — 99232 SBSQ HOSP IP/OBS MODERATE 35: CPT | Performed by: FAMILY MEDICINE

## 2025-07-03 RX ORDER — DEXTROAMPHETAMINE SACCHARATE, AMPHETAMINE ASPARTATE, DEXTROAMPHETAMINE SULFATE AND AMPHETAMINE SULFATE 2.5; 2.5; 2.5; 2.5 MG/1; MG/1; MG/1; MG/1
20 TABLET ORAL DAILY
Refills: 0 | Status: DISCONTINUED | OUTPATIENT
Start: 2025-07-04 | End: 2025-07-07

## 2025-07-03 RX ADMIN — DEXTROAMPHETAMINE SACCHARATE, AMPHETAMINE ASPARTATE, DEXTROAMPHETAMINE SULFATE AND AMPHETAMINE SULFATE 25 MG: 2.5; 2.5; 2.5; 2.5 TABLET ORAL at 08:13

## 2025-07-03 RX ADMIN — Medication 6 MG: at 21:00

## 2025-07-03 RX ADMIN — AMLODIPINE BESYLATE 5 MG: 5 TABLET ORAL at 08:13

## 2025-07-03 RX ADMIN — SENNOSIDES 8.6 MG: 8.6 TABLET, FILM COATED ORAL at 21:01

## 2025-07-03 RX ADMIN — OLANZAPINE 5 MG: 5 TABLET, FILM COATED ORAL at 08:13

## 2025-07-03 RX ADMIN — ATORVASTATIN CALCIUM 10 MG: 10 TABLET, FILM COATED ORAL at 17:02

## 2025-07-03 RX ADMIN — LEVOTHYROXINE SODIUM 75 MCG: 0.07 TABLET ORAL at 06:04

## 2025-07-03 RX ADMIN — METFORMIN HYDROCHLORIDE 500 MG: 500 TABLET, FILM COATED ORAL at 08:13

## 2025-07-03 RX ADMIN — METFORMIN HYDROCHLORIDE 500 MG: 500 TABLET, FILM COATED ORAL at 15:42

## 2025-07-03 RX ADMIN — OLANZAPINE 20 MG: 10 TABLET, FILM COATED ORAL at 21:00

## 2025-07-03 RX ADMIN — PANTOPRAZOLE SODIUM 40 MG: 40 TABLET, DELAYED RELEASE ORAL at 06:04

## 2025-07-03 RX ADMIN — DIVALPROEX SODIUM 2000 MG: 500 TABLET, FILM COATED, EXTENDED RELEASE ORAL at 21:00

## 2025-07-04 PROCEDURE — 99232 SBSQ HOSP IP/OBS MODERATE 35: CPT | Performed by: FAMILY MEDICINE

## 2025-07-04 RX ADMIN — Medication 6 MG: at 21:08

## 2025-07-04 RX ADMIN — ATORVASTATIN CALCIUM 10 MG: 10 TABLET, FILM COATED ORAL at 17:03

## 2025-07-04 RX ADMIN — DIVALPROEX SODIUM 2000 MG: 500 TABLET, FILM COATED, EXTENDED RELEASE ORAL at 21:08

## 2025-07-04 RX ADMIN — SENNOSIDES 8.6 MG: 8.6 TABLET, FILM COATED ORAL at 21:08

## 2025-07-04 RX ADMIN — OLANZAPINE 20 MG: 10 TABLET, FILM COATED ORAL at 21:08

## 2025-07-04 RX ADMIN — LORAZEPAM 0.5 MG: 0.5 TABLET ORAL at 16:42

## 2025-07-04 RX ADMIN — DEXTROAMPHETAMINE SACCHARATE, AMPHETAMINE ASPARTATE, DEXTROAMPHETAMINE SULFATE AND AMPHETAMINE SULFATE 20 MG: 2.5; 2.5; 2.5; 2.5 TABLET ORAL at 09:01

## 2025-07-04 RX ADMIN — METFORMIN HYDROCHLORIDE 500 MG: 500 TABLET, FILM COATED ORAL at 16:38

## 2025-07-04 RX ADMIN — OLANZAPINE 7.5 MG: 2.5 TABLET, FILM COATED ORAL at 09:01

## 2025-07-04 RX ADMIN — LEVOTHYROXINE SODIUM 75 MCG: 0.07 TABLET ORAL at 06:06

## 2025-07-04 RX ADMIN — PANTOPRAZOLE SODIUM 40 MG: 40 TABLET, DELAYED RELEASE ORAL at 06:06

## 2025-07-04 RX ADMIN — METFORMIN HYDROCHLORIDE 500 MG: 500 TABLET, FILM COATED ORAL at 09:02

## 2025-07-04 RX ADMIN — AMLODIPINE BESYLATE 5 MG: 5 TABLET ORAL at 09:02

## 2025-07-05 PROCEDURE — 99232 SBSQ HOSP IP/OBS MODERATE 35: CPT | Performed by: FAMILY MEDICINE

## 2025-07-05 RX ADMIN — Medication 6 MG: at 21:03

## 2025-07-05 RX ADMIN — LEVOTHYROXINE SODIUM 75 MCG: 0.07 TABLET ORAL at 06:27

## 2025-07-05 RX ADMIN — PANTOPRAZOLE SODIUM 40 MG: 40 TABLET, DELAYED RELEASE ORAL at 06:27

## 2025-07-05 RX ADMIN — DIVALPROEX SODIUM 2000 MG: 500 TABLET, FILM COATED, EXTENDED RELEASE ORAL at 21:03

## 2025-07-05 RX ADMIN — OLANZAPINE 20 MG: 10 TABLET, FILM COATED ORAL at 21:04

## 2025-07-05 RX ADMIN — ATORVASTATIN CALCIUM 10 MG: 10 TABLET, FILM COATED ORAL at 17:27

## 2025-07-05 RX ADMIN — OLANZAPINE 7.5 MG: 2.5 TABLET, FILM COATED ORAL at 08:10

## 2025-07-05 RX ADMIN — METFORMIN HYDROCHLORIDE 500 MG: 500 TABLET, FILM COATED ORAL at 08:09

## 2025-07-05 RX ADMIN — AMLODIPINE BESYLATE 5 MG: 5 TABLET ORAL at 08:10

## 2025-07-05 RX ADMIN — LORAZEPAM 0.5 MG: 0.5 TABLET ORAL at 12:42

## 2025-07-05 RX ADMIN — METFORMIN HYDROCHLORIDE 500 MG: 500 TABLET, FILM COATED ORAL at 15:35

## 2025-07-05 RX ADMIN — DEXTROAMPHETAMINE SACCHARATE, AMPHETAMINE ASPARTATE, DEXTROAMPHETAMINE SULFATE AND AMPHETAMINE SULFATE 20 MG: 2.5; 2.5; 2.5; 2.5 TABLET ORAL at 08:10

## 2025-07-05 RX ADMIN — SENNOSIDES 8.6 MG: 8.6 TABLET, FILM COATED ORAL at 21:04

## 2025-07-06 PROCEDURE — 99231 SBSQ HOSP IP/OBS SF/LOW 25: CPT | Performed by: FAMILY MEDICINE

## 2025-07-06 RX ADMIN — OLANZAPINE 7.5 MG: 2.5 TABLET, FILM COATED ORAL at 08:17

## 2025-07-06 RX ADMIN — METFORMIN HYDROCHLORIDE 500 MG: 500 TABLET, FILM COATED ORAL at 07:09

## 2025-07-06 RX ADMIN — LORAZEPAM 0.5 MG: 0.5 TABLET ORAL at 10:33

## 2025-07-06 RX ADMIN — LEVOTHYROXINE SODIUM 75 MCG: 0.07 TABLET ORAL at 06:04

## 2025-07-06 RX ADMIN — Medication 6 MG: at 21:16

## 2025-07-06 RX ADMIN — ATORVASTATIN CALCIUM 10 MG: 10 TABLET, FILM COATED ORAL at 17:02

## 2025-07-06 RX ADMIN — AMLODIPINE BESYLATE 5 MG: 5 TABLET ORAL at 08:18

## 2025-07-06 RX ADMIN — DIVALPROEX SODIUM 2000 MG: 500 TABLET, FILM COATED, EXTENDED RELEASE ORAL at 21:16

## 2025-07-06 RX ADMIN — LORAZEPAM 0.5 MG: 0.5 TABLET ORAL at 16:55

## 2025-07-06 RX ADMIN — ACETAMINOPHEN 650 MG: 325 TABLET, FILM COATED ORAL at 08:17

## 2025-07-06 RX ADMIN — OLANZAPINE 20 MG: 10 TABLET, FILM COATED ORAL at 21:16

## 2025-07-06 RX ADMIN — METFORMIN HYDROCHLORIDE 500 MG: 500 TABLET, FILM COATED ORAL at 16:56

## 2025-07-06 RX ADMIN — SENNOSIDES 8.6 MG: 8.6 TABLET, FILM COATED ORAL at 21:16

## 2025-07-06 RX ADMIN — PANTOPRAZOLE SODIUM 40 MG: 40 TABLET, DELAYED RELEASE ORAL at 06:04

## 2025-07-06 RX ADMIN — DEXTROAMPHETAMINE SACCHARATE, AMPHETAMINE ASPARTATE, DEXTROAMPHETAMINE SULFATE AND AMPHETAMINE SULFATE 20 MG: 2.5; 2.5; 2.5; 2.5 TABLET ORAL at 08:17

## 2025-07-07 PROCEDURE — 99231 SBSQ HOSP IP/OBS SF/LOW 25: CPT

## 2025-07-07 PROCEDURE — 99232 SBSQ HOSP IP/OBS MODERATE 35: CPT | Performed by: PSYCHIATRY & NEUROLOGY

## 2025-07-07 RX ORDER — DEXTROAMPHETAMINE SACCHARATE, AMPHETAMINE ASPARTATE, DEXTROAMPHETAMINE SULFATE AND AMPHETAMINE SULFATE 2.5; 2.5; 2.5; 2.5 MG/1; MG/1; MG/1; MG/1
15 TABLET ORAL DAILY
Refills: 0 | Status: DISCONTINUED | OUTPATIENT
Start: 2025-07-08 | End: 2025-07-07 | Stop reason: CLARIF

## 2025-07-07 RX ORDER — OLANZAPINE 10 MG/1
10 TABLET, FILM COATED ORAL EVERY MORNING
Status: DISCONTINUED | OUTPATIENT
Start: 2025-07-08 | End: 2025-08-08 | Stop reason: HOSPADM

## 2025-07-07 RX ADMIN — ATORVASTATIN CALCIUM 10 MG: 10 TABLET, FILM COATED ORAL at 17:00

## 2025-07-07 RX ADMIN — OLANZAPINE 20 MG: 10 TABLET, FILM COATED ORAL at 21:06

## 2025-07-07 RX ADMIN — LORAZEPAM 0.5 MG: 0.5 TABLET ORAL at 16:53

## 2025-07-07 RX ADMIN — SENNOSIDES 8.6 MG: 8.6 TABLET, FILM COATED ORAL at 21:06

## 2025-07-07 RX ADMIN — METFORMIN HYDROCHLORIDE 500 MG: 500 TABLET, FILM COATED ORAL at 16:52

## 2025-07-07 RX ADMIN — DEXTROAMPHETAMINE SACCHARATE, AMPHETAMINE ASPARTATE, DEXTROAMPHETAMINE SULFATE AND AMPHETAMINE SULFATE 20 MG: 2.5; 2.5; 2.5; 2.5 TABLET ORAL at 08:01

## 2025-07-07 RX ADMIN — OLANZAPINE 7.5 MG: 2.5 TABLET, FILM COATED ORAL at 08:01

## 2025-07-07 RX ADMIN — METFORMIN HYDROCHLORIDE 500 MG: 500 TABLET, FILM COATED ORAL at 08:01

## 2025-07-07 RX ADMIN — LEVOTHYROXINE SODIUM 75 MCG: 0.07 TABLET ORAL at 05:45

## 2025-07-07 RX ADMIN — PANTOPRAZOLE SODIUM 40 MG: 40 TABLET, DELAYED RELEASE ORAL at 05:45

## 2025-07-07 RX ADMIN — AMLODIPINE BESYLATE 5 MG: 5 TABLET ORAL at 08:01

## 2025-07-07 RX ADMIN — DIVALPROEX SODIUM 2000 MG: 500 TABLET, FILM COATED, EXTENDED RELEASE ORAL at 21:06

## 2025-07-07 RX ADMIN — Medication 6 MG: at 21:06

## 2025-07-08 PROCEDURE — 99231 SBSQ HOSP IP/OBS SF/LOW 25: CPT

## 2025-07-08 RX ADMIN — PANTOPRAZOLE SODIUM 40 MG: 40 TABLET, DELAYED RELEASE ORAL at 05:45

## 2025-07-08 RX ADMIN — ATORVASTATIN CALCIUM 10 MG: 10 TABLET, FILM COATED ORAL at 17:07

## 2025-07-08 RX ADMIN — SENNOSIDES 8.6 MG: 8.6 TABLET, FILM COATED ORAL at 21:10

## 2025-07-08 RX ADMIN — DIVALPROEX SODIUM 2000 MG: 500 TABLET, FILM COATED, EXTENDED RELEASE ORAL at 21:10

## 2025-07-08 RX ADMIN — AMLODIPINE BESYLATE 5 MG: 5 TABLET ORAL at 08:00

## 2025-07-08 RX ADMIN — OLANZAPINE 10 MG: 10 TABLET, FILM COATED ORAL at 08:00

## 2025-07-08 RX ADMIN — DEXTROAMPHETAMINE SACCHARATE, AMPHETAMINE ASPARTATE, DEXTROAMPHETAMINE SULFATE AND AMPHETAMINE SULFATE 15 MG: 2.5; 2.5; 2.5; 2.5 TABLET ORAL at 08:00

## 2025-07-08 RX ADMIN — METFORMIN HYDROCHLORIDE 500 MG: 500 TABLET, FILM COATED ORAL at 07:56

## 2025-07-08 RX ADMIN — METFORMIN HYDROCHLORIDE 500 MG: 500 TABLET, FILM COATED ORAL at 17:07

## 2025-07-08 RX ADMIN — Medication 6 MG: at 21:10

## 2025-07-08 RX ADMIN — LEVOTHYROXINE SODIUM 75 MCG: 0.07 TABLET ORAL at 05:45

## 2025-07-08 RX ADMIN — OLANZAPINE 20 MG: 10 TABLET, FILM COATED ORAL at 21:10

## 2025-07-09 PROCEDURE — 99231 SBSQ HOSP IP/OBS SF/LOW 25: CPT

## 2025-07-09 RX ADMIN — LEVOTHYROXINE SODIUM 75 MCG: 0.07 TABLET ORAL at 06:34

## 2025-07-09 RX ADMIN — SENNOSIDES 8.6 MG: 8.6 TABLET, FILM COATED ORAL at 21:38

## 2025-07-09 RX ADMIN — DIVALPROEX SODIUM 2000 MG: 500 TABLET, FILM COATED, EXTENDED RELEASE ORAL at 21:38

## 2025-07-09 RX ADMIN — ALUMINUM HYDROXIDE, MAGNESIUM HYDROXIDE, AND DIMETHICONE 30 ML: 200; 20; 200 SUSPENSION ORAL at 21:41

## 2025-07-09 RX ADMIN — OLANZAPINE 10 MG: 10 TABLET, FILM COATED ORAL at 08:07

## 2025-07-09 RX ADMIN — METFORMIN HYDROCHLORIDE 500 MG: 500 TABLET, FILM COATED ORAL at 08:07

## 2025-07-09 RX ADMIN — OLANZAPINE 20 MG: 10 TABLET, FILM COATED ORAL at 21:38

## 2025-07-09 RX ADMIN — METFORMIN HYDROCHLORIDE 500 MG: 500 TABLET, FILM COATED ORAL at 17:21

## 2025-07-09 RX ADMIN — AMLODIPINE BESYLATE 5 MG: 5 TABLET ORAL at 08:07

## 2025-07-09 RX ADMIN — ATORVASTATIN CALCIUM 10 MG: 10 TABLET, FILM COATED ORAL at 17:21

## 2025-07-09 RX ADMIN — PANTOPRAZOLE SODIUM 40 MG: 40 TABLET, DELAYED RELEASE ORAL at 06:34

## 2025-07-09 RX ADMIN — DEXTROAMPHETAMINE SACCHARATE, AMPHETAMINE ASPARTATE, DEXTROAMPHETAMINE SULFATE AND AMPHETAMINE SULFATE 15 MG: 2.5; 2.5; 2.5; 2.5 TABLET ORAL at 08:07

## 2025-07-09 RX ADMIN — Medication 6 MG: at 21:38

## 2025-07-10 PROCEDURE — 99232 SBSQ HOSP IP/OBS MODERATE 35: CPT | Performed by: PSYCHIATRY & NEUROLOGY

## 2025-07-10 PROCEDURE — 99231 SBSQ HOSP IP/OBS SF/LOW 25: CPT

## 2025-07-10 RX ORDER — DEXTROAMPHETAMINE SACCHARATE, AMPHETAMINE ASPARTATE, DEXTROAMPHETAMINE SULFATE AND AMPHETAMINE SULFATE 2.5; 2.5; 2.5; 2.5 MG/1; MG/1; MG/1; MG/1
10 TABLET ORAL DAILY
Refills: 0 | Status: DISCONTINUED | OUTPATIENT
Start: 2025-07-11 | End: 2025-07-14

## 2025-07-10 RX ADMIN — DIVALPROEX SODIUM 2000 MG: 500 TABLET, FILM COATED, EXTENDED RELEASE ORAL at 21:33

## 2025-07-10 RX ADMIN — LEVOTHYROXINE SODIUM 75 MCG: 0.07 TABLET ORAL at 05:03

## 2025-07-10 RX ADMIN — OLANZAPINE 20 MG: 10 TABLET, FILM COATED ORAL at 21:33

## 2025-07-10 RX ADMIN — SENNOSIDES 8.6 MG: 8.6 TABLET, FILM COATED ORAL at 21:33

## 2025-07-10 RX ADMIN — Medication 6 MG: at 21:33

## 2025-07-10 RX ADMIN — ATORVASTATIN CALCIUM 10 MG: 10 TABLET, FILM COATED ORAL at 17:13

## 2025-07-10 RX ADMIN — DEXTROAMPHETAMINE SACCHARATE, AMPHETAMINE ASPARTATE, DEXTROAMPHETAMINE SULFATE AND AMPHETAMINE SULFATE 15 MG: 2.5; 2.5; 2.5; 2.5 TABLET ORAL at 08:34

## 2025-07-10 RX ADMIN — AMLODIPINE BESYLATE 5 MG: 5 TABLET ORAL at 08:34

## 2025-07-10 RX ADMIN — METFORMIN HYDROCHLORIDE 500 MG: 500 TABLET, FILM COATED ORAL at 08:34

## 2025-07-10 RX ADMIN — OLANZAPINE 10 MG: 10 TABLET, FILM COATED ORAL at 08:34

## 2025-07-10 RX ADMIN — LORAZEPAM 0.5 MG: 0.5 TABLET ORAL at 16:57

## 2025-07-10 RX ADMIN — PANTOPRAZOLE SODIUM 40 MG: 40 TABLET, DELAYED RELEASE ORAL at 05:03

## 2025-07-10 RX ADMIN — METFORMIN HYDROCHLORIDE 500 MG: 500 TABLET, FILM COATED ORAL at 16:08

## 2025-07-11 PROCEDURE — 99231 SBSQ HOSP IP/OBS SF/LOW 25: CPT

## 2025-07-11 RX ADMIN — SENNOSIDES 8.6 MG: 8.6 TABLET, FILM COATED ORAL at 21:30

## 2025-07-11 RX ADMIN — Medication 6 MG: at 21:30

## 2025-07-11 RX ADMIN — AMLODIPINE BESYLATE 5 MG: 5 TABLET ORAL at 08:02

## 2025-07-11 RX ADMIN — METFORMIN HYDROCHLORIDE 500 MG: 500 TABLET, FILM COATED ORAL at 16:04

## 2025-07-11 RX ADMIN — LEVOTHYROXINE SODIUM 75 MCG: 0.07 TABLET ORAL at 05:22

## 2025-07-11 RX ADMIN — METFORMIN HYDROCHLORIDE 500 MG: 500 TABLET, FILM COATED ORAL at 07:43

## 2025-07-11 RX ADMIN — OLANZAPINE 10 MG: 10 TABLET, FILM COATED ORAL at 08:01

## 2025-07-11 RX ADMIN — DEXTROAMPHETAMINE SACCHARATE, AMPHETAMINE ASPARTATE, DEXTROAMPHETAMINE SULFATE AND AMPHETAMINE SULFATE 10 MG: 2.5; 2.5; 2.5; 2.5 TABLET ORAL at 08:02

## 2025-07-11 RX ADMIN — LORAZEPAM 0.5 MG: 0.5 TABLET ORAL at 12:46

## 2025-07-11 RX ADMIN — OLANZAPINE 20 MG: 10 TABLET, FILM COATED ORAL at 21:30

## 2025-07-11 RX ADMIN — LORAZEPAM 0.5 MG: 0.5 TABLET ORAL at 17:02

## 2025-07-11 RX ADMIN — ATORVASTATIN CALCIUM 10 MG: 10 TABLET, FILM COATED ORAL at 17:02

## 2025-07-11 RX ADMIN — PANTOPRAZOLE SODIUM 40 MG: 40 TABLET, DELAYED RELEASE ORAL at 05:22

## 2025-07-11 RX ADMIN — DIVALPROEX SODIUM 2000 MG: 500 TABLET, FILM COATED, EXTENDED RELEASE ORAL at 21:29

## 2025-07-12 PROCEDURE — 99231 SBSQ HOSP IP/OBS SF/LOW 25: CPT

## 2025-07-12 RX ADMIN — LEVOTHYROXINE SODIUM 75 MCG: 0.07 TABLET ORAL at 06:10

## 2025-07-12 RX ADMIN — LORAZEPAM 0.5 MG: 0.5 TABLET ORAL at 12:07

## 2025-07-12 RX ADMIN — PANTOPRAZOLE SODIUM 40 MG: 40 TABLET, DELAYED RELEASE ORAL at 06:10

## 2025-07-12 RX ADMIN — METFORMIN HYDROCHLORIDE 500 MG: 500 TABLET, FILM COATED ORAL at 07:11

## 2025-07-12 RX ADMIN — Medication 6 MG: at 21:00

## 2025-07-12 RX ADMIN — AMLODIPINE BESYLATE 5 MG: 5 TABLET ORAL at 08:00

## 2025-07-12 RX ADMIN — SENNOSIDES 8.6 MG: 8.6 TABLET, FILM COATED ORAL at 21:00

## 2025-07-12 RX ADMIN — OLANZAPINE 10 MG: 10 TABLET, FILM COATED ORAL at 08:00

## 2025-07-12 RX ADMIN — ACETAMINOPHEN 650 MG: 325 TABLET, FILM COATED ORAL at 08:00

## 2025-07-12 RX ADMIN — ATORVASTATIN CALCIUM 10 MG: 10 TABLET, FILM COATED ORAL at 17:02

## 2025-07-12 RX ADMIN — METFORMIN HYDROCHLORIDE 500 MG: 500 TABLET, FILM COATED ORAL at 15:31

## 2025-07-12 RX ADMIN — LORAZEPAM 0.5 MG: 0.5 TABLET ORAL at 17:02

## 2025-07-12 RX ADMIN — OLANZAPINE 20 MG: 10 TABLET, FILM COATED ORAL at 21:00

## 2025-07-12 RX ADMIN — DIVALPROEX SODIUM 2000 MG: 500 TABLET, FILM COATED, EXTENDED RELEASE ORAL at 21:00

## 2025-07-12 RX ADMIN — DEXTROAMPHETAMINE SACCHARATE, AMPHETAMINE ASPARTATE, DEXTROAMPHETAMINE SULFATE AND AMPHETAMINE SULFATE 10 MG: 2.5; 2.5; 2.5; 2.5 TABLET ORAL at 08:00

## 2025-07-13 PROCEDURE — 99231 SBSQ HOSP IP/OBS SF/LOW 25: CPT

## 2025-07-13 RX ADMIN — LORAZEPAM 0.5 MG: 0.5 TABLET ORAL at 11:46

## 2025-07-13 RX ADMIN — METFORMIN HYDROCHLORIDE 500 MG: 500 TABLET, FILM COATED ORAL at 08:04

## 2025-07-13 RX ADMIN — OLANZAPINE 10 MG: 10 TABLET, FILM COATED ORAL at 08:04

## 2025-07-13 RX ADMIN — LORAZEPAM 0.5 MG: 0.5 TABLET ORAL at 17:08

## 2025-07-13 RX ADMIN — DEXTROAMPHETAMINE SACCHARATE, AMPHETAMINE ASPARTATE, DEXTROAMPHETAMINE SULFATE AND AMPHETAMINE SULFATE 10 MG: 2.5; 2.5; 2.5; 2.5 TABLET ORAL at 08:03

## 2025-07-13 RX ADMIN — Medication 6 MG: at 21:11

## 2025-07-13 RX ADMIN — OLANZAPINE 20 MG: 10 TABLET, FILM COATED ORAL at 21:11

## 2025-07-13 RX ADMIN — METFORMIN HYDROCHLORIDE 500 MG: 500 TABLET, FILM COATED ORAL at 15:39

## 2025-07-13 RX ADMIN — LEVOTHYROXINE SODIUM 75 MCG: 0.07 TABLET ORAL at 05:59

## 2025-07-13 RX ADMIN — DIVALPROEX SODIUM 2000 MG: 500 TABLET, FILM COATED, EXTENDED RELEASE ORAL at 21:10

## 2025-07-13 RX ADMIN — ATORVASTATIN CALCIUM 10 MG: 10 TABLET, FILM COATED ORAL at 17:08

## 2025-07-13 RX ADMIN — AMLODIPINE BESYLATE 5 MG: 5 TABLET ORAL at 08:03

## 2025-07-13 RX ADMIN — SENNOSIDES 8.6 MG: 8.6 TABLET, FILM COATED ORAL at 21:10

## 2025-07-13 RX ADMIN — PANTOPRAZOLE SODIUM 40 MG: 40 TABLET, DELAYED RELEASE ORAL at 05:59

## 2025-07-13 RX ADMIN — ACETAMINOPHEN 650 MG: 325 TABLET, FILM COATED ORAL at 08:03

## 2025-07-14 PROCEDURE — 99232 SBSQ HOSP IP/OBS MODERATE 35: CPT | Performed by: INTERNAL MEDICINE

## 2025-07-14 PROCEDURE — 99232 SBSQ HOSP IP/OBS MODERATE 35: CPT | Performed by: PSYCHIATRY & NEUROLOGY

## 2025-07-14 RX ORDER — DEXTROAMPHETAMINE SACCHARATE, AMPHETAMINE ASPARTATE, DEXTROAMPHETAMINE SULFATE AND AMPHETAMINE SULFATE 1.25; 1.25; 1.25; 1.25 MG/1; MG/1; MG/1; MG/1
5 TABLET ORAL DAILY
Refills: 0 | Status: DISCONTINUED | OUTPATIENT
Start: 2025-07-15 | End: 2025-07-18

## 2025-07-14 RX ADMIN — ATORVASTATIN CALCIUM 10 MG: 10 TABLET, FILM COATED ORAL at 17:03

## 2025-07-14 RX ADMIN — METFORMIN HYDROCHLORIDE 500 MG: 500 TABLET, FILM COATED ORAL at 08:07

## 2025-07-14 RX ADMIN — OLANZAPINE 20 MG: 10 TABLET, FILM COATED ORAL at 21:09

## 2025-07-14 RX ADMIN — LEVOTHYROXINE SODIUM 75 MCG: 0.07 TABLET ORAL at 05:27

## 2025-07-14 RX ADMIN — Medication 6 MG: at 21:09

## 2025-07-14 RX ADMIN — OLANZAPINE 10 MG: 10 TABLET, FILM COATED ORAL at 08:08

## 2025-07-14 RX ADMIN — DEXTROAMPHETAMINE SACCHARATE, AMPHETAMINE ASPARTATE, DEXTROAMPHETAMINE SULFATE AND AMPHETAMINE SULFATE 10 MG: 2.5; 2.5; 2.5; 2.5 TABLET ORAL at 08:07

## 2025-07-14 RX ADMIN — METFORMIN HYDROCHLORIDE 500 MG: 500 TABLET, FILM COATED ORAL at 16:56

## 2025-07-14 RX ADMIN — SENNOSIDES 8.6 MG: 8.6 TABLET, FILM COATED ORAL at 21:09

## 2025-07-14 RX ADMIN — PANTOPRAZOLE SODIUM 40 MG: 40 TABLET, DELAYED RELEASE ORAL at 05:27

## 2025-07-14 RX ADMIN — DIVALPROEX SODIUM 2000 MG: 500 TABLET, FILM COATED, EXTENDED RELEASE ORAL at 21:09

## 2025-07-15 PROCEDURE — 99232 SBSQ HOSP IP/OBS MODERATE 35: CPT | Performed by: INTERNAL MEDICINE

## 2025-07-15 RX ADMIN — SENNOSIDES 8.6 MG: 8.6 TABLET, FILM COATED ORAL at 21:00

## 2025-07-15 RX ADMIN — LEVOTHYROXINE SODIUM 75 MCG: 0.07 TABLET ORAL at 06:14

## 2025-07-15 RX ADMIN — Medication 6 MG: at 21:00

## 2025-07-15 RX ADMIN — DIVALPROEX SODIUM 2000 MG: 500 TABLET, FILM COATED, EXTENDED RELEASE ORAL at 21:00

## 2025-07-15 RX ADMIN — OLANZAPINE 10 MG: 10 TABLET, FILM COATED ORAL at 08:06

## 2025-07-15 RX ADMIN — AMLODIPINE BESYLATE 5 MG: 5 TABLET ORAL at 08:06

## 2025-07-15 RX ADMIN — ATORVASTATIN CALCIUM 10 MG: 10 TABLET, FILM COATED ORAL at 17:20

## 2025-07-15 RX ADMIN — PANTOPRAZOLE SODIUM 40 MG: 40 TABLET, DELAYED RELEASE ORAL at 06:14

## 2025-07-15 RX ADMIN — METFORMIN HYDROCHLORIDE 500 MG: 500 TABLET, FILM COATED ORAL at 16:39

## 2025-07-15 RX ADMIN — LORAZEPAM 0.5 MG: 0.5 TABLET ORAL at 13:18

## 2025-07-15 RX ADMIN — OLANZAPINE 20 MG: 10 TABLET, FILM COATED ORAL at 21:00

## 2025-07-15 RX ADMIN — DEXTROAMPHETAMINE SACCHARATE, AMPHETAMINE ASPARTATE, DEXTROAMPHETAMINE SULFATE AND AMPHETAMINE SULFATE 5 MG: 1.25; 1.25; 1.25; 1.25 TABLET ORAL at 08:06

## 2025-07-15 RX ADMIN — METFORMIN HYDROCHLORIDE 500 MG: 500 TABLET, FILM COATED ORAL at 08:06

## 2025-07-15 RX ADMIN — ZOLPIDEM TARTRATE 5 MG: 5 TABLET, FILM COATED ORAL at 21:49

## 2025-07-16 PROCEDURE — 99232 SBSQ HOSP IP/OBS MODERATE 35: CPT | Performed by: INTERNAL MEDICINE

## 2025-07-16 RX ADMIN — AMLODIPINE BESYLATE 5 MG: 5 TABLET ORAL at 08:18

## 2025-07-16 RX ADMIN — PANTOPRAZOLE SODIUM 40 MG: 40 TABLET, DELAYED RELEASE ORAL at 05:00

## 2025-07-16 RX ADMIN — SENNOSIDES 8.6 MG: 8.6 TABLET, FILM COATED ORAL at 21:18

## 2025-07-16 RX ADMIN — OLANZAPINE 10 MG: 10 TABLET, FILM COATED ORAL at 08:17

## 2025-07-16 RX ADMIN — OLANZAPINE 20 MG: 10 TABLET, FILM COATED ORAL at 21:18

## 2025-07-16 RX ADMIN — LEVOTHYROXINE SODIUM 75 MCG: 0.07 TABLET ORAL at 05:00

## 2025-07-16 RX ADMIN — DIVALPROEX SODIUM 2000 MG: 500 TABLET, FILM COATED, EXTENDED RELEASE ORAL at 21:18

## 2025-07-16 RX ADMIN — METFORMIN HYDROCHLORIDE 500 MG: 500 TABLET, FILM COATED ORAL at 17:06

## 2025-07-16 RX ADMIN — DEXTROAMPHETAMINE SACCHARATE, AMPHETAMINE ASPARTATE, DEXTROAMPHETAMINE SULFATE AND AMPHETAMINE SULFATE 5 MG: 1.25; 1.25; 1.25; 1.25 TABLET ORAL at 08:18

## 2025-07-16 RX ADMIN — ATORVASTATIN CALCIUM 10 MG: 10 TABLET, FILM COATED ORAL at 17:06

## 2025-07-16 RX ADMIN — METFORMIN HYDROCHLORIDE 500 MG: 500 TABLET, FILM COATED ORAL at 08:18

## 2025-07-16 RX ADMIN — Medication 6 MG: at 21:18

## 2025-07-17 PROCEDURE — 99232 SBSQ HOSP IP/OBS MODERATE 35: CPT | Performed by: INTERNAL MEDICINE

## 2025-07-17 RX ADMIN — OLANZAPINE 10 MG: 10 TABLET, FILM COATED ORAL at 08:08

## 2025-07-17 RX ADMIN — AMLODIPINE BESYLATE 5 MG: 5 TABLET ORAL at 08:08

## 2025-07-17 RX ADMIN — SENNOSIDES 8.6 MG: 8.6 TABLET, FILM COATED ORAL at 21:06

## 2025-07-17 RX ADMIN — METFORMIN HYDROCHLORIDE 500 MG: 500 TABLET, FILM COATED ORAL at 08:08

## 2025-07-17 RX ADMIN — Medication 6 MG: at 21:06

## 2025-07-17 RX ADMIN — LEVOTHYROXINE SODIUM 75 MCG: 0.07 TABLET ORAL at 05:00

## 2025-07-17 RX ADMIN — METFORMIN HYDROCHLORIDE 500 MG: 500 TABLET, FILM COATED ORAL at 17:34

## 2025-07-17 RX ADMIN — DEXTROAMPHETAMINE SACCHARATE, AMPHETAMINE ASPARTATE, DEXTROAMPHETAMINE SULFATE AND AMPHETAMINE SULFATE 5 MG: 1.25; 1.25; 1.25; 1.25 TABLET ORAL at 08:08

## 2025-07-17 RX ADMIN — OLANZAPINE 20 MG: 10 TABLET, FILM COATED ORAL at 21:06

## 2025-07-17 RX ADMIN — DIVALPROEX SODIUM 2000 MG: 500 TABLET, FILM COATED, EXTENDED RELEASE ORAL at 21:06

## 2025-07-17 RX ADMIN — ATORVASTATIN CALCIUM 10 MG: 10 TABLET, FILM COATED ORAL at 17:34

## 2025-07-17 RX ADMIN — PANTOPRAZOLE SODIUM 40 MG: 40 TABLET, DELAYED RELEASE ORAL at 05:00

## 2025-07-18 LAB
CREAT UR-MCNC: 9.1 MG/DL
MICROALBUMIN UR-MCNC: <7 MG/L

## 2025-07-18 PROCEDURE — 99232 SBSQ HOSP IP/OBS MODERATE 35: CPT | Performed by: INTERNAL MEDICINE

## 2025-07-18 PROCEDURE — 82570 ASSAY OF URINE CREATININE: CPT | Performed by: INTERNAL MEDICINE

## 2025-07-18 PROCEDURE — 82043 UR ALBUMIN QUANTITATIVE: CPT | Performed by: INTERNAL MEDICINE

## 2025-07-18 RX ADMIN — DIVALPROEX SODIUM 2000 MG: 500 TABLET, FILM COATED, EXTENDED RELEASE ORAL at 21:01

## 2025-07-18 RX ADMIN — LEVOTHYROXINE SODIUM 75 MCG: 0.07 TABLET ORAL at 06:08

## 2025-07-18 RX ADMIN — METFORMIN HYDROCHLORIDE 500 MG: 500 TABLET, FILM COATED ORAL at 15:50

## 2025-07-18 RX ADMIN — ATORVASTATIN CALCIUM 10 MG: 10 TABLET, FILM COATED ORAL at 17:20

## 2025-07-18 RX ADMIN — METFORMIN HYDROCHLORIDE 500 MG: 500 TABLET, FILM COATED ORAL at 07:54

## 2025-07-18 RX ADMIN — AMLODIPINE BESYLATE 5 MG: 5 TABLET ORAL at 08:01

## 2025-07-18 RX ADMIN — PANTOPRAZOLE SODIUM 40 MG: 40 TABLET, DELAYED RELEASE ORAL at 06:08

## 2025-07-18 RX ADMIN — Medication 6 MG: at 21:01

## 2025-07-18 RX ADMIN — ACETAMINOPHEN 650 MG: 325 TABLET, FILM COATED ORAL at 15:06

## 2025-07-18 RX ADMIN — LORAZEPAM 0.5 MG: 0.5 TABLET ORAL at 11:16

## 2025-07-18 RX ADMIN — DEXTROAMPHETAMINE SACCHARATE, AMPHETAMINE ASPARTATE, DEXTROAMPHETAMINE SULFATE AND AMPHETAMINE SULFATE 5 MG: 1.25; 1.25; 1.25; 1.25 TABLET ORAL at 08:01

## 2025-07-18 RX ADMIN — OLANZAPINE 20 MG: 10 TABLET, FILM COATED ORAL at 21:01

## 2025-07-18 RX ADMIN — SENNOSIDES 8.6 MG: 8.6 TABLET, FILM COATED ORAL at 21:01

## 2025-07-18 RX ADMIN — OLANZAPINE 10 MG: 10 TABLET, FILM COATED ORAL at 08:01

## 2025-07-19 LAB
ALBUMIN SERPL BCG-MCNC: 3.9 G/DL (ref 3.5–5)
ALP SERPL-CCNC: 71 U/L (ref 34–104)
ALT SERPL W P-5'-P-CCNC: 14 U/L (ref 7–52)
ANION GAP SERPL CALCULATED.3IONS-SCNC: 7 MMOL/L (ref 4–13)
AST SERPL W P-5'-P-CCNC: 14 U/L (ref 13–39)
BASOPHILS # BLD AUTO: 0.07 THOUSANDS/ÂΜL (ref 0–0.1)
BASOPHILS NFR BLD AUTO: 1 % (ref 0–1)
BILIRUB SERPL-MCNC: 0.24 MG/DL (ref 0.2–1)
BUN SERPL-MCNC: 11 MG/DL (ref 5–25)
CALCIUM SERPL-MCNC: 9 MG/DL (ref 8.4–10.2)
CHLORIDE SERPL-SCNC: 100 MMOL/L (ref 96–108)
CO2 SERPL-SCNC: 28 MMOL/L (ref 21–32)
CREAT SERPL-MCNC: 0.57 MG/DL (ref 0.6–1.3)
EOSINOPHIL # BLD AUTO: 0 THOUSAND/ÂΜL (ref 0–0.61)
EOSINOPHIL NFR BLD AUTO: 0 % (ref 0–6)
ERYTHROCYTE [DISTWIDTH] IN BLOOD BY AUTOMATED COUNT: 13.3 % (ref 11.6–15.1)
EST. AVERAGE GLUCOSE BLD GHB EST-MCNC: 171 MG/DL
GFR SERPL CREATININE-BSD FRML MDRD: 113 ML/MIN/1.73SQ M
GLUCOSE SERPL-MCNC: 141 MG/DL (ref 65–140)
HBA1C MFR BLD: 7.6 %
HCT VFR BLD AUTO: 39 % (ref 36.5–49.3)
HGB BLD-MCNC: 12.4 G/DL (ref 12–17)
IMM GRANULOCYTES # BLD AUTO: 0.09 THOUSAND/UL (ref 0–0.2)
IMM GRANULOCYTES NFR BLD AUTO: 1 % (ref 0–2)
LYMPHOCYTES # BLD AUTO: 3.86 THOUSANDS/ÂΜL (ref 0.6–4.47)
LYMPHOCYTES NFR BLD AUTO: 37 % (ref 14–44)
MCH RBC QN AUTO: 26.6 PG (ref 26.8–34.3)
MCHC RBC AUTO-ENTMCNC: 31.8 G/DL (ref 31.4–37.4)
MCV RBC AUTO: 84 FL (ref 82–98)
MONOCYTES # BLD AUTO: 1.24 THOUSAND/ÂΜL (ref 0.17–1.22)
MONOCYTES NFR BLD AUTO: 12 % (ref 4–12)
NEUTROPHILS # BLD AUTO: 5.19 THOUSANDS/ÂΜL (ref 1.85–7.62)
NEUTS SEG NFR BLD AUTO: 49 % (ref 43–75)
NRBC BLD AUTO-RTO: 0 /100 WBCS
PLATELET # BLD AUTO: 186 THOUSANDS/UL (ref 149–390)
PMV BLD AUTO: 9.9 FL (ref 8.9–12.7)
POTASSIUM SERPL-SCNC: 4.1 MMOL/L (ref 3.5–5.3)
PROT SERPL-MCNC: 6.3 G/DL (ref 6.4–8.4)
RBC # BLD AUTO: 4.66 MILLION/UL (ref 3.88–5.62)
SODIUM SERPL-SCNC: 135 MMOL/L (ref 135–147)
WBC # BLD AUTO: 10.45 THOUSAND/UL (ref 4.31–10.16)

## 2025-07-19 PROCEDURE — 85025 COMPLETE CBC W/AUTO DIFF WBC: CPT | Performed by: INTERNAL MEDICINE

## 2025-07-19 PROCEDURE — 99232 SBSQ HOSP IP/OBS MODERATE 35: CPT | Performed by: INTERNAL MEDICINE

## 2025-07-19 PROCEDURE — 83036 HEMOGLOBIN GLYCOSYLATED A1C: CPT | Performed by: INTERNAL MEDICINE

## 2025-07-19 PROCEDURE — 80053 COMPREHEN METABOLIC PANEL: CPT | Performed by: INTERNAL MEDICINE

## 2025-07-19 RX ADMIN — Medication 6 MG: at 21:10

## 2025-07-19 RX ADMIN — SENNOSIDES 8.6 MG: 8.6 TABLET, FILM COATED ORAL at 21:11

## 2025-07-19 RX ADMIN — OLANZAPINE 20 MG: 10 TABLET, FILM COATED ORAL at 21:11

## 2025-07-19 RX ADMIN — METFORMIN HYDROCHLORIDE 500 MG: 500 TABLET, FILM COATED ORAL at 06:41

## 2025-07-19 RX ADMIN — METFORMIN HYDROCHLORIDE 500 MG: 500 TABLET, FILM COATED ORAL at 15:31

## 2025-07-19 RX ADMIN — OLANZAPINE 10 MG: 10 TABLET, FILM COATED ORAL at 08:02

## 2025-07-19 RX ADMIN — PANTOPRAZOLE SODIUM 40 MG: 40 TABLET, DELAYED RELEASE ORAL at 06:14

## 2025-07-19 RX ADMIN — ATORVASTATIN CALCIUM 10 MG: 10 TABLET, FILM COATED ORAL at 17:00

## 2025-07-19 RX ADMIN — LEVOTHYROXINE SODIUM 75 MCG: 0.07 TABLET ORAL at 06:14

## 2025-07-19 RX ADMIN — ACETAMINOPHEN 650 MG: 325 TABLET, FILM COATED ORAL at 08:02

## 2025-07-19 RX ADMIN — LORAZEPAM 0.5 MG: 0.5 TABLET ORAL at 11:36

## 2025-07-19 RX ADMIN — DIVALPROEX SODIUM 2000 MG: 500 TABLET, FILM COATED, EXTENDED RELEASE ORAL at 21:10

## 2025-07-19 RX ADMIN — AMLODIPINE BESYLATE 5 MG: 5 TABLET ORAL at 08:02

## 2025-07-20 PROCEDURE — 99232 SBSQ HOSP IP/OBS MODERATE 35: CPT | Performed by: INTERNAL MEDICINE

## 2025-07-20 RX ORDER — GABAPENTIN 100 MG/1
100 CAPSULE ORAL 3 TIMES DAILY
Status: DISCONTINUED | OUTPATIENT
Start: 2025-07-20 | End: 2025-07-21

## 2025-07-20 RX ADMIN — PANTOPRAZOLE SODIUM 40 MG: 40 TABLET, DELAYED RELEASE ORAL at 06:39

## 2025-07-20 RX ADMIN — Medication 6 MG: at 21:00

## 2025-07-20 RX ADMIN — LEVOTHYROXINE SODIUM 75 MCG: 0.07 TABLET ORAL at 06:39

## 2025-07-20 RX ADMIN — METFORMIN HYDROCHLORIDE 500 MG: 500 TABLET, FILM COATED ORAL at 06:39

## 2025-07-20 RX ADMIN — GABAPENTIN 100 MG: 100 CAPSULE ORAL at 15:32

## 2025-07-20 RX ADMIN — AMLODIPINE BESYLATE 5 MG: 5 TABLET ORAL at 08:09

## 2025-07-20 RX ADMIN — DIVALPROEX SODIUM 2000 MG: 500 TABLET, FILM COATED, EXTENDED RELEASE ORAL at 21:01

## 2025-07-20 RX ADMIN — ATORVASTATIN CALCIUM 10 MG: 10 TABLET, FILM COATED ORAL at 17:01

## 2025-07-20 RX ADMIN — SENNOSIDES 8.6 MG: 8.6 TABLET, FILM COATED ORAL at 21:00

## 2025-07-20 RX ADMIN — GABAPENTIN 100 MG: 100 CAPSULE ORAL at 20:59

## 2025-07-20 RX ADMIN — METFORMIN HYDROCHLORIDE 500 MG: 500 TABLET, FILM COATED ORAL at 15:31

## 2025-07-20 RX ADMIN — OLANZAPINE 10 MG: 10 TABLET, FILM COATED ORAL at 08:09

## 2025-07-20 RX ADMIN — ACETAMINOPHEN 650 MG: 325 TABLET, FILM COATED ORAL at 12:24

## 2025-07-20 RX ADMIN — LORAZEPAM 0.5 MG: 0.5 TABLET ORAL at 19:27

## 2025-07-20 RX ADMIN — OLANZAPINE 20 MG: 10 TABLET, FILM COATED ORAL at 21:00

## 2025-07-20 RX ADMIN — ALUMINUM HYDROXIDE, MAGNESIUM HYDROXIDE, AND DIMETHICONE 30 ML: 200; 20; 200 SUSPENSION ORAL at 12:24

## 2025-07-20 RX ADMIN — LORAZEPAM 0.5 MG: 0.5 TABLET ORAL at 13:42

## 2025-07-20 RX ADMIN — GABAPENTIN 100 MG: 100 CAPSULE ORAL at 12:03

## 2025-07-20 RX ADMIN — ACETAMINOPHEN 650 MG: 325 TABLET, FILM COATED ORAL at 19:27

## 2025-07-21 PROCEDURE — 99233 SBSQ HOSP IP/OBS HIGH 50: CPT | Performed by: INTERNAL MEDICINE

## 2025-07-21 PROCEDURE — 99233 SBSQ HOSP IP/OBS HIGH 50: CPT | Performed by: PSYCHIATRY & NEUROLOGY

## 2025-07-21 RX ORDER — GABAPENTIN 300 MG/1
300 CAPSULE ORAL 2 TIMES DAILY
Status: DISCONTINUED | OUTPATIENT
Start: 2025-07-21 | End: 2025-08-08

## 2025-07-21 RX ORDER — LORAZEPAM 2 MG/ML
2 INJECTION INTRAMUSCULAR ONCE
Status: COMPLETED | OUTPATIENT
Start: 2025-07-21 | End: 2025-07-21

## 2025-07-21 RX ADMIN — AMLODIPINE BESYLATE 5 MG: 5 TABLET ORAL at 08:16

## 2025-07-21 RX ADMIN — ZOLPIDEM TARTRATE 5 MG: 5 TABLET, FILM COATED ORAL at 21:00

## 2025-07-21 RX ADMIN — ATORVASTATIN CALCIUM 10 MG: 10 TABLET, FILM COATED ORAL at 17:07

## 2025-07-21 RX ADMIN — Medication 6 MG: at 21:00

## 2025-07-21 RX ADMIN — OLANZAPINE 10 MG: 10 TABLET, FILM COATED ORAL at 08:16

## 2025-07-21 RX ADMIN — LORAZEPAM 2 MG: 2 INJECTION INTRAMUSCULAR; INTRAVENOUS at 10:10

## 2025-07-21 RX ADMIN — SENNOSIDES 8.6 MG: 8.6 TABLET, FILM COATED ORAL at 21:00

## 2025-07-21 RX ADMIN — GABAPENTIN 100 MG: 100 CAPSULE ORAL at 08:16

## 2025-07-21 RX ADMIN — DIVALPROEX SODIUM 2000 MG: 500 TABLET, FILM COATED, EXTENDED RELEASE ORAL at 21:00

## 2025-07-21 RX ADMIN — METFORMIN HYDROCHLORIDE 500 MG: 500 TABLET, FILM COATED ORAL at 06:59

## 2025-07-21 RX ADMIN — PANTOPRAZOLE SODIUM 40 MG: 40 TABLET, DELAYED RELEASE ORAL at 06:59

## 2025-07-21 RX ADMIN — METFORMIN HYDROCHLORIDE 500 MG: 500 TABLET, FILM COATED ORAL at 17:07

## 2025-07-21 RX ADMIN — LEVOTHYROXINE SODIUM 75 MCG: 0.07 TABLET ORAL at 06:58

## 2025-07-21 RX ADMIN — OLANZAPINE 20 MG: 10 TABLET, FILM COATED ORAL at 21:00

## 2025-07-21 RX ADMIN — GABAPENTIN 300 MG: 300 CAPSULE ORAL at 17:07

## 2025-07-22 PROCEDURE — 99232 SBSQ HOSP IP/OBS MODERATE 35: CPT | Performed by: INTERNAL MEDICINE

## 2025-07-22 RX ADMIN — PANTOPRAZOLE SODIUM 40 MG: 40 TABLET, DELAYED RELEASE ORAL at 05:40

## 2025-07-22 RX ADMIN — GABAPENTIN 300 MG: 300 CAPSULE ORAL at 17:23

## 2025-07-22 RX ADMIN — AMLODIPINE BESYLATE 5 MG: 5 TABLET ORAL at 08:06

## 2025-07-22 RX ADMIN — LORAZEPAM 0.5 MG: 0.5 TABLET ORAL at 12:01

## 2025-07-22 RX ADMIN — ATORVASTATIN CALCIUM 10 MG: 10 TABLET, FILM COATED ORAL at 17:23

## 2025-07-22 RX ADMIN — METFORMIN HYDROCHLORIDE 500 MG: 500 TABLET, FILM COATED ORAL at 08:06

## 2025-07-22 RX ADMIN — DIVALPROEX SODIUM 2000 MG: 500 TABLET, FILM COATED, EXTENDED RELEASE ORAL at 21:03

## 2025-07-22 RX ADMIN — LEVOTHYROXINE SODIUM 75 MCG: 0.07 TABLET ORAL at 05:40

## 2025-07-22 RX ADMIN — OLANZAPINE 20 MG: 10 TABLET, FILM COATED ORAL at 21:03

## 2025-07-22 RX ADMIN — Medication 6 MG: at 21:03

## 2025-07-22 RX ADMIN — SENNOSIDES 8.6 MG: 8.6 TABLET, FILM COATED ORAL at 21:03

## 2025-07-22 RX ADMIN — ACETAMINOPHEN 650 MG: 325 TABLET, FILM COATED ORAL at 18:19

## 2025-07-22 RX ADMIN — OLANZAPINE 10 MG: 10 TABLET, FILM COATED ORAL at 08:06

## 2025-07-22 RX ADMIN — METFORMIN HYDROCHLORIDE 500 MG: 500 TABLET, FILM COATED ORAL at 17:23

## 2025-07-22 RX ADMIN — GABAPENTIN 300 MG: 300 CAPSULE ORAL at 08:06

## 2025-07-23 PROCEDURE — 99232 SBSQ HOSP IP/OBS MODERATE 35: CPT | Performed by: INTERNAL MEDICINE

## 2025-07-23 PROCEDURE — 99232 SBSQ HOSP IP/OBS MODERATE 35: CPT | Performed by: PSYCHIATRY & NEUROLOGY

## 2025-07-23 RX ADMIN — ATORVASTATIN CALCIUM 10 MG: 10 TABLET, FILM COATED ORAL at 17:22

## 2025-07-23 RX ADMIN — OLANZAPINE 10 MG: 10 TABLET, FILM COATED ORAL at 08:04

## 2025-07-23 RX ADMIN — OLANZAPINE 20 MG: 10 TABLET, FILM COATED ORAL at 21:01

## 2025-07-23 RX ADMIN — Medication 6 MG: at 21:01

## 2025-07-23 RX ADMIN — PANTOPRAZOLE SODIUM 40 MG: 40 TABLET, DELAYED RELEASE ORAL at 05:44

## 2025-07-23 RX ADMIN — GABAPENTIN 300 MG: 300 CAPSULE ORAL at 17:22

## 2025-07-23 RX ADMIN — GABAPENTIN 300 MG: 300 CAPSULE ORAL at 08:04

## 2025-07-23 RX ADMIN — AMLODIPINE BESYLATE 5 MG: 5 TABLET ORAL at 08:04

## 2025-07-23 RX ADMIN — DIVALPROEX SODIUM 2000 MG: 500 TABLET, FILM COATED, EXTENDED RELEASE ORAL at 21:01

## 2025-07-23 RX ADMIN — METFORMIN HYDROCHLORIDE 500 MG: 500 TABLET, FILM COATED ORAL at 17:22

## 2025-07-23 RX ADMIN — SENNOSIDES 8.6 MG: 8.6 TABLET, FILM COATED ORAL at 21:01

## 2025-07-23 RX ADMIN — LEVOTHYROXINE SODIUM 75 MCG: 0.07 TABLET ORAL at 05:44

## 2025-07-23 RX ADMIN — METFORMIN HYDROCHLORIDE 500 MG: 500 TABLET, FILM COATED ORAL at 08:04

## 2025-07-24 PROCEDURE — 99232 SBSQ HOSP IP/OBS MODERATE 35: CPT | Performed by: PSYCHIATRY & NEUROLOGY

## 2025-07-24 PROCEDURE — 99232 SBSQ HOSP IP/OBS MODERATE 35: CPT | Performed by: INTERNAL MEDICINE

## 2025-07-24 RX ADMIN — GABAPENTIN 300 MG: 300 CAPSULE ORAL at 08:25

## 2025-07-24 RX ADMIN — ATORVASTATIN CALCIUM 10 MG: 10 TABLET, FILM COATED ORAL at 17:01

## 2025-07-24 RX ADMIN — AMLODIPINE BESYLATE 5 MG: 5 TABLET ORAL at 08:25

## 2025-07-24 RX ADMIN — PANTOPRAZOLE SODIUM 40 MG: 40 TABLET, DELAYED RELEASE ORAL at 06:09

## 2025-07-24 RX ADMIN — SENNOSIDES 8.6 MG: 8.6 TABLET, FILM COATED ORAL at 21:21

## 2025-07-24 RX ADMIN — METFORMIN HYDROCHLORIDE 500 MG: 500 TABLET, FILM COATED ORAL at 16:44

## 2025-07-24 RX ADMIN — LEVOTHYROXINE SODIUM 75 MCG: 0.07 TABLET ORAL at 06:09

## 2025-07-24 RX ADMIN — DIVALPROEX SODIUM 2000 MG: 500 TABLET, FILM COATED, EXTENDED RELEASE ORAL at 21:21

## 2025-07-24 RX ADMIN — OLANZAPINE 10 MG: 10 TABLET, FILM COATED ORAL at 08:25

## 2025-07-24 RX ADMIN — Medication 6 MG: at 21:21

## 2025-07-24 RX ADMIN — GABAPENTIN 300 MG: 300 CAPSULE ORAL at 17:01

## 2025-07-24 RX ADMIN — METFORMIN HYDROCHLORIDE 500 MG: 500 TABLET, FILM COATED ORAL at 08:25

## 2025-07-24 RX ADMIN — OLANZAPINE 20 MG: 10 TABLET, FILM COATED ORAL at 21:21

## 2025-07-25 PROCEDURE — 99232 SBSQ HOSP IP/OBS MODERATE 35: CPT | Performed by: INTERNAL MEDICINE

## 2025-07-25 RX ADMIN — METFORMIN HYDROCHLORIDE 500 MG: 500 TABLET, FILM COATED ORAL at 16:22

## 2025-07-25 RX ADMIN — GABAPENTIN 300 MG: 300 CAPSULE ORAL at 08:02

## 2025-07-25 RX ADMIN — Medication 6 MG: at 21:01

## 2025-07-25 RX ADMIN — ATORVASTATIN CALCIUM 10 MG: 10 TABLET, FILM COATED ORAL at 17:06

## 2025-07-25 RX ADMIN — PANTOPRAZOLE SODIUM 40 MG: 40 TABLET, DELAYED RELEASE ORAL at 05:36

## 2025-07-25 RX ADMIN — GABAPENTIN 300 MG: 300 CAPSULE ORAL at 17:06

## 2025-07-25 RX ADMIN — LEVOTHYROXINE SODIUM 75 MCG: 0.07 TABLET ORAL at 05:36

## 2025-07-25 RX ADMIN — ACETAMINOPHEN 650 MG: 325 TABLET, FILM COATED ORAL at 08:02

## 2025-07-25 RX ADMIN — OLANZAPINE 20 MG: 10 TABLET, FILM COATED ORAL at 21:01

## 2025-07-25 RX ADMIN — DIVALPROEX SODIUM 2000 MG: 500 TABLET, FILM COATED, EXTENDED RELEASE ORAL at 21:01

## 2025-07-25 RX ADMIN — METFORMIN HYDROCHLORIDE 500 MG: 500 TABLET, FILM COATED ORAL at 08:02

## 2025-07-25 RX ADMIN — AMLODIPINE BESYLATE 5 MG: 5 TABLET ORAL at 08:02

## 2025-07-25 RX ADMIN — OLANZAPINE 10 MG: 10 TABLET, FILM COATED ORAL at 08:02

## 2025-07-25 RX ADMIN — SENNOSIDES 8.6 MG: 8.6 TABLET, FILM COATED ORAL at 21:01

## 2025-07-26 PROCEDURE — 99232 SBSQ HOSP IP/OBS MODERATE 35: CPT | Performed by: INTERNAL MEDICINE

## 2025-07-26 RX ADMIN — AMLODIPINE BESYLATE 5 MG: 5 TABLET ORAL at 08:05

## 2025-07-26 RX ADMIN — GABAPENTIN 300 MG: 300 CAPSULE ORAL at 17:00

## 2025-07-26 RX ADMIN — ATORVASTATIN CALCIUM 10 MG: 10 TABLET, FILM COATED ORAL at 17:00

## 2025-07-26 RX ADMIN — LEVOTHYROXINE SODIUM 75 MCG: 0.07 TABLET ORAL at 05:19

## 2025-07-26 RX ADMIN — METFORMIN HYDROCHLORIDE 500 MG: 500 TABLET, FILM COATED ORAL at 08:05

## 2025-07-26 RX ADMIN — OLANZAPINE 20 MG: 10 TABLET, FILM COATED ORAL at 21:22

## 2025-07-26 RX ADMIN — ACETAMINOPHEN 650 MG: 325 TABLET, FILM COATED ORAL at 15:57

## 2025-07-26 RX ADMIN — METFORMIN HYDROCHLORIDE 500 MG: 500 TABLET, FILM COATED ORAL at 15:58

## 2025-07-26 RX ADMIN — Medication 6 MG: at 21:22

## 2025-07-26 RX ADMIN — PANTOPRAZOLE SODIUM 40 MG: 40 TABLET, DELAYED RELEASE ORAL at 05:19

## 2025-07-26 RX ADMIN — DIVALPROEX SODIUM 2000 MG: 500 TABLET, FILM COATED, EXTENDED RELEASE ORAL at 21:22

## 2025-07-26 RX ADMIN — ACETAMINOPHEN 650 MG: 325 TABLET, FILM COATED ORAL at 08:04

## 2025-07-26 RX ADMIN — GABAPENTIN 300 MG: 300 CAPSULE ORAL at 08:05

## 2025-07-26 RX ADMIN — OLANZAPINE 10 MG: 10 TABLET, FILM COATED ORAL at 08:05

## 2025-07-26 RX ADMIN — SENNOSIDES 8.6 MG: 8.6 TABLET, FILM COATED ORAL at 21:22

## 2025-07-27 PROCEDURE — 99232 SBSQ HOSP IP/OBS MODERATE 35: CPT | Performed by: INTERNAL MEDICINE

## 2025-07-27 RX ADMIN — GABAPENTIN 300 MG: 300 CAPSULE ORAL at 08:05

## 2025-07-27 RX ADMIN — ZOLPIDEM TARTRATE 5 MG: 5 TABLET, FILM COATED ORAL at 21:09

## 2025-07-27 RX ADMIN — SENNOSIDES 8.6 MG: 8.6 TABLET, FILM COATED ORAL at 21:06

## 2025-07-27 RX ADMIN — ATORVASTATIN CALCIUM 10 MG: 10 TABLET, FILM COATED ORAL at 17:18

## 2025-07-27 RX ADMIN — DIVALPROEX SODIUM 2000 MG: 500 TABLET, FILM COATED, EXTENDED RELEASE ORAL at 21:05

## 2025-07-27 RX ADMIN — OLANZAPINE 10 MG: 10 TABLET, FILM COATED ORAL at 08:05

## 2025-07-27 RX ADMIN — METFORMIN HYDROCHLORIDE 500 MG: 500 TABLET, FILM COATED ORAL at 06:32

## 2025-07-27 RX ADMIN — AMLODIPINE BESYLATE 5 MG: 5 TABLET ORAL at 08:05

## 2025-07-27 RX ADMIN — PANTOPRAZOLE SODIUM 40 MG: 40 TABLET, DELAYED RELEASE ORAL at 06:31

## 2025-07-27 RX ADMIN — LEVOTHYROXINE SODIUM 75 MCG: 0.07 TABLET ORAL at 06:31

## 2025-07-27 RX ADMIN — Medication 6 MG: at 21:06

## 2025-07-27 RX ADMIN — METFORMIN HYDROCHLORIDE 500 MG: 500 TABLET, FILM COATED ORAL at 15:34

## 2025-07-27 RX ADMIN — OLANZAPINE 20 MG: 10 TABLET, FILM COATED ORAL at 21:05

## 2025-07-27 RX ADMIN — GABAPENTIN 300 MG: 300 CAPSULE ORAL at 17:18

## 2025-07-27 RX ADMIN — ACETAMINOPHEN 650 MG: 325 TABLET, FILM COATED ORAL at 08:05

## 2025-07-28 PROCEDURE — 99232 SBSQ HOSP IP/OBS MODERATE 35: CPT | Performed by: INTERNAL MEDICINE

## 2025-07-28 RX ADMIN — ACETAMINOPHEN 650 MG: 325 TABLET, FILM COATED ORAL at 14:40

## 2025-07-28 RX ADMIN — OLANZAPINE 10 MG: 10 TABLET, FILM COATED ORAL at 08:05

## 2025-07-28 RX ADMIN — DIVALPROEX SODIUM 2000 MG: 500 TABLET, FILM COATED, EXTENDED RELEASE ORAL at 21:13

## 2025-07-28 RX ADMIN — ATORVASTATIN CALCIUM 10 MG: 10 TABLET, FILM COATED ORAL at 17:10

## 2025-07-28 RX ADMIN — SENNOSIDES 8.6 MG: 8.6 TABLET, FILM COATED ORAL at 21:13

## 2025-07-28 RX ADMIN — PANTOPRAZOLE SODIUM 40 MG: 40 TABLET, DELAYED RELEASE ORAL at 05:07

## 2025-07-28 RX ADMIN — METFORMIN HYDROCHLORIDE 500 MG: 500 TABLET, FILM COATED ORAL at 15:31

## 2025-07-28 RX ADMIN — OLANZAPINE 20 MG: 10 TABLET, FILM COATED ORAL at 21:13

## 2025-07-28 RX ADMIN — GABAPENTIN 300 MG: 300 CAPSULE ORAL at 17:10

## 2025-07-28 RX ADMIN — METFORMIN HYDROCHLORIDE 500 MG: 500 TABLET, FILM COATED ORAL at 08:05

## 2025-07-28 RX ADMIN — Medication 6 MG: at 21:13

## 2025-07-28 RX ADMIN — ACETAMINOPHEN 650 MG: 325 TABLET, FILM COATED ORAL at 08:05

## 2025-07-28 RX ADMIN — GABAPENTIN 300 MG: 300 CAPSULE ORAL at 08:05

## 2025-07-28 RX ADMIN — LEVOTHYROXINE SODIUM 75 MCG: 0.07 TABLET ORAL at 05:07

## 2025-07-28 RX ADMIN — AMLODIPINE BESYLATE 5 MG: 5 TABLET ORAL at 08:05

## 2025-07-29 PROCEDURE — 99231 SBSQ HOSP IP/OBS SF/LOW 25: CPT | Performed by: INTERNAL MEDICINE

## 2025-07-29 PROCEDURE — 99232 SBSQ HOSP IP/OBS MODERATE 35: CPT | Performed by: PSYCHIATRY & NEUROLOGY

## 2025-07-29 RX ADMIN — PANTOPRAZOLE SODIUM 40 MG: 40 TABLET, DELAYED RELEASE ORAL at 05:14

## 2025-07-29 RX ADMIN — DIVALPROEX SODIUM 2000 MG: 500 TABLET, FILM COATED, EXTENDED RELEASE ORAL at 21:25

## 2025-07-29 RX ADMIN — ATORVASTATIN CALCIUM 10 MG: 10 TABLET, FILM COATED ORAL at 17:03

## 2025-07-29 RX ADMIN — METFORMIN HYDROCHLORIDE 500 MG: 500 TABLET, FILM COATED ORAL at 08:15

## 2025-07-29 RX ADMIN — ACETAMINOPHEN 650 MG: 325 TABLET, FILM COATED ORAL at 08:16

## 2025-07-29 RX ADMIN — METFORMIN HYDROCHLORIDE 500 MG: 500 TABLET, FILM COATED ORAL at 15:44

## 2025-07-29 RX ADMIN — Medication 6 MG: at 21:25

## 2025-07-29 RX ADMIN — ACETAMINOPHEN 650 MG: 325 TABLET, FILM COATED ORAL at 15:08

## 2025-07-29 RX ADMIN — GABAPENTIN 300 MG: 300 CAPSULE ORAL at 08:16

## 2025-07-29 RX ADMIN — SENNOSIDES 8.6 MG: 8.6 TABLET, FILM COATED ORAL at 21:25

## 2025-07-29 RX ADMIN — OLANZAPINE 10 MG: 10 TABLET, FILM COATED ORAL at 08:15

## 2025-07-29 RX ADMIN — ZOLPIDEM TARTRATE 5 MG: 5 TABLET, FILM COATED ORAL at 21:25

## 2025-07-29 RX ADMIN — OLANZAPINE 20 MG: 10 TABLET, FILM COATED ORAL at 21:25

## 2025-07-29 RX ADMIN — GABAPENTIN 300 MG: 300 CAPSULE ORAL at 17:03

## 2025-07-29 RX ADMIN — LEVOTHYROXINE SODIUM 75 MCG: 0.07 TABLET ORAL at 05:14

## 2025-07-29 RX ADMIN — AMLODIPINE BESYLATE 5 MG: 5 TABLET ORAL at 08:15

## 2025-07-30 PROCEDURE — 99231 SBSQ HOSP IP/OBS SF/LOW 25: CPT | Performed by: INTERNAL MEDICINE

## 2025-07-30 RX ADMIN — METFORMIN HYDROCHLORIDE 500 MG: 500 TABLET, FILM COATED ORAL at 08:23

## 2025-07-30 RX ADMIN — GABAPENTIN 300 MG: 300 CAPSULE ORAL at 08:23

## 2025-07-30 RX ADMIN — ZOLPIDEM TARTRATE 5 MG: 5 TABLET, FILM COATED ORAL at 20:49

## 2025-07-30 RX ADMIN — LEVOTHYROXINE SODIUM 75 MCG: 0.07 TABLET ORAL at 05:57

## 2025-07-30 RX ADMIN — PANTOPRAZOLE SODIUM 40 MG: 40 TABLET, DELAYED RELEASE ORAL at 05:57

## 2025-07-30 RX ADMIN — Medication 6 MG: at 20:46

## 2025-07-30 RX ADMIN — ACETAMINOPHEN 650 MG: 325 TABLET, FILM COATED ORAL at 17:07

## 2025-07-30 RX ADMIN — GABAPENTIN 300 MG: 300 CAPSULE ORAL at 17:07

## 2025-07-30 RX ADMIN — ATORVASTATIN CALCIUM 10 MG: 10 TABLET, FILM COATED ORAL at 17:07

## 2025-07-30 RX ADMIN — OLANZAPINE 10 MG: 10 TABLET, FILM COATED ORAL at 08:23

## 2025-07-30 RX ADMIN — AMLODIPINE BESYLATE 5 MG: 5 TABLET ORAL at 08:23

## 2025-07-30 RX ADMIN — ACETAMINOPHEN 650 MG: 325 TABLET, FILM COATED ORAL at 07:37

## 2025-07-30 RX ADMIN — OLANZAPINE 20 MG: 10 TABLET, FILM COATED ORAL at 21:13

## 2025-07-30 RX ADMIN — SENNOSIDES 8.6 MG: 8.6 TABLET, FILM COATED ORAL at 20:46

## 2025-07-30 RX ADMIN — METFORMIN HYDROCHLORIDE 500 MG: 500 TABLET, FILM COATED ORAL at 17:07

## 2025-07-30 RX ADMIN — DIVALPROEX SODIUM 2000 MG: 500 TABLET, FILM COATED, EXTENDED RELEASE ORAL at 20:45

## 2025-07-30 RX ADMIN — LORAZEPAM 0.5 MG: 0.5 TABLET ORAL at 12:14

## 2025-07-31 PROCEDURE — 99231 SBSQ HOSP IP/OBS SF/LOW 25: CPT | Performed by: INTERNAL MEDICINE

## 2025-07-31 RX ADMIN — METFORMIN HYDROCHLORIDE 500 MG: 500 TABLET, FILM COATED ORAL at 08:32

## 2025-07-31 RX ADMIN — METFORMIN HYDROCHLORIDE 500 MG: 500 TABLET, FILM COATED ORAL at 15:53

## 2025-07-31 RX ADMIN — AMLODIPINE BESYLATE 5 MG: 5 TABLET ORAL at 08:32

## 2025-07-31 RX ADMIN — OLANZAPINE 20 MG: 10 TABLET, FILM COATED ORAL at 20:44

## 2025-07-31 RX ADMIN — PANTOPRAZOLE SODIUM 40 MG: 40 TABLET, DELAYED RELEASE ORAL at 05:42

## 2025-07-31 RX ADMIN — ATORVASTATIN CALCIUM 10 MG: 10 TABLET, FILM COATED ORAL at 17:52

## 2025-07-31 RX ADMIN — OLANZAPINE 10 MG: 10 TABLET, FILM COATED ORAL at 08:32

## 2025-07-31 RX ADMIN — DIVALPROEX SODIUM 2000 MG: 500 TABLET, FILM COATED, EXTENDED RELEASE ORAL at 20:45

## 2025-07-31 RX ADMIN — SENNOSIDES 8.6 MG: 8.6 TABLET, FILM COATED ORAL at 20:45

## 2025-07-31 RX ADMIN — GABAPENTIN 300 MG: 300 CAPSULE ORAL at 17:52

## 2025-07-31 RX ADMIN — Medication 6 MG: at 20:45

## 2025-07-31 RX ADMIN — LEVOTHYROXINE SODIUM 75 MCG: 0.07 TABLET ORAL at 05:42

## 2025-07-31 RX ADMIN — ACETAMINOPHEN 650 MG: 325 TABLET, FILM COATED ORAL at 18:06

## 2025-07-31 RX ADMIN — GABAPENTIN 300 MG: 300 CAPSULE ORAL at 08:32

## 2025-08-01 PROCEDURE — 99231 SBSQ HOSP IP/OBS SF/LOW 25: CPT | Performed by: INTERNAL MEDICINE

## 2025-08-01 RX ADMIN — GABAPENTIN 300 MG: 300 CAPSULE ORAL at 17:00

## 2025-08-01 RX ADMIN — METFORMIN HYDROCHLORIDE 500 MG: 500 TABLET, FILM COATED ORAL at 16:12

## 2025-08-01 RX ADMIN — GABAPENTIN 300 MG: 300 CAPSULE ORAL at 08:15

## 2025-08-01 RX ADMIN — ATORVASTATIN CALCIUM 10 MG: 10 TABLET, FILM COATED ORAL at 17:00

## 2025-08-01 RX ADMIN — ZOLPIDEM TARTRATE 5 MG: 5 TABLET, FILM COATED ORAL at 21:41

## 2025-08-01 RX ADMIN — DIVALPROEX SODIUM 2000 MG: 500 TABLET, FILM COATED, EXTENDED RELEASE ORAL at 21:41

## 2025-08-01 RX ADMIN — PANTOPRAZOLE SODIUM 40 MG: 40 TABLET, DELAYED RELEASE ORAL at 05:49

## 2025-08-01 RX ADMIN — LEVOTHYROXINE SODIUM 75 MCG: 0.07 TABLET ORAL at 05:49

## 2025-08-01 RX ADMIN — Medication 6 MG: at 21:41

## 2025-08-01 RX ADMIN — ACETAMINOPHEN 650 MG: 325 TABLET, FILM COATED ORAL at 08:15

## 2025-08-01 RX ADMIN — METFORMIN HYDROCHLORIDE 500 MG: 500 TABLET, FILM COATED ORAL at 08:15

## 2025-08-01 RX ADMIN — OLANZAPINE 10 MG: 10 TABLET, FILM COATED ORAL at 08:15

## 2025-08-01 RX ADMIN — LORAZEPAM 0.5 MG: 0.5 TABLET ORAL at 05:50

## 2025-08-01 RX ADMIN — AMLODIPINE BESYLATE 5 MG: 5 TABLET ORAL at 08:15

## 2025-08-01 RX ADMIN — OLANZAPINE 20 MG: 10 TABLET, FILM COATED ORAL at 21:41

## 2025-08-01 RX ADMIN — ACETAMINOPHEN 650 MG: 325 TABLET, FILM COATED ORAL at 17:00

## 2025-08-01 RX ADMIN — SENNOSIDES 8.6 MG: 8.6 TABLET, FILM COATED ORAL at 21:42

## 2025-08-02 PROCEDURE — 99231 SBSQ HOSP IP/OBS SF/LOW 25: CPT | Performed by: INTERNAL MEDICINE

## 2025-08-02 RX ADMIN — ACETAMINOPHEN 650 MG: 325 TABLET, FILM COATED ORAL at 07:36

## 2025-08-02 RX ADMIN — GABAPENTIN 300 MG: 300 CAPSULE ORAL at 08:17

## 2025-08-02 RX ADMIN — Medication 6 MG: at 21:04

## 2025-08-02 RX ADMIN — METFORMIN HYDROCHLORIDE 500 MG: 500 TABLET, FILM COATED ORAL at 15:38

## 2025-08-02 RX ADMIN — ZOLPIDEM TARTRATE 5 MG: 5 TABLET, FILM COATED ORAL at 21:04

## 2025-08-02 RX ADMIN — METFORMIN HYDROCHLORIDE 500 MG: 500 TABLET, FILM COATED ORAL at 07:35

## 2025-08-02 RX ADMIN — DIVALPROEX SODIUM 2000 MG: 500 TABLET, FILM COATED, EXTENDED RELEASE ORAL at 21:04

## 2025-08-02 RX ADMIN — SENNOSIDES 8.6 MG: 8.6 TABLET, FILM COATED ORAL at 21:04

## 2025-08-02 RX ADMIN — OLANZAPINE 10 MG: 10 TABLET, FILM COATED ORAL at 08:17

## 2025-08-02 RX ADMIN — PANTOPRAZOLE SODIUM 40 MG: 40 TABLET, DELAYED RELEASE ORAL at 08:18

## 2025-08-02 RX ADMIN — ATORVASTATIN CALCIUM 10 MG: 10 TABLET, FILM COATED ORAL at 17:00

## 2025-08-02 RX ADMIN — LORAZEPAM 0.5 MG: 0.5 TABLET ORAL at 15:26

## 2025-08-02 RX ADMIN — GABAPENTIN 300 MG: 300 CAPSULE ORAL at 17:00

## 2025-08-02 RX ADMIN — ACETAMINOPHEN 650 MG: 325 TABLET, FILM COATED ORAL at 15:27

## 2025-08-02 RX ADMIN — OLANZAPINE 20 MG: 10 TABLET, FILM COATED ORAL at 21:04

## 2025-08-02 RX ADMIN — LEVOTHYROXINE SODIUM 75 MCG: 0.07 TABLET ORAL at 07:35

## 2025-08-02 RX ADMIN — AMLODIPINE BESYLATE 5 MG: 5 TABLET ORAL at 08:17

## 2025-08-03 LAB
ALBUMIN SERPL BCG-MCNC: 4 G/DL (ref 3.5–5)
ALP SERPL-CCNC: 77 U/L (ref 34–104)
ALT SERPL W P-5'-P-CCNC: 15 U/L (ref 7–52)
ANION GAP SERPL CALCULATED.3IONS-SCNC: 8 MMOL/L (ref 4–13)
AST SERPL W P-5'-P-CCNC: 18 U/L (ref 13–39)
BILIRUB SERPL-MCNC: 0.26 MG/DL (ref 0.2–1)
BUN SERPL-MCNC: 10 MG/DL (ref 5–25)
CALCIUM SERPL-MCNC: 8.9 MG/DL (ref 8.4–10.2)
CHLORIDE SERPL-SCNC: 97 MMOL/L (ref 96–108)
CO2 SERPL-SCNC: 26 MMOL/L (ref 21–32)
CREAT SERPL-MCNC: 0.5 MG/DL (ref 0.6–1.3)
ERYTHROCYTE [DISTWIDTH] IN BLOOD BY AUTOMATED COUNT: 13.6 % (ref 11.6–15.1)
GFR SERPL CREATININE-BSD FRML MDRD: 119 ML/MIN/1.73SQ M
GLUCOSE SERPL-MCNC: 125 MG/DL (ref 65–140)
HCT VFR BLD AUTO: 38.4 % (ref 36.5–49.3)
HGB BLD-MCNC: 12.5 G/DL (ref 12–17)
MCH RBC QN AUTO: 27.2 PG (ref 26.8–34.3)
MCHC RBC AUTO-ENTMCNC: 32.6 G/DL (ref 31.4–37.4)
MCV RBC AUTO: 84 FL (ref 82–98)
PLATELET # BLD AUTO: 207 THOUSANDS/UL (ref 149–390)
PMV BLD AUTO: 10 FL (ref 8.9–12.7)
POTASSIUM SERPL-SCNC: 4.4 MMOL/L (ref 3.5–5.3)
PROT SERPL-MCNC: 6.1 G/DL (ref 6.4–8.4)
RBC # BLD AUTO: 4.6 MILLION/UL (ref 3.88–5.62)
SODIUM SERPL-SCNC: 131 MMOL/L (ref 135–147)
WBC # BLD AUTO: 11.38 THOUSAND/UL (ref 4.31–10.16)

## 2025-08-03 PROCEDURE — 85027 COMPLETE CBC AUTOMATED: CPT | Performed by: INTERNAL MEDICINE

## 2025-08-03 PROCEDURE — 80053 COMPREHEN METABOLIC PANEL: CPT | Performed by: INTERNAL MEDICINE

## 2025-08-03 PROCEDURE — 99231 SBSQ HOSP IP/OBS SF/LOW 25: CPT | Performed by: INTERNAL MEDICINE

## 2025-08-03 RX ADMIN — OLANZAPINE 20 MG: 10 TABLET, FILM COATED ORAL at 21:16

## 2025-08-03 RX ADMIN — ACETAMINOPHEN 650 MG: 325 TABLET, FILM COATED ORAL at 05:45

## 2025-08-03 RX ADMIN — PANTOPRAZOLE SODIUM 40 MG: 40 TABLET, DELAYED RELEASE ORAL at 05:45

## 2025-08-03 RX ADMIN — DIVALPROEX SODIUM 2000 MG: 500 TABLET, FILM COATED, EXTENDED RELEASE ORAL at 21:16

## 2025-08-03 RX ADMIN — GABAPENTIN 300 MG: 300 CAPSULE ORAL at 17:12

## 2025-08-03 RX ADMIN — ATORVASTATIN CALCIUM 10 MG: 10 TABLET, FILM COATED ORAL at 17:12

## 2025-08-03 RX ADMIN — METFORMIN HYDROCHLORIDE 500 MG: 500 TABLET, FILM COATED ORAL at 15:51

## 2025-08-03 RX ADMIN — Medication 6 MG: at 21:16

## 2025-08-03 RX ADMIN — AMLODIPINE BESYLATE 5 MG: 5 TABLET ORAL at 08:21

## 2025-08-03 RX ADMIN — ZOLPIDEM TARTRATE 5 MG: 5 TABLET, FILM COATED ORAL at 21:16

## 2025-08-03 RX ADMIN — OLANZAPINE 10 MG: 10 TABLET, FILM COATED ORAL at 08:21

## 2025-08-03 RX ADMIN — METFORMIN HYDROCHLORIDE 500 MG: 500 TABLET, FILM COATED ORAL at 08:21

## 2025-08-03 RX ADMIN — SENNOSIDES 8.6 MG: 8.6 TABLET, FILM COATED ORAL at 21:16

## 2025-08-03 RX ADMIN — GABAPENTIN 300 MG: 300 CAPSULE ORAL at 08:21

## 2025-08-03 RX ADMIN — LEVOTHYROXINE SODIUM 75 MCG: 0.07 TABLET ORAL at 05:45

## 2025-08-04 PROCEDURE — 99231 SBSQ HOSP IP/OBS SF/LOW 25: CPT | Performed by: INTERNAL MEDICINE

## 2025-08-04 RX ADMIN — SENNOSIDES 8.6 MG: 8.6 TABLET, FILM COATED ORAL at 21:32

## 2025-08-04 RX ADMIN — LEVOTHYROXINE SODIUM 75 MCG: 0.07 TABLET ORAL at 05:51

## 2025-08-04 RX ADMIN — AMLODIPINE BESYLATE 5 MG: 5 TABLET ORAL at 08:08

## 2025-08-04 RX ADMIN — GABAPENTIN 300 MG: 300 CAPSULE ORAL at 08:08

## 2025-08-04 RX ADMIN — PANTOPRAZOLE SODIUM 40 MG: 40 TABLET, DELAYED RELEASE ORAL at 05:51

## 2025-08-04 RX ADMIN — DIVALPROEX SODIUM 2000 MG: 500 TABLET, FILM COATED, EXTENDED RELEASE ORAL at 21:32

## 2025-08-04 RX ADMIN — METFORMIN HYDROCHLORIDE 500 MG: 500 TABLET, FILM COATED ORAL at 17:24

## 2025-08-04 RX ADMIN — METFORMIN HYDROCHLORIDE 500 MG: 500 TABLET, FILM COATED ORAL at 08:08

## 2025-08-04 RX ADMIN — OLANZAPINE 20 MG: 10 TABLET, FILM COATED ORAL at 21:32

## 2025-08-04 RX ADMIN — ATORVASTATIN CALCIUM 10 MG: 10 TABLET, FILM COATED ORAL at 17:24

## 2025-08-04 RX ADMIN — Medication 6 MG: at 21:32

## 2025-08-04 RX ADMIN — OLANZAPINE 10 MG: 10 TABLET, FILM COATED ORAL at 08:08

## 2025-08-04 RX ADMIN — GABAPENTIN 300 MG: 300 CAPSULE ORAL at 17:24

## 2025-08-05 PROCEDURE — 99231 SBSQ HOSP IP/OBS SF/LOW 25: CPT | Performed by: INTERNAL MEDICINE

## 2025-08-05 RX ADMIN — PANTOPRAZOLE SODIUM 40 MG: 40 TABLET, DELAYED RELEASE ORAL at 05:13

## 2025-08-05 RX ADMIN — GABAPENTIN 300 MG: 300 CAPSULE ORAL at 08:01

## 2025-08-05 RX ADMIN — METFORMIN HYDROCHLORIDE 500 MG: 500 TABLET, FILM COATED ORAL at 08:01

## 2025-08-05 RX ADMIN — GABAPENTIN 300 MG: 300 CAPSULE ORAL at 17:06

## 2025-08-05 RX ADMIN — ATORVASTATIN CALCIUM 10 MG: 10 TABLET, FILM COATED ORAL at 17:06

## 2025-08-05 RX ADMIN — DIVALPROEX SODIUM 2000 MG: 500 TABLET, FILM COATED, EXTENDED RELEASE ORAL at 21:11

## 2025-08-05 RX ADMIN — Medication 6 MG: at 21:10

## 2025-08-05 RX ADMIN — LEVOTHYROXINE SODIUM 75 MCG: 0.07 TABLET ORAL at 05:13

## 2025-08-05 RX ADMIN — SENNOSIDES 8.6 MG: 8.6 TABLET, FILM COATED ORAL at 21:10

## 2025-08-05 RX ADMIN — METFORMIN HYDROCHLORIDE 500 MG: 500 TABLET, FILM COATED ORAL at 17:06

## 2025-08-05 RX ADMIN — OLANZAPINE 10 MG: 10 TABLET, FILM COATED ORAL at 08:00

## 2025-08-05 RX ADMIN — AMLODIPINE BESYLATE 5 MG: 5 TABLET ORAL at 08:01

## 2025-08-05 RX ADMIN — OLANZAPINE 20 MG: 10 TABLET, FILM COATED ORAL at 21:10

## 2025-08-06 PROCEDURE — 99231 SBSQ HOSP IP/OBS SF/LOW 25: CPT | Performed by: INTERNAL MEDICINE

## 2025-08-06 RX ADMIN — GABAPENTIN 300 MG: 300 CAPSULE ORAL at 17:30

## 2025-08-06 RX ADMIN — AMLODIPINE BESYLATE 5 MG: 5 TABLET ORAL at 08:07

## 2025-08-06 RX ADMIN — ACETAMINOPHEN 650 MG: 325 TABLET, FILM COATED ORAL at 08:34

## 2025-08-06 RX ADMIN — PANTOPRAZOLE SODIUM 40 MG: 40 TABLET, DELAYED RELEASE ORAL at 05:53

## 2025-08-06 RX ADMIN — OLANZAPINE 20 MG: 10 TABLET, FILM COATED ORAL at 21:18

## 2025-08-06 RX ADMIN — METFORMIN HYDROCHLORIDE 500 MG: 500 TABLET, FILM COATED ORAL at 16:46

## 2025-08-06 RX ADMIN — METFORMIN HYDROCHLORIDE 500 MG: 500 TABLET, FILM COATED ORAL at 08:07

## 2025-08-06 RX ADMIN — Medication 6 MG: at 21:18

## 2025-08-06 RX ADMIN — LEVOTHYROXINE SODIUM 75 MCG: 0.07 TABLET ORAL at 05:53

## 2025-08-06 RX ADMIN — SENNOSIDES 8.6 MG: 8.6 TABLET, FILM COATED ORAL at 21:18

## 2025-08-06 RX ADMIN — GABAPENTIN 300 MG: 300 CAPSULE ORAL at 08:07

## 2025-08-06 RX ADMIN — OLANZAPINE 10 MG: 10 TABLET, FILM COATED ORAL at 08:07

## 2025-08-06 RX ADMIN — ATORVASTATIN CALCIUM 10 MG: 10 TABLET, FILM COATED ORAL at 17:30

## 2025-08-06 RX ADMIN — DIVALPROEX SODIUM 2000 MG: 500 TABLET, FILM COATED, EXTENDED RELEASE ORAL at 21:18

## 2025-08-07 PROCEDURE — 99231 SBSQ HOSP IP/OBS SF/LOW 25: CPT | Performed by: INTERNAL MEDICINE

## 2025-08-07 RX ADMIN — Medication 6 MG: at 21:00

## 2025-08-07 RX ADMIN — LEVOTHYROXINE SODIUM 75 MCG: 0.07 TABLET ORAL at 05:52

## 2025-08-07 RX ADMIN — METFORMIN HYDROCHLORIDE 500 MG: 500 TABLET, FILM COATED ORAL at 17:10

## 2025-08-07 RX ADMIN — ZOLPIDEM TARTRATE 5 MG: 5 TABLET, FILM COATED ORAL at 20:58

## 2025-08-07 RX ADMIN — PANTOPRAZOLE SODIUM 40 MG: 40 TABLET, DELAYED RELEASE ORAL at 05:52

## 2025-08-07 RX ADMIN — ATORVASTATIN CALCIUM 10 MG: 10 TABLET, FILM COATED ORAL at 17:10

## 2025-08-07 RX ADMIN — DIVALPROEX SODIUM 2000 MG: 500 TABLET, FILM COATED, EXTENDED RELEASE ORAL at 21:00

## 2025-08-07 RX ADMIN — GABAPENTIN 300 MG: 300 CAPSULE ORAL at 08:16

## 2025-08-07 RX ADMIN — OLANZAPINE 10 MG: 10 TABLET, FILM COATED ORAL at 08:16

## 2025-08-07 RX ADMIN — METFORMIN HYDROCHLORIDE 500 MG: 500 TABLET, FILM COATED ORAL at 08:16

## 2025-08-07 RX ADMIN — AMLODIPINE BESYLATE 5 MG: 5 TABLET ORAL at 08:16

## 2025-08-07 RX ADMIN — OLANZAPINE 20 MG: 10 TABLET, FILM COATED ORAL at 21:00

## 2025-08-07 RX ADMIN — SENNOSIDES 8.6 MG: 8.6 TABLET, FILM COATED ORAL at 21:00

## 2025-08-07 RX ADMIN — GABAPENTIN 300 MG: 300 CAPSULE ORAL at 17:10

## 2025-08-08 VITALS
SYSTOLIC BLOOD PRESSURE: 121 MMHG | BODY MASS INDEX: 37.02 KG/M2 | HEIGHT: 67 IN | OXYGEN SATURATION: 98 % | TEMPERATURE: 97.6 F | DIASTOLIC BLOOD PRESSURE: 84 MMHG | HEART RATE: 66 BPM | WEIGHT: 235.89 LBS | RESPIRATION RATE: 18 BRPM

## 2025-08-08 PROCEDURE — 99239 HOSP IP/OBS DSCHRG MGMT >30: CPT | Performed by: INTERNAL MEDICINE

## 2025-08-08 RX ORDER — PANTOPRAZOLE SODIUM 40 MG/1
40 TABLET, DELAYED RELEASE ORAL
Status: CANCELLED | OUTPATIENT
Start: 2025-08-09

## 2025-08-08 RX ORDER — AMLODIPINE BESYLATE 5 MG/1
5 TABLET ORAL DAILY
Status: CANCELLED | OUTPATIENT
Start: 2025-08-09

## 2025-08-08 RX ORDER — BISACODYL 10 MG
10 SUPPOSITORY, RECTAL RECTAL DAILY PRN
Status: CANCELLED | OUTPATIENT
Start: 2025-08-08

## 2025-08-08 RX ORDER — CLONIDINE HYDROCHLORIDE 0.1 MG/1
0.2 TABLET ORAL EVERY 12 HOURS SCHEDULED
Status: CANCELLED | OUTPATIENT
Start: 2025-08-08

## 2025-08-08 RX ORDER — ACETAMINOPHEN 325 MG/1
650 TABLET ORAL EVERY 4 HOURS PRN
Status: CANCELLED | OUTPATIENT
Start: 2025-08-08

## 2025-08-08 RX ORDER — OLANZAPINE 5 MG/1
5 TABLET, FILM COATED ORAL
Status: CANCELLED | OUTPATIENT
Start: 2025-08-08

## 2025-08-08 RX ORDER — DIVALPROEX SODIUM 500 MG/1
2000 TABLET, FILM COATED, EXTENDED RELEASE ORAL
Status: CANCELLED | OUTPATIENT
Start: 2025-08-08

## 2025-08-08 RX ORDER — PROPRANOLOL HYDROCHLORIDE 10 MG/1
5 TABLET ORAL EVERY 8 HOURS PRN
Status: CANCELLED | OUTPATIENT
Start: 2025-08-08

## 2025-08-08 RX ORDER — MAGNESIUM HYDROXIDE/ALUMINUM HYDROXICE/SIMETHICONE 120; 1200; 1200 MG/30ML; MG/30ML; MG/30ML
30 SUSPENSION ORAL EVERY 4 HOURS PRN
Status: CANCELLED | OUTPATIENT
Start: 2025-08-08

## 2025-08-08 RX ORDER — OLANZAPINE 2.5 MG/1
2.5 TABLET, FILM COATED ORAL
Status: CANCELLED | OUTPATIENT
Start: 2025-08-08

## 2025-08-08 RX ORDER — OLANZAPINE 10 MG/1
10 TABLET, FILM COATED ORAL EVERY MORNING
Status: CANCELLED | OUTPATIENT
Start: 2025-08-09

## 2025-08-08 RX ORDER — SENNOSIDES 8.6 MG
1 TABLET ORAL
Status: CANCELLED | OUTPATIENT
Start: 2025-08-08

## 2025-08-08 RX ORDER — ATORVASTATIN CALCIUM 10 MG/1
10 TABLET, FILM COATED ORAL EVERY EVENING
Status: CANCELLED | OUTPATIENT
Start: 2025-08-08

## 2025-08-08 RX ORDER — ZOLPIDEM TARTRATE 5 MG/1
5 TABLET ORAL
Status: CANCELLED | OUTPATIENT
Start: 2025-08-08

## 2025-08-08 RX ORDER — BENZTROPINE MESYLATE 0.5 MG/1
0.5 TABLET ORAL
Status: CANCELLED | OUTPATIENT
Start: 2025-08-08

## 2025-08-08 RX ORDER — HYDROXYZINE HYDROCHLORIDE 25 MG/1
25 TABLET, FILM COATED ORAL
Status: CANCELLED | OUTPATIENT
Start: 2025-08-08

## 2025-08-08 RX ORDER — LORAZEPAM 0.5 MG/1
0.5 TABLET ORAL
Status: CANCELLED | OUTPATIENT
Start: 2025-08-08

## 2025-08-08 RX ORDER — OLANZAPINE 10 MG/2ML
5 INJECTION, POWDER, FOR SOLUTION INTRAMUSCULAR
Status: CANCELLED | OUTPATIENT
Start: 2025-08-08

## 2025-08-08 RX ORDER — HYDROXYZINE HYDROCHLORIDE 50 MG/1
50 TABLET, FILM COATED ORAL
Status: CANCELLED | OUTPATIENT
Start: 2025-08-08

## 2025-08-08 RX ORDER — LEVOTHYROXINE SODIUM 75 UG/1
75 TABLET ORAL
Status: CANCELLED | OUTPATIENT
Start: 2025-08-09

## 2025-08-08 RX ORDER — BENZTROPINE MESYLATE 1 MG/ML
1 INJECTION, SOLUTION INTRAMUSCULAR; INTRAVENOUS
Status: CANCELLED | OUTPATIENT
Start: 2025-08-08

## 2025-08-08 RX ORDER — ACETAMINOPHEN 325 MG/1
975 TABLET ORAL EVERY 6 HOURS PRN
Status: CANCELLED | OUTPATIENT
Start: 2025-08-08

## 2025-08-08 RX ORDER — LORAZEPAM 2 MG/ML
1 INJECTION INTRAMUSCULAR
Status: CANCELLED | OUTPATIENT
Start: 2025-08-08

## 2025-08-08 RX ORDER — AMOXICILLIN 250 MG
1 CAPSULE ORAL DAILY PRN
Status: CANCELLED | OUTPATIENT
Start: 2025-08-08

## 2025-08-08 RX ORDER — CLONIDINE HYDROCHLORIDE 0.1 MG/1
0.2 TABLET ORAL EVERY 12 HOURS SCHEDULED
Status: DISCONTINUED | OUTPATIENT
Start: 2025-08-08 | End: 2025-08-08 | Stop reason: HOSPADM

## 2025-08-08 RX ORDER — OLANZAPINE 10 MG/1
20 TABLET, FILM COATED ORAL
Status: CANCELLED | OUTPATIENT
Start: 2025-08-08

## 2025-08-08 RX ORDER — POLYETHYLENE GLYCOL 3350 17 G/17G
17 POWDER, FOR SOLUTION ORAL DAILY PRN
Status: CANCELLED | OUTPATIENT
Start: 2025-08-08

## 2025-08-08 RX ADMIN — LEVOTHYROXINE SODIUM 75 MCG: 0.07 TABLET ORAL at 05:13

## 2025-08-08 RX ADMIN — ACETAMINOPHEN 650 MG: 325 TABLET, FILM COATED ORAL at 08:15

## 2025-08-08 RX ADMIN — GABAPENTIN 300 MG: 300 CAPSULE ORAL at 08:14

## 2025-08-08 RX ADMIN — PANTOPRAZOLE SODIUM 40 MG: 40 TABLET, DELAYED RELEASE ORAL at 05:13

## 2025-08-08 RX ADMIN — METFORMIN HYDROCHLORIDE 500 MG: 500 TABLET, FILM COATED ORAL at 08:14

## 2025-08-08 RX ADMIN — OLANZAPINE 10 MG: 10 TABLET, FILM COATED ORAL at 08:14

## 2025-08-08 RX ADMIN — AMLODIPINE BESYLATE 5 MG: 5 TABLET ORAL at 08:15

## 2025-08-09 PROBLEM — E78.2 MIXED HYPERLIPIDEMIA: Status: ACTIVE | Noted: 2025-08-09

## 2025-08-09 PROBLEM — Z00.8 MEDICAL CLEARANCE FOR PSYCHIATRIC ADMISSION: Status: ACTIVE | Noted: 2019-07-24
